# Patient Record
Sex: FEMALE | Race: BLACK OR AFRICAN AMERICAN | NOT HISPANIC OR LATINO | URBAN - METROPOLITAN AREA
[De-identification: names, ages, dates, MRNs, and addresses within clinical notes are randomized per-mention and may not be internally consistent; named-entity substitution may affect disease eponyms.]

---

## 2017-01-07 ENCOUNTER — EMERGENCY (EMERGENCY)
Facility: HOSPITAL | Age: 37
LOS: 1 days | Discharge: HOME | End: 2017-01-07
Attending: EMERGENCY MEDICINE | Admitting: EMERGENCY MEDICINE
Payer: MEDICAID

## 2017-01-07 VITALS
OXYGEN SATURATION: 99 % | TEMPERATURE: 98 F | RESPIRATION RATE: 16 BRPM | DIASTOLIC BLOOD PRESSURE: 74 MMHG | SYSTOLIC BLOOD PRESSURE: 122 MMHG | HEART RATE: 89 BPM

## 2017-01-07 VITALS
RESPIRATION RATE: 16 BRPM | DIASTOLIC BLOOD PRESSURE: 77 MMHG | OXYGEN SATURATION: 100 % | HEART RATE: 90 BPM | SYSTOLIC BLOOD PRESSURE: 133 MMHG | TEMPERATURE: 98 F

## 2017-01-07 PROCEDURE — 90792 PSYCH DIAG EVAL W/MED SRVCS: CPT | Mod: GC

## 2017-01-07 PROCEDURE — 99284 EMERGENCY DEPT VISIT MOD MDM: CPT

## 2017-01-07 RX ORDER — FAMOTIDINE 10 MG/ML
20 INJECTION INTRAVENOUS ONCE
Qty: 0 | Refills: 0 | Status: COMPLETED | OUTPATIENT
Start: 2017-01-07 | End: 2017-01-07

## 2017-01-07 RX ADMIN — FAMOTIDINE 20 MILLIGRAM(S): 10 INJECTION INTRAVENOUS at 11:47

## 2017-01-07 NOTE — ED BEHAVIORAL HEALTH ASSESSMENT NOTE - OTHER PAST PSYCHIATRIC HISTORY (INCLUDE DETAILS REGARDING ONSET, COURSE OF ILLNESS, INPATIENT/OUTPATIENT TREATMENT)
Hx of >20 inpatient psychiatric admissions, with last in [facility] (08/08 - 09/01/2016). 3 prior suicide attempts (last in 2012).  History of medication non-compliance. Multiple [facility] visits.

## 2017-01-07 NOTE — ED BEHAVIORAL HEALTH ASSESSMENT NOTE - DETAILS
Poor response to Geodon; Depakote (Increased ammonia levels) Tattoo observed on left forearm; As per chart tattoo on back of neck Depakote Patient aware

## 2017-01-07 NOTE — ED BEHAVIORAL HEALTH ASSESSMENT NOTE - RISK ASSESSMENT
Patient is currently at low risk. Patient has history of chronic suicidality with 3 prior attempts, however does not have recent SIIP. Patient does not have HIIP or any recent aggression/violence. Patient has been behaviorally safe. Patient has significant protective factors include, motivation for medication compliance, no access to firearms, no global insomnia, no recent suicidality, supportive relationships, supervised housing,  no HIIP, no aggressive/violence ideation, future orientation, ability to cope with stress, engaging in discharge and safety planning, motivation to participate in outpatient treatment, access to care, systemwide resources.

## 2017-01-07 NOTE — ED BEHAVIORAL HEALTH ASSESSMENT NOTE - CURRENT MEDICATION
Lithium 300 mg PO tid; Proventil 2 Puffs QID; Famotidine 450 mg PO QAM; Trileptal PO 600mg BID; Zyprexa 20mg PO QHS

## 2017-01-07 NOTE — ED BEHAVIORAL HEALTH ASSESSMENT NOTE - SUICIDE PROTECTIVE FACTORS
Supportive social network or family/Fear of death or dying due to pain/suffering/Identifies reasons for living/Future oriented

## 2017-01-07 NOTE — ED BEHAVIORAL HEALTH ASSESSMENT NOTE - SAFETY PLAN DETAILS
Patient instructed to return to the ED or call 911 if patient experiences SI, HI, hopelessness, worsening of symptoms or has any other concerns.

## 2017-01-07 NOTE — ED PROVIDER NOTE - PMH
Asthma    Bipolar Disorder    Borderline Personality Disorder    Cholelithiasis    Depression    Fibroids    GERD (Gastroesophageal Reflux Disease)    Obesity
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
## 2017-01-07 NOTE — ED ADULT TRIAGE NOTE - CHIEF COMPLAINT QUOTE
aggressive behavior at breakfast in [place] pt is calm in [facility] bay. As per EMS pt threw a chair against the wall spoke with  MD states to send [facility] pt to

## 2017-01-07 NOTE — ED PROVIDER NOTE - MEDICAL DECISION MAKING DETAILS
35 y/o F brought in from [place] after an episode of aggression.  Pt now calm.  Pt just ate breakfast, requesting home dose of pepcid for acid reflux.  Exam benign.  Pepcid, med clear for psych eval.
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
## 2017-01-07 NOTE — ED ADULT NURSE NOTE - CHIEF COMPLAINT QUOTE
aggressive behavior at breakfast in [place] pt is calm in [facility] bay. As per EMS pt threw a chair against the wall spoke with  MD states to send [facility] pt to

## 2017-01-07 NOTE — ED BEHAVIORAL HEALTH ASSESSMENT NOTE - DESCRIPTION
Patient was calm and cooperative in the ED and did not exhibit any aggression. Patient did not require any PRN medications or any physical restraints. GERD, Asthma, Calculus of Bladder without cholecystitis without obstruction. Patient stated before she does not have knowledge of biological family; Lives in [facility] for over 2 years; Receives SSI/SSD; Stated has "some college".
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
## 2017-02-04 ENCOUNTER — EMERGENCY (EMERGENCY)
Facility: HOSPITAL | Age: 37
LOS: 1 days | Discharge: HOME | End: 2017-02-04
Admitting: EMERGENCY MEDICINE
Payer: MEDICAID

## 2017-02-04 VITALS
HEART RATE: 79 BPM | TEMPERATURE: 99 F | DIASTOLIC BLOOD PRESSURE: 92 MMHG | OXYGEN SATURATION: 100 % | RESPIRATION RATE: 16 BRPM | SYSTOLIC BLOOD PRESSURE: 141 MMHG

## 2017-02-04 PROCEDURE — 99283 EMERGENCY DEPT VISIT LOW MDM: CPT

## 2017-02-04 PROCEDURE — 90792 PSYCH DIAG EVAL W/MED SRVCS: CPT

## 2017-02-04 NOTE — ED ADULT TRIAGE NOTE - CHIEF COMPLAINT QUOTE
Pt presents from [facility], states she got frustrated and broke a few of her own items. Pt currently calm and cooperative.

## 2017-02-04 NOTE — ED BEHAVIORAL HEALTH ASSESSMENT NOTE - DESCRIPTION
Patient was calm and cooperative in the ED and did not exhibit any aggression. Patient did not require any PRN medications or any physical restraints. GERD, Asthma, Calculus of Bladder without cholecystitis without obstruction. Patient stated before she does not have knowledge of biological family; Lives in [facility] for over 2 years; Receives SSI/SSD; Stated has "some college".

## 2017-02-04 NOTE — ED PROVIDER NOTE - MEDICAL DECISION MAKING DETAILS
37 y/o F hx Asthma, Bipolar, BPD, GERD, Obesity   No evidence of physical injuries, broken skin or deformities.   Medical evaluation performed. There is no clinical evidence of intoxication or any acute medical problem requiring immediate intervention. Patient is awaiting psychiatric consultation. Final disposition will be determined by psychiatrist.

## 2017-02-04 NOTE — ED ADULT NURSE NOTE - CHIEF COMPLAINT QUOTE
Pt presents from [facility], states she got frustrated and broke a few of her own items. Pt currently calm and cooperative.

## 2017-02-04 NOTE — ED BEHAVIORAL HEALTH ASSESSMENT NOTE - SUMMARY
This is a 36 year-old  female, non caregiver, single, domiciled on [place] grounds in a group home, unemployed (on SSD/I), with past psychiatric history of Bipolar Affective Disorder, borderline personality disorder, cannabis abuse, multiple inpatient psychiatric hospitalizations (>20), with frequent visits to the [facility] (well-known to staff). She is compliant with medication and outpatient referrals. She has a history of 3 prior suicide attempts (last in 2012 via OD), recurrent suicidal gestures and suicidal ideation (none recently), history of violence, no history of arrests or access to weapons, was referred by [place] staff and presents to [facility] for being agitated. Patient is well-known to [facility] staff per frequent multiple visits (last 09/23/2016), where she is calm and cooperative. Patient has a chronic history of such complaints and requests, of which this visit is historically congruent. Patient was able to follow simple directions and participate in the interview.  Patient has no recent safety threatening / jeopardizing behaviors I.e. violence towards people; self-harm; suicidality. Patient and staff do not have any safety concerns for patient/others. Patient is future oriented, and motivated to continue medications as prescribed. Patient presentation does not warrant in patient admission, and agreeable to discharge plan of continuing medications at the Residence, and to follow up with AOT. This is a 36 year-old  female,  single, domiciled on [place] grounds in a group home, unemployed (on SSD/I), with past psychiatric history of Bipolar Affective Disorder, borderline personality disorder, cannabis abuse, multiple inpatient psychiatric hospitalizations (>20), with frequent visits to the [facility] (well-known to staff).  Referred by [place] staff and presents to [facility] after breaking a chair and bottle in her room due to frustration over not being able to more to a independent living situation.  She is currently calm, cooperative, free of SI/HI, free of psychosis and with no evidence of an acute mood episode.  She is fully compliant with meds, followed by AOT and ACT.  Pt remorseful of her behavior, understands that housing decision needs to be made with ACT and AOT.  Pt will be d/c to residence with follow up with  and ACT.

## 2017-02-04 NOTE — ED BEHAVIORAL HEALTH ASSESSMENT NOTE - HPI (INCLUDE ILLNESS QUALITY, SEVERITY, DURATION, TIMING, CONTEXT, MODIFYING FACTORS, ASSOCIATED SIGNS AND SYMPTOMS)
37 yo female with long hx of bipolar d/o, multiple hospitalizations, followed by ACT and AOT, resident of [facility] after becoming agitated and breaking a chair and glass bottle 35 yo female with long hx of bipolar d/o, multiple hospitalizations, followed by ACT and AOT, [facility] after becoming agitated and breaking a chair and glass bottle in her room.  She reports increasing frustration b/c she no longer wants to live at [facility] and move into a more independent housing situation.  She feels that no once including her  is listening to her wishes.  In order to alleviate her frustration she broke items.  Though she realizes her behavior is not appropriate, she feels justified due to her situation and the fact that the items she broke were her own.  In the ER she is calm, cooperative, charming.  She reports being psychiatrically stable, free of mood sympomts, free of SI/HI and psychotic symptoms.  She reports full compliance with meds and rules of the house.  She reports plans to go back to school to better herself and get a part time job.    As per  Nancy pt has been asking to be d/c to be on her own, but has advised that next steps need to be in conjuction with AOT and ACT.  She has contacted both teams and is awaiting call back.  She reports pt being stable and compliant with treatment

## 2017-02-04 NOTE — ED BEHAVIORAL HEALTH ASSESSMENT NOTE - DETAILS
Poor response to Geodon; Depakote (Increased ammonia levels) Tattoo observed on left forearm; As per chart tattoo on back of neck Depakote [facility] notified of d/c

## 2017-02-04 NOTE — ED PROVIDER NOTE - OBJECTIVE STATEMENT
35 y/o F     Admits to not sleeping well. Denies punching or kicking any objects. Denies pain, SOB, fever , chills, chest/ abdominal discomfort.  Denies any thoughts  or intention to harm self or others. Denies AH/VH. Denies  use of alcohol or illicit drugs 35 y/o F hx Asthma, Bipolar, BPD, GERD, Obesity presents to  ER w c/o agitation and aggression. States that she broke a glass out of frustration because she needs a new residence. Admits to not sleeping well. Denies punching or kicking any objects. Denies pain, SOB, fever , chills, chest/ abdominal discomfort.  Denies any thoughts  or intention to harm self or others. Denies AH/VH. Denies  use of alcohol or illicit drugs  LNMP - 1/20/2017

## 2017-02-04 NOTE — ED BEHAVIORAL HEALTH ASSESSMENT NOTE - SUICIDE PROTECTIVE FACTORS
Supportive social network or family/Fear of death or dying due to pain/suffering/Future oriented/Identifies reasons for living Future oriented/Fear of death or dying due to pain/suffering/High spirituality/Identifies reasons for living/Positive therapeutic relationships/Supportive social network or family

## 2017-02-04 NOTE — ED BEHAVIORAL HEALTH ASSESSMENT NOTE - OTHER PAST PSYCHIATRIC HISTORY (INCLUDE DETAILS REGARDING ONSET, COURSE OF ILLNESS, INPATIENT/OUTPATIENT TREATMENT)
Hx of >20 inpatient psychiatric admissions, with last in [facility] (08/08 - 09/01/2016). 3 prior suicide attempts (last in 2012).  History of medication non-compliance. Multiple [facility] visits.

## 2017-02-04 NOTE — ED BEHAVIORAL HEALTH ASSESSMENT NOTE - RISK ASSESSMENT
Patient is currently at low risk. Patient has history of chronic suicidality with 3 prior attempts, however does not have recent SIIP. Patient does not have HIIP or any recent aggression/violence. Patient has been behaviorally safe. Patient has significant protective factors include, motivation for medication compliance, no access to firearms, no global insomnia, no recent suicidality, supportive relationships, supervised housing,  no HIIP, no aggressive/violence ideation, future orientation, ability to cope with stress, engaging in discharge and safety planning, motivation to participate in outpatient treatment, access to care, systemwide resources. Patient is currently at low risk. Patient has history of chronic suicidality with 3 prior attempts, however does not have recent SIIP. Patient does not have HIIP or any recent aggression/violence. Patient has been behaviorally safe. Patient has significant protective factors which  include, future orientation, motivation for medication compliance, no access to firearms, no global insomnia, no recent suicidality, supportive relationships, supervised housing,  no HIIP, no aggressive/violence ideation, future orientation, ability to cope with stress, engaging in discharge and safety planning, motivation to participate in outpatient treatment, access to care, systemwide resources.

## 2017-02-04 NOTE — ED ADULT NURSE NOTE - OBJECTIVE STATEMENT
Received pt in   pt calm & cooperative in nad denies Si/Hi/AVh at present eval on going. [facility]   pt calm & cooperative in nad denies Si/Hi/AVh at present eval on going.

## 2017-02-13 ENCOUNTER — EMERGENCY (EMERGENCY)
Facility: HOSPITAL | Age: 37
LOS: 1 days | Discharge: HOME | End: 2017-02-13
Attending: EMERGENCY MEDICINE | Admitting: EMERGENCY MEDICINE
Payer: SELF-PAY

## 2017-02-13 VITALS
TEMPERATURE: 99 F | SYSTOLIC BLOOD PRESSURE: 148 MMHG | HEART RATE: 89 BPM | OXYGEN SATURATION: 100 % | DIASTOLIC BLOOD PRESSURE: 87 MMHG | RESPIRATION RATE: 20 BRPM

## 2017-02-13 PROCEDURE — 90792 PSYCH DIAG EVAL W/MED SRVCS: CPT

## 2017-02-13 PROCEDURE — 99284 EMERGENCY DEPT VISIT MOD MDM: CPT

## 2017-02-13 NOTE — ED BEHAVIORAL HEALTH ASSESSMENT NOTE - CURRENT MEDICATION
Lithium 300 mg PO tid; Proventil 2 Puffs QID; Famotidine 450 mg PO QAM; Trileptal PO 300mg BID; Zyprexa 20mg PO QHS. Per staff has not been able to fill meds this month due to an insurance lapse.

## 2017-02-13 NOTE — ED BEHAVIORAL HEALTH NOTE - BEHAVIORAL HEALTH NOTE
Collateral obtained from Ambrosio Guallpa ([phone]), one of the  at patient's residence. He reports that earlier this morning in the dining room, patient was observed yelling for "no apparent reason." Informant reports that patient allegedly picked up a chair but was able to calm herself down, place the chair back on the floor and subsequently went to her room. Patient reportedly continued screaming but was later observed to be in behavioral control. Informant denies any observed or reported physical aggression. In recent days, patient has been at her baseline with no observed or reported verbal or physical aggression. He states that this is her baseline, denies threatening, bizarre or disorganized behaviors. Patient has been reportedly eating and sleeping well. She has been attending to ADLs. Patient has been social at the residence, goes into the community daily. No evidence of isolative or withdrawn behaviors at the residence per collateral. She has not been compliant with medications since the beginning of the month due to issues with her insurance. Patient is due for psychiatric follow up and lab work. He denies any acute safety concerns.
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
## 2017-02-13 NOTE — ED BEHAVIORAL HEALTH ASSESSMENT NOTE - RISK ASSESSMENT
Patient has history of chronic suicidality with 3 prior attempts, however does not have recent SIIP. Patient does not have HIIP or any recent aggression/violence. Patient has been behaviorally safe. Patient has significant protective factors include no access to firearms, no global insomnia, no acute anxiety, no trina/psychosis, no recent suicidality/self injury, supportive relationships, supervised housing, no SHIIP, no aggressive/violence ideation, no current substance abuse, future orientation, engaging in discharge and safety planning, motivation to participate in outpatient treatment, access to care, systemwide resources. Acute risk appears low, no evidence of elevated imminent risk. Appropriate for outpatient level of care at this time.

## 2017-02-13 NOTE — ED BEHAVIORAL HEALTH ASSESSMENT NOTE - DESCRIPTION
Patient was calm and cooperative in the ED and did not exhibit any aggression. Patient did not require any PRN medications or any physical restraints. GERD, Asthma, Calculus of Bladder without cholecystitis without obstruction. Patient stated before she does not have knowledge of biological family; Lives in [facility] for several years; Receives SSI/SSD; Stated has "some college".

## 2017-02-13 NOTE — ED BEHAVIORAL HEALTH ASSESSMENT NOTE - OTHER PAST PSYCHIATRIC HISTORY (INCLUDE DETAILS REGARDING ONSET, COURSE OF ILLNESS, INPATIENT/OUTPATIENT TREATMENT)
Hx of >20 inpatient psychiatric admissions, with last in [facility] December 2016. 3 prior suicide attempts (last in 2012).  History of medication non-compliance. Multiple [facility] visits.

## 2017-02-13 NOTE — ED BEHAVIORAL HEALTH ASSESSMENT NOTE - SUMMARY
36 year-old  female, non caregiver, single, domiciled on [place] grounds in a group home, unemployed (on SSD/I), with past psychiatric history of Bipolar Affective Disorder, borderline personality disorder, cannabis abuse, multiple inpatient psychiatric hospitalizations (>20), most recently discharged from [facility] December 2016, with frequent visits to the [facility] (well-known to staff).  She has a history of 3 prior suicide attempts (last in 2012 via OD), recurrent suicidal gestures and suicidal ideation (none recently), history of property destruction, no history of arrests or access to weapons, was referred by [place] staff and presents to [facility] after being agitated at the residence.  Patient is well known to this writer and ED staff, she has chronic behavioral outbursts secondary to poor frustration tolerance and limited coping skills. She was not physically aggressive towards anyone today. Although staff report an insurance problem has led to patient becoming non compliant over the last month, on mental status exam she is calm, appropriate and does not exhibit any signs/symptoms of decompensation. No trina/psychosis/depression reported or observed. No SI/HI. Does not meet criteria for admission.

## 2017-02-13 NOTE — ED BEHAVIORAL HEALTH ASSESSMENT NOTE - HPI (INCLUDE ILLNESS QUALITY, SEVERITY, DURATION, TIMING, CONTEXT, MODIFYING FACTORS, ASSOCIATED SIGNS AND SYMPTOMS)
36 year-old  female, non caregiver, single, domiciled on [place] grounds in a group home, unemployed (on SSD/I), with past psychiatric history of Bipolar Affective Disorder, borderline personality disorder, cannabis abuse, multiple inpatient psychiatric hospitalizations (>20), most recently discharged from [facility] December 2016, with frequent visits to the [facility] (well-known to staff).  She has a history of 3 prior suicide attempts (last in 2012 via OD), recurrent suicidal gestures and suicidal ideation (none recently), history of property destruction, no history of arrests or access to weapons, was referred by [place] staff and presents to [facility] after being agitated at the residence.  Patient calm, cooperative, pleasant, well groomed. She reports that a peer came near her and did not say "excuse me", she has had ongoing frustrations with this peer and therefore she began yelling. She denies any physical contact/harm or homicidal ideation. She denies all mood and psychotic symptoms. Denies suicidal ideation. Denies AVH. Denies recent substance abuse. Plans to visit a museum today.    Ambrosio Guallpa, [place]  provided collateral, see  note.

## 2017-02-13 NOTE — ED BEHAVIORAL HEALTH ASSESSMENT NOTE - SUICIDE PROTECTIVE FACTORS
Fear of death or dying due to pain/suffering/Identifies reasons for living/Positive therapeutic relationships/Future oriented/Supportive social network or family

## 2017-02-13 NOTE — ED PROVIDER NOTE - MEDICAL DECISION MAKING DETAILS
35 y/o F sent from [facility] for aggressive behavior.  Pt is calm and cooperative here without any complaints.  Psych to see.

## 2017-02-16 ENCOUNTER — EMERGENCY (EMERGENCY)
Facility: HOSPITAL | Age: 37
LOS: 1 days | Discharge: HOME | End: 2017-02-16
Admitting: EMERGENCY MEDICINE
Payer: MEDICAID

## 2017-02-16 VITALS
TEMPERATURE: 98 F | DIASTOLIC BLOOD PRESSURE: 85 MMHG | SYSTOLIC BLOOD PRESSURE: 136 MMHG | RESPIRATION RATE: 18 BRPM | HEART RATE: 78 BPM | OXYGEN SATURATION: 100 %

## 2017-02-16 PROCEDURE — 90792 PSYCH DIAG EVAL W/MED SRVCS: CPT

## 2017-02-16 PROCEDURE — 99284 EMERGENCY DEPT VISIT MOD MDM: CPT

## 2017-02-16 NOTE — ED ADULT NURSE REASSESSMENT NOTE - SYMPTOMS
none
Pt has lac to right third digit 1-2cm in length, no active bleeding. Pt refusing stitches at this time. Area cleansed with water, bacitracin applied and bandaged./none

## 2017-02-16 NOTE — ED BEHAVIORAL HEALTH ASSESSMENT NOTE - SUMMARY
36 year-old  female, non caregiver, single, domiciled on [place] grounds in a group home, unemployed (on SSD/I), with past psychiatric history of Bipolar Affective Disorder, borderline personality disorder, cannabis abuse, multiple inpatient psychiatric hospitalizations (>20), most recently discharged from [facility] December 2016, with frequent visits to the [facility] (well-known to staff).  She has a history of 3 prior suicide attempts (last in 2012 via OD), recurrent suicidal gestures and suicidal ideation (none recently), history of property destruction, no history of arrests or access to weapons, was referred by [place] [facility] staff and presents to [facility] after being agitated at the residence.  Patient is well known to this writer and ED staff, she has chronic behavioral outbursts secondary to poor frustration tolerance and limited coping skills. She was not physically aggressive towards anyone today. Although staff report an insurance problem has led to patient becoming non compliant over the last month, on mental status exam she is calm, appropriate and does not exhibit any signs/symptoms of decompensation. No trina/psychosis/depression reported or observed. No SI/HI. Does not meet criteria for admission.

## 2017-02-16 NOTE — ED BEHAVIORAL HEALTH ASSESSMENT NOTE - SUICIDE PROTECTIVE FACTORS
Identifies reasons for living/Supportive social network or family/Positive therapeutic relationships/Future oriented/Fear of death or dying due to pain/suffering

## 2017-02-16 NOTE — ED ADULT TRIAGE NOTE - CHIEF COMPLAINT QUOTE
Pt from [place] got aggravated and hit radiator. C/o cut to right middle finger from radiator cover with 2 cm lac. Covered with gauze in triage.

## 2017-02-16 NOTE — ED PROVIDER NOTE - MEDICAL DECISION MAKING DETAILS
This is a 36 year old Female PMHx Asthma, Bipolar, BPD, GERD, Obesity BIBA from [facility] for psych eval r/t aggression and finger laceration. Medical evaluation performed. There is no clinical evidence of intoxication or any acute medical problem requiring immediate intervention. Final disposition will be determined by psychiatrist.

## 2017-02-16 NOTE — ED PROVIDER NOTE - PHYSICAL EXAMINATION
Patient present with left  3rd phalange laceration Distal to palm Epidermis is removed  revealing bright red tissue  of the dermis  Dime size Tetanus shot 5 years ago Patient is refusing stiches or consult with hand specialist Wound was cleaned, bacitracin applied abd bandage. Patient was also seen by Dr. Facundo Manley in intake where she also refused stiches or wound care. Patient was educated on signs and symptoms of cellulitis and wound care Patient verbalizes understanding of both  instructions. Dressing is clean dry and intact. Would is no longer bleeding. VSS DENIES PAIN SOB N/V NO S/S OF CARDIAC OR RESPIRATORY DISTRESS PT AMBULATES AND PERFORMS ADL'S INDEPENDENTLY TOLERATING PO INTAKE Patient present with 1-2 cm right 3rd phalange laceration Distal to palm Epidermis removed  revealing bright red tissue  of the dermis  Dime size Tetanus shot 5 years ago Patient is refusing stiches or consult with hand specialist Wound was cleaned, bacitracin applied and bandaged. Patient was also seen by Dr. Facundo Manley at intake where she also refused stiches or wound care. Patient was educated on signs and symptoms of cellulitis and wound care Patient verbalizes understanding of both  instructions. Dressing is clean dry and intact. Would is no longer bleeding. VSS DENIES PAIN SOB N/V NO S/S OF CARDIAC OR RESPIRATORY DISTRESS PT AMBULATES AND PERFORMS ADL'S INDEPENDENTLY TOLERATING PO INTAKE

## 2017-02-16 NOTE — ED ADULT NURSE REASSESSMENT NOTE - GENERAL PATIENT STATE
comfortable appearance/cooperative/denies s/i h/i or a/v/h
pt remains awake, a&ox3, calm, denies s/i or intent to self harm/comfortable appearance/cooperative

## 2017-02-16 NOTE — ED BEHAVIORAL HEALTH ASSESSMENT NOTE - DESCRIPTION
Patient was calm and cooperative in the ED and did not exhibit any aggression. Patient did not require any PRN medications or any physical restraints. GERD, Asthma, Calculus of Bladder without cholecystitis without obstruction. Patient stated before she does not have knowledge of biological family; Lives in [facility] for several years; Receives SSI/SSD; Stated has "some college".

## 2017-02-16 NOTE — ED BEHAVIORAL HEALTH ASSESSMENT NOTE - CASE SUMMARY
Patient is a 36 year-old  woman, with past psychiatric history of Bipolar Affective Disorder, borderline personality disorder, cannabis abuse, 3 prior suicide attempts (last in 2012 via OD), recurrent suicidal gestures and suicidal ideation (none recently) multiple inpatient psychiatric hospitalizations (>20), most recently discharged from [facility] December 2016, [facility]), last here three days ago after becoming agitated, was referred by [facility] staff who presents again to the hospital after becoming agitated grabbing a radiator cover and slamming it on the floor enduring small laceration not requiring sutures.    Patient denies symptoms of depression, trina, anxiety, psychosis, suicidal/homicidal ideations, auditory/visual hallucinations.  Patient does not represent an imminent threat of danger to self or others at this time.  Patient does not meet criteria for inpatient involuntary hospitalization.  Patient will be discharged home and agrees to discharge disposition    Patient has been calm and cooperative in the ED.  Patient denies acute symptoms of depression, trina, anxiety, psychosis, suicidal/homicidal ideations, intent or plans, denies auditory/visual hallucinations.  Patient does not represent an imminent threat of danger to self or others at this time.  Patient does not meet criteria for inpatient involuntary hospitalization.  Patient can be managed by AOT team          who was contacted and is aware of her disposition.  Patient will be discharged home and agrees to discharge disposition

## 2017-02-16 NOTE — ED BEHAVIORAL HEALTH ASSESSMENT NOTE - OTHER PAST PSYCHIATRIC HISTORY (INCLUDE DETAILS REGARDING ONSET, COURSE OF ILLNESS, INPATIENT/OUTPATIENT TREATMENT)
Hx of >20 inpatient psychiatric admissions, with last in [facility] December 2016. 3 prior suicide attempts (last in 2012).  History of medication non-compliance. Multiple [facility] visits last 02/13/2017.

## 2017-02-16 NOTE — ED ADULT NURSE REASSESSMENT NOTE - REASSESS COMMUNICATION
d/c instructions and cab provided by [facility] for safe transport to home/ED physician notified
ED physician notified/D/c instructions provided, awaiting cab set up by [facility] for safe transport to home.

## 2017-02-16 NOTE — ED BEHAVIORAL HEALTH ASSESSMENT NOTE - HPI (INCLUDE ILLNESS QUALITY, SEVERITY, DURATION, TIMING, CONTEXT, MODIFYING FACTORS, ASSOCIATED SIGNS AND SYMPTOMS)
36 year-old  female, non caregiver, single, domiciled on [place] grounds in a group home(on SSD/I), with past psychiatric history of Bipolar Affective Disorder, borderline personality disorder, cannabis abuse, multiple inpatient psychiatric hospitalizations (>20), most recently discharged from [facility] December 2016, with frequent visits to the [facility] (well-known to staff), last here three days ago after becoming agitated.  She has a history of 3 prior suicide attempts (last in 2012 via OD), recurrent suicidal gestures and suicidal ideation (none recently), history of property destruction, no history of arrests or access to weapons, was referred by [place] staff and presents to [facility] after becoming agitated grabbing a radiator cover to as reported by client, "slam it on the floor because I was upset" and in the process cut her finger.  Patient states she was feeling frustrated, was unable to verbalize why she was feeling this way, states that one of the staff members had "got on her nerves" and she had gone back to her room and just grabbed the radiator. Client denies current suicidal or homicidal ideation, denies alcohol or substance abuse, denies AV hallucinations. Calm and cooperative thought processes mostly linear and goal directed with some flight of ideas.   Spoke with Grace Norris, on call  at [facility] , she reports that patient has been off of her medications for the past month because of a mix up between the AOT provider and the pharmacist. Reports that the staff there are "working on it" and hope to resolve situation soon. She denies that they have any safety concerns with patient returning to residence. 36 year-old  female, non caregiver, single, domiciled on [place] grounds in a group home(on SSD/I), with past psychiatric history of Bipolar Affective Disorder, borderline personality disorder, cannabis abuse, multiple inpatient psychiatric hospitalizations (>20), most recently discharged from [facility] December 2016, with frequent visits to the [facility] (well-known to staff), last here three days ago after becoming agitated.  She has a history of 3 prior suicide attempts (last in 2012 via OD), recurrent suicidal gestures and suicidal ideation (none recently), history of property destruction, no history of arrests or access to weapons, was referred by [place] staff and presents to [facility] after becoming agitated grabbing a radiator cover to as reported by client, "slammed it on the floor because I was upset" and in the process cut her finger.  Patient states she was feeling frustrated, was unable to verbalize why she was feeling this way, states that one of the staff members had "got on her nerves" and she had gone back to her room and just grabbed the radiator. Client denies current suicidal or homicidal ideation, denies alcohol or substance abuse, denies AV hallucinations. Calm and cooperative thought processes mostly linear and goal directed with some flight of ideas.   Spoke with Grace Norris, on call  at [facility] , she reports that patient has been off of her medications for the past month because of a mix up between the AOT provider and the pharmacist. Reports that the staff there are "working on it" and hope to resolve situation soon. She denies that they have any safety concerns with patient returning to residence.

## 2017-02-16 NOTE — ED PROVIDER NOTE - OBJECTIVE STATEMENT
This is a 36 year old Female PMHx This is a 36 year old Female PMHx Asthma, Bipolar, BPD, GERD, Obesity BIBA from [facility] for psych eval r/t aggression and finger laceration. Patient states she became mad and threw a metal radiator cover which sliced her finger.  Reports suicidal thoughts and depression Denies suicidal plan Patient is refusing stitches or hand specialist. Patient presents with right 3rd finger laceration. Patient was also seen by Dr Facundo Manley at intake which initial wound care was evaluated and bandage. Patient presents with saturated band-aid. Denies chest pain, SOB, N/V/D and fevers, Denies palpitations or diaphoresis. Denies Numbness, Tingling, Blurry Vision and HA.  Denies homicidal thoughts. Denies visual/auditory hallucinations. Denies ETOH/Illicit drug use. Denies recent falls, trauma and injuries. Denies pain or any other medical complaints.

## 2017-02-16 NOTE — ED BEHAVIORAL HEALTH NOTE - BEHAVIORAL HEALTH NOTE
Writer called [facility], [phone], and spoke with Tho, requesting [facility] service with account #[id number], for a fee of $12, to her residence, [facility] [id number]. Patient does not have her coat and cannot, therefore, be sent by bus. Tho provided an ETA of 10:30PM.

## 2017-03-07 ENCOUNTER — EMERGENCY (EMERGENCY)
Facility: HOSPITAL | Age: 37
LOS: 1 days | Discharge: HOME | End: 2017-03-07
Admitting: EMERGENCY MEDICINE
Payer: MEDICAID

## 2017-03-07 VITALS
OXYGEN SATURATION: 100 % | HEART RATE: 84 BPM | TEMPERATURE: 98 F | DIASTOLIC BLOOD PRESSURE: 73 MMHG | SYSTOLIC BLOOD PRESSURE: 118 MMHG | RESPIRATION RATE: 18 BRPM

## 2017-03-07 VITALS
DIASTOLIC BLOOD PRESSURE: 59 MMHG | SYSTOLIC BLOOD PRESSURE: 107 MMHG | RESPIRATION RATE: 16 BRPM | HEART RATE: 99 BPM | TEMPERATURE: 99 F | OXYGEN SATURATION: 99 %

## 2017-03-07 LAB
ALBUMIN SERPL ELPH-MCNC: 3.7 G/DL — SIGNIFICANT CHANGE UP (ref 3.3–5)
ALP SERPL-CCNC: 74 U/L — SIGNIFICANT CHANGE UP (ref 40–120)
ALT FLD-CCNC: 11 U/L — SIGNIFICANT CHANGE UP (ref 4–33)
APPEARANCE UR: CLEAR — SIGNIFICANT CHANGE UP
AST SERPL-CCNC: 16 U/L — SIGNIFICANT CHANGE UP (ref 4–32)
BASE EXCESS BLDV CALC-SCNC: 0 MMOL/L — SIGNIFICANT CHANGE UP
BASOPHILS # BLD AUTO: 0.01 K/UL — SIGNIFICANT CHANGE UP (ref 0–0.2)
BASOPHILS NFR BLD AUTO: 0.1 % — SIGNIFICANT CHANGE UP (ref 0–2)
BILIRUB SERPL-MCNC: 0.4 MG/DL — SIGNIFICANT CHANGE UP (ref 0.2–1.2)
BILIRUB UR-MCNC: NEGATIVE — SIGNIFICANT CHANGE UP
BLOOD GAS VENOUS - CREATININE: 0.68 MG/DL — SIGNIFICANT CHANGE UP (ref 0.5–1.3)
BLOOD UR QL VISUAL: NEGATIVE — SIGNIFICANT CHANGE UP
BUN SERPL-MCNC: 13 MG/DL — SIGNIFICANT CHANGE UP (ref 7–23)
CALCIUM SERPL-MCNC: 8.7 MG/DL — SIGNIFICANT CHANGE UP (ref 8.4–10.5)
CHLORIDE BLDV-SCNC: 109 MMOL/L — HIGH (ref 96–108)
CHLORIDE SERPL-SCNC: 107 MMOL/L — SIGNIFICANT CHANGE UP (ref 98–107)
CO2 SERPL-SCNC: 21 MMOL/L — LOW (ref 22–31)
COLOR SPEC: YELLOW — SIGNIFICANT CHANGE UP
CREAT SERPL-MCNC: 0.67 MG/DL — SIGNIFICANT CHANGE UP (ref 0.5–1.3)
EOSINOPHIL # BLD AUTO: 0.02 K/UL — SIGNIFICANT CHANGE UP (ref 0–0.5)
EOSINOPHIL NFR BLD AUTO: 0.3 % — SIGNIFICANT CHANGE UP (ref 0–6)
GAS PNL BLDV: 138 MMOL/L — SIGNIFICANT CHANGE UP (ref 136–146)
GLUCOSE BLDV-MCNC: 90 — SIGNIFICANT CHANGE UP (ref 70–99)
GLUCOSE SERPL-MCNC: 86 MG/DL — SIGNIFICANT CHANGE UP (ref 70–99)
GLUCOSE UR-MCNC: NEGATIVE — SIGNIFICANT CHANGE UP
HCO3 BLDV-SCNC: 24 MMOL/L — SIGNIFICANT CHANGE UP (ref 20–27)
HCT VFR BLD CALC: 43.4 % — SIGNIFICANT CHANGE UP (ref 34.5–45)
HCT VFR BLDV CALC: 43.1 % — SIGNIFICANT CHANGE UP (ref 34.5–45)
HGB BLD-MCNC: 14 G/DL — SIGNIFICANT CHANGE UP (ref 11.5–15.5)
HGB BLDV-MCNC: 14 G/DL — SIGNIFICANT CHANGE UP (ref 11.5–15.5)
IMM GRANULOCYTES NFR BLD AUTO: 0.4 % — SIGNIFICANT CHANGE UP (ref 0–1.5)
KETONES UR-MCNC: SIGNIFICANT CHANGE UP
LACTATE BLDV-MCNC: 1.1 MMOL/L — SIGNIFICANT CHANGE UP (ref 0.5–2)
LEUKOCYTE ESTERASE UR-ACNC: NEGATIVE — SIGNIFICANT CHANGE UP
LIDOCAIN IGE QN: 22.2 U/L — SIGNIFICANT CHANGE UP (ref 7–60)
LYMPHOCYTES # BLD AUTO: 0.96 K/UL — LOW (ref 1–3.3)
LYMPHOCYTES # BLD AUTO: 12.2 % — LOW (ref 13–44)
MCHC RBC-ENTMCNC: 30.6 PG — SIGNIFICANT CHANGE UP (ref 27–34)
MCHC RBC-ENTMCNC: 32.3 % — SIGNIFICANT CHANGE UP (ref 32–36)
MCV RBC AUTO: 94.8 FL — SIGNIFICANT CHANGE UP (ref 80–100)
MONOCYTES # BLD AUTO: 0.48 K/UL — SIGNIFICANT CHANGE UP (ref 0–0.9)
MONOCYTES NFR BLD AUTO: 6.1 % — SIGNIFICANT CHANGE UP (ref 2–14)
MUCOUS THREADS # UR AUTO: SIGNIFICANT CHANGE UP
NEUTROPHILS # BLD AUTO: 6.39 K/UL — SIGNIFICANT CHANGE UP (ref 1.8–7.4)
NEUTROPHILS NFR BLD AUTO: 80.9 % — HIGH (ref 43–77)
NITRITE UR-MCNC: NEGATIVE — SIGNIFICANT CHANGE UP
PCO2 BLDV: 43 MMHG — SIGNIFICANT CHANGE UP (ref 41–51)
PH BLDV: 7.38 PH — SIGNIFICANT CHANGE UP (ref 7.32–7.43)
PH UR: 7 — SIGNIFICANT CHANGE UP (ref 4.6–8)
PLATELET # BLD AUTO: 247 K/UL — SIGNIFICANT CHANGE UP (ref 150–400)
PMV BLD: 9.1 FL — SIGNIFICANT CHANGE UP (ref 7–13)
PO2 BLDV: 39 MMHG — SIGNIFICANT CHANGE UP (ref 35–40)
POTASSIUM BLDV-SCNC: 3.8 MMOL/L — SIGNIFICANT CHANGE UP (ref 3.4–4.5)
POTASSIUM SERPL-MCNC: 4.2 MMOL/L — SIGNIFICANT CHANGE UP (ref 3.5–5.3)
POTASSIUM SERPL-SCNC: 4.2 MMOL/L — SIGNIFICANT CHANGE UP (ref 3.5–5.3)
PROT SERPL-MCNC: 7.3 G/DL — SIGNIFICANT CHANGE UP (ref 6–8.3)
PROT UR-MCNC: 30 — HIGH
RBC # BLD: 4.58 M/UL — SIGNIFICANT CHANGE UP (ref 3.8–5.2)
RBC # FLD: 12.7 % — SIGNIFICANT CHANGE UP (ref 10.3–14.5)
RBC CASTS # UR COMP ASSIST: SIGNIFICANT CHANGE UP (ref 0–?)
SAO2 % BLDV: 66.4 % — SIGNIFICANT CHANGE UP (ref 60–85)
SODIUM SERPL-SCNC: 143 MMOL/L — SIGNIFICANT CHANGE UP (ref 135–145)
SP GR SPEC: 1.03 — HIGH (ref 1–1.03)
SQUAMOUS # UR AUTO: SIGNIFICANT CHANGE UP
UROBILINOGEN FLD QL: NORMAL E.U. — SIGNIFICANT CHANGE UP (ref 0.1–0.2)
WBC # BLD: 7.89 K/UL — SIGNIFICANT CHANGE UP (ref 3.8–10.5)
WBC # FLD AUTO: 7.89 K/UL — SIGNIFICANT CHANGE UP (ref 3.8–10.5)
WBC UR QL: SIGNIFICANT CHANGE UP (ref 0–?)

## 2017-03-07 PROCEDURE — 99284 EMERGENCY DEPT VISIT MOD MDM: CPT

## 2017-03-07 PROCEDURE — 74177 CT ABD & PELVIS W/CONTRAST: CPT | Mod: 26

## 2017-03-07 RX ORDER — ONDANSETRON 8 MG/1
4 TABLET, FILM COATED ORAL ONCE
Qty: 0 | Refills: 0 | Status: COMPLETED | OUTPATIENT
Start: 2017-03-07 | End: 2017-03-07

## 2017-03-07 RX ORDER — SODIUM CHLORIDE 9 MG/ML
1000 INJECTION INTRAMUSCULAR; INTRAVENOUS; SUBCUTANEOUS ONCE
Qty: 0 | Refills: 0 | Status: COMPLETED | OUTPATIENT
Start: 2017-03-07 | End: 2017-03-07

## 2017-03-07 RX ORDER — FAMOTIDINE 10 MG/ML
20 INJECTION INTRAVENOUS ONCE
Qty: 0 | Refills: 0 | Status: COMPLETED | OUTPATIENT
Start: 2017-03-07 | End: 2017-03-07

## 2017-03-07 RX ADMIN — FAMOTIDINE 20 MILLIGRAM(S): 10 INJECTION INTRAVENOUS at 14:10

## 2017-03-07 RX ADMIN — SODIUM CHLORIDE 1000 MILLILITER(S): 9 INJECTION INTRAMUSCULAR; INTRAVENOUS; SUBCUTANEOUS at 14:00

## 2017-03-07 RX ADMIN — ONDANSETRON 4 MILLIGRAM(S): 8 TABLET, FILM COATED ORAL at 14:00

## 2017-03-07 NOTE — ED PROVIDER NOTE - MEDICAL DECISION MAKING DETAILS
Pt is a 35 y/o F former smoker, occasional marijuana smoker PMHx Bipolar Disorder, Borderline personality disorder, GERD, uterine fibroids p/w abdominal pain today -- r/o appendicitis, r/o UTI, r/o pyelonephritis -- labs, ua, ucg, lipase, CT abd and pelvis w/ contrast

## 2017-03-07 NOTE — ED PROVIDER NOTE - CHPI ED SYMPTOMS NEG
no burning urination/no dysuria/no blood in stool/no fever/no diarrhea/no hematuria/no abdominal distension

## 2017-03-07 NOTE — ED ADULT NURSE NOTE - CHIEF COMPLAINT QUOTE
[facility] abd pain with nausea/vomiting since yesterday. No acute distress noted.

## 2017-03-07 NOTE — ED PROVIDER NOTE - PLAN OF CARE
Rest, drink plenty of fluids.  Advance activity as tolerated.  Continue all previously prescribed medications as directed.  Take Tylenol 650mg (Two 325 mg pills) every 4-6 hours as needed for pain. Follow up with your primary care physician in 48-72 hours- bring copies of your results.  Return to the ER for worsening or persistent symptoms, including, but not limited to worsening/persistent pain, nausea, vomiting, and/or ANY NEW OR CONCERNING SYMPTOMS. If you have issues obtaining follow up, please call: [phone] to obtain a doctor or specialist who takes your insurance in your area.

## 2017-03-07 NOTE — ED PROVIDER NOTE - CARE PLAN
Principal Discharge DX:	Ovarian cyst  Instructions for follow-up, activity and diet:	Rest, drink plenty of fluids.  Advance activity as tolerated.  Continue all previously prescribed medications as directed.  Take Tylenol 650mg (Two 325 mg pills) every 4-6 hours as needed for pain. Follow up with your primary care physician in 48-72 hours- bring copies of your results.  Return to the ER for worsening or persistent symptoms, including, but not limited to worsening/persistent pain, nausea, vomiting, and/or ANY NEW OR CONCERNING SYMPTOMS. If you have issues obtaining follow up, please call: [phone] to obtain a doctor or specialist who takes your insurance in your area.  Secondary Diagnosis:	Fibroid

## 2017-03-07 NOTE — ED PROVIDER NOTE - OBJECTIVE STATEMENT
Pt is a 35 y/o F former smoker, occasional marijuana smoker PMHx Bipolar Disorder, Borderline personality disorder, GERD, uterine fibroids p/w abdominal pain today.  Pt endorses RLQ and suprapubic aching pain, moderate in intensity associated with nausea and vomiting (4 nonbloody, nonbilious episodes) and chills.  Pt also notes associated generalized body aches.  Pt notes constipation, described as harder than normal stools today.  Denies any fevers, chest pain, SOB, dysuria, cloudy urine, hematuria, vaginal bleeding, vaginal discharge, cough, wheezing, melena, BRBPR, sick contacts, recent travel history, recent antibiotic use, recent hospitalizations.  Last BM today, which was normal, nonbloody.  Pt passing gas today.

## 2017-03-07 NOTE — ED ADULT TRIAGE NOTE - CHIEF COMPLAINT QUOTE
[facility] abd pain with nausea/vomiting since yesterday. No acute distress noted.

## 2017-03-07 NOTE — ED ADULT NURSE NOTE - OBJECTIVE STATEMENT
pt c.o. abd pain since yesterday. pt c.o. chills, constipation, nausea, vomiting, since yesterday. vomited x5 today. denies dysuria. c.o. lower back pain. abd soft, tender on palpation thru out abdomen. pt states she became dizzy today and fell, denies injury .sl placed labs sent. NPO pending ct scan.

## 2017-03-07 NOTE — ED PROVIDER NOTE - PROGRESS NOTE DETAILS
ARTEM Sigala:  Pt endorses resolution of pain.  Pt tolerating PO.  CT shows right ovarian cyst and fibroid.  Pt medically stable for discharge.  Pt to follow up with PMD, OB/GYN, and GI (referral list provided).

## 2017-03-08 LAB
BACTERIA UR CULT: SIGNIFICANT CHANGE UP
SPECIMEN SOURCE: SIGNIFICANT CHANGE UP

## 2017-03-23 ENCOUNTER — EMERGENCY (EMERGENCY)
Facility: HOSPITAL | Age: 37
LOS: 1 days | Discharge: HOME | End: 2017-03-23
Admitting: EMERGENCY MEDICINE
Payer: MEDICAID

## 2017-03-23 VITALS
DIASTOLIC BLOOD PRESSURE: 89 MMHG | OXYGEN SATURATION: 100 % | SYSTOLIC BLOOD PRESSURE: 129 MMHG | RESPIRATION RATE: 16 BRPM | TEMPERATURE: 98 F | HEART RATE: 65 BPM

## 2017-03-23 VITALS
TEMPERATURE: 98 F | RESPIRATION RATE: 15 BRPM | HEART RATE: 89 BPM | DIASTOLIC BLOOD PRESSURE: 86 MMHG | OXYGEN SATURATION: 100 % | SYSTOLIC BLOOD PRESSURE: 132 MMHG

## 2017-03-23 PROCEDURE — 90792 PSYCH DIAG EVAL W/MED SRVCS: CPT

## 2017-03-23 PROCEDURE — 99284 EMERGENCY DEPT VISIT MOD MDM: CPT | Mod: GC

## 2017-03-23 NOTE — ED BEHAVIORAL HEALTH NOTE - BEHAVIORAL HEALTH NOTE
Writer supplied patient with a [employer] card, # [id number]; she is travel-trained, and regularly travels by bus to her residence from the [facility] ED.

## 2017-03-23 NOTE — ED BEHAVIORAL HEALTH ASSESSMENT NOTE - CURRENT MEDICATION
Lithium 300 mg PO tid; Proventil 2 Puffs QID; Famotidine 450 mg PO QAM; Trileptal PO 300mg BID; Zyprexa 20mg PO QHS. Per staff has not been able to fill meds this month due to an insurance lapse. zyprexa 20mg daily, lithium 300mg tid, trileptal 300mg bid, proventil

## 2017-03-23 NOTE — ED BEHAVIORAL HEALTH ASSESSMENT NOTE - HPI (INCLUDE ILLNESS QUALITY, SEVERITY, DURATION, TIMING, CONTEXT, MODIFYING FACTORS, ASSOCIATED SIGNS AND SYMPTOMS)
36 year-old  female, non caregiver, single, domiciled on [place] grounds in a group home(on SSD/I), with past psychiatric history of Bipolar Affective Disorder, borderline personality disorder, cannabis abuse, multiple inpatient psychiatric hospitalizations (>20), most recently discharged from [facility] December 2016, with frequent visits to the [facility] (well-known to staff), last here three days ago after becoming agitated.  She has a history of 3 prior suicide attempts (last in 2012 via OD), recurrent suicidal gestures and suicidal ideation (none recently), history of property destruction, no history of arrests or access to weapons, was referred by [place] staff and presents to [facility] after 36 year-old  female, non caregiver, single, domiciled on [place] grounds in a group home(on SSD/I), with past psychiatric history of Bipolar Affective Disorder, borderline personality disorder, cannabis abuse, multiple inpatient psychiatric hospitalizations (>20), most recently discharged from [facility] December 2016, on [facility], with frequent visits to the [facility] (well-known to staff), She has a history of 3 prior suicide attempts (last in 2012 via OD), recurrent suicidal gestures and suicidal ideation (none recently), history of property destruction, no history of arrests or access to weapons, was referred by [place] staff and presents to [facility] after she called 911 on herself for feeling overwhelmed and wanting to talk.  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 She also has been wondering about the causes of mental illness, and what makes people "normal". She is well related and asks many philosophical questions about human behavior and emotion. Patient is pleasant and well related, no evidence of thought disorder. She denies acute manic, depressive, or psychotic symptoms, denies suicidal ideation and homicidal ideation, denies aggressive ideation. Reports compliance with medications and is following up with [facility], saw them last week and has another appointment next week.

## 2017-03-23 NOTE — ED PROVIDER NOTE - CHPI ED SYMPTOMS NEG
no confusion/no homicidal/no suicidal/no change in level of consciousness/no weakness/no hallucinations/no agitation/no paranoia/no weight loss/no disorientation

## 2017-03-23 NOTE — ED ADULT TRIAGE NOTE - CHIEF COMPLAINT QUOTE
pt with hx of bipolar d/o c/o feeling depressed and states "I need someone to talk to." pt denies SI/HI. no drugs or alcohol.

## 2017-03-23 NOTE — ED BEHAVIORAL HEALTH ASSESSMENT NOTE - SUICIDE PROTECTIVE FACTORS
Future oriented/Fear of death or dying due to pain/suffering/Positive therapeutic relationships/Identifies reasons for living/Supportive social network or family

## 2017-03-23 NOTE — ED BEHAVIORAL HEALTH ASSESSMENT NOTE - DESCRIPTION
Patient was calm and cooperative in the ED and did not exhibit any aggression. Patient did not require any PRN medications or any physical restraints. GERD, Asthma, Calculus of Bladder without cholecystitis without obstruction. Patient stated before she does not have knowledge of biological family; Lives in [facility] for several years; Receives SSI/SSD; Stated has "some college".

## 2017-03-23 NOTE — ED BEHAVIORAL HEALTH ASSESSMENT NOTE - RISK ASSESSMENT
Patient has history of chronic suicidality with 3 prior attempts, however does not have recent SIIP. Patient does not have HIIP or any recent aggression/violence. Patient has been behaviorally safe. Patient has significant protective factors include no access to firearms, no global insomnia, no acute anxiety, no trina/psychosis, no recent suicidality/self injury, supportive relationships, supervised housing, no SHIIP, no aggressive/violence ideation, no current substance abuse, future orientation, engaging in discharge and safety planning, motivation to participate in outpatient treatment, access to care, systemwide resources. Acute risk appears low, no evidence of elevated imminent risk. Appropriate for outpatient level of care at this time. Patient has history of chronic suicidality with 3 prior attempts, however does not have recent SIIP. Patient does not have HIIP or any recent aggression/violence. Patient has been behaviorally safe. Patient has significant protective factors include no access to firearms, no global insomnia, no acute anxiety, no trina/psychosis, no recent suicidality/self injury, supportive relationships, supervised housing, no SHIIP, no aggressive/violence ideation, no current substance abuse, medication compliance, ACT/AOT, future orientation, engaging in discharge and safety planning, motivation to participate in outpatient treatment, access to care, systemwide resources. Acute risk appears low, no evidence of elevated imminent risk. Appropriate for outpatient level of care at this time.

## 2017-03-23 NOTE — ED PROVIDER NOTE - OBJECTIVE STATEMENT
The patient is a 35y/o Female hx of bipolar, fibroid and ovarian cyst presents to ed for psych eval stating that she is feeling overwhelmed after her job interview she had today at [employer].  Pt denies all medical complaints and no recent injuries, trauma or falls, no headache, back or neck pain, no abd/flank pain, no urinary symptoms, nausea or vomiting, no fever or chills, no cp or sob, no palpitations or diaphoresis.  Denies etoh or illicit drugs, no SI/HI, no AVH.

## 2017-03-23 NOTE — ED BEHAVIORAL HEALTH ASSESSMENT NOTE - OTHER PAST PSYCHIATRIC HISTORY (INCLUDE DETAILS REGARDING ONSET, COURSE OF ILLNESS, INPATIENT/OUTPATIENT TREATMENT)
Hx of >20 inpatient psychiatric admissions, with last in [facility] December 2016. 3 prior suicide attempts (last in 2012).  History of medication non-compliance. Multiple [facility] visits last 02/13/2017. Hx of >20 inpatient psychiatric admissions, with last in [facility] December 2016. 3 prior suicide attempts (last in 2012).  History of medication non-compliance. [facility] currently on [facility] with [facility]

## 2017-03-23 NOTE — ED PROVIDER NOTE - MEDICAL DECISION MAKING DETAILS
The patient is a 37y/o Female hx of bipolar, fibroid and ovarian cyst presents to ed for psych eval stating that she is feeling overwhelmed after her job interview she had today at [employer].  Pt is w/o other medical complaints, no HI/SI-  Pt is otherwise well appearing w/o visible signs of self inflicted injuries-  Will dispo as per psych-

## 2017-03-23 NOTE — ED BEHAVIORAL HEALTH ASSESSMENT NOTE - REFERRAL / APPOINTMENT DETAILS
Patient to follow up with outpatient psychiatrist Patient to follow up with outpatient psychiatrist/AOT/ACT Team

## 2017-03-23 NOTE — ED BEHAVIORAL HEALTH ASSESSMENT NOTE - SUMMARY
36 year-old  female, non caregiver, single, domiciled on [place] grounds in a group home, unemployed (on SSD/I), with past psychiatric history of Bipolar Affective Disorder, borderline personality disorder, cannabis abuse, multiple inpatient psychiatric hospitalizations (>20), most recently discharged from [facility] December 2016, with frequent visits to the [facility] (well-known to staff).  She has a history of 3 prior suicide attempts (last in 2012 via OD), recurrent suicidal gestures and suicidal ideation (none recently), history of property destruction, no history of arrests or access to weapons, was referred by [place] staff and presents to [facility] after being agitated at the residence.  Patient is well known to this writer and ED staff, she has chronic behavioral outbursts secondary to poor frustration tolerance and limited coping skills. She was not physically aggressive towards anyone today. Although staff report an insurance problem has led to patient becoming non compliant over the last month, on mental status exam she is calm, appropriate and does not exhibit any signs/symptoms of decompensation. No trina/psychosis/depression reported or observed. No SI/HI. Does not meet criteria for admission. 36 year-old  female, non caregiver, single, domiciled on [place] grounds in a group home(on SSD/I), with past psychiatric history of Bipolar Affective Disorder, borderline personality disorder, cannabis abuse, multiple inpatient psychiatric hospitalizations (>20), most recently discharged from [facility] December 2016, on ACT/AOT, with [facility] (well-known to staff), She has a history of 3 prior suicide attempts (last in 2012 via OD), recurrent suicidal gestures and suicidal ideation (none recently), history of property destruction, no history of arrests or access to weapons, was referred by [place] staff and presents to [facility] after she called 911 on herself for feeling overwhelmed and wanting to talk.  Patient is well known to this writer and ED staff. She called 911 on herself asking to talk, she is anxious about her recent job interview waiting to learn if she got the job. She otherwise endorses no acute psychiatric symptoms, has been in good behavioral control, is compliant with meds and AOT, and denies using drugs. No SI/HI. Does not meet criteria for admission.

## 2017-03-23 NOTE — ED BEHAVIORAL HEALTH NOTE - BEHAVIORAL HEALTH NOTE
SW called pt's residence, [address], [phone], for collateral information. SW was transferred to pt's , Ashley, who was unavailable at this time. SW left message requesting call back for collateral information.

## 2017-04-05 ENCOUNTER — EMERGENCY (EMERGENCY)
Facility: HOSPITAL | Age: 37
LOS: 1 days | Discharge: HOME | End: 2017-04-05
Admitting: EMERGENCY MEDICINE
Payer: MEDICAID

## 2017-04-05 VITALS
OXYGEN SATURATION: 99 % | HEART RATE: 65 BPM | RESPIRATION RATE: 18 BRPM | SYSTOLIC BLOOD PRESSURE: 114 MMHG | DIASTOLIC BLOOD PRESSURE: 74 MMHG | TEMPERATURE: 98 F

## 2017-04-05 PROCEDURE — 99284 EMERGENCY DEPT VISIT MOD MDM: CPT

## 2017-04-05 PROCEDURE — 90792 PSYCH DIAG EVAL W/MED SRVCS: CPT

## 2017-04-05 NOTE — ED BEHAVIORAL HEALTH ASSESSMENT NOTE - OTHER
peers CVM blue hair looking for job as of unsuccessfully slightly increased latency somewhat concrete chronically impaired chronically low end of fair chronically limited

## 2017-04-05 NOTE — ED ADULT TRIAGE NOTE - CHIEF COMPLAINT QUOTE
Pt sent from [facility] for having an outburst after being denied a job today.  As per EMS pt was throwing furniture, calm and cooperative at this time

## 2017-04-05 NOTE — ED BEHAVIORAL HEALTH ASSESSMENT NOTE - HPI (INCLUDE ILLNESS QUALITY, SEVERITY, DURATION, TIMING, CONTEXT, MODIFYING FACTORS, ASSOCIATED SIGNS AND SYMPTOMS)
PATIENT IS WELL KNOWN TO WRITER FROM INPATIENT AND TREATED PATIENT AT [place] UNIT 2B: PATIENT IS WELL KNOWN TO WRITER FROM INPATIENT AND TREATED PATIENT AT [place] UNIT 2B: Patient is a single, 36 year-old  female, non caregiver, unemployed (curently looking for employment), domiciled on [place] grounds at [facility], with past psychiatric history of Bipolar Affective Disorder, Borderline Personality Disorder, cannabis abuse, multiple inpatient psychiatric hospitalizations (>20), most recently discharged from [facility] December 2016, on ACT/AOT, with frequent visits to the [facility] (well-known to staff), 3 prior suicide attempts (last in 2012 via OD), recurrent suicidal gestures and suicidal ideation (none recently), history of property destruction when upset, no history of arrests or access to weapons, was referred by [place] staff and presents to [facility] after she broke a chair at her residence / agitation.     Patient is much better overall then the last time Writer saw her when she was in a full blown manic episode and required CO 1:1 for sexual preoccupation. Patient today is calm, cooperative, with appropriate behavior and impulse control. Admits that she broke a chair because she was upset and frustrated; expresses appropriate remorse and said she should have handled her disappointment differently. She is disappointed by not being able to get a job despite trying and applying to places.   Patient is not using [employer] - the program was discussed with her and she expressed interest. She was given information and print out on it.     Otherwise, she endorses stable euthymic mood, regular sleep / appetite / energy level / concentration / bathroom habits. Denies any symptoms of hypomania/trina/worsened chronic psychosis/major depression/ anxiety/panic. Denies any active or passive suicidal or homicidal ideation. Names protective factors (nestor; wants a job so she can move off [place] campus; hope for future). Endorses medication compliance. Denies adverse medication side effects. Denies substance use; denies access to weapons.     COLLATERAL FROM STAFF AT RESIDENCE: please see separate  note (Writer also discussed [employer] with Ms Tse and also emailed her the website for it so she can help Patient aply to their job program).

## 2017-04-05 NOTE — ED BEHAVIORAL HEALTH NOTE - BEHAVIORAL HEALTH NOTE
Writer provided patient with [employer] card # [id number]. Patient is well-known to this facility and travel-trained.

## 2017-04-05 NOTE — ED BEHAVIORAL HEALTH ASSESSMENT NOTE - OTHER PAST PSYCHIATRIC HISTORY (INCLUDE DETAILS REGARDING ONSET, COURSE OF ILLNESS, INPATIENT/OUTPATIENT TREATMENT)
Hx of >20 inpatient psychiatric admissions, with last in [facility] December 2016. 3 prior suicide attempts (last in 2012).  History of medication non-compliance[facility] currently on [facility] with [facility]

## 2017-04-05 NOTE — ED PROVIDER NOTE - OBJECTIVE STATEMENT
Patient is a 36 year old Female PMHx Bipolar Disorder, Borderline personality disorder, GERD, uterine fibroids came in today for psych eval r/t anxiety. Patient states she is waiting to hear from a job and it was making her anxious so she decide to come in. Denies chest pain, SOB, N/V/D and fevers, Denies palpitations or diaphoresis. Denies Numbness, Tingling, Blurry Vision and HA.  Denies suicidal/homicidal thoughts. Denies visual/auditory hallucinations. Denies ETOH/Illicit drug use. Denies recent falls, trauma and injuries. Denies pain or any other medical complaints.

## 2017-04-05 NOTE — ED BEHAVIORAL HEALTH ASSESSMENT NOTE - RISK ASSESSMENT
Patient has history of chronic suicidality with 3 prior attempts, however does not have recent SIIP. Patient does not have HIIP or any recent aggression/violence. Patient has been behaviorally safe. Patient has significant protective factors include no access to firearms, no global insomnia, no acute anxiety, no trina/psychosis, no recent suicidality/self injury, supportive relationships, supervised housing, no SHIIP, no aggressive/violence ideation, no current substance abuse, medication compliance, ACT/AOT, future orientation, engaging in discharge and safety planning, motivation to participate in outpatient treatment, access to care, systemwide resources. Acute risk appears low, no evidence of elevated imminent risk. Appropriate for outpatient level of care at this time.

## 2017-04-05 NOTE — ED BEHAVIORAL HEALTH ASSESSMENT NOTE - DESCRIPTION
calm, cooperative GERD, Asthma, Calculus of Bladder without cholecystitis without obstruction. Patient stated before she does not have knowledge of biological family; Lives in [facility] for several years; Receives SSI/SSD; Stated has "some college". calm, cooperative, polite

## 2017-04-05 NOTE — ED BEHAVIORAL HEALTH NOTE - BEHAVIORAL HEALTH NOTE
COLLATERAL FROM [facility], [phone] - Ms Tse: Patient picked up a manhole cover from the street which she brought in, then took it back out when asked to do so. She did not do anything with it. She also broke a chair out of frustration then went up to her room. She did not hit or aggressively threaten anyone. Today's trigger: he applied for a job 2 weeks ago, did not hear back from them and was feeling frustrated. Lorena is the care manager who is aware of today's events. Otherwise, she has been doing well, takes her medications and has been at baseline. No issues otherwise; Patient can return home.

## 2017-04-05 NOTE — ED PROVIDER NOTE - MEDICAL DECISION MAKING DETAILS
Patient is a 36 year old Female PMHx Bipolar Disorder, Borderline personality disorder, GERD, uterine fibroids came in today for psych eval r/t anxiety. Patient states she is waiting to hear from a job and it was making her anxious so she decide to come in.  Medical evaluation performed. There is no clinical evidence of intoxication or any acute medical problem requiring immediate intervention. Final disposition will be determined by psychiatrist.

## 2017-04-05 NOTE — ED BEHAVIORAL HEALTH ASSESSMENT NOTE - DETAILS
per HPI Poor response to Geodon; Depakote (Increased ammonia levels) Tattoos self referred see HPI; broke a chair earlier today

## 2017-04-05 NOTE — ED ADULT NURSE NOTE - CHIEF COMPLAINT QUOTE
Pt sent from [facility] for having an outburst after being denied a job today.  As per EMS pt was throwing furniture, calm and cooperative at this time

## 2017-04-05 NOTE — ED BEHAVIORAL HEALTH ASSESSMENT NOTE - SUMMARY
Patient is a 36 year-old  female with Bipolar Affective Disorder, Borderline Personality Disorder, Cannabis Abuse, multiple inpatient psychiatric hospitalizations (>20), most recently discharged from [facility] December 2016, on ACT/AOT, with frequent visits to the [facility] (well-known to staff), history of 3 prior suicide attempts (last in 2012 via OD), recurrent suicidal gestures and suicidal ideation (none recently), history of property destruction, no history of arrests or access to weapons, was referred by [place] staff after she broke a chair out of frustration after not getting a job. Patient presents at her well known baseline otherwise. She denies mood sxs; denies worsening of chronic psychosis; denies drug use; denies suicidal/homicidal ideation and denies access to weapons. Discharge - can continue as an outpatient.

## 2017-11-06 ENCOUNTER — INPATIENT (INPATIENT)
Facility: HOSPITAL | Age: 37
LOS: 44 days | Discharge: HOME | End: 2017-12-21
Attending: PSYCHIATRY & NEUROLOGY | Admitting: PSYCHIATRY & NEUROLOGY
Payer: COMMERCIAL

## 2017-11-06 VITALS
SYSTOLIC BLOOD PRESSURE: 116 MMHG | OXYGEN SATURATION: 100 % | RESPIRATION RATE: 18 BRPM | HEART RATE: 101 BPM | DIASTOLIC BLOOD PRESSURE: 76 MMHG | TEMPERATURE: 98 F

## 2017-11-06 DIAGNOSIS — F31.0 BIPOLAR DISORDER, CURRENT EPISODE HYPOMANIC: ICD-10-CM

## 2017-11-06 LAB
ALBUMIN SERPL ELPH-MCNC: 3.9 G/DL — SIGNIFICANT CHANGE UP (ref 3.3–5)
ALP SERPL-CCNC: 79 U/L — SIGNIFICANT CHANGE UP (ref 40–120)
ALT FLD-CCNC: 10 U/L — SIGNIFICANT CHANGE UP (ref 4–33)
AMPHET UR-MCNC: NEGATIVE — SIGNIFICANT CHANGE UP
APAP SERPL-MCNC: < 15 UG/ML — LOW (ref 15–25)
APPEARANCE UR: CLEAR — SIGNIFICANT CHANGE UP
AST SERPL-CCNC: 13 U/L — SIGNIFICANT CHANGE UP (ref 4–32)
BARBITURATES MEASUREMENT: NEGATIVE — SIGNIFICANT CHANGE UP
BARBITURATES UR SCN-MCNC: NEGATIVE — SIGNIFICANT CHANGE UP
BASOPHILS # BLD AUTO: 0.01 K/UL — SIGNIFICANT CHANGE UP (ref 0–0.2)
BASOPHILS NFR BLD AUTO: 0.1 % — SIGNIFICANT CHANGE UP (ref 0–2)
BENZODIAZ SERPL-MCNC: NEGATIVE — SIGNIFICANT CHANGE UP
BENZODIAZ UR-MCNC: NEGATIVE — SIGNIFICANT CHANGE UP
BILIRUB SERPL-MCNC: 0.4 MG/DL — SIGNIFICANT CHANGE UP (ref 0.2–1.2)
BILIRUB UR-MCNC: NEGATIVE — SIGNIFICANT CHANGE UP
BLOOD UR QL VISUAL: HIGH
BUN SERPL-MCNC: 10 MG/DL — SIGNIFICANT CHANGE UP (ref 7–23)
CALCIUM SERPL-MCNC: 9 MG/DL — SIGNIFICANT CHANGE UP (ref 8.4–10.5)
CANNABINOIDS UR-MCNC: NEGATIVE — SIGNIFICANT CHANGE UP
CHLORIDE SERPL-SCNC: 106 MMOL/L — SIGNIFICANT CHANGE UP (ref 98–107)
CO2 SERPL-SCNC: 26 MMOL/L — SIGNIFICANT CHANGE UP (ref 22–31)
COCAINE METAB.OTHER UR-MCNC: NEGATIVE — SIGNIFICANT CHANGE UP
COLOR SPEC: YELLOW — SIGNIFICANT CHANGE UP
CREAT SERPL-MCNC: 0.65 MG/DL — SIGNIFICANT CHANGE UP (ref 0.5–1.3)
EOSINOPHIL # BLD AUTO: 0.45 K/UL — SIGNIFICANT CHANGE UP (ref 0–0.5)
EOSINOPHIL NFR BLD AUTO: 4.2 % — SIGNIFICANT CHANGE UP (ref 0–6)
ETHANOL BLD-MCNC: < 10 MG/DL — SIGNIFICANT CHANGE UP
GLUCOSE SERPL-MCNC: 106 MG/DL — HIGH (ref 70–99)
GLUCOSE UR-MCNC: NEGATIVE — SIGNIFICANT CHANGE UP
HCT VFR BLD CALC: 37 % — SIGNIFICANT CHANGE UP (ref 34.5–45)
HGB BLD-MCNC: 11.4 G/DL — LOW (ref 11.5–15.5)
IMM GRANULOCYTES # BLD AUTO: 0.04 # — SIGNIFICANT CHANGE UP
IMM GRANULOCYTES NFR BLD AUTO: 0.4 % — SIGNIFICANT CHANGE UP (ref 0–1.5)
KETONES UR-MCNC: SIGNIFICANT CHANGE UP
LEUKOCYTE ESTERASE UR-ACNC: NEGATIVE — SIGNIFICANT CHANGE UP
LYMPHOCYTES # BLD AUTO: 2.48 K/UL — SIGNIFICANT CHANGE UP (ref 1–3.3)
LYMPHOCYTES # BLD AUTO: 22.9 % — SIGNIFICANT CHANGE UP (ref 13–44)
MCHC RBC-ENTMCNC: 29.1 PG — SIGNIFICANT CHANGE UP (ref 27–34)
MCHC RBC-ENTMCNC: 30.8 % — LOW (ref 32–36)
MCV RBC AUTO: 94.4 FL — SIGNIFICANT CHANGE UP (ref 80–100)
METHADONE UR-MCNC: NEGATIVE — SIGNIFICANT CHANGE UP
MONOCYTES # BLD AUTO: 0.97 K/UL — HIGH (ref 0–0.9)
MONOCYTES NFR BLD AUTO: 9 % — SIGNIFICANT CHANGE UP (ref 2–14)
MUCOUS THREADS # UR AUTO: SIGNIFICANT CHANGE UP
NEUTROPHILS # BLD AUTO: 6.87 K/UL — SIGNIFICANT CHANGE UP (ref 1.8–7.4)
NEUTROPHILS NFR BLD AUTO: 63.4 % — SIGNIFICANT CHANGE UP (ref 43–77)
NITRITE UR-MCNC: NEGATIVE — SIGNIFICANT CHANGE UP
NRBC # FLD: 0 — SIGNIFICANT CHANGE UP
OPIATES UR-MCNC: NEGATIVE — SIGNIFICANT CHANGE UP
OXYCODONE UR-MCNC: NEGATIVE — SIGNIFICANT CHANGE UP
PCP UR-MCNC: NEGATIVE — SIGNIFICANT CHANGE UP
PH UR: 7 — SIGNIFICANT CHANGE UP (ref 4.6–8)
PLATELET # BLD AUTO: 229 K/UL — SIGNIFICANT CHANGE UP (ref 150–400)
PMV BLD: 9.5 FL — SIGNIFICANT CHANGE UP (ref 7–13)
POTASSIUM SERPL-MCNC: 4.1 MMOL/L — SIGNIFICANT CHANGE UP (ref 3.5–5.3)
POTASSIUM SERPL-SCNC: 4.1 MMOL/L — SIGNIFICANT CHANGE UP (ref 3.5–5.3)
PROT SERPL-MCNC: 7.1 G/DL — SIGNIFICANT CHANGE UP (ref 6–8.3)
PROT UR-MCNC: 30 — HIGH
RBC # BLD: 3.92 M/UL — SIGNIFICANT CHANGE UP (ref 3.8–5.2)
RBC # FLD: 13.2 % — SIGNIFICANT CHANGE UP (ref 10.3–14.5)
RBC CASTS # UR COMP ASSIST: >50 — HIGH (ref 0–?)
SALICYLATES SERPL-MCNC: < 5 MG/DL — LOW (ref 15–30)
SODIUM SERPL-SCNC: 143 MMOL/L — SIGNIFICANT CHANGE UP (ref 135–145)
SP GR SPEC: 1.01 — SIGNIFICANT CHANGE UP (ref 1–1.03)
SQUAMOUS # UR AUTO: SIGNIFICANT CHANGE UP
UROBILINOGEN FLD QL: NORMAL E.U. — SIGNIFICANT CHANGE UP (ref 0.1–0.2)
WBC # BLD: 10.82 K/UL — HIGH (ref 3.8–10.5)
WBC # FLD AUTO: 10.82 K/UL — HIGH (ref 3.8–10.5)
WBC UR QL: SIGNIFICANT CHANGE UP (ref 0–?)

## 2017-11-06 PROCEDURE — 99285 EMERGENCY DEPT VISIT HI MDM: CPT

## 2017-11-06 RX ORDER — HYDROCORTISONE 1 %
1 OINTMENT (GRAM) TOPICAL
Qty: 0 | Refills: 0 | Status: COMPLETED | OUTPATIENT
Start: 2017-11-06 | End: 2017-11-09

## 2017-11-06 RX ORDER — HALOPERIDOL DECANOATE 100 MG/ML
5 INJECTION INTRAMUSCULAR ONCE
Qty: 0 | Refills: 0 | Status: COMPLETED | OUTPATIENT
Start: 2017-11-06 | End: 2017-11-06

## 2017-11-06 RX ORDER — OLANZAPINE 15 MG/1
5 TABLET, FILM COATED ORAL AT BEDTIME
Qty: 0 | Refills: 0 | Status: DISCONTINUED | OUTPATIENT
Start: 2017-11-06 | End: 2017-11-07

## 2017-11-06 RX ORDER — HALOPERIDOL DECANOATE 100 MG/ML
5 INJECTION INTRAMUSCULAR ONCE
Qty: 0 | Refills: 0 | Status: DISCONTINUED | OUTPATIENT
Start: 2017-11-06 | End: 2017-11-16

## 2017-11-06 RX ORDER — ACETAMINOPHEN 500 MG
650 TABLET ORAL EVERY 6 HOURS
Qty: 0 | Refills: 0 | Status: DISCONTINUED | OUTPATIENT
Start: 2017-11-06 | End: 2017-12-21

## 2017-11-06 RX ORDER — DIPHENHYDRAMINE HCL 50 MG
25 CAPSULE ORAL EVERY 4 HOURS
Qty: 0 | Refills: 0 | Status: DISCONTINUED | OUTPATIENT
Start: 2017-11-06 | End: 2017-11-12

## 2017-11-06 RX ORDER — HALOPERIDOL DECANOATE 100 MG/ML
5 INJECTION INTRAMUSCULAR EVERY 6 HOURS
Qty: 0 | Refills: 0 | Status: DISCONTINUED | OUTPATIENT
Start: 2017-11-06 | End: 2017-11-13

## 2017-11-06 RX ORDER — DIPHENHYDRAMINE HCL 50 MG
50 CAPSULE ORAL ONCE
Qty: 0 | Refills: 0 | Status: COMPLETED | OUTPATIENT
Start: 2017-11-06 | End: 2017-11-06

## 2017-11-06 RX ADMIN — HALOPERIDOL DECANOATE 5 MILLIGRAM(S): 100 INJECTION INTRAMUSCULAR at 02:16

## 2017-11-06 RX ADMIN — Medication 650 MILLIGRAM(S): at 21:53

## 2017-11-06 RX ADMIN — HALOPERIDOL DECANOATE 5 MILLIGRAM(S): 100 INJECTION INTRAMUSCULAR at 10:05

## 2017-11-06 RX ADMIN — Medication 25 MILLIGRAM(S): at 21:53

## 2017-11-06 RX ADMIN — Medication 2 MILLIGRAM(S): at 10:05

## 2017-11-06 RX ADMIN — Medication 2 MILLIGRAM(S): at 21:01

## 2017-11-06 RX ADMIN — OLANZAPINE 5 MILLIGRAM(S): 15 TABLET, FILM COATED ORAL at 21:01

## 2017-11-06 RX ADMIN — Medication 50 MILLIGRAM(S): at 02:16

## 2017-11-06 RX ADMIN — Medication 2 MILLIGRAM(S): at 02:16

## 2017-11-06 RX ADMIN — Medication 650 MILLIGRAM(S): at 02:23

## 2017-11-06 RX ADMIN — HALOPERIDOL DECANOATE 5 MILLIGRAM(S): 100 INJECTION INTRAMUSCULAR at 21:01

## 2017-11-06 NOTE — ED ADULT TRIAGE NOTE - CHIEF COMPLAINT QUOTE
psych eval    pt from [address] at [facility].... found acting erratic, running back and forth outside bl without shoes. pt not answering questions... appears internally preoccupied ....talking to herself, poor eye contact...  crying at times. denies drugs or etoh.  cleared by critical attending for .

## 2017-11-06 NOTE — ED ADULT NURSE NOTE - CHIEF COMPLAINT QUOTE
psych eval    pt from [address] at [facility].... found acting erratic, running back and forth outside bl without shoes. pt not answering questions... appears internally preoccupied ....talking to herself, poor eye contact...  crying at times. denies drugs or etoh.  cleared by critical attending for .

## 2017-11-06 NOTE — ED BEHAVIORAL HEALTH ASSESSMENT NOTE - NS ED [facility] ED COURSE PSYCHIATRIC MEDICATION GIVEN
None None/Oral Medication (indication, name, dose, time) Oral Medication (indication, name, dose, time)

## 2017-11-06 NOTE — ED ADULT NURSE NOTE - OBJECTIVE STATEMENT
Pt arrives to ED via EMS from outside [address] at [facility].  Pt found acting erratically running back and forth outside without shoes on.  Pt started out to be non-verbal with staff but with verbal support answered some questions.  Pt talking to herself and looking away from staff.  Pt occasionally tearful.  Pt denies drug and etoh use.  Pt checked by security and PES.  Pt assessed by Psychiatrist Cordell and medicated as per EMAR.  Pt provided sandwich by staff.  Pt resting in room 5.

## 2017-11-06 NOTE — ED PROVIDER NOTE - OBJECTIVE STATEMENT
38 yo F with PMHx of Bipolar disorder, borderline personality disorder, multiple inpatient psychiatric hospitalizations with frequent visits to [facility] ED with 3 prior suicide attempts sent from [facility] for erratic behavior mainly running back and forth outside the building without shoes, appearing internally preoccupied, talking to himself with poor eye contact, crying at times. Unknown compliance of psych meds.

## 2017-11-06 NOTE — ED BEHAVIORAL HEALTH ASSESSMENT NOTE - SUMMARY
Patient is a single, 37-year-old  female, non caregiver, unemployed, domiciled on [facility], with past psychiatric history of Bipolar Affective Disorder, Borderline Personality Disorder, cannabis abuse, multiple inpatient psychiatric hospitalizations (>20), most recently discharged from [facility] December 2016, on ACT/AOT, with frequent visits to the [facility] (well-known to staff), 3 prior suicide attempts (last in 2012 via OD), recurrent suicidal gestures and suicidal ideation (none recently), history of property destruction when upset, no history of arrests or access to weapons, was BIBEMS activated by [facility] staff on account of acting erratic. Reportedly, Pt was running back and forth outside the building without shoes, appearing internally preoccupied, talking to himself, and with poor eye contanct, crying at times. Pt lives at [address] at [facility].     Upon approach, Pt is internally preoccupied and appears "irritable and edgy." Pt's thought process is disorganized, non linear, and she often exhibits thought blocking. Pt is unable to answer most of the questions and kept repeating "I was just trying to help." However, unable to elaborate, who or what she was trying to help. She is staring into space. Pt is unable to answer if she takes any psychiatric medications Patient is a single, 37-year-old  female, non caregiver, unemployed, domiciled on [facility], with past psychiatric history of Bipolar Affective Disorder, Borderline Personality Disorder, cannabis abuse, multiple inpatient psychiatric hospitalizations (>20), most recently discharged from [facility] December 2016, on ACT/AOT, with frequent visits to the [facility] (well-known to staff), 3 prior suicide attempts (last in 2012 via OD), recurrent suicidal gestures and suicidal ideation (none recently), history of property destruction when upset, no history of arrests or access to weapons, was BIBEMS activated by [facility] staff on account of acting erratic. Reportedly, Pt was running back and forth outside the building without shoes, appearing internally preoccupied, talking to himself, and with poor eye contanct, crying at times. Pt lives at [address] at [facility].     Upon approach, Pt is internally preoccupied and appears "irritable and edgy." Pt's thought process is disorganized, non linear, and she often exhibits thought blocking. Pt is unable to answer most of the questions and kept repeating "I was just trying to help." However, unable to elaborate, who or what she was trying to help. She is staring into space. Pt is unable to answer if she takes any psychiatric medications.    I called [facility] and spoke with a staff who confirmed that Pt was pacing and going in and out of the building. She was internally preoccupied and talking to self in a language that could not be understood. I then called Pt's on call , Ms. Nancy, who confimed what had happened and informed me of several contact information.    ACT ([facility])/ Victor Manuel Browning : [phone]    AOT /Grace Donnelly: [phone]    Psychiatrist / Dr. Flores: [phone], [phone], [phone]    According to MsNoel Nancy, Pt is supposed to be on Zyprexa 20mg qhs, oxycarbamazepine 300mg bid, and Lithium 300mg tid, but has been refusing all of them for at least 3 months.

## 2017-11-06 NOTE — ED BEHAVIORAL HEALTH ASSESSMENT NOTE - HPI (INCLUDE ILLNESS QUALITY, SEVERITY, DURATION, TIMING, CONTEXT, MODIFYING FACTORS, ASSOCIATED SIGNS AND SYMPTOMS)
Patient is a single, 37-year-old  female, non caregiver, unemployed, domiciled on [place] grounds, with past psychiatric history of Bipolar Affective Disorder, Borderline Personality Disorder, cannabis abuse, multiple inpatient psychiatric hospitalizations (>20), most recently discharged from [facility] December 2016, on ACT/AOT, with frequent visits to the [facility] (well-known to staff), 3 prior suicide attempts (last in 2012 via OD), recurrent suicidal gestures and suicidal ideation (none recently), history of property destruction when upset, no history of arrests or access to weapons, was BIBEMS referred by [place] staff and presents to [facility] after she broke a chair at her residence / agitation.     Patient is much better overall then the last time Writer saw her when she was in a full blown manic episode and required CO 1:1 for sexual preoccupation. Patient today is calm, cooperative, with appropriate behavior and impulse control. Admits that she broke a chair because she was upset and frustrated; expresses appropriate remorse and said she should have handled her disappointment differently. She is disappointed by not being able to get a job despite trying and applying to places.   Patient is not using [employer] - the program was discussed with her and she expressed interest. She was given information and print out on it.     Otherwise, she endorses stable euthymic mood, regular sleep / appetite / energy level / concentration / bathroom habits. Denies any symptoms of hypomania/trina/worsened chronic psychosis/major depression/ anxiety/panic. Denies any active or passive suicidal or homicidal ideation. Names protective factors (nestor; wants a job so she can move off [place] campus; hope for future). Endorses medication compliance. Denies adverse medication side effects. Denies substance use; denies access to weapons.     COLLATERAL FROM STAFF AT RESIDENCE: please see separate  note (Writer also discussed [employer] with Ms Tse and also emailed her the website for it so she can help Patient aply to their job program). Patient is a single, 37-year-old  female, non caregiver, unemployed, domiciled on [facility], with past psychiatric history of Bipolar Affective Disorder, Borderline Personality Disorder, cannabis abuse, multiple inpatient psychiatric hospitalizations (>20), most recently discharged from [facility] December 2016, on ACT/AOT, with frequent visits to the [facility] (well-known to staff), 3 prior suicide attempts (last in 2012 via OD), recurrent suicidal gestures and suicidal ideation (none recently), history of property destruction when upset, no history of arrests or access to weapons, was BIBEMS activated by [facility] staff on account of acting erratic. Reportedly, Pt was running back and forth outside the building without shoes, appearing internally preoccupied, talking to himself, and with poor eye contanct, crying at times. Pt lives at [address] [facility].     Upon approach, Pt is internally preoccupied and appears "irritable and edgy." Pt's thought process is disorganized, non linear, and she often exhibits thought blocking. Pt is unable to answer most of the questions and kept repeating "I was just trying to help." However, unable to elaborate, who or what she was trying to help. She is staring into space. Pt is unable to answer if she takes any psychiatric medications Patient is a single, 37-year-old  female, non caregiver, unemployed, domiciled on [facility], with past psychiatric history of Bipolar Affective Disorder, Borderline Personality Disorder, cannabis abuse, multiple inpatient psychiatric hospitalizations (>20), most recently discharged from [facility] December 2016, on ACT/AOT, with frequent visits to the [facility] (well-known to staff), 3 prior suicide attempts (last in 2012 via OD), recurrent suicidal gestures and suicidal ideation (none recently), history of property destruction when upset, no history of arrests or access to weapons, was BIBEMS activated by [facility] staff on account of acting erratic. Reportedly, Pt was running back and forth outside the building without shoes, appearing internally preoccupied, talking to herself, and with poor eye contanct, crying at times. Pt lives at [address] at [facility] [place].     Upon approach, Pt is internally preoccupied and appears "irritable and edgy." Pt's thought process is disorganized, non linear, and she often exhibits thought blocking. Pt is unable to answer most of the questions and kept repeating "I was just trying to help." However, unable to elaborate, who or what she was trying to help. She is staring into space. Pt is unable to answer if she takes any psychiatric medications. She was given Haldol 5mg, Ativan 2mg, and Benadryl 50mg    I called [facility] and spoke with a staff who confirmed that Pt was pacing and going in and out of the building. She was internally preoccupied and talking to self in a language that could not be understood. I then called Pt's on call , Ms. Nancy, who confimed what had happened and informed me of several contact information.    ACT ([facility])/ Victor Manuel Browning : [phone]    AOT /Grace Donnelly: [phone]    Psychiatrist / Dr. Flores: [phone], [phone], [phone]    According to MsNoel Nancy, Pt is supposed to be on Zyprexa 20mg qhs, oxycarbamazepine 300mg bid, and Lithium 300mg tid, but has been refusing all of them for at least 3 months.

## 2017-11-06 NOTE — ED BEHAVIORAL HEALTH ASSESSMENT NOTE - SUICIDE PROTECTIVE FACTORS
Identifies reasons for living/Future oriented/Supportive social network or family/Fear of death or dying due to pain/suffering/Positive therapeutic relationships Fear of death or dying due to pain/suffering/Identifies reasons for living

## 2017-11-06 NOTE — ED PROVIDER NOTE - MEDICAL DECISION MAKING DETAILS
36 yo F with PMHx of Bipolar disorder, borderline personality disorder, multiple inpatient psychiatric hospitalizations with frequent visits to [facility] ED with 3 prior suicide attempts sent from [employer] [facility] for erratic behavior.  -labs for medical clearance, psychiatry admission

## 2017-11-06 NOTE — ED ADULT NURSE NOTE - CHPI ED SYMPTOMS NEG
no hallucinations/no weakness/no suicidal/no paranoia/no weight loss/no change in level of consciousness/no homicidal/no confusion

## 2017-11-06 NOTE — ED BEHAVIORAL HEALTH ASSESSMENT NOTE - DESCRIPTION
calm, cooperative, polite GERD, Asthma, Calculus of Bladder without cholecystitis without obstruction. Patient stated before she does not have knowledge of biological family; Lives in [facility] for several years; Receives SSI/SSD; Stated has "some college". Pt was edgy and restless, hence she was offered PO Haldol 5mg, Ativan 2mg, and Benadryl 50mg IM STAT for behavioral control and she accepted. Pt was edgy and restless, hence she was offered PO Haldol 5mg, Ativan 2mg, and Benadryl 50mg for behavioral control and she accepted.

## 2017-11-06 NOTE — ED ADULT NURSE REASSESSMENT NOTE - NS ED NURSE REASSESS COMMENT FT1
Report given to [facility]. Pt is calm and cooperative. No acute distress.
Labs drawn and sent.  EKG performed at bedside by female PCA with PES in area.

## 2017-11-06 NOTE — ED BEHAVIORAL HEALTH ASSESSMENT NOTE - OTHER PAST PSYCHIATRIC HISTORY (INCLUDE DETAILS REGARDING ONSET, COURSE OF ILLNESS, INPATIENT/OUTPATIENT TREATMENT)
Hx of >20 inpatient psychiatric admissions, with last in [facility] December 2016. 3 prior suicide attempts (last in 2012).  History of medication non-compliance[facility] currently on [facility]AOT with [facility]

## 2017-11-06 NOTE — ED BEHAVIORAL HEALTH ASSESSMENT NOTE - RISK ASSESSMENT
Pt appears to have decompensated in the context of medication noncompliance and is acutely psychotic.

## 2017-11-06 NOTE — ED BEHAVIORAL HEALTH ASSESSMENT NOTE - DETAILS
Poor response to Geodon; Depakote (Increased ammonia levels) Tattoos per HPI see HPI; broke a chair earlier today see HPI. Handoff given to [facility], [facility]

## 2017-11-06 NOTE — ED BEHAVIORAL HEALTH ASSESSMENT NOTE - ORAL MEDICATION DETAILS
haldol 5mg, Ativan 2mg, Bemadfj;;;;;;;;;;;;;;;;;;;;;;;;;;;;;;;;;;;;;;;;;;;;;;;;;;;;;;;;;;;;;;;;;;;;;;;;;;;;;;;;;;;;;;;;;;;;;;;;;;;;;;;;;;;;;;;;;;;;;;;;;;;;;;;;;;;;;;;;;;;;;;;;;;;;;;;;;;;;;;;;;;;;;;;;;;;;;;;;;;;;;;;;;;;;;;;;;;;;;;;;;;;;;;;;;;;;;;;;;;;;;;;;;;;;;;;;;;;;;;;;;;;;;;;;;;;;;;;;;;;;;;;;;;;;;;;;;;;;;;;;;;;;;;;;;;;;;;; haldol 5mg, Ativan 2mg, Benadryl 50mg PO

## 2017-11-07 LAB
CHOLEST SERPL-MCNC: 126 MG/DL — SIGNIFICANT CHANGE UP (ref 120–199)
HBA1C BLD-MCNC: 5.5 % — SIGNIFICANT CHANGE UP (ref 4–5.6)
HDLC SERPL-MCNC: 64 MG/DL — SIGNIFICANT CHANGE UP (ref 45–65)
LIPID PNL WITH DIRECT LDL SERPL: 61 MG/DL — SIGNIFICANT CHANGE UP
TRIGL SERPL-MCNC: 40 MG/DL — SIGNIFICANT CHANGE UP (ref 10–149)

## 2017-11-07 PROCEDURE — 99223 1ST HOSP IP/OBS HIGH 75: CPT

## 2017-11-07 RX ORDER — OLANZAPINE 15 MG/1
10 TABLET, FILM COATED ORAL AT BEDTIME
Qty: 0 | Refills: 0 | Status: DISCONTINUED | OUTPATIENT
Start: 2017-11-07 | End: 2017-11-08

## 2017-11-07 RX ORDER — LITHIUM CARBONATE 300 MG/1
450 TABLET, EXTENDED RELEASE ORAL
Qty: 0 | Refills: 0 | Status: DISCONTINUED | OUTPATIENT
Start: 2017-11-07 | End: 2017-11-20

## 2017-11-07 RX ADMIN — LITHIUM CARBONATE 450 MILLIGRAM(S): 300 TABLET, EXTENDED RELEASE ORAL at 21:37

## 2017-11-07 RX ADMIN — Medication 1 APPLICATION(S): at 08:22

## 2017-11-07 RX ADMIN — Medication 2 MILLIGRAM(S): at 17:27

## 2017-11-07 RX ADMIN — OLANZAPINE 10 MILLIGRAM(S): 15 TABLET, FILM COATED ORAL at 21:37

## 2017-11-08 PROCEDURE — 99232 SBSQ HOSP IP/OBS MODERATE 35: CPT

## 2017-11-08 RX ORDER — OLANZAPINE 15 MG/1
15 TABLET, FILM COATED ORAL AT BEDTIME
Qty: 0 | Refills: 0 | Status: DISCONTINUED | OUTPATIENT
Start: 2017-11-08 | End: 2017-11-09

## 2017-11-08 RX ADMIN — LITHIUM CARBONATE 450 MILLIGRAM(S): 300 TABLET, EXTENDED RELEASE ORAL at 08:02

## 2017-11-08 RX ADMIN — OLANZAPINE 15 MILLIGRAM(S): 15 TABLET, FILM COATED ORAL at 21:24

## 2017-11-08 RX ADMIN — HALOPERIDOL DECANOATE 5 MILLIGRAM(S): 100 INJECTION INTRAMUSCULAR at 16:07

## 2017-11-08 RX ADMIN — Medication 25 MILLIGRAM(S): at 07:32

## 2017-11-08 RX ADMIN — Medication 2 MILLIGRAM(S): at 16:07

## 2017-11-08 RX ADMIN — Medication 2 MILLIGRAM(S): at 07:32

## 2017-11-08 RX ADMIN — LITHIUM CARBONATE 450 MILLIGRAM(S): 300 TABLET, EXTENDED RELEASE ORAL at 21:24

## 2017-11-08 RX ADMIN — Medication 25 MILLIGRAM(S): at 16:07

## 2017-11-08 RX ADMIN — HALOPERIDOL DECANOATE 5 MILLIGRAM(S): 100 INJECTION INTRAMUSCULAR at 07:32

## 2017-11-09 PROCEDURE — 99232 SBSQ HOSP IP/OBS MODERATE 35: CPT

## 2017-11-09 RX ORDER — OLANZAPINE 15 MG/1
20 TABLET, FILM COATED ORAL AT BEDTIME
Qty: 0 | Refills: 0 | Status: DISCONTINUED | OUTPATIENT
Start: 2017-11-09 | End: 2017-11-10

## 2017-11-09 RX ADMIN — LITHIUM CARBONATE 450 MILLIGRAM(S): 300 TABLET, EXTENDED RELEASE ORAL at 08:50

## 2017-11-09 RX ADMIN — OLANZAPINE 20 MILLIGRAM(S): 15 TABLET, FILM COATED ORAL at 20:44

## 2017-11-09 RX ADMIN — Medication 25 MILLIGRAM(S): at 20:44

## 2017-11-09 RX ADMIN — HALOPERIDOL DECANOATE 5 MILLIGRAM(S): 100 INJECTION INTRAMUSCULAR at 20:44

## 2017-11-09 RX ADMIN — LITHIUM CARBONATE 450 MILLIGRAM(S): 300 TABLET, EXTENDED RELEASE ORAL at 20:44

## 2017-11-09 RX ADMIN — Medication 2 MILLIGRAM(S): at 20:44

## 2017-11-09 RX ADMIN — HALOPERIDOL DECANOATE 5 MILLIGRAM(S): 100 INJECTION INTRAMUSCULAR at 08:20

## 2017-11-10 PROCEDURE — 99232 SBSQ HOSP IP/OBS MODERATE 35: CPT

## 2017-11-10 RX ORDER — OLANZAPINE 15 MG/1
25 TABLET, FILM COATED ORAL AT BEDTIME
Qty: 0 | Refills: 0 | Status: DISCONTINUED | OUTPATIENT
Start: 2017-11-10 | End: 2017-11-13

## 2017-11-10 RX ADMIN — Medication 2 MILLIGRAM(S): at 08:54

## 2017-11-10 RX ADMIN — OLANZAPINE 25 MILLIGRAM(S): 15 TABLET, FILM COATED ORAL at 21:08

## 2017-11-10 RX ADMIN — LITHIUM CARBONATE 450 MILLIGRAM(S): 300 TABLET, EXTENDED RELEASE ORAL at 21:08

## 2017-11-10 RX ADMIN — LITHIUM CARBONATE 450 MILLIGRAM(S): 300 TABLET, EXTENDED RELEASE ORAL at 08:37

## 2017-11-10 RX ADMIN — Medication 2 MILLIGRAM(S): at 16:32

## 2017-11-10 RX ADMIN — HALOPERIDOL DECANOATE 5 MILLIGRAM(S): 100 INJECTION INTRAMUSCULAR at 08:54

## 2017-11-10 RX ADMIN — HALOPERIDOL DECANOATE 5 MILLIGRAM(S): 100 INJECTION INTRAMUSCULAR at 16:32

## 2017-11-10 RX ADMIN — Medication 25 MILLIGRAM(S): at 16:32

## 2017-11-11 PROCEDURE — 99232 SBSQ HOSP IP/OBS MODERATE 35: CPT

## 2017-11-11 RX ADMIN — HALOPERIDOL DECANOATE 5 MILLIGRAM(S): 100 INJECTION INTRAMUSCULAR at 21:26

## 2017-11-11 RX ADMIN — OLANZAPINE 25 MILLIGRAM(S): 15 TABLET, FILM COATED ORAL at 21:26

## 2017-11-11 RX ADMIN — Medication 2 MILLIGRAM(S): at 07:03

## 2017-11-11 RX ADMIN — HALOPERIDOL DECANOATE 5 MILLIGRAM(S): 100 INJECTION INTRAMUSCULAR at 07:03

## 2017-11-11 RX ADMIN — Medication 25 MILLIGRAM(S): at 21:26

## 2017-11-11 RX ADMIN — HALOPERIDOL DECANOATE 5 MILLIGRAM(S): 100 INJECTION INTRAMUSCULAR at 14:36

## 2017-11-11 RX ADMIN — Medication 2 MILLIGRAM(S): at 21:26

## 2017-11-11 RX ADMIN — LITHIUM CARBONATE 450 MILLIGRAM(S): 300 TABLET, EXTENDED RELEASE ORAL at 08:09

## 2017-11-11 RX ADMIN — Medication 2 MILLIGRAM(S): at 14:36

## 2017-11-11 RX ADMIN — LITHIUM CARBONATE 450 MILLIGRAM(S): 300 TABLET, EXTENDED RELEASE ORAL at 21:26

## 2017-11-11 RX ADMIN — Medication 25 MILLIGRAM(S): at 07:03

## 2017-11-12 PROCEDURE — 99232 SBSQ HOSP IP/OBS MODERATE 35: CPT

## 2017-11-12 RX ORDER — DIPHENHYDRAMINE HCL 50 MG
50 CAPSULE ORAL EVERY 4 HOURS
Qty: 0 | Refills: 0 | Status: DISCONTINUED | OUTPATIENT
Start: 2017-11-12 | End: 2017-12-21

## 2017-11-12 RX ADMIN — Medication 2 MILLIGRAM(S): at 22:45

## 2017-11-12 RX ADMIN — HALOPERIDOL DECANOATE 5 MILLIGRAM(S): 100 INJECTION INTRAMUSCULAR at 05:22

## 2017-11-12 RX ADMIN — LITHIUM CARBONATE 450 MILLIGRAM(S): 300 TABLET, EXTENDED RELEASE ORAL at 09:10

## 2017-11-12 RX ADMIN — Medication 25 MILLIGRAM(S): at 05:22

## 2017-11-12 RX ADMIN — HALOPERIDOL DECANOATE 5 MILLIGRAM(S): 100 INJECTION INTRAMUSCULAR at 22:44

## 2017-11-12 RX ADMIN — OLANZAPINE 25 MILLIGRAM(S): 15 TABLET, FILM COATED ORAL at 23:44

## 2017-11-12 RX ADMIN — HALOPERIDOL DECANOATE 5 MILLIGRAM(S): 100 INJECTION INTRAMUSCULAR at 12:32

## 2017-11-12 RX ADMIN — Medication 2 MILLIGRAM(S): at 05:23

## 2017-11-12 RX ADMIN — Medication 50 MILLIGRAM(S): at 12:32

## 2017-11-12 RX ADMIN — LITHIUM CARBONATE 450 MILLIGRAM(S): 300 TABLET, EXTENDED RELEASE ORAL at 23:44

## 2017-11-12 RX ADMIN — Medication 2 MILLIGRAM(S): at 12:32

## 2017-11-12 RX ADMIN — Medication 50 MILLIGRAM(S): at 22:44

## 2017-11-13 PROCEDURE — 99232 SBSQ HOSP IP/OBS MODERATE 35: CPT

## 2017-11-13 RX ORDER — OLANZAPINE 15 MG/1
30 TABLET, FILM COATED ORAL AT BEDTIME
Qty: 0 | Refills: 0 | Status: DISCONTINUED | OUTPATIENT
Start: 2017-11-13 | End: 2017-11-20

## 2017-11-13 RX ORDER — CLONAZEPAM 1 MG
1 TABLET ORAL
Qty: 0 | Refills: 0 | Status: DISCONTINUED | OUTPATIENT
Start: 2017-11-13 | End: 2017-11-20

## 2017-11-13 RX ORDER — CHLORPROMAZINE HCL 10 MG
50 TABLET ORAL EVERY 4 HOURS
Qty: 0 | Refills: 0 | Status: DISCONTINUED | OUTPATIENT
Start: 2017-11-13 | End: 2017-12-21

## 2017-11-13 RX ADMIN — Medication 50 MILLIGRAM(S): at 05:56

## 2017-11-13 RX ADMIN — Medication 50 MILLIGRAM(S): at 13:08

## 2017-11-13 RX ADMIN — HALOPERIDOL DECANOATE 5 MILLIGRAM(S): 100 INJECTION INTRAMUSCULAR at 05:56

## 2017-11-13 RX ADMIN — OLANZAPINE 30 MILLIGRAM(S): 15 TABLET, FILM COATED ORAL at 21:05

## 2017-11-13 RX ADMIN — Medication 1 MILLIGRAM(S): at 21:05

## 2017-11-13 RX ADMIN — Medication 2 MILLIGRAM(S): at 20:00

## 2017-11-13 RX ADMIN — Medication 50 MILLIGRAM(S): at 16:11

## 2017-11-13 RX ADMIN — LITHIUM CARBONATE 450 MILLIGRAM(S): 300 TABLET, EXTENDED RELEASE ORAL at 09:06

## 2017-11-13 RX ADMIN — Medication 50 MILLIGRAM(S): at 21:05

## 2017-11-13 RX ADMIN — HALOPERIDOL DECANOATE 5 MILLIGRAM(S): 100 INJECTION INTRAMUSCULAR at 13:08

## 2017-11-13 RX ADMIN — Medication 2 MILLIGRAM(S): at 13:08

## 2017-11-13 RX ADMIN — Medication 2 MILLIGRAM(S): at 05:56

## 2017-11-13 RX ADMIN — LITHIUM CARBONATE 450 MILLIGRAM(S): 300 TABLET, EXTENDED RELEASE ORAL at 21:05

## 2017-11-14 PROCEDURE — 99232 SBSQ HOSP IP/OBS MODERATE 35: CPT

## 2017-11-14 RX ADMIN — Medication 50 MILLIGRAM(S): at 12:35

## 2017-11-14 RX ADMIN — LITHIUM CARBONATE 450 MILLIGRAM(S): 300 TABLET, EXTENDED RELEASE ORAL at 09:30

## 2017-11-14 RX ADMIN — Medication 2 MILLIGRAM(S): at 03:08

## 2017-11-14 RX ADMIN — Medication 50 MILLIGRAM(S): at 09:30

## 2017-11-14 RX ADMIN — Medication 2 MILLIGRAM(S): at 12:35

## 2017-11-14 RX ADMIN — Medication 1 MILLIGRAM(S): at 09:29

## 2017-11-14 RX ADMIN — Medication 50 MILLIGRAM(S): at 03:08

## 2017-11-15 PROCEDURE — 99232 SBSQ HOSP IP/OBS MODERATE 35: CPT

## 2017-11-15 PROCEDURE — 93010 ELECTROCARDIOGRAM REPORT: CPT

## 2017-11-15 RX ADMIN — Medication 2 MILLIGRAM(S): at 17:00

## 2017-11-15 RX ADMIN — Medication 50 MILLIGRAM(S): at 07:49

## 2017-11-15 RX ADMIN — LITHIUM CARBONATE 450 MILLIGRAM(S): 300 TABLET, EXTENDED RELEASE ORAL at 08:38

## 2017-11-15 RX ADMIN — Medication 50 MILLIGRAM(S): at 17:00

## 2017-11-15 RX ADMIN — Medication 1 MILLIGRAM(S): at 08:38

## 2017-11-15 RX ADMIN — Medication 650 MILLIGRAM(S): at 06:42

## 2017-11-16 PROCEDURE — 99232 SBSQ HOSP IP/OBS MODERATE 35: CPT

## 2017-11-16 RX ORDER — CHLORPROMAZINE HCL 10 MG
50 TABLET ORAL ONCE
Qty: 0 | Refills: 0 | Status: COMPLETED | OUTPATIENT
Start: 2017-11-16 | End: 2017-11-20

## 2017-11-16 RX ORDER — CHLORPROMAZINE HCL 10 MG
50 TABLET ORAL ONCE
Qty: 0 | Refills: 0 | Status: DISCONTINUED | OUTPATIENT
Start: 2017-11-16 | End: 2017-12-21

## 2017-11-16 RX ADMIN — Medication 1 MILLIGRAM(S): at 20:03

## 2017-11-16 RX ADMIN — LITHIUM CARBONATE 450 MILLIGRAM(S): 300 TABLET, EXTENDED RELEASE ORAL at 20:03

## 2017-11-16 RX ADMIN — OLANZAPINE 30 MILLIGRAM(S): 15 TABLET, FILM COATED ORAL at 20:03

## 2017-11-16 RX ADMIN — Medication 50 MILLIGRAM(S): at 12:50

## 2017-11-16 RX ADMIN — Medication 2 MILLIGRAM(S): at 12:50

## 2017-11-17 PROCEDURE — 99232 SBSQ HOSP IP/OBS MODERATE 35: CPT

## 2017-11-17 RX ADMIN — Medication 50 MILLIGRAM(S): at 05:52

## 2017-11-17 RX ADMIN — Medication 2 MILLIGRAM(S): at 17:42

## 2017-11-17 RX ADMIN — OLANZAPINE 30 MILLIGRAM(S): 15 TABLET, FILM COATED ORAL at 21:05

## 2017-11-17 RX ADMIN — Medication 50 MILLIGRAM(S): at 21:30

## 2017-11-17 RX ADMIN — Medication 1 MILLIGRAM(S): at 08:06

## 2017-11-17 RX ADMIN — Medication 50 MILLIGRAM(S): at 17:42

## 2017-11-17 RX ADMIN — Medication 1 MILLIGRAM(S): at 21:05

## 2017-11-17 RX ADMIN — Medication 2 MILLIGRAM(S): at 09:08

## 2017-11-17 RX ADMIN — Medication 650 MILLIGRAM(S): at 10:08

## 2017-11-17 RX ADMIN — LITHIUM CARBONATE 450 MILLIGRAM(S): 300 TABLET, EXTENDED RELEASE ORAL at 21:05

## 2017-11-17 RX ADMIN — Medication 650 MILLIGRAM(S): at 09:08

## 2017-11-17 RX ADMIN — LITHIUM CARBONATE 450 MILLIGRAM(S): 300 TABLET, EXTENDED RELEASE ORAL at 08:06

## 2017-11-18 LAB — LITHIUM SERPL-MCNC: 0.64 MMOL/L — SIGNIFICANT CHANGE UP (ref 0.6–1.2)

## 2017-11-18 RX ADMIN — Medication 50 MILLIGRAM(S): at 19:56

## 2017-11-18 RX ADMIN — Medication 50 MILLIGRAM(S): at 14:32

## 2017-11-18 RX ADMIN — LITHIUM CARBONATE 450 MILLIGRAM(S): 300 TABLET, EXTENDED RELEASE ORAL at 19:56

## 2017-11-18 RX ADMIN — Medication 1 MILLIGRAM(S): at 08:36

## 2017-11-18 RX ADMIN — Medication 50 MILLIGRAM(S): at 08:36

## 2017-11-18 RX ADMIN — OLANZAPINE 30 MILLIGRAM(S): 15 TABLET, FILM COATED ORAL at 19:56

## 2017-11-18 RX ADMIN — LITHIUM CARBONATE 450 MILLIGRAM(S): 300 TABLET, EXTENDED RELEASE ORAL at 08:36

## 2017-11-18 RX ADMIN — Medication 1 MILLIGRAM(S): at 19:56

## 2017-11-19 RX ADMIN — Medication 50 MILLIGRAM(S): at 20:24

## 2017-11-19 RX ADMIN — Medication 1 MILLIGRAM(S): at 20:30

## 2017-11-19 RX ADMIN — Medication 2 MILLIGRAM(S): at 15:37

## 2017-11-19 RX ADMIN — Medication 50 MILLIGRAM(S): at 07:07

## 2017-11-19 RX ADMIN — Medication 1 MILLIGRAM(S): at 08:05

## 2017-11-19 RX ADMIN — LITHIUM CARBONATE 450 MILLIGRAM(S): 300 TABLET, EXTENDED RELEASE ORAL at 20:30

## 2017-11-19 RX ADMIN — Medication 2 MILLIGRAM(S): at 10:26

## 2017-11-19 RX ADMIN — Medication 50 MILLIGRAM(S): at 14:11

## 2017-11-19 RX ADMIN — Medication 2 MILLIGRAM(S): at 20:24

## 2017-11-19 RX ADMIN — LITHIUM CARBONATE 450 MILLIGRAM(S): 300 TABLET, EXTENDED RELEASE ORAL at 08:05

## 2017-11-19 RX ADMIN — OLANZAPINE 30 MILLIGRAM(S): 15 TABLET, FILM COATED ORAL at 20:30

## 2017-11-20 PROCEDURE — 99232 SBSQ HOSP IP/OBS MODERATE 35: CPT

## 2017-11-20 RX ORDER — OLANZAPINE 15 MG/1
35 TABLET, FILM COATED ORAL AT BEDTIME
Qty: 0 | Refills: 0 | Status: DISCONTINUED | OUTPATIENT
Start: 2017-11-20 | End: 2017-11-21

## 2017-11-20 RX ORDER — LITHIUM CARBONATE 300 MG/1
600 TABLET, EXTENDED RELEASE ORAL
Qty: 0 | Refills: 0 | Status: DISCONTINUED | OUTPATIENT
Start: 2017-11-20 | End: 2017-12-07

## 2017-11-20 RX ORDER — CLONAZEPAM 1 MG
1 TABLET ORAL
Qty: 0 | Refills: 0 | Status: DISCONTINUED | OUTPATIENT
Start: 2017-11-21 | End: 2017-11-26

## 2017-11-20 RX ADMIN — LITHIUM CARBONATE 450 MILLIGRAM(S): 300 TABLET, EXTENDED RELEASE ORAL at 09:51

## 2017-11-20 RX ADMIN — Medication 50 MILLIGRAM(S): at 22:16

## 2017-11-20 RX ADMIN — Medication 2 MILLIGRAM(S): at 12:45

## 2017-11-20 RX ADMIN — Medication 1 MILLIGRAM(S): at 09:51

## 2017-11-20 RX ADMIN — Medication 50 MILLIGRAM(S): at 05:59

## 2017-11-20 RX ADMIN — Medication 1 MILLIGRAM(S): at 22:15

## 2017-11-20 RX ADMIN — Medication 50 MILLIGRAM(S): at 12:45

## 2017-11-20 RX ADMIN — OLANZAPINE 35 MILLIGRAM(S): 15 TABLET, FILM COATED ORAL at 22:14

## 2017-11-20 RX ADMIN — Medication 2 MILLIGRAM(S): at 22:17

## 2017-11-20 RX ADMIN — LITHIUM CARBONATE 600 MILLIGRAM(S): 300 TABLET, EXTENDED RELEASE ORAL at 22:14

## 2017-11-20 RX ADMIN — Medication 2 MILLIGRAM(S): at 06:00

## 2017-11-21 PROCEDURE — 99232 SBSQ HOSP IP/OBS MODERATE 35: CPT

## 2017-11-21 RX ORDER — OLANZAPINE 15 MG/1
40 TABLET, FILM COATED ORAL AT BEDTIME
Qty: 0 | Refills: 0 | Status: DISCONTINUED | OUTPATIENT
Start: 2017-11-21 | End: 2017-12-08

## 2017-11-21 RX ADMIN — Medication 50 MILLIGRAM(S): at 20:39

## 2017-11-21 RX ADMIN — Medication 2 MILLIGRAM(S): at 09:38

## 2017-11-21 RX ADMIN — Medication 50 MILLIGRAM(S): at 03:40

## 2017-11-21 RX ADMIN — LITHIUM CARBONATE 600 MILLIGRAM(S): 300 TABLET, EXTENDED RELEASE ORAL at 08:21

## 2017-11-21 RX ADMIN — Medication 1 MILLIGRAM(S): at 20:39

## 2017-11-21 RX ADMIN — Medication 1 MILLIGRAM(S): at 08:21

## 2017-11-21 RX ADMIN — Medication 2 MILLIGRAM(S): at 03:40

## 2017-11-21 RX ADMIN — Medication 2 MILLIGRAM(S): at 20:39

## 2017-11-21 RX ADMIN — Medication 50 MILLIGRAM(S): at 08:21

## 2017-11-21 RX ADMIN — LITHIUM CARBONATE 600 MILLIGRAM(S): 300 TABLET, EXTENDED RELEASE ORAL at 20:39

## 2017-11-21 RX ADMIN — OLANZAPINE 40 MILLIGRAM(S): 15 TABLET, FILM COATED ORAL at 20:39

## 2017-11-22 PROCEDURE — 99232 SBSQ HOSP IP/OBS MODERATE 35: CPT

## 2017-11-22 RX ADMIN — Medication 1 MILLIGRAM(S): at 20:20

## 2017-11-22 RX ADMIN — Medication 1 MILLIGRAM(S): at 08:34

## 2017-11-22 RX ADMIN — Medication 50 MILLIGRAM(S): at 20:40

## 2017-11-22 RX ADMIN — Medication 50 MILLIGRAM(S): at 04:46

## 2017-11-22 RX ADMIN — LITHIUM CARBONATE 600 MILLIGRAM(S): 300 TABLET, EXTENDED RELEASE ORAL at 10:29

## 2017-11-22 RX ADMIN — Medication 50 MILLIGRAM(S): at 10:26

## 2017-11-22 RX ADMIN — OLANZAPINE 40 MILLIGRAM(S): 15 TABLET, FILM COATED ORAL at 20:20

## 2017-11-22 RX ADMIN — Medication 2 MILLIGRAM(S): at 04:47

## 2017-11-22 RX ADMIN — Medication 2 MILLIGRAM(S): at 20:40

## 2017-11-22 RX ADMIN — LITHIUM CARBONATE 600 MILLIGRAM(S): 300 TABLET, EXTENDED RELEASE ORAL at 20:20

## 2017-11-23 RX ORDER — CHLORPROMAZINE HCL 10 MG
50 TABLET ORAL ONCE
Qty: 0 | Refills: 0 | Status: COMPLETED | OUTPATIENT
Start: 2017-11-23 | End: 2017-11-23

## 2017-11-23 RX ADMIN — Medication 50 MILLIGRAM(S): at 17:45

## 2017-11-23 RX ADMIN — Medication 2 MILLIGRAM(S): at 17:45

## 2017-11-23 RX ADMIN — Medication 2 MILLIGRAM(S): at 03:29

## 2017-11-23 RX ADMIN — Medication 50 MILLIGRAM(S): at 03:29

## 2017-11-23 RX ADMIN — Medication 50 MILLIGRAM(S): at 16:16

## 2017-11-23 RX ADMIN — OLANZAPINE 40 MILLIGRAM(S): 15 TABLET, FILM COATED ORAL at 21:20

## 2017-11-23 RX ADMIN — Medication 1 MILLIGRAM(S): at 08:00

## 2017-11-23 RX ADMIN — Medication 50 MILLIGRAM(S): at 08:00

## 2017-11-23 RX ADMIN — Medication 1 MILLIGRAM(S): at 21:20

## 2017-11-23 RX ADMIN — Medication 2 MILLIGRAM(S): at 16:16

## 2017-11-23 RX ADMIN — LITHIUM CARBONATE 600 MILLIGRAM(S): 300 TABLET, EXTENDED RELEASE ORAL at 21:20

## 2017-11-23 RX ADMIN — LITHIUM CARBONATE 600 MILLIGRAM(S): 300 TABLET, EXTENDED RELEASE ORAL at 08:00

## 2017-11-23 RX ADMIN — Medication 2 MILLIGRAM(S): at 11:06

## 2017-11-24 PROCEDURE — 90853 GROUP PSYCHOTHERAPY: CPT

## 2017-11-24 PROCEDURE — 99232 SBSQ HOSP IP/OBS MODERATE 35: CPT | Mod: 25

## 2017-11-24 RX ADMIN — Medication 2 MILLIGRAM(S): at 21:53

## 2017-11-24 RX ADMIN — Medication 1 MILLIGRAM(S): at 20:37

## 2017-11-24 RX ADMIN — OLANZAPINE 40 MILLIGRAM(S): 15 TABLET, FILM COATED ORAL at 20:37

## 2017-11-24 RX ADMIN — Medication 50 MILLIGRAM(S): at 17:10

## 2017-11-24 RX ADMIN — Medication 50 MILLIGRAM(S): at 12:25

## 2017-11-24 RX ADMIN — Medication 50 MILLIGRAM(S): at 21:53

## 2017-11-24 RX ADMIN — Medication 2 MILLIGRAM(S): at 17:10

## 2017-11-24 RX ADMIN — LITHIUM CARBONATE 600 MILLIGRAM(S): 300 TABLET, EXTENDED RELEASE ORAL at 20:37

## 2017-11-24 RX ADMIN — Medication 2 MILLIGRAM(S): at 12:25

## 2017-11-24 RX ADMIN — Medication 1 MILLIGRAM(S): at 10:29

## 2017-11-25 LAB — LITHIUM SERPL-MCNC: 0.71 MMOL/L — SIGNIFICANT CHANGE UP (ref 0.6–1.2)

## 2017-11-25 RX ADMIN — Medication 2 MILLIGRAM(S): at 08:55

## 2017-11-25 RX ADMIN — Medication 2 MILLIGRAM(S): at 22:12

## 2017-11-25 RX ADMIN — Medication 50 MILLIGRAM(S): at 07:38

## 2017-11-25 RX ADMIN — Medication 2 MILLIGRAM(S): at 18:00

## 2017-11-25 RX ADMIN — LITHIUM CARBONATE 600 MILLIGRAM(S): 300 TABLET, EXTENDED RELEASE ORAL at 20:40

## 2017-11-25 RX ADMIN — Medication 50 MILLIGRAM(S): at 13:17

## 2017-11-25 RX ADMIN — Medication 2 MILLIGRAM(S): at 13:17

## 2017-11-25 RX ADMIN — Medication 1 MILLIGRAM(S): at 09:32

## 2017-11-25 RX ADMIN — Medication 50 MILLIGRAM(S): at 22:12

## 2017-11-25 RX ADMIN — OLANZAPINE 40 MILLIGRAM(S): 15 TABLET, FILM COATED ORAL at 20:40

## 2017-11-25 RX ADMIN — Medication 50 MILLIGRAM(S): at 18:00

## 2017-11-25 RX ADMIN — Medication 1 MILLIGRAM(S): at 20:40

## 2017-11-26 RX ORDER — CHLORPROMAZINE HCL 10 MG
50 TABLET ORAL ONCE
Qty: 0 | Refills: 0 | Status: COMPLETED | OUTPATIENT
Start: 2017-11-26 | End: 2017-11-26

## 2017-11-26 RX ORDER — CLONAZEPAM 1 MG
2 TABLET ORAL
Qty: 0 | Refills: 0 | Status: DISCONTINUED | OUTPATIENT
Start: 2017-11-26 | End: 2017-12-03

## 2017-11-26 RX ORDER — DIPHENHYDRAMINE HCL 50 MG
50 CAPSULE ORAL ONCE
Qty: 0 | Refills: 0 | Status: COMPLETED | OUTPATIENT
Start: 2017-11-26 | End: 2017-11-26

## 2017-11-26 RX ADMIN — Medication 2 MILLIGRAM(S): at 23:19

## 2017-11-26 RX ADMIN — Medication 50 MILLIGRAM(S): at 10:07

## 2017-11-26 RX ADMIN — Medication 1 MILLIGRAM(S): at 09:03

## 2017-11-26 RX ADMIN — OLANZAPINE 40 MILLIGRAM(S): 15 TABLET, FILM COATED ORAL at 23:19

## 2017-11-26 RX ADMIN — Medication 50 MILLIGRAM(S): at 06:46

## 2017-11-26 RX ADMIN — Medication 2 MILLIGRAM(S): at 13:38

## 2017-11-26 RX ADMIN — Medication 50 MILLIGRAM(S): at 23:19

## 2017-11-26 RX ADMIN — Medication 50 MILLIGRAM(S): at 13:38

## 2017-11-26 RX ADMIN — Medication 2 MILLIGRAM(S): at 06:46

## 2017-11-26 RX ADMIN — Medication 2 MILLIGRAM(S): at 10:07

## 2017-11-26 RX ADMIN — LITHIUM CARBONATE 600 MILLIGRAM(S): 300 TABLET, EXTENDED RELEASE ORAL at 09:03

## 2017-11-26 RX ADMIN — LITHIUM CARBONATE 600 MILLIGRAM(S): 300 TABLET, EXTENDED RELEASE ORAL at 23:19

## 2017-11-27 PROCEDURE — 99232 SBSQ HOSP IP/OBS MODERATE 35: CPT

## 2017-11-27 RX ADMIN — LITHIUM CARBONATE 600 MILLIGRAM(S): 300 TABLET, EXTENDED RELEASE ORAL at 08:06

## 2017-11-27 RX ADMIN — Medication 2 MILLIGRAM(S): at 14:17

## 2017-11-27 RX ADMIN — Medication 2 MILLIGRAM(S): at 08:35

## 2017-11-27 RX ADMIN — Medication 2 MILLIGRAM(S): at 08:06

## 2017-11-27 RX ADMIN — LITHIUM CARBONATE 600 MILLIGRAM(S): 300 TABLET, EXTENDED RELEASE ORAL at 22:43

## 2017-11-27 RX ADMIN — Medication 50 MILLIGRAM(S): at 14:17

## 2017-11-27 RX ADMIN — Medication 2 MILLIGRAM(S): at 22:43

## 2017-11-27 RX ADMIN — OLANZAPINE 40 MILLIGRAM(S): 15 TABLET, FILM COATED ORAL at 22:43

## 2017-11-27 RX ADMIN — Medication 50 MILLIGRAM(S): at 08:06

## 2017-11-28 PROCEDURE — 99232 SBSQ HOSP IP/OBS MODERATE 35: CPT

## 2017-11-28 RX ADMIN — Medication 50 MILLIGRAM(S): at 18:30

## 2017-11-28 RX ADMIN — Medication 2 MILLIGRAM(S): at 18:30

## 2017-11-28 RX ADMIN — LITHIUM CARBONATE 600 MILLIGRAM(S): 300 TABLET, EXTENDED RELEASE ORAL at 08:16

## 2017-11-28 RX ADMIN — Medication 50 MILLIGRAM(S): at 00:45

## 2017-11-28 RX ADMIN — Medication 50 MILLIGRAM(S): at 08:15

## 2017-11-28 RX ADMIN — Medication 2 MILLIGRAM(S): at 00:45

## 2017-11-28 RX ADMIN — Medication 2 MILLIGRAM(S): at 08:16

## 2017-11-29 PROCEDURE — 99232 SBSQ HOSP IP/OBS MODERATE 35: CPT

## 2017-11-29 RX ADMIN — LITHIUM CARBONATE 600 MILLIGRAM(S): 300 TABLET, EXTENDED RELEASE ORAL at 21:07

## 2017-11-29 RX ADMIN — Medication 50 MILLIGRAM(S): at 21:07

## 2017-11-29 RX ADMIN — Medication 2 MILLIGRAM(S): at 21:45

## 2017-11-29 RX ADMIN — LITHIUM CARBONATE 600 MILLIGRAM(S): 300 TABLET, EXTENDED RELEASE ORAL at 07:55

## 2017-11-29 RX ADMIN — Medication 50 MILLIGRAM(S): at 13:41

## 2017-11-29 RX ADMIN — Medication 2 MILLIGRAM(S): at 17:16

## 2017-11-29 RX ADMIN — OLANZAPINE 40 MILLIGRAM(S): 15 TABLET, FILM COATED ORAL at 21:07

## 2017-11-29 RX ADMIN — Medication 50 MILLIGRAM(S): at 08:29

## 2017-11-29 RX ADMIN — Medication 2 MILLIGRAM(S): at 07:55

## 2017-11-29 RX ADMIN — Medication 2 MILLIGRAM(S): at 21:07

## 2017-11-30 PROCEDURE — 99232 SBSQ HOSP IP/OBS MODERATE 35: CPT

## 2017-11-30 RX ADMIN — Medication 2 MILLIGRAM(S): at 20:18

## 2017-11-30 RX ADMIN — Medication 50 MILLIGRAM(S): at 20:18

## 2017-11-30 RX ADMIN — Medication 50 MILLIGRAM(S): at 08:12

## 2017-11-30 RX ADMIN — Medication 650 MILLIGRAM(S): at 13:10

## 2017-11-30 RX ADMIN — LITHIUM CARBONATE 600 MILLIGRAM(S): 300 TABLET, EXTENDED RELEASE ORAL at 20:18

## 2017-11-30 RX ADMIN — OLANZAPINE 40 MILLIGRAM(S): 15 TABLET, FILM COATED ORAL at 20:18

## 2017-11-30 RX ADMIN — LITHIUM CARBONATE 600 MILLIGRAM(S): 300 TABLET, EXTENDED RELEASE ORAL at 08:12

## 2017-11-30 RX ADMIN — Medication 50 MILLIGRAM(S): at 20:19

## 2017-11-30 RX ADMIN — Medication 2 MILLIGRAM(S): at 08:12

## 2017-12-01 PROCEDURE — 99232 SBSQ HOSP IP/OBS MODERATE 35: CPT

## 2017-12-01 RX ORDER — CLONAZEPAM 1 MG
2 TABLET ORAL
Qty: 0 | Refills: 0 | Status: DISCONTINUED | OUTPATIENT
Start: 2017-12-04 | End: 2017-12-11

## 2017-12-01 RX ADMIN — Medication 50 MILLIGRAM(S): at 21:20

## 2017-12-01 RX ADMIN — Medication 50 MILLIGRAM(S): at 09:06

## 2017-12-01 RX ADMIN — Medication 50 MILLIGRAM(S): at 13:18

## 2017-12-01 RX ADMIN — LITHIUM CARBONATE 600 MILLIGRAM(S): 300 TABLET, EXTENDED RELEASE ORAL at 09:05

## 2017-12-01 RX ADMIN — OLANZAPINE 40 MILLIGRAM(S): 15 TABLET, FILM COATED ORAL at 21:20

## 2017-12-01 RX ADMIN — LITHIUM CARBONATE 600 MILLIGRAM(S): 300 TABLET, EXTENDED RELEASE ORAL at 21:20

## 2017-12-01 RX ADMIN — Medication 2 MILLIGRAM(S): at 21:20

## 2017-12-01 RX ADMIN — Medication 2 MILLIGRAM(S): at 09:05

## 2017-12-01 RX ADMIN — Medication 50 MILLIGRAM(S): at 09:05

## 2017-12-02 RX ADMIN — Medication 50 MILLIGRAM(S): at 09:00

## 2017-12-02 RX ADMIN — Medication 50 MILLIGRAM(S): at 22:54

## 2017-12-02 RX ADMIN — Medication 50 MILLIGRAM(S): at 14:14

## 2017-12-02 RX ADMIN — Medication 2 MILLIGRAM(S): at 22:54

## 2017-12-02 RX ADMIN — Medication 50 MILLIGRAM(S): at 22:55

## 2017-12-02 RX ADMIN — LITHIUM CARBONATE 600 MILLIGRAM(S): 300 TABLET, EXTENDED RELEASE ORAL at 22:54

## 2017-12-02 RX ADMIN — Medication 2 MILLIGRAM(S): at 09:45

## 2017-12-02 RX ADMIN — LITHIUM CARBONATE 600 MILLIGRAM(S): 300 TABLET, EXTENDED RELEASE ORAL at 09:45

## 2017-12-02 RX ADMIN — OLANZAPINE 40 MILLIGRAM(S): 15 TABLET, FILM COATED ORAL at 22:54

## 2017-12-03 RX ADMIN — Medication 2 MILLIGRAM(S): at 08:32

## 2017-12-03 RX ADMIN — LITHIUM CARBONATE 600 MILLIGRAM(S): 300 TABLET, EXTENDED RELEASE ORAL at 08:21

## 2017-12-03 RX ADMIN — Medication 2 MILLIGRAM(S): at 08:21

## 2017-12-03 RX ADMIN — Medication 50 MILLIGRAM(S): at 20:40

## 2017-12-03 RX ADMIN — Medication 50 MILLIGRAM(S): at 07:00

## 2017-12-03 RX ADMIN — Medication 50 MILLIGRAM(S): at 21:13

## 2017-12-03 RX ADMIN — Medication 2 MILLIGRAM(S): at 20:39

## 2017-12-03 RX ADMIN — Medication 50 MILLIGRAM(S): at 15:54

## 2017-12-03 RX ADMIN — Medication 50 MILLIGRAM(S): at 11:07

## 2017-12-03 RX ADMIN — LITHIUM CARBONATE 600 MILLIGRAM(S): 300 TABLET, EXTENDED RELEASE ORAL at 20:40

## 2017-12-03 RX ADMIN — Medication 2 MILLIGRAM(S): at 20:20

## 2017-12-03 RX ADMIN — OLANZAPINE 40 MILLIGRAM(S): 15 TABLET, FILM COATED ORAL at 20:20

## 2017-12-04 PROCEDURE — 99232 SBSQ HOSP IP/OBS MODERATE 35: CPT

## 2017-12-04 RX ORDER — FLUPHENAZINE HYDROCHLORIDE 1 MG/1
2.5 TABLET, FILM COATED ORAL
Qty: 0 | Refills: 0 | Status: DISCONTINUED | OUTPATIENT
Start: 2017-12-04 | End: 2017-12-05

## 2017-12-04 RX ADMIN — OLANZAPINE 40 MILLIGRAM(S): 15 TABLET, FILM COATED ORAL at 21:26

## 2017-12-04 RX ADMIN — FLUPHENAZINE HYDROCHLORIDE 2.5 MILLIGRAM(S): 1 TABLET, FILM COATED ORAL at 21:26

## 2017-12-04 RX ADMIN — LITHIUM CARBONATE 600 MILLIGRAM(S): 300 TABLET, EXTENDED RELEASE ORAL at 08:15

## 2017-12-04 RX ADMIN — LITHIUM CARBONATE 600 MILLIGRAM(S): 300 TABLET, EXTENDED RELEASE ORAL at 21:26

## 2017-12-04 RX ADMIN — Medication 2 MILLIGRAM(S): at 21:26

## 2017-12-04 RX ADMIN — Medication 50 MILLIGRAM(S): at 23:46

## 2017-12-04 RX ADMIN — Medication 2 MILLIGRAM(S): at 08:15

## 2017-12-04 RX ADMIN — Medication 50 MILLIGRAM(S): at 14:19

## 2017-12-04 RX ADMIN — Medication 2 MILLIGRAM(S): at 23:46

## 2017-12-05 PROCEDURE — 99232 SBSQ HOSP IP/OBS MODERATE 35: CPT

## 2017-12-05 PROCEDURE — 90853 GROUP PSYCHOTHERAPY: CPT

## 2017-12-05 RX ORDER — FLUPHENAZINE HYDROCHLORIDE 1 MG/1
5 TABLET, FILM COATED ORAL
Qty: 0 | Refills: 0 | Status: DISCONTINUED | OUTPATIENT
Start: 2017-12-05 | End: 2017-12-12

## 2017-12-05 RX ADMIN — Medication 50 MILLIGRAM(S): at 17:22

## 2017-12-05 RX ADMIN — Medication 2 MILLIGRAM(S): at 21:12

## 2017-12-05 RX ADMIN — Medication 2 MILLIGRAM(S): at 08:17

## 2017-12-05 RX ADMIN — Medication 2 MILLIGRAM(S): at 05:58

## 2017-12-05 RX ADMIN — Medication 50 MILLIGRAM(S): at 21:34

## 2017-12-05 RX ADMIN — Medication 2 MILLIGRAM(S): at 17:22

## 2017-12-05 RX ADMIN — OLANZAPINE 40 MILLIGRAM(S): 15 TABLET, FILM COATED ORAL at 21:13

## 2017-12-05 RX ADMIN — Medication 50 MILLIGRAM(S): at 05:58

## 2017-12-05 RX ADMIN — LITHIUM CARBONATE 600 MILLIGRAM(S): 300 TABLET, EXTENDED RELEASE ORAL at 21:12

## 2017-12-05 RX ADMIN — FLUPHENAZINE HYDROCHLORIDE 5 MILLIGRAM(S): 1 TABLET, FILM COATED ORAL at 21:12

## 2017-12-05 RX ADMIN — LITHIUM CARBONATE 600 MILLIGRAM(S): 300 TABLET, EXTENDED RELEASE ORAL at 08:17

## 2017-12-05 RX ADMIN — FLUPHENAZINE HYDROCHLORIDE 2.5 MILLIGRAM(S): 1 TABLET, FILM COATED ORAL at 08:17

## 2017-12-06 PROCEDURE — 99232 SBSQ HOSP IP/OBS MODERATE 35: CPT

## 2017-12-06 RX ORDER — CLONAZEPAM 1 MG
2 TABLET ORAL
Qty: 0 | Refills: 0 | Status: DISCONTINUED | OUTPATIENT
Start: 2017-12-12 | End: 2017-12-12

## 2017-12-06 RX ADMIN — Medication 2 MILLIGRAM(S): at 08:14

## 2017-12-06 RX ADMIN — FLUPHENAZINE HYDROCHLORIDE 5 MILLIGRAM(S): 1 TABLET, FILM COATED ORAL at 08:14

## 2017-12-06 RX ADMIN — Medication 50 MILLIGRAM(S): at 05:40

## 2017-12-06 RX ADMIN — Medication 50 MILLIGRAM(S): at 15:30

## 2017-12-06 RX ADMIN — Medication 50 MILLIGRAM(S): at 21:22

## 2017-12-06 RX ADMIN — OLANZAPINE 40 MILLIGRAM(S): 15 TABLET, FILM COATED ORAL at 20:56

## 2017-12-06 RX ADMIN — Medication 2 MILLIGRAM(S): at 05:40

## 2017-12-06 RX ADMIN — Medication 2 MILLIGRAM(S): at 20:56

## 2017-12-06 RX ADMIN — LITHIUM CARBONATE 600 MILLIGRAM(S): 300 TABLET, EXTENDED RELEASE ORAL at 20:56

## 2017-12-06 RX ADMIN — Medication 2 MILLIGRAM(S): at 21:23

## 2017-12-06 RX ADMIN — FLUPHENAZINE HYDROCHLORIDE 5 MILLIGRAM(S): 1 TABLET, FILM COATED ORAL at 20:56

## 2017-12-06 RX ADMIN — Medication 50 MILLIGRAM(S): at 15:29

## 2017-12-07 PROCEDURE — 99232 SBSQ HOSP IP/OBS MODERATE 35: CPT

## 2017-12-07 PROCEDURE — 93010 ELECTROCARDIOGRAM REPORT: CPT

## 2017-12-07 RX ORDER — LITHIUM CARBONATE 300 MG/1
600 TABLET, EXTENDED RELEASE ORAL DAILY
Qty: 0 | Refills: 0 | Status: DISCONTINUED | OUTPATIENT
Start: 2017-12-08 | End: 2017-12-21

## 2017-12-07 RX ORDER — LITHIUM CARBONATE 300 MG/1
900 TABLET, EXTENDED RELEASE ORAL AT BEDTIME
Qty: 0 | Refills: 0 | Status: DISCONTINUED | OUTPATIENT
Start: 2017-12-07 | End: 2017-12-21

## 2017-12-07 RX ADMIN — Medication 50 MILLIGRAM(S): at 20:24

## 2017-12-07 RX ADMIN — OLANZAPINE 40 MILLIGRAM(S): 15 TABLET, FILM COATED ORAL at 21:25

## 2017-12-07 RX ADMIN — Medication 2 MILLIGRAM(S): at 21:24

## 2017-12-07 RX ADMIN — FLUPHENAZINE HYDROCHLORIDE 5 MILLIGRAM(S): 1 TABLET, FILM COATED ORAL at 08:03

## 2017-12-07 RX ADMIN — Medication 50 MILLIGRAM(S): at 08:03

## 2017-12-07 RX ADMIN — Medication 2 MILLIGRAM(S): at 08:03

## 2017-12-07 RX ADMIN — LITHIUM CARBONATE 900 MILLIGRAM(S): 300 TABLET, EXTENDED RELEASE ORAL at 21:24

## 2017-12-07 RX ADMIN — LITHIUM CARBONATE 600 MILLIGRAM(S): 300 TABLET, EXTENDED RELEASE ORAL at 08:03

## 2017-12-07 RX ADMIN — Medication 2 MILLIGRAM(S): at 03:53

## 2017-12-07 RX ADMIN — Medication 50 MILLIGRAM(S): at 03:54

## 2017-12-07 RX ADMIN — Medication 2 MILLIGRAM(S): at 22:12

## 2017-12-07 RX ADMIN — FLUPHENAZINE HYDROCHLORIDE 5 MILLIGRAM(S): 1 TABLET, FILM COATED ORAL at 21:24

## 2017-12-08 PROCEDURE — 99232 SBSQ HOSP IP/OBS MODERATE 35: CPT

## 2017-12-08 RX ORDER — OLANZAPINE 15 MG/1
30 TABLET, FILM COATED ORAL AT BEDTIME
Qty: 0 | Refills: 0 | Status: DISCONTINUED | OUTPATIENT
Start: 2017-12-09 | End: 2017-12-21

## 2017-12-08 RX ORDER — OLANZAPINE 15 MG/1
40 TABLET, FILM COATED ORAL AT BEDTIME
Qty: 0 | Refills: 0 | Status: COMPLETED | OUTPATIENT
Start: 2017-12-08 | End: 2017-12-09

## 2017-12-08 RX ORDER — OLANZAPINE 15 MG/1
10 TABLET, FILM COATED ORAL DAILY
Qty: 0 | Refills: 0 | Status: DISCONTINUED | OUTPATIENT
Start: 2017-12-09 | End: 2017-12-21

## 2017-12-08 RX ADMIN — LITHIUM CARBONATE 900 MILLIGRAM(S): 300 TABLET, EXTENDED RELEASE ORAL at 22:14

## 2017-12-08 RX ADMIN — OLANZAPINE 40 MILLIGRAM(S): 15 TABLET, FILM COATED ORAL at 22:15

## 2017-12-08 RX ADMIN — Medication 50 MILLIGRAM(S): at 08:13

## 2017-12-08 RX ADMIN — Medication 2 MILLIGRAM(S): at 22:14

## 2017-12-08 RX ADMIN — FLUPHENAZINE HYDROCHLORIDE 5 MILLIGRAM(S): 1 TABLET, FILM COATED ORAL at 22:14

## 2017-12-08 RX ADMIN — Medication 50 MILLIGRAM(S): at 21:52

## 2017-12-08 RX ADMIN — Medication 2 MILLIGRAM(S): at 10:20

## 2017-12-08 RX ADMIN — FLUPHENAZINE HYDROCHLORIDE 5 MILLIGRAM(S): 1 TABLET, FILM COATED ORAL at 08:13

## 2017-12-08 RX ADMIN — Medication 50 MILLIGRAM(S): at 08:40

## 2017-12-08 RX ADMIN — LITHIUM CARBONATE 600 MILLIGRAM(S): 300 TABLET, EXTENDED RELEASE ORAL at 08:13

## 2017-12-08 RX ADMIN — Medication 2 MILLIGRAM(S): at 08:13

## 2017-12-09 RX ADMIN — OLANZAPINE 10 MILLIGRAM(S): 15 TABLET, FILM COATED ORAL at 08:00

## 2017-12-09 RX ADMIN — LITHIUM CARBONATE 900 MILLIGRAM(S): 300 TABLET, EXTENDED RELEASE ORAL at 23:16

## 2017-12-09 RX ADMIN — FLUPHENAZINE HYDROCHLORIDE 5 MILLIGRAM(S): 1 TABLET, FILM COATED ORAL at 23:16

## 2017-12-09 RX ADMIN — Medication 50 MILLIGRAM(S): at 23:18

## 2017-12-09 RX ADMIN — Medication 50 MILLIGRAM(S): at 16:36

## 2017-12-09 RX ADMIN — LITHIUM CARBONATE 600 MILLIGRAM(S): 300 TABLET, EXTENDED RELEASE ORAL at 08:00

## 2017-12-09 RX ADMIN — FLUPHENAZINE HYDROCHLORIDE 5 MILLIGRAM(S): 1 TABLET, FILM COATED ORAL at 08:00

## 2017-12-09 RX ADMIN — Medication 2 MILLIGRAM(S): at 23:18

## 2017-12-09 RX ADMIN — Medication 50 MILLIGRAM(S): at 16:35

## 2017-12-09 RX ADMIN — Medication 2 MILLIGRAM(S): at 23:16

## 2017-12-09 RX ADMIN — Medication 2 MILLIGRAM(S): at 16:36

## 2017-12-09 RX ADMIN — OLANZAPINE 30 MILLIGRAM(S): 15 TABLET, FILM COATED ORAL at 23:18

## 2017-12-09 RX ADMIN — Medication 2 MILLIGRAM(S): at 08:00

## 2017-12-09 RX ADMIN — Medication 50 MILLIGRAM(S): at 08:01

## 2017-12-10 RX ADMIN — LITHIUM CARBONATE 600 MILLIGRAM(S): 300 TABLET, EXTENDED RELEASE ORAL at 08:20

## 2017-12-10 RX ADMIN — Medication 2 MILLIGRAM(S): at 13:44

## 2017-12-10 RX ADMIN — Medication 2 MILLIGRAM(S): at 23:30

## 2017-12-10 RX ADMIN — Medication 2 MILLIGRAM(S): at 08:20

## 2017-12-10 RX ADMIN — FLUPHENAZINE HYDROCHLORIDE 5 MILLIGRAM(S): 1 TABLET, FILM COATED ORAL at 23:30

## 2017-12-10 RX ADMIN — LITHIUM CARBONATE 900 MILLIGRAM(S): 300 TABLET, EXTENDED RELEASE ORAL at 23:30

## 2017-12-10 RX ADMIN — Medication 2 MILLIGRAM(S): at 07:11

## 2017-12-10 RX ADMIN — FLUPHENAZINE HYDROCHLORIDE 5 MILLIGRAM(S): 1 TABLET, FILM COATED ORAL at 08:20

## 2017-12-10 RX ADMIN — OLANZAPINE 10 MILLIGRAM(S): 15 TABLET, FILM COATED ORAL at 08:20

## 2017-12-10 RX ADMIN — Medication 50 MILLIGRAM(S): at 07:11

## 2017-12-10 RX ADMIN — Medication 50 MILLIGRAM(S): at 13:44

## 2017-12-10 RX ADMIN — Medication 50 MILLIGRAM(S): at 23:30

## 2017-12-10 RX ADMIN — OLANZAPINE 30 MILLIGRAM(S): 15 TABLET, FILM COATED ORAL at 23:30

## 2017-12-10 RX ADMIN — Medication 2 MILLIGRAM(S): at 23:29

## 2017-12-11 PROCEDURE — 99232 SBSQ HOSP IP/OBS MODERATE 35: CPT

## 2017-12-11 RX ADMIN — Medication 2 MILLIGRAM(S): at 21:51

## 2017-12-11 RX ADMIN — FLUPHENAZINE HYDROCHLORIDE 5 MILLIGRAM(S): 1 TABLET, FILM COATED ORAL at 21:51

## 2017-12-11 RX ADMIN — Medication 50 MILLIGRAM(S): at 06:38

## 2017-12-11 RX ADMIN — Medication 2 MILLIGRAM(S): at 08:30

## 2017-12-11 RX ADMIN — Medication 2 MILLIGRAM(S): at 06:38

## 2017-12-11 RX ADMIN — Medication 50 MILLIGRAM(S): at 16:15

## 2017-12-11 RX ADMIN — LITHIUM CARBONATE 600 MILLIGRAM(S): 300 TABLET, EXTENDED RELEASE ORAL at 08:30

## 2017-12-11 RX ADMIN — OLANZAPINE 30 MILLIGRAM(S): 15 TABLET, FILM COATED ORAL at 21:51

## 2017-12-11 RX ADMIN — FLUPHENAZINE HYDROCHLORIDE 5 MILLIGRAM(S): 1 TABLET, FILM COATED ORAL at 08:30

## 2017-12-11 RX ADMIN — Medication 50 MILLIGRAM(S): at 21:52

## 2017-12-11 RX ADMIN — Medication 2 MILLIGRAM(S): at 21:52

## 2017-12-11 RX ADMIN — LITHIUM CARBONATE 900 MILLIGRAM(S): 300 TABLET, EXTENDED RELEASE ORAL at 21:51

## 2017-12-11 RX ADMIN — Medication 2 MILLIGRAM(S): at 16:16

## 2017-12-11 RX ADMIN — OLANZAPINE 10 MILLIGRAM(S): 15 TABLET, FILM COATED ORAL at 08:30

## 2017-12-11 NOTE — ED BEHAVIORAL HEALTH ASSESSMENT NOTE - REFERRAL / APPOINTMENT DETAILS
learning disabilities/comprehension/GDD
Patient to follow up with outpatient psychiatrist/AOT/ACT Team

## 2017-12-12 PROCEDURE — 99232 SBSQ HOSP IP/OBS MODERATE 35: CPT

## 2017-12-12 RX ORDER — FLUPHENAZINE HYDROCHLORIDE 1 MG/1
7.5 TABLET, FILM COATED ORAL
Qty: 0 | Refills: 0 | Status: DISCONTINUED | OUTPATIENT
Start: 2017-12-12 | End: 2017-12-18

## 2017-12-12 RX ORDER — CLONAZEPAM 1 MG
2 TABLET ORAL
Qty: 0 | Refills: 0 | Status: DISCONTINUED | OUTPATIENT
Start: 2017-12-12 | End: 2017-12-19

## 2017-12-12 RX ADMIN — FLUPHENAZINE HYDROCHLORIDE 5 MILLIGRAM(S): 1 TABLET, FILM COATED ORAL at 08:16

## 2017-12-12 RX ADMIN — OLANZAPINE 10 MILLIGRAM(S): 15 TABLET, FILM COATED ORAL at 08:16

## 2017-12-12 RX ADMIN — LITHIUM CARBONATE 600 MILLIGRAM(S): 300 TABLET, EXTENDED RELEASE ORAL at 08:16

## 2017-12-12 RX ADMIN — Medication 2 MILLIGRAM(S): at 08:16

## 2017-12-13 PROCEDURE — 99232 SBSQ HOSP IP/OBS MODERATE 35: CPT

## 2017-12-13 RX ADMIN — FLUPHENAZINE HYDROCHLORIDE 7.5 MILLIGRAM(S): 1 TABLET, FILM COATED ORAL at 08:17

## 2017-12-13 RX ADMIN — LITHIUM CARBONATE 600 MILLIGRAM(S): 300 TABLET, EXTENDED RELEASE ORAL at 08:17

## 2017-12-13 RX ADMIN — OLANZAPINE 10 MILLIGRAM(S): 15 TABLET, FILM COATED ORAL at 08:17

## 2017-12-13 RX ADMIN — Medication 2 MILLIGRAM(S): at 21:25

## 2017-12-13 RX ADMIN — OLANZAPINE 30 MILLIGRAM(S): 15 TABLET, FILM COATED ORAL at 21:25

## 2017-12-13 RX ADMIN — LITHIUM CARBONATE 900 MILLIGRAM(S): 300 TABLET, EXTENDED RELEASE ORAL at 21:25

## 2017-12-13 RX ADMIN — Medication 2 MILLIGRAM(S): at 08:17

## 2017-12-13 RX ADMIN — Medication 50 MILLIGRAM(S): at 17:17

## 2017-12-13 RX ADMIN — FLUPHENAZINE HYDROCHLORIDE 7.5 MILLIGRAM(S): 1 TABLET, FILM COATED ORAL at 21:25

## 2017-12-14 PROCEDURE — 99232 SBSQ HOSP IP/OBS MODERATE 35: CPT

## 2017-12-14 RX ADMIN — OLANZAPINE 10 MILLIGRAM(S): 15 TABLET, FILM COATED ORAL at 08:34

## 2017-12-14 RX ADMIN — OLANZAPINE 30 MILLIGRAM(S): 15 TABLET, FILM COATED ORAL at 21:44

## 2017-12-14 RX ADMIN — FLUPHENAZINE HYDROCHLORIDE 7.5 MILLIGRAM(S): 1 TABLET, FILM COATED ORAL at 21:44

## 2017-12-14 RX ADMIN — Medication 2 MILLIGRAM(S): at 08:34

## 2017-12-14 RX ADMIN — LITHIUM CARBONATE 900 MILLIGRAM(S): 300 TABLET, EXTENDED RELEASE ORAL at 21:44

## 2017-12-14 RX ADMIN — LITHIUM CARBONATE 600 MILLIGRAM(S): 300 TABLET, EXTENDED RELEASE ORAL at 08:34

## 2017-12-14 RX ADMIN — FLUPHENAZINE HYDROCHLORIDE 7.5 MILLIGRAM(S): 1 TABLET, FILM COATED ORAL at 08:34

## 2017-12-14 RX ADMIN — Medication 2 MILLIGRAM(S): at 21:44

## 2017-12-15 PROCEDURE — 99232 SBSQ HOSP IP/OBS MODERATE 35: CPT

## 2017-12-15 RX ADMIN — Medication 2 MILLIGRAM(S): at 21:26

## 2017-12-15 RX ADMIN — Medication 2 MILLIGRAM(S): at 08:11

## 2017-12-15 RX ADMIN — FLUPHENAZINE HYDROCHLORIDE 7.5 MILLIGRAM(S): 1 TABLET, FILM COATED ORAL at 08:11

## 2017-12-15 RX ADMIN — FLUPHENAZINE HYDROCHLORIDE 7.5 MILLIGRAM(S): 1 TABLET, FILM COATED ORAL at 21:26

## 2017-12-15 RX ADMIN — LITHIUM CARBONATE 600 MILLIGRAM(S): 300 TABLET, EXTENDED RELEASE ORAL at 08:11

## 2017-12-15 RX ADMIN — LITHIUM CARBONATE 900 MILLIGRAM(S): 300 TABLET, EXTENDED RELEASE ORAL at 21:26

## 2017-12-15 RX ADMIN — OLANZAPINE 30 MILLIGRAM(S): 15 TABLET, FILM COATED ORAL at 21:26

## 2017-12-15 RX ADMIN — OLANZAPINE 10 MILLIGRAM(S): 15 TABLET, FILM COATED ORAL at 08:11

## 2017-12-16 RX ADMIN — FLUPHENAZINE HYDROCHLORIDE 7.5 MILLIGRAM(S): 1 TABLET, FILM COATED ORAL at 21:50

## 2017-12-16 RX ADMIN — OLANZAPINE 10 MILLIGRAM(S): 15 TABLET, FILM COATED ORAL at 08:32

## 2017-12-16 RX ADMIN — Medication 2 MILLIGRAM(S): at 08:32

## 2017-12-16 RX ADMIN — OLANZAPINE 30 MILLIGRAM(S): 15 TABLET, FILM COATED ORAL at 21:50

## 2017-12-16 RX ADMIN — Medication 2 MILLIGRAM(S): at 21:50

## 2017-12-16 RX ADMIN — FLUPHENAZINE HYDROCHLORIDE 7.5 MILLIGRAM(S): 1 TABLET, FILM COATED ORAL at 08:32

## 2017-12-16 RX ADMIN — LITHIUM CARBONATE 900 MILLIGRAM(S): 300 TABLET, EXTENDED RELEASE ORAL at 21:50

## 2017-12-16 RX ADMIN — LITHIUM CARBONATE 600 MILLIGRAM(S): 300 TABLET, EXTENDED RELEASE ORAL at 08:32

## 2017-12-17 RX ADMIN — FLUPHENAZINE HYDROCHLORIDE 7.5 MILLIGRAM(S): 1 TABLET, FILM COATED ORAL at 08:01

## 2017-12-17 RX ADMIN — Medication 2 MILLIGRAM(S): at 08:01

## 2017-12-17 RX ADMIN — OLANZAPINE 10 MILLIGRAM(S): 15 TABLET, FILM COATED ORAL at 08:01

## 2017-12-17 RX ADMIN — Medication 50 MILLIGRAM(S): at 15:57

## 2017-12-17 RX ADMIN — FLUPHENAZINE HYDROCHLORIDE 7.5 MILLIGRAM(S): 1 TABLET, FILM COATED ORAL at 20:36

## 2017-12-17 RX ADMIN — LITHIUM CARBONATE 900 MILLIGRAM(S): 300 TABLET, EXTENDED RELEASE ORAL at 20:36

## 2017-12-17 RX ADMIN — OLANZAPINE 30 MILLIGRAM(S): 15 TABLET, FILM COATED ORAL at 20:36

## 2017-12-17 RX ADMIN — LITHIUM CARBONATE 600 MILLIGRAM(S): 300 TABLET, EXTENDED RELEASE ORAL at 08:01

## 2017-12-17 RX ADMIN — Medication 2 MILLIGRAM(S): at 20:36

## 2017-12-18 VITALS — TEMPERATURE: 98 F

## 2017-12-18 PROCEDURE — 99232 SBSQ HOSP IP/OBS MODERATE 35: CPT

## 2017-12-18 RX ORDER — CLONAZEPAM 1 MG
2 TABLET ORAL
Qty: 0 | Refills: 0 | Status: DISCONTINUED | OUTPATIENT
Start: 2017-12-20 | End: 2017-12-20

## 2017-12-18 RX ORDER — FLUPHENAZINE HYDROCHLORIDE 1 MG/1
10 TABLET, FILM COATED ORAL
Qty: 0 | Refills: 0 | Status: DISCONTINUED | OUTPATIENT
Start: 2017-12-18 | End: 2017-12-21

## 2017-12-18 RX ADMIN — Medication 2 MILLIGRAM(S): at 09:18

## 2017-12-18 RX ADMIN — OLANZAPINE 30 MILLIGRAM(S): 15 TABLET, FILM COATED ORAL at 21:56

## 2017-12-18 RX ADMIN — FLUPHENAZINE HYDROCHLORIDE 10 MILLIGRAM(S): 1 TABLET, FILM COATED ORAL at 21:55

## 2017-12-18 RX ADMIN — LITHIUM CARBONATE 600 MILLIGRAM(S): 300 TABLET, EXTENDED RELEASE ORAL at 09:18

## 2017-12-18 RX ADMIN — Medication 2 MILLIGRAM(S): at 21:55

## 2017-12-18 RX ADMIN — FLUPHENAZINE HYDROCHLORIDE 7.5 MILLIGRAM(S): 1 TABLET, FILM COATED ORAL at 09:18

## 2017-12-18 RX ADMIN — OLANZAPINE 10 MILLIGRAM(S): 15 TABLET, FILM COATED ORAL at 09:18

## 2017-12-18 RX ADMIN — LITHIUM CARBONATE 900 MILLIGRAM(S): 300 TABLET, EXTENDED RELEASE ORAL at 21:55

## 2017-12-19 PROCEDURE — 99232 SBSQ HOSP IP/OBS MODERATE 35: CPT

## 2017-12-19 RX ADMIN — FLUPHENAZINE HYDROCHLORIDE 10 MILLIGRAM(S): 1 TABLET, FILM COATED ORAL at 21:05

## 2017-12-19 RX ADMIN — LITHIUM CARBONATE 900 MILLIGRAM(S): 300 TABLET, EXTENDED RELEASE ORAL at 21:04

## 2017-12-19 RX ADMIN — LITHIUM CARBONATE 600 MILLIGRAM(S): 300 TABLET, EXTENDED RELEASE ORAL at 08:27

## 2017-12-19 RX ADMIN — OLANZAPINE 30 MILLIGRAM(S): 15 TABLET, FILM COATED ORAL at 21:04

## 2017-12-19 RX ADMIN — OLANZAPINE 10 MILLIGRAM(S): 15 TABLET, FILM COATED ORAL at 08:27

## 2017-12-19 RX ADMIN — Medication 2 MILLIGRAM(S): at 08:27

## 2017-12-19 RX ADMIN — FLUPHENAZINE HYDROCHLORIDE 10 MILLIGRAM(S): 1 TABLET, FILM COATED ORAL at 08:27

## 2017-12-19 RX ADMIN — Medication 2 MILLIGRAM(S): at 21:05

## 2017-12-20 PROCEDURE — 99232 SBSQ HOSP IP/OBS MODERATE 35: CPT

## 2017-12-20 RX ORDER — LITHIUM CARBONATE 300 MG/1
3 TABLET, EXTENDED RELEASE ORAL
Qty: 45 | Refills: 0 | OUTPATIENT
Start: 2017-12-20 | End: 2018-01-03

## 2017-12-20 RX ORDER — OLANZAPINE 15 MG/1
2 TABLET, FILM COATED ORAL
Qty: 30 | Refills: 0 | OUTPATIENT
Start: 2017-12-20 | End: 2018-01-03

## 2017-12-20 RX ORDER — OLANZAPINE 15 MG/1
1 TABLET, FILM COATED ORAL
Qty: 15 | Refills: 0 | OUTPATIENT
Start: 2017-12-20 | End: 2018-01-03

## 2017-12-20 RX ORDER — CLONAZEPAM 1 MG
1 TABLET ORAL
Qty: 30 | Refills: 0 | OUTPATIENT
Start: 2017-12-20 | End: 2018-01-03

## 2017-12-20 RX ORDER — CLONAZEPAM 1 MG
1 TABLET ORAL
Qty: 0 | Refills: 0 | Status: DISCONTINUED | OUTPATIENT
Start: 2017-12-20 | End: 2017-12-21

## 2017-12-20 RX ORDER — FLUPHENAZINE HYDROCHLORIDE 1 MG/1
1 TABLET, FILM COATED ORAL
Qty: 30 | Refills: 0 | OUTPATIENT
Start: 2017-12-20 | End: 2018-01-03

## 2017-12-20 RX ORDER — LITHIUM CARBONATE 300 MG/1
2 TABLET, EXTENDED RELEASE ORAL
Qty: 30 | Refills: 0 | OUTPATIENT
Start: 2017-12-20 | End: 2018-01-03

## 2017-12-20 RX ADMIN — LITHIUM CARBONATE 600 MILLIGRAM(S): 300 TABLET, EXTENDED RELEASE ORAL at 08:35

## 2017-12-20 RX ADMIN — FLUPHENAZINE HYDROCHLORIDE 10 MILLIGRAM(S): 1 TABLET, FILM COATED ORAL at 08:35

## 2017-12-20 RX ADMIN — OLANZAPINE 10 MILLIGRAM(S): 15 TABLET, FILM COATED ORAL at 08:35

## 2017-12-20 RX ADMIN — Medication 2 MILLIGRAM(S): at 08:35

## 2017-12-21 ENCOUNTER — EMERGENCY (EMERGENCY)
Facility: HOSPITAL | Age: 37
LOS: 1 days | Discharge: HOME | End: 2017-12-21
Admitting: EMERGENCY MEDICINE
Payer: COMMERCIAL

## 2017-12-21 VITALS
OXYGEN SATURATION: 99 % | TEMPERATURE: 98 F | HEART RATE: 97 BPM | DIASTOLIC BLOOD PRESSURE: 81 MMHG | RESPIRATION RATE: 16 BRPM | SYSTOLIC BLOOD PRESSURE: 126 MMHG

## 2017-12-21 LAB — LITHIUM SERPL-MCNC: 0.61 MMOL/L — SIGNIFICANT CHANGE UP (ref 0.6–1.2)

## 2017-12-21 PROCEDURE — 90792 PSYCH DIAG EVAL W/MED SRVCS: CPT

## 2017-12-21 PROCEDURE — 99284 EMERGENCY DEPT VISIT MOD MDM: CPT

## 2017-12-21 PROCEDURE — 99238 HOSP IP/OBS DSCHRG MGMT 30/<: CPT

## 2017-12-21 RX ORDER — CHLORPROMAZINE HCL 10 MG
50 TABLET ORAL ONCE
Qty: 0 | Refills: 0 | Status: COMPLETED | OUTPATIENT
Start: 2017-12-21 | End: 2017-12-21

## 2017-12-21 RX ADMIN — Medication 1 MILLIGRAM(S): at 08:05

## 2017-12-21 RX ADMIN — LITHIUM CARBONATE 600 MILLIGRAM(S): 300 TABLET, EXTENDED RELEASE ORAL at 08:47

## 2017-12-21 RX ADMIN — FLUPHENAZINE HYDROCHLORIDE 10 MILLIGRAM(S): 1 TABLET, FILM COATED ORAL at 08:05

## 2017-12-21 RX ADMIN — OLANZAPINE 10 MILLIGRAM(S): 15 TABLET, FILM COATED ORAL at 08:05

## 2017-12-21 RX ADMIN — Medication 50 MILLIGRAM(S): at 20:49

## 2017-12-21 NOTE — ED BEHAVIORAL HEALTH ASSESSMENT NOTE - RISK ASSESSMENT
Pt is not an imminent danger to self or others and does not require acute inpatient psychiatric admission and refuses voluntary admission. Therefore, discharge Pt home.

## 2017-12-21 NOTE — ED BEHAVIORAL HEALTH ASSESSMENT NOTE - SUMMARY
Patient is a single, 37-year-old  female, non caregiver, unemployed, domiciled on [place] grounds, with past psychiatric history of Bipolar Affective Disorder, Borderline Personality Disorder, cannabis abuse, multiple inpatient psychiatric hospitalizations (>20), most recently discharged from [facility] December 2017, just discharged from [facility] this morning after 6 weeks of hospitalization, on ACT/AOT, with frequent visits to the [facility] (well-known to staff), 3 prior suicide attempts (last in 2012 via OD), recurrent suicidal gestures and suicidal ideation (none recently), history of property destruction when upset, no history of arrests or access to weapons, was BIBEMS activated by [facility] staff on account of talking to herself.     In ED, Pt received Thorazine 50mg PO x 1 due to being irritable and talking to self. Pt is now calm and cooperative. Pt admits she was talking to herself and that was the reason why Daltoncaterina sent her back to ER. Pt states she was talking to self about "how to deal with life and philosophical stuff." Pt says she has been talking to herself ever since she was a child and does not see it as pathologic. She describes her mood to be "happy" and denies depressed mood, hopelessness, helplessness, racing thoughts, A/V hallucinations, delusions and paranoia. Pt also specifically and adamantly denies suicidal and homicidal ideations, intent or plan at this time. Pt was discharged on Zyprexa 10mg daily and 30mg qhs, Prolixin 10mg bid, Lithium 600mg daily and 900mg qhs, and Klonopin 1mg bid. Pt says Alie has her medications.    Collateral information obtained from Dr. Whitman, the inpatient psychiatrist in [facility]. Dr. Whitman said Pt was on the unit for 6 weeks and she was stable and not psychotic for the 2 weeks preceding discharge. Pt is a chronic patient and he does not have any safety concern for her.

## 2017-12-21 NOTE — ED PROVIDER NOTE - MEDICAL DECISION MAKING DETAILS
This is a 37 year old Female BIBA from [facility] for psych evaluation. Patient was sent in from her  because she noted the patient was speaking to herself. Medical evaluation performed. There is no clinical evidence of intoxication or any acute medical problem requiring immediate intervention. Final disposition will be determined by psychiatrist.

## 2017-12-21 NOTE — ED PROVIDER NOTE - OBJECTIVE STATEMENT
This is a 37 year old Female BIBA from [facility] for psych evaluation. Patient was sent in from her  because she noted the patient was speaking to herself. Patient reports being discharged from [facility] this am and then went shopping in [place] and returned to her residence. Patient states she is talking to someone. However they are not telling to do anything Denies SI/HI Denies AH/VH Denies ETOH/Illicit drugs

## 2017-12-21 NOTE — ED BEHAVIORAL HEALTH ASSESSMENT NOTE - OTHER
peers CVM slightly increased latency somewhat concrete chronically impaired chronically low end of fair chronically limited somewhat abstract

## 2017-12-21 NOTE — ED BEHAVIORAL HEALTH ASSESSMENT NOTE - HPI (INCLUDE ILLNESS QUALITY, SEVERITY, DURATION, TIMING, CONTEXT, MODIFYING FACTORS, ASSOCIATED SIGNS AND SYMPTOMS)
Patient is a single, 37-year-old  female, non caregiver, unemployed, domiciled on [place] grounds, with past psychiatric history of Bipolar Affective Disorder, Borderline Personality Disorder, cannabis abuse, multiple inpatient psychiatric hospitalizations (>20), most recently discharged from [facility] December 2017, just discharged from [facility] this morning after 6 weeks of hospitalization, on ACT/AOT, with frequent visits to the [facility] (well-known to staff), 3 prior suicide attempts (last in 2012 via OD), recurrent suicidal gestures and suicidal ideation (none recently), history of property destruction when upset, no history of arrests or access to weapons, was BIBEMS activated by [facility] staff on account of talking to herself.     In ED, Pt received Thorazine 50mg PO x 1 due to being irritable and talking to self. Pt is now calm and cooperative. Pt admits she was talking to herself and that was the reason why Daltoncaterina sent her back to ER. Pt states she was talking to self about "how to deal with life and philosophical stuff." Pt says she has been talking to herself ever since she was a child and does not see it as pathologic. She describes her mood to be "happy" and denies depressed mood, hopelessness, helplessness, racing thoughts, A/V hallucinations, delusions and paranoia. Pt also specifically and adamantly denies suicidal and homicidal ideations, intent or plan at this time. Pt was discharged on Zyprexa 10mg daily and 30mg qhs, Prolixin 10mg bid, Lithium 600mg daily and 900mg qhs, and Klonopin 1mg bid. Pt says Alie has her medications.    Collateral information obtained from Dr. Whitman, the inpatient psychiatrist in [facility]. Dr. Whitman said Pt was on the unit for 6 weeks and she was stable and not psychotic for the 2 weeks preceding discharge. Pt is a chronic patient and he does not have any safety concern for her.

## 2017-12-21 NOTE — ED ADULT NURSE NOTE - CHIEF COMPLAINT QUOTE
Pt bibems from [facility], sent over by facility, pt has been talking to herself out loud, "trying to figure out her life" denies hearing any voices, hx of bipolar and borderline personality disorder, denies any si/hi, or visual hallucination, pt calm and cooperative

## 2017-12-21 NOTE — ED BEHAVIORAL HEALTH NOTE - BEHAVIORAL HEALTH NOTE
This worker has spoken with the patient's [facility]  Carmencita [phone] for collateral. All info is as per Carmencita;    Patient was discharged from [facility] earlier today. Upon returning to the group home the patient was found to be speaking illogically and "does not appear at baseline." The patient appeared emotional and was crying. When asked what was wrong the patient was not able to partake in logical conversation. She was making statements such as "the world is coming to an end" and "people are wicked." Patient was not able to be redirected, so 911 was called. The patient did not report si/hi, but due to her tangentiality they were unable to assess if the patient is a danger to herself or others. Patient has history of uncontrolled behavior and history of property destruction when upset. Carmencita strongly advocates for re-admission as the patient does not appear at base line.

## 2017-12-21 NOTE — ED BEHAVIORAL HEALTH ASSESSMENT NOTE - OTHER PAST PSYCHIATRIC HISTORY (INCLUDE DETAILS REGARDING ONSET, COURSE OF ILLNESS, INPATIENT/OUTPATIENT TREATMENT)
Hx of >20 inpatient psychiatric admissions, with last in [facility] December 2016. 3 prior suicide attempts (last in 2012).  History of medication non-compliance[facility] Hx of >20 inpatient psychiatric admissions, with last in [facility] December 2017, just discharged this morning. 3 prior suicide attempts (last in 2012).  History of medication non-compliance. currently on ACT/AOT with [facility]

## 2017-12-21 NOTE — ED BEHAVIORAL HEALTH ASSESSMENT NOTE - CURRENT MEDICATION
zyprexa 20mg daily, lithium 300mg tid, trileptal 300mg bid, proventil Zyprexa 10mg qAM and 30mg qhs, Prolixin 10mg bid, Lithium 600mg qAM and 900mg qhs, Klonopin 1mg bid

## 2017-12-21 NOTE — ED BEHAVIORAL HEALTH ASSESSMENT NOTE - DETAILS
Pt was discharged from [place] [address] this morning. per HPI see HPI. Poor response to Geodon; Depakote (Increased ammonia levels) Tattoos after hours [facility] [place] [id number]

## 2017-12-21 NOTE — ED ADULT TRIAGE NOTE - CHIEF COMPLAINT QUOTE
Pt bibems from [facility], sent over by facility, pt has been talking to herself out loud, "trying to figure out her life" denies hearing any voices, hx of bipolar and borderline personality disorder, denies any si/hi, or visual hallucination, pt calm and cooperative

## 2017-12-21 NOTE — ED BEHAVIORAL HEALTH ASSESSMENT NOTE - DESCRIPTION
Pt was initially irritable and talking to herself and received Thorazine 50mg PO STAT x 1. GERD, Asthma, Calculus of Bladder without cholecystitis without obstruction. Patient stated before she does not have knowledge of biological family; Lives in [facility] for several years; Receives SSI/SSD; Stated has "some college".

## 2017-12-21 NOTE — ED ADULT NURSE REASSESSMENT NOTE - GENERAL PATIENT STATE
received pt awake, appears internally preoccupied, reports that she has been talking to herself but denies Hallucinations, S/I, H/I. Pt received STAT Thorazine 50mg/no change observed

## 2017-12-21 NOTE — ED ADULT NURSE REASSESSMENT NOTE - GENERAL PATIENT STATE
denies s/i h/i or a/v/h/smiling/interactive/improvement verbalized/comfortable appearance/cooperative

## 2018-01-06 ENCOUNTER — EMERGENCY (EMERGENCY)
Facility: HOSPITAL | Age: 38
LOS: 1 days | Discharge: HOME | End: 2018-01-06
Admitting: EMERGENCY MEDICINE
Payer: COMMERCIAL

## 2018-01-06 VITALS
HEART RATE: 93 BPM | SYSTOLIC BLOOD PRESSURE: 120 MMHG | RESPIRATION RATE: 16 BRPM | OXYGEN SATURATION: 100 % | TEMPERATURE: 99 F | DIASTOLIC BLOOD PRESSURE: 69 MMHG

## 2018-01-06 PROCEDURE — 99283 EMERGENCY DEPT VISIT LOW MDM: CPT

## 2018-01-06 PROCEDURE — 90792 PSYCH DIAG EVAL W/MED SRVCS: CPT | Mod: GC

## 2018-01-06 NOTE — ED BEHAVIORAL HEALTH ASSESSMENT NOTE - SUICIDE PROTECTIVE FACTORS
Fear of death or dying due to pain/suffering/Identifies reasons for living Identifies reasons for living/Future oriented/Positive therapeutic relationships/Fear of death or dying due to pain/suffering

## 2018-01-06 NOTE — ED BEHAVIORAL HEALTH ASSESSMENT NOTE - HPI (INCLUDE ILLNESS QUALITY, SEVERITY, DURATION, TIMING, CONTEXT, MODIFYING FACTORS, ASSOCIATED SIGNS AND SYMPTOMS)
Patient is a single, 37-year-old  female, non caregiver, unemployed, domiciled on [place] grounds, with past psychiatric history of Bipolar Affective Disorder, Borderline Personality Disorder, cannabis abuse, multiple inpatient psychiatric hospitalizations (>20), most recently discharged from [facility] December 2017, just discharged from [facility] this morning after 6 weeks of hospitalization, on ACT/AOT, with frequent visits to the [facility] (well-known to staff), 3 prior suicide attempts (last in 2012 via OD), recurrent suicidal gestures and suicidal ideation (none recently), history of property destruction when upset, no history of arrests or access to weapons, was BIBEMS activated by [facility] staff on account of talking to herself.     In ED, Pt received Thorazine 50mg PO x 1 due to being irritable and talking to self. Pt is now calm and cooperative. Pt admits she was talking to herself and that was the reason why Daltoncaterina sent her back to ER. Pt states she was talking to self about "how to deal with life and philosophical stuff." Pt says she has been talking to herself ever since she was a child and does not see it as pathologic. She describes her mood to be "happy" and denies depressed mood, hopelessness, helplessness, racing thoughts, A/V hallucinations, delusions and paranoia. Pt also specifically and adamantly denies suicidal and homicidal ideations, intent or plan at this time. Pt was discharged on Zyprexa 10mg daily and 30mg qhs, Prolixin 10mg bid, Lithium 600mg daily and 900mg qhs, and Klonopin 1mg bid. Pt says Alie has her medications.    Collateral information obtained from Dr. Whitman, the inpatient psychiatrist in [facility]. Dr. Whitman said Pt was on the unit for 6 weeks and she was stable and not psychotic for the 2 weeks preceding discharge. Pt is a chronic patient and he does not have any safety concern for her. Patient is a single, 37-year-old  female, non caregiver, unemployed, domiciled on [place] grounds, with past psychiatric history of Bipolar Affective Disorder, Borderline Personality Disorder, cannabis abuse, multiple inpatient psychiatric hospitalizations (>20), most recently discharged from [facility] December 2017, just discharged from [facility] this morning after 6 weeks of hospitalization, on ACT/AOT, with frequent visits to the [facility] (well-known to staff), 3 prior suicide attempts (last in 2012 via OD), recurrent suicidal gestures and suicidal ideation (none recently), history of property destruction when upset, no history of arrests or access to weapons, was BIBEMS activated by [facility] staff on account of patient taking off her shirt in the kitchen and refusing to put it back on. No acute safety concerns; no change in baseline behavior.     Initially patient was uncooperative and guarded, but upon reinterview patient explained she hates living at [place] and wants a change and "is open to admission for a vacation from [place]." Denies any suicidal ideation or homicidal ideation. Denies paranoia. Has chronic obsessive thoughts about Hitler ("He had a good idea" when asked what that idea was, patient states "I really am not sure but he was ok"). When asked why patient took off clothes, she states b/c staff refused to allow her to shower too much. Expresses remorse. Pt says she has been talking to herself ever since she was a child but is not hearing voices. "I Have heard voices but not now. All I can say is my mind is different but I am not trying to hurt anyone." Patient has no acute safety concerns; denies side effects from medications; reports she is sad her psychiatrist keeps changes; denies depressed mood, hopelessness, helplessness, racing thoughts, A/V hallucinations, or paranoia. + preoccuptions regarding Hitler & Russians. Pt also specifically and adamantly denies suicidal and homicidal ideations, intent or plan at this time. Pt was takes Zyprexa 20mg daily, trileptal 300 mg BID, Lithium 300mg TID, and states staff at [facility] administer her medications daily. Has an ACT team and is awaiting being assigned to new psychiatrist. Feels safe to be discharged back to [place] upon discharge. Explained to patient if she feels a decline in functioning or feels she is being harmed to call 911 or return to ER. Patient agreeable and has no acute safety concerns.    Collateral information obtained from Mary Jo Funk :  Patient took off her clothes in the kitchen without trigger and would not place her clothes back on. Staff did not know what to do so called EMS. Has not been acting bizarrely since [facility] D/c in December. Takes medication daily; no sign of harming self or others; has not made any violent or self injurious gestures. Patient has been chronically ill. Staff know when she is not at her baseline and feel comfortable sending patient back to [facility] ED if any changes in baseline behavior including cognition or med side effects occur. Staff do not have any safety concern for her (adamantly denies suicidal ideation homicidal ideation AV). While patient has disorganized thought process at baseline and is awaiting a psychiatrist, is being followed by ACT team, will be seen by  Monday and is living in psychiatric residential facility where staff monitor her daily & is administer her medication.  offers no impediment in treatment plan. Patient is a single, 37-year-old  female, non caregiver, unemployed, domiciled on [place] grounds, with past psychiatric history of Bipolar Affective Disorder, Borderline Personality Disorder, cannabis abuse, multiple inpatient psychiatric hospitalizations (>20), most recently discharged from [facility] December 2017 from [facility] on 12/21/17 after 6 weeks of hospitalization, on ACT/AOT, with frequent visits to the [facility] (well-known to staff), 3 prior suicide attempts (last in 2012 via OD), recurrent suicidal gestures and suicidal ideation (none recently), history of property destruction when upset, no history of arrests or access to weapons, was BIBEMS activated by [facility] staff on account of patient taking off her shirt in the kitchen and refusing to put it back on.  No acute safety concerns; no change in baseline behavior.     Initially patient was uncooperative and guarded, but upon reinterview patient explained she hates living at [place] and wants a change and "is open to admission for a vacation from [place]." Denies any suicidal ideation or homicidal ideation. Denies paranoia. Has chronic obsessive thoughts about Hitler ("He had a good idea" when asked what that idea was, patient states "I really am not sure but he was ok"). When asked why patient took off clothes, she states b/c staff refused to allow her to shower too much. Expresses remorse. Pt says she has been talking to herself ever since she was a child but is not hearing voices. "I Have heard voices but not now. All I can say is my mind is different but I am not trying to hurt anyone." Patient has no acute safety concerns; denies side effects from medications; reports she is sad her psychiatrist keeps changes; denies depressed mood, hopelessness, helplessness, racing thoughts, A/V hallucinations, or paranoia. + preoccuptions regarding Hitler & Russians. Pt also specifically and adamantly denies suicidal and homicidal ideations, intent or plan at this time. Pt was takes Zyprexa 20mg daily, trileptal 300 mg BID, Lithium 300mg TID, and states staff at [facility] administer her medications daily. Has an ACT team and is awaiting being assigned to new psychiatrist. Feels safe to be discharged back to [place] upon discharge. Explained to patient if she feels a decline in functioning or feels she is being harmed to call 911 or return to ER. Patient agreeable and has no acute safety concerns.    Collateral information obtained from Mary Jo Funk :  Patient took off her clothes in the kitchen without trigger and would not place her clothes back on. Staff did not know what to do so called EMS. Has not been acting bizarrely since [facility] D/c in December. Takes medication daily; no sign of harming self or others; has not made any violent or self injurious gestures. Patient has been chronically ill. Staff know when she is not at her baseline and feel comfortable sending patient back to [facility] ED if any changes in baseline behavior including cognition or med side effects occur. Staff do not have any safety concern for her (adamantly denies suicidal ideation homicidal ideation AV). While patient has disorganized thought process at baseline and is awaiting a psychiatrist, is being followed by ACT team, will be seen by  Monday and is living in psychiatric residential facility where staff monitor her daily & is administer her medication.  offers no impediment in treatment plan. Patient is a single, 37-year-old  female, non caregiver, unemployed, domiciled on [place] grounds, with past psychiatric history of Bipolar Affective Disorder, Borderline Personality Disorder, cannabis abuse, multiple inpatient psychiatric hospitalizations (>20), most recently discharged from [facility] December 2017 from [facility] on 12/21/17 after 6 weeks of hospitalization, on ACT/AOT, with frequent visits to the [facility] (well-known to staff), 3 prior suicide attempts (last in 2012 via OD), recurrent suicidal gestures and suicidal ideation (none recently), history of property destruction when upset, no history of arrests or access to weapons, was BIBEMS activated by [facility] staff on account of patient taking off her shirt in the kitchen and refusing to put it back on.  No acute safety concerns; no change in baseline behavior.     Initially patient was uncooperative and guarded, but upon reinterview patient explained she hates living at [place] and wants a change and "is open to admission for a vacation from [place]." Denies any suicidal ideation or homicidal ideation.  Denies paranoia.  Has chronic obsessive thoughts about Hitler ("He had a good idea" when asked what that idea was, patient states "I really am not sure but he was ok").  When asked why patient took off clothes, she states b/c staff refused to allow her to shower too much.  Expresses remorse.  Pt says she has been talking to herself ever since she was a child but is not hearing voices. "I Have heard voices but not now. All I can say is my mind is different but I am not trying to hurt anyone."  Patient has no acute safety concerns; denies side effects from medications; reports she is sad her psychiatrist keeps changes; denies depressed mood, hopelessness, helplessness, racing thoughts, A/V hallucinations, or paranoia. + preoccuptions regarding Hitler & Russians.  Pt also specifically and adamantly denies suicidal and homicidal ideations, intent or plan at this time.  Pt was takes Zyprexa 20mg daily, trileptal 300 mg BID, Lithium 300mg TID, and states staff at [facility] administer her medications daily.  Has an ACT team and is awaiting being assigned to new psychiatrist.  Feels safe to be discharged back to [place] upon discharge. Explained to patient if she feels a decline in functioning or feels she is being harmed to call 911 or return to ER. Patient agreeable and has no acute safety concerns.    Collateral information obtained from Mary Jo Funk :  Patient took off her clothes in the kitchen without trigger and would not place her clothes back on. Staff did not know what to do so called EMS. Has not been acting bizarrely since [facility] D/c in December. Takes medication daily; no sign of harming self or others; has not made any violent or self injurious gestures. Patient has been chronically ill. Staff know when she is not at her baseline and feel comfortable sending patient back to [facility] ED if any changes in baseline behavior including cognition or med side effects occur. Staff do not have any safety concern for her (adamantly denies suicidal ideation homicidal ideation AV). While patient has disorganized thought process at baseline and is awaiting a psychiatrist, is being followed by ACT team, will be seen by  Monday and is living in psychiatric residential facility where staff monitor her daily & is administer her medication.  offers no impediment in treatment plan.

## 2018-01-06 NOTE — ED BEHAVIORAL HEALTH ASSESSMENT NOTE - DETAILS
see HPI. Poor response to Geodon; Depakote (Increased ammonia levels) Tattoos patient not being readmitted Pt was discharged from [place] [id number] on Dec 21 2017 per HPI Mary Jo Funk -  at [place] patient not being readmitted and reviewed prior ED BH assessment on 12/21/17 with d/w Dr. Marek Whitman at that time

## 2018-01-06 NOTE — ED ADULT NURSE NOTE - OBJECTIVE STATEMENT
Received pt in  pt calm & cooperative bought in by EMS from [facility] for disrobing pt calm & cooperative denies Si/Hi/AVh at present eval on going.

## 2018-01-06 NOTE — ED BEHAVIORAL HEALTH ASSESSMENT NOTE - SAFETY PLAN DETAILS
No Safety planning done with patient and . Patient lives in psychiatric residential tx, denies having any firearms at home. Patient and  advised to call 911 or take the patient to the nearest ER if patient's behavior worsened or if there are any safety concerns. Patient verbalized understanding.

## 2018-01-06 NOTE — ED BEHAVIORAL HEALTH ASSESSMENT NOTE - DESCRIPTION
GERD, Asthma, Calculus of Bladder without cholecystitis without obstruction. Patient stated before she does not have knowledge of biological family; Lives in [facility] for several years; Receives SSI/SSD; Stated has "some college". Pt was initially irritable and talking to herself and received Thorazine 50mg PO STAT x 1. Patient was calm and cooperative in the ED and did not exhibit any aggression. Pt did not require any prn medications or any physical restraints.    Vital Signs Last 24 Hrs  T(C): 37.2 (06 Jan 2018 14:26), Max: 37.2 (06 Jan 2018 14:26)  T(F): 99 (06 Jan 2018 14:26), Max: 99 (06 Jan 2018 14:26)  HR: 93 (06 Jan 2018 14:26) (93 - 93)  BP: 120/69 (06 Jan 2018 14:26) (120/69 - 120/69)  BP(mean): --  RR: 16 (06 Jan 2018 14:26) (16 - 16)  SpO2: 100% (06 Jan 2018 14:26) (100% - 100%)

## 2018-01-06 NOTE — ED PROVIDER NOTE - OBJECTIVE STATEMENT
38 y/o F Bipolar, Asthma, BPD, Depression, GERD, Obesity BIBA w c/o bizarre behaviour while at [place]. EMS reported that patient was seen disrobing in the kitchen. Starts that Malachi is his role model and that she is " showing his commands" by her hand movements. Admits to medication compliance. Denies pain , SOB, fever, chills , dizziness, blurred vision, chest/ abdominal discomfort. Denies falling, punching or kicking any objects . Denies SI/HI/AH/VH. Denies  recent use  of alcohol or illicit drug.  [facility] - 12/2017

## 2018-01-06 NOTE — ED PROVIDER NOTE - MEDICAL DECISION MAKING DETAILS
36 y/o F Bipolar, Asthma, BPD, Depression, GERD, Obesity   Paranoia like due to chronic bipolar.   Medical evaluation performed. There is no clinical evidence of intoxication or any acute medical problem requiring immediate intervention. Patient is awaiting psychiatric consultation. Final disposition will be determined by psychiatrist.

## 2018-01-06 NOTE — ED BEHAVIORAL HEALTH ASSESSMENT NOTE - CURRENT MEDICATION
Zyprexa 10mg qAM and 30mg qhs, Prolixin 10mg bid, Lithium 600mg qAM and 900mg qhs, Klonopin 1mg bid Zyprexa 20mg daily, trileptal 300 mg BID, Lithium 300mg TID (per med list sent from [facility])

## 2018-01-06 NOTE — ED ADULT NURSE NOTE - CHIEF COMPLAINT QUOTE
Pt here for marques schulz. Pt from [facility], pt was taking her clothes off and having manic episodes of laughing or crying.

## 2018-01-06 NOTE — ED BEHAVIORAL HEALTH ASSESSMENT NOTE - CASE SUMMARY
Patient is a 37 year old single  female, non caregiver, unemployed, domiciled on [place] grounds, with past psychiatric history of Bipolar Affective Disorder, Borderline Personality Disorder, cannabis abuse, multiple inpatient psychiatric hospitalizations, most recently discharged from [facility] December 2017, on ACT/AOT, with frequent visits to the [facility] (well-known to staff), 3 prior suicide attempts (last in 2012 via OD), recurrent suicidal gestures and suicidal ideation (none recently), history of property destruction when upset, no history of arrests or access to weapons, was BIBEMS activated by [place] staff on account of patient taking off her shirt in the kitchen and refusing to put it back on.  Patient presents without any acute safety concerns, as per risk assessment and collateral information by .  Patient has been exhibiting good behavioral control throughout ED course with no signs or symptoms of agitation, no prns required.  Patient denies suicidal ideation, homicidal ideation, no trina evident on exam.  Chronic disorganized behaviors in the context of chronic mental illness.  While patient has disorganized thought process at baseline she is being followed by ACT team, will be seen by  Monday and is living in psychiatric residential facility where staff monitor her daily & also administer her medication.  Transportation home arranged with social work support and patient does not present as an acute risk to self or others at this time.

## 2018-01-06 NOTE — ED BEHAVIORAL HEALTH NOTE - BEHAVIORAL HEALTH NOTE
This worker has contacted Wilian at [facility] [phone] to arrange medicaid transport. Patient's medicaid policy [id number] has been determined to have inactive transport benefit. Will discuss with team.

## 2018-01-06 NOTE — ED ADULT TRIAGE NOTE - CHIEF COMPLAINT QUOTE
Pt here for marques schulz. Pt from [facility], pt was taking her clothes off and having manic episodes of laughing or crying.

## 2018-01-06 NOTE — ED BEHAVIORAL HEALTH ASSESSMENT NOTE - RISK ASSESSMENT
Pt is not an imminent danger to self or others and does not require acute inpatient psychiatric admission and refuses voluntary admission. Therefore, discharge Pt home. Risk factors: chronic mental illness, previous suicide attempt, in between psychiatrist change, impulsivity, recent discharge from an inpatient unit    Protective factors: denies suicidality, positive social support, positive therapeutic relationship, no access to gun at home, help seeking and involved with treatment plan. The patient does not present an imminent risk of suicide at this time.    On homicidal risk assessment the patient denies assaultive or homicidal ideation/urges/lethal plan or history of such, denies access to weapons, denies history of homicide attempts or impulsive acting out, does not present with verbalized vindictiveness, denies history of current or recent substance abuse; satisfactory support system.      While the patient has slight risk due to chronic mental illness, she does not meet criteria for inpatient hospitalization and will be discharged to her ACT Team/ follow up with outpatient psychiatrist within [place].

## 2018-01-06 NOTE — ED BEHAVIORAL HEALTH ASSESSMENT NOTE - SUMMARY
Patient is a single, 37-year-old  female, non caregiver, unemployed, domiciled on [place] grounds, with past psychiatric history of Bipolar Affective Disorder, Borderline Personality Disorder, cannabis abuse, multiple inpatient psychiatric hospitalizations (>20), most recently discharged from [facility] December 2017, just discharged from [facility] this morning after 6 weeks of hospitalization, on ACT/AOT, with frequent visits to the [facility] (well-known to staff), 3 prior suicide attempts (last in 2012 via OD), recurrent suicidal gestures and suicidal ideation (none recently), history of property destruction when upset, no history of arrests or access to weapons, was BIBEMS activated by [facility] staff on account of talking to herself.     In ED, Pt received Thorazine 50mg PO x 1 due to being irritable and talking to self. Pt is now calm and cooperative. Pt admits she was talking to herself and that was the reason why Daltoncaterina sent her back to ER. Pt states she was talking to self about "how to deal with life and philosophical stuff." Pt says she has been talking to herself ever since she was a child and does not see it as pathologic. She describes her mood to be "happy" and denies depressed mood, hopelessness, helplessness, racing thoughts, A/V hallucinations, delusions and paranoia. Pt also specifically and adamantly denies suicidal and homicidal ideations, intent or plan at this time. Pt was discharged on Zyprexa 10mg daily and 30mg qhs, Prolixin 10mg bid, Lithium 600mg daily and 900mg qhs, and Klonopin 1mg bid. Pt says [facility] has her medications.    Collateral information obtained from Dr. Whitman, the inpatient psychiatrist in [facility]. Dr. Whitman said Pt was on the unit for 6 weeks and she was stable and not psychotic for the 2 weeks preceding discharge. Pt is a chronic patient and he does not have any safety concern for her. Patient is a single, 37-year-old  female, non caregiver, unemployed, domiciled on [place] grounds, with past psychiatric history of Bipolar Affective Disorder, Borderline Personality Disorder, cannabis abuse, multiple inpatient psychiatric hospitalizations (>20), most recently discharged from [facility] December 2017, just discharged from [facility] this morning after 6 weeks of hospitalization, on ACT/AOT, with frequent visits to the [facility] (well-known to staff), 3 prior suicide attempts (last in 2012 via OD), recurrent suicidal gestures and suicidal ideation (none recently), history of property destruction when upset, no history of arrests or access to weapons, was BIBEMS activated by [facility] staff on account of patient taking off her shirt in the kitchen and refusing to put it back on. No acute safety concerns; no change in baseline behavior.     Patient presents without any acute safety concerns, as per risk assessment and collateral information by . Patient denies suicidal ideation, homicidal ideation, no trina evident on exam. Chronic disorganized behaviors in the context of chronic mental illness. While patient has disorganized thought process at baseline and is awaiting a psychiatrist, is being followed by ACT team, will be seen by  Monday and is living in psychiatric residential facility where staff monitor her daily & is administer her medication.  and patient offer no impediment in treatment plan. Patient will be sent to [place] [id number] residence via taxi cab and worker will meet patient at front. Patient is a single, 37-year-old  female, non caregiver, unemployed, domiciled on [place] grounds, with past psychiatric history of Bipolar Affective Disorder, Borderline Personality Disorder, cannabis abuse, multiple inpatient psychiatric hospitalizations (>20), most recently discharged from [facility] December 2017 after 6 weeks of hospitalization, on ACT/AOT, with frequent visits to the [facility] (well-known to staff), 3 prior suicide attempts (last in 2012 via OD), recurrent suicidal gestures and suicidal ideation (none recently), history of property destruction when upset, no history of arrests or access to weapons, was BIBEMS activated by [place] staff on account of patient taking off her shirt in the kitchen and refusing to put it back on. No acute safety concerns; no change in baseline behavior.     Patient presents without any acute safety concerns, as per risk assessment and collateral information by . Patient denies suicidal ideation, homicidal ideation, no trina evident on exam. Chronic disorganized behaviors in the context of chronic mental illness. While patient has disorganized thought process at baseline and is awaiting a psychiatrist, is being followed by ACT team, will be seen by  Monday and is living in psychiatric residential facility where staff monitor her daily & is administer her medication.  and patient offer no impediment in treatment plan. Patient will be sent to [place] [address] residence via taxi cab and worker will meet patient at front.

## 2018-01-06 NOTE — ED BEHAVIORAL HEALTH ASSESSMENT NOTE - OTHER PAST PSYCHIATRIC HISTORY (INCLUDE DETAILS REGARDING ONSET, COURSE OF ILLNESS, INPATIENT/OUTPATIENT TREATMENT)
Hx of >20 inpatient psychiatric admissions, with last in [facility] December 2017, just discharged this morning. 3 prior suicide attempts (last in 2012).  History of medication non-compliance. currently on ACT/AOT with [facility] Hx of >20 inpatient psychiatric admissions, with last in [facility] December 2017, 3 prior suicide attempts (last in 2012).  History of medication non-compliance. currently on ACT/AOT with [facility]. lives at [place]. Chronic symptoms of disorganized thoughts  currently denies suicidal ideation homicidal ideation AVH or trina.

## 2018-01-06 NOTE — ED BEHAVIORAL HEALTH NOTE - BEHAVIORAL HEALTH NOTE
Treating psychiatrist reports patient will not meet criteria for ambulance transport but requires assisted transport such as taxi (unable to use bus as per patient's group home staff). Taxi arranged under account 50 at a cost of $12 with [facility] thompson.

## 2018-01-06 NOTE — ED BEHAVIORAL HEALTH ASSESSMENT NOTE - DIFFERENTIAL
bipolar disorder  borderline personality disorder bipolar disorder w/ PF  hx of borderline personality disorder

## 2018-01-10 ENCOUNTER — INPATIENT (INPATIENT)
Facility: HOSPITAL | Age: 38
LOS: 15 days | Discharge: HOME | End: 2018-01-26
Attending: PSYCHIATRY & NEUROLOGY | Admitting: PSYCHIATRY & NEUROLOGY
Payer: COMMERCIAL

## 2018-01-10 VITALS
SYSTOLIC BLOOD PRESSURE: 127 MMHG | OXYGEN SATURATION: 99 % | TEMPERATURE: 98 F | DIASTOLIC BLOOD PRESSURE: 86 MMHG | HEART RATE: 89 BPM | RESPIRATION RATE: 20 BRPM

## 2018-01-10 DIAGNOSIS — F60.3 BORDERLINE PERSONALITY DISORDER: ICD-10-CM

## 2018-01-10 DIAGNOSIS — F25.0 SCHIZOAFFECTIVE DISORDER, BIPOLAR TYPE: ICD-10-CM

## 2018-01-10 DIAGNOSIS — F12.10 CANNABIS ABUSE, UNCOMPLICATED: ICD-10-CM

## 2018-01-10 LAB
ALBUMIN SERPL ELPH-MCNC: 3.9 G/DL — SIGNIFICANT CHANGE UP (ref 3.3–5)
ALP SERPL-CCNC: 92 U/L — SIGNIFICANT CHANGE UP (ref 40–120)
ALT FLD-CCNC: 25 U/L — SIGNIFICANT CHANGE UP (ref 4–33)
AMPHET UR-MCNC: NEGATIVE — SIGNIFICANT CHANGE UP
APAP SERPL-MCNC: < 15 UG/ML — LOW (ref 15–25)
APPEARANCE UR: SIGNIFICANT CHANGE UP
AST SERPL-CCNC: 24 U/L — SIGNIFICANT CHANGE UP (ref 4–32)
BARBITURATES MEASUREMENT: NEGATIVE — SIGNIFICANT CHANGE UP
BARBITURATES UR SCN-MCNC: NEGATIVE — SIGNIFICANT CHANGE UP
BASOPHILS # BLD AUTO: 0.03 K/UL — SIGNIFICANT CHANGE UP (ref 0–0.2)
BASOPHILS NFR BLD AUTO: 0.3 % — SIGNIFICANT CHANGE UP (ref 0–2)
BENZODIAZ SERPL-MCNC: NEGATIVE — SIGNIFICANT CHANGE UP
BENZODIAZ UR-MCNC: NEGATIVE — SIGNIFICANT CHANGE UP
BILIRUB SERPL-MCNC: < 0.2 MG/DL — LOW (ref 0.2–1.2)
BILIRUB UR-MCNC: NEGATIVE — SIGNIFICANT CHANGE UP
BLOOD UR QL VISUAL: NEGATIVE — SIGNIFICANT CHANGE UP
BUN SERPL-MCNC: 16 MG/DL — SIGNIFICANT CHANGE UP (ref 7–23)
CALCIUM SERPL-MCNC: 8.9 MG/DL — SIGNIFICANT CHANGE UP (ref 8.4–10.5)
CANNABINOIDS UR-MCNC: NEGATIVE — SIGNIFICANT CHANGE UP
CHLORIDE SERPL-SCNC: 105 MMOL/L — SIGNIFICANT CHANGE UP (ref 98–107)
CO2 SERPL-SCNC: 17 MMOL/L — LOW (ref 22–31)
COCAINE METAB.OTHER UR-MCNC: NEGATIVE — SIGNIFICANT CHANGE UP
COLOR SPEC: SIGNIFICANT CHANGE UP
CREAT SERPL-MCNC: 0.78 MG/DL — SIGNIFICANT CHANGE UP (ref 0.5–1.3)
EOSINOPHIL # BLD AUTO: 0.56 K/UL — HIGH (ref 0–0.5)
EOSINOPHIL NFR BLD AUTO: 5 % — SIGNIFICANT CHANGE UP (ref 0–6)
ETHANOL BLD-MCNC: < 10 MG/DL — SIGNIFICANT CHANGE UP
GLUCOSE SERPL-MCNC: 95 MG/DL — SIGNIFICANT CHANGE UP (ref 70–99)
GLUCOSE UR-MCNC: NEGATIVE — SIGNIFICANT CHANGE UP
HCG SERPL-ACNC: < 5 MIU/ML — SIGNIFICANT CHANGE UP
HCT VFR BLD CALC: 37.9 % — SIGNIFICANT CHANGE UP (ref 34.5–45)
HGB BLD-MCNC: 11.9 G/DL — SIGNIFICANT CHANGE UP (ref 11.5–15.5)
IMM GRANULOCYTES # BLD AUTO: 0.06 # — SIGNIFICANT CHANGE UP
IMM GRANULOCYTES NFR BLD AUTO: 0.5 % — SIGNIFICANT CHANGE UP (ref 0–1.5)
KETONES UR-MCNC: NEGATIVE — SIGNIFICANT CHANGE UP
LEUKOCYTE ESTERASE UR-ACNC: HIGH
LITHIUM SERPL-MCNC: 0.34 MMOL/L — LOW (ref 0.6–1.2)
LYMPHOCYTES # BLD AUTO: 28 % — SIGNIFICANT CHANGE UP (ref 13–44)
LYMPHOCYTES # BLD AUTO: 3.13 K/UL — SIGNIFICANT CHANGE UP (ref 1–3.3)
MCHC RBC-ENTMCNC: 29.4 PG — SIGNIFICANT CHANGE UP (ref 27–34)
MCHC RBC-ENTMCNC: 31.4 % — LOW (ref 32–36)
MCV RBC AUTO: 93.6 FL — SIGNIFICANT CHANGE UP (ref 80–100)
METHADONE UR-MCNC: NEGATIVE — SIGNIFICANT CHANGE UP
MONOCYTES # BLD AUTO: 0.93 K/UL — HIGH (ref 0–0.9)
MONOCYTES NFR BLD AUTO: 8.3 % — SIGNIFICANT CHANGE UP (ref 2–14)
NEUTROPHILS # BLD AUTO: 6.46 K/UL — SIGNIFICANT CHANGE UP (ref 1.8–7.4)
NEUTROPHILS NFR BLD AUTO: 57.9 % — SIGNIFICANT CHANGE UP (ref 43–77)
NITRITE UR-MCNC: NEGATIVE — SIGNIFICANT CHANGE UP
NRBC # FLD: 0 — SIGNIFICANT CHANGE UP
OPIATES UR-MCNC: NEGATIVE — SIGNIFICANT CHANGE UP
OXYCODONE UR-MCNC: NEGATIVE — SIGNIFICANT CHANGE UP
PCP UR-MCNC: NEGATIVE — SIGNIFICANT CHANGE UP
PH UR: 6 — SIGNIFICANT CHANGE UP (ref 4.6–8)
PLATELET # BLD AUTO: 254 K/UL — SIGNIFICANT CHANGE UP (ref 150–400)
PMV BLD: 9.2 FL — SIGNIFICANT CHANGE UP (ref 7–13)
POTASSIUM SERPL-MCNC: 3.9 MMOL/L — SIGNIFICANT CHANGE UP (ref 3.5–5.3)
POTASSIUM SERPL-SCNC: 3.9 MMOL/L — SIGNIFICANT CHANGE UP (ref 3.5–5.3)
PROT SERPL-MCNC: 7.6 G/DL — SIGNIFICANT CHANGE UP (ref 6–8.3)
PROT UR-MCNC: NEGATIVE MG/DL — SIGNIFICANT CHANGE UP
RBC # BLD: 4.05 M/UL — SIGNIFICANT CHANGE UP (ref 3.8–5.2)
RBC # FLD: 14.4 % — SIGNIFICANT CHANGE UP (ref 10.3–14.5)
RBC CASTS # UR COMP ASSIST: SIGNIFICANT CHANGE UP (ref 0–?)
SALICYLATES SERPL-MCNC: < 5 MG/DL — LOW (ref 15–30)
SODIUM SERPL-SCNC: 139 MMOL/L — SIGNIFICANT CHANGE UP (ref 135–145)
SP GR SPEC: 1.01 — SIGNIFICANT CHANGE UP (ref 1–1.04)
SQUAMOUS # UR AUTO: SIGNIFICANT CHANGE UP
T3 SERPL-MCNC: 115 NG/DL — SIGNIFICANT CHANGE UP (ref 80–200)
T4 AB SER-ACNC: 6.34 UG/DL — SIGNIFICANT CHANGE UP (ref 5.1–13)
TSH SERPL-MCNC: 6.2 UIU/ML — HIGH (ref 0.27–4.2)
UROBILINOGEN FLD QL: NORMAL MG/DL — SIGNIFICANT CHANGE UP
WBC # BLD: 11.17 K/UL — HIGH (ref 3.8–10.5)
WBC # FLD AUTO: 11.17 K/UL — HIGH (ref 3.8–10.5)
WBC UR QL: SIGNIFICANT CHANGE UP (ref 0–?)

## 2018-01-10 PROCEDURE — 99285 EMERGENCY DEPT VISIT HI MDM: CPT

## 2018-01-10 RX ORDER — DIPHENHYDRAMINE HCL 50 MG
25 CAPSULE ORAL EVERY 6 HOURS
Qty: 0 | Refills: 0 | Status: DISCONTINUED | OUTPATIENT
Start: 2018-01-10 | End: 2018-01-26

## 2018-01-10 RX ORDER — ALBUTEROL 90 UG/1
2 AEROSOL, METERED ORAL EVERY 6 HOURS
Qty: 0 | Refills: 0 | Status: DISCONTINUED | OUTPATIENT
Start: 2018-01-10 | End: 2018-01-26

## 2018-01-10 RX ORDER — FAMOTIDINE 10 MG/ML
20 INJECTION INTRAVENOUS DAILY
Qty: 0 | Refills: 0 | Status: DISCONTINUED | OUTPATIENT
Start: 2018-01-10 | End: 2018-01-26

## 2018-01-10 RX ORDER — OLANZAPINE 15 MG/1
10 TABLET, FILM COATED ORAL
Qty: 0 | Refills: 0 | Status: DISCONTINUED | OUTPATIENT
Start: 2018-01-10 | End: 2018-01-12

## 2018-01-10 RX ORDER — HALOPERIDOL DECANOATE 100 MG/ML
5 INJECTION INTRAMUSCULAR EVERY 6 HOURS
Qty: 0 | Refills: 0 | Status: DISCONTINUED | OUTPATIENT
Start: 2018-01-10 | End: 2018-01-26

## 2018-01-10 RX ORDER — HALOPERIDOL DECANOATE 100 MG/ML
5 INJECTION INTRAMUSCULAR ONCE
Qty: 0 | Refills: 0 | Status: COMPLETED | OUTPATIENT
Start: 2018-01-10 | End: 2018-01-10

## 2018-01-10 RX ORDER — TRAZODONE HCL 50 MG
50 TABLET ORAL AT BEDTIME
Qty: 0 | Refills: 0 | Status: DISCONTINUED | OUTPATIENT
Start: 2018-01-10 | End: 2018-01-26

## 2018-01-10 RX ORDER — LITHIUM CARBONATE 300 MG/1
300 TABLET, EXTENDED RELEASE ORAL
Qty: 0 | Refills: 0 | Status: DISCONTINUED | OUTPATIENT
Start: 2018-01-10 | End: 2018-01-12

## 2018-01-10 RX ORDER — CLONAZEPAM 1 MG
0.5 TABLET ORAL
Qty: 0 | Refills: 0 | Status: DISCONTINUED | OUTPATIENT
Start: 2018-01-10 | End: 2018-01-15

## 2018-01-10 RX ORDER — FLUPHENAZINE HYDROCHLORIDE 1 MG/1
5 TABLET, FILM COATED ORAL DAILY
Qty: 0 | Refills: 0 | Status: DISCONTINUED | OUTPATIENT
Start: 2018-01-10 | End: 2018-01-18

## 2018-01-10 RX ORDER — DIPHENHYDRAMINE HCL 50 MG
25 CAPSULE ORAL ONCE
Qty: 0 | Refills: 0 | Status: DISCONTINUED | OUTPATIENT
Start: 2018-01-10 | End: 2018-01-26

## 2018-01-10 RX ORDER — ACETAMINOPHEN 500 MG
650 TABLET ORAL EVERY 6 HOURS
Qty: 0 | Refills: 0 | Status: DISCONTINUED | OUTPATIENT
Start: 2018-01-10 | End: 2018-01-26

## 2018-01-10 RX ORDER — CEPHALEXIN 500 MG
500 CAPSULE ORAL EVERY 12 HOURS
Qty: 0 | Refills: 0 | Status: COMPLETED | OUTPATIENT
Start: 2018-01-10 | End: 2018-01-17

## 2018-01-10 RX ADMIN — OLANZAPINE 10 MILLIGRAM(S): 15 TABLET, FILM COATED ORAL at 22:41

## 2018-01-10 RX ADMIN — Medication 25 MILLIGRAM(S): at 23:29

## 2018-01-10 RX ADMIN — HALOPERIDOL DECANOATE 5 MILLIGRAM(S): 100 INJECTION INTRAMUSCULAR at 17:48

## 2018-01-10 RX ADMIN — Medication 650 MILLIGRAM(S): at 22:42

## 2018-01-10 RX ADMIN — LITHIUM CARBONATE 300 MILLIGRAM(S): 300 TABLET, EXTENDED RELEASE ORAL at 22:41

## 2018-01-10 RX ADMIN — Medication 0.5 MILLIGRAM(S): at 22:41

## 2018-01-10 RX ADMIN — Medication 50 MILLIGRAM(S): at 22:43

## 2018-01-10 RX ADMIN — Medication 2 MILLIGRAM(S): at 22:47

## 2018-01-10 RX ADMIN — Medication 2 MILLIGRAM(S): at 17:48

## 2018-01-10 RX ADMIN — Medication 500 MILLIGRAM(S): at 22:41

## 2018-01-10 NOTE — ED BEHAVIORAL HEALTH ASSESSMENT NOTE - DETAILS
residence aware of admission called Dr Marek Whitman informing him of the ED visit and readmission discharged from  11/6/17-12/21/17 [facility] called for hand off 3 prior suicide attempts (last in 2012 via OD) as per records, recurrent suicidal gestures and suicidal ideation history of property destruction ((breaking TV screens while hospitalized) when upset / acting out Poor response to Geodon; Depakote (Increased ammonia levels) [facility] called for hand off; discussed having a low threshold for CO given her hxof provocative behavior on units; AND Grace also aware

## 2018-01-10 NOTE — ED BEHAVIORAL HEALTH ASSESSMENT NOTE - CURRENT MEDICATION
as per most recent discharge from inpatient (12/21/17): lithium 600mg PO in am and 900mg PO in hs, Prolixin 10mg PO bid, Klonopin 1mg PO bid, Zyprexa 10mg PO in am and 30mg PO qhs

## 2018-01-10 NOTE — ED PROVIDER NOTE - OBJECTIVE STATEMENT
The patient is a 37y Female hx of schizophrenia, substance abuse, non compliant w/ meds brought to ed for psych eval.   As per collateral from pt's  Mr. Orville Yusef, pt has been experiencing hallucination and speaking to herself.   Pt was also drinking water from toilet bowl.  Pt denies all medical complaints at present, no recent injuries, trauma or falls, no headache, back or neck pain, no abd/flank pain, no uti/urinary symptoms, nausea or vomiting, no fever or chills, no cp or sob, no palpitations or diaphoresis.  Pt denies SI/HI, no AVH.

## 2018-01-10 NOTE — ED BEHAVIORAL HEALTH ASSESSMENT NOTE - DESCRIPTION (FIRST USE, LAST USE, QUANTITY, FREQUENCY, DURATION)
long hx of cannabis use (years); unable to state at this time when last used due to being very psychotic and disorganized charted hx of prior use (unclear if it amounted to "abuse" criteria or just recreational use) heroin - charted hx of prior use (unclear if it amounted to "abuse" criteria or just recreational use)

## 2018-01-10 NOTE — ED BEHAVIORAL HEALTH ASSESSMENT NOTE - OTHER PAST PSYCHIATRIC HISTORY (INCLUDE DETAILS REGARDING ONSET, COURSE OF ILLNESS, INPATIENT/OUTPATIENT TREATMENT)
> 11 prior inpatient psychiatric admission to [facility] alone since 2011  - > 30 prior [facility] ED visits  - long hx of sexually provocative, acting out behaviors (during last [facility] inpatient admission, patient had to be placed on CO 1:1 after trying to perform oral sex on a male patient on the dayroom, taken off CO then was going into male patient's room, overheard offering them sex and was placed back on CO > 11 prior inpatient psychiatric admission to [facility] alone since 2011, lifetime inpatient admissions > 20   - > 30 prior [facility] ED visits alone  - 3 prior suicide attempts (last in 2012 via OD) as per records, recurrent suicidal gestures and suicidal ideation (none recently), history of property destruction ((breaking TV screens while hospitalized) when upset / acting out   - long hx of sexually provocative, acting out behaviors (during last [facility] inpatient admission, patient had to be placed on CO 1:1 after trying to perform oral sex on a male patient on the dayroom, taken off CO then was going into male patient's room, overheard offering them sex and was placed back on CO

## 2018-01-10 NOTE — ED BEHAVIORAL HEALTH ASSESSMENT NOTE - OTHER
staff at residence peers CVM superficially cooperative mostly glaring with blunted affect, at times looks angry suspected as she looks to be internally preoccupied at times

## 2018-01-10 NOTE — ED PROVIDER NOTE - MEDICAL DECISION MAKING DETAILS
Elevated TSH- T3T4 sent.  Keflex 500mg tab po BID for UTI- Culture sent- 38y/o Female hx of schizophrenia, substance abuse, non compliant w/ meds brought to ed for psych eval. Pt is manic appearing but able to follow instruction, alert and oriented, no injuries on exam- Labs notable for elevated TSH- T3T4 sent, Keflex 500mg tab po BID for UTI x 7 days- Culture sent- Medically clear for psych admission.

## 2018-01-10 NOTE — ED BEHAVIORAL HEALTH ASSESSMENT NOTE - RISK ASSESSMENT
Patient currently at high risk for subsequent decompensation which includes aggression and violence when Patient is off medications for long periods of time. She is presenting with highly impulsive, disorganized behavior, engaging in bizarre behaviors that are also unsanitary (drinking out of toiler), poor self care/poor hygiene, ideas of reference she is acting on, impaired reality testing, poor insight and judgment, in the context of noncompliance. Patient is also high risk as she has a long hx of sexually provocative, acting out behaviors when decompensated and is very high risk to be taken advantage of, Hx of suicide of suicide attempts and aggression. Needs inpatient level of care at this.

## 2018-01-10 NOTE — ED BEHAVIORAL HEALTH ASSESSMENT NOTE - SUMMARY
Patient is a 37-year-old female whose seriousness of symptoms, history and presentation are consistent with ACTUAL Schizoaffective Disorder, Bipolar Type (meets criteria), Borderline Personality Disorder, Cannabis Abuse, many prior inpatient psychiatric hospitalizations, most recently discharged from [facility] ~ 3 weeks ago with 2 other ED visits since that time, 3 prior suicide attempts with hx of recurrent suicidal gestures and suicidal ideation, history of property destruction when upset, long hx of sexually provocative behavior (both out in the community and while on inpatient units), no history of arrests or access to weapons, long hx of medication and treatment noncompliance who was BIB EMS activated by [facility] staff on account of not taking her medications and engaging in highly disorganized behavior such as drinking out of the toilet in the context of medication noncompliance. Patient at this time needs inpatient level of care for stabilization as she is a threat to self/unable to meet basic needs.

## 2018-01-10 NOTE — ED BEHAVIORAL HEALTH ASSESSMENT NOTE - HPI (INCLUDE ILLNESS QUALITY, SEVERITY, DURATION, TIMING, CONTEXT, MODIFYING FACTORS, ASSOCIATED SIGNS AND SYMPTOMS)
PATIENT IS WELL KNOWN TO WRITER WHO TREATED HER AS INPATIENT AT [facility] EARLY 2017:  Patient is a single, 37-year-old  female, non caregiver, unemployed, on disability for mental illness, domiciled on [place] grounds, followed by ACT Team, with past psychiatric history of most likely actual Schizoaffective Bipolar Type, Borderline Personality Disorder, Cannabis abuse, many prior inpatient psychiatric hospitalizations (>20), most recently discharged from [facility] December 21, 2017 from [facility] on 12/21/17 after 6 weeks of hospitalization, with numerous prior visits to the [facility] ED (well-known to staff), 3 prior suicide attempts (last in 2012 via OD), recurrent suicidal gestures and suicidal ideation (none recently), history of property destruction when upset, no history of arrests or access to weapons, long hx of medication and treatment noncompliance who was BIB EMS activated by [facility] staff on account of not taking her medications and engaging in highly disorganized behavior such as drinking out of the toilet. PATIENT IS WELL KNOWN TO WRITER WHO TREATED HER AS INPATIENT AT [facility] UNIT 2B IN SEPTEMBER OF 2016:  Patient is a single, 37-year-old  female, non caregiver, unemployed, on disability for mental illness, domiciled on [facility] on [place] grounds at this time, followed by ACT Team, [therapist Katie [phone], ACT ([facility])/ Victor Manuel Browning : [phone]; AOT /Gr[facility] Donnelly: [phone]; Psychiatrist / Dr. Flores: [phone], [phone], [phone]], with past psychiatric history of most likely actual Schizoaffective Bipolar Type, Borderline Personality Disorder, Cannabis abuse, many prior inpatient psychiatric hospitalizations (>20), most recently discharged from [facility] December 21, 2017 from [facility] on 12/21/17 after 6 weeks of hospitalization, has been back to [facility] ED twice since that time, with numerous prior visits to the [facility] ED (well-known to staff), 3 prior suicide attempts (last in 2012 via OD), recurrent suicidal gestures and suicidal ideation (none recently), history of property destruction when upset, long hx of sexually provocative behavior (both out in the community and while on inpatient units), no history of arrests or access to weapons, long hx of medication and treatment noncompliance who was BIB EMS activated by [facility] staff on account of not taking her medications and engaging in highly disorganized behavior such as drinking out of the toilet.    Patient is a limited historian at this time. Highly disorganized, paranoid, unpredictable and required STAT IM treatment. She was not able to tolerate a long, in depth assessment.     COLLATERAL FROM [facility]: patient has been getting worst as the days go on. She is making bizarre statements and engaging in bizarre behavior - most recently drinking out of the toilet saying it was "Tristanian tea." Poor hygiene, poor self care, not performing her ADLs. Reporting to get special messages from things in the environment. They are suspecting she is not taking her medications.     COLLATERAL FROM DR SANTANA: case discussed and current presentation. Agrees with readmission.

## 2018-01-10 NOTE — ED PROVIDER NOTE - CHPI ED SYMPTOMS NEG
no disorientation/no change in level of consciousness/no paranoia/no homicidal/no weakness/no suicidal/no weight loss/no confusion

## 2018-01-10 NOTE — ED BEHAVIORAL HEALTH ASSESSMENT NOTE - PSYCHIATRIC ISSUES AND PLAN (INCLUDE STANDING AND PRN MEDICATION)
restart meds: lithium 300mg PO bid. Prolixin 5mg qd, Klonopin 0.5mg PO bid, Zyprexa 10mg PO bid. Given long hx of noncompliance and readmission, consider AOT and long-acting injectible

## 2018-01-10 NOTE — ED BEHAVIORAL HEALTH ASSESSMENT NOTE - DESCRIPTION
disorganized, unpredictable, stuffing food in her face and eating very fast, pacing, at times glaring at people GERD as per records,  Patient does not know much about her biological family, she attended some college in the past disorganized, unpredictable, stuffing food in her face and eating very fast, pacing, at times glaring at people. Later was found to have lathered herself from head to toe with the soap in the bathroom and staff had to intervene

## 2018-01-10 NOTE — ED ADULT NURSE NOTE - OBJECTIVE STATEMENT
Pt. brought in by EMS from [facility] for agitation and non-compliance of meds. Stating she is "high on life". Unable to stay still, dancing around. Exhibiting manic behavior. Denies any hallucinations or SI/HI. h/o bipolar. Safety measures maintained. VSS in triage. Labs sent. Will continue to monitor.

## 2018-01-11 PROCEDURE — 99223 1ST HOSP IP/OBS HIGH 75: CPT

## 2018-01-11 RX ADMIN — Medication 0.5 MILLIGRAM(S): at 08:26

## 2018-01-11 RX ADMIN — HALOPERIDOL DECANOATE 5 MILLIGRAM(S): 100 INJECTION INTRAMUSCULAR at 18:12

## 2018-01-11 RX ADMIN — Medication 50 MILLIGRAM(S): at 22:07

## 2018-01-11 RX ADMIN — Medication 0.5 MILLIGRAM(S): at 22:07

## 2018-01-11 RX ADMIN — Medication 500 MILLIGRAM(S): at 08:26

## 2018-01-11 RX ADMIN — OLANZAPINE 10 MILLIGRAM(S): 15 TABLET, FILM COATED ORAL at 08:26

## 2018-01-11 RX ADMIN — FLUPHENAZINE HYDROCHLORIDE 5 MILLIGRAM(S): 1 TABLET, FILM COATED ORAL at 08:26

## 2018-01-11 RX ADMIN — FAMOTIDINE 20 MILLIGRAM(S): 10 INJECTION INTRAVENOUS at 08:26

## 2018-01-11 RX ADMIN — OLANZAPINE 10 MILLIGRAM(S): 15 TABLET, FILM COATED ORAL at 22:07

## 2018-01-11 RX ADMIN — LITHIUM CARBONATE 300 MILLIGRAM(S): 300 TABLET, EXTENDED RELEASE ORAL at 08:26

## 2018-01-11 RX ADMIN — Medication 2 MILLIGRAM(S): at 18:12

## 2018-01-11 RX ADMIN — LITHIUM CARBONATE 300 MILLIGRAM(S): 300 TABLET, EXTENDED RELEASE ORAL at 22:07

## 2018-01-11 RX ADMIN — Medication 500 MILLIGRAM(S): at 22:07

## 2018-01-11 RX ADMIN — Medication 25 MILLIGRAM(S): at 18:12

## 2018-01-12 PROCEDURE — 99232 SBSQ HOSP IP/OBS MODERATE 35: CPT

## 2018-01-12 RX ORDER — OLANZAPINE 15 MG/1
15 TABLET, FILM COATED ORAL
Qty: 0 | Refills: 0 | Status: DISCONTINUED | OUTPATIENT
Start: 2018-01-12 | End: 2018-01-15

## 2018-01-12 RX ORDER — LITHIUM CARBONATE 300 MG/1
450 TABLET, EXTENDED RELEASE ORAL
Qty: 0 | Refills: 0 | Status: DISCONTINUED | OUTPATIENT
Start: 2018-01-12 | End: 2018-01-22

## 2018-01-12 RX ADMIN — Medication 500 MILLIGRAM(S): at 22:11

## 2018-01-12 RX ADMIN — FLUPHENAZINE HYDROCHLORIDE 5 MILLIGRAM(S): 1 TABLET, FILM COATED ORAL at 08:35

## 2018-01-12 RX ADMIN — FAMOTIDINE 20 MILLIGRAM(S): 10 INJECTION INTRAVENOUS at 08:34

## 2018-01-12 RX ADMIN — LITHIUM CARBONATE 450 MILLIGRAM(S): 300 TABLET, EXTENDED RELEASE ORAL at 22:12

## 2018-01-12 RX ADMIN — LITHIUM CARBONATE 300 MILLIGRAM(S): 300 TABLET, EXTENDED RELEASE ORAL at 08:35

## 2018-01-12 RX ADMIN — Medication 500 MILLIGRAM(S): at 08:34

## 2018-01-12 RX ADMIN — OLANZAPINE 15 MILLIGRAM(S): 15 TABLET, FILM COATED ORAL at 22:12

## 2018-01-12 RX ADMIN — OLANZAPINE 10 MILLIGRAM(S): 15 TABLET, FILM COATED ORAL at 08:35

## 2018-01-12 RX ADMIN — Medication 0.5 MILLIGRAM(S): at 08:34

## 2018-01-12 RX ADMIN — Medication 0.5 MILLIGRAM(S): at 22:12

## 2018-01-13 PROCEDURE — 99232 SBSQ HOSP IP/OBS MODERATE 35: CPT

## 2018-01-13 RX ADMIN — FAMOTIDINE 20 MILLIGRAM(S): 10 INJECTION INTRAVENOUS at 07:57

## 2018-01-13 RX ADMIN — OLANZAPINE 15 MILLIGRAM(S): 15 TABLET, FILM COATED ORAL at 20:54

## 2018-01-13 RX ADMIN — HALOPERIDOL DECANOATE 5 MILLIGRAM(S): 100 INJECTION INTRAMUSCULAR at 17:11

## 2018-01-13 RX ADMIN — LITHIUM CARBONATE 450 MILLIGRAM(S): 300 TABLET, EXTENDED RELEASE ORAL at 20:54

## 2018-01-13 RX ADMIN — OLANZAPINE 15 MILLIGRAM(S): 15 TABLET, FILM COATED ORAL at 07:57

## 2018-01-13 RX ADMIN — Medication 500 MILLIGRAM(S): at 20:54

## 2018-01-13 RX ADMIN — Medication 0.5 MILLIGRAM(S): at 20:54

## 2018-01-13 RX ADMIN — Medication 0.5 MILLIGRAM(S): at 07:57

## 2018-01-13 RX ADMIN — Medication 2 MILLIGRAM(S): at 17:11

## 2018-01-13 RX ADMIN — LITHIUM CARBONATE 450 MILLIGRAM(S): 300 TABLET, EXTENDED RELEASE ORAL at 07:57

## 2018-01-13 RX ADMIN — Medication 500 MILLIGRAM(S): at 07:56

## 2018-01-13 RX ADMIN — FLUPHENAZINE HYDROCHLORIDE 5 MILLIGRAM(S): 1 TABLET, FILM COATED ORAL at 07:57

## 2018-01-13 RX ADMIN — Medication 50 MILLIGRAM(S): at 20:54

## 2018-01-14 PROCEDURE — 93010 ELECTROCARDIOGRAM REPORT: CPT

## 2018-01-14 PROCEDURE — 99232 SBSQ HOSP IP/OBS MODERATE 35: CPT

## 2018-01-14 RX ADMIN — Medication 25 MILLIGRAM(S): at 07:46

## 2018-01-14 RX ADMIN — LITHIUM CARBONATE 450 MILLIGRAM(S): 300 TABLET, EXTENDED RELEASE ORAL at 08:02

## 2018-01-14 RX ADMIN — Medication 0.5 MILLIGRAM(S): at 21:37

## 2018-01-14 RX ADMIN — Medication 500 MILLIGRAM(S): at 08:02

## 2018-01-14 RX ADMIN — LITHIUM CARBONATE 450 MILLIGRAM(S): 300 TABLET, EXTENDED RELEASE ORAL at 21:37

## 2018-01-14 RX ADMIN — OLANZAPINE 15 MILLIGRAM(S): 15 TABLET, FILM COATED ORAL at 08:02

## 2018-01-14 RX ADMIN — FAMOTIDINE 20 MILLIGRAM(S): 10 INJECTION INTRAVENOUS at 08:02

## 2018-01-14 RX ADMIN — Medication 2 MILLIGRAM(S): at 08:39

## 2018-01-14 RX ADMIN — Medication 500 MILLIGRAM(S): at 21:37

## 2018-01-14 RX ADMIN — FLUPHENAZINE HYDROCHLORIDE 5 MILLIGRAM(S): 1 TABLET, FILM COATED ORAL at 08:02

## 2018-01-14 RX ADMIN — HALOPERIDOL DECANOATE 5 MILLIGRAM(S): 100 INJECTION INTRAMUSCULAR at 08:39

## 2018-01-14 RX ADMIN — Medication 0.5 MILLIGRAM(S): at 08:02

## 2018-01-14 RX ADMIN — OLANZAPINE 15 MILLIGRAM(S): 15 TABLET, FILM COATED ORAL at 21:37

## 2018-01-15 PROCEDURE — 99232 SBSQ HOSP IP/OBS MODERATE 35: CPT

## 2018-01-15 PROCEDURE — 93010 ELECTROCARDIOGRAM REPORT: CPT

## 2018-01-15 RX ORDER — OLANZAPINE 15 MG/1
15 TABLET, FILM COATED ORAL DAILY
Qty: 0 | Refills: 0 | Status: DISCONTINUED | OUTPATIENT
Start: 2018-01-16 | End: 2018-01-24

## 2018-01-15 RX ORDER — OLANZAPINE 15 MG/1
20 TABLET, FILM COATED ORAL AT BEDTIME
Qty: 0 | Refills: 0 | Status: DISCONTINUED | OUTPATIENT
Start: 2018-01-15 | End: 2018-01-24

## 2018-01-15 RX ORDER — CLONAZEPAM 1 MG
0.5 TABLET ORAL
Qty: 0 | Refills: 0 | Status: DISCONTINUED | OUTPATIENT
Start: 2018-01-15 | End: 2018-01-19

## 2018-01-15 RX ADMIN — HALOPERIDOL DECANOATE 5 MILLIGRAM(S): 100 INJECTION INTRAMUSCULAR at 07:23

## 2018-01-15 RX ADMIN — Medication 500 MILLIGRAM(S): at 23:08

## 2018-01-15 RX ADMIN — Medication 500 MILLIGRAM(S): at 08:28

## 2018-01-15 RX ADMIN — LITHIUM CARBONATE 450 MILLIGRAM(S): 300 TABLET, EXTENDED RELEASE ORAL at 08:28

## 2018-01-15 RX ADMIN — OLANZAPINE 15 MILLIGRAM(S): 15 TABLET, FILM COATED ORAL at 08:28

## 2018-01-15 RX ADMIN — Medication 2 MILLIGRAM(S): at 07:23

## 2018-01-15 RX ADMIN — FLUPHENAZINE HYDROCHLORIDE 5 MILLIGRAM(S): 1 TABLET, FILM COATED ORAL at 08:28

## 2018-01-15 RX ADMIN — OLANZAPINE 20 MILLIGRAM(S): 15 TABLET, FILM COATED ORAL at 23:08

## 2018-01-15 RX ADMIN — LITHIUM CARBONATE 450 MILLIGRAM(S): 300 TABLET, EXTENDED RELEASE ORAL at 23:08

## 2018-01-15 RX ADMIN — Medication 0.5 MILLIGRAM(S): at 23:08

## 2018-01-15 RX ADMIN — Medication 0.5 MILLIGRAM(S): at 08:28

## 2018-01-15 RX ADMIN — Medication 25 MILLIGRAM(S): at 07:23

## 2018-01-15 RX ADMIN — FAMOTIDINE 20 MILLIGRAM(S): 10 INJECTION INTRAVENOUS at 08:28

## 2018-01-16 PROCEDURE — 99232 SBSQ HOSP IP/OBS MODERATE 35: CPT

## 2018-01-16 RX ADMIN — FLUPHENAZINE HYDROCHLORIDE 5 MILLIGRAM(S): 1 TABLET, FILM COATED ORAL at 08:14

## 2018-01-16 RX ADMIN — Medication 500 MILLIGRAM(S): at 20:49

## 2018-01-16 RX ADMIN — Medication 25 MILLIGRAM(S): at 08:15

## 2018-01-16 RX ADMIN — Medication 2 MILLIGRAM(S): at 20:35

## 2018-01-16 RX ADMIN — Medication 2 MILLIGRAM(S): at 00:54

## 2018-01-16 RX ADMIN — HALOPERIDOL DECANOATE 5 MILLIGRAM(S): 100 INJECTION INTRAMUSCULAR at 08:15

## 2018-01-16 RX ADMIN — Medication 500 MILLIGRAM(S): at 08:14

## 2018-01-16 RX ADMIN — Medication 50 MILLIGRAM(S): at 20:35

## 2018-01-16 RX ADMIN — LITHIUM CARBONATE 450 MILLIGRAM(S): 300 TABLET, EXTENDED RELEASE ORAL at 08:14

## 2018-01-16 RX ADMIN — OLANZAPINE 20 MILLIGRAM(S): 15 TABLET, FILM COATED ORAL at 20:50

## 2018-01-16 RX ADMIN — HALOPERIDOL DECANOATE 5 MILLIGRAM(S): 100 INJECTION INTRAMUSCULAR at 00:54

## 2018-01-16 RX ADMIN — Medication 0.5 MILLIGRAM(S): at 08:14

## 2018-01-16 RX ADMIN — LITHIUM CARBONATE 450 MILLIGRAM(S): 300 TABLET, EXTENDED RELEASE ORAL at 20:49

## 2018-01-16 RX ADMIN — Medication 0.5 MILLIGRAM(S): at 20:49

## 2018-01-16 RX ADMIN — OLANZAPINE 15 MILLIGRAM(S): 15 TABLET, FILM COATED ORAL at 08:14

## 2018-01-16 RX ADMIN — Medication 25 MILLIGRAM(S): at 00:53

## 2018-01-16 RX ADMIN — FAMOTIDINE 20 MILLIGRAM(S): 10 INJECTION INTRAVENOUS at 08:14

## 2018-01-17 LAB — LITHIUM SERPL-MCNC: 0.55 MMOL/L — LOW (ref 0.6–1.2)

## 2018-01-17 PROCEDURE — 99232 SBSQ HOSP IP/OBS MODERATE 35: CPT

## 2018-01-17 RX ORDER — LITHIUM CARBONATE 300 MG/1
300 TABLET, EXTENDED RELEASE ORAL AT BEDTIME
Qty: 0 | Refills: 0 | Status: DISCONTINUED | OUTPATIENT
Start: 2018-01-17 | End: 2018-01-22

## 2018-01-17 RX ADMIN — FLUPHENAZINE HYDROCHLORIDE 5 MILLIGRAM(S): 1 TABLET, FILM COATED ORAL at 08:27

## 2018-01-17 RX ADMIN — OLANZAPINE 15 MILLIGRAM(S): 15 TABLET, FILM COATED ORAL at 08:28

## 2018-01-17 RX ADMIN — OLANZAPINE 20 MILLIGRAM(S): 15 TABLET, FILM COATED ORAL at 21:45

## 2018-01-17 RX ADMIN — Medication 50 MILLIGRAM(S): at 23:03

## 2018-01-17 RX ADMIN — FAMOTIDINE 20 MILLIGRAM(S): 10 INJECTION INTRAVENOUS at 08:27

## 2018-01-17 RX ADMIN — Medication 0.5 MILLIGRAM(S): at 08:27

## 2018-01-17 RX ADMIN — Medication 0.5 MILLIGRAM(S): at 21:45

## 2018-01-17 RX ADMIN — LITHIUM CARBONATE 450 MILLIGRAM(S): 300 TABLET, EXTENDED RELEASE ORAL at 08:28

## 2018-01-17 RX ADMIN — Medication 500 MILLIGRAM(S): at 08:27

## 2018-01-17 RX ADMIN — LITHIUM CARBONATE 300 MILLIGRAM(S): 300 TABLET, EXTENDED RELEASE ORAL at 21:45

## 2018-01-17 RX ADMIN — LITHIUM CARBONATE 450 MILLIGRAM(S): 300 TABLET, EXTENDED RELEASE ORAL at 21:45

## 2018-01-18 LAB — LITHIUM SERPL-MCNC: 0.64 MMOL/L — SIGNIFICANT CHANGE UP (ref 0.6–1.2)

## 2018-01-18 PROCEDURE — 99232 SBSQ HOSP IP/OBS MODERATE 35: CPT

## 2018-01-18 RX ORDER — FLUPHENAZINE HYDROCHLORIDE 1 MG/1
5 TABLET, FILM COATED ORAL
Qty: 0 | Refills: 0 | Status: DISCONTINUED | OUTPATIENT
Start: 2018-01-18 | End: 2018-01-26

## 2018-01-18 RX ADMIN — Medication 0.5 MILLIGRAM(S): at 20:19

## 2018-01-18 RX ADMIN — Medication 2 MILLIGRAM(S): at 05:14

## 2018-01-18 RX ADMIN — FAMOTIDINE 20 MILLIGRAM(S): 10 INJECTION INTRAVENOUS at 08:35

## 2018-01-18 RX ADMIN — Medication 0.5 MILLIGRAM(S): at 08:35

## 2018-01-18 RX ADMIN — HALOPERIDOL DECANOATE 5 MILLIGRAM(S): 100 INJECTION INTRAMUSCULAR at 05:14

## 2018-01-18 RX ADMIN — OLANZAPINE 20 MILLIGRAM(S): 15 TABLET, FILM COATED ORAL at 20:20

## 2018-01-18 RX ADMIN — OLANZAPINE 15 MILLIGRAM(S): 15 TABLET, FILM COATED ORAL at 08:35

## 2018-01-18 RX ADMIN — Medication 25 MILLIGRAM(S): at 05:14

## 2018-01-18 RX ADMIN — FLUPHENAZINE HYDROCHLORIDE 5 MILLIGRAM(S): 1 TABLET, FILM COATED ORAL at 20:19

## 2018-01-18 RX ADMIN — LITHIUM CARBONATE 300 MILLIGRAM(S): 300 TABLET, EXTENDED RELEASE ORAL at 20:20

## 2018-01-18 RX ADMIN — LITHIUM CARBONATE 450 MILLIGRAM(S): 300 TABLET, EXTENDED RELEASE ORAL at 08:35

## 2018-01-18 RX ADMIN — LITHIUM CARBONATE 450 MILLIGRAM(S): 300 TABLET, EXTENDED RELEASE ORAL at 20:20

## 2018-01-18 RX ADMIN — FLUPHENAZINE HYDROCHLORIDE 5 MILLIGRAM(S): 1 TABLET, FILM COATED ORAL at 08:35

## 2018-01-19 PROCEDURE — 99232 SBSQ HOSP IP/OBS MODERATE 35: CPT

## 2018-01-19 RX ORDER — CLONAZEPAM 1 MG
0.5 TABLET ORAL
Qty: 0 | Refills: 0 | Status: DISCONTINUED | OUTPATIENT
Start: 2018-01-19 | End: 2018-01-25

## 2018-01-19 RX ADMIN — FLUPHENAZINE HYDROCHLORIDE 5 MILLIGRAM(S): 1 TABLET, FILM COATED ORAL at 20:23

## 2018-01-19 RX ADMIN — FLUPHENAZINE HYDROCHLORIDE 5 MILLIGRAM(S): 1 TABLET, FILM COATED ORAL at 08:18

## 2018-01-19 RX ADMIN — LITHIUM CARBONATE 450 MILLIGRAM(S): 300 TABLET, EXTENDED RELEASE ORAL at 08:18

## 2018-01-19 RX ADMIN — OLANZAPINE 20 MILLIGRAM(S): 15 TABLET, FILM COATED ORAL at 20:23

## 2018-01-19 RX ADMIN — Medication 0.5 MILLIGRAM(S): at 08:17

## 2018-01-19 RX ADMIN — LITHIUM CARBONATE 450 MILLIGRAM(S): 300 TABLET, EXTENDED RELEASE ORAL at 20:23

## 2018-01-19 RX ADMIN — FAMOTIDINE 20 MILLIGRAM(S): 10 INJECTION INTRAVENOUS at 08:17

## 2018-01-19 RX ADMIN — Medication 0.5 MILLIGRAM(S): at 20:23

## 2018-01-19 RX ADMIN — LITHIUM CARBONATE 300 MILLIGRAM(S): 300 TABLET, EXTENDED RELEASE ORAL at 20:23

## 2018-01-19 RX ADMIN — OLANZAPINE 15 MILLIGRAM(S): 15 TABLET, FILM COATED ORAL at 08:18

## 2018-01-20 RX ADMIN — FLUPHENAZINE HYDROCHLORIDE 5 MILLIGRAM(S): 1 TABLET, FILM COATED ORAL at 08:20

## 2018-01-20 RX ADMIN — OLANZAPINE 15 MILLIGRAM(S): 15 TABLET, FILM COATED ORAL at 08:20

## 2018-01-20 RX ADMIN — FAMOTIDINE 20 MILLIGRAM(S): 10 INJECTION INTRAVENOUS at 08:20

## 2018-01-20 RX ADMIN — Medication 0.5 MILLIGRAM(S): at 08:20

## 2018-01-20 RX ADMIN — LITHIUM CARBONATE 450 MILLIGRAM(S): 300 TABLET, EXTENDED RELEASE ORAL at 08:20

## 2018-01-21 RX ADMIN — LITHIUM CARBONATE 450 MILLIGRAM(S): 300 TABLET, EXTENDED RELEASE ORAL at 08:00

## 2018-01-21 RX ADMIN — Medication 2 MILLIGRAM(S): at 01:17

## 2018-01-21 RX ADMIN — FLUPHENAZINE HYDROCHLORIDE 5 MILLIGRAM(S): 1 TABLET, FILM COATED ORAL at 08:00

## 2018-01-21 RX ADMIN — Medication 0.5 MILLIGRAM(S): at 08:00

## 2018-01-21 RX ADMIN — Medication 50 MILLIGRAM(S): at 21:30

## 2018-01-21 RX ADMIN — Medication 2 MILLIGRAM(S): at 21:30

## 2018-01-21 RX ADMIN — Medication 0.5 MILLIGRAM(S): at 21:45

## 2018-01-21 RX ADMIN — Medication 50 MILLIGRAM(S): at 01:17

## 2018-01-21 RX ADMIN — LITHIUM CARBONATE 450 MILLIGRAM(S): 300 TABLET, EXTENDED RELEASE ORAL at 21:45

## 2018-01-21 RX ADMIN — OLANZAPINE 15 MILLIGRAM(S): 15 TABLET, FILM COATED ORAL at 08:00

## 2018-01-21 RX ADMIN — LITHIUM CARBONATE 300 MILLIGRAM(S): 300 TABLET, EXTENDED RELEASE ORAL at 21:45

## 2018-01-21 RX ADMIN — OLANZAPINE 20 MILLIGRAM(S): 15 TABLET, FILM COATED ORAL at 21:45

## 2018-01-21 RX ADMIN — FLUPHENAZINE HYDROCHLORIDE 5 MILLIGRAM(S): 1 TABLET, FILM COATED ORAL at 21:45

## 2018-01-21 RX ADMIN — HALOPERIDOL DECANOATE 5 MILLIGRAM(S): 100 INJECTION INTRAMUSCULAR at 01:17

## 2018-01-21 RX ADMIN — FAMOTIDINE 20 MILLIGRAM(S): 10 INJECTION INTRAVENOUS at 08:00

## 2018-01-21 RX ADMIN — Medication 25 MILLIGRAM(S): at 01:17

## 2018-01-22 LAB — LITHIUM SERPL-MCNC: 0.74 MMOL/L — SIGNIFICANT CHANGE UP (ref 0.6–1.2)

## 2018-01-22 PROCEDURE — 99232 SBSQ HOSP IP/OBS MODERATE 35: CPT

## 2018-01-22 RX ORDER — LITHIUM CARBONATE 300 MG/1
600 TABLET, EXTENDED RELEASE ORAL
Qty: 0 | Refills: 0 | Status: DISCONTINUED | OUTPATIENT
Start: 2018-01-22 | End: 2018-01-26

## 2018-01-22 RX ORDER — FLUPHENAZINE HYDROCHLORIDE 1 MG/1
12.5 TABLET, FILM COATED ORAL ONCE
Qty: 0 | Refills: 0 | Status: COMPLETED | OUTPATIENT
Start: 2018-01-22 | End: 2018-01-22

## 2018-01-22 RX ADMIN — OLANZAPINE 20 MILLIGRAM(S): 15 TABLET, FILM COATED ORAL at 21:13

## 2018-01-22 RX ADMIN — Medication 0.5 MILLIGRAM(S): at 08:29

## 2018-01-22 RX ADMIN — FAMOTIDINE 20 MILLIGRAM(S): 10 INJECTION INTRAVENOUS at 08:29

## 2018-01-22 RX ADMIN — FLUPHENAZINE HYDROCHLORIDE 12.5 MILLIGRAM(S): 1 TABLET, FILM COATED ORAL at 21:12

## 2018-01-22 RX ADMIN — LITHIUM CARBONATE 600 MILLIGRAM(S): 300 TABLET, EXTENDED RELEASE ORAL at 21:13

## 2018-01-22 RX ADMIN — FLUPHENAZINE HYDROCHLORIDE 5 MILLIGRAM(S): 1 TABLET, FILM COATED ORAL at 08:29

## 2018-01-22 RX ADMIN — Medication 0.5 MILLIGRAM(S): at 21:12

## 2018-01-22 RX ADMIN — LITHIUM CARBONATE 450 MILLIGRAM(S): 300 TABLET, EXTENDED RELEASE ORAL at 08:29

## 2018-01-22 RX ADMIN — OLANZAPINE 15 MILLIGRAM(S): 15 TABLET, FILM COATED ORAL at 08:29

## 2018-01-22 RX ADMIN — FLUPHENAZINE HYDROCHLORIDE 5 MILLIGRAM(S): 1 TABLET, FILM COATED ORAL at 21:13

## 2018-01-23 PROCEDURE — 99232 SBSQ HOSP IP/OBS MODERATE 35: CPT

## 2018-01-23 RX ADMIN — Medication 50 MILLIGRAM(S): at 21:36

## 2018-01-23 RX ADMIN — FLUPHENAZINE HYDROCHLORIDE 5 MILLIGRAM(S): 1 TABLET, FILM COATED ORAL at 21:09

## 2018-01-23 RX ADMIN — LITHIUM CARBONATE 600 MILLIGRAM(S): 300 TABLET, EXTENDED RELEASE ORAL at 21:09

## 2018-01-23 RX ADMIN — Medication 0.5 MILLIGRAM(S): at 21:09

## 2018-01-23 RX ADMIN — FLUPHENAZINE HYDROCHLORIDE 5 MILLIGRAM(S): 1 TABLET, FILM COATED ORAL at 08:01

## 2018-01-23 RX ADMIN — OLANZAPINE 15 MILLIGRAM(S): 15 TABLET, FILM COATED ORAL at 08:01

## 2018-01-23 RX ADMIN — OLANZAPINE 20 MILLIGRAM(S): 15 TABLET, FILM COATED ORAL at 21:10

## 2018-01-23 RX ADMIN — FAMOTIDINE 20 MILLIGRAM(S): 10 INJECTION INTRAVENOUS at 08:01

## 2018-01-23 RX ADMIN — LITHIUM CARBONATE 600 MILLIGRAM(S): 300 TABLET, EXTENDED RELEASE ORAL at 08:01

## 2018-01-23 RX ADMIN — Medication 0.5 MILLIGRAM(S): at 08:01

## 2018-01-23 RX ADMIN — Medication 2 MILLIGRAM(S): at 21:36

## 2018-01-24 PROCEDURE — 99232 SBSQ HOSP IP/OBS MODERATE 35: CPT

## 2018-01-24 RX ORDER — OLANZAPINE 15 MG/1
20 TABLET, FILM COATED ORAL
Qty: 0 | Refills: 0 | Status: DISCONTINUED | OUTPATIENT
Start: 2018-01-24 | End: 2018-01-26

## 2018-01-24 RX ADMIN — Medication 0.5 MILLIGRAM(S): at 08:26

## 2018-01-24 RX ADMIN — OLANZAPINE 20 MILLIGRAM(S): 15 TABLET, FILM COATED ORAL at 20:53

## 2018-01-24 RX ADMIN — FAMOTIDINE 20 MILLIGRAM(S): 10 INJECTION INTRAVENOUS at 08:26

## 2018-01-24 RX ADMIN — Medication 0.5 MILLIGRAM(S): at 20:52

## 2018-01-24 RX ADMIN — LITHIUM CARBONATE 600 MILLIGRAM(S): 300 TABLET, EXTENDED RELEASE ORAL at 08:26

## 2018-01-24 RX ADMIN — LITHIUM CARBONATE 600 MILLIGRAM(S): 300 TABLET, EXTENDED RELEASE ORAL at 20:52

## 2018-01-24 RX ADMIN — OLANZAPINE 15 MILLIGRAM(S): 15 TABLET, FILM COATED ORAL at 08:26

## 2018-01-24 RX ADMIN — FLUPHENAZINE HYDROCHLORIDE 5 MILLIGRAM(S): 1 TABLET, FILM COATED ORAL at 20:52

## 2018-01-24 RX ADMIN — FLUPHENAZINE HYDROCHLORIDE 5 MILLIGRAM(S): 1 TABLET, FILM COATED ORAL at 08:26

## 2018-01-25 PROCEDURE — 99232 SBSQ HOSP IP/OBS MODERATE 35: CPT

## 2018-01-25 RX ORDER — LITHIUM CARBONATE 300 MG/1
1 TABLET, EXTENDED RELEASE ORAL
Qty: 30 | Refills: 0 | OUTPATIENT
Start: 2018-01-25 | End: 2018-02-08

## 2018-01-25 RX ORDER — FAMOTIDINE 10 MG/ML
1 INJECTION INTRAVENOUS
Qty: 15 | Refills: 0 | OUTPATIENT
Start: 2018-01-25 | End: 2018-02-08

## 2018-01-25 RX ORDER — FLUPHENAZINE HYDROCHLORIDE 1 MG/1
1 TABLET, FILM COATED ORAL
Qty: 30 | Refills: 0 | OUTPATIENT
Start: 2018-01-25 | End: 2018-02-08

## 2018-01-25 RX ORDER — OLANZAPINE 15 MG/1
1 TABLET, FILM COATED ORAL
Qty: 30 | Refills: 0 | OUTPATIENT
Start: 2018-01-25 | End: 2018-02-08

## 2018-01-25 RX ORDER — ALBUTEROL 90 UG/1
2 AEROSOL, METERED ORAL
Qty: 1 | Refills: 0 | OUTPATIENT
Start: 2018-01-25 | End: 2018-02-23

## 2018-01-25 RX ORDER — CLONAZEPAM 1 MG
1 TABLET ORAL
Qty: 30 | Refills: 0 | OUTPATIENT
Start: 2018-01-25 | End: 2018-02-08

## 2018-01-25 RX ORDER — CLONAZEPAM 1 MG
0.5 TABLET ORAL
Qty: 0 | Refills: 0 | Status: DISCONTINUED | OUTPATIENT
Start: 2018-01-25 | End: 2018-01-26

## 2018-01-25 RX ADMIN — Medication 2 MILLIGRAM(S): at 21:00

## 2018-01-25 RX ADMIN — Medication 0.5 MILLIGRAM(S): at 08:01

## 2018-01-25 RX ADMIN — FAMOTIDINE 20 MILLIGRAM(S): 10 INJECTION INTRAVENOUS at 08:01

## 2018-01-25 RX ADMIN — FLUPHENAZINE HYDROCHLORIDE 5 MILLIGRAM(S): 1 TABLET, FILM COATED ORAL at 08:01

## 2018-01-25 RX ADMIN — OLANZAPINE 20 MILLIGRAM(S): 15 TABLET, FILM COATED ORAL at 08:01

## 2018-01-25 RX ADMIN — FLUPHENAZINE HYDROCHLORIDE 5 MILLIGRAM(S): 1 TABLET, FILM COATED ORAL at 21:33

## 2018-01-25 RX ADMIN — LITHIUM CARBONATE 600 MILLIGRAM(S): 300 TABLET, EXTENDED RELEASE ORAL at 21:33

## 2018-01-25 RX ADMIN — Medication 50 MILLIGRAM(S): at 21:00

## 2018-01-25 RX ADMIN — LITHIUM CARBONATE 600 MILLIGRAM(S): 300 TABLET, EXTENDED RELEASE ORAL at 08:01

## 2018-01-25 RX ADMIN — OLANZAPINE 20 MILLIGRAM(S): 15 TABLET, FILM COATED ORAL at 21:33

## 2018-01-25 RX ADMIN — Medication 0.5 MILLIGRAM(S): at 21:33

## 2018-01-26 VITALS — TEMPERATURE: 98 F

## 2018-01-26 PROCEDURE — 99238 HOSP IP/OBS DSCHRG MGMT 30/<: CPT

## 2018-01-26 RX ADMIN — LITHIUM CARBONATE 600 MILLIGRAM(S): 300 TABLET, EXTENDED RELEASE ORAL at 08:23

## 2018-01-26 RX ADMIN — Medication 0.5 MILLIGRAM(S): at 08:23

## 2018-01-26 RX ADMIN — OLANZAPINE 20 MILLIGRAM(S): 15 TABLET, FILM COATED ORAL at 08:24

## 2018-01-26 RX ADMIN — FLUPHENAZINE HYDROCHLORIDE 5 MILLIGRAM(S): 1 TABLET, FILM COATED ORAL at 08:23

## 2018-01-26 RX ADMIN — FAMOTIDINE 20 MILLIGRAM(S): 10 INJECTION INTRAVENOUS at 08:23

## 2018-02-01 ENCOUNTER — EMERGENCY (EMERGENCY)
Facility: HOSPITAL | Age: 38
LOS: 1 days | Discharge: HOME | End: 2018-02-01
Admitting: EMERGENCY MEDICINE
Payer: SELF-PAY

## 2018-02-01 VITALS
RESPIRATION RATE: 19 BRPM | SYSTOLIC BLOOD PRESSURE: 148 MMHG | OXYGEN SATURATION: 99 % | DIASTOLIC BLOOD PRESSURE: 83 MMHG | HEART RATE: 89 BPM

## 2018-02-01 DIAGNOSIS — F31.9 BIPOLAR DISORDER, UNSPECIFIED: ICD-10-CM

## 2018-02-01 DIAGNOSIS — F60.3 BORDERLINE PERSONALITY DISORDER: ICD-10-CM

## 2018-02-01 PROCEDURE — 90792 PSYCH DIAG EVAL W/MED SRVCS: CPT | Mod: GC

## 2018-02-01 PROCEDURE — 99285 EMERGENCY DEPT VISIT HI MDM: CPT

## 2018-02-01 RX ORDER — HALOPERIDOL DECANOATE 100 MG/ML
5 INJECTION INTRAMUSCULAR ONCE
Qty: 0 | Refills: 0 | Status: COMPLETED | OUTPATIENT
Start: 2018-02-01 | End: 2018-02-01

## 2018-02-01 RX ADMIN — Medication 2 MILLIGRAM(S): at 18:46

## 2018-02-01 RX ADMIN — HALOPERIDOL DECANOATE 5 MILLIGRAM(S): 100 INJECTION INTRAMUSCULAR at 18:46

## 2018-02-01 NOTE — ED ADULT NURSE NOTE - OBJECTIVE STATEMENT
37F presents from [facility] with mild agitation and flashing people. Pt appears on edge and intrusive, denies SI/HI, auditory or visual hallucinations. Pt denies any complaints. 37F presents from [facility] with mild agitation and flashing people. Pt appears on edge, intrusive, but follows commands and cooperates denies SI/HI, auditory or visual hallucinations. Pt denies any complaints,

## 2018-02-01 NOTE — ED BEHAVIORAL HEALTH ASSESSMENT NOTE - OTHER
chronically low end of fair chronically limited chronically impaired peers CVM likely chronic delusions, not currently preoccupied

## 2018-02-01 NOTE — ED PROVIDER NOTE - MEDICAL DECISION MAKING DETAILS
This is a 37 year old Female BIBA from [facility] for psych eval r/t aggressive behavior. Per Ems patient was aggressive towards staff and flashing them. On arrival patient appears agitated , with intense glare, intrusive, yelling and screaming required Ativan 2mg/Haldol 5mg IM x 1. Medical evaluation performed. There is no clinical evidence of intoxication or any acute medical problem requiring immediate intervention. Final disposition will be determined by psychiatrist.

## 2018-02-01 NOTE — ED BEHAVIORAL HEALTH ASSESSMENT NOTE - CURRENT MEDICATION
(from discharge on 1/26)- Zyprexa 20mg BID, Lithium 600mg BID, Klonopin 0.5mg BID, Prolixin 5mg BID X 2 weeks, Prolixin Dec 12.5mg IM [id number] next injection 2/5/19

## 2018-02-01 NOTE — ED BEHAVIORAL HEALTH ASSESSMENT NOTE - RISK ASSESSMENT
Risk factors: chronic mental illness, previous suicide attempt, in between psychiatrist change, impulsivity, recent discharge from an inpatient unit    Protective factors: denies suicidality, positive social support, positive therapeutic relationship, no access to gun at home, help seeking and involved with treatment plan. The patient does not present an imminent risk of suicide at this time.    On homicidal risk assessment the patient denies assaultive or homicidal ideation/urges/lethal plan or history of such, denies access to weapons, denies history of homicide attempts or impulsive acting out, does not present with verbalized vindictiveness, denies history of current or recent substance abuse; satisfactory support system.      While the patient has slight risk due to chronic mental illness, she does not meet criteria for inpatient hospitalization and will be discharged to her ACT Team/ follow up with outpatient psychiatrist within [place]. Risk factors: chronic mental illness, previous suicide attempt, impulsivity, recent discharge from an inpatient unit    Protective factors:  positive social support (boyfriend), positive therapeutic relationship, no access to gun at home, help seeking and involved with treatment plan, compliant with meds. The patient does not present an imminent risk of suicide or harm due to a modifiable mental illness at present.     On homicidal risk assessment the patient denies assaultive or homicidal ideation/urges/lethal plan or history of such, denies access to weapons, denies history of homicide attempts or impulsive acting out, does not present with verbalized vindictiveness, denies history of current or recent substance abuse; satisfactory support system.      While the patient has slight risk due to chronic mental illness, she does not meet criteria for inpatient hospitalization and will be discharged to her ACT Team/ follow up with outpatient psychiatrist within [place].

## 2018-02-01 NOTE — ED BEHAVIORAL HEALTH ASSESSMENT NOTE - OTHER PAST PSYCHIATRIC HISTORY (INCLUDE DETAILS REGARDING ONSET, COURSE OF ILLNESS, INPATIENT/OUTPATIENT TREATMENT)
Hx of >20 inpatient psychiatric admissions, with last in [facility] January 2018, 3 prior suicide attempts (last in 2012).  History of medication non-compliance. currently on ACT/AOT with [facility]. lives at [place]. Chronic symptoms of disorganized thoughts  currently denies suicidal ideation homicidal ideation AVH or trina.

## 2018-02-01 NOTE — ED BEHAVIORAL HEALTH ASSESSMENT NOTE - SAFETY PLAN DETAILS
Safety planning done with patient and . Patient lives in psychiatric residential tx, denies having any firearms at home. Patient and  advised to call 911 or take the patient to the nearest ER if patient's behavior worsened or if there are any safety concerns. Patient verbalized understanding. Safety planning done with patient and . Patient lives in psychiatric residential tx, denies having any firearms at home. Patient and  advised to call 911 or take the patient to the nearest ER if patient's behavior worsened or if there are any safety concerns.

## 2018-02-01 NOTE — ED BEHAVIORAL HEALTH ASSESSMENT NOTE - SUICIDE PROTECTIVE FACTORS
Future oriented/Positive therapeutic relationships/Identifies reasons for living/Fear of death or dying due to pain/suffering Future oriented/Positive therapeutic relationships

## 2018-02-01 NOTE — ED BEHAVIORAL HEALTH ASSESSMENT NOTE - DIFFERENTIAL
bipolar disorder w/ PF  hx of borderline personality disorder bipolar disorder   hx of borderline personality disorder

## 2018-02-01 NOTE — ED BEHAVIORAL HEALTH NOTE - BEHAVIORAL HEALTH NOTE
Writer spoke with patient’s ACT team worker, Monica Moyer, [phone], for collateral information. She reported the following:    The patient has not been well since being discharged from  care at [facility] 1/26/18. The informant saw the patient today, at which time she was alternately happy and then tearful with crying. She changed into 5 different colorful outfits and 8 wigs during the visit. When asked why she did so, she stated it was to symbolize sexual freedom, and show that she is now liberated. She was talking to herself, telling herself not to be jealous of the fact that the informant was visiting with another client at the facility. The informant asked her why she had flashed her upper body to staff yesterday. She stated she did so to demonstrate that she has been tending to hygiene, which she had been told she was not doing well enough. She was able to state that her behavior had been inappropriate. She denied auditory hallucinations, though it was suspected she was responding to internal stimuli.     Ms. Moyer requested that the disposition be called in to the ACT team emergency line, [phone]. Writer spoke with patient’s ACT team worker, Monica Moyer, [phone], for collateral information. She reported the following:    The patient has not been well since being discharged from  care at [facility] 1/26/18. The informant saw the patient today, at which time she was alternately happy and then tearful with crying. She changed into 5 different colorful outfits and 8 wigs during the visit. When asked why she did so, she stated it was to symbolize sexual freedom, and show that she is now liberated. She was talking to herself, telling herself not to be jealous of the fact that the informant was visiting with another client at the facility. The informant asked her why she had flashed her upper body to staff yesterday. She stated she did so to demonstrate that she has been tending to hygiene, which she had been told she was not doing well enough. She was able to state that her behavior had been inappropriate. She denied auditory hallucinations, though it was suspected she was responding to internal stimuli.     Ms. Moyer requested that the disposition be called in to the ACT team emergency line, [phone]. After learning that the patient was to be discharged, writer called the ACT team emergency line and spoke with Toya, informing her of the disposition. Writer also called Nancy Bustamante, the on- from [facility] [address], and informed her of the discharge also. She stated she would call the home and inform them of the discharge. The patient will be sent home by ambulance, to be ordered by the unit nurse.

## 2018-02-01 NOTE — ED BEHAVIORAL HEALTH ASSESSMENT NOTE - HPI (INCLUDE ILLNESS QUALITY, SEVERITY, DURATION, TIMING, CONTEXT, MODIFYING FACTORS, ASSOCIATED SIGNS AND SYMPTOMS)
Patient is a single, 37-year-old  female, non caregiver, unemployed, domiciled on [place] grounds, with past psychiatric history of Bipolar Affective Disorder, Borderline Personality Disorder, cannabis abuse, multiple inpatient psychiatric hospitalizations (>20), most recently discharged from [facility] December 2017 from [facility] on 12/21/17 after 6 weeks of hospitalization, on ACT/AOT, with frequent visits to the [facility] (well-known to staff), 3 prior suicide attempts (last in 2012 via OD), recurrent suicidal gestures and suicidal ideation (none recently), history of property destruction when upset, no history of arrests or access to weapons, was BIBEMS activated by [facility] staff on account of patient taking off her shirt in the kitchen and refusing to put it back on.  No acute safety concerns; no change in baseline behavior.     Initially patient was uncooperative and guarded, but upon reinterview patient explained she hates living at [place] and wants a change and "is open to admission for a vacation from [place]." Denies any suicidal ideation or homicidal ideation.  Denies paranoia.  Has chronic obsessive thoughts about Hitler ("He had a good idea" when asked what that idea was, patient states "I really am not sure but he was ok").  When asked why patient took off clothes, she states b/c staff refused to allow her to shower too much.  Expresses remorse.  Pt says she has been talking to herself ever since she was a child but is not hearing voices. "I Have heard voices but not now. All I can say is my mind is different but I am not trying to hurt anyone."  Patient has no acute safety concerns; denies side effects from medications; reports she is sad her psychiatrist keeps changes; denies depressed mood, hopelessness, helplessness, racing thoughts, A/V hallucinations, or paranoia. + preoccuptions regarding Hitler & Russians.  Pt also specifically and adamantly denies suicidal and homicidal ideations, intent or plan at this time.  Pt was takes Zyprexa 20mg daily, trileptal 300 mg BID, Lithium 300mg TID, and states staff at [facility] administer her medications daily.  Has an ACT team and is awaiting being assigned to new psychiatrist.  Feels safe to be discharged back to [place] upon discharge. Explained to patient if she feels a decline in functioning or feels she is being harmed to call 911 or return to ER. Patient agreeable and has no acute safety concerns.    Collateral information obtained from Mary Jo Funk :  Patient took off her clothes in the kitchen without trigger and would not place her clothes back on. Staff did not know what to do so called EMS. Has not been acting bizarrely since [facility] D/c in December. Takes medication daily; no sign of harming self or others; has not made any violent or self injurious gestures. Patient has been chronically ill. Staff know when she is not at her baseline and feel comfortable sending patient back to [facility] ED if any changes in baseline behavior including cognition or med side effects occur. Staff do not have any safety concern for her (adamantly denies suicidal ideation homicidal ideation AV). While patient has disorganized thought process at baseline and is awaiting a psychiatrist, is being followed by ACT team, will be seen by  Monday and is living in psychiatric residential facility where staff monitor her daily & is administer her medication.  offers no impediment in treatment plan. Patient is a single, 37-year-old  female, non caregiver, unemployed, domiciled on [place] grounds, with past psychiatric history of Bipolar Affective Disorder, Borderline Personality Disorder, cannabis abuse, multiple inpatient psychiatric hospitalizations (>20), most recently discharged from [facility] January 2018 from [facility] on 1/26/18, on ACT/AOT, with frequent visits to the [facility] (well-known to staff), 3 prior suicide attempts (last in 2012 via OD), recurrent suicidal gestures and suicidal ideation (none recently), provocativeness, history of property destruction when upset, no history of arrests or access to weapons, was BIBEMS activated by [facility] staff after she flashed them. It appears that the patient asked the be brought to [facility] (per  collateral note)  No acute safety concerns; no change in baseline behavior.    Upon presentation, patient is in bed after receiving pre-emptive Haldol 5mg and Ativan 2mg which she states she likes.  Patient is provacative with writer, commenting on his eyes, making up and performing poetry on the spot. She states she is a free spirit and flashes her breast at the writer.  She states "we were born naked why do I have to wear clothes?!" Patient states she wants to be admitted to work on her DBT skills and see her friends on [facility] [place].  She states she does not like the staff at [place] and that they upset her.  Patient reports sleeping and eating, and asks for a meal here.  She states she is "depressed" but is smiling and laughing.  When asked what she would do if she were to be discharged she states she would jump in front of a train.  When pushed she said it would be the F train.  Writer asked how should get there from [place] she stated she would walk (it's 4 miles and it's winter),  Patient never mentioned suicide until possibility of being discharged.  Patient denies hallucinations and states the last time she hallucinated was in 2012.  She denies self harm and suicide attempts since 2012.  She denies all substance abuse.      When  attending approached her patient states her problem isn't psychiatric and admits she does not want to go back to [place].      Collateral obtained from multiple sources including ACT team and  (please see  NOTE, patient just saw ACT team today and they did not believe she needed to be hospitalized).  Writer personally obtained collateral from Marek Whitman MD who is the inpatient psychiatrist who treated her in throughout most of December and January.  He states this behavior is baseline (which is confirmed by past  notes) and that she is on two antipsychotics at high dosages, one being an PALACIOS, and on lithium, and a benzodiazpene.  He does not believe she needs to be admitted.  Additionally per  collateral ( note) patient has not been suicidal, and taking her medications since discharge.

## 2018-02-01 NOTE — ED PROVIDER NOTE - OBJECTIVE STATEMENT
This is a 37 year old Female This is a 37 year old Female BIBA from [facility] for psych eval r/t aggressive behavior. Per Ems patient was aggressive towards staff and flashing them. On arrival patient appears agitated , with intense glare, intrusive, yelling and screaming required Ativan 2mg/Haldol 5mg IM x 1> This is a 37 year old Female BIBA from [facility] for psych eval r/t aggressive behavior. Per Ems patient was aggressive towards staff and flashing them. On arrival patient appears agitated , with intense glare, intrusive, yelling and screaming required Ativan 2mg/Haldol 5mg IM x 1.

## 2018-02-01 NOTE — ED BEHAVIORAL HEALTH ASSESSMENT NOTE - DETAILS
per HPI Tattoos see HPI. Poor response to Geodon; Depakote (Increased ammonia levels) Mary Jo Funk -  at [place] patient not being readmitted Pt was discharged from [place] [address] on Jan 26 [place]- on call ACT team/ [facility] staff (see  notes) patient is provacative with threatening suicide if sent back to [place] see HPI.- none currently

## 2018-02-01 NOTE — ED BEHAVIORAL HEALTH ASSESSMENT NOTE - CASE SUMMARY
Pt with history of  bipolar d/o, borderline PD, and cannabis abuse. Pt was BIBEMS  activated by [facility] staff. Pt reportedly flashed herself at her residence. Pt reportedly has been taking her meds. Pt appears to be at her baseline and will be discharged back to [facility].

## 2018-02-01 NOTE — ED BEHAVIORAL HEALTH ASSESSMENT NOTE - DESCRIPTION
Patient was calm and cooperative in the ED and did not exhibit any aggression. Pt did not require any prn medications or any physical restraints.    Vital Signs Last 24 Hrs  T(C): --  T(F): --  HR: 89 (01 Feb 2018 18:27) (89 - 89)  BP: 148/83 (01 Feb 2018 18:27) (148/83 - 148/83)  BP(mean): --  RR: 19 (01 Feb 2018 18:27) (19 - 19)  SpO2: 99% (01 Feb 2018 18:27) (99% - 99%) Patient stated before she does not have knowledge of biological family; Lives in [facility] for several years; Receives SSI/SSD; Stated has "some college". GERD, Asthma, Calculus of Bladder without cholecystitis without obstruction. Pre-emptive IM by medical NP due to provacative nature.  Besides that she was happy and eating.  Appears disorganized and eccentric but at baseline per history.    Vital Signs Last 24 Hrs  T(C): --  T(F): --  HR: 89 (01 Feb 2018 18:27) (89 - 89)  BP: 148/83 (01 Feb 2018 18:27) (148/83 - 148/83)  BP(mean): --  RR: 19 (01 Feb 2018 18:27) (19 - 19)  SpO2: 99% (01 Feb 2018 18:27) (99% - 99%)

## 2018-02-01 NOTE — ED BEHAVIORAL HEALTH ASSESSMENT NOTE - SUMMARY
Patient is a single, 37-year-old  female, non caregiver, unemployed, domiciled on [place] grounds, with past psychiatric history of Bipolar Affective Disorder, Borderline Personality Disorder, cannabis abuse, multiple inpatient psychiatric hospitalizations (>20), most recently discharged from [facility] December 2017 after 6 weeks of hospitalization, on ACT/AOT, with frequent visits to the [facility] (well-known to staff), 3 prior suicide attempts (last in 2012 via OD), recurrent suicidal gestures and suicidal ideation (none recently), history of property destruction when upset, no history of arrests or access to weapons, was BIBEMS activated by [place] staff on account of patient taking off her shirt in the kitchen and refusing to put it back on. No acute safety concerns; no change in baseline behavior.     Patient presents without any acute safety concerns, as per risk assessment and collateral information by . Patient denies suicidal ideation, homicidal ideation, no trina evident on exam. Chronic disorganized behaviors in the context of chronic mental illness. While patient has disorganized thought process at baseline and is awaiting a psychiatrist, is being followed by ACT team, will be seen by  Monday and is living in psychiatric residential facility where staff monitor her daily & is administer her medication.  and patient offer no impediment in treatment plan. Patient will be sent to [place] [address] residence via taxi cab and worker will meet patient at front. Patient is a single, 37-year-old  female, non caregiver, unemployed, domiciled on [place] grounds, with past psychiatric history of Bipolar Affective Disorder, Borderline Personality Disorder, cannabis abuse, multiple inpatient psychiatric hospitalizations (>20), most recently discharged from [facility] January 2018 from [facility] on 1/26/18, on ACT/AOT, [facility] staff), 3 prior suicide attempts (last in 2012 via OD), recurrent suicidal gestures and suicidal ideation (none recently), provocativeness, history of property destruction when upset, no history of arrests or access to weapons, was BIBEMS activated by [facility] staff after she flashed them. It appears that the patient asked the be brought to [facility] (per  collateral note)  No acute safety concerns; no change in baseline behavior.      Patient presents without any acute safety concerns, as per risk assessment and collateral information by . Patient is provacative with suicidality but this is reactive to the possibility of being discharge, no trina evident on exam. Chronic disorganized behaviors in the context of chronic mental illness. While patient has disorganized thought process at baseline, has an ACT team, and is living in psychiatric residential facility where staff monitor her daily & is administer her medication.  and patient offer no impediment in treatment plan (confirms taking meds). Patient will be sent to [place] [address] residence.  Will require ambulance due to getting IM ativan.

## 2018-02-01 NOTE — ED BEHAVIORAL HEALTH NOTE - BEHAVIORAL HEALTH NOTE
Worker spoke to  on call Nancysamia Carrillo ([phone]) and  Mary Joclovissatnam Ponce. All information is as per MsNoel Gerardo and Ms. Ponce:    Patient is a 37 year old female, domiciled at [place] [address] on [place] ground, BIBEMS activated by residential facility [place] [address]. Ms. Carrillo states that today patient was yelling and flashing her upper body to staff and residents in the residence. Ms. Carrillo  states she kept flashing her upper body several times and even flashed the police officers upon arrival. Ms. Carrillo reports that patient was changing her clothes frequently like 7-8 times and was speaking incorrectly.  Ms. Carrillo states that patient has been compliant with medications and is not on any drugs or alcohol to her knowledge. Ms. Ponce reports that patient has presented to the emergency room several times and insisted that she be brought to [facility] today because she needs help. Ms. Ponce states that the ACT team seen her today but they did not make any changes that she is aware of. Ms. Ponce reports that last Friday patient was discharge from the inpatient unit at [facility] and the patient’s medication dosage was decreased. Ms. Ponce states she took the patient’s case in December and has never seen her not symptomatic. Ms. Ponce asked patient why she kept flashing staff and she stated that she wanted to show that she was clean.  Ms. Ponce also states that she provided medication list (in chart) about medications that patient is on. Ms. Ponce reports no medical problems and reports that patient showers, eats, and sleeps regularly. Ms. Ponce reports that the patient was pointing at the  staff stating, “I will [facility].” Yesterday, Ms. Ponce reports that the patient stated that she has the cure for aids. Ms. Ponce also states that the patient has a past diagnosis of boardline disorder.  Ms. Ponce states that patient is not SI, HI, and does not believe patient is having AH/ VH or delusions. Worker called the [facility] team therapist Katie [phone], and left a message to return call, worker provided call back number.

## 2018-03-10 ENCOUNTER — INPATIENT (INPATIENT)
Facility: HOSPITAL | Age: 38
LOS: 8 days | Discharge: HOME | End: 2018-03-19
Attending: PSYCHIATRY & NEUROLOGY | Admitting: PSYCHIATRY & NEUROLOGY
Payer: COMMERCIAL

## 2018-03-10 VITALS
SYSTOLIC BLOOD PRESSURE: 158 MMHG | OXYGEN SATURATION: 100 % | DIASTOLIC BLOOD PRESSURE: 99 MMHG | RESPIRATION RATE: 16 BRPM | HEART RATE: 98 BPM

## 2018-03-10 DIAGNOSIS — F12.11 CANNABIS ABUSE, IN REMISSION: ICD-10-CM

## 2018-03-10 DIAGNOSIS — F25.0 SCHIZOAFFECTIVE DISORDER, BIPOLAR TYPE: ICD-10-CM

## 2018-03-10 DIAGNOSIS — F31.9 BIPOLAR DISORDER, UNSPECIFIED: ICD-10-CM

## 2018-03-10 LAB
ALBUMIN SERPL ELPH-MCNC: 3.7 G/DL — SIGNIFICANT CHANGE UP (ref 3.3–5)
ALP SERPL-CCNC: 90 U/L — SIGNIFICANT CHANGE UP (ref 40–120)
ALT FLD-CCNC: 27 U/L — SIGNIFICANT CHANGE UP (ref 4–33)
AMPHET UR-MCNC: NEGATIVE — SIGNIFICANT CHANGE UP
APAP SERPL-MCNC: < 15 UG/ML — LOW (ref 15–25)
APPEARANCE UR: CLEAR — SIGNIFICANT CHANGE UP
AST SERPL-CCNC: 26 U/L — SIGNIFICANT CHANGE UP (ref 4–32)
BARBITURATES MEASUREMENT: NEGATIVE — SIGNIFICANT CHANGE UP
BARBITURATES UR SCN-MCNC: NEGATIVE — SIGNIFICANT CHANGE UP
BASOPHILS # BLD AUTO: 0.03 K/UL — SIGNIFICANT CHANGE UP (ref 0–0.2)
BASOPHILS NFR BLD AUTO: 0.3 % — SIGNIFICANT CHANGE UP (ref 0–2)
BENZODIAZ SERPL-MCNC: NEGATIVE — SIGNIFICANT CHANGE UP
BENZODIAZ UR-MCNC: NEGATIVE — SIGNIFICANT CHANGE UP
BILIRUB SERPL-MCNC: < 0.2 MG/DL — LOW (ref 0.2–1.2)
BILIRUB UR-MCNC: NEGATIVE — SIGNIFICANT CHANGE UP
BLOOD UR QL VISUAL: NEGATIVE — SIGNIFICANT CHANGE UP
BUN SERPL-MCNC: 8 MG/DL — SIGNIFICANT CHANGE UP (ref 7–23)
CALCIUM SERPL-MCNC: 8.9 MG/DL — SIGNIFICANT CHANGE UP (ref 8.4–10.5)
CANNABINOIDS UR-MCNC: NEGATIVE — SIGNIFICANT CHANGE UP
CHLORIDE SERPL-SCNC: 109 MMOL/L — HIGH (ref 98–107)
CO2 SERPL-SCNC: 19 MMOL/L — LOW (ref 22–31)
COCAINE METAB.OTHER UR-MCNC: NEGATIVE — SIGNIFICANT CHANGE UP
COLOR SPEC: SIGNIFICANT CHANGE UP
CREAT SERPL-MCNC: 0.73 MG/DL — SIGNIFICANT CHANGE UP (ref 0.5–1.3)
EOSINOPHIL # BLD AUTO: 0.66 K/UL — HIGH (ref 0–0.5)
EOSINOPHIL NFR BLD AUTO: 5.9 % — SIGNIFICANT CHANGE UP (ref 0–6)
ETHANOL BLD-MCNC: < 10 MG/DL — SIGNIFICANT CHANGE UP
GLUCOSE SERPL-MCNC: 114 MG/DL — HIGH (ref 70–99)
GLUCOSE UR-MCNC: NEGATIVE — SIGNIFICANT CHANGE UP
HCG SERPL-ACNC: < 5 MIU/ML — SIGNIFICANT CHANGE UP
HCT VFR BLD CALC: 38.6 % — SIGNIFICANT CHANGE UP (ref 34.5–45)
HGB BLD-MCNC: 12.1 G/DL — SIGNIFICANT CHANGE UP (ref 11.5–15.5)
IMM GRANULOCYTES # BLD AUTO: 0.06 # — SIGNIFICANT CHANGE UP
IMM GRANULOCYTES NFR BLD AUTO: 0.5 % — SIGNIFICANT CHANGE UP (ref 0–1.5)
KETONES UR-MCNC: NEGATIVE — SIGNIFICANT CHANGE UP
LEUKOCYTE ESTERASE UR-ACNC: NEGATIVE — SIGNIFICANT CHANGE UP
LITHIUM SERPL-MCNC: 0.57 MMOL/L — LOW (ref 0.6–1.2)
LYMPHOCYTES # BLD AUTO: 2.83 K/UL — SIGNIFICANT CHANGE UP (ref 1–3.3)
LYMPHOCYTES # BLD AUTO: 25.3 % — SIGNIFICANT CHANGE UP (ref 13–44)
MCHC RBC-ENTMCNC: 29.5 PG — SIGNIFICANT CHANGE UP (ref 27–34)
MCHC RBC-ENTMCNC: 31.3 % — LOW (ref 32–36)
MCV RBC AUTO: 94.1 FL — SIGNIFICANT CHANGE UP (ref 80–100)
METHADONE UR-MCNC: NEGATIVE — SIGNIFICANT CHANGE UP
MONOCYTES # BLD AUTO: 0.8 K/UL — SIGNIFICANT CHANGE UP (ref 0–0.9)
MONOCYTES NFR BLD AUTO: 7.1 % — SIGNIFICANT CHANGE UP (ref 2–14)
MUCOUS THREADS # UR AUTO: SIGNIFICANT CHANGE UP
NEUTROPHILS # BLD AUTO: 6.82 K/UL — SIGNIFICANT CHANGE UP (ref 1.8–7.4)
NEUTROPHILS NFR BLD AUTO: 60.9 % — SIGNIFICANT CHANGE UP (ref 43–77)
NITRITE UR-MCNC: NEGATIVE — SIGNIFICANT CHANGE UP
NRBC # FLD: 0 — SIGNIFICANT CHANGE UP
OPIATES UR-MCNC: NEGATIVE — SIGNIFICANT CHANGE UP
OXYCODONE UR-MCNC: NEGATIVE — SIGNIFICANT CHANGE UP
PCP UR-MCNC: NEGATIVE — SIGNIFICANT CHANGE UP
PH UR: 6.5 — SIGNIFICANT CHANGE UP (ref 4.6–8)
PLATELET # BLD AUTO: 284 K/UL — SIGNIFICANT CHANGE UP (ref 150–400)
PMV BLD: 8.8 FL — SIGNIFICANT CHANGE UP (ref 7–13)
POTASSIUM SERPL-MCNC: 3.5 MMOL/L — SIGNIFICANT CHANGE UP (ref 3.5–5.3)
POTASSIUM SERPL-SCNC: 3.5 MMOL/L — SIGNIFICANT CHANGE UP (ref 3.5–5.3)
PROT SERPL-MCNC: 7.5 G/DL — SIGNIFICANT CHANGE UP (ref 6–8.3)
PROT UR-MCNC: 20 MG/DL — SIGNIFICANT CHANGE UP
RBC # BLD: 4.1 M/UL — SIGNIFICANT CHANGE UP (ref 3.8–5.2)
RBC # FLD: 13.8 % — SIGNIFICANT CHANGE UP (ref 10.3–14.5)
SALICYLATES SERPL-MCNC: < 5 MG/DL — LOW (ref 15–30)
SODIUM SERPL-SCNC: 144 MMOL/L — SIGNIFICANT CHANGE UP (ref 135–145)
SP GR SPEC: 1.01 — SIGNIFICANT CHANGE UP (ref 1–1.04)
SQUAMOUS # UR AUTO: SIGNIFICANT CHANGE UP
TSH SERPL-MCNC: 6.07 UIU/ML — HIGH (ref 0.27–4.2)
UROBILINOGEN FLD QL: NORMAL MG/DL — SIGNIFICANT CHANGE UP
WBC # BLD: 11.2 K/UL — HIGH (ref 3.8–10.5)
WBC # FLD AUTO: 11.2 K/UL — HIGH (ref 3.8–10.5)
WBC UR QL: SIGNIFICANT CHANGE UP (ref 0–?)

## 2018-03-10 PROCEDURE — 99285 EMERGENCY DEPT VISIT HI MDM: CPT

## 2018-03-10 RX ORDER — DIPHENHYDRAMINE HCL 50 MG
25 CAPSULE ORAL EVERY 6 HOURS
Qty: 0 | Refills: 0 | Status: DISCONTINUED | OUTPATIENT
Start: 2018-03-10 | End: 2018-03-19

## 2018-03-10 RX ORDER — HALOPERIDOL DECANOATE 100 MG/ML
5 INJECTION INTRAMUSCULAR EVERY 6 HOURS
Qty: 0 | Refills: 0 | Status: DISCONTINUED | OUTPATIENT
Start: 2018-03-10 | End: 2018-03-19

## 2018-03-10 RX ORDER — FAMOTIDINE 10 MG/ML
20 INJECTION INTRAVENOUS DAILY
Qty: 0 | Refills: 0 | Status: DISCONTINUED | OUTPATIENT
Start: 2018-03-10 | End: 2018-03-19

## 2018-03-10 RX ORDER — ALBUTEROL 90 UG/1
2 AEROSOL, METERED ORAL EVERY 6 HOURS
Qty: 0 | Refills: 0 | Status: DISCONTINUED | OUTPATIENT
Start: 2018-03-10 | End: 2018-03-19

## 2018-03-10 RX ORDER — OLANZAPINE 15 MG/1
10 TABLET, FILM COATED ORAL AT BEDTIME
Qty: 0 | Refills: 0 | Status: DISCONTINUED | OUTPATIENT
Start: 2018-03-10 | End: 2018-03-19

## 2018-03-10 RX ORDER — OLANZAPINE 15 MG/1
20 TABLET, FILM COATED ORAL AT BEDTIME
Qty: 0 | Refills: 0 | Status: DISCONTINUED | OUTPATIENT
Start: 2018-03-10 | End: 2018-03-19

## 2018-03-10 RX ORDER — LITHIUM CARBONATE 300 MG/1
600 TABLET, EXTENDED RELEASE ORAL
Qty: 0 | Refills: 0 | Status: DISCONTINUED | OUTPATIENT
Start: 2018-03-10 | End: 2018-03-10

## 2018-03-10 RX ORDER — IBUPROFEN 200 MG
600 TABLET ORAL EVERY 6 HOURS
Qty: 0 | Refills: 0 | Status: DISCONTINUED | OUTPATIENT
Start: 2018-03-10 | End: 2018-03-19

## 2018-03-10 RX ORDER — OLANZAPINE 15 MG/1
10 TABLET, FILM COATED ORAL DAILY
Qty: 0 | Refills: 0 | Status: DISCONTINUED | OUTPATIENT
Start: 2018-03-10 | End: 2018-03-19

## 2018-03-10 RX ORDER — LITHIUM CARBONATE 300 MG/1
900 TABLET, EXTENDED RELEASE ORAL AT BEDTIME
Qty: 0 | Refills: 0 | Status: DISCONTINUED | OUTPATIENT
Start: 2018-03-10 | End: 2018-03-19

## 2018-03-10 RX ORDER — ACETAMINOPHEN 500 MG
650 TABLET ORAL EVERY 6 HOURS
Qty: 0 | Refills: 0 | Status: DISCONTINUED | OUTPATIENT
Start: 2018-03-10 | End: 2018-03-19

## 2018-03-10 RX ORDER — LITHIUM CARBONATE 300 MG/1
300 TABLET, EXTENDED RELEASE ORAL DAILY
Qty: 0 | Refills: 0 | Status: DISCONTINUED | OUTPATIENT
Start: 2018-03-10 | End: 2018-03-19

## 2018-03-10 RX ORDER — CLONAZEPAM 1 MG
0.5 TABLET ORAL
Qty: 0 | Refills: 0 | Status: DISCONTINUED | OUTPATIENT
Start: 2018-03-10 | End: 2018-03-15

## 2018-03-10 RX ORDER — DIPHENHYDRAMINE HCL 50 MG
25 CAPSULE ORAL ONCE
Qty: 0 | Refills: 0 | Status: DISCONTINUED | OUTPATIENT
Start: 2018-03-10 | End: 2018-03-19

## 2018-03-10 RX ADMIN — HALOPERIDOL DECANOATE 5 MILLIGRAM(S): 100 INJECTION INTRAMUSCULAR at 21:30

## 2018-03-10 RX ADMIN — Medication 0.5 MILLIGRAM(S): at 20:46

## 2018-03-10 RX ADMIN — Medication 25 MILLIGRAM(S): at 21:30

## 2018-03-10 RX ADMIN — OLANZAPINE 10 MILLIGRAM(S): 15 TABLET, FILM COATED ORAL at 20:46

## 2018-03-10 RX ADMIN — OLANZAPINE 20 MILLIGRAM(S): 15 TABLET, FILM COATED ORAL at 20:45

## 2018-03-10 RX ADMIN — LITHIUM CARBONATE 900 MILLIGRAM(S): 300 TABLET, EXTENDED RELEASE ORAL at 20:46

## 2018-03-10 NOTE — ED BEHAVIORAL HEALTH ASSESSMENT NOTE - OTHER PAST PSYCHIATRIC HISTORY (INCLUDE DETAILS REGARDING ONSET, COURSE OF ILLNESS, INPATIENT/OUTPATIENT TREATMENT)
> 12 prior inpatient psychiatric admission to [facility] alone since 2011, lifetime inpatient admissions > 20   - > 31 prior [facility] ED visits alone  - 3 prior suicide attempts (last in 2012 via OD) as per records, recurrent suicidal gestures and suicidal ideation (none recently), history of property destruction ((breaking TV screens while hospitalized) when upset / acting out   - long hx of sexually provocative, acting out behaviors (during last [facility] inpatient admission, patient had to be placed on CO 1:1 after trying to perform oral sex on a male patient on the dayroom, taken off CO then was going into male patient's room, overheard offering them sex and was placed back on CO  - in 2017 was also followed by ACT Team [therapist Katie [phone], ACT ([facility])/ Victor Manuel Browinng : [phone]; AOT /Grace Donnelly: [phone]; Psychiatrist / Dr. Flores: [phone], [phone], [phone]],

## 2018-03-10 NOTE — ED PROVIDER NOTE - OBJECTIVE STATEMENT
The patient is a 37y Female  hx of bipolar disorder, asthma, borderline personality disorder, cholelithiasis, depression compliant with meds sent to for psych eval 2/2 aggression toward staff at [place].  Pt denies all medical complaints and tolerating po, no recent injuries, trauma or falls, no headache, back or neck pain, no abd/flank pain, no uti/urinary symptoms, nausea or vomiting, no fever or chills, no cp or sob, no palpitations or diaphoresis.  Denies etoh or illicit drugs, no SI/HI, no AVH.

## 2018-03-10 NOTE — ED BEHAVIORAL HEALTH ASSESSMENT NOTE - CURRENT ACTIVE IDEATION
Date of Service: 12/04/2017    HISTORY OF PRESENT ILLNESS:  This is a 38-year-old  male who is very well known to my practice with a history of COPD and obstructive sleep apnea. He was last seen in February and comes in today for routine 6-month followup. The patient carries a history of moderate obstructive sleep apnea with AHI of 18. He is on auto CPAP. Previous download has shown excellent compliance. He sleeps well with the device but did not necessarily notice any increased energy since going on the CPAP. He has a history of pulmonary nodule which was found on lung cancer screening CT scan but resolved on subsequent imaging. He will be due for another scan later this winter. He has tried a variety of inhalers, none of which have given him any significant benefit. Last time I saw him, we switched him from [facility] to [facility]. He is complaining of upper respiratory infection type symptoms for about 2 weeks. He thinks that he acquired it on a plane coming back from [place]. He is coughing up green secretions and finding it a little bit more difficult to breathe. His biggest concern is pain in his lower extremities. He has seen 2 rheumatologists and a neurologist, and unfortunately nobody has really been able to give him any answers. Last rheumatology note suggested a 2-week trial of low-dose Prednisone as if it was an inflammatory would expect to see some improvement; however, he does not recall ever having gotten the prescription. He is also complaining of feeling plugged in his ears. PAST MEDICAL HISTORY, FAMILY HISTORY AND SOCIAL HISTORY:  Reviewed and is unchanged. MEDICATIONS:  Updated. REVIEW OF SYSTEMS:   A 10-point review of systems was performed and was otherwise negative. PHYSICAL EXAMINATION:  GENERAL:  He is a well-developed, well-nourished male who is in no apparent distress. LUNGS:  Clear. No wheezes, rhonchi or crackles.   CARDIAC:  Rate and rhythm are regular. ABDOMEN:  Soft, nontender to palpation. EXTREMITIES:  Warm and dry, no pretibial edema. NEUROLOGICALLY:  He is grossly normal.    IMPRESSION:    1. Moderate chronic obstructive pulmonary disease. 2.  Moderate obstructive sleep apnea. 3.  Former tobacco dependence. PLAN:  The patient will try going off Bevespi to see if he notes any difference. He just switched to Medicare and he was told that his co-pay would be $500 due to his Part D coverage. If he does not notice much benefit, he may stay off of it. Will continue with the CPAP. He will be due for lung cancer screening CT scan next year. I am going to give him 5 days of Zithromax and 5 days of Prednisone. If the leg pain has an inflammatory component, would expect that it should improve while on the Prednisone. I am going to send him to Dr. Beckie Aguirre of vascular medicine and Dr. Jenny Kaufman of ENT. I am going to check him for alpha-1 antitrypsin deficiency as he lost a brother to COPD and his second brother is also nearing the end of his road with regards to COPD. I will tentatively see him back in the office in 6 months; however, he was encouraged to call sooner if any problems arise. Dictated By: Tahmina Ferrell NP  Signing Provider: MD AMBAR Rodriguez/max ([id number])  DD: 12/04/2017 15:22:12 TD: 12/05/2017 07:14:05    Copy Sent To:     cc: Cruzito Dumont MD None known
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
## 2018-03-10 NOTE — ED BEHAVIORAL HEALTH ASSESSMENT NOTE - HPI (INCLUDE ILLNESS QUALITY, SEVERITY, DURATION, TIMING, CONTEXT, MODIFYING FACTORS, ASSOCIATED SIGNS AND SYMPTOMS)
PATIENT IS WELL KNOWN TO WRITER WHO TREATED HER AS INPATIENT AT [facility] UNIT 2B IN SEPTEMBER OF 2016: Patient is a single, 37-year-old  female, non caregiver, unemployed, on disability for mental illness, domiciled at [facility] at this time (phone [phone];  Javid), before that was living at [facility] on [place] grounds, followed by [facility] ACT Team (phone [phone]; Orville Gorman [phone]); with past psychiatric history of most likely actual Schizoaffective Bipolar Type, Borderline Personality Disorder, Cannabis abuse, many prior inpatient psychiatric hospitalizations (>22), most recently at [place] [address] from 1/10/18-1/26/18 (not taking her medications and engaging in highly disorganized behavior such as drinking out of the toilet), previously also at  from 11/6/17-12/21/17, has been back to [facility] ED twice since that time, with numerous prior visits to the [facility] ED (well-known to staff), 3 prior suicide attempts (last in 2012 via OD), recurrent suicidal gestures and suicidal ideation (none recently), history of property destruction when upset, long hx of sexually provocative behavior (both out in the community and while on inpatient units), no history of arrests or access to weapons, long hx of medication and treatment noncompliance who presents today       Patient was admitted to PATIENT IS WELL KNOWN TO WRITER WHO TREATED HER AS INPATIENT AT [facility] UNIT 2B IN SEPTEMBER OF 2016: Patient is a single, 37-year-old  female, non caregiver, unemployed, on disability for mental illness, domiciled at [facility] at this time (phone [phone];  Javid), before that was living at [facility] on [place] grounds, followed by [facility] ACT Team (phone [phone]; Orville Gorman [phone]); with past psychiatric history of most likely actual Schizoaffective Bipolar Type, Borderline Personality Disorder, Cannabis abuse, many prior inpatient psychiatric hospitalizations (>22), most recently at [place] [address] from 1/10/18-1/26/18 (not taking her medications and engaging in highly disorganized behavior such as drinking out of the toilet), previously also at  from 11/6/17-12/21/17, has been back to [facility] ED twice since that time, with numerous prior visits to the [facility] ED (well-known to staff), 3 prior suicide attempts (last in 2012 via OD), recurrent suicidal gestures and suicidal ideation (none recently), history of property destruction when upset, long hx of sexually provocative behavior (both out in the community and while on inpatient units), no history of arrests or access to weapons, long hx of medication and treatment noncompliance who presents today from her [facility] residence after Patient went up to staff and said she feels "agitated and confused." Hence 911 was called and she was sent to the ED for evaluation.     COLLATERAL FROM [facility] RESIDENCE  MACIEL: As per Maciel, she is the only staff there today. She knows Patient well and describes her baseline as "never seen her well". Today, Patient was inside her room and was heard screaming by herself. (no words, just screaming nonsensically). She came out of her room, went into the kitchen, screaming more and was asked by Maciel is she was ok which she replied "yes." Patient then returned to her room. Some time later, Patient came out of her room again and went up to Maciel telling her that "I feel agitated and confused." As per Maciel, Patient is usually like this at baseline (ie frequently yells without seeming purpose and is disorganized). Today was different because Patient came upt o staff and complained about not feeling well (ie. "confused and agitated).     Maciel transferred Writer to Patient's  Javid's office extension - recording did not leave an 'after hours emergency number" and just said to either call 911 or the residence's .  will return Monday. PATIENT IS WELL KNOWN TO WRITER WHO TREATED HER AS INPATIENT AT [facility] UNIT 2B IN SEPTEMBER OF 2016: Patient is a single, 37-year-old  female, non caregiver, unemployed, on disability for mental illness, domiciled at [facility] at this time (phone [phone];  Jaivd), before that was living at [facility] on [place] grounds, followed by [facility] ACT Team (phone [phone]; Orville Gorman [phone]); with past psychiatric history of most likely actual Schizoaffective Bipolar Type, Borderline Personality Disorder, Cannabis abuse, many prior inpatient psychiatric hospitalizations (>22), most recently at [place] [address] from 1/10/18-1/26/18 (not taking her medications and engaging in highly disorganized behavior such as drinking out of the toilet), previously also at  from 11/6/17-12/21/17, has been back to [facility] ED twice since that time, with numerous prior visits to the [facility] ED (well-known to staff), 3 prior suicide attempts (last in 2012 via OD), recurrent suicidal gestures and suicidal ideation (none recently), history of property destruction when upset, long hx of sexually provocative behavior (both out in the community and while on inpatient units), no history of arrests or access to weapons, long hx of medication and treatment noncompliance who presents today from her [facility] residence after Patient went up to staff and said she feels "agitated and confused." Hence 911 was called and she was sent to the ED for evaluation.     COLLATERAL FROM [facility] RESIDENCE  MACIEL: As per Maciel, she is the only staff there today. She knows Patient well and describes her baseline as "never seen her well". Today, Patient was inside her room and was heard screaming by herself. (no words, just screaming nonsensically). She came out of her room, went into the kitchen, screaming more and was asked by Maciel is she was ok which she replied "yes." Patient then returned to her room. Some time later, Patient came out of her room again and went up to Maciel telling her that "I feel agitated and confused." As per Maciel, Patient is usually like this at baseline (ie frequently yells without seeming purpose and is disorganized). Today was different because Patient came upt o staff and complained about not feeling well (ie. "confused and agitated).     Maciel transferred Writer to Patient's  Javid's office extension - recording did not leave an 'after hours emergency number" and just said to either call 911 or the residence's .  will return Monday.    COLLATERAL FROM COVERING  PEYMAN: PATIENT IS WELL KNOWN TO WRITER WHO TREATED HER AS INPATIENT AT [facility] UNIT 2B IN SEPTEMBER OF 2016: Patient is a single, 37-year-old  female, non caregiver, unemployed, on disability for mental illness, domiciled at [facility] at this time (phone [phone];  Javid), before that was living at [facility] on [place] grounds, followed by [facility] ACT Team (phone [phone]; Orville Gorman [phone]); with past psychiatric history of most likely actual Schizoaffective Bipolar Type, Borderline Personality Disorder, Cannabis abuse, many prior inpatient psychiatric hospitalizations (>22), most recently at [place] [address] from 1/10/18-1/26/18 (not taking her medications and engaging in highly disorganized behavior such as drinking out of the toilet), previously also at  from 11/6/17-12/21/17, has been back to [facility] ED twice since that time, with numerous prior visits to the [facility] ED (well-known to staff), 3 prior suicide attempts (last in 2012 via OD), recurrent suicidal gestures and suicidal ideation (none recently), history of property destruction when upset, long hx of sexually provocative behavior (both out in the community and while on inpatient units), no history of arrests or access to weapons, long hx of medication and treatment noncompliance who presents today from her [facility] residence after Patient went up to staff and said she feels "agitated and confused." Hence 911 was called and she was sent to the ED for evaluation.     Patient states that she has not been sleeping more than 4 hours for the last few weeks, and she usually sleeps 8. She states she cannot fall asleep and does not know why. Her mood has been down and she feels hopeless in that she will not get better. She says she takes her medication but dislikes the lithium as it makes her feel sleepy and weak. She cannot recall her last Prolixin Dec shot - next dose maybe due next week; she denies skipping it. Patient starts the conversation with some concrete linearity which after a few minutes becomes disorganized - Patient starts to say  drill commands such as "ten hut! 1, 2, 3, 4. All at attention!" When asked what keep her going in life, she responds "You." Affect labile - at times elated, at times dysphoric and tearful. She hold her face and head in her hands saying she feels unwell and she cannot recall the last time she felt well. She would like to be admitted. She denies substance/drug use.     COLLATERAL FROM [facility]  BESSY [phone]: Bessy knows Patient very well. Patient has been doing steadily worst for the last year. In the last few weeks, other residents have complained about Patient walking around naked, flashing them. Staff reports that Patient changes her clothing and outfit up to 10x/day but has nowhere to go. She has been increasingly loud, screaming by herself in her room and at different places at the residence when no one is there. Patient has been known to run around the residence, bang on the windows without seeming purpose. Patient has also ran around on the streets surrounding the residence banging on car windows and upsetting people.     COLLATERAL FROM [facility] RESIDENCE  MACIEL: As per Maciel, she is the only staff there today. She knows Patient well and describes her baseline as "never seen her well". Today, Patient was inside her room and was heard screaming by herself. (no words, just screaming nonsensically). She came out of her room, went into the kitchen, screaming more and was asked by Maciel is she was ok which she replied "yes." Patient then returned to her room. Some time later, Patient came out of her room again and went up to Maciel telling her that "I feel agitated and confused." As per Maciel, Patient is usually like this at baseline (ie frequently yells without seeming purpose and is disorganized). Today was different because Patient came upt o staff and complained about not feeling well (ie. "confused and agitated).     Maciel transferred Writer to Patient's  Javid's office extension - recording did not leave an 'after hours emergency number" and just said to either call 911 or the residence's .  will return Monday.

## 2018-03-10 NOTE — ED BEHAVIORAL HEALTH ASSESSMENT NOTE - SUMMARY
Worthy to note that patient was a few years back high functioning - she had a part-time job, socialized and had friends. She has steadily gotten worst in the last several years and never seemingly regained that prior level of functioning. There is a good chance that Patient [place] in [place] level of care given the trajectory of her illness.          Patient is a 37-year-old female whose seriousness of symptoms, history and presentation are consistent with ACTUAL Schizoaffective Disorder, Bipolar Type (meets criteria), Borderline Personality Disorder, Cannabis Abuse, many prior inpatient psychiatric hospitalizations, 3 prior suicide attempts with hx of recurrent suicidal gestures and suicidal ideation, history of property destruction when upset, long hx of sexually provocative behavior (both out in the community and while on inpatient units), hx of Cannabis use, long hx of medication and treatment noncompliance who was BIB EMS from her residence after she reported not feeling well. Patient has been engaging in highly disorganized behavior such as walking around naked in a co-ed residence, banging on windows of cars on the street, among others, resulting in frequent complaints and calls to the residence and her . Unclear if Patient is taking all of her medications - there is a chance that she is and this is the natural progression of her illness. Patient at this time needs inpatient level of care for stabilization as she is a threat to self/unable to meet basic needs

## 2018-03-10 NOTE — ED PROVIDER NOTE - MEDICAL DECISION MAKING DETAILS
37y Female  hx of bipolar disorder, asthma, borderline personality disorder, cholelithiasis, depression compliant with meds sent to for psych eval 2/2 aggression toward staff at [place].  Pt is well appearing, calm and cooperative, alert and oriented- labs unrevealing-  Clear for psych disposition-

## 2018-03-10 NOTE — ED BEHAVIORAL HEALTH ASSESSMENT NOTE - NS ED BHA PLAN ADMIT TO PSYCHIATRY ADMIT TO
[facility] phone call message- patient requests medication or medication refill:    If this is a refill request, has the caller requested the refill from the pharmacy already? No  Name of the pharmacy and phone number for the current request:  [facility]    Name of the medication requested:lisinopril (PRINIVIL/ZESTRIL) 10 MG tablet, clopidogrel (PLAVIX) 75 MG tablet     Other request: patient was in the hospital at [facility] and that's who prescribed the medication patient is wanting her provider in [place] to prescribe them now. Please call and let patient know if this can be done.    OK to leave the result message on voice mail or with a family member? YES    Call taken on 3/20/2017 at 1:12 PM by Sujata Mack     [facility]

## 2018-03-10 NOTE — ED BEHAVIORAL HEALTH ASSESSMENT NOTE - CURRENT MEDICATION
Most recent inpatient discharge Psychotropic Medications:  Lithium 600mg BID, Zyprexa 20mg BID Klonopin 0.5mg BID; Prolixin 5mg BID x 2 weeks Prolixin Decanoate 12.5mg IM Q2 weeks

## 2018-03-10 NOTE — ED ADULT TRIAGE NOTE - CHIEF COMPLAINT QUOTE
Pt was sent from [facility] for increased agitation and verbally  aggressive with staff. Pt is calm and cooperative in triage.

## 2018-03-10 NOTE — ED BEHAVIORAL HEALTH ASSESSMENT NOTE - MEDICAL ISSUES AND PLAN (INCLUDE STANDING AND PRN MEDICATION)
GERD - continue ; Prventil HFA 2 puffs q6hrs PRN for SOB/wheezing GERD - continue ; Prventil HFA 2 puffs q6hrs PRN for SOB/wheezing (this is on her residence's paperwork)

## 2018-03-10 NOTE — ED BEHAVIORAL HEALTH ASSESSMENT NOTE - PSYCHIATRIC ISSUES AND PLAN (INCLUDE STANDING AND PRN MEDICATION)
continue Lithium 600mg BID, Zyprexa 20mg BID Klonopin 0.5mg BID for now. Confirm with clinic on Monday when Patient received her last Prolixin Decanoate shot. She was discharged on 12.5mg IM Q2 weeks, maybe it should be increased if it wasn't. changed Lithium 600mg BID to 300mg qam & 900mg qhs due to c/o of sedation, changed Zyprexa 20mg BID to 10mg PO in am and 30mg PO qhs for c/o sedation; Klonopin 0.5mg BID for now. Confirm with clinic on Monday when Patient received her last Prolixin Decanoate shot. She was discharged on 12.5mg IM Q2 weeks, maybe it should be increased if it wasn't.

## 2018-03-10 NOTE — ED BEHAVIORAL HEALTH ASSESSMENT NOTE - SUICIDE PROTECTIVE FACTORS
Positive therapeutic relationships Supportive social network or family/Positive therapeutic relationships

## 2018-03-10 NOTE — ED ADULT NURSE NOTE - OBJECTIVE STATEMENT
Received pt into . Pt ambulates into  and states " they shock at it". Pt appears disorganized but calm and cooperative. No distress noted. Denies pain, Denies SI. Denies HI. Pt requesting for food. Pt walking back and forth in  ding holding plate of food. Pt eval by KIRSTY Brennan. Belongings secured in  closet.

## 2018-03-10 NOTE — ED BEHAVIORAL HEALTH ASSESSMENT NOTE - DESCRIPTION
GERD as per records,  Patient does not know much about her biological family, she attended some college in the past In an upbeat mood - came wearing a long blonde wig, smiling, eating lunch provided while doing a little "happy dance" In an upbeat mood - came wearing a long blonde wig, smiling, eating lunch provided while doing a little "happy dance;" later tearful

## 2018-03-10 NOTE — ED BEHAVIORAL HEALTH ASSESSMENT NOTE - DETAILS
3 prior suicide attempts (last in 2012 via OD) as per records, recurrent suicidal gestures and suicidal ideation history of property destruction ((breaking TV screens while hospitalized) when upset / acting out Poor response to Geodon; Depakote (Increased ammonia levels) RIKI called for hand off staff /  aware of admission

## 2018-03-11 PROCEDURE — 99222 1ST HOSP IP/OBS MODERATE 55: CPT

## 2018-03-11 RX ADMIN — LITHIUM CARBONATE 900 MILLIGRAM(S): 300 TABLET, EXTENDED RELEASE ORAL at 22:13

## 2018-03-11 RX ADMIN — Medication 0.5 MILLIGRAM(S): at 08:43

## 2018-03-11 RX ADMIN — OLANZAPINE 10 MILLIGRAM(S): 15 TABLET, FILM COATED ORAL at 22:13

## 2018-03-11 RX ADMIN — LITHIUM CARBONATE 300 MILLIGRAM(S): 300 TABLET, EXTENDED RELEASE ORAL at 08:44

## 2018-03-11 RX ADMIN — FAMOTIDINE 20 MILLIGRAM(S): 10 INJECTION INTRAVENOUS at 08:43

## 2018-03-11 RX ADMIN — Medication 0.5 MILLIGRAM(S): at 22:13

## 2018-03-11 RX ADMIN — OLANZAPINE 10 MILLIGRAM(S): 15 TABLET, FILM COATED ORAL at 08:44

## 2018-03-11 RX ADMIN — OLANZAPINE 20 MILLIGRAM(S): 15 TABLET, FILM COATED ORAL at 22:13

## 2018-03-12 PROCEDURE — 99222 1ST HOSP IP/OBS MODERATE 55: CPT

## 2018-03-12 RX ADMIN — Medication 0.5 MILLIGRAM(S): at 21:14

## 2018-03-12 RX ADMIN — Medication 0.5 MILLIGRAM(S): at 09:03

## 2018-03-12 RX ADMIN — LITHIUM CARBONATE 900 MILLIGRAM(S): 300 TABLET, EXTENDED RELEASE ORAL at 21:14

## 2018-03-12 RX ADMIN — OLANZAPINE 20 MILLIGRAM(S): 15 TABLET, FILM COATED ORAL at 21:14

## 2018-03-12 RX ADMIN — FAMOTIDINE 20 MILLIGRAM(S): 10 INJECTION INTRAVENOUS at 09:03

## 2018-03-12 RX ADMIN — LITHIUM CARBONATE 300 MILLIGRAM(S): 300 TABLET, EXTENDED RELEASE ORAL at 09:03

## 2018-03-12 RX ADMIN — OLANZAPINE 10 MILLIGRAM(S): 15 TABLET, FILM COATED ORAL at 09:03

## 2018-03-12 RX ADMIN — OLANZAPINE 10 MILLIGRAM(S): 15 TABLET, FILM COATED ORAL at 21:14

## 2018-03-13 RX ADMIN — OLANZAPINE 10 MILLIGRAM(S): 15 TABLET, FILM COATED ORAL at 09:00

## 2018-03-13 RX ADMIN — Medication 0.5 MILLIGRAM(S): at 09:00

## 2018-03-13 RX ADMIN — Medication 0.5 MILLIGRAM(S): at 21:04

## 2018-03-13 RX ADMIN — OLANZAPINE 20 MILLIGRAM(S): 15 TABLET, FILM COATED ORAL at 21:04

## 2018-03-13 RX ADMIN — OLANZAPINE 10 MILLIGRAM(S): 15 TABLET, FILM COATED ORAL at 21:04

## 2018-03-13 RX ADMIN — LITHIUM CARBONATE 900 MILLIGRAM(S): 300 TABLET, EXTENDED RELEASE ORAL at 21:04

## 2018-03-13 RX ADMIN — FAMOTIDINE 20 MILLIGRAM(S): 10 INJECTION INTRAVENOUS at 09:00

## 2018-03-13 RX ADMIN — LITHIUM CARBONATE 300 MILLIGRAM(S): 300 TABLET, EXTENDED RELEASE ORAL at 09:00

## 2018-03-14 PROCEDURE — 99232 SBSQ HOSP IP/OBS MODERATE 35: CPT

## 2018-03-14 RX ADMIN — Medication 0.5 MILLIGRAM(S): at 22:02

## 2018-03-14 RX ADMIN — FAMOTIDINE 20 MILLIGRAM(S): 10 INJECTION INTRAVENOUS at 08:59

## 2018-03-14 RX ADMIN — OLANZAPINE 20 MILLIGRAM(S): 15 TABLET, FILM COATED ORAL at 22:02

## 2018-03-14 RX ADMIN — LITHIUM CARBONATE 900 MILLIGRAM(S): 300 TABLET, EXTENDED RELEASE ORAL at 22:02

## 2018-03-14 RX ADMIN — OLANZAPINE 10 MILLIGRAM(S): 15 TABLET, FILM COATED ORAL at 08:59

## 2018-03-14 RX ADMIN — LITHIUM CARBONATE 300 MILLIGRAM(S): 300 TABLET, EXTENDED RELEASE ORAL at 08:59

## 2018-03-14 RX ADMIN — Medication 0.5 MILLIGRAM(S): at 08:59

## 2018-03-14 RX ADMIN — OLANZAPINE 10 MILLIGRAM(S): 15 TABLET, FILM COATED ORAL at 22:02

## 2018-03-15 PROCEDURE — 99232 SBSQ HOSP IP/OBS MODERATE 35: CPT

## 2018-03-15 RX ORDER — FLUPHENAZINE HYDROCHLORIDE 1 MG/1
25 TABLET, FILM COATED ORAL ONCE
Qty: 0 | Refills: 0 | Status: COMPLETED | OUTPATIENT
Start: 2018-03-15 | End: 2018-03-15

## 2018-03-15 RX ORDER — CLONAZEPAM 1 MG
0.5 TABLET ORAL
Qty: 0 | Refills: 0 | Status: DISCONTINUED | OUTPATIENT
Start: 2018-03-15 | End: 2018-03-19

## 2018-03-15 RX ADMIN — OLANZAPINE 10 MILLIGRAM(S): 15 TABLET, FILM COATED ORAL at 21:23

## 2018-03-15 RX ADMIN — Medication 0.5 MILLIGRAM(S): at 09:45

## 2018-03-15 RX ADMIN — OLANZAPINE 20 MILLIGRAM(S): 15 TABLET, FILM COATED ORAL at 21:23

## 2018-03-15 RX ADMIN — OLANZAPINE 10 MILLIGRAM(S): 15 TABLET, FILM COATED ORAL at 09:45

## 2018-03-15 RX ADMIN — LITHIUM CARBONATE 300 MILLIGRAM(S): 300 TABLET, EXTENDED RELEASE ORAL at 09:45

## 2018-03-15 RX ADMIN — FLUPHENAZINE HYDROCHLORIDE 25 MILLIGRAM(S): 1 TABLET, FILM COATED ORAL at 16:40

## 2018-03-15 RX ADMIN — LITHIUM CARBONATE 900 MILLIGRAM(S): 300 TABLET, EXTENDED RELEASE ORAL at 21:23

## 2018-03-15 RX ADMIN — Medication 0.5 MILLIGRAM(S): at 21:23

## 2018-03-15 RX ADMIN — FAMOTIDINE 20 MILLIGRAM(S): 10 INJECTION INTRAVENOUS at 09:45

## 2018-03-16 LAB — LITHIUM SERPL-MCNC: 0.83 MMOL/L — SIGNIFICANT CHANGE UP (ref 0.6–1.2)

## 2018-03-16 RX ADMIN — Medication 0.5 MILLIGRAM(S): at 21:30

## 2018-03-16 RX ADMIN — FAMOTIDINE 20 MILLIGRAM(S): 10 INJECTION INTRAVENOUS at 09:24

## 2018-03-16 RX ADMIN — OLANZAPINE 10 MILLIGRAM(S): 15 TABLET, FILM COATED ORAL at 09:24

## 2018-03-16 RX ADMIN — OLANZAPINE 20 MILLIGRAM(S): 15 TABLET, FILM COATED ORAL at 21:30

## 2018-03-16 RX ADMIN — Medication 0.5 MILLIGRAM(S): at 09:24

## 2018-03-16 RX ADMIN — LITHIUM CARBONATE 300 MILLIGRAM(S): 300 TABLET, EXTENDED RELEASE ORAL at 09:24

## 2018-03-16 RX ADMIN — LITHIUM CARBONATE 900 MILLIGRAM(S): 300 TABLET, EXTENDED RELEASE ORAL at 21:30

## 2018-03-16 RX ADMIN — OLANZAPINE 10 MILLIGRAM(S): 15 TABLET, FILM COATED ORAL at 21:30

## 2018-03-17 RX ADMIN — OLANZAPINE 10 MILLIGRAM(S): 15 TABLET, FILM COATED ORAL at 20:54

## 2018-03-17 RX ADMIN — OLANZAPINE 20 MILLIGRAM(S): 15 TABLET, FILM COATED ORAL at 20:54

## 2018-03-17 RX ADMIN — OLANZAPINE 10 MILLIGRAM(S): 15 TABLET, FILM COATED ORAL at 10:43

## 2018-03-17 RX ADMIN — FAMOTIDINE 20 MILLIGRAM(S): 10 INJECTION INTRAVENOUS at 10:43

## 2018-03-17 RX ADMIN — Medication 0.5 MILLIGRAM(S): at 10:43

## 2018-03-17 RX ADMIN — Medication 0.5 MILLIGRAM(S): at 20:54

## 2018-03-17 RX ADMIN — LITHIUM CARBONATE 900 MILLIGRAM(S): 300 TABLET, EXTENDED RELEASE ORAL at 20:54

## 2018-03-17 RX ADMIN — LITHIUM CARBONATE 300 MILLIGRAM(S): 300 TABLET, EXTENDED RELEASE ORAL at 10:43

## 2018-03-18 RX ADMIN — OLANZAPINE 20 MILLIGRAM(S): 15 TABLET, FILM COATED ORAL at 21:42

## 2018-03-18 RX ADMIN — Medication 0.5 MILLIGRAM(S): at 21:42

## 2018-03-18 RX ADMIN — Medication 0.5 MILLIGRAM(S): at 08:59

## 2018-03-18 RX ADMIN — LITHIUM CARBONATE 300 MILLIGRAM(S): 300 TABLET, EXTENDED RELEASE ORAL at 08:59

## 2018-03-18 RX ADMIN — OLANZAPINE 10 MILLIGRAM(S): 15 TABLET, FILM COATED ORAL at 08:59

## 2018-03-18 RX ADMIN — OLANZAPINE 10 MILLIGRAM(S): 15 TABLET, FILM COATED ORAL at 21:42

## 2018-03-18 RX ADMIN — FAMOTIDINE 20 MILLIGRAM(S): 10 INJECTION INTRAVENOUS at 08:59

## 2018-03-18 RX ADMIN — LITHIUM CARBONATE 900 MILLIGRAM(S): 300 TABLET, EXTENDED RELEASE ORAL at 21:42

## 2018-03-19 VITALS — DIASTOLIC BLOOD PRESSURE: 77 MMHG | SYSTOLIC BLOOD PRESSURE: 114 MMHG | HEART RATE: 91 BPM | TEMPERATURE: 98 F

## 2018-03-19 RX ORDER — LITHIUM CARBONATE 300 MG/1
1 TABLET, EXTENDED RELEASE ORAL
Qty: 30 | Refills: 0 | OUTPATIENT
Start: 2018-03-19

## 2018-03-19 RX ORDER — ALBUTEROL 90 UG/1
2 AEROSOL, METERED ORAL
Qty: 0 | Refills: 0 | COMMUNITY
Start: 2018-03-19

## 2018-03-19 RX ORDER — LITHIUM CARBONATE 300 MG/1
1 TABLET, EXTENDED RELEASE ORAL
Qty: 15 | Refills: 0 | OUTPATIENT
Start: 2018-03-19

## 2018-03-19 RX ORDER — OLANZAPINE 15 MG/1
1 TABLET, FILM COATED ORAL
Qty: 15 | Refills: 0 | OUTPATIENT
Start: 2018-03-19

## 2018-03-19 RX ORDER — CLONAZEPAM 1 MG
1 TABLET ORAL
Qty: 39 | Refills: 0 | OUTPATIENT
Start: 2018-03-19

## 2018-03-19 RX ORDER — OLANZAPINE 15 MG/1
1 TABLET, FILM COATED ORAL
Qty: 30 | Refills: 0 | OUTPATIENT
Start: 2018-03-19

## 2018-03-19 RX ADMIN — LITHIUM CARBONATE 300 MILLIGRAM(S): 300 TABLET, EXTENDED RELEASE ORAL at 09:22

## 2018-03-19 RX ADMIN — FAMOTIDINE 20 MILLIGRAM(S): 10 INJECTION INTRAVENOUS at 09:22

## 2018-03-19 RX ADMIN — Medication 0.5 MILLIGRAM(S): at 09:21

## 2018-03-19 RX ADMIN — OLANZAPINE 10 MILLIGRAM(S): 15 TABLET, FILM COATED ORAL at 09:21

## 2018-03-27 ENCOUNTER — EMERGENCY (EMERGENCY)
Facility: HOSPITAL | Age: 38
LOS: 1 days | Discharge: HOME | End: 2018-03-27
Attending: EMERGENCY MEDICINE | Admitting: EMERGENCY MEDICINE
Payer: SELF-PAY

## 2018-03-27 VITALS
DIASTOLIC BLOOD PRESSURE: 79 MMHG | HEART RATE: 104 BPM | SYSTOLIC BLOOD PRESSURE: 123 MMHG | RESPIRATION RATE: 18 BRPM | OXYGEN SATURATION: 100 % | TEMPERATURE: 99 F

## 2018-03-27 LAB
ALBUMIN SERPL ELPH-MCNC: 3.5 G/DL — SIGNIFICANT CHANGE UP (ref 3.3–5)
ALP SERPL-CCNC: 82 U/L — SIGNIFICANT CHANGE UP (ref 40–120)
ALT FLD-CCNC: 26 U/L — SIGNIFICANT CHANGE UP (ref 4–33)
AMPHET UR-MCNC: NEGATIVE — SIGNIFICANT CHANGE UP
APAP SERPL-MCNC: < 15 UG/ML — LOW (ref 15–25)
APPEARANCE UR: CLEAR — SIGNIFICANT CHANGE UP
AST SERPL-CCNC: 17 U/L — SIGNIFICANT CHANGE UP (ref 4–32)
BACTERIA # UR AUTO: SIGNIFICANT CHANGE UP
BARBITURATES UR SCN-MCNC: NEGATIVE — SIGNIFICANT CHANGE UP
BASOPHILS # BLD AUTO: 0.03 K/UL — SIGNIFICANT CHANGE UP (ref 0–0.2)
BASOPHILS NFR BLD AUTO: 0.3 % — SIGNIFICANT CHANGE UP (ref 0–2)
BENZODIAZ UR-MCNC: NEGATIVE — SIGNIFICANT CHANGE UP
BILIRUB SERPL-MCNC: < 0.2 MG/DL — LOW (ref 0.2–1.2)
BILIRUB UR-MCNC: NEGATIVE — SIGNIFICANT CHANGE UP
BLOOD UR QL VISUAL: NEGATIVE — SIGNIFICANT CHANGE UP
BUN SERPL-MCNC: 12 MG/DL — SIGNIFICANT CHANGE UP (ref 7–23)
CALCIUM SERPL-MCNC: 9.3 MG/DL — SIGNIFICANT CHANGE UP (ref 8.4–10.5)
CANNABINOIDS UR-MCNC: NEGATIVE — SIGNIFICANT CHANGE UP
CHLORIDE SERPL-SCNC: 104 MMOL/L — SIGNIFICANT CHANGE UP (ref 98–107)
CO2 SERPL-SCNC: 26 MMOL/L — SIGNIFICANT CHANGE UP (ref 22–31)
COCAINE METAB.OTHER UR-MCNC: NEGATIVE — SIGNIFICANT CHANGE UP
COLOR SPEC: SIGNIFICANT CHANGE UP
CREAT SERPL-MCNC: 0.9 MG/DL — SIGNIFICANT CHANGE UP (ref 0.5–1.3)
EOSINOPHIL # BLD AUTO: 0.53 K/UL — HIGH (ref 0–0.5)
EOSINOPHIL NFR BLD AUTO: 5 % — SIGNIFICANT CHANGE UP (ref 0–6)
ETHANOL BLD-MCNC: < 10 MG/DL — SIGNIFICANT CHANGE UP
GLUCOSE SERPL-MCNC: 89 MG/DL — SIGNIFICANT CHANGE UP (ref 70–99)
GLUCOSE UR-MCNC: NEGATIVE — SIGNIFICANT CHANGE UP
HCG SERPL-ACNC: < 5 MIU/ML — SIGNIFICANT CHANGE UP
HCT VFR BLD CALC: 40.6 % — SIGNIFICANT CHANGE UP (ref 34.5–45)
HGB BLD-MCNC: 12.6 G/DL — SIGNIFICANT CHANGE UP (ref 11.5–15.5)
IMM GRANULOCYTES # BLD AUTO: 0.06 # — SIGNIFICANT CHANGE UP
IMM GRANULOCYTES NFR BLD AUTO: 0.6 % — SIGNIFICANT CHANGE UP (ref 0–1.5)
KETONES UR-MCNC: NEGATIVE — SIGNIFICANT CHANGE UP
LEUKOCYTE ESTERASE UR-ACNC: HIGH
LITHIUM SERPL-MCNC: 0.8 MMOL/L — SIGNIFICANT CHANGE UP (ref 0.6–1.2)
LYMPHOCYTES # BLD AUTO: 1.83 K/UL — SIGNIFICANT CHANGE UP (ref 1–3.3)
LYMPHOCYTES # BLD AUTO: 17.2 % — SIGNIFICANT CHANGE UP (ref 13–44)
MCHC RBC-ENTMCNC: 29.6 PG — SIGNIFICANT CHANGE UP (ref 27–34)
MCHC RBC-ENTMCNC: 31 % — LOW (ref 32–36)
MCV RBC AUTO: 95.3 FL — SIGNIFICANT CHANGE UP (ref 80–100)
METHADONE UR-MCNC: NEGATIVE — SIGNIFICANT CHANGE UP
MONOCYTES # BLD AUTO: 0.91 K/UL — HIGH (ref 0–0.9)
MONOCYTES NFR BLD AUTO: 8.6 % — SIGNIFICANT CHANGE UP (ref 2–14)
NEUTROPHILS # BLD AUTO: 7.27 K/UL — SIGNIFICANT CHANGE UP (ref 1.8–7.4)
NEUTROPHILS NFR BLD AUTO: 68.3 % — SIGNIFICANT CHANGE UP (ref 43–77)
NITRITE UR-MCNC: NEGATIVE — SIGNIFICANT CHANGE UP
NON-SQ EPI CELLS # UR AUTO: <1 — SIGNIFICANT CHANGE UP
NRBC # FLD: 0 — SIGNIFICANT CHANGE UP
OPIATES UR-MCNC: NEGATIVE — SIGNIFICANT CHANGE UP
OXYCODONE UR-MCNC: NEGATIVE — SIGNIFICANT CHANGE UP
PCP UR-MCNC: NEGATIVE — SIGNIFICANT CHANGE UP
PH UR: 7 — SIGNIFICANT CHANGE UP (ref 4.6–8)
PLATELET # BLD AUTO: 267 K/UL — SIGNIFICANT CHANGE UP (ref 150–400)
PMV BLD: 9.1 FL — SIGNIFICANT CHANGE UP (ref 7–13)
POTASSIUM SERPL-MCNC: 4 MMOL/L — SIGNIFICANT CHANGE UP (ref 3.5–5.3)
POTASSIUM SERPL-SCNC: 4 MMOL/L — SIGNIFICANT CHANGE UP (ref 3.5–5.3)
PROT SERPL-MCNC: 7.6 G/DL — SIGNIFICANT CHANGE UP (ref 6–8.3)
PROT UR-MCNC: 20 MG/DL — SIGNIFICANT CHANGE UP
RBC # BLD: 4.26 M/UL — SIGNIFICANT CHANGE UP (ref 3.8–5.2)
RBC # FLD: 13.9 % — SIGNIFICANT CHANGE UP (ref 10.3–14.5)
RBC CASTS # UR COMP ASSIST: SIGNIFICANT CHANGE UP (ref 0–?)
SALICYLATES SERPL-MCNC: < 5 MG/DL — LOW (ref 15–30)
SODIUM SERPL-SCNC: 138 MMOL/L — SIGNIFICANT CHANGE UP (ref 135–145)
SP GR SPEC: 1.01 — SIGNIFICANT CHANGE UP (ref 1–1.04)
SQUAMOUS # UR AUTO: SIGNIFICANT CHANGE UP
TSH SERPL-MCNC: 2.53 UIU/ML — SIGNIFICANT CHANGE UP (ref 0.27–4.2)
UROBILINOGEN FLD QL: NORMAL MG/DL — SIGNIFICANT CHANGE UP
WBC # BLD: 10.63 K/UL — HIGH (ref 3.8–10.5)
WBC # FLD AUTO: 10.63 K/UL — HIGH (ref 3.8–10.5)
WBC UR QL: SIGNIFICANT CHANGE UP (ref 0–?)

## 2018-03-27 PROCEDURE — 99284 EMERGENCY DEPT VISIT MOD MDM: CPT

## 2018-03-27 PROCEDURE — 73630 X-RAY EXAM OF FOOT: CPT | Mod: 26,RT

## 2018-03-27 PROCEDURE — 90792 PSYCH DIAG EVAL W/MED SRVCS: CPT

## 2018-03-27 PROCEDURE — 73600 X-RAY EXAM OF ANKLE: CPT | Mod: 26,RT

## 2018-03-27 NOTE — ED BEHAVIORAL HEALTH ASSESSMENT NOTE - DETAILS
d/c from [facility] on 3/19/18. 3 prior suicide attempts (last in 2012 via OD) as per records, recurrent suicidal gestures and suicidal ideation history of property destruction ((breaking TV screens while hospitalized) when upset / acting out Poor response to Geodon; Depakote (Increased ammonia levels) ankle pain self Pt for discharge.

## 2018-03-27 NOTE — ED PROVIDER NOTE - MEDICAL DECISION MAKING DETAILS
36 y/o F w/ right foot pain. Plan: UCG and XR. If XR negative, will wrap in ace bandage, give Tylenol and dc home.

## 2018-03-27 NOTE — ED BEHAVIORAL HEALTH ASSESSMENT NOTE - SUMMARY
Pt is chronically suicidal and her baseline risk of self harm is moderate. However, her current risk is not elevated from the baseline and she can be managed as an outpatient basis,

## 2018-03-27 NOTE — ED BEHAVIORAL HEALTH NOTE - BEHAVIORAL HEALTH NOTE
Patient is a 37 year old female who presented to the emergency room today for ankle pain.  Patient resides at [facility].  Writer called [facility]  Javid  who provided the following information.  Patient has been doing well since her recent discharge.  She has been compliant with her medications.  She’s complained of feeling tired during the day.  Patient typically is outside and has a history of some behavioral issues, but lately has been staying in her room.  Patient spends most of her time in her room.  She did notify staff today that she was going to go to the emergency room for ankle pain.  She does not have any safety concerns.  She states patient can return via taxi. Patient is a 37 year old female who presented to the emergency room today for ankle pain.  Patient resides at [facility].  Writer called [facility]  Javid  who provided the following information.  Patient has been doing well since her recent discharge.  She has been compliant with her medications.  She’s complained of feeling tired during the day.  Patient typically is outside and has a history of some behavioral issues, but lately has been staying in her room.  Patient spends most of her time in her room.  She did notify staff today that she was going to go to the emergency room for ankle pain.  She does not have any safety concerns.  She states patient can return via taxi.    Writer called residence requesting Medicaid number to arrange a taxi to transport as medicaid number is not in chart.  Writer left a voicemail requesting a callback to social work [facility].

## 2018-03-27 NOTE — ED BEHAVIORAL HEALTH ASSESSMENT NOTE - DESCRIPTION
Pt is calm and cooperative. No medication given.   Vital Signs Last 24 Hrs  T(C): 37.1 (27 Mar 2018 10:25), Max: 37.1 (27 Mar 2018 10:25)  T(F): 98.7 (27 Mar 2018 10:25), Max: 98.7 (27 Mar 2018 10:25)  HR: 104 (27 Mar 2018 10:25) (104 - 104)  BP: 123/79 (27 Mar 2018 10:25) (123/79 - 123/79)  BP(mean): --  RR: 18 (27 Mar 2018 10:25) (18 - 18)  SpO2: 100% (27 Mar 2018 10:25) (100% - 100%) GERD as per records,  Patient does not know much about her biological family, she attended some college in the past
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
## 2018-03-27 NOTE — ED ADULT TRIAGE NOTE - CHIEF COMPLAINT QUOTE
pt ambulated to triage from ambulance. as per patient her "right ankle gave out" while walking and pt sat on her rear end. no swelling or deformity noted pt able to bare weight.

## 2018-03-27 NOTE — ED BEHAVIORAL HEALTH ASSESSMENT NOTE - RISK ASSESSMENT
As above. Pt's risk of suicide is not elevated from the baseline at this time (though baseline risk of self harm is medium), and she can be managed at home on outpatient basis.

## 2018-03-27 NOTE — ED PROVIDER NOTE - LOWER EXTREMITY EXAM, RIGHT
diffuse dorsal mid foot pain w/ palpation, no visible swelling or bruising, good pulses and feeling at toes

## 2018-03-27 NOTE — ED PROVIDER NOTE - OBJECTIVE STATEMENT
36 y/o F w/ PMHx of asthma, bipolar disorder, borderline personality disorder, cholelithiasis, depression, fibroids, GERD, obesity and right inguinal hernia, presents to the ED c/o right foot pain. Pain is constant and worse w/ walking. Pt states she was walking and felt her right foot give out, but did not fall. Pain is located in right mid foot. No pain to knee or ankle. Denies head trauma, any other trauma, numbness/tingling or any other complaints. Pt does not know if she could be pregnant.

## 2018-03-27 NOTE — ED BEHAVIORAL HEALTH ASSESSMENT NOTE - HPI (INCLUDE ILLNESS QUALITY, SEVERITY, DURATION, TIMING, CONTEXT, MODIFYING FACTORS, ASSOCIATED SIGNS AND SYMPTOMS)
Pt is a 37-year-old black female with history of schizoaffective disorder, bipolar ty pe, borderline personality disorder, cannabis abuse, and multiple inpatient psychiatric admission, most recently in [facility], discharged on 3/19/18, originally presented to ER today for ankle pain but later voiced she was suicidal, hence she was transferred to . Upon assessment, Pt says "my life is a waste. I have no saving. I want to be voluntarily admitted to the hospital." She goes on to say the recent hospitalization was helpful because "I ate, rested, and talked to people." She states she has learnt some DBT skills but they don't help. I went over some of the skills with her and she was able to cite "behavioral chain analysis" and "Distraction" but unable to recall any other skills. Pt was encouraged to engage in further outpatient therapy and skills development. Collateral information was obtained from staff at [facility]. See SW  note. They have no safety concerns.

## 2018-03-27 NOTE — ED BEHAVIORAL HEALTH ASSESSMENT NOTE - SUICIDE RISK FACTORS
Mood episode/Hopelessness/Highly impulsive behavior/Agitation/severe anxiety/Perceived burden on family and others
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
## 2018-03-28 LAB
BACTERIA UR CULT: SIGNIFICANT CHANGE UP
SPECIMEN SOURCE: SIGNIFICANT CHANGE UP

## 2018-06-21 ENCOUNTER — EMERGENCY (EMERGENCY)
Facility: HOSPITAL | Age: 38
LOS: 1 days | Discharge: HOME | End: 2018-06-21
Attending: EMERGENCY MEDICINE | Admitting: EMERGENCY MEDICINE
Payer: SELF-PAY

## 2018-06-21 VITALS
OXYGEN SATURATION: 100 % | RESPIRATION RATE: 16 BRPM | TEMPERATURE: 99 F | DIASTOLIC BLOOD PRESSURE: 61 MMHG | HEART RATE: 90 BPM | SYSTOLIC BLOOD PRESSURE: 137 MMHG

## 2018-06-21 VITALS
TEMPERATURE: 98 F | RESPIRATION RATE: 16 BRPM | SYSTOLIC BLOOD PRESSURE: 137 MMHG | HEART RATE: 95 BPM | DIASTOLIC BLOOD PRESSURE: 93 MMHG | OXYGEN SATURATION: 100 %

## 2018-06-21 LAB
ALBUMIN SERPL ELPH-MCNC: 3.9 G/DL — SIGNIFICANT CHANGE UP (ref 3.3–5)
ALP SERPL-CCNC: 114 U/L — SIGNIFICANT CHANGE UP (ref 40–120)
ALT FLD-CCNC: 45 U/L — HIGH (ref 4–33)
APPEARANCE UR: CLEAR — SIGNIFICANT CHANGE UP
AST SERPL-CCNC: 23 U/L — SIGNIFICANT CHANGE UP (ref 4–32)
BACTERIA # UR AUTO: SIGNIFICANT CHANGE UP
BASOPHILS # BLD AUTO: 0.03 K/UL — SIGNIFICANT CHANGE UP (ref 0–0.2)
BASOPHILS NFR BLD AUTO: 0.3 % — SIGNIFICANT CHANGE UP (ref 0–2)
BILIRUB SERPL-MCNC: < 0.2 MG/DL — LOW (ref 0.2–1.2)
BILIRUB UR-MCNC: NEGATIVE — SIGNIFICANT CHANGE UP
BLOOD UR QL VISUAL: NEGATIVE — SIGNIFICANT CHANGE UP
BUN SERPL-MCNC: 11 MG/DL — SIGNIFICANT CHANGE UP (ref 7–23)
CALCIUM SERPL-MCNC: 9.2 MG/DL — SIGNIFICANT CHANGE UP (ref 8.4–10.5)
CHLORIDE SERPL-SCNC: 106 MMOL/L — SIGNIFICANT CHANGE UP (ref 98–107)
CO2 SERPL-SCNC: 23 MMOL/L — SIGNIFICANT CHANGE UP (ref 22–31)
COLOR SPEC: SIGNIFICANT CHANGE UP
CREAT SERPL-MCNC: 0.85 MG/DL — SIGNIFICANT CHANGE UP (ref 0.5–1.3)
EOSINOPHIL # BLD AUTO: 0.39 K/UL — SIGNIFICANT CHANGE UP (ref 0–0.5)
EOSINOPHIL NFR BLD AUTO: 4 % — SIGNIFICANT CHANGE UP (ref 0–6)
GLUCOSE SERPL-MCNC: 88 MG/DL — SIGNIFICANT CHANGE UP (ref 70–99)
GLUCOSE UR-MCNC: NEGATIVE — SIGNIFICANT CHANGE UP
HCT VFR BLD CALC: 36.2 % — SIGNIFICANT CHANGE UP (ref 34.5–45)
HGB BLD-MCNC: 11.4 G/DL — LOW (ref 11.5–15.5)
IMM GRANULOCYTES # BLD AUTO: 0.09 # — SIGNIFICANT CHANGE UP
IMM GRANULOCYTES NFR BLD AUTO: 0.9 % — SIGNIFICANT CHANGE UP (ref 0–1.5)
KETONES UR-MCNC: NEGATIVE — SIGNIFICANT CHANGE UP
LEUKOCYTE ESTERASE UR-ACNC: HIGH
LYMPHOCYTES # BLD AUTO: 1.85 K/UL — SIGNIFICANT CHANGE UP (ref 1–3.3)
LYMPHOCYTES # BLD AUTO: 19 % — SIGNIFICANT CHANGE UP (ref 13–44)
MCHC RBC-ENTMCNC: 28.7 PG — SIGNIFICANT CHANGE UP (ref 27–34)
MCHC RBC-ENTMCNC: 31.5 % — LOW (ref 32–36)
MCV RBC AUTO: 91.2 FL — SIGNIFICANT CHANGE UP (ref 80–100)
MONOCYTES # BLD AUTO: 0.85 K/UL — SIGNIFICANT CHANGE UP (ref 0–0.9)
MONOCYTES NFR BLD AUTO: 8.7 % — SIGNIFICANT CHANGE UP (ref 2–14)
MUCOUS THREADS # UR AUTO: SIGNIFICANT CHANGE UP
NEUTROPHILS # BLD AUTO: 6.51 K/UL — SIGNIFICANT CHANGE UP (ref 1.8–7.4)
NEUTROPHILS NFR BLD AUTO: 67.1 % — SIGNIFICANT CHANGE UP (ref 43–77)
NITRITE UR-MCNC: NEGATIVE — SIGNIFICANT CHANGE UP
NRBC # FLD: 0 — SIGNIFICANT CHANGE UP
PH UR: 6.5 — SIGNIFICANT CHANGE UP (ref 4.6–8)
PLATELET # BLD AUTO: 240 K/UL — SIGNIFICANT CHANGE UP (ref 150–400)
PMV BLD: 9.2 FL — SIGNIFICANT CHANGE UP (ref 7–13)
POTASSIUM SERPL-MCNC: 3.8 MMOL/L — SIGNIFICANT CHANGE UP (ref 3.5–5.3)
POTASSIUM SERPL-SCNC: 3.8 MMOL/L — SIGNIFICANT CHANGE UP (ref 3.5–5.3)
PROT SERPL-MCNC: 7.8 G/DL — SIGNIFICANT CHANGE UP (ref 6–8.3)
PROT UR-MCNC: 10 MG/DL — SIGNIFICANT CHANGE UP
RBC # BLD: 3.97 M/UL — SIGNIFICANT CHANGE UP (ref 3.8–5.2)
RBC # FLD: 14.7 % — HIGH (ref 10.3–14.5)
RBC CASTS # UR COMP ASSIST: SIGNIFICANT CHANGE UP (ref 0–?)
SODIUM SERPL-SCNC: 140 MMOL/L — SIGNIFICANT CHANGE UP (ref 135–145)
SP GR SPEC: 1.01 — SIGNIFICANT CHANGE UP (ref 1–1.04)
SQUAMOUS # UR AUTO: SIGNIFICANT CHANGE UP
UROBILINOGEN FLD QL: NORMAL MG/DL — SIGNIFICANT CHANGE UP
WBC # BLD: 9.72 K/UL — SIGNIFICANT CHANGE UP (ref 3.8–10.5)
WBC # FLD AUTO: 9.72 K/UL — SIGNIFICANT CHANGE UP (ref 3.8–10.5)
WBC UR QL: SIGNIFICANT CHANGE UP (ref 0–?)

## 2018-06-21 PROCEDURE — 99284 EMERGENCY DEPT VISIT MOD MDM: CPT

## 2018-06-21 RX ORDER — ACETAMINOPHEN 500 MG
650 TABLET ORAL ONCE
Qty: 0 | Refills: 0 | Status: COMPLETED | OUTPATIENT
Start: 2018-06-21 | End: 2018-06-21

## 2018-06-21 RX ORDER — CEPHALEXIN 500 MG
500 CAPSULE ORAL ONCE
Qty: 0 | Refills: 0 | Status: COMPLETED | OUTPATIENT
Start: 2018-06-21 | End: 2018-06-21

## 2018-06-21 RX ORDER — CEPHALEXIN 500 MG
1 CAPSULE ORAL
Qty: 14 | Refills: 0 | OUTPATIENT
Start: 2018-06-21 | End: 2018-06-27

## 2018-06-21 RX ORDER — SODIUM CHLORIDE 9 MG/ML
1000 INJECTION INTRAMUSCULAR; INTRAVENOUS; SUBCUTANEOUS ONCE
Qty: 0 | Refills: 0 | Status: COMPLETED | OUTPATIENT
Start: 2018-06-21 | End: 2018-06-21

## 2018-06-21 RX ORDER — FAMOTIDINE 10 MG/ML
20 INJECTION INTRAVENOUS ONCE
Qty: 0 | Refills: 0 | Status: COMPLETED | OUTPATIENT
Start: 2018-06-21 | End: 2018-06-21

## 2018-06-21 RX ADMIN — Medication 500 MILLIGRAM(S): at 11:56

## 2018-06-21 RX ADMIN — SODIUM CHLORIDE 1000 MILLILITER(S): 9 INJECTION INTRAMUSCULAR; INTRAVENOUS; SUBCUTANEOUS at 10:44

## 2018-06-21 RX ADMIN — Medication 650 MILLIGRAM(S): at 12:52

## 2018-06-21 RX ADMIN — Medication 650 MILLIGRAM(S): at 13:42

## 2018-06-21 RX ADMIN — FAMOTIDINE 20 MILLIGRAM(S): 10 INJECTION INTRAVENOUS at 10:44

## 2018-06-21 NOTE — ED PROVIDER NOTE - MEDICAL DECISION MAKING DETAILS
36 y/o female with an episode of vomiting and abd pain after eating. Concern for appy/gastritis. Labs, CT, UA, Analgesia, IVF.

## 2018-06-21 NOTE — ED ADULT NURSE REASSESSMENT NOTE - NS ED NURSE REASSESS COMMENT FT1
PO challenge done for pt. After eating she feels mild nausea but no vomiting. MD Toledo made aware. Pending disposition.

## 2018-06-21 NOTE — ED PROVIDER NOTE - PROGRESS NOTE DETAILS
Pt refusing CT. Discussed at length with pt. Pt understands that it is to look for possible infections to the bowel like appendicitis and that without such testing pain can get worse leading to perforation and death. Pt is competent and understands risks and does not want to have CT done. Discussed concerns with pt's , Javid, at [phone] Pt states feeling better. Tolerating PO. No tenderness on re-exam. Discussed with  about finding of UTI and will sent Keflex to pharmacy. Discussed with pt to return for any worsening symptoms. Pt states feeling better. Tolerating PO. No tenderness on re-exam. Discussed with  about finding of UTI and will sent Keflex to pharmacy.  said to send medication to [facility]. Discussed with pt to return for any worsening symptoms. Pt states feeling better. Tolerating PO. No tenderness on re-exam. Discussed with  about finding of UTI and will sent Keflex to pharmacy.  said to send medication to [facility]. Discussed with pt to return for any worsening symptoms. Pt states she normally takes the bus back home and has money to do so.

## 2018-06-21 NOTE — ED PROVIDER NOTE - OBJECTIVE STATEMENT
36 y/o female with PMHx of Asthma, GERD, Bipolar, and Depression presents to ED for lower abd pain with an episode of vomiting. Pt states after eating breakfast today having lower abd pain with an episode of vomiting. Pt also notes having an achy headache. Pt denies any fever, chills, SOB, chest pain, vaginal bleeding, dysuria, or discharge. 38 y/o female with PMHx of Asthma, GERD, Bipolar, and Depression presents to ED for lower abd pain with an episode of vomiting. Pt states after eating breakfast today having lower abd pain with an episode of vomiting. Pt also notes having an achy headache. Pt denies any fever, chills, SOB, chest pain, vaginal bleeding, dysuria, or discharge. Denies any SI, HI, or hallucinations.

## 2018-06-21 NOTE — ED ADULT TRIAGE NOTE - CHIEF COMPLAINT QUOTE
Pt presents from [facility] c/o abdominal pain, headache and vomiting after breakfast today. Pt denies chest pain, SOB, diarrhea, fever, dysuria. Pt appears in NAD.

## 2018-06-21 NOTE — ED ADULT NURSE REASSESSMENT NOTE - NS ED NURSE REASSESS COMMENT FT1
pt AO x3, ambulatory, was discharged by MD Toledo in stable condition. IV removed. No bleeding noted. Exit ED. As per pt, she will use public transportation/bus.

## 2018-06-25 ENCOUNTER — EMERGENCY (EMERGENCY)
Facility: HOSPITAL | Age: 38
LOS: 1 days | Discharge: HOME | End: 2018-06-25
Attending: EMERGENCY MEDICINE | Admitting: EMERGENCY MEDICINE
Payer: COMMERCIAL

## 2018-06-25 VITALS
DIASTOLIC BLOOD PRESSURE: 80 MMHG | OXYGEN SATURATION: 99 % | TEMPERATURE: 99 F | HEART RATE: 100 BPM | RESPIRATION RATE: 18 BRPM | SYSTOLIC BLOOD PRESSURE: 145 MMHG

## 2018-06-25 PROCEDURE — 99284 EMERGENCY DEPT VISIT MOD MDM: CPT

## 2018-06-25 PROCEDURE — 70450 CT HEAD/BRAIN W/O DYE: CPT | Mod: 26

## 2018-06-25 RX ORDER — MECLIZINE HCL 12.5 MG
25 TABLET ORAL ONCE
Qty: 0 | Refills: 0 | Status: COMPLETED | OUTPATIENT
Start: 2018-06-25 | End: 2018-06-25

## 2018-06-25 RX ORDER — ACETAMINOPHEN 500 MG
650 TABLET ORAL ONCE
Qty: 0 | Refills: 0 | Status: COMPLETED | OUTPATIENT
Start: 2018-06-25 | End: 2018-06-25

## 2018-06-25 RX ADMIN — Medication 25 MILLIGRAM(S): at 13:46

## 2018-06-25 RX ADMIN — Medication 650 MILLIGRAM(S): at 13:45

## 2018-06-25 NOTE — PROVIDER CONTACT NOTE (OTHER) - ASSESSMENT
pt stable to return home to [place].  pt states that she is able to take a bus to [place].  pt given metro card to assist in paying for transport,

## 2018-06-25 NOTE — ED PROVIDER NOTE - OBJECTIVE STATEMENT
37F with PMH of bipolar d/o, GERD, asthma who comes in with 3 hours of vertigo, ear ringing, and frontal HA.  No N/V, numbness, weakness, LOC. No F/C/neck pain.  No CP, palps, SOB.

## 2018-06-25 NOTE — ED PROVIDER NOTE - PHYSICAL EXAMINATION
HDS, NAD, MMM, TMs with cerumen bilat, lungs CTAB, heart sounds normal, abd benign, LEs without edema, AAOx3, PERRLA, EOMIB, no nystagmus, CN 2-12 intact, 5/5 strength, SILT, normal gait, no drift, ZURI intact HDS, NAD  MMM  TMs with cerumen bilat  lungs CTAB  heart sounds normal  abd benign  LEs without edema  AAOx3, PERRLA, EOMIB, no nystagmus, CN 2-12 intact, 5/5 strength, SILT, normal gait, no drift, ZURI intact  Skin without rash

## 2018-06-25 NOTE — ED PROVIDER NOTE - CARE PLAN
Principal Discharge DX:	Vertigo Principal Discharge DX:	Vertigo  Assessment and plan of treatment:	1) Please follow-up with your primary care doctor within the next 3 days.  If you cannot follow-up with your doctor(s), please return to the ED for any urgent issues.  2) If you have any worsening of symptoms or any other concerns please return to the ED immediately.  3) Please continue taking your home medications as directed.  4) You may have been given a copy of your labs and/or imaging.  Please go over these with your primary care doctor.

## 2018-06-25 NOTE — ED PROVIDER NOTE - CONSTITUTIONAL, MLM
normal... Well appearing, well nourished, awake, alert, oriented to person, place, time/situation and in no apparent distress. · CONSTITUTIONAL: - - -  · Constitutional [+]: FEVER  · GASTROINTESTINAL: no abdominal pain, no bloating, no constipation, no diarrhea, no nausea and no vomiting.  · GENITOURINARY: - - -  · Genitourinary [+]: urinary urgency, urinary frequency, dysuria  · Genitourinary [-]: no hematuria  · ROS STATEMENT: all other ROS negative except as per HPI

## 2018-06-25 NOTE — ED PROVIDER NOTE - MEDICAL DECISION MAKING DETAILS
[place]: 37F from [place], here with vertigo and ear ringing x 3 hours.  Exam completely benign.  Unlikely central cause.  Will give meclizine and check head CT. [place]: 37F from [place], here with vertigo and ear ringing x 3 hours.  Exam completely benign.  Unlikely central cause.  Will give meclizine and check head CT.  Patient refused EKG.

## 2018-07-30 ENCOUNTER — EMERGENCY (EMERGENCY)
Facility: HOSPITAL | Age: 38
LOS: 1 days | Discharge: HOME | End: 2018-07-30
Attending: EMERGENCY MEDICINE | Admitting: EMERGENCY MEDICINE
Payer: COMMERCIAL

## 2018-07-30 VITALS
DIASTOLIC BLOOD PRESSURE: 95 MMHG | SYSTOLIC BLOOD PRESSURE: 136 MMHG | RESPIRATION RATE: 18 BRPM | OXYGEN SATURATION: 100 % | HEART RATE: 110 BPM

## 2018-07-30 VITALS
SYSTOLIC BLOOD PRESSURE: 117 MMHG | DIASTOLIC BLOOD PRESSURE: 74 MMHG | OXYGEN SATURATION: 100 % | RESPIRATION RATE: 18 BRPM | HEART RATE: 83 BPM

## 2018-07-30 PROCEDURE — 99283 EMERGENCY DEPT VISIT LOW MDM: CPT

## 2018-07-30 PROCEDURE — 90792 PSYCH DIAG EVAL W/MED SRVCS: CPT

## 2018-07-30 NOTE — ED PROVIDER NOTE - OBJECTIVE STATEMENT
38 year old female c/o auditory hallucinations, suicidal ideation. crying. Claims to be compliant with meds. no homicidal ideation. DEnies medical complaints. last in ED 1 month ago. No medical complaints. DEnies alcohol or drug use

## 2018-07-30 NOTE — ED BEHAVIORAL HEALTH NOTE - BEHAVIORAL HEALTH NOTE
Worker completed max series drivers of utilization interview with patient and emailed to [email or web address]

## 2018-07-30 NOTE — ED PROVIDER NOTE - PHYSICAL EXAMINATION
obese female, crying,  hypokinetic,   Full EOM PERRL, neck supple   heart sopunds nml  lungs clear, abd soft nontender   ext no edema or rashes  neuro gait nml   2-12 intact, motor 5/5 sensation intact

## 2018-07-30 NOTE — ED BEHAVIORAL HEALTH ASSESSMENT NOTE - DESCRIPTION (FIRST USE, LAST USE, QUANTITY, FREQUENCY, DURATION)
long hx of cannabis use charted hx of prior use (unclear if it amounted to "abuse" criteria or just recreational use) heroin - charted hx of prior use (unclear if it amounted to "abuse" criteria or just recreational use)

## 2018-07-30 NOTE — ED BEHAVIORAL HEALTH ASSESSMENT NOTE - RISK ASSESSMENT
patient has chronic risks of multiple admissions, prior suicide attempts, prior property destruction, high suspicion for prostitution, prior non compliance, prior substance abuse. No acute risks are identified. Protective factors include treatment compliance, no recent violence, denying recent substance abuse, no known access to weapons, no known legal issues, lack of acute mood/psychotic epsidoe, no suicidal ideation intent or plan, no homicidal ideation, supervised living environment and engaged in treatment. Patient has elevated chronic/non-modifiable risks but does not appear to have any current elevated risk from baseline. Suspect malingering/housing seeking is primary motivation for today's visit. Patient is appropriate for outpatient level of care at this time.

## 2018-07-30 NOTE — ED ADULT NURSE NOTE - CHIEF COMPLAINT QUOTE
Pt brought in by EMS from [facility] for visual and auditory hallucinations, "They are telling me to jump off a bridge" Pt denies drugs or alcohol. Pt evaluated by JOSE LUIS LUJAN.

## 2018-07-30 NOTE — ED BEHAVIORAL HEALTH ASSESSMENT NOTE - DETAILS
[facility] staff notified history of property destruction ((breaking TV screens while hospitalized) when upset / acting out 3 prior suicide attempts (last in 2012 via OD) as per records, recurrent suicidal gestures and suicidal ideation Poor response to Geodon; Depakote (Increased ammonia levels) suspect prostitution
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
## 2018-07-30 NOTE — ED BEHAVIORAL HEALTH ASSESSMENT NOTE - DESCRIPTION
Pt is calm and cooperative. No medication given.   Vital Signs Last 24 Hrs  T(C): --  T(F): --  HR: 110 (30 Jul 2018 09:36) (110 - 110)  BP: 136/95 (30 Jul 2018 09:36) (136/95 - 136/95)  BP(mean): --  RR: 18 (30 Jul 2018 09:36) (18 - 18)  SpO2: 100% (30 Jul 2018 09:36) (100% - 100%) GERD as per records,  Patient does not know much about her biological family, she attended some college in the past

## 2018-07-30 NOTE — ED ADULT NURSE NOTE - OBJECTIVE STATEMENT
pt states hat she is having auditory hallucinations, the voices are telling her to kill herself. pt states she wants to listen to them when they are talking, but now is not currently suicidal. pt states she does not want to hurt anyone else because 'they have the right to live'. pt states she just needs time to 'recoop'

## 2018-07-30 NOTE — ED BEHAVIORAL HEALTH ASSESSMENT NOTE - SUMMARY
Patient is a single, 38-year-old  female, non caregiver, unemployed, domiciled on [place] grounds, with past psychiatric history of Bipolar Affective Disorder, Borderline Personality Disorder, cannabis abuse, multiple inpatient psychiatric hospitalizations (>20), with frequent visits to the [facility] (well-known to staff), 3 prior suicide attempts (last in 2012 via OD), recurrent suicidal gestures and suicidal ideation, history of property destruction when upset, no history of arrests or access to weapons, was BIBEMS activated by [place] staff after patient reportedly kneeled down and began crying asking to go to the hospital.  Patient reports chronic depression vs dysthymia and chronic dislike of living situation. She presents actively seeking admission and then asks to leave if beds are not available. Patient does not currently meet criteria for a major depressive episode, denies suicidal ideation and homicidal ideation, does not appear grossly psychotic, manic or thought disordered. Compliant with medications and in good behavioral control. No indication for psychiatric admission.

## 2018-07-30 NOTE — ED BEHAVIORAL HEALTH ASSESSMENT NOTE - HPI (INCLUDE ILLNESS QUALITY, SEVERITY, DURATION, TIMING, CONTEXT, MODIFYING FACTORS, ASSOCIATED SIGNS AND SYMPTOMS)
Patient is a single, 38-year-old  female, non caregiver, unemployed, domiciled on [place] grounds, with past psychiatric history of Bipolar Affective Disorder, Borderline Personality Disorder, cannabis abuse, multiple inpatient psychiatric hospitalizations (>20), with frequent visits to the [facility] (well-known to staff), 3 prior suicide attempts (last in 2012 via OD), recurrent suicidal gestures and suicidal ideation, history of property destruction when upset, no history of arrests or access to weapons, was BIBEMS activated by [place] staff after patient reportedly kneeled down and began crying asking to go to the hospital.  EMS reported that patient stated she "drank eucalyptus tea from a plant" prior to arrival, unclear if this is a reference to illicit substances.  Patient well known to this provider. She reports that "my situation and my psyche" make her feel depressed. She last felt happy a year ago. She cannot identify any trigger today that upset her. she reports chronic low mood, noting that she had a "low key" 38th birthday a few weeks ago. She denies hearing voices, denies suicidal intent or plan, denies homicidal ideation. Patient denies drug use and reports medication compliance. Otherwise has no complaints. repeatedly asks for admission and then later states she would like to go home if there is no bed at [facility]. Patient states she suffered a miscarriage recently.    Spoke with Tiffanie, staff at [facility]. She reports the prior shift called 911 and per report, "patient went down on her knees and was screaming in the ding" and wanted to come to the hospital. They report patient has been at baseline, sometimes appearing sad, sometimes appearing happy and dancing. Patient is medication compliant and non violent. No known inciting trigger for today's presentation. No safety concerns.

## 2018-07-30 NOTE — ED ADULT TRIAGE NOTE - CHIEF COMPLAINT QUOTE
Pt brought in by EMS from [facility] for visual and auditory hallucinations, "They are telling me to jump off a bridge" Pt denies drugs or alcohol. Pt evaluated by JOSE LUIS LUJAN.

## 2018-08-17 ENCOUNTER — EMERGENCY (EMERGENCY)
Facility: HOSPITAL | Age: 38
LOS: 1 days | Discharge: HOME | End: 2018-08-17
Admitting: EMERGENCY MEDICINE
Payer: SELF-PAY

## 2018-08-17 VITALS
SYSTOLIC BLOOD PRESSURE: 140 MMHG | OXYGEN SATURATION: 98 % | RESPIRATION RATE: 18 BRPM | HEART RATE: 84 BPM | TEMPERATURE: 98 F | DIASTOLIC BLOOD PRESSURE: 90 MMHG

## 2018-08-17 PROCEDURE — 90792 PSYCH DIAG EVAL W/MED SRVCS: CPT | Mod: GC

## 2018-08-17 PROCEDURE — 99285 EMERGENCY DEPT VISIT HI MDM: CPT

## 2018-08-17 NOTE — ED PROVIDER NOTE - PROGRESS NOTE DETAILS
Vik NP- Medical evaluation performed. There is no clinical evidence of intoxication or any acute medical problem requiring immediate intervention. Patient is awaiting psychiatric consultation. Final disposition will be determined by psychiatrist.

## 2018-08-17 NOTE — ED PROVIDER NOTE - PHYSICAL EXAMINATION
HDS, NAD,   MMM,   eyes clear,   oropharynx normal, no bruising around neck, cspine NTTP in midline, FROM of neck,   lungs CTAB, no stridor,  heart sounds normal,   abd soft, NT, ND, no CVAT,   LEs without edema, wwp,   skin normal temperature and color,   neuro: alert and oriented, no focal deficits

## 2018-08-17 NOTE — ED BEHAVIORAL HEALTH ASSESSMENT NOTE - HPI (INCLUDE ILLNESS QUALITY, SEVERITY, DURATION, TIMING, CONTEXT, MODIFYING FACTORS, ASSOCIATED SIGNS AND SYMPTOMS)
Ms. Castillo is a single, 38-year-old  female, non caregiver, unemployed, domiciled on [place] grounds, with past psychiatric history of Bipolar Affective Disorder, Borderline Personality Disorder, cannabis abuse, multiple inpatient psychiatric hospitalizations (>20), with frequent visits to the [facility] (well-known to staff), 3 prior suicide attempts (last in 2012 via OD), recurrent suicidal gestures and suicidal ideation, history of property destruction when upset, no history of arrests or access to weapons, was BIBEMS activated by [place] staff after patient reportedly attempted to strangle herself with a lanyard.    On evaluation, the pt was observed resting comfortably in bed, drinking water from an unsued urine testing cup. The pt indicated that she is grandiose, having hallucinations and is suffering from "sadness and despair" that caused her to try and kill herself today. She reports being depressed and anhedonic for her entire life because she has no friends, no family and cannot get a job. Today, she was searching for jobs on [employer], felt that life was not worth living and attempted to strangle herself with a lanyard. She noted that it is difficult to commit suicide by strangling, but also thought about using a knife that she has hidden in her room to hurt herself. She has no noted bruises or ligature marks on her neck. She reports overdosing on digoxin in the past which required hospitalization, but is unable to recall how many other suicide attempts she has had in her life or how she attempted them. The pt insisted she needs voluntary hospitalization and does not want to go back home. When pressed about any problems she is having at her residence, Ms. Castillo denied specific issues, repeating that she does not want to go back and would like respite housing if she is not sent to a psychiatric hospital. She states that she would hurt herself with the knife hidden in her room if she goes back and made a gesture of cutting her throat. When asked about her grandiosity, she replies she is the Duchess of [place]. When asked about her hallucinations, she responds that she hears her own thoughts saying "kill, kill, kill." She also hears the voice of god who calls her his child. Throughout the interview, she remained logical, linear and goal-directed without apparent pre-occupation with internal stimuli. She endorsed feelings of hopeless with poor appetite, sleep and energy in addition to her depression and anhedonia. She endorses being easily distracted, being impulsive, having grandiose thoughts, racing thoughts, poor sleep and pressured speech despite no evidence of impaired attention or pressured speech on exam with an ability to coherently and appropriately answer all questions. The pt denies HI. She denies that she is on medication, later stating she cannot remember if she takes pills.    Per collateral obtained from pt's  Javid ([phone]) - The pt had a meeting at 10 am this morning regarding her upcoming AOT hearing on Tuesday. Shortly after being informed that she would have to attend the hearing, the  states the pt reported dizziness with CAH from god telling her to kill people. She also expressed SI to use a knife hidden in her room to cut herself. While the  activated 911, the pt returned to her residence to use the bathroom and came out with a lanyard that she pulled tight across her neck in front of staff. Staff was able to verbally re-direct her to let go of the lanyard. The  reports that for the past 2 weeks, the pt has been at her baseline, with some episodes of verbal altercations, but otherwise sleeping well, no evidence of AH/VH or pressured speech and racing thoughts. She reports the pt has full compliance with her current regimen of prolixin dec, klonopin, famotidine, olanzapine and lithium and is actively followed by a psychiatrist on her ACT team. Aside from today, the  is not aware of any other SI or HI expressed within the past two weeks. Ms. Castillo is a single, 38-year-old  female, non caregiver, unemployed, domiciled on [place] grounds, with past psychiatric history of Bipolar Affective Disorder, Borderline Personality Disorder, cannabis abuse, multiple inpatient psychiatric hospitalizations (>20), with frequent visits to the [facility] (well-known to staff), 3 prior suicide attempts (last in 2012 via OD), recurrent suicidal gestures and suicidal ideation, history of property destruction when upset, no history of arrests or access to weapons, was BIBEMS activated by [place] staff after patient reportedly attempted to strangle herself with a lanyard.    On evaluation, the pt was observed resting comfortably in bed, drinking water from an unsued urine testing cup. The pt indicated that she is grandiose, having hallucinations and is suffering from "sadness and despair" that caused her to try and kill herself today. She reports being depressed and anhedonic for her entire life because she has no friends, no family and cannot get a job. Today, she was searching for jobs on [employer], felt that life was not worth living and attempted to strangle herself with a lanyard. She noted that it is difficult to commit suicide by strangling, but also thought about using a knife that she has hidden in her room to hurt herself. She has no noted bruises or ligature marks on her neck. She reports overdosing on digoxin in the past which required hospitalization, but is unable to recall how many other suicide attempts she has had in her life or how she attempted them. The pt insisted she needs voluntary hospitalization and does not want to go back home. When pressed about any problems she is having at her residence, Ms. Castillo denied specific issues, repeating that she does not want to go back and would like respite housing if she is not sent to a psychiatric hospital. She states that she would hurt herself with the knife hidden in her room if she goes back and made a gesture of cutting her throat. When asked about her grandiosity, she replies she is the Duchess of [place]. When asked about her hallucinations, she responds that she hears her own thoughts saying "kill, kill, kill." She also hears the voice of god who calls her his child. Throughout the interview, she remained logical, linear and goal-directed without apparent pre-occupation with internal stimuli. She endorsed feelings of hopeless with poor appetite, sleep and energy in addition to her depression and anhedonia. She endorses being easily distracted, being impulsive, having grandiose thoughts, racing thoughts, poor sleep and pressured speech despite no evidence of impaired attention or pressured speech on exam with an ability to coherently and appropriately answer all questions. The pt denies HI. She denies that she is on medication, later stating she cannot remember if she takes pills.    On re-evaluation, the pt admitted to being upset about her AOT hearing and that she does not want to go to the hearing. When informed that it is important to attend the hearing, she verbalized understanding of the need to attend. She reported that she does not know where the knife in her room is, but would not tell the examiner if she did know. She indicated she still had thoughts of hurting herself, but requested to take a taxi home.    Per collateral obtained from pt's  Javid ([phone]) - The pt had a meeting at 10 am this morning regarding her upcoming AOT hearing on Tuesday. Shortly after being informed that she would have to attend the hearing, the  states the pt reported dizziness with CAH from god telling her to kill people. She also expressed SI to use a knife hidden in her room to cut herself. While the  activated 911, the pt returned to her residence to use the bathroom and came out with a lanyard that she pulled tight across her neck in front of staff. Staff was able to verbally re-direct her to let go of the lanyard. The  reports that for the past 2 weeks, the pt has been at her baseline, with some episodes of verbal altercations, but otherwise sleeping well, no evidence of AH/VH or pressured speech and racing thoughts. She reports the pt has full compliance with her current regimen of prolixin dec, klonopin, famotidine, olanzapine and lithium and is actively followed by a psychiatrist on her ACT team. Aside from today, the  is not aware of any other SI or HI expressed within the past two weeks. Ms. Castillo is a single, 38-year-old  female, non caregiver, unemployed, domiciled on [place] grounds, with past psychiatric history of Bipolar Affective Disorder, Borderline Personality Disorder, cannabis abuse, multiple inpatient psychiatric hospitalizations (>20), with frequent visits to the [facility] (well-known to staff), 3 prior suicide attempts (last in 2012 via OD), recurrent suicidal gestures and suicidal ideation, history of property destruction when upset, no history of arrests or access to weapons, was BIBEMS activated by [place] staff after patient reportedly attempted to strangle herself with a lanyard.    On evaluation, the pt was observed resting comfortably in bed, drinking water from an unsued urine testing cup. The pt indicated that she is grandiose, having hallucinations and is suffering from "sadness and despair" that caused her to try and kill herself today. She reports being depressed and anhedonic for her entire life because she has no friends, no family and cannot get a job. Today, she was searching for jobs on [employer], felt that life was not worth living and attempted to strangle herself with a lanyard. She noted that it is difficult to commit suicide by strangling, but also thought about using a knife that she has hidden in her room to hurt herself. She has no noted bruises or ligature marks on her neck. She reports overdosing on digoxin in the past which required hospitalization, but is unable to recall how many other suicide attempts she has had in her life or how she attempted them. The pt insisted she needs voluntary hospitalization and does not want to go back home. When pressed about any problems she is having at her residence, Ms. Castillo denied specific issues, repeating that she does not want to go back and would like respite housing if she is not sent to a psychiatric hospital. She states that she would hurt herself with the knife hidden in her room if she goes back and made a gesture of cutting her throat. When asked about her grandiosity, she replies she is the Duchess of [place]. When asked about her hallucinations, she responds that she hears her own thoughts saying "kill, kill, kill." She also hears the voice of god who calls her his child. Throughout the interview, she remained logical, linear and goal-directed without apparent pre-occupation with internal stimuli. She endorsed feelings of hopeless with poor appetite, sleep and energy in addition to her depression and anhedonia. She endorses being easily distracted, being impulsive, having grandiose thoughts, racing thoughts, poor sleep and pressured speech despite no evidence of impaired attention or pressured speech on exam with an ability to coherently and appropriately answer all questions. The pt denies HI. She denies that she is on medication, later stating she cannot remember if she takes pills.    On re-evaluation, when confronted by the collateral information obtained from her , the pt admitted to being upset about her AOT hearing and that she does not want to go to the hearing. When informed that it is important to attend the hearing, she verbalized understanding of the need to attend. She reported that she does not know where the knife in her room is, but would not tell the examiner if she did know. She indicated she still had thoughts of hurting herself, but requested to take a taxi home.    Per collateral obtained from pt's  Javid ([phone]) - The pt had a meeting at 10 am this morning regarding her upcoming AOT hearing on Tuesday. Shortly after being informed that she would have to attend the hearing, the  states the pt reported dizziness with CAH from god telling her to kill people. She also expressed SI to use a knife hidden in her room to cut herself. While the  activated 911, the pt returned to her residence to use the bathroom and came out with a lanyard that she pulled tight across her neck in front of staff. Staff was able to verbally re-direct her to let go of the lanyard. The  reports that for the past 2 weeks, the pt has been at her baseline, with some episodes of verbal altercations, but otherwise sleeping well, no evidence of AH/VH or pressured speech and racing thoughts. She reports the pt has full compliance with her current regimen of prolixin dec, klonopin, famotidine, olanzapine and lithium and is actively followed by a psychiatrist on her ACT team. Aside from today, the  is not aware of any other SI or HI expressed within the past two weeks.

## 2018-08-17 NOTE — ED PROVIDER NOTE - OBJECTIVE STATEMENT
38F with PMH of BPD and bipolar d/o who comes in with suicidal ideation.  She states that she pulled the string with her keys tight against her neck but did not experience strangulation.  She hears voices telling her to kill herself.  No HI.  No ingestions.  Denies drugs/ETOH.

## 2018-08-17 NOTE — ED BEHAVIORAL HEALTH ASSESSMENT NOTE - CURRENT MEDICATION
List obtained from [facility] :  Lithium 300 mg qd, 900 mg qHS, Zyprexa 10 mg qam, 20 mg qHS, Klonopin 0.5mg BID; Prolixin Decanoate 25 mg IM Q2 weeks, famotidine 20 mg qd, proair q6h prn

## 2018-08-17 NOTE — ED ADULT NURSE NOTE - NSIMPLEMENTINTERV_GEN_ALL_ED
Implemented All Universal Safety Interventions:  [place] to call system. Call bell, personal items and telephone within reach. Instruct patient to call for assistance. Room bathroom lighting operational. Non-slip footwear when patient is off stretcher. Physically safe environment: no spills, clutter or unnecessary equipment. Stretcher in lowest position, wheels locked, appropriate side rails in place.

## 2018-08-17 NOTE — ED BEHAVIORAL HEALTH ASSESSMENT NOTE - OTHER PAST PSYCHIATRIC HISTORY (INCLUDE DETAILS REGARDING ONSET, COURSE OF ILLNESS, INPATIENT/OUTPATIENT TREATMENT)
lifetime inpatient admissions > 20. Numerous [facility] ED visits. Most recently inpatient at [facility] in March 2018.  - 3 prior suicide attempts (last in 2012 via OD) as per records, recurrent suicidal gestures and suicidal ideation, history of property destruction ((breaking TV screens while hospitalized) when upset / acting out   - long hx of sexually provocative, acting out behaviors (during last [facility] inpatient admission >2 years ago, patient had to be placed on CO 1:1 after trying to perform oral sex on a male patient on the dayroom, taken off CO then was going into male patient's room, overheard offering them sex and was placed back on CO  - in 2017 was also followed by ACT Team [therapist Katie [phone], ACT ([facility])/ Victor Manuel Browning : [phone]; AOT /Grace Donnelly: [phone]; Psychiatrist / Dr. Flores: [phone], [phone], [phone]], lifetime inpatient admissions > 20. Numerous [facility] ED visits. Currently followed by ACT team. Most recently inpatient at [facility] in March 2018.  - 3 prior suicide attempts (last in 2012 via OD) as per records, recurrent suicidal gestures and suicidal ideation, history of property destruction ((breaking TV screens while hospitalized) when upset / acting out   - long hx of sexually provocative, acting out behaviors (during last [facility] inpatient admission >2 years ago, patient had to be placed on CO 1:1 after trying to perform oral sex on a male patient on the dayroom, taken off CO then was going into male patient's room, overheard offering them sex and was placed back on CO

## 2018-08-17 NOTE — ED BEHAVIORAL HEALTH ASSESSMENT NOTE - RISK ASSESSMENT
patient has chronic risks of multiple admissions, prior suicide attempts, prior property destruction, high suspicion for prostitution, prior non compliance, prior substance abuse. No acute risks are identified. Protective factors include treatment compliance, no recent violence, denying recent substance abuse, no known access to weapons, no known legal issues, lack of acute mood/psychotic epsidoe, no suicidal ideation intent or plan, no homicidal ideation, supervised living environment and engaged in treatment. Patient has elevated chronic/non-modifiable risks but does not appear to have any current elevated risk from baseline. Suspect malingering/housing seeking is primary motivation for today's visit. Patient is appropriate for outpatient level of care at this time. patient has chronic risks of multiple admissions, prior suicide attempts, prior property destruction, high suspicion for prostitution, prior non compliance, prior substance abuse with current SI in the context of an active psychosocial stressor. Protective factors include treatment compliance, no recent violence, no known recent substance use, no known access to firearms, no known legal issues, lack of active manic/psychotic symptoms, no homicidal ideation, supervised living environment and engaged in treatment. Patient has elevated chronic/non-modifiable risks but does not appear to have any current elevated risk from baseline. There is likely a an element of malingering present given her desire to obtain new housing in the context of a stressful meeting with her  about AOT. Patient is appropriate for outpatient level of care at this time.

## 2018-08-17 NOTE — ED ADULT TRIAGE NOTE - CHIEF COMPLAINT QUOTE
Patient brought to ER from home by EMS for c/o suicidal ideations. Pt attempted to cut her neck with her keys. Patient brought to ER from home by EMS for c/o suicidal ideations with hallucinations. Pt attempted to cut her neck with her keys.

## 2018-08-17 NOTE — ED BEHAVIORAL HEALTH ASSESSMENT NOTE - DESCRIPTION
Pt is calm and cooperative. No medication given.     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

## 2018-08-17 NOTE — ED BEHAVIORAL HEALTH ASSESSMENT NOTE - DETAILS
[facility] staff notified 3 prior suicide attempts (last in 2012 via OD) as per records, recurrent suicidal gestures and suicidal ideation, see HPI for current thoughts to hurt herself with a knife history of property destruction ((breaking TV screens while hospitalized) when upset / acting out Poor response to Geodon; Depakote (Increased ammonia levels) ? history of prostitution Left voicemail for [facility]  Javid informing her of pt's disposition

## 2018-08-17 NOTE — ED BEHAVIORAL HEALTH ASSESSMENT NOTE - SUMMARY
Patient is a single, 38-year-old  female, non caregiver, unemployed, domiciled on [place] grounds, with past psychiatric history of Bipolar Affective Disorder, Borderline Personality Disorder, cannabis abuse, multiple inpatient psychiatric hospitalizations (>20), with frequent visits to the [facility] (well-known to staff), 3 prior suicide attempts (last in 2012 via OD), recurrent suicidal gestures and suicidal ideation, history of property destruction when upset, no history of arrests or access to weapons, was BIBEMS activated by [facility] staff after patient reportedly kneeled down and began crying asking to go to the hospital.  Patient reports chronic depression vs dysthymia and chronic dislike of living situation. She presents actively seeking admission and then asks to leave if beds are not available. Patient does not currently meet criteria for a major depressive episode, denies suicidal ideation and homicidal ideation, does not appear grossly psychotic, manic or thought disordered. Compliant with medications and in good behavioral control. No indication for psychiatric admission. Ms. Castillo is a single, 38-year-old  female, non caregiver, unemployed, domiciled on [place] grounds, with past psychiatric history of Bipolar Affective Disorder, Borderline Personality Disorder, cannabis abuse, multiple inpatient psychiatric hospitalizations (>20), with frequent visits to the [facility] (well-known to staff), 3 prior suicide attempts (last in 2012 via OD), recurrent suicidal gestures and suicidal ideation, history of property destruction when upset, no history of arrests or access to weapons, was BIBEMS activated by [place] staff after patient reportedly attempted to strangle herself with a lanyard.    Pt reports suffering from chronic depressive symptoms with exacerbations triggered by various psychosocial stressors, today of which involved a meeting regarding her AOT hearing. She requested voluntary admission to a psychiatric facility and respite housing if she were not to be admitted because she does not want to return home. She endorses nearly every psychiatric ROS, but these reported symptoms are inconsistent with her full, euthymic affect and information obtained by collateral. She is logical, linear and goal-directed in thought without apparent internal preoccupation and normal speech despite her endorsement of flight of ideas, hallucinations, grandiosity and being easily distracted. She does not appear actively manic, psychotic or intoxicated. She is endorsing SI with a plan to use a knife to hurt herself, but she has an extensive history of parasuicidal gestures and suicidal ideation. Pt has not attempted to injure/commit suicide via cutting despite her indication that she has access to a knife. She is compliant with medications and able to maintain good behavioral control. Collateral information suggests that the pt has been at her baseline without an acute exacerbation in symptoms until this morning after experiencing the previously described psychosocial stressor. The pt does not meet criteria for psychiatric hospitalization at this time. Ms. Castillo is a single, 38-year-old  female, non caregiver, unemployed, domiciled on [place] grounds, with past psychiatric history of Bipolar Affective Disorder, Borderline Personality Disorder, cannabis abuse, multiple inpatient psychiatric hospitalizations (>20), with frequent visits to the [facility] (well-known to staff), 3 prior suicide attempts (last in 2012 via OD), recurrent suicidal gestures and suicidal ideation, history of property destruction when upset, no history of arrests or access to weapons, was BIBEMS activated by [place] staff after patient reportedly attempted to strangle herself with a lanyard.    Pt reports suffering from chronic depressive symptoms with exacerbations triggered by various psychosocial stressors, today of which involved a meeting regarding her AOT hearing. She requested voluntary admission to a psychiatric facility and respite housing if she were not to be admitted because she does not want to return home. She endorses nearly every psychiatric ROS, but these reported symptoms are inconsistent with her full, euthymic affect and information obtained by collateral. On re-evaluation, the pt admitted to numerous inconsistencies in her story, admitting to being upset about her pending AOT case and report compliance with medication. She is logical, linear and goal-directed in thought without apparent internal preoccupation and normal speech despite her endorsement of flight of ideas, hallucinations, grandiosity and being easily distracted. She does not appear actively manic, psychotic or intoxicated. She is endorsing SI with a plan to use a knife to hurt herself, but she has an extensive history of parasuicidal gestures and suicidal ideation. Pt has not attempted to injure/commit suicide via cutting despite her indication that she has access to a knife. She is compliant with medications and able to maintain good behavioral control. Collateral information suggests that the pt has been at her baseline without an acute exacerbation in symptoms until this morning after experiencing the previously described psychosocial stressor. The pt does not meet criteria for psychiatric hospitalization at this time.

## 2018-08-17 NOTE — ED BEHAVIORAL HEALTH ASSESSMENT NOTE - DESCRIPTION (FIRST USE, LAST USE, QUANTITY, FREQUENCY, DURATION)
long hx of cannabis use, pt denies current use charted hx of prior use (unclear if it amounted to "abuse" criteria or just recreational use), pt denies current use heroin - charted hx of prior use (unclear if it amounted to "abuse" criteria or just recreational use), pt denies current use
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
## 2018-08-17 NOTE — ED PROVIDER NOTE - MEDICAL DECISION MAKING DETAILS
[place]: 38F with PMH of BPD and bipolar d/o who comes in with suicidal ideation.  She states that she pulled the string with her keys tight against her neck but did not experience strangulation.  She hears voices telling her to kill herself.  No HI.  No ingestions.  Denies drugs/ETOH.  Exam normal, no sign of strangulation injury.  Will send screening labs and consult psych.

## 2018-08-17 NOTE — ED ADULT NURSE REASSESSMENT NOTE - NS ED NURSE REASSESS COMMENT FT1
pt calm & cooperative responding to directions verbalizing understanding of d/c instructions pt denies Si/Hi currently.

## 2018-08-29 NOTE — ED BEHAVIORAL HEALTH ASSESSMENT NOTE - FAMILY HISTORY OF MEDICAL ILLNESS
Discharge Note:  Patient discharged home with spouse via wheelchair. AVS reviewed with patient indepth and patient verbalized understanding of medications and follow up appointments.  Strongly encouraged patient to take lactulose 3 times a day, need to have 3 bowel movements a day.   None known

## 2018-09-01 ENCOUNTER — INPATIENT (INPATIENT)
Facility: HOSPITAL | Age: 38
LOS: 9 days | Discharge: HOME | End: 2018-09-11
Attending: PSYCHIATRY & NEUROLOGY | Admitting: PSYCHIATRY & NEUROLOGY
Payer: MEDICARE

## 2018-09-01 VITALS
OXYGEN SATURATION: 98 % | DIASTOLIC BLOOD PRESSURE: 96 MMHG | RESPIRATION RATE: 16 BRPM | SYSTOLIC BLOOD PRESSURE: 137 MMHG | TEMPERATURE: 99 F | HEART RATE: 113 BPM

## 2018-09-01 DIAGNOSIS — F31.9 BIPOLAR DISORDER, UNSPECIFIED: ICD-10-CM

## 2018-09-01 LAB
ALBUMIN SERPL ELPH-MCNC: 3.9 G/DL — SIGNIFICANT CHANGE UP (ref 3.3–5)
ALP SERPL-CCNC: 109 U/L — SIGNIFICANT CHANGE UP (ref 40–120)
ALT FLD-CCNC: 15 U/L — SIGNIFICANT CHANGE UP (ref 4–33)
APAP SERPL-MCNC: < 15 UG/ML — LOW (ref 15–25)
AST SERPL-CCNC: 16 U/L — SIGNIFICANT CHANGE UP (ref 4–32)
BASOPHILS # BLD AUTO: 0.03 K/UL — SIGNIFICANT CHANGE UP (ref 0–0.2)
BASOPHILS NFR BLD AUTO: 0.3 % — SIGNIFICANT CHANGE UP (ref 0–2)
BILIRUB SERPL-MCNC: 0.3 MG/DL — SIGNIFICANT CHANGE UP (ref 0.2–1.2)
BUN SERPL-MCNC: 6 MG/DL — LOW (ref 7–23)
CALCIUM SERPL-MCNC: 9.6 MG/DL — SIGNIFICANT CHANGE UP (ref 8.4–10.5)
CHLORIDE SERPL-SCNC: 103 MMOL/L — SIGNIFICANT CHANGE UP (ref 98–107)
CO2 SERPL-SCNC: 23 MMOL/L — SIGNIFICANT CHANGE UP (ref 22–31)
CREAT SERPL-MCNC: 0.93 MG/DL — SIGNIFICANT CHANGE UP (ref 0.5–1.3)
EOSINOPHIL # BLD AUTO: 0.25 K/UL — SIGNIFICANT CHANGE UP (ref 0–0.5)
EOSINOPHIL NFR BLD AUTO: 2.1 % — SIGNIFICANT CHANGE UP (ref 0–6)
ETHANOL BLD-MCNC: < 10 MG/DL — SIGNIFICANT CHANGE UP
GLUCOSE SERPL-MCNC: 92 MG/DL — SIGNIFICANT CHANGE UP (ref 70–99)
HCG SERPL-ACNC: < 5 MIU/ML — SIGNIFICANT CHANGE UP
HCT VFR BLD CALC: 38.5 % — SIGNIFICANT CHANGE UP (ref 34.5–45)
HGB BLD-MCNC: 12 G/DL — SIGNIFICANT CHANGE UP (ref 11.5–15.5)
IMM GRANULOCYTES # BLD AUTO: 0.03 # — SIGNIFICANT CHANGE UP
IMM GRANULOCYTES NFR BLD AUTO: 0.3 % — SIGNIFICANT CHANGE UP (ref 0–1.5)
LITHIUM SERPL-MCNC: 0.69 MMOL/L — SIGNIFICANT CHANGE UP (ref 0.6–1.2)
LYMPHOCYTES # BLD AUTO: 1.6 K/UL — SIGNIFICANT CHANGE UP (ref 1–3.3)
LYMPHOCYTES # BLD AUTO: 13.7 % — SIGNIFICANT CHANGE UP (ref 13–44)
MCHC RBC-ENTMCNC: 28.7 PG — SIGNIFICANT CHANGE UP (ref 27–34)
MCHC RBC-ENTMCNC: 31.2 % — LOW (ref 32–36)
MCV RBC AUTO: 92.1 FL — SIGNIFICANT CHANGE UP (ref 80–100)
MONOCYTES # BLD AUTO: 0.77 K/UL — SIGNIFICANT CHANGE UP (ref 0–0.9)
MONOCYTES NFR BLD AUTO: 6.6 % — SIGNIFICANT CHANGE UP (ref 2–14)
NEUTROPHILS # BLD AUTO: 8.96 K/UL — HIGH (ref 1.8–7.4)
NEUTROPHILS NFR BLD AUTO: 77 % — SIGNIFICANT CHANGE UP (ref 43–77)
NRBC # FLD: 0 — SIGNIFICANT CHANGE UP
PLATELET # BLD AUTO: 274 K/UL — SIGNIFICANT CHANGE UP (ref 150–400)
PMV BLD: 8.9 FL — SIGNIFICANT CHANGE UP (ref 7–13)
POTASSIUM SERPL-MCNC: 3.8 MMOL/L — SIGNIFICANT CHANGE UP (ref 3.5–5.3)
POTASSIUM SERPL-SCNC: 3.8 MMOL/L — SIGNIFICANT CHANGE UP (ref 3.5–5.3)
PROT SERPL-MCNC: 8.1 G/DL — SIGNIFICANT CHANGE UP (ref 6–8.3)
RBC # BLD: 4.18 M/UL — SIGNIFICANT CHANGE UP (ref 3.8–5.2)
RBC # FLD: 13.8 % — SIGNIFICANT CHANGE UP (ref 10.3–14.5)
SALICYLATES SERPL-MCNC: < 5 MG/DL — LOW (ref 15–30)
SODIUM SERPL-SCNC: 140 MMOL/L — SIGNIFICANT CHANGE UP (ref 135–145)
TSH SERPL-MCNC: 1.64 UIU/ML — SIGNIFICANT CHANGE UP (ref 0.27–4.2)
WBC # BLD: 11.64 K/UL — HIGH (ref 3.8–10.5)
WBC # FLD AUTO: 11.64 K/UL — HIGH (ref 3.8–10.5)

## 2018-09-01 PROCEDURE — 99285 EMERGENCY DEPT VISIT HI MDM: CPT

## 2018-09-01 RX ORDER — OLANZAPINE 15 MG/1
10 TABLET, FILM COATED ORAL DAILY
Qty: 0 | Refills: 0 | Status: DISCONTINUED | OUTPATIENT
Start: 2018-09-01 | End: 2018-09-01

## 2018-09-01 RX ORDER — OLANZAPINE 15 MG/1
20 TABLET, FILM COATED ORAL AT BEDTIME
Qty: 0 | Refills: 0 | Status: DISCONTINUED | OUTPATIENT
Start: 2018-09-01 | End: 2018-09-01

## 2018-09-01 RX ORDER — CLONAZEPAM 1 MG
0.5 TABLET ORAL
Qty: 0 | Refills: 0 | Status: DISCONTINUED | OUTPATIENT
Start: 2018-09-01 | End: 2018-09-01

## 2018-09-01 RX ORDER — CLONAZEPAM 1 MG
1 TABLET ORAL
Qty: 0 | Refills: 0 | Status: DISCONTINUED | OUTPATIENT
Start: 2018-09-01 | End: 2018-09-06

## 2018-09-01 RX ORDER — OLANZAPINE 15 MG/1
20 TABLET, FILM COATED ORAL AT BEDTIME
Qty: 0 | Refills: 0 | Status: DISCONTINUED | OUTPATIENT
Start: 2018-09-01 | End: 2018-09-05

## 2018-09-01 RX ORDER — FAMOTIDINE 10 MG/ML
20 INJECTION INTRAVENOUS DAILY
Qty: 0 | Refills: 0 | Status: DISCONTINUED | OUTPATIENT
Start: 2018-09-01 | End: 2018-09-01

## 2018-09-01 RX ORDER — LITHIUM CARBONATE 300 MG/1
300 TABLET, EXTENDED RELEASE ORAL DAILY
Qty: 0 | Refills: 0 | Status: DISCONTINUED | OUTPATIENT
Start: 2018-09-01 | End: 2018-09-01

## 2018-09-01 RX ORDER — LITHIUM CARBONATE 300 MG/1
900 TABLET, EXTENDED RELEASE ORAL AT BEDTIME
Qty: 0 | Refills: 0 | Status: DISCONTINUED | OUTPATIENT
Start: 2018-09-01 | End: 2018-09-01

## 2018-09-01 RX ORDER — OLANZAPINE 15 MG/1
10 TABLET, FILM COATED ORAL DAILY
Qty: 0 | Refills: 0 | Status: DISCONTINUED | OUTPATIENT
Start: 2018-09-01 | End: 2018-09-11

## 2018-09-01 RX ORDER — FAMOTIDINE 10 MG/ML
20 INJECTION INTRAVENOUS DAILY
Qty: 0 | Refills: 0 | Status: DISCONTINUED | OUTPATIENT
Start: 2018-09-01 | End: 2018-09-11

## 2018-09-01 RX ORDER — LITHIUM CARBONATE 300 MG/1
300 TABLET, EXTENDED RELEASE ORAL DAILY
Qty: 0 | Refills: 0 | Status: DISCONTINUED | OUTPATIENT
Start: 2018-09-01 | End: 2018-09-11

## 2018-09-01 RX ORDER — LITHIUM CARBONATE 300 MG/1
900 TABLET, EXTENDED RELEASE ORAL AT BEDTIME
Qty: 0 | Refills: 0 | Status: DISCONTINUED | OUTPATIENT
Start: 2018-09-01 | End: 2018-09-11

## 2018-09-01 RX ADMIN — OLANZAPINE 20 MILLIGRAM(S): 15 TABLET, FILM COATED ORAL at 20:36

## 2018-09-01 RX ADMIN — LITHIUM CARBONATE 900 MILLIGRAM(S): 300 TABLET, EXTENDED RELEASE ORAL at 20:36

## 2018-09-01 RX ADMIN — Medication 1 MILLIGRAM(S): at 20:36

## 2018-09-01 NOTE — ED ADULT TRIAGE NOTE - CHIEF COMPLAINT QUOTE
ar[place] with EMS and [employer] from [facility] for evaluation of aggressive behavior and non complaint with meds x 2 days. Denies SI, no HI, no drugs, no alcohol.  PMH: Bipolar disorder, GERD, borderline personality disorder, depression, asthma, fibroids, obesity,

## 2018-09-01 NOTE — ED PROVIDER NOTE - OBJECTIVE STATEMENT
39 yo female with a h/o bordeline personality and bipolar disorder on unknown meds as per patient BIBEMS for aggressive behavior and agitation at [facility]. Pt has not taken her meds in several days. Pt is unclear why she is in the ED- admits to not taking meds and admits to feeling suicidal. Pt endorses previous suicide attempts with medications. NO audio or visual hallucinations. Pt generally a poor historian. Pt touching her vagina during the interview.
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
## 2018-09-01 NOTE — ED BEHAVIORAL HEALTH ASSESSMENT NOTE - DESCRIPTION
Pt is cooperative but disinhibited. No medication given.     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

## 2018-09-01 NOTE — ED ADULT NURSE NOTE - CHIEF COMPLAINT QUOTE
ar[place] with EMS and [employer] from [facility] for evaluation of aggressive behavior and non complaint with meds x 2 days. Denies SI, no HI, no drugs, no alcohol.  PMH: Bipolar disorder, GERD, borderline personality disorder, depression, asthma, fibroids, obesity,

## 2018-09-01 NOTE — ED PROVIDER NOTE - MEDICAL DECISION MAKING DETAILS
37 yo female with recent medication noncompliance now here for agitation. pt calm on my evaluation but touching her genitals. will consult psych and put pt in  for evaluation.
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
## 2018-09-01 NOTE — ED ADULT NURSE REASSESSMENT NOTE - NS ED NURSE REASSESS COMMENT FT1
pt cooperative in nad denies Si/Hi/AVh presently pt aware of admission to [facility], awaiting transport via security & PES.

## 2018-09-01 NOTE — ED BEHAVIORAL HEALTH ASSESSMENT NOTE - DETAILS
3 prior suicide attempts (last in 2012 via OD) as per records, recurrent suicidal gestures and suicidal ideation, see HPI for current thoughts to hurt kill herself with CO history of property destruction ((breaking TV screens while hospitalized) when upset / acting out, see HPI for current thoughts to hurt others with gun or sword verbal handoff provided to ADN contacted NP Poor response to [place]; Depakote (Increased ammonia levels) ? history of prostitution verbal handoff provided to RIKI

## 2018-09-01 NOTE — ED BEHAVIORAL HEALTH NOTE - BEHAVIORAL HEALTH NOTE
This worker has contacted patient's insurer [employer] [phone]. Automated message states that they are closed for weekend & holiday and recommends call back during normal business hours. No representative is available.
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
## 2018-09-01 NOTE — ED ADULT NURSE NOTE - OBJECTIVE STATEMENT
Received  pt in  [facility] for non compliance of meds & aggression, pt verbalizing Si with no plan pt disorganized  denies /Hi/AVh presently safety & comfort measures maintained, eval on going.

## 2018-09-01 NOTE — ED BEHAVIORAL HEALTH ASSESSMENT NOTE - RISK ASSESSMENT
patient has chronic risks of multiple admissions, prior suicide attempts, prior property destruction, high suspicion for prostitution, prior non compliance, prior substance abuse with current SI in the context of an active psychosocial stressor. Protective factors include treatment compliance, no recent violence, no known recent substance use, no known access to firearms, no known legal issues, lack of active manic/psychotic symptoms, no homicidal ideation, supervised living environment and engaged in treatment. Patient has elevated chronic/non-modifiable risks but does not appear to have any current elevated risk from baseline. There is likely a an element of malingering present given her desire to obtain new housing in the context of a stressful meeting with her  about AOT. Patient is appropriate for outpatient level of care at this time. patient has chronic risks of multiple admissions, prior suicide attempts, prior property destruction, high suspicion for prostitution, prior non compliance, prior substance abuse with current SI in the context of an active psychosocial stressor. Protective factors include no known recent substance use, no known access to firearms, no known legal issues, supervised living environment and engaged in treatment. Patient has elevated chronic/non-modifiable risks and has elevated acute risk given current SI/HI and recent agitation at residence.  Pt denies suicidal/homicidal intent while in the hospital and has been in fair behavioral control while in the hospital, mitigating risks while she is hospitalized; however, she will require regular reassessment for safety.

## 2018-09-01 NOTE — ED BEHAVIORAL HEALTH ASSESSMENT NOTE - PSYCHIATRIC ISSUES AND PLAN (INCLUDE STANDING AND PRN MEDICATION)
Lithium 300 mg daily/900 mg, Zyprexa 10 mg/30 mg, Klonopin 0.5mg BID; Prolixin Decanoate 25 mg IM q2week; ativan 1mg prn for agitation Lithium 300 mg daily/900 mg, Zyprexa 10 mg/20 mg, Klonopin 0.5mg BID; Prolixin Decanoate 25 mg IM q2week; ativan 1mg prn for agitation

## 2018-09-01 NOTE — ED ADULT NURSE NOTE - NSIMPLEMENTINTERV_GEN_ALL_ED
Implemented All Universal Safety Interventions:  [place] to call system. Call bell, personal items and telephone within reach. Instruct patient to call for assistance. Room bathroom lighting operational. Non-slip footwear when patient is off stretcher. Physically safe environment: no spills, clutter or unnecessary equipment. Stretcher in lowest position, wheels locked, appropriate side rails in place.

## 2018-09-01 NOTE — ED BEHAVIORAL HEALTH ASSESSMENT NOTE - CURRENT MEDICATION
List obtained from [facility] :  Lithium 300 mg daily/900 mg, Zyprexa 10 mg/30 mg, Klonopin 0.5mg BID; Prolixin Decanoate 25 mg IM q2week, famotidine 20 mg daily List obtained from [facility] :  Lithium 300 mg daily/900 mg, Zyprexa 10 mg/20 mg, Klonopin 0.5mg BID; Prolixin Decanoate 25 mg IM q2week, famotidine 20 mg daily

## 2018-09-02 PROCEDURE — 99222 1ST HOSP IP/OBS MODERATE 55: CPT

## 2018-09-02 RX ADMIN — OLANZAPINE 10 MILLIGRAM(S): 15 TABLET, FILM COATED ORAL at 07:41

## 2018-09-02 RX ADMIN — FAMOTIDINE 20 MILLIGRAM(S): 10 INJECTION INTRAVENOUS at 07:41

## 2018-09-02 RX ADMIN — OLANZAPINE 20 MILLIGRAM(S): 15 TABLET, FILM COATED ORAL at 21:19

## 2018-09-02 RX ADMIN — Medication 1 MILLIGRAM(S): at 07:41

## 2018-09-02 RX ADMIN — Medication 1 MILLIGRAM(S): at 17:53

## 2018-09-02 RX ADMIN — LITHIUM CARBONATE 900 MILLIGRAM(S): 300 TABLET, EXTENDED RELEASE ORAL at 21:19

## 2018-09-02 RX ADMIN — Medication 1 MILLIGRAM(S): at 21:19

## 2018-09-02 RX ADMIN — LITHIUM CARBONATE 300 MILLIGRAM(S): 300 TABLET, EXTENDED RELEASE ORAL at 07:41

## 2018-09-03 PROCEDURE — 99232 SBSQ HOSP IP/OBS MODERATE 35: CPT

## 2018-09-03 RX ADMIN — LITHIUM CARBONATE 300 MILLIGRAM(S): 300 TABLET, EXTENDED RELEASE ORAL at 09:00

## 2018-09-03 RX ADMIN — OLANZAPINE 10 MILLIGRAM(S): 15 TABLET, FILM COATED ORAL at 09:00

## 2018-09-03 RX ADMIN — OLANZAPINE 20 MILLIGRAM(S): 15 TABLET, FILM COATED ORAL at 20:27

## 2018-09-03 RX ADMIN — FAMOTIDINE 20 MILLIGRAM(S): 10 INJECTION INTRAVENOUS at 09:00

## 2018-09-03 RX ADMIN — LITHIUM CARBONATE 900 MILLIGRAM(S): 300 TABLET, EXTENDED RELEASE ORAL at 20:27

## 2018-09-03 RX ADMIN — Medication 1 MILLIGRAM(S): at 20:27

## 2018-09-03 RX ADMIN — Medication 1 MILLIGRAM(S): at 09:00

## 2018-09-04 PROCEDURE — 99232 SBSQ HOSP IP/OBS MODERATE 35: CPT

## 2018-09-04 RX ADMIN — LITHIUM CARBONATE 300 MILLIGRAM(S): 300 TABLET, EXTENDED RELEASE ORAL at 08:33

## 2018-09-04 RX ADMIN — OLANZAPINE 20 MILLIGRAM(S): 15 TABLET, FILM COATED ORAL at 20:21

## 2018-09-04 RX ADMIN — OLANZAPINE 10 MILLIGRAM(S): 15 TABLET, FILM COATED ORAL at 08:33

## 2018-09-04 RX ADMIN — LITHIUM CARBONATE 900 MILLIGRAM(S): 300 TABLET, EXTENDED RELEASE ORAL at 20:21

## 2018-09-04 RX ADMIN — FAMOTIDINE 20 MILLIGRAM(S): 10 INJECTION INTRAVENOUS at 08:33

## 2018-09-04 RX ADMIN — Medication 1 MILLIGRAM(S): at 08:33

## 2018-09-04 RX ADMIN — Medication 1 MILLIGRAM(S): at 20:21

## 2018-09-05 LAB
APPEARANCE UR: CLEAR — SIGNIFICANT CHANGE UP
BACTERIA # UR AUTO: NEGATIVE — SIGNIFICANT CHANGE UP
BILIRUB UR-MCNC: NEGATIVE — SIGNIFICANT CHANGE UP
BLOOD UR QL VISUAL: NEGATIVE — SIGNIFICANT CHANGE UP
COLOR SPEC: COLORLESS — SIGNIFICANT CHANGE UP
GLUCOSE UR-MCNC: NEGATIVE — SIGNIFICANT CHANGE UP
HYALINE CASTS # UR AUTO: NEGATIVE — SIGNIFICANT CHANGE UP
KETONES UR-MCNC: NEGATIVE — SIGNIFICANT CHANGE UP
LEUKOCYTE ESTERASE UR-ACNC: SIGNIFICANT CHANGE UP
NITRITE UR-MCNC: NEGATIVE — SIGNIFICANT CHANGE UP
PH UR: 7.5 — SIGNIFICANT CHANGE UP (ref 5–8)
PROT UR-MCNC: NEGATIVE — SIGNIFICANT CHANGE UP
RBC CASTS # UR COMP ASSIST: SIGNIFICANT CHANGE UP (ref 0–?)
SP GR SPEC: 1.01 — SIGNIFICANT CHANGE UP (ref 1–1.04)
SQUAMOUS # UR AUTO: SIGNIFICANT CHANGE UP
UROBILINOGEN FLD QL: NORMAL — SIGNIFICANT CHANGE UP
WBC UR QL: SIGNIFICANT CHANGE UP (ref 0–?)

## 2018-09-05 PROCEDURE — 99232 SBSQ HOSP IP/OBS MODERATE 35: CPT | Mod: 25

## 2018-09-05 RX ORDER — FLUPHENAZINE HYDROCHLORIDE 1 MG/1
25 TABLET, FILM COATED ORAL ONCE
Qty: 0 | Refills: 0 | Status: COMPLETED | OUTPATIENT
Start: 2018-09-06 | End: 2018-09-06

## 2018-09-05 RX ORDER — OLANZAPINE 15 MG/1
30 TABLET, FILM COATED ORAL AT BEDTIME
Qty: 0 | Refills: 0 | Status: DISCONTINUED | OUTPATIENT
Start: 2018-09-05 | End: 2018-09-11

## 2018-09-05 RX ORDER — FLUPHENAZINE HYDROCHLORIDE 1 MG/1
5 TABLET, FILM COATED ORAL
Qty: 0 | Refills: 0 | Status: DISCONTINUED | OUTPATIENT
Start: 2018-09-05 | End: 2018-09-11

## 2018-09-05 RX ADMIN — FLUPHENAZINE HYDROCHLORIDE 5 MILLIGRAM(S): 1 TABLET, FILM COATED ORAL at 21:45

## 2018-09-05 RX ADMIN — OLANZAPINE 10 MILLIGRAM(S): 15 TABLET, FILM COATED ORAL at 08:35

## 2018-09-05 RX ADMIN — Medication 1 MILLIGRAM(S): at 21:45

## 2018-09-05 RX ADMIN — Medication 1 MILLIGRAM(S): at 08:35

## 2018-09-05 RX ADMIN — OLANZAPINE 30 MILLIGRAM(S): 15 TABLET, FILM COATED ORAL at 21:45

## 2018-09-05 RX ADMIN — FAMOTIDINE 20 MILLIGRAM(S): 10 INJECTION INTRAVENOUS at 08:35

## 2018-09-05 RX ADMIN — LITHIUM CARBONATE 300 MILLIGRAM(S): 300 TABLET, EXTENDED RELEASE ORAL at 08:35

## 2018-09-05 RX ADMIN — LITHIUM CARBONATE 900 MILLIGRAM(S): 300 TABLET, EXTENDED RELEASE ORAL at 21:45

## 2018-09-06 PROCEDURE — 99232 SBSQ HOSP IP/OBS MODERATE 35: CPT

## 2018-09-06 RX ORDER — CLONAZEPAM 1 MG
1 TABLET ORAL
Qty: 0 | Refills: 0 | Status: DISCONTINUED | OUTPATIENT
Start: 2018-09-06 | End: 2018-09-11

## 2018-09-06 RX ADMIN — Medication 1 MILLIGRAM(S): at 21:17

## 2018-09-06 RX ADMIN — FAMOTIDINE 20 MILLIGRAM(S): 10 INJECTION INTRAVENOUS at 10:08

## 2018-09-06 RX ADMIN — OLANZAPINE 30 MILLIGRAM(S): 15 TABLET, FILM COATED ORAL at 21:17

## 2018-09-06 RX ADMIN — Medication 1 MILLIGRAM(S): at 10:10

## 2018-09-06 RX ADMIN — FLUPHENAZINE HYDROCHLORIDE 5 MILLIGRAM(S): 1 TABLET, FILM COATED ORAL at 21:17

## 2018-09-06 RX ADMIN — FLUPHENAZINE HYDROCHLORIDE 5 MILLIGRAM(S): 1 TABLET, FILM COATED ORAL at 10:09

## 2018-09-06 RX ADMIN — LITHIUM CARBONATE 900 MILLIGRAM(S): 300 TABLET, EXTENDED RELEASE ORAL at 21:17

## 2018-09-06 RX ADMIN — FLUPHENAZINE HYDROCHLORIDE 25 MILLIGRAM(S): 1 TABLET, FILM COATED ORAL at 11:44

## 2018-09-06 RX ADMIN — OLANZAPINE 10 MILLIGRAM(S): 15 TABLET, FILM COATED ORAL at 10:10

## 2018-09-06 RX ADMIN — LITHIUM CARBONATE 300 MILLIGRAM(S): 300 TABLET, EXTENDED RELEASE ORAL at 10:09

## 2018-09-07 PROCEDURE — 99232 SBSQ HOSP IP/OBS MODERATE 35: CPT

## 2018-09-07 RX ADMIN — OLANZAPINE 10 MILLIGRAM(S): 15 TABLET, FILM COATED ORAL at 09:02

## 2018-09-07 RX ADMIN — FAMOTIDINE 20 MILLIGRAM(S): 10 INJECTION INTRAVENOUS at 09:01

## 2018-09-07 RX ADMIN — FLUPHENAZINE HYDROCHLORIDE 5 MILLIGRAM(S): 1 TABLET, FILM COATED ORAL at 21:12

## 2018-09-07 RX ADMIN — Medication 1 MILLIGRAM(S): at 09:01

## 2018-09-07 RX ADMIN — OLANZAPINE 30 MILLIGRAM(S): 15 TABLET, FILM COATED ORAL at 21:12

## 2018-09-07 RX ADMIN — FLUPHENAZINE HYDROCHLORIDE 5 MILLIGRAM(S): 1 TABLET, FILM COATED ORAL at 09:01

## 2018-09-07 RX ADMIN — LITHIUM CARBONATE 900 MILLIGRAM(S): 300 TABLET, EXTENDED RELEASE ORAL at 21:12

## 2018-09-07 RX ADMIN — LITHIUM CARBONATE 300 MILLIGRAM(S): 300 TABLET, EXTENDED RELEASE ORAL at 09:02

## 2018-09-07 RX ADMIN — Medication 1 MILLIGRAM(S): at 21:12

## 2018-09-08 RX ADMIN — OLANZAPINE 10 MILLIGRAM(S): 15 TABLET, FILM COATED ORAL at 08:04

## 2018-09-08 RX ADMIN — FAMOTIDINE 20 MILLIGRAM(S): 10 INJECTION INTRAVENOUS at 08:03

## 2018-09-08 RX ADMIN — FLUPHENAZINE HYDROCHLORIDE 5 MILLIGRAM(S): 1 TABLET, FILM COATED ORAL at 21:51

## 2018-09-08 RX ADMIN — FLUPHENAZINE HYDROCHLORIDE 5 MILLIGRAM(S): 1 TABLET, FILM COATED ORAL at 08:03

## 2018-09-08 RX ADMIN — Medication 1 MILLIGRAM(S): at 21:51

## 2018-09-08 RX ADMIN — LITHIUM CARBONATE 300 MILLIGRAM(S): 300 TABLET, EXTENDED RELEASE ORAL at 08:03

## 2018-09-08 RX ADMIN — Medication 1 MILLIGRAM(S): at 08:03

## 2018-09-08 RX ADMIN — OLANZAPINE 30 MILLIGRAM(S): 15 TABLET, FILM COATED ORAL at 21:51

## 2018-09-08 RX ADMIN — LITHIUM CARBONATE 900 MILLIGRAM(S): 300 TABLET, EXTENDED RELEASE ORAL at 21:51

## 2018-09-09 RX ADMIN — OLANZAPINE 30 MILLIGRAM(S): 15 TABLET, FILM COATED ORAL at 23:23

## 2018-09-09 RX ADMIN — LITHIUM CARBONATE 300 MILLIGRAM(S): 300 TABLET, EXTENDED RELEASE ORAL at 08:07

## 2018-09-09 RX ADMIN — LITHIUM CARBONATE 900 MILLIGRAM(S): 300 TABLET, EXTENDED RELEASE ORAL at 23:23

## 2018-09-09 RX ADMIN — FAMOTIDINE 20 MILLIGRAM(S): 10 INJECTION INTRAVENOUS at 08:07

## 2018-09-09 RX ADMIN — Medication 1 MILLIGRAM(S): at 23:23

## 2018-09-09 RX ADMIN — Medication 1 MILLIGRAM(S): at 08:07

## 2018-09-09 RX ADMIN — OLANZAPINE 10 MILLIGRAM(S): 15 TABLET, FILM COATED ORAL at 08:07

## 2018-09-09 RX ADMIN — FLUPHENAZINE HYDROCHLORIDE 5 MILLIGRAM(S): 1 TABLET, FILM COATED ORAL at 23:23

## 2018-09-09 RX ADMIN — FLUPHENAZINE HYDROCHLORIDE 5 MILLIGRAM(S): 1 TABLET, FILM COATED ORAL at 08:07

## 2018-09-09 RX ADMIN — Medication 30 MILLILITER(S): at 10:23

## 2018-09-10 PROCEDURE — 99232 SBSQ HOSP IP/OBS MODERATE 35: CPT

## 2018-09-10 RX ORDER — LITHIUM CARBONATE 300 MG/1
1 TABLET, EXTENDED RELEASE ORAL
Qty: 60 | Refills: 0 | OUTPATIENT
Start: 2018-09-10

## 2018-09-10 RX ORDER — OLANZAPINE 15 MG/1
2 TABLET, FILM COATED ORAL
Qty: 60 | Refills: 0 | OUTPATIENT
Start: 2018-09-10

## 2018-09-10 RX ORDER — FLUPHENAZINE HYDROCHLORIDE 1 MG/1
1 TABLET, FILM COATED ORAL
Qty: 60 | Refills: 0 | OUTPATIENT
Start: 2018-09-10

## 2018-09-10 RX ORDER — LITHIUM CARBONATE 300 MG/1
1 TABLET, EXTENDED RELEASE ORAL
Qty: 30 | Refills: 0 | OUTPATIENT
Start: 2018-09-10

## 2018-09-10 RX ORDER — FAMOTIDINE 10 MG/ML
1 INJECTION INTRAVENOUS
Qty: 30 | Refills: 0 | OUTPATIENT
Start: 2018-09-10

## 2018-09-10 RX ORDER — OLANZAPINE 15 MG/1
1 TABLET, FILM COATED ORAL
Qty: 30 | Refills: 0 | OUTPATIENT
Start: 2018-09-10

## 2018-09-10 RX ORDER — CLONAZEPAM 1 MG
1 TABLET ORAL
Qty: 30 | Refills: 0 | OUTPATIENT
Start: 2018-09-10

## 2018-09-10 RX ORDER — FLUPHENAZINE HYDROCHLORIDE 1 MG/1
0.5 TABLET, FILM COATED ORAL
Qty: 0 | Refills: 0 | COMMUNITY

## 2018-09-10 RX ADMIN — FLUPHENAZINE HYDROCHLORIDE 5 MILLIGRAM(S): 1 TABLET, FILM COATED ORAL at 21:19

## 2018-09-10 RX ADMIN — OLANZAPINE 30 MILLIGRAM(S): 15 TABLET, FILM COATED ORAL at 21:19

## 2018-09-10 RX ADMIN — FAMOTIDINE 20 MILLIGRAM(S): 10 INJECTION INTRAVENOUS at 09:09

## 2018-09-10 RX ADMIN — Medication 1 MILLIGRAM(S): at 09:09

## 2018-09-10 RX ADMIN — LITHIUM CARBONATE 900 MILLIGRAM(S): 300 TABLET, EXTENDED RELEASE ORAL at 21:19

## 2018-09-10 RX ADMIN — LITHIUM CARBONATE 300 MILLIGRAM(S): 300 TABLET, EXTENDED RELEASE ORAL at 09:09

## 2018-09-10 RX ADMIN — FLUPHENAZINE HYDROCHLORIDE 5 MILLIGRAM(S): 1 TABLET, FILM COATED ORAL at 09:09

## 2018-09-10 RX ADMIN — OLANZAPINE 10 MILLIGRAM(S): 15 TABLET, FILM COATED ORAL at 09:09

## 2018-09-10 RX ADMIN — Medication 1 MILLIGRAM(S): at 21:19

## 2018-09-11 VITALS — TEMPERATURE: 97 F

## 2018-09-11 LAB — LITHIUM SERPL-MCNC: 0.84 MMOL/L — SIGNIFICANT CHANGE UP (ref 0.6–1.2)

## 2018-09-11 PROCEDURE — 99238 HOSP IP/OBS DSCHRG MGMT 30/<: CPT

## 2018-09-11 RX ADMIN — FLUPHENAZINE HYDROCHLORIDE 5 MILLIGRAM(S): 1 TABLET, FILM COATED ORAL at 09:07

## 2018-09-11 RX ADMIN — LITHIUM CARBONATE 300 MILLIGRAM(S): 300 TABLET, EXTENDED RELEASE ORAL at 09:07

## 2018-09-11 RX ADMIN — Medication 1 MILLIGRAM(S): at 09:08

## 2018-09-11 RX ADMIN — FAMOTIDINE 20 MILLIGRAM(S): 10 INJECTION INTRAVENOUS at 09:07

## 2018-09-11 RX ADMIN — OLANZAPINE 10 MILLIGRAM(S): 15 TABLET, FILM COATED ORAL at 09:07

## 2018-10-17 ENCOUNTER — INPATIENT (INPATIENT)
Facility: HOSPITAL | Age: 38
LOS: 12 days | Discharge: HOME | End: 2018-10-30
Attending: PSYCHIATRY & NEUROLOGY | Admitting: PSYCHIATRY & NEUROLOGY
Payer: COMMERCIAL

## 2018-10-17 VITALS
HEART RATE: 114 BPM | RESPIRATION RATE: 18 BRPM | DIASTOLIC BLOOD PRESSURE: 96 MMHG | SYSTOLIC BLOOD PRESSURE: 133 MMHG | TEMPERATURE: 99 F

## 2018-10-17 DIAGNOSIS — R69 ILLNESS, UNSPECIFIED: ICD-10-CM

## 2018-10-17 DIAGNOSIS — F31.9 BIPOLAR DISORDER, UNSPECIFIED: ICD-10-CM

## 2018-10-17 DIAGNOSIS — F25.0 SCHIZOAFFECTIVE DISORDER, BIPOLAR TYPE: ICD-10-CM

## 2018-10-17 LAB
ALBUMIN SERPL ELPH-MCNC: 3.8 G/DL — SIGNIFICANT CHANGE UP (ref 3.3–5)
ALP SERPL-CCNC: 119 U/L — SIGNIFICANT CHANGE UP (ref 40–120)
ALT FLD-CCNC: 24 U/L — SIGNIFICANT CHANGE UP (ref 4–33)
APAP SERPL-MCNC: < 15 UG/ML — LOW (ref 15–25)
AST SERPL-CCNC: 22 U/L — SIGNIFICANT CHANGE UP (ref 4–32)
BASOPHILS # BLD AUTO: 0.03 K/UL — SIGNIFICANT CHANGE UP (ref 0–0.2)
BASOPHILS NFR BLD AUTO: 0.3 % — SIGNIFICANT CHANGE UP (ref 0–2)
BILIRUB SERPL-MCNC: 0.3 MG/DL — SIGNIFICANT CHANGE UP (ref 0.2–1.2)
BUN SERPL-MCNC: 9 MG/DL — SIGNIFICANT CHANGE UP (ref 7–23)
CALCIUM SERPL-MCNC: 9.1 MG/DL — SIGNIFICANT CHANGE UP (ref 8.4–10.5)
CHLORIDE SERPL-SCNC: 106 MMOL/L — SIGNIFICANT CHANGE UP (ref 98–107)
CO2 SERPL-SCNC: 22 MMOL/L — SIGNIFICANT CHANGE UP (ref 22–31)
CREAT SERPL-MCNC: 0.81 MG/DL — SIGNIFICANT CHANGE UP (ref 0.5–1.3)
EOSINOPHIL # BLD AUTO: 0.43 K/UL — SIGNIFICANT CHANGE UP (ref 0–0.5)
EOSINOPHIL NFR BLD AUTO: 3.9 % — SIGNIFICANT CHANGE UP (ref 0–6)
ETHANOL BLD-MCNC: < 10 MG/DL — SIGNIFICANT CHANGE UP
GLUCOSE SERPL-MCNC: 109 MG/DL — HIGH (ref 70–99)
HCG SERPL-ACNC: < 5 MIU/ML — SIGNIFICANT CHANGE UP
HCT VFR BLD CALC: 35.2 % — SIGNIFICANT CHANGE UP (ref 34.5–45)
HGB BLD-MCNC: 10.9 G/DL — LOW (ref 11.5–15.5)
IMM GRANULOCYTES # BLD AUTO: 0.05 # — SIGNIFICANT CHANGE UP
IMM GRANULOCYTES NFR BLD AUTO: 0.4 % — SIGNIFICANT CHANGE UP (ref 0–1.5)
LYMPHOCYTES # BLD AUTO: 1.95 K/UL — SIGNIFICANT CHANGE UP (ref 1–3.3)
LYMPHOCYTES # BLD AUTO: 17.5 % — SIGNIFICANT CHANGE UP (ref 13–44)
MCHC RBC-ENTMCNC: 28.5 PG — SIGNIFICANT CHANGE UP (ref 27–34)
MCHC RBC-ENTMCNC: 31 % — LOW (ref 32–36)
MCV RBC AUTO: 92.1 FL — SIGNIFICANT CHANGE UP (ref 80–100)
MONOCYTES # BLD AUTO: 0.75 K/UL — SIGNIFICANT CHANGE UP (ref 0–0.9)
MONOCYTES NFR BLD AUTO: 6.7 % — SIGNIFICANT CHANGE UP (ref 2–14)
NEUTROPHILS # BLD AUTO: 7.91 K/UL — HIGH (ref 1.8–7.4)
NEUTROPHILS NFR BLD AUTO: 71.2 % — SIGNIFICANT CHANGE UP (ref 43–77)
NRBC # FLD: 0 — SIGNIFICANT CHANGE UP
PLATELET # BLD AUTO: 271 K/UL — SIGNIFICANT CHANGE UP (ref 150–400)
PMV BLD: 9 FL — SIGNIFICANT CHANGE UP (ref 7–13)
POTASSIUM SERPL-MCNC: 3.5 MMOL/L — SIGNIFICANT CHANGE UP (ref 3.5–5.3)
POTASSIUM SERPL-SCNC: 3.5 MMOL/L — SIGNIFICANT CHANGE UP (ref 3.5–5.3)
PROT SERPL-MCNC: 7.8 G/DL — SIGNIFICANT CHANGE UP (ref 6–8.3)
RBC # BLD: 3.82 M/UL — SIGNIFICANT CHANGE UP (ref 3.8–5.2)
RBC # FLD: 14.3 % — SIGNIFICANT CHANGE UP (ref 10.3–14.5)
SALICYLATES SERPL-MCNC: < 5 MG/DL — LOW (ref 15–30)
SODIUM SERPL-SCNC: 140 MMOL/L — SIGNIFICANT CHANGE UP (ref 135–145)
TSH SERPL-MCNC: 2.26 UIU/ML — SIGNIFICANT CHANGE UP (ref 0.27–4.2)
WBC # BLD: 11.12 K/UL — HIGH (ref 3.8–10.5)
WBC # FLD AUTO: 11.12 K/UL — HIGH (ref 3.8–10.5)

## 2018-10-17 PROCEDURE — 99285 EMERGENCY DEPT VISIT HI MDM: CPT

## 2018-10-17 RX ORDER — OLANZAPINE 15 MG/1
10 TABLET, FILM COATED ORAL DAILY
Qty: 0 | Refills: 0 | Status: DISCONTINUED | OUTPATIENT
Start: 2018-10-17 | End: 2018-10-30

## 2018-10-17 RX ORDER — FLUPHENAZINE HYDROCHLORIDE 1 MG/1
5 TABLET, FILM COATED ORAL
Qty: 0 | Refills: 0 | Status: DISCONTINUED | OUTPATIENT
Start: 2018-10-17 | End: 2018-10-30

## 2018-10-17 RX ORDER — DIPHENHYDRAMINE HCL 50 MG
50 CAPSULE ORAL EVERY 6 HOURS
Qty: 0 | Refills: 0 | Status: DISCONTINUED | OUTPATIENT
Start: 2018-10-17 | End: 2018-10-30

## 2018-10-17 RX ORDER — OLANZAPINE 15 MG/1
20 TABLET, FILM COATED ORAL AT BEDTIME
Qty: 0 | Refills: 0 | Status: DISCONTINUED | OUTPATIENT
Start: 2018-10-17 | End: 2018-10-20

## 2018-10-17 RX ORDER — DIPHENHYDRAMINE HCL 50 MG
50 CAPSULE ORAL ONCE
Qty: 0 | Refills: 0 | Status: DISCONTINUED | OUTPATIENT
Start: 2018-10-17 | End: 2018-10-30

## 2018-10-17 RX ORDER — HALOPERIDOL DECANOATE 100 MG/ML
5 INJECTION INTRAMUSCULAR EVERY 6 HOURS
Qty: 0 | Refills: 0 | Status: DISCONTINUED | OUTPATIENT
Start: 2018-10-17 | End: 2018-10-30

## 2018-10-17 RX ORDER — LITHIUM CARBONATE 300 MG/1
300 TABLET, EXTENDED RELEASE ORAL
Qty: 0 | Refills: 0 | Status: DISCONTINUED | OUTPATIENT
Start: 2018-10-17 | End: 2018-10-30

## 2018-10-17 RX ORDER — HALOPERIDOL DECANOATE 100 MG/ML
5 INJECTION INTRAMUSCULAR ONCE
Qty: 0 | Refills: 0 | Status: DISCONTINUED | OUTPATIENT
Start: 2018-10-17 | End: 2018-10-30

## 2018-10-17 RX ORDER — HALOPERIDOL DECANOATE 100 MG/ML
5 INJECTION INTRAMUSCULAR ONCE
Qty: 0 | Refills: 0 | Status: COMPLETED | OUTPATIENT
Start: 2018-10-17 | End: 2018-10-17

## 2018-10-17 RX ORDER — FAMOTIDINE 10 MG/ML
20 INJECTION INTRAVENOUS DAILY
Qty: 0 | Refills: 0 | Status: DISCONTINUED | OUTPATIENT
Start: 2018-10-17 | End: 2018-10-30

## 2018-10-17 RX ORDER — LITHIUM CARBONATE 300 MG/1
600 TABLET, EXTENDED RELEASE ORAL AT BEDTIME
Qty: 0 | Refills: 0 | Status: DISCONTINUED | OUTPATIENT
Start: 2018-10-17 | End: 2018-10-30

## 2018-10-17 RX ORDER — CLONAZEPAM 1 MG
1 TABLET ORAL
Qty: 0 | Refills: 0 | Status: DISCONTINUED | OUTPATIENT
Start: 2018-10-17 | End: 2018-10-24

## 2018-10-17 RX ADMIN — HALOPERIDOL DECANOATE 5 MILLIGRAM(S): 100 INJECTION INTRAMUSCULAR at 17:48

## 2018-10-17 RX ADMIN — Medication 2 MILLIGRAM(S): at 17:48

## 2018-10-17 NOTE — ED BEHAVIORAL HEALTH ASSESSMENT NOTE - DESCRIPTION
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

## 2018-10-17 NOTE — ED BEHAVIORAL HEALTH ASSESSMENT NOTE - OTHER PAST PSYCHIATRIC HISTORY (INCLUDE DETAILS REGARDING ONSET, COURSE OF ILLNESS, INPATIENT/OUTPATIENT TREATMENT)
lifetime inpatient admissions > 20. Numerous [facility] ED visits. Currently followed by ACT team. Most recently inpatient at [facility] in March 2018.  - 3 prior suicide attempts (last in 2012 via OD) as per records, recurrent suicidal gestures and suicidal ideation, history of property destruction ((breaking TV screens while hospitalized) when upset / acting out   - long hx of sexually provocative, acting out behaviors (during last [facility] inpatient admission >2 years ago, patient had to be placed on CO 1:1 after trying to perform oral sex on a male patient on the dayroom, taken off CO then was going into male patient's room, overheard offering them sex and was placed back on CO

## 2018-10-17 NOTE — ED PROVIDER NOTE - NS ED ROS FT
patient cannot rliably answer questions given mental state Psych condition, suggestible and answers yes or inappropriately to all questions

## 2018-10-17 NOTE — ED BEHAVIORAL HEALTH ASSESSMENT NOTE - CURRENT MEDICATION
List obtained from [facility] :  Lithium 300 mg daily/900 mg, Zyprexa 10 mg/20 mg, Klonopin 0.5mg BID; Prolixin Decanoate 25 mg IM q2week, famotidine 20 mg daily

## 2018-10-17 NOTE — ED BEHAVIORAL HEALTH ASSESSMENT NOTE - SUMMARY
Ms. Castillo is a single, 38-year-old  female, non caregiver, unemployed, domiciled on [place] grounds [address], with past psychiatric history of  Schizoaffective Disorder, Borderline Personality Disorder, cannabis abuse, multiple inpatient psychiatric hospitalizations (>20), recently Low 6 9/1/18-9/11/18, with frequent visits to the [facility] (well-known to staff), 3 prior suicide attempts (last in 2012 via OD), recurrent suicidal gestures and suicidal ideation, history of property destruction when upset, no history of arrests or access to weapons, was BIB EMS activated by [place] staff for agitation. On arrival, patient was agitated stating "Kill, kill, kill". On interview, she is internally preoccupied, laughing, talking to herself, kneeling prostrate before writer, speech is normal in rate, vol and tone, mood is labile, affect incongruous (laughing as she endorses SI states that she wants to "drink Cyanide"), thought processes are tangential with formal thought disorder, she endorses delusions that she has been selected to "exterminate" non Edd races, endorses AH/CAH Ms. Castillo is a single, 38-year-old  female, non caregiver, unemployed, domiciled on [place] [address], with past psychiatric history of  Schizoaffective Disorder, Borderline Personality Disorder, cannabis abuse, multiple inpatient psychiatric hospitalizations (>20), recently Low 6 9/1/18-9/11/18, with frequent visits to the [facility] (well-known to staff), 3 prior suicide attempts (last in 2012 via OD), recurrent suicidal gestures and suicidal ideation, history of property destruction when upset, no history of arrests or access to weapons, was BIB EMS activated by [place] staff for agitation. On arrival, patient was agitated stating "Kill, kill, kill". On interview, she is internally preoccupied, laughing, talking to herself, kneeling prostrate before writer, speech is normal in rate, vol and tone, mood is labile, affect incongruous (laughing as she endorses SI states that she wants to "drink Cyanide"), thought processes are tangential with formal thought disorder, she endorses delusions that she has been selected to "exterminate" non Edd races, endorses AH/CAH, and homicidal ideation without current intent or plan. Insight is poor. Dx-schizoaffective disorder. Pt presents as acutely psychotic with affective instability in context of medication non compliance. Chronic risks include past hospitalizations, past suicide attempts, unemployment, hx of chronic aggression. Acute risks include active psychosis, CAH to hurt people, affective instability, Ms. Castillo is a single, 38-year-old  female, non caregiver, unemployed, domiciled on [place] grounds [address], with past psychiatric history of  Schizoaffective Disorder, Borderline Personality Disorder, cannabis abuse, multiple inpatient psychiatric hospitalizations (>20), recently Low 6 9/1/18-9/11/18, with frequent visits to the [facility] (well-known to staff), 3 prior suicide attempts (last in 2012 via OD), recurrent suicidal gestures and suicidal ideation, history of property destruction when upset, no history of arrests or access to weapons, was BIB EMS activated by [facility] staff for agitation. On arrival, patient was agitated stating "Kill, kill, kill". On interview, she is internally preoccupied, laughing, talking to herself, kneeling prostrate before writer, speech is normal in rate, vol and tone, mood is labile, affect incongruous (laughing as she endorses SI states that she wants to "drink Cyanide"), thought processes are tangential with formal thought disorder, she endorses delusions that she has been selected to "exterminate" non Edd races, endorses AH/CAH, and homicidal ideation without current intent or plan. Insight is poor. Dx-schizoaffective disorder. Pt presents as acutely psychotic with affective instability in context of medication non compliance. Chronic risks include past hospitalizations, past suicide attempts, unemployment, hx of chronic aggression. Acute risks include active psychosis, CAH to hurt people, affective instability, recent aggression, HI/SI, medical non compliance and poor insight. Pt poses an imminent danger to self and others and requires inpatient psychiatric Ms. Castillo is a single, 38-year-old  female, non caregiver, unemployed, domiciled on [place] grounds [address], with past psychiatric history of  Schizoaffective Disorder, Borderline Personality Disorder, cannabis abuse, multiple inpatient psychiatric hospitalizations (>20), recently Low 6 9/1/18-9/11/18, with frequent visits to the [facility] (well-known to staff), 3 prior suicide attempts (last in 2012 via OD), recurrent suicidal gestures and suicidal ideation, history of property destruction when upset, no history of arrests or access to weapons, was BIB EMS activated by [place] staff for agitation. On arrival, patient was agitated stating "Kill, kill, kill". On interview, she is internally preoccupied, laughing, talking to herself, kneeling prostrate before writer, speech is normal in rate, vol and tone, mood is labile, affect incongruous (laughing as she endorses SI states that she wants to "drink Cyanide"), thought processes are tangential with formal thought disorder, she endorses delusions that she has been selected to "exterminate" non Edd races, endorses AH/CAH, and homicidal ideation without current intent or plan. Insight is poor. Dx-schizoaffective disorder. Pt presents as acutely psychotic with affective instability in context of medication non compliance. Chronic risks include past hospitalizations, past suicide attempts, unemployment, hx of chronic aggression. Acute risks include active psychosis, CAH to hurt people, affective instability, recent aggression, HI/SI, medical non compliance and poor insight. Pt poses an imminent danger to self and others and requires inpatient psychiatric admission for treatment and stabilization. No 1:1 CO as pt denies active suicidal and homicidal intent.

## 2018-10-17 NOTE — ED PROVIDER NOTE - OBJECTIVE STATEMENT
39 yo female hx of chronic schizophrenia, [facility] resident, presents with agitation, states she is throwing things and saying "kill, kill, kill."

## 2018-10-17 NOTE — ED BEHAVIORAL HEALTH ASSESSMENT NOTE - AXIS IV
Problem related to social environment/Educational problems/Occupational problems/Problems with access to healthcare services/Housing problems/Economic problems

## 2018-10-17 NOTE — ED BEHAVIORAL HEALTH NOTE - BEHAVIORAL HEALTH NOTE
Worker spoke to patient’s  Mary Jo Ponce ([phone] ext. [phone]) at [facility] for collateral information. All information is as per Ms. PONCE:    Patient is a 38 year old female, domiciled at [facility], recently non- compliant with medications, BIBEMS activated by [facility] lead team for disorganization. Ms. Ponce states that the patient has been yelling and acting out at [facility] and the lead team met with her and the patient agreed to go to [facility] for a couple of days. Ms. Ponce states that the patient was supposed to return back from the [facility] tomorrow but the lead team called 911 because the patient was not doing well. Ms. Ponce states that while at [facility] the patient was visiting her residence [facility] on Monday, Tuesday, and Wednesday. Ms. Ponce states that while she was visiting the patient she has been smashing objects on the floor and screaming. Ms. Ponce states that the patient said she was bored at [facility] and wanted to visit her boyfriend. Ms. Ponce states that the patient’s pill box was inconsistent with the medications and she believes the patient has not been taking her medications. Ms. Ponce states the patient normally screams in her room but she has been breaking items in the residence. She states the patient is not on any drugs or alcohol and denies that the patient is having SI/HI/VH. She states that the patient is prescribed Clonazepam 1mg twice a day, Lithium 300mg am/ 900mg pm, Olazapine 10mg am/30mg pm, Fluphenazine 5mg twice a day, and Famotidine 20mg am. Worker informed Ms. Ponce that patient will be admitted to .    Worker also spoke to Diane Ambriz at [facility] ([phone]) for additional collateral information.    Ms. Ambriz states that the patient has been shouting and screaming since Thursday. She states that the patient has been walking outside with no shoes with gold makeup smothered on her face. Ms. Ambriz states that the patient was not physically/ verbally aggressive towards staff at the [facility]. Ms. Ambriz states that the patient was outside shouting screaming that she is a terrorist with a sheet wrapped around her head. Ms. Ambriz states that [facility] does not monitor the patient’s medications and she believes the patient is not taking medications. Worker informed Ms. Ambriz that the patient will be admitted to [facility].     Worker also spoke to Hans Ryan ([phone]) for collateral from [facility] lead team.    Ms. Ryan states that the I lead time activated call for the patient to come to the emergency room. Ms. Ryan states that the patient went to [facility] on Thursday and she has not been taking her medications consistently. Ms. Ryan states that the patient presented very delusional and was in and out of psychosis. She also states that the patient is not in touch with reality. Today the patient went back to the residence to visit and afterwards the lead team brought the patient back to the respite. Ms. Ryan states that when she was brought back the patient was banging her head on the table and was not making sense when she was talking. She states that the patient was stating that she was talking to God and trying to rule the world. Ms. Ryan states that the patient has been non-compliant with medications (not sure how long). She denies that the patient is having SI/HI and is not on any drugs or alcohol. Ms. Ryan is not sure if the patient has an ACT team and is not sure when the patient was last admitted. Worker informed Ms. Ryan that the patient is being admitted to . Worker spoke to patient’s  Mary Jo Ponce ([phone] ext. [phone]) at [facility] for collateral information. All information is as per Ms. PONCE:    Patient is a 38 year old female, domiciled at [facility], recently non- compliant with medications, BIBEMS activated by [facility] lead team for disorganization. Ms. Ponce states that the patient has been yelling and acting out at [facility] and the lead team met with her and the patient agreed to go to [facility] for a couple of days. Ms. Ponce states that the patient was supposed to return back from the [facility] tomorrow but the lead team called 911 because the patient was not doing well. Ms. Ponce states that while at [facility] the patient was visiting her residence [facility] on Monday, Tuesday, and Wednesday. Ms. Ponce states that while she was visiting the patient she has been smashing objects on the floor and screaming. Ms. Ponce states that the patient said she was bored at [facility] and wanted to visit her boyfriend. Ms. Ponce states that she believes the patient has not been taking her medications. Ms. Ponce states the patient normally screams in her room but she has been breaking items in the residence. She states the patient is not on any drugs or alcohol and denies that the patient is having SI/HI/VH. She states that the patient is prescribed Clonazepam 1mg twice a day, Lithium 300mg am/ 900mg pm, Olazapine 10mg am/30mg pm, Fluphenazine 5mg twice a day, and Famotidine 20mg am. Worker informed Ms. Ponce that patient will be admitted to .    Worker also spoke to Diane Ambriz at [facility] ([phone]) for additional collateral information.    Ms. Ambriz states that the patient has been shouting and screaming since Thursday. She states that the patient has been walking outside with no shoes with gold makeup smothered on her face. Ms. Ambriz states that the patient was not physically/ verbally aggressive towards staff at the [facility]. Ms. Ambriz states that the patient was outside shouting screaming that she is a terrorist with a sheet wrapped around her head. Ms. Ambriz states that  staff at [facility] does not monitor the patient’s medications and she believes the patient is not taking medications. Worker informed Ms. Ambriz that the patient will be admitted to [facility].     Worker also spoke to Hans Ryan ([phone]) for collateral from \[facility]\"" lead team.    Ms. Ryan states that the I lead time activated call for the patient to come to the emergency room. Ms. Ryan states that the patient went to [facility] resp[facility] on Thursday and she has not been taking her medications consistently. Ms. Ryan states that the patient presented very delusional and was in and out of psychosis. She also states that the patient is not in touch with reality. Today the patient went back to the residence to visit and afterwards the lead team brought the patient back to the respite. Ms. Ryan states that when she was brought back the patient was banging her head on the table and was not making sense when she was talking. She states that the patient was stating that she was talking to God and trying to rule the world. Ms. Ryan states that the patient has been non-compliant with medications (not sure how long). She denies that the patient is having SI/HI and is not on any drugs or alcohol. Ms. Ryan is not sure if the patient has an ACT team and is not sure when the patient was last admitted. Worker informed Ms. Ryan that the patient is being admitted to .

## 2018-10-17 NOTE — ED BEHAVIORAL HEALTH ASSESSMENT NOTE - PSYCHIATRIC ISSUES AND PLAN (INCLUDE STANDING AND PRN MEDICATION)
restart Lithium 300 mg daily/900 mg, Zyprexa 10 mg/20 mg, Klonopin 0.5mg BID;  famotidine 20 mg daily, need to clarify when pt last received PALACIOS, Haldol 5mg PO q 6 hrs prn agitation; Ativan 2mg PO q 6hrs prn anxiety/agitation, Diphenhydramine 50mg PO q 6hr prn EPS, insomnia or agitation.      Haldol 5mg IM; Ativan 2mg IM, Diphenhydramine 50mg IM once prn severe agitation (combativeness, assaultive behavior) after notification of a prescribing clinician.

## 2018-10-17 NOTE — ED ADULT NURSE NOTE - NSIMPLEMENTINTERV_GEN_ALL_ED
Implemented All Universal Safety Interventions:  [place] to call system. Call bell, personal items and telephone within reach. Instruct patient to call for assistance. Room bathroom lighting operational. Non-slip footwear when patient is off stretcher. Physically safe environment: no spills, clutter or unnecessary equipment. Stretcher in lowest position, wheels locked, appropriate side rails in place.

## 2018-10-17 NOTE — ED BEHAVIORAL HEALTH ASSESSMENT NOTE - HPI (INCLUDE ILLNESS QUALITY, SEVERITY, DURATION, TIMING, CONTEXT, MODIFYING FACTORS, ASSOCIATED SIGNS AND SYMPTOMS)
Ms. Castillo is a single, 38-year-old  female, non caregiver, unemployed, domiciled on [place] [address], with past psychiatric history of  Schizoaffective Disorder, Borderline Personality Disorder, cannabis abuse, multiple inpatient psychiatric hospitalizations (>20), recently Low 6 9/1/18-9/11/18, with frequent visits to the [facility] (well-known to staff), 3 prior suicide attempts (last in 2012 via OD), recurrent suicidal gestures and suicidal ideation, history of property destruction when upset, no history of arrests or access to weapons, was BIBEMS activated by [place] staff for agitation. On arrival, patient was agitated stating "Kill, kill, kill". On interview, she is internally preoccupied, kneeling prostrate before writer, speech is normal in rate, vol and tone, mood is labile, affect incongruous (laughing as she states that she wants to "drink Cyanide"), thought processes are tangential with formal thought disorder.  She states that today she was "making gang signs" and broke a broom stick in half. She states that she is not sleeping , was staying up all night watching TV... and "talking to President Bennett in my head". She states that she is "hearing the voice of God telling me to kill, kill , kill. ..I need to save the Edd race... kill all the cripples and the niggers". She states that she wants to kill people and has had thoughts of killing herself by "drinking cyanide".       Per collateral obtained from pt's  Tiffanie Keller at [facility] ([phone]), Ms. Castillo is a single, 38-year-old  female, non caregiver, unemployed, domiciled on [place] [address], with past psychiatric history of  Schizoaffective Disorder, Borderline Personality Disorder, cannabis abuse, multiple inpatient psychiatric hospitalizations (>20), recently Low 6 9/1/18-9/11/18, with frequent visits to the [facility] (well-known to staff), 3 prior suicide attempts (last in 2012 via OD), recurrent suicidal gestures and suicidal ideation, history of property destruction when upset, no history of arrests or access to weapons, was BIB EMS activated by [place] staff for agitation.     On arrival, patient was agitated stating "Kill, kill, kill". On interview, she is internally preoccupied, laughing, talking to herself, kneeling prostrate before writer, speech is normal in rate, vol and tone, mood is labile, affect incongruous (laughing as she states that she wants to "drink Cyanide"), thought processes are tangential with formal thought disorder.  She states that today she was "making gang signs" and broke a broom stick in half. She states that she is not sleeping , was staying up all night watching TV... and "talking to President Bennett in my head". She states that she is "hearing the voice of God telling me to kill, kill , kill. ..I need to save the Edd race... kill all the cripples and the niggers". She states that she wants to kill people and has had thoughts of killing herself by "drinking cyanide". She states that she has not been taking her medications for over a week.     As per collateral: Patient was recently moved to [facility] as she was increasingly agitated in [address] over past week. She is screaming, not sleeping and breaking property.  See  note for further details.       Per collateral obtained from pt's  Tiffanie Kellre at [facility] ([phone]), Ms. Castillo is a single, 38-year-old  female, non caregiver, unemployed, domiciled on [place] grounds [address], with past psychiatric history of  Schizoaffective Disorder, Borderline Personality Disorder, cannabis abuse, multiple inpatient psychiatric hospitalizations (>20), recently Low 6 9/1/18-9/11/18, with frequent visits to the [facility] (well-known to staff), 3 prior suicide attempts (last in 2012 via OD), recurrent suicidal gestures and suicidal ideation, history of property destruction when upset, no history of arrests or access to weapons, was BIB EMS activated by [place] staff for agitation.     On arrival, patient was agitated stating "Kill, kill, kill". On interview, she is internally preoccupied, laughing, talking to herself, kneeling prostrate before writer, speech is normal in rate, vol and tone, mood is labile, affect incongruous (laughing as she states that she wants to "drink Cyanide"), thought processes are tangential with formal thought disorder.  She states that today she was "making gang signs" and broke a broom stick in half. She states that she is not sleeping , was staying up all night watching TV... and "talking to President Bennett in my head". She states that she is "hearing the voice of God telling me to kill, kill , kill. ..I need to save the Edd race... kill all the cripples and the niggers". She states that she wants to kill people and has had thoughts of killing herself by "drinking cyanide". She states that she has not been taking her medications for over a week.     As per collateral: Patient was recently moved to [facility] as she was increasingly agitated in [address] over past week. She is screaming, not sleeping and breaking property. See  note for further details. Ms. Castillo is a single, 38-year-old  female, non caregiver, unemployed, domiciled on [place] grounds [address], with past psychiatric history of  Schizoaffective Disorder, Borderline Personality Disorder, cannabis abuse, multiple inpatient psychiatric hospitalizations (>20), recently Low 6 9/1/18-9/11/18, with frequent visits to the [facility] (well-known to staff), 3 prior suicide attempts (last in 2012 via OD), recurrent suicidal gestures and suicidal ideation, history of property destruction when upset, no history of arrests or access to weapons, was BIB EMS activated by [place] staff for agitation.     On arrival, patient was agitated stating "Kill, kill, kill". On interview, she is internally preoccupied, laughing, talking to herself, kneeling prostrate before writer, speech is normal in rate, vol and tone, mood is labile, affect incongruous (laughing as she states that she wants to "drink Cyanide"), thought processes are tangential with formal thought disorder.  She states that today she was "making gang signs" and broke a broom stick in half. She states that she is not sleeping , was staying up all night watching TV... and "talking to President Bennett in my head". She states that she is "hearing the voice of God telling me to kill, kill , kill. ..I need to save the Edd race... kill all the cripples and the niggers". She denies any imminent  She states that she has thoughts of killing herself by "drinking cyanide". She states that she has not been taking her medications for over a week.     As per collateral: Patient was recently moved to [facility] as she was increasingly agitated in [address] over past week. She is screaming, not sleeping and breaking property .She has not been aggressive toward other people recently despite reporting hearing v. See  note for further details.

## 2018-10-17 NOTE — ED ADULT NURSE REASSESSMENT NOTE - NS ED NURSE REASSESS COMMENT FT1
Break Coverage -  Pt calm aware of admission to  Low 6 pt currently denies Si/Hi/AVh presently pt awaiting transport via EMS.

## 2018-10-17 NOTE — ED BEHAVIORAL HEALTH ASSESSMENT NOTE - DETAILS
Poor response to Geodon; Depakote (Increased ammonia levels) ? history of prostitution 3 prior suicide attempts (last in 2012 via OD) as per records, recurrent suicidal gestures and suicidal ideation, see HPI for current thoughts to hurt kill herself with CO history of property destruction ((breaking TV screens while hospitalized) when upset / acting out, see HPI for current thoughts to hurt others with gun or sword hearing voice telling her to kill and save Edd race dr Johnson

## 2018-10-17 NOTE — ED ADULT NURSE REASSESSMENT NOTE - NS ED NURSE REASSESS COMMENT FT1
Patient agitated, screaming and banging on walls, unable to be redirected. MD Serrano notified. Patient medicated for safety and stabilization(see EMR) good effects. safety and comfort measures maintained.

## 2018-10-17 NOTE — ED BEHAVIORAL HEALTH ASSESSMENT NOTE - RISK ASSESSMENT
Pt presents as acutely psychotic with affective instability in context of medication non compliance. Chronic risks include past hospitalizations, past suicide attempts, unemployment, hx of chronic aggression. Acute risks include active psychosis, CAH to hurt people, affective instability, recent aggression, HI/SI, medical non compliance and poor insight. Pt poses an imminent danger to self and others and requires inpatient psychiatric admission for treatment and stabilization. No 1:1 CO as pt denies active suicidal and homicidal intent.

## 2018-10-17 NOTE — ED ADULT TRIAGE NOTE - CHIEF COMPLAINT QUOTE
voices are telling her to [email or web address] making stabbing action  when asked pt states" I made a bomb"  pt from [facility] 20

## 2018-10-17 NOTE — ED ADULT NURSE NOTE - OBJECTIVE STATEMENT
Pt received in  c/o psych eval, patient agitated and disorganized, talking and laughing to self and making bizarre gestures. pt denies SI&AH. Endorsed AH saying "kill kill kill". pt denies ETOH and substance use. psych eval ongoing

## 2018-10-17 NOTE — ED ADULT NURSE NOTE - IS THE PATIENT ABLE TO BE SCREENED?
Patient has lab appointment tomorrow at 8:25 am. There were no orders seen in epic, appointment notes states \"fasting lab per Dr. Machado\". Patient did have labs done in March. Please put in orders, thanks.     No

## 2018-10-17 NOTE — ED ADULT NURSE NOTE - CHIEF COMPLAINT QUOTE
voices are telling her to [email or web address] making stabbing action  when asked pt states" I made a bomb"  pt from [facility] 20

## 2018-10-18 PROCEDURE — 99222 1ST HOSP IP/OBS MODERATE 55: CPT

## 2018-10-18 RX ADMIN — LITHIUM CARBONATE 300 MILLIGRAM(S): 300 TABLET, EXTENDED RELEASE ORAL at 21:21

## 2018-10-18 RX ADMIN — OLANZAPINE 10 MILLIGRAM(S): 15 TABLET, FILM COATED ORAL at 08:46

## 2018-10-18 RX ADMIN — Medication 1 MILLIGRAM(S): at 08:46

## 2018-10-18 RX ADMIN — OLANZAPINE 20 MILLIGRAM(S): 15 TABLET, FILM COATED ORAL at 21:21

## 2018-10-18 RX ADMIN — Medication 2 MILLIGRAM(S): at 16:37

## 2018-10-18 RX ADMIN — LITHIUM CARBONATE 600 MILLIGRAM(S): 300 TABLET, EXTENDED RELEASE ORAL at 21:21

## 2018-10-18 RX ADMIN — LITHIUM CARBONATE 300 MILLIGRAM(S): 300 TABLET, EXTENDED RELEASE ORAL at 08:46

## 2018-10-18 RX ADMIN — FLUPHENAZINE HYDROCHLORIDE 5 MILLIGRAM(S): 1 TABLET, FILM COATED ORAL at 21:21

## 2018-10-18 RX ADMIN — Medication 50 MILLIGRAM(S): at 21:21

## 2018-10-18 RX ADMIN — Medication 1 MILLIGRAM(S): at 21:21

## 2018-10-18 RX ADMIN — FLUPHENAZINE HYDROCHLORIDE 5 MILLIGRAM(S): 1 TABLET, FILM COATED ORAL at 08:46

## 2018-10-18 RX ADMIN — HALOPERIDOL DECANOATE 5 MILLIGRAM(S): 100 INJECTION INTRAMUSCULAR at 10:42

## 2018-10-18 RX ADMIN — HALOPERIDOL DECANOATE 5 MILLIGRAM(S): 100 INJECTION INTRAMUSCULAR at 21:21

## 2018-10-18 RX ADMIN — Medication 50 MILLIGRAM(S): at 10:42

## 2018-10-18 RX ADMIN — FAMOTIDINE 20 MILLIGRAM(S): 10 INJECTION INTRAVENOUS at 08:46

## 2018-10-19 PROCEDURE — 99232 SBSQ HOSP IP/OBS MODERATE 35: CPT

## 2018-10-19 RX ADMIN — LITHIUM CARBONATE 300 MILLIGRAM(S): 300 TABLET, EXTENDED RELEASE ORAL at 21:16

## 2018-10-19 RX ADMIN — LITHIUM CARBONATE 300 MILLIGRAM(S): 300 TABLET, EXTENDED RELEASE ORAL at 08:58

## 2018-10-19 RX ADMIN — LITHIUM CARBONATE 600 MILLIGRAM(S): 300 TABLET, EXTENDED RELEASE ORAL at 21:16

## 2018-10-19 RX ADMIN — FLUPHENAZINE HYDROCHLORIDE 5 MILLIGRAM(S): 1 TABLET, FILM COATED ORAL at 08:58

## 2018-10-19 RX ADMIN — FLUPHENAZINE HYDROCHLORIDE 5 MILLIGRAM(S): 1 TABLET, FILM COATED ORAL at 21:16

## 2018-10-19 RX ADMIN — OLANZAPINE 10 MILLIGRAM(S): 15 TABLET, FILM COATED ORAL at 08:58

## 2018-10-19 RX ADMIN — Medication 1 MILLIGRAM(S): at 21:16

## 2018-10-19 RX ADMIN — FAMOTIDINE 20 MILLIGRAM(S): 10 INJECTION INTRAVENOUS at 08:58

## 2018-10-19 RX ADMIN — OLANZAPINE 20 MILLIGRAM(S): 15 TABLET, FILM COATED ORAL at 21:16

## 2018-10-19 RX ADMIN — Medication 1 MILLIGRAM(S): at 08:58

## 2018-10-20 PROCEDURE — 99232 SBSQ HOSP IP/OBS MODERATE 35: CPT

## 2018-10-20 RX ORDER — OLANZAPINE 15 MG/1
25 TABLET, FILM COATED ORAL AT BEDTIME
Qty: 0 | Refills: 0 | Status: DISCONTINUED | OUTPATIENT
Start: 2018-10-20 | End: 2018-10-23

## 2018-10-20 RX ADMIN — OLANZAPINE 25 MILLIGRAM(S): 15 TABLET, FILM COATED ORAL at 21:50

## 2018-10-20 RX ADMIN — HALOPERIDOL DECANOATE 5 MILLIGRAM(S): 100 INJECTION INTRAMUSCULAR at 16:49

## 2018-10-20 RX ADMIN — FLUPHENAZINE HYDROCHLORIDE 5 MILLIGRAM(S): 1 TABLET, FILM COATED ORAL at 21:50

## 2018-10-20 RX ADMIN — Medication 1 MILLIGRAM(S): at 08:28

## 2018-10-20 RX ADMIN — Medication 2 MILLIGRAM(S): at 16:50

## 2018-10-20 RX ADMIN — LITHIUM CARBONATE 300 MILLIGRAM(S): 300 TABLET, EXTENDED RELEASE ORAL at 08:28

## 2018-10-20 RX ADMIN — OLANZAPINE 10 MILLIGRAM(S): 15 TABLET, FILM COATED ORAL at 08:28

## 2018-10-20 RX ADMIN — Medication 1 MILLIGRAM(S): at 21:50

## 2018-10-20 RX ADMIN — FAMOTIDINE 20 MILLIGRAM(S): 10 INJECTION INTRAVENOUS at 08:28

## 2018-10-20 RX ADMIN — LITHIUM CARBONATE 600 MILLIGRAM(S): 300 TABLET, EXTENDED RELEASE ORAL at 21:50

## 2018-10-20 RX ADMIN — FLUPHENAZINE HYDROCHLORIDE 5 MILLIGRAM(S): 1 TABLET, FILM COATED ORAL at 08:28

## 2018-10-20 RX ADMIN — LITHIUM CARBONATE 300 MILLIGRAM(S): 300 TABLET, EXTENDED RELEASE ORAL at 21:50

## 2018-10-21 PROCEDURE — 99232 SBSQ HOSP IP/OBS MODERATE 35: CPT

## 2018-10-21 RX ADMIN — LITHIUM CARBONATE 300 MILLIGRAM(S): 300 TABLET, EXTENDED RELEASE ORAL at 09:27

## 2018-10-21 RX ADMIN — FLUPHENAZINE HYDROCHLORIDE 5 MILLIGRAM(S): 1 TABLET, FILM COATED ORAL at 20:19

## 2018-10-21 RX ADMIN — LITHIUM CARBONATE 600 MILLIGRAM(S): 300 TABLET, EXTENDED RELEASE ORAL at 20:19

## 2018-10-21 RX ADMIN — LITHIUM CARBONATE 300 MILLIGRAM(S): 300 TABLET, EXTENDED RELEASE ORAL at 20:19

## 2018-10-21 RX ADMIN — OLANZAPINE 25 MILLIGRAM(S): 15 TABLET, FILM COATED ORAL at 20:19

## 2018-10-21 RX ADMIN — FLUPHENAZINE HYDROCHLORIDE 5 MILLIGRAM(S): 1 TABLET, FILM COATED ORAL at 09:27

## 2018-10-21 RX ADMIN — OLANZAPINE 10 MILLIGRAM(S): 15 TABLET, FILM COATED ORAL at 09:27

## 2018-10-21 RX ADMIN — FAMOTIDINE 20 MILLIGRAM(S): 10 INJECTION INTRAVENOUS at 09:27

## 2018-10-21 RX ADMIN — Medication 1 MILLIGRAM(S): at 09:27

## 2018-10-21 RX ADMIN — Medication 1 MILLIGRAM(S): at 20:19

## 2018-10-22 LAB — LITHIUM SERPL-MCNC: 0.9 MMOL/L — SIGNIFICANT CHANGE UP (ref 0.6–1.2)

## 2018-10-22 PROCEDURE — 99232 SBSQ HOSP IP/OBS MODERATE 35: CPT

## 2018-10-22 RX ADMIN — FLUPHENAZINE HYDROCHLORIDE 5 MILLIGRAM(S): 1 TABLET, FILM COATED ORAL at 09:05

## 2018-10-22 RX ADMIN — Medication 1 MILLIGRAM(S): at 09:05

## 2018-10-22 RX ADMIN — Medication 1 MILLIGRAM(S): at 21:21

## 2018-10-22 RX ADMIN — FLUPHENAZINE HYDROCHLORIDE 5 MILLIGRAM(S): 1 TABLET, FILM COATED ORAL at 21:21

## 2018-10-22 RX ADMIN — LITHIUM CARBONATE 300 MILLIGRAM(S): 300 TABLET, EXTENDED RELEASE ORAL at 21:21

## 2018-10-22 RX ADMIN — OLANZAPINE 25 MILLIGRAM(S): 15 TABLET, FILM COATED ORAL at 21:21

## 2018-10-22 RX ADMIN — LITHIUM CARBONATE 600 MILLIGRAM(S): 300 TABLET, EXTENDED RELEASE ORAL at 21:21

## 2018-10-22 RX ADMIN — FAMOTIDINE 20 MILLIGRAM(S): 10 INJECTION INTRAVENOUS at 09:05

## 2018-10-22 RX ADMIN — LITHIUM CARBONATE 300 MILLIGRAM(S): 300 TABLET, EXTENDED RELEASE ORAL at 09:05

## 2018-10-22 RX ADMIN — OLANZAPINE 10 MILLIGRAM(S): 15 TABLET, FILM COATED ORAL at 09:05

## 2018-10-23 PROCEDURE — 99232 SBSQ HOSP IP/OBS MODERATE 35: CPT

## 2018-10-23 RX ORDER — OLANZAPINE 15 MG/1
20 TABLET, FILM COATED ORAL AT BEDTIME
Qty: 0 | Refills: 0 | Status: DISCONTINUED | OUTPATIENT
Start: 2018-10-23 | End: 2018-10-30

## 2018-10-23 RX ADMIN — Medication 50 MILLIGRAM(S): at 21:43

## 2018-10-23 RX ADMIN — LITHIUM CARBONATE 600 MILLIGRAM(S): 300 TABLET, EXTENDED RELEASE ORAL at 21:30

## 2018-10-23 RX ADMIN — FLUPHENAZINE HYDROCHLORIDE 5 MILLIGRAM(S): 1 TABLET, FILM COATED ORAL at 09:13

## 2018-10-23 RX ADMIN — FLUPHENAZINE HYDROCHLORIDE 5 MILLIGRAM(S): 1 TABLET, FILM COATED ORAL at 21:30

## 2018-10-23 RX ADMIN — OLANZAPINE 20 MILLIGRAM(S): 15 TABLET, FILM COATED ORAL at 21:30

## 2018-10-23 RX ADMIN — FAMOTIDINE 20 MILLIGRAM(S): 10 INJECTION INTRAVENOUS at 09:13

## 2018-10-23 RX ADMIN — Medication 1 MILLIGRAM(S): at 21:30

## 2018-10-23 RX ADMIN — Medication 1 MILLIGRAM(S): at 09:13

## 2018-10-23 RX ADMIN — HALOPERIDOL DECANOATE 5 MILLIGRAM(S): 100 INJECTION INTRAMUSCULAR at 21:43

## 2018-10-23 RX ADMIN — LITHIUM CARBONATE 300 MILLIGRAM(S): 300 TABLET, EXTENDED RELEASE ORAL at 21:30

## 2018-10-23 RX ADMIN — OLANZAPINE 10 MILLIGRAM(S): 15 TABLET, FILM COATED ORAL at 09:13

## 2018-10-23 RX ADMIN — LITHIUM CARBONATE 300 MILLIGRAM(S): 300 TABLET, EXTENDED RELEASE ORAL at 09:13

## 2018-10-24 PROCEDURE — 99232 SBSQ HOSP IP/OBS MODERATE 35: CPT

## 2018-10-24 RX ORDER — FLUPHENAZINE HYDROCHLORIDE 1 MG/1
25 TABLET, FILM COATED ORAL ONCE
Qty: 0 | Refills: 0 | Status: COMPLETED | OUTPATIENT
Start: 2018-10-24 | End: 2018-10-24

## 2018-10-24 RX ORDER — CLONAZEPAM 1 MG
1 TABLET ORAL
Qty: 0 | Refills: 0 | Status: DISCONTINUED | OUTPATIENT
Start: 2018-10-24 | End: 2018-10-30

## 2018-10-24 RX ORDER — ACETAMINOPHEN 500 MG
650 TABLET ORAL EVERY 6 HOURS
Qty: 0 | Refills: 0 | Status: DISCONTINUED | OUTPATIENT
Start: 2018-10-24 | End: 2018-10-30

## 2018-10-24 RX ADMIN — Medication 1 MILLIGRAM(S): at 21:18

## 2018-10-24 RX ADMIN — FLUPHENAZINE HYDROCHLORIDE 5 MILLIGRAM(S): 1 TABLET, FILM COATED ORAL at 21:18

## 2018-10-24 RX ADMIN — LITHIUM CARBONATE 600 MILLIGRAM(S): 300 TABLET, EXTENDED RELEASE ORAL at 21:18

## 2018-10-24 RX ADMIN — LITHIUM CARBONATE 300 MILLIGRAM(S): 300 TABLET, EXTENDED RELEASE ORAL at 21:18

## 2018-10-24 RX ADMIN — OLANZAPINE 20 MILLIGRAM(S): 15 TABLET, FILM COATED ORAL at 21:18

## 2018-10-24 RX ADMIN — LITHIUM CARBONATE 300 MILLIGRAM(S): 300 TABLET, EXTENDED RELEASE ORAL at 09:33

## 2018-10-24 RX ADMIN — FAMOTIDINE 20 MILLIGRAM(S): 10 INJECTION INTRAVENOUS at 09:33

## 2018-10-24 RX ADMIN — OLANZAPINE 10 MILLIGRAM(S): 15 TABLET, FILM COATED ORAL at 09:33

## 2018-10-24 RX ADMIN — FLUPHENAZINE HYDROCHLORIDE 5 MILLIGRAM(S): 1 TABLET, FILM COATED ORAL at 09:33

## 2018-10-24 RX ADMIN — Medication 1 MILLIGRAM(S): at 09:33

## 2018-10-24 RX ADMIN — FLUPHENAZINE HYDROCHLORIDE 25 MILLIGRAM(S): 1 TABLET, FILM COATED ORAL at 13:42

## 2018-10-25 PROCEDURE — 99232 SBSQ HOSP IP/OBS MODERATE 35: CPT

## 2018-10-25 RX ADMIN — LITHIUM CARBONATE 600 MILLIGRAM(S): 300 TABLET, EXTENDED RELEASE ORAL at 20:33

## 2018-10-25 RX ADMIN — LITHIUM CARBONATE 300 MILLIGRAM(S): 300 TABLET, EXTENDED RELEASE ORAL at 09:11

## 2018-10-25 RX ADMIN — Medication 1 MILLIGRAM(S): at 20:33

## 2018-10-25 RX ADMIN — OLANZAPINE 20 MILLIGRAM(S): 15 TABLET, FILM COATED ORAL at 20:34

## 2018-10-25 RX ADMIN — OLANZAPINE 10 MILLIGRAM(S): 15 TABLET, FILM COATED ORAL at 09:11

## 2018-10-25 RX ADMIN — LITHIUM CARBONATE 300 MILLIGRAM(S): 300 TABLET, EXTENDED RELEASE ORAL at 20:33

## 2018-10-25 RX ADMIN — Medication 1 MILLIGRAM(S): at 09:11

## 2018-10-25 RX ADMIN — FLUPHENAZINE HYDROCHLORIDE 5 MILLIGRAM(S): 1 TABLET, FILM COATED ORAL at 20:33

## 2018-10-25 RX ADMIN — FAMOTIDINE 20 MILLIGRAM(S): 10 INJECTION INTRAVENOUS at 09:11

## 2018-10-25 RX ADMIN — FLUPHENAZINE HYDROCHLORIDE 5 MILLIGRAM(S): 1 TABLET, FILM COATED ORAL at 09:11

## 2018-10-26 PROCEDURE — 99232 SBSQ HOSP IP/OBS MODERATE 35: CPT

## 2018-10-26 RX ADMIN — OLANZAPINE 20 MILLIGRAM(S): 15 TABLET, FILM COATED ORAL at 21:34

## 2018-10-26 RX ADMIN — FLUPHENAZINE HYDROCHLORIDE 5 MILLIGRAM(S): 1 TABLET, FILM COATED ORAL at 21:34

## 2018-10-26 RX ADMIN — LITHIUM CARBONATE 600 MILLIGRAM(S): 300 TABLET, EXTENDED RELEASE ORAL at 21:34

## 2018-10-26 RX ADMIN — Medication 1 MILLIGRAM(S): at 09:11

## 2018-10-26 RX ADMIN — FLUPHENAZINE HYDROCHLORIDE 5 MILLIGRAM(S): 1 TABLET, FILM COATED ORAL at 09:12

## 2018-10-26 RX ADMIN — LITHIUM CARBONATE 300 MILLIGRAM(S): 300 TABLET, EXTENDED RELEASE ORAL at 09:12

## 2018-10-26 RX ADMIN — OLANZAPINE 10 MILLIGRAM(S): 15 TABLET, FILM COATED ORAL at 09:12

## 2018-10-26 RX ADMIN — FAMOTIDINE 20 MILLIGRAM(S): 10 INJECTION INTRAVENOUS at 09:12

## 2018-10-26 RX ADMIN — Medication 1 MILLIGRAM(S): at 21:34

## 2018-10-26 RX ADMIN — LITHIUM CARBONATE 300 MILLIGRAM(S): 300 TABLET, EXTENDED RELEASE ORAL at 21:34

## 2018-10-27 RX ADMIN — LITHIUM CARBONATE 300 MILLIGRAM(S): 300 TABLET, EXTENDED RELEASE ORAL at 09:00

## 2018-10-27 RX ADMIN — OLANZAPINE 20 MILLIGRAM(S): 15 TABLET, FILM COATED ORAL at 21:29

## 2018-10-27 RX ADMIN — Medication 1 MILLIGRAM(S): at 09:00

## 2018-10-27 RX ADMIN — Medication 1 MILLIGRAM(S): at 21:29

## 2018-10-27 RX ADMIN — OLANZAPINE 10 MILLIGRAM(S): 15 TABLET, FILM COATED ORAL at 09:00

## 2018-10-27 RX ADMIN — LITHIUM CARBONATE 600 MILLIGRAM(S): 300 TABLET, EXTENDED RELEASE ORAL at 21:29

## 2018-10-27 RX ADMIN — FLUPHENAZINE HYDROCHLORIDE 5 MILLIGRAM(S): 1 TABLET, FILM COATED ORAL at 09:00

## 2018-10-27 RX ADMIN — LITHIUM CARBONATE 300 MILLIGRAM(S): 300 TABLET, EXTENDED RELEASE ORAL at 21:29

## 2018-10-27 RX ADMIN — FLUPHENAZINE HYDROCHLORIDE 5 MILLIGRAM(S): 1 TABLET, FILM COATED ORAL at 21:29

## 2018-10-27 RX ADMIN — FAMOTIDINE 20 MILLIGRAM(S): 10 INJECTION INTRAVENOUS at 09:00

## 2018-10-28 RX ADMIN — FLUPHENAZINE HYDROCHLORIDE 5 MILLIGRAM(S): 1 TABLET, FILM COATED ORAL at 09:55

## 2018-10-28 RX ADMIN — FAMOTIDINE 20 MILLIGRAM(S): 10 INJECTION INTRAVENOUS at 09:55

## 2018-10-28 RX ADMIN — Medication 1 MILLIGRAM(S): at 09:55

## 2018-10-28 RX ADMIN — LITHIUM CARBONATE 300 MILLIGRAM(S): 300 TABLET, EXTENDED RELEASE ORAL at 21:29

## 2018-10-28 RX ADMIN — FLUPHENAZINE HYDROCHLORIDE 5 MILLIGRAM(S): 1 TABLET, FILM COATED ORAL at 21:29

## 2018-10-28 RX ADMIN — OLANZAPINE 20 MILLIGRAM(S): 15 TABLET, FILM COATED ORAL at 21:29

## 2018-10-28 RX ADMIN — LITHIUM CARBONATE 300 MILLIGRAM(S): 300 TABLET, EXTENDED RELEASE ORAL at 09:55

## 2018-10-28 RX ADMIN — Medication 1 MILLIGRAM(S): at 21:29

## 2018-10-28 RX ADMIN — OLANZAPINE 10 MILLIGRAM(S): 15 TABLET, FILM COATED ORAL at 09:55

## 2018-10-28 RX ADMIN — LITHIUM CARBONATE 600 MILLIGRAM(S): 300 TABLET, EXTENDED RELEASE ORAL at 21:29

## 2018-10-29 PROCEDURE — 99232 SBSQ HOSP IP/OBS MODERATE 35: CPT

## 2018-10-29 RX ORDER — OLANZAPINE 15 MG/1
1 TABLET, FILM COATED ORAL
Qty: 0 | Refills: 0 | COMMUNITY
Start: 2018-10-29

## 2018-10-29 RX ORDER — CLONAZEPAM 1 MG
1 TABLET ORAL
Qty: 0 | Refills: 0 | COMMUNITY
Start: 2018-10-29

## 2018-10-29 RX ORDER — FLUPHENAZINE HYDROCHLORIDE 1 MG/1
1 TABLET, FILM COATED ORAL
Qty: 0 | Refills: 0 | COMMUNITY
Start: 2018-10-29

## 2018-10-29 RX ORDER — OLANZAPINE 15 MG/1
1 TABLET, FILM COATED ORAL
Qty: 30 | Refills: 0 | OUTPATIENT
Start: 2018-10-29

## 2018-10-29 RX ORDER — FAMOTIDINE 10 MG/ML
1 INJECTION INTRAVENOUS
Qty: 30 | Refills: 0 | OUTPATIENT
Start: 2018-10-29

## 2018-10-29 RX ORDER — LITHIUM CARBONATE 300 MG/1
1 TABLET, EXTENDED RELEASE ORAL
Qty: 0 | Refills: 0 | COMMUNITY
Start: 2018-10-29

## 2018-10-29 RX ORDER — LITHIUM CARBONATE 300 MG/1
1 TABLET, EXTENDED RELEASE ORAL
Qty: 30 | Refills: 0 | OUTPATIENT
Start: 2018-10-29

## 2018-10-29 RX ORDER — LITHIUM CARBONATE 300 MG/1
1 TABLET, EXTENDED RELEASE ORAL
Qty: 60 | Refills: 0 | OUTPATIENT
Start: 2018-10-29

## 2018-10-29 RX ORDER — FLUPHENAZINE HYDROCHLORIDE 1 MG/1
1 TABLET, FILM COATED ORAL
Qty: 60 | Refills: 0 | OUTPATIENT
Start: 2018-10-29

## 2018-10-29 RX ORDER — FAMOTIDINE 10 MG/ML
1 INJECTION INTRAVENOUS
Qty: 0 | Refills: 0 | COMMUNITY
Start: 2018-10-29

## 2018-10-29 RX ORDER — CLONAZEPAM 1 MG
1 TABLET ORAL
Qty: 30 | Refills: 0 | OUTPATIENT
Start: 2018-10-29

## 2018-10-29 RX ADMIN — LITHIUM CARBONATE 300 MILLIGRAM(S): 300 TABLET, EXTENDED RELEASE ORAL at 21:14

## 2018-10-29 RX ADMIN — Medication 1 MILLIGRAM(S): at 21:14

## 2018-10-29 RX ADMIN — OLANZAPINE 10 MILLIGRAM(S): 15 TABLET, FILM COATED ORAL at 08:49

## 2018-10-29 RX ADMIN — Medication 1 MILLIGRAM(S): at 08:49

## 2018-10-29 RX ADMIN — FLUPHENAZINE HYDROCHLORIDE 5 MILLIGRAM(S): 1 TABLET, FILM COATED ORAL at 21:14

## 2018-10-29 RX ADMIN — FLUPHENAZINE HYDROCHLORIDE 5 MILLIGRAM(S): 1 TABLET, FILM COATED ORAL at 08:49

## 2018-10-29 RX ADMIN — FAMOTIDINE 20 MILLIGRAM(S): 10 INJECTION INTRAVENOUS at 08:49

## 2018-10-29 RX ADMIN — OLANZAPINE 20 MILLIGRAM(S): 15 TABLET, FILM COATED ORAL at 21:14

## 2018-10-29 RX ADMIN — LITHIUM CARBONATE 300 MILLIGRAM(S): 300 TABLET, EXTENDED RELEASE ORAL at 08:49

## 2018-10-29 RX ADMIN — LITHIUM CARBONATE 600 MILLIGRAM(S): 300 TABLET, EXTENDED RELEASE ORAL at 21:14

## 2018-10-30 VITALS — DIASTOLIC BLOOD PRESSURE: 72 MMHG | SYSTOLIC BLOOD PRESSURE: 125 MMHG | HEART RATE: 91 BPM | TEMPERATURE: 98 F

## 2018-10-30 PROCEDURE — 99238 HOSP IP/OBS DSCHRG MGMT 30/<: CPT

## 2018-10-30 RX ADMIN — FAMOTIDINE 20 MILLIGRAM(S): 10 INJECTION INTRAVENOUS at 08:25

## 2018-10-30 RX ADMIN — OLANZAPINE 10 MILLIGRAM(S): 15 TABLET, FILM COATED ORAL at 08:25

## 2018-10-30 RX ADMIN — FLUPHENAZINE HYDROCHLORIDE 5 MILLIGRAM(S): 1 TABLET, FILM COATED ORAL at 08:25

## 2018-10-30 RX ADMIN — LITHIUM CARBONATE 300 MILLIGRAM(S): 300 TABLET, EXTENDED RELEASE ORAL at 09:11

## 2018-10-30 RX ADMIN — Medication 1 MILLIGRAM(S): at 08:25

## 2018-11-22 ENCOUNTER — EMERGENCY (EMERGENCY)
Facility: HOSPITAL | Age: 38
LOS: 1 days | Discharge: HOME | End: 2018-11-22
Attending: EMERGENCY MEDICINE | Admitting: EMERGENCY MEDICINE
Payer: MEDICARE

## 2018-11-22 VITALS
SYSTOLIC BLOOD PRESSURE: 150 MMHG | HEART RATE: 95 BPM | RESPIRATION RATE: 16 BRPM | DIASTOLIC BLOOD PRESSURE: 93 MMHG | OXYGEN SATURATION: 100 % | TEMPERATURE: 98 F

## 2018-11-22 PROCEDURE — 90792 PSYCH DIAG EVAL W/MED SRVCS: CPT

## 2018-11-22 PROCEDURE — 99283 EMERGENCY DEPT VISIT LOW MDM: CPT
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
## 2018-11-22 NOTE — ED ADULT TRIAGE NOTE - CHIEF COMPLAINT QUOTE
pt brought from [place] for active HI towards "everyone" called EMS herself, calm/compliant on presentation, compliant w/ meds

## 2018-11-22 NOTE — ED BEHAVIORAL HEALTH ASSESSMENT NOTE - DESCRIPTION
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    Vital Signs Last 24 Hrs  T(C): 36.6 (22 Nov 2018 16:48), Max: 36.6 (22 Nov 2018 16:48)  T(F): 97.9 (22 Nov 2018 16:48), Max: 97.9 (22 Nov 2018 16:48)  HR: 95 (22 Nov 2018 16:48) (95 - 95)  BP: 150/93 (22 Nov 2018 16:48) (150/93 - 150/93)  BP(mean): --  RR: 16 (22 Nov 2018 16:48) (16 - 16)  SpO2: 100% (22 Nov 2018 16:48) (100% - 100%) GERD, Asthma

## 2018-11-22 NOTE — ED BEHAVIORAL HEALTH ASSESSMENT NOTE - SUMMARY
Ms. Castillo is a single, 38-year-old  female, non caregiver, unemployed, domiciled on [place] grounds [address], with past psychiatric history of  Schizoaffective Disorder, Borderline Personality Disorder, cannabis abuse, multiple inpatient psychiatric hospitalizations (>20), recently Low 6 9/1/18-9/11/18, with frequent visits to the [facility] (well-known to staff), 3 prior suicide attempts (last in 2012 via OD), recurrent suicidal gestures and suicidal ideation, history of property destruction when upset, no history of arrests or access to weapons, was BIB EMS activated by [place] staff for agitation. On arrival, patient was agitated stating "Kill, kill, kill". On interview, she is internally preoccupied, laughing, talking to herself, kneeling prostrate before writer, speech is normal in rate, vol and tone, mood is labile, affect incongruous (laughing as she endorses SI states that she wants to "drink Cyanide"), thought processes are tangential with formal thought disorder, she endorses delusions that she has been selected to "exterminate" non Edd races, endorses AH/CAH, and homicidal ideation without current intent or plan. Insight is poor. Dx-schizoaffective disorder. Pt presents as acutely psychotic with affective instability in context of medication non compliance. Chronic risks include past hospitalizations, past suicide attempts, unemployment, hx of chronic aggression. Acute risks include active psychosis, CAH to hurt people, affective instability, recent aggression, HI/SI, medical non compliance and poor insight. Pt poses an imminent danger to self and others and requires inpatient psychiatric admission for treatment and stabilization. No 1:1 CO as pt denies active suicidal and homicidal intent. Ms. Castillo is a single, 38-year-old  female, non caregiver, unemployed, domiciled on [place] grounds [address], with past psychiatric history of  Schizoaffective Disorder, Borderline Personality Disorder, cannabis abuse, multiple inpatient psychiatric hospitalizations (>20), recently Low 6 9/1/18-9/11/18, with frequent visits to the [facility] (well-known to staff), 3 prior suicide attempts (last in 2012 via OD), recurrent suicidal gestures and suicidal ideation, history of property destruction when upset, no history of arrests or access to weapons, was BIB EMS for self-activated HI but no plan or intention, and a clear understanding of moral wrongness of such actions, and her mental status looks improved from her last ED visit over a month ago, which is secondary to her increase in medications and she presents calm with no active HI, and does not need a psychiatric admission for stabilization.

## 2018-11-22 NOTE — ED ADULT NURSE NOTE - NSIMPLEMENTINTERV_GEN_ALL_ED
Implemented All Universal Safety Interventions:  [place] to call system. Call bell, personal items and telephone within reach. Instruct patient to call for assistance. Room bathroom lighting operational. Non-slip footwear when patient is off stretcher. Physically safe environment: no spills, clutter or unnecessary equipment. Stretcher in lowest position, wheels locked, appropriate side rails in place.

## 2018-11-22 NOTE — ED PROVIDER NOTE - OBJECTIVE STATEMENT
37 yo F presents with homicidal ideation. she states that she sees a lake entering her room several times a day and she doesn't want him to do so anymore and so has been thinking of killing him. she states she doesn't want to report him bc she is afraid that no one is going to believe her, and so her solution is to kill him. no suicidal ideation. otherwise feels well.   + urinary urgency.   no other physical complaints. states that her mood is good 39 yo F presents with homicidal ideation. she states that she sees a lake entering her room several times a day and she doesn't want him to do so anymore and so has been thinking of killing him. she states she doesn't want to report him bc she is afraid that no one is going to believe her, and so her solution is to kill him. no suicidal ideation. otherwise feels well.   no other physical complaints. states that her mood is good
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
## 2018-11-22 NOTE — ED ADULT NURSE NOTE - CHIEF COMPLAINT QUOTE
pt brought from [place] for active HI towards "everyone" called EMS herself, calm/compliant on presentation, compliant w/ meds

## 2018-11-22 NOTE — ED BEHAVIORAL HEALTH ASSESSMENT NOTE - CURRENT MEDICATION
List obtained from [facility] :  Lithium 300 mg daily/900 mg, Zyprexa 10 mg/20 mg, Klonopin 0.5mg BID; Prolixin Decanoate 25 mg IM q2week, famotidine 20 mg daily List obtained from [facility] :  Lithium 300 mg daily/600 mg, Zyprexa 20 mg/40 mg, Klonopin 2mg BID; Prolixin Decanoate 25 mg IM q2week, famotidine 20 mg daily

## 2018-11-22 NOTE — ED PROVIDER NOTE - MEDICAL DECISION MAKING DETAILS
delusions, HI, psych consult, dispo per psych p/w homicidal ideations. may be having visual hallucinations regarding man entering room. no SI. no physical complaints. pykaren consulted, they confirmed that these are visual hallucinations. they did not believe labs were warranted and deemed patient safe for discharge with instructions to continue taking her meds and f/u with psychiatrist in 1-2 days for rpt eval/further managmenet    The patient was discharged from the ED in stable condition. All results of today's workup were discussed with the patient and all questions/concerns were addressed. All discharge instructions were thoroughly discussed with the patient, as well as important warning signs and new/ worsening symptoms which should necessitate patient's immediate return to the ED. The patient is agreeable with discharge and expresses full understanding of all instructions given.

## 2018-11-22 NOTE — ED PROVIDER NOTE - NSFOLLOWUPCLINICS_GEN_ALL_ED_FT
A Family Medicine Doctor  Family Medicine  .  [place]   Phone:   Fax:   Follow Up Time:     A Psychiatrist  Psychiatry  .  [place]   Phone:   Fax:   Follow Up Time:

## 2018-11-22 NOTE — ED BEHAVIORAL HEALTH ASSESSMENT NOTE - AXIS IV
Economic problems/Problems with access to healthcare services/Problem related to social environment/Educational problems/Occupational problems/Housing problems

## 2018-11-22 NOTE — ED ADULT NURSE NOTE - OBJECTIVE STATEMENT
Patient received in  c/o HI towards a fellow resident at [facility]. In  pt continues to endorsed HI with (a smiling face), requesting inpatient hospitalization "to stabilize myself". Patient denies SI or any hallucinations. denies ETOH or substance use. psych eval ongoing

## 2018-11-22 NOTE — ED PROVIDER NOTE - NSFOLLOWUPINSTRUCTIONS_ED_ALL_ED_FT
take all your meds as prescribed. follow up with your PMD in 1-2 days for repeat eval/further management  return for worsening symptoms

## 2018-11-22 NOTE — ED BEHAVIORAL HEALTH ASSESSMENT NOTE - DETAILS
3 prior suicide attempts (last in 2012 via OD) as per records, recurrent suicidal gestures and suicidal ideation, see HPI for current thoughts to hurt kill herself with CO history of property destruction ((breaking TV screens while hospitalized) when upset / acting out, see HPI for current thoughts to hurt others with gun or sword Poor response to Geodon; Depakote (Increased ammonia levels) ? 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

## 2018-11-22 NOTE — ED BEHAVIORAL HEALTH ASSESSMENT NOTE - REFERRAL / APPOINTMENT DETAILS
Patient's chart reviewed, please contact to schedule OA colonoscopy with Dago Friedman MD.    Screening Criteria  Age: 63 year old Patient meets age criteria.  PCP:  Ceferino Cohen MD  Personal H/O Colon CA or Polyps: Patient does NOT have a personal history of colon polyps or colon cancer  Family H/O Colon CA: No family history of colon cancer.  GI Symptoms: No  Most recent colon procedure No information available in chart  Concerns for taking prep at home(mobility, etc): No    ALLERGIES:   Allergen Reactions   • Unknown Other (See Comments)     Patient states that she has a hard time waking from anesthetic and has stomach issues   • Influenza Vac Typ [Flu Virus Vaccine] HIVES       Medications:  Anticoagulation (examples - coumadin, heparin, lovenox, xarelto, pradaxa): No  Platelet Modifying (examples - Plavix, aspirin, nsaids, Aggrenox) : No  Concerns for sedation. On Chronic long acting narcotics, Methadone, Suboxone, antianxiety like xanax, klonazepam: No  Review of other medications indicate - diuretic   BMI: Estimated body mass index is 36.14 kg/(m^2) as calculated from the following:    Height as of an earlier encounter on 3/9/17: 5' 3\" (1.6 m).    Weight as of an earlier encounter on 3/9/17: 92.5 kg.:more than 45 No  Is the patient over 300 lbs Weight:   Wt Readings from Last 1 Encounters:   03/09/17 92.5 kg   :  No  On Oxygen:  No    Past Medical History:  Past Medical History:   Diagnosis Date   • Acute bronchitis    • Anemia 2/4/2013   • Asthma 4/5/2013    pt denies   • GERD (esophageal reflux)    • Hyperlipidemia 2/4/2013   • Malignant neoplasm of upper-inner quadrant of female breast    • GREYSON (obstructive sleep apnea) 4/5/2013    pt denies   • Osteoporosis 5/24/2013   • PONV (postoperative nausea and vomiting)    • Tibial plateau fracture 6/15/2012    bilateral   • Unexplained endometrial cells on cervical Pap smear 10/6/2014    Endometrial biopsy was done 10/06/2014   • Unspecified sinusitis (chronic)     • Urinary tract infection      History of clotting disorder (i.e., VWF or ITP)? No    Past Surgical History:  Past Surgical History:   Procedure Laterality Date   • BIOPSY OF BREAST, NEEDLE CORE  11/26/2007    Right - benign   • BREAST SURGERY  1/4/07    Left, Breast lumpectomy with axillary lymphadenectomy   • CAD DIAGNOSTIC MAMMOGRAPHY  08/31/2009    Birads 2 benign   • ENDOMETRIAL BIOPSY  10/6/2014    Normal Pap with endometrial cells   • FRACTURE SURGERY  6/17/12    Right ORIF tibial plateau fracture. Dr Salguero. [facility]   • LIGATN/STRIP LONG OR SHORT SAPHEN  11-13    Vein Stripping   • SINUS SURGERY  8-2013    right   • SINUS SURGERY PROC UNLISTED  1998       Other Concerns: none noted    Prior Labs: If abnormal creatinine/electrolytes, then will need Nulytely prep  Creatinine   Date Value Ref Range Status   03/01/2017 0.62 0.51 - 0.95 mg/dL Final     BUN   Date Value Ref Range Status   03/01/2017 13 6 - 20 mg/dL Final     Sodium   Date Value Ref Range Status   03/01/2017 140 135 - 145 mmol/L Final     Potassium   Date Value Ref Range Status   03/01/2017 4.4 3.4 - 5.1 mmol/L Final     Chloride   Date Value Ref Range Status   03/01/2017 102 98 - 107 mmol/L Final     Carbon Dioxide   Date Value Ref Range Status   03/01/2017 28 21 - 32 mmol/L Final     Glucose   Date Value Ref Range Status   03/01/2017 96 65 - 99 mg/dL Final     CALCIUM   Date Value Ref Range Status   03/01/2017 9.6 8.4 - 10.2 mg/dL Final        SCHEDULING:  OK to schedule open access colonoscopy, if no then will need office visit: Yes  Physician Scheduled with: Dago Friedman MD  Diagnosis code from O/A order:  Colon Cancer Screening Z12.11  Location: [facility]  Sedation: MAC  Prep: Suprep       [facility]

## 2018-11-22 NOTE — ED ADULT NURSE REASSESSMENT NOTE - NS ED NURSE REASSESS COMMENT FT1
pt calm & cooperative denies Si/Hi/AVh presently pt d/c by MD awaiting transport pt verbalizing understanding of d/c instructions eval on going.

## 2018-11-22 NOTE — ED BEHAVIORAL HEALTH ASSESSMENT NOTE - RISK ASSESSMENT
Pt presents as acutely psychotic with affective instability in context of medication non compliance. Chronic risks include past hospitalizations, past suicide attempts, unemployment, hx of chronic aggression. Acute risks include active psychosis, CAH to hurt people, affective instability, recent aggression, HI/SI, medical non compliance and poor insight. Pt poses an imminent danger to self and others and requires inpatient psychiatric admission for treatment and stabilization. No 1:1 CO as pt denies active suicidal and homicidal intent. chronic risk secondary to impulsive behaviors, but no clear acute increased risk.

## 2018-12-02 ENCOUNTER — EMERGENCY (EMERGENCY)
Facility: HOSPITAL | Age: 38
LOS: 1 days | Discharge: HOME | End: 2018-12-02
Admitting: EMERGENCY MEDICINE
Payer: SELF-PAY

## 2018-12-02 VITALS
HEART RATE: 78 BPM | OXYGEN SATURATION: 99 % | RESPIRATION RATE: 16 BRPM | SYSTOLIC BLOOD PRESSURE: 148 MMHG | DIASTOLIC BLOOD PRESSURE: 86 MMHG | TEMPERATURE: 98 F

## 2018-12-02 LAB
ALBUMIN SERPL ELPH-MCNC: 3.9 G/DL — SIGNIFICANT CHANGE UP (ref 3.3–5)
ALP SERPL-CCNC: 118 U/L — SIGNIFICANT CHANGE UP (ref 40–120)
ALT FLD-CCNC: 20 U/L — SIGNIFICANT CHANGE UP (ref 4–33)
AMPHET UR-MCNC: NEGATIVE — SIGNIFICANT CHANGE UP
APAP SERPL-MCNC: < 15 UG/ML — LOW (ref 15–25)
APPEARANCE UR: SIGNIFICANT CHANGE UP
AST SERPL-CCNC: 15 U/L — SIGNIFICANT CHANGE UP (ref 4–32)
BACTERIA # UR AUTO: HIGH
BARBITURATES UR SCN-MCNC: NEGATIVE — SIGNIFICANT CHANGE UP
BASOPHILS # BLD AUTO: 0.04 K/UL — SIGNIFICANT CHANGE UP (ref 0–0.2)
BASOPHILS NFR BLD AUTO: 0.4 % — SIGNIFICANT CHANGE UP (ref 0–2)
BENZODIAZ UR-MCNC: NEGATIVE — SIGNIFICANT CHANGE UP
BILIRUB SERPL-MCNC: < 0.2 MG/DL — LOW (ref 0.2–1.2)
BILIRUB UR-MCNC: NEGATIVE — SIGNIFICANT CHANGE UP
BLOOD UR QL VISUAL: SIGNIFICANT CHANGE UP
BUN SERPL-MCNC: 9 MG/DL — SIGNIFICANT CHANGE UP (ref 7–23)
CALCIUM SERPL-MCNC: 9.9 MG/DL — SIGNIFICANT CHANGE UP (ref 8.4–10.5)
CANNABINOIDS UR-MCNC: NEGATIVE — SIGNIFICANT CHANGE UP
CHLORIDE SERPL-SCNC: 107 MMOL/L — SIGNIFICANT CHANGE UP (ref 98–107)
CO2 SERPL-SCNC: 21 MMOL/L — LOW (ref 22–31)
COCAINE METAB.OTHER UR-MCNC: NEGATIVE — SIGNIFICANT CHANGE UP
COLOR SPEC: YELLOW — SIGNIFICANT CHANGE UP
CREAT SERPL-MCNC: 0.94 MG/DL — SIGNIFICANT CHANGE UP (ref 0.5–1.3)
EOSINOPHIL # BLD AUTO: 0.4 K/UL — SIGNIFICANT CHANGE UP (ref 0–0.5)
EOSINOPHIL NFR BLD AUTO: 3.5 % — SIGNIFICANT CHANGE UP (ref 0–6)
ETHANOL BLD-MCNC: < 10 MG/DL — SIGNIFICANT CHANGE UP
GLUCOSE SERPL-MCNC: 90 MG/DL — SIGNIFICANT CHANGE UP (ref 70–99)
GLUCOSE UR-MCNC: NEGATIVE — SIGNIFICANT CHANGE UP
HCG SERPL-ACNC: < 5 MIU/ML — SIGNIFICANT CHANGE UP
HCT VFR BLD CALC: 40.5 % — SIGNIFICANT CHANGE UP (ref 34.5–45)
HGB BLD-MCNC: 12.3 G/DL — SIGNIFICANT CHANGE UP (ref 11.5–15.5)
IMM GRANULOCYTES # BLD AUTO: 0.05 # — SIGNIFICANT CHANGE UP
IMM GRANULOCYTES NFR BLD AUTO: 0.4 % — SIGNIFICANT CHANGE UP (ref 0–1.5)
KETONES UR-MCNC: NEGATIVE — SIGNIFICANT CHANGE UP
LEUKOCYTE ESTERASE UR-ACNC: HIGH
LYMPHOCYTES # BLD AUTO: 2.53 K/UL — SIGNIFICANT CHANGE UP (ref 1–3.3)
LYMPHOCYTES # BLD AUTO: 22.3 % — SIGNIFICANT CHANGE UP (ref 13–44)
MCHC RBC-ENTMCNC: 28.1 PG — SIGNIFICANT CHANGE UP (ref 27–34)
MCHC RBC-ENTMCNC: 30.4 % — LOW (ref 32–36)
MCV RBC AUTO: 92.7 FL — SIGNIFICANT CHANGE UP (ref 80–100)
METHADONE UR-MCNC: NEGATIVE — SIGNIFICANT CHANGE UP
MONOCYTES # BLD AUTO: 0.77 K/UL — SIGNIFICANT CHANGE UP (ref 0–0.9)
MONOCYTES NFR BLD AUTO: 6.8 % — SIGNIFICANT CHANGE UP (ref 2–14)
NEUTROPHILS # BLD AUTO: 7.56 K/UL — HIGH (ref 1.8–7.4)
NEUTROPHILS NFR BLD AUTO: 66.6 % — SIGNIFICANT CHANGE UP (ref 43–77)
NITRITE UR-MCNC: NEGATIVE — SIGNIFICANT CHANGE UP
NRBC # FLD: 0 — SIGNIFICANT CHANGE UP
OPIATES UR-MCNC: NEGATIVE — SIGNIFICANT CHANGE UP
OXYCODONE UR-MCNC: NEGATIVE — SIGNIFICANT CHANGE UP
PCP UR-MCNC: NEGATIVE — SIGNIFICANT CHANGE UP
PH UR: 6.5 — SIGNIFICANT CHANGE UP (ref 5–8)
PLATELET # BLD AUTO: 307 K/UL — SIGNIFICANT CHANGE UP (ref 150–400)
PMV BLD: 9.1 FL — SIGNIFICANT CHANGE UP (ref 7–13)
POTASSIUM SERPL-MCNC: 3.4 MMOL/L — LOW (ref 3.5–5.3)
POTASSIUM SERPL-SCNC: 3.4 MMOL/L — LOW (ref 3.5–5.3)
PROT SERPL-MCNC: 8.1 G/DL — SIGNIFICANT CHANGE UP (ref 6–8.3)
PROT UR-MCNC: SIGNIFICANT CHANGE UP
RBC # BLD: 4.37 M/UL — SIGNIFICANT CHANGE UP (ref 3.8–5.2)
RBC # FLD: 13.7 % — SIGNIFICANT CHANGE UP (ref 10.3–14.5)
RBC CASTS # UR COMP ASSIST: SIGNIFICANT CHANGE UP (ref 0–?)
SALICYLATES SERPL-MCNC: < 5 MG/DL — LOW (ref 15–30)
SODIUM SERPL-SCNC: 142 MMOL/L — SIGNIFICANT CHANGE UP (ref 135–145)
SP GR SPEC: 1.02 — SIGNIFICANT CHANGE UP (ref 1–1.04)
SQUAMOUS # UR AUTO: SIGNIFICANT CHANGE UP
TSH SERPL-MCNC: 2.68 UIU/ML — SIGNIFICANT CHANGE UP (ref 0.27–4.2)
UROBILINOGEN FLD QL: NORMAL — SIGNIFICANT CHANGE UP
WBC # BLD: 11.35 K/UL — HIGH (ref 3.8–10.5)
WBC # FLD AUTO: 11.35 K/UL — HIGH (ref 3.8–10.5)
WBC UR QL: >50 — HIGH (ref 0–?)

## 2018-12-02 PROCEDURE — 93010 ELECTROCARDIOGRAM REPORT: CPT

## 2018-12-02 PROCEDURE — 99285 EMERGENCY DEPT VISIT HI MDM: CPT | Mod: 25

## 2018-12-02 PROCEDURE — 90792 PSYCH DIAG EVAL W/MED SRVCS: CPT | Mod: GC

## 2018-12-02 NOTE — ED BEHAVIORAL HEALTH ASSESSMENT NOTE - DESCRIPTION
calm, cooperative GERD, Asthma currently lives in [place] housing, reports no contact with family, currently unemployed calm, cooperative  Vital Signs Last 24 Hrs  T(C): 36.9 (02 Dec 2018 14:15), Max: 36.9 (02 Dec 2018 14:15)  T(F): 98.5 (02 Dec 2018 14:15), Max: 98.5 (02 Dec 2018 14:15)  HR: 78 (02 Dec 2018 14:15) (78 - 78)  BP: 148/86 (02 Dec 2018 14:15) (148/86 - 148/86)  BP(mean): --  RR: 16 (02 Dec 2018 14:15) (16 - 16)  SpO2: 99% (02 Dec 2018 14:15) (99% - 99%)

## 2018-12-02 NOTE — ED BEHAVIORAL HEALTH ASSESSMENT NOTE - SUMMARY
Ms. Castillo is a single, 38-year-old  female, non caregiver, unemployed, domiciled on [place] [address], with past psychiatric history of  Schizoaffective Disorder, Borderline Personality Disorder, cannabis abuse, multiple inpatient psychiatric hospitalizations (>20), recently Low 6 9/1/18-9/11/18, with frequent visits to the [facility] (well-known to staff, last Oct 2018), 3 prior suicide attempts (last in 2012 via OD), recurrent suicidal gestures and suicidal ideation, history of property destruction when upset, no history of arrests or access to weapons, was BIB EMS for  "please call [facility], I am having suicidal thoughts."  states that pt has a history of making these statements and often it is "attention seeking" and may be for 2ndary gain. Pt denies having any plan or intent to end her life. Denies AVH, suspiciousness, or paranoia. Reports compliance with her medications. Pt contracts for safety, does not meet criteria for hospitalization. Pt to return to [place].

## 2018-12-02 NOTE — ED PROVIDER NOTE - MEDICAL DECISION MAKING DETAILS
39 y/o M hx Bipolar, BPD, GERD, Obesity   Labs, Urine Tox/UA, EKG, HCG.   Medical evaluation performed. There is no clinical evidence of intoxication or any acute medical problem requiring immediate intervention. Patient is awaiting psychiatric consultation. Final disposition will be determined by psychiatrist.

## 2018-12-02 NOTE — ED BEHAVIORAL HEALTH ASSESSMENT NOTE - HPI (INCLUDE ILLNESS QUALITY, SEVERITY, DURATION, TIMING, CONTEXT, MODIFYING FACTORS, ASSOCIATED SIGNS AND SYMPTOMS)
Ms. Castillo is a single, 38-year-old  female, non caregiver, unemployed, domiciled on [facility] [address], with past psychiatric history of  Schizoaffective Disorder, Borderline Personality Disorder, cannabis abuse, multiple inpatient psychiatric hospitalizations (>20), recently Low 6 9/1/18-9/11/18, with [facility] to staff, last Oct 2018), 3 prior suicide attempts (last in 2012 via OD), recurrent suicidal gestures and suicidal ideation, history of property destruction when upset, no history of arrests or access to weapons, was BIB EMS for "please call [facility] I am having suicidal thoughts".     Collateral from  reveals that pt was not combative or agitated, did not appear distressed or upset, and calmly walked to the front staff and stated "please call [facility], I am having suicidal thoughts."  states that pt has a history of making these statements and often it is "attention seeking" and may be for 2ndary gain. However, per protocol, staff is obligated to send patient to emergency room. No acute changes in behavior.     On interview, pt has fixed gaze, AAOx3, able to engage in linear, reality based conversation, states that she has been upset for most of her life that she is "not working, not , don't have any children," and that she came to the emergency room for "DBT skills to help with my cognitive thinking." Pt states that she would like someone to tell her her purpose in life. She denies having any plan or intent to end her life. Denies AVH, suspiciousness, or paranoia. States that the staff has been treating her well. Denies recent altercation, denies HIIP. She states she continues to enjoy watching TV and dancing.  Denies anxious or depressed mood. Reports that she has been sleeping and eating well. States that she sees a psychiatric NP regularly. Reports compliance with her medications.     Per ED  assessment from last week, "Tiffanie Keller, the client’s  was called, and she reports that the patient informed the  and the staff that she was going to the call 911 because she was having HI.  No behavior disturbance, or problems at the facility prior to calling 911.  She has a long history of calling 911 because she enjoys the visits and attention, and has a long history to visiting multiple EDs."

## 2018-12-02 NOTE — ED BEHAVIORAL HEALTH ASSESSMENT NOTE - CURRENT MEDICATION
List obtained from [facility] :  Lithium 300 mg BID and 600 mg qhs, Zyprexa 10 mg/20 mg, Klonopin 1mg BID; Prolixin Decanoate 25 mg IM q2week

## 2018-12-02 NOTE — ED PROVIDER NOTE - OBJECTIVE STATEMENT
39 y/o M hx Bipolar, BPD, GERD, Obesity BIBA w c/o depression  and suicidal ideations with plan to overdose on pills. Patient who appears paranoid states that she feels overwhelmed , hopeless and has no purpose to live.  Denies falling, punching  or kicking any objects. Denies SI/HI/AH/VH.  Denies SOB, fever, chills, chest/ abdominal   discomfort.  Denies  recent use of alcohol or illicit drugs.

## 2018-12-02 NOTE — ED BEHAVIORAL HEALTH ASSESSMENT NOTE - AXIS IV
Problem related to social environment/Educational problems/Occupational problems/Housing problems/Economic problems

## 2018-12-02 NOTE — ED BEHAVIORAL HEALTH ASSESSMENT NOTE - RISK ASSESSMENT
chronic risk secondary to impulsive behaviors, but no clear acute increased risk. Risk factors include dx of schizoaffective d/o, h/o SAs, impulsivity. Protective factors include no current SIIP/HIIP, calm and cooperative, no access to weapons, engaged in treatment.

## 2018-12-02 NOTE — ED ADULT NURSE NOTE - CHIEF COMPLAINT QUOTE
from [place], staff stated pt  reported suicidal thoughts, [address].  states  " wants to kill self,but I feel blue"

## 2018-12-02 NOTE — ED BEHAVIORAL HEALTH ASSESSMENT NOTE - DETAILS
Alie aware of discharge plan 3 prior suicide attempts (last in 2012 via OD) as per records, recurrent suicidal gestures and suicidal ideation, see HPI for current thoughts to hurt kill herself with CO history of property destruction ((breaking TV screens while hospitalized) when upset / acting out Poor response to Geodon; Depakote (Increased ammonia levels)

## 2018-12-02 NOTE — ED BEHAVIORAL HEALTH ASSESSMENT NOTE - OTHER PAST PSYCHIATRIC HISTORY (INCLUDE DETAILS REGARDING ONSET, COURSE OF ILLNESS, INPATIENT/OUTPATIENT TREATMENT)
lifetime inpatient admissions > 20. Numerous [facility] ED visits. Currently followed by ACT team. Most recently inpatient at [facility] in March 2018.  Multiple  ED evals with d/c, last in Oct, 2018.  - 3 prior suicide attempts (last in 2012 via OD) as per records, recurrent suicidal gestures and suicidal ideation, history of property destruction ((breaking TV screens while hospitalized) when upset / acting out   - long hx of sexually provocative, acting out behaviors (during last [facility] inpatient admission >2 years ago, patient had to be placed on CO 1:1 after trying to perform oral sex on a male patient on the dayroom, taken off CO then was going into male patient's room, overheard offering them sex and was placed back on CO

## 2018-12-02 NOTE — ED BEHAVIORAL HEALTH ASSESSMENT NOTE - CASE SUMMARY
38-year-old  female, non caregiver, unemployed, domiciled on [place] grounds [address], with past psychiatric history of  Schizoaffective Disorder, Borderline Personality Disorder, cannabis abuse, multiple inpatient psychiatric hospitalizations (>20), recently Low 6 9/1/18-9/11/18, with frequent visits to the [facility] (well-known to staff, last Oct 2018), 3 prior suicide attempts (last in 2012 via OD), recurrent suicidal gestures and suicidal ideation, history of property destruction when upset, no history of arrests or access to weapons, was BIB EMS for  "please call [facility], I am having suicidal thoughts."  states that pt has a history of making these statements and often it is "attention seeking" and may be for 2ndary gain. Pt denies having any plan or intent to end her life. Denies AVH, suspiciousness, or paranoia. Reports compliance with her medications. Pt contracts for safety, does not meet criteria for hospitalization. Pt to return to [place].  Agree with the assessment and plan as outline in resident note. Pt denies any active suicidal ideation, homicidal ideation, auditory/visual hallucinations, or trina.   At this time doesn't meet criteria for inpatient psych admission.

## 2018-12-02 NOTE — ED ADULT TRIAGE NOTE - CHIEF COMPLAINT QUOTE
from [employer], staff stated pt  reported suicidal thoughts, [address].  states  " wants to kill self,but I feel blue"

## 2018-12-03 ENCOUNTER — EMERGENCY (EMERGENCY)
Facility: HOSPITAL | Age: 38
LOS: 1 days | Discharge: HOME | End: 2018-12-03
Admitting: EMERGENCY MEDICINE
Payer: COMMERCIAL

## 2018-12-03 VITALS
SYSTOLIC BLOOD PRESSURE: 125 MMHG | DIASTOLIC BLOOD PRESSURE: 80 MMHG | RESPIRATION RATE: 16 BRPM | HEART RATE: 81 BPM | TEMPERATURE: 99 F | OXYGEN SATURATION: 100 %

## 2018-12-03 DIAGNOSIS — F60.3 BORDERLINE PERSONALITY DISORDER: ICD-10-CM

## 2018-12-03 PROCEDURE — 99284 EMERGENCY DEPT VISIT MOD MDM: CPT

## 2018-12-03 PROCEDURE — 90792 PSYCH DIAG EVAL W/MED SRVCS: CPT | Mod: GC

## 2018-12-03 NOTE — ED ADULT TRIAGE NOTE - CHIEF COMPLAINT QUOTE
pt bibems from [email or web address], here for suicidal ideations, plan to jab herself in the neck with an object and bleed to death, no hi, no drugs/alcohol, pt having visual hallucinations, seeing two of everything, no ah. pmhx: bipolar, explosive personality disorder

## 2018-12-03 NOTE — ED BEHAVIORAL HEALTH ASSESSMENT NOTE - HPI (INCLUDE ILLNESS QUALITY, SEVERITY, DURATION, TIMING, CONTEXT, MODIFYING FACTORS, ASSOCIATED SIGNS AND SYMPTOMS)
Ms. Castillo is a single, 38-year-old  female, non caregiver, unemployed, domiciled on [place] [address], with past psychiatric history of  Schizoaffective Disorder, Borderline Personality Disorder, cannabis abuse, multiple inpatient psychiatric hospitalizations (>20), recently Low 6 10/17/18-10/30/18, with frequent visits to the [facility] (well-known to staff, last yesterday), 3 prior suicide attempts (last in 2012 via OD), recurrent suicidal gestures and suicidal ideation, history of property destruction when upset, no history of arrests or access to weapons, was BIB EMS called by  for SI.    Collateral from [place],  who called EMS reports that pt was in usual state of health today, no changes from yesterday. Says pt got mail that she thought meant she might have to leave her residence (did not say that) and felt more down. Says that pt had a pen and said she wanted to stab her neck but made no gesture. Said that she walked pt back to her room, and pt became happy and friendly when the police came because "she likes them". Does not believe that pt poses an acute risk at this time.    Pt reports that nothing has changed from yesterday. Says she "sits around all day twiddling my thumbs". Reports sleep and appetite are ok, no fatigue. Mood is generally stable, can feel down at times when she thinks about not having a family. Reports wanted to stab herself in the neck with a pen but decided to come to the ED instead. Reports passive SI now but denies any intent or plan. Reports "hearing the voice of god" that tells her things will work out for her. Denies VH/paranoia/no other delusional content elicited. Reports med compliance. TP was mainly linear, but pt would make bizarre statement at times such as "phd, potential hydrogen", and "my body mind and soul are all connected". Pt says she sees her NP and therapist regularly. Wants to go to groups to "get more DBT and CBT" but was kicked out. Denies HI/I/P or other aggressive thoughts. No access to firearms. No substance use. Reports she enjoys watching TV and eating. Says she wants to help homeless people. Ms. Castillo is a single, 38-year-old  female, non caregiver, unemployed, domiciled on [place] grounds [address], with past psychiatric history of  Schizoaffective Disorder, Borderline Personality Disorder, cannabis abuse, multiple inpatient psychiatric hospitalizations (>20), recently Low 6 10/17/18-10/30/18, with frequent visits to the [facility] (well-known to staff, last yesterday), 3 prior suicide attempts (last in 2012 via OD), recurrent suicidal gestures and suicidal ideation, history of property destruction when upset, no history of arrests or access to weapons, was BIB EMS called by  for SI.    Collateral from [place],  who called EMS reports that pt was in usual state of health today, no changes from yesterday. Says pt got mail that she thought meant she might have to leave her residence (did not say that) and felt more down. Says that pt had a pen and said she wanted to stab her neck but made no gesture. Said that she walked pt back to her room, and pt became happy and friendly when the police came because "she likes them". Does not believe that pt poses an acute risk at this time.    Pt reports that nothing has changed from yesterday. Says she "sits around all day twiddling my thumbs". Reports sleep and appetite are ok, no fatigue. Mood is generally stable, can feel down at times when she thinks about not having a family. Reports wanted to stab herself in the neck with a pen but decided to come to the ED instead. Reports passive SI now but denies any intent or plan. Reports "hearing the voice of god" that tells her things will work out for her. Denies other AH/VH/paranoia/no other delusional content elicited. Reports med compliance. TP was mainly linear, but pt would make bizarre statement at times such as "phd, potential hydrogen", and "my body mind and soul are all connected". Pt says she sees her NP and therapist regularly. Wants to go to groups to "get more DBT and CBT" but was kicked out. Denies HI/I/P or other aggressive thoughts. Denies anxiety or panic sx. No access to firearms. No substance use. Reports she enjoys watching TV and eating. Says she wants to help homeless people.

## 2018-12-03 NOTE — ED BEHAVIORAL HEALTH ASSESSMENT NOTE - DESCRIPTION
GERD, Asthma currently lives in [place] housing, reports no contact with family, currently unemployed calm and cooperative    Vital Signs Last 24 Hrs  T(C): 37.2 (03 Dec 2018 19:34), Max: 37.2 (03 Dec 2018 19:34)  T(F): 98.9 (03 Dec 2018 19:34), Max: 98.9 (03 Dec 2018 19:34)  HR: 81 (03 Dec 2018 19:34) (81 - 81)  BP: 125/80 (03 Dec 2018 19:34) (125/80 - 125/80)  BP(mean): --  RR: 16 (03 Dec 2018 19:34) (16 - 16)  SpO2: 100% (03 Dec 2018 19:34) (100% - 100%)

## 2018-12-03 NOTE — ED BEHAVIORAL HEALTH ASSESSMENT NOTE - OTHER PAST PSYCHIATRIC HISTORY (INCLUDE DETAILS REGARDING ONSET, COURSE OF ILLNESS, INPATIENT/OUTPATIENT TREATMENT)
lifetime inpatient admissions > 20. Numerous [facility] ED visits. Currently followed by ACT team. Most recently inpatient at [facility] in March 2018.  Multiple  ED evals with d/c, last yesterday 12/2.  - 3 prior suicide attempts (last in 2012 via OD) as per records, recurrent suicidal gestures and suicidal ideation, history of property destruction ((breaking TV screens while hospitalized) when upset / acting out   - long hx of sexually provocative, acting out behaviors (during last [facility] inpatient admission >2 years ago, patient had to be placed on CO 1:1 after trying to perform oral sex on a male patient on the dayroom, taken off CO then was going into male patient's room, overheard offering them sex and was placed back on CO lifetime inpatient admissions > 20. Numerous [facility] ED visits. Currently followed by ACT team. Most recently inpatient at [facility] in October 18.  Multiple  ED evals with d/c, last yesterday 12/2.  - 3 prior suicide attempts (last in 2012 via OD) as per records, recurrent suicidal gestures and suicidal ideation, history of property destruction ((breaking TV screens while hospitalized) when upset / acting out   - long hx of sexually provocative, acting out behaviors (during last [facility] inpatient admission >2 years ago, patient had to be placed on CO 1:1 after trying to perform oral sex on a male patient on the dayroom, taken off CO then was going into male patient's room, overheard offering them sex and was placed back on CO

## 2018-12-03 NOTE — ED ADULT NURSE NOTE - NSIMPLEMENTINTERV_GEN_ALL_ED
Implemented All Universal Safety Interventions:  [place] to call system. Call bell, personal items and telephone within reach. Instruct patient to call for assistance. Room bathroom lighting operational. Non-slip footwear when patient is off stretcher. Physically safe environment: no spills, clutter or unnecessary equipment. Stretcher in lowest position, wheels locked, appropriate side rails in place.

## 2018-12-03 NOTE — ED BEHAVIORAL HEALTH ASSESSMENT NOTE - DESCRIPTION (FIRST USE, LAST USE, QUANTITY, FREQUENCY, DURATION)
long hx of cannabis use, pt denies current use charted hx of prior use (unclear if it amounted to "abuse" criteria or just recreational use), pt denies current use heroin - charted hx of prior use (unclear if it amounted to "abuse" criteria or just recreational use), pt denies current use reports hx of use but not for years

## 2018-12-03 NOTE — ED ADULT NURSE NOTE - CHIEF COMPLAINT QUOTE
pt bibems from [employer], here for suicidal ideations, plan to jab herself in the neck with an object and bleed to death, no hi, no drugs/alcohol, pt having visual hallucinations, seeing two of everything, no ah. pmhx: bipolar, explosive personality disorder

## 2018-12-03 NOTE — ED PROVIDER NOTE - OBJECTIVE STATEMENT
37 y/o M hx Bipolar, BPD, GERD, Obesity BIBA w c/o depression  and suicidal ideations with plan to stab herself in the neck.  Patient is from [address]. Reports seeing double.  Reports Si with a plan + AH Denies HI  Denies ETOH/Illicit drugs

## 2018-12-03 NOTE — ED BEHAVIORAL HEALTH ASSESSMENT NOTE - RISK ASSESSMENT
Risk factors include dx of schizoaffective d/o, h/o SAs, impulsivity. Protective factors include no current active SI/HI/I/P, calm and cooperative, no access to weapons, engaged in treatment, future-oriented, spiritual. Pt is at baseline risk level and does not meet criteria for psychiatrist hospitalization.

## 2018-12-03 NOTE — ED BEHAVIORAL HEALTH ASSESSMENT NOTE - AXIS IV
Economic problems/Problem related to social environment/Educational problems/Occupational problems/Housing problems

## 2018-12-03 NOTE — ED PROVIDER NOTE - MEDICAL DECISION MAKING DETAILS
37 y/o M hx Bipolar, BPD, GERD, Obesity BIBA w c/o depression  and suicidal ideations with plan to stab herself in the neck.  Patient is from [address]. Medical evaluation performed. There is no clinical evidence of intoxication or any acute medical problem requiring immediate intervention. Final disposition will be determined by psychiatrist.

## 2018-12-03 NOTE — ED BEHAVIORAL HEALTH ASSESSMENT NOTE - DETAILS
3 prior suicide attempts (last in 2012 via OD) as per records, recurrent suicidal gestures and suicidal ideation, see HPI for current thoughts to hurt kill herself with CO history of property destruction ((breaking TV screens while hospitalized) when upset / acting out Poor response to Geodon; Depakote (Increased ammonia levels) Alie aware of discharge plan [place] aware of discharge plan

## 2018-12-03 NOTE — ED BEHAVIORAL HEALTH ASSESSMENT NOTE - CASE SUMMARY
Ms. Castillo is a single, 38-year-old  female, non caregiver, unemployed, domiciled on [place] grounds [address], with past psychiatric history of  Schizoaffective Disorder, Borderline Personality Disorder, cannabis abuse, multiple inpatient psychiatric hospitalizations (>20), recently Low 6 10/17/18-10/30/18, with frequent visits to the [facility] (well-known to staff, last yesterday), 3 prior suicide attempts (last in 2012 via OD), recurrent suicidal gestures and suicidal ideation, history of property destruction when upset, no history of arrests or access to weapons, was BIB EMS called by  for SI. Pt presentation has not changed since yesterday's ED visit. Her SI was fleeting and denies current active SI/I/P. Staff member who called EMS has no acute safety concerns and is ok with pt returning to [place].

## 2018-12-03 NOTE — ED ADULT NURSE NOTE - OBJECTIVE STATEMENT
Received pt in BH from [facility] pt calm & cooperative c/o Si/Vh,  pt denies Hi/Ah presently safety & comfort measures ,maintained eval on going.

## 2018-12-03 NOTE — ED BEHAVIORAL HEALTH ASSESSMENT NOTE - CURRENT MEDICATION
Lithium 300 mg BID and 600 mg qhs, Zyprexa 10 mg/20 mg, Klonopin 1mg BID; Prolixin Decanoate 25 mg IM q2week

## 2018-12-03 NOTE — ED BEHAVIORAL HEALTH ASSESSMENT NOTE - SUMMARY
Ms. Castillo is a single, 38-year-old  female, non caregiver, unemployed, domiciled on [place] grounds [address], with past psychiatric history of  Schizoaffective Disorder, Borderline Personality Disorder, cannabis abuse, multiple inpatient psychiatric hospitalizations (>20), recently Low 6 10/17/18-10/30/18, with frequent visits to the [facility] (well-known to staff, last yesterday), 3 prior suicide attempts (last in 2012 via OD), recurrent suicidal gestures and suicidal ideation, history of property destruction when upset, no history of arrests or access to weapons, was BIB EMS called by  for SI. Pt presentation has not changed since yesterday's ED visit. Her SI was fleeting and denies current active SI/I/P. Staff member who called EMS has no acute safety concerns and is ok with pt returning to [place].

## 2018-12-04 LAB
BACTERIA UR CULT: SIGNIFICANT CHANGE UP
SPECIMEN SOURCE: SIGNIFICANT CHANGE UP

## 2018-12-07 ENCOUNTER — EMERGENCY (EMERGENCY)
Facility: HOSPITAL | Age: 38
LOS: 1 days | Discharge: HOME | End: 2018-12-07
Admitting: EMERGENCY MEDICINE
Payer: COMMERCIAL

## 2018-12-07 VITALS
HEART RATE: 90 BPM | SYSTOLIC BLOOD PRESSURE: 152 MMHG | DIASTOLIC BLOOD PRESSURE: 98 MMHG | RESPIRATION RATE: 14 BRPM | TEMPERATURE: 99 F | OXYGEN SATURATION: 99 %

## 2018-12-07 DIAGNOSIS — F60.3 BORDERLINE PERSONALITY DISORDER: ICD-10-CM

## 2018-12-07 DIAGNOSIS — F25.9 SCHIZOAFFECTIVE DISORDER, UNSPECIFIED: ICD-10-CM

## 2018-12-07 PROCEDURE — 99283 EMERGENCY DEPT VISIT LOW MDM: CPT

## 2018-12-07 PROCEDURE — 90792 PSYCH DIAG EVAL W/MED SRVCS: CPT

## 2018-12-07 NOTE — ED BEHAVIORAL HEALTH ASSESSMENT NOTE - DESCRIPTION
calm and cooperative GERD, Asthma currently lives in [place] housing, reports no contact with family, currently unemployed calm and cooperative  ICU Vital Signs Last 24 Hrs  T(C): 37.1 (07 Dec 2018 20:30), Max: 37.1 (07 Dec 2018 20:30)  T(F): 98.8 (07 Dec 2018 20:30), Max: 98.8 (07 Dec 2018 20:30)  HR: 90 (07 Dec 2018 20:30) (90 - 90)  BP: 140/98 (07 Dec 2018 20:30) (140/98 - 140/98)  BP(mean): --  ABP: --  ABP(mean): --  RR: 14 (07 Dec 2018 20:30) (14 - 14)  SpO2: 99% (07 Dec 2018 20:30) (99% - 99%)

## 2018-12-07 NOTE — ED BEHAVIORAL HEALTH NOTE - BEHAVIORAL HEALTH NOTE
Writer called [facility], to request information, and spoke with David, who stated the patient was screaming there, banging walls and other objects, and frightened other guests, who stated they felt frightened and uncomfortable. The patient was not yelling anything intelligilble, but others there felt threatened. Staff there called 911. The patient cannot be accepted back there. Writer called [address], [phone], and informed staff that the patient would be returning.  gave the patient [facility] card # [id number], as requested by the evaluating provider.

## 2018-12-07 NOTE — ED BEHAVIORAL HEALTH ASSESSMENT NOTE - DESCRIPTION (FIRST USE, LAST USE, QUANTITY, FREQUENCY, DURATION)
reports hx of use but not for years long hx of cannabis use, pt denies current use charted hx of prior use (unclear if it amounted to "abuse" criteria or just recreational use), pt denies current use heroin - charted hx of prior use (unclear if it amounted to "abuse" criteria or just recreational use), pt denies current use

## 2018-12-07 NOTE — ED ADULT NURSE NOTE - NSIMPLEMENTINTERV_GEN_ALL_ED
Implemented All Universal Safety Interventions:  [place] to call system. Call bell, personal items and telephone within reach. Instruct patient to call for assistance. Room bathroom lighting operational. Non-slip footwear when patient is off stretcher. Physically safe environment: no spills, clutter or unnecessary equipment. Stretcher in lowest position, wheels locked, appropriate side rails in place.

## 2018-12-07 NOTE — ED BEHAVIORAL HEALTH ASSESSMENT NOTE - OTHER PAST PSYCHIATRIC HISTORY (INCLUDE DETAILS REGARDING ONSET, COURSE OF ILLNESS, INPATIENT/OUTPATIENT TREATMENT)
lifetime inpatient admissions > 20. Numerous [facility] ED visits. Currently followed by ACT team. Most recently inpatient at [facility] in October 18.  Multiple  ED evals with d/c, last yesterday 12/2.  - 3 prior suicide attempts (last in 2012 via OD) as per records, recurrent suicidal gestures and suicidal ideation, history of property destruction ((breaking TV screens while hospitalized) when upset / acting out   - long hx of sexually provocative, acting out behaviors (during last [facility] inpatient admission >2 years ago, patient had to be placed on CO 1:1 after trying to perform oral sex on a male patient on the dayroom, taken off CO then was going into male patient's room, overheard offering them sex and was placed back on CO

## 2018-12-07 NOTE — ED PROVIDER NOTE - MEDICAL DECISION MAKING DETAILS
39 y/o M hx Bipolar, BPD, GERD, Obesity  Medical evaluation performed. There is no clinical evidence of intoxication or any acute medical problem requiring immediate intervention. Patient is awaiting psychiatric consultation. Final disposition will be determined by psychiatrist. D/C via EMS to [facility]

## 2018-12-07 NOTE — ED BEHAVIORAL HEALTH ASSESSMENT NOTE - DETAILS
3 prior suicide attempts (last in 2012 via OD) as per records, recurrent suicidal gestures and suicidal ideation, see HPI for current thoughts to hurt kill herself with CO history of property destruction ((breaking TV screens while hospitalized) when upset / acting out Poor response to Geodon; Depakote (Increased ammonia levels) [facility] and [address]

## 2018-12-07 NOTE — ED ADULT TRIAGE NOTE - CHIEF COMPLAINT QUOTE
pt arrives to ED from [facility]. EMS called because per other resident stated pt had knife to throat, was not witnessed by staff.  Patient has been calm and cooperative with EMS and in triage. Denies suicidal or homicidal ideations. VSS. Endorsed to [facility]. Pt taken to . Hx of Bipolar and schizophrenia.

## 2018-12-07 NOTE — ED BEHAVIORAL HEALTH ASSESSMENT NOTE - SUMMARY
Ms. Castillo is a single, 38-year-old  female, non caregiver, unemployed, domiciled on [place] grounds [address] with past psychiatric history of  Schizoaffective Disorder, Borderline Personality Disorder, cannabis abuse, multiple inpatient psychiatric hospitalizations (>20), recently Low 6 10/17/18-10/30/18, with frequent visits to the [facility] (well-known to staff, last on 12/5)) 3 prior suicide attempts (last in 2012 via OD), recurrent suicidal gestures and suicidal ideation, history of property destruction when upset, no history of arrests or access to weapons, was BIB EMS called by staff at [facility] as patient was behaving in a provacative fashion, screaming and upsetting the other residents. As per [facility], patient was acting out this evening, screaming, and had made a gesture my putting a butter knife to her neck (pt told staff that she was "just joking", and that she was not truly suicidal.) Patient is not allowed to go back to [facility] and was informed that she does not want to go back to [place] [address]. Collateral from [facility]  staff [place] [address]-patient was sent tonight from [facility] because she was cursing out the other clients. she has been in respite since Wednesday December 4th, doing 7 days in respite w plan to return to [place] [address]. On interview, pt is A+Ox3, she is cooperative, no PMA; with long silver manicure nails, and gold glitter on her cheek;, provocative and dramatic, eye contact is good, mood is euthymic, affect is reactive, thought processes are linear. She denies current active suicidal intent or plan, or HI, she denies delusions/AVH. Patient appears to be at chronic baseline, and as she has been evicted from [facility] and does not want to go back to [place] [address], she is requesting psychiatric hospitalization. She has a chronic hx of attention seeking behavior, and has long hx of making parasuicidal gestures and threats, and told staff that she held a butter knife near her neck as a "joke".  Patient is at a chronic elevated background risk give chronic impulsivity, past SA, past aggression. Risk does not currently appear to be acutely elevated from baseline. Pt is not currently acutely psychotic or depressed, is on an PALACIOS. Inpatient admission Is unlikely to modify these chronic risks, pt is not requiring inpatient psychiatric admission at this time as she is not an imminent danger to self or others. Current presentation in keeping w axis 2 pathology and chronic poor impulse control.

## 2018-12-07 NOTE — ED ADULT NURSE NOTE - CHIEF COMPLAINT QUOTE
pt arrives to ED from [facility]. EMS called because per other resident stated pt had knife to throat, was not witnessed by staff.  Patient has been calm and cooperative with EMS and in triage. Denies suicidal or homicidal ideations. VSS. Endorsed to [facility]. Pt taken to . Hx of Bipolar and schizophrenia.

## 2018-12-07 NOTE — ED BEHAVIORAL HEALTH ASSESSMENT NOTE - HPI (INCLUDE ILLNESS QUALITY, SEVERITY, DURATION, TIMING, CONTEXT, MODIFYING FACTORS, ASSOCIATED SIGNS AND SYMPTOMS)
Ms. Castillo is a single, 38-year-old  female, non caregiver, unemployed, domiciled on [facility] [address], with past psychiatric history of  Schizoaffective Disorder, Borderline Personality Disorder, cannabis abuse, multiple inpatient psychiatric hospitalizations (>20), recently Low 6 10/17/18-10/30/18, with frequent visits to the [facility] (well-known to staff, last yesterday), 3 prior suicide attempts (last in 2012 via OD), recurrent suicidal gestures and suicidal ideation, history of property destruction when upset, no history of arrests or access to weapons, was BIB EMS called by  for SI.    Collateral from [facility]  staff [address]-patient was sent tonight from [facility] respite because she was cursing out the other clients. she has been in respite since Wednesday December 4th, doing 7 days in respite w plan to return to [address]. Ms. Castillo is a single, 38-year-old  female, non caregiver, unemployed, domiciled on [place] grounds [address] with past psychiatric history of  Schizoaffective Disorder, Borderline Personality Disorder, cannabis abuse, multiple inpatient psychiatric hospitalizations (>20), recently Low 6 10/17/18-10/30/18, with frequent visits to the [facility] (well-known to staff, last on 12/5)) 3 prior suicide attempts (last in 2012 via OD), recurrent suicidal gestures and suicidal ideation, history of property destruction when upset, no history of arrests or access to weapons, was BIB EMS called by staff at [facility] as patient was behaving in a provacative fashion, screaming and upsetting the other residents. As per [facility], patient was acting out this evening, screaming, and had made a gesture my putting a butter knife to her neck (pt told staff that she was "just joking", and that she was not truly suicidal.) Patient is not allowed to go back to [facility] and was informed that she does not want to go back to [place] [address]. Collateral from [facility]  staff [place] [address]-patient was sent tonight from [facility] because she was cursing out the other clients. she has been in respite since Wednesday December 4th, doing 7 days in respite w plan to return to [place] [address].     On interview, pt is A+Ox3, she is cooperative, no PMA; with long silver manicure nails, and gold glitter on her cheek;, provocative and dramatic, eye contact is good, mood is euthymic, affect is reactive, thought processes are linear. Patient states that she was sent to the ED because "I was going to cut my neck.. .to sacrifice myself to Allah,". She denies actually cutting her neck. She reports chronic passive suicidal ideation but denies current suicidal intent or plan. She states that she does not want to go back to [place] [address], and is unhappy that [facility] is not allowing her back, so she wants admission to [facility] "so I can learn DBT skills". She states "I don't do anything with my life. .I need help". She states that sometimes she feels hopeless but other times she "wants something to live for". She states that she is "living in hell" and she decided to color her hair and put gold spray paint on her face and "put on war paint". She denies delusions/AVH. She states that she has not taken her medication for one week because it "makes me too tired".    [address] has no acute safety concerns for patient's return Ms. Castillo is a single, 38-year-old  female, non caregiver, unemployed, domiciled on [place] grounds [address] with past psychiatric history of  Schizoaffective Disorder, Borderline Personality Disorder, cannabis abuse, multiple inpatient psychiatric hospitalizations (>20), recently Low 6 10/17/18-10/30/18, with frequent visits to the [facility] (well-known to staff, last on 12/5)) 3 prior suicide attempts (last in 2012 via OD), recurrent suicidal gestures and suicidal ideation, history of property destruction when upset, no history of arrests or access to weapons, was BIB EMS called by staff at [facility] as patient was behaving in a provacative fashion, screaming and upsetting the other residents. As per [facility], patient was acting out this evening, screaming, and had made a gesture my putting a butter knife to her neck (pt told staff that she was "just joking", and that she was not truly suicidal.) Patient is not allowed to go back to [facility] and was informed. She states that she does not want to go back to [place] [address]. Collateral from [facility]  staff Building -patient was sent tonight from [facility] because she was cursing out the other clients. she has been in respite since Wednesday December 4th, doing 7 days in respite w plan to return to [place] [address].     On interview, pt is A+Ox3, she is cooperative, no PMA; with long silver manicure nails, and gold glitter on her cheek;, provocative and dramatic, eye contact is good, mood is euthymic, affect is reactive, thought processes are linear. Patient states that she was sent to the ED because "I was going to cut my neck.. .to sacrifice myself to Allah,". She denies actually cutting her neck. She reports chronic passive suicidal ideation but denies current suicidal intent or plan. She states that she does not want to go back to [place] [address], and is unhappy that [facility] is not allowing her back, so she wants admission to [facility] "so I can learn DBT skills". She states "I don't do anything with my life. .I need help". She states that sometimes she feels hopeless but other times she "wants something to live for". She states that she is "living in hell" and she decided to color her hair and put gold spray paint on her face and "put on war paint". She denies delusions/AVH. She states that she has not taken her medication for one week because it "makes me too tired".    [address] has no acute safety concerns for patient's return

## 2018-12-07 NOTE — ED PROVIDER NOTE - OBJECTIVE STATEMENT
39 y/o M hx Bipolar, BPD, GERD, Obesity BIBA from [facility] w c/o provocative and bizarre behaviors . Patient presents with her face painted stating " Its raining yue and gold".  Patient appears paranoid.    Denies falling, punching  or kicking any objects. Denies SI/HI/AH/VH.  Denies pain , SOB, fever, chills, chest/ abdominal   discomfort.  Denies  recent use of alcohol or illicit drugs.
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
## 2018-12-11 ENCOUNTER — INPATIENT (INPATIENT)
Facility: HOSPITAL | Age: 38
LOS: 12 days | Discharge: HOME | End: 2018-12-24
Attending: PSYCHIATRY & NEUROLOGY | Admitting: PSYCHIATRY & NEUROLOGY
Payer: COMMERCIAL

## 2018-12-11 VITALS
TEMPERATURE: 100 F | HEART RATE: 105 BPM | DIASTOLIC BLOOD PRESSURE: 92 MMHG | RESPIRATION RATE: 18 BRPM | SYSTOLIC BLOOD PRESSURE: 125 MMHG | OXYGEN SATURATION: 97 %
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
## 2018-12-12 DIAGNOSIS — F25.9 SCHIZOAFFECTIVE DISORDER, UNSPECIFIED: ICD-10-CM

## 2018-12-12 DIAGNOSIS — F60.3 BORDERLINE PERSONALITY DISORDER: ICD-10-CM

## 2018-12-12 LAB
ALBUMIN SERPL ELPH-MCNC: 3.6 G/DL — SIGNIFICANT CHANGE UP (ref 3.3–5)
ALP SERPL-CCNC: 108 U/L — SIGNIFICANT CHANGE UP (ref 40–120)
ALT FLD-CCNC: 36 U/L — HIGH (ref 4–33)
APAP SERPL-MCNC: < 15 UG/ML — LOW (ref 15–25)
AST SERPL-CCNC: 35 U/L — HIGH (ref 4–32)
BASOPHILS # BLD AUTO: 0 K/UL — SIGNIFICANT CHANGE UP (ref 0–0.2)
BASOPHILS NFR BLD AUTO: 0 % — SIGNIFICANT CHANGE UP (ref 0–2)
BILIRUB SERPL-MCNC: 0.3 MG/DL — SIGNIFICANT CHANGE UP (ref 0.2–1.2)
BUN SERPL-MCNC: 12 MG/DL — SIGNIFICANT CHANGE UP (ref 7–23)
CALCIUM SERPL-MCNC: 8.9 MG/DL — SIGNIFICANT CHANGE UP (ref 8.4–10.5)
CHLORIDE SERPL-SCNC: 104 MMOL/L — SIGNIFICANT CHANGE UP (ref 98–107)
CO2 SERPL-SCNC: 20 MMOL/L — LOW (ref 22–31)
CREAT SERPL-MCNC: 0.99 MG/DL — SIGNIFICANT CHANGE UP (ref 0.5–1.3)
EOSINOPHIL # BLD AUTO: 0.22 K/UL — SIGNIFICANT CHANGE UP (ref 0–0.5)
EOSINOPHIL NFR BLD AUTO: 2.3 % — SIGNIFICANT CHANGE UP (ref 0–6)
ETHANOL BLD-MCNC: < 10 MG/DL — SIGNIFICANT CHANGE UP
GLUCOSE SERPL-MCNC: 112 MG/DL — HIGH (ref 70–99)
HCG SERPL-ACNC: < 5 MIU/ML — SIGNIFICANT CHANGE UP
HCT VFR BLD CALC: 36.7 % — SIGNIFICANT CHANGE UP (ref 34.5–45)
HGB BLD-MCNC: 11.3 G/DL — LOW (ref 11.5–15.5)
IMM GRANULOCYTES # BLD AUTO: 0.04 # — SIGNIFICANT CHANGE UP
IMM GRANULOCYTES NFR BLD AUTO: 0.4 % — SIGNIFICANT CHANGE UP (ref 0–1.5)
LITHIUM SERPL-MCNC: 0.26 MMOL/L — LOW (ref 0.6–1.2)
LYMPHOCYTES # BLD AUTO: 0.8 K/UL — LOW (ref 1–3.3)
LYMPHOCYTES # BLD AUTO: 8.2 % — LOW (ref 13–44)
MCHC RBC-ENTMCNC: 28.3 PG — SIGNIFICANT CHANGE UP (ref 27–34)
MCHC RBC-ENTMCNC: 30.8 % — LOW (ref 32–36)
MCV RBC AUTO: 91.8 FL — SIGNIFICANT CHANGE UP (ref 80–100)
MONOCYTES # BLD AUTO: 0.55 K/UL — SIGNIFICANT CHANGE UP (ref 0–0.9)
MONOCYTES NFR BLD AUTO: 5.6 % — SIGNIFICANT CHANGE UP (ref 2–14)
NEUTROPHILS # BLD AUTO: 8.15 K/UL — HIGH (ref 1.8–7.4)
NEUTROPHILS NFR BLD AUTO: 83.5 % — HIGH (ref 43–77)
NRBC # FLD: 0 — SIGNIFICANT CHANGE UP
PLATELET # BLD AUTO: 254 K/UL — SIGNIFICANT CHANGE UP (ref 150–400)
PMV BLD: 9.2 FL — SIGNIFICANT CHANGE UP (ref 7–13)
POTASSIUM SERPL-MCNC: 4.2 MMOL/L — SIGNIFICANT CHANGE UP (ref 3.5–5.3)
POTASSIUM SERPL-SCNC: 4.2 MMOL/L — SIGNIFICANT CHANGE UP (ref 3.5–5.3)
PROT SERPL-MCNC: 6.9 G/DL — SIGNIFICANT CHANGE UP (ref 6–8.3)
RBC # BLD: 4 M/UL — SIGNIFICANT CHANGE UP (ref 3.8–5.2)
RBC # FLD: 14.2 % — SIGNIFICANT CHANGE UP (ref 10.3–14.5)
SALICYLATES SERPL-MCNC: < 5 MG/DL — LOW (ref 15–30)
SODIUM SERPL-SCNC: 139 MMOL/L — SIGNIFICANT CHANGE UP (ref 135–145)
TSH SERPL-MCNC: 1.48 UIU/ML — SIGNIFICANT CHANGE UP (ref 0.27–4.2)
WBC # BLD: 9.76 K/UL — SIGNIFICANT CHANGE UP (ref 3.8–10.5)
WBC # FLD AUTO: 9.76 K/UL — SIGNIFICANT CHANGE UP (ref 3.8–10.5)

## 2018-12-12 PROCEDURE — 99285 EMERGENCY DEPT VISIT HI MDM: CPT

## 2018-12-12 RX ORDER — LITHIUM CARBONATE 300 MG/1
300 TABLET, EXTENDED RELEASE ORAL
Qty: 0 | Refills: 0 | Status: DISCONTINUED | OUTPATIENT
Start: 2018-12-12 | End: 2018-12-14

## 2018-12-12 RX ORDER — FLUPHENAZINE HYDROCHLORIDE 1 MG/1
5 TABLET, FILM COATED ORAL
Qty: 0 | Refills: 0 | Status: DISCONTINUED | OUTPATIENT
Start: 2018-12-12 | End: 2018-12-24

## 2018-12-12 RX ORDER — ACETAMINOPHEN 500 MG
650 TABLET ORAL EVERY 6 HOURS
Qty: 0 | Refills: 0 | Status: DISCONTINUED | OUTPATIENT
Start: 2018-12-12 | End: 2018-12-24

## 2018-12-12 RX ORDER — IBUPROFEN 200 MG
600 TABLET ORAL ONCE
Qty: 0 | Refills: 0 | Status: COMPLETED | OUTPATIENT
Start: 2018-12-12 | End: 2018-12-12

## 2018-12-12 RX ORDER — OLANZAPINE 15 MG/1
20 TABLET, FILM COATED ORAL ONCE
Qty: 0 | Refills: 0 | Status: COMPLETED | OUTPATIENT
Start: 2018-12-12 | End: 2018-12-12

## 2018-12-12 RX ORDER — FLUPHENAZINE HYDROCHLORIDE 1 MG/1
5 TABLET, FILM COATED ORAL EVERY 6 HOURS
Qty: 0 | Refills: 0 | Status: DISCONTINUED | OUTPATIENT
Start: 2018-12-12 | End: 2018-12-15

## 2018-12-12 RX ORDER — CLONAZEPAM 1 MG
1 TABLET ORAL ONCE
Qty: 0 | Refills: 0 | Status: DISCONTINUED | OUTPATIENT
Start: 2018-12-12 | End: 2018-12-12

## 2018-12-12 RX ORDER — FLUPHENAZINE HYDROCHLORIDE 1 MG/1
5 TABLET, FILM COATED ORAL ONCE
Qty: 0 | Refills: 0 | Status: DISCONTINUED | OUTPATIENT
Start: 2018-12-12 | End: 2018-12-24

## 2018-12-12 RX ORDER — OLANZAPINE 15 MG/1
10 TABLET, FILM COATED ORAL
Qty: 0 | Refills: 0 | Status: DISCONTINUED | OUTPATIENT
Start: 2018-12-12 | End: 2018-12-14

## 2018-12-12 RX ORDER — CLONAZEPAM 1 MG
0.5 TABLET ORAL
Qty: 0 | Refills: 0 | Status: DISCONTINUED | OUTPATIENT
Start: 2018-12-12 | End: 2018-12-14

## 2018-12-12 RX ADMIN — OLANZAPINE 20 MILLIGRAM(S): 15 TABLET, FILM COATED ORAL at 00:50

## 2018-12-12 RX ADMIN — Medication 1 MILLIGRAM(S): at 00:49

## 2018-12-12 NOTE — ED PROVIDER NOTE - PROGRESS NOTE DETAILS
Key: pt signed out to me by Dr. Alcala pending psychiatric admission for schizoaffective disorder, pulling fire alarm, agitation. pt is medically cleared. Key: Pt accepted to [facility], accepting physician: Marek Whitman.

## 2018-12-12 NOTE — ED ADULT NURSE REASSESSMENT NOTE - NS ED NURSE REASSESS COMMENT FT1
Pt ambulated to bathroom unassisted with steady gait. Calm and able to follow verbal command. NAD, VSS. Awaiting bed for [facility] admission. Plan of care explained to pt. Will continue to monitor for safety.
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
## 2018-12-12 NOTE — ED BEHAVIORAL HEALTH ASSESSMENT NOTE - DETAILS
3 prior suicide attempts (last in 2012 via OD) as per records, recurrent suicidal gestures and suicidal ideation, see HPI for current thoughts to hurt kill herself with CO history of property destruction ((breaking TV screens while hospitalized) when upset / acting out Poor response to Geodon; Depakote (Increased ammonia levels) to hurt others left voicemail message for Dr. Stanton

## 2018-12-12 NOTE — ED BEHAVIORAL HEALTH ASSESSMENT NOTE - PSYCHIATRIC ISSUES AND PLAN (INCLUDE STANDING AND PRN MEDICATION)
Restart Lithium 300 mg daily/900 mg, Zyprexa 10 mg/20 mg, Klonopin 0.5mg BID; Haldol 5mg PO q 6 hrs prn agitation; Ativan 2mg PO q 6hrs prn anxiety/agitation, Diphenhydramine 50mg PO q 6hr prn EPS, insomnia or agitation. Restart Lithium at 300mg bid, Zyprexa 10mg bid, , Klonopin 0.5mg BID; Haldol 5mg PO q 6 hrs prn agitation; Ativan 2mg PO q 6hrs prn anxiety/agitation, Diphenhydramine 50mg PO q 6hr prn EPS, insomnia or agitation. Restart Lithium at 300mg bid, Zyprexa 10mg bid, , Klonopin 0.5mg BID, Prolixin 5mg BID; Haldol 5mg PO q 6 hrs prn agitation; Ativan 2mg PO q 6hrs prn anxiety/agitation, Diphenhydramine 50mg PO q 6hr prn EPS, insomnia or agitation.

## 2018-12-12 NOTE — ED BEHAVIORAL HEALTH ASSESSMENT NOTE - RISK ASSESSMENT
Patient is at a chronic elevated background risk give chronic impulsivity, past SA, past aggression. Risk does not currently appear to be acutely elevated from baseline. Pt is not currently acutely psychotic or depressed, is on an PALACIOS. Inpatient admission Is unlikely to modify these chronic risks, pt is not requiring inpatient psychiatric admission at this time as she is not an imminent danger to self or others. Current presentation in keeping w axis 2 pathology and chronic poor impulse control. Pt presents as acutely psychotic with affective instability in context of medication non adherence. Chronic risks include past hospitalizations, past suicide attempts, unemployment, hx of chronic aggression. Acute risks include active psychosis, CAH to hurt people, affective instability, recent aggression, HI, medical non adherence and poor insight. Pt poses an imminent danger to self and others and requires inpatient psychiatric admission for treatment and stabilization.

## 2018-12-12 NOTE — ED ADULT NURSE REASSESSMENT NOTE - NS ED NURSE REASSESS COMMENT FT1
0830am Received report from night RN pt calm & cooperative currently denies Si/Hi/AVh pt tolerated breakfast no c/o verbalized safety & comfort measures maintained eval on going.  1040 Pt made aware of admission to [facility] 4 pt currently denies Si/Hi/AVh pt transported via security accompanied by a PES.

## 2018-12-12 NOTE — ED BEHAVIORAL HEALTH ASSESSMENT NOTE - DESCRIPTION
GERD, Asthma currently lives in [place] housing, reports no contact with family, currently unemployed Patient was labile, yelling and sexually inappropriate in the emergency room.
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
## 2018-12-12 NOTE — ED PROVIDER NOTE - MEDICAL DECISION MAKING DETAILS
Pt c complex PMhx who presents after argument with staff at [facility], also abd pain.  Will send labs to assess for hepatic abn given h/o gallstones, but has no tenderness on exam and neg nicholas's.  Agitated.  Will provide medication, at pt's request, to facilitate medical evaluation (not for sedation) and also obtain  eval. No sign of acute or decompensated medical illness.  Dispo per  attending if labs WNL.

## 2018-12-12 NOTE — ED ADULT NURSE REASSESSMENT NOTE - NS ED NURSE REASSESS COMMENT FT1
Received pt from [facility], awake, hyper and increasingly labile. Pt yelling, disrobing and sexually inappropriate towards others. Dr. Alcala with patient at this time for initial medical eval. Will continue to monitor for safety.
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
## 2018-12-13 RX ORDER — OLANZAPINE 15 MG/1
10 TABLET, FILM COATED ORAL AT BEDTIME
Qty: 0 | Refills: 0 | Status: DISCONTINUED | OUTPATIENT
Start: 2018-12-13 | End: 2018-12-14

## 2018-12-13 RX ORDER — SIMETHICONE 80 MG/1
80 TABLET, CHEWABLE ORAL
Qty: 0 | Refills: 0 | Status: DISCONTINUED | OUTPATIENT
Start: 2018-12-13 | End: 2018-12-24

## 2018-12-13 RX ADMIN — OLANZAPINE 10 MILLIGRAM(S): 15 TABLET, FILM COATED ORAL at 20:55

## 2018-12-13 RX ADMIN — FLUPHENAZINE HYDROCHLORIDE 5 MILLIGRAM(S): 1 TABLET, FILM COATED ORAL at 20:55

## 2018-12-13 RX ADMIN — FLUPHENAZINE HYDROCHLORIDE 5 MILLIGRAM(S): 1 TABLET, FILM COATED ORAL at 08:04

## 2018-12-13 RX ADMIN — OLANZAPINE 10 MILLIGRAM(S): 15 TABLET, FILM COATED ORAL at 08:04

## 2018-12-13 RX ADMIN — LITHIUM CARBONATE 300 MILLIGRAM(S): 300 TABLET, EXTENDED RELEASE ORAL at 00:30

## 2018-12-13 RX ADMIN — Medication 0.5 MILLIGRAM(S): at 08:04

## 2018-12-13 RX ADMIN — LITHIUM CARBONATE 300 MILLIGRAM(S): 300 TABLET, EXTENDED RELEASE ORAL at 08:04

## 2018-12-13 RX ADMIN — Medication 2 MILLIGRAM(S): at 10:22

## 2018-12-13 RX ADMIN — Medication 0.5 MILLIGRAM(S): at 00:30

## 2018-12-13 RX ADMIN — Medication 2 MILLIGRAM(S): at 20:55

## 2018-12-13 RX ADMIN — OLANZAPINE 10 MILLIGRAM(S): 15 TABLET, FILM COATED ORAL at 00:30

## 2018-12-13 RX ADMIN — Medication 0.5 MILLIGRAM(S): at 20:55

## 2018-12-13 RX ADMIN — FLUPHENAZINE HYDROCHLORIDE 5 MILLIGRAM(S): 1 TABLET, FILM COATED ORAL at 00:30

## 2018-12-13 RX ADMIN — LITHIUM CARBONATE 300 MILLIGRAM(S): 300 TABLET, EXTENDED RELEASE ORAL at 20:55

## 2018-12-14 RX ORDER — MAGNESIUM HYDROXIDE 400 MG/1
30 TABLET, CHEWABLE ORAL DAILY
Qty: 0 | Refills: 0 | Status: DISCONTINUED | OUTPATIENT
Start: 2018-12-14 | End: 2018-12-24

## 2018-12-14 RX ORDER — LITHIUM CARBONATE 300 MG/1
300 TABLET, EXTENDED RELEASE ORAL DAILY
Qty: 0 | Refills: 0 | Status: DISCONTINUED | OUTPATIENT
Start: 2018-12-14 | End: 2018-12-23

## 2018-12-14 RX ORDER — LITHIUM CARBONATE 300 MG/1
600 TABLET, EXTENDED RELEASE ORAL AT BEDTIME
Qty: 0 | Refills: 0 | Status: DISCONTINUED | OUTPATIENT
Start: 2018-12-14 | End: 2018-12-23

## 2018-12-14 RX ORDER — OLANZAPINE 15 MG/1
10 TABLET, FILM COATED ORAL DAILY
Qty: 0 | Refills: 0 | Status: DISCONTINUED | OUTPATIENT
Start: 2018-12-14 | End: 2018-12-14

## 2018-12-14 RX ORDER — OLANZAPINE 15 MG/1
10 TABLET, FILM COATED ORAL
Qty: 0 | Refills: 0 | Status: DISCONTINUED | OUTPATIENT
Start: 2018-12-14 | End: 2018-12-14

## 2018-12-14 RX ORDER — OLANZAPINE 15 MG/1
20 TABLET, FILM COATED ORAL AT BEDTIME
Qty: 0 | Refills: 0 | Status: DISCONTINUED | OUTPATIENT
Start: 2018-12-14 | End: 2018-12-14

## 2018-12-14 RX ORDER — OLANZAPINE 15 MG/1
20 TABLET, FILM COATED ORAL AT BEDTIME
Qty: 0 | Refills: 0 | Status: DISCONTINUED | OUTPATIENT
Start: 2018-12-14 | End: 2018-12-24

## 2018-12-14 RX ORDER — CLONAZEPAM 1 MG
1 TABLET ORAL
Qty: 0 | Refills: 0 | Status: DISCONTINUED | OUTPATIENT
Start: 2018-12-14 | End: 2018-12-19

## 2018-12-14 RX ORDER — OLANZAPINE 15 MG/1
10 TABLET, FILM COATED ORAL DAILY
Qty: 0 | Refills: 0 | Status: DISCONTINUED | OUTPATIENT
Start: 2018-12-14 | End: 2018-12-24

## 2018-12-14 RX ADMIN — SIMETHICONE 80 MILLIGRAM(S): 80 TABLET, CHEWABLE ORAL at 18:23

## 2018-12-14 RX ADMIN — OLANZAPINE 10 MILLIGRAM(S): 15 TABLET, FILM COATED ORAL at 08:47

## 2018-12-14 RX ADMIN — FLUPHENAZINE HYDROCHLORIDE 5 MILLIGRAM(S): 1 TABLET, FILM COATED ORAL at 08:47

## 2018-12-14 RX ADMIN — MAGNESIUM HYDROXIDE 30 MILLILITER(S): 400 TABLET, CHEWABLE ORAL at 12:30

## 2018-12-14 RX ADMIN — FLUPHENAZINE HYDROCHLORIDE 5 MILLIGRAM(S): 1 TABLET, FILM COATED ORAL at 23:17

## 2018-12-14 RX ADMIN — Medication 2 MILLIGRAM(S): at 09:36

## 2018-12-14 RX ADMIN — SIMETHICONE 80 MILLIGRAM(S): 80 TABLET, CHEWABLE ORAL at 12:00

## 2018-12-14 RX ADMIN — LITHIUM CARBONATE 300 MILLIGRAM(S): 300 TABLET, EXTENDED RELEASE ORAL at 08:47

## 2018-12-14 RX ADMIN — Medication 0.5 MILLIGRAM(S): at 08:47

## 2018-12-14 RX ADMIN — Medication 1 MILLIGRAM(S): at 23:17

## 2018-12-14 RX ADMIN — LITHIUM CARBONATE 600 MILLIGRAM(S): 300 TABLET, EXTENDED RELEASE ORAL at 23:18

## 2018-12-14 RX ADMIN — OLANZAPINE 20 MILLIGRAM(S): 15 TABLET, FILM COATED ORAL at 23:18

## 2018-12-15 PROCEDURE — 99232 SBSQ HOSP IP/OBS MODERATE 35: CPT

## 2018-12-15 RX ORDER — CHLORPROMAZINE HCL 10 MG
50 TABLET ORAL EVERY 6 HOURS
Qty: 0 | Refills: 0 | Status: DISCONTINUED | OUTPATIENT
Start: 2018-12-15 | End: 2018-12-24

## 2018-12-15 RX ADMIN — FLUPHENAZINE HYDROCHLORIDE 5 MILLIGRAM(S): 1 TABLET, FILM COATED ORAL at 06:36

## 2018-12-15 RX ADMIN — OLANZAPINE 20 MILLIGRAM(S): 15 TABLET, FILM COATED ORAL at 22:19

## 2018-12-15 RX ADMIN — OLANZAPINE 10 MILLIGRAM(S): 15 TABLET, FILM COATED ORAL at 09:07

## 2018-12-15 RX ADMIN — LITHIUM CARBONATE 300 MILLIGRAM(S): 300 TABLET, EXTENDED RELEASE ORAL at 09:07

## 2018-12-15 RX ADMIN — Medication 2 MILLIGRAM(S): at 06:36

## 2018-12-15 RX ADMIN — LITHIUM CARBONATE 600 MILLIGRAM(S): 300 TABLET, EXTENDED RELEASE ORAL at 22:19

## 2018-12-15 RX ADMIN — Medication 2 MILLIGRAM(S): at 00:35

## 2018-12-15 RX ADMIN — Medication 1 MILLIGRAM(S): at 09:07

## 2018-12-15 RX ADMIN — FLUPHENAZINE HYDROCHLORIDE 5 MILLIGRAM(S): 1 TABLET, FILM COATED ORAL at 09:07

## 2018-12-15 RX ADMIN — Medication 1 MILLIGRAM(S): at 22:18

## 2018-12-15 RX ADMIN — FLUPHENAZINE HYDROCHLORIDE 5 MILLIGRAM(S): 1 TABLET, FILM COATED ORAL at 22:19

## 2018-12-16 PROCEDURE — 99231 SBSQ HOSP IP/OBS SF/LOW 25: CPT

## 2018-12-16 RX ADMIN — FLUPHENAZINE HYDROCHLORIDE 5 MILLIGRAM(S): 1 TABLET, FILM COATED ORAL at 09:06

## 2018-12-16 RX ADMIN — Medication 1 MILLIGRAM(S): at 22:05

## 2018-12-16 RX ADMIN — LITHIUM CARBONATE 600 MILLIGRAM(S): 300 TABLET, EXTENDED RELEASE ORAL at 22:05

## 2018-12-16 RX ADMIN — OLANZAPINE 20 MILLIGRAM(S): 15 TABLET, FILM COATED ORAL at 22:05

## 2018-12-16 RX ADMIN — LITHIUM CARBONATE 300 MILLIGRAM(S): 300 TABLET, EXTENDED RELEASE ORAL at 09:06

## 2018-12-16 RX ADMIN — Medication 1 MILLIGRAM(S): at 09:06

## 2018-12-16 RX ADMIN — FLUPHENAZINE HYDROCHLORIDE 5 MILLIGRAM(S): 1 TABLET, FILM COATED ORAL at 22:05

## 2018-12-16 RX ADMIN — OLANZAPINE 10 MILLIGRAM(S): 15 TABLET, FILM COATED ORAL at 09:06

## 2018-12-17 RX ADMIN — Medication 2 MILLIGRAM(S): at 12:58

## 2018-12-17 RX ADMIN — OLANZAPINE 20 MILLIGRAM(S): 15 TABLET, FILM COATED ORAL at 23:10

## 2018-12-17 RX ADMIN — LITHIUM CARBONATE 600 MILLIGRAM(S): 300 TABLET, EXTENDED RELEASE ORAL at 23:10

## 2018-12-17 RX ADMIN — Medication 1 MILLIGRAM(S): at 08:26

## 2018-12-17 RX ADMIN — OLANZAPINE 10 MILLIGRAM(S): 15 TABLET, FILM COATED ORAL at 08:26

## 2018-12-17 RX ADMIN — FLUPHENAZINE HYDROCHLORIDE 5 MILLIGRAM(S): 1 TABLET, FILM COATED ORAL at 23:10

## 2018-12-17 RX ADMIN — LITHIUM CARBONATE 300 MILLIGRAM(S): 300 TABLET, EXTENDED RELEASE ORAL at 08:26

## 2018-12-17 RX ADMIN — FLUPHENAZINE HYDROCHLORIDE 5 MILLIGRAM(S): 1 TABLET, FILM COATED ORAL at 08:26

## 2018-12-17 RX ADMIN — Medication 1 MILLIGRAM(S): at 23:10

## 2018-12-18 RX ORDER — POLYETHYLENE GLYCOL 3350 17 G/17G
17 POWDER, FOR SOLUTION ORAL DAILY
Qty: 0 | Refills: 0 | Status: DISCONTINUED | OUTPATIENT
Start: 2018-12-18 | End: 2018-12-24

## 2018-12-18 RX ADMIN — OLANZAPINE 10 MILLIGRAM(S): 15 TABLET, FILM COATED ORAL at 08:16

## 2018-12-18 RX ADMIN — Medication 1 MILLIGRAM(S): at 23:14

## 2018-12-18 RX ADMIN — FLUPHENAZINE HYDROCHLORIDE 5 MILLIGRAM(S): 1 TABLET, FILM COATED ORAL at 23:14

## 2018-12-18 RX ADMIN — OLANZAPINE 20 MILLIGRAM(S): 15 TABLET, FILM COATED ORAL at 23:14

## 2018-12-18 RX ADMIN — SIMETHICONE 80 MILLIGRAM(S): 80 TABLET, CHEWABLE ORAL at 08:50

## 2018-12-18 RX ADMIN — LITHIUM CARBONATE 300 MILLIGRAM(S): 300 TABLET, EXTENDED RELEASE ORAL at 08:16

## 2018-12-18 RX ADMIN — LITHIUM CARBONATE 600 MILLIGRAM(S): 300 TABLET, EXTENDED RELEASE ORAL at 23:14

## 2018-12-18 RX ADMIN — FLUPHENAZINE HYDROCHLORIDE 5 MILLIGRAM(S): 1 TABLET, FILM COATED ORAL at 08:16

## 2018-12-18 RX ADMIN — Medication 1 MILLIGRAM(S): at 08:16

## 2018-12-19 RX ORDER — CLONAZEPAM 1 MG
1 TABLET ORAL
Qty: 0 | Refills: 0 | Status: DISCONTINUED | OUTPATIENT
Start: 2018-12-19 | End: 2018-12-24

## 2018-12-19 RX ADMIN — POLYETHYLENE GLYCOL 3350 17 GRAM(S): 17 POWDER, FOR SOLUTION ORAL at 12:32

## 2018-12-19 RX ADMIN — OLANZAPINE 20 MILLIGRAM(S): 15 TABLET, FILM COATED ORAL at 20:58

## 2018-12-19 RX ADMIN — FLUPHENAZINE HYDROCHLORIDE 5 MILLIGRAM(S): 1 TABLET, FILM COATED ORAL at 20:58

## 2018-12-19 RX ADMIN — Medication 1 MILLIGRAM(S): at 08:25

## 2018-12-19 RX ADMIN — OLANZAPINE 10 MILLIGRAM(S): 15 TABLET, FILM COATED ORAL at 08:25

## 2018-12-19 RX ADMIN — LITHIUM CARBONATE 600 MILLIGRAM(S): 300 TABLET, EXTENDED RELEASE ORAL at 20:58

## 2018-12-19 RX ADMIN — Medication 1 MILLIGRAM(S): at 20:58

## 2018-12-19 RX ADMIN — LITHIUM CARBONATE 300 MILLIGRAM(S): 300 TABLET, EXTENDED RELEASE ORAL at 08:25

## 2018-12-19 RX ADMIN — FLUPHENAZINE HYDROCHLORIDE 5 MILLIGRAM(S): 1 TABLET, FILM COATED ORAL at 08:25

## 2018-12-19 RX ADMIN — Medication 2 MILLIGRAM(S): at 20:58

## 2018-12-20 RX ADMIN — FLUPHENAZINE HYDROCHLORIDE 5 MILLIGRAM(S): 1 TABLET, FILM COATED ORAL at 09:04

## 2018-12-20 RX ADMIN — LITHIUM CARBONATE 300 MILLIGRAM(S): 300 TABLET, EXTENDED RELEASE ORAL at 09:04

## 2018-12-20 RX ADMIN — Medication 50 MILLIGRAM(S): at 15:40

## 2018-12-20 RX ADMIN — OLANZAPINE 20 MILLIGRAM(S): 15 TABLET, FILM COATED ORAL at 21:48

## 2018-12-20 RX ADMIN — Medication 1 MILLIGRAM(S): at 09:04

## 2018-12-20 RX ADMIN — POLYETHYLENE GLYCOL 3350 17 GRAM(S): 17 POWDER, FOR SOLUTION ORAL at 14:27

## 2018-12-20 RX ADMIN — LITHIUM CARBONATE 600 MILLIGRAM(S): 300 TABLET, EXTENDED RELEASE ORAL at 21:48

## 2018-12-20 RX ADMIN — Medication 1 MILLIGRAM(S): at 21:48

## 2018-12-20 RX ADMIN — OLANZAPINE 10 MILLIGRAM(S): 15 TABLET, FILM COATED ORAL at 09:04

## 2018-12-20 RX ADMIN — FLUPHENAZINE HYDROCHLORIDE 5 MILLIGRAM(S): 1 TABLET, FILM COATED ORAL at 21:48

## 2018-12-21 LAB
ALBUMIN SERPL ELPH-MCNC: 3.4 G/DL — SIGNIFICANT CHANGE UP (ref 3.3–5)
ALP SERPL-CCNC: 104 U/L — SIGNIFICANT CHANGE UP (ref 40–120)
ALT FLD-CCNC: 56 U/L — HIGH (ref 4–33)
AST SERPL-CCNC: 41 U/L — HIGH (ref 4–32)
BILIRUB SERPL-MCNC: 0.2 MG/DL — SIGNIFICANT CHANGE UP (ref 0.2–1.2)
BUN SERPL-MCNC: 12 MG/DL — SIGNIFICANT CHANGE UP (ref 7–23)
CALCIUM SERPL-MCNC: 9.5 MG/DL — SIGNIFICANT CHANGE UP (ref 8.4–10.5)
CHLORIDE SERPL-SCNC: 105 MMOL/L — SIGNIFICANT CHANGE UP (ref 98–107)
CHOLEST SERPL-MCNC: 122 MG/DL — SIGNIFICANT CHANGE UP (ref 120–199)
CO2 SERPL-SCNC: 22 MMOL/L — SIGNIFICANT CHANGE UP (ref 22–31)
CREAT SERPL-MCNC: 0.72 MG/DL — SIGNIFICANT CHANGE UP (ref 0.5–1.3)
GLUCOSE SERPL-MCNC: 90 MG/DL — SIGNIFICANT CHANGE UP (ref 70–99)
HDLC SERPL-MCNC: 39 MG/DL — LOW (ref 45–65)
LIPID PNL WITH DIRECT LDL SERPL: 76 MG/DL — SIGNIFICANT CHANGE UP
LITHIUM SERPL-MCNC: 0.54 MMOL/L — LOW (ref 0.6–1.2)
POTASSIUM SERPL-MCNC: 4.1 MMOL/L — SIGNIFICANT CHANGE UP (ref 3.5–5.3)
POTASSIUM SERPL-SCNC: 4.1 MMOL/L — SIGNIFICANT CHANGE UP (ref 3.5–5.3)
PROT SERPL-MCNC: 7 G/DL — SIGNIFICANT CHANGE UP (ref 6–8.3)
SODIUM SERPL-SCNC: 138 MMOL/L — SIGNIFICANT CHANGE UP (ref 135–145)
TRIGL SERPL-MCNC: 75 MG/DL — SIGNIFICANT CHANGE UP (ref 10–149)

## 2018-12-21 RX ORDER — FLUPHENAZINE HYDROCHLORIDE 1 MG/1
25 TABLET, FILM COATED ORAL ONCE
Qty: 0 | Refills: 0 | Status: COMPLETED | OUTPATIENT
Start: 2018-12-21 | End: 2018-12-22

## 2018-12-21 RX ADMIN — FLUPHENAZINE HYDROCHLORIDE 5 MILLIGRAM(S): 1 TABLET, FILM COATED ORAL at 08:16

## 2018-12-21 RX ADMIN — Medication 50 MILLIGRAM(S): at 16:52

## 2018-12-21 RX ADMIN — LITHIUM CARBONATE 300 MILLIGRAM(S): 300 TABLET, EXTENDED RELEASE ORAL at 08:16

## 2018-12-21 RX ADMIN — OLANZAPINE 20 MILLIGRAM(S): 15 TABLET, FILM COATED ORAL at 21:19

## 2018-12-21 RX ADMIN — Medication 1 MILLIGRAM(S): at 08:16

## 2018-12-21 RX ADMIN — FLUPHENAZINE HYDROCHLORIDE 5 MILLIGRAM(S): 1 TABLET, FILM COATED ORAL at 21:18

## 2018-12-21 RX ADMIN — LITHIUM CARBONATE 600 MILLIGRAM(S): 300 TABLET, EXTENDED RELEASE ORAL at 21:18

## 2018-12-21 RX ADMIN — OLANZAPINE 10 MILLIGRAM(S): 15 TABLET, FILM COATED ORAL at 08:17

## 2018-12-21 RX ADMIN — Medication 1 MILLIGRAM(S): at 21:18

## 2018-12-22 RX ADMIN — FLUPHENAZINE HYDROCHLORIDE 25 MILLIGRAM(S): 1 TABLET, FILM COATED ORAL at 15:56

## 2018-12-22 RX ADMIN — LITHIUM CARBONATE 300 MILLIGRAM(S): 300 TABLET, EXTENDED RELEASE ORAL at 09:11

## 2018-12-22 RX ADMIN — OLANZAPINE 20 MILLIGRAM(S): 15 TABLET, FILM COATED ORAL at 21:15

## 2018-12-22 RX ADMIN — Medication 1 MILLIGRAM(S): at 09:11

## 2018-12-22 RX ADMIN — FLUPHENAZINE HYDROCHLORIDE 5 MILLIGRAM(S): 1 TABLET, FILM COATED ORAL at 21:15

## 2018-12-22 RX ADMIN — FLUPHENAZINE HYDROCHLORIDE 5 MILLIGRAM(S): 1 TABLET, FILM COATED ORAL at 09:11

## 2018-12-22 RX ADMIN — OLANZAPINE 10 MILLIGRAM(S): 15 TABLET, FILM COATED ORAL at 09:11

## 2018-12-22 RX ADMIN — LITHIUM CARBONATE 600 MILLIGRAM(S): 300 TABLET, EXTENDED RELEASE ORAL at 21:15

## 2018-12-22 RX ADMIN — Medication 1 MILLIGRAM(S): at 21:15

## 2018-12-23 RX ORDER — OLANZAPINE 15 MG/1
1 TABLET, FILM COATED ORAL
Qty: 30 | Refills: 0
Start: 2018-12-23 | End: 2019-01-21

## 2018-12-23 RX ORDER — LITHIUM CARBONATE 300 MG/1
1 TABLET, EXTENDED RELEASE ORAL
Qty: 0 | Refills: 0 | COMMUNITY
Start: 2018-12-23

## 2018-12-23 RX ORDER — LITHIUM CARBONATE 300 MG/1
600 TABLET, EXTENDED RELEASE ORAL
Qty: 0 | Refills: 0 | Status: DISCONTINUED | OUTPATIENT
Start: 2018-12-23 | End: 2018-12-24

## 2018-12-23 RX ORDER — LITHIUM CARBONATE 300 MG/1
2 TABLET, EXTENDED RELEASE ORAL
Qty: 0 | Refills: 0 | COMMUNITY
Start: 2018-12-23

## 2018-12-23 RX ORDER — FLUPHENAZINE HYDROCHLORIDE 1 MG/1
1 TABLET, FILM COATED ORAL
Qty: 60 | Refills: 0
Start: 2018-12-23 | End: 2019-01-21

## 2018-12-23 RX ORDER — LITHIUM CARBONATE 300 MG/1
2 TABLET, EXTENDED RELEASE ORAL
Qty: 120 | Refills: 0
Start: 2018-12-23 | End: 2019-01-21

## 2018-12-23 RX ORDER — FLUPHENAZINE HYDROCHLORIDE 1 MG/1
25 TABLET, FILM COATED ORAL
Qty: 25 | Refills: 0 | OUTPATIENT
Start: 2018-12-23 | End: 2019-01-12

## 2018-12-23 RX ORDER — CLONAZEPAM 1 MG
1 TABLET ORAL
Qty: 60 | Refills: 0
Start: 2018-12-23 | End: 2019-01-21

## 2018-12-23 RX ORDER — POLYETHYLENE GLYCOL 3350 17 G/17G
17 POWDER, FOR SOLUTION ORAL
Qty: 30 | Refills: 0
Start: 2018-12-23 | End: 2019-01-21

## 2018-12-23 RX ORDER — SIMETHICONE 80 MG/1
1 TABLET, CHEWABLE ORAL
Qty: 60 | Refills: 0
Start: 2018-12-23 | End: 2019-01-21

## 2018-12-23 RX ADMIN — OLANZAPINE 20 MILLIGRAM(S): 15 TABLET, FILM COATED ORAL at 21:33

## 2018-12-23 RX ADMIN — Medication 1 MILLIGRAM(S): at 09:09

## 2018-12-23 RX ADMIN — Medication 1 MILLIGRAM(S): at 21:33

## 2018-12-23 RX ADMIN — OLANZAPINE 10 MILLIGRAM(S): 15 TABLET, FILM COATED ORAL at 09:09

## 2018-12-23 RX ADMIN — FLUPHENAZINE HYDROCHLORIDE 5 MILLIGRAM(S): 1 TABLET, FILM COATED ORAL at 09:09

## 2018-12-23 RX ADMIN — LITHIUM CARBONATE 600 MILLIGRAM(S): 300 TABLET, EXTENDED RELEASE ORAL at 09:09

## 2018-12-23 RX ADMIN — FLUPHENAZINE HYDROCHLORIDE 5 MILLIGRAM(S): 1 TABLET, FILM COATED ORAL at 21:33

## 2018-12-23 RX ADMIN — LITHIUM CARBONATE 600 MILLIGRAM(S): 300 TABLET, EXTENDED RELEASE ORAL at 21:33

## 2018-12-24 VITALS — TEMPERATURE: 99 F

## 2018-12-24 PROCEDURE — 99238 HOSP IP/OBS DSCHRG MGMT 30/<: CPT

## 2018-12-24 RX ADMIN — OLANZAPINE 10 MILLIGRAM(S): 15 TABLET, FILM COATED ORAL at 08:03

## 2018-12-24 RX ADMIN — LITHIUM CARBONATE 600 MILLIGRAM(S): 300 TABLET, EXTENDED RELEASE ORAL at 08:03

## 2018-12-24 RX ADMIN — Medication 1 MILLIGRAM(S): at 08:03

## 2018-12-24 RX ADMIN — FLUPHENAZINE HYDROCHLORIDE 5 MILLIGRAM(S): 1 TABLET, FILM COATED ORAL at 08:03

## 2018-12-27 ENCOUNTER — EMERGENCY (EMERGENCY)
Facility: HOSPITAL | Age: 38
LOS: 1 days | Discharge: HOME | End: 2018-12-27
Admitting: EMERGENCY MEDICINE
Payer: SELF-PAY

## 2018-12-27 VITALS
DIASTOLIC BLOOD PRESSURE: 75 MMHG | HEART RATE: 88 BPM | SYSTOLIC BLOOD PRESSURE: 136 MMHG | OXYGEN SATURATION: 100 % | RESPIRATION RATE: 18 BRPM | TEMPERATURE: 98 F

## 2018-12-27 PROCEDURE — 99284 EMERGENCY DEPT VISIT MOD MDM: CPT

## 2018-12-27 RX ORDER — HALOPERIDOL DECANOATE 100 MG/ML
5 INJECTION INTRAMUSCULAR ONCE
Qty: 0 | Refills: 0 | Status: COMPLETED | OUTPATIENT
Start: 2018-12-27 | End: 2018-12-27

## 2018-12-27 RX ADMIN — HALOPERIDOL DECANOATE 5 MILLIGRAM(S): 100 INJECTION INTRAMUSCULAR at 18:42

## 2018-12-27 RX ADMIN — Medication 2 MILLIGRAM(S): at 18:43

## 2018-12-27 NOTE — ED BEHAVIORAL HEALTH NOTE - BEHAVIORAL HEALTH NOTE
Writer informed that pt was cleared for discharge. Writer contacted [facility] #[phone] and was notified that pt's medicaid is currently inactive. Writer then spoke with pt. Pt reports that she has no money and feels safest returning to residence in a taxi as opposed to public transportation. Writer then contacted [facility]. Writer provided with taxi price of $12 dollars. Taxi transportation arranged for pt.

## 2018-12-27 NOTE — ED PROVIDER NOTE - MEDICAL DECISION MAKING DETAILS
39 y/o M hx Bipolar, BPD, GERD, Obesity   Medical evaluation performed. There is no clinical evidence of intoxication or any acute medical problem requiring immediate intervention. D/C to group home via EMS 39 y/o M hx Bipolar, BPD, GERD, Obesity   Medical evaluation performed. There is no clinical evidence of intoxication or any acute medical problem requiring immediate intervention. D/C to  [facility] via cab. Discuss plan with - [phone]
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
## 2019-01-02 ENCOUNTER — EMERGENCY (EMERGENCY)
Facility: HOSPITAL | Age: 39
LOS: 1 days | Discharge: HOME | End: 2019-01-02
Admitting: EMERGENCY MEDICINE
Payer: MEDICAID

## 2019-01-02 VITALS
DIASTOLIC BLOOD PRESSURE: 87 MMHG | RESPIRATION RATE: 18 BRPM | TEMPERATURE: 99 F | OXYGEN SATURATION: 98 % | SYSTOLIC BLOOD PRESSURE: 132 MMHG | HEART RATE: 98 BPM

## 2019-01-02 PROCEDURE — 99283 EMERGENCY DEPT VISIT LOW MDM: CPT

## 2019-01-02 NOTE — ED PROVIDER NOTE - OBJECTIVE STATEMENT
39y/o female presents to  for AH telling her she's gorgeous".  Pt states the voices do not command her to do anything, "they just tell me I'm gorgeous and I'm pretty". Pt does not fear the voices.  PT alert and oriented x 3 with no c/o pain or discomfort.  PT denies SI/HI/VH.  PT is happy, calm and cooperative, making jokes with staff in the  area.

## 2019-01-02 NOTE — ED ADULT NURSE NOTE - NSIMPLEMENTINTERV_GEN_ALL_ED
Implemented All Universal Safety Interventions:  [place] to call system. Call bell, personal items and telephone within reach. Instruct patient to call for assistance. Room bathroom lighting operational. Non-slip footwear when patient is off stretcher. Physically safe environment: no spills, clutter or unnecessary equipment. Stretcher in lowest position, wheels locked, appropriate side rails in place.

## 2019-01-02 NOTE — ED PROVIDER NOTE - MEDICAL DECISION MAKING DETAILS
39 y/o female presents to  for  telling her she's gorgeous".  Physical examination performed and unremarkable for any acute medical issues or problems requiring immediate interventions.  Pt cleared for stable discharge.  Pt will f/u with her out pt provider regarding the  for medication adjustment.  Pt also provided the [facility] information with discharge paper work as an additional resource.

## 2019-01-02 NOTE — ED ADULT TRIAGE NOTE - CHIEF COMPLAINT QUOTE
I'm having grand delusions "voices are telling me that I am the queen and to run the world"  Denies SI/HI

## 2019-01-02 NOTE — ED BEHAVIORAL HEALTH NOTE - BEHAVIORAL HEALTH NOTE
Writer called [facility], [phone], and spoke with Joesph, requesting [facility] service, faxing the request to [phone]. Patient has only Medicare, and no coverage for a cab arranged by [facility]. Joesph provided an ETA of 6:15PM.

## 2019-01-06 ENCOUNTER — EMERGENCY (EMERGENCY)
Facility: HOSPITAL | Age: 39
LOS: 1 days | Discharge: HOME | End: 2019-01-06
Attending: EMERGENCY MEDICINE | Admitting: EMERGENCY MEDICINE
Payer: MEDICAID

## 2019-01-06 VITALS
RESPIRATION RATE: 18 BRPM | SYSTOLIC BLOOD PRESSURE: 127 MMHG | OXYGEN SATURATION: 100 % | TEMPERATURE: 97 F | DIASTOLIC BLOOD PRESSURE: 94 MMHG | HEART RATE: 88 BPM

## 2019-01-06 PROCEDURE — 99283 EMERGENCY DEPT VISIT LOW MDM: CPT

## 2019-01-06 PROCEDURE — 90792 PSYCH DIAG EVAL W/MED SRVCS: CPT

## 2019-01-06 NOTE — ED PROVIDER NOTE - OBJECTIVE STATEMENT
38F with pmh of bipolar disorder, borderline personality disorder, schizoaffective disorder, recent admission to [facility] with psychosis/trina  BIBA from [facility] with SI/HI. Pt reports that hears voices that are telling her to kill herself and other people. She mentioned to EMS that wanted to stab herself in the next with a pen. Pt denies alcohol/drug use, f/c, recent illness. Pt reports that is compliant with her psych meds.

## 2019-01-06 NOTE — ED BEHAVIORAL HEALTH ASSESSMENT NOTE - SUMMARY
Ms. Castillo is a single, 38-year-old  female, non caregiver, unemployed, domiciled on [place] grounds, with past psychiatric history of  Schizoaffective Disorder, Borderline Personality Disorder, cannabis abuse, multiple inpatient psychiatric hospitalizations (>20), recently Low 6 10/17/18-10/30/18, with frequent visits to the [facility] (well-known to staff, last on 12/7) 3 prior suicide attempts (last in 2012 via OD), recurrent suicidal gestures and suicidal ideation, history of property destruction when upset, no history of arrests or access to weapons, was BIB EMS called by staff at [place] due to agitation after pulling the fire alarm. Patient presents with active psychotic symptoms, formal though disorder, homicidal ideation towards staff at her residence and medication non-adherence (Lithium subtherapeutic). Pt poses an imminent danger to self and others and requires inpatient psychiatric admission for treatment and stabilization. Ms. Castillo is a single, 38-year-old  female, non caregiver, unemployed, domiciled on [place] grounds, with past psychiatric history of  Schizoaffective Disorder, Borderline Personality Disorder, cannabis abuse, multiple inpatient psychiatric hospitalizations (>20), recently Low 4 12/12-12/24 2018, with frequent visits to the [facility] (well-known to staff) 3 prior suicide attempts (last in 2012 via OD), recurrent suicidal gestures and suicidal ideation, history of property destruction when upset, no history of arrests or access to weapons. PMH GERD & asthma.  BIB EMS called by  because patient requested to come to the ER.     Patient presents to the ER in the context of requesting to come to the ER for suicidal ideation and inpatient admission. Patient reportedly also threw a chair while waiting for EMS because she could not find her ID but then calmed herself once she found it. She has a chronic history of impulsive behavior and property destruction. She denied SI/HI/CAH. She reported saying these things to triage in order to gain admission because she did not like her residence and wanted admission to have a break or go to respite. Patient consistently denies SI/HI/SIB/intent/plan, CAH, psychotic symptoms, depression, trina or any other mental health issues. She is future oriented and able to safety plan. She is medication compliant and treatment seeking. Patient appears at chronic baseline. Patient has secondary gains in requesting admission, collateral has no safety concerns. Patient recanted request for admission, now refusing inpatient admission and now is requesting discharge. She does not meet criteria for involuntary inpatient admission. Recommend discharge back to residence. On-Call  advised to make patient follow up appt with psychiatrist. Extensive safety planning performed. Patient and staff agreeing verbally to return patient to ER or call 911 if symptoms worsen or patient has urges to harm self or others. Ms. Castillo is a single, 38-year-old  female, non caregiver, unemployed, domiciled on [place] [address], with past psychiatric history of  Schizoaffective Disorder, Borderline Personality Disorder, cannabis abuse, multiple inpatient psychiatric hospitalizations (>20), recently Low 4 12/12-12/24 2018, with frequent visits to the [facility] (well-known to staff) 3 prior suicide attempts (last in 2012 via OD), recurrent suicidal gestures and suicidal ideation, history of property destruction when upset, past legal issues, no access to weapons. PMH GERD & asthma.  BIB EMS called by  because patient requested to come to the ER.     Patient presents to the ER in the context of requesting to come to the ER for suicidal ideation and inpatient admission. Patient reportedly also threw a chair while waiting for EMS because she could not find her ID but then calmed herself once she found it. She has a chronic history of similar impulsive behavior and property destruction. She denied SI/HI/CAH. She reported saying these things to triage/EMS in order to gain inpatient admission because she does not like her residence and wanted admission to have a break or go to respite. Patient consistently denies SI/HI/SIB/intent/plan, CAH, psychotic symptoms, depression, trina or any other mental health issues. She is future oriented and able to safety plan. She is medication compliant and treatment seeking. Patient appears at chronic baseline. Patient has secondary gains in requesting admission, collateral has no safety concerns. Patient recanted request for admission, now refusing inpatient admission and now is requesting discharge. She does not meet criteria for involuntary inpatient admission. Recommend discharge back to residence. On-Call  advised to make patient follow up appt with psychiatrist. Extensive safety planning performed. Patient and staff agreeing verbally to return patient to ER or call 911 if symptoms worsen or patient has urges to harm self or others.
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
## 2019-01-06 NOTE — ED BEHAVIORAL HEALTH ASSESSMENT NOTE - RISK ASSESSMENT
Pt presents as acutely psychotic with affective instability in context of medication non adherence. Chronic risks include past hospitalizations, past suicide attempts, unemployment, hx of chronic aggression. Acute risks include active psychosis, CAH to hurt people, affective instability, recent aggression, HI, medical non adherence and poor insight. Pt poses an imminent danger to self and others and requires inpatient psychiatric admission for treatment and stabilization. Patient has chornic risk give chronic impulsivity, past SA, past aggression. Risk does not currently appear to be acutely elevated from baseline. Pt is not an imminent risk- not currently acutely psychotic or depressed, no si/hi/sib/intent/plan, medication compliant, eating/sleeping well, no recent violence, no substance use, future oriented, able to safety plan, is on an PALACIOS and is calm and cooperative throughout evaluation. Inpatient admission Is unlikely to modify these chronic risks, pt is not requiring inpatient psychiatric admission at this time as she is not an imminent danger to self or others. Current presentation in keeping w axis 2 pathology and chronic poor impulse control. Patient has chronic risk give chronic impulsivity, past SA, past aggression. Risk does not currently appear to be acutely elevated from baseline. Pt is not an imminent risk- not currently acutely psychotic or depressed, no si/hi/sib/intent/plan, medication compliant, eating/sleeping well, no recent violence, no substance use, future oriented, able to safety plan, is on an PALACIOS and is calm and cooperative throughout evaluation. Inpatient admission Is unlikely to modify these chronic risks, pt is not requiring inpatient psychiatric admission at this time as she is not an imminent danger to self or others. Current presentation in keeping w axis 2 pathology and chronic poor impulse control.

## 2019-01-06 NOTE — ED BEHAVIORAL HEALTH ASSESSMENT NOTE - SUICIDE PROTECTIVE FACTORS
Supportive social network or family/Fear of death or dying due to pain/suffering Supportive social network or family/Identifies reasons for living/Future oriented/Positive therapeutic relationships/Other/Fear of death or dying due to pain/suffering

## 2019-01-06 NOTE — ED ADULT TRIAGE NOTE - CHIEF COMPLAINT QUOTE
pt comes to ED by EMS for psych eval. pt comes from [facility]. pt states she wants to kill herself and others. pt states the voices in her head are telling her to hurt other people. pt is calm and cooperative in triage. pt VSS pt denies any drugs or ETOH. pt states she has been taking her  psych meds. FIT MD called to triage for eval  pt is cleared to go to

## 2019-01-06 NOTE — ED PROVIDER NOTE - MEDICAL DECISION MAKING DETAILS
likely decompensation of psych disease, possible psychosis. low suspicion for underlying infection, metabolic derangement, ICH. Plan for psych eval.

## 2019-01-06 NOTE — ED PROVIDER NOTE - NSFOLLOWUPINSTRUCTIONS_ED_ALL_ED_FT
Please see your psychiatrist in next few days. Take your medications as prescribed. Return to ED if you have worsening symptoms.

## 2019-01-06 NOTE — ED ADULT NURSE NOTE - NSIMPLEMENTINTERV_GEN_ALL_ED
Implemented All Universal Safety Interventions:  [place] to call system. Call bell, personal items and telephone within reach. Instruct patient to call for assistance. Room bathroom lighting operational. Non-slip footwear when patient is off stretcher. Physically safe environment: no spills, clutter or unnecessary equipment. Stretcher in lowest position, wheels locked, appropriate side rails in place.

## 2019-01-06 NOTE — ED PROVIDER NOTE - PHYSICAL EXAMINATION
GEN - mildly agitated, obese, A+O x3   HEAD - NC/AT     EYES - EOMI, no conjunctival pallor, no scleral icterus  ENT -   mucous membranes  moist , no discharge      NECK - Neck supple  PULM - CTA b/l,  symmetric breath sounds  COR -  RRR, S1 S2, no murmurs  ABD - , ND, NT, soft, no guarding, no rebound, no masses    BACK - no CVA tenderness, nontender spine     EXTREMS - no edema, no deformity, warm and well perfused    SKIN - no rash or bruising      NEUROLOGIC - alert, sensation nl, motor 5/5 RUE/LUE/RLE/LLE  PSYCH -  +AH, SI/HI, odd affect

## 2019-01-06 NOTE — ED BEHAVIORAL HEALTH NOTE - BEHAVIORAL HEALTH NOTE
Worker alerted patient is clear for discharge and reports ability to use public transport. Team consulted and metro provided. No further questions or concerns reported.

## 2019-01-06 NOTE — ED BEHAVIORAL HEALTH ASSESSMENT NOTE - CURRENT MEDICATION
Lithium 300 mg BID and 600 mg qhs, Zyprexa 10 mg/20 mg, Klonopin 1mg BID; Prolixin Decanoate 25 mg IM q2week Lithium 600mg bid,  Zyprexa 10 mg daily;20 mg qhs, Klonopin 1mg BID; Prolixin Decanoate 25 mg IM q2week (last received 12/21/18) Lithium 600mg bid,  Zyprexa 10 mg daily;20 mg qhs, Klonopin 1mg BID; Prolixin Decanoate 25 mg IM q2week (last received [facility] 12/21/18 (unknown if recent received since admission since clinic closed but on call  endorses compliance with medication)

## 2019-01-06 NOTE — ED PROVIDER NOTE - PROGRESS NOTE DETAILS
Key: Pt was evaluated by psychiatry, stated to them that only expressed SI/HI because wanted to come here as does not like living in [facility]. Pt is psychiatrically and medically cleared for discharge pending SW to arrange transport.

## 2019-01-06 NOTE — ED BEHAVIORAL HEALTH ASSESSMENT NOTE - CASE SUMMARY
38-year-old  female, non caregiver, unemployed, domiciled on [place] [address], with past psychiatric history of  Schizoaffective Disorder, Borderline Personality Disorder, cannabis abuse, multiple inpatient psychiatric hospitalizations (>20), recently Low 4 12/12-12/24 2018, with frequent visits to the [facility] (well-known to staff) 3 prior suicide attempts (last in 2012 via OD), recurrent suicidal gestures and suicidal ideation, history of property destruction when upset, past legal issues, no access to weapons. PMH GERD & asthma.  BIB EMS called by  because patient requested to come to the ER.     Patient in the interview changed her story and stated she wanted to get a break from the place she is living in and came to the hospital. She does not pose any imminent threat to self ot others and does not require inpatient treatment at this time. Patient contracts for safety and can be discharged from the ED.
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
## 2019-01-06 NOTE — ED BEHAVIORAL HEALTH ASSESSMENT NOTE - SUICIDE RISK FACTORS
Hopelessness/History of abuse/trauma/Highly impulsive behavior Agitation/severe anxiety/History of abuse/trauma

## 2019-01-06 NOTE — ED BEHAVIORAL HEALTH NOTE - BEHAVIORAL HEALTH NOTE
Spoke with On-call  Rosalva Ramirez ([phone])- She was told from  that patient stated she wanted to go to the hospital and while she was waiting she threw a chair. Patient did not say why she threw a chair or why she wanted to go to the hospital. Patient has been medication compliant. Patient has been at baseline with imminent safety concerns. Spoke with On-call  Rosalva Ramirez ([phone])- She was told from  that patient stated she wanted to go to the hospital and while she was waiting she threw a chair. Patient did not say why she threw a chair or why she wanted to go to the hospital. Patient has been medication compliant. Patient has been at baseline with imminent safety concerns. Agreed she will organize outpatient psychiatric appt.

## 2019-01-06 NOTE — ED BEHAVIORAL HEALTH ASSESSMENT NOTE - PSYCHIATRIC ISSUES AND PLAN (INCLUDE STANDING AND PRN MEDICATION)
Restart Lithium at 300mg bid, Zyprexa 10mg bid, , Klonopin 0.5mg BID, Prolixin 5mg BID; Haldol 5mg PO q 6 hrs prn agitation; Ativan 2mg PO q 6hrs prn anxiety/agitation, Diphenhydramine 50mg PO q 6hr prn EPS, insomnia or agitation.

## 2019-01-06 NOTE — ED ADULT NURSE NOTE - CHIEF COMPLAINT QUOTE
pt comes to ED by EMS for psych eval. pt comes from [facility]. pt states she wants to kill herself and others. pt states the voices in her head are telling her to hurt other people. pt is calm and cooperative in triage. pt VSS pt denies any drugs or ETOH. pt states she has been taking her  psych meds. FIT MD called to triage for eval  pt is cleared to go to

## 2019-01-06 NOTE — ED BEHAVIORAL HEALTH ASSESSMENT NOTE - DESCRIPTION
Patient was labile, yelling and sexually inappropriate in the emergency room. GERD, Asthma currently lives in [place] housing, reports no contact with family, currently unemployed During course of ED visit patient was calm and cooperative. Patient was not aggressive or violent and did not require PRN medications.    Vital Signs Last 24 Hrs  T(C): 36.3 (06 Jan 2019 09:21), Max: 36.3 (06 Jan 2019 09:21)  T(F): 97.3 (06 Jan 2019 09:21), Max: 97.3 (06 Jan 2019 09:21)  HR: 88 (06 Jan 2019 09:21) (88 - 88)  BP: 127/94 (06 Jan 2019 09:21) (127/94 - 127/94)  BP(mean): --  RR: 18 (06 Jan 2019 09:21) (18 - 18)  SpO2: 100% (06 Jan 2019 09:21) (100% - 100%)

## 2019-01-11 ENCOUNTER — EMERGENCY (EMERGENCY)
Facility: HOSPITAL | Age: 39
LOS: 1 days | Discharge: HOME | End: 2019-01-11
Attending: EMERGENCY MEDICINE | Admitting: EMERGENCY MEDICINE
Payer: MEDICAID

## 2019-01-11 VITALS
HEART RATE: 78 BPM | TEMPERATURE: 98 F | RESPIRATION RATE: 16 BRPM | DIASTOLIC BLOOD PRESSURE: 70 MMHG | SYSTOLIC BLOOD PRESSURE: 130 MMHG | OXYGEN SATURATION: 100 %

## 2019-01-11 LAB
HCG UR-SCNC: NEGATIVE — SIGNIFICANT CHANGE UP
SP GR UR: 1.03 — SIGNIFICANT CHANGE UP (ref 1–1.03)

## 2019-01-11 PROCEDURE — 99283 EMERGENCY DEPT VISIT LOW MDM: CPT

## 2019-01-11 PROCEDURE — 90792 PSYCH DIAG EVAL W/MED SRVCS: CPT

## 2019-01-11 RX ORDER — OLANZAPINE 15 MG/1
10 TABLET, FILM COATED ORAL ONCE
Qty: 0 | Refills: 0 | Status: COMPLETED | OUTPATIENT
Start: 2019-01-11 | End: 2019-01-11

## 2019-01-11 RX ORDER — FLUPHENAZINE HYDROCHLORIDE 1 MG/1
25 TABLET, FILM COATED ORAL ONCE
Qty: 0 | Refills: 0 | Status: COMPLETED | OUTPATIENT
Start: 2019-01-11 | End: 2019-01-11

## 2019-01-11 RX ADMIN — FLUPHENAZINE HYDROCHLORIDE 25 MILLIGRAM(S): 1 TABLET, FILM COATED ORAL at 13:12

## 2019-01-11 RX ADMIN — OLANZAPINE 10 MILLIGRAM(S): 15 TABLET, FILM COATED ORAL at 12:17

## 2019-01-11 NOTE — ED BEHAVIORAL HEALTH ASSESSMENT NOTE - DESCRIPTION
calm, cooperative, offered and accepted prescribed medication  Vital Signs Last 24 Hrs  T(C): 36.7 (11 Jan 2019 10:10), Max: 36.7 (11 Jan 2019 10:10)  T(F): 98 (11 Jan 2019 10:10), Max: 98 (11 Jan 2019 10:10)  HR: 78 (11 Jan 2019 10:10) (78 - 78)  BP: 130/70 (11 Jan 2019 10:10) (130/70 - 130/70)  BP(mean): --  RR: 16 (11 Jan 2019 10:10) (16 - 16)  SpO2: 100% (11 Jan 2019 10:10) (100% - 100%) GERD, Asthma currently lives in [facility], [address] [facility] [address] MICHELLE reports no contact with family, currently unemployed
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
## 2019-01-11 NOTE — ED BEHAVIORAL HEALTH ASSESSMENT NOTE - DESCRIPTION (FIRST USE, LAST USE, QUANTITY, FREQUENCY, DURATION)
reports hx of use but not for years social use, denies recent long hx of cannabis use, pt denies current use charted hx of prior use (unclear if it amounted to "abuse" criteria or just recreational use), pt denies current use heroin - charted hx of prior use (unclear if it amounted to "abuse" criteria or just recreational use), pt denies current use

## 2019-01-11 NOTE — ED ADULT NURSE NOTE - NSIMPLEMENTINTERV_GEN_ALL_ED
Implemented All Universal Safety Interventions:  [place] to call system. Call bell, personal items and telephone within reach. Instruct patient to call for assistance. Room bathroom lighting operational. Non-slip footwear when patient is off stretcher. Physically safe environment: no spills, clutter or unnecessary equipment. Stretcher in lowest position, wheels locked, appropriate side rails in place.
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
## 2019-01-11 NOTE — ED BEHAVIORAL HEALTH ASSESSMENT NOTE - HPI (INCLUDE ILLNESS QUALITY, SEVERITY, DURATION, TIMING, CONTEXT, MODIFYING FACTORS, ASSOCIATED SIGNS AND SYMPTOMS)
Ms. Castillo is a single, 38-year-old  female, non caregiver, unemployed, domiciled on [place] [address], with past psychiatric history of  Schizoaffective Disorder, Borderline Personality Disorder, cannabis abuse, multiple inpatient psychiatric hospitalizations (>20), recently Low 4 12/12-12/24 2018, with frequent visits to the [facility] (well-known to staff) 3 prior suicide attempts (last in 2012 via OD), recurrent suicidal gestures and suicidal ideation, history of property destruction when upset, past legal issues, no access to weapons. PMH GERD & asthma.  BIB EMS called by staff for bizarre behavior. Ms. Castillo is a single, 38-year-old  female, non caregiver, unemployed, domiciled on [place] [address], with past psychiatric history of  Schizoaffective Disorder, Borderline Personality Disorder, cannabis abuse, multiple inpatient psychiatric hospitalizations (>20), recently Low 4 12/12-12/24 2018, with frequent visits to the [facility] (well-known to staff) 3 prior suicide attempts (last in 2012 via OD), recurrent suicidal gestures and suicidal ideation, history of property destruction when upset, past legal issues, no access to weapons. PMH GERD & asthma.  BIB EMS called by staff for bizarre behavior.    Patient first interviewed by medical student, where she reported she was upset because her ex-boyfriend had been cheating on her, which caused her to have an “epiphany” today to take her clothes off and throw glitter on herself because she was “having a bar mitzvah.” She went on to state that she wanted to protest about life at [place].    Patient was observed to be behaviorally dysregulated when staff were being attentive, banging on windows and dancing. However when observed on camera she had extended periods of calm, which supports the belief that presentation is largely behaviorally driven. Ms. Castillo is a single, 38-year-old  female, non caregiver, unemployed, domiciled on [place] [address], with past psychiatric history of  Schizoaffective Disorder, Borderline Personality Disorder, cannabis abuse, multiple inpatient psychiatric hospitalizations (>20), recently Low 4 12/12-12/24 2018, [facility]) 3 prior suicide attempts (last in 2012 via OD), recurrent suicidal gestures and suicidal ideation, history of property destruction when upset, past legal issues, no access to weapons. PMH GERD & asthma.  [facility] called by staff for bizarre behavior.    Patient first interviewed by medical student, where she reported she was upset because her ex-boyfriend had been cheating on her, which caused her to have an “epiphany” today to take her clothes off and throw glitter on herself because she was “having a bar mitzvah.” She went on to state that she wanted to protest about life at [place].    Patient was observed to be behaviorally dysregulated when staff were being attentive, banging on windows and dancing. However when observed on camera she had extended periods of calm, which supports the belief that presentation is largely behaviorally driven.    Upon attending interview, she asked writer about her prior pregnancy and reported supernatural alba. She reports ongoing conflicts with various peers in her residence and does not like that they use drugs. She reports AH, then laughs and denies them. Denies suicidal ideation and homicidal ideation.     See  note for information from residence.    Spoke with Toya YOUNGBLOOD and Orville Holbrook  at [facility] ACT team. They saw patient Monday. They had planned to see patient Wednesday but she was not present at the residence. They report she was at baseline on Monday, with chronic dislike of her residence and attention seeking behaviors. When asked what led to her presentation today, they reported her actions were consistent with her baseline. They did report that she had not received her Prolixin injection since her admission at [facility]. They also note her medicaid is inactive and she is not being cooperative with providing the documentation, though the team is attempting to help her with this.   Spoke with Dr. Stanton who reported that patient was needy and attention seeking on the unit, and after a few days of admission she would repeatedly ask about her discharge. He also suspects a significant behavioral component to frequent hospital presentations.

## 2019-01-11 NOTE — ED BEHAVIORAL HEALTH ASSESSMENT NOTE - REFERRAL / APPOINTMENT DETAILS
Follow up with outpatient psychiatrist- on call  aware to make appointment patient to follow up with ACT team next week
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
## 2019-01-11 NOTE — ED PROVIDER NOTE - OBJECTIVE STATEMENT
Meg: 38 F, bipolar, from [facility] w/ elaborate/exaggerated behavior. Dressing like she's going to a club, wearing a wig and glitter, putting hand in her vagina, stating she wants to kill Aryans. No e/o acute medical or surgical condition or trauma that might explain such behavior or complicate treatment.

## 2019-01-11 NOTE — ED BEHAVIORAL HEALTH ASSESSMENT NOTE - MEDICATIONS (PRESCRIPTIONS, DIRECTIONS)
none; continue current medication Given one dose of Zyprexa 10mg PO and Prolixin Dec 25mg IM in the ED. ACT Team aware. Patient due for next injection in two weeks.

## 2019-01-11 NOTE — ED ADULT NURSE NOTE - CHIEF COMPLAINT QUOTE
pt comes to ED for psych eval pt comes to ED from [facility] [address].  pt was stripping her clothes off and threw gliter on herself at the facility. pt denies SI. pt denies ETOH or Drugs. pt states she wants to hurt other people. pt has not been taking her meds. pt trying to take clothes off in triage. fit MD called to triage for eval

## 2019-01-11 NOTE — ED BEHAVIORAL HEALTH ASSESSMENT NOTE - RISK ASSESSMENT
Patient has chronic risk give chronic impulsivity, past SA, past aggression. Risk does not currently appear to be acutely elevated from baseline. Pt is not an imminent risk- not currently acutely psychotic or depressed, no si/hi/sib/intent/plan, medication compliant, eating/sleeping well, no recent violence, no substance use, future oriented, able to safety plan, is on an PALACIOS and is calm and cooperative throughout evaluation. Inpatient admission Is unlikely to modify these chronic risks, pt is not requiring inpatient psychiatric admission at this time as she is not an imminent danger to self or others. Current presentation in keeping w axis 2 pathology and chronic poor impulse control. Patient has chronic risk give chronic impulsivity, past SA, past aggression. Risk does not currently appear to be acutely elevated from baseline. Pt is not an imminent risk- not currently acutely psychotic or depressed, no si/hi/sib/intent/plan, eating/sleeping well, no recent violence, no substance use, future oriented, able to safety plan, is on an PALACIOS and is calm and cooperative throughout evaluation. Inpatient admission Is unlikely to modify these chronic risks, pt is not requiring inpatient psychiatric admission at this time as she is not an imminent danger to self or others. Current presentation in keeping with personality pathology and chronic poor impulse control.

## 2019-01-11 NOTE — ED ADULT NURSE NOTE - OBJECTIVE STATEMENT
Received  pt in  pt  calm & cooperative sent from [facility] for psych eval as per EMS pt was disrobing & tossing glitter on herself pt denies Si/Hi/AVh presently safety & comfort measures maintained eval on going.

## 2019-01-11 NOTE — ED ADULT NURSE REASSESSMENT NOTE - NS ED NURSE REASSESS COMMENT FT1
pt calm & cooperative d/c by MD pt denies Si/Hi/AVh presently pt verbalizing understanding of d/c instructions resources provided to pt. pt calm & cooperative d/c by MD pt denies Si/Hi/AVh presently pt verbalizing understanding of d/c instructions resources provided to pt.  [id number] Pt calm & cooperative back to baseline pt denies Si/Hi/AVh pt placed in a cab by [facility] for transport back to [facility].

## 2019-01-11 NOTE — ED BEHAVIORAL HEALTH ASSESSMENT NOTE - SUICIDE PROTECTIVE FACTORS
Positive therapeutic relationships/Identifies reasons for living/Future oriented/Other/Supportive social network or family/Fear of death or dying due to pain/suffering

## 2019-01-11 NOTE — ED ADULT TRIAGE NOTE - CHIEF COMPLAINT QUOTE
pt comes to ED for psych eval pt comes to ED from [facility] [address].  pt was stripping her clothes off and threw gliter on herself at the facility. pt denies SI. pt denies ETOH or Drugs. pt states she wants to hurt other people. pt has not been taking her meds. pt trying to take clothes off in triage. fit MD called to triage for eval

## 2019-01-11 NOTE — ED BEHAVIORAL HEALTH ASSESSMENT NOTE - SUMMARY
Ms. Castillo is a single, 38-year-old  female, non caregiver, unemployed, domiciled on [place] [address], with past psychiatric history of  Schizoaffective Disorder, Borderline Personality Disorder, cannabis abuse, multiple inpatient psychiatric hospitalizations (>20), recently Low 4 12/12-12/24 2018, with frequent visits to the [facility] (well-known to staff) 3 prior suicide attempts (last in 2012 via OD), recurrent suicidal gestures and suicidal ideation, history of property destruction when upset, past legal issues, no access to weapons. PMH GERD & asthma. BIB EMS called by staff for bizarre behavior.  Patient is well known to this writer. When interviewed by [facility], she initially was citing medical terms to report her symptoms ("I have flight of ideas") in addition to making suicidal comments and reporting hallucinations. Upon attending reassessment these were denied. Patient was observed to be dancing and banging on windows, but these behaviors were not observed when staff attention was diverted elsewhere, suggesting a strong behavioral component. Patient does not appear internally preoccupied nor responding to internal stimuli. She does not present as acutely depressed or suicidal, not homicidal. She appears to be trying to feign manic symptoms, supported by her report of DSM criteria for trina. She willingly agreed to take oral Zyprexa and also agreed to take her overdue Prolixin Decanoate. She resides in a supervised residence and has community support via ACT team. She is known to act out due to chronic dislike of her residence and peer conflict. She does not present an imminent risk of harm to herself/others that would warrant psychiatric hospitalization at this time.

## 2019-01-11 NOTE — ED BEHAVIORAL HEALTH ASSESSMENT NOTE - CURRENT MEDICATION
Reportedly non compliant sine 1/8/19: Lithium 600mg bid, Zyprexa 10 mg daily & 20 mg qhs, Klonopin 1mg BID.     Prolixin Decanoate 25 mg IM q2week. last received [facility] 12/21/18. Has not received since leaving the hospital per ACT Team. will administer in ED today.

## 2019-01-11 NOTE — ED BEHAVIORAL HEALTH NOTE - BEHAVIORAL HEALTH NOTE
Worker met with patient to discuss options of transport back to [place] [facility]. Patient states she does not have a coat or any cash money with her belongings. Patient has inactive Medicaid and is currently pending Medicaid renewal. Worker arranged for cab through [employer] account 50 to transport patient back to residence. Worker met with patient to discuss options of transport back to [facility]. Patient states she does not have a coat or any cash money with her belongings. Patient has inactive Medicaid and is currently pending Medicaid renewal. Worker arranged for cab through [employer] account 50 to transport patient back to residence. Worker faxed discharge sheet [phone] to [facility] and also faxed discharge sheet to social work staff house.

## 2019-02-13 ENCOUNTER — EMERGENCY (EMERGENCY)
Facility: HOSPITAL | Age: 39
LOS: 1 days | Discharge: HOME | End: 2019-02-13
Attending: EMERGENCY MEDICINE | Admitting: EMERGENCY MEDICINE
Payer: MEDICAID

## 2019-02-13 VITALS
SYSTOLIC BLOOD PRESSURE: 121 MMHG | TEMPERATURE: 97 F | DIASTOLIC BLOOD PRESSURE: 71 MMHG | OXYGEN SATURATION: 99 % | RESPIRATION RATE: 18 BRPM | HEART RATE: 89 BPM

## 2019-02-13 DIAGNOSIS — F25.0 SCHIZOAFFECTIVE DISORDER, BIPOLAR TYPE: ICD-10-CM

## 2019-02-13 DIAGNOSIS — F43.24 ADJUSTMENT DISORDER WITH DISTURBANCE OF CONDUCT: ICD-10-CM

## 2019-02-13 PROCEDURE — 99283 EMERGENCY DEPT VISIT LOW MDM: CPT

## 2019-02-13 PROCEDURE — 90792 PSYCH DIAG EVAL W/MED SRVCS: CPT

## 2019-02-13 NOTE — ED BEHAVIORAL HEALTH ASSESSMENT NOTE - PRIMARY DX
Schizoaffective disorder Borderline personality disorder Adjustment disorder with disturbance of conduct

## 2019-02-13 NOTE — ED BEHAVIORAL HEALTH ASSESSMENT NOTE - DIFFERENTIAL
schizoaffective disorder, borderline pd, malingering. schizoaffective disorder by history, borderline pd
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
## 2019-02-13 NOTE — ED ADULT NURSE NOTE - NSFALLRSKASSESASSIST_ED_ALL_ED
no 15 y/o F pt with epigastric burning radiating to the chest, likely gastritis vs GERD. No relief with Maalox. Labs and US with no significant pathology yesterday. Will recheck labs and give Pepcid due to pt smoking and since pt has IUD will check D-dimer as well.

## 2019-02-13 NOTE — ED ADULT NURSE NOTE - OBJECTIVE STATEMENT
Received pt in  pt calm & cooperative bought in by EMS from [facility] for agitations pt sitting in hallway in [facility] denies Si/Hi/AVh. pt  requesting breakfast presently safety & comfort measures maintained eval on going.

## 2019-02-13 NOTE — ED PROVIDER NOTE - CLINICAL SUMMARY MEDICAL DECISION MAKING FREE TEXT BOX
hx of bipolar d/o, borderline personality d/o here from [facility] for agitated behavior. Patient to be evaluated by psych for psychosis. hx of bipolar d/o, borderline personality d/o here from [facility] for agitated behavior. Patient fed and evaluated by psychiatry, thought to be as a result of patient's poor impulse control. Patient deemed clear for discharge via psych.

## 2019-02-13 NOTE — ED BEHAVIORAL HEALTH ASSESSMENT NOTE - DESCRIPTION
calm, cooperative, offered and accepted prescribed medication  Vital Signs Last 24 Hrs  T(C): 36.7 (11 Jan 2019 10:10), Max: 36.7 (11 Jan 2019 10:10)  T(F): 98 (11 Jan 2019 10:10), Max: 98 (11 Jan 2019 10:10)  HR: 78 (11 Jan 2019 10:10) (78 - 78)  BP: 130/70 (11 Jan 2019 10:10) (130/70 - 130/70)  BP(mean): --  RR: 16 (11 Jan 2019 10:10) (16 - 16)  SpO2: 100% (11 Jan 2019 10:10) (100% - 100%) currently lives in [facility], [address] [facility] [address] MICHELLE reports no contact with family, currently unemployed GERD, Asthma calm, cooperative, in good behavioral control    Vital Signs Last 24 Hrs  T(C): 36.1 (13 Feb 2019 08:53), Max: 36.1 (13 Feb 2019 08:53)  T(F): 97 (13 Feb 2019 08:53), Max: 97 (13 Feb 2019 08:53)  HR: 89 (13 Feb 2019 08:53) (89 - 89)  BP: 121/71 (13 Feb 2019 08:53) (121/71 - 121/71)  BP(mean): --  RR: 18 (13 Feb 2019 08:53) (18 - 18)  SpO2: 99% (13 Feb 2019 08:53) (99% - 99%)

## 2019-02-13 NOTE — ED BEHAVIORAL HEALTH ASSESSMENT NOTE - NS ED B[facility] ED COURSE PSYCHIATRIC MEDICATION GIVEN
unknown Voluntary Intramuscular (indication, name, dose, time)/Oral Medication (indication, name, dose, time) None

## 2019-02-13 NOTE — ED BEHAVIORAL HEALTH ASSESSMENT NOTE - AXIS IV
Problem related to social environment/Problems with primary support/Occupational problems/Housing problems

## 2019-02-13 NOTE — ED BEHAVIORAL HEALTH ASSESSMENT NOTE - HPI (INCLUDE ILLNESS QUALITY, SEVERITY, DURATION, TIMING, CONTEXT, MODIFYING FACTORS, ASSOCIATED SIGNS AND SYMPTOMS)
Ms. Castillo is a single, 38-year-old  female, non caregiver, unemployed, domiciled on [place] [address], with past psychiatric history of  Schizoaffective Disorder, Borderline Personality Disorder, cannabis abuse, multiple inpatient psychiatric hospitalizations (>20), recently Low 4 12/12-12/24 2018, with [facility]) 3 prior suicide attempts (last in 2012 via OD), recurrent suicidal gestures and suicidal ideation, history of property destruction when upset, past legal issues, no access to weapons. PMH GERD & asthma.  [facility] called by staff for bizarre behavior.    Patient first interviewed by medical student, where she reported she was upset because her ex-boyfriend had been cheating on her, which caused her to have an “epiphany” today to take her clothes off and throw glitter on herself because she was “having a bar mitzvah.” She went on to state that she wanted to protest about life at [place].    Patient was observed to be behaviorally dysregulated when staff were being attentive, banging on windows and dancing. However when observed on camera she had extended periods of calm, which supports the belief that presentation is largely behaviorally driven.    Upon attending interview, she asked writer about her prior pregnancy and reported supernatural alba. She reports ongoing conflicts with various peers in her residence and does not like that they use drugs. She reports AH, then laughs and denies them. Denies suicidal ideation and homicidal ideation.     See  note for information from residence.    Spoke with Toya YOUNGBLOOD and Orville Holbrook  at [facility] ACT team. They saw patient Monday. They had planned to see patient Wednesday but she was not present at the residence. They report she was at baseline on Monday, with chronic dislike of her residence and attention seeking behaviors. When asked what led to her presentation today, they reported her actions were consistent with her baseline. They did report that she had not received her Prolixin injection since her admission at [facility]. They also note her medicaid is inactive and she is not being cooperative with providing the documentation, though the team is attempting to help her with this.   Spoke with Dr. Stanton who reported that patient was needy and attention seeking on the unit, and after a few days of admission she would repeatedly ask about her discharge. He also suspects a significant behavioral component to frequent hospital presentations. Ms. Castillo is a single, 38-year-old  female, non caregiver, unemployed, domiciled on [place] [address], with past psychiatric history of  Schizoaffective Disorder, Borderline Personality Disorder, cannabis abuse, multiple inpatient psychiatric hospitalizations (>20), most recently Low 4 12/12-12/24 2018, with frequent visits to the [facility] (well-known to staff) 3 prior suicide attempts (last in 2012 via OD), recurrent suicidal gestures and suicidal ideation, history of property destruction when upset, past legal issues, no access to weapons. PMH GERD & asthma.  BIB EMS called by [place] staff for throwing things while upset.     Per ED provider note: "Patient was throwing plastic knives and forks at staff because she was hungry and not being given food. Patient arrives tearful. She states she wants to hurt herself."    On assessment in  ED, pt is calm, cooperative, and in good behavioral control. States she threw plastic knives and forks because staff didn't give her breakfast. Pt says "I was upset." Pt denies wanting to physically harm anyone. She denies homicidal or violent ideation, intent, or plan. Pt denies suicidal ideation, intent, or plan. Pt denies paranoid ideation/delusions, denies AH/VH. She denies manic symptoms or depression. She reports medication compliance, denies substance use. She asks for food. Pt ate plate of food in  ED.   Writer spoke with pt's , Ms. Camargo, who corroborates that pt was throwing objects because she was upset this morning. Pt was not physically aggressive or violent. She has not voiced SI recently. Pt missed one dose of medication on Monday night but is otherwise medication compliant. Pt has been at her baseline.

## 2019-02-13 NOTE — ED BEHAVIORAL HEALTH ASSESSMENT NOTE - OTHER PAST PSYCHIATRIC HISTORY (INCLUDE DETAILS REGARDING ONSET, COURSE OF ILLNESS, INPATIENT/OUTPATIENT TREATMENT)
lifetime inpatient admissions > 20. Numerous [facility] ED visits. Currently followed by ACT team. Most recently inpatient at [facility] 12/12-12/24 2018.  Multiple [facility] ED evals.  - 3 prior suicide attempts (last in 2012 via OD) as per records, recurrent suicidal gestures and suicidal ideation, history of property destruction ((breaking TV screens while hospitalized) when upset / acting out   - long hx of sexually provocative, acting out behaviors (during last [facility] inpatient admission >2 years ago, patient had to be placed on CO 1:1 after trying to perform oral sex on a male patient on the dayroom, taken off CO then was going into male patient's room, overheard offering them sex and was placed back on CO

## 2019-02-13 NOTE — ED BEHAVIORAL HEALTH ASSESSMENT NOTE - SUICIDE PROTECTIVE FACTORS
Fear of death or dying due to pain/suffering/Identifies reasons for living/Supportive social network or family/Positive therapeutic relationships/Future oriented/Other

## 2019-02-13 NOTE — ED BEHAVIORAL HEALTH ASSESSMENT NOTE - SUMMARY
Ms. Castillo is a single, 38-year-old  female, non caregiver, unemployed, domiciled on [place] [address], with past psychiatric history of  Schizoaffective Disorder, Borderline Personality Disorder, cannabis abuse, multiple inpatient psychiatric hospitalizations (>20), recently Low 4 12/12-12/24 2018, with frequent visits to the [facility] (well-known to staff) 3 prior suicide attempts (last in 2012 via OD), recurrent suicidal gestures and suicidal ideation, history of property destruction when upset, past legal issues, no access to weapons. PMH GERD & asthma. BIB EMS called by staff for bizarre behavior.  Patient is well known to this writer. When interviewed by [facility], she initially was citing medical terms to report her symptoms ("I have flight of ideas") in addition to making suicidal comments and reporting hallucinations. Upon attending reassessment these were denied. Patient was observed to be dancing and banging on windows, but these behaviors were not observed when staff attention was diverted elsewhere, suggesting a strong behavioral component. Patient does not appear internally preoccupied nor responding to internal stimuli. She does not present as acutely depressed or suicidal, not homicidal. She appears to be trying to feign manic symptoms, supported by her report of DSM criteria for trina. She willingly agreed to take oral Zyprexa and also agreed to take her overdue Prolixin Decanoate. She resides in a supervised residence and has community support via ACT team. She is known to act out due to chronic dislike of her residence and peer conflict. She does not present an imminent risk of harm to herself/others that would warrant psychiatric hospitalization at this time. Ms. Castillo is a single, 38-year-old  female, non caregiver, unemployed, domiciled on [place] grounds [address], with past psychiatric history of  Schizoaffective Disorder, Borderline Personality Disorder, cannabis abuse, multiple inpatient psychiatric hospitalizations (>20), most recently Low 4 12/12-12/24 2018, with frequent visits to the [facility] (well-known to staff) 3 prior suicide attempts (last in 2012 via OD), recurrent suicidal gestures and suicidal ideation, history of property destruction when upset, past legal issues, no access to weapons. PMH GERD & asthma.  BIB EMS called by [place] staff for throwing things while upset.     Pt is known to act out due to chronic dislike of her residence and peer conflict. Pt is calm, cooperative, and in good behavioral control throughout ED course. She denies suicidal ideation, intent, or plan. She denies homicidal or violent ideation, intent, or plan. She does not appear psychotic, manic, or depressed. She does not present an imminent risk of harm to herself/others at this time and does not require inpatient psychiatric hospitalization at this time.
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
## 2019-02-13 NOTE — ED BEHAVIORAL HEALTH ASSESSMENT NOTE - OTHER
on call  dislikes residence 24/7 supervised residence wearing glitter chronically limited CVM concrete

## 2019-02-13 NOTE — ED PROVIDER NOTE - PROGRESS NOTE DETAILS
Modesta LUJAN: Patient well known to psych here. Discussed consult with psych attending who knows patient well. Patient to be evaluated. Modesta LUJAN: I was called by psych to be told that patient is cleared for discharge. Patient to go back to [facility] via [facility].

## 2019-02-13 NOTE — ED BEHAVIORAL HEALTH ASSESSMENT NOTE - RISK ASSESSMENT
Patient has chronic risk give chronic impulsivity, past SA, past aggression. Risk does not currently appear to be acutely elevated from baseline. Pt is not an imminent risk- not currently acutely psychotic or depressed, no si/hi/sib/intent/plan, eating/sleeping well, no recent violence, no substance use, future oriented, able to safety plan, is on an PALACIOS and is calm and cooperative throughout evaluation. Inpatient admission Is unlikely to modify these chronic risks, pt is not requiring inpatient psychiatric admission at this time as she is not an imminent danger to self or others. Current presentation in keeping with personality pathology and chronic poor impulse control. Patient has chronic elevated risk given chronic impulsivity, past SA, past aggression. Risk does not currently appear to be acutely elevated from baseline. Pt is not an imminent risk- not currently acutely psychotic or depressed, no si/hi/sib/intent/plan, eating/sleeping well, no recent violence, no substance use, future oriented, able to safety plan, is on an PALACIOS and is calm and cooperative throughout evaluation. Inpatient admission Is unlikely to modify these chronic risks, pt is not requiring inpatient psychiatric admission at this time as she is not an imminent danger to self or others. Current presentation in keeping with personality pathology and chronic poor impulse control.

## 2019-02-13 NOTE — ED ADULT NURSE REASSESSMENT NOTE - NS ED NURSE REASSESS COMMENT FT1
1030am pt d/c by MD verbalizing understanding of d/c instructions pt denies Si/Hi/AVh presently resources provided to pt upon discharge pt verbalizing understanding.

## 2019-02-13 NOTE — ED BEHAVIORAL HEALTH ASSESSMENT NOTE - CURRENT MEDICATION
Reportedly non compliant sine 1/8/19: Lithium 600mg bid, Zyprexa 10 mg daily & 20 mg qhs, Klonopin 1mg BID.     Prolixin Decanoate 25 mg IM q2week. last received [facility] 12/21/18. Has not received since leaving the hospital per ACT Team. will administer in ED today. Lithium 600mg bid, Zyprexa 10 mg daily & 20 mg qhs, Klonopin 1mg BID.     Prolixin Decanoate 25 mg IM q2week
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
## 2019-02-13 NOTE — ED BEHAVIORAL HEALTH ASSESSMENT NOTE - MEDICATIONS (PRESCRIPTIONS, DIRECTIONS)
Given one dose of Zyprexa 10mg PO and Prolixin Dec 25mg IM in the ED. ACT Team aware. Patient due for next injection in two weeks. take medications as prescribed

## 2019-02-21 ENCOUNTER — EMERGENCY (EMERGENCY)
Facility: HOSPITAL | Age: 39
LOS: 1 days | Discharge: HOME | End: 2019-02-21
Attending: EMERGENCY MEDICINE | Admitting: EMERGENCY MEDICINE
Payer: MEDICAID

## 2019-02-21 VITALS
OXYGEN SATURATION: 96 % | RESPIRATION RATE: 18 BRPM | HEART RATE: 111 BPM | DIASTOLIC BLOOD PRESSURE: 66 MMHG | TEMPERATURE: 98 F | SYSTOLIC BLOOD PRESSURE: 133 MMHG

## 2019-02-21 VITALS
SYSTOLIC BLOOD PRESSURE: 128 MMHG | OXYGEN SATURATION: 100 % | DIASTOLIC BLOOD PRESSURE: 76 MMHG | HEART RATE: 89 BPM | RESPIRATION RATE: 16 BRPM | TEMPERATURE: 98 F

## 2019-02-21 PROCEDURE — 99283 EMERGENCY DEPT VISIT LOW MDM: CPT

## 2019-02-21 PROCEDURE — 90792 PSYCH DIAG EVAL W/MED SRVCS: CPT

## 2019-02-21 NOTE — ED BEHAVIORAL HEALTH ASSESSMENT NOTE - SUMMARY
Ms. Castillo is a single, 38-year-old  female, non caregiver, unemployed, domiciled on [place] [address], with past psychiatric history of  Schizoaffective Disorder, Borderline Personality Disorder, cannabis abuse, multiple inpatient psychiatric hospitalizations (>20), most recently Low 4 12/12-12/24 2018, with frequent visits to the [facility] (well-known to staff) 3 prior suicide attempts (last in 2012 via OD), recurrent suicidal gestures and suicidal ideation, history of property destruction when upset, past legal issues, no access to weapons. PMH GERD & asthma, referred by staff via EMS for throwing things in the residence.     Pt is known to act out due to chronic dislike of her residence and peer conflict. Pt is calm, cooperative, and in good behavioral control throughout ED course. She denies suicidal ideation, intent, or plan. She denies homicidal or violent ideation, intent, or plan. She does not appear psychotic, manic, or depressed. She does not present an imminent risk of harm to herself/others at this time and does not require inpatient psychiatric hospitalization at this time.
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
## 2019-02-21 NOTE — ED BEHAVIORAL HEALTH ASSESSMENT NOTE - HPI (INCLUDE ILLNESS QUALITY, SEVERITY, DURATION, TIMING, CONTEXT, MODIFYING FACTORS, ASSOCIATED SIGNS AND SYMPTOMS)
Ms. Castillo is a single, 38-year-old  female, non caregiver, unemployed, domiciled on [place] [address], with past psychiatric history of  Schizoaffective Disorder, Borderline Personality Disorder, cannabis abuse, multiple inpatient psychiatric hospitalizations (>20), most recently Low 4 12/12-12/24 2018, with frequent visits to the [facility] (well-known to staff) 3 prior suicide attempts (last in 2012 via OD), recurrent suicidal gestures and suicidal ideation, history of property destruction when upset, past legal issues, no access to weapons. PMH GERD & asthma.  BIB EMS called by [place] staff for aggression.    On assessment in  ED, pt is calm, cooperative, and in good behavioral control. She reports that she told staff she saw "god" and the "Armageddon" after she lost a game of call of duty. She reports telling staff she wanted to come to the ED to get admitted and they declined to send her so she started throwing things so that EMS would come get her. She also reports not taking some of her medications, "so they could admit me for not taking my meds". She reports that lithium sedates her in the morning and she does not like taking it but has been taking her other medications. Reports she "wants a break" and to come into the hospital. She denies homicidal or violent ideation, intent, or plan. Pt denies suicidal ideation, intent, or plan. Pt denies paranoid ideation/delusions, denies AH/VH. She denies manic symptoms or depression. She denies any recent substance abuse.     Collateral obtained from staff, see  note. In summary, they corroborate patient's report that she asked to be sent to the ED and escalated when they declined and began to throw things.

## 2019-02-21 NOTE — ED PROVIDER NOTE - MDM ORDERS SUBMITTED SELECTION
Pt reports she has stopped drinking for the past 2 months. Continued sobriety encouraged. No signs of withdrawal and she is out of window for withdrawal. Supplement with thiamine and folic acid. Stable. Noted Elevated LFT's.    Medications/Not Applicable
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
## 2019-02-21 NOTE — ED BEHAVIORAL HEALTH ASSESSMENT NOTE - DESCRIPTION
GERD, Asthma currently lives in [facility], [address] [facility] le MICHELLE reports no contact with family, currently unemployed calm, cooperative, in good behavioral control    ICU Vital Signs Last 24 Hrs  T(C): 36.7 (21 Feb 2019 09:47), Max: 36.7 (21 Feb 2019 09:47)  T(F): 98.1 (21 Feb 2019 09:47), Max: 98.1 (21 Feb 2019 09:47)  HR: 111 (21 Feb 2019 09:47) (111 - 111)  BP: 133/66 (21 Feb 2019 09:47) (133/66 - 133/66)  BP(mean): --  ABP: --  ABP(mean): --  RR: 18 (21 Feb 2019 09:47) (18 - 18)  SpO2: 96% (21 Feb 2019 09:47) (96% - 96%)

## 2019-02-21 NOTE — ED BEHAVIORAL HEALTH ASSESSMENT NOTE - RISK ASSESSMENT
Patient has chronic elevated risk given chronic impulsivity, past SA, past aggression. Risk does not currently appear to be acutely elevated from baseline. Pt is not an imminent risk- not currently acutely psychotic or depressed, no si/hi/sib/intent/plan, eating/sleeping well, no recent violence, no substance use, future oriented, able to safety plan, is on an PALACIOS and is calm and cooperative throughout evaluation. Inpatient admission Is unlikely to modify these chronic risks, pt is not requiring inpatient psychiatric admission at this time as she is not an imminent danger to self or others. Current presentation in keeping with personality pathology and chronic poor impulse control.

## 2019-02-21 NOTE — ED ADULT NURSE REASSESSMENT NOTE - NS ED NURSE REASSESS COMMENT FT1
pt calm & cooperative denies Si/Hi/AVh presently pt d/c by MD resources provided to pt upon discharge pt transported back to [facility] via taxi arranged by MI.

## 2019-02-21 NOTE — ED PROVIDER NOTE - PROGRESS NOTE DETAILS
richelle: pt evaluated by psychiatry and additional information on reason for visit obtained. Pt was cleared for discharge by psychiatry.

## 2019-02-21 NOTE — ED BEHAVIORAL HEALTH ASSESSMENT NOTE - SUICIDE PROTECTIVE FACTORS
Positive therapeutic relationships/Fear of death or dying due to pain/suffering/Supportive social network or family/Identifies reasons for living/Future oriented/Other

## 2019-02-21 NOTE — ED BEHAVIORAL HEALTH ASSESSMENT NOTE - CURRENT MEDICATION
Lithium 600mg bid, Zyprexa 10 mg daily & 20 mg qhs, Klonopin 1mg BID.     Prolixin Decanoate 25 mg IM q2week

## 2019-02-21 NOTE — ED PROVIDER NOTE - CLINICAL SUMMARY MEDICAL DECISION MAKING FREE TEXT BOX
pt with hx of recurrent suicidal gestures and attempts was sent to ED by riccardo for evaluation. denies wanting to hurt self or others at present. pt calm amd cooperative on exam. psychiatry consulted for further evaluation

## 2019-02-21 NOTE — ED BEHAVIORAL HEALTH NOTE - BEHAVIORAL HEALTH NOTE
Jesseniar was informed patient was medically and psychiatrically cleared for discharge.  Bettina called pt's residence and informed  Ms. Crandall of the discharge.  Garret was informed patient required a taxi home. Pt does not have medicaid.   arranged taxi with [place] using account [employer] and was informed it would be 15 minute ETA.

## 2019-02-21 NOTE — ED PROVIDER NOTE - OBJECTIVE STATEMENT
richelle: past psychiatry hx as follows:   Pt is domiciled on [place] grounds psychiatric history of  Schizoaffective Disorder, Borderline Personality Disorder, cannabis abuse, multiple inpatient psychiatric hospitalizations (>20), frequent visits to the [facility] , 3 prior suicide attempts , recurrent suicidal gestures and suicidal ideation.  today presents again with evaluation. denies wanting to hurt self at this time.

## 2019-02-21 NOTE — ED BEHAVIORAL HEALTH NOTE - BEHAVIORAL HEALTH NOTE
Jesseniar called On call  Ms. Camargo [phone] who states she was informed patient was aggressive today with a history of frequent visits to the emergency rooms.  Writer was informed to call pt's residence at  and speak to  MSNoel Ponce.  Writer called Ms. Ponce but was informed they are in a meeting an to call back after 11am.  Writer [facility] [address] [facility]  She states patient wanted to go to the hospital this morning.  Patient refused her medication last night and this morning stating she wanted to be admitted to the hospital.  When patient was informed she didn't need to go to the hospital she began throwing things, breaking property, making comments to peers trying to provoke and fight others.  She states patient goes on and off at times.  She states patient has been her usual self otherwise.  She states the  might be able to provide additional information but is currently in a morning meeting.

## 2019-02-21 NOTE — ED ADULT NURSE NOTE - OBJECTIVE STATEMENT
Received pt in  pt calm & cooperative verbalizing she didn't take her meds today, pt denies Si/Hi/AVh presently safety & comfort measures maintained eval on going.
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
## 2019-02-27 ENCOUNTER — EMERGENCY (EMERGENCY)
Facility: HOSPITAL | Age: 39
LOS: 1 days | Discharge: HOME | End: 2019-02-27
Admitting: EMERGENCY MEDICINE
Payer: MEDICAID

## 2019-02-27 VITALS
HEART RATE: 80 BPM | RESPIRATION RATE: 18 BRPM | DIASTOLIC BLOOD PRESSURE: 76 MMHG | OXYGEN SATURATION: 100 % | TEMPERATURE: 98 F | SYSTOLIC BLOOD PRESSURE: 116 MMHG

## 2019-02-27 VITALS
DIASTOLIC BLOOD PRESSURE: 78 MMHG | HEART RATE: 78 BPM | RESPIRATION RATE: 16 BRPM | TEMPERATURE: 98 F | SYSTOLIC BLOOD PRESSURE: 120 MMHG | OXYGEN SATURATION: 100 %

## 2019-02-27 PROCEDURE — 99284 EMERGENCY DEPT VISIT MOD MDM: CPT

## 2019-02-27 RX ORDER — HALOPERIDOL DECANOATE 100 MG/ML
5 INJECTION INTRAMUSCULAR ONCE
Qty: 0 | Refills: 0 | Status: COMPLETED | OUTPATIENT
Start: 2019-02-27 | End: 2019-02-27

## 2019-02-27 RX ADMIN — Medication 2 MILLIGRAM(S): at 19:00

## 2019-02-27 RX ADMIN — HALOPERIDOL DECANOATE 5 MILLIGRAM(S): 100 INJECTION INTRAMUSCULAR at 19:00
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
## 2019-02-27 NOTE — ED ADULT NURSE NOTE - OBJECTIVE STATEMENT
Received pt in   pt verbalizing she voided in a garbage pt denies Si/Hi/AVh presently safety & comfort measures maintained eval on going.

## 2019-02-27 NOTE — ED PROVIDER NOTE - CPE EDP NEURO NORM
New Patient Consult Note  Referred by: Jen Ortiz PA-C    Reason for consult: Diabetes Management Type 2    HPI:  Hu Santacruz is a 53 y.o. old patient who is seeing us today for diabetes care.  This is a pleasant patient with diabetes and I appreciate the opportunity to participate in the care of this patient.  This is a new patient with me today.      DIABETES:  He is currently on Trulicity 1.5 qweekly and Amaryl 2mg QHS, and Metformin 1000 BID.  His HbA1c from 12/2015 was 6.8 today in the office it is 5.7.  I do not want him to be having hypoglycemic events and will stop the Amaryl today.  If needed I will start him on an SGLT2.  Stay away from Amaryl at this point.  If his numbers are doing really well in 6 weeks he can reduce to one 1000mg ER Metformin in the AM only instead of BID    Recent open heart surgery 3 vessel.  No transfusion.  He is curently in [facility] rehab which is great.      BG Diary:  In the AM: 110, 164, 198, 146, 143.  I asked him to check in the AM and just before bed for the next visit    1. Dyslipidemia associated with type 2 diabetes mellitus (CMS-HCC)  He is on Crestor 20mg qday    2. Coronary artery disease involving native coronary artery of native heart without angina pectoris  He is on Crestor 20mg and states doing well.     Fatigue:   He has been tired since the bypass surgery but feels he is getting his strength back      ROS:   Constitutional: No change in weight , No fatigue, No night sweats.  HEENT: No Headache.  Eyes:  No blurred vision, No visual changes.  Cardiac: No chest pain, No palpitations.  Resp: No shortness of breath, No cough,   Gastro: No nausea or vomiting, No diarrhea.  Neuro: Denies numbness or tinging in bilateral feet or hands, and no loss of sensation.  Endo: No heat or cold intolerance.  : No polyuria, No polydipsia, No chronic UTI's.  Lower extremities: No lower leg edema bilateral.  All other systems were reviewed and were negative.    Past Medical  History:  Patient Active Problem List    Diagnosis Date Noted   • S/P CABG (coronary artery bypass graft) 12/20/2016     Priority: Medium   • CAD (coronary artery disease), native coronary artery 04/16/2012     Priority: Medium   • Dyslipidemia associated with type 2 diabetes mellitus (CMS-HCC) 04/16/2012     Priority: Medium   • Situational anxiety 10/11/2016     Priority: Low   • Erectile dysfunction 02/24/2014     Priority: Low   • Calcifying tendinitis of shoulder 04/02/2013     Priority: Low   • Bicipital tenosynovitis 04/02/2013     Priority: Low   • DM (diabetes mellitus) (CMS-HCC) 02/06/2017   • Type 2 diabetes mellitus with hyperlipidemia (CMS-HCC) 02/06/2017   • Fatigue 02/06/2017       Past Surgical History:  Past Surgical History   Procedure Laterality Date   • Patella orif  1980     bilateral   • Knee arthroscopy  1983     left   • Dental extraction(s)  1997     wisdom teeth   • Shoulder arthroscopy w/ rotator cuff repair  4/2/2013     Performed by Angelique Romero M.D. at [facility]   • Shoulder decompression arthroscopic  4/2/2013     Performed by Angelique Romero M.D. at [facility]   • Joint injection diagnostic  4/2/2013     Performed by Angelique Romero M.D. at [facility]   • Clavicle distal excision  4/2/2013     Performed by Angelique Romero M.D. at [facility]   • Shoulder arthroscopy w/ bicipital tenodesis repair  4/2/2013     Performed by Angelique Romero M.D. at [facility]   • Stent placement  2008     x3   • Multiple coronary artery bypass endo vein harvest  12/6/2016     Procedure: MULTIPLE CORONARY ARTERY BYPASS ENDO VEIN HARVEST X3 WITH LAITH;  Surgeon: Juan Pablo Schmitz M.D.;  Location: SURGERY [facility];  Service:    • Cardiac cath         Allergies:  Review of patient's allergies indicates no known allergies.    Social History:  Social History     Social History   • Marital Status:      Spouse  Name: N/A   • Number of Children: 1   • Years of Education: N/A     Occupational History   • Contractor Other     Social History Main Topics   • Smoking status: Never Smoker    • Smokeless tobacco: Never Used   • Alcohol Use: 3.0 oz/week     6 Cans of beer per week      Comment: 2 per week   • Drug Use: No      Comment: Former Marijuana usage   • Sexual Activity:     Partners: Female     Other Topics Concern   • Not on file     Social History Narrative       Family History:  Family History   Problem Relation Age of Onset   • Adopted: Yes   • Family history unknown: Yes       Medications:    Current outpatient prescriptions:   •  Dulaglutide (TRULICITY) 1.5 MG/0.5ML Solution Pen-injector, Inject 1 Each as instructed every 7 days. On Mon, Disp: 4 PEN, Rfl: 8  •  alprazolam (XANAX) 0.25 MG Tab, Take 1 Tab by mouth at bedtime as needed for Anxiety., Disp: 20 Tab, Rfl: 0  •  rosuvastatin (CRESTOR) 20 MG Tab, Take 1 Tab by mouth every bedtime., Disp: 90 Tab, Rfl: 3  •  metoprolol (LOPRESSOR) 25 MG Tab, Take 0.5 Tabs by mouth 2 times a day., Disp: 90 Tab, Rfl: 3  •  cyclobenzaprine (FLEXERIL) 10 MG Tab, Take 10 mg by mouth 1 time daily as needed for Moderate Pain or Muscle Spasms. Pt is taking Roxicodone for post surgery pain and is not taking this yet since DC from CABG.  But he has it., Disp: , Rfl:   •  clopidogrel (PLAVIX) 75 MG Tab, Take 1 Tab by mouth every day., Disp: 30 Tab, Rfl: 2  •  aspirin EC (ECOTRIN) 81 MG Tablet Delayed Response, Take 81 mg by mouth every day., Disp: , Rfl:   •  metformin (GLUCOPHAGE) 1000 MG tablet, Take 1,000 mg by mouth 2 times a day, with meals., Disp: , Rfl:   •  vitamin D (CHOLECALCIFEROL) 1000 UNIT TABS, Take 1,000 Units by mouth 2 Times a Day., Disp: , Rfl:   •  Diphenhydramine-APAP, sleep, (TYLENOL PM EXTRA STRENGTH)  MG Tab, Take 2 Tabs by mouth 1 time daily as needed (sleep)., Disp: , Rfl:     Labs:    Physical Examination:   Vital signs: /70 mmHg  Pulse 92  Ht 1.803  "m (5' 10.98\")  Wt 86.637 kg (191 lb)  BMI 26.65 kg/m2  SpO2 95%  General: No distress, cooperative, well dressed and well nourished.   Eyes: No scleral icterus or discharge, No hyposphagma  ENMT: Normal on external inspection of nose, lips, No nasal drainage   Neck: No abnormal masses on inspection  Resp: Normal effort, Bilateral clear to auscultation, No wheezing, No rales  CVS: Regular rate and rhythm, S1 S2 normal, No murmur. No gallop  Extremities: No edema bilateral extremities  Neuro: Alert and oriented  Skin: No rash, No Ulcers  Psych: Normal mood and affect  Foot exam: normal sensation to monofilament testing, normal pulses, no ulcers.  Normal Vibration quantitative sensation test.    Assessment and Plan:    DIABETES:  He is currently on Trulicity 1.5 qweekly and Amaryl 2mg QHS, and Metformin 1000 BID.  His HbA1c from 12/2015 was 6.8 today in the office it is 5.7.  I do not want him to be having hypoglycemic events and will stop the Amaryl today.  If needed I will start him on an SGLT2.  Stay away from Amaryl at this point.  If his numbers are doing really well in 6 weeks he can reduce to one 1000mg ER Metformin in the AM only instead of BID      1. Dyslipidemia associated with type 2 diabetes mellitus (CMS-HCC)  He is on Crestor 20mg and no changes made. He is seeing Cardiology and recent 3 vessel bypass and he is attending Cardiac Rehab.      2. Coronary artery disease involving native coronary artery of native heart without angina pectoris  Recent 3 vessel    Fatigue:  He is still pretty tired most days but feels he is getting better with exercise.  This is not due to his diabetes.        Return in about 6 months (around 8/6/2017).         This patient during there office visit today may have been started on new medication.  Side effects of new medications were discussed with the patient today in the office. The patient may have been supplied paperwork on this new medication.    Thank you kindly for " allowing me to participate in the diabetes care plan for this patient.    Kelvin Turcios PA-C  02/06/2017    CC:   Jen Ortiz PA-C       normal...

## 2019-02-27 NOTE — ED ADULT NURSE REASSESSMENT NOTE - NS ED NURSE REASSESS COMMENT FT1
pt calm & cooperative back to baseline pt denies Si/Hi/AVh  presently pt d/c by NP resources provided to pt upon discharge pt verbalizing understanding.

## 2019-02-27 NOTE — ED PROVIDER NOTE - OBJECTIVE STATEMENT
37 y/o female pt presents to  from [place] 37 y/o female pt presents to  from [facility] for she pee'd in the trash can.  Pt is alert and oriented x 3, denies SI/HI/AH/VH.  Pt is calm and cooperative in  area and denies any other c/o pain or discomfort.  PT states: "I didn't think it would offend anyone".  "I needed to pee, I was waiting in line to get my pillbox filled and I really needed to pee".  "So, I pee'd in the garbage can".   "Honestly, I didn't mean to offend anyone".  "I know it's offensive to Asians, but I didn't think anyone there would care".
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
## 2019-02-27 NOTE — ED PROVIDER NOTE - CLINICAL SUMMARY MEDICAL DECISION MAKING FREE TEXT BOX
39 y/o female pt presents to  from [facility] EMS for she pee'd in the trash can. Physical examination completed and unremarkable for any acute issues/problems requiring immediate interventions.  Collateral obtained by  from staff at [place].  PT cleared for discharge back to [place].

## 2019-03-03 ENCOUNTER — EMERGENCY (EMERGENCY)
Facility: HOSPITAL | Age: 39
LOS: 1 days | Discharge: HOME | End: 2019-03-03
Attending: EMERGENCY MEDICINE | Admitting: EMERGENCY MEDICINE
Payer: MEDICAID

## 2019-03-03 VITALS
RESPIRATION RATE: 22 BRPM | TEMPERATURE: 99 F | HEART RATE: 102 BPM | DIASTOLIC BLOOD PRESSURE: 89 MMHG | SYSTOLIC BLOOD PRESSURE: 120 MMHG | OXYGEN SATURATION: 99 %

## 2019-03-03 VITALS
RESPIRATION RATE: 16 BRPM | SYSTOLIC BLOOD PRESSURE: 126 MMHG | OXYGEN SATURATION: 100 % | HEART RATE: 90 BPM | TEMPERATURE: 98 F | DIASTOLIC BLOOD PRESSURE: 78 MMHG

## 2019-03-03 DIAGNOSIS — R41.83 BORDERLINE INTELLECTUAL FUNCTIONING: ICD-10-CM

## 2019-03-03 PROCEDURE — 99283 EMERGENCY DEPT VISIT LOW MDM: CPT

## 2019-03-03 PROCEDURE — 90792 PSYCH DIAG EVAL W/MED SRVCS: CPT

## 2019-03-03 RX ADMIN — Medication 2 MILLIGRAM(S): at 11:00

## 2019-03-03 NOTE — ED ADULT TRIAGE NOTE - CHIEF COMPLAINT QUOTE
brought in by ambulance and [employer] from [facility] more for agitation, argument with staff from [facility], is seen by Dr. Petersen, transfer to ED Psy

## 2019-03-03 NOTE — ED PROVIDER NOTE - CONSTITUTIONAL, MLM
normal... Well appearing, well nourished, awake, alert, oriented to person, place, time/situation and in no apparent distress. Well appearing, well nourished, awake, alert, oriented to person, place, time/situation and in no apparent distress. NCAT. Neck supple nt.

## 2019-03-03 NOTE — ED PROVIDER NOTE - CARE PLAN
Principal Discharge DX:	Aggression Principal Discharge DX:	Bipolar disorder Principal Discharge DX:	Bipolar disorder  Secondary Diagnosis:	Borderline personality disorder

## 2019-03-03 NOTE — ED ADULT NURSE NOTE - CHIEF COMPLAINT QUOTE
brought in by ambulance and [employer] from [facility] more for agitation, argument with staff from [facility], is seen by Dr. Petersen, transfer to ED Psy
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
## 2019-03-03 NOTE — ED BEHAVIORAL HEALTH ASSESSMENT NOTE - PRIMARY DX
Adjustment disorder with disturbance of conduct Borderline personality disorder Borderline intellectual functioning

## 2019-03-03 NOTE — ED ADULT NURSE NOTE - OBJECTIVE STATEMENT
Received pt in  pt calm & cooperative bought in by EMS from [facility] for agitation & aggression. pt calm & cooperative denies si/hi/avh presently safety & comfort measures maintained eval on going.
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
## 2019-03-03 NOTE — ED BEHAVIORAL HEALTH ASSESSMENT NOTE - RISK ASSESSMENT
Patient has chronic elevated risk given chronic impulsivity, past SA, past aggression. Risk does not currently appear to be acutely elevated from baseline. Pt is not an imminent risk- not currently acutely psychotic or depressed, no si/hi/sib/intent/plan, eating/sleeping well, no recent violence, no substance use, future oriented, able to safety plan, is on an PALACIOS and is calm and cooperative throughout evaluation. Inpatient admission Is unlikely to modify these chronic risks, pt is not requiring inpatient psychiatric admission at this time as she is not an imminent danger to self or others. Current presentation in keeping with personality pathology and chronic poor impulse control. Patient has chronic elevated risk given chronic impulsivity, past SA, past aggression. Risk does not currently appear to be acutely elevated from baseline. Pt is not an imminent risk- not currently acutely psychotic or depressed, no si/hi/sib/intent/plan, eating/sleeping well, no recent violence, no substance use, future oriented,  is on an PALACIOS and was calm and cooperative throughout evaluation. Inpatient admission Is unlikely to modify these chronic risks, pt is not requiring inpatient psychiatric admission at this time as she is not an imminent danger to self or others. Current presentation in keeping with personality pathology and chronic poor impulse control.
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
## 2019-03-03 NOTE — ED PROVIDER NOTE - PROGRESS NOTE DETAILS
Trinity att: 09:30 Patient eval by me in ambulance bay. Patient's physical exam deferred until she was moved from ambulance bay to  as per her request. 10:54 I went to  with Psych Attg Dr. Canales to evaluate patient. Observed patient seated upright and peeing directly into her hand. D/w Dr. Canales. Will medicate 2 mg Ativan po for disorganization. NAD, ctabl, rrr, s1s2, abd soft ntnd.

## 2019-03-03 NOTE — ED BEHAVIORAL HEALTH ASSESSMENT NOTE - SUICIDE PROTECTIVE FACTORS
Supportive social network or family/Identifies reasons for living/Other/Future oriented/Fear of death or dying due to pain/suffering/Positive therapeutic relationships

## 2019-03-03 NOTE — ED BEHAVIORAL HEALTH NOTE - BEHAVIORAL HEALTH NOTE
Alerted patient is clear for discharge and will benefit from a taxi for transport. Have contacted [facility] to reach [place]. patient's medicaid is inactive. Patient is ineligible for service. Will discuss with team.

## 2019-03-03 NOTE — ED ADULT NURSE REASSESSMENT NOTE - NS ED NURSE REASSESS COMMENT FT1
pt calm & cooperative lying on bed in nad pt denies  si/hi/avh presently safety & comfort measures maintained eval on going.

## 2019-03-03 NOTE — ED BEHAVIORAL HEALTH ASSESSMENT NOTE - AXIS IV
Occupational problems/Problems with primary support/Problem related to social environment/Housing problems

## 2019-03-03 NOTE — ED BEHAVIORAL HEALTH ASSESSMENT NOTE - SAFETY PLAN DETAILS
Extensive safety planning performed. Patient and staff agreeing verbally to return patient to ER or call 911 if symptoms worsen or patient has urges to harm self or others. Patient and staff agreeing verbally to return patient to ER or call 911 if symptoms worsen or patient has urges to harm self or others.

## 2019-03-03 NOTE — ED BEHAVIORAL HEALTH NOTE - BEHAVIORAL HEALTH NOTE
Account 50 taxi arranged with Ray at [facility] as requested by treating psychiatrist. Cost of transport is $14

## 2019-03-03 NOTE — ED BEHAVIORAL HEALTH ASSESSMENT NOTE - DESCRIPTION
Vital Signs Last 24 Hrs  T(C): 37.1 (03 Mar 2019 09:27), Max: 37.1 (03 Mar 2019 09:27)  T(F): 98.8 (03 Mar 2019 09:27), Max: 98.8 (03 Mar 2019 09:27)  HR: 102 (03 Mar 2019 09:27) (102 - 102)  BP: 120/89 (03 Mar 2019 09:27) (120/89 - 120/89)  BP(mean): --  RR: 22 (03 Mar 2019 09:27) (22 - 22)  SpO2: 99% (03 Mar 2019 09:27) (99% - 99%) GERD, Asthma currently lives in [facility] [facility] MICHELLE reports no contact with family, currently unemployed Vital Signs Last 24 Hrs  T(C): 37.1 (03 Mar 2019 09:27), Max: 37.1 (03 Mar 2019 09:27)  T(F): 98.8 (03 Mar 2019 09:27), Max: 98.8 (03 Mar 2019 09:27)  HR: 102 (03 Mar 2019 09:27) (102 - 102)  BP: 120/89 (03 Mar 2019 09:27) (120/89 - 120/89)  BP(mean): --  RR: 22 (03 Mar 2019 09:27) (22 - 22)  SpO2: 99% (03 Mar 2019 09:27) (99% - 99%)  Patient initially agitated, treated with lorazepam 2 mg po, was calm for duration

## 2019-03-22 ENCOUNTER — EMERGENCY (EMERGENCY)
Facility: HOSPITAL | Age: 39
LOS: 1 days | Discharge: HOME | End: 2019-03-22
Attending: EMERGENCY MEDICINE | Admitting: EMERGENCY MEDICINE
Payer: MEDICAID

## 2019-03-22 VITALS
RESPIRATION RATE: 18 BRPM | TEMPERATURE: 98 F | OXYGEN SATURATION: 100 % | HEART RATE: 89 BPM | SYSTOLIC BLOOD PRESSURE: 156 MMHG | DIASTOLIC BLOOD PRESSURE: 87 MMHG

## 2019-03-22 PROCEDURE — 90792 PSYCH DIAG EVAL W/MED SRVCS: CPT

## 2019-03-22 PROCEDURE — 99283 EMERGENCY DEPT VISIT LOW MDM: CPT

## 2019-03-22 RX ORDER — FLUPHENAZINE HYDROCHLORIDE 1 MG/1
25 TABLET, FILM COATED ORAL ONCE
Qty: 0 | Refills: 0 | Status: COMPLETED | OUTPATIENT
Start: 2019-03-22 | End: 2019-03-22

## 2019-03-22 RX ADMIN — FLUPHENAZINE HYDROCHLORIDE 25 MILLIGRAM(S): 1 TABLET, FILM COATED ORAL at 11:00

## 2019-03-22 NOTE — ED PROVIDER NOTE - DISCHARGE DATE
Forms received from [employer] for Pt to complete home monitoring of INR  Advised Pt his signature is required on forms in order for completion and to be faxed.  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      Objective:     Observation:  Gait: R antalgic, use of SC, improved circumduction during swing   Palpation: minimal TTP noted globally   Sensation: diminished along lateral aspect of R k discharge to aide with symptom management and return to previous level of function. (Progressing 12/21/17)  2. Patient to demonstrate 100 degrees of R knee flexion in order to transfer in/our of car/chair/bed without difficulty. (Met 12/21/17)  3.  Patient 22-Mar-2019

## 2019-03-22 NOTE — ED BEHAVIORAL HEALTH ASSESSMENT NOTE - SUICIDE PROTECTIVE FACTORS
Future oriented/Fear of death or dying due to pain/suffering/Positive therapeutic relationships/Identifies reasons for living/Supportive social network or family/Other
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
## 2019-03-22 NOTE — ED BEHAVIORAL HEALTH ASSESSMENT NOTE - RISK ASSESSMENT
Patient has chronic risk give chronic impulsivity, past SA, past aggression. Risk does not currently appear to be acutely elevated from baseline. Pt is not an imminent risk- not currently acutely psychotic or depressed, no si/hi/sib/intent/plan, eating/sleeping well, no recent violence, no substance use, future oriented, able to safety plan, is on an PALACIOS and is calm and cooperative throughout evaluation. Inpatient admission Is unlikely to modify these chronic risks, pt is not requiring inpatient psychiatric admission at this time as she is not an imminent danger to self or others. Current presentation in keeping with personality pathology and chronic poor impulse control.

## 2019-03-22 NOTE — ED BEHAVIORAL HEALTH ASSESSMENT NOTE - DESCRIPTION
calm, cooperative, offered and accepted prescribed medication  Vital Signs Last 24 Hrs  T(C): 36.8 (22 Mar 2019 08:40), Max: 36.8 (22 Mar 2019 08:40)  T(F): 98.3 (22 Mar 2019 08:40), Max: 98.3 (22 Mar 2019 08:40)  HR: 89 (22 Mar 2019 08:40) (89 - 89)  BP: 156/87 (22 Mar 2019 08:40) (156/87 - 156/87)  BP(mean): --  RR: 18 (22 Mar 2019 08:40) (18 - 18)  SpO2: 100% (22 Mar 2019 08:40) (100% - 100%) GERD, Asthma currently lives in [facility] [facility] [address] MICHELLE reports no contact with family, currently unemployed

## 2019-03-22 NOTE — ED BEHAVIORAL HEALTH NOTE - BEHAVIORAL HEALTH NOTE
Peterson thompson arranged for pt at time of discharge. Cost of taxi was $14 dollars with bill to  department.

## 2019-03-22 NOTE — ED PROVIDER NOTE - CLINICAL SUMMARY MEDICAL DECISION MAKING FREE TEXT BOX
38F was upset due to breakfast problem at [employer], threw a speaker; is purposely doing things to get herself kicked out of [employer].  Endorses HI – reports voices “allah”  telling her to kill,  reports SI, with plan for SI with intent to overdose.  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  Reports some abd pain.  Multiple ED visits for similar.  Seems manic here, endorsing all positive BH screening questions, possible 2ary gain.  Would check labs and urine, psych eval.

## 2019-03-22 NOTE — ED PROVIDER NOTE - PHYSICAL EXAMINATION
VS:  unremarkable except mild htn    GEN - NAD; psychomotor agitation but in good behavioral control; A+O x3   HEAD - NC/AT     ENT - PEERL, EOMI, mucous membranes  moist , no discharge      NECK: Neck supple, non-tender without lymphadenopathy, no masses, no JVD  PULM - CTA b/l,  symmetric breath sounds  COR -  normal heart sounds    ABD - , ND, NT, soft,  BACK - no CVA tenderness, nontender spine     EXTREMS - no edema, no deformity, warm and well perfused    SKIN - no rash or bruising      NEUROLOGIC - alert, CN 2-12 intact, sensation nl, motor no focal deficit.

## 2019-03-22 NOTE — ED BEHAVIORAL HEALTH ASSESSMENT NOTE - CURRENT MEDICATION
Per documentation from residence: Prolixin 5mg BID, Klonopin 1mg BID, Zyprexa 10mg QAM+20mg QHS, Lithium Carbonate 600mg BID    Prolixin Decanoate 25 mg IM q2week. last received 3/8/19 from ACT team RN.

## 2019-03-22 NOTE — ED BEHAVIORAL HEALTH ASSESSMENT NOTE - AXIS IV
Housing problems/Problems with primary support/Problem related to social environment/Occupational problems

## 2019-03-22 NOTE — ED ADULT TRIAGE NOTE - CHIEF COMPLAINT QUOTE
arrives calm appears manic  wearing  a nga @ this time follows all direction   pt states" Allah tells me to kill"

## 2019-03-22 NOTE — ED PROVIDER NOTE - OBJECTIVE STATEMENT
38F was upset due to breakfast problem at [employer], threw a speaker; is purposely doing things to get herself kicked out of [employer].  Endorses HI – reports voices “allah”  telling her to kill,  reports SI, with plan for SI with intent to overdose.  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  Reports some abd pain.  Multiple ED visits for similar.  Seems manic here, endorsing all positive BH screening questions, possible 2ary gain.  Would check labs and urine, psych eval.   VS:  unremarkable except mild htn    GEN - NAD; psychomotor agitation but in good behavioral control; A+O x3   HEAD - NC/AT     ENT - PEERL, EOMI, mucous membranes  moist , no discharge      NECK: Neck supple, non-tender without lymphadenopathy, no masses, no JVD  PULM - CTA b/l,  symmetric breath sounds  COR -  normal heart sounds    ABD - , ND, NT, soft,  BACK - no CVA tenderness, nontender spine     EXTREMS - no edema, no deformity, warm and well perfused    SKIN - no rash or bruising      NEUROLOGIC - alert, CN 2-12 intact, sensation nl, motor no focal deficit.
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
## 2019-03-22 NOTE — ED BEHAVIORAL HEALTH ASSESSMENT NOTE - OTHER
residence staff / act team dislikes residence 24/7 supervised residence wearing a wig chronically limited
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
## 2019-03-22 NOTE — ED BEHAVIORAL HEALTH ASSESSMENT NOTE - DIFFERENTIAL
schizoaffective disorder, borderline pd, malingering. malingering, schizoaffective disorder, borderline personality disorder

## 2019-03-22 NOTE — ED BEHAVIORAL HEALTH ASSESSMENT NOTE - SUMMARY
Ms. Castillo is a single, 38-year-old  female, non caregiver, unemployed, domiciled on [place] [address], with past psychiatric history of  Schizoaffective Disorder, Borderline Personality Disorder, cannabis abuse, multiple inpatient psychiatric hospitalizations (>20), recently Low 4 12/12-12/24 2018, with frequent visits to the [facility] (well-known to staff) 3 prior suicide attempts (last in 2012 via OD), recurrent suicidal gestures and suicidal ideation, history of property destruction when upset, past legal issues, no access to weapons. PMH GERD & asthma. BIB EMS called by staff for agitation, throwing objects.  Patient is well known to this writer. When interviewed by [facility], she was largely pan-positive on review of symptoms. Upon attending reassessment these were denied. Patient was observed to be volitionally shaking which she stopped when told to do so, these behaviors were not observed when staff attention was diverted elsewhere, suggesting a strong behavioral component. Residence states patient became agitated and threw objects when not given coffee at breakfast, which is consistent with prior evaluations. Patient is not thought disordered and does not appear internally preoccupied nor responding to internal stimuli. She does not present as acutely depressed or manic, is not suicidal, not homicidal. She appears to be trying to feign symptoms, supported by her report of DSM criteria. She willingly agreed to take her Prolixin Decanoate. She resides in a supervised residence and has community support via ACT team. She is known to act out due to chronic dislike of her residence and peer conflict. She does not present an imminent risk of harm to herself/others that would warrant psychiatric hospitalization at this time. Ms. Castillo is a single, 38-year-old  female, non caregiver, unemployed, domiciled on [place] [address], with past psychiatric history of  Schizoaffective Disorder, Borderline Personality Disorder, cannabis abuse, multiple inpatient psychiatric hospitalizations (>20), recently Low 4 12/12-12/24 2018, with frequent visits to the [facility] (well-known to staff) 3 prior suicide attempts (last in 2012 via OD), recurrent suicidal gestures and suicidal ideation, history of property destruction when upset, past legal issues, no access to weapons. PMH GERD & asthma. BIB EMS called by staff for agitation, throwing objects.  Patient is well known to this writer. When interviewed by [facility], she was largely pan-positive on review of symptoms. Upon attending reassessment these were denied. Patient was observed to be volitionally shaking which she stopped when told to do so, these behaviors were not observed when staff attention was diverted elsewhere, suggesting a strong behavioral component. Residence states patient became agitated and threw objects when not given coffee at breakfast, which is consistent with prior evaluations. Patient is not thought disordered and does not appear internally preoccupied nor responding to internal stimuli. She does not present as acutely depressed or manic, is not suicidal, not homicidal. She appears to be trying to feign symptoms, supported by her report of DSM criteria and her dramatic volitional behaviors which diminish when she is not being paid attention. She willingly agreed to take her Prolixin Decanoate and asks for a taxi. She resides in a supervised residence and has community support via ACT team. She is known to act out due to chronic dislike of her residence and peer conflict. She does not present an imminent risk of harm to herself/others that would warrant psychiatric hospitalization at this time.

## 2019-03-23 ENCOUNTER — EMERGENCY (EMERGENCY)
Facility: HOSPITAL | Age: 39
LOS: 1 days | Discharge: HOME | End: 2019-03-23
Admitting: EMERGENCY MEDICINE
Payer: MEDICAID

## 2019-03-23 VITALS
RESPIRATION RATE: 18 BRPM | TEMPERATURE: 98 F | DIASTOLIC BLOOD PRESSURE: 96 MMHG | HEART RATE: 77 BPM | SYSTOLIC BLOOD PRESSURE: 137 MMHG | OXYGEN SATURATION: 100 %

## 2019-03-23 PROCEDURE — 99283 EMERGENCY DEPT VISIT LOW MDM: CPT

## 2019-03-23 RX ORDER — HALOPERIDOL DECANOATE 100 MG/ML
5 INJECTION INTRAMUSCULAR ONCE
Qty: 0 | Refills: 0 | Status: COMPLETED | OUTPATIENT
Start: 2019-03-23 | End: 2019-03-23

## 2019-03-23 RX ADMIN — Medication 1 MILLIGRAM(S): at 17:59

## 2019-03-23 RX ADMIN — HALOPERIDOL DECANOATE 5 MILLIGRAM(S): 100 INJECTION INTRAMUSCULAR at 17:59

## 2019-03-23 NOTE — ED ADULT NURSE NOTE - CHIEF COMPLAINT QUOTE
Pt brought in by EMS from [facility] for aggressive behavior. As per EMS, Pt became upset because dinner wasn't ready. Pt became agitated and verbally aggressive towards staff members. Denies SI/HI. Denies A/V hallucinations. Hx of bipolar, schizophrenia

## 2019-03-23 NOTE — ED ADULT NURSE REASSESSMENT NOTE - NS ED NURSE REASSESS COMMENT FT1
Pt received at 8:30pm shift change. Pt discharged and presently leaving ER by cab which was arranged by MI.

## 2019-03-23 NOTE — ED ADULT TRIAGE NOTE - CHIEF COMPLAINT QUOTE
Pt brought in by EMS from [facility] for aggressive behavior. As per EMS, Pt became upset because dinner wasn't ready. Pt became agitated and verbally aggressive towards staff members. Denies SI/HI. Denies A/V hallucinations. Hx of bipolar, schizophrenia

## 2019-03-23 NOTE — ED PROVIDER NOTE - NSFOLLOWUPCLINICS_GEN_ALL_ED_FT
[facility]  Behavioral Health  [address] [place] [address]  Phone: [phone]  Fax:   Follow Up Time:

## 2019-03-23 NOTE — ED PROVIDER NOTE - OBJECTIVE STATEMENT
37 y/o M hx BPD, Bipolar, Depression, GERD, Obesity  BIBA w c/o agitation  secondary to not receiving on time.  States " I was hungry" .  Denies falling, punching  or kicking any objects. Denies SI/HI/AH/VH.  Denies  pain, SOB, fever, chills, chest/ abdominal   discomfort.  Denies  recent use of alcohol or illicit drugs.

## 2019-03-23 NOTE — ED ADULT NURSE NOTE - NSIMPLEMENTINTERV_GEN_ALL_ED
Implemented All Universal Safety Interventions:  [place] to call system. Call bell, personal items and telephone within reach. Instruct patient to call for assistance. Room bathroom lighting operational. Non-slip footwear when patient is off stretcher. Physically safe environment: no spills, clutter or unnecessary equipment. Stretcher in lowest position, wheels locked, appropriate side rails in place.

## 2019-03-23 NOTE — ED PROVIDER NOTE - CLINICAL SUMMARY MEDICAL DECISION MAKING FREE TEXT BOX
39 y/o M hx BPD, Bipolar, Depression, GERD, Obesity   Medical evaluation performed. There is no clinical evidence of intoxication or any acute medical problem requiring immediate intervention.  Discuss plan with - - [facility] - [phone]  Ms Shelby Toledo.  admits that patient is compliant with her medications and has no concerns for patient  safety.

## 2019-03-23 NOTE — ED BEHAVIORAL HEALTH NOTE - BEHAVIORAL HEALTH NOTE
[facility] back [facility] .  Taxi requested with Peterson Kenney.

## 2019-03-25 ENCOUNTER — EMERGENCY (EMERGENCY)
Facility: HOSPITAL | Age: 39
LOS: 1 days | Discharge: HOME | End: 2019-03-25
Admitting: EMERGENCY MEDICINE
Payer: MEDICAID

## 2019-03-25 VITALS
OXYGEN SATURATION: 100 % | TEMPERATURE: 98 F | RESPIRATION RATE: 17 BRPM | SYSTOLIC BLOOD PRESSURE: 123 MMHG | DIASTOLIC BLOOD PRESSURE: 83 MMHG | HEART RATE: 91 BPM

## 2019-03-25 DIAGNOSIS — F25.9 SCHIZOAFFECTIVE DISORDER, UNSPECIFIED: ICD-10-CM

## 2019-03-25 DIAGNOSIS — F63.9 IMPULSE DISORDER, UNSPECIFIED: ICD-10-CM

## 2019-03-25 DIAGNOSIS — Z76.5 MALINGERER [CONSCIOUS SIMULATION]: ICD-10-CM

## 2019-03-25 PROCEDURE — 99283 EMERGENCY DEPT VISIT LOW MDM: CPT

## 2019-03-25 PROCEDURE — 90792 PSYCH DIAG EVAL W/MED SRVCS: CPT

## 2019-03-25 NOTE — ED BEHAVIORAL HEALTH ASSESSMENT NOTE - SUMMARY
Ms. Castillo is a single, 38-year-old  female, non caregiver, unemployed, domiciled on [place] [address], with past psychiatric history of  Schizoaffective Disorder, Borderline Personality Disorder, cannabis abuse, multiple inpatient psychiatric hospitalizations (>20), recently Low 4 12/12-12/24 2018, with frequent visits to the [facility] (well-known to staff) 3 prior suicide attempts (last in 2012 via OD), recurrent suicidal gestures and suicidal ideation, history of property destruction when upset, past legal issues, no access to weapons. PMH GERD & asthma. BIB EMS called by staff for verbal threats.  Patient is well known to this writer. Patient was making verbal threats in the context of not liking breakfast and not wanting to sit with her  to do insurance paperwork. There was no physical violence or property destruction today. She does not present as acutely depressed or manic, is not suicidal, not homicidal. She resides in a supervised residence and has community support via ACT team. She is known to act out due to chronic dislike of her residence and peer conflict. She does not present an imminent risk of harm to herself/others that would warrant psychiatric hospitalization at this time.
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
## 2019-03-25 NOTE — ED PROVIDER NOTE - CARE PLAN
Principal Discharge DX:	Intermittent explosive disorder  Secondary Diagnosis:	Borderline personality disorder  Secondary Diagnosis:	Bipolar disorder

## 2019-03-25 NOTE — ED ADULT NURSE NOTE - CHIEF COMPLAINT QUOTE
pt from [facility], states she upset about her health insurance and food plan. pt stated to staff if she had to live there more day she'd kill herself. pt currently calm and laughing in triage.
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
## 2019-03-25 NOTE — ED PROVIDER NOTE - OBJECTIVE STATEMENT
This is a 38 year old Female PMHx  Asthma  Bipolar Disorder Borderline Personality Disorder  Cholelithiasis  Depression  Fibroids  GERD Obesity    BIBA for psych eval r/t suicidal ideations. Patient known to  staff appears in good behavorial control clean and well dressed. Patient states " I can not longer live at that facility they will not help so I want to kill myself"

## 2019-03-25 NOTE — ED ADULT TRIAGE NOTE - CHIEF COMPLAINT QUOTE
pt from [facility], states she upset about her health insurance and food plan. pt stated to staff if she had to live there more day she'd kill herself. pt currently calm and laughing in triage.

## 2019-03-25 NOTE — ED BEHAVIORAL HEALTH ASSESSMENT NOTE - DESCRIPTION
calm, cooperative, offered and accepted prescribed medication  Vital Signs Last 24 Hrs  T(C): 36.4 (25 Mar 2019 12:28), Max: 36.4 (25 Mar 2019 12:28)  T(F): 97.5 (25 Mar 2019 12:28), Max: 97.5 (25 Mar 2019 12:28)  HR: 91 (25 Mar 2019 12:28) (91 - 91)  BP: 123/83 (25 Mar 2019 12:28) (123/83 - 123/83)  BP(mean): --  RR: 17 (25 Mar 2019 12:28) (17 - 17)  SpO2: 100% (25 Mar 2019 12:28) (100% - 100%) GERD, Asthma currently lives in [facility], [address] [facility] carterfreddy MIGUEL ANGEL reports no contact with family, currently unemployed.
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
## 2019-03-25 NOTE — ED BEHAVIORAL HEALTH ASSESSMENT NOTE - CURRENT MEDICATION
Per documentation from residence: Prolixin 5mg BID, Klonopin 1mg BID, Zyprexa 10mg QAM+20mg QHS, Lithium Carbonate 600mg BID    Prolixin Decanoate 25 mg IM q2week. last received 3/22/19 in [facility] ED

## 2019-03-25 NOTE — ED BEHAVIORAL HEALTH ASSESSMENT NOTE - SUICIDE PROTECTIVE FACTORS
Supportive social network or family/Identifies reasons for living/Future oriented/Fear of death or dying due to pain/suffering/Other/Positive therapeutic relationships
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
## 2019-03-27 NOTE — ED ADULT NURSE NOTE - NS_BHTRGCALCULATEDSCORE_ED_A_ED_FT
1 Bilobed Flap Text: The defect edges were debeveled with a #15 scalpel blade.  Given the location of the defect and the proximity to free margins a bilobe flap was deemed most appropriate.  Using a sterile surgical marker, an appropriate bilobe flap drawn around the defect.    The area thus outlined was incised deep to adipose tissue with a #15 scalpel blade.  The skin margins were undermined to an appropriate distance in all directions utilizing iris scissors.

## 2019-03-28 ENCOUNTER — EMERGENCY (EMERGENCY)
Facility: HOSPITAL | Age: 39
LOS: 1 days | Discharge: NURSING, REHAB OR LONG-TERM CARE | End: 2019-03-28
Attending: EMERGENCY MEDICINE | Admitting: EMERGENCY MEDICINE
Payer: MEDICAID

## 2019-03-28 VITALS
HEART RATE: 92 BPM | OXYGEN SATURATION: 100 % | DIASTOLIC BLOOD PRESSURE: 73 MMHG | SYSTOLIC BLOOD PRESSURE: 131 MMHG | TEMPERATURE: 99 F | RESPIRATION RATE: 16 BRPM

## 2019-03-28 PROCEDURE — 90792 PSYCH DIAG EVAL W/MED SRVCS: CPT

## 2019-03-28 PROCEDURE — 99283 EMERGENCY DEPT VISIT LOW MDM: CPT

## 2019-03-28 NOTE — ED ADULT TRIAGE NOTE - CHIEF COMPLAINT QUOTE
Pt brought in from [facility] for aggressive behavior, pt states "I was angry because dinner wasn't on time", pt endorses SI with plan to lie down in middle of street, also endorses HI towards staff at [facility], pt denies drinking/drug use/delusions/hallucinations.

## 2019-03-28 NOTE — ED BEHAVIORAL HEALTH ASSESSMENT NOTE - SUMMARY
Ms. Castillo is a single, 38-year-old  female, non caregiver, unemployed, domiciled on [place] [address], with past psychiatric history of  Schizoaffective Disorder, Borderline Personality Disorder, cannabis abuse, multiple inpatient psychiatric hospitalizations (>20), recently Low 4 12/12-12/24 2018, with frequent visits to the [facility] (well-known to staff) 3 prior suicide attempts (last in 2012 via OD), recurrent suicidal gestures and suicidal ideation, history of property destruction when upset, past legal issues, no access to weapons. PMH GERD & asthma. BIB EMS called by staff for agitation.    Patient is well known to this writer. Patient was making verbal threats in the context of being upset dinner was not served on time. There was no physical violence today, patient was throwing things around the kitchen and slamming her hands on the counter, per staff reports. She does not present as acutely depressed or manic, is not suicidal, not homicidal. She resides in a supervised residence and has community support via ACT team. She is known to act out due to chronic dislike of her residence and peer conflict. She does not present an imminent risk of harm to herself/others that would warrant psychiatric hospitalization at this time.

## 2019-03-28 NOTE — ED PROVIDER NOTE - OBJECTIVE STATEMENT
38F with pmh of bipolar and borderline personality disorders, asthma, obesity, GERD ,frequently in ED, here with SI/HI after her meal did not get served on time at the group home. Pt is disorganized and not able to provide much hx. Denies fever, chest pain, head injury, drug use.

## 2019-03-28 NOTE — ED BEHAVIORAL HEALTH ASSESSMENT NOTE - SUICIDE PROTECTIVE FACTORS
Identifies reasons for living/Future oriented/Supportive social network or family/Fear of death or dying due to pain/suffering/Positive therapeutic relationships/Other

## 2019-03-28 NOTE — ED BEHAVIORAL HEALTH ASSESSMENT NOTE - AXIS IV
Occupational problems/Housing problems/Problems with primary support/Problem related to social environment

## 2019-03-28 NOTE — ED PROVIDER NOTE - CLINICAL SUMMARY MEDICAL DECISION MAKING FREE TEXT BOX
pt well known to psych team. at baseline here. no signs of head injury. do not suspect electrolyte derrangement or infection or ICH.  medically cleared. will get psych c/s.
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
## 2019-03-28 NOTE — ED ADULT NURSE NOTE - NSIMPLEMENTINTERV_GEN_ALL_ED
Implemented All Universal Safety Interventions:  [place] to call system. Call bell, personal items and telephone within reach. Instruct patient to call for assistance. Room bathroom lighting operational. Non-slip footwear when patient is off stretcher. Physically safe environment: no spills, clutter or unnecessary equipment. Stretcher in lowest position, wheels locked, appropriate side rails in place.

## 2019-03-28 NOTE — ED BEHAVIORAL HEALTH ASSESSMENT NOTE - CURRENT MEDICATION
Per documentation from residence: Prolixin 5mg BID, Klonopin 1mg BID, Zyprexa 10mg QAM+20mg QHS, Lithium Carbonate 600mg BID    Prolixin Decanoate 25 mg IM q2week. last received 3/22/19 in [facility] ED

## 2019-03-28 NOTE — ED BEHAVIORAL HEALTH NOTE - BEHAVIORAL HEALTH NOTE
Worker alerted patient will require an account 50 cab for return to [place]. Have arranged with Yao at [facility]. He will  patient ASAP. Form emailed to [email or web address] and sent to social work office [phone]

## 2019-03-28 NOTE — ED BEHAVIORAL HEALTH ASSESSMENT NOTE - DESCRIPTION
calm, cooperative, offered and accepted prescribed medication    ICU Vital Signs Last 24 Hrs  T(C): 37.3 (28 Mar 2019 18:35), Max: 37.3 (28 Mar 2019 18:35)  T(F): 99.1 (28 Mar 2019 18:35), Max: 99.1 (28 Mar 2019 18:35)  HR: 92 (28 Mar 2019 18:35) (92 - 92)  BP: 131/73 (28 Mar 2019 18:35) (131/73 - 131/73)  BP(mean): --  ABP: --  ABP(mean): --  RR: 16 (28 Mar 2019 18:35) (16 - 16)  SpO2: 100% (28 Mar 2019 18:35) (100% - 100%) GERD, Asthma currently lives in [facility], [address] [facility] MIGUEL ANGEL reports no contact with family, currently unemployed.

## 2019-03-28 NOTE — ED ADULT NURSE NOTE - CHIEF COMPLAINT QUOTE
Pt brought in from [facility] for aggressive behavior, pt states "I was angry because dinner wasn't on time", pt endorses SI with plan to lie down in middle of street, also endorses HI towards staff at [facility], pt denies drinking/drug use/delusions/hallucinations.

## 2019-03-28 NOTE — ED PROVIDER NOTE - PHYSICAL EXAMINATION

## 2019-03-28 NOTE — ED BEHAVIORAL HEALTH ASSESSMENT NOTE - PROFESSIONAL COLLATERAL PHONE
[facility] [phone] #[phone], then option [phone] for  / [employer] ACT Team

## 2019-03-28 NOTE — ED BEHAVIORAL HEALTH ASSESSMENT NOTE - HPI (INCLUDE ILLNESS QUALITY, SEVERITY, DURATION, TIMING, CONTEXT, MODIFYING FACTORS, ASSOCIATED SIGNS AND SYMPTOMS)
Ms. Castillo is a single, 38-year-old  female, non caregiver, unemployed, domiciled on [place] [address], with past psychiatric history of  Schizoaffective Disorder, Borderline Personality Disorder, cannabis abuse, multiple inpatient psychiatric hospitalizations (>20), recently Low 4 12/12-12/24 2018, with frequent visits to the [facility] (well-known to staff) 3 prior suicide attempts (last in 2012 via OD), recurrent suicidal gestures and suicidal ideation, history of property destruction when upset, past legal issues, no access to weapons. PMH GERD & asthma. BIB EMS called by staff for agitation.    Patient is well known to ED team and writer. Patient was observed socializing with a peers in the ED, smiling and joking with staff.     Patient asks to be admitted voluntarily to multiple staff members. She reports that she wants to be admitted so that she can get her dinner on time. "it was late today and I got mad and started throwing things. Why can't I just stay here?" She chronically makes dramatic comments about killing herself in the context of chronic dislike of her residence. She has no suicidal plan or intent. She denies any thoughts/intent/plan to harm anyone. Denies hallucinations. Denies drug use.    Per staff, they report it was dinner and she was being verbally aggressive. She was throwing things in the kitchen, slamming her hand on the desk and tried to jump over the counter to get food and coffee. She was screaming at staff as well. They report that she will do this to go to the ED. They are amenable to discharge.     Per note from 3 days ago: "[facility]. No imminent concerns expressed. There is a planned case conference April 9 with the ACT Team and the residence. She notes it will be difficult to pursue more independent housing for patient due to her numerous ED visits. Will follow up with patient within 2 days."

## 2019-04-12 ENCOUNTER — EMERGENCY (EMERGENCY)
Facility: HOSPITAL | Age: 39
LOS: 1 days | Discharge: HOME | End: 2019-04-12
Attending: EMERGENCY MEDICINE | Admitting: EMERGENCY MEDICINE
Payer: MEDICAID

## 2019-04-12 VITALS
RESPIRATION RATE: 18 BRPM | HEART RATE: 82 BPM | OXYGEN SATURATION: 100 % | TEMPERATURE: 98 F | SYSTOLIC BLOOD PRESSURE: 149 MMHG | DIASTOLIC BLOOD PRESSURE: 88 MMHG

## 2019-04-12 VITALS
OXYGEN SATURATION: 100 % | RESPIRATION RATE: 18 BRPM | HEART RATE: 88 BPM | SYSTOLIC BLOOD PRESSURE: 131 MMHG | TEMPERATURE: 99 F | DIASTOLIC BLOOD PRESSURE: 68 MMHG

## 2019-04-12 LAB
ALBUMIN SERPL ELPH-MCNC: 3.6 G/DL — SIGNIFICANT CHANGE UP (ref 3.3–5)
ALP SERPL-CCNC: 95 U/L — SIGNIFICANT CHANGE UP (ref 40–120)
ALT FLD-CCNC: 11 U/L — SIGNIFICANT CHANGE UP (ref 4–33)
AMPHET UR-MCNC: NEGATIVE — SIGNIFICANT CHANGE UP
ANION GAP SERPL CALC-SCNC: 12 MMO/L — SIGNIFICANT CHANGE UP (ref 7–14)
APAP SERPL-MCNC: < 15 UG/ML — LOW (ref 15–25)
APPEARANCE UR: CLEAR — SIGNIFICANT CHANGE UP
APTT BLD: 34.5 SEC — SIGNIFICANT CHANGE UP (ref 27.5–36.3)
AST SERPL-CCNC: 13 U/L — SIGNIFICANT CHANGE UP (ref 4–32)
BARBITURATES UR SCN-MCNC: NEGATIVE — SIGNIFICANT CHANGE UP
BASOPHILS # BLD AUTO: 0.03 K/UL — SIGNIFICANT CHANGE UP (ref 0–0.2)
BASOPHILS NFR BLD AUTO: 0.4 % — SIGNIFICANT CHANGE UP (ref 0–2)
BENZODIAZ UR-MCNC: NEGATIVE — SIGNIFICANT CHANGE UP
BILIRUB SERPL-MCNC: < 0.2 MG/DL — LOW (ref 0.2–1.2)
BILIRUB UR-MCNC: NEGATIVE — SIGNIFICANT CHANGE UP
BLOOD UR QL VISUAL: NEGATIVE — SIGNIFICANT CHANGE UP
BUN SERPL-MCNC: 9 MG/DL — SIGNIFICANT CHANGE UP (ref 7–23)
CALCIUM SERPL-MCNC: 9.3 MG/DL — SIGNIFICANT CHANGE UP (ref 8.4–10.5)
CANNABINOIDS UR-MCNC: NEGATIVE — SIGNIFICANT CHANGE UP
CHLORIDE SERPL-SCNC: 106 MMOL/L — SIGNIFICANT CHANGE UP (ref 98–107)
CO2 SERPL-SCNC: 21 MMOL/L — LOW (ref 22–31)
COCAINE METAB.OTHER UR-MCNC: NEGATIVE — SIGNIFICANT CHANGE UP
COLOR SPEC: SIGNIFICANT CHANGE UP
CREAT SERPL-MCNC: 0.84 MG/DL — SIGNIFICANT CHANGE UP (ref 0.5–1.3)
EOSINOPHIL # BLD AUTO: 0.26 K/UL — SIGNIFICANT CHANGE UP (ref 0–0.5)
EOSINOPHIL NFR BLD AUTO: 3.4 % — SIGNIFICANT CHANGE UP (ref 0–6)
ETHANOL BLD-MCNC: < 10 MG/DL — SIGNIFICANT CHANGE UP
GLUCOSE SERPL-MCNC: 104 MG/DL — HIGH (ref 70–99)
GLUCOSE UR-MCNC: NEGATIVE — SIGNIFICANT CHANGE UP
HCT VFR BLD CALC: 42.5 % — SIGNIFICANT CHANGE UP (ref 34.5–45)
HGB BLD-MCNC: 12.7 G/DL — SIGNIFICANT CHANGE UP (ref 11.5–15.5)
IMM GRANULOCYTES NFR BLD AUTO: 0.3 % — SIGNIFICANT CHANGE UP (ref 0–1.5)
INR BLD: 1.11 — SIGNIFICANT CHANGE UP (ref 0.88–1.17)
KETONES UR-MCNC: NEGATIVE — SIGNIFICANT CHANGE UP
LEUKOCYTE ESTERASE UR-ACNC: NEGATIVE — SIGNIFICANT CHANGE UP
LITHIUM SERPL-MCNC: 0.47 MMOL/L — LOW (ref 0.6–1.2)
LYMPHOCYTES # BLD AUTO: 1.8 K/UL — SIGNIFICANT CHANGE UP (ref 1–3.3)
LYMPHOCYTES # BLD AUTO: 23.7 % — SIGNIFICANT CHANGE UP (ref 13–44)
MCHC RBC-ENTMCNC: 27.4 PG — SIGNIFICANT CHANGE UP (ref 27–34)
MCHC RBC-ENTMCNC: 29.9 % — LOW (ref 32–36)
MCV RBC AUTO: 91.6 FL — SIGNIFICANT CHANGE UP (ref 80–100)
METHADONE UR-MCNC: NEGATIVE — SIGNIFICANT CHANGE UP
MONOCYTES # BLD AUTO: 0.63 K/UL — SIGNIFICANT CHANGE UP (ref 0–0.9)
MONOCYTES NFR BLD AUTO: 8.3 % — SIGNIFICANT CHANGE UP (ref 2–14)
NEUTROPHILS # BLD AUTO: 4.85 K/UL — SIGNIFICANT CHANGE UP (ref 1.8–7.4)
NEUTROPHILS NFR BLD AUTO: 63.9 % — SIGNIFICANT CHANGE UP (ref 43–77)
NITRITE UR-MCNC: NEGATIVE — SIGNIFICANT CHANGE UP
NRBC # FLD: 0 K/UL — SIGNIFICANT CHANGE UP (ref 0–0)
OB PNL STL: NEGATIVE — SIGNIFICANT CHANGE UP
OPIATES UR-MCNC: NEGATIVE — SIGNIFICANT CHANGE UP
OXYCODONE UR-MCNC: NEGATIVE — SIGNIFICANT CHANGE UP
PCP UR-MCNC: NEGATIVE — SIGNIFICANT CHANGE UP
PH UR: 8 — SIGNIFICANT CHANGE UP (ref 5–8)
PLATELET # BLD AUTO: 313 K/UL — SIGNIFICANT CHANGE UP (ref 150–400)
PMV BLD: 9.4 FL — SIGNIFICANT CHANGE UP (ref 7–13)
POTASSIUM SERPL-MCNC: 3.9 MMOL/L — SIGNIFICANT CHANGE UP (ref 3.5–5.3)
POTASSIUM SERPL-SCNC: 3.9 MMOL/L — SIGNIFICANT CHANGE UP (ref 3.5–5.3)
PROT SERPL-MCNC: 7.3 G/DL — SIGNIFICANT CHANGE UP (ref 6–8.3)
PROT UR-MCNC: NEGATIVE — SIGNIFICANT CHANGE UP
PROTHROM AB SERPL-ACNC: 12.7 SEC — SIGNIFICANT CHANGE UP (ref 9.8–13.1)
RBC # BLD: 4.64 M/UL — SIGNIFICANT CHANGE UP (ref 3.8–5.2)
RBC # FLD: 14 % — SIGNIFICANT CHANGE UP (ref 10.3–14.5)
SALICYLATES SERPL-MCNC: < 5 MG/DL — LOW (ref 15–30)
SODIUM SERPL-SCNC: 139 MMOL/L — SIGNIFICANT CHANGE UP (ref 135–145)
SP GR SPEC: 1.01 — SIGNIFICANT CHANGE UP (ref 1–1.04)
TSH SERPL-MCNC: 2.13 UIU/ML — SIGNIFICANT CHANGE UP (ref 0.27–4.2)
UROBILINOGEN FLD QL: NORMAL — SIGNIFICANT CHANGE UP
WBC # BLD: 7.59 K/UL — SIGNIFICANT CHANGE UP (ref 3.8–10.5)
WBC # FLD AUTO: 7.59 K/UL — SIGNIFICANT CHANGE UP (ref 3.8–10.5)

## 2019-04-12 PROCEDURE — 74177 CT ABD & PELVIS W/CONTRAST: CPT | Mod: 26

## 2019-04-12 PROCEDURE — 99285 EMERGENCY DEPT VISIT HI MDM: CPT

## 2019-04-12 PROCEDURE — 90792 PSYCH DIAG EVAL W/MED SRVCS: CPT

## 2019-04-12 NOTE — ED PROVIDER NOTE - NS ED ROS FT
General: denies fever, chills  Eyes: denies visual changes, blurred vision,   CV: denies chest pain, palpitations  Resp: denies difficulty breathing, cough  Abdominal: + abdominal pain, +blood in stool, + bloody vomit  MSK: denies  leg pain, leg swelling  Neuro: denies headaches, numbness  Pysch: + SI, denies HI

## 2019-04-12 NOTE — ED PROVIDER NOTE - NSFOLLOWUPINSTRUCTIONS_ED_ALL_ED_FT
- Follow up with your doctor within 2-3 days.   - Return to the ED for any new or worsening symptoms.     Abdominal Pain    Many things can cause abdominal pain. Many times, abdominal pain is not caused by a disease and will improve without treatment. Your health care provider will do a physical exam to determine if there is a dangerous cause of your pain; blood tests and imaging may help determine the cause of your pain. However, in many cases, no cause may be found and you may need further testing as an outpatient. Monitor your abdominal pain for any changes.     SEEK IMMEDIATE MEDICAL CARE IF YOU HAVE ANY OF THE FOLLOWING SYMPTOMS: worsening abdominal pain, uncontrollable vomiting, profuse diarrhea, inability to have bowel movements or pass gas, black or bloody stools, fever accompanying chest pain or back pain, or fainting. These symptoms may represent a serious problem that is an emergency. Do not wait to see if the symptoms will go away. Get medical help right away. Call 911 and do not drive yourself to the hospital.

## 2019-04-12 NOTE — ED BEHAVIORAL HEALTH ASSESSMENT NOTE - SUMMARY
Ms. Castillo is a single, 38-year-old  female, non caregiver, unemployed, domiciled on [place] grounds [address] [place] [address], with past psychiatric history of Schizoaffective Disorder, Borderline Personality Disorder, cannabis abuse, multiple inpatient psychiatric hospitalizations (>20), recently Low 4 12/12-12/24 2018, with frequent visits to the [facility] (well-known to staff) 3 prior suicide attempts (last in 2012 via OD), recurrent suicidal gestures and suicidal ideation, history of property destruction when upset, past legal issues, no access to weapons. PMH GERD & asthma. BIB EMS called by staff after patient reported vomiting. Patient expressed to medical team that she was suicidal.    Patient is well known to this writer. Patient reports being depressed for two weeks despite having seen her ACT Team twice this week. She reports not tending to ADLs despite being adequately groomed in the ED. She is compliant with PALACIOS though lithium level is somewhat low at 0.47. Per staff patient came her for medical reasons with no depression reported earlier today. She is observed socializing with a female peer in the ED. Patient is otherwise at baseline. Is not manic or psychotic. She is known to dislike her residence and seek admission through the ED. There is no indication that patient is decompensating from her baseline and she is appropriate for discharge with outpatient ACT team follow up as scheduled Monday.

## 2019-04-12 NOTE — ED PROVIDER NOTE - OBJECTIVE STATEMENT
38F w/ h/o GERD, bipolar and borderline, asthma, on lithium p/w bloody emesis x1 this morning at 5 am. States she had similar episode 2 days ago. Reports bright red blood, no coffee grounds. States she feels lightheaded and is having epigastric pain. Denies chest pain, SOB, diarrhea. Reports long h/o BRBPR. Patient also reporting she is going to kill herself by jumping off a bridge.

## 2019-04-12 NOTE — ED PROVIDER NOTE - ATTENDING CONTRIBUTION TO CARE
Dr. Mackey: I have personally performed a face to face bedside history and physical examination of this patient. I have discussed the history, examination, review of systems, assessment and plan of management with the resident. I have reviewed the electronic medical record and amended it to reflect my history, review of systems, physical exam, assessment and plan.    38F with pmh of GERD, bipolar and borderline personality disorder, here with hematemesis x 2 episodes in last 3 days. +epigastric pain. denies eating anything red prior or melena. not on blood thinners. Also reports that has SI and would like to jump out in front of traffic    on exam  GEN - NAD; well appearing; A+O x3   HEAD - NC/AT     EYES - EOMI, no conjunctival pallor, no scleral icterus  ENT -   mucous membranes  moist , no discharge      NECK - Neck supple  PULM - CTA b/l,  symmetric breath sounds  COR -  RRR, S1 S2, no murmurs  ABD - , ND, +RLQ tenderness, soft, obese, no guarding, no rebound, no masses    BACK - no CVA tenderness, nontender spine     EXTREMS - no edema, no deformity, warm and well perfused    SKIN - no rash or bruising      NEUROLOGIC - alert, sensation nl, motor 5/5 RUE/LUE/RLE/LLE  PSYCH - very talkative, smiling inappropriately, +SI    will r/o upper GI bleed, significant anemia, coagulopathy, possibly psychogenic. Also with RLQ tenderness, will r/o collitis/appendicits. Plan for labs, guaiac, CT a/p, close monitoring for recurrent hematemesis, psych eval, GI f/u.

## 2019-04-12 NOTE — ED PROVIDER NOTE - PHYSICAL EXAMINATION
CONSTITUTIONAL: awake, alert, no acute respiratory distress  HEAD: Normocephalic; atraumatic  ENMT: External appears normal; no blood visualized in the oropharynx   CARD: Normal Sl, S2; no audible murmurs  RESP: Breathing comfortably on RA, normal wob, lungs ctab  ABD: Soft, non-distended; + mild epigastric tenderness  EXT: No edema, normal ROM in all four extremities  NEURO: aaox3, moving all extremities spontaneously  PSYCH: + SI

## 2019-04-12 NOTE — ED BEHAVIORAL HEALTH ASSESSMENT NOTE - SUICIDE PROTECTIVE FACTORS
Positive therapeutic relationships/Identifies reasons for living/Future oriented/Fear of death or dying due to pain/suffering/Other/Supportive social network or family

## 2019-04-12 NOTE — ED PROVIDER NOTE - PMH
Asthma    Bipolar Disorder    Borderline Personality Disorder    Cholelithiasis    Depression    Fibroids    GERD (Gastroesophageal Reflux Disease)    Obesity
constant

## 2019-04-12 NOTE — ED BEHAVIORAL HEALTH ASSESSMENT NOTE - AXIS IV
Problem related to social environment/Housing problems/Problems with primary support/Occupational problems

## 2019-04-12 NOTE — ED BEHAVIORAL HEALTH ASSESSMENT NOTE - HPI (INCLUDE ILLNESS QUALITY, SEVERITY, DURATION, TIMING, CONTEXT, MODIFYING FACTORS, ASSOCIATED SIGNS AND SYMPTOMS)
Ms. Castillo is a single, 38-year-old  female, non caregiver, unemployed, domiciled on [place] grounds building [address] [place] [address], with past psychiatric history of Schizoaffective Disorder, Borderline Personality Disorder, cannabis abuse, multiple inpatient psychiatric hospitalizations (>20), recently Low 4 12/12-12/24 2018, with frequent visits to the [facility] (well-known to staff) 3 prior suicide attempts (last in 2012 via OD), recurrent suicidal gestures and suicidal ideation, history of property destruction when upset, past legal issues, no access to weapons. PMH GERD & asthma. BIB EMS called by staff after patient reported vomiting. Patient expressed to medical team that she was suicidal.    Patient is well known to ED team and writer. Patient was observed socializing with a peers in the ED, smiling and joking with staff.     Patient asks multiple staff members if there are beds in the hospital. She states she has been depressed for 5 days and has not gotten out of bed or showered (despite being appropriately groomed). She states "I just use air, my life has no purpose". When asked what she would do about it she states "maybe something I shouldn't", but denies wanting to actively end her life and denies having a suicide plan. Denies homicidal ideation. She states she just saw her ACT Team but withheld that she has been feeling depressed from them. She claims she has been sleeping "all day". Reports medication compliance, denies drug use. Denies manic and psychotic symptoms. Denies homicidal ideation. Denies appetite change, ate well in the ED.    Spoke with residence staff, Alex, who reported that patient told the staff she was vomiting so they sent her to the hospital. No mental health issues were reported at that time. She has otherwise been at baseline, no concerns expressed.     Spoke with Dao of ACT Team. Patient last received her injection on 4/3, and was seen twice this week on 4/8 and 4/10, it is documented that she was inappropriately dressed but otherwise calm, no acute symptoms reported or observed. Patient sees the ACT Team on Mondays and Wednesdays.
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
## 2019-04-12 NOTE — ED ADULT NURSE NOTE - OBJECTIVE STATEMENT
Pt rec;d in 14, c/o N/V, blood in vomit, and abd pain x today. Pt reports SI, reports history of SI, denies HI, states she always feels SI.

## 2019-04-12 NOTE — ED PROVIDER NOTE - CLINICAL SUMMARY MEDICAL DECISION MAKING FREE TEXT BOX
38F w/ bipolar, + SI, on constant obs, will consult  after labs, will check for anemia, will check lithium levels, low suspicion for acute abdomen as patient appears comfortable w/o distension

## 2019-04-12 NOTE — ED BEHAVIORAL HEALTH ASSESSMENT NOTE - DESCRIPTION
GERD, Asthma currently lives in [facility], building [address] [facility] [address] MIGUEL ANGEL reports no contact with family, currently unemployed. calm and cooperative  Vital Signs Last 24 Hrs  T(C): 36.9 (12 Apr 2019 12:18), Max: 37 (12 Apr 2019 11:06)  T(F): 98.5 (12 Apr 2019 12:18), Max: 98.6 (12 Apr 2019 11:06)  HR: 82 (12 Apr 2019 12:18) (82 - 88)  BP: 149/88 (12 Apr 2019 12:18) (131/68 - 149/88)  BP(mean): --  RR: 18 (12 Apr 2019 12:18) (18 - 18)  SpO2: 100% (12 Apr 2019 12:18) (100% - 100%)

## 2019-04-12 NOTE — ED BEHAVIORAL HEALTH NOTE - BEHAVIORAL HEALTH NOTE
Worker discussed transportation for patient with interdisciplinary team and attending Dr. el. Patient does not have Medicaid therefore she is not eligible for transportation services. Worker met with patient and also observed on property sheet that the patient has $28 dollars. Patient states she is able to pay for her own cab to take her back to residence. Worker will help patient to facilitate taxi upon discharge. Worker discussed transportation for patient with interdisciplinary team and attending Dr. el. Patient does not have Medicaid therefore she is not eligible for transportation services. Worker met with patient and also observed on property sheet that the patient has $28 dollars. Patient states she is able to pay for her own cab to take her back to residence. Worker will help patient to facilitate taxi upon discharge and called for cab through [employer].  Patient will be transported to [address] [facility]. Taxi states that ride will be $12 and the patient states she will pay for cab. PES assisted Ms. Castillo to the cab upon discharge.

## 2019-04-12 NOTE — ED PROVIDER NOTE - PROGRESS NOTE DETAILS
Juan Ramon PGY-4: no bleeding (occult or recurrent vomiting). VSS. Cleared for . Now cleared by  as well, will emily.

## 2019-04-12 NOTE — ED BEHAVIORAL HEALTH ASSESSMENT NOTE - CURRENT MEDICATION
Per documentation from residence: Prolixin 5mg BID, Klonopin 1mg BID, Zyprexa 10mg QAM+20mg QHS, Lithium Carbonate 600mg BID    Prolixin Decanoate 25 mg IM q2week. last received 4/3/19 from ACT Team

## 2019-05-06 ENCOUNTER — EMERGENCY (EMERGENCY)
Facility: HOSPITAL | Age: 39
LOS: 1 days | Discharge: HOME | End: 2019-05-06
Attending: EMERGENCY MEDICINE | Admitting: EMERGENCY MEDICINE
Payer: MEDICAID

## 2019-05-06 VITALS
RESPIRATION RATE: 16 BRPM | HEART RATE: 90 BPM | OXYGEN SATURATION: 100 % | TEMPERATURE: 99 F | SYSTOLIC BLOOD PRESSURE: 142 MMHG | DIASTOLIC BLOOD PRESSURE: 79 MMHG

## 2019-05-06 PROCEDURE — 99284 EMERGENCY DEPT VISIT MOD MDM: CPT

## 2019-05-06 PROCEDURE — 90792 PSYCH DIAG EVAL W/MED SRVCS: CPT

## 2019-05-06 NOTE — ED BEHAVIORAL HEALTH ASSESSMENT NOTE - CURRENT MEDICATION
Per documentation from residence: Prolixin 5mg BID, Klonopin 1mg BID, Zyprexa 10mg QAM+20mg QHS, Lithium Carbonate 600mg BID    Prolixin Decanoate 25 mg IM q2week. Per documentation from residence: Prolixin 5mg BID, Klonopin 1mg BID, Zyprexa 10mg QAM+20mg QHS, Lithium Carbonate 600mg BID    Prolixin Decanoate 25 mg IM q2week. ACT team did not know when next due.

## 2019-05-06 NOTE — ED PROVIDER NOTE - MDM PATIENT STATEMENT FOR ADDL TREATMENT
Taxotere infusion completed  Cytoxan infusing at present  Eating lunch, good appetite  Will continue to monitor  Patient with one or more new problems requiring additional work-up/treatment.

## 2019-05-06 NOTE — ED BEHAVIORAL HEALTH ASSESSMENT NOTE - SUMMARY
Ms. Castillo is a single, 38-year-old  female, non caregiver, unemployed, domiciled on [place] grounds building [address] [place] [address], with past psychiatric history of Schizoaffective Disorder, Borderline Personality Disorder, cannabis abuse, multiple inpatient psychiatric hospitalizations (>20), recently Low 4 12/12-12/24 2018, with frequent visits to the [facility] (well-known to staff) 3 prior suicide attempts (last in 2012 via OD), recurrent suicidal gestures and suicidal ideation, history of property destruction when upset, past legal issues, no access to weapons. PMH GERD & asthma. BIB EMS called by staff after making a suicidal threat to harm herself with a pen.    Patient is well known to this writer. Patient reports an extremely low-lethality threat to harm self without any self injury. Patient reports being depressed for two weeks despite having seen her ACT Team twice this week. She is adequately groomed in the ED. Patient is otherwise at baseline. Is not manic or psychotic. She is known to dislike her residence and seek admission through the ED. She is often provocative and dramatic which is most consistent with behavioral dysregulation and personality disorder pathology. There is no indication that patient is decompensating from her baseline and she is appropriate for discharge with outpatient ACT team follow up as scheduled. Ms. Castillo is a single, 38-year-old  female, non caregiver, unemployed, domiciled on [place] grounds building [address] [place] [address], with past psychiatric history of Schizoaffective Disorder, Borderline Personality Disorder, cannabis abuse, multiple inpatient psychiatric hospitalizations (>20), recently Low 4 12/12-12/24 2018, with frequent visits to the [facility] (well-known to staff) 3 prior suicide attempts (last in 2012 via OD), recurrent suicidal gestures and suicidal ideation, history of property destruction when upset, past legal issues, no access to weapons. PMH GERD & asthma. BIB EMS called by staff after making a suicidal threat to harm herself with a pen.    Patient is well known to this writer. Patient reports an extremely low-lethality threat to harm self without any self injury. She is adequately groomed in the ED. Patient is otherwise at baseline. Is not manic or psychotic. She is known to dislike her residence and seek admission through the ED. She is often provocative and dramatic which is most consistent with behavioral dysregulation and personality disorder pathology. There is no indication that patient is decompensating from her baseline, no evidence of an acute mood or psychotic disorder on exam, and she is appropriate for discharge with outpatient ACT team follow up as scheduled.
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
## 2019-05-06 NOTE — ED BEHAVIORAL HEALTH ASSESSMENT NOTE - SUICIDE PROTECTIVE FACTORS
Fear of death or dying due to pain/suffering/Positive therapeutic relationships/Future oriented/Identifies reasons for living/Supportive social network or family/Other
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
## 2019-05-06 NOTE — ED ADULT NURSE REASSESSMENT NOTE - NS ED NURSE REASSESS COMMENT FT1
Patient in improved and stable condition, discharged as per MD Ramirez order, discharge instructions given, pt verbalized understanding and left ER a&ox3 with metro card.

## 2019-05-06 NOTE — ED BEHAVIORAL HEALTH ASSESSMENT NOTE - PROFESSIONAL COLLATERAL PHONE
residence [phone] #6, then option 421 for  // [employer] ACT Team [phone] or [phone] // [phone]

## 2019-05-06 NOTE — ED BEHAVIORAL HEALTH ASSESSMENT NOTE - AXIS IV
Problems with primary support/Problem related to social environment/Housing problems/Occupational problems

## 2019-05-06 NOTE — ED ADULT NURSE NOTE - PAIN: PRESENCE, MLM
denies pain/discomfort
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

## 2019-05-06 NOTE — ED BEHAVIORAL HEALTH ASSESSMENT NOTE - DESCRIPTION
calm and cooperative  Vital Signs Last 24 Hrs  T(C): 37.2 (06 May 2019 14:27), Max: 37.2 (06 May 2019 14:27)  T(F): 99 (06 May 2019 14:27), Max: 99 (06 May 2019 14:27)  HR: 90 (06 May 2019 14:27) (90 - 90)  BP: 142/79 (06 May 2019 14:27) (142/79 - 142/79)  BP(mean): --  RR: 16 (06 May 2019 14:27) (16 - 16)  SpO2: 100% (06 May 2019 14:27) (100% - 100%) GERD, Asthma currently lives in [facility], building [address] [facility] [address] MIGUEL ANGEL reports no contact with family, currently unemployed.

## 2019-05-06 NOTE — ED BEHAVIORAL HEALTH ASSESSMENT NOTE - MEDICATIONS (PRESCRIPTIONS, DIRECTIONS)
prolixin dec due from ACT team. Continue all oral meds as prescribed. prolixin dec as ordered from ACT team (they could not confirm date). Continue all oral meds as prescribed.
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
## 2019-05-06 NOTE — ED PROVIDER NOTE - OBJECTIVE STATEMENT
38 year old female came to the ED because she is depressed and thinking about killing herself. No overdose, no chest pain, no back pain, no nausea, no vomiting.

## 2019-05-18 ENCOUNTER — EMERGENCY (EMERGENCY)
Facility: HOSPITAL | Age: 39
LOS: 1 days | Discharge: HOME | End: 2019-05-18
Attending: EMERGENCY MEDICINE | Admitting: EMERGENCY MEDICINE
Payer: MEDICAID

## 2019-05-18 VITALS
TEMPERATURE: 98 F | OXYGEN SATURATION: 99 % | DIASTOLIC BLOOD PRESSURE: 94 MMHG | SYSTOLIC BLOOD PRESSURE: 142 MMHG | RESPIRATION RATE: 17 BRPM | HEART RATE: 96 BPM

## 2019-05-18 PROCEDURE — 99284 EMERGENCY DEPT VISIT MOD MDM: CPT | Mod: GC,25

## 2019-05-18 NOTE — ED ADULT TRIAGE NOTE - CHIEF COMPLAINT QUOTE
Pt BIBEMS NSLIJ from [facility] [address]. c/o Bilateral Lower Abdominal Pain with Nausea, Vomiting after eating food from chinese Restaurant. Denies Diarrhea Dysuria

## 2019-05-19 VITALS
DIASTOLIC BLOOD PRESSURE: 86 MMHG | TEMPERATURE: 98 F | OXYGEN SATURATION: 98 % | RESPIRATION RATE: 16 BRPM | HEART RATE: 86 BPM | SYSTOLIC BLOOD PRESSURE: 148 MMHG

## 2019-05-19 LAB
ALBUMIN SERPL ELPH-MCNC: 3.6 G/DL — SIGNIFICANT CHANGE UP (ref 3.3–5)
ALP SERPL-CCNC: 114 U/L — SIGNIFICANT CHANGE UP (ref 40–120)
ALT FLD-CCNC: 29 U/L — SIGNIFICANT CHANGE UP (ref 4–33)
ANION GAP SERPL CALC-SCNC: 10 MMO/L — SIGNIFICANT CHANGE UP (ref 7–14)
APPEARANCE UR: CLEAR — SIGNIFICANT CHANGE UP
AST SERPL-CCNC: 21 U/L — SIGNIFICANT CHANGE UP (ref 4–32)
BACTERIA # UR AUTO: NEGATIVE — SIGNIFICANT CHANGE UP
BASE EXCESS BLDV CALC-SCNC: -1.4 MMOL/L — SIGNIFICANT CHANGE UP
BASOPHILS # BLD AUTO: 0.03 K/UL — SIGNIFICANT CHANGE UP (ref 0–0.2)
BASOPHILS NFR BLD AUTO: 0.2 % — SIGNIFICANT CHANGE UP (ref 0–2)
BILIRUB SERPL-MCNC: < 0.2 MG/DL — LOW (ref 0.2–1.2)
BILIRUB UR-MCNC: NEGATIVE — SIGNIFICANT CHANGE UP
BLOOD GAS VENOUS - CREATININE: 0.66 MG/DL — SIGNIFICANT CHANGE UP (ref 0.5–1.3)
BLOOD GAS VENOUS - FIO2: 21 — SIGNIFICANT CHANGE UP
BLOOD UR QL VISUAL: NEGATIVE — SIGNIFICANT CHANGE UP
BUN SERPL-MCNC: 10 MG/DL — SIGNIFICANT CHANGE UP (ref 7–23)
CALCIUM SERPL-MCNC: 9.2 MG/DL — SIGNIFICANT CHANGE UP (ref 8.4–10.5)
CHLORIDE BLDV-SCNC: 112 MMOL/L — HIGH (ref 96–108)
CHLORIDE SERPL-SCNC: 110 MMOL/L — HIGH (ref 98–107)
CO2 SERPL-SCNC: 22 MMOL/L — SIGNIFICANT CHANGE UP (ref 22–31)
COLOR SPEC: SIGNIFICANT CHANGE UP
CREAT SERPL-MCNC: 0.71 MG/DL — SIGNIFICANT CHANGE UP (ref 0.5–1.3)
EOSINOPHIL # BLD AUTO: 0.66 K/UL — HIGH (ref 0–0.5)
EOSINOPHIL NFR BLD AUTO: 5.5 % — SIGNIFICANT CHANGE UP (ref 0–6)
GAS PNL BLDV: 141 MMOL/L — SIGNIFICANT CHANGE UP (ref 136–146)
GLUCOSE BLDV-MCNC: 119 MG/DL — HIGH (ref 70–99)
GLUCOSE SERPL-MCNC: 121 MG/DL — HIGH (ref 70–99)
GLUCOSE UR-MCNC: NEGATIVE — SIGNIFICANT CHANGE UP
HCO3 BLDV-SCNC: 23 MMOL/L — SIGNIFICANT CHANGE UP (ref 20–27)
HCT VFR BLD CALC: 37.1 % — SIGNIFICANT CHANGE UP (ref 34.5–45)
HCT VFR BLDV CALC: 34.8 % — SIGNIFICANT CHANGE UP (ref 34.5–45)
HGB BLD-MCNC: 11.3 G/DL — LOW (ref 11.5–15.5)
HGB BLDV-MCNC: 11.3 G/DL — LOW (ref 11.5–15.5)
HYALINE CASTS # UR AUTO: NEGATIVE — SIGNIFICANT CHANGE UP
IMM GRANULOCYTES NFR BLD AUTO: 0.7 % — SIGNIFICANT CHANGE UP (ref 0–1.5)
KETONES UR-MCNC: NEGATIVE — SIGNIFICANT CHANGE UP
LACTATE BLDV-MCNC: 1.2 MMOL/L — SIGNIFICANT CHANGE UP (ref 0.5–2)
LEUKOCYTE ESTERASE UR-ACNC: SIGNIFICANT CHANGE UP
LIDOCAIN IGE QN: 20.3 U/L — SIGNIFICANT CHANGE UP (ref 7–60)
LITHIUM SERPL-MCNC: 0.65 MMOL/L — SIGNIFICANT CHANGE UP (ref 0.6–1.2)
LYMPHOCYTES # BLD AUTO: 16.8 % — SIGNIFICANT CHANGE UP (ref 13–44)
LYMPHOCYTES # BLD AUTO: 2.02 K/UL — SIGNIFICANT CHANGE UP (ref 1–3.3)
MCHC RBC-ENTMCNC: 28 PG — SIGNIFICANT CHANGE UP (ref 27–34)
MCHC RBC-ENTMCNC: 30.5 % — LOW (ref 32–36)
MCV RBC AUTO: 92.1 FL — SIGNIFICANT CHANGE UP (ref 80–100)
MONOCYTES # BLD AUTO: 0.76 K/UL — SIGNIFICANT CHANGE UP (ref 0–0.9)
MONOCYTES NFR BLD AUTO: 6.3 % — SIGNIFICANT CHANGE UP (ref 2–14)
NEUTROPHILS # BLD AUTO: 8.47 K/UL — HIGH (ref 1.8–7.4)
NEUTROPHILS NFR BLD AUTO: 70.5 % — SIGNIFICANT CHANGE UP (ref 43–77)
NITRITE UR-MCNC: NEGATIVE — SIGNIFICANT CHANGE UP
NRBC # FLD: 0 K/UL — SIGNIFICANT CHANGE UP (ref 0–0)
PCO2 BLDV: 39 MMHG — LOW (ref 41–51)
PH BLDV: 7.38 PH — SIGNIFICANT CHANGE UP (ref 7.32–7.43)
PH UR: 6.5 — SIGNIFICANT CHANGE UP (ref 5–8)
PLATELET # BLD AUTO: 267 K/UL — SIGNIFICANT CHANGE UP (ref 150–400)
PMV BLD: 9.3 FL — SIGNIFICANT CHANGE UP (ref 7–13)
PO2 BLDV: 60 MMHG — HIGH (ref 35–40)
POTASSIUM BLDV-SCNC: 3.7 MMOL/L — SIGNIFICANT CHANGE UP (ref 3.4–4.5)
POTASSIUM SERPL-MCNC: 3.9 MMOL/L — SIGNIFICANT CHANGE UP (ref 3.5–5.3)
POTASSIUM SERPL-SCNC: 3.9 MMOL/L — SIGNIFICANT CHANGE UP (ref 3.5–5.3)
PROT SERPL-MCNC: 7.4 G/DL — SIGNIFICANT CHANGE UP (ref 6–8.3)
PROT UR-MCNC: 20 — SIGNIFICANT CHANGE UP
RBC # BLD: 4.03 M/UL — SIGNIFICANT CHANGE UP (ref 3.8–5.2)
RBC # FLD: 14.6 % — HIGH (ref 10.3–14.5)
RBC CASTS # UR COMP ASSIST: SIGNIFICANT CHANGE UP (ref 0–?)
SAO2 % BLDV: 90.1 % — HIGH (ref 60–85)
SODIUM SERPL-SCNC: 142 MMOL/L — SIGNIFICANT CHANGE UP (ref 135–145)
SP GR SPEC: 1.01 — SIGNIFICANT CHANGE UP (ref 1–1.04)
SQUAMOUS # UR AUTO: SIGNIFICANT CHANGE UP
UROBILINOGEN FLD QL: NORMAL — SIGNIFICANT CHANGE UP
WBC # BLD: 12.02 K/UL — HIGH (ref 3.8–10.5)
WBC # FLD AUTO: 12.02 K/UL — HIGH (ref 3.8–10.5)
WBC UR QL: SIGNIFICANT CHANGE UP (ref 0–?)

## 2019-05-19 RX ORDER — ONDANSETRON 8 MG/1
4 TABLET, FILM COATED ORAL ONCE
Refills: 0 | Status: COMPLETED | OUTPATIENT
Start: 2019-05-19 | End: 2019-05-19

## 2019-05-19 RX ORDER — METOCLOPRAMIDE HCL 10 MG
10 TABLET ORAL ONCE
Refills: 0 | Status: COMPLETED | OUTPATIENT
Start: 2019-05-19 | End: 2019-05-19

## 2019-05-19 RX ORDER — SODIUM CHLORIDE 9 MG/ML
1000 INJECTION INTRAMUSCULAR; INTRAVENOUS; SUBCUTANEOUS ONCE
Refills: 0 | Status: COMPLETED | OUTPATIENT
Start: 2019-05-19 | End: 2019-05-19

## 2019-05-19 RX ORDER — FAMOTIDINE 10 MG/ML
20 INJECTION INTRAVENOUS ONCE
Refills: 0 | Status: COMPLETED | OUTPATIENT
Start: 2019-05-19 | End: 2019-05-19

## 2019-05-19 RX ADMIN — SODIUM CHLORIDE 1000 MILLILITER(S): 9 INJECTION INTRAMUSCULAR; INTRAVENOUS; SUBCUTANEOUS at 02:07

## 2019-05-19 RX ADMIN — Medication 10 MILLIGRAM(S): at 02:55

## 2019-05-19 RX ADMIN — Medication 30 MILLILITER(S): at 02:12

## 2019-05-19 RX ADMIN — FAMOTIDINE 20 MILLIGRAM(S): 10 INJECTION INTRAVENOUS at 02:10

## 2019-05-19 RX ADMIN — ONDANSETRON 4 MILLIGRAM(S): 8 TABLET, FILM COATED ORAL at 02:08

## 2019-05-19 NOTE — ED ADULT NURSE REASSESSMENT NOTE - NS ED NURSE REASSESS COMMENT FT1
Pt tolerating PO intake. Pt appears comfortable and reports decrease in abdominal pain. Pending disposition.

## 2019-05-19 NOTE — ED PROVIDER NOTE - NSFOLLOWUPINSTRUCTIONS_ED_ALL_ED_FT
You were seen at [facility] ER for abdominal pain and vomiting after eating a Dana cheese steak. You had a normal exam and normal lab work except a slightly elevated white blood cell count. You likely had an irritation of your stomach and you tolerated oral intake.     You are to continue taking pepcid 20mg twice a day.     You are to continue to follow-up with your primary doctor and psychiatrist int he next 1 week.    You are to return to the hospital if you have persistent vomiting and inability to tolerate oral intake, persistent fevers, worsening abdominal pain, or any concerning symptoms.

## 2019-05-19 NOTE — ED ADULT NURSE NOTE - CHIEF COMPLAINT QUOTE
Pt BIBEMS NSLIJ from [place] [address]. c/o Bilateral Lower Abdominal Pain with Nausea, Vomiting after eating food from chinese Restaurant. Denies Diarrhea Dysuria

## 2019-05-19 NOTE — ED ADULT NURSE NOTE - OBJECTIVE STATEMENT
Pt received to room 24 a/o x 3 c/o lower abd pain, nausea 3 episodes  of vomiting with blood streaks since this afternoon. Pt states she at a greasy rose cheese steak and symptoms started after. Pt received to room 24 a/o x 3 c/o lower abd pain, nausea 3 episodes  of vomiting with blood streaks since this afternoon. Pt states she at a greasy rose cheese steak and symptoms started after. pt LBM was 5 hrs ago and normal for her, no blood or dark color stool. Respirations even and unlabored. Lung sounds clear with equal chest rise bilaterally. ABD is soft, tender lower abd, non distended with normal active bowel sounds. No complaints of chest pain, headache, dizziness, vomiting  SOB, fever, chills verbalized.

## 2019-05-19 NOTE — ED PROVIDER NOTE - PROGRESS NOTE DETAILS
patient with leukocytosis likely stress related due to gastritis. tolerated oral intake. Will discharge back to [facility].   -Wiliam Baker PGY4 EMIM Spectra#[id number]

## 2019-05-19 NOTE — ED PROVIDER NOTE - ATTENDING CONTRIBUTION TO CARE
39 y/o F with h/o GERD, borderline personality, bipolar here with abdominal pain and n/v.  Pt reports having a rose cheesesteak tonight for dinner, noting it was very greasy and her stomach became upset after eating it.  She endorses nausea and one episode of nbnb vomiting.  No fever, chills, urinary sxs, diarrhea, constipation.  No past surgical hx.  Pt did not take any other medications or food prior to arrival.  Well appearing, obese sitting comfortably in stretcher, awake and alert, nontoxic.  AF/VSS.  Lungs cta bl.  Cards nl S1/S2, RRR, no MRG.  Abd soft obese ntnd no ruq tenderness, neg nicholas's sign.  No CVA tenderness.  Sxs likely related with known gerd vs gastritis and recent greast food intake.  Abd is benign on exam, no concern for acute surgical pathology at this time.  Will obtain labs, gi cocktail and antiemetics, reassess.

## 2019-05-19 NOTE — ED PROVIDER NOTE - CLINICAL SUMMARY MEDICAL DECISION MAKING FREE TEXT BOX
Pt is a 37 y/o F w/ a PMHx of GERD, Gallstone, Asthma, Borderline personality d/o, bipolar do who p/w vomiting and abdominal pain Pt is a 39 y/o F w/ a PMHx of GERD, Gallstone, Asthma, Borderline personality d/o, bipolar do who p/w vomiting and abdominal pain likely due to gastritis with nontender abdominal exam. Likely gastritis, low suspicion for obstruction of diverticulitis, and nephrolithiasis given history and exam and will get CBC, CMP, Lipase, VBG w/ lact, give IVFs, zofran, Lithium level, Pepcid, Maalox, no need for imaging at this time. Likely DC back to [facility].

## 2019-05-19 NOTE — ED PROVIDER NOTE - OBJECTIVE STATEMENT
Pt is a 39 y/o F w/ a PMHx of GERD, Gallstone, Asthma, Borderline personality d/o, bipolar do who p/w vomiting x 3 starting at 6pm with no blood after eating a Dana cheese steak sandwich. No other sick contacts. Pt also complains of lower abdominal pain that started at the same time, last BM early today, no abdominal surgeries, no fevers, normal BM today, no blood in BM, no dysuria or hematuria.

## 2019-05-23 ENCOUNTER — EMERGENCY (EMERGENCY)
Facility: HOSPITAL | Age: 39
LOS: 1 days | Discharge: HOME | End: 2019-05-23
Attending: EMERGENCY MEDICINE | Admitting: EMERGENCY MEDICINE
Payer: MEDICAID

## 2019-05-23 VITALS
TEMPERATURE: 99 F | HEART RATE: 92 BPM | DIASTOLIC BLOOD PRESSURE: 86 MMHG | SYSTOLIC BLOOD PRESSURE: 137 MMHG | OXYGEN SATURATION: 100 % | RESPIRATION RATE: 18 BRPM

## 2019-05-23 PROCEDURE — 99283 EMERGENCY DEPT VISIT LOW MDM: CPT

## 2019-05-23 RX ORDER — DIPHENHYDRAMINE HCL 50 MG
50 CAPSULE ORAL ONCE
Refills: 0 | Status: COMPLETED | OUTPATIENT
Start: 2019-05-23 | End: 2019-05-23

## 2019-05-23 RX ORDER — HALOPERIDOL DECANOATE 100 MG/ML
5 INJECTION INTRAMUSCULAR ONCE
Refills: 0 | Status: COMPLETED | OUTPATIENT
Start: 2019-05-23 | End: 2019-05-23

## 2019-05-23 RX ADMIN — HALOPERIDOL DECANOATE 5 MILLIGRAM(S): 100 INJECTION INTRAMUSCULAR at 21:49

## 2019-05-23 RX ADMIN — Medication 50 MILLIGRAM(S): at 22:00

## 2019-05-23 RX ADMIN — Medication 2 MILLIGRAM(S): at 21:49

## 2019-05-23 NOTE — ED PROVIDER NOTE - OBJECTIVE STATEMENT
21:40 [facility] att: 38F h/o bipolar disorder, borderline personality BIBEMS manic. Per ems patient from [facility], out wandering, drinking water from puddles on ground. Upon seeing examiner patient exclaimed, "Doctor stop chewing! Why are you chewing on jarocho? Are you a child?" Denies si/hi/ah. Escorted to . To be seen by Psych attending.
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
## 2019-05-23 NOTE — ED PROVIDER NOTE - PROGRESS NOTE DETAILS
Per FORD Watkins, pt has been evaluated and cleared for discharge by psychiatrist Dr. Gamez (though note not yet entered). Rec'd signout on this pt from Dr. Petersen, pt is medically cleared as well. Altaf gonzalez   -Dr. Shultz

## 2019-05-23 NOTE — ED PROVIDER NOTE - NSFOLLOWUPINSTRUCTIONS_ED_ALL_ED_FT
Please follow up with your psychiatrist as soon as possible and follow all other instructions provided to you by our emergency department psychiatrist.     Return to the emergency department if you develop any symptoms that are concerning to you.
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
## 2019-05-24 PROCEDURE — 90792 PSYCH DIAG EVAL W/MED SRVCS: CPT
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
## 2019-05-24 NOTE — ED BEHAVIORAL HEALTH ASSESSMENT NOTE - PROFESSIONAL COLLATERAL PHONE
residence [phone] #6, then option 421 for  // [employer] ACT Team [phone] or [phone] // [phone]

## 2019-05-24 NOTE — ED BEHAVIORAL HEALTH ASSESSMENT NOTE - SUICIDE PROTECTIVE FACTORS
Supportive social network or family/Fear of death or dying due to pain/suffering/Identifies reasons for living/Future oriented/Positive therapeutic relationships/Other
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
## 2019-05-24 NOTE — ED BEHAVIORAL HEALTH ASSESSMENT NOTE - DESCRIPTION
calm and cooperative  Vital Signs Last 24 Hrs  T(C): 37.2 (06 May 2019 14:27), Max: 37.2 (06 May 2019 14:27)  T(F): 99 (06 May 2019 14:27), Max: 99 (06 May 2019 14:27)  HR: 90 (06 May 2019 14:27) (90 - 90)  BP: 142/79 (06 May 2019 14:27) (142/79 - 142/79)  BP(mean): --  RR: 16 (06 May 2019 14:27) (16 - 16)  SpO2: 100% (06 May 2019 14:27) (100% - 100%) GERD, Asthma currently lives in [facility], [address] [facility] [address] MIGUEL ANGEL reports no contact with family, currently unemployed.
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
## 2019-05-24 NOTE — ED BEHAVIORAL HEALTH ASSESSMENT NOTE - CURRENT MEDICATION
Per documentation from residence: Prolixin 5mg BID, Klonopin 1mg BID, Zyprexa 10mg QAM+20mg QHS, Lithium Carbonate 600mg BID    Prolixin Decanoate 25 mg IM q2week. ACT team did not know when next due.

## 2019-05-24 NOTE — ED BEHAVIORAL HEALTH ASSESSMENT NOTE - AXIS IV
Problems with primary support/Occupational problems/Housing problems/Problem related to social environment
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
## 2019-05-24 NOTE — ED BEHAVIORAL HEALTH ASSESSMENT NOTE - MEDICATIONS (PRESCRIPTIONS, DIRECTIONS)
prolixin dec as ordered from ACT team (they could not confirm date). Continue all oral meds as prescribed.

## 2019-06-24 NOTE — ED PROVIDER NOTE - ATTESTATION, MLM
Detail Level: Zone Detail Level: Simple I have reviewed and confirmed nurses' notes for patient's medications, allergies, medical history, and surgical history.

## 2019-07-14 ENCOUNTER — EMERGENCY (EMERGENCY)
Facility: HOSPITAL | Age: 39
LOS: 1 days | Discharge: HOME | End: 2019-07-14
Attending: EMERGENCY MEDICINE | Admitting: EMERGENCY MEDICINE
Payer: COMMERCIAL

## 2019-07-14 VITALS
TEMPERATURE: 98 F | HEART RATE: 82 BPM | DIASTOLIC BLOOD PRESSURE: 69 MMHG | SYSTOLIC BLOOD PRESSURE: 115 MMHG | OXYGEN SATURATION: 99 % | RESPIRATION RATE: 16 BRPM

## 2019-07-14 PROCEDURE — 73130 X-RAY EXAM OF HAND: CPT | Mod: 26,RT

## 2019-07-14 PROCEDURE — 29125 APPL SHORT ARM SPLINT STATIC: CPT

## 2019-07-14 PROCEDURE — 99053 MED SERV 10PM-8AM 24 HR FAC: CPT

## 2019-07-14 PROCEDURE — 99283 EMERGENCY DEPT VISIT LOW MDM: CPT | Mod: 25,GC

## 2019-07-14 NOTE — ED PROVIDER NOTE - PHYSICAL EXAMINATION
Gen: AAOx3, non-toxic  Head: NCAT  HEENT: EOMI, oral mucosa moist, normal conjunctiva  Lung: CTAB, no respiratory distress, no wheezes/rhonchi/rales B/L, speaking in full sentences  CV: RRR, no murmurs, rubs or gallops  Abd: soft, NTND, no guarding  MSK: no visible deformities, mild swelling over 2nd and 3rd metacarpal of L hand  Neuro: No focal sensory or motor deficits, +5/5  strength bilaterally  Skin: Warm, well perfused, no rash  Psych: normal affect.   ~Miguelangel Patrick M.D. Resident Gen: AAOx3, non-toxic  Head: NCAT  HEENT: EOMI, oral mucosa moist, normal conjunctiva  Lung: CTAB, no respiratory distress, no wheezes/rhonchi/rales B/L, speaking in full sentences  CV: RRR, no murmurs, rubs or gallops  Abd: soft, NTND, no guarding  MSK: no visible deformities, mild swelling over 2nd and 3rd metacarpal of R hand  Neuro: No focal sensory or motor deficits, +5/5  strength bilaterally  Skin: Warm, well perfused, no rash  Psych: normal affect.   ~Miguelangel Patrick M.D. Resident

## 2019-07-14 NOTE — ED PROVIDER NOTE - NS ED ROS FT
GENERAL: No fever or chills, EYES: no change in vision, HEENT: no trouble swallowing or speaking, CARDIAC: no chest pain, PULMONARY: no cough or SOB, GI: no abdominal pain, no nausea, no vomiting, no diarrhea or constipation, : No changes in urination, SKIN: no rashes, NEURO: no headache,  MSK: L hand pain ~Miguelangel Patrick M.D. Resident GENERAL: No fever or chills, EYES: no change in vision, HEENT: no trouble swallowing or speaking, CARDIAC: no chest pain, PULMONARY: no cough or SOB, GI: no abdominal pain, no nausea, no vomiting, no diarrhea or constipation, : No changes in urination, SKIN: no rashes, NEURO: no headache,  MSK: R hand pain ~Miguelangel Patrick M.D. Resident

## 2019-07-14 NOTE — ED PROVIDER NOTE - OBJECTIVE STATEMENT
39 yo F PMHx bipolar, asthma, GERD p/w L hand pain. Pt punched a wall around 9 pm because she was angry. She has some swelling and pain to the hand. She states she was angry that people were being allowed. Denies SI/HI. 39 yo F PMHx bipolar, asthma, GERD p/w R hand pain. Pt punched a wall around 9 pm because she was angry. She has some swelling and pain to the hand. She states she was angry that people were being allowed. Denies SI/HI.

## 2019-07-14 NOTE — ED PROVIDER NOTE - NSFOLLOWUPCLINICS_GEN_ALL_ED_FT
[facility] Orthopedic Surgery  Orthopedic Surgery  [address], 3rd & 4th [place] [address]  Phone: [phone]  Fax:   Follow Up Time:
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
## 2019-07-14 NOTE — ED PROVIDER NOTE - CLINICAL SUMMARY MEDICAL DECISION MAKING FREE TEXT BOX
37 yo F PMHx bipolar, asthma, GERD p/w L hand pain s/p punching wall, will obtain Xray 39 yo F PMHx bipolar, asthma, GERD p/w R hand pain s/p punching wall, will obtain Xray
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
## 2019-07-14 NOTE — ED PROVIDER NOTE - ATTENDING CONTRIBUTION TO CARE
38F hx bipolar now p/w R hand pain after punching wall b/c there was loud noises on the other side.  No SI/HI/  pt reports pain worse at index finger MCP & 2/3 MCs.  No thumb pain.  No change in sensation.  + swellung.    VS: afebrile, others reassuring  Gen: Well appearing in NAD  Head: NC/AT  HEENT: moist mucous membranes   Neck: trachea midline  Resp:  No distress  Ext: R upper extremity exam: There is no gross visible bony deformity. + tender soft tissue swelling on 2/3 MC & MCPs.  Full active & passive ROM & strength testing limited by pain w/ROM 2/3 MCP. 5/5 sensation. 2+ distal pulses.  Brisk cap refill.  Skin intact. No snuff box tenderness or pain w/axial load on thumb.   Neuro:  A&Ox3  Skin:  Warm and dry as visualized  Psych:  Normal affect and mood    MDM: obvious traumatic injusr.  skin intaxct.  Will XR to eval fx, splint. 38F hx bipolar now p/w R hand pain after punching wall b/c there was loud noises on the other side.  No SI/HI/  pt reports pain worse at index finger MCP & 2/3 MCs.  No thumb pain.  No change in sensation.  + swellung.    VS: afebrile, others reassuring  Gen: Well appearing in NAD  Head: NC/AT  HEENT: moist mucous membranes   Neck: trachea midline  Resp:  No distress  Ext: R upper extremity exam: There is no gross visible bony deformity. + tender soft tissue swelling on dorsal 2>3 MC & MCPs.  Full active & passive ROM & strength testing limited by pain w/ROM 2>3 MCP. 5/5 sensation. 2+ distal pulses.  Brisk cap refill.  Skin intact. No snuff box tenderness or pain w/axial load on thumb.   Neuro:  A&Ox3  Skin:  Warm and dry as visualized  Psych:  Normal affect and mood    MDM: obvious traumatic injusr.  skin intaxct.  Will XR to eval fx, splint.

## 2019-07-20 ENCOUNTER — EMERGENCY (EMERGENCY)
Facility: HOSPITAL | Age: 39
LOS: 1 days | Discharge: HOME | End: 2019-07-20
Attending: EMERGENCY MEDICINE | Admitting: EMERGENCY MEDICINE
Payer: COMMERCIAL

## 2019-07-20 VITALS
RESPIRATION RATE: 18 BRPM | SYSTOLIC BLOOD PRESSURE: 129 MMHG | DIASTOLIC BLOOD PRESSURE: 68 MMHG | OXYGEN SATURATION: 100 % | TEMPERATURE: 98 F | HEART RATE: 94 BPM

## 2019-07-20 PROCEDURE — 99282 EMERGENCY DEPT VISIT SF MDM: CPT

## 2019-07-20 NOTE — ED PROVIDER NOTE - CARE PLAN
Principal Discharge DX:	Asthma  Assessment and plan of treatment:	You were seen today for your asthma exacerbation.  Continue to use albuterol as needed for wheezing.  Follow up with your primary care physician in 2-3 days for re-evaluation.  RETURN TO THE EMERGENCY DEPARTMENT IMMEDIATELY IF YOU FEEL SEVERE SHORTNESS OF BREATH OR FOR ANY OTHER CONCERN.

## 2019-07-20 NOTE — ED PROVIDER NOTE - OBJECTIVE STATEMENT
40 y/o female with history of bipolar disorder and asthma. Brought in by EMS from [facility] for asthma exacerbation. Per EMS patient had clear lungs on root, was easily breathing, in no respiratory distress and did not receive any interventions. Asthma attack resolved by itself. No SOB. Patient is doing well now. 38 y/o female with history of bipolar disorder and asthma. Brought in by EMS from [facility] for asthma exacerbation. Per EMS patient had clear lungs en route, in no respiratory distress and did not receive any interventions. Pt states she felt mildly sob.  Feels back to baseline at this time.  States she thinks her asthma spontaneously resolved.

## 2019-07-20 NOTE — ED ADULT NURSE NOTE - OBJECTIVE STATEMENT
Received pt in  Received pt in  pt calm & cooperative denies si/hi/avh presently no c/o sob pt verbalizing her asthma symptoms has resolved, safety & comfort measures maintained eval on going.

## 2019-07-20 NOTE — ED PROVIDER NOTE - PLAN OF CARE
You were seen today for your asthma exacerbation.  Continue to use albuterol as needed for wheezing.  Follow up with your primary care physician in 2-3 days for re-evaluation.  RETURN TO THE EMERGENCY DEPARTMENT IMMEDIATELY IF YOU FEEL SEVERE SHORTNESS OF BREATH OR FOR ANY OTHER CONCERN.

## 2019-07-20 NOTE — ED ADULT NURSE REASSESSMENT NOTE - NS ED NURSE REASSESS COMMENT FT1
pt calm & cooperative denies si/hi/avh presently pt d/c by MD resources provided to pt upon discharge pt verbalizing understanding.

## 2019-07-20 NOTE — ED PROVIDER NOTE - RESPIRATORY, MLM
Easy work of breathing, speaks in full sentences, no SOB Easy work of breathing, speaks in full sentences, no SOB, no wheeze, ctabil

## 2019-07-20 NOTE — ED PROVIDER NOTE - CLINICAL SUMMARY MEDICAL DECISION MAKING FREE TEXT BOX
38 y/o male with history of bipolar disorder and asthma. Presenting after an asthma attack. Asthma exacerbation self resolved with no interventions at this time. Will d/c back to [place] with PCP follow up. 40 y/o male with history of bipolar disorder and asthma. Presenting after a mild asthma attack. Asthma exacerbation self resolved with no interventions at this time. Will d/c back to [place] with PCP follow up.

## 2019-07-22 ENCOUNTER — EMERGENCY (EMERGENCY)
Facility: HOSPITAL | Age: 39
LOS: 1 days | Discharge: HOME | End: 2019-07-22
Attending: EMERGENCY MEDICINE | Admitting: EMERGENCY MEDICINE
Payer: COMMERCIAL

## 2019-07-22 VITALS
DIASTOLIC BLOOD PRESSURE: 89 MMHG | HEART RATE: 80 BPM | OXYGEN SATURATION: 100 % | SYSTOLIC BLOOD PRESSURE: 141 MMHG | RESPIRATION RATE: 18 BRPM | TEMPERATURE: 98 F

## 2019-07-22 VITALS
RESPIRATION RATE: 18 BRPM | SYSTOLIC BLOOD PRESSURE: 121 MMHG | TEMPERATURE: 99 F | DIASTOLIC BLOOD PRESSURE: 77 MMHG | OXYGEN SATURATION: 97 % | HEART RATE: 117 BPM

## 2019-07-22 PROCEDURE — 90792 PSYCH DIAG EVAL W/MED SRVCS: CPT

## 2019-07-22 PROCEDURE — 99284 EMERGENCY DEPT VISIT MOD MDM: CPT

## 2019-07-22 RX ORDER — HALOPERIDOL DECANOATE 100 MG/ML
5 INJECTION INTRAMUSCULAR ONCE
Refills: 0 | Status: COMPLETED | OUTPATIENT
Start: 2019-07-22 | End: 2019-07-22

## 2019-07-22 RX ADMIN — HALOPERIDOL DECANOATE 5 MILLIGRAM(S): 100 INJECTION INTRAMUSCULAR at 12:59

## 2019-07-22 RX ADMIN — Medication 2 MILLIGRAM(S): at 12:59

## 2019-07-22 NOTE — ED BEHAVIORAL HEALTH NOTE - BEHAVIORAL HEALTH NOTE
C-SSRS Screener     1. Have you ever wished to be dead or wished you could go to sleep and not wake up?  [ X ]Yes, [  ]No, [  ]Unable to Assess  Details _____________________________     2. Have you actually had any thoughts of killing yourself?   [ X ]Yes, [  ]No, [  ]Unable to Assess  Details _____________________________     If answer is “No” for 1 and 2, stop here. If answer is “Yes” to 1 or 2, proceed to 3.     3. Have you been thinking about how you might kill yourself?  [  ]Yes, [ X ]No, [  ]Unable to Assess  Details _____________________________     4. Have you had these thoughts and had some intention of acting on them?  [  ]Yes, [ X ]No, [  ]Unable to Assess  Details _____________________________     5. Have you started to work out or worked out the details of how to kill yourself? Do you intend to carry out this plan?  [  ]Yes, [ X ]No, [  ]Unable to Assess  Details _____________________________     6. Have you ever done anything, started to do anything, or prepared to do anything to end your life? If so, was it in the past 3 months?  [  ]Yes, [ X ]No, [  ]Unable to Assess  Details _____________________________        Additional Suicide Risk Factors (select all that apply)  [  ]Access to lethal means including firearms  [  ]Family history of suicide  [ X ]Impulsivity  [ X ] Current or past mood disorder  [  ] Current or past psychotic disorder  [  ] Current or past PTSD  [  ] Current or past ADHD  [  ] Current or past TBI  [ X ] Current or past cluster B personality disorder or traits  [ X ] Current or past conduct problems  [  ] Recent onset of current or past psychiatric disorder  [  ] Family history of psychiatric diagnoses requiring hospitalization     Additional Activating Events (select all that apply)  [  ]Perceived burden on family or others  [  ]Current sexual or physical abuse  [  ]Substance intoxication or withdrawal  [ X ]Inadequate social supports  [  ]Hopeless about or dissatisfied with current provider or treatment     Additional Protective Factors (select all that apply)  [  ] Future plans  [  ] [employer] beliefs  [  ] Beloved pets

## 2019-07-22 NOTE — ED BEHAVIORAL HEALTH ASSESSMENT NOTE - OTHER PAST PSYCHIATRIC HISTORY (INCLUDE DETAILS REGARDING ONSET, COURSE OF ILLNESS, INPATIENT/OUTPATIENT TREATMENT)
lifetime inpatient admissions > 20. Numerous [facility] ED visits. Currently followed by ACT team. Most recently inpatient at [facility] 12/12-12/24 2018.  Multiple [facility] ED evals.  - 3 prior suicide attempts (last in 2012 via OD) as per records, recurrent suicidal gestures and suicidal ideation, history of property destruction (breaking TV screens while hospitalized) when upset / acting out   - long hx of sexually provocative, acting out behaviors (during last [facility] inpatient admission >2 years ago, patient had to be placed on CO 1:1 after trying to perform oral sex on a male patient on the dayroom, taken off CO then was going into male patient's room, overheard offering them sex and was placed back on CO)

## 2019-07-22 NOTE — ED PROVIDER NOTE - PHYSICAL EXAMINATION
GEN - agitated, speaking fast, crying; A+O x3   HEAD - NC/AT     EYES - EOMI, no conjunctival pallor, no scleral icterus  ENT -   mucous membranes  moist , no discharge      NECK - Neck supple  PULM - CTA b/l,  symmetric breath sounds  COR -  RRR, S1 S2, tachy  ABD - , ND, NT, soft, no guarding, no rebound, no masses    BACK - no CVA tenderness, nontender spine     EXTREMS - no edema, no deformity, warm and well perfused    SKIN - no rash or bruising      NEUROLOGIC - alert, sensation nl, motor 5/5 RUE/LUE/RLE/LLE  PSYCH - agitated, crying, ?SI, tangential

## 2019-07-22 NOTE — ED BEHAVIORAL HEALTH ASSESSMENT NOTE - HPI (INCLUDE ILLNESS QUALITY, SEVERITY, DURATION, TIMING, CONTEXT, MODIFYING FACTORS, ASSOCIATED SIGNS AND SYMPTOMS)
Ms. Castillo is a single, 39-year-old  female, non caregiver, unemployed, domiciled on [place] grounds building [address] [place] [address], with past psychiatric history of Schizoaffective Disorder, Borderline Personality Disorder, cannabis abuse, multiple inpatient psychiatric hospitalizations (>20), recently [facility] 12/12-12/24 2018, with frequent visits to the [facility] (well-known to staff) 3 prior suicide attempts (last in 2012 via OD), recurrent suicidal gestures and suicidal ideation, history of property destruction when upset, past legal issues, no access to weapons. PMH GERD & asthma. BIB EMS called by staff at residence for agitation.    Patient was yelling on arrival, poorly cooperative, provocative, given medication by EM attending.    Patient is well known to this writer. She is observed yelling "shock treatment" in front of peers, but stops when instructed to. She states she does not like her residence and asks if she will be admitted. She reports chronically low mood, but denies other symptoms of a depressive episode. Denies suicidal ideation and homicidal ideation, denies AVH, denies hopelessness, denies insomnia or appetite changes, denies access to weapons. Denies manic sx. Denies current substance abuse.    Spoke with Tiffanie from residence. She states patient has been 'agitated all weekend' due to not wanting to live there, but she cannot move because she is on AOT. Yesterday she was throwing pens. Today she reportedly defecated in a garbage pail, and when staff called 911 and told EMS she needed a psych eval, she reportedly cursed and spit at this staff. No physical aggression. Patient is partially adherent to oral medications. No imminent safety concerns expressed.    Spoke with ACT team. Patient was last seen 7/17 noted to be stable and happy about celebrating her birthday. Compliant with injection, last received 7/12/19. Patient is seen twice weekly on Mondays and Wednesdays. They plan to work with pt on DBT skills. No concerns expressed.

## 2019-07-22 NOTE — ED BEHAVIORAL HEALTH ASSESSMENT NOTE - AXIS IV
Housing problems/Problem related to social environment/Problems with primary support/Occupational problems

## 2019-07-22 NOTE — ED ADULT NURSE REASSESSMENT NOTE - NS ED NURSE REASSESS COMMENT FT1
Patient medically cleared and in improved condition, discharged as per MD order, discharge instructions given, pt verbalized understanding and left ER a&ox3 with metro card.

## 2019-07-22 NOTE — ED PROVIDER NOTE - OBJECTIVE STATEMENT
39F with pmh of bipolar disorder, borderline personality disorder, asthma p/w agitation from [place]. Per EMS and police pt with distruptive behavior. To me pt denies drug use or recent illness. Says that is compliant with her meds but not sure which meds she actually takes. Limited hx 2/2 agitation.

## 2019-07-22 NOTE — ED ADULT NURSE REASSESSMENT NOTE - NS ED NURSE REASSESS COMMENT FT1
Patient presents anxious and hyper verbal, medication given as ordered, needs met, pt currently in [facility], awaiting further MD evaluation.

## 2019-07-22 NOTE — ED BEHAVIORAL HEALTH ASSESSMENT NOTE - RISK ASSESSMENT
Patient has high chronic risk given chronic impulsivity, past SA, past aggression. Risk does not currently appear to be acutely elevated from baseline. Pt is low imminent risk- not currently acutely psychotic or depressed, no si/hi/sib/intent/plan, eating/sleeping well, no recent violence, no substance use, future oriented, able to safety plan, is on an PALACIOS and is calm and cooperative throughout evaluation. Inpatient admission Is unlikely to modify these chronic risks, pt is not requiring inpatient psychiatric admission at this time as she is not an imminent danger to self or others. Current presentation in keeping with personality pathology and chronic poor impulse control.  See  note for CSSR.

## 2019-07-22 NOTE — ED BEHAVIORAL HEALTH ASSESSMENT NOTE - SUMMARY
Ms. Castillo is a single, 39-year-old  female, non caregiver, unemployed, domiciled on [place] grounds building [address] [place] [address], with past psychiatric history of Schizoaffective Disorder, Borderline Personality Disorder, cannabis abuse, multiple inpatient psychiatric hospitalizations (>20), recently [facility] 12/12-12/24 2018, with frequent visits to the [facility] (well-known to staff) 3 prior suicide attempts (last in 2012 via OD), recurrent suicidal gestures and suicidal ideation, history of property destruction when upset, past legal issues, no access to weapons. PMH GERD & asthma. BIB EMS called by staff at residence for agitation.    On arrival, patient was agitated and provocative and  was given haldol 5mg/ativan 2mg/Benadryl 50mg IM. She de-escalated and on interview was calm and cooperative, asking for a new residence.  Patient is well known to this writer and has been known to make provacative and attention seeking statements but denies SI/HI. Patient is at chronic baseline. Is not manic or psychotic. She is known to dislike her residence and seek admission through the ED. She is often provocative and dramatic which is most consistent with behavioral dysregulation and personality disorder pathology. There is no indication that patient is decompensating from her baseline, no evidence of an acute mood or psychotic disorder on exam, is compliant with PALACIOS and at baseline per ACT Team, and she is appropriate for discharge with outpatient ACT team follow up as scheduled.

## 2019-07-22 NOTE — ED PROVIDER NOTE - CLINICAL SUMMARY MEDICAL DECISION MAKING FREE TEXT BOX
39F with pmh of multiple psych problems p/w agitation. Pt is very histrionic, theatrical.  Likely decompensation of underlying psych disease. Do not suspect ICH, CNS infection, UTI, electrolyte abnormality, hypo/hyperglycemia. Pt required medical anxiolysis. Plan for  eval.

## 2019-07-22 NOTE — ED BEHAVIORAL HEALTH ASSESSMENT NOTE - PROFESSIONAL COLLATERAL PHONE
residence [phone] then option [phone] for  or [phone] for Tiffanie // [employer] ACT Team [phone] or [phone]

## 2019-07-22 NOTE — ED BEHAVIORAL HEALTH ASSESSMENT NOTE - DESCRIPTION
initially agitated per report from EM, calm and cooperative during  assessment  Vital Signs Last 24 Hrs  T(C): 37.2 (22 Jul 2019 12:45), Max: 37.2 (22 Jul 2019 12:45)  T(F): 99 (22 Jul 2019 12:45), Max: 99 (22 Jul 2019 12:45)  HR: 117 (22 Jul 2019 12:45) (117 - 117)  BP: 121/77 (22 Jul 2019 12:45) (121/77 - 121/77)  BP(mean): --  RR: 18 (22 Jul 2019 12:45) (18 - 18)  SpO2: 97% (22 Jul 2019 12:45) (97% - 97%) GERD, Asthma currently lives in [facility], [address] MIGUEL ANGEL reports no contact with family, currently unemployed.

## 2019-07-22 NOTE — ED ADULT TRIAGE NOTE - CHIEF COMPLAINT QUOTE
pt BIBA and NYMATI in handcuffs from [facility], pt was aggressive and aggitated today and demanding money from people.

## 2019-07-22 NOTE — ED ADULT NURSE NOTE - CHIEF COMPLAINT QUOTE
pt BIBA and NYMATI in handcuffs from [facility], pt was aggressive and aggitated today and demanding money from people.

## 2019-07-25 ENCOUNTER — EMERGENCY (EMERGENCY)
Facility: HOSPITAL | Age: 39
LOS: 1 days | Discharge: HOME | End: 2019-07-25
Attending: EMERGENCY MEDICINE | Admitting: EMERGENCY MEDICINE
Payer: COMMERCIAL

## 2019-07-25 VITALS
SYSTOLIC BLOOD PRESSURE: 142 MMHG | OXYGEN SATURATION: 100 % | HEART RATE: 85 BPM | RESPIRATION RATE: 16 BRPM | DIASTOLIC BLOOD PRESSURE: 77 MMHG | TEMPERATURE: 98 F

## 2019-07-25 PROCEDURE — 99283 EMERGENCY DEPT VISIT LOW MDM: CPT

## 2019-07-25 RX ORDER — ALBUTEROL 90 UG/1
2 AEROSOL, METERED ORAL
Qty: 1 | Refills: 0
Start: 2019-07-25 | End: 2019-08-23

## 2019-07-25 NOTE — ED PROVIDER NOTE - NS ED ROS FT
Constitutional: No weakness or fatigue, no fevers or chills  Skin: No rash or pruritis  HEENT: No sore throat  Cardiovascular: No chest pain, no cough, no shortness of breath  Respiratory: No shortness of breath, no cough  Gastrointestinal: No abdominal pain, no nausea, no vomiting, no diarrhea, no constipation  Musculoskeletal: No joint pain  Genitourinary: No dysuria   Neurological: No weakness

## 2019-07-25 NOTE — ED PROVIDER NOTE - NSFOLLOWUPINSTRUCTIONS_ED_ALL_ED_FT
You are discharged to go home/back to your facility  Please return to the Emergency Room if your symptoms change or worsen    Please follow up with you outpatient psychiatry resources

## 2019-07-25 NOTE — ED PROVIDER NOTE - PROGRESS NOTE DETAILS
BRANDI DO- facility contacted by psych SW who confirmed story per my HPI. sent Rx for albuterol inhaler as requested for maintenance asthma tx. plan to dc back to facility

## 2019-07-25 NOTE — ED ADULT NURSE NOTE - NSIMPLEMENTINTERV_GEN_ALL_ED
Implemented All Universal Safety Interventions:  [place] to call system. Call bell, personal items and telephone within reach. Instruct patient to call for assistance. Room bathroom lighting operational. Non-slip footwear when patient is off stretcher. Physically safe environment: no spills, clutter or unnecessary equipment. Stretcher in lowest position, wheels locked, appropriate side rails in place.

## 2019-07-25 NOTE — ED ADULT NURSE NOTE - OBJECTIVE STATEMENT
Received pt in  pt calm & cooperative bought in for agitation & aggression from [place] pt denies Si/Hi/AVh presently safety & comfort measures maintained eval on going.

## 2019-07-25 NOTE — ED PROVIDER NOTE - OBJECTIVE STATEMENT
38yo F with h/o bipolar, asthma, frequent ED visits, here today after not getting food today at [place] she says and then got angry and agitated, no SI or HI or physical complaints. asking for food here

## 2019-07-25 NOTE — ED PROVIDER NOTE - CLINICAL SUMMARY MEDICAL DECISION MAKING FREE TEXT BOX
38yo F with h/o bipolar, asthma, frequent ED visits, here today after not getting food today at [place] she says and then got angry and agitated, no SI or HI or physical complaints. asking for food here. will give food. psych SW called to contact facility for collateral. if no acute issues at facility, plan to likely dc back to facility after eating here

## 2019-07-25 NOTE — ED BEHAVIORAL HEALTH NOTE - BEHAVIORAL HEALTH NOTE
Pt is a 39 year old female from [facility] [address]. Writer spoke with FARIDA Moreno at [phone]. The following information was provided:     Pt has hx of Bipolar Disorder and Borderline Personality Disorder as per CM. Face sheet and additional documents were said to be sent with pt. CM states pt acting up more as she reportedly wants to move out of residence. CM states pt became agitated, screaming, and throwing things because food was not on time today. Pt then screaming that she would rather go to hospital. Pt said to be verbally aggressive and raising fist to  who she was blaming for food not being out on time. Pt has hx of medication noncompliance. No SI/HI intent or plan reported. Pt has hx of acting out. No AH/VH reported. Pt able to return to residence with no acute safety concerns identified. CM stated only additional thing to be pt being without her Albuterol inhaler for quite sometime and requesting it. Writer recommended pt follow up with PCP to which CM stated pt does not always make appointments. CM requesting possibility of script being sent by provider. The below pharmacy information was provided. MD informed.     Pharmacy: [facility]  Address: [address] , [place] [address]  Phone: [phone]

## 2019-07-25 NOTE — ED PROVIDER NOTE - PHYSICAL EXAMINATION
General: Patient in no apparent distress, AAO x 3. laying comfortably on floor, heavy facial makeup  Skin: Dry and intact  HEENT: Oral mucosa moist. No pharyngeal exudates or tonsillar enlargement  Eyes: Conjunctiva normal  Cardiac: Regular rate and rhythm  Respiratory: Lungs clear b/l and symmetric. No respiratory distress  Gastrointestinal: Abdomen soft, nondistended, nontender  Musculoskeletal: Moves all extremities spontaneously  Neurological: alert and oriented to personal, place and time  Psychiatric: Cooperative

## 2019-07-25 NOTE — ED ADULT TRIAGE NOTE - CHIEF COMPLAINT QUOTE
Pt became angry at [facility] when she was not given food.  Pt calm and cooperative in triage.  PMH Bipolar

## 2019-07-29 ENCOUNTER — EMERGENCY (EMERGENCY)
Facility: HOSPITAL | Age: 39
LOS: 1 days | Discharge: HOME | End: 2019-07-29
Attending: EMERGENCY MEDICINE | Admitting: EMERGENCY MEDICINE
Payer: COMMERCIAL

## 2019-07-29 VITALS
SYSTOLIC BLOOD PRESSURE: 145 MMHG | HEART RATE: 79 BPM | RESPIRATION RATE: 18 BRPM | DIASTOLIC BLOOD PRESSURE: 91 MMHG | OXYGEN SATURATION: 100 %

## 2019-07-29 PROCEDURE — 99283 EMERGENCY DEPT VISIT LOW MDM: CPT

## 2019-07-29 NOTE — ED ADULT TRIAGE NOTE - NS ED NOTE AC HIGH RISK COUNTRIES
Pt called in with c/o possible yeast under R breast x3days, getting worse. Would like to be seen as soon as possible today. No available appts please advise.
No

## 2019-07-29 NOTE — ED PROVIDER NOTE - NSFOLLOWUPINSTRUCTIONS_ED_ALL_ED_FT
RECOMMEND CLOTRIMAZOLE CREAM APPLIED TO AFFECTED AREA TWICE A DAY FOR 4 WEEKS.    FOLLOW UP WITH PRIMARY CARE PHYSICIAN IN 1 WEEK. RECOMMEND CLOTRIMAZOLE CREAM APPLIED TO AFFECTED AREA TWICE A DAY FOR 4 WEEKS.'    ALSO RECOMMEND HYDROCORTISONE 1% CREAM TO AFFECTED AREA TWICE A DAY FOR 1 WEEK ONLY.    FOLLOW UP WITH PRIMARY CARE PHYSICIAN IN 1 WEEK.

## 2019-07-29 NOTE — ED PROVIDER NOTE - OBJECTIVE STATEMENT
39F presents from [place] with complaint of pruritic rash to left side of face x few days.  Denies new substances ingested/in contact with.  Denies pain.

## 2019-07-29 NOTE — ED ADULT TRIAGE NOTE - CHIEF COMPLAINT QUOTE
pt BIBA from [facility].  pt c/o rash on her face.  + rash noted to left side of her face

## 2019-08-06 ENCOUNTER — EMERGENCY (EMERGENCY)
Facility: HOSPITAL | Age: 39
LOS: 1 days | Discharge: PSYCHIATRIC OR SUBSTANCE USE FACILITY | End: 2019-08-06
Attending: EMERGENCY MEDICINE | Admitting: STUDENT IN AN ORGANIZED HEALTH CARE EDUCATION/TRAINING PROGRAM
Payer: COMMERCIAL

## 2019-08-06 VITALS
DIASTOLIC BLOOD PRESSURE: 78 MMHG | TEMPERATURE: 98 F | HEART RATE: 91 BPM | SYSTOLIC BLOOD PRESSURE: 128 MMHG | OXYGEN SATURATION: 99 % | RESPIRATION RATE: 18 BRPM

## 2019-08-06 LAB
ALBUMIN SERPL ELPH-MCNC: 3.9 G/DL — SIGNIFICANT CHANGE UP (ref 3.3–5)
ALP SERPL-CCNC: 106 U/L — SIGNIFICANT CHANGE UP (ref 40–120)
ALT FLD-CCNC: 17 U/L — SIGNIFICANT CHANGE UP (ref 4–33)
AMPHET UR-MCNC: NEGATIVE — SIGNIFICANT CHANGE UP
ANION GAP SERPL CALC-SCNC: 11 MMO/L — SIGNIFICANT CHANGE UP (ref 7–14)
APAP SERPL-MCNC: < 15 UG/ML — LOW (ref 15–25)
APPEARANCE UR: CLEAR — SIGNIFICANT CHANGE UP
AST SERPL-CCNC: 17 U/L — SIGNIFICANT CHANGE UP (ref 4–32)
BACTERIA # UR AUTO: NEGATIVE — SIGNIFICANT CHANGE UP
BARBITURATES UR SCN-MCNC: NEGATIVE — SIGNIFICANT CHANGE UP
BENZODIAZ UR-MCNC: NEGATIVE — SIGNIFICANT CHANGE UP
BILIRUB SERPL-MCNC: 0.2 MG/DL — SIGNIFICANT CHANGE UP (ref 0.2–1.2)
BILIRUB UR-MCNC: NEGATIVE — SIGNIFICANT CHANGE UP
BLOOD UR QL VISUAL: NEGATIVE — SIGNIFICANT CHANGE UP
BUN SERPL-MCNC: 9 MG/DL — SIGNIFICANT CHANGE UP (ref 7–23)
CALCIUM SERPL-MCNC: 9.7 MG/DL — SIGNIFICANT CHANGE UP (ref 8.4–10.5)
CANNABINOIDS UR-MCNC: NEGATIVE — SIGNIFICANT CHANGE UP
CHLORIDE SERPL-SCNC: 106 MMOL/L — SIGNIFICANT CHANGE UP (ref 98–107)
CO2 SERPL-SCNC: 24 MMOL/L — SIGNIFICANT CHANGE UP (ref 22–31)
COCAINE METAB.OTHER UR-MCNC: NEGATIVE — SIGNIFICANT CHANGE UP
COLOR SPEC: YELLOW — SIGNIFICANT CHANGE UP
CREAT SERPL-MCNC: 0.79 MG/DL — SIGNIFICANT CHANGE UP (ref 0.5–1.3)
ETHANOL BLD-MCNC: < 10 MG/DL — SIGNIFICANT CHANGE UP
GLUCOSE SERPL-MCNC: 96 MG/DL — SIGNIFICANT CHANGE UP (ref 70–99)
GLUCOSE UR-MCNC: NEGATIVE — SIGNIFICANT CHANGE UP
HCT VFR BLD CALC: 37 % — SIGNIFICANT CHANGE UP (ref 34.5–45)
HGB BLD-MCNC: 11.2 G/DL — LOW (ref 11.5–15.5)
HYALINE CASTS # UR AUTO: NEGATIVE — SIGNIFICANT CHANGE UP
KETONES UR-MCNC: NEGATIVE — SIGNIFICANT CHANGE UP
LEUKOCYTE ESTERASE UR-ACNC: NEGATIVE — SIGNIFICANT CHANGE UP
MCHC RBC-ENTMCNC: 27.6 PG — SIGNIFICANT CHANGE UP (ref 27–34)
MCHC RBC-ENTMCNC: 30.3 % — LOW (ref 32–36)
MCV RBC AUTO: 91.1 FL — SIGNIFICANT CHANGE UP (ref 80–100)
METHADONE UR-MCNC: NEGATIVE — SIGNIFICANT CHANGE UP
NITRITE UR-MCNC: NEGATIVE — SIGNIFICANT CHANGE UP
NRBC # FLD: 0 K/UL — SIGNIFICANT CHANGE UP (ref 0–0)
OPIATES UR-MCNC: NEGATIVE — SIGNIFICANT CHANGE UP
OXYCODONE UR-MCNC: NEGATIVE — SIGNIFICANT CHANGE UP
PCP UR-MCNC: NEGATIVE — SIGNIFICANT CHANGE UP
PH UR: 6 — SIGNIFICANT CHANGE UP (ref 5–8)
PLATELET # BLD AUTO: 273 K/UL — SIGNIFICANT CHANGE UP (ref 150–400)
PMV BLD: 9 FL — SIGNIFICANT CHANGE UP (ref 7–13)
POTASSIUM SERPL-MCNC: 3.9 MMOL/L — SIGNIFICANT CHANGE UP (ref 3.5–5.3)
POTASSIUM SERPL-SCNC: 3.9 MMOL/L — SIGNIFICANT CHANGE UP (ref 3.5–5.3)
PROT SERPL-MCNC: 7.7 G/DL — SIGNIFICANT CHANGE UP (ref 6–8.3)
PROT UR-MCNC: 50 — SIGNIFICANT CHANGE UP
RBC # BLD: 4.06 M/UL — SIGNIFICANT CHANGE UP (ref 3.8–5.2)
RBC # FLD: 14.4 % — SIGNIFICANT CHANGE UP (ref 10.3–14.5)
RBC CASTS # UR COMP ASSIST: HIGH (ref 0–?)
SALICYLATES SERPL-MCNC: < 5 MG/DL — LOW (ref 15–30)
SODIUM SERPL-SCNC: 141 MMOL/L — SIGNIFICANT CHANGE UP (ref 135–145)
SP GR SPEC: 1.03 — SIGNIFICANT CHANGE UP (ref 1–1.04)
SQUAMOUS # UR AUTO: SIGNIFICANT CHANGE UP
TSH SERPL-MCNC: 0.96 UIU/ML — SIGNIFICANT CHANGE UP (ref 0.27–4.2)
UROBILINOGEN FLD QL: NORMAL — SIGNIFICANT CHANGE UP
WBC # BLD: 8.1 K/UL — SIGNIFICANT CHANGE UP (ref 3.8–10.5)
WBC # FLD AUTO: 8.1 K/UL — SIGNIFICANT CHANGE UP (ref 3.8–10.5)
WBC UR QL: SIGNIFICANT CHANGE UP (ref 0–?)

## 2019-08-06 PROCEDURE — 90792 PSYCH DIAG EVAL W/MED SRVCS: CPT

## 2019-08-06 PROCEDURE — 99285 EMERGENCY DEPT VISIT HI MDM: CPT

## 2019-08-06 RX ORDER — HALOPERIDOL DECANOATE 100 MG/ML
5 INJECTION INTRAMUSCULAR ONCE
Refills: 0 | Status: COMPLETED | OUTPATIENT
Start: 2019-08-06 | End: 2019-08-06

## 2019-08-06 RX ADMIN — HALOPERIDOL DECANOATE 5 MILLIGRAM(S): 100 INJECTION INTRAMUSCULAR at 14:09

## 2019-08-06 RX ADMIN — Medication 2 MILLIGRAM(S): at 14:09

## 2019-08-06 NOTE — ED BEHAVIORAL HEALTH ASSESSMENT NOTE - CURRENT MEDICATION
Per documentation from residence: Prolixin 5mg BID, Klonopin 1mg BID, Zyprexa 10mg QAM+20mg QHS, Lithium Carbonate 600mg BID    Prolixin Decanoate 25 mg IM q2week. ACT team did not know when next due. Per documentation from residence: Prolixin 5mg BID, Klonopin 1mg Qdaily, Zyprexa 10mg QAM+20mg QHS, Lithium Carbonate 600mg BID    Prolixin Decanoate 25 mg IM q2week (adherent)

## 2019-08-06 NOTE — ED BEHAVIORAL HEALTH NOTE - BEHAVIORAL HEALTH NOTE
As per team patient medically and psychiatrically cleared and able to return to previous residence [facility]. MI spoke with FARIDA Ceballos at [address] [phone] regarding discharge. SW confirmed method of transportation is metrocard or taxi and patient travel independent without supervision.  Team prefers with taxi due to PRN given when in hospital. Clinical provider in agreement with taxi instead of metrocard. Lin also asked for discharge paperwork to be faxed [phone] before 5pm for them to review before patient arrival due to the medication that was given. SW notified team and next covering SW.   Patient does not have active medicaid and no benefits for transportation. MI spoke with supervisor Crystal Blanco and approved to use [email or web address] also to call Darren Kenney for transport. Covering SW will complete transportation when ready for discharge. SW will also call [facility] and confirm they receive fax and time of transportation.  MI will continue to monitor, erick made aware.

## 2019-08-06 NOTE — ED BEHAVIORAL HEALTH ASSESSMENT NOTE - THOUGHT CONTENT
Blood pressure is under good control.  She continues losartan with hydrochlorothiazide.  She tries to follow a low-salt diet with limited success.   Unremarkable

## 2019-08-06 NOTE — ED BEHAVIORAL HEALTH ASSESSMENT NOTE - PROFESSIONAL COLLATERAL PHONE
residence [phone] then option [phone] for  or [phone] for Tiffanie // [facility] [phone] or [phone]

## 2019-08-06 NOTE — ED BEHAVIORAL HEALTH NOTE - BEHAVIORAL HEALTH NOTE
follow up completed based on  sign out from  Katrin LIRA Writer faxed pt's discharge paperwork to [phone]. Writer contacted FARIDA Ceballos at [phone] who confirmed fax was received. FARIDA informed that pt would be returning home via taxi, as previously reported to be best mode of transportation for today's discharge due to PRNs given. Writer made online reservation via [email or web address] with  account 50 as approved by   when discussed with prior  staff. Refer to prior ED BH note for confirmation. Current trip set with booking ID #[id number] with cost of $10.61. Reservation info to be sent to  office staff.

## 2019-08-06 NOTE — ED BEHAVIORAL HEALTH ASSESSMENT NOTE - MEDICATIONS (PRESCRIPTIONS, DIRECTIONS)
prolixin dec next due 7/26. Continue all oral meds as prescribed. c/w prolixin dec and all PO meds (pt agrees to resume PO meds this evening).

## 2019-08-06 NOTE — ED PROVIDER NOTE - OBJECTIVE STATEMENT
This is a 38 y/o F PMHx bipolar, asthma, frequent ED visits p/w agitation and aggression from [facility]. She reports she was angry and agitated so she took a stove and threw it at a wall and barricaded herself. She denies taking any pills and denies SI/HI/AH/VH. She denies any pain except on her wrists from the handcuffs. She denies any fevers, chills, chest pain, SOB, abdominal pain, nausea, vomiting, urinary complaints or lower extremity edema.

## 2019-08-06 NOTE — ED BEHAVIORAL HEALTH ASSESSMENT NOTE - SUICIDE PROTECTIVE FACTORS
Identifies reasons for living/Future oriented/Fear of death or dying due to pain/suffering/Positive therapeutic relationships/Other/Supportive social network or family

## 2019-08-06 NOTE — ED BEHAVIORAL HEALTH ASSESSMENT NOTE - OTHER
dislikes residence residence staff/ACT team peers 24/7 supervised residence chronically limited initially disinhibited, dancing, but later calmed and for several hours in ER was seating/lying down, quiet, appropriate

## 2019-08-06 NOTE — ED BEHAVIORAL HEALTH ASSESSMENT NOTE - OTHER PAST PSYCHIATRIC HISTORY (INCLUDE DETAILS REGARDING ONSET, COURSE OF ILLNESS, INPATIENT/OUTPATIENT TREATMENT)
lifetime inpatient admissions > 20. Numerous [facility] ED visits. Currently followed by ACT team. Most recently inpatient at [facility] 12/12-12/24 2018.  Multiple [facility] ED evals.  - 3 prior suicide attempts (last in 2012 via OD) as per records, recurrent suicidal gestures and suicidal ideation, history of property destruction (breaking TV screens while hospitalized) when upset / acting out   - long hx of sexually provocative, acting out behaviors (during last [facility] inpatient admission >2 years ago, patient had to be placed on CO 1:1 after trying to perform oral sex on a male patient on the dayroom, taken off CO then was going into male patient's room, overheard offering them sex and was placed back on CO)

## 2019-08-06 NOTE — ED ADULT NURSE NOTE - NSIMPLEMENTINTERV_GEN_ALL_ED
Implemented All Universal Safety Interventions:  [place] to call system. Call bell, personal items and telephone within reach. Instruct patient to call for assistance. Room bathroom lighting operational. Non-slip footwear when patient is off stretcher. Physically safe environment: no spills, clutter or unnecessary equipment. Stretcher in lowest position, wheels locked, appropriate side rails in place.

## 2019-08-06 NOTE — ED BEHAVIORAL HEALTH ASSESSMENT NOTE - HPI (INCLUDE ILLNESS QUALITY, SEVERITY, DURATION, TIMING, CONTEXT, MODIFYING FACTORS, ASSOCIATED SIGNS AND SYMPTOMS)
Ms. Castillo is a single, 39-year-old  female, non caregiver, unemployed, domiciled on [place] grounds building [address] [place] [address], with past psychiatric history of Schizoaffective Disorder, Borderline Personality Disorder, cannabis abuse, multiple inpatient psychiatric hospitalizations (>20), last [facility] 4 12/12-12/24 2018, with frequent visits to the [facility] (well-known to staff) 3 prior suicide attempts (last in 2012 via OD), recurrent suicidal gestures and suicidal ideation, history of property destruction when upset, past legal issues, no access to weapons. PMH GERD & asthma. BIB EMS called by staff at residence for agitation.    Patient was yelling on arrival, poorly cooperative, provocative, given medication by EM attending.    Chart reviewed.  Pt observed over several hours in the ED.  Initially she was loud, provocative.  Later she calmed, was easily redirectable.  She stated that she broke the stove in her residence because she was annoyed at the staff, states that she does not think they treat her fairly.  She states she has been taking her injectable medication (Prolixin Dec) but intermittently does not take her PO medications because she does not want them to make her sleep.  She states she has been more awake at night recently because she hasn't been taking the medication regularly.  Initially in the ER she was more disinhibted, dancing, laughing, but later calmed, was seated, ate her food appropriate, spoke calmly with writer.  Initially when she was told that she would likely be discharged home she complained that she should stay in the hospital because "she broke the stove" (demonstrating her insight into fact that this was not acceptable behavior and demonstrating her desire to engage in behavior that will get her into the hospital because she complains that she does not like her peers/her residence at times (clearly indicated by previous notes as well).   She denies AVH or SI/HI/I/P.  Was initially worried that she would not be getting her money as scheduled tomorrow, but was reassured that residence confirmed that she is scheduled to get her money tomorrow.      Spoke with Aubrie Sarabia from ACT team who confirmed that patient has been getting her injectable medication, is intermittently noncompliant with PO meds.  Last saw ACT team yesterday.  Intermittent is upset with ACT team and does not always want to talk to all the members.      Spoke with Tiffanie from [facility] who reported patient intermittently not taking PO medications and today moved/damaged stove because she was upset and that this was consistent with her provocative behavior that forces [facility] to call EMS.  Confirmed with Tiffanie that this is baseline, there was no physical aggression towards others or self today or recently. Ms. Castillo is a single, 39-year-old  female, non caregiver, unemployed, domiciled on [place] grounds building [address] [place] [address], with past psychiatric history of Schizoaffective Disorder, Borderline Personality Disorder, cannabis abuse, multiple inpatient psychiatric hospitalizations (>20), last [facility] 4 12/12-12/24 2018, with frequent visits to the [facility] (well-known to staff) 3 prior suicide attempts (last in 2012 via OD), recurrent suicidal gestures and suicidal ideation, history of property destruction when upset, past legal issues, no access to weapons. PMH GERD & asthma. BIB EMS called by staff at residence for agitation.    Patient was yelling on arrival, poorly cooperative, provocative, given medication by EM attending.    Chart reviewed.  Pt observed over several hours in the ED.  Initially she was loud, provocative.  Later she calmed, was easily redirectable.  She stated that she broke the stove in her residence because she was annoyed at the staff, states that she does not think they treat her fairly.  She states she has been taking her injectable medication (Prolixin Dec) but intermittently does not take her PO medications because she does not want them to make her sleep.  She states she has been more awake at night recently because she hasn't been taking the medication regularly.  Initially in the ER she was more disinhibited, dancing, laughing, but later calmed, was seated, ate her food appropriate, spoke calmly with writer.  Initially when she was told that she would likely be discharged home she complained that she should stay in the hospital because "she broke the stove" (demonstrating her insight into fact that this was not acceptable behavior and demonstrating her desire to engage in behavior that will get her into the hospital because she complains that she does not like her peers/her residence at times (clearly indicated by previous notes as well).   She denies AVH or SI/HI/I/P.  Was initially worried that she would not be getting her money as scheduled tomorrow, but was reassured that residence confirmed that she is scheduled to get her money tomorrow.    Spoke with Aubrie Sarabia from ACT team who confirmed that patient has been getting her injectable medication, is intermittently noncompliant with PO meds.  Last saw ACT team yesterday.  Intermittent is upset with ACT team and does not always want to talk to all the members.      Spoke with Tiffanie from [facility] who reported patient intermittently not taking PO medications and today moved/damaged stove because she was upset and that this was consistent with her provocative behavior that forces [facility] to call EMS.  Confirmed with Tiffanie that this is baseline, there was no physical aggression towards others or self today or recently.

## 2019-08-06 NOTE — ED ADULT NURSE NOTE - OBJECTIVE STATEMENT
Pt to behavioral health are of ER. Pt brought in for agitation while at residence of [place]. Pt pleasantly manic in ED. All belongings accounted for and placed. in locker. Will continue to monitor.

## 2019-08-06 NOTE — ED PROVIDER NOTE - CLINICAL SUMMARY MEDICAL DECISION MAKING FREE TEXT BOX
38 y/o F PMHx bipolar, asthma, frequent ED visits p/w agitation and aggression from [facility]. No medical complaints at this time and denies SI/HI/AH/VH. Sent to behavioral health and asked [facility] to loosen cuffs 40 y/o F PMHx bipolar, asthma, frequent ED visits p/w agitation and aggression from [facility]. No medical complaints at this time and denies SI/HI/AH/VH. Patient placed in behavioral health. Psych consult for dispo 40 y/o F PMHx bipolar, asthma, frequent ED visits p/w agitation and aggression from [facility]. No medical complaints at this time and denies SI/HI/AH/VH. Patient placed in behavioral health. Given frequent outbursts and unable to verbally redirect, will require sedation with Haldol 5mg and Ativan 2mg for safety and assessment. Psych consult for dispo

## 2019-08-06 NOTE — ED PROVIDER NOTE - NSFOLLOWUPINSTRUCTIONS_ED_ALL_ED_FT
1) Please follow-up with doctor at [facility] in the next 1-2 days  2) If you have any worsening of symptoms or any other concerns please return to the ED immediately.  3) You may have been given a copy of your labs and/or imaging.  Please go over these with your primary care doctor.

## 2019-08-06 NOTE — ED PROVIDER NOTE - MUSCULOSKELETAL, MLM
Handcuffs on wrist, b/l radial pulses intact, Sitting in stretcher upright with hands cuffed behind her, FROM of LEs

## 2019-08-06 NOTE — ED BEHAVIORAL HEALTH ASSESSMENT NOTE - DESCRIPTION
initially agitated per report from EM, calm and cooperative during  assessment  Vital Signs Last 24 Hrs  T(C): 37.2 (22 Jul 2019 12:45), Max: 37.2 (22 Jul 2019 12:45)  T(F): 99 (22 Jul 2019 12:45), Max: 99 (22 Jul 2019 12:45)  HR: 117 (22 Jul 2019 12:45) (117 - 117)  BP: 121/77 (22 Jul 2019 12:45) (121/77 - 121/77)  BP(mean): --  RR: 18 (22 Jul 2019 12:45) (18 - 18)  SpO2: 97% (22 Jul 2019 12:45) (97% - 97%) GERD, Asthma currently lives in [facility], [address] [facility] carterfreddy MIGUEL ANGEL reports no contact with family, currently unemployed.
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
## 2019-08-06 NOTE — ED BEHAVIORAL HEALTH NOTE - BEHAVIORAL HEALTH NOTE
C-SSRS Screener     1. Have you ever wished to be dead or wished you could go to sleep and not wake up?  [  ]Yes, [x  ]No, [  ]Unable to Assess  Details _____________________________     2. Have you actually had any thoughts of killing yourself?   [  ]Yes, [x  ]No, [  ]Unable to Assess  Details _____________________________     If answer is “No” for 1 and 2, stop here. If answer is “Yes” to 1 or 2, proceed to 3.     3. Have you been thinking about how you might kill yourself?  [  ]Yes, [  ]No, [  ]Unable to Assess  Details _____________________________     4. Have you had these thoughts and had some intention of acting on them?  [  ]Yes, [  ]No, [  ]Unable to Assess  Details _____________________________     5. Have you started to work out or worked out the details of how to kill yourself? Do you intend to carry out this plan?  [  ]Yes, [  ]No, [  ]Unable to Assess  Details _____________________________     6. Have you ever done anything, started to do anything, or prepared to do anything to end your life? If so, was it in the past 3 months?  [  ]Yes, [  ]No, [  ]Unable to Assess  Details _____________________________        Additional Suicide Risk Factors (select all that apply)  [  ]Access to lethal means including firearms  [  ]Family history of suicide  [  ]Impulsivity  [  ] Current or past mood disorder  [  ] Current or past psychotic disorder  [  ] Current or past PTSD  [  ] Current or past ADHD  [  ] Current or past TBI  [  ] Current or past cluster B personality disorder or traits  [  ] Current or past conduct problems  [  ] Recent onset of current or past psychiatric disorder  [  ] Family history of psychiatric diagnoses requiring hospitalization     Additional Activating Events (select all that apply)  [  ]Perceived burden on family or others  [  ]Current sexual or physical abuse  [  ]Substance intoxication or withdrawal  [  ]Inadequate social supports  [  ]Hopeless about or dissatisfied with current provider or treatment     Additional Protective Factors (select all that apply)  [  ] Future plans  [  ] Quaker beliefs  [  ] Beloved pets
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
## 2019-08-08 ENCOUNTER — EMERGENCY (EMERGENCY)
Facility: HOSPITAL | Age: 39
LOS: 1 days | Discharge: HOME | End: 2019-08-08
Attending: EMERGENCY MEDICINE | Admitting: EMERGENCY MEDICINE
Payer: COMMERCIAL

## 2019-08-08 VITALS
OXYGEN SATURATION: 100 % | TEMPERATURE: 98 F | RESPIRATION RATE: 18 BRPM | HEART RATE: 88 BPM | DIASTOLIC BLOOD PRESSURE: 74 MMHG | SYSTOLIC BLOOD PRESSURE: 126 MMHG

## 2019-08-08 PROCEDURE — 73110 X-RAY EXAM OF WRIST: CPT | Mod: 26,RT

## 2019-08-08 PROCEDURE — 29125 APPL SHORT ARM SPLINT STATIC: CPT | Mod: RT

## 2019-08-08 PROCEDURE — 99283 EMERGENCY DEPT VISIT LOW MDM: CPT | Mod: 25
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
## 2019-08-08 NOTE — ED PROCEDURE NOTE - PROCEDURE ADDITIONAL DETAILS
4 inch ortho glass and stocking net used for thumb spica splint. Splinted up to just proximal to right thumb IP joint. Patient able to move distal thumb with cap refill <2 sec and neurologically intact after splinting. 4 inch ortho glass and stocking net used for thumb spica splint. Splinted up to just proximal to right thumb IP joint. Patient able to move distal thumb with cap refill <2 sec and neurologically intact after splinting.    Dawood Redding MD: I was present for the key portions of the procedure.

## 2019-08-08 NOTE — ED ADULT TRIAGE NOTE - CHIEF COMPLAINT QUOTE
Pt is from [facility] grounds arrived with no escort. Pt is here because states was brought to ER two days ago with handcuffs now reports pain to right wrist and thumb.

## 2019-08-08 NOTE — ED PROVIDER NOTE - NSFOLLOWUPINSTRUCTIONS_ED_ALL_ED_FT
Your diagnosis:    Discharge instructions:    - Please follow up with orthopedist in 2 weeks for repeat xrays of right wrist.    - Tylenol up to 650 mg every 8 hours as needed for pain and/or Motrin up to 600 mg every 6 hours as needed for pain.    - Be sure to return to the ED if you develop new or worsening symptoms. Specific signs and symptoms to be vigilant of:  weakness in the hand, numbness or tingling in the hand or fingers, or any other distressing symptoms

## 2019-08-08 NOTE — ED ADULT TRIAGE NOTE - MODE OF ARRIVAL
H&P History of Present Illness





- General


Date of Service: 19


Admit Problem/Dx: 


 Admission Diagnosis/Problem





Admission Diagnosis/Problem      Urosepsis








Source of Information: Patient, Old Records, Provider, RN Notes Reviewed


History Limitations: Reports: Language Barrier, Physical Impairment





- History of Present Illness


Initial Comments - Free Text/Narative: 


This is a 54 yo white female with past medical hx/o Dysphagia, Urinary Retention

, Back Pain, OA, Spasms, Osteoporosis, CVA/TIA, Advanced MS, Anemia, CKD, 

Seizure Disorder, Hx/o DVT/VTE, Alzheimer's Disease, DM2, Anxiety, and 

Depression who was brought in from [facility] due to febrile illness associated 

with decreased responsiveness in the past a couple of days. Per ED notes, ED 

staff were told that it is expected she will  but her mother did not want 

patient to die in the nursing home and would rather be brought here in the 

hospital. Dr. Valles also informed me that this patient is comfort measures.





Her initial work up in ED shows CBC remarkable for WBC of 14.22, RBC of 3.55, 

Hgb of 8.5, Hct of 29.1, MCH of 23.9, MCHC of 29.2, RDW of 51.9, Platelet of 833

, Neutrophils of 68% and Monocytes of 1%. Her ABG shows a pH of 7.61, pCO2 of 

20.5, pO2 of 64, HCO3 of 20.5 and O2 Sat of 97.1%. Her chemistry is significant 

for Sodium of 133, CO2 of 20, AG of 20, BUN of 29, Cr of 1.3, LA of 6.2 and Ca 

of 10.4, AST of 66, ALT of 101, Alk Phos of 147, Total Protein of 8.3, and 

Albumin of 1.2. Her UA is strongly suggestive of UTI. Her CXR shows atelectasis 

on the bases. Her BPs have been significantly low, with RR in the 20-30s, HR as 

high as 160 and a Temp of 39 C on presentation to ED.





Patient is being admitted for Sepsis secondary to UTI. She is DNR/DNI/Comfort 

Measures.





  











- Related Data


Allergies/Adverse Reactions: 


 Allergies











Allergy/AdvReac Type Severity Reaction Status Date / Time


 


No Known Allergies Allergy   Verified 18 14:24











Home Medications: 


 Home Meds





tiZANidine HCl [Zanaflex] 2 mg GTUBE TID 01/12/15 [History]


levETIRAcetam [Levetiracetam] 10 ml GTUBE BID 17 [History]


Acetaminophen 650 mg GTUBE Q4H PRN 18 [History]


LORazepam [Ativan] 0.25 mg GTUBE DAILY 18 [History]


Acetaminophen/oxyCODONE [Percocet 325-5 MG] 1 tab PO Q4H PRN #45 tablet 12/15/

18 [Rx]


Atropine 1% [Atropine 1% Ophth Soln] 2 drop PO Q4HR PRN 19 [History]


Cholecalciferol (Vitamin D3) [Vitamin D3] 2,000 unit GTUBE DAILY 19 [

History]


FA/Lycopene/Lut/MV,Ca,Iron,Min [Centrum] 1 tab GTUBE BEDTIME 19 [History]


FLUoxetine [PROzac] 10 mg GTUBE DAILY 19 [History]


Lactose-Reduced Food/Fiber [Jevity 1.2 Antwan Liquid] 260 ml GTUBE 5XDAY 19 [

History]


Lactulose 15 ml GTUBE DAILY PRN 19 [History]


Magnesium Oxide [Magnesium] 400 mg GTUBE BID 19 [History]


Morphine [Morphine 10 MG/0.5 ML Oral Syringe] 2 mg GTUBE Q2HR PRN 19 [

History]


Nystatin 15 gm TP BID 19 [History]


Ondansetron [Zofran] 8 mg GTUBE Q6H PRN 19 [History]


Potassium Chloride [Potassium Chloride Solution] 15 ml PO BID 19 [History]


Sennosides/Docusate Sodium [Senna Plus Tablet] 2 each GTUBE BID 19 [

History]


Sulfamethoxazole/Trimethoprim [Sulfatrim 800-160 mg/20 ml Jennifer] 20 ml PO BID  [History]


Water 100 ml GTUBE TID 19 [History]











Past Medical History


HEENT History: Reports: Other (See Below)


Other HEENT History: dysphagia,aphasia


Cardiovascular History: Reports: Blood Clots/VTE/DVT


Other Cardiovascular History: hypomagnesemia, hypokalemia


Respiratory History: Reports: Pneumonia, Recurrent


Gastrointestinal History: Reports: Other (See Below)


Other Gastrointestinal History: martinez-key tube in place


Genitourinary History: Reports: Acute Renal Failure, Retention, Urinary, UTI, 

Recurrent


Other Genitourinary History: chronic kidney disease


Musculoskeletal History: Reports: Back Pain, Chronic, Osteoarthritis, 

Osteoporosis


Other Musculoskeletal History: MS


Neurological History: Reports: Alzheimers Disease, CVA, MS, Seizure, TIA


Other Neuro History: encephalopathy, insomnia


Psychiatric History: Reports: Anxiety, Depression


Other Psychiatric History: insomnia,


Endocrine/Metabolic History: Reports: Diabetes, Type II


Other Endocrine/Metabolic History: disorders of magnesium metabolism, anemia, 

hypercalcemia, hypokalemia


Hematologic History: Reports: Anemia


Other Hematologic History: hyponmagnesemia, septic shock


Other Dermatologic History: candidasis, follicular disorder





- Infectious Disease History


Infectious Disease History: Reports: MRSA


Other Infectious Disease History: not on NH paperwork, patient unable to answer





- Past Surgical History


Head Surgeries/Procedures: Reports: None


Other Musculoskeletal Surgeries/Procedures:: contractures of muscles





Social & Family History





- Family History


Family Medical History: Unobtainable





- Tobacco Use


Smoking Status *Q: Unknown Ever Smoked





- Caffeine Use


Caffeine Use: Reports: None





- Recreational Drug Use


Recreational Drug Use: No





- Living Situation & Occupation


Living situation: Reports: Extended Care Facility ([facility] nursing home)


Occupation: Disabled





H&P Review of Systems





- Review of Systems:


Review Of Systems: Unable To Obtain


Review of Systems Comment:: 


Has advanced MS and unknown baseline cognition. She is able to talk but non-

sensible.    








Exam





- Exam


Exam: See Below





- Vital Signs


Vital Signs: 


 Last Vital Signs











Temp  39.0 C H  19 06:31


 


Pulse  161 H  19 06:31


 


Resp  33 H  19 06:31


 


BP  105/70   19 06:31


 


Pulse Ox  94 L  19 06:31











Weight: 66.27 kg





- Exam


General: Alert, Other (awake and does not look she is actively dying).  No: 

Oriented, Cooperative


HEENT: Conjunctiva Clear, Hearing Intact, Nares Patent, Normal Nasal Septum, 

Other (her lips a chapped and dry).  No: Mucosa Moist & Pink


Neck: Supple


Lungs: Normal Respiratory Effort.  No: Decreased Breath Sounds


Cardiovascular: Tachycardia


GI/Abdominal Exam: Normal Bowel Sounds, Soft, Non-Tender, No Organomegaly, No 

Abnormal Bruit, No Mass, Distended, Other (Gastric tube)


 (Female) Exam: Deferred


Rectal (Female) Exam: Deferred


Back Exam: Other (deferred)


Extremities: Normal Range of Motion, Non-Tender, No Pedal Edema, Normal 

Capillary Refill, Other (right hand contracture)


Peripheral Pulses: 2+: Posterior Tibial (L), Posterior Tibial (R), Dorsalis 

Pedis (L), Dorsalis Pedis (R)


Skin: Warm, Dry, Intact


Neuro Extensive - Mental Status: Other (awake but not able to follow commands)


Neuro Extensive - Motor, Sensory, Reflexes: Other (not appropriate, she is not 

able to follow commands)


Psychiatric: Normal Affect, Other





- Patient Data


Lab Results Last 24 hrs: 


 Laboratory Results - last 24 hr











  19 Range/Units





  06:57 07:14 07:28 


 


WBC    14.22 H  (3.98-10.04)  K/mm3


 


RBC    3.55 L  (3.98-5.22)  M/mm3


 


Hgb    8.5 L  (11.2-15.7)  gm/L


 


Hct    29.1 L  (34.1-44.9)  %


 


MCV    82.0  (79.4-94.8)  fl


 


MCH    23.9 L  (25.6-32.2)  pg


 


MCHC    29.2 L  (32.2-35.5)  g/dl


 


RDW Std Deviation    51.9 H  (36.4-46.3)  fL


 


Plt Count    833 H  (182-369)  K/mm3


 


MPV    9.5  (9.4-12.3)  fl


 


Neutrophils % (Manual)    68 H  (40-60)  %


 


Band Neutrophils %    8  (0-10)  %


 


Lymphocytes % (Manual)    23  (20-40)  %


 


Atypical Lymphs %    0  %


 


Monocytes % (Manual)    1 L  (2-10)  %


 


Eosinophils % (Manual)    0 L  (0.7-5.8)  %


 


Basophils % (Manual)    0 L  (0.1-1.2)  


 


Blast Cells %    0  


 


Nucleated RBCs    1.0  %


 


Platelet Estimate    Marked inc  


 


Polychromasia    1+ slight  


 


Anisocytosis    Sl  


 


Tear Drop Cells    Few  


 


RBC Morph Comment    Abn  


 


Puncture Site   Rt radial   


 


ABG pH   7.61 H*   (7.35-7.45)  


 


ABG pCO2   20.5 L   (35.0-45.0)  mmHg


 


ABG pO2   64.0 L   (80.0-100.0)  mmHg


 


ABG HCO3   20.5 L   (22.0-26.0)  meq/L


 


ABG O2 Saturation   97.1 H   (96.0-97.0)  %


 


ABG Base Excess   -0.1   (-2-2.0)  


 


Angel Test   Positive   


 


A-a Gradient   44   mmHg


 


PEEP   Not Reportable   


 


Sodium     (136-145)  mEq/L


 


Potassium     (3.5-5.1)  mEq/L


 


Chloride     ()  mEq/L


 


Carbon Dioxide     (21-32)  mEq/L


 


Anion Gap     (5-15)  


 


BUN     (7-18)  mg/dL


 


Creatinine     (0.55-1.02)  mg/dL


 


Est Cr Clr Drug Dosing     


 


Estimated GFR (MDRD)     (>60)  mL/min


 


BUN/Creatinine Ratio     (14-18)  


 


Glucose     ()  mg/dL


 


Lactic Acid     (0.4-2.0)  mmol/L


 


Calcium     (8.5-10.1)  mg/dL


 


Magnesium     (1.8-2.4)  mg/dl


 


Total Bilirubin     (0.2-1.0)  mg/dL


 


AST     (15-37)  U/L


 


ALT     (14-59)  U/L


 


Alkaline Phosphatase     ()  U/L


 


Total Protein     (6.4-8.2)  g/dl


 


Albumin     (3.4-5.0)  g/dl


 


Globulin     gm/dL


 


Albumin/Globulin Ratio     (1-2)  


 


Urine Color  Yellow    (Yellow)  


 


Urine Appearance  Cloudy H    (Clear)  


 


Urine pH  8.5 H    (5.0-8.0)  


 


Ur Specific Gravity  1.015    (1.005-1.030)  


 


Urine Protein  Trace H    (Negative)  


 


Urine Glucose (UA)  Negative    (Negative)  


 


Urine Ketones  Negative    (Negative)  


 


Urine Occult Blood  Trace-intact H    (Negative)  


 


Urine Nitrite  Negative    (Negative)  


 


Urine Bilirubin  Negative    (Negative)  


 


Urine Urobilinogen  0.2    (0.2-1.0)  


 


Ur Leukocyte Esterase  3+ H    (Negative)  


 


Urine RBC  0-5    (0-5)  /hpf


 


Urine WBC   H    (0-5)  /hpf


 


Ur Epithelial Cells  10-20 H    (0-5)  /hpf


 


Ur Squamous Epith Cells  5-10 H    (0-5)  /hpf


 


Ur Renal Epithelial Cell  0-5    (0-5)  /hpf


 


Amorphous Sediment  Many H    (NOT SEEN)  /hpf


 


Urine Bacteria  Many H    (FEW)  /hpf


 


Urine Mucus  Not seen    (FEW)  /hpf


 


MRSA (PCR)     














  19 Range/Units





  07:28 07:28 12:00 


 


WBC     (3.98-10.04)  K/mm3


 


RBC     (3.98-5.22)  M/mm3


 


Hgb     (11.2-15.7)  gm/L


 


Hct     (34.1-44.9)  %


 


MCV     (79.4-94.8)  fl


 


MCH     (25.6-32.2)  pg


 


MCHC     (32.2-35.5)  g/dl


 


RDW Std Deviation     (36.4-46.3)  fL


 


Plt Count     (182-369)  K/mm3


 


MPV     (9.4-12.3)  fl


 


Neutrophils % (Manual)     (40-60)  %


 


Band Neutrophils %     (0-10)  %


 


Lymphocytes % (Manual)     (20-40)  %


 


Atypical Lymphs %     %


 


Monocytes % (Manual)     (2-10)  %


 


Eosinophils % (Manual)     (0.7-5.8)  %


 


Basophils % (Manual)     (0.1-1.2)  


 


Blast Cells %     


 


Nucleated RBCs     %


 


Platelet Estimate     


 


Polychromasia     


 


Anisocytosis     


 


Tear Drop Cells     


 


RBC Morph Comment     


 


Puncture Site     


 


ABG pH     (7.35-7.45)  


 


ABG pCO2     (35.0-45.0)  mmHg


 


ABG pO2     (80.0-100.0)  mmHg


 


ABG HCO3     (22.0-26.0)  meq/L


 


ABG O2 Saturation     (96.0-97.0)  %


 


ABG Base Excess     (-2-2.0)  


 


Angel Test     


 


A-a Gradient     mmHg


 


PEEP     


 


Sodium  133 L    (136-145)  mEq/L


 


Potassium  5.0    (3.5-5.1)  mEq/L


 


Chloride  98    ()  mEq/L


 


Carbon Dioxide  20 L    (21-32)  mEq/L


 


Anion Gap  20.0 H    (5-15)  


 


BUN  29 H    (7-18)  mg/dL


 


Creatinine  1.3 H    (0.55-1.02)  mg/dL


 


Est Cr Clr Drug Dosing  TNP    


 


Estimated GFR (MDRD)  43    (>60)  mL/min


 


BUN/Creatinine Ratio  22.3 H    (14-18)  


 


Glucose  98    ()  mg/dL


 


Lactic Acid   6.2 H   (0.4-2.0)  mmol/L


 


Calcium  10.4 H    (8.5-10.1)  mg/dL


 


Magnesium  1.9    (1.8-2.4)  mg/dl


 


Total Bilirubin  0.6    (0.2-1.0)  mg/dL


 


AST  66 H    (15-37)  U/L


 


ALT  101 H    (14-59)  U/L


 


Alkaline Phosphatase  147 H    ()  U/L


 


Total Protein  8.3 H    (6.4-8.2)  g/dl


 


Albumin  1.2 L    (3.4-5.0)  g/dl


 


Globulin  7.1    gm/dL


 


Albumin/Globulin Ratio  0.2 L    (1-2)  


 


Urine Color     (Yellow)  


 


Urine Appearance     (Clear)  


 


Urine pH     (5.0-8.0)  


 


Ur Specific Gravity     (1.005-1.030)  


 


Urine Protein     (Negative)  


 


Urine Glucose (UA)     (Negative)  


 


Urine Ketones     (Negative)  


 


Urine Occult Blood     (Negative)  


 


Urine Nitrite     (Negative)  


 


Urine Bilirubin     (Negative)  


 


Urine Urobilinogen     (0.2-1.0)  


 


Ur Leukocyte Esterase     (Negative)  


 


Urine RBC     (0-5)  /hpf


 


Urine WBC     (0-5)  /hpf


 


Ur Epithelial Cells     (0-5)  /hpf


 


Ur Squamous Epith Cells     (0-5)  /hpf


 


Ur Renal Epithelial Cell     (0-5)  /hpf


 


Amorphous Sediment     (NOT SEEN)  /hpf


 


Urine Bacteria     (FEW)  /hpf


 


Urine Mucus     (FEW)  /hpf


 


MRSA (PCR)    Negative  














  19 Range/Units





  13:30 


 


WBC   (3.98-10.04)  K/mm3


 


RBC   (3.98-5.22)  M/mm3


 


Hgb   (11.2-15.7)  gm/L


 


Hct   (34.1-44.9)  %


 


MCV   (79.4-94.8)  fl


 


MCH   (25.6-32.2)  pg


 


MCHC   (32.2-35.5)  g/dl


 


RDW Std Deviation   (36.4-46.3)  fL


 


Plt Count   (182-369)  K/mm3


 


MPV   (9.4-12.3)  fl


 


Neutrophils % (Manual)   (40-60)  %


 


Band Neutrophils %   (0-10)  %


 


Lymphocytes % (Manual)   (20-40)  %


 


Atypical Lymphs %   %


 


Monocytes % (Manual)   (2-10)  %


 


Eosinophils % (Manual)   (0.7-5.8)  %


 


Basophils % (Manual)   (0.1-1.2)  


 


Blast Cells %   


 


Nucleated RBCs   %


 


Platelet Estimate   


 


Polychromasia   


 


Anisocytosis   


 


Tear Drop Cells   


 


RBC Morph Comment   


 


Puncture Site   


 


ABG pH   (7.35-7.45)  


 


ABG pCO2   (35.0-45.0)  mmHg


 


ABG pO2   (80.0-100.0)  mmHg


 


ABG HCO3   (22.0-26.0)  meq/L


 


ABG O2 Saturation   (96.0-97.0)  %


 


ABG Base Excess   (-2-2.0)  


 


Angel Test   


 


A-a Gradient   mmHg


 


PEEP   


 


Sodium   (136-145)  mEq/L


 


Potassium   (3.5-5.1)  mEq/L


 


Chloride   ()  mEq/L


 


Carbon Dioxide   (21-32)  mEq/L


 


Anion Gap   (5-15)  


 


BUN   (7-18)  mg/dL


 


Creatinine   (0.55-1.02)  mg/dL


 


Est Cr Clr Drug Dosing   


 


Estimated GFR (MDRD)   (>60)  mL/min


 


BUN/Creatinine Ratio   (14-18)  


 


Glucose   ()  mg/dL


 


Lactic Acid  5.3 H  (0.4-2.0)  mmol/L


 


Calcium   (8.5-10.1)  mg/dL


 


Magnesium   (1.8-2.4)  mg/dl


 


Total Bilirubin   (0.2-1.0)  mg/dL


 


AST   (15-37)  U/L


 


ALT   (14-59)  U/L


 


Alkaline Phosphatase   ()  U/L


 


Total Protein   (6.4-8.2)  g/dl


 


Albumin   (3.4-5.0)  g/dl


 


Globulin   gm/dL


 


Albumin/Globulin Ratio   (1-2)  


 


Urine Color   (Yellow)  


 


Urine Appearance   (Clear)  


 


Urine pH   (5.0-8.0)  


 


Ur Specific Gravity   (1.005-1.030)  


 


Urine Protein   (Negative)  


 


Urine Glucose (UA)   (Negative)  


 


Urine Ketones   (Negative)  


 


Urine Occult Blood   (Negative)  


 


Urine Nitrite   (Negative)  


 


Urine Bilirubin   (Negative)  


 


Urine Urobilinogen   (0.2-1.0)  


 


Ur Leukocyte Esterase   (Negative)  


 


Urine RBC   (0-5)  /hpf


 


Urine WBC   (0-5)  /hpf


 


Ur Epithelial Cells   (0-5)  /hpf


 


Ur Squamous Epith Cells   (0-5)  /hpf


 


Ur Renal Epithelial Cell   (0-5)  /hpf


 


Amorphous Sediment   (NOT SEEN)  /hpf


 


Urine Bacteria   (FEW)  /hpf


 


Urine Mucus   (FEW)  /hpf


 


MRSA (PCR)   











Result Diagrams: 


 19 07:28





 19 07:28


Martinez Results Last 24 hrs: 


 Microbiology











 19 07:02 Influenza Type A Antigen Screen - Final





 Nasopharyngeal Swab    NEGATIVE INFLUENZA A VIRUS AG





 Influenza Type B Antigen Screen - Final





    NEGATIVE INFLUENZA B VIRUS AG














EKG INTERPRETATION


EKG Date: 19


Time: 06:45


Rhythm: Other (Sinus Tachycardia)


Rate (Beats/Min): 166


Axis: Normal


P-Wave: Present


QRS: Normal


ST-T: Normal


QT: Normal


Comparison: No Change


Problem List Initiated/Reviewed/Updated: Yes


Orders Last 24hrs: 


 Active Orders 24 hr











 Category Date Time Status


 


 Admission Status [Patient Status] [ADT] Routine ADT  19 09:40 Active


 


 Insert Aguirre Catheter [Insert Urinary Catheter] [OM.PC] Care  19 07:05 

Ordered





 Stat   


 


 CULTURE BLOOD [BC] Stat Lab  19 07:15 Received


 


 CULTURE BLOOD [BC] Stat Lab  19 07:28 Received


 


 CULTURE URINE [RM] Stat Lab  19 06:57 Received


 


 REFLEX LACTIC ACID YES OR NO [CHEM] Routine Lab  19 14:14 Received


 


 Acetaminophen [Tylenol] Med  19 16:19 Ordered





 650 mg GTUBE Q4H PRN   


 


 Acetaminophen/oxyCODONE [Percocet 325-5 MG] Med  19 16:19 Ordered





 1 tab PO Q4H PRN   


 


 Atropine 1% [Atropine 1% Ophth Soln] Med  19 16:19 Ordered





 2 drop EYEBOTH Q4HR PRN   


 


 Cholecalciferol (Vitamin D3) [Vitamin D3] Med  19 09:00 Ordered





 2,000 unit GTUBE DAILY   


 


 Dextrose 5%-0.9% NaCl [Dextrose 5%-Normal Saline] 1,000 Med  19 09:30 

Active





 ml   





 IV ASDIRECTED   


 


 Docusate Sodium/Sennosides [Senna Plus] Med  19 21:00 Ordered





 2 each GTUBE BID   


 


 FA/Lycopene/Lut/MV,Ca,Iron,Min Med  19 21:00 Ordered





 1 tab GTUBE BEDTIME   


 


 FLUoxetine [PROzac] Med  19 09:00 Ordered





 10 mg GTUBE DAILY   


 


 LORazepam [Ativan] Med  19 09:00 Ordered





 0.25 mg GTUBE DAILY   


 


 Lactose-Reduced Food/Fiber [Jevity 1.2 Antwan Liquid] Med  19 18:00 Ordered





 260 ml GTUBE 5XDAY   


 


 Lactulose Med  19 16:19 Ordered





 15 ml GTUBE DAILY PRN   


 


 Magnesium Oxide [Magnesium] Med  19 21:00 Ordered





 400 mg GTUBE BID   


 


 Morphine [Morphine 10 MG/0.5 ML Oral Syringe] Med  19 16:19 Ordered





 2 mg .XX Q2HR PRN   


 


 Nystatin Med  19 21:00 Ordered





 15 gm TP BID   


 


 Ondansetron Med  19 16:19 Ordered





 8 mg GTUBE Q6H PRN   


 


 Potassium Chloride [Potassium Chloride Solution] Med  19 21:00 Ordered





 20 meq PO BID   


 


 Water [Water] Med  19 21:00 Ordered





 100 ml GTUBE TID   


 


 levETIRAcetam Med  19 21:00 Ordered





 10 ml GTUBE BID   


 


 tiZANidine HCl [Zanaflex] Med  19 21:00 Ordered





 2 mg GTUBE TID   


 


 Blood Culture x2 Reflex Set [OM.PC] Stat Oth  19 06:30 Ordered


 


 Resuscitation Status Routine Resus Stat  19 15:16 Ordered








 Medication Orders





Dextrose/Sodium Chloride (Dextrose 5%-Normal Saline)  1,000 mls @ 75 mls/hr IV 

ASDIRECTED [facility]


   Last Admin: 19 09:44  Dose: 75 mls/hr








Assessment/Plan Comment:: 


Assessment/Plan:


Acute:


Sepsis w/ Hypotension


   - 2/2 UTI due to neurogenic bladder from MS


   - Risk Factor: Advanced MS, AMS, Recurrent UTI and Aspiration Syndrome 2/2 

Dysphagia


   - Fever of 39 C; WBC of 14.22, HR of 119-128, BP of 92/34, 100/48, 101/58 

mmHg, RR 24-33


   - LA 6.2 --> 5.3 --> 5.6; received 1L of NS in ED; will add 2L NS and then 

start 1L NS at 125 cc/hr


   - Procalcitonin and Cortisol level


   - Solucortef 100 mg IV Q6H once cortisol level is drawn


   - High pseudomonal risk due to recent multiple antibiotic use: Azithromycin -, Cipro 2/15-, and Bactrim -


   - Triple antibiotic regimen: IV Levaquin, Zosyn and Vancomycin





UTI


   - Carries Multiple Hx/o UTIs


   - 2/2 Urinary retention from neurogenic bladder 


   - UA is pos for infection w/ 3+ LE


   - IV Antibiotic for GN coverage





Anemia


   - Acute on Chronic


   - Hgb of 8.5 and Hct 29; baseline is - (2018)


   - Stool occult study


   - Iron Panel in AM 





End of Life Care


   - Spoke to her mother; not ready for it





Chronic:


Dysphagia


Urinary Retention


Back Pain


OA


Spasms


Osteoporosis


CVA/TIA


MS


Anemia


Seizure Disorder


Hx/o DVT/VTE


Alzheimer's Disease


DM2


Anxiety


Depression





Plan:


Admit to [facility] w/ Tele


Routine AM Labs


Sepsis Protocol (my own-triple regimen)


Resume Home Meds except Bactrim


Aspiration/Seizure/Fall Precaution


DVT/GI PPx: Lovenox sub Q


SW/CM for D/c planning


DNR/DNI/Comfort Measures
bus/Public Transport

## 2019-08-08 NOTE — ED PROVIDER NOTE - CLINICAL SUMMARY MEDICAL DECISION MAKING FREE TEXT BOX
39F with PMH of bipolar disorder (taking clozapine/ Lithium), Asthma, GERD, presents with right wrist pain after being handcuffed two days ago. Patient endorses pain located near the snuff box of the right wrist. Denies falling on her right hand/wrist. Vitals reassuring on arrival. Exam pertinent for snuffbox and scaphoid tubercle tenderness, with normal  strength, normal radial pulses, and no swelling or ecchymosis to wrist. Xrays, thumb spica splint, pain meds.

## 2019-08-08 NOTE — ED PROVIDER NOTE - NS ED ROS FT
GENERAL: no fever, chills  HEENT: no cough, congestion, odynophagia, dysphagia  CARDIAC: +palpitations, no chest pain, lightheadedness  PULM: no dyspnea, wheezing   GI: no abdominal pain, nausea, vomiting, diarrhea, constipation  : no urinary dysuria, frequency  NEURO: +endorses tingling in dorsal aspect of right thumb, no headache, motor weakness  MSK: +pain on dorsal/radial aspect of right wrist near snuffbox  SKIN: +abrasion on wrist where handcuffs were, hives  HEME: no active bleeding, bruising

## 2019-08-08 NOTE — ED PROVIDER NOTE - OBJECTIVE STATEMENT
Patient is a 39 year old Female with PMH of Asthma, GERD, and Bipolar disorder (on clozapine and lithium) presenting to the ED complaining of right wrist and thumb pain since yesterday. She states she was handcuffed 2 days ago and they were too tight. States that she has tingling on the dorsal surface of the thumb. Her pain is located on the radial aspect of the wrist near the snuff box. Pain is 5/10, didn't take anything for the pain. Patient says she never fell on her right wrist. Also mentioned having palpitations associated with the pain. Denies any fevers, chills, chest pain, lightheadedness, SOB, abdominal pain, nausea, vomiting, diarrhea, constipation, dysuria. Patient is a 39 year old Female with PMH of Asthma, GERD, and Bipolar disorder (on clozapine and lithium) presenting to the ED complaining of right wrist and thumb pain since yesterday. She states she was handcuffed 2 days ago and they were too tight. States that she has tingling on the dorsal surface of the thumb. Her pain is located on the radial aspect of the wrist near the snuff box. Pain is 5/10, didn't take anything for the pain. Patient says she never fell on her right wrist. Also mentioned having palpitations associated with the pain. Patient also notes that she came into the ED today to see a nurse in behavioral health to talk about babysitting her kids. Denies any fevers, chills, chest pain, lightheadedness, SOB, abdominal pain, nausea, vomiting, diarrhea, constipation, dysuria.

## 2019-08-08 NOTE — ED PROVIDER NOTE - PHYSICAL EXAMINATION
GENERAL: no acute distress, non-toxic appearing  HEAD: normocephalic, atraumatic  HEENT: normal conjunctiva, oral mucosa moist, neck supple  CARDIAC: regular rate and rhythm, normal S1 and S2,  no appreciable murmurs, 2+ radial pulses in bilateral upper extremities  PULM: clear to ascultation bilaterally, no crackles, rales, rhonchi, or wheezing  NEURO: alert and oriented x 3, 5/5 strength in bilateral upper extremities including  strength, no focal motor or sensory deficits  MSK: + tenderness at right snuff box and right scaphoid tubercle, no visible deformities or ecchymosis to wrist, no peripheral edema, calf tenderness/redness/swelling  SKIN: +small 2cm abrasion on volar aspect of right wrist with skin intact, no visible rashes, dry, well-perfused  PSYCH: +slightly inappropriate/blunted affect

## 2019-08-16 ENCOUNTER — EMERGENCY (EMERGENCY)
Facility: HOSPITAL | Age: 39
LOS: 1 days | Discharge: HOME | End: 2019-08-16
Attending: EMERGENCY MEDICINE | Admitting: EMERGENCY MEDICINE
Payer: COMMERCIAL

## 2019-08-16 VITALS
RESPIRATION RATE: 18 BRPM | OXYGEN SATURATION: 100 % | HEART RATE: 78 BPM | TEMPERATURE: 98 F | SYSTOLIC BLOOD PRESSURE: 113 MMHG | DIASTOLIC BLOOD PRESSURE: 87 MMHG

## 2019-08-16 LAB
APPEARANCE UR: CLEAR — SIGNIFICANT CHANGE UP
BACTERIA # UR AUTO: NEGATIVE — SIGNIFICANT CHANGE UP
BILIRUB UR-MCNC: NEGATIVE — SIGNIFICANT CHANGE UP
BLOOD UR QL VISUAL: NEGATIVE — SIGNIFICANT CHANGE UP
COLOR SPEC: SIGNIFICANT CHANGE UP
GLUCOSE UR-MCNC: NEGATIVE — SIGNIFICANT CHANGE UP
HYALINE CASTS # UR AUTO: NEGATIVE — SIGNIFICANT CHANGE UP
KETONES UR-MCNC: NEGATIVE — SIGNIFICANT CHANGE UP
LEUKOCYTE ESTERASE UR-ACNC: SIGNIFICANT CHANGE UP
NITRITE UR-MCNC: NEGATIVE — SIGNIFICANT CHANGE UP
PH UR: 7.5 — SIGNIFICANT CHANGE UP (ref 5–8)
PROT UR-MCNC: 10 — SIGNIFICANT CHANGE UP
RBC CASTS # UR COMP ASSIST: SIGNIFICANT CHANGE UP (ref 0–?)
SP GR SPEC: 1.01 — SIGNIFICANT CHANGE UP (ref 1–1.04)
SQUAMOUS # UR AUTO: SIGNIFICANT CHANGE UP
UROBILINOGEN FLD QL: NORMAL — SIGNIFICANT CHANGE UP
WBC UR QL: SIGNIFICANT CHANGE UP (ref 0–?)

## 2019-08-16 PROCEDURE — 99283 EMERGENCY DEPT VISIT LOW MDM: CPT | Mod: GC

## 2019-08-16 RX ORDER — FLUCONAZOLE 150 MG/1
150 TABLET ORAL ONCE
Refills: 0 | Status: COMPLETED | OUTPATIENT
Start: 2019-08-16 | End: 2019-08-16

## 2019-08-16 RX ORDER — IBUPROFEN 200 MG
600 TABLET ORAL ONCE
Refills: 0 | Status: COMPLETED | OUTPATIENT
Start: 2019-08-16 | End: 2019-08-16

## 2019-08-16 RX ADMIN — FLUCONAZOLE 150 MILLIGRAM(S): 150 TABLET ORAL at 18:10

## 2019-08-16 RX ADMIN — Medication 600 MILLIGRAM(S): at 16:45

## 2019-08-16 NOTE — ED PROVIDER NOTE - NSFOLLOWUPINSTRUCTIONS_ED_ALL_ED_FT
Please follow up with your primary medical doctor within two days.  Return to the emergency department immediately if your symptoms worsen or if you develop fever, chills, abdominal pain, nausea or vomiting.

## 2019-08-16 NOTE — ED PROVIDER NOTE - NS ED ROS FT
GENERAL: denies fever, chills  HEENT: denies congestion, dysphagia, lightheadedness   CARDIAC: denies chest pain, palpitations  PULM: denies dyspnea, wheezing, cough  GI: denies abdominal pain, nausea, vomiting, diarrhea, constipation, melena, hematochezia  : +pelvic pain, denies dysuria, frequency, incontinence, hematuria  NEURO: denies headache, motor weakness, sensory changes  MSK: denies joint or muscle pain  SKIN: denies new rashes, hives  HEME: denies active bleeding, bruising

## 2019-08-16 NOTE — ED PROVIDER NOTE - OBJECTIVE STATEMENT
40y/o F w/ h/o GERD, asthma, hernia, BPD presents to the ED with pelvic pain. Patient states that her pain has been going on for a few days and is in the lower abdomen. The pain does not radiate anywhere. Patient reports her last menstrual period was 2 weeks ago. She denies active bleeding, lightheadedness, fevers, chills, headache, chest pain, SOB.

## 2019-08-16 NOTE — PROVIDER CONTACT NOTE (OTHER) - ASSESSMENT
Pt is discharged. She is from Building . She asked for a Taxi, I called [facility] and was told her Medicaid in inactive. I called [place] [id number] @ [phone], spoke with Shelby, she said pt ia capable to take Bus and asked to give her a metro card. I explained pt about her Medicaid , she accepted a metro card. Pt left.

## 2019-08-16 NOTE — ED ADULT NURSE REASSESSMENT NOTE - NS ED NURSE REASSESS COMMENT FT1
Pt dc'd spoke to SW, pt provided way back to facility, no medical complaints at this time, provided with dc instructions and paperwork.

## 2019-08-16 NOTE — ED ADULT TRIAGE NOTE - CHIEF COMPLAINT QUOTE
pt reports she is compliant with her psych meds and is pleasant in triage. reports pelvic pain and cramping,. denies bleeing

## 2019-08-16 NOTE — ED PROVIDER NOTE - CLINICAL SUMMARY MEDICAL DECISION MAKING FREE TEXT BOX
40y/o F w/ h/o GERD, asthma, hernia, BPD presents to the ED with non radiating pelvic pain for a few days and is in the lower abdomen denies active bleeding, fevers. Will do pelvic exam to r/o PID and d/c with OB/GYN f/u.

## 2019-08-16 NOTE — ED PROVIDER NOTE - ATTENDING CONTRIBUTION TO CARE
Dr. Mackey: I have personally performed a face to face bedside history and physical examination of this patient. I have discussed the history, examination, review of systems, assessment and plan of management with the resident. I have reviewed the electronic medical record and amended it to reflect my history, review of systems, physical exam, assessment and plan.    39F with pmh of GERD, asthma, psych disease p/w pelvic/lower abdominal pain and watery diarrhea x few days. +feels bloated. +vaginal discharge. No f/c, n/v, urinary symptoms. No hx of abdominal surgeries. +sexually active, one male partner.    On exam  GEN - NAD; well appearing; A+O x3   HEAD - NC/AT     EYES - EOMI, no conjunctival pallor, no scleral icterus  ENT -   mucous membranes  moist , no discharge      NECK - Neck supple  PULM - CTA b/l,  symmetric breath sounds  COR -  RRR, S1 S2, no murmurs  ABD - , ND, +ttp at lower abdomen, soft, no guarding, no rebound, no masses    BACK - no CVA tenderness, nontender spine     EXTREMS - no edema, no deformity, warm and well perfused    SKIN - no rash or bruising      NEUROLOGIC - alert, sensation nl, motor 5/5 RUE/LUE/RLE/LLE    39F p/w lower abdominal pain, vaginal discharge. Afebrile, no vomiting. Will r/o pregnancy/ectopic, Dr. Mackey: I have personally performed a face to face bedside history and physical examination of this patient. I have discussed the history, examination, review of systems, assessment and plan of management with the resident. I have reviewed the electronic medical record and amended it to reflect my history, review of systems, physical exam, assessment and plan.    39F with pmh of GERD, asthma, psych disease p/w pelvic/lower abdominal pain and watery diarrhea x few days. +feels bloated. +vaginal discharge. No f/c, n/v, urinary symptoms. No hx of abdominal surgeries. +sexually active, one male partner.    On exam  GEN - NAD; well appearing; A+O x3   HEAD - NC/AT     EYES - EOMI, no conjunctival pallor, no scleral icterus  ENT -   mucous membranes  moist , no discharge      NECK - Neck supple  PULM - CTA b/l,  symmetric breath sounds  COR -  RRR, S1 S2, no murmurs  ABD - , ND, +mild ttp at lower abdomen, soft, no guarding, no rebound, no masses    BACK - no CVA tenderness, nontender spine     EXTREMS - no edema, no deformity, warm and well perfused    SKIN - no rash or bruising      NEUROLOGIC - alert, sensation nl, motor 5/5 RUE/LUE/RLE/LLE  PSYCH - odd affect, distruptive but redirectable. No active SI/HI or hallucinations    39F p/w lower abdominal pain, vaginal discharge. Afebrile, no vomiting. Will r/o pregnancy/ectopic, UTI. Likely yeast infection. plan for UA, UCG, gc/chlamydia, pelvic exam.  Do not suspect appendicitis, torsion, TOA or colitis. Dr. Mackey: I have personally performed a face to face bedside history and physical examination of this patient. I have discussed the history, examination, review of systems, assessment and plan of management with the resident. I have reviewed the electronic medical record and amended it to reflect my history, review of systems, physical exam, assessment and plan.    39F with pmh of GERD, asthma, psych disease p/w pelvic/lower abdominal pain and watery diarrhea x few days. +feels bloated. +vaginal discharge. No f/c, n/v, urinary symptoms. No hx of abdominal surgeries. +sexually active, one male partner.    On exam  GEN - NAD; well appearing; A+O x3   HEAD - NC/AT     EYES - EOMI, no conjunctival pallor, no scleral icterus  ENT -   mucous membranes  moist , no discharge      NECK - Neck supple  PULM - CTA b/l,  symmetric breath sounds  COR -  RRR, S1 S2, no murmurs  ABD - , ND, +mild ttp at lower abdomen, soft, no guarding, no rebound, no masses    BACK - no CVA tenderness, nontender spine     EXTREMS - no edema, no deformity, warm and well perfused    SKIN - no rash or bruising      NEUROLOGIC - alert, sensation nl, motor 5/5 RUE/LUE/RLE/LLE  PSYCH - odd affect, distruptive but redirectable. No active SI/HI or hallucinations    39F p/w lower abdominal pain, vaginal discharge. Afebrile, no vomiting. Will r/o pregnancy/ectopic, UTI. Likely yeast infection. plan for UA, UCG, gc/chlamydia, pelvic exam.  Do not suspect appendicitis, torsion, TOA or colitis..

## 2019-08-16 NOTE — ED PROVIDER NOTE - PROGRESS NOTE DETAILS
Pt completely distractible and is nontender in the abdomen on re-examination. Pelvic exam performed with Dr. Khan and shows small amount of white discharge, no CMT or adnexal tenderness. GC/chlamydia sent. will treat for yeast infection. Pending UA and SW. Dr. Beltran: Received signout 39 F P/W dysuria with exam c/w yeast infection.  Singed out pending UA and administration of fluconazole.  Urine negative for infection and pt feels improved.

## 2019-08-16 NOTE — ED PROVIDER NOTE - PHYSICAL EXAMINATION
GENERAL: no acute distress, non-toxic appearing  HEAD: normocephalic, atraumatic  HEENT: normal conjunctiva, oral mucosa moist, neck supple  CARDIAC: regular rate and rhythm, normal S1 and S2,  no appreciable murmurs  PULM: clear to ascultation bilaterally, no crackles, rales, rhonchi, or wheezing  GI: abdomen nondistended, soft, nontender, no guarding or rebound tenderness  : chapeone Dr. Mackey, no adnexal tenderness, no cervical tendernes, no gross blood on exam, no CVA tenderness, no suprapubic tenderness  NEURO: alert and oriented x 3, normal speech, PERRLA, EOMI, no focal motor or sensory deficits, nonantalgic gait  MSK: no visible deformities, no peripheral edema, calf tenderness/redness/swelling  SKIN: no visible rashes, dry, well-perfused  PSYCH: appropriate mood and affect

## 2019-08-17 LAB
C TRACH RRNA SPEC QL NAA+PROBE: SIGNIFICANT CHANGE UP
N GONORRHOEA RRNA SPEC QL NAA+PROBE: SIGNIFICANT CHANGE UP
SPECIMEN SOURCE: SIGNIFICANT CHANGE UP

## 2019-08-22 ENCOUNTER — EMERGENCY (EMERGENCY)
Facility: HOSPITAL | Age: 39
LOS: 1 days | Discharge: HOME | End: 2019-08-22
Attending: EMERGENCY MEDICINE | Admitting: EMERGENCY MEDICINE
Payer: SELF-PAY

## 2019-08-22 VITALS
SYSTOLIC BLOOD PRESSURE: 137 MMHG | DIASTOLIC BLOOD PRESSURE: 88 MMHG | OXYGEN SATURATION: 100 % | RESPIRATION RATE: 18 BRPM | TEMPERATURE: 98 F | HEART RATE: 101 BPM

## 2019-08-22 PROCEDURE — 99283 EMERGENCY DEPT VISIT LOW MDM: CPT

## 2019-08-22 NOTE — ED ADULT NURSE REASSESSMENT NOTE - NS ED NURSE REASSESS COMMENT FT1
pt calm & cooperative d/c by MD bae/ángel kelly done prior to d/c pt cleared to go back to [facility], metro card given pt  denies si/hi/avh presently verbalizing understanding of d/c instructions.

## 2019-08-22 NOTE — ED ADULT NURSE NOTE - OBJECTIVE STATEMENT
Received pt in  pt bought in by EMS from [facility] s/p verbal altercation with staff over breakfast being late, pt denies si/hi/avh presently safety & comfort measures maintained eval on going.

## 2019-08-22 NOTE — ED ADULT TRIAGE NOTE - CHIEF COMPLAINT QUOTE
patient from [facility] had an argument with staff. at baseline mental status in ED calm and cooperative

## 2019-08-22 NOTE — ED ADULT NURSE NOTE - CHIEF COMPLAINT QUOTE
patient from [facility] had an argument with staff. at baseline mental status in ED calm and cooperative

## 2019-08-22 NOTE — ED PROVIDER NOTE - OBJECTIVE STATEMENT
39F h/o bipolar disorder, borderline personality disorder sent from her psych residence due to acting out  behavior this morning, became angry and shouted at staff as breakfast was late. No physical violence Did not harm anyone and denies SI/HI. Collateral info obtained by . currently patient feels fine. Does not feel angry and does not wish to harm anyone denies SI/HI. No drugs alcohol No acute medical symptoms. No injuries. Review of Symptoms in all systems otherwise negative, except as indicated

## 2019-08-22 NOTE — ED PROVIDER NOTE - CLINICAL SUMMARY MEDICAL DECISION MAKING FREE TEXT BOX
Transient anger and agitation, now resolved in 39F with bipolar ds and borderline Personality disorder, sent from residence. Calm and cooperative in ED. d/w psych residence. No need for acute psych intervention. DC back to psych residence
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
## 2019-08-29 ENCOUNTER — EMERGENCY (EMERGENCY)
Facility: HOSPITAL | Age: 39
LOS: 1 days | Discharge: HOME | End: 2019-08-29
Admitting: EMERGENCY MEDICINE
Payer: COMMERCIAL

## 2019-08-29 VITALS
OXYGEN SATURATION: 99 % | TEMPERATURE: 98 F | DIASTOLIC BLOOD PRESSURE: 87 MMHG | SYSTOLIC BLOOD PRESSURE: 131 MMHG | HEART RATE: 80 BPM | RESPIRATION RATE: 18 BRPM

## 2019-08-29 PROCEDURE — 73610 X-RAY EXAM OF ANKLE: CPT | Mod: 26,LT

## 2019-08-29 PROCEDURE — 99283 EMERGENCY DEPT VISIT LOW MDM: CPT

## 2019-08-29 PROCEDURE — 73630 X-RAY EXAM OF FOOT: CPT | Mod: 26,LT

## 2019-08-29 RX ORDER — ACETAMINOPHEN 500 MG
650 TABLET ORAL ONCE
Refills: 0 | Status: COMPLETED | OUTPATIENT
Start: 2019-08-29 | End: 2019-08-29

## 2019-08-29 RX ADMIN — Medication 650 MILLIGRAM(S): at 11:16

## 2019-08-29 NOTE — ED STATDOCS - NSFOLLOWUPINSTRUCTIONS_ED_ALL_ED_FT
Limit further injury, over exertion, and rest affected area. Take motrin 600mg every 6h as needed for pain. Please continue all home medications as directed. See your regular doctor/podiatry within 72 hours for follow-up care. Return to ER for new or worsening symptoms.

## 2019-08-29 NOTE — ED STATDOCS - PATIENT PORTAL LINK FT
You can access the FollowMyHealth Patient Portal offered by [facility] by registering at the following website: [email or web address]. By joining [employer]’s FollowMyHealth portal, you will also be able to view your health information using other applications (apps) compatible with our system.

## 2019-08-29 NOTE — ED STATDOCS - OBJECTIVE STATEMENT
38 y/o F presents to the ED, brought by ambulance with left ankle pain. Pt was walking and slipped and in the process of trying catch her she twisted her left ankle inward and outward. Associated symptoms dizziness. Pt toes are fine, denies hitting her heard.    PMH: Asthma    Bipolar Disorder    Borderline Personality Disorder    Cholelithiasis    Depression    Fibroids    GERD (Gastroesophageal Reflux Disease)    Obesity    PSH: negative  FH/SH: non-contributory, except as noted in the HPI  Allergies: No known drug allergies  Medications: No chronic home medications 40 y/o F presents to the ED, brought by ambulance with left ankle pain. Pt was walking and slipped and in the process of trying catch her she twisted her left ankle inward and outward. Denies LOC, head trauma, n/v/f/c, CP, SOB, abdominal pain, dysuria, hematuria, melena, hematochezia, diarrhea, constipation.    PMH: Asthma    Bipolar Disorder    Borderline Personality Disorder    Cholelithiasis    Depression    Fibroids    GERD (Gastroesophageal Reflux Disease)    Obesity    PSH: negative  FH/SH: non-contributory, except as noted in the HPI  Allergies: No known drug allergies  Medications: No chronic home medications

## 2019-08-29 NOTE — PROVIDER CONTACT NOTE (OTHER) - SITUATION
Worker contacted patient’s residence [facility] and spoke to [facility]  ([phone]) staff  to verify patient arrived to residence via Metro Card. Team informed.

## 2019-08-29 NOTE — ED ADULT NURSE REASSESSMENT NOTE - NS ED NURSE REASSESS COMMENT FT1
Pt left with her own metrocard prior to SW speaking to patient. [facility] notified SW that patient reached facility safely.

## 2019-08-29 NOTE — ED STATDOCS - CLINICAL SUMMARY MEDICAL DECISION MAKING FREE TEXT BOX
40 y/o F presents to the ED, brought by ambulance with left ankle pain. Plan to get x-ray of left ankle to rule out fracture  and pain management.

## 2019-09-27 ENCOUNTER — EMERGENCY (EMERGENCY)
Facility: HOSPITAL | Age: 39
LOS: 1 days | Discharge: HOME | End: 2019-09-27
Attending: PERSONAL EMERGENCY RESPONSE ATTENDANT | Admitting: PERSONAL EMERGENCY RESPONSE ATTENDANT
Payer: COMMERCIAL

## 2019-09-27 VITALS
HEART RATE: 96 BPM | TEMPERATURE: 98 F | OXYGEN SATURATION: 98 % | SYSTOLIC BLOOD PRESSURE: 148 MMHG | DIASTOLIC BLOOD PRESSURE: 84 MMHG | RESPIRATION RATE: 18 BRPM

## 2019-09-27 DIAGNOSIS — F25.0 SCHIZOAFFECTIVE DISORDER, BIPOLAR TYPE: ICD-10-CM

## 2019-09-27 LAB
ALBUMIN SERPL ELPH-MCNC: 4 G/DL — SIGNIFICANT CHANGE UP (ref 3.3–5)
ALP SERPL-CCNC: 116 U/L — SIGNIFICANT CHANGE UP (ref 40–120)
ALT FLD-CCNC: 20 U/L — SIGNIFICANT CHANGE UP (ref 4–33)
AMPHET UR-MCNC: NEGATIVE — SIGNIFICANT CHANGE UP
ANION GAP SERPL CALC-SCNC: 10 MMO/L — SIGNIFICANT CHANGE UP (ref 7–14)
APAP SERPL-MCNC: < 15 UG/ML — LOW (ref 15–25)
AST SERPL-CCNC: 13 U/L — SIGNIFICANT CHANGE UP (ref 4–32)
BARBITURATES UR SCN-MCNC: NEGATIVE — SIGNIFICANT CHANGE UP
BASOPHILS # BLD AUTO: 0.04 K/UL — SIGNIFICANT CHANGE UP (ref 0–0.2)
BASOPHILS NFR BLD AUTO: 0.4 % — SIGNIFICANT CHANGE UP (ref 0–2)
BENZODIAZ UR-MCNC: NEGATIVE — SIGNIFICANT CHANGE UP
BILIRUB SERPL-MCNC: < 0.2 MG/DL — LOW (ref 0.2–1.2)
BUN SERPL-MCNC: 10 MG/DL — SIGNIFICANT CHANGE UP (ref 7–23)
CALCIUM SERPL-MCNC: 10.2 MG/DL — SIGNIFICANT CHANGE UP (ref 8.4–10.5)
CANNABINOIDS UR-MCNC: NEGATIVE — SIGNIFICANT CHANGE UP
CHLORIDE SERPL-SCNC: 108 MMOL/L — HIGH (ref 98–107)
CO2 SERPL-SCNC: 21 MMOL/L — LOW (ref 22–31)
COCAINE METAB.OTHER UR-MCNC: NEGATIVE — SIGNIFICANT CHANGE UP
CREAT SERPL-MCNC: 1.1 MG/DL — SIGNIFICANT CHANGE UP (ref 0.5–1.3)
DIGOXIN SERPL-MCNC: < 0.3 NG/ML — LOW (ref 0.8–2)
EOSINOPHIL # BLD AUTO: 0.53 K/UL — HIGH (ref 0–0.5)
EOSINOPHIL NFR BLD AUTO: 4.8 % — SIGNIFICANT CHANGE UP (ref 0–6)
ETHANOL BLD-MCNC: < 10 MG/DL — SIGNIFICANT CHANGE UP
GLUCOSE SERPL-MCNC: 95 MG/DL — SIGNIFICANT CHANGE UP (ref 70–99)
HCT VFR BLD CALC: 40.9 % — SIGNIFICANT CHANGE UP (ref 34.5–45)
HGB BLD-MCNC: 12.5 G/DL — SIGNIFICANT CHANGE UP (ref 11.5–15.5)
IMM GRANULOCYTES NFR BLD AUTO: 0.4 % — SIGNIFICANT CHANGE UP (ref 0–1.5)
LYMPHOCYTES # BLD AUTO: 2.32 K/UL — SIGNIFICANT CHANGE UP (ref 1–3.3)
LYMPHOCYTES # BLD AUTO: 20.9 % — SIGNIFICANT CHANGE UP (ref 13–44)
MCHC RBC-ENTMCNC: 27.6 PG — SIGNIFICANT CHANGE UP (ref 27–34)
MCHC RBC-ENTMCNC: 30.6 % — LOW (ref 32–36)
MCV RBC AUTO: 90.3 FL — SIGNIFICANT CHANGE UP (ref 80–100)
METHADONE UR-MCNC: NEGATIVE — SIGNIFICANT CHANGE UP
MONOCYTES # BLD AUTO: 0.82 K/UL — SIGNIFICANT CHANGE UP (ref 0–0.9)
MONOCYTES NFR BLD AUTO: 7.4 % — SIGNIFICANT CHANGE UP (ref 2–14)
NEUTROPHILS # BLD AUTO: 7.37 K/UL — SIGNIFICANT CHANGE UP (ref 1.8–7.4)
NEUTROPHILS NFR BLD AUTO: 66.1 % — SIGNIFICANT CHANGE UP (ref 43–77)
NRBC # FLD: 0 K/UL — SIGNIFICANT CHANGE UP (ref 0–0)
OPIATES UR-MCNC: NEGATIVE — SIGNIFICANT CHANGE UP
OXYCODONE UR-MCNC: NEGATIVE — SIGNIFICANT CHANGE UP
PCP UR-MCNC: NEGATIVE — SIGNIFICANT CHANGE UP
PLATELET # BLD AUTO: 288 K/UL — SIGNIFICANT CHANGE UP (ref 150–400)
PMV BLD: 9.4 FL — SIGNIFICANT CHANGE UP (ref 7–13)
POTASSIUM SERPL-MCNC: 4.1 MMOL/L — SIGNIFICANT CHANGE UP (ref 3.5–5.3)
POTASSIUM SERPL-SCNC: 4.1 MMOL/L — SIGNIFICANT CHANGE UP (ref 3.5–5.3)
PROT SERPL-MCNC: 8.1 G/DL — SIGNIFICANT CHANGE UP (ref 6–8.3)
RBC # BLD: 4.53 M/UL — SIGNIFICANT CHANGE UP (ref 3.8–5.2)
RBC # FLD: 14 % — SIGNIFICANT CHANGE UP (ref 10.3–14.5)
SALICYLATES SERPL-MCNC: < 5 MG/DL — LOW (ref 15–30)
SODIUM SERPL-SCNC: 139 MMOL/L — SIGNIFICANT CHANGE UP (ref 135–145)
WBC # BLD: 11.12 K/UL — HIGH (ref 3.8–10.5)
WBC # FLD AUTO: 11.12 K/UL — HIGH (ref 3.8–10.5)

## 2019-09-27 PROCEDURE — 99283 EMERGENCY DEPT VISIT LOW MDM: CPT

## 2019-09-27 PROCEDURE — 90792 PSYCH DIAG EVAL W/MED SRVCS: CPT | Mod: GC

## 2019-09-27 NOTE — ED ADULT NURSE NOTE - NSIMPLEMENTINTERV_GEN_ALL_ED
Implemented All Universal Safety Interventions:  [place] to call system. Call bell, personal items and telephone within reach. Instruct patient to call for assistance. Room bathroom lighting operational. Non-slip footwear when patient is off stretcher. Physically safe environment: no spills, clutter or unnecessary equipment. Stretcher in lowest position, wheels locked, appropriate side rails in place.

## 2019-09-27 NOTE — ED BEHAVIORAL HEALTH ASSESSMENT NOTE - SUICIDE RISK FACTORS
History of abuse/trauma/Agitation/Severe Anxiety/Panic History of abuse/trauma/Psychotic disorder current/past/Mood Disorder current/past/Cluster B Personality disorders or traits current/past
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
## 2019-09-27 NOTE — ED BEHAVIORAL HEALTH ASSESSMENT NOTE - DIFFERENTIAL
malingering, schizoaffective disorder, borderline personality disorder schizoaffective disorder, borderline personality disorder
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
## 2019-09-27 NOTE — ED BEHAVIORAL HEALTH ASSESSMENT NOTE - MEDICATIONS (PRESCRIPTIONS, DIRECTIONS)
c/w prolixin dec and all PO meds (pt agrees to resume PO meds this evening). c/w all medications as dispensed by residence

## 2019-09-27 NOTE — ED PROVIDER NOTE - NSFOLLOWUPINSTRUCTIONS_ED_ALL_ED_FT
1. Follow up with your team at [facility].  2.  Return to the ER for any suicidal or homicidal thoughts.

## 2019-09-27 NOTE — ED PROVIDER NOTE - OBJECTIVE STATEMENT
Attending MD Villafana.  Pt is a 38 yo female who endorses hx of GERD, schizophrenia, depression and borderline personality disorder who presents to ED with complaint of wanting to die. States that she's looked up ways to kill herself online but has not acted on anything and has no one way in mind.  Endorses hx of attempting overdose with digoxin.  Pt denies any attempt to harm self prior to arrival today.  She is tearful but forthcoming without psychomotor agitation.  States that she has seen the ghost of her dead father and that she needs a needle in her chest.  She denies any physical pain complaints or injuries.

## 2019-09-27 NOTE — ED PROVIDER NOTE - PATIENT PORTAL LINK FT
You can access the FollowMyHealth Patient Portal offered by [facility] by registering at the following website: [email or web address]. By joining [employer]’s FollowMyHealth portal, you will also be able to view your health information using other applications (apps) compatible with our system.

## 2019-09-27 NOTE — ED PROVIDER NOTE - PROGRESS NOTE DETAILS
Attending MD Villafana.  PT being seen by psych currently.  Likely TBDC but will pend formal recs. Received call from Dr. Lackey.  Psychiatry stating that pt is cleared psychiatrically.  SW to help arrange transport back to [facility].  Pt has an ACT team and an NP at [facility].

## 2019-09-27 NOTE — ED BEHAVIORAL HEALTH ASSESSMENT NOTE - CURRENT PASSIVE IDEATION:
Bedside shift report received from LIZ Bravo. Patient up speaking with family at bedside. No complaints at this time. Will continue to monitor. Yes

## 2019-09-27 NOTE — ED BEHAVIORAL HEALTH ASSESSMENT NOTE - CASE SUMMARY
Patient is a 39-year-old, single,  female, non caregiver, unemployed, domiciled on [place] grounds building [address] [place] [address], with past psychiatric history of Schizoaffective Disorder, Borderline Personality Disorder, hx of cannabis abuse, multiple inpatient psychiatric hospitalizations (>20), last [facility] 4 12/12-12/24 2018, with frequent visits to the [facility] (well-known to staff), with ACT Team, 3 prior suicide attempts (last in 2012 via OD), recurrent and chronic suicidal gestures and suicidal ideation, history of property destruction when upset, past legal issues, no access to weapons. PMH GERD & asthma. BIB EMS called by staff at residence for depression and patient expressing SI.  Patient was seen and evaluated and she reports she was upset about someone who passed away in the residence. However at the time this provider evaluated the pt she denies any si/i/p, and wants to return to the residence. She was open about having the safety plan done  and was cooperative. She declines admission and does not meet criteria for involuntary admission.

## 2019-09-27 NOTE — ED BEHAVIORAL HEALTH ASSESSMENT NOTE - OTHER
dislikes residence 24/7 supervised residence chronically limited peers residence staff/ACT team initially disinhibited, dancing, but later calmed and for several hours in ER was seating/lying down, quiet, appropriate 24/7 supervised residence, no access to means

## 2019-09-27 NOTE — ED BEHAVIORAL HEALTH ASSESSMENT NOTE - CURRENT MEDICATION
Per documentation from residence: Prolixin 5mg BID, Klonopin 1mg Qdaily, Zyprexa 10mg QAM+20mg QHS, Lithium Carbonate 600mg BID    Prolixin Decanoate 25 mg IM q2week (adherent) Per patient taking Lithium and Clozapine

## 2019-09-27 NOTE — ED BEHAVIORAL HEALTH NOTE - BEHAVIORAL HEALTH NOTE
DISCHARGE:   Per provider, patient is cleared and is able to return to their previous residence, [facility] [address].  has spoken to Yue at from the residence [phone],  confirmed that patient’s mode of transportation is TAXI and that patient travels INDEPENDENTLY. Clinical provider is in agreement with TAXI back to group home.   Transportation coordinated via account 50.

## 2019-09-27 NOTE — ED BEHAVIORAL HEALTH ASSESSMENT NOTE - RISK ASSESSMENT
Patient has high chronic risk given chronic impulsivity, past SA, past aggression. Risk does not currently appear to be acutely elevated from baseline. Pt is low imminent risk- not currently acutely psychotic or depressed, no si/hi/sib/intent/plan, eating/sleeping well, no recent violence, no substance use, future oriented, able to safety plan, is on an PALACIOS and is calm and cooperative throughout evaluation. Inpatient admission Is unlikely to modify these chronic risks, pt is not requiring inpatient psychiatric admission at this time as she is not an imminent danger to self or others. Current presentation in keeping with personality pathology and chronic poor impulse control.  See  note for CSSR. Patient has high chronic risk given chronic impulsivity, past SA, past aggression. Risk does not currently appear to be acutely elevated from baseline. Pt is low imminent risk- not currently acutely psychotic or depressed, no si/hi/sib/intent/plan, eating/sleeping well, no recent violence, no substance use, future oriented, able to safety plan, compliant with medications, calm and cooperative throughout evaluation. Inpatient admission Is unlikely to modify these chronic risks, pt is not requiring inpatient psychiatric admission at this time as she is not an imminent danger to self or others. Current presentation in keeping with personality pathology and known psychiatric diagnoses. Low Acute Suicide Risk

## 2019-09-27 NOTE — ED BEHAVIORAL HEALTH ASSESSMENT NOTE - AXIS IV
Housing problems/Occupational problems/Problem related to social environment/Problems with primary support

## 2019-09-27 NOTE — ED BEHAVIORAL HEALTH ASSESSMENT NOTE - DESCRIPTION
GERD, Asthma currently lives in [facility], [address] [facility] carterfreddy MIGUEL ANGEL reports no contact with family, currently unemployed. calm and cooperative in ED    Vital Signs Last 24 Hrs  T(C): 36.7 (27 Sep 2019 18:19), Max: 36.7 (27 Sep 2019 18:19)  T(F): 98.1 (27 Sep 2019 18:19), Max: 98.1 (27 Sep 2019 18:19)  HR: 96 (27 Sep 2019 18:19) (96 - 96)  BP: 148/84 (27 Sep 2019 18:19) (148/84 - 148/84)  BP(mean): --  RR: 18 (27 Sep 2019 18:19) (18 - 18)  SpO2: 98% (27 Sep 2019 18:19) (98% - 98%)

## 2019-09-27 NOTE — ED BEHAVIORAL HEALTH ASSESSMENT NOTE - SUICIDE PROTECTIVE FACTORS
Other/Fear of death or the actual act of killing self/Positive therapeutic relationships/Identifies reasons for living/Has future plans/Supportive social network of family or friends
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
## 2019-09-27 NOTE — ED BEHAVIORAL HEALTH ASSESSMENT NOTE - SUMMARY
Ms. Castillo is a single, 39-year-old  female, non caregiver, unemployed, domiciled on [place] grounds building [address] [place] [address], with past psychiatric history of Schizoaffective Disorder, Borderline Personality Disorder, cannabis abuse, multiple inpatient psychiatric hospitalizations (>20), recently [facility] 12/12-12/24 2018, with frequent visits to the [facility] (well-known to staff) 3 prior suicide attempts (last in 2012 via OD), recurrent suicidal gestures and suicidal ideation, history of property destruction when upset, past legal issues, no access to weapons. PMH GERD & asthma. BIB EMS called by staff at residence for agitation/destructive to property.    Presentation and ED course are consistent with patient's baseline.  Pt initially agitated and provocative in ED and given prn by EM team.  Pt initially disinhibted, dancing/provocative, asking for new residence,  With support and redirection she de-escalated, was calm and cooperative and agreed to resume her PO medications this evening and follow with her ACT team.  We discussed that residence can not be changed through the emergency room and she accepted that.   She is currently at chronic baseline.  It would be counterproductive to respond to her provocative statements and acting out behaviors by admitting her to inpatient psychiatric unit as, again she readily admits to wanting a difference residence.   At this time no evidence of trina/psychosis/suicidality/homicidality.  She has chronic issues with impulse control, acting out behaviors, intermittent destruction to property (again all consistent with her character pathology and would not benefit from inpatient psychiatric hospitalization but would be best served by returning to supportive/structured residence and working with  from there if she really wants to change residences). Patient is a 39-year-old, single,  female, non caregiver, unemployed, domiciled on [place] grounds building [address] [place] [address], with past psychiatric history of Schizoaffective Disorder, Borderline Personality Disorder, hx of cannabis abuse, multiple inpatient psychiatric hospitalizations (>20), last [facility] 4 12/12-12/24 2018, with frequent visits to the [facility] (well-known to staff), with ACT Team, 3 prior suicide attempts (last in 2012 via OD), recurrent and chronic suicidal gestures and suicidal ideation, history of property destruction when upset, past legal issues, no access to weapons. PMH GERD & asthma. BIB EMS called by staff at residence for depression and patient expressing SI.    Patient presents with mild worsening of chronic SI in the setting of recent loss/stressor at patient's residence. At present she denies any plan or intent to act on these thoughts and is willing to engage in safety planning to further mitigate her risk. Discussed option of admission to the hospital however this is not in line with patient's stated goals of receiving intensive therapy. At this time there is no evidence of acute trina/psychosis/homicidality. Patient is a 39-year-old, single,  female, non caregiver, unemployed, domiciled on [place] grounds building [address] [place] [address], with past psychiatric history of Schizoaffective Disorder, Borderline Personality Disorder, hx of cannabis abuse, multiple inpatient psychiatric hospitalizations (>20), last [facility] 4 12/12-12/24 2018, with frequent visits to the [facility] (well-known to staff), with ACT Team, 3 prior suicide attempts (last in 2012 via OD), recurrent and chronic suicidal gestures and suicidal ideation, history of property destruction when upset, past legal issues, no access to weapons. PMH GERD & asthma. BIB EMS called by staff at residence for depression and patient expressing SI.    Patient presents with mild worsening of chronic SI in the setting of recent loss/stressor at patient's residence. At present she denies any plan or intent to act on these thoughts and is willing to engage in safety planning to further mitigate her risk. Discussed option of admission to the hospital however this is not in line with patient's stated goals of receiving intensive therapy. At this time there is no evidence of acute trina/psychosis/homicidality. After speaking with providers and engaging in safety planning, patient denied all current suicidal ideation and was future oriented.

## 2019-09-27 NOTE — ED BEHAVIORAL HEALTH ASSESSMENT NOTE - SAFETY PLAN ADDT'L DETAILS
Education provided regarding environmental safety / lethal means restriction/Provision of National Suicide Prevention Lifeline 8-282-967-FHHF (7646)/Safety plan discussed with...

## 2019-09-27 NOTE — ED BEHAVIORAL HEALTH ASSESSMENT NOTE - ACTIVATING EVENTS/STRESSORS
Other Triggering events leading to humiliation, shame, and/or despair (e.g. Loss of relationship, financial or health status) (real or anticipated)

## 2019-09-27 NOTE — ED BEHAVIORAL HEALTH ASSESSMENT NOTE - PAST PSYCHOTROPIC MEDICATION
Haldol, risperdal, Abilify, Geodon, thorazine, depakote Haldol, risperdal, Abilify, Geodon, thorazine, depakote, Zyprexa, Prolixin

## 2019-09-27 NOTE — ED ADULT NURSE NOTE - OBJECTIVE STATEMENT
Pt. A&Ox3, brought to Pt. A&Ox3, brought via EMS to  c/o feeling depressed since her friend  recently. Admits to SI and has been looking on the computer for ways to kill herself. Pt. tearful on arrival but calm and compliant. No discomforts at this time. Denies HI or hallucinations. Labs drawn and sent. MD to zeus. WIll continue to monitor.

## 2019-10-07 ENCOUNTER — EMERGENCY (EMERGENCY)
Facility: HOSPITAL | Age: 39
LOS: 1 days | Discharge: HOME | End: 2019-10-07
Attending: EMERGENCY MEDICINE | Admitting: EMERGENCY MEDICINE
Payer: COMMERCIAL

## 2019-10-07 VITALS
OXYGEN SATURATION: 100 % | SYSTOLIC BLOOD PRESSURE: 133 MMHG | HEART RATE: 99 BPM | TEMPERATURE: 98 F | RESPIRATION RATE: 18 BRPM | DIASTOLIC BLOOD PRESSURE: 90 MMHG

## 2019-10-07 VITALS
HEART RATE: 84 BPM | DIASTOLIC BLOOD PRESSURE: 84 MMHG | OXYGEN SATURATION: 100 % | TEMPERATURE: 98 F | RESPIRATION RATE: 17 BRPM | SYSTOLIC BLOOD PRESSURE: 151 MMHG

## 2019-10-07 PROCEDURE — 99283 EMERGENCY DEPT VISIT LOW MDM: CPT

## 2019-10-07 RX ORDER — ACETAMINOPHEN 500 MG
650 TABLET ORAL ONCE
Refills: 0 | Status: COMPLETED | OUTPATIENT
Start: 2019-10-07 | End: 2019-10-07

## 2019-10-07 RX ADMIN — Medication 650 MILLIGRAM(S): at 19:11

## 2019-10-07 NOTE — ED ADULT NURSE REASSESSMENT NOTE - NS ED NURSE REASSESS COMMENT FT1
PT baseline mental status, calm and cooperative. Transport set up via taxi to [facility] by social work. PT escorted to cab at pickup in front of ED.

## 2019-10-07 NOTE — ED PROVIDER NOTE - CLINICAL SUMMARY MEDICAL DECISION MAKING FREE TEXT BOX
This is a 39 yr old F, Bipolar Disorder, Borderline Personality Disorder , with acting out behavior.  Collateral info obtained by SW from group home via phone.  There is no clinical evidence of intoxication, or any acute medical problem requiring immediate intervention.  Pt will be discharge to group home via cab.
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
## 2019-10-07 NOTE — ED BEHAVIORAL HEALTH NOTE - BEHAVIORAL HEALTH NOTE
DISCHARGE:   Per provider, patient is cleared and is able to return to their previous residence, [place] [address]  has spoken to Rosalva [phone] on call house manger from [place] [phone],  confirmed that patient’s mode of transportation is TAXI and that patient travels INDEPENDENTLY. Clinical provider is in agreement with taxi back to group home.   Transportation coordinated via account 50.

## 2019-10-07 NOTE — ED PROVIDER NOTE - PATIENT PORTAL LINK FT
You can access the FollowMyHealth Patient Portal offered by [facility] by registering at the following website: [email or web address]. By joining [facility]’s FollowMyHealth portal, you will also be able to view your health information using other applications (apps) compatible with our system.
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
## 2019-10-07 NOTE — ED PROVIDER NOTE - NSFOLLOWUPCLINICS_GEN_ALL_ED_FT
[facility]  Behavioral Health  [address] [place] [address]  Phone: [phone]  Fax:   Follow Up Time:
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
## 2019-10-07 NOTE — ED ADULT NURSE NOTE - NSIMPLEMENTINTERV_GEN_ALL_ED
Implemented All Universal Safety Interventions:  [place] to call system. Call bell, personal items and telephone within reach. Instruct patient to call for assistance. Room bathroom lighting operational. Non-slip footwear when patient is off stretcher. Physically safe environment: no spills, clutter or unnecessary equipment. Stretcher in lowest position, wheels locked, appropriate side rails in place.

## 2019-10-07 NOTE — ED PROVIDER NOTE - NSFOLLOWUPINSTRUCTIONS_ED_ALL_ED_FT
FOLLOW UP WITH [facility]- [phone])    FOLLOW-UP WITH YOUR PRIMARY CARE DOCTOR IN 1-2 DAYS TO DISCUSS YOUR ER APPOINTMENT.     YOU MAY FOLLOW-UP WITH THE [facility] AS NEEDED FOR .    IF NEEDED, CALL PATIENT ACCESS SERVICES AT [phone] TO FIND A PRIMARY CARE PHYSICIAN.  OR CALL [phone] TO MAKE AN APPOINTMENT WITH THE CLINIC.    RETURN TO THE ER FOR ANY WORSENING SYMPTOMS OR CONCERNS.

## 2019-10-10 ENCOUNTER — EMERGENCY (EMERGENCY)
Facility: HOSPITAL | Age: 39
LOS: 1 days | Discharge: HOME | End: 2019-10-10
Attending: EMERGENCY MEDICINE | Admitting: EMERGENCY MEDICINE
Payer: COMMERCIAL

## 2019-10-10 VITALS
HEART RATE: 99 BPM | RESPIRATION RATE: 16 BRPM | SYSTOLIC BLOOD PRESSURE: 140 MMHG | DIASTOLIC BLOOD PRESSURE: 85 MMHG | TEMPERATURE: 99 F

## 2019-10-10 PROCEDURE — 99283 EMERGENCY DEPT VISIT LOW MDM: CPT
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
## 2019-10-10 NOTE — ED PROVIDER NOTE - PATIENT PORTAL LINK FT
You can access the FollowMyHealth Patient Portal offered by [facility] by registering at the following website: [email or web address]. By joining [employer]’s FollowMyHealth portal, you will also be able to view your health information using other applications (apps) compatible with our system.

## 2019-10-10 NOTE — ED PROVIDER NOTE - PHYSICAL EXAMINATION
Attending/Nima: Well-appearing, NAD; PERRL/EOMI, non-icterus, supple, no VALENTE, no JVD, RRR, CTAB; Abd-soft, NT/ND, no HSM; no LE edema, A&Ox3, nonfocal; Skin-warm/dry
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
## 2019-10-10 NOTE — ED BEHAVIORAL HEALTH NOTE - BEHAVIORAL HEALTH NOTE
Writer spoke with patient's ACT team provider, Brenda Conklin (on personal cell phone). She reports that patient has been at her baseline, refusing AM meds but taking HS meds and compliant with her monthly injection. She has no acute safety concerns and is amenable to discharge as she does not feel patient would benefit from and inpatient admission as much of her presentation is behavioral. She will follow up with patient upon discharge.

## 2019-10-10 NOTE — ED BEHAVIORAL HEALTH NOTE - BEHAVIORAL HEALTH NOTE
SW contacted patient’s residence [facility] and spoke to [facility] ([phone]) staff  to verify patient arrived to residence via [employer] Card. Team informed.

## 2019-10-10 NOTE — ED ADULT NURSE REASSESSMENT NOTE - NS ED NURSE REASSESS COMMENT FT1
Received report from FORD Kelley pt sitting in  low acuity denies si/hi/avh presently, pt d/c by MD resources provided to pt including metro card pt verbalizing understanding, report giving to [facility] staff [facility].
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
## 2019-10-10 NOTE — ED PROVIDER NOTE - PROGRESS NOTE DETAILS
Nima: Manju had contacted with staff at [facility]. Pt  to be d/c back to facility.

## 2019-10-19 ENCOUNTER — EMERGENCY (EMERGENCY)
Facility: HOSPITAL | Age: 39
LOS: 1 days | Discharge: HOME | End: 2019-10-19
Attending: EMERGENCY MEDICINE | Admitting: EMERGENCY MEDICINE
Payer: COMMERCIAL

## 2019-10-19 VITALS
HEART RATE: 68 BPM | RESPIRATION RATE: 14 BRPM | SYSTOLIC BLOOD PRESSURE: 130 MMHG | TEMPERATURE: 98 F | DIASTOLIC BLOOD PRESSURE: 79 MMHG | OXYGEN SATURATION: 100 %

## 2019-10-19 VITALS
SYSTOLIC BLOOD PRESSURE: 129 MMHG | OXYGEN SATURATION: 100 % | HEART RATE: 80 BPM | RESPIRATION RATE: 18 BRPM | TEMPERATURE: 98 F | DIASTOLIC BLOOD PRESSURE: 82 MMHG

## 2019-10-19 LAB
ALBUMIN SERPL ELPH-MCNC: 3.5 G/DL — SIGNIFICANT CHANGE UP (ref 3.3–5)
ALP SERPL-CCNC: 92 U/L — SIGNIFICANT CHANGE UP (ref 40–120)
ALT FLD-CCNC: 15 U/L — SIGNIFICANT CHANGE UP (ref 4–33)
ANION GAP SERPL CALC-SCNC: 8 MMO/L — SIGNIFICANT CHANGE UP (ref 7–14)
APPEARANCE UR: CLEAR — SIGNIFICANT CHANGE UP
AST SERPL-CCNC: 26 U/L — SIGNIFICANT CHANGE UP (ref 4–32)
BASE EXCESS BLDV CALC-SCNC: 0.8 MMOL/L — SIGNIFICANT CHANGE UP
BASOPHILS # BLD AUTO: 0.04 K/UL — SIGNIFICANT CHANGE UP (ref 0–0.2)
BASOPHILS NFR BLD AUTO: 0.6 % — SIGNIFICANT CHANGE UP (ref 0–2)
BILIRUB SERPL-MCNC: < 0.2 MG/DL — LOW (ref 0.2–1.2)
BILIRUB UR-MCNC: NEGATIVE — SIGNIFICANT CHANGE UP
BLOOD GAS VENOUS - CREATININE: SIGNIFICANT CHANGE UP MG/DL (ref 0.5–1.3)
BLOOD UR QL VISUAL: NEGATIVE — SIGNIFICANT CHANGE UP
BUN SERPL-MCNC: 15 MG/DL — SIGNIFICANT CHANGE UP (ref 7–23)
CALCIUM SERPL-MCNC: 9.2 MG/DL — SIGNIFICANT CHANGE UP (ref 8.4–10.5)
CHLORIDE BLDV-SCNC: 111 MMOL/L — HIGH (ref 96–108)
CHLORIDE SERPL-SCNC: 110 MMOL/L — HIGH (ref 98–107)
CO2 SERPL-SCNC: 21 MMOL/L — LOW (ref 22–31)
COLOR SPEC: SIGNIFICANT CHANGE UP
CREAT SERPL-MCNC: 0.73 MG/DL — SIGNIFICANT CHANGE UP (ref 0.5–1.3)
EOSINOPHIL # BLD AUTO: 0.24 K/UL — SIGNIFICANT CHANGE UP (ref 0–0.5)
EOSINOPHIL NFR BLD AUTO: 3.4 % — SIGNIFICANT CHANGE UP (ref 0–6)
GAS PNL BLDV: 140 MMOL/L — SIGNIFICANT CHANGE UP (ref 136–146)
GLUCOSE BLDV-MCNC: 81 MG/DL — SIGNIFICANT CHANGE UP (ref 70–99)
GLUCOSE SERPL-MCNC: 77 MG/DL — SIGNIFICANT CHANGE UP (ref 70–99)
GLUCOSE UR-MCNC: NEGATIVE — SIGNIFICANT CHANGE UP
HCO3 BLDV-SCNC: 23 MMOL/L — SIGNIFICANT CHANGE UP (ref 20–27)
HCT VFR BLD CALC: 37.7 % — SIGNIFICANT CHANGE UP (ref 34.5–45)
HCT VFR BLDV CALC: 36.6 % — SIGNIFICANT CHANGE UP (ref 34.5–45)
HGB BLD-MCNC: 11.6 G/DL — SIGNIFICANT CHANGE UP (ref 11.5–15.5)
HGB BLDV-MCNC: 11.9 G/DL — SIGNIFICANT CHANGE UP (ref 11.5–15.5)
IMM GRANULOCYTES NFR BLD AUTO: 0.3 % — SIGNIFICANT CHANGE UP (ref 0–1.5)
KETONES UR-MCNC: NEGATIVE — SIGNIFICANT CHANGE UP
LACTATE BLDV-MCNC: 1 MMOL/L — SIGNIFICANT CHANGE UP (ref 0.5–2)
LEUKOCYTE ESTERASE UR-ACNC: NEGATIVE — SIGNIFICANT CHANGE UP
LIDOCAIN IGE QN: 22.9 U/L — SIGNIFICANT CHANGE UP (ref 7–60)
LYMPHOCYTES # BLD AUTO: 2.07 K/UL — SIGNIFICANT CHANGE UP (ref 1–3.3)
LYMPHOCYTES # BLD AUTO: 29.4 % — SIGNIFICANT CHANGE UP (ref 13–44)
MCHC RBC-ENTMCNC: 28 PG — SIGNIFICANT CHANGE UP (ref 27–34)
MCHC RBC-ENTMCNC: 30.8 % — LOW (ref 32–36)
MCV RBC AUTO: 91.1 FL — SIGNIFICANT CHANGE UP (ref 80–100)
MONOCYTES # BLD AUTO: 0.71 K/UL — SIGNIFICANT CHANGE UP (ref 0–0.9)
MONOCYTES NFR BLD AUTO: 10.1 % — SIGNIFICANT CHANGE UP (ref 2–14)
NEUTROPHILS # BLD AUTO: 3.97 K/UL — SIGNIFICANT CHANGE UP (ref 1.8–7.4)
NEUTROPHILS NFR BLD AUTO: 56.2 % — SIGNIFICANT CHANGE UP (ref 43–77)
NITRITE UR-MCNC: NEGATIVE — SIGNIFICANT CHANGE UP
NRBC # FLD: 0 K/UL — SIGNIFICANT CHANGE UP (ref 0–0)
PCO2 BLDV: 55 MMHG — HIGH (ref 41–51)
PH BLDV: 7.31 PH — LOW (ref 7.32–7.43)
PH UR: 6.5 — SIGNIFICANT CHANGE UP (ref 5–8)
PLATELET # BLD AUTO: 259 K/UL — SIGNIFICANT CHANGE UP (ref 150–400)
PMV BLD: 9.1 FL — SIGNIFICANT CHANGE UP (ref 7–13)
PO2 BLDV: < 24 MMHG — LOW (ref 35–40)
POTASSIUM BLDV-SCNC: 5.7 MMOL/L — HIGH (ref 3.4–4.5)
POTASSIUM SERPL-MCNC: 5 MMOL/L — SIGNIFICANT CHANGE UP (ref 3.5–5.3)
POTASSIUM SERPL-SCNC: 5 MMOL/L — SIGNIFICANT CHANGE UP (ref 3.5–5.3)
PROT SERPL-MCNC: 7.5 G/DL — SIGNIFICANT CHANGE UP (ref 6–8.3)
PROT UR-MCNC: NEGATIVE — SIGNIFICANT CHANGE UP
RBC # BLD: 4.14 M/UL — SIGNIFICANT CHANGE UP (ref 3.8–5.2)
RBC # FLD: 14.9 % — HIGH (ref 10.3–14.5)
SAO2 % BLDV: 32.7 % — LOW (ref 60–85)
SODIUM SERPL-SCNC: 139 MMOL/L — SIGNIFICANT CHANGE UP (ref 135–145)
SP GR SPEC: 1.01 — SIGNIFICANT CHANGE UP (ref 1–1.04)
UROBILINOGEN FLD QL: NORMAL — SIGNIFICANT CHANGE UP
WBC # BLD: 7.05 K/UL — SIGNIFICANT CHANGE UP (ref 3.8–10.5)
WBC # FLD AUTO: 7.05 K/UL — SIGNIFICANT CHANGE UP (ref 3.8–10.5)

## 2019-10-19 PROCEDURE — 99284 EMERGENCY DEPT VISIT MOD MDM: CPT

## 2019-10-19 RX ORDER — SODIUM CHLORIDE 9 MG/ML
1000 INJECTION INTRAMUSCULAR; INTRAVENOUS; SUBCUTANEOUS ONCE
Refills: 0 | Status: COMPLETED | OUTPATIENT
Start: 2019-10-19 | End: 2019-10-19

## 2019-10-19 RX ORDER — ONDANSETRON 8 MG/1
4 TABLET, FILM COATED ORAL ONCE
Refills: 0 | Status: COMPLETED | OUTPATIENT
Start: 2019-10-19 | End: 2019-10-19

## 2019-10-19 RX ORDER — FAMOTIDINE 10 MG/ML
20 INJECTION INTRAVENOUS ONCE
Refills: 0 | Status: COMPLETED | OUTPATIENT
Start: 2019-10-19 | End: 2019-10-19

## 2019-10-19 RX ADMIN — SODIUM CHLORIDE 1000 MILLILITER(S): 9 INJECTION INTRAMUSCULAR; INTRAVENOUS; SUBCUTANEOUS at 12:57

## 2019-10-19 RX ADMIN — FAMOTIDINE 20 MILLIGRAM(S): 10 INJECTION INTRAVENOUS at 12:57

## 2019-10-19 RX ADMIN — ONDANSETRON 4 MILLIGRAM(S): 8 TABLET, FILM COATED ORAL at 12:57

## 2019-10-19 NOTE — ED ADULT NURSE NOTE - OBJECTIVE STATEMENT
Pt to bed 21. Alert and oriented x 3. Pt c/o abd pain and vaginal discharge. IVl placed. Bloods drawn. Will continue to monitor.

## 2019-10-19 NOTE — ED ADULT NURSE NOTE - NSIMPLEMENTINTERV_GEN_ALL_ED
Implemented All Universal Safety Interventions:  [place] to call system. Call bell, personal items and telephone within reach. Instruct patient to call for assistance. Room bathroom lighting operational. Non-slip footwear when patient is off stretcher. Physically safe environment: no spills, clutter or unnecessary equipment. Stretcher in lowest position, wheels locked, appropriate side rails in place.

## 2019-10-19 NOTE — ED PROVIDER NOTE - CLINICAL SUMMARY MEDICAL DECISION MAKING FREE TEXT BOX
40 y/o female from outpatient [facility], with pmhx of obesity, schizophrenia, bipolar, borderline personality disorder, fibroids, hernia, presents to ED c/o RLQ pain since this morning associated with 1 episode of nbnb vomiting and 1 episode of watery diarrhea. will r/o appendicitis, labs, CT abdomen/pelvis with IV contrast. pt admits to vaginal discharge x 2 weeks and unprotected sex. unremarkable pelvic exam, no cmt, no adnexal tenderness, no discharge. discussed testing for G/C in urine, safe sex and outpt follow up with GYN for further std testing

## 2019-10-19 NOTE — ED PROVIDER NOTE - ATTENDING CONTRIBUTION TO CARE
This is a 40 y/o F PMHx Schizophrenia, Bipolar, Borderline personality disorder, Fibroids, Hernia p/w RLQ pain, a/w N/V/D x 1 day. No fevers, chills, chest pain, SOB, urinary complaints or vaginal bleeding. No adnexal tenderness as per PA. Plan- Urine pregnancy, Labs, UA, CT A/P, Symptomatic treatment with IVF/Pain control/Anti-emetics, Reassess

## 2019-10-19 NOTE — ED PROVIDER NOTE - PATIENT PORTAL LINK FT
You can access the FollowMyHealth Patient Portal offered by [facility] by registering at the following website: [email or web address]. By joining [employer]’s FollowMyHealth portal, you will also be able to view your health information using other applications (apps) compatible with our system.

## 2019-10-19 NOTE — ED PROVIDER NOTE - NSFOLLOWUPINSTRUCTIONS_ED_ALL_ED_FT
Follow up with your primary care provider within 48 hours  Take copy of results with you      Follow up with OBGYN    You may return to ER at anytime    Return to ER for any new or worsening symptoms, fever, vomiting, abdominal pain or any other concerns.

## 2019-10-19 NOTE — ED PROVIDER NOTE - PROGRESS NOTE DETAILS
labs and ua unremarkable. pt states she would like to go home and does not want to have CT performed. pt axox3, has capacity to make own decisions. pt calm, removed her IV herself. discussed with John at [facility] residence at [phone], and can have pt return via bus, called Patricia STARK to give pt metro card. pt aware of risks of leaving, not having ct performed, appendicitis, infection possible permanent disability and/or death. aware can return at any time. pt states her abd pain resolved and is hungry and wants food. will AMA. ARTEM Lobo- pt eloped prior to receiving dc and ama papers. as per MI Gomez, spoke with  at residence and pt can travel herself independently. ARTEM Lobo- pt eloped prior to receiving dc and ama papers. as per MI Gomez, spoke with  at residence and pt can travel herself independently. called pt's 3 contacts phone numbers listed with no answer.

## 2019-10-19 NOTE — ED PROVIDER NOTE - OBJECTIVE STATEMENT
40 y/o female from outpatient [facility], with pmhx of schizophrenia, bipolar, borderline personality disorder, fibroids, hernia, presents to ED c/o RLQ pain since this morning associated with 1 episode of nbnb vomiting and 1 episode of watery diarrhea. No sick contacts or travel. LMP 1 month ago, sexually active with one male partner, not in monogamous relationship. Hx of chlamydia treated years ago. Admits to white vaginal discharge x 2 weeks, no itchiness. No fever, chills, cp, sob, melena, dysuria, vaginal bleeding. 38 y/o female from outpatient [facility], with pmhx of obesity, schizophrenia, bipolar, borderline personality disorder, fibroids, hernia, presents to ED c/o RLQ pain since this morning associated with 1 episode of nbnb vomiting and 1 episode of watery diarrhea. No sick contacts or travel. LMP 1 month ago, sexually active with one male partner, not monogamous. Hx of chlamydia treated years ago. Admits to white vaginal discharge x 2 weeks, no itchiness. No fever, chills, cp, sob, melena, dysuria, vaginal bleeding.

## 2019-10-19 NOTE — PROVIDER CONTACT NOTE (OTHER) - ASSESSMENT
SW contacted by Pt’s Judy who states Group Home Pt from [facility] leaving AMA, MI contacted Pt's Group Home [address] Outpatient, [phone] MI spoke with FARIDA on Site Janel Walters who informs Pt often goes to hospital on her own, Pt travels independently and mertrocard or taxi is appropriate for travel. SW went to meet with Pt, Pt not in room. MI spoke with ARTEM Kelley who informs Pt ripped out IV and eloped.  SW contacted Pt's Residence again spoke with FARIDA Walters and informed her of Pt's elopement, she states they will look out for her.  No further SW intervention required at this time.

## 2019-10-20 ENCOUNTER — EMERGENCY (EMERGENCY)
Facility: HOSPITAL | Age: 39
LOS: 1 days | Discharge: HOME | End: 2019-10-20
Admitting: EMERGENCY MEDICINE
Payer: COMMERCIAL

## 2019-10-20 VITALS
SYSTOLIC BLOOD PRESSURE: 116 MMHG | OXYGEN SATURATION: 100 % | TEMPERATURE: 98 F | RESPIRATION RATE: 18 BRPM | HEART RATE: 80 BPM | DIASTOLIC BLOOD PRESSURE: 74 MMHG

## 2019-10-20 LAB — SPECIMEN SOURCE: SIGNIFICANT CHANGE UP

## 2019-10-20 PROCEDURE — 99284 EMERGENCY DEPT VISIT MOD MDM: CPT

## 2019-10-20 RX ORDER — HALOPERIDOL DECANOATE 100 MG/ML
5 INJECTION INTRAMUSCULAR ONCE
Refills: 0 | Status: COMPLETED | OUTPATIENT
Start: 2019-10-20 | End: 2019-10-20

## 2019-10-20 RX ADMIN — HALOPERIDOL DECANOATE 5 MILLIGRAM(S): 100 INJECTION INTRAMUSCULAR at 17:37

## 2019-10-20 RX ADMIN — Medication 2 MILLIGRAM(S): at 17:37

## 2019-10-20 NOTE — ED PROVIDER NOTE - CLINICAL SUMMARY MEDICAL DECISION MAKING FREE TEXT BOX
40 y/o F hx BPD, Bipolar, Obesity   Medical evaluation performed. There is no clinical evidence of intoxication or any acute medical problem requiring immediate intervention. Medication offered. D/C to [facility] [address] via EMS.

## 2019-10-20 NOTE — ED ADULT NURSE REASSESSMENT NOTE - NS ED NURSE REASSESS COMMENT FT1
Patient is in improved and stable condition, discharged as per NP Pellew order, discharge instructions given, pt verbalized understanding and left ER with EMS on route back to adult residence.
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
## 2019-10-20 NOTE — ED PROVIDER NOTE - CARE PLAN
Principal Discharge DX:	Impulse control disorder  Secondary Diagnosis:	Borderline personality disorder

## 2019-10-20 NOTE — ED ADULT NURSE NOTE - NSIMPLEMENTINTERV_GEN_ALL_ED
Implemented All Universal Safety Interventions:  [place] to call system. Call bell, personal items and telephone within reach. Instruct patient to call for assistance. Room bathroom lighting operational. Non-slip footwear when patient is off stretcher. Physically safe environment: no spills, clutter or unnecessary equipment. Stretcher in lowest position, wheels locked, appropriate side rails in place.

## 2019-10-20 NOTE — ED ADULT NURSE NOTE - NSFALLRSKASSESSTYPE_ED_ALL_ED
carried down through  skin and subcutaneous tissue as well as palmar fascia.  At this point  retractors were used to identify transverse carpal ligament, which was  identified and incised using a 15 blade sharply at  the distal portion of the  carpal tunnel . A [place] elevator was inserted to protect the contents of the  carpal tunnel at all times.  The distal release was completed using a  combination of tenotomy scissors, gently releasing the fibers distally as well  as a 15 blade until the yellow fat distally was identified and complete  release of the nerve was directly visualized.  We then focused attention  proximally. Under direct visualization, the transverse carpal ligament was  released using a 15 blade using a [place] elevator for protection of the median  nerve and carpal tunnel contents.  Tenotomy scissors were used to incise the  more superficial palmar fascia proximally as well with again direct  visualization noted.  Fingertip could be inserted to ensure adequate proximal  and distal decompression.  The contents of the carpal tunnel were examined and  found to be consistent with changes of median nerve compression.   The  tourniquet was then released, and hemostasis was achieved with bipolar  electrocautery.  The wound was irrigated with sterile saline.  The skin was  then closed using interrupted 5-0 nylon suture.  The sterile dressing using  Adaptic, bacitracin with sterile overwrap was then applied.  The patient was  awoken and taken to PACU in stable condition.  No apparent complications were  noted.           Findings:  No aberrant motor branch     Intervention:  1% Xylocaine, 0.25% Marcaine, without epinephrine as local injection        Other Notes:   Follow-up in 10-14 days for wound inspection and likely suture removal.  Patient will be taught a home exercise range of motion program for the wrist and fingers along with scar and thenar massage.  If there are any concerns or if the patient desires, Routine Reassessment

## 2019-10-20 NOTE — ED PROVIDER NOTE - NSFOLLOWUPCLINICS_GEN_ALL_ED_FT
[facility]  Behavioral Health  [address] [place] [address]  Phone: [phone]  Fax:   Follow Up Time:
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
## 2019-10-20 NOTE — ED PROVIDER NOTE - PATIENT PORTAL LINK FT
You can access the FollowMyHealth Patient Portal offered by [facility] by registering at the following website: [email or web address]. By joining [employer]’s FollowMyHealth portal, you will also be able to view your health information using other applications (apps) compatible with our system.

## 2019-10-21 LAB
BACTERIA UR CULT: SIGNIFICANT CHANGE UP
C TRACH RRNA SPEC QL NAA+PROBE: SIGNIFICANT CHANGE UP
N GONORRHOEA RRNA SPEC QL NAA+PROBE: SIGNIFICANT CHANGE UP
SPECIMEN SOURCE: SIGNIFICANT CHANGE UP

## 2019-10-23 NOTE — ED PROVIDER NOTE - ENMT, MLM
Airway patent. No e/o head trauma.
PATIENT CURRENTLY DENIES CHEST PAIN  SHORTNESS OF BREATH  PALPITATIONS,  DYSURIA, OR UPPER RESPIRATORY INFECTION IN PAST 2 WEEKS  EXERCISE  TOLERANCE  1-2 FLIGHT OF STAIRS  WITHOUT SHORTNESS OF BREATH
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
## 2019-11-14 NOTE — ED BEHAVIORAL HEALTH ASSESSMENT NOTE - NS ED BHA AXIS II PRIMARY CODE FT
Informed pt her angelique contour test strips are ready to be picked up at her pharmacy. Pt verbalized understanding, no further action is needed.   R68

## 2019-12-04 ENCOUNTER — EMERGENCY (EMERGENCY)
Facility: HOSPITAL | Age: 39
LOS: 1 days | Discharge: HOME | End: 2019-12-04
Admitting: EMERGENCY MEDICINE
Payer: COMMERCIAL

## 2019-12-04 VITALS
RESPIRATION RATE: 16 BRPM | HEART RATE: 64 BPM | TEMPERATURE: 98 F | SYSTOLIC BLOOD PRESSURE: 122 MMHG | OXYGEN SATURATION: 100 % | DIASTOLIC BLOOD PRESSURE: 74 MMHG

## 2019-12-04 VITALS
HEART RATE: 80 BPM | SYSTOLIC BLOOD PRESSURE: 135 MMHG | DIASTOLIC BLOOD PRESSURE: 90 MMHG | OXYGEN SATURATION: 100 % | RESPIRATION RATE: 16 BRPM | TEMPERATURE: 98 F

## 2019-12-04 PROCEDURE — 99053 MED SERV 10PM-8AM 24 HR FAC: CPT

## 2019-12-04 PROCEDURE — 99283 EMERGENCY DEPT VISIT LOW MDM: CPT

## 2019-12-04 PROCEDURE — 73610 X-RAY EXAM OF ANKLE: CPT | Mod: 26,LT

## 2019-12-04 PROCEDURE — 73562 X-RAY EXAM OF KNEE 3: CPT | Mod: 26,LT

## 2019-12-04 RX ORDER — ACETAMINOPHEN 500 MG
975 TABLET ORAL ONCE
Refills: 0 | Status: COMPLETED | OUTPATIENT
Start: 2019-12-04 | End: 2019-12-04

## 2019-12-04 RX ADMIN — Medication 975 MILLIGRAM(S): at 08:57

## 2019-12-04 NOTE — ED ADULT NURSE REASSESSMENT NOTE - NS ED NURSE REASSESS COMMENT FT1
PT is A&Ox4, ambulates independently. Pt lives at [facility], [address]. Pt is calm & cooperative at this time, respirations are even & unlabored. Pt looks comfortable, walking with a steady gait. Endorses "some" left knee and ankle pain but states "it's not that bad right now." Awaiting X-ray results.

## 2019-12-04 NOTE — ED ADULT NURSE NOTE - OBJECTIVE STATEMENT
Patient is a 43 year-old female received to intake room 6, ambulatory, c/o left knee and ankle pain. Pt is a patient coming from [facility] A. Patient states she was dancing yesterday and states the ankle "flared up" this am. Patient appears comfortable in the bed. Respirations even and unlabored. Patient able to ambulate. Seen by MD. Pt awaiting X-Ray at this time. Will continue to monitor.

## 2019-12-04 NOTE — ED PROVIDER NOTE - PROGRESS NOTE DETAILS
xrays negative.  Group huddle form filled out. Spoke to [facility]  at [facility] and discussed discharge instructions and follow up recommendations.

## 2019-12-04 NOTE — ED ADULT TRIAGE NOTE - CHIEF COMPLAINT QUOTE
Pt from [facility].  Pt p/w L knee and ankle swelling after dancing yesterday.  Ambulatory with EMS.  Pt calm and cooperative in triage.

## 2019-12-04 NOTE — ED PROVIDER NOTE - PATIENT PORTAL LINK FT
You can access the FollowMyHealth Patient Portal offered by [facility] by registering at the following website: [email or web address]. By joining [employer]’s FollowMyHealth portal, you will also be able to view your health information using other applications (apps) compatible with our system.

## 2019-12-04 NOTE — ED PROVIDER NOTE - MUSCULOSKELETAL MINIMAL EXAM
left knee: FROM, no swelling or point tenderness noted.  Left ankle: +mild swelling, FROM, no point tenderness

## 2019-12-04 NOTE — ED PROVIDER NOTE - CLINICAL SUMMARY MEDICAL DECISION MAKING FREE TEXT BOX
38 yo female c pmhx bipolar disorder, schizophrenia, Obesity, asthma from [facility], BIBA for left ankle and left knee pain/swelling this AM.

## 2019-12-04 NOTE — ED PROVIDER NOTE - OBJECTIVE STATEMENT
38 yo female c pmhx bipolar disorder, schizophrenia, Obesity, asthma from [employer], BIBA for left ankle and left knee pain/swelling this AM.  Pt states was practicing her ballet and is not sure if she injured herself.  Pt has been ambulating on it.  No SI/HI.  Denies any other joint pain or injuries.

## 2019-12-04 NOTE — ED ADULT NURSE NOTE - CHIEF COMPLAINT QUOTE
Pt from [facility].  Pt p/w L knee and ankle swelling after dancing yesterday.  Ambulatory with EMS.  Pt calm and cooperative in triage.

## 2019-12-04 NOTE — ED PROVIDER NOTE - NSFOLLOWUPINSTRUCTIONS_ED_ALL_ED_FT
Take tylenol or motrin every 6-8 hours as needed for pain.  Avoid any strenuous activity, rest, elevate.  Follow up with an orthopedic within 1-2 weeks.  Return to ED for any worsening pain, swelling, redness or fever.

## 2019-12-05 ENCOUNTER — EMERGENCY (EMERGENCY)
Facility: HOSPITAL | Age: 39
LOS: 1 days | Discharge: HOME | End: 2019-12-05
Attending: EMERGENCY MEDICINE | Admitting: EMERGENCY MEDICINE
Payer: SELF-PAY

## 2019-12-05 VITALS
RESPIRATION RATE: 16 BRPM | HEART RATE: 67 BPM | SYSTOLIC BLOOD PRESSURE: 111 MMHG | DIASTOLIC BLOOD PRESSURE: 76 MMHG | OXYGEN SATURATION: 97 % | TEMPERATURE: 98 F

## 2019-12-05 VITALS
HEART RATE: 73 BPM | RESPIRATION RATE: 16 BRPM | OXYGEN SATURATION: 100 % | DIASTOLIC BLOOD PRESSURE: 78 MMHG | SYSTOLIC BLOOD PRESSURE: 134 MMHG | TEMPERATURE: 98 F

## 2019-12-05 LAB
ALBUMIN SERPL ELPH-MCNC: 3.9 G/DL — SIGNIFICANT CHANGE UP (ref 3.3–5)
ALP SERPL-CCNC: 93 U/L — SIGNIFICANT CHANGE UP (ref 40–120)
ALT FLD-CCNC: 10 U/L — SIGNIFICANT CHANGE UP (ref 4–33)
ANION GAP SERPL CALC-SCNC: 12 MMO/L — SIGNIFICANT CHANGE UP (ref 7–14)
AST SERPL-CCNC: 15 U/L — SIGNIFICANT CHANGE UP (ref 4–32)
BILIRUB SERPL-MCNC: 0.2 MG/DL — SIGNIFICANT CHANGE UP (ref 0.2–1.2)
BUN SERPL-MCNC: 8 MG/DL — SIGNIFICANT CHANGE UP (ref 7–23)
CALCIUM SERPL-MCNC: 9.7 MG/DL — SIGNIFICANT CHANGE UP (ref 8.4–10.5)
CHLORIDE SERPL-SCNC: 106 MMOL/L — SIGNIFICANT CHANGE UP (ref 98–107)
CO2 SERPL-SCNC: 23 MMOL/L — SIGNIFICANT CHANGE UP (ref 22–31)
CREAT SERPL-MCNC: 0.72 MG/DL — SIGNIFICANT CHANGE UP (ref 0.5–1.3)
GLUCOSE SERPL-MCNC: 90 MG/DL — SIGNIFICANT CHANGE UP (ref 70–99)
HCT VFR BLD CALC: 38.3 % — SIGNIFICANT CHANGE UP (ref 34.5–45)
HGB BLD-MCNC: 11.7 G/DL — SIGNIFICANT CHANGE UP (ref 11.5–15.5)
MCHC RBC-ENTMCNC: 28 PG — SIGNIFICANT CHANGE UP (ref 27–34)
MCHC RBC-ENTMCNC: 30.5 % — LOW (ref 32–36)
MCV RBC AUTO: 91.6 FL — SIGNIFICANT CHANGE UP (ref 80–100)
NRBC # FLD: 0 K/UL — SIGNIFICANT CHANGE UP (ref 0–0)
PLATELET # BLD AUTO: 285 K/UL — SIGNIFICANT CHANGE UP (ref 150–400)
PMV BLD: 9.2 FL — SIGNIFICANT CHANGE UP (ref 7–13)
POTASSIUM SERPL-MCNC: 4.1 MMOL/L — SIGNIFICANT CHANGE UP (ref 3.5–5.3)
POTASSIUM SERPL-SCNC: 4.1 MMOL/L — SIGNIFICANT CHANGE UP (ref 3.5–5.3)
PROT SERPL-MCNC: 7.2 G/DL — SIGNIFICANT CHANGE UP (ref 6–8.3)
RBC # BLD: 4.18 M/UL — SIGNIFICANT CHANGE UP (ref 3.8–5.2)
RBC # FLD: 14.4 % — SIGNIFICANT CHANGE UP (ref 10.3–14.5)
SODIUM SERPL-SCNC: 141 MMOL/L — SIGNIFICANT CHANGE UP (ref 135–145)
WBC # BLD: 7.33 K/UL — SIGNIFICANT CHANGE UP (ref 3.8–10.5)
WBC # FLD AUTO: 7.33 K/UL — SIGNIFICANT CHANGE UP (ref 3.8–10.5)

## 2019-12-05 PROCEDURE — 99283 EMERGENCY DEPT VISIT LOW MDM: CPT

## 2019-12-05 RX ORDER — ACETAMINOPHEN 500 MG
650 TABLET ORAL ONCE
Refills: 0 | Status: COMPLETED | OUTPATIENT
Start: 2019-12-05 | End: 2019-12-05

## 2019-12-05 RX ADMIN — Medication 650 MILLIGRAM(S): at 15:00

## 2019-12-05 RX ADMIN — Medication 650 MILLIGRAM(S): at 14:08

## 2019-12-05 RX ADMIN — Medication 30 MILLILITER(S): at 14:08

## 2019-12-05 NOTE — ED PROVIDER NOTE - CLINICAL SUMMARY MEDICAL DECISION MAKING FREE TEXT BOX
pt here states vomiting blood, here looks stable, vs are normal, soft abd.  long term psych hx, has hx gerd, states not taking meds (nexium).  unclear if history accurate.  pt was also here yesterday for ankle pain.    will involve social work as pt is in ACT team, lives at [facility] pt here states vomiting blood, here looks stable, vs are normal, soft abd.  long term psych hx, has hx gerd, states not taking meds (nexium).  unclear if history accurate.  pt was also here yesterday for ankle pain.    will involve social work as pt is in ACT team, [facility]    pt observed, no vomiting during her stay, vs remained normal, lab work (hct/ chem) all normal, pt felt improved, sw involved for dc.  pt should have outpt f/u

## 2019-12-05 NOTE — ED PROVIDER NOTE - MUSCULOSKELETAL, MLM
Spine appears normal, range of motion is not limited, no muscle or joint tenderness. No extremity edema

## 2019-12-05 NOTE — ED PROVIDER NOTE - OBJECTIVE STATEMENT
38 y/o F with PMHx of asthma, bipolar disorder, borderline personality disorder, and GERD presents to ED with blood in vomit since a few days. Associated with these symptoms pt c/o abdominal pain and headache. As per patient, had similar symptoms in the past and at this time was seen in ED and given IV medications. No endoscopy done at time. Pt states that had chinese food yesterday, which no other person ate. Yesterday pt was seen in ED for ankle injury s/p injury while doing ballet. Pt endorses no longer taking medication for GERD which was Nexium (+5 years) or medication for Asthma which is Albuterol.  LMP last month. Denies having any fever, diarrhea, recent travel, or other medical complaints.   Medications: Lithium 300mg and Olanzapine 20mg

## 2019-12-05 NOTE — ED PROVIDER NOTE - NS_ ATTENDINGSCRIBEDETAILS _ED_A_ED_FT
I performed a history and physical exam of the patient and discussed their management with the resident and /or advanced care provider. My medical decision making and observations are found above.    agree with bj ibarra as above documented by brennon

## 2019-12-05 NOTE — ED PROVIDER NOTE - CONSTITUTIONAL, MLM
normal... Well appearing, awake, alert, oriented to person, place, time/situation and in no apparent distress. No tremors.

## 2019-12-05 NOTE — ED PROVIDER NOTE - PATIENT PORTAL LINK FT
You can access the FollowMyHealth Patient Portal offered by [facility] by registering at the following website: [email or web address]. By joining [employer]’s FollowMyHealth portal, you will also be able to view your health information using other applications (apps) compatible with our system.

## 2019-12-05 NOTE — ED PROVIDER NOTE - PHYSICAL EXAMINATION
pt with general appearance NAD, able to phonate well, not pressured speech, looks reasonably put together, has on lipstick and wig in good position, she does have some body odor.

## 2019-12-05 NOTE — PROVIDER CONTACT NOTE (OTHER) - ASSESSMENT
Pt is cleared t0 return to [facility], called [phone], spoke with Julee confirmed pt is a resident. She said pt is an independent traveller, takes bus and asked to give metro card. Provided. Pt left. Filled out Hayden form.

## 2019-12-05 NOTE — ED ADULT NURSE NOTE - CHIEF COMPLAINT QUOTE
Arrives via EMS from [facility] [place] with complaints of "throwing up blood and abdominal since 6 AM and headache"

## 2019-12-05 NOTE — ED ADULT TRIAGE NOTE - CHIEF COMPLAINT QUOTE
Arrives via EMS from [place] with complaints of "throwing up blood and abdominal since 6 AM and headache"

## 2019-12-05 NOTE — ED PROVIDER NOTE - PROGRESS NOTE DETAILS
ARTEM Martinez: Called [facility], spoke to clerk Ladd, informed patient being discharged.  Message left for  on call.

## 2019-12-09 NOTE — ED PROVIDER NOTE - INTERPRETATION
I have reviewed and confirmed nurses' notes for patient's medications, allergies, medical history, and surgical history.
normal sinus rhythm

## 2019-12-13 NOTE — ED BEHAVIORAL HEALTH ASSESSMENT NOTE - PROFESSIONAL COLLATERAL NAME
Patient arrives in triage reporting weakness for the last week. Patient Reporting headache, cough, and dizziness. Spouse reports patient has a fever. Denies pain, NV or other symptoms.   
Dr Marek Whitman

## 2019-12-31 ENCOUNTER — EMERGENCY (EMERGENCY)
Facility: HOSPITAL | Age: 39
LOS: 1 days | Discharge: HOME | End: 2019-12-31
Attending: STUDENT IN AN ORGANIZED HEALTH CARE EDUCATION/TRAINING PROGRAM | Admitting: STUDENT IN AN ORGANIZED HEALTH CARE EDUCATION/TRAINING PROGRAM
Payer: COMMERCIAL

## 2019-12-31 VITALS
SYSTOLIC BLOOD PRESSURE: 119 MMHG | OXYGEN SATURATION: 98 % | HEART RATE: 78 BPM | DIASTOLIC BLOOD PRESSURE: 68 MMHG | RESPIRATION RATE: 18 BRPM | TEMPERATURE: 98 F

## 2019-12-31 LAB
ALBUMIN SERPL ELPH-MCNC: 3.9 G/DL — SIGNIFICANT CHANGE UP (ref 3.3–5)
ALP SERPL-CCNC: 112 U/L — SIGNIFICANT CHANGE UP (ref 40–120)
ALT FLD-CCNC: 15 U/L — SIGNIFICANT CHANGE UP (ref 4–33)
AMPHET UR-MCNC: NEGATIVE — SIGNIFICANT CHANGE UP
ANION GAP SERPL CALC-SCNC: 13 MMO/L — SIGNIFICANT CHANGE UP (ref 7–14)
APAP SERPL-MCNC: < 15 UG/ML — LOW (ref 15–25)
APPEARANCE UR: CLEAR — SIGNIFICANT CHANGE UP
AST SERPL-CCNC: 16 U/L — SIGNIFICANT CHANGE UP (ref 4–32)
BACTERIA # UR AUTO: HIGH
BARBITURATES UR SCN-MCNC: NEGATIVE — SIGNIFICANT CHANGE UP
BASOPHILS # BLD AUTO: 0.04 K/UL — SIGNIFICANT CHANGE UP (ref 0–0.2)
BASOPHILS NFR BLD AUTO: 0.4 % — SIGNIFICANT CHANGE UP (ref 0–2)
BENZODIAZ UR-MCNC: NEGATIVE — SIGNIFICANT CHANGE UP
BILIRUB SERPL-MCNC: 0.3 MG/DL — SIGNIFICANT CHANGE UP (ref 0.2–1.2)
BILIRUB UR-MCNC: NEGATIVE — SIGNIFICANT CHANGE UP
BLOOD UR QL VISUAL: SIGNIFICANT CHANGE UP
BUN SERPL-MCNC: 9 MG/DL — SIGNIFICANT CHANGE UP (ref 7–23)
CALCIUM SERPL-MCNC: 10 MG/DL — SIGNIFICANT CHANGE UP (ref 8.4–10.5)
CANNABINOIDS UR-MCNC: NEGATIVE — SIGNIFICANT CHANGE UP
CHLORIDE SERPL-SCNC: 105 MMOL/L — SIGNIFICANT CHANGE UP (ref 98–107)
CO2 SERPL-SCNC: 21 MMOL/L — LOW (ref 22–31)
COCAINE METAB.OTHER UR-MCNC: NEGATIVE — SIGNIFICANT CHANGE UP
COLOR SPEC: YELLOW — SIGNIFICANT CHANGE UP
CREAT SERPL-MCNC: 0.93 MG/DL — SIGNIFICANT CHANGE UP (ref 0.5–1.3)
EOSINOPHIL # BLD AUTO: 0.44 K/UL — SIGNIFICANT CHANGE UP (ref 0–0.5)
EOSINOPHIL NFR BLD AUTO: 4.5 % — SIGNIFICANT CHANGE UP (ref 0–6)
ETHANOL BLD-MCNC: < 10 MG/DL — SIGNIFICANT CHANGE UP
GLUCOSE SERPL-MCNC: 95 MG/DL — SIGNIFICANT CHANGE UP (ref 70–99)
GLUCOSE UR-MCNC: NEGATIVE — SIGNIFICANT CHANGE UP
HCG SERPL-ACNC: < 5 MIU/ML — SIGNIFICANT CHANGE UP
HCT VFR BLD CALC: 42.7 % — SIGNIFICANT CHANGE UP (ref 34.5–45)
HGB BLD-MCNC: 13 G/DL — SIGNIFICANT CHANGE UP (ref 11.5–15.5)
HYALINE CASTS # UR AUTO: NEGATIVE — SIGNIFICANT CHANGE UP
IMM GRANULOCYTES NFR BLD AUTO: 0.3 % — SIGNIFICANT CHANGE UP (ref 0–1.5)
KETONES UR-MCNC: NEGATIVE — SIGNIFICANT CHANGE UP
LEUKOCYTE ESTERASE UR-ACNC: SIGNIFICANT CHANGE UP
LITHIUM SERPL-MCNC: 0.49 MMOL/L — LOW (ref 0.6–1.2)
LYMPHOCYTES # BLD AUTO: 2.63 K/UL — SIGNIFICANT CHANGE UP (ref 1–3.3)
LYMPHOCYTES # BLD AUTO: 26.9 % — SIGNIFICANT CHANGE UP (ref 13–44)
MCHC RBC-ENTMCNC: 27.7 PG — SIGNIFICANT CHANGE UP (ref 27–34)
MCHC RBC-ENTMCNC: 30.4 % — LOW (ref 32–36)
MCV RBC AUTO: 91 FL — SIGNIFICANT CHANGE UP (ref 80–100)
METHADONE UR-MCNC: NEGATIVE — SIGNIFICANT CHANGE UP
MONOCYTES # BLD AUTO: 0.62 K/UL — SIGNIFICANT CHANGE UP (ref 0–0.9)
MONOCYTES NFR BLD AUTO: 6.3 % — SIGNIFICANT CHANGE UP (ref 2–14)
NEUTROPHILS # BLD AUTO: 6.02 K/UL — SIGNIFICANT CHANGE UP (ref 1.8–7.4)
NEUTROPHILS NFR BLD AUTO: 61.6 % — SIGNIFICANT CHANGE UP (ref 43–77)
NITRITE UR-MCNC: NEGATIVE — SIGNIFICANT CHANGE UP
NRBC # FLD: 0 K/UL — SIGNIFICANT CHANGE UP (ref 0–0)
OPIATES UR-MCNC: NEGATIVE — SIGNIFICANT CHANGE UP
OXYCODONE UR-MCNC: NEGATIVE — SIGNIFICANT CHANGE UP
PCP UR-MCNC: NEGATIVE — SIGNIFICANT CHANGE UP
PH UR: 6.5 — SIGNIFICANT CHANGE UP (ref 5–8)
PLATELET # BLD AUTO: 333 K/UL — SIGNIFICANT CHANGE UP (ref 150–400)
PMV BLD: 8.9 FL — SIGNIFICANT CHANGE UP (ref 7–13)
POTASSIUM SERPL-MCNC: 4.6 MMOL/L — SIGNIFICANT CHANGE UP (ref 3.5–5.3)
POTASSIUM SERPL-SCNC: 4.6 MMOL/L — SIGNIFICANT CHANGE UP (ref 3.5–5.3)
PROT SERPL-MCNC: 8 G/DL — SIGNIFICANT CHANGE UP (ref 6–8.3)
PROT UR-MCNC: 50 — SIGNIFICANT CHANGE UP
RBC # BLD: 4.69 M/UL — SIGNIFICANT CHANGE UP (ref 3.8–5.2)
RBC # FLD: 14.1 % — SIGNIFICANT CHANGE UP (ref 10.3–14.5)
RBC CASTS # UR COMP ASSIST: SIGNIFICANT CHANGE UP (ref 0–?)
SALICYLATES SERPL-MCNC: < 5 MG/DL — LOW (ref 15–30)
SODIUM SERPL-SCNC: 139 MMOL/L — SIGNIFICANT CHANGE UP (ref 135–145)
SP GR SPEC: 1.02 — SIGNIFICANT CHANGE UP (ref 1–1.04)
SQUAMOUS # UR AUTO: SIGNIFICANT CHANGE UP
TSH SERPL-MCNC: 2.19 UIU/ML — SIGNIFICANT CHANGE UP (ref 0.27–4.2)
UROBILINOGEN FLD QL: NORMAL — SIGNIFICANT CHANGE UP
WBC # BLD: 9.78 K/UL — SIGNIFICANT CHANGE UP (ref 3.8–10.5)
WBC # FLD AUTO: 9.78 K/UL — SIGNIFICANT CHANGE UP (ref 3.8–10.5)
WBC UR QL: HIGH (ref 0–?)

## 2019-12-31 PROCEDURE — 90792 PSYCH DIAG EVAL W/MED SRVCS: CPT

## 2019-12-31 PROCEDURE — 99284 EMERGENCY DEPT VISIT MOD MDM: CPT

## 2019-12-31 NOTE — ED ADULT NURSE REASSESSMENT NOTE - NS ED NURSE REASSESS COMMENT FT1
Pt calm & cooperative seen by psych cleared by MD NP for discharge report giving to [facility], transport arranged by  pt verbalizing understanding of d/c instructions.

## 2019-12-31 NOTE — ED PROVIDER NOTE - PATIENT PORTAL LINK FT
You can access the FollowMyHealth Patient Portal offered by [facility] by registering at the following website: [email or web address]. By joining [employer]’s FollowMyHealth portal, you will also be able to view your health information using other applications (apps) compatible with our system.
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
## 2019-12-31 NOTE — ED BEHAVIORAL HEALTH ASSESSMENT NOTE - RISK ASSESSMENT
Patient has high chronic risk given chronic impulsivity, past SA, past aggression. Risk does not currently appear to be acutely elevated from baseline. Pt is low imminent risk- not currently acutely psychotic or depressed, no si/hi/sib/intent/plan, eating/sleeping well, no recent violence, no substance use, future oriented, able to safety plan, compliant with medications, calm and cooperative throughout evaluation. Inpatient admission Is unlikely to modify these chronic risks, pt is not requiring inpatient psychiatric admission at this time as she is not an imminent danger to self or others. Current presentation in keeping with personality pathology and known psychiatric diagnoses. Low Acute Suicide Risk
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
## 2019-12-31 NOTE — ED BEHAVIORAL HEALTH NOTE - BEHAVIORAL HEALTH NOTE
Writer called [address], [phone] spoke to Wander informing him patient will be coming back.  Writer informed him patient will receive metrocard.  Pt was provided metrocard serial # [id number]

## 2019-12-31 NOTE — ED BEHAVIORAL HEALTH ASSESSMENT NOTE - DESCRIPTION
dramatic but calm and redirectable  Vital Signs Last 24 Hrs  T(C): 36.7 (31 Dec 2019 11:36), Max: 36.7 (31 Dec 2019 11:36)  T(F): 98 (31 Dec 2019 11:36), Max: 98 (31 Dec 2019 11:36)  HR: 78 (31 Dec 2019 11:36) (78 - 78)  BP: 119/68 (31 Dec 2019 11:36) (119/68 - 119/68)  BP(mean): --  RR: 18 (31 Dec 2019 11:36) (18 - 18)  SpO2: 98% (31 Dec 2019 11:36) (98% - 98%) GERD, Asthma currently lives in [facility], [address] [facility] carterfreddy MIGUEL ANGEL reports no contact with family, currently unemployed.

## 2019-12-31 NOTE — ED PROVIDER NOTE - NSFOLLOWUPCLINICS_GEN_ALL_ED_FT
[facility]  Behavioral Health  [address] [place] [address]  Phone: [phone]  Fax:   Follow Up Time:

## 2019-12-31 NOTE — ED PROVIDER NOTE - CLINICAL SUMMARY MEDICAL DECISION MAKING FREE TEXT BOX
39F h/o bipolar, borderline personality from [place] p/w suicidal ideation with a plan -will have psych eval in ED 39F h/o bipolar, borderline personality from [facility] p/w suicidal ideation with a plan -will have psych eval in ED  Jassi NP- psychiatric eval - team recommendation outpatient follow up.

## 2019-12-31 NOTE — ED PROVIDER NOTE - OBJECTIVE STATEMENT
39F h/o bipolar, borderline personality from [facility] p/w suicidal ideation with a plan. PT states she has been more depressed and wants to give away all of her money and stick her head in the oven to "end it all." Denies any new stressors or changes to medication. Reports previous suicide attempt in the past with digoxin OD. No suicide attempt made today.

## 2019-12-31 NOTE — ED BEHAVIORAL HEALTH ASSESSMENT NOTE - HPI (INCLUDE ILLNESS QUALITY, SEVERITY, DURATION, TIMING, CONTEXT, MODIFYING FACTORS, ASSOCIATED SIGNS AND SYMPTOMS)
Patient is a 39-year-old, single,  female, non caregiver, unemployed, domiciled on [place] grounds building [address] [place] [address], with past psychiatric history of Schizoaffective Disorder, Borderline Personality Disorder, hx of cannabis abuse, multiple inpatient psychiatric hospitalizations (>20), last [facility] 12/12-12/24 2018, with frequent visits to the [facility] (well-known to staff), with ACT Team, 3 prior suicide attempts (last in 2012 via OD), recurrent and chronic suicidal gestures and suicidal ideation, history of property destruction when upset, past legal issues, no access to weapons. PMH GERD & asthma. BIB EMS called by staff due to pt request to come to the hospital.    Patient was dramatic, sitting on the floor at times, but verbally redirectable and otherwise calm and cooperative. Patient states she wants to leave her money to [facility] and today had a thought to stick her head in the oven, denies trying to do so. She denies wanting to be dead. She claims she is not taking her night lithium because it makes her lethargic during the day. She states she needs medication for her mind because it feels "flabbergasted". She denies any suicidal intent, denies homicidal or violent ideation, reports no issues with sleep or appetite, asking for food, and requesting admission to be in the hospital for new years. Denies hallucinations, paranoia, or IOR.    See  note for collateral from residence.    Spoke with Brenda Conklin, psych NP of [facility] ACT Team. She states pr has been refusing prolixin dec for two months and was transitioned to oral two weeks ago. Current regimen is Prolixin 5mg BID, Lithium 450mg BID, Klonopin 1mg QAM. She states pt was seen last week, is at baseline, chronically dislikes her residence and seeks out time in the ED. No concerns expressed, ACT team can see patient in 2 days.

## 2019-12-31 NOTE — ED BEHAVIORAL HEALTH ASSESSMENT NOTE - SUICIDE PROTECTIVE FACTORS
Supportive social network of family or friends/Fear of death or the actual act of killing self/Identifies reasons for living/Positive therapeutic relationships/Has future plans

## 2019-12-31 NOTE — ED BEHAVIORAL HEALTH NOTE - BEHAVIORAL HEALTH NOTE
Writer was asked to arrange taxi paid for by [employer].  Writer arranged taxi via [employer] using account 20.    Pritesh Castro  Thank you for booking with [employer], please find your booking details below. If you have any questions in relation to your booking, please call us on [phone]  Booking Reference	#[id number]   Date	Tue 31st Dec 2019 14:51  Account	50- Social Work Dept  Payment	Invoice  Passenger	Janessa Castillo  Passenger Phone	[phone]  Client Reference	[id number]  From	[address]   Destination	[address] [facility]  Trip Distance	2.27MI    [employer], [address] [place] [address], [id number]

## 2019-12-31 NOTE — ED BEHAVIORAL HEALTH ASSESSMENT NOTE - SAFETY PLAN ADDT'L DETAILS
Safety plan discussed with.../Education provided regarding environmental safety / lethal means restriction/Provision of National Suicide Prevention Lifeline 8-403-927-IXOJ (3293)

## 2019-12-31 NOTE — ED ADULT TRIAGE NOTE - CHIEF COMPLAINT QUOTE
p/t with hx Bipolar, schizophrenia c/o of increased depression and SI with plan, p/t not compliant with her meds

## 2019-12-31 NOTE — ED BEHAVIORAL HEALTH ASSESSMENT NOTE - SUMMARY
Patient is a 39-year-old, single,  female, non caregiver, unemployed, domiciled on [place] [address] [place] [address], with past psychiatric history of Schizoaffective Disorder, Borderline Personality Disorder, hx of cannabis abuse, multiple inpatient psychiatric hospitalizations (>20), last [facility] 4 12/12-12/24 2018, with frequent visits to the [facility] (well-known to staff), with ACT Team, 3 prior suicide attempts (last in 2012 via OD), recurrent and chronic suicidal gestures and suicidal ideation, history of property destruction when upset, past legal issues, no access to weapons. PMH GERD & asthma. BIB EMS called by staff due to pt request to come to the hospital.    Patient is seeking admission due to chronic dislike of residence and wanting to celebrate New Years within the hospital. She has chronic suicidal ideation, which she expressed to staff today, however does not meet criteria for an acute depressive episode, has no suicidal intent, had no preparatory acts for suicide, no recent self injury, and no wish to be dead. She requests a lithium level for monitoring stating she has not had it in months. Per residence and ACT team, patient has been at baseline and compliant. There is noted secondary gain. At present she denies any plan or intent to act on these thoughts and is willing to engage in safety planning to further mitigate her risk. At this time there is no evidence of acute trina/psychosis/homicidality. Patient is future oriented, hopeful to eventually transition to an independent apartment. She has ACT Team follow up later this week. No indication for inpatient level of care at this time. These symptoms are chronic and can continue to be managed as an oupatient.

## 2019-12-31 NOTE — ED ADULT NURSE NOTE - OBJECTIVE STATEMENT
Received pt in  pt sent from [facility] for psych eval,  pt calm & cooperative verbalizing she told staff she wanted to stick her head in the oven, so she can come to the hospital. pt denies si/hi/avh presently safety & comfort measures maintained eval on going.

## 2019-12-31 NOTE — ED BEHAVIORAL HEALTH NOTE - BEHAVIORAL HEALTH NOTE
MI spoke with patient’s , Angi at [facility] Building [address] [place] [address] A, [phone]. Angi reported patient went to  this morning stating she was feeling depressed and asked for the LEAD team to be called. Angi reported when LEAD team arrived, patient requested to go to the hospital and stated she was going to give all of her money away and stick her head in the oven, prompting EMS call. Angi reported patient has a history of expressing suicidal ideation. Angi reported patient has been stable this month and has been compliant with all medication. Angi reported patient has not expressed AH/VH/HI, is not engaging in SIB and has not been violent. Angi reported no additional psychiatric or medical concerns. Angi reported patient can return independently via taxi if cleared for discharge.

## 2020-01-01 LAB
BACTERIA UR CULT: SIGNIFICANT CHANGE UP
SPECIMEN SOURCE: SIGNIFICANT CHANGE UP

## 2020-02-12 NOTE — ED ADULT NURSE NOTE - NSSISCREENINGQ5_ED_A_ED
Mom called think child has strep throat, mom noticed white spots. Child has had cold symptoms for about 3 weeks.  Mom says she snores and thinks she may need tonsils out No

## 2020-05-25 NOTE — ED ADULT NURSE NOTE - NS ED PATIENT SAFETY CONCERN
No CBC Full  -  ( 25 May 2020 06:10 )  WBC Count : 6.27 K/uL  RBC Count : 2.83 M/uL  Hemoglobin : 8.6 g/dL  Hematocrit : 28.3 %  Platelet Count - Automated : 173 K/uL  Mean Cell Volume : 100.0 fl  Mean Cell Hemoglobin : 30.4 pg  Mean Cell Hemoglobin Concentration : 30.4 gm/dL  Auto Neutrophil # : x  Auto Lymphocyte # : x  Auto Monocyte # : x  Auto Eosinophil # : x  Auto Basophil # : x  Auto Neutrophil % : x  Auto Lymphocyte % : x  Auto Monocyte % : x  Auto Eosinophil % : x  Auto Basophil % : x    05-25    128<L>  |  93<L>  |  23  ----------------------------<  94  4.5   |  24  |  3.02<H>    Ca    8.8      25 May 2020 06:10  Phos  1.2     05-25  Mg     1.9     05-25

## 2020-06-02 ENCOUNTER — INPATIENT (INPATIENT)
Facility: HOSPITAL | Age: 40
LOS: 6 days | Discharge: HOME | End: 2020-06-09
Attending: PSYCHIATRY & NEUROLOGY | Admitting: PSYCHIATRY & NEUROLOGY
Payer: MEDICAID

## 2020-06-02 VITALS
HEART RATE: 88 BPM | OXYGEN SATURATION: 100 % | TEMPERATURE: 98 F | DIASTOLIC BLOOD PRESSURE: 80 MMHG | RESPIRATION RATE: 18 BRPM | SYSTOLIC BLOOD PRESSURE: 125 MMHG

## 2020-06-02 DIAGNOSIS — F31.64 BIPOLAR DISORDER, CURRENT EPISODE MIXED, SEVERE, WITH PSYCHOTIC FEATURES: ICD-10-CM

## 2020-06-02 DIAGNOSIS — F25.9 SCHIZOAFFECTIVE DISORDER, UNSPECIFIED: ICD-10-CM

## 2020-06-02 LAB
ALBUMIN SERPL ELPH-MCNC: 4 G/DL — SIGNIFICANT CHANGE UP (ref 3.3–5)
ALP SERPL-CCNC: 123 U/L — HIGH (ref 40–120)
ALT FLD-CCNC: 36 U/L — HIGH (ref 4–33)
ANION GAP SERPL CALC-SCNC: 12 MMO/L — SIGNIFICANT CHANGE UP (ref 7–14)
APAP SERPL-MCNC: < 15 UG/ML — LOW (ref 15–25)
AST SERPL-CCNC: 25 U/L — SIGNIFICANT CHANGE UP (ref 4–32)
BASOPHILS # BLD AUTO: 0.02 K/UL — SIGNIFICANT CHANGE UP (ref 0–0.2)
BASOPHILS NFR BLD AUTO: 0.2 % — SIGNIFICANT CHANGE UP (ref 0–2)
BILIRUB SERPL-MCNC: < 0.2 MG/DL — LOW (ref 0.2–1.2)
BUN SERPL-MCNC: 8 MG/DL — SIGNIFICANT CHANGE UP (ref 7–23)
CALCIUM SERPL-MCNC: 9.4 MG/DL — SIGNIFICANT CHANGE UP (ref 8.4–10.5)
CHLORIDE SERPL-SCNC: 105 MMOL/L — SIGNIFICANT CHANGE UP (ref 98–107)
CO2 SERPL-SCNC: 20 MMOL/L — LOW (ref 22–31)
CREAT SERPL-MCNC: 0.79 MG/DL — SIGNIFICANT CHANGE UP (ref 0.5–1.3)
EOSINOPHIL # BLD AUTO: 0.6 K/UL — HIGH (ref 0–0.5)
EOSINOPHIL NFR BLD AUTO: 7 % — HIGH (ref 0–6)
ETHANOL BLD-MCNC: < 10 MG/DL — SIGNIFICANT CHANGE UP
GLUCOSE SERPL-MCNC: 112 MG/DL — HIGH (ref 70–99)
HCG SERPL-ACNC: < 5 MIU/ML — SIGNIFICANT CHANGE UP
HCT VFR BLD CALC: 40.8 % — SIGNIFICANT CHANGE UP (ref 34.5–45)
HGB BLD-MCNC: 12.4 G/DL — SIGNIFICANT CHANGE UP (ref 11.5–15.5)
IMM GRANULOCYTES NFR BLD AUTO: 0.6 % — SIGNIFICANT CHANGE UP (ref 0–1.5)
LITHIUM SERPL-MCNC: 0.91 MMOL/L — SIGNIFICANT CHANGE UP (ref 0.6–1.2)
LYMPHOCYTES # BLD AUTO: 2.28 K/UL — SIGNIFICANT CHANGE UP (ref 1–3.3)
LYMPHOCYTES # BLD AUTO: 26.6 % — SIGNIFICANT CHANGE UP (ref 13–44)
MCHC RBC-ENTMCNC: 27.8 PG — SIGNIFICANT CHANGE UP (ref 27–34)
MCHC RBC-ENTMCNC: 30.4 % — LOW (ref 32–36)
MCV RBC AUTO: 91.5 FL — SIGNIFICANT CHANGE UP (ref 80–100)
MONOCYTES # BLD AUTO: 0.66 K/UL — SIGNIFICANT CHANGE UP (ref 0–0.9)
MONOCYTES NFR BLD AUTO: 7.7 % — SIGNIFICANT CHANGE UP (ref 2–14)
NEUTROPHILS # BLD AUTO: 4.95 K/UL — SIGNIFICANT CHANGE UP (ref 1.8–7.4)
NEUTROPHILS NFR BLD AUTO: 57.9 % — SIGNIFICANT CHANGE UP (ref 43–77)
NRBC # FLD: 0 K/UL — SIGNIFICANT CHANGE UP (ref 0–0)
PLATELET # BLD AUTO: 270 K/UL — SIGNIFICANT CHANGE UP (ref 150–400)
PMV BLD: 9.2 FL — SIGNIFICANT CHANGE UP (ref 7–13)
POTASSIUM SERPL-MCNC: 4.7 MMOL/L — SIGNIFICANT CHANGE UP (ref 3.5–5.3)
POTASSIUM SERPL-SCNC: 4.7 MMOL/L — SIGNIFICANT CHANGE UP (ref 3.5–5.3)
PROT SERPL-MCNC: 7.7 G/DL — SIGNIFICANT CHANGE UP (ref 6–8.3)
RBC # BLD: 4.46 M/UL — SIGNIFICANT CHANGE UP (ref 3.8–5.2)
RBC # FLD: 13.8 % — SIGNIFICANT CHANGE UP (ref 10.3–14.5)
SALICYLATES SERPL-MCNC: < 5 MG/DL — LOW (ref 15–30)
SARS-COV-2 RNA SPEC QL NAA+PROBE: SIGNIFICANT CHANGE UP
SODIUM SERPL-SCNC: 137 MMOL/L — SIGNIFICANT CHANGE UP (ref 135–145)
TSH SERPL-MCNC: 2.96 UIU/ML — SIGNIFICANT CHANGE UP (ref 0.27–4.2)
WBC # BLD: 8.56 K/UL — SIGNIFICANT CHANGE UP (ref 3.8–10.5)
WBC # FLD AUTO: 8.56 K/UL — SIGNIFICANT CHANGE UP (ref 3.8–10.5)

## 2020-06-02 PROCEDURE — 99285 EMERGENCY DEPT VISIT HI MDM: CPT

## 2020-06-02 RX ORDER — ALBUTEROL 90 UG/1
2 AEROSOL, METERED ORAL EVERY 6 HOURS
Refills: 0 | Status: DISCONTINUED | OUTPATIENT
Start: 2020-06-02 | End: 2020-06-09

## 2020-06-02 RX ORDER — LITHIUM CARBONATE 300 MG/1
900 TABLET, EXTENDED RELEASE ORAL AT BEDTIME
Refills: 0 | Status: DISCONTINUED | OUTPATIENT
Start: 2020-06-02 | End: 2020-06-09

## 2020-06-02 RX ORDER — LITHIUM CARBONATE 300 MG/1
150 TABLET, EXTENDED RELEASE ORAL
Refills: 0 | Status: DISCONTINUED | OUTPATIENT
Start: 2020-06-02 | End: 2020-06-02

## 2020-06-02 RX ORDER — CLONAZEPAM 1 MG
1 TABLET ORAL DAILY
Refills: 0 | Status: DISCONTINUED | OUTPATIENT
Start: 2020-06-02 | End: 2020-06-09

## 2020-06-02 RX ORDER — OLANZAPINE 15 MG/1
10 TABLET, FILM COATED ORAL AT BEDTIME
Refills: 0 | Status: DISCONTINUED | OUTPATIENT
Start: 2020-06-02 | End: 2020-06-02

## 2020-06-02 RX ORDER — FAMOTIDINE 10 MG/ML
20 INJECTION INTRAVENOUS DAILY
Refills: 0 | Status: DISCONTINUED | OUTPATIENT
Start: 2020-06-02 | End: 2020-06-09

## 2020-06-02 RX ORDER — LITHIUM CARBONATE 300 MG/1
300 TABLET, EXTENDED RELEASE ORAL DAILY
Refills: 0 | Status: DISCONTINUED | OUTPATIENT
Start: 2020-06-02 | End: 2020-06-09

## 2020-06-02 RX ORDER — LITHIUM CARBONATE 300 MG/1
300 TABLET, EXTENDED RELEASE ORAL
Refills: 0 | Status: DISCONTINUED | OUTPATIENT
Start: 2020-06-02 | End: 2020-06-02

## 2020-06-02 RX ORDER — OLANZAPINE 15 MG/1
10 TABLET, FILM COATED ORAL
Refills: 0 | Status: DISCONTINUED | OUTPATIENT
Start: 2020-06-02 | End: 2020-06-09

## 2020-06-02 RX ORDER — FLUPHENAZINE HYDROCHLORIDE 1 MG/1
5 TABLET, FILM COATED ORAL
Refills: 0 | Status: DISCONTINUED | OUTPATIENT
Start: 2020-06-02 | End: 2020-06-09

## 2020-06-02 RX ADMIN — LITHIUM CARBONATE 900 MILLIGRAM(S): 300 TABLET, EXTENDED RELEASE ORAL at 20:28

## 2020-06-02 RX ADMIN — OLANZAPINE 10 MILLIGRAM(S): 15 TABLET, FILM COATED ORAL at 20:28

## 2020-06-02 RX ADMIN — FLUPHENAZINE HYDROCHLORIDE 5 MILLIGRAM(S): 1 TABLET, FILM COATED ORAL at 20:28

## 2020-06-02 NOTE — ED PROVIDER NOTE - CLINICAL SUMMARY MEDICAL DECISION MAKING FREE TEXT BOX
This is a q 39 yr old F, pmh bipolar disorder with c/o SI. Pt endorses SI to counselor in the morning and she still feels she wants to die. Collateral info obtained by MI from [place] [address] staff member, please refer to her note. Labs, toxicology, psych consult requested. This is a q 39 yr old F, pmh bipolar disorder with c/o SI. Pt endorses SI to counselor in the morning and she still feels she wants to die. Collateral info obtained by MI from [place] [address] staff member, please refer to her note. Labs, toxicology, psych consult requested.- recommendation inpatient treatment

## 2020-06-02 NOTE — ED BEHAVIORAL HEALTH ASSESSMENT NOTE - SUICIDE RISK FACTORS
Current mood episode/Psychotic disorder current/past/Cluster B Personality disorders or traits current/past/Mood Disorder current/past/Hopelessness or despair/Command hallucinations/History of abuse/trauma/Anhedonia

## 2020-06-02 NOTE — ED BEHAVIORAL HEALTH ASSESSMENT NOTE - TELEPSYCHIATRY?
No Transitions of Care: 15% risk of re-admission      -[facility] has accepted for home health skilled nursing, PT/OT   -Discharge with elise with Urology and Cardiac Surgery follow-up   -Family to transport    The CM spoke with Cardiac Surgery NP- anticipate discharge tomorrow, 3/31 with [facility]- CM will ask RN to initiate [facility] education for home. The patient is authorized for home health PT/OT/RN with [facility], information on AVS. The CM met with patient at bedside- he is agreeable for services with [facility] and his wife will transport home. Denied any additional concerns for anticipated discharge tomorrow, 3/31. Medicare pt has received, reviewed, and signed 2nd IM letter informing them of their right to appeal the discharge. Signed copy has been placed on pt bedside chart. CM will follow for transitions of care.  SAMANTHA Castro

## 2020-06-02 NOTE — ED BEHAVIORAL HEALTH ASSESSMENT NOTE - SUICIDE PROTECTIVE FACTORS
Supportive social network of family or friends/Fear of death or the actual act of killing self/Positive therapeutic relationships
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
## 2020-06-02 NOTE — ED PROVIDER NOTE - OBJECTIVE STATEMENT
This is a q 39 yr old F, pmh bipolar disorder with c/o SI. Pt endorses SI to counselor in the morning and she still feels she wants to die.

## 2020-06-02 NOTE — ED BEHAVIORAL HEALTH NOTE - BEHAVIORAL HEALTH NOTE
MI spoke with  at [facility] Central Intake and obtained bed for patient on Low6.    MI spoke with ACT , Anh [phone], LEAD Team  Angie [phone], and  Tiffanie, [phone] to inform of [facility] admission pending medical clearance and provided contact information for unit Low6.

## 2020-06-02 NOTE — ED ADULT NURSE NOTE - OBJECTIVE STATEMENT
pt. brought from [facility]. 74, pt. expressed suicidal thoughts to a counselor this morning, endorses SI at the present time w/ no particular plan in mind, denies HI. PMHx bipolar disorder, borderline personality disorder.  Patient evaluated by JOHNNIE and MD. Pt's labs drawn and sent. Pt admitted and report given to floor RN Low 6. Pt left with security.  FORD Sandoval
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
## 2020-06-02 NOTE — ED BEHAVIORAL HEALTH ASSESSMENT NOTE - HPI (INCLUDE ILLNESS QUALITY, SEVERITY, DURATION, TIMING, CONTEXT, MODIFYING FACTORS, ASSOCIATED SIGNS AND SYMPTOMS)
Patient is a 39-year-old, single,  female, non caregiver, unemployed, domiciled on [place] grounds building [address] [place] [address], with past psychiatric history of bipolar I disorder, Borderline Personality Disorder, hx of cannabis abuse, multiple inpatient psychiatric hospitalizations (>20), [facility] with ACT Team, 3 prior suicide attempts (last in 2012 via OD), recurrent and chronic suicidal gestures and suicidal ideation, history of property destruction when upset, past legal issues, no access to weapons. PMH GERD & asthma. BIB EMS called by staff due to suicidal ideation.    See  note for collateral from residence and outpatient SW.     Patient reports she has been feeling depressed and angry for a long time and can't hide it anymore. Not feeling well despite compliance with medication. Feels depressed about society. Reports she has been feeling like there is no point in being alive. Reports that she researched how to kill herself yesterday and was having thoughts to buy things so that she could put herself on fire. States does not want to continue living like this. Hears voices telling her to kill herself and that she is bad. No delusions elicited. Reports sleep and appetite are so-so. Reports feeling hopelessness and anhedonia. Reports slamming doors but denies breaking things. No homicidal ideation reported. No drug use.

## 2020-06-02 NOTE — ED PROVIDER NOTE - INTERPRETATION
Reason for Admission:   Complaints of substance abuse/additional problem RRAT Score:          0/0/1 Plan for utilizing home health:      No needs Likelihood of Readmission:  Low Transition of Care Plan:            
 
Mary Reid is a 35year old male to [facility] after relapse. He self reported loss of job and girlfriend in last few weeks. [facility] met with patient and evaluated. He lives with room mate for 1.5 years with verbal contract that he stay sober. He has sponsor at bedside, he has reached out to mother, sister,  and father. He has some resources for hotel if needed. [facility] is recommending outpatient services/therapies. Meli Hilton RN, BSN, [facility] 
ED Care Management [phone] normal sinus rhythm

## 2020-06-02 NOTE — ED BEHAVIORAL HEALTH ASSESSMENT NOTE - SUMMARY
Patient is a 39-year-old, single,  female, non caregiver, unemployed, domiciled on [place] grounds building [address] [place] [address], with past psychiatric history of bipolar I disorder, Borderline Personality Disorder, hx of cannabis abuse, multiple inpatient psychiatric hospitalizations (>20), with frequent visits to the [facility] (well-known to staff), with ACT Team, 3 prior suicide attempts (last in 2012 via OD), recurrent and chronic suicidal gestures and suicidal ideation, history of property destruction when upset, past legal issues, no access to weapons. PMH GERD & asthma. BIB EMS called by staff due to suicidal ideation.    Patient reporting worsening depression and irritability in setting of being quarantined at residence. She report suicidal ideation with plan to put herself on fire, reports has been researching ways to kill herself and making plans to give her things away. Unable to contract for safety at this time. She is also reporting CAH to kill herself and has somewhat of a disorganized thought process. While she does have a history of chronic suicidal ideation, making provocative statements/acting out, it is confirmed that she has been more depressed in the past week at residence, not taking care of herself as well, and her outpatient SW is advocating for an admission. Will admit for observation and management.

## 2020-06-02 NOTE — ED BEHAVIORAL HEALTH ASSESSMENT NOTE - PRIMARY DX
Bipolar disorder, current episode mixed, severe, with psychotic features Borderline personality disorder
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
## 2020-06-02 NOTE — ED BEHAVIORAL HEALTH ASSESSMENT NOTE - DESCRIPTION
No incidents    Vital Signs Last 24 Hrs  T(C): 36.6 (02 Jun 2020 12:13), Max: 36.6 (02 Jun 2020 12:13)  T(F): 97.8 (02 Jun 2020 12:13), Max: 97.8 (02 Jun 2020 12:13)  HR: 88 (02 Jun 2020 12:13) (88 - 88)  BP: 125/80 (02 Jun 2020 12:13) (125/80 - 125/80)  BP(mean): --  RR: 18 (02 Jun 2020 12:13) (18 - 18)  SpO2: 100% (02 Jun 2020 12:13) (100% - 100%) GERD, Asthma, gallbladder calculus without cholecystitis currently lives in [facility], [address] [facility] [address] MIGUEL ANGEL reports no contact with family, currently unemployed.

## 2020-06-02 NOTE — ED ADULT NURSE NOTE - CHIEF COMPLAINT QUOTE
pt. brought from [facility]. 74, pt. expressed suicidal thoughts to a counselor this morning, endorses SI at the present time w/ no particular plan in mind, denies HI. PMHx bipolar disorder, borderline personality disorder.

## 2020-06-02 NOTE — ED BEHAVIORAL HEALTH ASSESSMENT NOTE - CURRENT MEDICATION
proair HFA 2 puffs q6h prn SOB, prolixin decanoate 25mg/bi-week, zyprexa 20mg po qhs, Prolixin 5mg BID, Lithium 450mg BID, Klonopin 1mg QAM Albuterol 90mg 2 puffs q6h prn, pepcid 20mg po daily, zyprexa 10mg po bid, Prolixin 5mg BID, Lithium 300mg qam and 900mg at bedtime, Klonopin 1mg QAM

## 2020-06-02 NOTE — ED BEHAVIORAL HEALTH ASSESSMENT NOTE - PAST PSYCHOTROPIC MEDICATION
Haldol, risperdal, Abilify, Geodon, thorazine, depakote, Zyprexa, Prolixin dec, ?clozapine Haldol, risperdal, Abilify, Geodon, thorazine, depakote, Zyprexa, Prolixin dec, ?clozapine  off prolixin decanoate one year

## 2020-06-03 RX ADMIN — FLUPHENAZINE HYDROCHLORIDE 5 MILLIGRAM(S): 1 TABLET, FILM COATED ORAL at 20:38

## 2020-06-03 RX ADMIN — LITHIUM CARBONATE 900 MILLIGRAM(S): 300 TABLET, EXTENDED RELEASE ORAL at 20:39

## 2020-06-03 RX ADMIN — OLANZAPINE 10 MILLIGRAM(S): 15 TABLET, FILM COATED ORAL at 20:39

## 2020-06-04 RX ADMIN — Medication 1 MILLIGRAM(S): at 08:45

## 2020-06-04 RX ADMIN — LITHIUM CARBONATE 900 MILLIGRAM(S): 300 TABLET, EXTENDED RELEASE ORAL at 21:23

## 2020-06-04 RX ADMIN — LITHIUM CARBONATE 300 MILLIGRAM(S): 300 TABLET, EXTENDED RELEASE ORAL at 08:45

## 2020-06-04 RX ADMIN — FLUPHENAZINE HYDROCHLORIDE 5 MILLIGRAM(S): 1 TABLET, FILM COATED ORAL at 21:23

## 2020-06-04 RX ADMIN — OLANZAPINE 10 MILLIGRAM(S): 15 TABLET, FILM COATED ORAL at 21:23

## 2020-06-04 RX ADMIN — FAMOTIDINE 20 MILLIGRAM(S): 10 INJECTION INTRAVENOUS at 08:45

## 2020-06-04 RX ADMIN — FLUPHENAZINE HYDROCHLORIDE 5 MILLIGRAM(S): 1 TABLET, FILM COATED ORAL at 08:45

## 2020-06-04 RX ADMIN — OLANZAPINE 10 MILLIGRAM(S): 15 TABLET, FILM COATED ORAL at 08:45

## 2020-06-05 LAB
ALBUMIN SERPL ELPH-MCNC: 3.6 G/DL — SIGNIFICANT CHANGE UP (ref 3.3–5)
ALP SERPL-CCNC: 107 U/L — SIGNIFICANT CHANGE UP (ref 40–120)
ALT FLD-CCNC: 26 U/L — SIGNIFICANT CHANGE UP (ref 4–33)
ANION GAP SERPL CALC-SCNC: 10 MMO/L — SIGNIFICANT CHANGE UP (ref 7–14)
AST SERPL-CCNC: 20 U/L — SIGNIFICANT CHANGE UP (ref 4–32)
BILIRUB SERPL-MCNC: < 0.2 MG/DL — LOW (ref 0.2–1.2)
BUN SERPL-MCNC: 11 MG/DL — SIGNIFICANT CHANGE UP (ref 7–23)
CALCIUM SERPL-MCNC: 9.4 MG/DL — SIGNIFICANT CHANGE UP (ref 8.4–10.5)
CHLORIDE SERPL-SCNC: 109 MMOL/L — HIGH (ref 98–107)
CHOLEST SERPL-MCNC: 132 MG/DL — SIGNIFICANT CHANGE UP (ref 120–199)
CO2 SERPL-SCNC: 24 MMOL/L — SIGNIFICANT CHANGE UP (ref 22–31)
CREAT SERPL-MCNC: 0.86 MG/DL — SIGNIFICANT CHANGE UP (ref 0.5–1.3)
GLUCOSE SERPL-MCNC: 80 MG/DL — SIGNIFICANT CHANGE UP (ref 70–99)
HBA1C BLD-MCNC: 5.4 % — SIGNIFICANT CHANGE UP (ref 4–5.6)
HCT VFR BLD CALC: 38 % — SIGNIFICANT CHANGE UP (ref 34.5–45)
HDLC SERPL-MCNC: 43 MG/DL — LOW (ref 45–65)
HGB BLD-MCNC: 11.5 G/DL — SIGNIFICANT CHANGE UP (ref 11.5–15.5)
LIPID PNL WITH DIRECT LDL SERPL: 76 MG/DL — SIGNIFICANT CHANGE UP
MCHC RBC-ENTMCNC: 27.8 PG — SIGNIFICANT CHANGE UP (ref 27–34)
MCHC RBC-ENTMCNC: 30.3 % — LOW (ref 32–36)
MCV RBC AUTO: 92 FL — SIGNIFICANT CHANGE UP (ref 80–100)
NRBC # FLD: 0 K/UL — SIGNIFICANT CHANGE UP (ref 0–0)
PLATELET # BLD AUTO: 259 K/UL — SIGNIFICANT CHANGE UP (ref 150–400)
PMV BLD: 9.3 FL — SIGNIFICANT CHANGE UP (ref 7–13)
POTASSIUM SERPL-MCNC: 4.4 MMOL/L — SIGNIFICANT CHANGE UP (ref 3.5–5.3)
POTASSIUM SERPL-SCNC: 4.4 MMOL/L — SIGNIFICANT CHANGE UP (ref 3.5–5.3)
PROT SERPL-MCNC: 6.6 G/DL — SIGNIFICANT CHANGE UP (ref 6–8.3)
RBC # BLD: 4.13 M/UL — SIGNIFICANT CHANGE UP (ref 3.8–5.2)
RBC # FLD: 14 % — SIGNIFICANT CHANGE UP (ref 10.3–14.5)
SODIUM SERPL-SCNC: 143 MMOL/L — SIGNIFICANT CHANGE UP (ref 135–145)
TRIGL SERPL-MCNC: 105 MG/DL — SIGNIFICANT CHANGE UP (ref 10–149)
WBC # BLD: 7.77 K/UL — SIGNIFICANT CHANGE UP (ref 3.8–10.5)
WBC # FLD AUTO: 7.77 K/UL — SIGNIFICANT CHANGE UP (ref 3.8–10.5)

## 2020-06-05 RX ORDER — PANTOPRAZOLE SODIUM 20 MG/1
40 TABLET, DELAYED RELEASE ORAL DAILY
Refills: 0 | Status: DISCONTINUED | OUTPATIENT
Start: 2020-06-06 | End: 2020-06-09

## 2020-06-05 RX ORDER — ONDANSETRON 8 MG/1
4 TABLET, FILM COATED ORAL EVERY 8 HOURS
Refills: 0 | Status: DISCONTINUED | OUTPATIENT
Start: 2020-06-05 | End: 2020-06-09

## 2020-06-05 RX ORDER — PANTOPRAZOLE SODIUM 20 MG/1
40 TABLET, DELAYED RELEASE ORAL ONCE
Refills: 0 | Status: COMPLETED | OUTPATIENT
Start: 2020-06-05 | End: 2020-06-05

## 2020-06-05 RX ADMIN — OLANZAPINE 10 MILLIGRAM(S): 15 TABLET, FILM COATED ORAL at 08:23

## 2020-06-05 RX ADMIN — Medication 1 MILLIGRAM(S): at 08:23

## 2020-06-05 RX ADMIN — LITHIUM CARBONATE 900 MILLIGRAM(S): 300 TABLET, EXTENDED RELEASE ORAL at 21:00

## 2020-06-05 RX ADMIN — LITHIUM CARBONATE 300 MILLIGRAM(S): 300 TABLET, EXTENDED RELEASE ORAL at 08:23

## 2020-06-05 RX ADMIN — FLUPHENAZINE HYDROCHLORIDE 5 MILLIGRAM(S): 1 TABLET, FILM COATED ORAL at 21:00

## 2020-06-05 RX ADMIN — FLUPHENAZINE HYDROCHLORIDE 5 MILLIGRAM(S): 1 TABLET, FILM COATED ORAL at 08:23

## 2020-06-05 RX ADMIN — OLANZAPINE 10 MILLIGRAM(S): 15 TABLET, FILM COATED ORAL at 21:00

## 2020-06-05 RX ADMIN — FAMOTIDINE 20 MILLIGRAM(S): 10 INJECTION INTRAVENOUS at 08:23

## 2020-06-05 RX ADMIN — ONDANSETRON 4 MILLIGRAM(S): 8 TABLET, FILM COATED ORAL at 21:00

## 2020-06-05 RX ADMIN — ONDANSETRON 4 MILLIGRAM(S): 8 TABLET, FILM COATED ORAL at 10:20

## 2020-06-05 RX ADMIN — PANTOPRAZOLE SODIUM 40 MILLIGRAM(S): 20 TABLET, DELAYED RELEASE ORAL at 10:20

## 2020-06-06 PROCEDURE — 99232 SBSQ HOSP IP/OBS MODERATE 35: CPT

## 2020-06-06 RX ADMIN — Medication 1 MILLIGRAM(S): at 09:26

## 2020-06-06 RX ADMIN — PANTOPRAZOLE SODIUM 40 MILLIGRAM(S): 20 TABLET, DELAYED RELEASE ORAL at 09:26

## 2020-06-06 RX ADMIN — FLUPHENAZINE HYDROCHLORIDE 5 MILLIGRAM(S): 1 TABLET, FILM COATED ORAL at 09:26

## 2020-06-06 RX ADMIN — FAMOTIDINE 20 MILLIGRAM(S): 10 INJECTION INTRAVENOUS at 09:26

## 2020-06-06 RX ADMIN — FLUPHENAZINE HYDROCHLORIDE 5 MILLIGRAM(S): 1 TABLET, FILM COATED ORAL at 21:31

## 2020-06-06 RX ADMIN — OLANZAPINE 10 MILLIGRAM(S): 15 TABLET, FILM COATED ORAL at 09:26

## 2020-06-06 RX ADMIN — OLANZAPINE 10 MILLIGRAM(S): 15 TABLET, FILM COATED ORAL at 20:46

## 2020-06-06 RX ADMIN — LITHIUM CARBONATE 900 MILLIGRAM(S): 300 TABLET, EXTENDED RELEASE ORAL at 20:45

## 2020-06-06 RX ADMIN — LITHIUM CARBONATE 300 MILLIGRAM(S): 300 TABLET, EXTENDED RELEASE ORAL at 09:26

## 2020-06-07 LAB
HCT VFR BLD CALC: 40.5 % — SIGNIFICANT CHANGE UP (ref 34.5–45)
HGB BLD-MCNC: 12.2 G/DL — SIGNIFICANT CHANGE UP (ref 11.5–15.5)
MCHC RBC-ENTMCNC: 27.6 PG — SIGNIFICANT CHANGE UP (ref 27–34)
MCHC RBC-ENTMCNC: 30.1 % — LOW (ref 32–36)
MCV RBC AUTO: 91.6 FL — SIGNIFICANT CHANGE UP (ref 80–100)
NRBC # FLD: 0 K/UL — SIGNIFICANT CHANGE UP (ref 0–0)
PLATELET # BLD AUTO: 269 K/UL — SIGNIFICANT CHANGE UP (ref 150–400)
PMV BLD: 9.1 FL — SIGNIFICANT CHANGE UP (ref 7–13)
RBC # BLD: 4.42 M/UL — SIGNIFICANT CHANGE UP (ref 3.8–5.2)
RBC # FLD: 14 % — SIGNIFICANT CHANGE UP (ref 10.3–14.5)
WBC # BLD: 7.92 K/UL — SIGNIFICANT CHANGE UP (ref 3.8–10.5)
WBC # FLD AUTO: 7.92 K/UL — SIGNIFICANT CHANGE UP (ref 3.8–10.5)

## 2020-06-07 PROCEDURE — 99232 SBSQ HOSP IP/OBS MODERATE 35: CPT

## 2020-06-07 RX ADMIN — FLUPHENAZINE HYDROCHLORIDE 5 MILLIGRAM(S): 1 TABLET, FILM COATED ORAL at 09:11

## 2020-06-07 RX ADMIN — Medication 1 MILLIGRAM(S): at 09:11

## 2020-06-07 RX ADMIN — FLUPHENAZINE HYDROCHLORIDE 5 MILLIGRAM(S): 1 TABLET, FILM COATED ORAL at 20:42

## 2020-06-07 RX ADMIN — LITHIUM CARBONATE 300 MILLIGRAM(S): 300 TABLET, EXTENDED RELEASE ORAL at 09:11

## 2020-06-07 RX ADMIN — LITHIUM CARBONATE 900 MILLIGRAM(S): 300 TABLET, EXTENDED RELEASE ORAL at 20:42

## 2020-06-07 RX ADMIN — PANTOPRAZOLE SODIUM 40 MILLIGRAM(S): 20 TABLET, DELAYED RELEASE ORAL at 09:11

## 2020-06-07 RX ADMIN — FAMOTIDINE 20 MILLIGRAM(S): 10 INJECTION INTRAVENOUS at 09:11

## 2020-06-07 RX ADMIN — OLANZAPINE 10 MILLIGRAM(S): 15 TABLET, FILM COATED ORAL at 20:42

## 2020-06-07 RX ADMIN — OLANZAPINE 10 MILLIGRAM(S): 15 TABLET, FILM COATED ORAL at 09:11

## 2020-06-07 NOTE — CHART NOTE - NSCHARTNOTEFT_GEN_A_CORE
Pt seen with a female RN Grace.  Pt has no complaints, no more nausea or vomiting since Friday, tolerated diet. Vitals stable. No signs of GI bleeding. Will check CBC today.

## 2020-06-08 PROCEDURE — 99232 SBSQ HOSP IP/OBS MODERATE 35: CPT

## 2020-06-08 RX ORDER — OLANZAPINE 15 MG/1
1 TABLET, FILM COATED ORAL
Qty: 60 | Refills: 0
Start: 2020-06-08

## 2020-06-08 RX ORDER — CLONAZEPAM 1 MG
1 TABLET ORAL
Qty: 15 | Refills: 0
Start: 2020-06-08

## 2020-06-08 RX ORDER — PANTOPRAZOLE SODIUM 20 MG/1
1 TABLET, DELAYED RELEASE ORAL
Qty: 30 | Refills: 0
Start: 2020-06-08

## 2020-06-08 RX ORDER — FAMOTIDINE 10 MG/ML
1 INJECTION INTRAVENOUS
Qty: 30 | Refills: 0
Start: 2020-06-08

## 2020-06-08 RX ORDER — FLUPHENAZINE HYDROCHLORIDE 1 MG/1
1 TABLET, FILM COATED ORAL
Qty: 60 | Refills: 0
Start: 2020-06-08

## 2020-06-08 RX ORDER — OLANZAPINE 15 MG/1
1 TABLET, FILM COATED ORAL
Qty: 0 | Refills: 0 | DISCHARGE
Start: 2020-06-08

## 2020-06-08 RX ORDER — ALBUTEROL 90 UG/1
2 AEROSOL, METERED ORAL
Qty: 1 | Refills: 0
Start: 2020-06-08

## 2020-06-08 RX ORDER — FLUPHENAZINE HYDROCHLORIDE 1 MG/1
1 TABLET, FILM COATED ORAL
Qty: 0 | Refills: 0 | DISCHARGE
Start: 2020-06-08

## 2020-06-08 RX ORDER — LITHIUM CARBONATE 300 MG/1
3 TABLET, EXTENDED RELEASE ORAL
Qty: 0 | Refills: 0 | DISCHARGE
Start: 2020-06-08

## 2020-06-08 RX ORDER — CLONAZEPAM 1 MG
1 TABLET ORAL
Qty: 0 | Refills: 0 | DISCHARGE
Start: 2020-06-08

## 2020-06-08 RX ORDER — FAMOTIDINE 10 MG/ML
1 INJECTION INTRAVENOUS
Qty: 0 | Refills: 0 | DISCHARGE
Start: 2020-06-08

## 2020-06-08 RX ORDER — LITHIUM CARBONATE 300 MG/1
1 TABLET, EXTENDED RELEASE ORAL
Qty: 0 | Refills: 0 | DISCHARGE
Start: 2020-06-08

## 2020-06-08 RX ORDER — LITHIUM CARBONATE 300 MG/1
1 TABLET, EXTENDED RELEASE ORAL
Qty: 30 | Refills: 0
Start: 2020-06-08

## 2020-06-08 RX ORDER — LITHIUM CARBONATE 300 MG/1
1 TABLET, EXTENDED RELEASE ORAL
Qty: 60 | Refills: 0
Start: 2020-06-08

## 2020-06-08 RX ADMIN — PANTOPRAZOLE SODIUM 40 MILLIGRAM(S): 20 TABLET, DELAYED RELEASE ORAL at 08:51

## 2020-06-08 RX ADMIN — OLANZAPINE 10 MILLIGRAM(S): 15 TABLET, FILM COATED ORAL at 20:39

## 2020-06-08 RX ADMIN — LITHIUM CARBONATE 900 MILLIGRAM(S): 300 TABLET, EXTENDED RELEASE ORAL at 20:39

## 2020-06-08 RX ADMIN — Medication 1 MILLIGRAM(S): at 08:50

## 2020-06-08 RX ADMIN — FLUPHENAZINE HYDROCHLORIDE 5 MILLIGRAM(S): 1 TABLET, FILM COATED ORAL at 20:39

## 2020-06-08 RX ADMIN — FLUPHENAZINE HYDROCHLORIDE 5 MILLIGRAM(S): 1 TABLET, FILM COATED ORAL at 08:50

## 2020-06-08 RX ADMIN — LITHIUM CARBONATE 300 MILLIGRAM(S): 300 TABLET, EXTENDED RELEASE ORAL at 08:50

## 2020-06-08 RX ADMIN — OLANZAPINE 10 MILLIGRAM(S): 15 TABLET, FILM COATED ORAL at 08:51

## 2020-06-08 RX ADMIN — FAMOTIDINE 20 MILLIGRAM(S): 10 INJECTION INTRAVENOUS at 08:50

## 2020-06-09 VITALS — TEMPERATURE: 96 F

## 2020-06-09 PROCEDURE — 99238 HOSP IP/OBS DSCHRG MGMT 30/<: CPT

## 2020-06-09 RX ADMIN — FLUPHENAZINE HYDROCHLORIDE 5 MILLIGRAM(S): 1 TABLET, FILM COATED ORAL at 08:01

## 2020-06-09 RX ADMIN — PANTOPRAZOLE SODIUM 40 MILLIGRAM(S): 20 TABLET, DELAYED RELEASE ORAL at 08:01

## 2020-06-09 RX ADMIN — OLANZAPINE 10 MILLIGRAM(S): 15 TABLET, FILM COATED ORAL at 08:01

## 2020-06-09 RX ADMIN — FAMOTIDINE 20 MILLIGRAM(S): 10 INJECTION INTRAVENOUS at 08:01

## 2020-06-09 RX ADMIN — Medication 1 MILLIGRAM(S): at 08:01

## 2020-06-09 RX ADMIN — LITHIUM CARBONATE 300 MILLIGRAM(S): 300 TABLET, EXTENDED RELEASE ORAL at 08:01

## 2020-06-14 ENCOUNTER — EMERGENCY (EMERGENCY)
Facility: HOSPITAL | Age: 40
LOS: 1 days | Discharge: HOME | End: 2020-06-14
Admitting: EMERGENCY MEDICINE
Payer: MEDICAID

## 2020-06-14 VITALS
DIASTOLIC BLOOD PRESSURE: 91 MMHG | SYSTOLIC BLOOD PRESSURE: 149 MMHG | HEART RATE: 86 BPM | TEMPERATURE: 97 F | OXYGEN SATURATION: 97 % | RESPIRATION RATE: 16 BRPM

## 2020-06-14 DIAGNOSIS — F31.9 BIPOLAR DISORDER, UNSPECIFIED: ICD-10-CM

## 2020-06-14 PROCEDURE — 90792 PSYCH DIAG EVAL W/MED SRVCS: CPT | Mod: GC

## 2020-06-14 PROCEDURE — 99284 EMERGENCY DEPT VISIT MOD MDM: CPT

## 2020-06-14 NOTE — ED PROVIDER NOTE - CLINICAL SUMMARY MEDICAL DECISION MAKING FREE TEXT BOX
40 y/o F hx BPD, Bipolar, Obesity   Medical evaluation performed. There is no clinical evidence of intoxication or any acute medical problem requiring immediate intervention. Patient is awaiting psychiatric consultation. Final disposition will be determined by psychiatrist.  D/C to [place] via cab

## 2020-06-14 NOTE — ED PROVIDER NOTE - PATIENT PORTAL LINK FT
You can access the FollowMyHealth Patient Portal offered by [facility] by registering at the following website: [email or web address]. By joining [facility]’s FollowMyHealth portal, you will also be able to view your health information using other applications (apps) compatible with our system.

## 2020-06-14 NOTE — ED BEHAVIORAL HEALTH ASSESSMENT NOTE - MODIFICATIONS
Prescription sent   I have seen and examined the patient with Dr BENITO Galindo and performed key elements of the History and Mental Status Examination.  I concur with her assessment and recommendations.  I agree with the note as stated above, having amended the EMR as needed to reflect my findings. I have discussed the Pt's assessment and plan of care with Dr BENITO Galindo.  This includes during the time I functioned as the attending physician for this Pt at the  ED of [facility].

## 2020-06-14 NOTE — ED BEHAVIORAL HEALTH ASSESSMENT NOTE - SUICIDE PROTECTIVE FACTORS
Identifies reasons for living/Supportive social network of family or friends/Fear of death or the actual act of killing self/Positive therapeutic relationships

## 2020-06-14 NOTE — ED BEHAVIORAL HEALTH ASSESSMENT NOTE - SUMMARY
The patient is a 39-year-old, single,  woman, non caregiver, unemployed, domiciled on [facility] building [address] [place] [address]; with past psychiatric history of bipolar I disorder, Borderline Personality Disorder, hx of cannabis abuse, and multiple inpatient psychiatric hospitalizations (>20, most recently discharged from [facility] on 6/9/20), with frequent visits to the [facility] (well-known to staff), with ACT Team, with a history of 3 prior suicide attempts (last in 2012 via OD), with hx of recurrent and chronic suicidal gestures and suicidal ideation; with a history of property destruction when upset, past legal issues, no access to weapons; with PMH of GERD & asthma; BIB EMS from [facility] called by staff due to concern for suicidal ideation.     In the ED, patient reported that recent SI was fleeting triggered by her attendance in [employer] today and denies any current suicidal ideation. In the ED, she was observed to be smiling and making jokes with several staff members. She denied depressed mood/anxiety. No evidence of trina or psychotic symptoms. No HI.

## 2020-06-14 NOTE — ED BEHAVIORAL HEALTH ASSESSMENT NOTE - DESCRIPTION
see above GERD, Asthma, gallbladder calculus without cholecystitis currently lives in [facility], [address] [facility] [address] MIGUEL ANGEL reports no contact with family, currently unemployed. Since her  ED arrival, the Pt has been calm and cooperative.  There has been no agitation/aggressive behavior.  No verbalization of active SI/HI.   There are no signs/symptoms of severe MDD, acute trina or florid psychosis.  The Pt is not experiencing any AH/VH.  Pt is not showing any signs/symptoms of intoxication or withdrawal.  Pt is not delirious.  She has not tested limits.. Has maintained appropriate boundaries. Pt has been easily redirected.  Overall, there has been no management issues.      Vital Signs Last 24 Hrs  T(C): 36.1 (14 Jun 2020 13:07), Max: 36.1 (14 Jun 2020 13:07)  T(F): 97 (14 Jun 2020 13:07), Max: 97 (14 Jun 2020 13:07)  HR: 86 (14 Jun 2020 13:07) (86 - 86)  BP: 149/91 (14 Jun 2020 13:07) (149/91 - 149/91)  BP(mean): --  RR: 16 (14 Jun 2020 13:07) (16 - 16)  SpO2: 97% (14 Jun 2020 13:07) (97% - 97%)

## 2020-06-14 NOTE — ED ADULT NURSE NOTE - OBJECTIVE STATEMENT
Received pt in  pt c/o depression & si pt denies hi/avh presently emotional reassurance provided eval on going.

## 2020-06-14 NOTE — ED BEHAVIORAL HEALTH ASSESSMENT NOTE - RISK ASSESSMENT
Static risk factors include: hx of inpatient psychiatric admissions, remote hx of suicide attempts (last in 2012), hx of psychiatric illness, hx of aggression, hx of trauma, and hx of substance use  Protective factors include: no current ideation/intent/plan for suicide/homicide/NSSIB/aggression toward others, future orientation, help-seeking behavior and desire for betterment, treatment engagement, domiciled, no active psychosis, no access to a gun, no recent substance use, ACT team in the community.     Given the above, and that the patient is not actively suicidal/homicidal, and that protective factors mitigate risk, the patient is not an imminent risk of harm to self or others at this time and is appropriate for treatment as an outpatient. Future risk will be minimized by providing relevant patient education, devising a safety plan, discussing emergency procedures, and recommending close follow-up with her outpatient psychiatrist. Low Acute Suicide Risk

## 2020-06-14 NOTE — ED BEHAVIORAL HEALTH ASSESSMENT NOTE - OTHER
residence staff peers cvm Pandemic/Quarantine fair denies SI/HI/I/P chronically limited staff at residence
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
## 2020-06-14 NOTE — ED BEHAVIORAL HEALTH ASSESSMENT NOTE - CASE SUMMARY
39/F with hx of Schizoaffective disorder and Borderline Personality Disorder, has hx of multiple psych hosps (most recently discharged from [facility] SI; Pt has hx of THC abuse and 3 prior SAs (last in 2012 via OD); with hx of recurrent and chronic suicidal gestures and SI.  The Pt has a hx of property destruction when upset, had past legal issues, and no hx of access to weapons.  Today, BIB EMS after staff called 911 as Pt had been verbalizing SI with plan of self immolation.  At this time, the Pt is not presenting with signs/ symptoms of MDD, acute trina or psychosis.  Since her  ED arrival, the Pt has been calm and cooperative.  There has been no agitation/aggressive behavior.  No verbalization of active SI/HI.   Pt is not showing any signs/symptoms of withdrawal.  She is not delirious.  Pt has not tested limits.. Has maintained appropriate boundaries. Pt has been easily redirected.  Pt was observed to have been interacting appropriately with other Pts; was jovial, laughing and talking with other Pts in the .  Overall, there has been no management issues.  Collateral information was obtained from her  who reported that Pt's verbalization of SI with plan is behavioral rather (as a means to seek attention) rather than consequential of a worsening primary affective/psychotic disorder.  The  did not raise any safety concerns nor any opposition towards discharging the Pt back to the community.  In addition, the Pt was able to partake towards safety planning.  As such, there is no indication to warrant in-Pt psych admission at this time.  Pt may continue to be seen on an out-Pt basis     RECOMMENDATIONS:   1. Psychoeducation provided.  Encouraged continued compliance with all prescribed meds.     2. Emergency protocol reviewed.  Pt and  were both adviced to call 911 or come to the nearest ED should symptoms worsen; have increasing bouts of agitation/aggressive behavior; having SI/HI.    3. Pt's ACT team is scheduled to follow up on her this week

## 2020-06-14 NOTE — ED BEHAVIORAL HEALTH ASSESSMENT NOTE - SAFETY PLAN DETAILS
Patient advised to return to the ED for thoughts of harming self/others. Completed formal safety plan with patient in EMR, patient voiced understanding and agreed with this plan.

## 2020-06-14 NOTE — ED ADULT TRIAGE NOTE - CHIEF COMPLAINT QUOTE
pt brought in by ems from [facility]  c/o feeling suicidal with a plan pt has hx bi polar recently d/[place] from [place]

## 2020-06-14 NOTE — ED BEHAVIORAL HEALTH NOTE - BEHAVIORAL HEALTH NOTE
COLLATERAL INFORMATION OBTAINED FROM Pt's , Ms Tiffanie Keller [phone]:  reported that since Pt was recently discharged from [facility], has been doing well overall.  Pt was reportedly compliant with her meds.  Today, Ms Keller was aware that Pt has made suicidal statements with plan to "burn herself (the Pt)".  As per Ms Keller, the Pt has not been exhibiting any signs/ symptoms of MDD, acute trina or psychosis.  Lead team at [facility] are also aware of Pt's suicidal statement and they have spoken to the Pt.  Ms Keller and the lead team are in concordance that Pt's statements are volitional; behavioral.. Per Ms Keller, there is no real intention of consummating on the suicidal statements made by the Pt.  Rather, such statements were said within the context of "boredom" as well as Pt feeling that "she has not been receiving much attention" from the staff there.  Ms Keller denied that pt has verbalized any HI.  There is no substance abuse involved here not Pt manifesting any violence; aggression towards others/ properties.  Ms Keller denied Pt having any access to guns.  Ms Keller did not raise any safety concerns and is ok for Pt to be discharged back to the community; there was no opposition towards discharging Pt.  Pt's ACT team is scheduled to follow up on her this week ([phone]).

## 2020-06-14 NOTE — ED BEHAVIORAL HEALTH ASSESSMENT NOTE - HPI (INCLUDE ILLNESS QUALITY, SEVERITY, DURATION, TIMING, CONTEXT, MODIFYING FACTORS, ASSOCIATED SIGNS AND SYMPTOMS)
The patient is a 39-year-old, single,  woman, non caregiver, unemployed, domiciled on [place] grounds building [address] [place] [address]; with past psychiatric history of bipolar I disorder, Borderline Personality Disorder, hx of cannabis abuse, and multiple inpatient psychiatric hospitalizations (>20, most recently discharged from [facility] on 6/9/20), with frequent visits to the [facility] (well-known to staff), with ACT Team, with a history of 3 prior suicide attempts (last in 2012 via OD), with hx of recurrent and chronic suicidal gestures and suicidal ideation; with a history of property destruction when upset, past legal issues, no access to weapons; with PMH of GERD & asthma; BIB EMS from [facility] called by staff due to concern for suicidal ideation.     Per  note in sunrise - COLLATERAL INFORMATION OBTAINED FROM Pt's , Ms Tiffanie Keller [phone]:reported that since Pt was recently discharged from [facility], has been doing well overall.  Pt was reportedly compliant with her meds.  Today, Ms Keller was aware that Pt has made suicidal statements with plan to "burn herself (the Pt)".  As per Ms Keller, the Pt has not been exhibiting any signs/ symptoms of MDD, acute trina or psychosis.  Lead team at [facility] are also aware of Pt's suicidal statement and they have spoken to the Pt.  Ms Keller and the lead team are in concordance that Pt's statements are volitional; behavioral.. Per Ms Keller, there is no real intention of consummating on the suicidal statements made by the Pt.  Rather, such statements were said within the context of "boredom" as well as Pt feeling that "she has not been receiving much attention" from the staff there.  Ms Keller denied that pt has verbalized any HI.  There is no substance abuse involved here not Pt manifesting any violence; aggression towards others/ properties.  Ms Keller denied Pt having any access to guns.  Ms Keller did not raise any safety concerns and is ok for Pt to be discharged back to the community; there was no opposition towards discharging Pt.  Pt's ACT team is scheduled to follow up on her this week ([phone]).    Patient was seen and examined. Patient reports that she was recently discharged from [facility] last week and has been adherent with and tolerating her discharge psychotropic medication regimen of: Klonopin 1 mg PO daily, Fluphenazine 5 mg PO BID, lithium 300 mg PO BID and 600 mg PO QHS, and Zyprexa 10 mg PO BID. She reports that since discharge she has missed seeing a therapist and psychiatrist in person, and that it has been harder for her to see her current outpatient psychiatrist (Brenda) and therapist (Katie) via the telehealth format in the setting of the COVID-19 pandemic. She reported that she was in her usual state of health until this morning when she was engaged in [employer] services and donations were requested. At that time, patient reports that she suddenly became upset that she is unemployed and does not have the financial means to donate to others in need. In this setting, she reports that she developed fleeting suicidal thoughts to end her life by “lighting an alcoholic beverage on fire.” Patient subsequently informed staff regarding these thoughts. Patient denied any preparatory action to carry out this plan.    Since coming to the ED, patient reports that she is feeling better and no longer having suicidal ideation. She reports that she wishes to live for “kind people” and help others in any way she can.     On review of systems: Denies depressed mood/anxiety. No evidence of psychotic or manic symptoms. She appears future-oriented and was observed to be smiling and making jokes with several staff members and other patients in the emergency.  Pt denied recent substance/alcohol use.  Denies access to a weapon. Reports sleep and appetite are adequate. No homicidal ideation/intent/plan.

## 2020-06-14 NOTE — ED PROVIDER NOTE - OBJECTIVE STATEMENT
38 y/o F hx BPD, Bipolar, Obesity  BIBA from [facility] [address] c/o agitation and suicidal ideations to light herself afire.  Admits  to feeling overwhelmed and that she wants to create a bomb.  Denies HI/AH/VH.  Denies falling,  punching or kicking any objects. Denies pain, headache, blurred vision,  SOB, dizziness,   fever, chills  chest/ abdominal discomfort. Denies recent use of alcohol or illicit drugs.  No evidence of physical injuries, or deformities.

## 2020-06-14 NOTE — ED ADULT NURSE NOTE - CHIEF COMPLAINT QUOTE
pt brought in by ems from [facility]  c/o feeling suicidal with a plan pt has hx bi polar recently d/[facility] from [facility]

## 2020-06-14 NOTE — ED PROVIDER NOTE - NSFOLLOWUPCLINICS_GEN_ALL_ED_FT
[facility]  Behavioral Health  [address] [place] [address]  Phone: [phone]  Fax:   Follow Up Time:
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
## 2020-06-14 NOTE — ED BEHAVIORAL HEALTH NOTE - BEHAVIORAL HEALTH NOTE
Per provider, patient is cleared and is able to return to their previous residence, [facility].  has spoken to Tiffanie  at [facility] [phone],  confirmed that patients mode of transportation is [employer] and that patient travels INDEPENDENTLY. Clinical provider is in agreement with [employer] back to group home. Verbal huddle regarding coordination of care completed with interdisciplinary team. Ms. Moreno states patient should travel via taxi at this time. Patient has straight Medicare. Unable to process cab. Patient states she did not come with money. Booked cab via account 50 $11.26 booking # [id number] [facility] unable to arrange for transport.

## 2020-06-14 NOTE — ED ADULT NURSE REASSESSMENT NOTE - NS ED NURSE REASSESS COMMENT FT1
Break coverage- D/C paperwork provided. Pt's personal belongings returned. Pt calm and cooperative. Pt escorted out to cab where transportation was set up by social work.
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
## 2020-06-15 ENCOUNTER — EMERGENCY (EMERGENCY)
Facility: HOSPITAL | Age: 40
LOS: 1 days | Discharge: HOME | End: 2020-06-15
Admitting: EMERGENCY MEDICINE
Payer: MEDICAID

## 2020-06-15 VITALS
HEART RATE: 84 BPM | DIASTOLIC BLOOD PRESSURE: 74 MMHG | RESPIRATION RATE: 18 BRPM | SYSTOLIC BLOOD PRESSURE: 142 MMHG | OXYGEN SATURATION: 98 % | TEMPERATURE: 98 F

## 2020-06-15 VITALS
OXYGEN SATURATION: 100 % | HEART RATE: 81 BPM | DIASTOLIC BLOOD PRESSURE: 71 MMHG | SYSTOLIC BLOOD PRESSURE: 144 MMHG | RESPIRATION RATE: 18 BRPM | TEMPERATURE: 98 F

## 2020-06-15 PROCEDURE — 90792 PSYCH DIAG EVAL W/MED SRVCS: CPT | Mod: GC

## 2020-06-15 PROCEDURE — 99284 EMERGENCY DEPT VISIT MOD MDM: CPT

## 2020-06-15 NOTE — ED BEHAVIORAL HEALTH ASSESSMENT NOTE - SUMMARY
The patient is a 39-year-old, single,  woman, non caregiver, unemployed, domiciled on [place] grounds building [address] [place] [address]; with past psychiatric history of bipolar I disorder, Borderline Personality Disorder, hx of cannabis abuse, and multiple inpatient psychiatric hospitalizations (>20, most recently discharged from [facility] on 6/9/20), with frequent visits to the [facility] (well-known to staff), with ACT Team, with a history of 3 prior suicide attempts (last in 2012 via OD), with hx of recurrent and chronic suicidal gestures and suicidal ideation; with a history of property destruction when upset, past legal issues, no access to weapons; with PMH of GERD & asthma; BIB EMS from [facility] called by staff due to concern for suicidal ideation.     Pt denied SI on examination, noting brief passive SI thought while in ambulance, but not before and not after. Pt reports feeling happy and is "excited" to return home and follow up with her ACT team later this week. No evidence of trina or psychotic symptoms. No HI.

## 2020-06-15 NOTE — ED BEHAVIORAL HEALTH ASSESSMENT NOTE - CASE SUMMARY
39F with borderline personality disorder and bipolar presents after residence claims pt made suicidal statements. Patient seen here yesterday for same. In the ED she is well groomed with nice make up, denies all psychiatric symptoms. Residence said pt was at baseline and tap dancing earlier. Patient is not suicidal or homicidal and wants to return to her residence. Patient is well known to make dramatic suicidal statements for attention and for the purpose of going to the ED, she also tends to frequent the ED at mealtimes. Patient is chronically personality disordered and manipulative, no evidence of acute psychosis or trina on exam, compliant with treatment and ACT Team. JACKSON.

## 2020-06-15 NOTE — ED PROVIDER NOTE - NSFOLLOWUPCLINICS_GEN_ALL_ED_FT
[facility]  Behavioral Health  [address] [place] [address]  Phone: [phone]  Fax:   Follow Up Time:

## 2020-06-15 NOTE — ED BEHAVIORAL HEALTH NOTE - BEHAVIORAL HEALTH NOTE
Worker called Tiffanie on call for [facility] ([phone]) she states that she is not sure why the patient is presenting to the ER and she is unsure if the ACT team Angie is confusing what the patient stated yesterday as today. She states that the patient reported to the ACT team that she wanted to make a cocktail in a bottle and put a rag in it and set on fire. Ms. Moreno states that the patient reported to the act team she wanted to set it on fire or throw the bottle. Ms. Moreno states it was never clarified if this was a thought she had yesterday or today. Worker then called Act team to speak with [phone] and left message. Worker called another number for act team [phone] and left message for call back. Case discussed with Dr. Gonzalez. Patient is psychiatrically cleared for discharge. Per provider, Patient is cleared and is able to return to their previous residence, [facility] [address]  has spoken to [facility] informing of patient ‘s clearance.  confirmed that patient’s mode of transportation is public transport and that patient travels INDEPENDENTLY. Clinical provider is in agreement with  [facility] back to group home. Verbal huddle regarding coordination of care completed with interdisciplinary team.  Worker called Tiffanie  at [facility] [phone] on call  and could not leave messages . Worker called multiple times and no answer. Worker then called Act team to speak with [phone] and left message. Worker called another number for act team [phone] and left message for call back.

## 2020-06-15 NOTE — ED BEHAVIORAL HEALTH ASSESSMENT NOTE - SUICIDE RISK FACTORS
Mood Disorder current/past/Impulsivity/Psychotic disorder current/past/Cluster B Personality disorders or traits current/past

## 2020-06-15 NOTE — ED BEHAVIORAL HEALTH ASSESSMENT NOTE - OTHER PAST PSYCHIATRIC HISTORY (INCLUDE DETAILS REGARDING ONSET, COURSE OF ILLNESS, INPATIENT/OUTPATIENT TREATMENT)
-discharged from [facility] on 6/9/2020  lifetime inpatient admissions > 20. Numerous [facility] ED visits. Currently followed by [facility] ACT team.  Multiple [facility] ED evals.  - 3 prior suicide attempts (last in 2012 via OD) as per records, recurrent suicidal gestures and suicidal ideation, history of property destruction (breaking TV screens while hospitalized) when upset / acting out   - long hx of sexually provocative, acting out behaviors (during last [facility] inpatient admission >2 years ago, patient had to be placed on CO 1:1 after trying to perform oral sex on a male patient on the dayroom, taken off CO then was going into male patient's room, overheard offering them sex and was placed back on CO)

## 2020-06-15 NOTE — ED BEHAVIORAL HEALTH ASSESSMENT NOTE - CURRENT MEDICATION
Klonopin 1 mg PO daily, Fluphenazine 5 mg PO BID, lithium 300 mg PO BID and 600 mg PO QHS, and Zyprexa 10 mg PO BID

## 2020-06-15 NOTE — ED BEHAVIORAL HEALTH ASSESSMENT NOTE - SAFETY PLAN ADDT'L DETAILS
Safety plan discussed with.../Provision of National Suicide Prevention Lifeline 9-263-001-TALK (3876)

## 2020-06-15 NOTE — ED BEHAVIORAL HEALTH NOTE - BEHAVIORAL HEALTH NOTE
High Risk Log:  Worker called patient's  [phone] and she reports the patient is doing well and has been going outside and tap dancing. She denies that the patient is having any si.  She states that the patient has follow up with her ACT team this week.

## 2020-06-15 NOTE — ED ADULT NURSE REASSESSMENT NOTE - GENERAL PATIENT STATE
Left message with  to have patient call back. Her machine mail box full.    cooperative/pt denies s/i h/i or a/v/h. Reports feeling sad r/t inability to obtain a job for the past few weeks./comfortable appearance

## 2020-06-15 NOTE — ED BEHAVIORAL HEALTH ASSESSMENT NOTE - DESCRIPTION
Pt calm and cooperative, noted to be walking around happily.    Vital Signs Last 24 Hrs  T(C): 36.7 (15 Kulwant 2020 17:02), Max: 36.7 (15 Kulwant 2020 17:02)  T(F): 98 (15 Kulwant 2020 17:02), Max: 98 (15 Kulwant 2020 17:02)  HR: 84 (15 Kulwant 2020 17:02) (84 - 84)  BP: 142/74 (15 Kulwant 2020 17:02) (142/74 - 142/74)  BP(mean): --  RR: 18 (15 Kulwant 2020 17:02) (18 - 18)  SpO2: 98% (15 Kulwant 2020 17:02) (98% - 98%) GERD, Asthma, gallbladder calculus without cholecystitis currently lives in [facility], [address] [facility] [address] MIGUEL ANGEL reports no contact with family, currently unemployed.
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
## 2020-06-15 NOTE — ED ADULT NURSE NOTE - NSFALLRSKASSESSDT_ED_ALL_ED
EMERGENCY DEPARTMENT HISTORY AND PHYSICAL EXAM      Date: 9/16/2019  Patient Name: Kylie Alberto    History of Presenting Illness     Chief Complaint   Patient presents with    Neck Pain       History Provided By: Patient and Patient's Son    Chief Complaint: neck pain    Additional History (Context): Kylie Alberto is a 78 y.o. male with Multiple medical problems as below who presents with 3 days of bilateral neck muscle pain. States that he was moving around shelving basement prior to the onset of the pain. The next day when he woke up noted that both sides of his neck were sore radiating down towards his shoulders. Worse with movement, especially twisting. No fall or direct trauma. No weakness or paresthesia. Also notes that about 6 weeks ago he had a right lower extremity cellulitis after a cat bite. This was treated with oral antibiotics and the redness resolved completely, however the swelling never went away. Now he complains of redness to his anterior shin that is like when he had the cellulitis before. No fevers, chills, sweats, nausea, vomiting. PCP: Alfonzo Jonas MD    Current Facility-Administered Medications   Medication Dose Route Frequency Provider Last Rate Last Dose    oxyCODONE-acetaminophen (PERCOCET) 5-325 mg per tablet 1 Tab  1 Tab Oral ONCE Moo Peguero MD         Current Outpatient Medications   Medication Sig Dispense Refill    trimethoprim-sulfamethoxazole (BACTRIM DS) 160-800 mg per tablet Take 1 Tab by mouth two (2) times a day for 10 days. 20 Tab 0    traMADol (ULTRAM) 50 mg tablet Take 1 Tab by mouth every six (6) hours as needed for Pain for up to 5 days. Max Daily Amount: 200 mg. 20 Tab 0    zolpidem (AMBIEN) 5 mg tablet Take 5 mg by mouth nightly.  tamsulosin (FLOMAX) 0.4 mg capsule Take 1 Cap by mouth daily (after dinner). 90 Cap 3    apixaban (ELIQUIS) 5 mg tablet Take 1 Tab by mouth two (2) times a day.  60 Tab 0    albuterol (PROAIR HFA) 90 mcg/actuation inhaler Take  inhaled by mouth.  ALPHAGAN P 0.15 % ophthalmic solution Administer 1 Drop to both eyes three (3) times daily.  ipratropium-albuterol (COMBIVENT RESPIMAT)  mcg/actuation inhaler daily as needed      cyanocobalamin 1,000 mcg tablet Take 1,000 mcg by mouth daily.  dilTIAZem CD (CARDIZEM CD) 180 mg ER capsule Take 180 mg by mouth daily.  furosemide (LASIX) 40 mg tablet Take 40 mg by mouth daily. 2 tab daily      loratadine (CLARITIN) 10 mg tablet Take 10 mg by mouth daily.  gabapentin (NEURONTIN) 600 mg tablet Take 600 mg by mouth three (3) times daily.  levothyroxine (SYNTHROID) 175 mcg tablet Take 175 mcg by mouth Daily (before breakfast).  bimatoprost (LUMIGAN) 0.03 % ophthalmic drops Administer 1 drop to both eyes every evening.  multivitamins-minerals-lutein (CENTRUM SILVER) Tab Take 1 Tab by mouth daily.  traZODone (DESYREL) 50 mg tablet Take 50 mg by mouth nightly as needed.  PYRIDOXINE HCL (VITAMIN B-6 PO) Take 1 Cap by mouth daily. Past History     Past Medical History:  Past Medical History:   Diagnosis Date    Arthritis     Atrial fibrillation ([facility] 07/2017    Dr Pasquale Medina cardio follows.  chronic     Carcinoma of prostate ([facility]     Cellulitis     Coarse tremor      hands, states \"my doctor is aware\"    Difficulty swallowing     Dysphagia     Edema     Encounter for colonoscopy due to history of adenomatous colonic polyps     Essential hypertension     Fall 10/07/2017    \"[facility] to [facility] Emergency Room, CT Scan showed concussion, no bleeding\" per patient    Fatigue     GERD (gastroesophageal reflux disease)     wo. esophagitis     Glaucoma     H/O joint replacement      left knee 2003    HLD (hyperlipidemia)     Hypertension     Hypertensive disorder     Hypothyroidism     Impotence     Laceration of right lower leg with complication     Malignant neoplasm of prostate (HCC)      [id number] Adenocarcinoma of the Prostate, dx 11/3/2008, karlee 3+4, presenting PSA 5.5ng/mL. 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 of        Past Surgical History:  Past Surgical History:   Procedure Laterality Date    COLONOSCOPY N/A 1/3/2017    HX CHOLECYSTECTOMY      HX KNEE REPLACEMENT Left 01/2003    HX SHOULDER REPLACEMENT Right 7/17, 10/17       Family History:  No family history on file. Social History:  Social History     Tobacco Use    Smoking status: Former Smoker    Smokeless tobacco: Never Used   Substance Use Topics    Alcohol use: Yes     Comment: weekly     Drug use: No       Allergies: Allergies   Allergen Reactions    Adhesive Other (comments)     Skin irritation    Darvon [Propoxyphene] Hives, Myalgia and Other (comments)     Joint pain    Penicillins Rash and Itching         Review of Systems   Review of Systems   Constitutional: Negative for chills, fatigue and fever. HENT: Negative for congestion, rhinorrhea, sore throat, trouble swallowing and voice change. Eyes: Negative for discharge, redness and itching. Respiratory: Negative for cough, shortness of breath, wheezing and stridor. Cardiovascular: Positive for leg swelling. Negative for chest pain and palpitations. Gastrointestinal: Negative for abdominal pain, blood in stool, diarrhea, nausea and vomiting. Genitourinary: Negative for difficulty urinating and dysuria. Musculoskeletal: Positive for neck pain and neck stiffness. Negative for back pain. Skin: Positive for color change and rash. Negative for wound.         His facial actinic keratoses which were recently treated with freezing by his dermatologist; he has several superficial scabbed-over lesions consistent with stated history. Also has redness to his right leg as per HPI. Neurological: Negative for syncope and light-headedness. Psychiatric/Behavioral: Negative for behavioral problems and confusion. All other systems reviewed and are negative. Physical Exam     Vitals:    09/16/19 1008   BP: 158/72   Pulse: 86   Resp: 16   Temp: 98.2 °F (36.8 °C)   SpO2: 98%   Weight: 86.2 kg (190 lb)   Height: 5' 8\" (1.727 m)     Physical Exam   Constitutional: He is oriented to person, place, and time. He appears well-developed and well-nourished. No distress. HENT:   Head: Normocephalic and atraumatic. Mouth/Throat: Oropharynx is clear and moist.   Eyes: Pupils are equal, round, and reactive to light. Conjunctivae are normal.   Neck: Normal range of motion. Neck supple. No JVD present. No tracheal deviation present. Cardiovascular: Normal rate, regular rhythm and normal heart sounds. No murmur heard. Pulmonary/Chest: Effort normal and breath sounds normal. He has no wheezes. Abdominal: Soft. Bowel sounds are normal. There is no tenderness. Musculoskeletal: Normal range of motion. He exhibits edema. He exhibits no tenderness. Right lower extremity with 2+ pitting edema with associated erythema and mild warmth. Patient also has numerous scabbed over superficial lesions in his head and neck area that he states are where his dermatologist recently treated actinic keratoses with freezing. No evidence of infection in that area. C-spine with no midline or bony TTP; has bilateral cervical paraspinous muscle spasm and TTP that reproduces his pain. Slightly decreased ROM secondary to pain. Lymphadenopathy:     He has no cervical adenopathy. Neurological: He is alert and oriented to person, place, and time. No cranial nerve deficit. Coordination normal.   Motor 5 out of 5 bilateral upper extremities proximally and distally. Sensation intact light touch throughout. Skin: Skin is warm and dry. Rash noted. Psychiatric: He has a normal mood and affect. His behavior is normal.   Nursing note and vitals reviewed. Diagnostic Study Results     Labs -   No results found for this or any previous visit (from the past 12 hour(s)). Radiologic Studies -   No orders to display     CT Results  (Last 48 hours)    None        CXR Results  (Last 48 hours)    None            Medical Decision Making   I am the first provider for this patient. I reviewed the vital signs, available nursing notes, past medical history, past surgical history, family history and social history. Vital Signs-Reviewed the patient's vital signs. Records Reviewed: Old Medical Records    ED Course:   Felt better after treatment in the emergency department. Disposition:  Discharge home    DISCHARGE NOTE:     Pt has been reexamined. Patient has no new complaints, changes, or physical findings. Care plan outlined and precautions discussed. All medications were reviewed with the patient; will d/c home with prescription for Ultram and Bactrim. All of pt's questions and concerns were addressed. Patient was instructed and agrees to follow up with his primary care provider, as well as to return to the ED upon further deterioration. Patient is ready to go home. Follow-up Information     Follow up With Specialties Details Why Contact Info    Valarie Loaiza MD Family Practice Schedule an appointment as soon as possible for a visit in 3 days  [facility] [address] [facility] DEPT Emergency Medicine  As needed, If symptoms worsen [address] [facility].  [phone]          Current Discharge Medication List      START taking these medications    Details   trimethoprim-sulfamethoxazole (BACTRIM DS) 160-800 mg per tablet Take 1 Tab by mouth two (2) times a day for 10 days.   Qty: 20 Tab, Refills: 0 CONTINUE these medications which have CHANGED    Details   traMADol (ULTRAM) 50 mg tablet Take 1 Tab by mouth every six (6) hours as needed for Pain for up to 5 days. Max Daily Amount: 200 mg. Qty: 20 Tab, Refills: 0    Associated Diagnoses: Cervical sprain, initial encounter         CONTINUE these medications which have NOT CHANGED    Details   zolpidem (AMBIEN) 5 mg tablet Take 5 mg by mouth nightly. tamsulosin (FLOMAX) 0.4 mg capsule Take 1 Cap by mouth daily (after dinner). Qty: 90 Cap, Refills: 3    Associated Diagnoses: Malignant neoplasm [facility] ([facility] History of urinary retention      apixaban (ELIQUIS) 5 mg tablet Take 1 Tab by mouth two (2) times a day. Qty: 60 Tab, Refills: 0      albuterol (PROAIR HFA) 90 mcg/actuation inhaler Take  inhaled by mouth. Associated Diagnoses: Malignant neoplasm of prostate ([facility] Urinary retention; Nocturia      ALPHAGAN P 0.15 % ophthalmic solution Administer 1 Drop to both eyes three (3) times daily. Associated Diagnoses: Malignant neoplasm of prostate ([facility] Urinary retention; Nocturia      ipratropium-albuterol (COMBIVENT RESPIMAT)  mcg/actuation inhaler daily as needed    Comments: for SOB  Associated Diagnoses: Malignant neoplasm of prostate ([facility] Urinary retention; Nocturia      cyanocobalamin 1,000 mcg tablet Take 1,000 mcg by mouth daily. Associated Diagnoses: Malignant neoplasm of prostate ([facility] Urinary retention; Nocturia      dilTIAZem CD (CARDIZEM CD) 180 mg ER capsule Take 180 mg by mouth daily. Associated Diagnoses: Malignant neoplasm of prostate ([facility] Urinary retention; Nocturia      furosemide (LASIX) 40 mg tablet Take 40 mg by mouth daily. 2 tab daily    Associated Diagnoses: Malignant neoplasm of prostate ([facility] Urinary retention; Nocturia      loratadine (CLARITIN) 10 mg tablet Take 10 mg by mouth daily. Associated Diagnoses: Malignant neoplasm of prostate ([facility]  Urinary retention; Nocturia      gabapentin (NEURONTIN) 600 mg tablet Take 600 mg by mouth three (3) times daily. levothyroxine (SYNTHROID) 175 mcg tablet Take 175 mcg by mouth Daily (before breakfast). bimatoprost (LUMIGAN) 0.03 % ophthalmic drops Administer 1 drop to both eyes every evening.      multivitamins-minerals-lutein (CENTRUM SILVER) Tab Take 1 Tab by mouth daily. traZODone (DESYREL) 50 mg tablet Take 50 mg by mouth nightly as needed. PYRIDOXINE HCL (VITAMIN B-6 PO) Take 1 Cap by mouth daily. STOP taking these medications       cephALEXin (KEFLEX) 500 mg capsule Comments:   Reason for Stopping:         predniSONE (DELTASONE) 20 mg tablet Comments:   Reason for Stopping:         predniSONE (DELTASONE) 20 mg tablet Comments:   Reason for Stopping:               Provider Notes (Medical Decision Making):   Neck pain. History and exam are consistent with musculoskeletal cervical strain. Low suspicion for fracture or other acute bony pathology or significant herniated disc that would require any acute intervention. Discussed utility of x-rays with patient and his son, and offered to perform x-rays today, versus symptom Medicare and following up with primary care doctor. Patient desires to manage expectantly with analgesics and to follow-up with his primary doctor should he not improve, and then consider imaging at that time. Patient also with recent history of right lower extremity cellulitis that now seems to have recurred. Given that he states the swelling was there before and associated with infection, I have low suspicion that he has diarrhea. Also discussed with patient that he may need evaluation by his primary care doctor for his long-term swelling in his leg. Stable and appropriate for outpatient PCP follow-up. Diagnosis     Clinical Impression:   1. Cervical sprain, initial encounter    2.  Cellulitis of right leg 15-Kulwant-2020 19:01

## 2020-06-15 NOTE — ED ADULT NURSE NOTE - OBJECTIVE STATEMENT
Pt from [facility] had initial complaint of wanting to make her own "cocktail" in order to hurt herself and others, upon assessment, pt denying complaints, breathing even and unlabored, denies any pain, calm and cooperative, awaiting further eval by psychiatrist, will continue to monitor.

## 2020-06-15 NOTE — ED PROVIDER NOTE - OBJECTIVE STATEMENT
This is a 39 yr old F, pmh borderline personality and bipolar disorder with c/o depression and SI with a plan, making her own Molotov cocktail. Pt is from [facility].  Pt states yesterday he wanted to do that. Today still feel like "I don't want to live anymore". She states I have money so I can do my own Molotov cocktail. Pt upon arrival, calm cooperative with care, pleasant, has her make up and dressed neatly with a clip in her hair.

## 2020-06-15 NOTE — ED PROVIDER NOTE - CLINICAL SUMMARY MEDICAL DECISION MAKING FREE TEXT BOX
This is a 39 yr old F, pmh borderline personality and bipolar disorder with c/o depression and SI with a plan, making her own Molotov cocktail. Pt was seen here yesterday with very similar presentation and complaints yesterday, after eval she was discharged.  Psychiatric consult requested- recommendation out patent follow up. There is no clinical evidence of intoxication, or any acute medical problem requiring immediate intervention.

## 2020-06-15 NOTE — ED BEHAVIORAL HEALTH ASSESSMENT NOTE - SAFETY PLAN DETAILS
Patient advised to return to the ED for thoughts of harming self/others. Safety plan dated 6/14/2020 in EMR reviewed with patient

## 2020-06-15 NOTE — ED BEHAVIORAL HEALTH ASSESSMENT NOTE - HPI (INCLUDE ILLNESS QUALITY, SEVERITY, DURATION, TIMING, CONTEXT, MODIFYING FACTORS, ASSOCIATED SIGNS AND SYMPTOMS)
The patient is a 39-year-old, single,  woman, non caregiver, unemployed, domiciled on [place] grounds building [address] [place] [address]; with past psychiatric history of bipolar I disorder, Borderline Personality Disorder, hx of cannabis abuse, and multiple inpatient psychiatric hospitalizations (>20, most recently discharged from [facility] on 6/9/20), with frequent visits to the [facility] (well-known to staff), with ACT Team, with a history of 3 prior suicide attempts (last in 2012 via OD), with hx of recurrent and chronic suicidal gestures and suicidal ideation; with a history of property destruction when upset, past legal issues, no access to weapons; with PMH of GERD & asthma; BIB EMS from [facility] called by staff due to concern for suicidal ideation.    On interview, pt is calm, cooperative and pleasant with the examiner. She reports that she does not know why she was brought to the hospital, only that staff activated 911 and an ambulance came and picked her up. She admits that she felt The patient is a 39-year-old, single,  woman, non caregiver, unemployed, domiciled on [place] grounds building [address] [place] [address]; with past psychiatric history of bipolar I disorder, Borderline Personality Disorder, hx of cannabis abuse, and multiple inpatient psychiatric hospitalizations (>20, most recently discharged from [facility] on 6/9/20), with frequent visits to the [facility] (well-known to staff), with ACT Team, with a history of 3 prior suicide attempts (last in 2012 via OD), with hx of recurrent and chronic suicidal gestures and suicidal ideation; with a history of property destruction when upset, past legal issues, no access to weapons; with PMH of GERD & asthma; BIB EMS from [facility] called by staff due to concern for suicidal ideation.    On interview, pt is calm, cooperative and pleasant with the examiner. She reports that she does not know why she was brought to the hospital, only that staff activated 911 and an ambulance came and picked her up. She admits that she felt like she wanted to die on the ambulance ride, but denies feeling suicidal now. She denied making suicidal or homicidal threats at her residence and told the examiner that she would like to go home. She endorsed her last SA was in 2012 and she has not thought of any plan to hurt herself for "a while." When confronted about comments she made about making a molotov cocktail, pt laughed and again denied SI/HI. She denied AH, VH, paranoid delusions and IOR. She reported having poor sleep last night, but otherwise has been sleeping well for at least the past week. Reports eating well at home. Pt denies feelings of depression/anxiety, noting last time she felt sad was a week ago. Pt reports enjoying her residence and would like to go back tonight.     See  note for collateral from housing staff. Housing staff unaware of SI/HI prior to EMS transport.

## 2020-06-15 NOTE — ED PROVIDER NOTE - PATIENT PORTAL LINK FT
You can access the FollowMyHealth Patient Portal offered by [facility] by registering at the following website: [email or web address]. By joining [employer]’s FollowMyHealth portal, you will also be able to view your health information using other applications (apps) compatible with our system.

## 2020-06-19 NOTE — ED BEHAVIORAL HEALTH NOTE - BEHAVIORAL HEALTH NOTE
Writer called [phone] and spoke with Tiffanie, on call  for [facility]. Tiffanie reports patient is doing well.

## 2020-07-06 ENCOUNTER — EMERGENCY (EMERGENCY)
Facility: HOSPITAL | Age: 40
LOS: 1 days | Discharge: HOME | End: 2020-07-06
Admitting: EMERGENCY MEDICINE
Payer: MEDICAID

## 2020-07-06 VITALS
DIASTOLIC BLOOD PRESSURE: 65 MMHG | OXYGEN SATURATION: 100 % | RESPIRATION RATE: 16 BRPM | HEART RATE: 78 BPM | SYSTOLIC BLOOD PRESSURE: 119 MMHG | TEMPERATURE: 99 F

## 2020-07-06 PROCEDURE — 99283 EMERGENCY DEPT VISIT LOW MDM: CPT

## 2020-07-06 RX ORDER — OLANZAPINE 15 MG/1
5 TABLET, FILM COATED ORAL ONCE
Refills: 0 | Status: COMPLETED | OUTPATIENT
Start: 2020-07-06 | End: 2020-07-06

## 2020-07-06 RX ADMIN — OLANZAPINE 5 MILLIGRAM(S): 15 TABLET, FILM COATED ORAL at 17:56

## 2020-07-06 NOTE — ED ADULT NURSE NOTE - CHIEF COMPLAINT QUOTE
Patient brought to ER by EMS from [facility] after she stated she wanted to burn herself up with a Molatave Cocktail.

## 2020-07-06 NOTE — ED BEHAVIORAL HEALTH NOTE - BEHAVIORAL HEALTH NOTE
Verbal huddle occurred regarding collateral and d/c information. Pt cleared for discharge at this time by KIRSTY Keene. Writer informed [facility] residence of pt's return. On call ACT team member also informed of pt's discharge. Transportation set up via [employer] online portal with reservation #[phone]. Transportation set up with [employer] #[phone]. Verbal huddle occurred regarding collateral and d/c information. Pt cleared for discharge at this time by KIRSTY Keene. Writer informed [facility] residence of pt's return. On call ACT team member also informed of pt's discharge. Transportation set up via [employer] online portal with reservation #[phone]. Transportation set up with [employer] #[phone].

## 2020-07-06 NOTE — ED PROVIDER NOTE - CLINICAL SUMMARY MEDICAL DECISION MAKING FREE TEXT BOX
This is a 39 yr borderline and bipolar disorder with c.o anxiety and self injuries ideation. Pt states she just feels bad, because she is not working, and using "tax payers money". PT came from [facility]. Pt well known to  provider and staff members. Pt is pleasant and happy to see everyone, smiling, cheerful, cooperative with care. She endorses she wanted to hurt herself. She states she feels very anxious, because stressful events are going around. collateral info obtained by , please refer to her note. There is no clinical evidence of intoxication, or any acute medical problem requiring immediate intervention. Transportation arranged by  via cab to return to [facility].

## 2020-07-06 NOTE — ED BEHAVIORAL HEALTH NOTE - BEHAVIORAL HEALTH NOTE
Pt from [facility] #[phone] ext. [phone]. Writer called on call [phone] and spoke with Rosalva. CM has heard her assigned CM that she has said this before that she was going to harm herself making a cocktail. CM states it was around 2 weeks ago call to EMS also made as it is protocol. Pt reported to be unpredictable. Pt said to be at times calm and then at other times behaviors escalates to throwing things, destroying property, acting out and making verbal outs. No known changes to sleep, appetite, etc. No other informed able to be provided. Pt travels INDEPENDENTLY via [employer].     Writer contacted [facility] residence and spoke with Erwin at the . Erwin reports ACT team activated EMS for SI. ACT team contacted at    LEAD TEAM contacted at [phone]. Writer spoke with Angie who spoke with pt at 3pm. Pt at this time reported to be happy, talking about her hair/nails, and upcoming birthday.  No concerns reported with representative surprised to hear pt went to ED. Call not activated by LEAD team.     [facility] ACT TEAM contacted at . After hour number found to be [phone]. Writer called and spoke with Roxana who will pass message along to Swapna. Phone number for returned call provided. [facility] #[phone] ext. [phone]. Writer called on call [facility] [phone] and spoke with Rosalva. CM has heard her assigned CM that she has said this before that she was going to harm herself making a cocktail. CM states it was around 2 weeks ago call to EMS also made as it is protocol. Pt reported to be unpredictable. Pt said to be at times calm and then at other times behaviors escalates to throwing things, destroying property, acting out and making verbal outs. No known changes to sleep, appetite, etc. No other informed able to be provided. Pt travels INDEPENDENTLY via [place].     Writer contacted [facility] residence and spoke with Erwin at the . Erwin reports ACT team activated EMS for SI. ACT team contacted at    LEAD TEAM contacted at [phone]. Writer spoke with Angie who spoke with pt at 3pm. Pt at this time reported to be happy, talking about her hair/nails, and upcoming birthday.  No concerns reported with representative surprised to hear pt went to ED. Call not activated by LEAD team.     [facility] ACT TEAM contacted at . After hour number found to be [phone]. Writer called and spoke with Roxana who will pass message along to Swapna. Phone number for returned call provided.    Writer received a return call from ACT team member who reported pt had phone session with Ms. Castillo today and mentioned ideations with plan of purchasing flammable products from store with money to purchase items. No time frame reported. Pt said to have went back and forth between excitement and then feeling sad because she is not as productive as people in same age group. Pt also mentioned brother also reporting that pt is a burden. Worker spoke with pt sometime after 3pm via cellphone. No other concerns reported. Session was said to have started out well. Pt said to be medication compliant.

## 2020-07-06 NOTE — ED PROVIDER NOTE - PATIENT PORTAL LINK FT
You can access the FollowMyHealth Patient Portal offered by [facility] by registering at the following website: [email or web address]. By joining [employer]’s FollowMyHealth portal, you will also be able to view your health information using other applications (apps) compatible with our system.

## 2020-07-06 NOTE — ED ADULT NURSE NOTE - TEMPLATE LIST FOR HEAD TO TOE ASSESSMENT
pls inform patient her monitoring labs are fine. Inflammatory markers are fine/no inflammation. Continue same tx with HCQ. XR of ankles unremarkable except for heel spurs. Ankle pain possibly from tendinitis. Consider seeing podiatry/ortho.   Psych/Behavioral
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
## 2020-07-06 NOTE — ED ADULT NURSE NOTE - NSIMPLEMENTINTERV_GEN_ALL_ED
Implemented All Universal Safety Interventions:  [place] to call system. Call bell, personal items and telephone within reach. Instruct patient to call for assistance. Room bathroom lighting operational. Non-slip footwear when patient is off stretcher. Physically safe environment: no spills, clutter or unnecessary equipment. Stretcher in lowest position, wheels locked, appropriate side rails in place.

## 2020-07-06 NOTE — ED ADULT NURSE NOTE - OBJECTIVE STATEMENT
Pt a&ox3, restless, cooperative. pt sent from residence for self injurious ideation. pt appears cheerful, states feeling bad for "wasting tax payers dollars". pt denies si/hi and auditory hallucinations. respirations even/unlabored, nad noted. belongings collected and secured. will continue to monitor

## 2020-07-06 NOTE — ED PROVIDER NOTE - OBJECTIVE STATEMENT
This is a 39 yr borderline and bipolar disorder with c.o anxiety and self injuries ideation. Pt states she just feels bad, because she is not working, and using "tax payers money". PT came from [place] [id number]. Pt well known to  provider and staff members. Pt is happy to see everyone, smiling, cheerful, cooperative with care. She endorses she wanted to hurt herself. This is a 39 yr borderline and bipolar disorder with c.o anxiety and self injuries ideation. Pt states she just feels bad, because she is not working, and using "tax payers money". PT came from [place] [id number]. Pt well known to  provider and staff members. Pt is pleasant and happy to see everyone, smiling, cheerful, cooperative with care. She endorses she wanted to hurt herself. She states she feels very anxious, because stressful events are going around.

## 2020-07-31 NOTE — ED BEHAVIORAL HEALTH ASSESSMENT NOTE - BEHAVIOR
Tiara calls today asking for Lupis WINTERS to write her a medical excuse/letter for her work stating that she doesn't need to wear a mask at work.  She says that she is working 11 hour days and wearing a mask and \"cannot breathe, and her glasses fog up so she cannot see.\"    She is a . She says she just \"cannot do this.\" She no longer sees her PCP due to insurance. Reviewed her health history, no breathing issues or significant health history  She says that she was told she has exercise induced asthma from past PCP but has no inhalers or medication for this.   Discussed with her that she may need to try out different types of masks and to make sure that she uses one that has a wire of part at the nose that she can pinch to create a better seal around her nose and prevent her glasses from steaming up. Reviewed that the masks are safe to wear and that she may need to practice over the weekend to get more comfortable.   She still is requesting the letter. Advised that will have to send a message to Lupis WINTERS and let her decide.  NP is out until next week.  Patient states understanding.  Will call her next week.      Cooperative

## 2020-08-04 ENCOUNTER — EMERGENCY (EMERGENCY)
Facility: HOSPITAL | Age: 40
LOS: 1 days | Discharge: HOME | End: 2020-08-04
Admitting: EMERGENCY MEDICINE
Payer: MEDICAID

## 2020-08-04 VITALS
DIASTOLIC BLOOD PRESSURE: 82 MMHG | HEART RATE: 65 BPM | RESPIRATION RATE: 18 BRPM | SYSTOLIC BLOOD PRESSURE: 142 MMHG | TEMPERATURE: 98 F | OXYGEN SATURATION: 99 %

## 2020-08-04 PROCEDURE — 99283 EMERGENCY DEPT VISIT LOW MDM: CPT

## 2020-08-04 NOTE — ED BEHAVIORAL HEALTH NOTE - BEHAVIORAL HEALTH NOTE
writer called [facility], [phone] phone not working. Writer called [facility] and trip was changed to [facility].  Writer called [facility]  but they do not have the order yet, and asked to call back in 15 minutes.
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
## 2020-08-04 NOTE — ED ADULT NURSE NOTE - CHIEF COMPLAINT QUOTE
Pt brought in by EMS from [facility]. Pt states she does not want to live at [facility] anymore. Pt denies SI/HI.

## 2020-08-04 NOTE — ED PROVIDER NOTE - PATIENT PORTAL LINK FT
You can access the FollowMyHealth Patient Portal offered by [facility] by registering at the following website: [email or web address]. By joining [employer]’s FollowMyHealth portal, you will also be able to view your health information using other applications (apps) compatible with our system.
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
## 2020-08-04 NOTE — ED BEHAVIORAL HEALTH NOTE - BEHAVIORAL HEALTH NOTE
Worker called [facility] ([phone]) And spoke with Rosalva who changed vendor because prior vendor was a no show several times for . Worker changed vendor to [facility] ([phone]) new invoice #[id number]  20 Minutes.

## 2020-08-04 NOTE — ED ADULT TRIAGE NOTE - CHIEF COMPLAINT QUOTE
Pt brought in by EMS from [facility]. Pt states she does not want to live at [facility] anymore. Pt denies SI/HI.

## 2020-08-04 NOTE — ED BEHAVIORAL HEALTH NOTE - BEHAVIORAL HEALTH NOTE
Worker called back [facility]; computer lines down. Worker then called back [facility] and staff states 20 minutes wait.
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
## 2020-08-04 NOTE — ED ADULT NURSE NOTE - OBJECTIVE STATEMENT
Pt arrived to  reporting agitation at residence. Pt reports that she no longer wants to live there. EMS reports that patient had been throwing items. Pt calm and cooperative. Denies S/I H/I A/H V/H. Pt changed into  clothing. Personal property collected and logged.

## 2020-08-12 NOTE — ED BEHAVIORAL HEALTH ASSESSMENT NOTE - CURRENT PLAN:
Hpi Title: Evaluation of Skin Lesions
How Severe Are Your Spot(S)?: mild
Have Your Spot(S) Been Treated In The Past?: has not been treated
None known

## 2020-08-12 NOTE — ED ADULT TRIAGE NOTE - SOURCE OF INFORMATION
Subjective:       Patient ID: Maria Elena Tavarse is a 62 y.o. female  The patient location is: home  The chief complaint leading to consultation is: breast cancer    Visit type: audiovisual      30 minutes of total time spent on the encounter, which includes face to face time and non-face to face time preparing to see the patient (eg, review of tests), Obtaining and/or reviewing separately obtained history, Documenting clinical information in the electronic or other health record, Independently interpreting results (not separately reported) and communicating results to the patient/family/caregiver, or Care coordination (not separately reported).         Each patient to whom he or she provides medical services by telemedicine is:  (1) informed of the relationship between the physician and patient and the respective role of any other health care provider with respect to management of the patient; and (2) notified that he or she may decline to receive medical services by telemedicine and may withdraw from such care at any time.    Notes: see below    HPI     History of Present Illness: Ms. Tavares is a 62 y.o. who returns in follow up for Breast Cancer.  In the interval, admitted with enteritis as was having abdominal pain. See below.      Today, reports that she feels better today than yesterday.   Taking antibiotic- last dose due today.   +fatigue  +cold all the time.   Not drinking as much.   Not taking Nsaids.   Appt with Dr. Solorzano today to remove drains.         Hospital Course:   Patient admitted to observation for enteritis. She was started on Zosyn with improvement. Prior to discharge on oral medications, patient's left breast spacer ruptured with purulent fluid. Plastic surgery consulted and plan for explant of TE in OR today (7/30).  She was started on vanc/zosyn and ID consulted. ID de-escalated abx to vanc/CTX. Intra-op cultures showed MSSA.  ID recommended cephalexin and patient discharged to completed 14  total days (end 8/13).  Patient to follow up with ID and plastic surgery.  Patient acknowledged understanding of plan and discharged home in stable condition.          Oncologic History:  - self detected, noted a shooting pain in breast first and then a week later felt a lump  Some delay in getting appointment as she was unaware she had to call  - 8/30/19 Diagnostic mammogram and ultrasound:  Left breast 20 mm x 18 mm x 17 mm mass at the 3 o'clock position. Assessment: 4 - Suspicious finding. Biopsy is recommended.   Lymph Node: Left axilla 16 mm x 14 mm x 13 mm lymph node. Assessment: 4 - Suspicious finding. Biopsy is recommended.   Right- There is no mammographic or sonographic evidence of malignancy.  BI-RADS Category:   Overall: 4 - Suspicious  - 9/5/19 Ultrasound guided biopsy  Pathology:  FINAL PATHOLOGIC DIAGNOSIS  1. LEFT BREAST MASS, BIOPSY:  - Invasive ductal carcinoma, grade 3, longest linear length is 10 mm measured on the slide.  - Histologic Grade (Yale Histologic Score)  Glandular (Acinar/Tubular Differentiation): 3.  Nuclear Pleomorphism: 2.  Mitotic Rate: 3.  Overall Grade: Grade 3 (score 8).  - Microcalcifications: Not identified.  - Lymphovascular invasion: Not identified.  2. LYMPH NODE, LEFT AXILLA, BIOPSY:  - Positive for metastatic carcinoma.  - The metastatic deposit measures 6 mm in longest continuous linear length.  Estrogen receptor: Positive, 90% of the tumor nuclei staining moderate to strongly.  Progesterone receptor: Positive, 30% of the tumor nuclei staining weak to moderately.  HER2: Negative.  Ki-67: 60%.  - ECHO 9/20/19: EF 64%  - PET 9/21/19:  Impression         Hypermetabolic left breast mass and regional left axillary lymphadenopathy consistent with reported breast cancer and corresponding with recent MRI findings.    Upper limit of normal sized right axillary lymph nodes with normal fatty duane and mildly increased radiotracer uptake.  Findings could represent reactive  nodes with metastatic nodes thought less likely.  Lymphscintigraphy with injection in the left breast may considered to assess for drainage to the contralateral axilla.      BX 9/24/19: Right axilla node biopsy is benign  - she underwent neoadjuvant chemotherapy and completed 4 cycles of AC followed by 11 cycles of weekly Taxol.   Stopped with side effects.  - 3/24/20 - she underwent bilateral mastectomies with Lt. sentinel node biopsy and subsequent Lt. axillary dissection and Rt. sentinel node biopsy with Dr. Lopez.  Tissue expanders were placed.   - Pathology from the Lt. breast revealed 1.2 cm of residual invasive ductal carcinoma histologic grade 3, nuclear grade 3 and mitotic index 5 ). There was high grade DCIS. There was lymphovascular invasion.  The margins of resection were negative at > 1 cm.  One axillary node had a 5 mm focus of metastatic carcinoma.  Another Lt. sentinel node had isolated tumor cells.   A third Lt. sentinel node and 7 Lt. axillary nodes were negative.  The Rt. breast and Rt. sentinel nodes were negative.    - XRT pending      Review of Systems   Constitutional: Positive for fatigue. Negative for activity change, appetite change, chills, fever and unexpected weight change ( weight loss planned).   HENT: Negative for nasal congestion, rhinorrhea and trouble swallowing.    Eyes: Negative for visual disturbance.   Respiratory: Negative for cough, shortness of breath and wheezing.    Cardiovascular: Negative for chest pain, palpitations and leg swelling.   Gastrointestinal: Negative for abdominal distention, abdominal pain, blood in stool, constipation, diarrhea, nausea and vomiting.   Genitourinary: Negative for decreased urine volume, difficulty urinating, dysuria, frequency and hematuria.   Musculoskeletal: Negative for arthralgias, back pain, gait problem, joint swelling and neck pain.   Integumentary:  Negative for pallor and rash.   Neurological: Positive for numbness (neuropathy  stable). Negative for dizziness, weakness, light-headedness and headaches.   Hematological: Negative for adenopathy. Does not bruise/bleed easily.   Psychiatric/Behavioral: Negative for confusion, dysphoric mood and sleep disturbance.         Objective:      Physical Exam  Constitutional:       Appearance: Normal appearance.      Comments: Limited as virtual visit.    Neurological:      Mental Status: She is alert.      Labs- reviewed   Assessment:       1. Breast cancer metastasized to axillary lymph node, left    2. Anemia, unspecified type    3. Neuropathy due to chemotherapeutic drug    4. Elevated serum creatinine        Plan:       Anemia parameters stable but needs further workup.   TSH, T4, Iron/tibc, ferritin, B12, Folate, retic count, LDH,   Repeat creatinine as well.   Encouraged hydration. Of note, Last antibiotic dose today.    Needs PCP and she will set up.    Continue femara.       RTC in 1 week for labs and 1 month for follow up        Patient is in agreement with the proposed treatment plan. All questions were answered to the patient's satisfaction. Pt knows to call clinic for any new or worsening symptoms and if anything is needed before the next clinic visit.      SHARON MorejonP-C  Hematology & Oncology  [facility] [place] [address]  ph. [phone]  Fax. [phone]            Patient

## 2020-09-15 ENCOUNTER — EMERGENCY (EMERGENCY)
Facility: HOSPITAL | Age: 40
LOS: 1 days | Discharge: HOME | End: 2020-09-15
Attending: EMERGENCY MEDICINE | Admitting: EMERGENCY MEDICINE
Payer: MEDICAID

## 2020-09-15 VITALS
RESPIRATION RATE: 16 BRPM | OXYGEN SATURATION: 100 % | TEMPERATURE: 98 F | HEART RATE: 64 BPM | DIASTOLIC BLOOD PRESSURE: 97 MMHG | SYSTOLIC BLOOD PRESSURE: 148 MMHG

## 2020-09-15 VITALS — HEIGHT: 66 IN

## 2020-09-15 PROCEDURE — 90792 PSYCH DIAG EVAL W/MED SRVCS: CPT

## 2020-09-15 PROCEDURE — 99284 EMERGENCY DEPT VISIT MOD MDM: CPT

## 2020-09-15 RX ORDER — IBUPROFEN 200 MG
400 TABLET ORAL ONCE
Refills: 0 | Status: COMPLETED | OUTPATIENT
Start: 2020-09-15 | End: 2020-09-15

## 2020-09-15 RX ADMIN — Medication 400 MILLIGRAM(S): at 11:39

## 2020-09-15 NOTE — ED BEHAVIORAL HEALTH ASSESSMENT NOTE - SUICIDE PROTECTIVE FACTORS
Positive therapeutic relationships/Engaged in work or school/Identifies reasons for living/Has future plans

## 2020-09-15 NOTE — ED BEHAVIORAL HEALTH NOTE - BEHAVIORAL HEALTH NOTE
Called [facility] [phone] ext [phone] - Ms. Dowd Director- Patient has been verbally aggressive threatening to "blow the whole building up." She became verbally aggressive because breakfast was not served on time. She will make these threats frequently when she does not get what she wants. Mid-August she broke her windows in her room after she was mad because she wants to handle her own money but her NP doesn't feel she should. She said "you better pay me." Patient can travel via bus or taxi. She said to ask the patient.     Called ACT team and spoke with Brenda Conklin- Patient has been acting out over the last 2 months. She has been refusing medication and breaking things at the residence because she wants to be her own payee but she is not showing she is responsible. Patient has been acting out as a result of this- refusing medication x 2 months and acting out ie: breaking stuff at the residence, agitation. She reports this is all behavioral. Called [facility] [phone] ext [phone] - Ms. Dowd Director- Patient was verbally aggressive today threatening to "blow the whole building up." She became verbally aggressive because breakfast was not served on time. She will make these threats to blow up the residence or harm others frequently when she does not get what she wants. This is her baseline behavior.  Mid-August she broke her windows in her room after she was mad because she wants to handle her own money but her NP doesn't feel she should. She said "you better pay me." Patient is at baseline. Patient can travel via bus or taxi.     Called ACT team and spoke with Brenda Conklin- Patient has been acting out over the last 2 months. She has been refusing medication and breaking things at the residence because she wants to be her own payee but she is not showing she is responsible. Patient has been acting out as a result of this- refusing medication x 2 months and acting out ie: breaking stuff at the residence, agitation. She reports this is all behavioral.
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
## 2020-09-15 NOTE — ED ADULT NURSE REASSESSMENT NOTE - NS ED NURSE REASSESS COMMENT FT1
pt c/o of dental pain, requesting medication, KARRI [place] aware, medication order. will continue to monitor
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
## 2020-09-15 NOTE — ED BEHAVIORAL HEALTH ASSESSMENT NOTE - SAFETY PLAN ADDT'L DETAILS
Education provided regarding environmental safety / lethal means restriction/Provision of National Suicide Prevention Lifeline 6-668-511-LNCP (4720)/Safety plan discussed with...
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
## 2020-09-15 NOTE — ED PROVIDER NOTE - NSFOLLOWUPINSTRUCTIONS_ED_ALL_ED_FT
Continue your current medications as prescribed.    May take Motrin 400mg every 6 hours as needed for pain. Take with food.     Follow up with Dental Clinic, [phone], as discussed for further evaluation of tooth pain. Continue your current medications as prescribed.    May take Motrin 400mg every 6 hours as needed for pain. Take with food.     Follow up with Dental Clinic, [phone], as discussed for further evaluation of tooth pain.    Return for any worsening symptoms.
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
## 2020-09-15 NOTE — ED BEHAVIORAL HEALTH ASSESSMENT NOTE - HPI (INCLUDE ILLNESS QUALITY, SEVERITY, DURATION, TIMING, CONTEXT, MODIFYING FACTORS, ASSOCIATED SIGNS AND SYMPTOMS)
The patient is a 40-year-old, single,  woman, non caregiver, unemployed, domiciled on [place] [address] [place] [address]; with past psychiatric history of bipolar I disorder, Borderline Personality Disorder, hx of cannabis abuse, and multiple inpatient psychiatric hospitalizations (>20, most recently discharged from [facility] on 6/9/20), with frequent visits to the [facility] (well-known to staff), with ACT Team, with a history of 3 prior suicide attempts (last in 2012 via OD), with hx of recurrent and chronic suicidal gestures and suicidal ideation; with a history of property destruction when upset, past legal issues, no access to weapons; with PMH of GERD & asthma; BIB EMS from [facility] called by staff due to agitation.     On interview, pt is calm, cooperative and pleasant with the examiner. She stated she came to the ER because breakfast was not served on time. She stated "if it was [employer] and your coffee was late, wouldn't you complain about bad customer service." She stated she stopped taking her medication because they won't let her control her money and so if she doesn't take her medication she can save 37$. She reports "I'm showing them I can budget." She denies making a statement to blow up the residence or threatening staff. She denied HI/plan/intent. She reports she has been volunteering at a [employer] to show the residence she can hold a job.     Patient denies any hallucinations, does not report any delusional thought content, denies thought insertion/withdrawal, denies referential thought processes & is not paranoid on interview. Patient does not report nor exhibit any signs of trina, including irritable or elevated mood, grandiosity, pressured speech, risk-taking behaviors, increase in productivity or agitation. Patient denies any depressive symptoms including depressed mood, anhedonia, changes in energy/concentration/appetite, sleep disturbances, preoccupation with death or feelings of guilt. Patient adamantly denies SI, intent or plan; denies any HI, violent thoughts.     See  note for collateral information. The patient is a 40-year-old, single,  woman, non caregiver, unemployed, domiciled on [place] grounds [address] [place] [address]; with past psychiatric history of bipolar I disorder, Borderline Personality Disorder, hx of cannabis abuse, and multiple inpatient psychiatric hospitalizations (>20, most recently discharged from [facility] on 6/9/20), with frequent visits to the [facility] (well-known to staff), with ACT Team, with a history of 3 prior suicide attempts (last in 2012 via OD), with hx of recurrent and chronic suicidal gestures and suicidal ideation; with a history of property destruction when upset, past legal issues, no access to weapons; with PMH of GERD & asthma; BIB EMS from [facility] called by staff due to agitation.     On interview, pt is calm, cooperative and pleasant with the examiner. She stated she came to the ER because breakfast was not served on time. She stated "if it was [employer] and your coffee was late, wouldn't you complain about bad customer service." She stated she stopped taking her medication because they won't let her control her money and so if she doesn't take her medication she can save 37$. She reports "I'm showing them I can budget." She denies making a statement to blow up the residence or threatening staff. She denied HI/plan/intent. She reports she has been volunteering at a near  [employer] to show the residence she can hold a job.     Patient denies any hallucinations, does not report any delusional thought content, denies thought insertion/withdrawal, denies referential thought processes & is not paranoid on interview. Patient does not report nor exhibit any signs of trina, including irritable or elevated mood, grandiosity, pressured speech, risk-taking behaviors, increase in productivity or agitation. Patient denies any depressive symptoms including depressed mood, anhedonia, changes in energy/concentration/appetite, sleep disturbances, preoccupation with death or feelings of guilt. Patient adamantly denies SI, intent or plan; denies any HI, violent thoughts.     See  note for collateral information.

## 2020-09-15 NOTE — ED PROVIDER NOTE - OBJECTIVE STATEMENT
41 y/o female with PMHx of Asthma, Bipolar, Borderline Personality, and GERD who presented to the ED for increased agitation today. Pt is a resident at [place] [address] who presented today after reported becoming more agitated with other residents because her breakfast was taking too long. Pt admits to being non-compliant with medication over the past week. Pt denies any headache, fever, chills, cough, SOB, chest pain, or abd pain. Pt denies any SI, HI, or hallucinations. Pt arrived with [facility] and escorted to  where cuffs were removed.

## 2020-09-15 NOTE — ED PROVIDER NOTE - CLINICAL SUMMARY MEDICAL DECISION MAKING FREE TEXT BOX
41 y/o female with PMHx of Asthma, Bipolar, Borderline Personality, and GERD who presented to the ED for increased agitation today.  Concern for Bipolar with agitation and off medication  Psych eval

## 2020-09-15 NOTE — ED PROVIDER NOTE - PROGRESS NOTE DETAILS
Dr. Toledo: Pt was seen and evaluated by Psych. Pt cleared by Psych. Pt currently calm. Pt does note having some tooth pain on re-eval. Noted to have some discoloration to tooth #1. No tenderness or fluctuance to gumline. Discussed with pt need to f/u with Dentist and given Dental clinic number. Dr. Toledo: Betty performed and SW arranging for transportation for the pt to get back to [facility].

## 2020-09-15 NOTE — ED BEHAVIORAL HEALTH ASSESSMENT NOTE - REFERRAL / APPOINTMENT DETAILS
Patient advised to follow-up with her outpatient psychiatrist and ACT team later this week, Dental Clinic Patient advised to follow-up with her outpatient psychiatrist and ACT team later this week, Dental Clinic referral given

## 2020-09-15 NOTE — ED ADULT NURSE NOTE - OBJECTIVE STATEMENT
Pt brought in by EMS, from facility. As per EMS, pt was agitated because breakfast "took too long" and pt threatening to blow building up. Pt admits to threat. pt denies si/hi and auditory hallucination, or any medical discomfort. Respirations even/unlabored, nad noted. belongings collected and secured. will continue to monitor

## 2020-09-15 NOTE — ED BEHAVIORAL HEALTH ASSESSMENT NOTE - SUMMARY
The patient is a 40-year-old, single,  woman, non caregiver, unemployed, domiciled on [address]; with past psychiatric history of bipolar I disorder, Borderline Personality Disorder, hx of cannabis abuse, and multiple inpatient psychiatric hospitalizations (>20, most recently discharged from [facility] on 6/9/20), with frequent visits to the [facility] (well-known to staff), with ACT Team, with a history of 3 prior suicide attempts (last in 2012 via OD), with hx of recurrent and chronic suicidal gestures and suicidal ideation; with a history of property destruction when upset, past legal issues, no access to weapons; with PMH of GERD & asthma; BIB EMS from [facility] called by staff due to agitation.    Ms. Castillo is a single, 39-year-old  female, non caregiver, unemployed, domiciled on [address], with past psychiatric history of Schizoaffective Disorder, Borderline Personality Disorder, cannabis abuse, multiple inpatient psychiatric hospitalizations (>20), recently [facility] 4 12/12-12/24 2018, with frequent visits to the [facility] (well-known to staff) 3 prior suicide attempts (last in 2012 via OD), recurrent suicidal gestures and suicidal ideation, history of property destruction when upset, past legal issues, no access to weapons. PMH GERD & asthma. BIB EMS called by staff at residence for agitation.    Patient presents to the ER in the context of verbal agitation when she did not get her breakfast on time. Presentation and ED course are consistent with patient's baseline.  Patient chronically makes provocative statements and has acting out behaviors due to her poor frustration tolerance and character pathology. She is often provocative and dramatic which is most consistent with behavioral dysregulation and personality disorder pathology. Her refusal of her psychiatric medication is behavioral in nature and at this current time she does not display any acute exacerbation of an axis I diagnosis. Patient does not endorse nor display symptoms of psychosis, trina/hypomania, depression or any other mental health symptoms. She adamantly denies SI/HI/SIB/intent/plan. She has chronic issues with impulse control, acting out behaviors & intermittent destruction to property. She is not seeking and refused inpatient psychiatric admission. She does not meet criteria for involuntary inpatient admission at this time. Recommend f/u with ACT team.    Extensive safety planning performed with patient and staff.  Patient and staff agreeing verbally to return patient to ER or call 911 if symptoms worsen or patient has urges to harm self or others. The patient is a 40-year-old, single,  woman, non caregiver, unemployed, domiciled on [place] [address] building [address] [place] [address]; with past psychiatric history of bipolar I disorder, Borderline Personality Disorder, hx of cannabis abuse, and multiple inpatient psychiatric hospitalizations (>20, most recently discharged from [facility] on 6/9/20), with frequent visits to the [facility] (well-known to staff), with ACT Team, with a history of 3 prior suicide attempts (last in 2012 via OD), with hx of recurrent and chronic suicidal gestures and suicidal ideation; with a history of property destruction when upset, past legal issues, no access to weapons; with PMH of GERD & asthma; BIB EMS from [facility] called by staff due to agitation.    Patient presents to the ER in the context of verbal threats when she did not get her breakfast on time. Presentation and ED course are consistent with patient's baseline.  Patient chronically makes provocative threatening statements and has acting out behaviors due to her poor frustration tolerance and character pathology. She is often provocative and dramatic which is most consistent with behavioral dysregulation and personality disorder pathology. Her refusal of her psychiatric medication is behavioral in nature and at this current time she does not display any acute exacerbation of an axis 1 diagnosis or symptoms. Patient does not endorse nor display symptoms of psychosis, trina/hypomania, depression or any other mental health symptoms. She adamantly denies SI/HI/SIB/intent/plan. She is future oriented and able to safety plan. She has chronic issues with impulse control, acting out behaviors & intermittent destruction to property. She is not seeking and refused inpatient psychiatric admission. She does not meet criteria for involuntary inpatient admission at this time. Recommend f/u with ACT team.    Extensive safety planning performed with patient and staff.  Patient and staff agreeing verbally to return patient to ER or call 911 if symptoms worsen or patient has urges to harm self or others.

## 2020-09-15 NOTE — ED BEHAVIORAL HEALTH ASSESSMENT NOTE - SAFETY PLAN DETAILS
Extensive safety planning performed with patient and staff.  Patient and staff agreeing verbally to return patient to ER or call 911 if symptoms worsen or patient has urges to harm self or others.

## 2020-09-15 NOTE — ED BEHAVIORAL HEALTH ASSESSMENT NOTE - CASE SUMMARY
Pt seen, chart reviewed, case discussed with team. As above, pt was BIBA from her residence b/c she was upset about breakfast being late. She denies threatening anyone, or having thoughts to harm anyone. She denies any current A/V/T H, SI/I/I/P, HI/I/P, drug use. She admits to noncompliance with her meds, does not wish to restart. She has been calm in the ED, linear MSE. She does c/o tooth pain, relayed to ED Attending, pt has tooth which needs outpt dental f/u, residence aware. Pt does not require inpt admission at this time, can f/u with ACT team.

## 2020-09-15 NOTE — ED PROVIDER NOTE - PATIENT PORTAL LINK FT
You can access the FollowMyHealth Patient Portal offered by [facility] by registering at the following website: [email or web address]. By joining [facility]’s FollowMyHealth portal, you will also be able to view your health information using other applications (apps) compatible with our system.

## 2020-09-15 NOTE — ED BEHAVIORAL HEALTH ASSESSMENT NOTE - RISK ASSESSMENT
Static risk factors include: hx of inpatient psychiatric admissions, remote hx of suicide attempts (last in 2012), hx of psychiatric illness, hx of aggression, hx of trauma, and hx of substance use.    Acute risk factors- medication non compliance x 2 months    Protective factors include: no current ideation/intent/plan for suicide/homicide/NSSIB/aggression toward others, future orientation, help-seeking behavior and desire for betterment, treatment engagement, domiciled, no active psychosis, no access to a gun, no recent substance use, ACT team in the community. Low Acute Suicide Risk Static risk factors include: hx of inpatient psychiatric admissions, remote hx of suicide attempts (last in 2012), hx of psychiatric illness, hx of aggression, hx of trauma, axis 2 pathology and hx of substance use.    Acute risk factors- medication non compliance x 2 months    Protective factors include: no current ideation/intent/plan for suicide/homicide/NSSIB, no aggression toward others, future orientation, help-seeking behavior and desire for betterment, treatment engagement, domiciled, no active psychosis, no trina/hypomania, no depression, no access to a gun, no recent substance use, ACT team in the community, supervised residence, calm and cooperative during evaluation.

## 2020-09-15 NOTE — ED BEHAVIORAL HEALTH ASSESSMENT NOTE - DESCRIPTION
GERD, Asthma, gallbladder calculus without cholecystitis currently lives in [facility], [address] [facility] carterfreddy MIGUEL ANGEL reports no contact with family, currently unemployed. Pt calm and cooperative, noted to be walking around happily.    Vital Signs Last 24 Hrs  T(C): 36.7 (15 Kulwant 2020 17:02), Max: 36.7 (15 Kulwant 2020 17:02)  T(F): 98 (15 Kulwant 2020 17:02), Max: 98 (15 Kulwant 2020 17:02)  HR: 84 (15 Kulwant 2020 17:02) (84 - 84)  BP: 142/74 (15 Kulwant 2020 17:02) (142/74 - 142/74)  BP(mean): --  RR: 18 (15 Kulwant 2020 17:02) (18 - 18)  SpO2: 98% (15 Kulwant 2020 17:02) (98% - 98%) Pt calm and cooperative, noted to be walking around happily. No aggressive/violent behavior. No PRNs given.     Vital Signs Last 24 Hrs  T(C): 36.5 (15 Sep 2020 10:16), Max: 36.5 (15 Sep 2020 10:16)  T(F): 97.7 (15 Sep 2020 10:16), Max: 97.7 (15 Sep 2020 10:16)  HR: 64 (15 Sep 2020 10:16) (64 - 64)  BP: 148/97 (15 Sep 2020 10:16) (148/97 - 148/97)  BP(mean): --  RR: 16 (15 Sep 2020 10:16) (16 - 16)  SpO2: 100% (15 Sep 2020 10:16) (100% - 100%)

## 2020-09-15 NOTE — ED BEHAVIORAL HEALTH ASSESSMENT NOTE - OTHER
staff at residence housing staff dissatisfaction with being unable to be her own payee denies SI/HI/I/P chronically limited staff, ACT team peers

## 2020-09-15 NOTE — ED BEHAVIORAL HEALTH NOTE - BEHAVIORAL HEALTH NOTE
Writer was informed pt was medically and psychiatrically cleared.  Writer contacted [facility] [phone] spoke to Deirdre who arranged for [employer] for 12:30  using confirmation #[id number] Writer was informed pt was medically and psychiatrically cleared.  Writer contacted [facility]  spoke to Deirdre who arranged for [place] [employer] [facility] for 12:30  using confirmation #[id number]  Writer spoke to Zach [phone] estimated 25 minute ETA. Writer was informed pt was medically and psychiatrically cleared.  Writer contacted [facility]  spoke to Deirdre who arranged for [place] [employer] [facility] for 12:30  using confirmation #[id number].  Writer called pt's residence Called [facility] [phone] x [phone] - Ms. Dowd Director who confirmed pt can return to residence via public transportation or taxi whichever pt prefers.  Betty completed.  Writer spoke to Zach [phone] estimated 25 minute ETA.

## 2020-09-15 NOTE — ED ADULT NURSE NOTE - CHIEF COMPLAINT QUOTE
pt arrives from [facility].  pt got agitated and aggressive toward other residents because "her breakfast was taking too long".  pt has been non-compliant with meds.  unable to obtain meds due to agitation.  pt accompanied by HENRY in handcuffs

## 2020-09-29 NOTE — ED ADULT NURSE NOTE - CAS DISCH ACCOMP BY
Mr. Bonilla,    I hope you are doing well in [place], I am jealous.      The repeat protein test looks the same.  You have a tiny extra protein which at this point is really insignificant.  Many people have this as they get older and while there is an extremely small chance it can lead to a more serious blood disorder there is nothing urgent but monitoring.  For most people it does absolutely nothing.    I would asked that you come back in the spring and see me and we can repeat the test.  There is nothing else you need to do.  There is nothing you need to worry about.    If you have any questions just let me know.    Zac Martínez M.D.  
Self

## 2020-10-02 ENCOUNTER — EMERGENCY (EMERGENCY)
Facility: HOSPITAL | Age: 40
LOS: 1 days | Discharge: HOME | End: 2020-10-02
Attending: EMERGENCY MEDICINE | Admitting: EMERGENCY MEDICINE
Payer: MEDICAID

## 2020-10-02 VITALS
RESPIRATION RATE: 16 BRPM | TEMPERATURE: 98 F | SYSTOLIC BLOOD PRESSURE: 134 MMHG | HEART RATE: 78 BPM | DIASTOLIC BLOOD PRESSURE: 80 MMHG | OXYGEN SATURATION: 100 %

## 2020-10-02 VITALS
SYSTOLIC BLOOD PRESSURE: 134 MMHG | DIASTOLIC BLOOD PRESSURE: 87 MMHG | OXYGEN SATURATION: 99 % | TEMPERATURE: 98 F | RESPIRATION RATE: 18 BRPM | HEIGHT: 66 IN | HEART RATE: 74 BPM

## 2020-10-02 PROCEDURE — 99283 EMERGENCY DEPT VISIT LOW MDM: CPT

## 2020-10-02 NOTE — ED BEHAVIORAL HEALTH ASSESSMENT NOTE - SUICIDE PROTECTIVE FACTORS
Identifies reasons for living/Positive therapeutic relationships/Engaged in work or school/Has future plans

## 2020-10-02 NOTE — ED BEHAVIORAL HEALTH ASSESSMENT NOTE - OTHER
peers staff, ACT team dissatisfaction with being unable to be her own payee denies SI/HI/I/P chronically limited staff at residence

## 2020-10-02 NOTE — ED BEHAVIORAL HEALTH ASSESSMENT NOTE - REFERRAL / APPOINTMENT DETAILS
Patient advised to follow-up with her outpatient psychiatrist and ACT team later this week (10/05/2020)

## 2020-10-02 NOTE — ED BEHAVIORAL HEALTH ASSESSMENT NOTE - CURRENT MEDICATION
Klonopin 1 mg PO daily, Fluphenazine 5 mg PO BID, lithium 300 mg PO BID and 600 mg PO QHS, and Zyprexa 10 mg PO BID - NON ADHERENT

## 2020-10-02 NOTE — ED BEHAVIORAL HEALTH ASSESSMENT NOTE - OTHER PAST PSYCHIATRIC HISTORY (INCLUDE DETAILS REGARDING ONSET, COURSE OF ILLNESS, INPATIENT/OUTPATIENT TREATMENT)
-discharged from [facility] on 6/9/2020  lifetime inpatient admissions > 20. Numerous J ED visits. Currently followed by [facility] ACT team.  Multiple [facility] ED evals.  - 3 prior suicide attempts (last in 2012 via OD) as per records, recurrent suicidal gestures and suicidal ideation, history of property destruction (breaking TV screens while hospitalized) when upset / acting out   - long hx of sexually provocative, acting out behaviors (during last [facility] inpatient admission >2 years ago, patient had to be placed on CO 1:1 after trying to perform oral sex on a male patient on the dayroom, taken off CO then was going into male patient's room, overheard offering them sex and was placed back on CO)

## 2020-10-02 NOTE — ED BEHAVIORAL HEALTH NOTE - BEHAVIORAL HEALTH NOTE
[phone] [phone] spoke to Ms. Ortiz who provided the following collateral.  [facility]  Ms. Ortiz states she hasn't been in the office in several days and hasn't seen patient.  She states today pt was taking paintings off the wall and laying them on the floor then laying down next to them.  She states the last thing patient did is come downstairs with a bucket of feces.  She states pt told them her toilet was "broken or something like that".  She states pt has been refusing medication for 2 months since August.  She doesn't know how patient has been sleeping but states there is no report of patient not sleeping.  She states pt is treated by the ACT team.   Writer spoke to  Tiffanie who states pt was walking in the ding with a bucket of feces.  Pt has been off medication for 2 months, but hasn't been symptomatic.  She states patient not taking medication is a protest of patient not being her own payee.  [facility]  She states patient acts out from time to time.  She denies patient says she's suicidal denies any aggression.  Writer informed her patient will be discharged and transported home via taxi.  Writer spoke to MD and nurse regarding transportation home.    Writer called [facility] spoke to Jazmyne 4  who arranged for a taxi with a 12:45pm  using confirmation# [id number]  Transportation company [employer]

## 2020-10-02 NOTE — ED ADULT NURSE NOTE - OBJECTIVE STATEMENT
Received pt in  pt calm & cooperative sent for bizarre behaviour as per EMS, pt verbalizing her toilet was broken & she was attempting to remove the feces. pt  denies s/i h/i/avh  presently, safety & comfort measures maintained eval on going.

## 2020-10-02 NOTE — ED BEHAVIORAL HEALTH ASSESSMENT NOTE - RISK ASSESSMENT
Static risk factors include: hx of inpatient psychiatric admissions, remote hx of suicide attempts (last in 2012), hx of psychiatric illness, hx of aggression, hx of trauma, axis 2 pathology and hx of substance use.    Acute risk factors- medication non compliance x 2 months    Protective factors include: no current ideation/intent/plan for suicide/homicide/NSSIB, no aggression toward others, future orientation, help-seeking behavior and desire for betterment, treatment engagement, domiciled, no active psychosis, no trina/hypomania, no depression, no access to a gun, no recent substance use, ACT team in the community, supervised residence, calm and cooperative during evaluation. Low Acute Suicide Risk

## 2020-10-02 NOTE — ED PROVIDER NOTE - PHYSICAL EXAMINATION
Well appearing, well nourished, awake, alert, oriented to person, place, time/situation and in no apparent distress.    Airway patent    Eyes without scleral injection. No jaundice.    Strong pulse.    Respirations unlabored.    Abdomen soft, non-tender, no guarding.    Spine appears normal, range of motion is not limited, no muscle or joint tenderness.    Alert and oriented, no gross motor or sensory deficits.    Skin normal color for race, warm, dry and intact. No evidence of rash.

## 2020-10-02 NOTE — ED BEHAVIORAL HEALTH ASSESSMENT NOTE - SUMMARY
The patient is a 40-year-old, single,  woman, non caregiver, unemployed, domiciled on [place] grounds building [address] [place] [address]; with past psychiatric history of bipolar I disorder, Borderline Personality Disorder, hx of cannabis abuse, and multiple inpatient psychiatric hospitalizations (>20, most recently discharged from [facility] on 6/9/20), with frequent visits to the [facility] (well-known to staff), with ACT Team, with a history of 3 prior suicide attempts (last in 2012 via OD), with hx of recurrent and chronic suicidal gestures and suicidal ideation; with a history of property destruction when upset, past legal issues, no access to weapons; with PMH of GERD & asthma; BIB EMS from [facility] called by staff for unusual behavior.     Patient presents to the ER in the context of strange behaviors in her residence. Presentation and ED course are consistent with patient's baseline.  She is often provocative and dramatic which is most consistent with behavioral dysregulation and personality disorder pathology. Her refusal of her psychiatric medication is behavioral in nature and at this current time she does not display any acute exacerbation of an axis 1 diagnosis or symptoms. Patient does not endorse nor display symptoms of psychosis, trina/hypomania, depression or any other mental health symptoms. She adamantly denies SI/HI/SIB/intent/plan. She is future oriented. She has chronic issues with impulse control, acting out behaviors & intermittent destruction to property (not in the last 2 weeks). She is not seeking and refused inpatient psychiatric admission. She does not meet criteria for involuntary inpatient admission at this time. Recommend f/u with ACT team.    Patient and staff agreeing verbally to return patient to ER or call 911 if symptoms worsen or patient has urges to harm self or others.

## 2020-10-02 NOTE — ED BEHAVIORAL HEALTH ASSESSMENT NOTE - HPI (INCLUDE ILLNESS QUALITY, SEVERITY, DURATION, TIMING, CONTEXT, MODIFYING FACTORS, ASSOCIATED SIGNS AND SYMPTOMS)
The patient is a 40-year-old, single,  woman, non caregiver, unemployed, domiciled on [place] grounds building [address] [place] [address]; with past psychiatric history of bipolar I disorder, Borderline Personality Disorder, hx of cannabis abuse, and multiple inpatient psychiatric hospitalizations (>20, most recently discharged from [facility] on 6/9/20), with frequent visits to the [facility] (well-known to staff), with ACT Team, with a history of 3 prior suicide attempts (last in 2012 via OD), with hx of recurrent and chronic suicidal gestures and suicidal ideation; with a history of property destruction when upset, past legal issues, no access to weapons; with PMH of GERD & asthma; BIB EMS from [facility] called by staff for unusual behavior.     On interview, pt is calm, cooperative and pleasant with the examiner. She stated she doesn't know why she's here and when asked about what happened with the "toilet" she started laughing and explained that today her toilet clogged with her feces and when she told the staff, they told her to "deal with it". She asked a coresident for a bucket and proceeded to take the feces out of the toilet and put it on the front lawn as "composte". When asked it that was the most appropriate thing to do, pt said no and stated she could have put it in her plants. She attempted to compare it to "truffles/manure" they use in [place] and stated she is a mammal as well. Patient denies any recent aggression/violence at home and admits to not taking her psychiatric medications because she doesn't like the side effects and "eugenics". When asked if she thought anyone was trying to control her or hurt her with the medication she denied any paranoia towards staff or her ACT team NP Brenda Conklin. She denies any AH/VH at this time. She is denying any depressed symptoms as well and admits to having a normal mood and has been eating and sleeping well. Pt has also been caring for ADLs appropriately and showering daily.      Patient denies any hallucinations, does not report any delusional thought content, denies thought insertion/withdrawal, denies referential thought processes & is not paranoid on interview. Patient does not report nor exhibit any signs of trina, including irritable or elevated mood, grandiosity, pressured speech, risk-taking behaviors, increase in productivity or agitation. Patient denies any depressive symptoms including depressed mood, anhedonia, changes in energy/concentration/appetite, sleep disturbances, preoccupation with death or feelings of guilt. Patient adamantly denies SI, intent or plan; denies any HI, violent thoughts.     See  note for collateral information.    Discussed with Brenda Conklin NP from ACT team - Pt has been acting out recently because she wants to become her own Payee and ultimately get discharged from the residence. She has not been taking medications for the last two months. There is a case conference on 10/05/2020 to discuss her care. Pt has a hx of being violent and aggressive and destructive of property. No recent acute psychosis/trina and most behaviors are secondary to personality traits and trauma from her past. No acute safety concerns and will f/u on 10/05/2020.

## 2020-10-02 NOTE — ED PROVIDER NOTE - PATIENT PORTAL LINK FT
You can access the FollowMyHealth Patient Portal offered by [facility] by registering at the following website: [email or web address]. By joining [email or web address]’s FollowMyHealth portal, you will also be able to view your health information using other applications (apps) compatible with our system.

## 2020-10-02 NOTE — ED ADULT NURSE NOTE - CHIEF COMPLAINT QUOTE
pt from [facility] for hallucinating pt was cleaning her feces from the floor with her pant which she place it in her head.  as per EMS pt not compliant with meds.  pt cooperative at present time.

## 2020-10-02 NOTE — ED BEHAVIORAL HEALTH ASSESSMENT NOTE - VIOLENCE RISK FACTORS:
Impulsivity/History of being victimized/traumatized/Noncompliance with treatment/Lack of insight into violence risk/need for treatment

## 2020-10-02 NOTE — ED BEHAVIORAL HEALTH ASSESSMENT NOTE - DESCRIPTION
GERD, Asthma, gallbladder calculus without cholecystitis Pt calm and cooperative, noted to be walking around happily. No aggressive/violent behavior. No PRNs given.     Vital Signs Last 24 Hrs  T(C): 36.9 (02 Oct 2020 10:49), Max: 36.9 (02 Oct 2020 10:49)  T(F): 98.4 (02 Oct 2020 10:49), Max: 98.4 (02 Oct 2020 10:49)  HR: 74 (02 Oct 2020 10:49) (74 - 74)  BP: 134/87 (02 Oct 2020 10:49) (134/87 - 134/87)  BP(mean): --  RR: 18 (02 Oct 2020 10:49) (18 - 18)  SpO2: 99% (02 Oct 2020 10:49) (99% - 99%) currently lives in [facility], [address] carterfreddy MIGUEL ANGEL reports no contact with family, currently unemployed.

## 2020-10-02 NOTE — ED ADULT NURSE REASSESSMENT NOTE - NS ED NURSE REASSESS COMMENT FT1
pt calm & cooperative denies s/i h/i/avh  presently, pt cleared by psych d/c by md laci kelly completed transport arranged by SW pt verbalizing understanding of d/c instructions.

## 2020-10-02 NOTE — ED ADULT TRIAGE NOTE - CHIEF COMPLAINT QUOTE
pt from [facility] for hallucinating pt was cleaning her feces from the floor with her pant which she place it in her head.  as per EMS pt not compliant with meds.  pt cooperative at present time.

## 2020-10-02 NOTE — ED PROVIDER NOTE - OBJECTIVE STATEMENT
Meg: From [employer]. Apparently, her toilet wasn't working, so she took a bucket and removed the feces. The bucket had a hole. She took the feces outside for compost. Staff say she was spreading it around. She's been talking about electrocution by Nazis, formaldehyde, and eugenics (she says she's not taking meds 2/2 fear of eugenics). She is calm, and, when speaking with me, appears delusional and disorganized and tangential, However, when speaking with someone she knows here, is organized in her thinking.
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
## 2020-10-02 NOTE — ED BEHAVIORAL HEALTH ASSESSMENT NOTE - SAFETY PLAN DETAILS
safety planning performed with patient and staff.  Patient and staff agreeing verbally to return patient to ER or call 911 if symptoms worsen or patient has urges to harm self or others.

## 2020-10-15 NOTE — ED PROVIDER NOTE - ATTESTATION, MLM
"Requested Prescriptions   Pending Prescriptions Disp Refills     lisinopril (ZESTRIL) 5 MG tablet 90 tablet 0     Sig: TAKE 1 TABLET(5 MG) BY MOUTH DAILY.  Please follow up for yearly preventative Physical in September 2020       ACE Inhibitors (Including Combos) Protocol Failed - 10/12/2020 11:41 AM        Failed - Normal serum creatinine on file in past 12 months     Recent Labs   Lab Test 08/27/20  1515   CR 1.33*       Ok to refill medication if creatinine is low          Passed - Blood pressure under 140/90 in past 12 months     BP Readings from Last 3 Encounters:   07/22/20 103/66   03/09/20 136/80   02/17/20 118/66                 Passed - Recent (12 mo) or future (30 days) visit within the authorizing provider's specialty     Patient has had an office visit with the authorizing provider or a provider within the authorizing providers department within the previous 12 mos or has a future within next 30 days. See \"Patient Info\" tab in inbasket, or \"Choose Columns\" in Meds & Orders section of the refill encounter.              Passed - Medication is active on med list        Passed - Patient is age 18 or older        Passed - Normal serum potassium on file in past 12 months     Recent Labs   Lab Test 08/27/20  1515   POTASSIUM 4.3                  " I have reviewed and confirmed nurses' notes for patient's medications, allergies, medical history, and surgical history.

## 2020-10-24 NOTE — ED PROVIDER NOTE - QUALITY
Creatinine trending up   BMP reviewed- noted Estimated Creatinine Clearance: 29.7 mL/min (A) (based on SCr of 2.5 mg/dL (H)). according to latest data.   Monitor UOP and serial BMP and adjust therapy as needed. Renally dose meds.     altered level of consciousness

## 2020-10-26 NOTE — ED BEHAVIORAL HEALTH ASSESSMENT NOTE - SUICIDALITY
[facility] ((National Emergency Concerning the CORONAVIRUS (COVID 19), preferred for return patients. )    Image quality and interpretability: acceptable    Physician has received verbal consent for a Video/Photos Visit from the patient? Yes    In-person dermatology visit recommendation: no    Consent has been obtained for this service by 1 care team member: yes.       Referring Physician: Montse Hernandez   CC:   Chief Complaint   Patient presents with     teledermatology     Phone visits with photos, hives       HPI:   We had the pleasure of calling Elisha's mother from our Pediatric Dermatology clinic today, for evaluation of hives that patient has been getting for just over a month now every time she goes outside into cooler weather. Mom says that this started around September when Elisha would go outside when temps were in the 50s F. Mom says that the hives show up all over patient's body, even areas that are no exposed to the elements. They are extremely itchy and will last for 15-20 minutes after patient comes back inside.   She has not tried any treatments for this yet. Patient has never had difficulty breathing with any of these episodes. This doesn't happen when patient drinks cold beverages. No family history of similar skin concerns. Patient has a history of atopic dermatitis that is relatively well-controlled, although mom admits they could be better with her treatment routine.   There are no further skin concerns at this time.      Past Medical/Surgical History:   No past medical history on file.  - atopic dermatitis    Family History:   No family history on file.  - no history of hives    Social History:   - reviewed    Medications:   Current Outpatient Medications   Medication Sig Dispense Refill     FLUoxetine (PROZAC) 20 MG capsule TAKE 1 CAPSULE BY MOUTH EVERY DAY       guanFACINE HCl (INTUNIV) 3 MG TB24 24 hr tablet Take 3 mg by mouth daily       VYVANSE 20  MG capsule        cyproheptadine (PERIACTIN) 4 MG tablet Take 4 mg by mouth 2 times daily       fluocinolone (SYNALAR) 0.01 % solution Apply topically 2 times daily Apply to affected areas of the scalp at bedtime as needed (Patient not taking: Reported on 10/26/2020) 60 mL 3     mometasone (ELOCON) 0.1 % ointment Use daily on round thicker spots (Patient not taking: Reported on 4/2/2019) 45 g 3     tacrolimus (PROTOPIC) 0.03 % ointment Use twice a day as needed for affected areas on the face (Patient not taking: Reported on 4/2/2019) 60 g 3     triamcinolone (KENALOG) 0.1 % external cream Apply topically 2 times daily (Patient not taking: Reported on 10/26/2020) 453.6 g 3     triamcinolone (KENALOG) 0.1 % ointment Use as needed for flares (Patient not taking: Reported on 4/2/2019) 80 g 3      Allergies: No Known Allergies   ROS: a 10 point review of systems including constitutional, HEENT, CV, GI, musculoskeletal, Neurologic, Endocrine, Respiratory, Hematologic and Allergic/Immunologic was performed and was negative except for the following: unremarkable  Physical examination: There were no vitals taken for this visit.     Photos submitted on 10/22 of left cheek, lateral trunk, abdomen, right hand reveal:  - on the left cheek there are numerous skin-colored dermal papules  - on the left trunk, abdomen, left arm/hand there are numerous skin-colored dermal papules and wheals                  Asked mom to place ice-cube in plastic bag and apply to skin x 5 minutes. After 2 minutes, reaction below was elicited.   - large skin-colored wheal on forearm with smaller satellite wheals surrounding it    Assessment:  1. 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 Discussed with patient potential for possible life-threatening reactions such as anaphylaxis and also dangerous activities that would include swimming, cold foods/beverages, surgery, IV  fluids and infections. Would recommend patient have an epinephrine pen available should she develop symptoms of anaphylaxis. For prevention of cold urticaria, would recommend starting higher dose of antihistamine. Given patient's age and size would recommend starting cetirizine 10 mg PO every day .  Plan:  1. Auvi-Q prescription sent to pharmacy (one injector for school and one for home), letter sent for school  2. Start cetirizine 10 mg by mouth daily.    Follow-up in 4 weeks.    Thank you for allowing us to participate in Elisha's care.    Patient seen and discussed with Dr. August.    Ancelmo Bejarano MD, PhD  Med-Derm PGY-5    I have personally reviewed photos of this patient and was present for the resident's conversation with this patient.  I agree with the resident's documentation and plan of care.  I have reviewed and amended the note above.  The documentation accurately reflects my clinical observations, diagnoses, treatment and follow-up plans.     Deepthi August MD  , Pediatric Dermatology        Teledermatology information:  - Location of patient: Home  - Location of teledermatologist:  [facility] (Dr. Oliver, [place], [place])  - Reason teledermatology is appropriate:  of National Emergency Regarding Coronavirus disease (COVID 19) Outbreak  - Method of transmission:  Store and Forward ((National Emergency Concerning the CORONAVIRUS (COVID 19), preferred for return patients.   - Date of images: 10/26/20  - Service start time:10:45 am  - Service end time:10:57 am  - Date of report: 10/26/20     Yes

## 2020-11-07 ENCOUNTER — EMERGENCY (EMERGENCY)
Facility: HOSPITAL | Age: 40
LOS: 1 days | Discharge: HOME | End: 2020-11-07
Attending: EMERGENCY MEDICINE | Admitting: EMERGENCY MEDICINE
Payer: MEDICAID

## 2020-11-07 VITALS
TEMPERATURE: 99 F | RESPIRATION RATE: 18 BRPM | OXYGEN SATURATION: 100 % | DIASTOLIC BLOOD PRESSURE: 72 MMHG | HEART RATE: 69 BPM | SYSTOLIC BLOOD PRESSURE: 137 MMHG

## 2020-11-07 VITALS
TEMPERATURE: 98 F | RESPIRATION RATE: 18 BRPM | OXYGEN SATURATION: 98 % | DIASTOLIC BLOOD PRESSURE: 85 MMHG | HEIGHT: 66 IN | SYSTOLIC BLOOD PRESSURE: 126 MMHG | HEART RATE: 74 BPM

## 2020-11-07 LAB
APPEARANCE UR: CLEAR — SIGNIFICANT CHANGE UP
BACTERIA # UR AUTO: NEGATIVE — SIGNIFICANT CHANGE UP
BILIRUB UR-MCNC: SIGNIFICANT CHANGE UP
BLOOD UR QL VISUAL: NEGATIVE — SIGNIFICANT CHANGE UP
COLOR SPEC: YELLOW — SIGNIFICANT CHANGE UP
GLUCOSE UR-MCNC: NEGATIVE — SIGNIFICANT CHANGE UP
HYALINE CASTS # UR AUTO: HIGH
KETONES UR-MCNC: HIGH
LEUKOCYTE ESTERASE UR-ACNC: NEGATIVE — SIGNIFICANT CHANGE UP
MUCOUS THREADS # UR AUTO: SIGNIFICANT CHANGE UP
NITRITE UR-MCNC: NEGATIVE — SIGNIFICANT CHANGE UP
PH UR: 6.5 — SIGNIFICANT CHANGE UP (ref 5–8)
PROT UR-MCNC: 100 — HIGH
RBC CASTS # UR COMP ASSIST: SIGNIFICANT CHANGE UP (ref 0–?)
SP GR SPEC: 1.03 — SIGNIFICANT CHANGE UP (ref 1–1.04)
SQUAMOUS # UR AUTO: SIGNIFICANT CHANGE UP
UROBILINOGEN FLD QL: HIGH
WBC UR QL: SIGNIFICANT CHANGE UP (ref 0–?)

## 2020-11-07 PROCEDURE — 99283 EMERGENCY DEPT VISIT LOW MDM: CPT

## 2020-11-07 RX ORDER — ONDANSETRON 8 MG/1
4 TABLET, FILM COATED ORAL ONCE
Refills: 0 | Status: DISCONTINUED | OUTPATIENT
Start: 2020-11-07 | End: 2020-11-07

## 2020-11-07 RX ORDER — ONDANSETRON 8 MG/1
4 TABLET, FILM COATED ORAL ONCE
Refills: 0 | Status: COMPLETED | OUTPATIENT
Start: 2020-11-07 | End: 2020-11-07

## 2020-11-07 RX ORDER — SODIUM CHLORIDE 9 MG/ML
1000 INJECTION INTRAMUSCULAR; INTRAVENOUS; SUBCUTANEOUS ONCE
Refills: 0 | Status: DISCONTINUED | OUTPATIENT
Start: 2020-11-07 | End: 2020-11-07

## 2020-11-07 RX ORDER — ACETAMINOPHEN 500 MG
650 TABLET ORAL ONCE
Refills: 0 | Status: COMPLETED | OUTPATIENT
Start: 2020-11-07 | End: 2020-11-07

## 2020-11-07 RX ADMIN — Medication 650 MILLIGRAM(S): at 06:39

## 2020-11-07 RX ADMIN — ONDANSETRON 4 MILLIGRAM(S): 8 TABLET, FILM COATED ORAL at 06:47

## 2020-11-07 RX ADMIN — Medication 30 MILLILITER(S): at 06:39

## 2020-11-07 NOTE — ED ADULT NURSE REASSESSMENT NOTE - NS ED NURSE REASSESS COMMENT FT1
Pt. is alert and oriented x 3, no c/o abdominal pain, no nausea or vomiting. Patient ate breakfast tolerated well. Waiting for taxi service to return to [facility].  Margie arranged transportation.

## 2020-11-07 NOTE — ED PROVIDER NOTE - ATTENDING CONTRIBUTION TO CARE
I performed a history and physical exam of the patient and discussed their management with the resident. I reviewed the resident's note and agree with the documented findings and plan of care. I have edited as appropriate. My medical decision making and observations are found above.   pt p/w multiple complaints, well appearing. ambulatory around ed with no active abd pain. states had over 40 episodes of vomiting but not clinically appropriate with normal skin tugor, wet mucous membranes, tolerating PO with no complaints. requesting Pt is well appearing walking with normal gait, stable for discharge and follow up with medical doctor. Pt educated on care and need for follow up. Questions answered.  pt states does not want CT, does not want more IV attempts, pt tolerating PO in ED, with hd stable.

## 2020-11-07 NOTE — ED PROVIDER NOTE - PATIENT PORTAL LINK FT
You can access the FollowMyHealth Patient Portal offered by [facility] by registering at the following website: [email or web address]. By joining [employer]’s FollowMyHealth portal, you will also be able to view your health information using other applications (apps) compatible with our system.

## 2020-11-07 NOTE — ED ADULT NURSE NOTE - OBJECTIVE STATEMENT
Pt received in room 10. Pt A&Ox3, states she os coming from [facility] for abdominal pain that started a few days ago. Pt states the pain is across her entire lower abdomen and it is a 10/10. Pt states she has had multiple episodes of vomiting for the last Pt received in room 10. Pt A&Ox3, states she os coming from [facility] for abdominal pain that started a few days ago. Pt states the pain is across her entire lower abdomen and it is a 10/10. Pt states she has had multiple episodes of vomiting today. Pt abd is soft, non-distended, tenderness upon palpation to lower abd, bowel sounds in all quadrants. Pt denies any chest pain, SOB, N/V/D, HA, Dizziness. Pt's respirations even and unlabored, lungs sounds clear bilaterally. Will continue to monitor

## 2020-11-07 NOTE — ED PROVIDER NOTE - NSFOLLOWUPINSTRUCTIONS_ED_ALL_ED_FT
Please take over the counter pain medications such as motrin (600mg every 6 hours) and tylenol (650mg every 4 hours) for pain and follow up with your primary care doctor within 2-3 days    You were seen today in the emergency room for abdominal pain. Although the testing done today indicates that your pain is not from an acute emergency, your pain could still represent a more serious problem. Most commonly, the pain you are having is likely not something serious and will resolve in a few days, however testing was done today based on the symptoms that you currently have; so if you develop new or worsening symptoms this could be a sign of a problem that was not tested for and means you should come back to the emergency room or see your doctor urgently. You need to follow up with your doctor in the next 48-72 hours. If you develop any new or worsening symptoms you need to return immediately to the emergency department. If you experience any of the following please come right back to the emergency room: severe nausea and vomiting with inability to tolerate eating, severe worsening of your pain, a new fever, new bleeding in stool or vomit, confusion, new numbness or weakness, passing out.

## 2020-11-07 NOTE — ED PROVIDER NOTE - OBJECTIVE STATEMENT
40F PMHx of Asthma, Bipolar, Borderline Personality, and GERD presenting from [facility] for abdominal pain. Diffuse but worse across lower abdomen, 10/10, beginning 2 days ago, intermittent, no patterns she noticed to pain, associated with multiple episodes of yellow vomiting. No constipation, diarrhea, urinary symptoms. No vaginal bleeding or discharge. No fevers/chills, chest pain, SOB

## 2020-11-07 NOTE — ED PROVIDER NOTE - CLINICAL SUMMARY MEDICAL DECISION MAKING FREE TEXT BOX
The patient had repeat abdominal examinations.  They did not show peritoneal signs.  There were no signs of ischemic bowel, AAA, or perforations. No evidence of Appendicitis, Diverticulitis, Bowel Obstruction, MI, Torsion, acute biliary tract disease or any other acute abdominal condition.

## 2020-11-07 NOTE — ED PROVIDER NOTE - PROGRESS NOTE DETAILS
Richard LUJAN (PGY-1)   pt tolerating PO. ambulating w/o difficulty. resting comfortably. no new complaints. will d/c to home w/ return precautions Richard LUJAN (PGY-1)   spoke with Alie hale who said to arrange cab for pt back to her residence. spoke with registration who will attempt to assist with this

## 2020-11-07 NOTE — ED PROVIDER NOTE - PHYSICAL EXAMINATION
Physical Exam:  Gen: NAD, non-toxic appearing, awake alert   HEENT: EOMI, PEERL, normal conjunctiva, oral mucosa dry  Lung: CTAB, no respiratory distress, no wheezes/rhonchi/rales B/L, speaking in full sentences  CV: RRR, no murmurs, rubs or gallops  Abd: soft, tenderness across lower abdomen, ND, no guarding, no rigidity, no rebound tenderness, no CVA tenderness   MSK: no visible deformities, ROM normal in UE/LE, no spinal tenderness   Neuro: No focal sensory or motor deficits  Skin: Warm, well perfused, no rash, no leg swelling  Psych: normal affect, calm  ~Marco Ramos MD (PGY-1) Physical Exam:  Gen: NAD, non-toxic appearing, awake alert   HEENT: EOMI, PEERL, normal conjunctiva, oral mucosa moist  Lung: CTAB, no respiratory distress, no wheezes/rhonchi/rales B/L, speaking in full sentences  CV: RRR, no murmurs, rubs or gallops  Abd: soft, tenderness across lower abdomen, ND, no guarding, no rigidity, no rebound tenderness, no CVA tenderness   MSK: no visible deformities, ROM normal in UE/LE, no spinal tenderness   Neuro: No focal sensory or motor deficits  Skin: Warm, well perfused, no rash, no leg swelling  Psych: normal affect, calm  ~Marco Ramos MD (PGY-1)

## 2020-11-07 NOTE — PROVIDER CONTACT NOTE (OTHER) - ASSESSMENT
MI contacted by Provider Demario, Pt is from a [facility], MI reviewed chart, started verbal Huddle.  SW contacted [facility]. [phone] spoke with staff, referred to On-Call FARIDA Noel [phone] who confirmed Pt is from [facility]. [id number] [place]. [address], Pt travels independently by Bus or TAXI. MI contacted [facility] [phone] spoke with staff Nichelle who provided invoice # [id number] for taxi transport with [employer] set up earlier.  MI contacted [employer] confirmed  20 minutes.  MI met with Pt and MD, Pt is refusing Taxi states she will take the bus does not want to wait. SW encouraged Pt to wait for Taxi Pt insists on taking the bus.  MI contacted [facility] FARIDA Noel again made her aware of Pt’s request, FARIDA informs Pt is allowed and able to take the bus independently. MI informed medical team and completed Huddle.  MI contacted [employer] cancelled taxi.  No further MI intervention required at this time.

## 2020-11-08 ENCOUNTER — EMERGENCY (EMERGENCY)
Facility: HOSPITAL | Age: 40
LOS: 1 days | Discharge: HOME | End: 2020-11-08
Attending: EMERGENCY MEDICINE | Admitting: EMERGENCY MEDICINE
Payer: MEDICAID

## 2020-11-08 VITALS
OXYGEN SATURATION: 96 % | DIASTOLIC BLOOD PRESSURE: 78 MMHG | HEIGHT: 66 IN | HEART RATE: 62 BPM | RESPIRATION RATE: 18 BRPM | SYSTOLIC BLOOD PRESSURE: 119 MMHG | TEMPERATURE: 98 F

## 2020-11-08 LAB
APPEARANCE UR: CLEAR — SIGNIFICANT CHANGE UP
BACTERIA # UR AUTO: SIGNIFICANT CHANGE UP
BILIRUB UR-MCNC: SIGNIFICANT CHANGE UP
BLOOD UR QL VISUAL: SIGNIFICANT CHANGE UP
COLOR SPEC: YELLOW — SIGNIFICANT CHANGE UP
CULTURE RESULTS: SIGNIFICANT CHANGE UP
GLUCOSE UR-MCNC: NEGATIVE — SIGNIFICANT CHANGE UP
HYALINE CASTS # UR AUTO: HIGH
KETONES UR-MCNC: HIGH
LEUKOCYTE ESTERASE UR-ACNC: NEGATIVE — SIGNIFICANT CHANGE UP
NITRITE UR-MCNC: NEGATIVE — SIGNIFICANT CHANGE UP
PH UR: 6.5 — SIGNIFICANT CHANGE UP (ref 5–8)
PROT UR-MCNC: 100 — HIGH
RBC CASTS # UR COMP ASSIST: HIGH (ref 0–?)
SP GR SPEC: 1.03 — SIGNIFICANT CHANGE UP (ref 1–1.04)
SPECIMEN SOURCE: SIGNIFICANT CHANGE UP
SQUAMOUS # UR AUTO: SIGNIFICANT CHANGE UP
UROBILINOGEN FLD QL: SIGNIFICANT CHANGE UP
WBC UR QL: HIGH (ref 0–?)

## 2020-11-08 PROCEDURE — 99284 EMERGENCY DEPT VISIT MOD MDM: CPT

## 2020-11-08 RX ORDER — FAMOTIDINE 10 MG/ML
20 INJECTION INTRAVENOUS ONCE
Refills: 0 | Status: COMPLETED | OUTPATIENT
Start: 2020-11-08 | End: 2020-11-08

## 2020-11-08 RX ORDER — SODIUM CHLORIDE 9 MG/ML
1000 INJECTION INTRAMUSCULAR; INTRAVENOUS; SUBCUTANEOUS ONCE
Refills: 0 | Status: DISCONTINUED | OUTPATIENT
Start: 2020-11-08 | End: 2020-11-08

## 2020-11-08 RX ORDER — FAMOTIDINE 10 MG/ML
20 INJECTION INTRAVENOUS ONCE
Refills: 0 | Status: DISCONTINUED | OUTPATIENT
Start: 2020-11-08 | End: 2020-11-08

## 2020-11-08 RX ORDER — KETOROLAC TROMETHAMINE 30 MG/ML
15 SYRINGE (ML) INJECTION ONCE
Refills: 0 | Status: DISCONTINUED | OUTPATIENT
Start: 2020-11-08 | End: 2020-11-08

## 2020-11-08 RX ORDER — ONDANSETRON 8 MG/1
4 TABLET, FILM COATED ORAL ONCE
Refills: 0 | Status: DISCONTINUED | OUTPATIENT
Start: 2020-11-08 | End: 2020-11-08

## 2020-11-08 RX ORDER — ACETAMINOPHEN 500 MG
975 TABLET ORAL ONCE
Refills: 0 | Status: COMPLETED | OUTPATIENT
Start: 2020-11-08 | End: 2020-11-08

## 2020-11-08 RX ADMIN — FAMOTIDINE 20 MILLIGRAM(S): 10 INJECTION INTRAVENOUS at 06:19

## 2020-11-08 RX ADMIN — Medication 30 MILLILITER(S): at 06:19

## 2020-11-08 RX ADMIN — Medication 975 MILLIGRAM(S): at 06:19

## 2020-11-08 NOTE — ED PROVIDER NOTE - PHYSICAL EXAMINATION
CONSTITUTIONAL: non-toxic, well appearing  SKIN: no rash, no petechiae.  EYES: pink conjunctiva, anicteric  ENT: tongue and uvular midline, no exudates, moist mucous membranes  NECK: Supple; no meningismus, no JVD  CARD: RRR, no murmurs, equal radial pulses bilaterally 2+  RESP: CTAB, no respiratory distress  ABD: Soft, abdomen diffusely tender but distractable, non-distended, no peritoneal signs  EXT: Normal ROM x4. No edema.   NEURO: Alert, oriented.   PSYCH: Cooperative, appropriate.

## 2020-11-08 NOTE — ED PROVIDER NOTE - ATTENDING CONTRIBUTION TO CARE
Dr. Wisdom:  I have personally performed a face to face bedside history and physical examination of this patient. I have discussed the history, examination, review of systems, assessment and plan of management with the resident. I have reviewed the electronic medical record and amended it to reflect my history, review of systems, physical exam, assessment and plan.    40F h/o bipolar disorder, cholelithiasis, presents from [facility] c/o abdominal pain.  Started a few days ago, was seen in ED for same complaint yesterday and discharged after normal UA.  States she was vomiting yesterday but not today.  ROS otherwise negative.    Exam:  - nad  - rrr  - ctab   -abd soft, diffusely tender but distractable    A/P  - abd pain, eval pathology  - ordered cbc, cmp, lipase, hcg, ua, CT abd/pelvis  - patient initially amenable to work up but subsequently refusing blood draws/CT, states that she understands she may have pathology that may worsen and lead to further morbidity & mortality

## 2020-11-08 NOTE — ED PROVIDER NOTE - NSFOLLOWUPINSTRUCTIONS_ED_ALL_ED_FT
You were seen today for abdominal pain. You declined further evaluation with labs or imaging and are leaving against medical advice.    Follow up with your primary care physician as soon as possible.     Return immediately if you wish to continue your evaluation.     SEEK IMMEDIATE MEDICAL CARE IF YOU HAVE ANY OF THE FOLLOWING SYMPTOMS: worsening abdominal pain, uncontrollable vomiting, profuse diarrhea, inability to have bowel movements or pass gas, black or bloody stools, fever accompanying chest pain or back pain, or fainting. These symptoms may represent a serious problem that is an emergency. Do not wait to see if the symptoms will go away. Get medical help right away. Call 911 and do not drive yourself to the hospital.

## 2020-11-08 NOTE — ED ADULT NURSE NOTE - PMH
Romeo Cruz is requesting a refill of lisdexamfetamine (VYVANSE) 60 MG capsule for a 30 day supply and would like to be called when ready for .      Asthma    Bipolar Disorder    Borderline Personality Disorder    Cholelithiasis    Depression    Fibroids    GERD (Gastroesophageal Reflux Disease)    Obesity

## 2020-11-08 NOTE — ED ADULT TRIAGE NOTE - CHIEF COMPLAINT QUOTE
Pt arrives from [facility]. Pt st" I was here yesterday for abd pain....I still have abd pain did not get any better, I hope they admitt me....I feel dizzy and lethargic the pain is all over abd." Denies vomiting or diarreah today. Pt st" Yesterday I vomited. "

## 2020-11-08 NOTE — ED PROVIDER NOTE - OBJECTIVE STATEMENT
40 year old female with PMH bipolar disorder, depression, GERD presents with abdominal pain x 3 days. Pt reports "abdominal pain all over" over the past 3 days associated with nausea and "40 episodes of vomiting" yesterday. Pt was seen yesterday in [facility] ED, declined bloodwork/IV/CT at the time, tolerated PO intake and discharged. Pt returning today with persistent pain. Denies fevers, diarrhea, bloody stools, black tarry stools, dysuria, hematuria, vaginal bleeding, or vaginal discharge. Tolerating PO intake. Denies any vomiting today, but reports nausea. Denies any additional complaints.

## 2020-11-08 NOTE — ED PROVIDER NOTE - PATIENT PORTAL LINK FT
You can access the FollowMyHealth Patient Portal offered by [facility] by registering at the following website: [email or web address]. By joining [employer]’s FollowMyHealth portal, you will also be able to view your health information using other applications (apps) compatible with our system.

## 2020-11-08 NOTE — ED PROVIDER NOTE - CLINICAL SUMMARY MEDICAL DECISION MAKING FREE TEXT BOX
Beth-PGY2: 40 year old female with PMH bipolar disorder, depression, GERD presents with abdominal pain x 3 days. Unclear etiology, pt reports vomiting yesterday, no vomiting today. Mucous membranes moist, pt afebrile, nontoxic appearing, in NAD. Attempted to obtain labs and CT A/P r/o intraabdominal pathology, pt refusing blood draws or CT at this time. Discussed possibility of acute intraabdominal pathology causing vomiting and abdominal pain including possibility of delayed diagnosis, critical illness, and death. Offered anxiolysis for blood draw, pt declined. The pt is clinically sober, AA&Ox3, free from distracting injury.  Pt has demonstrated concrete thinking/reasoning, has maintained an orderly/reasonable conversation, appears to have intact insight/judgment/reason and therefore in our opinion has capacity to make decisions. Given the patient's presentation, we communicated our concern for acute abdominal pathology in laymans terms. The pt verbalized an understanding of our worries. We’ve told the patient that the ED evaluation is incomplete & many troublesome conditions haven’t been r/o. We have discussed the need for further ED w/u so we can get more information about her abdominal pain and vomiting.  We have discussed the range of possible dx, potential testing & tx options.  We’ve made numerous efforts to prevent the pt from leaving AMA. Our discussions included the potential outcomes of leaving AMA, including worsening of their condition, becoming permanently disabled/in pain/critically ill, or death.  Despite these efforts, we were unable to convince the pt to stay. The pt is refusing any  further care and is leaving against medical advice. We have attempted to offer tx/rx/guidance for any dangerous conditions which are most likely and/or dangerous.  We have answered all questions and have implored the pt to return ASAP to complete the work up.

## 2020-11-08 NOTE — ED ADULT NURSE NOTE - OBJECTIVE STATEMENT
Pt received to room 18 a&ox4, ambulatory c/o abdominal pain. Pt comes from [facility]. Pt was here yesterday for same complaint. Pt states that she has abdominal pain "all over." Pt states "I threw up 40 times yesterday." Pt refusing lab work and vitals at this time. Pt in no acute distress at this time. Respirations even and unlabored. Skin warm and dry. Comfort measures provided. Awaiting further orders. Will continue to monitor.

## 2020-11-08 NOTE — ED ADULT NURSE REASSESSMENT NOTE - NS ED NURSE REASSESS COMMENT FT1
Pt to leave AMA. Pt still refusing vitals and blood work at this time. Pt to leave AMA. Pt still refusing vitals and blood work at this time. SW contacted [facility], and pt to return back via taxi.

## 2020-11-08 NOTE — PROVIDER CONTACT NOTE (OTHER) - ASSESSMENT
Per provider, Dr. Campos, pt is cleared and able to return to her previous residence [place] [facility] [address] [place], [place]. SW has spoken to Bari  at [facility] ([phone]) who confirmed that pt's mode of transportation is [employer] and pt travels independently. Clinical provider is in agreement with [employer] back to group home. Verbal huddle completed.    Transportation coordinated via [employer] ([phone]), estimated  by 7AM. MAS Inv# [id number].

## 2020-11-08 NOTE — ED ADULT NURSE NOTE - CHIEF COMPLAINT QUOTE
Pt arrives from [facility]. Pt st" I was here yesterday for abd pain....I still have abd pain did not get any better, I hope they admitt me....I feel dizzy and lethargic the pain is all over abd." Denies vomiting or diarreah today. Pt st" Yesterday I vomited. "

## 2020-11-12 ENCOUNTER — EMERGENCY (EMERGENCY)
Facility: HOSPITAL | Age: 40
LOS: 1 days | Discharge: HOME | End: 2020-11-12
Attending: EMERGENCY MEDICINE | Admitting: EMERGENCY MEDICINE
Payer: MEDICAID

## 2020-11-12 VITALS
RESPIRATION RATE: 15 BRPM | HEART RATE: 89 BPM | OXYGEN SATURATION: 98 % | HEIGHT: 66 IN | DIASTOLIC BLOOD PRESSURE: 83 MMHG | SYSTOLIC BLOOD PRESSURE: 140 MMHG | TEMPERATURE: 98 F

## 2020-11-12 PROCEDURE — 99283 EMERGENCY DEPT VISIT LOW MDM: CPT
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
## 2020-11-12 NOTE — ED PROVIDER NOTE - PROGRESS NOTE DETAILS
King Garcia, PGY 3: patient wanted to left and confirmed with SW and patient is able to travel independently.

## 2020-11-12 NOTE — PROVIDER CONTACT NOTE (OTHER) - ASSESSMENT
SW contacted patient's residence, [facility] ([phone]) and was instructed to call on call CM, [phone], as CM's are not yet in for the day. MI contacted on call CM number and spoke with Sadie to inform that patient is cleared for discharge. Per Sadie, patient can travel INDEPENDENTLY via PUBLIC TRANSPORTATION. MI informed attending Dr. James that patient is able to travel independently via bus, verbal huddle complete. MI attemtped to meet with patient at bedside to provide metrocard, SW informed by Dr. James and RN that patient left.
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
## 2020-11-12 NOTE — PROVIDER CONTACT NOTE (OTHER) - BACKGROUND
SW informed by RN that patient is cleared for discharge and requesting metrocard. Patient is from [facility] 74, verbal huddle initiated.
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
## 2020-11-12 NOTE — ED PROVIDER NOTE - NS ED ROS FT
CONSTITUTIONAL: No fever  CARDIOVASCULAR: No chest pain  RESPIRATORY: No SOB, no cough  GASTROINTESTINAL: No abdominal pain, no vomiting, no diarrhea  NEUROLOGIC: No headache

## 2020-11-24 NOTE — ED BEHAVIORAL HEALTH ASSESSMENT NOTE - PAST PSYCHOTROPIC MEDICATION
[Palliative] : Goals of care discussed with patient: Palliative [Palliative Care Plan] : not applicable at this time Haldol, risperdal, Abilify, Geodon, thorazine, depakote

## 2020-12-03 NOTE — ED BEHAVIORAL HEALTH ASSESSMENT NOTE - AFFECT QUALITY
Euthymic Affect appropriate/Interactive/Normal unassisted gait/Deep tendon reflexes intact and symmetric/Motor strength normal in all extremities

## 2020-12-05 ENCOUNTER — INPATIENT (INPATIENT)
Facility: HOSPITAL | Age: 40
LOS: 30 days | Discharge: HOME | End: 2021-01-05
Attending: PSYCHIATRY & NEUROLOGY | Admitting: PSYCHIATRY & NEUROLOGY
Payer: MEDICAID

## 2020-12-05 VITALS
RESPIRATION RATE: 16 BRPM | HEIGHT: 66 IN | HEART RATE: 110 BPM | OXYGEN SATURATION: 100 % | DIASTOLIC BLOOD PRESSURE: 72 MMHG | TEMPERATURE: 98 F | SYSTOLIC BLOOD PRESSURE: 115 MMHG

## 2020-12-05 DIAGNOSIS — F31.9 BIPOLAR DISORDER, UNSPECIFIED: ICD-10-CM

## 2020-12-05 LAB
ALBUMIN SERPL ELPH-MCNC: 3.5 G/DL — SIGNIFICANT CHANGE UP (ref 3.3–5)
ALP SERPL-CCNC: 83 U/L — SIGNIFICANT CHANGE UP (ref 40–120)
ALT FLD-CCNC: 13 U/L — SIGNIFICANT CHANGE UP (ref 4–33)
ANION GAP SERPL CALC-SCNC: 14 MMOL/L — SIGNIFICANT CHANGE UP (ref 7–14)
AST SERPL-CCNC: 16 U/L — SIGNIFICANT CHANGE UP (ref 4–32)
BASOPHILS # BLD AUTO: 0.01 K/UL — SIGNIFICANT CHANGE UP (ref 0–0.2)
BASOPHILS NFR BLD AUTO: 0.2 % — SIGNIFICANT CHANGE UP (ref 0–2)
BILIRUB SERPL-MCNC: <0.2 MG/DL — SIGNIFICANT CHANGE UP (ref 0.2–1.2)
BUN SERPL-MCNC: 12 MG/DL — SIGNIFICANT CHANGE UP (ref 7–23)
CALCIUM SERPL-MCNC: 9 MG/DL — SIGNIFICANT CHANGE UP (ref 8.4–10.5)
CHLORIDE SERPL-SCNC: 110 MMOL/L — HIGH (ref 98–107)
CO2 SERPL-SCNC: 17 MMOL/L — LOW (ref 22–31)
CREAT SERPL-MCNC: 0.84 MG/DL — SIGNIFICANT CHANGE UP (ref 0.5–1.3)
EOSINOPHIL # BLD AUTO: 0.42 K/UL — SIGNIFICANT CHANGE UP (ref 0–0.5)
EOSINOPHIL NFR BLD AUTO: 7.5 % — HIGH (ref 0–6)
GLUCOSE SERPL-MCNC: 92 MG/DL — SIGNIFICANT CHANGE UP (ref 70–99)
HCT VFR BLD CALC: 36.5 % — SIGNIFICANT CHANGE UP (ref 34.5–45)
HGB BLD-MCNC: 11 G/DL — LOW (ref 11.5–15.5)
IANC: 2.57 K/UL — SIGNIFICANT CHANGE UP (ref 1.5–8.5)
IMM GRANULOCYTES NFR BLD AUTO: 0.2 % — SIGNIFICANT CHANGE UP (ref 0–1.5)
LITHIUM SERPL-MCNC: <0.1 MMOL/L — LOW (ref 0.6–1.2)
LYMPHOCYTES # BLD AUTO: 1.97 K/UL — SIGNIFICANT CHANGE UP (ref 1–3.3)
LYMPHOCYTES # BLD AUTO: 35 % — SIGNIFICANT CHANGE UP (ref 13–44)
MCHC RBC-ENTMCNC: 27.4 PG — SIGNIFICANT CHANGE UP (ref 27–34)
MCHC RBC-ENTMCNC: 30.1 GM/DL — LOW (ref 32–36)
MCV RBC AUTO: 90.8 FL — SIGNIFICANT CHANGE UP (ref 80–100)
MONOCYTES # BLD AUTO: 0.65 K/UL — SIGNIFICANT CHANGE UP (ref 0–0.9)
MONOCYTES NFR BLD AUTO: 11.5 % — SIGNIFICANT CHANGE UP (ref 2–14)
NEUTROPHILS # BLD AUTO: 2.57 K/UL — SIGNIFICANT CHANGE UP (ref 1.8–7.4)
NEUTROPHILS NFR BLD AUTO: 45.6 % — SIGNIFICANT CHANGE UP (ref 43–77)
NRBC # BLD: 0 /100 WBCS — SIGNIFICANT CHANGE UP
NRBC # FLD: 0 K/UL — SIGNIFICANT CHANGE UP
PLATELET # BLD AUTO: 267 K/UL — SIGNIFICANT CHANGE UP (ref 150–400)
POTASSIUM SERPL-MCNC: 3.7 MMOL/L — SIGNIFICANT CHANGE UP (ref 3.5–5.3)
POTASSIUM SERPL-SCNC: 3.7 MMOL/L — SIGNIFICANT CHANGE UP (ref 3.5–5.3)
PROT SERPL-MCNC: 6.6 G/DL — SIGNIFICANT CHANGE UP (ref 6–8.3)
RBC # BLD: 4.02 M/UL — SIGNIFICANT CHANGE UP (ref 3.8–5.2)
RBC # FLD: 16 % — HIGH (ref 10.3–14.5)
SODIUM SERPL-SCNC: 141 MMOL/L — SIGNIFICANT CHANGE UP (ref 135–145)
TOXICOLOGY SCREEN, DRUGS OF ABUSE, SERUM RESULT: SIGNIFICANT CHANGE UP
TSH SERPL-MCNC: 2.54 UIU/ML — SIGNIFICANT CHANGE UP (ref 0.27–4.2)
WBC # BLD: 5.63 K/UL — SIGNIFICANT CHANGE UP (ref 3.8–10.5)
WBC # FLD AUTO: 5.63 K/UL — SIGNIFICANT CHANGE UP (ref 3.8–10.5)

## 2020-12-05 PROCEDURE — 99285 EMERGENCY DEPT VISIT HI MDM: CPT | Mod: GC

## 2020-12-05 NOTE — ED BEHAVIORAL HEALTH NOTE - BEHAVIORAL HEALTH NOTE
COVID Exposure Screen- Patient     1.        *Have you had a COVID-19 test in the last 21 days?  (  ) Yes   (X) No   (  ) Unknown- Reason: ______  IF YES PROCEED TO QUESTION #2. IF NO OR UNKNOWN THEN PLEASE SKIP TO QUESTION #3.  2.        Date of test: ________  3.        3. Do you know the result? (  ) Negative   (  ) Positive   (  ) No result available  4.        *In the past 14 days, have you been around anyone with a positive COVID-19 test?*  (  ) Yes   (X) No   (  ) Unknown- Reason (e.g. patient uncertain, sedated, refusing to answer, etc.):  ______  IF YES PROCEED TO QUESTION #5. IF NO or UNKNOWN, PLEASE SKIP TO QUESTION #10  5.        Were you within 6 feet of them for at least 15 minutes? (  ) Yes   (  ) No   (  ) Unknown- Reason: _____  6.        Have you provided care for them? (  ) Yes   (  ) No   (  ) Unknown- Reason: ______  7.        Have you had direct physical contact with them (touched, hugged, or kissed them)? (  ) Yes   (  ) No    (  ) Unknown- Reason: ___  8.        Have you shared eating or drinking utensils with them? (  ) Yes   (  ) No    (  ) Unknown- Reason: ____  9.        Have they sneezed, coughed, or somehow got respiratory droplets on you? (  ) Yes   (  ) No    (  ) Unknown- Reason: ______  10.     *Have you been out of [place] within the past 14 days?*  (  ) Yes   (X) No   (  ) Unknown- Reason (e.g. patient uncertain, sedated, refusing to answer, etc.): _______  IF YES PLEASE ANSWER THE FOLLOWING QUESTIONS:  11.     Which state/country have you been to? ______  12.     Were you there over 24 hours? (  ) Yes   (  ) No    (  ) Unknown- Reason: ______  13.     Date of return to [facility]: ______

## 2020-12-05 NOTE — ED BEHAVIORAL HEALTH ASSESSMENT NOTE - SUICIDE RISK FACTORS
Mood Disorder current/past/History of abuse/trauma/Cluster B Personality disorders or traits current/past/Psychotic disorder current/past/Impulsivity

## 2020-12-05 NOTE — ED ADULT NURSE REASSESSMENT NOTE - NS ED NURSE REASSESS COMMENT FT1
Pt is currently sleeping, NAD. Lab and covid results pending for psychiatric admission. Will continue to monitor for safety.
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
## 2020-12-05 NOTE — ED BEHAVIORAL HEALTH ASSESSMENT NOTE - CASE SUMMARY
Pt is a 41 y/o single female, unemployed, domiciled on [facility] [address] [place] [address], w/ PMHx GERD, asthma, w/ PPHx bipolar 1 disorder, borderline personality disorder, hx cannabis use d/o, w/ hx multiple inpt psych hospitalizations (>20, most recently discharged from [facility] on 6/9/20), w/ frequent visits to [facility] (well-known to staff), w/ ACT team, w/ hx 3 prior SA's (last in 2012 via OD), w/ hx recurrent and chronic suicidal gestures and suicidal ideation; with a history of property destruction when upset, past legal issues, no access to weapons; BIBEMS from [facility] for violent behavior.   Patient is well known to writer and appears to have deteriorated significantly from her chronic baseline in the setting of medication non compliance. She endorses persecutory delusions asks writer "Do you understand the theory of eugenics?" " I want to help the Edd race...but maybe that is bad....I need my medicine...I am not doing well". Pt presents as manic, psychotic, and has been aggressive, is an imminent danger to self and others and requires admission at this point. She is, however, agreeable to re-start all of her medication in the ED, which we will do pending bed availability.
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
## 2020-12-05 NOTE — ED PROVIDER NOTE - CLINICAL SUMMARY MEDICAL DECISION MAKING FREE TEXT BOX
Labs, Urine Tox/UA, EKG , HCG   Medical evaluation performed. There is no clinical evidence of intoxication or any acute medical problem requiring immediate intervention. Patient is awaiting psychiatric consultation. Final disposition will be determined by psychiatrist. 39 y/o M hx  Bipolar  D/O, BPD, GERD, Asthma    Labs, Urine Tox/UA, EKG , HCG   Medical evaluation performed. There is no clinical evidence of intoxication or any acute medical problem requiring immediate intervention. Patient is awaiting psychiatric consultation. Final disposition will be determined by psychiatrist.

## 2020-12-05 NOTE — ED ADULT NURSE NOTE - NSIMPLEMENTINTERV_GEN_ALL_ED
Implemented All Universal Safety Interventions:  [place] to call system. Call bell, personal items and telephone within reach. Instruct patient to call for assistance. Room bathroom lighting operational. Non-slip footwear when patient is off stretcher. Physically safe environment: no spills, clutter or unnecessary equipment. Stretcher in lowest position, wheels locked, appropriate side rails in place.
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
## 2020-12-05 NOTE — ED BEHAVIORAL HEALTH ASSESSMENT NOTE - VIOLENCE RISK FACTORS:
Impulsivity/History of being victimized/traumatized/Lack of insight into violence risk/need for treatment/Noncompliance with treatment

## 2020-12-05 NOTE — ED BEHAVIORAL HEALTH ASSESSMENT NOTE - CURRENT MEDICATION
Patient currently off meds x3 months. Prior meds include: Klonopin 1 mg PO daily, Fluphenazine 5 mg PO BID, lithium 300 mg PO BID and 600 mg PO QHS, and Zyprexa 10 mg PO BID

## 2020-12-05 NOTE — ED BEHAVIORAL HEALTH NOTE - BEHAVIORAL HEALTH NOTE
No female beds in [facility]. Contacted SO-per Shakira , no female beds except for COVID positive. We are awaiting COVID. Plan-patient will board in ED until bed available, will restart medication in meantime , see  assessment note for details.
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
## 2020-12-05 NOTE — ED BEHAVIORAL HEALTH ASSESSMENT NOTE - DESCRIPTION
Pt irritable, though largely cooperative in ED.    Vital Signs Last 24 Hrs  T(C): 36.9 (05 Dec 2020 18:34), Max: 36.9 (05 Dec 2020 18:34)  T(F): 98.4 (05 Dec 2020 18:34), Max: 98.4 (05 Dec 2020 18:34)  HR: 110 (05 Dec 2020 18:34) (110 - 110)  BP: 115/72 (05 Dec 2020 18:34) (115/72 - 115/72)  BP(mean): --  RR: 16 (05 Dec 2020 18:34) (16 - 16)  SpO2: 100% (05 Dec 2020 18:34) (100% - 100%) GERD, Asthma, gallbladder calculus without cholecystitis currently lives in [facility], [address] [facility] [address] MIGUEL ANGEL reports no contact with family, currently unemployed.
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
## 2020-12-05 NOTE — ED ADULT NURSE REASSESSMENT NOTE - NS ED NURSE REASSESS COMMENT FT1
Received pt and report at change of shift. Pt awake, a&ox3, hyper and dancing in HW. No aggression observed. Covid and lab results pending for  and psychiatric admission. Will continue to monitor for safety. Received pt and report at change of shift. Pt awake, a&ox3, hyper, dancing, laughing in HW. No aggression observed. Covid and lab results pending for MC and psychiatric admission. Will continue to monitor for safety.

## 2020-12-05 NOTE — ED BEHAVIORAL HEALTH ASSESSMENT NOTE - PSYCHIATRIC ISSUES AND PLAN (INCLUDE STANDING AND PRN MEDICATION)
Restart prior meds w/ plan to uptitrate as needed: Klonopin 0.5mg daily, fluphenazine 5mg BID, Zyprexa 5mg qhs, lithium 600mg qhs
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
## 2020-12-05 NOTE — ED BEHAVIORAL HEALTH ASSESSMENT NOTE - RISK ASSESSMENT
Static risk factors include: hx of inpatient psychiatric admissions, remote hx of suicide attempts (last in 2012), hx of psychiatric illness, hx of aggression, hx of trauma, axis 2 pathology and hx of substance use. Low Acute Suicide Risk
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
## 2020-12-05 NOTE — ED BEHAVIORAL HEALTH ASSESSMENT NOTE - SUICIDE PROTECTIVE FACTORS
Engaged in work or school/Has future plans/Identifies reasons for living/Positive therapeutic relationships

## 2020-12-06 DIAGNOSIS — F31.9 BIPOLAR DISORDER, UNSPECIFIED: ICD-10-CM

## 2020-12-06 LAB
SARS-COV-2 IGG SERPL QL IA: NEGATIVE — SIGNIFICANT CHANGE UP
SARS-COV-2 IGM SERPL IA-ACNC: <0.1 INDEX — SIGNIFICANT CHANGE UP
SARS-COV-2 RNA SPEC QL NAA+PROBE: SIGNIFICANT CHANGE UP

## 2020-12-06 PROCEDURE — 90853 GROUP PSYCHOTHERAPY: CPT

## 2020-12-06 RX ORDER — LITHIUM CARBONATE 300 MG/1
600 TABLET, EXTENDED RELEASE ORAL AT BEDTIME
Refills: 0 | Status: DISCONTINUED | OUTPATIENT
Start: 2020-12-06 | End: 2020-12-14

## 2020-12-06 RX ORDER — HALOPERIDOL DECANOATE 100 MG/ML
5 INJECTION INTRAMUSCULAR EVERY 6 HOURS
Refills: 0 | Status: DISCONTINUED | OUTPATIENT
Start: 2020-12-06 | End: 2021-01-05

## 2020-12-06 RX ORDER — HALOPERIDOL DECANOATE 100 MG/ML
5 INJECTION INTRAMUSCULAR EVERY 6 HOURS
Refills: 0 | Status: DISCONTINUED | OUTPATIENT
Start: 2020-12-06 | End: 2020-12-07

## 2020-12-06 RX ORDER — DIPHENHYDRAMINE HCL 50 MG
25 CAPSULE ORAL ONCE
Refills: 0 | Status: DISCONTINUED | OUTPATIENT
Start: 2020-12-06 | End: 2020-12-07

## 2020-12-06 RX ORDER — DIPHENHYDRAMINE HCL 50 MG
50 CAPSULE ORAL EVERY 6 HOURS
Refills: 0 | Status: DISCONTINUED | OUTPATIENT
Start: 2020-12-06 | End: 2021-01-05

## 2020-12-06 RX ORDER — DIPHENHYDRAMINE HCL 50 MG
25 CAPSULE ORAL ONCE
Refills: 0 | Status: DISCONTINUED | OUTPATIENT
Start: 2020-12-06 | End: 2020-12-13

## 2020-12-06 RX ORDER — FLUPHENAZINE HYDROCHLORIDE 1 MG/1
5 TABLET, FILM COATED ORAL
Refills: 0 | Status: DISCONTINUED | OUTPATIENT
Start: 2020-12-06 | End: 2020-12-14

## 2020-12-06 RX ORDER — ALBUTEROL 90 UG/1
2 AEROSOL, METERED ORAL EVERY 6 HOURS
Refills: 0 | Status: DISCONTINUED | OUTPATIENT
Start: 2020-12-06 | End: 2021-01-05

## 2020-12-06 RX ORDER — CLONAZEPAM 1 MG
0.5 TABLET ORAL DAILY
Refills: 0 | Status: DISCONTINUED | OUTPATIENT
Start: 2020-12-06 | End: 2020-12-09

## 2020-12-06 RX ORDER — OLANZAPINE 15 MG/1
5 TABLET, FILM COATED ORAL AT BEDTIME
Refills: 0 | Status: DISCONTINUED | OUTPATIENT
Start: 2020-12-06 | End: 2020-12-14

## 2020-12-06 RX ADMIN — Medication 50 MILLIGRAM(S): at 22:50

## 2020-12-06 RX ADMIN — Medication 2 MILLIGRAM(S): at 16:45

## 2020-12-06 RX ADMIN — Medication 50 MILLIGRAM(S): at 16:45

## 2020-12-06 RX ADMIN — FLUPHENAZINE HYDROCHLORIDE 5 MILLIGRAM(S): 1 TABLET, FILM COATED ORAL at 20:46

## 2020-12-06 RX ADMIN — HALOPERIDOL DECANOATE 5 MILLIGRAM(S): 100 INJECTION INTRAMUSCULAR at 20:47

## 2020-12-06 RX ADMIN — LITHIUM CARBONATE 600 MILLIGRAM(S): 300 TABLET, EXTENDED RELEASE ORAL at 20:47

## 2020-12-06 RX ADMIN — Medication 2 MILLIGRAM(S): at 22:50

## 2020-12-06 RX ADMIN — HALOPERIDOL DECANOATE 5 MILLIGRAM(S): 100 INJECTION INTRAMUSCULAR at 16:45

## 2020-12-06 RX ADMIN — OLANZAPINE 5 MILLIGRAM(S): 15 TABLET, FILM COATED ORAL at 20:47
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
## 2020-12-06 NOTE — BH PATIENT PROFILE - NSBHBEHAVIOR_PSY_A_CORE
aggressive/agitated/angry/inappropriate for situation/uncooperative/combative/non-compliant with medications

## 2020-12-06 NOTE — ED BEHAVIORAL HEALTH NOTE - BEHAVIORAL HEALTH NOTE
This is a 39 y/o single female, unemployed, domiciled on [place] grounds , w/ PMHx GERD, asthma, w/ PPHx bipolar 1 disorder, borderline personality disorder, hx cannabis use d/o, w/ hx multiple inpt psych hospitalizations (>20, most recently discharged from [facility] on 6/9/20), w/ frequent visits to [facility] (well-known to staff), w/ ACT team, w/ hx 3 prior SA's (last in 2012 via OD), w/ hx recurrent and chronic suicidal gestures and suicidal ideation; with a history of property destruction when upset, past legal issues, no access to weapons; BIBEMS from [place] residence for violent behavior.     Pt. slept throughout the night without any events. Now awake, pt. was seen dancing on the hallway on and off. Pt. remains very disorganized - talking and  laughing to self. Pt. has been non compliant with meds for last 3 months.    ICU Vital Signs Last 24 Hrs  T(C): 36.7 (06 Dec 2020 06:14), Max: 36.9 (05 Dec 2020 18:34)  T(F): 98 (06 Dec 2020 06:14), Max: 98.4 (05 Dec 2020 18:34)  HR: 82 (06 Dec 2020 06:14) (63 - 110)  BP: 127/84 (06 Dec 2020 06:14) (115/72 - 127/84)  BP(mean): --  ABP: --  ABP(mean): --  RR: 16 (06 Dec 2020 06:14) (16 - 16)  SpO2: 100% (06 Dec 2020 06:14) (100% - 100%)    Pt. is waiting for inpt. admission- no female beds available at [facility] and [facility]  COVID negative This is a 41 y/o single female, unemployed, domiciled on [place] grounds , w/ PMHx GERD, asthma, w/ PPHx bipolar 1 disorder, borderline personality disorder, hx cannabis use d/o, w/ hx multiple inpt psych hospitalizations (>20, most recently discharged from [facility] on 6/9/20), w/ frequent visits to [facility] (well-known to staff), w/ ACT team, w/ hx 3 prior SA's (last in 2012 via OD), w/ hx recurrent and chronic suicidal gestures and suicidal ideation; with a history of property destruction when upset, past legal issues, no access to weapons; BIBEMS from [place] residence for violent behavior.     Pt. slept throughout the night without any events. Now awake, pt. was seen dancing on the hallway on and off. Pt. remains very disorganized - talking and  laughing to self. Pt. has been non compliant with meds for last 3 months. Discussed the plan with primary team, also d/sed  about ordering  am dose of prolixin 5mg po.    ICU Vital Signs Last 24 Hrs  T(C): 36.7 (06 Dec 2020 06:14), Max: 36.9 (05 Dec 2020 18:34)  T(F): 98 (06 Dec 2020 06:14), Max: 98.4 (05 Dec 2020 18:34)  HR: 82 (06 Dec 2020 06:14) (63 - 110)  BP: 127/84 (06 Dec 2020 06:14) (115/72 - 127/84)  BP(mean): --  ABP: --  ABP(mean): --  RR: 16 (06 Dec 2020 06:14) (16 - 16)  SpO2: 100% (06 Dec 2020 06:14) (100% - 100%)    Pt. is waiting for inpt. admission- no female beds available at [facility] and [facility]  COVID negative This is a 41 y/o single female, unemployed, domiciled on [place] grounds , w/ PMHx GERD, asthma, w/ PPHx bipolar 1 disorder, borderline personality disorder, hx cannabis use d/o, w/ hx multiple inpt psych hospitalizations (>20, most recently discharged from [facility] on 6/9/20), w/ frequent visits to [facility] (well-known to staff), w/ ACT team, w/ hx 3 prior SA's (last in 2012 via OD), w/ hx recurrent and chronic suicidal gestures and suicidal ideation; with a history of property destruction when upset, past legal issues, no access to weapons; BIBEMS from [place] residence for violent behavior.     Pt. slept throughout the night without any events. Now awake, pt. was seen dancing on the hallway on and off. Pt. remains very disorganized -restless, pacing back and forth,  talking to self and  laughing . Pt. has been non compliant with meds for last 3 months. Discussed the plan with primary team, also d/sed  about ordering  am dose of prolixin 5mg po.    ICU Vital Signs Last 24 Hrs  T(C): 36.7 (06 Dec 2020 06:14), Max: 36.9 (05 Dec 2020 18:34)  T(F): 98 (06 Dec 2020 06:14), Max: 98.4 (05 Dec 2020 18:34)  HR: 82 (06 Dec 2020 06:14) (63 - 110)  BP: 127/84 (06 Dec 2020 06:14) (115/72 - 127/84)  BP(mean): --  ABP: --  ABP(mean): --  RR: 16 (06 Dec 2020 06:14) (16 - 16)  SpO2: 100% (06 Dec 2020 06:14) (100% - 100%)    Pt. is waiting for inpt. admission- no female beds available at [facility] and [facility]  COVID negative

## 2020-12-06 NOTE — ED ADULT NURSE REASSESSMENT NOTE - NS ED NURSE REASSESS COMMENT FT1
Received pt from RN break coverage. Pt is sleeping in bed, NAD, even unlabored respirations observed. Awaiting psychiatric admission when bed becomes available. Will continue to monitor for safety.

## 2020-12-06 NOTE — ED BEHAVIORAL HEALTH NOTE - BEHAVIORAL HEALTH NOTE
AM SHIFT PSYCH ED ATTENDING:   discussed with  staff as Pt is well known here; high utilizer of mental health facility; frequent visitor.  chart review.  Pt was brought yesterday from her psych residence due to agitation.  Since her  ED arrival, the Pt has been calm and cooperative.  There has been no recurrence of agitation/aggressive behavior.  No verbalization of active/ passive SI/HI.   There are no signs/symptoms of acute trina or florid psychosis.   Pt is not showing any signs/symptoms of intoxication or withdrawal.  She is not delirious.  Pt is easily redirectable and has not tested limits.  Overnight and up to this time, the pt was able to maintain appropriate boundaries. Overall, there has been no management issues.      Vital Signs Last 24 Hrs  T(C): 36.8 (06 Dec 2020 10:46), Max: 36.9 (05 Dec 2020 18:34)  T(F): 98.2 (06 Dec 2020 10:46), Max: 98.4 (05 Dec 2020 18:34)  HR: 78 (06 Dec 2020 10:46) (63 - 110)  BP: 129/74 (06 Dec 2020 10:46) (115/72 - 129/74)  BP(mean): --  RR: 16 (06 Dec 2020 10:46) (16 - 16)  SpO2: 100% (06 Dec 2020 10:46) (100% - 100%)    LABS:                        11.0   5.63  )-----------( 267      ( 05 Dec 2020 19:47 )             36.5     05 Dec 2020 19:47    141    |  110    |  12     ----------------------------<  92     3.7     |  17     |  0.84     Ca    9.0        05 Dec 2020 19:47    TPro  6.6    /  Alb  3.5    /  TBili  <0.2   /  DBili  x      /  AST  16     /  ALT  13     /  AlkPhos  83     05 Dec 2020 19:47      COVID PCR: not detected       - discussed with Dr HODA Cary,  chief: final dispo; regarding transfer to appropriate psych unit, will be upon discussion with [facility] AND    - called and discussed this case for admission to [facility]; upon discussion with [facility] ADN, Pt was approved to be transferred to  Encounter Date: 3/19/2018       History     Chief Complaint   Patient presents with    Fatigue     + increased generalzied weakness x 1 week. Pt reports + intermittent unsteady gait w/ several falls within this week. Denies hitting head or LOC at time of fall. No numbnes/tinlging to extremities.      Time seen by provider: 6:31 PM    This is a 70 y.o. Female, with history of HTN and CKD, who presents with complaint of fatigue that has been constant for approximately six days. Patient reports that six days ago she began taking Inspra to treat her HTN and stopped taking Torcemide. She reports that since this medication change she has been feeling fatigue accompanied by weakness, decreased appetite, nausea, and vomiting. These symptoms are similar to an episode three months ago for which she was admitted to [facility] for eleven days. During this hospital admission, patient was treated and symptoms had resolved. She was discharged on 1/26/2018. She reports during this admission she was diagnosed with hyperaldosteronism. Patient denies diarrhea, constipation, shortness of breath, or chest pain. Patient also denies use of alcohol, tobacco, or illicit drugs. She reports that her primary care provider is Dr. Snyder and her nephrologist is Dr. Diallo.       The history is provided by the patient.     Review of patient's allergies indicates:   Allergen Reactions    Ace inhibitors Swelling     Lips Swelling    Vioxx [rofecoxib] Swelling      lips swelling    Bactrim [sulfamethoxazole-trimethoprim]      Pt would like this medication to be added due to elevation of creatinine with single kidney.    Procardia [nifedipine] Nausea Only and Edema     Feet swelling and nausea, now reports that this has happened in the past and required lasix    Arthrotec 50 [diclofenac-misoprostol]      Unknown    Bentyl [dicyclomine]      Not effective    Doxycycline Nausea Only    Imdur [isosorbide mononitrate] Nausea Only    Lomotil  [diphenoxylate-atropine] Nausea And Vomiting     Stomach cramps with vomitting    Lozol [indapamide] Rash    Norvasc [amlodipine]      Not tolerated    Tramadol Nausea Only     Past Medical History:   Diagnosis Date    Acute on chronic diastolic congestive heart failure 11/17/2017    Allergy     Anatomical narrow angle glaucoma     Anemia     States diagnosed about a month ago    Aortic atherosclerosis     Arthritis     Cataract     CHF (congestive heart failure)     Chronic kidney disease     Chronic sciatica of left side     Right lower back pain due to sciatica    Coronary artery disease     Depression     Dry eyes     GERD (gastroesophageal reflux disease)     History of colon cancer     Hyperaldosteronism     Hyperlipidemia     Hypertension     Hypothyroidism     Left ventricular diastolic dysfunction with preserved systolic function     Lupus (systemic lupus erythematosus) 8/17/2012    Malnutrition 5/16/2017    Osteoarthritis of cervical spine 8/17/2012    Osteopenia     PAD (peripheral artery disease)     FRAN (renal artery stenosis)      Past Surgical History:   Procedure Laterality Date    CARDIAC CATHETERIZATION  07/27/2011    x1    CHOLECYSTECTOMY  1999    COLON SURGERY  2000 & 2003    partial removal    COLONOSCOPY N/A 4/14/2016    Procedure: COLONOSCOPY;  Surgeon: Jose Steen MD;  Location: [facility] ([address] [place] [address]);  Service: Endoscopy;  Laterality: N/A;  prep 2 days prior light meals only/clear liquid day before  and 4 dulcolax tabs (5 mgs) at noon    COLONOSCOPY N/A 9/14/2017    Procedure: COLONOSCOPY;  Surgeon: Jose Steen MD;  Location: [facility] ([address] [place] [address]);  Service: Endoscopy;  Laterality: N/A;    EYE SURGERY      HAND SURGERY Bilateral 1996 & 1997    due to carpal tunnel     HERNIA REPAIR      HYSTERECTOMY  1983    NEPHRECTOMY  1985    donated to brother    OOPHORECTOMY      removed one    Peripheral Iridotomy both eyes  1994    laser angle correction     THYROIDECTOMY, PARTIAL  2006    to remove two nodules and one-half thyroid     Family History   Problem Relation Age of Onset    Heart attack Mother     Hypertension Mother     Heart disease Father     Hypertension Father     Diabetes Maternal Grandmother     Glaucoma Maternal Grandmother     Hypertension Sister     Kidney disease Brother     Kidney failure Brother      received donated kidney from sister    No Known Problems Daughter     Diabetes Son     Hypertension Brother     Diabetes Maternal Aunt     No Known Problems Maternal Grandfather     No Known Problems Paternal Grandmother     No Known Problems Paternal Grandfather     Acne Neg Hx     Eczema Neg Hx     Lupus Neg Hx     Psoriasis Neg Hx     Melanoma Neg Hx     Amblyopia Neg Hx     Blindness Neg Hx     Cataracts Neg Hx     Macular degeneration Neg Hx     Retinal detachment Neg Hx     Strabismus Neg Hx      Social History   Substance Use Topics    Smoking status: Former Smoker     Packs/day: 1.50     Years: 30.00     Types: Cigarettes     Start date: 1955     Quit date: 3/13/1985    Smokeless tobacco: Never Used    Alcohol use No     Review of Systems   Constitutional: Positive for appetite change (decreased) and fatigue. Negative for fever.   HENT: Negative for sore throat.    Respiratory: Negative for shortness of breath.    Cardiovascular: Negative for chest pain.   Gastrointestinal: Positive for nausea and vomiting. Negative for constipation and diarrhea.   Genitourinary: Negative for dysuria.   Musculoskeletal: Negative for back pain.   Skin: Negative for rash.   Neurological: Negative for weakness.   Hematological: Does not bruise/bleed easily.       Physical Exam     Initial Vitals [03/19/18 1757]   BP Pulse Resp Temp SpO2   (!) 179/77 63 16 98.4 °F (36.9 °C) 96 %      MAP       111         Physical Exam    Nursing note and vitals reviewed.  Constitutional: No distress.   Thin elderly female.    HENT:   Head:  Normocephalic and atraumatic.   Right Ear: External ear normal.   Left Ear: External ear normal.   Nose: Nose normal.   Dry mucus membranes.   Eyes: Conjunctivae and EOM are normal. Pupils are equal, round, and reactive to light. No scleral icterus.   Neck: Normal range of motion. Neck supple.   Cardiovascular: Normal rate, regular rhythm, normal heart sounds and intact distal pulses. Exam reveals no gallop and no friction rub.    No murmur heard.  Pulmonary/Chest: Breath sounds normal. No respiratory distress. She has no wheezes. She exhibits no tenderness.   Musculoskeletal: Normal range of motion. She exhibits no edema or tenderness.       Neurological: She is alert and oriented to person, place, and time. She has normal strength. No cranial nerve deficit.   Skin: Skin is warm and dry. Capillary refill takes less than 2 seconds. No rash noted. No pallor.   Psychiatric: She has a normal mood and affect. Thought content normal.         ED Course   Procedures  Labs Reviewed   CBC W/ AUTO DIFFERENTIAL - Abnormal; Notable for the following:        Result Value    WBC 3.78 (*)     Hemoglobin 10.3 (*)     Hematocrit 32.0 (*)     MCV 80 (*)     MCH 25.7 (*)     Lymph # 0.8 (*)     Mono% 18.8 (*)     All other components within normal limits   COMPREHENSIVE METABOLIC PANEL - Abnormal; Notable for the following:     Sodium 132 (*)     BUN, Bld 70 (*)     Creatinine 2.8 (*)     Albumin 3.1 (*)     Anion Gap 7 (*)     eGFR if  19 (*)     eGFR if non  16 (*)     All other components within normal limits     EKG Readings: (Independently Interpreted)   Initial Reading: No STEMI.   Sinus rhythm at a rate of 65 bpm. Non specific ST changes, no acute ischemia. Similar to prior EKGS           Medical Decision Making:   Clinical Tests:   Lab Tests: Ordered and Reviewed  Medical Tests: Ordered and Reviewed            Scribe Attestation:   Scribe #1: I performed the above scribed service and the  documentation accurately describes the services I performed. I attest to the accuracy of the note.    Attending Attestation:           Physician Attestation for Scribe:  Physician Attestation Statement for Scribe #1: I, Dr. Birch, reviewed documentation, as scribed by Lesly Cook in my presence, and it is both accurate and complete.          patient presents complaining of nausea and occasional vomiting, making it difficult to tolerate her medication feels generally weak over the past 5-7 days.  Unsteady in her feet.  Primarily lives alone.  She attributes her weakness to starting a new medication for blood pressure, inspra,  she had recent admission for difficult to control blood pressure and in the emergency department her systolic is again 200 to 230.  Laboratory studies were sent and show stable mild hyponatremia, essentially unchanged chronic renal function.  We will give her a dose of IV hydralazine.  Give her the oral nighttime medications but in light of her nausea difficulty ambulating and recurrence of the malignant hypertension.  We will admit to hospital for observation, titration of medications, especially as patient is adamant she does not want to continue the recently prescribed inspra.  Case discussed with the hospitalist service           Clinical Impression:     1. Malignant hypertension    2. Weakness    3. Lightheaded    4. Chronic renal impairment, unspecified CKD stage    5. Hyponatremia    6. Nausea and vomiting, intractability of vomiting not specified, unspecified vomiting type                                 Jeremy Birch II, MD  03/20/18 3055

## 2020-12-06 NOTE — ED BEHAVIORAL HEALTH NOTE - BEHAVIORAL HEALTH NOTE
MI spoke with [facility] on-call , Kedar Buckner [phone] to inform of [facility] admission and provided contact number for [address].

## 2020-12-06 NOTE — BH PATIENT PROFILE - HOME MEDICATIONS
pantoprazole 40 mg oral delayed release tablet , 1 tab(s) orally once a day   famotidine 20 mg oral tablet , 1 tab(s) orally once a day  clonazePAM 1 mg oral tablet , 1 tab(s) orally once a day MDD:1mg  OLANZapine 10 mg oral tablet , 1 tab(s) orally 2 times a day  lithium 300 mg oral capsule , 1 cap(s) orally 2 times a day   lithium 600 mg oral capsule , 1 cap(s) orally once a day (at bedtime)   fluPHENAZine 5 mg oral tablet , 1 tab(s) orally 2 times a day  Proventil HFA 90 mcg/inh inhalation aerosol , 2 puff(s) inhaled every 6 hours, As needed, Shortness of Breath MDD:Inhaler as per patient's insurance

## 2020-12-06 NOTE — ED ADULT NURSE REASSESSMENT NOTE - NS ED NURSE REASSESS COMMENT FT1
Received report from night RN, pt calm & cooperative denies si/hi/avh presently, pt awaiting bed assignment tolerated breakfast no c/o verbalized eval on going.

## 2020-12-06 NOTE — BH PATIENT PROFILE - NSICDXPASTMEDICALHX_GEN_ALL_CORE_FT
PAST MEDICAL HISTORY:  Asthma     Bipolar Disorder     Borderline Personality Disorder     Cholelithiasis     Depression     Fibroids     GERD (Gastroesophageal Reflux Disease)     Obesity

## 2020-12-06 NOTE — ED ADULT NURSE REASSESSMENT NOTE - NS ED NURSE REASSESS COMMENT FT1
Break RN: Pt is sleeping at this time, breathing even and non-labored, appears to be comfortable. NAD noted. Awaiting bed assignment. Will continue to monitor

## 2020-12-06 NOTE — ED BEHAVIORAL HEALTH NOTE - BEHAVIORAL HEALTH NOTE
Per request of Dr. Valencia,  faxed clinicals to [place] f: [phone] for review for possible admission. Per request of Dr. Valencia, MI faxed clinicals to [place] f: [phone] for review for possible admission.      MI received call from Kadie at [place], [phone] who reports patient does not have any psychiatric days left under Medicare. [place] unable to accept [facility] Medicaid. Per request of Dr. Valencia, MI faxed clinicals to [place] f: [phone] for review for possible admission.    MI received call from Kadie at [place], [phone] who reports patient does not have any psychiatric days left under Medicare. [place] unable to accept [facility] Medicaid. Per request of Dr. Valencia, MI faxed clinicals to [place] f: [phone] for review for possible admission.    MI received call from Kadie at [place], [phone] who reports patient does not have any psychiatric days left under Medicare. [place] unable to accept straight Medicaid.  MI spoke with Nitin Akbar RN at [facility], [phone] who reports 1 female bed available. Awaiting return call from Attending. Per request of Dr. Valencia, MI faxed clinicals to [place] f: [phone] for review for possible admission.  MI received call from Kadie at [place], [phone] who reports patient does not have any psychiatric days left under Medicare. [place] unable to accept straight Medicaid.    MI spoke with Nitin Akbar RN at [facility], [phone] who reports 1 female bed available. Awaiting return call from Attending. Per request of Dr. Valencia, MI faxed clinicals to [place] f: [phone] for review for possible admission.  MI received call from Kadie at [place], [phone] who reports patient does not have any psychiatric days left under Medicare. [place] unable to accept straight Medicaid.    MI spoke with Dia at [facility], [phone] who reports no adult beds available.     MI spoke with Dr. Almonte at [facility] [phone] who reports no beds are available to accept transfers today.

## 2020-12-06 NOTE — ED ADULT NURSE REASSESSMENT NOTE - NS ED NURSE REASSESS COMMENT FT1
Pt is awake, a&ox3, ambulating to bathroom. Upon approach, pt has disorganized thinking, laughing and talking to self. VSS. Awaiting bed for psychiatric admission

## 2020-12-06 NOTE — ED ADULT NURSE REASSESSMENT NOTE - NS ED NURSE REASSESS COMMENT FT1
pt remains sleeping, NAD, even unlabored respirations observed. MC, awaiting available bed for involuntary psychiatric admission. Will continue to monitor.

## 2020-12-07 PROCEDURE — 99222 1ST HOSP IP/OBS MODERATE 55: CPT

## 2020-12-07 RX ORDER — HALOPERIDOL DECANOATE 100 MG/ML
5 INJECTION INTRAMUSCULAR ONCE
Refills: 0 | Status: DISCONTINUED | OUTPATIENT
Start: 2020-12-07 | End: 2020-12-07

## 2020-12-07 RX ADMIN — Medication 2 MILLIGRAM(S): at 12:55

## 2020-12-07 RX ADMIN — Medication 50 MILLIGRAM(S): at 12:55

## 2020-12-07 RX ADMIN — HALOPERIDOL DECANOATE 5 MILLIGRAM(S): 100 INJECTION INTRAMUSCULAR at 12:55

## 2020-12-08 PROCEDURE — 99233 SBSQ HOSP IP/OBS HIGH 50: CPT

## 2020-12-08 RX ORDER — DIPHENHYDRAMINE HCL 50 MG
25 CAPSULE ORAL ONCE
Refills: 0 | Status: DISCONTINUED | OUTPATIENT
Start: 2020-12-08 | End: 2020-12-08

## 2020-12-08 RX ORDER — HALOPERIDOL DECANOATE 100 MG/ML
5 INJECTION INTRAMUSCULAR EVERY 6 HOURS
Refills: 0 | Status: DISCONTINUED | OUTPATIENT
Start: 2020-12-08 | End: 2020-12-08

## 2020-12-08 RX ADMIN — Medication 2 MILLIGRAM(S): at 15:10

## 2020-12-08 RX ADMIN — Medication 50 MILLIGRAM(S): at 15:10

## 2020-12-08 RX ADMIN — Medication 50 MILLIGRAM(S): at 08:08

## 2020-12-08 RX ADMIN — HALOPERIDOL DECANOATE 5 MILLIGRAM(S): 100 INJECTION INTRAMUSCULAR at 15:10

## 2020-12-08 RX ADMIN — HALOPERIDOL DECANOATE 5 MILLIGRAM(S): 100 INJECTION INTRAMUSCULAR at 08:07

## 2020-12-08 NOTE — BH CHART NOTE - NSEVENTNOTEFT_PSY_ALL_CORE
RIKI notified pt is agitated, disrobing, verbally aggressive, not redirectable. Chart reviewed, most recent VSS. EKG 12/5/2020 qtc 415ms. Activated Haldol 5mg, Benadryl 50mg, Ativan 2mg IM. RIKI to continue to monitor.

## 2020-12-08 NOTE — PSYCHIATRIC REHAB INITIAL EVALUATION - NSBHPRRECOMMEND_PSY_ALL_CORE
Writer attempted to meet with pt for initial assessment. Pt was unable to be aroused to participate in session, pt refused. Psych Rehab staff will continue to meet with pt later to orient psychiatric rehabilitation department function in order to provide further assessment and to build rapport with pt. Writer provided pt a unit schedule and psychiatric rehabilitation welcome letter. Writer and pt were unable to establish a collaborative psych rehab goal so writer will select goal for pt at present and psych rehab staff will continue to asses pt and collaborate around treatment and psych rehab goal.

## 2020-12-08 NOTE — BH INPATIENT PSYCHIATRY PROGRESS NOTE - NSBHFUPINTERVALHXFT_PSY_A_CORE
Patient seen for follow up for worsening Psychosis, chart reviewed, and case discussed with treatment team.  Per Nursing report, patient was disorgonized, disrobing herself in the hallway.  The patient report that she does need any medications and prefers everything to be natural.  Patient walked away from the interview. She continues to refused all prescribed medications

## 2020-12-08 NOTE — BH INPATIENT PSYCHIATRY PROGRESS NOTE - NSBHATTENDATTEST_PSY_ALL_CORE
I have personally seen, examined and participated in the care of this patient. I have reviewed all pertinent clinical information, including history, physical exam, plan and the Medical/PA/NP Student’s note and agree except as noted.

## 2020-12-08 NOTE — BH INPATIENT PSYCHIATRY PROGRESS NOTE - NSBHCHARTREVIEWVS_PSY_A_CORE FT
Vital Signs Last 24 Hrs  T(C): 37.1 (07 Dec 2020 19:35), Max: 37.1 (07 Dec 2020 19:35)  T(F): 98.7 (07 Dec 2020 19:35), Max: 98.7 (07 Dec 2020 19:35)  HR: --  BP: --  BP(mean): --  RR: --  SpO2: --

## 2020-12-09 RX ORDER — HALOPERIDOL DECANOATE 100 MG/ML
5 INJECTION INTRAMUSCULAR EVERY 6 HOURS
Refills: 0 | Status: DISCONTINUED | OUTPATIENT
Start: 2020-12-09 | End: 2021-01-05

## 2020-12-09 RX ORDER — CLONAZEPAM 1 MG
0.5 TABLET ORAL DAILY
Refills: 0 | Status: DISCONTINUED | OUTPATIENT
Start: 2020-12-09 | End: 2020-12-16

## 2020-12-09 RX ORDER — DIPHENHYDRAMINE HCL 50 MG
25 CAPSULE ORAL ONCE
Refills: 0 | Status: COMPLETED | OUTPATIENT
Start: 2020-12-09 | End: 2020-12-09

## 2020-12-09 RX ADMIN — FLUPHENAZINE HYDROCHLORIDE 5 MILLIGRAM(S): 1 TABLET, FILM COATED ORAL at 07:58

## 2020-12-09 RX ADMIN — Medication 2 MILLIGRAM(S): at 09:13

## 2020-12-09 RX ADMIN — Medication 25 MILLIGRAM(S): at 09:12

## 2020-12-09 RX ADMIN — Medication 0.5 MILLIGRAM(S): at 07:58

## 2020-12-09 RX ADMIN — HALOPERIDOL DECANOATE 5 MILLIGRAM(S): 100 INJECTION INTRAMUSCULAR at 09:12

## 2020-12-09 NOTE — BH INPATIENT PSYCHIATRY PROGRESS NOTE - NSTXIMPULSINTERMD_PSY_ALL_CORE
Encourage groups and milieu therapy Encourage groups and milieu therapy  Continue medication regimen

## 2020-12-09 NOTE — BH INPATIENT PSYCHIATRY PROGRESS NOTE - NSBHFUPINTERVALHXFT_PSY_A_CORE
Patient is followed up for Schizophrenia, admitted for psychosis.  Chart, medications and labs reviewed.  Patient is discussed in treatment team. No significant overnight issues, however patient is tenuous, labile on unit.  Patient remains aggressive on unit, attempted to throw chair at staff and peer during breakfast meal. Patient not redirectable, given IM medication, Haldol 5mg Ativan 2mg and Benadryl 25mg at 0900. Patient refused her medications, stating "I don't need medications."  Patient is profoundly disorganized with ongoing perceptual disturbances, reports +HI towards peer.  She is aggressive and erratic on unit.  She is observed dancing provocatively  in hallway.  Unable to fully participate in interview due to severity of psychotic symptoms. Self- care remains an issue.

## 2020-12-09 NOTE — BH INPATIENT PSYCHIATRY PROGRESS NOTE - NSBHCHARTREVIEWVS_PSY_A_CORE FT
Vital Signs Last 24 Hrs  T(C): 36.7 (08 Dec 2020 15:41), Max: 36.7 (08 Dec 2020 15:30)  T(F): 98 (08 Dec 2020 15:41), Max: 98 (08 Dec 2020 15:30)  HR: 78 (08 Dec 2020 15:41) (78 - 78)  BP: 134/80 (08 Dec 2020 15:41) (134/80 - 134/80)  BP(mean): --  RR: 18 (08 Dec 2020 15:30) (18 - 18)  SpO2: --

## 2020-12-09 NOTE — BH INPATIENT PSYCHIATRY PROGRESS NOTE - MSE UNSTRUCTURED FT
The patient appears stated age, poor hygiene. She maintained poor eye contact.  No psychomotor agitation or retardation. Patient ambulating with  Steady gait.   The patient’ presented with ; pressured speech, impaired attention, and tangential thinking. Thought content and thinking with bizarre ideas of reference including beliefs that her " she has to eradicate the world". She denies any auditory and visual hallucinations.  She denies any current suicidal or homicidal ideation, intent, or plan.  Insight is poor.  Judgment is poor.  Impulse control has been fair on the unit
The patient appears stated age, poor hygiene. She maintained poor eye contact.  No psychomotor agitation or retardation. Patient ambulating with  Steady gait.   The patient’ presented with ; pressured speech, impaired attention, and tangential thinking. Thought content and thinking with bizarre ideas of reference including beliefs that her " she has to eradicate the world". She denies any auditory and visual hallucinations.  She denies any current suicidal or homicidal ideation, intent, or plan.  Insight is poor.  Judgment is poor.  Impulse control has been fair on the unit

## 2020-12-10 PROCEDURE — 99232 SBSQ HOSP IP/OBS MODERATE 35: CPT

## 2020-12-10 RX ADMIN — Medication 0.5 MILLIGRAM(S): at 08:55

## 2020-12-10 RX ADMIN — FLUPHENAZINE HYDROCHLORIDE 5 MILLIGRAM(S): 1 TABLET, FILM COATED ORAL at 08:55

## 2020-12-10 NOTE — BH INPATIENT PSYCHIATRY PROGRESS NOTE - OTHER
Sexually provocative. Patient is seen at nursing station disrobing. A bit manic, hyperactive, Expansive Illogical, disorganized Difficult to tolerate interview Interview

## 2020-12-10 NOTE — BH INPATIENT PSYCHIATRY PROGRESS NOTE - NSBHFUPINTERVALHXFT_PSY_A_CORE
Patient is seen for aggression, trina  and disorganization.  Chart, labs and medications reviewed.  Patient is discussed in treatment team, no significant overnight issues.  Patient is observed in front of nurses station and begins to disrobe, exposing breasts to staff.  Patient is redirected and covers self with her coat.  Patient is offered and given hospital garb but refuses. Patient refused her standing medications last night, reports "it's my right to refuse I don't need any medications." Patient reports she is only here because "I needed to rest and get away from where I live." During interview patient made multiple complaints regarding her ACT Team and her residence, requesting that writer "get me another ACT Team." Patient denies SI/HI/AVH.  Patient is less aggressive today but remains labile, unpredictable, and sexually provocative on unit.

## 2020-12-10 NOTE — BH INPATIENT PSYCHIATRY PROGRESS NOTE - NSBHCONSBHPROVDETAILS_PSY_A_CORE  FT
12/09:Attempt made to call  Kedar Buckner at [phone]  12/10: Second attempt to call  Kedar Buckner at [facility] LEFT VM

## 2020-12-10 NOTE — BH INPATIENT PSYCHIATRY PROGRESS NOTE - NSBHCHARTREVIEWVS_PSY_A_CORE FT
Vital Signs Last 24 Hrs  T(C): 36.8 (10 Dec 2020 06:13), Max: 36.8 (10 Dec 2020 06:13)  T(F): 98.3 (10 Dec 2020 06:13), Max: 98.3 (10 Dec 2020 06:13)  HR: --  BP: --  BP(mean): --  RR: 18 (09 Dec 2020 21:13) (18 - 18)  SpO2: --

## 2020-12-11 PROCEDURE — 99232 SBSQ HOSP IP/OBS MODERATE 35: CPT

## 2020-12-11 RX ADMIN — Medication 0.5 MILLIGRAM(S): at 10:06

## 2020-12-11 RX ADMIN — FLUPHENAZINE HYDROCHLORIDE 5 MILLIGRAM(S): 1 TABLET, FILM COATED ORAL at 10:06

## 2020-12-11 NOTE — BH SCALES AND SCREENS - NSBPRSGRANDIOS_PSY_ALL_CORE
2 = Very Mild – e.g., is more confident than most people, but of only possible clinical significance

## 2020-12-11 NOTE — BH SCALES AND SCREENS - NSBPRSMANNPOST_PSY_ALL_CORE
4 = Moderate – e.g., assumes yoga position for a brief period of time, infrequent tongue protrusions, rocking

## 2020-12-11 NOTE — BH SCALES AND SCREENS - NSBPRSHALLBEH_PSY_ALL_CORE
4 = Moderate – as above, but more frequent or extensive (e.g. frequently sees the devil’s face, two voices carry on lengthy conversations)

## 2020-12-11 NOTE — BH INPATIENT PSYCHIATRY PROGRESS NOTE - NSBHCONSBHPROVDETAILS_PSY_A_CORE  FT
12/09:Attempt made to call  Kedar Buckner at [phone]  12/10: Second attempt to call  Kedar Buckner at [facility] LEFT VM  12/11/20: Attempts made to contact outpatient ACT Team.  Left  for ACT Team KIRSTY Bernal at [phone].

## 2020-12-11 NOTE — BH PSYCHOLOGY - GROUP THERAPY NOTE - NSPSYCHOLGRPCOGINT_PSY_A_CORE
mindfulness skills taught/other../group members provided support/relaxation skills practiced/group members suggested positive behaviors

## 2020-12-11 NOTE — BH INPATIENT PSYCHIATRY PROGRESS NOTE - NSBHCHARTREVIEWVS_PSY_A_CORE FT
Vital Signs Last 24 Hrs  T(C): 37.4 (11 Dec 2020 08:18), Max: 37.4 (11 Dec 2020 08:18)  T(F): 99.3 (11 Dec 2020 08:18), Max: 99.3 (11 Dec 2020 08:18)  HR: --  BP: --  BP(mean): --  RR: 18 (10 Dec 2020 21:05) (18 - 18)  SpO2: 100% (11 Dec 2020 08:42) (100% - 100%)

## 2020-12-11 NOTE — BH PSYCHOLOGY - GROUP THERAPY NOTE - NSPSYCHOLGRPCOGPT_PSY_A_CORE
participated in exercise/shared positive coping experience/shared negative coping experience/other...

## 2020-12-11 NOTE — BH PSYCHOLOGY - GROUP THERAPY NOTE - NSBHPSYCHOLPARTICIPCOMMENT_PSY_A_CORE FT
Pt actively participated. At times her comments were inappropriate but she was able to be redirected. PT remained in behavioral control.

## 2020-12-11 NOTE — BH TREATMENT PLAN - NSTXCOPEINTERMD_PSY_ALL_CORE
Encourage patient to attend groups and milieu therapy  Encouraged medication compliance  Consider PALACIOS Prolixin

## 2020-12-11 NOTE — BH TREATMENT PLAN - NSTXIMPULSGOAL_PSY_ALL_CORE
Will be able to describe/demonstrate alternative ways of managing his/her emotional state on the unit

## 2020-12-11 NOTE — BH INPATIENT PSYCHIATRY PROGRESS NOTE - OTHER
Dressed in layers, wearing fur coat indoors Less sexual provocative. In slight better control, decreased aggression A bit manic, hyperactive, but improving Expansive, smiles brightly disorganized Difficult to tolerate interview, improving

## 2020-12-11 NOTE — BH TREATMENT PLAN - NSTXPSYCHOINTERMD_PSY_ALL_CORE
Continue with current medication regimen  Encourage medication compliance  Consider PALACIOS Prolixin

## 2020-12-11 NOTE — BH SCALES AND SCREENS - NSBPRSSUSPIC_PSY_ALL_CORE
3 = Mild – occasional instances of suspiciousness that are definitely not warranted by the situation

## 2020-12-11 NOTE — BH INPATIENT PSYCHIATRY PROGRESS NOTE - NSBHFUPINTERVALHXFT_PSY_A_CORE
Patient is seen for aggression, trina and disorganization.  Chart, labs and medications reviewed.  Patient is discussed in treatment team, no significant overnight issues.  Patient is observed pacing hallway, patient is a lot calmer today, less manic, less aggressive.  She is more appropriate on unit and less sexually provocative.  Patient approaches writer and unsolicited reported “I’m taking my medications now.”  Reports she is more willing to engage in treatment now.  Patient is encouraged to continue medication regimen and is provided with positive feedback.  She describes mood as “I’m doing alright.”  Patient denies SI/HI, no plan or intent.  She also denies AVH, however there is evidence of psychosis.  Remains a bit disorganized, and internally preoccupied.  No mention of sleep disturbances or appetite concerns.  Patient is more medication compliant.  No reported or observed adverse effects.  No akithisia, EPS, or tremors. Self- care remains an issue, but improving.  Has had no behavioral issues on unit today, requiring emergent IM medications. Pt offers no complaints.    Addendum: Attempts made to contact outpatient ACT Team.  Left  for ACT Team KIRSTY Bernal at [phone].

## 2020-12-11 NOTE — BH TREATMENT PLAN - NSTXCOPEINTERPR_PSY_ALL_CORE
Writer attempted to meet with pt, pt refused. Psych rehab staff will continue to attempt to meet with pt to enagae her in tx and encorge joe to attend daily psych reahb groups so she may idenitfy 2-3 coping skills.

## 2020-12-12 RX ORDER — HALOPERIDOL DECANOATE 100 MG/ML
5 INJECTION INTRAMUSCULAR EVERY 6 HOURS
Refills: 0 | Status: DISCONTINUED | OUTPATIENT
Start: 2020-12-12 | End: 2021-01-05

## 2020-12-12 RX ORDER — DIPHENHYDRAMINE HCL 50 MG
25 CAPSULE ORAL ONCE
Refills: 0 | Status: COMPLETED | OUTPATIENT
Start: 2020-12-12 | End: 2020-12-12

## 2020-12-12 RX ADMIN — Medication 50 MILLIGRAM(S): at 06:53

## 2020-12-12 RX ADMIN — HALOPERIDOL DECANOATE 5 MILLIGRAM(S): 100 INJECTION INTRAMUSCULAR at 12:05

## 2020-12-12 RX ADMIN — Medication 2 MILLIGRAM(S): at 12:05

## 2020-12-12 RX ADMIN — Medication 25 MILLIGRAM(S): at 12:05

## 2020-12-12 RX ADMIN — Medication 2 MILLIGRAM(S): at 06:54

## 2020-12-12 RX ADMIN — HALOPERIDOL DECANOATE 5 MILLIGRAM(S): 100 INJECTION INTRAMUSCULAR at 06:53

## 2020-12-13 RX ORDER — HALOPERIDOL DECANOATE 100 MG/ML
5 INJECTION INTRAMUSCULAR EVERY 6 HOURS
Refills: 0 | Status: DISCONTINUED | OUTPATIENT
Start: 2020-12-13 | End: 2021-01-05

## 2020-12-13 RX ORDER — DIPHENHYDRAMINE HCL 50 MG
50 CAPSULE ORAL ONCE
Refills: 0 | Status: COMPLETED | OUTPATIENT
Start: 2020-12-13 | End: 2020-12-13

## 2020-12-13 RX ORDER — DIPHENHYDRAMINE HCL 50 MG
50 CAPSULE ORAL ONCE
Refills: 0 | Status: DISCONTINUED | OUTPATIENT
Start: 2020-12-13 | End: 2021-01-05

## 2020-12-13 RX ADMIN — Medication 2 MILLIGRAM(S): at 21:59

## 2020-12-13 RX ADMIN — FLUPHENAZINE HYDROCHLORIDE 5 MILLIGRAM(S): 1 TABLET, FILM COATED ORAL at 19:52

## 2020-12-13 RX ADMIN — LITHIUM CARBONATE 600 MILLIGRAM(S): 300 TABLET, EXTENDED RELEASE ORAL at 19:52

## 2020-12-13 RX ADMIN — HALOPERIDOL DECANOATE 5 MILLIGRAM(S): 100 INJECTION INTRAMUSCULAR at 19:53

## 2020-12-13 RX ADMIN — OLANZAPINE 5 MILLIGRAM(S): 15 TABLET, FILM COATED ORAL at 19:51

## 2020-12-13 RX ADMIN — FLUPHENAZINE HYDROCHLORIDE 5 MILLIGRAM(S): 1 TABLET, FILM COATED ORAL at 09:34

## 2020-12-13 RX ADMIN — Medication 50 MILLIGRAM(S): at 22:00

## 2020-12-13 RX ADMIN — Medication 0.5 MILLIGRAM(S): at 09:34

## 2020-12-13 RX ADMIN — Medication 50 MILLIGRAM(S): at 19:52

## 2020-12-13 RX ADMIN — HALOPERIDOL DECANOATE 5 MILLIGRAM(S): 100 INJECTION INTRAMUSCULAR at 21:58

## 2020-12-14 PROCEDURE — 99232 SBSQ HOSP IP/OBS MODERATE 35: CPT

## 2020-12-14 RX ORDER — OLANZAPINE 15 MG/1
7.5 TABLET, FILM COATED ORAL AT BEDTIME
Refills: 0 | Status: DISCONTINUED | OUTPATIENT
Start: 2020-12-14 | End: 2020-12-23

## 2020-12-14 RX ORDER — FLUPHENAZINE HYDROCHLORIDE 1 MG/1
10 TABLET, FILM COATED ORAL
Refills: 0 | Status: DISCONTINUED | OUTPATIENT
Start: 2020-12-14 | End: 2020-12-29

## 2020-12-14 RX ORDER — LITHIUM CARBONATE 300 MG/1
450 TABLET, EXTENDED RELEASE ORAL
Refills: 0 | Status: DISCONTINUED | OUTPATIENT
Start: 2020-12-14 | End: 2020-12-22

## 2020-12-14 NOTE — BH INPATIENT PSYCHIATRY PROGRESS NOTE - NSBHCHARTREVIEWVS_PSY_A_CORE FT
Vital Signs Last 24 Hrs  T(C): 37.3 (13 Dec 2020 10:23), Max: 37.3 (13 Dec 2020 10:23)  T(F): 99.2 (13 Dec 2020 10:23), Max: 99.2 (13 Dec 2020 10:23)  HR: --  BP: --  BP(mean): --  RR: 17 (13 Dec 2020 16:07) (17 - 17)  SpO2: 99% (13 Dec 2020 16:07) (99% - 99%) Vital Signs Last 24 Hrs  T(C): 37.3 (13 Dec 2020 10:23), Max: 37.3 (13 Dec 2020 10:23)  T(F): 99.2 (13 Dec 2020 10:23), Max: 99.2 (13 Dec 2020 10:23)  HR: --refused vitals  BP: --  BP(mean): --  RR: 17 (13 Dec 2020 16:07) (17 - 17)  SpO2: 99% (13 Dec 2020 16:07) (99% - 99%)

## 2020-12-14 NOTE — BH INPATIENT PSYCHIATRY PROGRESS NOTE - OTHER
Dressed in layers, wearing fur coat indoors Less sexual provocative. More aggression over the weekend required IM emergent medications A bit manic, hyperactive, Expansive, smiles brightly disorganized Difficult to tolerate interview, improving Less sexual provocative. More aggression over the weekend required IM emergent medications. Patient remains labile,  threatening Angry affect Animated, angry, reddened face as a result of shouting

## 2020-12-14 NOTE — BH INPATIENT PSYCHIATRY PROGRESS NOTE - NSTXPSYCHOINTERMD_PSY_ALL_CORE
Continue with current medication regimen. Increase Prolixin 10mg BId, increase Lithium 450mg BID  Encourage medication compliance  Consider PALACIOS Prolixin Continue with current medication regimen. Increase Prolixin 10mg BId, increase Lithium 450mg BID  Encourage medication compliance  Consider PALACIOS Prolixin  >Continue treatment for risk modifications  >Obs: Routine, no current SI. no need for CO, patient not expected to pose risk to self  in controlled inpatient setting.   >Continue to monitor for risk of aggression  >Treatment: Group therapy and individual therapy  >Continue to provide therapeutic interventions in support of treatment goals

## 2020-12-14 NOTE — BH INPATIENT PSYCHIATRY PROGRESS NOTE - NSBHCONSBHPROVDETAILS_PSY_A_CORE  FT
12/09:Attempt made to call  Kedar Buckner at [phone]  12/10: Second attempt to call  Kedar Buckner at [facility] LEFT VM  12/11/20: Attempts made to contact outpatient ACT Team.  Left  for ACT Team KIRSTY Bernal at [phone].

## 2020-12-14 NOTE — ED ADULT NURSE NOTE - PAIN RATING/NUMBER SCALE (0-10): ACTIVITY
Interventional Cardiology Clinic Note  Reason for Visit: Pre-lung transplant RHC/LHC  Referring Physician: Dr. Brian Lopez    HPI:   Igor Sprague is a 50 y.o. male who presents for Lung Transplant Pre-evaluation    Patient has a PMHx of HTN, HLD, idiopathic pulmonary fibrosis, ADELAIDA on CPAP. He is currently being evaluated for a lung transplant and now presents to interventional cardiology clinic for a pre-operative RHC and LHC.    Patient states he has had PAULINO for many months. He was diagnosed with IPF in October of this year. 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 He denies any history of angina, PND, orthopnea, or LE edema.    He is a former smoker, quit about 6 years ago.  He has a family history of CAD, father had MI in his 60's and mother has had multiple PCI's starting in her 60's.    ROS:    Constitution: Negative for fever, chills, weight loss or gain.   HENT: Negative for sore throat, rhinorrhea, or headache.  Eyes: Negative for blurred or double vision.   Cardiovascular: See above  Pulmonary: Positive for SOB   Gastrointestinal: Negative for abdominal pain, nausea, vomiting, or diarrhea.   : Negative for dysuria.   Neurological: Negative for focal weakness or sensory changes.  PMH:     Past Medical History:   Diagnosis Date    Chronic rhinosinusitis     PAULINO (dyspnea on exertion)     Elevated IgE level     GERD (gastroesophageal reflux disease)     Hyperlipidemia     Intermittent claudication     Interstitial lung disease     IPF (idiopathic pulmonary fibrosis)     Mild obstructive sleep apnea     on CPAP    Organizing pneumonia     Palpitations     Paraseptal emphysema     Prediabetes     UIP (usual interstitial pneumonitis)     UIP (usual interstitial pneumonitis)      Past Surgical History:   Procedure Laterality Date    APPENDECTOMY      BRONCHOSCOPY WITH BIOPSY  09/04/2019    COLONOSCOPY      THORACOSCOPY  10/10/2019  "    Allergies:   Review of patient's allergies indicates:  No Known Allergies  Medications:     Current Outpatient Medications on File Prior to Visit   Medication Sig Dispense Refill    aspirin (ECOTRIN) 81 MG EC tablet Take 81 mg by mouth once daily.      atorvastatin (LIPITOR) 20 MG tablet Take 20 mg by mouth once daily.      clonazePAM (KLONOPIN) 1 MG tablet Take 1 mg by mouth 2 (two) times daily. as needed for anxiety.      metFORMIN (GLUCOPHAGE) 500 MG tablet Take 500 mg by mouth once daily.      metoprolol succinate (TOPROL-XL) 25 MG 24 hr tablet Take 12.5 mg by mouth once daily.      pantoprazole (PROTONIX) 40 MG tablet Take 40 mg by mouth once daily.      sertraline (ZOLOFT) 50 MG tablet Take 50 mg by mouth nightly.      zolpidem (AMBIEN) 10 mg Tab Take 10 mg by mouth nightly as needed.       No current facility-administered medications on file prior to visit.      Social History:     Social History     Tobacco Use    Smoking status: Former Smoker     Types: Cigarettes, Vaping with nicotine     Start date: 1984     Quit date: 2018     Years since quittin.6    Smokeless tobacco: Never Used    Tobacco comment: 'stopped cigarettes at 44, e-cigs at 48"   Substance Use Topics    Alcohol use: Not Currently     Comment: 'quit 5 years ago'     Family History:     Family History   Problem Relation Age of Onset    Heart disease Father     Hypertension Father     Heart attack Father     Heart disease Maternal Grandfather     Hypertension Maternal Grandfather     Heart attack Maternal Grandfather      Physical Exam:   /69 (BP Location: Right arm, Patient Position: Sitting, BP Method: Large (Automatic))   Pulse 84   Ht 5' 9" (1.753 m)   Wt 94.8 kg (208 lb 15.9 oz)   SpO2 96%   BMI 30.86 kg/m²      Constitutional: NAD, conversant  HEENT: Sclera anicteric, PERRLA, EOMI  Neck: No JVD, no carotid bruits  CV: RRR, no murmur, normal S1/S2  Pulm: Diffuse crackles B/L  GI: Abdomen soft, " NTND, +BS  Extremities: No LE edema, warm and well perfused; 2+ radial pulses B/L, 2+ DP and PT pulses B/L  Skin: No ecchymosis, erythema, or ulcers  Psych: AOx3, appropriate affect  Neuro: No gross deficits    Labs:     Lab Results   Component Value Date     12/14/2020    K 4.2 12/14/2020     12/14/2020    CO2 29 12/14/2020    BUN 18 12/14/2020    CREATININE 1.1 12/14/2020    ANIONGAP 7 (L) 12/14/2020     Lab Results   Component Value Date    HGBA1C 5.7 (H) 12/14/2020      Lab Results   Component Value Date    WBC 7.23 12/14/2020    HGB 16.1 12/14/2020    HCT 49.2 12/14/2020     12/14/2020    GRAN 3.8 12/14/2020    GRAN 52.5 12/14/2020     Lab Results   Component Value Date    CHOL 147 12/14/2020    HDL 32 (L) 12/14/2020    LDLCALC 88.8 12/14/2020    TRIG 131 12/14/2020            Assessment:     1. Lung transplant candidate    2. Essential hypertension    3. Mixed hyperlipidemia    4. IPF (idiopathic pulmonary fibrosis)      Plan:     Lung transplant candidate  - Patient has a Hx of IPF and is currently under consideration for lung transplant  - Plan for a RHC and coronary angiogram +/- PCI as part of his pre-op evaluation  - Anti-platelet Therapy: ASA 81 mg Daily, Clopidogrel 300 mg load x 1 and Clopidogrel 75 mg Daily  - Access: R CFA and R CFV  - Creatinine/CrCl: 1.1 on 12/14/2020  - Allergies: No shellfish/Iodine allergy  - Pre-Hydration: 3 cc/kg/hr IV, continuous, for 1 hour, Pre-Procedure  - Pre-Op Med: Diphenhydramine (Benadryl) 50 mg, Oral, Once, Pre-Procedure   - All patient's questions were answered  - The risks, benefits & alternatives of the procedure were explained to the patient  - The risks of coronary angiography include but are not limited to: Bleeding, infection, heart rhythm abnormalities, allergic reactions, kidney injury requiring dilaysis, limb loss, stroke and death  - Should stenting be indicated, the patient has agreed to dual anti-platelet therapy for 1-consecutive year  with a drug-eluting stent and a minimum of 1-month with the use of a bare metal stent  - Additionally, pt is aware that non-compliance is likely to result in stent clotting with heart attack, heart failure, and/or death  - The risks of moderate sedation include hypotension, respiratory depression, arrhythmias, bronchospasm, & death  - Informed consent was obtained & the patient is agreeable to proceed with the procedure    Essential hypertension  - Well-controlled in clinic today  - On Toprol XL 12.5 mg Daily    Hyperlipidemia  - Lipid panel as above  - On atorvastatin 20 mg Daily    IPF (idiopathic pulmonary fibrosis)  - Currently being evaluated for lung transplant as above    Signed:  Leobardo Duque MD  Advanced Interventional Cardiology Fellow, PGY-8  Pager: [phone]  12/14/2020 10:22 AM    Staff:  I have personally taken the history and examined this patient and agree with the fellow's note as stated above and amended it accordingly :-)  Coronary angiogram and possible AVIS PCI pre lung transplantation.   0

## 2020-12-14 NOTE — BH INPATIENT PSYCHIATRY PROGRESS NOTE - NSBHFUPINTERVALHXFT_PSY_A_CORE
Patient is seen for aggression, trina and disorganization.  Chart, labs and medications reviewed.  Patient is discussed in treatment team, per nursing team patient required po and emergent prn medications over the weekend.  Patient was given po haldol 5mg and benadryl 50mg last night at 0752pm due to trina, and aggressive behaviors. However, patient required IM prn medications with haldol 5mg, benadryl 50mg and ativan 2mg at 0940pm due to escalating aggressive behaviors.  Patient was involved in a verbal altercation with another peer, escalated to patient making verbal threats to harm peer. Patient was loud, a bit erratic last night.  After receiving IM medications (with good effect) patient was able to deescalate and slept through out the night. Per nursing patient took her standing evening medications but required much teaching and encouraging.  Today patient is observed in hallway, approachable but still manic. She remains hyperactive: restless, pacing, dancing in hallway.  She describes mood as “I’m doing alright.”  Patient denies SI/HI, no plan or intent.  She also denies AVH, however there is evidence of psychosis.  Remains a bit disorganized, and internally preoccupied.  No mention of sleep disturbances or appetite concerns.      Addendum: Second attempt made to contact outpatient ACT Team.  Left  for ACT Team NP Allie Bernal at [phone].       Patient is seen for aggression, trina and disorganization.  Chart, labs and medications reviewed.  Patient is discussed in treatment team, per nursing team patient required po and emergent prn medications over the weekend.  Patient was given po haldol 5mg and benadryl 50mg last night at 0752pm due to trina, and aggressive behaviors. However, patient required IM prn medications with haldol 5mg, benadryl 50mg and ativan 2mg at 0940pm due to escalating aggressive behaviors.  Patient was involved in a verbal altercation with another peer, escalated to patient making verbal threats to harm peer. Patient was loud, a bit erratic last night.  After receiving IM medications (with good effect) patient was able to deescalate and slept through out the night. Per nursing patient took her standing evening medications but required much teaching and encouraging.  Today patient is observed in her room, patient is not approachable, she remains hostile, and aggressive.  She describes mood as “get out of my face and get out of my room.”  Patient refused interview, refused standing medications.  When encouraged to take her medications patient began shouting/cursing/threatening statements  at writer.  Patient shouts at writer "Im not taking medications,take em to court." Patient states that she does not need medications.  Patient disheveled with poor hygiene. Remains unpredictable, labile, hostile.   Patient denies SI/HI, no plan or intent.  She also denies AVH, however there is evidence of psychosis.  Remains a bit disorganized, and internally preoccupied.  No mention of sleep disturbances or appetite concerns.      Addendum: Second attempt made to contact outpatient ACT Team.  Left  for ACT Team NP Allie Bernal at [phone].

## 2020-12-15 PROCEDURE — 99232 SBSQ HOSP IP/OBS MODERATE 35: CPT

## 2020-12-15 RX ADMIN — FLUPHENAZINE HYDROCHLORIDE 10 MILLIGRAM(S): 1 TABLET, FILM COATED ORAL at 21:20

## 2020-12-15 RX ADMIN — LITHIUM CARBONATE 450 MILLIGRAM(S): 300 TABLET, EXTENDED RELEASE ORAL at 21:20

## 2020-12-15 RX ADMIN — Medication 2 MILLIGRAM(S): at 21:20

## 2020-12-15 RX ADMIN — OLANZAPINE 7.5 MILLIGRAM(S): 15 TABLET, FILM COATED ORAL at 21:20

## 2020-12-15 NOTE — BH INPATIENT PSYCHIATRY DISCHARGE NOTE - NSBHDCHANDOFFFT_PSY_ALL_CORE
Verbal handoff  and written handoff via fax was given to Allie Bernal NP, including discussion of hospital course, treatment, and medications, with phone number left for call back. Verbal handoff  and written handoff via fax was given to Allie Bernal NP, including discussion of hospital course, treatment, and medications, with phone number left for call back. Discharge paperwork also faxed.  Verbal handoff  and written handoff via fax was given to Allie Bernal NP, including discussion of hospital course, treatment, and medications, with phone number left for call back. Discharge summary paperwork also faxed to [phone]

## 2020-12-15 NOTE — BH INPATIENT PSYCHIATRY PROGRESS NOTE - NSBHCONSBHPROVDETAILS_PSY_A_CORE  FT
12/09:Attempt made to call  Kedar Buckner at [phone]  12/10: Second attempt to call  Kedar Buckner at [facility] LEFT VM  12/11/20: Attempts made to contact outpatient ACT Team.  Left  for ACT Team KIRSTY Bernal at [phone]. 12/09:Attempt made to call  Kedar Buckner at [phone]  12/10: Second attempt to call  Kedar Buckner at [facility] LEFT VM  12/11/20: Attempts made to contact outpatient ACT Team.  Left  for ACT Team NP Allie Bernal at [phone].    12/15/20: Spoke with ACT Team member Jennifer Nettles at [phone].

## 2020-12-15 NOTE — BH INPATIENT PSYCHIATRY DISCHARGE NOTE - OTHER PAST PSYCHIATRIC HISTORY (INCLUDE DETAILS REGARDING ONSET, COURSE OF ILLNESS, INPATIENT/OUTPATIENT TREATMENT)
most recent admission was at [facility], 6/9/2020. Lifetime inpatient admissions > 20. Numerous [facility] ED visits. Currently followed by [facility] ACT team.  Multiple [facility] ED evals.    - 3 prior suicide attempts (last in 2012 via OD) as per records, recurrent suicidal gestures and suicidal ideation, history of property destruction (breaking TV screens while hospitalized) when upset / acting out     - long hx of sexually provocative, acting out behaviors (during last [facility] inpatient admission >2 years ago, patient had to be placed on CO 1:1 after trying to perform oral sex on a male patient on the dayroom, taken off CO then was going into male patient's room, overheard offering them sex and was placed back on CO)

## 2020-12-15 NOTE — BH INPATIENT PSYCHIATRY DISCHARGE NOTE - VIOLENCE RISK FACTORS:
Violent ideation/threat/speech/Affective dysregulation/Impulsivity/Lack of insight into violence risk/need for treatment/Noncompliance with treatment/Irritability/Other

## 2020-12-15 NOTE — BH INPATIENT PSYCHIATRY DISCHARGE NOTE - NSDCMRMEDTOKEN_GEN_ALL_CORE_FT
clonazePAM 1 mg oral tablet: 1 tab(s) orally once a day MDD:1mg  famotidine 20 mg oral tablet: 1 tab(s) orally once a day  fluPHENAZine 5 mg oral tablet: 1 tab(s) orally 2 times a day  lithium 300 mg oral capsule: 1 cap(s) orally 2 times a day   lithium 600 mg oral capsule: 1 cap(s) orally once a day (at bedtime)   OLANZapine 10 mg oral tablet: 1 tab(s) orally 2 times a day  pantoprazole 40 mg oral delayed release tablet: 1 tab(s) orally once a day   Proventil HFA 90 mcg/inh inhalation aerosol: 2 puff(s) inhaled every 6 hours, As needed, Shortness of Breath MDD:Inhaler as per patient&#x27;s insurance   albuterol 90 mcg/inh inhalation aerosol: 2 puff(s) inhaled every 6 hours, As needed, asthma attack  clonazePAM 1 mg oral tablet: 1 tab(s) orally once a day MDD:1mg  fluPHENAZine decanoate 25 mg/mL injectable solution: 50 milligram(s) intramuscularly every 4 weeks. Received on 12/22/20, Next scheduled dosing on 1/22/20.  lithium 450 mg oral tablet, extended release: 2 tab(s) orally once a day (at bedtime)  OLANZapine 15 mg oral tablet: 1 tab(s) orally once a day (at bedtime)

## 2020-12-15 NOTE — BH INPATIENT PSYCHIATRY DISCHARGE NOTE - NSBHDCSIGEVENTSFT_PSY_A_CORE
Overall she has been in behavioral control, no prn’s required during the latter part of her hospitalization. Once stabilized on her medications, she has remained in good behavioral control and has not required emergent intramuscular medications or seclusion / restraints.  Initial part of her treatment course patient refused interview, and was extremely verbally aggressive with staff.  She refused medications and verbally attacked nursing team when offered medications, often requiring PES and po prns.  Once stabilized on her medications, she has remained in good behavioral control and has not required emergent intramuscular medications or seclusion / restraints. Overall she has been in behavioral control, no prn’s required during the latter part of her hospitalization.

## 2020-12-15 NOTE — BH INPATIENT PSYCHIATRY PROGRESS NOTE - OTHER
Dressed in layers, wearing fur coat indoors. At times patient is disrobing in hallway Sexual provocative. More verbally aggression, remains labile,  threatening, increasing trina Appears to be in a psychotic manic state.  Mood fluctuating. Angry , Animated, angry, expansive affect Patient is becoming more disorganized disorganized Difficult to tolerate interview, walks away from Provider

## 2020-12-15 NOTE — BH INPATIENT PSYCHIATRY DISCHARGE NOTE - NSBHMETABOLIC_PSY_ALL_CORE_FT
BMI:   HbA1c: A1C with Estimated Average Glucose: 5.4 % (06-05-20 @ 11:45)    Glucose:   BP: --  Lipid Panel: Date/Time: 06-05-20 @ 11:45  Cholesterol, Serum: 132  Direct LDL: 76  HDL Cholesterol, Serum: 43  Total Cholesterol/HDL Ration Measurement: --  Triglycerides, Serum: 105

## 2020-12-15 NOTE — BH INPATIENT PSYCHIATRY PROGRESS NOTE - NSTXPSYCHOINTERMD_PSY_ALL_CORE
Continue with current medication regimen. Increase Prolixin 10mg BId, increase Lithium 450mg BID  Encourage medication compliance  Consider PALACIOS Prolixin  >Continue treatment for risk modifications  >Obs: Routine, no current SI. no need for CO, patient not expected to pose risk to self  in controlled inpatient setting.   >Continue to monitor for risk of aggression  >Treatment: Group therapy and individual therapy  >Continue to provide therapeutic interventions in support of treatment goals

## 2020-12-15 NOTE — BH INPATIENT PSYCHIATRY PROGRESS NOTE - NSBHCHARTREVIEWVS_PSY_A_CORE FT
Vital Signs Last 24 Hrs  T(C): 36.3 (15 Dec 2020 06:51), Max: 36.4 (14 Dec 2020 22:16)  T(F): 97.3 (15 Dec 2020 06:51), Max: 97.5 (14 Dec 2020 22:16)  HR: --  BP: --  BP(mean): --  RR: 17 (15 Dec 2020 08:58) (17 - 18)  SpO2: 97% (15 Dec 2020 08:58) (97% - 97%)

## 2020-12-15 NOTE — BH INPATIENT PSYCHIATRY PROGRESS NOTE - NSBHFUPINTERVALHXFT_PSY_A_CORE
Patient is followed up for Bipolar Disorder with psychosis.  Chart, medications and labs reviewed.  Patient is discussed in treatment team.  Per nursing patient continues to refuse all standing medications, remains labile, unpredictable, at times hostile. Patient is observed dancing provocatively, sliding on floor, touching self provocatively in hallway. Upon approach patient is minimally cooperative, easily-agitated, shouting at NP to “stay away.” Appears paranoid, responding to internal stimuli, walks off from NP avoiding any direct expression.  She denies any psychotic symptoms, despite psychotic symptoms successfully elicited.  Per nursing no mention of sleep disturbances or appetite concerns.  Attempted to discuss her medication regimen, patient shouts at NP “take me to court.”     Addendum: Spoke with ACT Team member Jennifer Nettles.  Discussed precipitating events leading to current admission, treatment goals/plan, and medication regimen discussed.  In brief, per Ms. Tho patient has been noncompliant with her medications for several months. When patient decompensates she gets aggressive, and sexually provocative.  Per Ms. Nettles patient has been upset with ACT Team because patient wants to be her own payee and has been “acting out erratically” for several months. Ms. Nettles is unable to confirm if patient has ever had an PALACIOS.  She is informed that writer has made several attempts to call patient’s ACT Team NP, with no return call back.  Ms. Nettles reports she will follow up on this matter.

## 2020-12-15 NOTE — BH INPATIENT PSYCHIATRY DISCHARGE NOTE - NSICDXPASTMEDICALHX_GEN_ALL_CORE_FT
fractured extremity PAST MEDICAL HISTORY:  Asthma     Bipolar Disorder     Borderline Personality Disorder     Cholelithiasis     Depression     Fibroids     GERD (Gastroesophageal Reflux Disease)     Obesity

## 2020-12-15 NOTE — BH INPATIENT PSYCHIATRY DISCHARGE NOTE - NSDCCPCAREPLAN_GEN_ALL_CORE_FT
PRINCIPAL DISCHARGE DIAGNOSIS  Diagnosis: Bipolar affective disorder  Assessment and Plan of Treatment:

## 2020-12-15 NOTE — BH INPATIENT PSYCHIATRY DISCHARGE NOTE - NSDCTESTSFT_PSY_ALL_CORE
----- Message from Yee Chandni schedule next available appointment with Dmitry NA Lithium serum at dc is therapeutic 0.6

## 2020-12-15 NOTE — BH INPATIENT PSYCHIATRY DISCHARGE NOTE - HOSPITAL COURSE
Date Admitted: 12/6 /20  Date Discharged: 1/5/21  Patient was admitted to [facility] inpatient service on 9.39 legal status. However during course of treatment patient was converted to [facility]. Patient's labs were grossly WNL.   Patient admitted with a diagnosis of  Bipolar I Disorder.  Patient was BIBEMS from [facility] for violent behavior.  When asked what brought pt to the ED, she responds "eugenics." She says that the psychiatric association has been doing eugenics for years based off of Malachi's theories, and she is interested in helping. She says "I need to eradicate people." When asked how, she says "it's already eradicated." Reports "agitated" mood every day. Reports poor sleep, <5 hr nightly for a while. Denies other depressive or manic sx. Denies SI/HI at this time. Denies AVH. Reports she stopped meds 3 months ago and says "I need to be reprogrammed so I don't get agitated."  Staff reports that they called EMS because earlier the pt had a knife in her hand during dinner at the residence and was cutting walls at dinner time, saying she was cutting vegetables. The pt then later was threatening to kill another resident unprovoked. When EMS came, pt was found to have another knife and a long metal pole weapon in her bag. She hasn't taken any medications in 3 months. Additional collateral obtained from pt's  Kedar Buckner ([phone]). She reports pt has been breaking property at the residence and today threatened to kill another resident. Police found 2 knives in her bag. She has been talking to self, decompensating for months.  On admission interview, patient was profoundly disorganized, aggressive and preoccupied with ongoing perceptual disturbances; minimal information could be elicited, unwilling to participate in interview. Initial part of her treatment course patient refused interview, and was extremely vernally aggressive with staff.  She refused medications and verbally attacked nursing team when offered medications, often requiring PES and po prns.  Following a prolonged discussion regarding risks (including but not limited to EPS, weight gain, NMS, TD, metabolic syndrome) and benefits (attenuated psychosis, more organized thought process and behavior), patient was restarted on her outpatient medications Prolixin, Lithium and Olanzapine.  Prolixin was titrated to 5mg BID. Medication teaching provided, risk/benefits discussed regarding PALACIOS, patient consented to receiving her monthly outpatient Prolixin Deconate 50mg with good tolerability and effect.   She showed some improvement in her symptoms, with a gradual reduction of her internal preoccupation, aggression and thought disorganization.   Patient had no reported or observed adverse effects, such as akathisia, tremor, EPS.  Although there is still some evidence of internal preoccupation at baseline, patient has responded well to medication regimen, with good restoration of function.  Psychotic process has notably subsided. Once stabilized on her medications, she has remained in good behavioral control and has not required emergent intramuscular medications or seclusion / restraints.   She denied any suicidal or homicidal ideations throughout hospitalization.  Patient attended groups and milieu therapy.  In regards to her substance abuse, the patient was provided with motivational counseling to abstain from illicit substances, as these can directly worsen her mood and symptoms. The patient was provided with motivational counseling to tackle stressors and enhance coping skills.  Patient was provided with extensive psycho-education regarding importance of compliance with medications. At discharge patient has developed coping skills and able to identify protective factors.     PROCEDURES AND TREATMENT:  1.  Individual and group psychotherapy.  2.  Psychopharmacologic management.  3. Motivational counseling.   Communication with patient ACT Team was continuous throughout her course of treatment.  For the purpose of professional collaboration, psychoeducation, outpatient treatment plan and medication management.    ADDITIONAL RISK FACTORS CONSIDERED AT TIME OF DISCHARGE:  Patient is visible on the unit and is calm, cooperative, pleasant, AAOX3 upon approach. She is scheduled for discharge, returning to [facility] residence. No risk factors present upon discharge, patient left under no distress. On suicide risk assessment at the time of discharge, patient is at elevated chronic risk due the following factors: history of psychiatric illness, history on noncompliance, recent inpatient psychiatric admission.  Protective factors include patient denying any panic attacks, global insomnia, severe anhedonia or psychotic/manic symptoms. Patient has support social network and residential stability.  Patient denies any suicidal ideations, intent, or plan at the time of discharge.  Among other protective factors, patient is future-oriented, and received  PALACIOS prior to discharge.  Given the mitigation of aforementioned acute risk factors and considerable protective factors, patient's acute risk for self-harm has been minimized and is currently low.  There are no other acute risk factors that could be mitigated by further inpatient hospitalization.  Patient is not deemed to be an imminent danger to self or others at the time of discharge.  The patient has been instructed that should she develop thoughts of self-harm, suicidal thoughts, homicidal thoughts, aggression, agitation or any other distressing psychiatric symptoms, she should call 911 or go the emergency room. The patient has expressed understanding and is in agreement with the above plan.  Problem Pertinent Review of Symptoms/Associated Signs and Symptoms: There are no other acute risk factors that could be mitigated by further inpatient hospitalization.  Patient is not deemed to be an imminent danger to self or others at the time of discharge. Patient's clinical improvement of symptoms led the treatment team to conclude that the patient can be safely discharged to lower level of care to her residence and to her outpatient provider.  Psycho education was provided to the patient prior to discharge.  Patient received medication education at discharge. Psychoeducation was provided to patient with regards routing blood levels, and necessary bloodwork for Lithium. The patient was educated about the proper and safe use of medications as well as the risks and benefits of over and under dosing. The patient was told to go to the nearest emergency department or call her PMD or psychiatrist if she experienced any adverse side effects from any of the medications.  Encouraged patient to maintain medication compliance. Discussed pitfalls of treatment failure, and reinforced taking medication as directed. Discussed not stopping medications when she feels better, and processed discontinuation symptoms. Patient was informed of risk/benefit of medication, alternative treatment and no treatment.  Patient was educated about side effects of her medications, including EPS, TD.   Patient was provided with emergency phone numbers and were instructed to call 911 or go to the nearest ER for worsening of symptoms, dangerousness to self/others. Patient verbalized understanding.      Based on the above assessment, patient no longer warrants an inpatient psychiatric hospitalization.  Prognosis is guarded, given a history of inpatient psychiatric hospitalizations, history of chronic mental illness and history of noncompliance with medications. Future risk was minimized before discharge by treatment of acute mood symptoms, maximizing outpatient support, providing relevant patient education, discussing emergency procedures, and ensuring close follow-up. Although the patient remains at her chronic baseline risk of self-harm, such risk cannot be further ameliorated by continued inpatient treatment and the patient is therefore appropriate for discharge. The treatment regimen in combination with other treatment modalities led to: Improved reality testing. Improved mood and behavior stabilization. Symptoms reduction and stabilization. Functional improvement. Improved socialization. Positive thinking and gaining insight.       Date Admitted: 12/6 /20  Date Discharged: 1/5/21  Patient was admitted to [facility] inpatient service on 9.39 legal status. However during course of treatment patient was converted to [facility]. Patient's labs were grossly WNL.   Patient admitted with a diagnosis of  Bipolar I Disorder.  Patient was BIBEMS from [facility] for violent behavior.  When asked what brought pt to the ED, she responds "eugenics." She says that the psychiatric association has been doing eugenics for years based off of Malachi's theories, and she is interested in helping. She says "I need to eradicate people." When asked how, she says "it's already eradicated." Reports "agitated" mood every day. Reports poor sleep, <5 hr nightly for a while. Denies other depressive or manic sx. Denies SI/HI at this time. Denies AVH. Reports she stopped meds 3 months ago and says "I need to be reprogrammed so I don't get agitated."  Staff reports that they called EMS because earlier the pt had a knife in her hand during dinner at the residence and was cutting walls at dinner time, saying she was cutting vegetables. The pt then later was threatening to kill another resident unprovoked. When EMS came, pt was found to have another knife and a long metal pole weapon in her bag. She hasn't taken any medications in 3 months. Additional collateral obtained from pt's  Kedar Buckner ([phone]). She reports pt has been breaking property at the residence and today threatened to kill another resident. Police found 2 knives in her bag. She has been talking to self, decompensating for months.  On admission interview, patient was profoundly disorganized, aggressive and preoccupied with ongoing perceptual disturbances; minimal information could be elicited, unwilling to participate in interview. Initial part of her treatment course patient refused interview, and was extremely vernally aggressive with staff.  She refused medications and verbally attacked nursing team when offered medications, often requiring PES and po prns.  Following a prolonged discussion regarding risks (including but not limited to EPS, weight gain, NMS, TD, metabolic syndrome) and benefits (attenuated psychosis, more organized thought process and behavior), patient was restarted on her outpatient medications Prolixin, Lithium and Olanzapine.  Prolixin was titrated to 5mg BID. Medication teaching provided, risk/benefits discussed regarding PALACIOS, patient consented to receiving her monthly outpatient Prolixin Deconate 50mg with good tolerability and effect.   She showed some improvement in her symptoms, with a gradual reduction of her internal preoccupation, aggression and thought disorganization.   Patient had no reported or observed adverse effects, such as akathisia, tremor, EPS.  Although there is still some evidence of internal preoccupation at baseline, patient has responded well to medication regimen, with good restoration of function.  Psychotic process has notably subsided. Once stabilized on her medications, she has remained in good behavioral control and has not required emergent intramuscular medications or seclusion / restraints.   She denied any suicidal or homicidal ideations throughout hospitalization.  Patient attended groups and milieu therapy.  In regards to her substance abuse, the patient was provided with motivational counseling to abstain from illicit substances, as these can directly worsen her mood and symptoms. The patient was provided with motivational counseling to tackle stressors and enhance coping skills.  Patient was provided with extensive psycho-education regarding importance of compliance with medications. At discharge patient has developed coping skills and able to identify protective factors.     PROCEDURES AND TREATMENT:  1.  Individual and group psychotherapy.  2.  Psychopharmacologic management.  3. Motivational counseling.   Communication with patient ACT Team was continuous throughout her course of treatment.  For the purpose of professional collaboration, psychoeducation, outpatient treatment plan and medication management.    ADDITIONAL RISK FACTORS CONSIDERED AT TIME OF DISCHARGE:  Patient is visible on the unit and is calm, cooperative, pleasant, AAOX3 upon approach. She is scheduled for discharge, returning to [facility] residence. No risk factors present upon discharge, patient left under no distress. On suicide risk assessment at the time of discharge, patient is at elevated chronic risk due the following factors: history of psychiatric illness, history on noncompliance, recent inpatient psychiatric admission.  Protective factors include patient denying any panic attacks, global insomnia, severe anhedonia or psychotic/manic symptoms. Patient has support social network and residential stability.  Patient denies any suicidal ideations, intent, or plan at the time of discharge.  Among other protective factors, patient is future-oriented, and received  PALACIOS prior to discharge.  Given the mitigation of aforementioned acute risk factors and considerable protective factors, patient's acute risk for self-harm has been minimized and is currently low.  There are no other acute risk factors that could be mitigated by further inpatient hospitalization.  Patient is not deemed to be an imminent danger to self or others at the time of discharge.  The patient has been instructed that should she develop thoughts of self-harm, suicidal thoughts, homicidal thoughts, aggression, agitation or any other distressing psychiatric symptoms, she should call 911 or go the emergency room. The patient has expressed understanding and is in agreement with the above plan.  Problem Pertinent Review of Symptoms/Associated Signs and Symptoms: There are no other acute risk factors that could be mitigated by further inpatient hospitalization.  Patient is not deemed to be an imminent danger to self or others at the time of discharge. Patient's clinical improvement of symptoms led the treatment team to conclude that the patient can be safely discharged to lower level of care to her residence and to her outpatient provider.  Psycho education was provided to the patient prior to discharge.  Patient received medication education at discharge. Psychoeducation was provided to patient with regards routing blood levels, and necessary bloodwork for Lithium. The patient was educated about the proper and safe use of medications as well as the risks and benefits of over and under dosing. The patient was told to go to the nearest emergency department or call her PMD or psychiatrist if she experienced any adverse side effects from any of the medications.  Encouraged patient to maintain medication compliance. Discussed pitfalls of treatment failure, and reinforced taking medication as directed. Discussed not stopping medications when she feels better, and processed discontinuation symptoms. Patient was informed of risk/benefit of medication, alternative treatment and no treatment.  Patient was educated about side effects of her medications, including EPS, TD.   Patient was provided with emergency phone numbers and were instructed to call 911 or go to the nearest ER for worsening of symptoms, dangerousness to self/others. Patient verbalized understanding.  Based on the above assessment, patient no longer warrants an inpatient psychiatric hospitalization.  Prognosis is guarded, given a history of inpatient psychiatric hospitalizations, history of chronic mental illness and history of noncompliance with medications. Future risk was minimized before discharge by treatment of acute mood symptoms, maximizing outpatient support, providing relevant patient education, discussing emergency procedures, and ensuring close follow-up. Although the patient remains at her chronic baseline risk of self-harm, such risk cannot be further ameliorated by continued inpatient treatment and the patient is therefore appropriate for discharge. The treatment regimen in combination with other treatment modalities led to: Improved reality testing. Improved mood and behavior stabilization. Symptoms reduction and stabilization. Functional improvement. Improved socialization. Positive thinking and gaining insight.    Discharge Pan:  -Patient given 4 weeks supply of medications. Risk, benefits and alternatives discussed with patient. Patient verbalized understanding and in accord with aftercare recommendations.   -Patient instructed to call 911 or go to nearest ER in case of emergency.   -Patient given Suicide Prevention Lifeline number 1-231.574.2023 and provided instructions on its use  -Patient will follow up with outpatient appointments.

## 2020-12-15 NOTE — BH INPATIENT PSYCHIATRY DISCHARGE NOTE - NSBHDCMEDICALFT_PSY_A_CORE
Patient is diagnosed with Asthma, GERD, Obesity and Fibroids. During the course of treatment, collaborated with medical team to manage medical issues.   At the time of this treatment summary, the patient has not had any acute medical changes during her hospitalization. There have been no other medical consultations. Patient was discharge with COVID 19 negative results. No cough, SOB, CP, or fever at time of discharge. Vitals WNL.

## 2020-12-15 NOTE — BH INPATIENT PSYCHIATRY DISCHARGE NOTE - NSBHDCRISKMITIGATE_PSY_ALL_CORE
Safety planning/Referral to case management/Family/Other social support involvement/Medications targeting suicidality/non-suicidal self injurious behavior

## 2020-12-15 NOTE — BH INPATIENT PSYCHIATRY DISCHARGE NOTE - NSDCRECOMMEND_PSY_ALL_CORE
Unrestricted diet/activity Unrestricted diet/activity/Recommended laboratory tests/other investigations following discharge...

## 2020-12-15 NOTE — BH INPATIENT PSYCHIATRY DISCHARGE NOTE - DESCRIPTION
currently lives in [facility], [address] [facility] [address] MIGUEL ANGEL reports no contact with family, currently unemployed.

## 2020-12-15 NOTE — BH INPATIENT PSYCHIATRY DISCHARGE NOTE - REASON FOR ADMISSION
Patient was brought into ED from [facility] for violent behavior in context of noncompliance with medications

## 2020-12-15 NOTE — BH INPATIENT PSYCHIATRY DISCHARGE NOTE - HPI (INCLUDE ILLNESS QUALITY, SEVERITY, DURATION, TIMING, CONTEXT, MODIFYING FACTORS, ASSOCIATED SIGNS AND SYMPTOMS)
Pt is a 39 y/o single female, unemployed, domiciled on [facility] [address] [facility] [facility] [address], w/ PMHx GERD, asthma, w/ PPHx bipolar 1 disorder, borderline personality disorder, hx cannabis use d/o, w/ hx multiple inpt psych hospitalizations (>20, most recently discharged from [facility] on 6/9/20), w/ frequent visits to [facility] (well-known to staff), w/ ACT team, w/ hx 3 prior SA's (last in 2012 via OD), w/ hx recurrent and chronic suicidal gestures and suicidal ideation; with a history of property destruction when upset, past legal issues, no access to weapons; BIBEMS from [facility] for violent behavior.     When asked what brought pt to the ED, she responds "eugenics." She says that the psychiatric association has been doing eugenics for years based off of Malachi's theories, and she is interested in helping. She says "I need to eradicate people." When asked how, she says "it's already eradicated." Reports "agitated" mood every day. Reports poor sleep, <5 hr nightly for a while. Denies other depressive or manic sx. Denies SI/HI at this time. Denies AVH. Reports she stopped meds 3 months ago and says "I need to be reprogrammed so I don't get agitated."     Called [facility] [address], ([phone]). Staff reports that they called EMS because earlier the pt had a knife in her hand during dinner at the residence and was cutting walls at dinner time, saying she was cutting vegetables. The pt then later was threatening to kill another resident unprovoked. When EMS came, pt was found to have another knife and a long metal pole weapon in her bag. She hasn't taken any medications in 3 months. Additional collateral obtained from pt's  Kedar Buckner ([phone]). She reports pt has been breaking property at the residence and today threatened to kill another resident. Police found 2 knives in her bag. She has been talking to self, decompensating for months.

## 2020-12-16 PROCEDURE — 99232 SBSQ HOSP IP/OBS MODERATE 35: CPT

## 2020-12-16 RX ORDER — IBUPROFEN 200 MG
600 TABLET ORAL EVERY 6 HOURS
Refills: 0 | Status: DISCONTINUED | OUTPATIENT
Start: 2020-12-16 | End: 2021-01-05

## 2020-12-16 RX ORDER — CLONAZEPAM 1 MG
0.5 TABLET ORAL
Refills: 0 | Status: DISCONTINUED | OUTPATIENT
Start: 2020-12-16 | End: 2020-12-23

## 2020-12-16 RX ADMIN — HALOPERIDOL DECANOATE 5 MILLIGRAM(S): 100 INJECTION INTRAMUSCULAR at 13:45

## 2020-12-16 RX ADMIN — Medication 600 MILLIGRAM(S): at 16:11

## 2020-12-16 RX ADMIN — Medication 2 MILLIGRAM(S): at 13:45

## 2020-12-16 RX ADMIN — FLUPHENAZINE HYDROCHLORIDE 10 MILLIGRAM(S): 1 TABLET, FILM COATED ORAL at 13:46

## 2020-12-16 RX ADMIN — Medication 50 MILLIGRAM(S): at 21:43

## 2020-12-16 RX ADMIN — Medication 600 MILLIGRAM(S): at 15:11

## 2020-12-16 RX ADMIN — Medication 50 MILLIGRAM(S): at 13:46

## 2020-12-16 RX ADMIN — HALOPERIDOL DECANOATE 5 MILLIGRAM(S): 100 INJECTION INTRAMUSCULAR at 21:44

## 2020-12-16 RX ADMIN — LITHIUM CARBONATE 450 MILLIGRAM(S): 300 TABLET, EXTENDED RELEASE ORAL at 13:46

## 2020-12-16 NOTE — BH INPATIENT PSYCHIATRY PROGRESS NOTE - NSBHCONSBHPROVDETAILS_PSY_A_CORE  FT
12/09:Attempt made to call  Kedar Buckner at [phone]  12/10: Second attempt to call  Kedar Buckner at [facility] LEFT VM  12/11/20: Attempts made to contact outpatient ACT Team.  Left VM for ACT Team NP Allie Bernal at [phone].    12/16/20: Professional collateral obtained from outpatient ACT Team NP Allie Bernal at [phone].    12/15/20: Spoke with ACT Team member Jennifer Nettles at [phone].

## 2020-12-16 NOTE — BH INPATIENT PSYCHIATRY PROGRESS NOTE - NSTXPSYCHOINTERMD_PSY_ALL_CORE
Continue with current medication regimen. Increase Prolixin 10mg BId, increase Lithium 450mg BID  Encourage medication compliance  Consider PALACIOS Prolixin  Possible MOO  >Continue treatment for risk modifications  >Obs: Routine, no current SI. no need for CO, patient not expected to pose risk to self  in controlled inpatient setting.   >Continue to monitor for risk of aggression  >Treatment: Group therapy and individual therapy  >Continue to provide therapeutic interventions in support of treatment goals

## 2020-12-16 NOTE — BH INPATIENT PSYCHIATRY PROGRESS NOTE - OTHER
Dressed in layers, wearing fur coat indoors. At times patient is disrobing in hallway. Stained hospital gown from menses. Patient is refusing to wear sanitary napkin Sexual provocative. More verbally aggression, remains labile,  threatening, increasing trina Appears to be in a psychotic manic state.  Mood fluctuating. Angry ,  Animated, angry, expansive affect Patient is becoming more disorganized disorganized, appears internally preoccupied, talking to self Difficult to tolerate interview, walks away from Provider

## 2020-12-16 NOTE — BH INPATIENT PSYCHIATRY PROGRESS NOTE - NSBHFUPINTERVALHXFT_PSY_A_CORE
Patient is followed up for Bipolar Disorder, presents with trina, disorganization, aggression and psychosis.   Chart, medications and labs reviewed.  Patient is discussed in treatment team.  No significant overnight issues.  No appreciated change in status today. Patient remains disorganized, with ongoing perceptual disturbances. Psychotic symptoms successfully elicited; patient is observed talking to self, RIS, appears internally preoccupied, and disorganized. She is observed in hallway, dancing provocatively, remains hyperactive, intrusive on the unit, manic. Some evidence of attention seeking behaviors on the unit.  Psychotic symptoms remains in same intensity and frequency.  Patient initially refuses interview, however, walks up to Provider stating “I took my medications” Per nursing patient refused all standing medications last night and this morning.   Unable to fully participate in interview due to severity of psychotic symptoms, walks away when writer attempts to talk to her further, pt reports “take me to court.” Per nursing no mention of sleep disturbances or appetite concerns.  Self- care remains an issue, dressed in layers of hospital gowns, a bit disheveled, not malodorous.  Has not had behavioral issues on unit today, requiring emergent IM medications, however patient remains labile, unpredictable, hostile.  Outbursts with peers seems slightly in better control.  Patient reports being angry at staff because staff gave her IM over the weekend due to altercation with peer. Patient blames peer for altercation.   Patient also started her menses today, and is refusing to wear sanitary napkin. Patient is given supplies and when asked by staff to wear sanitary napkin patient shouts “I don’t want to be bother.”   Addendum: Professional collateral obtained from outpatient NP Allie Bernal at [place] [phone].  Discussed precipitating events leading to admission. Reviewed treatment plan/goals, and medication management discussed.  In brief, per NP patient has hx of being on AOT, did well on AOT.  However, AOT was discontinued approximately 6 months ago, since then patient has been intermittently compliant with her medications, more recently 2 months ago patient totally self discontinued all her medications (Klonopin 0.5mg BID, Prolixin 5mg BID, Lithium. Has hx of Prolixing Dec 50mg. When patient decompensate she becomes aggressive.  Past several months patient has been destructive to property at her residence, hostile and aggressive with staff, with multiple ED visit. Per NP patient does not like her residence and wants to be her own payee.

## 2020-12-16 NOTE — BH INPATIENT PSYCHIATRY PROGRESS NOTE - NSBHCHARTREVIEWVS_PSY_A_CORE FT
Vital Signs Last 24 Hrs  T(C): 36.2 (15 Dec 2020 19:55), Max: 36.5 (15 Dec 2020 15:09)  T(F): 97.1 (15 Dec 2020 19:55), Max: 97.7 (15 Dec 2020 15:09)  HR: --  BP: --  BP(mean): --  RR: --  SpO2: --

## 2020-12-17 RX ADMIN — FLUPHENAZINE HYDROCHLORIDE 10 MILLIGRAM(S): 1 TABLET, FILM COATED ORAL at 09:21

## 2020-12-17 RX ADMIN — FLUPHENAZINE HYDROCHLORIDE 10 MILLIGRAM(S): 1 TABLET, FILM COATED ORAL at 23:00

## 2020-12-17 RX ADMIN — Medication 2 MILLIGRAM(S): at 23:00

## 2020-12-17 RX ADMIN — LITHIUM CARBONATE 450 MILLIGRAM(S): 300 TABLET, EXTENDED RELEASE ORAL at 09:21

## 2020-12-17 RX ADMIN — LITHIUM CARBONATE 450 MILLIGRAM(S): 300 TABLET, EXTENDED RELEASE ORAL at 23:00

## 2020-12-17 RX ADMIN — OLANZAPINE 7.5 MILLIGRAM(S): 15 TABLET, FILM COATED ORAL at 23:02

## 2020-12-17 RX ADMIN — Medication 0.5 MILLIGRAM(S): at 22:59

## 2020-12-17 RX ADMIN — Medication 0.5 MILLIGRAM(S): at 09:21

## 2020-12-18 PROCEDURE — 99232 SBSQ HOSP IP/OBS MODERATE 35: CPT

## 2020-12-18 RX ORDER — DIPHENHYDRAMINE HCL 50 MG
50 CAPSULE ORAL ONCE
Refills: 0 | Status: DISCONTINUED | OUTPATIENT
Start: 2020-12-18 | End: 2021-01-05

## 2020-12-18 RX ADMIN — HALOPERIDOL DECANOATE 5 MILLIGRAM(S): 100 INJECTION INTRAMUSCULAR at 17:45

## 2020-12-18 RX ADMIN — Medication 50 MILLIGRAM(S): at 17:45

## 2020-12-18 RX ADMIN — Medication 50 MILLIGRAM(S): at 10:20

## 2020-12-18 RX ADMIN — Medication 2 MILLIGRAM(S): at 10:21

## 2020-12-18 RX ADMIN — Medication 2 MILLIGRAM(S): at 17:45

## 2020-12-18 RX ADMIN — HALOPERIDOL DECANOATE 5 MILLIGRAM(S): 100 INJECTION INTRAMUSCULAR at 10:20

## 2020-12-18 RX ADMIN — FLUPHENAZINE HYDROCHLORIDE 10 MILLIGRAM(S): 1 TABLET, FILM COATED ORAL at 10:20

## 2020-12-18 NOTE — BH INPATIENT PSYCHIATRY PROGRESS NOTE - NSBHCONSBHPROVDETAILS_PSY_A_CORE  FT
12/09:Attempt made to call  Kedar Buckner at [phone]  12/10: Second attempt to call  Kedar Buckner at [facility] LEFT VM  12/11/20: Attempts made to contact outpatient ACT Team.  Left VM for ACT Team NP Allie Bernal at [phone].    12/16/20: Professional collateral obtained from outpatient ACT Team NP Allie Bernal at [phone].    12/15/20: Spoke with ACT Team member Jennifer Nettles at [phone].

## 2020-12-18 NOTE — BH INPATIENT PSYCHIATRY PROGRESS NOTE - NSBHFUPINTERVALHXFT_PSY_A_CORE
Patient is followed up for Bipolar Disorder, presents with trina, disorganization, aggression and psychosis. Charts, medications, labs reviewed. Patient discussed with treatment team. No significant overnight issues. No appreciated change in status today. Patient remains internally preoccupied and very disorganized. Patient observed talking to herself dancing provocatively in hallways and continues to be aggressive towards staff. Appears in a manic, hyperactive state. Symptoms show no improvement. Continues to be attention seeking on unit, asking if she can do her makeup. Self-care remains an issue. Dressed in a few layers of her own clothing. Blood seen soaking through pants. Behavioral issue this AM around 9:15am requiring emergent IM medications to be delivered. Patient refused vitals as per nursing but did take medications. Patient refused interview says "I don't care and I don't want to talk." Patient is followed up for Bipolar Disorder, presents with trina, disorganization, aggression and psychosis. Charts, medications, labs reviewed. Patient discussed with treatment team. No significant overnight issues. No appreciated change in status today. Patient remains internally preoccupied and very disorganized. Patient observed talking to herself dancing provocatively in hallways and continues to be aggressive towards staff. Appears in a manic, hyperactive state. Symptoms show no improvement. Continues to be attention seeking on unit, asking if she can do her makeup. Self-care remains an issue. Dressed in a few layers of her own clothing. Blood seen soaking through pants, refusing to use sanitary napkins while menstrating. Behavioral issue this AM around 9:15am requiring emergent IM medications to be delivered. Patient refused vitals as per nursing but did take medications. Patient refused interview says "I don't care and I don't want to talk."

## 2020-12-18 NOTE — BH INPATIENT PSYCHIATRY PROGRESS NOTE - NSBHCHARTREVIEWVS_PSY_A_CORE FT
Vital Signs Last 24 Hrs  T(C): --  T(F): --  HR: --  BP: --  BP(mean): --  RR: 17 (18 Dec 2020 11:11) (17 - 18)  SpO2: 100% (18 Dec 2020 11:11) (99% - 100%)

## 2020-12-18 NOTE — BH INPATIENT PSYCHIATRY PROGRESS NOTE - OTHER
Sexual provocative. More verbally aggression, remains labile,  threatening, increasing trina Dressed in layers, wearing fur coat indoors. At times patient is disrobing in hallway. Stained hospital gown from menses. Patient is refusing to wear sanitary napkin Appears to be in a psychotic manic state.  Mood fluctuating. Animated, angry, expansive affect Patient is becoming more disorganized disorganized, appears internally preoccupied, talking to self Difficult to tolerate interview, walks away from Provider Angry ,

## 2020-12-19 RX ADMIN — FLUPHENAZINE HYDROCHLORIDE 10 MILLIGRAM(S): 1 TABLET, FILM COATED ORAL at 06:43

## 2020-12-19 RX ADMIN — HALOPERIDOL DECANOATE 5 MILLIGRAM(S): 100 INJECTION INTRAMUSCULAR at 06:26

## 2020-12-19 RX ADMIN — LITHIUM CARBONATE 450 MILLIGRAM(S): 300 TABLET, EXTENDED RELEASE ORAL at 06:42

## 2020-12-19 RX ADMIN — Medication 50 MILLIGRAM(S): at 13:00

## 2020-12-19 RX ADMIN — FLUPHENAZINE HYDROCHLORIDE 10 MILLIGRAM(S): 1 TABLET, FILM COATED ORAL at 20:58

## 2020-12-19 RX ADMIN — HALOPERIDOL DECANOATE 5 MILLIGRAM(S): 100 INJECTION INTRAMUSCULAR at 20:59

## 2020-12-19 RX ADMIN — LITHIUM CARBONATE 450 MILLIGRAM(S): 300 TABLET, EXTENDED RELEASE ORAL at 20:59

## 2020-12-19 RX ADMIN — Medication 0.5 MILLIGRAM(S): at 06:41

## 2020-12-19 RX ADMIN — Medication 50 MILLIGRAM(S): at 06:26

## 2020-12-19 RX ADMIN — Medication 2 MILLIGRAM(S): at 06:32

## 2020-12-19 RX ADMIN — HALOPERIDOL DECANOATE 5 MILLIGRAM(S): 100 INJECTION INTRAMUSCULAR at 13:00

## 2020-12-19 RX ADMIN — Medication 0.5 MILLIGRAM(S): at 20:58

## 2020-12-19 RX ADMIN — Medication 50 MILLIGRAM(S): at 20:58

## 2020-12-19 RX ADMIN — OLANZAPINE 7.5 MILLIGRAM(S): 15 TABLET, FILM COATED ORAL at 20:58

## 2020-12-19 NOTE — BH INPATIENT PSYCHIATRY PROGRESS NOTE - NSBHFUPINTERVALHXFT_PSY_A_CORE
Patient is followed up for Bipolar Disorder, presents with trina, disorganization, aggression and psychosis.   Chart, medications and labs reviewed.  Patient is discussed with nursing.  No significant overnight issues.  No appreciated change in status today. Patient remains high risk for aggression, disorganized, with ongoing perceptual disturbances. Patient refuses interview. Per nursing no mention of sleep disturbances or appetite concerns.  Patient continues to refuse standing medications.

## 2020-12-19 NOTE — BH INPATIENT PSYCHIATRY PROGRESS NOTE - NSBHCONSBHPROVDETAILS_PSY_A_CORE  FT
12/09:Attempt made to call  Kedar Buckner at [phone]  12/10: Second attempt to call  Kedar Buckner at [facility] LEFT VM  12/11/20: Attempts made to contact outpatient ACT Team.  Left VM for ACT Team NP Allie Brenal at [phone].    12/16/20: Professional collateral obtained from outpatient ACT Team NP Allie Bernal at [phone].    12/15/20: Spoke with ACT Team member Jennifer Nettles at [phone].

## 2020-12-19 NOTE — BH INPATIENT PSYCHIATRY PROGRESS NOTE - NSBHCHARTREVIEWVS_PSY_A_CORE FT
Vital Signs Last 24 Hrs  T(C): 36.6 (18 Dec 2020 22:01), Max: 36.6 (18 Dec 2020 22:01)  T(F): 97.8 (18 Dec 2020 22:01), Max: 97.8 (18 Dec 2020 22:01)  HR: --  BP: --  BP(mean): --  RR: 17 (18 Dec 2020 22:01) (17 - 17)  SpO2: 100% (18 Dec 2020 11:11) (100% - 100%)

## 2020-12-19 NOTE — BH INPATIENT PSYCHIATRY PROGRESS NOTE - OTHER
Animated, angry, expansive affect Appears to be in a psychotic manic state.  Mood fluctuating. Difficult to tolerate interview, walks away from Provider disorganized, appears internally preoccupied, talking to self Dressed in layers, wearing fur coat indoors. At times patient is disrobing in hallway. Stained hospital gown from menses. Patient is refusing to wear sanitary napkin Angry ,  Sexual provocative. More verbally aggression, remains labile,  threatening, increasing trina Patient is becoming more disorganized

## 2020-12-19 NOTE — BH INPATIENT PSYCHIATRY PROGRESS NOTE - NSBHCONSDANGEROTHERS_PSY_A_CORE
assaultive threats with plan and means/high risk for assault/assaultive behavior
high risk for assault/assaultive behavior
assaultive behavior/high risk for assault
high risk for assault
assaultive behavior/high risk for assault
assaultive behavior

## 2020-12-20 RX ADMIN — HALOPERIDOL DECANOATE 5 MILLIGRAM(S): 100 INJECTION INTRAMUSCULAR at 08:12

## 2020-12-20 RX ADMIN — Medication 600 MILLIGRAM(S): at 20:48

## 2020-12-20 RX ADMIN — FLUPHENAZINE HYDROCHLORIDE 10 MILLIGRAM(S): 1 TABLET, FILM COATED ORAL at 08:11

## 2020-12-20 RX ADMIN — Medication 50 MILLIGRAM(S): at 08:12

## 2020-12-20 RX ADMIN — Medication 0.5 MILLIGRAM(S): at 08:11

## 2020-12-20 RX ADMIN — Medication 600 MILLIGRAM(S): at 21:50

## 2020-12-20 RX ADMIN — Medication 2 MILLIGRAM(S): at 08:12

## 2020-12-20 RX ADMIN — OLANZAPINE 7.5 MILLIGRAM(S): 15 TABLET, FILM COATED ORAL at 20:48

## 2020-12-20 RX ADMIN — FLUPHENAZINE HYDROCHLORIDE 10 MILLIGRAM(S): 1 TABLET, FILM COATED ORAL at 20:48

## 2020-12-20 RX ADMIN — LITHIUM CARBONATE 450 MILLIGRAM(S): 300 TABLET, EXTENDED RELEASE ORAL at 08:12

## 2020-12-20 RX ADMIN — LITHIUM CARBONATE 450 MILLIGRAM(S): 300 TABLET, EXTENDED RELEASE ORAL at 20:49

## 2020-12-20 RX ADMIN — Medication 0.5 MILLIGRAM(S): at 20:48

## 2020-12-21 PROCEDURE — 99232 SBSQ HOSP IP/OBS MODERATE 35: CPT

## 2020-12-21 RX ADMIN — Medication 50 MILLIGRAM(S): at 12:50

## 2020-12-21 RX ADMIN — Medication 0.5 MILLIGRAM(S): at 12:50

## 2020-12-21 RX ADMIN — OLANZAPINE 7.5 MILLIGRAM(S): 15 TABLET, FILM COATED ORAL at 21:22

## 2020-12-21 RX ADMIN — FLUPHENAZINE HYDROCHLORIDE 10 MILLIGRAM(S): 1 TABLET, FILM COATED ORAL at 12:50

## 2020-12-21 RX ADMIN — FLUPHENAZINE HYDROCHLORIDE 10 MILLIGRAM(S): 1 TABLET, FILM COATED ORAL at 21:22

## 2020-12-21 RX ADMIN — HALOPERIDOL DECANOATE 5 MILLIGRAM(S): 100 INJECTION INTRAMUSCULAR at 12:50

## 2020-12-21 RX ADMIN — LITHIUM CARBONATE 450 MILLIGRAM(S): 300 TABLET, EXTENDED RELEASE ORAL at 21:22

## 2020-12-21 RX ADMIN — Medication 0.5 MILLIGRAM(S): at 21:22

## 2020-12-21 RX ADMIN — LITHIUM CARBONATE 450 MILLIGRAM(S): 300 TABLET, EXTENDED RELEASE ORAL at 12:49

## 2020-12-21 NOTE — BH INPATIENT PSYCHIATRY PROGRESS NOTE - OTHER
Appears to be in a psychotic manic state.  Mood fluctuating. disorganized, appears internally preoccupied, talking to self, but improving Dressed in layers, wearing fur coat indoors. At times patient is disrobing in hallway. Stained hospital gown from menses. Patient is refusing to wear sanitary napkin Less Animated, angry, expansive affect

## 2020-12-21 NOTE — BH INPATIENT PSYCHIATRY PROGRESS NOTE - NSBHCHARTREVIEWVS_PSY_A_CORE FT
Vital Signs Last 24 Hrs  T(C): 36.3 (20 Dec 2020 22:18), Max: 36.7 (20 Dec 2020 18:51)  T(F): 97.4 (20 Dec 2020 22:18), Max: 98 (20 Dec 2020 18:51)  HR: --refused  BP: --refused  BP(mean): --  RR: 18 (20 Dec 2020 20:42) (18 - 18)  SpO2: 100% (20 Dec 2020 20:42) (100% - 100%)

## 2020-12-21 NOTE — ED PROVIDER NOTE - CARDIAC, MLM
12/20/20 4583   C-SSRS (Frequent Screen)   2. Have you actually had any thoughts of killing yourself? No   6. Have you done anything, started to do anything, or prepared to do anything to end your life? No   Suicide Evaluation Negative screen= no ideation, behaviors or history     Nursing Suicide Assessment Note - Inpatient    Current assessment:    Current C-SSRS score: (P) Negative screen= no ideation, behaviors or history      Protective Factors / Reason for Living: Responsibility to children, Nondenominational beliefs    Interventions:   · Implemented the Safety / Recovery Plan    Other Interventions Implemented:  · Pt sleeping, maintain current plan or care   Normal rate, regular rhythm.  Heart sounds S1, S2.  No murmurs, rubs or gallops.

## 2020-12-21 NOTE — BH INPATIENT PSYCHIATRY PROGRESS NOTE - NSBHFUPINTERVALHXFT_PSY_A_CORE
Patient is followed up for Schizophrenia, admitted for psychosis, and aggression.   Chart, medications and labs reviewed.  Patient is discussed in treatment team. Over the weekend patient became a bit irritated at her nurse because patient felt nurse was taking too long to give her  her morning medications, after nurse gave patient her medications patient threw a chair.  Per nursing chair was not directed at the staff.  Improved rapport with writer today, patient is less dismissive, some decrease in aggression.  She is slightly calmer during interview.  Patient describes mood as “ok.”  Patient reports “I’m doing better right?”  Patient is given positive praise for better compliance with her medications.  Although improved, discussed her continued aggression on the unit.  Patient is working on obtaining rewards (headphones, her makeup, a wig, shaving privileges) if her behavior improves and remains on her medications and allows vitals.   Denies AVH/SI/HI.  Good sleep and appetite.  Remains more compliant with medications. No reported or observed adverse effects.  Encouraged patient to continue medication regimen. Briefly discussed Prolixin PALACIOS, patient not consenting at this time.   Self- care remains fair.  Overall in good behavioral control, remains tenuous at times, but level of aggression has improved significantly.  Pt offers no complaints.

## 2020-12-21 NOTE — BH INPATIENT PSYCHIATRY PROGRESS NOTE - NSBHCONSBHPROVDETAILS_PSY_A_CORE  FT
12/09:Attempt made to call  Kedar Buckner at [phone]  12/10: Second attempt to call  Kedar Buckner at [facility] LEFT VM  12/11/20: Attempts made to contact outpatient ACT Team.  Left VM for ACT Team NP Allie Bernal at [phone].    12/16/20: Professional collateral obtained from outpatient ACT Team NP Allie Bernal at [phone].    12/15/20: Spoke with ACT Team member Jennifer Nettles at [phone].

## 2020-12-22 PROCEDURE — 99232 SBSQ HOSP IP/OBS MODERATE 35: CPT

## 2020-12-22 RX ORDER — FLUPHENAZINE HYDROCHLORIDE 1 MG/1
50 TABLET, FILM COATED ORAL ONCE
Refills: 0 | Status: COMPLETED | OUTPATIENT
Start: 2020-12-22 | End: 2020-12-22

## 2020-12-22 RX ORDER — LITHIUM CARBONATE 300 MG/1
900 TABLET, EXTENDED RELEASE ORAL AT BEDTIME
Refills: 0 | Status: DISCONTINUED | OUTPATIENT
Start: 2020-12-22 | End: 2021-01-05

## 2020-12-22 RX ADMIN — FLUPHENAZINE HYDROCHLORIDE 10 MILLIGRAM(S): 1 TABLET, FILM COATED ORAL at 08:00

## 2020-12-22 RX ADMIN — Medication 0.5 MILLIGRAM(S): at 21:59

## 2020-12-22 RX ADMIN — Medication 0.5 MILLIGRAM(S): at 08:00

## 2020-12-22 RX ADMIN — LITHIUM CARBONATE 900 MILLIGRAM(S): 300 TABLET, EXTENDED RELEASE ORAL at 21:59

## 2020-12-22 RX ADMIN — FLUPHENAZINE HYDROCHLORIDE 10 MILLIGRAM(S): 1 TABLET, FILM COATED ORAL at 22:02

## 2020-12-22 RX ADMIN — OLANZAPINE 7.5 MILLIGRAM(S): 15 TABLET, FILM COATED ORAL at 22:01

## 2020-12-22 RX ADMIN — LITHIUM CARBONATE 450 MILLIGRAM(S): 300 TABLET, EXTENDED RELEASE ORAL at 08:00

## 2020-12-22 RX ADMIN — FLUPHENAZINE HYDROCHLORIDE 50 MILLIGRAM(S): 1 TABLET, FILM COATED ORAL at 13:31

## 2020-12-22 NOTE — BH INPATIENT PSYCHIATRY PROGRESS NOTE - NSBHCHARTREVIEWVS_PSY_A_CORE FT
Vital Signs Last 24 Hrs  T(C): 36.8 (21 Dec 2020 20:44), Max: 36.8 (21 Dec 2020 20:44)  T(F): 98.3 (21 Dec 2020 20:44), Max: 98.3 (21 Dec 2020 20:44)  HR: --  BP: --  BP(mean): --  RR: --  SpO2: 100% (21 Dec 2020 23:37) (100% - 100%)

## 2020-12-22 NOTE — BH INPATIENT PSYCHIATRY PROGRESS NOTE - NSTXPSYCHOINTERMD_PSY_ALL_CORE
Continue with current medication regimen. Increase Prolixin 10mg BId, increase Lithium 450mg BID  Encourage medication compliance  Order Prolixin Dec 50mg PALACIOS today  Possible MOO  >Continue treatment for risk modifications  >Obs: Routine, no current SI. no need for CO, patient not expected to pose risk to self  in controlled inpatient setting.   >Continue to monitor for risk of aggression  >Treatment: Group therapy and individual therapy  >Continue to provide therapeutic interventions in support of treatment goals

## 2020-12-22 NOTE — BH INPATIENT PSYCHIATRY PROGRESS NOTE - NSBHFUPINTERVALHXFT_PSY_A_CORE
Chart, medications and labs reviewed.  Patient is discussed in treatment team. Continued improvement rapport with writer significant decrease in aggression and agitation.  She is slightly calmer during interview and is able to tolerate interview a bit better.  Patient describes mood as “Im doing better.”  Patient is given positive praise for better compliance with her medications.  Although improved, discussed her continued aggression on the unit.  Patient is working on obtaining rewards (headphones, her makeup, a wig, shaving privileges) if her behavior improves and remains on her medications and allows vitals. Patient was approved use of her make up today.  Denies AVH/SI/HI.  Good sleep and appetite.  Remains more compliant with medications. No reported or observed adverse effects.  Encouraged patient to continue medication regimen. Again discussed benefits of Prolixin PALACIOS, patient has consented to PALACIOS.  She will receive Prolixin Deconate 50mg today.  Self- care remains poor-fair, displays substandard grooming and hygiene. Overall in good behavioral control, remains tenuous at times, but level of aggression has improved significantly.  Pt offers no complaints, seems more invested in treatment interventions.

## 2020-12-22 NOTE — BH INPATIENT PSYCHIATRY PROGRESS NOTE - OTHER
Less Animated, angry, expansive affect Dressed in layers, wearing fur coat indoors. displays substandard grooming and hygiene displays substandard grooming and hygiene Appears to be in a psychotic manic state.  Mood fluctuating. disorganized, appears internally preoccupied, talking to self, but improving

## 2020-12-22 NOTE — BH INPATIENT PSYCHIATRY PROGRESS NOTE - NSBHCONSBHPROVDETAILS_PSY_A_CORE  FT
12/09:Attempt made to call  Kedar Buckner at [phone]  12/10: Second attempt to call  Kedar Buckner at [facility] LEFT VM  12/11/20: Attempts made to contact outpatient ACT Team.  Left VM for ACT Team NP Allie Bernal at [phone].    12/16/20: Professional collateral obtained from outpatient ACT Team NP Allie Bernal at [phone].    12/15/20: Spoke with ACT Team member Jennifer Nettles at [phone].

## 2020-12-23 LAB
A1C WITH ESTIMATED AVERAGE GLUCOSE RESULT: 5.6 % — SIGNIFICANT CHANGE UP (ref 4–5.6)
CHOLEST SERPL-MCNC: 148 MG/DL — SIGNIFICANT CHANGE UP
ESTIMATED AVERAGE GLUCOSE: 114 MG/DL — SIGNIFICANT CHANGE UP (ref 68–114)
HDLC SERPL-MCNC: 52 MG/DL — SIGNIFICANT CHANGE UP
LIPID PNL WITH DIRECT LDL SERPL: 76 MG/DL — SIGNIFICANT CHANGE UP
LITHIUM SERPL-MCNC: 0.6 MMOL/L — SIGNIFICANT CHANGE UP (ref 0.6–1.2)
NON HDL CHOLESTEROL: 96 MG/DL — SIGNIFICANT CHANGE UP
TRIGL SERPL-MCNC: 101 MG/DL — SIGNIFICANT CHANGE UP

## 2020-12-23 PROCEDURE — 99232 SBSQ HOSP IP/OBS MODERATE 35: CPT

## 2020-12-23 RX ORDER — OLANZAPINE 15 MG/1
10 TABLET, FILM COATED ORAL AT BEDTIME
Refills: 0 | Status: DISCONTINUED | OUTPATIENT
Start: 2020-12-23 | End: 2020-12-29

## 2020-12-23 RX ORDER — CLONAZEPAM 1 MG
0.5 TABLET ORAL
Refills: 0 | Status: DISCONTINUED | OUTPATIENT
Start: 2020-12-23 | End: 2020-12-30

## 2020-12-23 RX ADMIN — FLUPHENAZINE HYDROCHLORIDE 10 MILLIGRAM(S): 1 TABLET, FILM COATED ORAL at 08:10

## 2020-12-23 RX ADMIN — Medication 0.5 MILLIGRAM(S): at 08:10

## 2020-12-23 NOTE — BH INPATIENT PSYCHIATRY PROGRESS NOTE - NSBHCONSBHPROVDETAILS_PSY_A_CORE  FT
12/09:Attempt made to call  Kedar Buckner at [phone]  12/10: Second attempt to call  Kedar Buckner at [facility] LEFT VM  12/11/20: Attempts made to contact outpatient ACT Team.  Left VM for ACT Team NP Allie Bernal at [phone].    12/16/20: Professional collateral obtained from outpatient ACT Team NP Allie Bernal at [phone].    12/15/20: Spoke with ACT Team member Jennifer Nettles at [phone].

## 2020-12-23 NOTE — BH INPATIENT PSYCHIATRY PROGRESS NOTE - NSBHFUPINTERVALHXFT_PSY_A_CORE
Chart, medications and labs reviewed.  Patient is discussed in treatment team. Continued improvement rapport with writer significant decrease in aggression and agitation.  Level, intensity and frequency of aggression has significantly decreased and she is better, significantly calmer on unit.  Patient continues to work on obtaining rewards (headphones, her makeup, a wig, shaving privileges) if her behavior maintains in control, remains on her medications and allows vitals. Denies AVH/SI/HI.  Good sleep and appetite.  Remains compliant with medications. No reported or observed adverse effects.  Received her Prolixin PALACIOS, 50mg yesterday, will continue with one week oral overlap. Olanzapine will be titrated to 10mg qhs for tonight, remain on Lithium 900mg.  Blood work scheduled today for Lithium level.  Self- care remains poor-fair, displays substandard grooming and hygiene, however patient seems to be more interested in improving her ADL’s. Pt offers no complaints, seems more invested in treatment interventions. Symptoms present a few days ago has dissipated.

## 2020-12-23 NOTE — BH INPATIENT PSYCHIATRY PROGRESS NOTE - NSBHCHARTREVIEWVS_PSY_A_CORE FT
Vital Signs Last 24 Hrs  T(C): 36.7 (23 Dec 2020 08:05), Max: 36.7 (23 Dec 2020 08:05)  T(F): 98.1 (23 Dec 2020 08:05), Max: 98.1 (23 Dec 2020 08:05)  HR: 75 (23 Dec 2020 08:05) (75 - 75)  BP: 108/81 (23 Dec 2020 08:05) (108/81 - 108/81)  BP(mean): --  RR: 18 (22 Dec 2020 14:58) (18 - 18)  SpO2: 100% (22 Dec 2020 14:58) (100% - 100%)

## 2020-12-23 NOTE — BH INPATIENT PSYCHIATRY PROGRESS NOTE - OTHER
Mood is improving and stabilizing.  Dressed in layers, wearing fur coat indoors. displays substandard grooming and hygiene displays substandard grooming and hygiene disorganized, appears internally preoccupied, talking to self, but improving Patient is becoming less disorganized, TP is stabilizing

## 2020-12-24 PROCEDURE — 99232 SBSQ HOSP IP/OBS MODERATE 35: CPT

## 2020-12-24 RX ADMIN — Medication 2 MILLIGRAM(S): at 20:14

## 2020-12-24 RX ADMIN — Medication 2 MILLIGRAM(S): at 00:34

## 2020-12-24 RX ADMIN — Medication 50 MILLIGRAM(S): at 20:14

## 2020-12-24 RX ADMIN — FLUPHENAZINE HYDROCHLORIDE 10 MILLIGRAM(S): 1 TABLET, FILM COATED ORAL at 20:14

## 2020-12-24 RX ADMIN — HALOPERIDOL DECANOATE 5 MILLIGRAM(S): 100 INJECTION INTRAMUSCULAR at 20:14

## 2020-12-24 RX ADMIN — OLANZAPINE 10 MILLIGRAM(S): 15 TABLET, FILM COATED ORAL at 20:14

## 2020-12-24 RX ADMIN — Medication 0.5 MILLIGRAM(S): at 00:35

## 2020-12-24 RX ADMIN — HALOPERIDOL DECANOATE 5 MILLIGRAM(S): 100 INJECTION INTRAMUSCULAR at 00:36

## 2020-12-24 RX ADMIN — Medication 50 MILLIGRAM(S): at 00:36

## 2020-12-24 RX ADMIN — Medication 0.5 MILLIGRAM(S): at 20:14

## 2020-12-24 RX ADMIN — LITHIUM CARBONATE 900 MILLIGRAM(S): 300 TABLET, EXTENDED RELEASE ORAL at 20:14

## 2020-12-24 RX ADMIN — LITHIUM CARBONATE 900 MILLIGRAM(S): 300 TABLET, EXTENDED RELEASE ORAL at 00:35

## 2020-12-24 RX ADMIN — OLANZAPINE 10 MILLIGRAM(S): 15 TABLET, FILM COATED ORAL at 00:57

## 2020-12-24 RX ADMIN — FLUPHENAZINE HYDROCHLORIDE 10 MILLIGRAM(S): 1 TABLET, FILM COATED ORAL at 00:34

## 2020-12-24 NOTE — BH INPATIENT PSYCHIATRY PROGRESS NOTE - NSBHCHARTREVIEWVS_PSY_A_CORE FT
Vital Signs Last 24 Hrs  T(C): 36.7 (23 Dec 2020 23:30), Max: 36.7 (23 Dec 2020 23:30)  T(F): 98 (23 Dec 2020 23:30), Max: 98 (23 Dec 2020 23:30)  HR: --  BP: --  BP(mean): --  RR: 18 (23 Dec 2020 23:30) (18 - 18)  SpO2: 98% (23 Dec 2020 23:30) (98% - 98%)

## 2020-12-24 NOTE — BH INPATIENT PSYCHIATRY PROGRESS NOTE - OTHER
Mood is improving and stabilizing.  Patient is becoming less disorganized, TP is stabilizing displays substandard grooming and hygiene Dressed in layers, wearing fur coat indoors. displays improved grooming and hygiene disorganized, appears internally preoccupied, talking to self, but improving

## 2020-12-24 NOTE — BH INPATIENT PSYCHIATRY PROGRESS NOTE - NSBHFUPINTERVALHXFT_PSY_A_CORE
Chart, medications and labs reviewed.  Patient is discussed in treatment team, no interval events.  Continued improvements are evident, decrease in aggression and agitation.  Patient continues to work on obtaining rewards (headphones, her makeup, a wig, shaving privileges) if her behavior maintains in control, remains on her medications and allows vitals. Denies AVH/SI/HI. Less sexually provocative.  Good sleep and appetite.  Remains compliant with medications. No reported or observed adverse effects.  Received her Prolixin PALACIOS, 50mg,  will continue with one week oral overlap.  Self- care remains slightly improved, patient seems to be more interested in improving her ADL’s. Pt offers no complaints, seems more invested in treatment interventions. Symptoms present a few days ago has dissipated.

## 2020-12-24 NOTE — BH INPATIENT PSYCHIATRY PROGRESS NOTE - NSBHCONSBHPROVDETAILS_PSY_A_CORE  FT
12/09:Attempt made to call  Kedar Buckner at [phone]  12/10: Second attempt to call  Kedar Buckner at [facility] LEFT VM  12/11/20: Attempts made to contact outpatient ACT Team.  Left VM for ACT Team NP Allie Bernal at [phone].    12/16/20: Professional collateral obtained from outpatient ACT Team NP Allie Bernal at [phone].    12/15/20: Spoke with ACT Team member Jennifer Nettles at [phone].

## 2020-12-25 RX ADMIN — Medication 0.5 MILLIGRAM(S): at 08:25

## 2020-12-25 RX ADMIN — Medication 0.5 MILLIGRAM(S): at 21:16

## 2020-12-25 RX ADMIN — OLANZAPINE 10 MILLIGRAM(S): 15 TABLET, FILM COATED ORAL at 21:15

## 2020-12-25 RX ADMIN — FLUPHENAZINE HYDROCHLORIDE 10 MILLIGRAM(S): 1 TABLET, FILM COATED ORAL at 21:16

## 2020-12-25 RX ADMIN — Medication 2 MILLIGRAM(S): at 08:56

## 2020-12-25 RX ADMIN — Medication 2 MILLIGRAM(S): at 21:25

## 2020-12-25 RX ADMIN — FLUPHENAZINE HYDROCHLORIDE 10 MILLIGRAM(S): 1 TABLET, FILM COATED ORAL at 08:25

## 2020-12-25 RX ADMIN — HALOPERIDOL DECANOATE 5 MILLIGRAM(S): 100 INJECTION INTRAMUSCULAR at 08:56

## 2020-12-25 RX ADMIN — LITHIUM CARBONATE 900 MILLIGRAM(S): 300 TABLET, EXTENDED RELEASE ORAL at 21:15

## 2020-12-26 RX ADMIN — Medication 0.5 MILLIGRAM(S): at 08:04

## 2020-12-26 RX ADMIN — OLANZAPINE 10 MILLIGRAM(S): 15 TABLET, FILM COATED ORAL at 20:05

## 2020-12-26 RX ADMIN — FLUPHENAZINE HYDROCHLORIDE 10 MILLIGRAM(S): 1 TABLET, FILM COATED ORAL at 08:04

## 2020-12-26 RX ADMIN — Medication 50 MILLIGRAM(S): at 20:06

## 2020-12-26 RX ADMIN — LITHIUM CARBONATE 900 MILLIGRAM(S): 300 TABLET, EXTENDED RELEASE ORAL at 20:05

## 2020-12-26 RX ADMIN — Medication 0.5 MILLIGRAM(S): at 20:05

## 2020-12-26 RX ADMIN — FLUPHENAZINE HYDROCHLORIDE 10 MILLIGRAM(S): 1 TABLET, FILM COATED ORAL at 20:05

## 2020-12-26 RX ADMIN — Medication 2 MILLIGRAM(S): at 20:06

## 2020-12-26 RX ADMIN — HALOPERIDOL DECANOATE 5 MILLIGRAM(S): 100 INJECTION INTRAMUSCULAR at 20:05

## 2020-12-27 RX ADMIN — Medication 0.5 MILLIGRAM(S): at 08:04

## 2020-12-27 RX ADMIN — Medication 2 MILLIGRAM(S): at 20:17

## 2020-12-27 RX ADMIN — Medication 50 MILLIGRAM(S): at 20:17

## 2020-12-27 RX ADMIN — Medication 0.5 MILLIGRAM(S): at 20:16

## 2020-12-27 RX ADMIN — HALOPERIDOL DECANOATE 5 MILLIGRAM(S): 100 INJECTION INTRAMUSCULAR at 08:41

## 2020-12-27 RX ADMIN — FLUPHENAZINE HYDROCHLORIDE 10 MILLIGRAM(S): 1 TABLET, FILM COATED ORAL at 20:16

## 2020-12-27 RX ADMIN — LITHIUM CARBONATE 900 MILLIGRAM(S): 300 TABLET, EXTENDED RELEASE ORAL at 20:17

## 2020-12-27 RX ADMIN — Medication 50 MILLIGRAM(S): at 08:41

## 2020-12-27 RX ADMIN — HALOPERIDOL DECANOATE 5 MILLIGRAM(S): 100 INJECTION INTRAMUSCULAR at 20:17

## 2020-12-27 RX ADMIN — OLANZAPINE 10 MILLIGRAM(S): 15 TABLET, FILM COATED ORAL at 20:16

## 2020-12-27 RX ADMIN — FLUPHENAZINE HYDROCHLORIDE 10 MILLIGRAM(S): 1 TABLET, FILM COATED ORAL at 08:04

## 2020-12-28 PROCEDURE — 99232 SBSQ HOSP IP/OBS MODERATE 35: CPT

## 2020-12-28 RX ADMIN — Medication 2 MILLIGRAM(S): at 20:36

## 2020-12-28 RX ADMIN — LITHIUM CARBONATE 900 MILLIGRAM(S): 300 TABLET, EXTENDED RELEASE ORAL at 20:35

## 2020-12-28 RX ADMIN — Medication 0.5 MILLIGRAM(S): at 08:23

## 2020-12-28 RX ADMIN — OLANZAPINE 10 MILLIGRAM(S): 15 TABLET, FILM COATED ORAL at 20:36

## 2020-12-28 RX ADMIN — Medication 0.5 MILLIGRAM(S): at 20:35

## 2020-12-28 RX ADMIN — HALOPERIDOL DECANOATE 5 MILLIGRAM(S): 100 INJECTION INTRAMUSCULAR at 20:36

## 2020-12-28 RX ADMIN — Medication 50 MILLIGRAM(S): at 08:24

## 2020-12-28 RX ADMIN — FLUPHENAZINE HYDROCHLORIDE 10 MILLIGRAM(S): 1 TABLET, FILM COATED ORAL at 20:35

## 2020-12-28 RX ADMIN — HALOPERIDOL DECANOATE 5 MILLIGRAM(S): 100 INJECTION INTRAMUSCULAR at 08:24

## 2020-12-28 RX ADMIN — FLUPHENAZINE HYDROCHLORIDE 10 MILLIGRAM(S): 1 TABLET, FILM COATED ORAL at 08:23

## 2020-12-28 RX ADMIN — Medication 50 MILLIGRAM(S): at 20:36

## 2020-12-28 NOTE — BH INPATIENT PSYCHIATRY PROGRESS NOTE - NSBHFUPINTERVALHXFT_PSY_A_CORE
Chart, medications and labs reviewed.  Patient is discussed in treatment team. She was medicated with PRN medication for agitation last night and in the morning. On evaluation, the patient presented calm and cooperative. Noted she responds well to redirection. Denies AVH/SI/HI. Less sexually provocative and wears excessive amounts of makeup. As per sleep log and patient, her sleep has improved. Her appetite is good.  She is compliant with medications and no side effects reported. The patient asked, "When I leaving here?". The writer explained her treatment plan with the patient and she verbalized understanding.

## 2020-12-28 NOTE — BH INPATIENT PSYCHIATRY PROGRESS NOTE - NSBHCONSBHPROVDETAILS_PSY_A_CORE  FT
12/09:Attempt made to call  Kedar Buckner at [phone]  12/10: Second attempt to call  Kedar Buckner at [facility] LEFT VM  12/11/20: Attempts made to contact outpatient ACT Team.  Left VM for ACT Team NP Allie Bernal at [phone].    12/16/20: Professional collateral obtained from outpatient ACT Team NP Allie Bernal at [phone].    12/15/20: Spoke with ACT Team member Jennifer Nettles at [phone].

## 2020-12-28 NOTE — BH INPATIENT PSYCHIATRY PROGRESS NOTE - NSBHASSESSSUMMFT_PSY_ALL_CORE
The patient is currently cooperative on the unit, though, remains tenuous. Responds well to redirection. She is visible with minimal social contact. Noted excessive amounts of makeup.   Plan:  Continue current medication regimen.

## 2020-12-28 NOTE — BH INPATIENT PSYCHIATRY PROGRESS NOTE - OTHER
Dressed in layers, wearing fur coat indoors. displays improved grooming and hygiene. Excessive amounts of makeup on her face today. displays substandard grooming and hygiene Mood is improving and stabilizing.  Patient is becoming less disorganized, TP is stabilizing disorganized, appears internally preoccupied, talking to self, but improving

## 2020-12-28 NOTE — BH INPATIENT PSYCHIATRY PROGRESS NOTE - NSBHCHARTREVIEWVS_PSY_A_CORE FT
Vital Signs Last 24 Hrs  T(C): 36.3 (28 Dec 2020 14:35), Max: 36.6 (28 Dec 2020 06:57)  T(F): 97.4 (28 Dec 2020 14:35), Max: 97.8 (28 Dec 2020 06:57)  HR: --92  BP: --124/73  BP(mean): --  RR: 16 (27 Dec 2020 22:56) (16 - 16)  SpO2: 100% (28 Dec 2020 09:23) (100% - 100%)

## 2020-12-29 PROCEDURE — 99232 SBSQ HOSP IP/OBS MODERATE 35: CPT

## 2020-12-29 RX ORDER — OLANZAPINE 15 MG/1
15 TABLET, FILM COATED ORAL AT BEDTIME
Refills: 0 | Status: DISCONTINUED | OUTPATIENT
Start: 2020-12-29 | End: 2021-01-05

## 2020-12-29 RX ORDER — FLUPHENAZINE HYDROCHLORIDE 1 MG/1
5 TABLET, FILM COATED ORAL
Refills: 0 | Status: DISCONTINUED | OUTPATIENT
Start: 2020-12-29 | End: 2020-12-31

## 2020-12-29 RX ADMIN — FLUPHENAZINE HYDROCHLORIDE 5 MILLIGRAM(S): 1 TABLET, FILM COATED ORAL at 20:13

## 2020-12-29 RX ADMIN — HALOPERIDOL DECANOATE 5 MILLIGRAM(S): 100 INJECTION INTRAMUSCULAR at 20:14

## 2020-12-29 RX ADMIN — OLANZAPINE 15 MILLIGRAM(S): 15 TABLET, FILM COATED ORAL at 20:13

## 2020-12-29 RX ADMIN — LITHIUM CARBONATE 900 MILLIGRAM(S): 300 TABLET, EXTENDED RELEASE ORAL at 20:13

## 2020-12-29 RX ADMIN — FLUPHENAZINE HYDROCHLORIDE 5 MILLIGRAM(S): 1 TABLET, FILM COATED ORAL at 08:20

## 2020-12-29 RX ADMIN — Medication 0.5 MILLIGRAM(S): at 08:19

## 2020-12-29 RX ADMIN — Medication 2 MILLIGRAM(S): at 20:14

## 2020-12-29 RX ADMIN — Medication 0.5 MILLIGRAM(S): at 20:14

## 2020-12-29 RX ADMIN — Medication 50 MILLIGRAM(S): at 20:14

## 2020-12-29 NOTE — BH INPATIENT PSYCHIATRY PROGRESS NOTE - NSBHCHARTREVIEWVS_PSY_A_CORE FT
Vital Signs Last 24 Hrs  T(C): 36.9 (29 Dec 2020 08:52), Max: 36.9 (29 Dec 2020 08:52)  T(F): 98.4 (29 Dec 2020 08:52), Max: 98.4 (29 Dec 2020 08:52)  HR: 93  BP: 106/67  BP(mean): --  RR: 18 (28 Dec 2020 20:50) (18 - 18)  SpO2: 100% (28 Dec 2020 20:50) (100% - 100%)

## 2020-12-29 NOTE — BH INPATIENT PSYCHIATRY PROGRESS NOTE - OTHER
Patient is becoming less disorganized, TP is stabilizing displays substandard grooming and hygiene Dressed in layers, wearing fur coat indoors. displays improved grooming and hygiene. Excessive amounts of makeup on her face today. disorganized, appears internally preoccupied, talking to self, but improving Mood is improving and stabilizing.

## 2020-12-29 NOTE — BH INPATIENT PSYCHIATRY PROGRESS NOTE - NSBHFUPINTERVALHXFT_PSY_A_CORE
Patient followed up for psychosis. Charts, medications, labs reviewed. Patient is discussed with treatment team. No significant overnight events. Patient received "cocktail" at 830pm last night. Patient reports feeling very good and happy today because received news of discharge date for 1/5. States she is a little worried because the environment here keeps her more calm but she is excited to get out. Patient continues to show improvements in aggression and agitation. Patient continues to smile and shows no signs of aggression with writer. Patient continues to behave herself with being rewarded for good behavior. Denies any SI/HI, AVH. Minimal, if no signs on sexual preoccupation and less internally preoccupied. Patient reports good sleep and appetite. Remains compliant with medications and reports no side effects. Self care continuing to improve with improvements in ADLs. Patient has been attending groups and remains interactive and visible on unit. Patient has no complaints. Encouraged to continue following behavioral plan and attending groups. Patient followed up for psychosis. Charts, medications, labs reviewed. Patient is discussed with treatment team. No significant overnight events. Patient received po prn medication Haldol 5mg Ativan 2mg and Benadryl 50mg  at 830pm last night. Patient reports feeling very good and happy today because received news of discharge date for 1/5. States she is a little worried because the environment here keeps her more calm but she is excited to get out. Patient continues to show improvements in aggression and agitation. Patient continues to smile and shows no signs of aggression with writer. Patient continues to behave herself with being rewarded for good behavior. Denies any SI/HI, AVH. Minimal, if no signs on sexual preoccupation and less internally preoccupied. Patient reports good sleep and appetite. Remains compliant with medications and reports no side effects. Self care continuing to improve with improvements in ADLs. Patient has been attending groups and remains interactive and visible on unit. Patient has no complaints. Encouraged to continue following behavioral plan and attending groups.

## 2020-12-29 NOTE — BH INPATIENT PSYCHIATRY PROGRESS NOTE - NSBHCONSBHPROVDETAILS_PSY_A_CORE  FT
12/09:Attempt made to call  Kedar Buckner at [phone]  12/10: Second attempt to call  Kedar Buckner at [facility] LEFT VM  12/11/20: Attempts made to contact outpatient ACT Team.  Left VM for ACT Team NP Allie Bernal at [phone].    12/16/20: Professional collateral obtained from outpatient ACT Team NP Allie Bernal at [phone].    12/15/20: Spoke with ACT Team member Jennifer Nettles at [phone].

## 2020-12-30 PROCEDURE — 99232 SBSQ HOSP IP/OBS MODERATE 35: CPT

## 2020-12-30 RX ADMIN — Medication 0.5 MILLIGRAM(S): at 08:20

## 2020-12-30 RX ADMIN — LITHIUM CARBONATE 900 MILLIGRAM(S): 300 TABLET, EXTENDED RELEASE ORAL at 20:03

## 2020-12-30 RX ADMIN — FLUPHENAZINE HYDROCHLORIDE 5 MILLIGRAM(S): 1 TABLET, FILM COATED ORAL at 08:21

## 2020-12-30 RX ADMIN — FLUPHENAZINE HYDROCHLORIDE 5 MILLIGRAM(S): 1 TABLET, FILM COATED ORAL at 20:03

## 2020-12-30 RX ADMIN — HALOPERIDOL DECANOATE 5 MILLIGRAM(S): 100 INJECTION INTRAMUSCULAR at 20:03

## 2020-12-30 RX ADMIN — Medication 0.5 MILLIGRAM(S): at 20:03

## 2020-12-30 RX ADMIN — Medication 50 MILLIGRAM(S): at 20:03

## 2020-12-30 RX ADMIN — Medication 2 MILLIGRAM(S): at 20:03

## 2020-12-30 RX ADMIN — OLANZAPINE 15 MILLIGRAM(S): 15 TABLET, FILM COATED ORAL at 20:03

## 2020-12-30 NOTE — BH INPATIENT PSYCHIATRY PROGRESS NOTE - OTHER
Dressed in layers, wearing fur coat indoors. displays improved grooming and hygiene. Excessive amounts of makeup on her face today. displays substandard grooming and hygiene Patient is becoming less disorganized, TP is stabilizing Mood is improving and stabilizing.  disorganized, appears internally preoccupied, talking to self, but improving

## 2020-12-30 NOTE — BH INPATIENT PSYCHIATRY PROGRESS NOTE - NSBHCHARTREVIEWVS_PSY_A_CORE FT
Vital Signs Last 24 Hrs  T(C): 36.4 (30 Dec 2020 10:28), Max: 36.4 (29 Dec 2020 11:12)  T(F): 97.6 (30 Dec 2020 10:28), Max: 97.6 (29 Dec 2020 14:49)  HR: 78  BP: 123/72  BP(mean): --  RR: 16 (30 Dec 2020 08:37) (16 - 18)  SpO2: 100% (30 Dec 2020 08:37) (100% - 100%)
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
## 2020-12-31 PROCEDURE — 99232 SBSQ HOSP IP/OBS MODERATE 35: CPT

## 2020-12-31 RX ADMIN — LITHIUM CARBONATE 900 MILLIGRAM(S): 300 TABLET, EXTENDED RELEASE ORAL at 21:18

## 2020-12-31 RX ADMIN — OLANZAPINE 15 MILLIGRAM(S): 15 TABLET, FILM COATED ORAL at 21:19

## 2020-12-31 RX ADMIN — Medication 50 MILLIGRAM(S): at 15:23

## 2020-12-31 RX ADMIN — HALOPERIDOL DECANOATE 5 MILLIGRAM(S): 100 INJECTION INTRAMUSCULAR at 15:23

## 2020-12-31 RX ADMIN — Medication 2 MILLIGRAM(S): at 15:23

## 2020-12-31 NOTE — BH INPATIENT PSYCHIATRY PROGRESS NOTE - NSBHCHARTREVIEWVS_PSY_A_CORE FT
Vital Signs Last 24 Hrs  T(C): 36.2 (30 Dec 2020 21:30), Max: 36.4 (30 Dec 2020 10:28)  T(F): 97.2 (30 Dec 2020 21:30), Max: 97.6 (30 Dec 2020 10:28)  HR: --  BP: --  BP(mean): --  RR: 16 (30 Dec 2020 22:10) (16 - 16)  SpO2: 100% (30 Dec 2020 22:10) (100% - 100%) Vital Signs Last 24 Hrs  T(C): 36.2 (30 Dec 2020 21:30), Max: 36.4 (30 Dec 2020 10:28)  T(F): 97.2 (30 Dec 2020 21:30), Max: 97.6 (30 Dec 2020 10:28)  HR: --97  BP: --109/65  BP(mean): --  RR: 16 (30 Dec 2020 22:10) (16 - 16)  SpO2: 100% (30 Dec 2020 22:10) (100% - 100%)

## 2020-12-31 NOTE — BH INPATIENT PSYCHIATRY PROGRESS NOTE - NSBHCONSBHPROVDETAILS_PSY_A_CORE  FT
12/09:Attempt made to call  Kedar Buckner at [phone]  12/10: Second attempt to call  Kedar Buckner at [facility] LEFT VM  12/11/20: Attempts made to contact outpatient ACT Team.  Left VM for ACT Team NP Allie Bernal at [phone].    12/16/20: Professional collateral obtained from outpatient ACT Team NP Allie Bernal at [phone].    12/15/20: Spoke with ACT Team member Jennifer Nettles at [phone].

## 2020-12-31 NOTE — BH INPATIENT PSYCHIATRY PROGRESS NOTE - NSBHFUPINTERVALHXFT_PSY_A_CORE
Chart, medications and labs reviewed.  Patient is discussed with nursing staff. No significant overnight issues.  Patient continues to respond to treatment interventions. Denies SI/HI, no plan or intent. Denies AVH, however patient is observed talking to self, RIS, remains internally preoccupied.  However, patient has made significant clinical progress. She is calm, cooperative and engaging during interview. No mention of sleep disturbances or appetite concerns.  Remains compliant with medications, no reported or observed adverse effects.  No ne SE.   Self- care is good.  Good sleep and appetite.   Remains in behavioral control.  Pt offers no complaints. Patient is actively working on discharge planning, with discharge next Tuesday 1/5/21. Patient is in accord with discharge planning.

## 2020-12-31 NOTE — BH INPATIENT PSYCHIATRY PROGRESS NOTE - OTHER
disorganized, appears internally preoccupied, talking to self, but improving Displays improved grooming and hygiene. Excessive amounts of makeup on her face today. Patient is becoming less disorganized, TP is improving Mood is improving and stabilizing.

## 2021-01-01 RX ADMIN — LITHIUM CARBONATE 900 MILLIGRAM(S): 300 TABLET, EXTENDED RELEASE ORAL at 20:32

## 2021-01-01 RX ADMIN — Medication 50 MILLIGRAM(S): at 08:16

## 2021-01-01 RX ADMIN — Medication 50 MILLIGRAM(S): at 20:32

## 2021-01-01 RX ADMIN — OLANZAPINE 15 MILLIGRAM(S): 15 TABLET, FILM COATED ORAL at 20:32

## 2021-01-01 RX ADMIN — HALOPERIDOL DECANOATE 5 MILLIGRAM(S): 100 INJECTION INTRAMUSCULAR at 08:16

## 2021-01-01 RX ADMIN — Medication 2 MILLIGRAM(S): at 08:16

## 2021-01-02 RX ADMIN — Medication 50 MILLIGRAM(S): at 15:38

## 2021-01-02 RX ADMIN — HALOPERIDOL DECANOATE 5 MILLIGRAM(S): 100 INJECTION INTRAMUSCULAR at 15:40

## 2021-01-02 RX ADMIN — LITHIUM CARBONATE 900 MILLIGRAM(S): 300 TABLET, EXTENDED RELEASE ORAL at 20:45

## 2021-01-02 RX ADMIN — OLANZAPINE 15 MILLIGRAM(S): 15 TABLET, FILM COATED ORAL at 20:45

## 2021-01-02 RX ADMIN — Medication 2 MILLIGRAM(S): at 15:38

## 2021-01-03 RX ADMIN — Medication 50 MILLIGRAM(S): at 07:58

## 2021-01-03 RX ADMIN — OLANZAPINE 15 MILLIGRAM(S): 15 TABLET, FILM COATED ORAL at 20:08

## 2021-01-03 RX ADMIN — HALOPERIDOL DECANOATE 5 MILLIGRAM(S): 100 INJECTION INTRAMUSCULAR at 07:59

## 2021-01-03 RX ADMIN — Medication 2 MILLIGRAM(S): at 07:58

## 2021-01-03 RX ADMIN — LITHIUM CARBONATE 900 MILLIGRAM(S): 300 TABLET, EXTENDED RELEASE ORAL at 20:08

## 2021-01-04 LAB
ALBUMIN SERPL ELPH-MCNC: 3.6 G/DL — SIGNIFICANT CHANGE UP (ref 3.3–5)
ALP SERPL-CCNC: 206 U/L — HIGH (ref 40–120)
ALT FLD-CCNC: 111 U/L — HIGH (ref 4–33)
ANION GAP SERPL CALC-SCNC: 10 MMOL/L — SIGNIFICANT CHANGE UP (ref 7–14)
AST SERPL-CCNC: 50 U/L — HIGH (ref 4–32)
BASOPHILS # BLD AUTO: 0.04 K/UL — SIGNIFICANT CHANGE UP (ref 0–0.2)
BASOPHILS NFR BLD AUTO: 0.3 % — SIGNIFICANT CHANGE UP (ref 0–2)
BILIRUB SERPL-MCNC: <0.2 MG/DL — SIGNIFICANT CHANGE UP (ref 0.2–1.2)
BUN SERPL-MCNC: 18 MG/DL — SIGNIFICANT CHANGE UP (ref 7–23)
CALCIUM SERPL-MCNC: 9.7 MG/DL — SIGNIFICANT CHANGE UP (ref 8.4–10.5)
CHLORIDE SERPL-SCNC: 105 MMOL/L — SIGNIFICANT CHANGE UP (ref 98–107)
CO2 SERPL-SCNC: 23 MMOL/L — SIGNIFICANT CHANGE UP (ref 22–31)
CREAT SERPL-MCNC: 0.75 MG/DL — SIGNIFICANT CHANGE UP (ref 0.5–1.3)
EOSINOPHIL # BLD AUTO: 0.86 K/UL — HIGH (ref 0–0.5)
EOSINOPHIL NFR BLD AUTO: 7.5 % — HIGH (ref 0–6)
GLUCOSE SERPL-MCNC: 123 MG/DL — HIGH (ref 70–99)
HCT VFR BLD CALC: 38.3 % — SIGNIFICANT CHANGE UP (ref 34.5–45)
HGB BLD-MCNC: 11.6 G/DL — SIGNIFICANT CHANGE UP (ref 11.5–15.5)
IANC: 6.89 K/UL — SIGNIFICANT CHANGE UP (ref 1.5–8.5)
IMM GRANULOCYTES NFR BLD AUTO: 1 % — SIGNIFICANT CHANGE UP (ref 0–1.5)
LITHIUM SERPL-MCNC: 0.6 MMOL/L — SIGNIFICANT CHANGE UP (ref 0.6–1.2)
LYMPHOCYTES # BLD AUTO: 2.72 K/UL — SIGNIFICANT CHANGE UP (ref 1–3.3)
LYMPHOCYTES # BLD AUTO: 23.8 % — SIGNIFICANT CHANGE UP (ref 13–44)
MCHC RBC-ENTMCNC: 27.1 PG — SIGNIFICANT CHANGE UP (ref 27–34)
MCHC RBC-ENTMCNC: 30.3 GM/DL — LOW (ref 32–36)
MCV RBC AUTO: 89.5 FL — SIGNIFICANT CHANGE UP (ref 80–100)
MONOCYTES # BLD AUTO: 0.81 K/UL — SIGNIFICANT CHANGE UP (ref 0–0.9)
MONOCYTES NFR BLD AUTO: 7.1 % — SIGNIFICANT CHANGE UP (ref 2–14)
NEUTROPHILS # BLD AUTO: 6.89 K/UL — SIGNIFICANT CHANGE UP (ref 1.8–7.4)
NEUTROPHILS NFR BLD AUTO: 60.3 % — SIGNIFICANT CHANGE UP (ref 43–77)
NRBC # BLD: 0 /100 WBCS — SIGNIFICANT CHANGE UP
NRBC # FLD: 0 K/UL — SIGNIFICANT CHANGE UP
PLATELET # BLD AUTO: 347 K/UL — SIGNIFICANT CHANGE UP (ref 150–400)
POTASSIUM SERPL-MCNC: 4.2 MMOL/L — SIGNIFICANT CHANGE UP (ref 3.5–5.3)
POTASSIUM SERPL-SCNC: 4.2 MMOL/L — SIGNIFICANT CHANGE UP (ref 3.5–5.3)
PROT SERPL-MCNC: 7.4 G/DL — SIGNIFICANT CHANGE UP (ref 6–8.3)
RBC # BLD: 4.28 M/UL — SIGNIFICANT CHANGE UP (ref 3.8–5.2)
RBC # FLD: 16.3 % — HIGH (ref 10.3–14.5)
SODIUM SERPL-SCNC: 138 MMOL/L — SIGNIFICANT CHANGE UP (ref 135–145)
WBC # BLD: 11.44 K/UL — HIGH (ref 3.8–10.5)
WBC # FLD AUTO: 11.44 K/UL — HIGH (ref 3.8–10.5)

## 2021-01-04 PROCEDURE — 99232 SBSQ HOSP IP/OBS MODERATE 35: CPT

## 2021-01-04 RX ORDER — FLUPHENAZINE HYDROCHLORIDE 1 MG/1
50 TABLET, FILM COATED ORAL
Qty: 1 | Refills: 0
Start: 2021-01-04

## 2021-01-04 RX ORDER — OLANZAPINE 15 MG/1
1 TABLET, FILM COATED ORAL
Qty: 30 | Refills: 0
Start: 2021-01-04 | End: 2021-02-02

## 2021-01-04 RX ORDER — LITHIUM CARBONATE 300 MG/1
2 TABLET, EXTENDED RELEASE ORAL
Qty: 60 | Refills: 0
Start: 2021-01-04 | End: 2021-02-02

## 2021-01-04 RX ORDER — ALBUTEROL 90 UG/1
2 AEROSOL, METERED ORAL
Qty: 8 | Refills: 0
Start: 2021-01-04 | End: 2021-01-04

## 2021-01-04 RX ORDER — CLONAZEPAM 1 MG
1 TABLET ORAL
Qty: 7 | Refills: 0
Start: 2021-01-04 | End: 2021-01-10

## 2021-01-04 RX ADMIN — HALOPERIDOL DECANOATE 5 MILLIGRAM(S): 100 INJECTION INTRAMUSCULAR at 20:08

## 2021-01-04 RX ADMIN — HALOPERIDOL DECANOATE 5 MILLIGRAM(S): 100 INJECTION INTRAMUSCULAR at 12:03

## 2021-01-04 RX ADMIN — Medication 2 MILLIGRAM(S): at 20:08

## 2021-01-04 RX ADMIN — OLANZAPINE 15 MILLIGRAM(S): 15 TABLET, FILM COATED ORAL at 20:08

## 2021-01-04 RX ADMIN — Medication 50 MILLIGRAM(S): at 20:08

## 2021-01-04 RX ADMIN — Medication 2 MILLIGRAM(S): at 12:03

## 2021-01-04 RX ADMIN — LITHIUM CARBONATE 900 MILLIGRAM(S): 300 TABLET, EXTENDED RELEASE ORAL at 20:08

## 2021-01-04 RX ADMIN — Medication 50 MILLIGRAM(S): at 12:04

## 2021-01-04 NOTE — BH INPATIENT PSYCHIATRY PROGRESS NOTE - OTHER
Mood is improving and stabilizing.  Displays improved grooming and hygiene. Excessive amounts of makeup on her face today. Patient is becoming less disorganized, TP is improving disorganized, appears internally preoccupied, talking to self, but improving Displays improved grooming and hygiene.  More organized and sensical. Has been engaged in dc planning Patient is becoming less disorganized, more linear, organized thought process

## 2021-01-04 NOTE — BH INPATIENT PSYCHIATRY PROGRESS NOTE - NSBHFUPINTERVALHXFT_PSY_A_CORE
Patient is seen for psychosis.  Patient is discussed in multidisciplinary team: reviewed discharge plans for tomorrow, labs, medications, chart reviewed. No overnight concerns. Patient is stable and appears to be at baseline. Patient reports improved mood, denies any psychotic symptoms at this time.  Patient describes mood as "I’m doing okay."  She showed some improvement in her symptoms, with a gradual reduction of her internal preoccupation, thought disorganization, and aggression.  She has been compliant with her medications.  Patient consented to receiving Prolixin Deconate 50mg PALACIOS, no adverse effects reported or observed.  No mention of appetite concerns or sleep disturbances.  Denies any SI/HI/VH, no signs of trina, delusions or paranoia at this time.  Symptoms reduction and stabilization are observed: functional improvement, improved socialization, positive thinking and gaining insight, and improved interaction with others. Patient attended groups and milieu therapy. Patient has been actively engaged in discharge planning. Discussed discharge plans for tomorrow, and aftercare recommendations.  Discussed pitfalls of treatment failure, and reinforced taking medication as directed. Discussed not stopping medications when she feels better, and processed discontinuation symptoms. Patient verbalized understanding.  Patient is in accord with discharge for tomorrow, returning to [facility]. Discussed her f/u appointments.  Patient is strongly encouraged to keep all aftercare recommendations. Patient is seen for psychosis.  Patient is discussed in multidisciplinary team: reviewed discharge plans for tomorrow, labs, medications, chart reviewed. No overnight concerns. Patient is stable and appears to be at baseline. Patient reports improved mood, denies any psychotic symptoms at this time.  Patient describes mood as "I’m doing okay."  Patient reports some anxiety about being dc tomorrow, processed feelings, discussed coping mechanisms. She showed some improvement in her symptoms, with a gradual reduction of her internal preoccupation, thought disorganization, and aggression.  She has been compliant with her medications.  Patient consented to receiving Prolixin Deconate 50mg PALACIOS, no adverse effects reported or observed.  No mention of appetite concerns or sleep disturbances.  Denies any SI/HI/VH, no signs of trina, delusions or paranoia at this time.  Symptoms reduction and stabilization are observed: functional improvement, improved socialization, positive thinking and gaining insight, and improved interaction with others. Patient attended groups and milieu therapy. Patient has been actively engaged in discharge planning. Discussed discharge plans for tomorrow, and aftercare recommendations.  Discussed pitfalls of treatment failure, and reinforced taking medication as directed. Discussed not stopping medications when she feels better, and processed discontinuation symptoms. Patient verbalized understanding.  Patient is in accord with discharge for tomorrow, returning to [facility]. Discussed her f/u appointments.  Patient is strongly encouraged to keep all aftercare recommendations.

## 2021-01-04 NOTE — BH INPATIENT PSYCHIATRY PROGRESS NOTE - NSDCCRITERIA_PSY_ALL_CORE
Discharge pending psychiatric stabilization.  Discharge pending psychiatric stabilization.   Has received PALACIOS (Prolixin Deconate)  Participating in dc planning

## 2021-01-04 NOTE — BH INPATIENT PSYCHIATRY PROGRESS NOTE - ADDITIONAL DETAILS / COMMENTS
Improved insight into her mental illness and need for medications. Has consented to PALACIOS Improved insight into her mental illness and need for medications. Has consented to PALACIOS. Has been fully engaged in dc planning.

## 2021-01-05 VITALS — TEMPERATURE: 98 F

## 2021-01-05 PROCEDURE — 99238 HOSP IP/OBS DSCHRG MGMT 30/<: CPT

## 2021-01-05 NOTE — BH INPATIENT PSYCHIATRY PROGRESS NOTE - NSBHCONTPROVIDER_PSY_ALL_CORE
Yes...

## 2021-01-05 NOTE — BH INPATIENT PSYCHIATRY PROGRESS NOTE - NSBHMSEBODY_PSY_A_CORE
Well nourished/Overweight

## 2021-01-05 NOTE — BH INPATIENT PSYCHIATRY PROGRESS NOTE - NSTXDCOPNOGOAL_PSY_ALL_CORE
Will agree to participate in appropriate outpatient care

## 2021-01-05 NOTE — BH INPATIENT PSYCHIATRY PROGRESS NOTE - NSBHANTIPSYCHOTIC_PSY_ALL_CORE
Yes...

## 2021-01-05 NOTE — BH INPATIENT PSYCHIATRY PROGRESS NOTE - NSTXIMPULSDATEEST_PSY_ALL_CORE
06-Dec-2020
06-Dec-2020
24-Dec-2020
08-Dec-2020
08-Dec-2020
06-Dec-2020
24-Dec-2020
24-Dec-2020
06-Dec-2020
24-Dec-2020
06-Dec-2020
08-Dec-2020
06-Dec-2020
06-Dec-2020
24-Dec-2020
06-Dec-2020
24-Dec-2020

## 2021-01-05 NOTE — BH INPATIENT PSYCHIATRY PROGRESS NOTE - NSTXPSYCHOGOAL_PSY_ALL_CORE
Will ask for PRN medication to manage hallucinations
Will ask for PRN medication to manage hallucinations
Will identify 2 coping skills that help mitigate hallucinations
Will ask for PRN medication to manage hallucinations
Will identify 2 coping skills that help mitigate hallucinations
Will ask for PRN medication to manage hallucinations
Will identify 2 coping skills that help mitigate hallucinations
Will identify 2 coping skills that help mitigate hallucinations
Will ask for PRN medication to manage hallucinations
Will identify 2 coping skills that help mitigate hallucinations
Will ask for PRN medication to manage hallucinations
Will identify 2 coping skills that help mitigate hallucinations

## 2021-01-05 NOTE — BH INPATIENT PSYCHIATRY PROGRESS NOTE - NSTXPROBCOPE_PSY_ALL_CORE
COPING, INEFFECTIVE

## 2021-01-05 NOTE — BH INPATIENT PSYCHIATRY PROGRESS NOTE - NSCGISEVERILLNESS_PSY_ALL_CORE
4 = Moderately ill – overt symptoms causing noticeable, but modest, functional impairment or distress; symptom level may warrant medication
5 = Markedly ill - intrusive symptoms that distinctly impair social/occupational function or cause intrusive levels of distress
4 = Moderately ill – overt symptoms causing noticeable, but modest, functional impairment or distress; symptom level may warrant medication
4 = Moderately ill – overt symptoms causing noticeable, but modest, functional impairment or distress; symptom level may warrant medication
5 = Markedly ill - intrusive symptoms that distinctly impair social/occupational function or cause intrusive levels of distress
4 = Moderately ill – overt symptoms causing noticeable, but modest, functional impairment or distress; symptom level may warrant medication
5 = Markedly ill - intrusive symptoms that distinctly impair social/occupational function or cause intrusive levels of distress
4 = Moderately ill – overt symptoms causing noticeable, but modest, functional impairment or distress; symptom level may warrant medication
5 = Markedly ill - intrusive symptoms that distinctly impair social/occupational function or cause intrusive levels of distress
4 = Moderately ill – overt symptoms causing noticeable, but modest, functional impairment or distress; symptom level may warrant medication
4 = Moderately ill – overt symptoms causing noticeable, but modest, functional impairment or distress; symptom level may warrant medication
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
## 2021-01-05 NOTE — BH INPATIENT PSYCHIATRY PROGRESS NOTE - NSBHCHARTREVIEWVS_PSY_A_CORE FT
Vital Signs Last 24 Hrs  T(C): 36.6 (04 Jan 2021 14:26), Max: 36.6 (04 Jan 2021 14:26)  T(F): 97.9 (04 Jan 2021 14:26), Max: 97.9 (04 Jan 2021 14:26)  HR: 108 (04 Jan 2021 09:00) (108 - 108)  BP: 121/87 (04 Jan 2021 09:00) (121/87 - 121/87)  BP(mean): --  RR: 16 (04 Jan 2021 21:48) (16 - 16)  SpO2: 100% (04 Jan 2021 21:48) (100% - 100%)

## 2021-01-05 NOTE — BH DISCHARGE NOTE NURSING/SOCIAL WORK/PSYCH REHAB - NSCDUDCCRISIS_PSY_A_CORE
[facility] Well  [phone] [facility]-WELL ([phone])  Text "WELL" to [phone]  Website: www.[place]/.[facility] 1 (252) 701-UKEH (5300) Website: www.safehorizon.org/.National Suicide Prevention Lifeline 2 (454) 128-9652/.  [employer]  1 (060) L[facility] (182-3119)/.  [facility]  [phone] [place] [address] [phone]   Hours:  Monday through Friday from 9 AM to 3 PM/.  [employer] - [facility] Crisis Line  0 [phone], Option 1

## 2021-01-05 NOTE — BH INPATIENT PSYCHIATRY PROGRESS NOTE - NSBHMSEKNOWHOW_PSY_ALL_CORE
Current Events
Vocabulary
Vocabulary
Current Events
Vocabulary
Current Events
Other.../Vocabulary
Vocabulary
Current Events
Current Events
Vocabulary
Vocabulary
Current Events/Vocabulary

## 2021-01-05 NOTE — BH INPATIENT PSYCHIATRY PROGRESS NOTE - NSTXCOPEDATETRGT_PSY_ALL_CORE
22-Dec-2020
05-Jan-2021
05-Jan-2021
15-Dec-2020
15-Dec-2020
31-Dec-2020
22-Dec-2020
31-Dec-2020
22-Dec-2020
15-Dec-2020
05-Jan-2021
15-Dec-2020
22-Dec-2020
15-Dec-2020
22-Dec-2020
22-Dec-2020
05-Jan-2021

## 2021-01-05 NOTE — BH INPATIENT PSYCHIATRY PROGRESS NOTE - NSBHADMITMEDEDUDETAILS_A_CORE FT
Risks/benefits discussed with patient
Risks/benefits discussed
Risks/benefits discussed with patient
Patient was informed of risk/benefit of medication, alternative treatment and no treatment.  Patient was educated about side effects of his medications, including EPS, TD. 
Patient was informed of risk/benefit of medication, alternative treatment and no treatment.  Patient was educated about side effects of her medications, including EPS, TD.
Risks/benefits discussed with patient
Patient was informed of risk/benefit of medication, alternative treatment and no treatment.  Patient was educated about side effects of his medications, including EPS, TD. 
Patient was informed of risk/benefit of medication, alternative treatment and no treatment.  Patient was educated about side effects of his medications, including EPS, TD. 
Risks/benefits discussed with patient
Risk and benefits discussed
Risks/benefits discussed with patient
Risk/benefits discussed
Risk/benefits discussed.
Patient was informed of risk/benefit of medication, alternative treatment and no treatment.  Patient was educated about side effects of his medications, including EPS, TD. 
Risk/benefits discussed
Risk and benefits discussed

## 2021-01-05 NOTE — BH INPATIENT PSYCHIATRY PROGRESS NOTE - NSBHMSEATTEN_PSY_A_CORE
Normal
Other
Normal
Normal
Other
Normal

## 2021-01-05 NOTE — BH INPATIENT PSYCHIATRY PROGRESS NOTE - NSBHATTESTSEENBY_PSY_A_CORE
NP without Attending Psychiatrist

## 2021-01-05 NOTE — BH INPATIENT PSYCHIATRY PROGRESS NOTE - NSTXIMPULSDATETRGT_PSY_ALL_CORE
22-Dec-2020
31-Dec-2020
15-Dec-2020
15-Dec-2020
31-Dec-2020
31-Dec-2020
22-Dec-2020
31-Dec-2020
29-Dec-2020
15-Dec-2020
31-Dec-2020
15-Dec-2020
15-Dec-2020
22-Dec-2020
31-Dec-2020

## 2021-01-05 NOTE — BH INPATIENT PSYCHIATRY PROGRESS NOTE - GENERAL APPEARANCE
Well developed/Other
Other/Well developed
Well developed/Other
Other/Well developed
Well developed/Other
Well developed
Well developed/Other

## 2021-01-05 NOTE — BH INPATIENT PSYCHIATRY PROGRESS NOTE - NSTXCOPEPROGRES_PSY_ALL_CORE
Improving
Improving
No Change
Improving
Improving
No Change
No Change
Improving
No Change
Improving
Improving
Met - goal discontinued

## 2021-01-05 NOTE — BH INPATIENT PSYCHIATRY PROGRESS NOTE - NSTXPROBPSYCHO_PSY_ALL_CORE
PSYCHOTIC SYMPTOMS

## 2021-01-05 NOTE — BH INPATIENT PSYCHIATRY PROGRESS NOTE - NSTXDCFAMDATETRGT_PSY_ALL_CORE
31-Dec-2020
21-Dec-2020
31-Dec-2020
28-Dec-2020
31-Dec-2020
31-Dec-2020
21-Dec-2020

## 2021-01-05 NOTE — BH INPATIENT PSYCHIATRY PROGRESS NOTE - NSBHCONSBHPROVDETAILS_PSY_A_CORE  FT
12/09:Attempt made to call  Kedar Buckner at [phone]  12/10: Second attempt to call  Kedar Buckner at [facility] LEFT VM  12/11/20: Attempts made to contact outpatient ACT Team.  Left VM for ACT Team NP Allie Bernal at [phone].    12/16/20: Professional collateral obtained from outpatient ACT Team NP Allie Bernal at [phone].    12/15/20: Spoke with ACT Team member Jennifer Nettles at [phone].

## 2021-01-05 NOTE — BH INPATIENT PSYCHIATRY PROGRESS NOTE - NSTREATMENTCERTY_PSY_ALL_CORE

## 2021-01-05 NOTE — BH INPATIENT PSYCHIATRY PROGRESS NOTE - NSDCCRITERIA_PSY_ALL_CORE
Discharge pending psychiatric stabilization.   Has received PALACIOS (Prolixin Deconate)  Participating in dc planning

## 2021-01-05 NOTE — BH INPATIENT PSYCHIATRY PROGRESS NOTE - NSTXPSYCHOPROGRES_PSY_ALL_CORE
Improving
No Change
Improving
No Change
Improving
Improving
No Change
Met - goal discontinued
Improving
No Change
No Change
Improving

## 2021-01-05 NOTE — BH INPATIENT PSYCHIATRY PROGRESS NOTE - NSTXDCFAMDATEEST_PSY_ALL_CORE
21-Dec-2020
14-Dec-2020
21-Dec-2020
14-Dec-2020
21-Dec-2020

## 2021-01-05 NOTE — BH DISCHARGE NOTE NURSING/SOCIAL WORK/PSYCH REHAB - PATIENT PORTAL LINK FT
You can access the FollowMyHealth Patient Portal offered by [facility] by registering at the following website: [email or web address]. By joining [employer]’s FollowMyHealth portal, you will also be able to view your health information using other applications (apps) compatible with our system.

## 2021-01-05 NOTE — BH INPATIENT PSYCHIATRY PROGRESS NOTE - NSBHMETABOLIC_PSY_ALL_CORE_FT
BMI:   HbA1c:   Glucose:   BP: --  Lipid Panel: Date/Time: 06-05-20 @ 11:45  Cholesterol, Serum: 132  Direct LDL: 76  HDL Cholesterol, Serum: 43  Total Cholesterol/HDL Ration Measurement: --  Triglycerides, Serum: 105  
BMI:   HbA1c: A1C with Estimated Average Glucose: 5.4 % (06-05-20 @ 11:45)    Glucose:   BP: --  Lipid Panel: Date/Time: 06-05-20 @ 11:45  Cholesterol, Serum: 132  Direct LDL: 76  HDL Cholesterol, Serum: 43  Total Cholesterol/HDL Ration Measurement: --  Triglycerides, Serum: 105  
BMI:   HbA1c:   Glucose:   BP: 129/74 (12-06-20 @ 10:46) (115/72 - 129/74)  Lipid Panel: Date/Time: 06-05-20 @ 11:45  Cholesterol, Serum: 132  Direct LDL: 76  HDL Cholesterol, Serum: 43  Total Cholesterol/HDL Ration Measurement: --  Triglycerides, Serum: 105  
BMI:   HbA1c:   Glucose:   BP: 134/80 (12-08-20 @ 15:41) (129/74 - 134/80)  Lipid Panel: Date/Time: 06-05-20 @ 11:45  Cholesterol, Serum: 132  Direct LDL: 76  HDL Cholesterol, Serum: 43  Total Cholesterol/HDL Ration Measurement: --  Triglycerides, Serum: 105  
BMI:   HbA1c:   Glucose:   BP: 134/80 (12-08-20 @ 15:41) (134/80 - 134/80)  Lipid Panel: Date/Time: 06-05-20 @ 11:45  Cholesterol, Serum: 132  Direct LDL: 76  HDL Cholesterol, Serum: 43  Total Cholesterol/HDL Ration Measurement: --  Triglycerides, Serum: 105  
BMI:   HbA1c:   Glucose:   BP: 134/80 (12-08-20 @ 15:41) (134/80 - 134/80)  Lipid Panel: Date/Time: 06-05-20 @ 11:45  Cholesterol, Serum: 132  Direct LDL: 76  HDL Cholesterol, Serum: 43  Total Cholesterol/HDL Ration Measurement: --  Triglycerides, Serum: 105  
BMI:   HbA1c: A1C with Estimated Average Glucose: 5.4 % (06-05-20 @ 11:45)    Glucose:   BP: --  Lipid Panel: Date/Time: 06-05-20 @ 11:45  Cholesterol, Serum: 132  Direct LDL: 76  HDL Cholesterol, Serum: 43  Total Cholesterol/HDL Ration Measurement: --  Triglycerides, Serum: 105  
BMI:   HbA1c: A1C with Estimated Average Glucose: 5.4 % (06-05-20 @ 11:45)    Glucose:   BP: --  Lipid Panel: Date/Time: 06-05-20 @ 11:45  Cholesterol, Serum: 132  Direct LDL: 76  HDL Cholesterol, Serum: 43  Total Cholesterol/HDL Ration Measurement: --  Triglycerides, Serum: 105  
BMI:   HbA1c: A1C with Estimated Average Glucose Result: 5.6 % (12-23-20 @ 10:49)    Glucose:   BP: 121/87 (01-04-21 @ 09:00) (121/87 - 121/87)  Lipid Panel: Date/Time: 12-23-20 @ 10:49  Cholesterol, Serum: 148  Direct LDL: --  HDL Cholesterol, Serum: 52  Total Cholesterol/HDL Ration Measurement: --  Triglycerides, Serum: 101  
BMI:   HbA1c: A1C with Estimated Average Glucose Result: 5.6 % (12-23-20 @ 10:49)    Glucose:   BP: 104/60 (12-27-20 @ 08:06) (104/60 - 104/60)  Lipid Panel: Date/Time: 12-23-20 @ 10:49  Cholesterol, Serum: 148  Direct LDL: --  HDL Cholesterol, Serum: 52  Total Cholesterol/HDL Ration Measurement: --  Triglycerides, Serum: 101  
BMI:   HbA1c: A1C with Estimated Average Glucose Result: 5.6 % (12-23-20 @ 10:49)    Glucose:   BP: --  Lipid Panel: Date/Time: 12-23-20 @ 10:49  Cholesterol, Serum: 148  Direct LDL: --  HDL Cholesterol, Serum: 52  Total Cholesterol/HDL Ration Measurement: --  Triglycerides, Serum: 101  
BMI:   HbA1c: A1C with Estimated Average Glucose Result: 5.6 % (12-23-20 @ 10:49)    Glucose:   BP: --  Lipid Panel: Date/Time: 12-23-20 @ 10:49  Cholesterol, Serum: 148  Direct LDL: --  HDL Cholesterol, Serum: 52  Total Cholesterol/HDL Ration Measurement: --  Triglycerides, Serum: 101  
BMI:   HbA1c: A1C with Estimated Average Glucose Result: 5.6 % (12-23-20 @ 10:49)    Glucose:   BP: 108/81 (12-23-20 @ 08:05) (108/81 - 108/81)  Lipid Panel: Date/Time: 12-23-20 @ 10:49  Cholesterol, Serum: 148  Direct LDL: --  HDL Cholesterol, Serum: 52  Total Cholesterol/HDL Ration Measurement: --  Triglycerides, Serum: 101  
BMI:   HbA1c: A1C with Estimated Average Glucose: 5.4 % (06-05-20 @ 11:45)    Glucose:   BP: --  Lipid Panel: Date/Time: 06-05-20 @ 11:45  Cholesterol, Serum: 132  Direct LDL: 76  HDL Cholesterol, Serum: 43  Total Cholesterol/HDL Ration Measurement: --  Triglycerides, Serum: 105  
BMI:   HbA1c: A1C with Estimated Average Glucose: 5.4 % (06-05-20 @ 11:45)    Glucose:   BP: 108/81 (12-23-20 @ 08:05) (108/81 - 108/81)  Lipid Panel: Date/Time: 06-05-20 @ 11:45  Cholesterol, Serum: 132  Direct LDL: 76  HDL Cholesterol, Serum: 43  Total Cholesterol/HDL Ration Measurement: --  Triglycerides, Serum: 105  
BMI:   HbA1c: A1C with Estimated Average Glucose: 5.4 % (06-05-20 @ 11:45)    Glucose:   BP: --  Lipid Panel: Date/Time: 06-05-20 @ 11:45  Cholesterol, Serum: 132  Direct LDL: 76  HDL Cholesterol, Serum: 43  Total Cholesterol/HDL Ration Measurement: --  Triglycerides, Serum: 105  
BMI:   HbA1c: A1C with Estimated Average Glucose: 5.4 % (06-05-20 @ 11:45)    Glucose:   BP: --  Lipid Panel: Date/Time: 06-05-20 @ 11:45  Cholesterol, Serum: 132  Direct LDL: 76  HDL Cholesterol, Serum: 43  Total Cholesterol/HDL Ration Measurement: --  Triglycerides, Serum: 105  
BMI:   HbA1c: A1C with Estimated Average Glucose Result: 5.6 % (12-23-20 @ 10:49)    Glucose:   BP: 104/60 (12-27-20 @ 08:06) (104/60 - 104/60)  Lipid Panel: Date/Time: 12-23-20 @ 10:49  Cholesterol, Serum: 148  Direct LDL: --  HDL Cholesterol, Serum: 52  Total Cholesterol/HDL Ration Measurement: --  Triglycerides, Serum: 101  
BMI:   HbA1c: A1C with Estimated Average Glucose Result: 5.6 % (12-23-20 @ 10:49)    Glucose:   BP: --  Lipid Panel: Date/Time: 12-23-20 @ 10:49  Cholesterol, Serum: 148  Direct LDL: --  HDL Cholesterol, Serum: 52  Total Cholesterol/HDL Ration Measurement: --  Triglycerides, Serum: 101

## 2021-01-05 NOTE — BH INPATIENT PSYCHIATRY PROGRESS NOTE - NSTXIMPULSPROGRES_PSY_ALL_CORE
Improving
Met - goal discontinued
No Change
No Change
Improving
No Change

## 2021-01-05 NOTE — BH INPATIENT PSYCHIATRY PROGRESS NOTE - NSBHFUPINTERVALCCFT_PSY_A_CORE
" im not talking now"
"I don't want to be bother"
"Im ok"
"Nothing is wrong with me, I just needed to rest from where I was living"
"Take me to court"
"Im doing much better"
I don't want to be bothered
"I don't want to talk to you"
"When I leaving here?" 
"Can I have my make-up today"
"Im ok, I took my shot" 
I am tired today
"I'm doing ok, just nervous about being leaving"
I feel good
"I don't need medications"
"Im ok, I got my make up" 
"I'm doing good"
" Get out of my face get out of my room"
"I'm doing ok, just nervous about being leaving"

## 2021-01-05 NOTE — BH INPATIENT PSYCHIATRY PROGRESS NOTE - NSCGIIMPROVESX_PSY_ALL_CORE
3 = Minimally improved - slightly better with little or no clinically meaningful reduction of symptoms.  Represents very little change in basic clinical status, level of care, or functional capacity.
4 = No change - symptoms remain essentially unchanged
4 = No change - symptoms remain essentially unchanged
3 = Minimally improved - slightly better with little or no clinically meaningful reduction of symptoms.  Represents very little change in basic clinical status, level of care, or functional capacity.
4 = No change - symptoms remain essentially unchanged
3 = Minimally improved - slightly better with little or no clinically meaningful reduction of symptoms.  Represents very little change in basic clinical status, level of care, or functional capacity.
4 = No change - symptoms remain essentially unchanged
3 = Minimally improved - slightly better with little or no clinically meaningful reduction of symptoms.  Represents very little change in basic clinical status, level of care, or functional capacity.
2 = Much improved - notably better with signficant reduction of symptoms; increase in the level of functioning but some symptoms remain

## 2021-01-05 NOTE — BH INPATIENT PSYCHIATRY PROGRESS NOTE - NSTXCOPEINTERMD_PSY_ALL_CORE
Encourage patient to attend groups and milieu therapy  Encouraged medication compliance, Increase Prolixin 10mg BID, and increase Lithium 450mg BID  Consider PALACIOS Prolixin
Encourage patient to attend groups and milieu therapy
Encourage patient to attend groups and milieu therapy  Encouraged medication compliance, Increase Prolixin 10mg BID, and increase Lithium 450mg BID  Consider PALACIOS Prolixin
Encourage patient to attend groups and milieu therapy
Encourage patient to attend groups and milieu therapy
Encourage patient to attend groups and milieu therapy  Encouraged medication compliance, Increase Prolixin 10mg BID, and increase Lithium 450mg BID  Consider PALACIOS Prolixin

## 2021-01-05 NOTE — BH INPATIENT PSYCHIATRY PROGRESS NOTE - NSBHMSESPEECH_PSY_A_CORE
Normal volume, rate, productivity, spontaneity and articulation

## 2021-01-05 NOTE — BH INPATIENT PSYCHIATRY PROGRESS NOTE - NSBHCHARTREVIEWLAB_PSY_A_CORE FT
all WNL
Labs: Lithium level ordered for today
all WNL
All labs WNL
Labs: Lithium level 0.6 as of 12/23/20

## 2021-01-05 NOTE — BH INPATIENT PSYCHIATRY PROGRESS NOTE - NSBHPSYCHOLCOGORIENT_PSY_A_CORE
Oriented to time, place, person, situation

## 2021-01-05 NOTE — BH INPATIENT PSYCHIATRY PROGRESS NOTE - CURRENT MEDICATION
MEDICATIONS  (STANDING):  clonazePAM  Tablet 0.5 milliGRAM(s) Oral two times a day  fluPHENAZine 10 milliGRAM(s) Oral two times a day  lithium CR (ESKALITH-CR) 900 milliGRAM(s) Oral at bedtime  OLANZapine 10 milliGRAM(s) Oral at bedtime    MEDICATIONS  (PRN):  ALBUTerol    90 MICROgram(s) HFA Inhaler 2 Puff(s) Inhalation every 6 hours PRN asthma attack  diphenhydrAMINE 50 milliGRAM(s) Oral every 6 hours PRN agitation eps ppx  diphenhydrAMINE   Injectable 50 milliGRAM(s) IntraMuscular once PRN psychotic agitation  diphenhydrAMINE   Injectable 50 milliGRAM(s) IntraMuscular once PRN psychotic agitation  haloperidol     Tablet 5 milliGRAM(s) Oral every 6 hours PRN psychotic agitation  haloperidol    Injectable 5 milliGRAM(s) IntraMuscular every 6 hours PRN SEVERE PSYCHOTIC AGITATION  haloperidol    Injectable 5 milliGRAM(s) IntraMuscular every 6 hours PRN SEVERE PSYCHOTIC AGITATION  haloperidol    Injectable 5 milliGRAM(s) IntraMuscular every 6 hours PRN SEVERE PSYCHOTIC AGITATION  haloperidol    Injectable 5 milliGRAM(s) IntraMuscular every 6 hours PRN SEVERE PSYCHOTIC AGITATION  ibuprofen  Tablet. 600 milliGRAM(s) Oral every 6 hours PRN Moderate Pain (4 - 6)  LORazepam     Tablet 2 milliGRAM(s) Oral every 6 hours PRN agitation  LORazepam   Injectable 2 milliGRAM(s) IntraMuscular once PRN agitation  
MEDICATIONS  (STANDING):  clonazePAM  Tablet 0.5 milliGRAM(s) Oral two times a day  fluPHENAZine 10 milliGRAM(s) Oral two times a day  lithium CR (ESKALITH-CR) 900 milliGRAM(s) Oral at bedtime  OLANZapine 10 milliGRAM(s) Oral at bedtime    MEDICATIONS  (PRN):  ALBUTerol    90 MICROgram(s) HFA Inhaler 2 Puff(s) Inhalation every 6 hours PRN asthma attack  diphenhydrAMINE 50 milliGRAM(s) Oral every 6 hours PRN agitation eps ppx  diphenhydrAMINE   Injectable 50 milliGRAM(s) IntraMuscular once PRN psychotic agitation  diphenhydrAMINE   Injectable 50 milliGRAM(s) IntraMuscular once PRN psychotic agitation  haloperidol     Tablet 5 milliGRAM(s) Oral every 6 hours PRN psychotic agitation  haloperidol    Injectable 5 milliGRAM(s) IntraMuscular every 6 hours PRN SEVERE PSYCHOTIC AGITATION  haloperidol    Injectable 5 milliGRAM(s) IntraMuscular every 6 hours PRN SEVERE PSYCHOTIC AGITATION  haloperidol    Injectable 5 milliGRAM(s) IntraMuscular every 6 hours PRN SEVERE PSYCHOTIC AGITATION  haloperidol    Injectable 5 milliGRAM(s) IntraMuscular every 6 hours PRN SEVERE PSYCHOTIC AGITATION  ibuprofen  Tablet. 600 milliGRAM(s) Oral every 6 hours PRN Moderate Pain (4 - 6)  LORazepam     Tablet 2 milliGRAM(s) Oral every 6 hours PRN agitation  LORazepam   Injectable 2 milliGRAM(s) IntraMuscular once PRN agitation  
MEDICATIONS  (STANDING):  clonazePAM  Tablet 0.5 milliGRAM(s) Oral two times a day  fluPHENAZine 5 milliGRAM(s) Oral two times a day  lithium CR (ESKALITH-CR) 900 milliGRAM(s) Oral at bedtime  OLANZapine 15 milliGRAM(s) Oral at bedtime    MEDICATIONS  (PRN):  ALBUTerol    90 MICROgram(s) HFA Inhaler 2 Puff(s) Inhalation every 6 hours PRN asthma attack  diphenhydrAMINE 50 milliGRAM(s) Oral every 6 hours PRN agitation eps ppx  diphenhydrAMINE   Injectable 50 milliGRAM(s) IntraMuscular once PRN psychotic agitation  diphenhydrAMINE   Injectable 50 milliGRAM(s) IntraMuscular once PRN psychotic agitation  haloperidol     Tablet 5 milliGRAM(s) Oral every 6 hours PRN psychotic agitation  haloperidol    Injectable 5 milliGRAM(s) IntraMuscular every 6 hours PRN SEVERE PSYCHOTIC AGITATION  haloperidol    Injectable 5 milliGRAM(s) IntraMuscular every 6 hours PRN SEVERE PSYCHOTIC AGITATION  haloperidol    Injectable 5 milliGRAM(s) IntraMuscular every 6 hours PRN SEVERE PSYCHOTIC AGITATION  haloperidol    Injectable 5 milliGRAM(s) IntraMuscular every 6 hours PRN SEVERE PSYCHOTIC AGITATION  ibuprofen  Tablet. 600 milliGRAM(s) Oral every 6 hours PRN Moderate Pain (4 - 6)  LORazepam     Tablet 2 milliGRAM(s) Oral every 6 hours PRN agitation  LORazepam   Injectable 2 milliGRAM(s) IntraMuscular once PRN agitation  
MEDICATIONS  (STANDING):  clonazePAM  Tablet 0.5 milliGRAM(s) Oral daily  fluPHENAZine 10 milliGRAM(s) Oral two times a day  lithium CR (ESKALITH-CR) 450 milliGRAM(s) Oral two times a day  OLANZapine 5 milliGRAM(s) Oral at bedtime    MEDICATIONS  (PRN):  ALBUTerol    90 MICROgram(s) HFA Inhaler 2 Puff(s) Inhalation every 6 hours PRN asthma attack  diphenhydrAMINE 50 milliGRAM(s) Oral every 6 hours PRN agitation eps ppx  diphenhydrAMINE   Injectable 50 milliGRAM(s) IntraMuscular once PRN psychotic agitation  haloperidol     Tablet 5 milliGRAM(s) Oral every 6 hours PRN psychotic agitation  haloperidol    Injectable 5 milliGRAM(s) IntraMuscular every 6 hours PRN SEVERE PSYCHOTIC AGITATION  haloperidol    Injectable 5 milliGRAM(s) IntraMuscular every 6 hours PRN SEVERE PSYCHOTIC AGITATION  haloperidol    Injectable 5 milliGRAM(s) IntraMuscular every 6 hours PRN SEVERE PSYCHOTIC AGITATION  haloperidol    Injectable 5 milliGRAM(s) IntraMuscular every 6 hours PRN SEVERE PSYCHOTIC AGITATION  LORazepam     Tablet 2 milliGRAM(s) Oral every 6 hours PRN agitation  LORazepam   Injectable 2 milliGRAM(s) IntraMuscular once PRN agitation  
MEDICATIONS  (STANDING):  clonazePAM  Tablet 0.5 milliGRAM(s) Oral daily  fluPHENAZine 5 milliGRAM(s) Oral two times a day  lithium 600 milliGRAM(s) Oral at bedtime  OLANZapine 5 milliGRAM(s) Oral at bedtime    MEDICATIONS  (PRN):  ALBUTerol    90 MICROgram(s) HFA Inhaler 2 Puff(s) Inhalation every 6 hours PRN asthma attack  diphenhydrAMINE 50 milliGRAM(s) Oral every 6 hours PRN agitation eps ppx  diphenhydrAMINE   Injectable 25 milliGRAM(s) IntraMuscular once PRN severe agitation  haloperidol     Tablet 5 milliGRAM(s) Oral every 6 hours PRN psychotic agitation  haloperidol    Injectable 5 milliGRAM(s) IntraMuscular every 6 hours PRN SEVERE PSYCHOTIC AGITATION  LORazepam     Tablet 2 milliGRAM(s) Oral every 6 hours PRN agitation  LORazepam   Injectable 2 milliGRAM(s) IntraMuscular once PRN agitation  
MEDICATIONS  (STANDING):  clonazePAM  Tablet 0.5 milliGRAM(s) Oral daily  fluPHENAZine 5 milliGRAM(s) Oral two times a day  lithium 600 milliGRAM(s) Oral at bedtime  OLANZapine 5 milliGRAM(s) Oral at bedtime    MEDICATIONS  (PRN):  ALBUTerol    90 MICROgram(s) HFA Inhaler 2 Puff(s) Inhalation every 6 hours PRN asthma attack  diphenhydrAMINE 50 milliGRAM(s) Oral every 6 hours PRN agitation eps ppx  diphenhydrAMINE   Injectable 25 milliGRAM(s) IntraMuscular once PRN severe agitation  haloperidol     Tablet 5 milliGRAM(s) Oral every 6 hours PRN psychotic agitation  haloperidol    Injectable 5 milliGRAM(s) IntraMuscular every 6 hours PRN SEVERE PSYCHOTIC AGITATION  LORazepam     Tablet 2 milliGRAM(s) Oral every 6 hours PRN psychotic agitation  LORazepam   Injectable 2 milliGRAM(s) IntraMuscular every 6 hours PRN SEVERE PSYCHOTIC AGITATION  
MEDICATIONS  (STANDING):  clonazePAM  Tablet 0.5 milliGRAM(s) Oral two times a day  fluPHENAZine 10 milliGRAM(s) Oral two times a day  lithium CR (ESKALITH-CR) 900 milliGRAM(s) Oral at bedtime  OLANZapine 10 milliGRAM(s) Oral at bedtime    MEDICATIONS  (PRN):  ALBUTerol    90 MICROgram(s) HFA Inhaler 2 Puff(s) Inhalation every 6 hours PRN asthma attack  diphenhydrAMINE 50 milliGRAM(s) Oral every 6 hours PRN agitation eps ppx  diphenhydrAMINE   Injectable 50 milliGRAM(s) IntraMuscular once PRN psychotic agitation  diphenhydrAMINE   Injectable 50 milliGRAM(s) IntraMuscular once PRN psychotic agitation  haloperidol     Tablet 5 milliGRAM(s) Oral every 6 hours PRN psychotic agitation  haloperidol    Injectable 5 milliGRAM(s) IntraMuscular every 6 hours PRN SEVERE PSYCHOTIC AGITATION  haloperidol    Injectable 5 milliGRAM(s) IntraMuscular every 6 hours PRN SEVERE PSYCHOTIC AGITATION  haloperidol    Injectable 5 milliGRAM(s) IntraMuscular every 6 hours PRN SEVERE PSYCHOTIC AGITATION  haloperidol    Injectable 5 milliGRAM(s) IntraMuscular every 6 hours PRN SEVERE PSYCHOTIC AGITATION  ibuprofen  Tablet. 600 milliGRAM(s) Oral every 6 hours PRN Moderate Pain (4 - 6)  LORazepam     Tablet 2 milliGRAM(s) Oral every 6 hours PRN agitation  LORazepam   Injectable 2 milliGRAM(s) IntraMuscular once PRN agitation  
MEDICATIONS  (STANDING):  clonazePAM  Tablet 0.5 milliGRAM(s) Oral daily  fluPHENAZine 10 milliGRAM(s) Oral two times a day  lithium CR (ESKALITH-CR) 450 milliGRAM(s) Oral two times a day  OLANZapine 7.5 milliGRAM(s) Oral at bedtime    MEDICATIONS  (PRN):  ALBUTerol    90 MICROgram(s) HFA Inhaler 2 Puff(s) Inhalation every 6 hours PRN asthma attack  diphenhydrAMINE 50 milliGRAM(s) Oral every 6 hours PRN agitation eps ppx  diphenhydrAMINE   Injectable 50 milliGRAM(s) IntraMuscular once PRN psychotic agitation  haloperidol     Tablet 5 milliGRAM(s) Oral every 6 hours PRN psychotic agitation  haloperidol    Injectable 5 milliGRAM(s) IntraMuscular every 6 hours PRN SEVERE PSYCHOTIC AGITATION  haloperidol    Injectable 5 milliGRAM(s) IntraMuscular every 6 hours PRN SEVERE PSYCHOTIC AGITATION  haloperidol    Injectable 5 milliGRAM(s) IntraMuscular every 6 hours PRN SEVERE PSYCHOTIC AGITATION  haloperidol    Injectable 5 milliGRAM(s) IntraMuscular every 6 hours PRN SEVERE PSYCHOTIC AGITATION  LORazepam     Tablet 2 milliGRAM(s) Oral every 6 hours PRN agitation  LORazepam   Injectable 2 milliGRAM(s) IntraMuscular once PRN agitation  
MEDICATIONS  (STANDING):  clonazePAM  Tablet 0.5 milliGRAM(s) Oral daily  fluPHENAZine 5 milliGRAM(s) Oral two times a day  lithium 600 milliGRAM(s) Oral at bedtime  OLANZapine 5 milliGRAM(s) Oral at bedtime    MEDICATIONS  (PRN):  ALBUTerol    90 MICROgram(s) HFA Inhaler 2 Puff(s) Inhalation every 6 hours PRN asthma attack  diphenhydrAMINE 50 milliGRAM(s) Oral every 6 hours PRN agitation eps ppx  diphenhydrAMINE   Injectable 25 milliGRAM(s) IntraMuscular once PRN severe agitation  haloperidol     Tablet 5 milliGRAM(s) Oral every 6 hours PRN psychotic agitation  haloperidol    Injectable 5 milliGRAM(s) IntraMuscular every 6 hours PRN SEVERE PSYCHOTIC AGITATION  haloperidol    Injectable 5 milliGRAM(s) IntraMuscular every 6 hours PRN SEVERE PSYCHOTIC AGITATION  LORazepam     Tablet 2 milliGRAM(s) Oral every 6 hours PRN agitation  LORazepam   Injectable 2 milliGRAM(s) IntraMuscular once PRN agitation  
MEDICATIONS  (STANDING):  clonazePAM  Tablet 0.5 milliGRAM(s) Oral daily  fluPHENAZine 5 milliGRAM(s) Oral two times a day  lithium 600 milliGRAM(s) Oral at bedtime  OLANZapine 5 milliGRAM(s) Oral at bedtime    MEDICATIONS  (PRN):  ALBUTerol    90 MICROgram(s) HFA Inhaler 2 Puff(s) Inhalation every 6 hours PRN asthma attack  diphenhydrAMINE 50 milliGRAM(s) Oral every 6 hours PRN agitation eps ppx  diphenhydrAMINE   Injectable 25 milliGRAM(s) IntraMuscular once PRN severe agitation  haloperidol     Tablet 5 milliGRAM(s) Oral every 6 hours PRN psychotic agitation  haloperidol    Injectable 5 milliGRAM(s) IntraMuscular every 6 hours PRN SEVERE PSYCHOTIC AGITATION  haloperidol    Injectable 5 milliGRAM(s) IntraMuscular every 6 hours PRN SEVERE PSYCHOTIC AGITATION  LORazepam     Tablet 2 milliGRAM(s) Oral every 6 hours PRN agitation  LORazepam   Injectable 2 milliGRAM(s) IntraMuscular once PRN agitation  
MEDICATIONS  (STANDING):  fluPHENAZine 10 milliGRAM(s) Oral two times a day  lithium CR (ESKALITH-CR) 450 milliGRAM(s) Oral two times a day  OLANZapine 7.5 milliGRAM(s) Oral at bedtime    MEDICATIONS  (PRN):  ALBUTerol    90 MICROgram(s) HFA Inhaler 2 Puff(s) Inhalation every 6 hours PRN asthma attack  diphenhydrAMINE 50 milliGRAM(s) Oral every 6 hours PRN agitation eps ppx  diphenhydrAMINE   Injectable 50 milliGRAM(s) IntraMuscular once PRN psychotic agitation  haloperidol     Tablet 5 milliGRAM(s) Oral every 6 hours PRN psychotic agitation  haloperidol    Injectable 5 milliGRAM(s) IntraMuscular every 6 hours PRN SEVERE PSYCHOTIC AGITATION  haloperidol    Injectable 5 milliGRAM(s) IntraMuscular every 6 hours PRN SEVERE PSYCHOTIC AGITATION  haloperidol    Injectable 5 milliGRAM(s) IntraMuscular every 6 hours PRN SEVERE PSYCHOTIC AGITATION  haloperidol    Injectable 5 milliGRAM(s) IntraMuscular every 6 hours PRN SEVERE PSYCHOTIC AGITATION  LORazepam     Tablet 2 milliGRAM(s) Oral every 6 hours PRN agitation  LORazepam   Injectable 2 milliGRAM(s) IntraMuscular once PRN agitation  
MEDICATIONS  (STANDING):  clonazePAM  Tablet 0.5 milliGRAM(s) Oral two times a day  fluPHENAZine 10 milliGRAM(s) Oral two times a day  lithium CR (ESKALITH-CR) 450 milliGRAM(s) Oral two times a day  OLANZapine 7.5 milliGRAM(s) Oral at bedtime    MEDICATIONS  (PRN):  ALBUTerol    90 MICROgram(s) HFA Inhaler 2 Puff(s) Inhalation every 6 hours PRN asthma attack  diphenhydrAMINE 50 milliGRAM(s) Oral every 6 hours PRN agitation eps ppx  diphenhydrAMINE   Injectable 50 milliGRAM(s) IntraMuscular once PRN psychotic agitation  diphenhydrAMINE   Injectable 50 milliGRAM(s) IntraMuscular once PRN psychotic agitation  haloperidol     Tablet 5 milliGRAM(s) Oral every 6 hours PRN psychotic agitation  haloperidol    Injectable 5 milliGRAM(s) IntraMuscular every 6 hours PRN SEVERE PSYCHOTIC AGITATION  haloperidol    Injectable 5 milliGRAM(s) IntraMuscular every 6 hours PRN SEVERE PSYCHOTIC AGITATION  haloperidol    Injectable 5 milliGRAM(s) IntraMuscular every 6 hours PRN SEVERE PSYCHOTIC AGITATION  haloperidol    Injectable 5 milliGRAM(s) IntraMuscular every 6 hours PRN SEVERE PSYCHOTIC AGITATION  ibuprofen  Tablet. 600 milliGRAM(s) Oral every 6 hours PRN Moderate Pain (4 - 6)  LORazepam     Tablet 2 milliGRAM(s) Oral every 6 hours PRN agitation  LORazepam   Injectable 2 milliGRAM(s) IntraMuscular once PRN agitation  LORazepam   Injectable 2 milliGRAM(s) IntraMuscular once PRN agitation  
MEDICATIONS  (STANDING):  clonazePAM  Tablet 0.5 milliGRAM(s) Oral two times a day  fluPHENAZine 10 milliGRAM(s) Oral two times a day  fluPHENAZine decanoate Injectable, Long Acting 50 milliGRAM(s) IntraMuscular once  lithium CR (ESKALITH-CR) 900 milliGRAM(s) Oral at bedtime  OLANZapine 7.5 milliGRAM(s) Oral at bedtime    MEDICATIONS  (PRN):  ALBUTerol    90 MICROgram(s) HFA Inhaler 2 Puff(s) Inhalation every 6 hours PRN asthma attack  diphenhydrAMINE 50 milliGRAM(s) Oral every 6 hours PRN agitation eps ppx  diphenhydrAMINE   Injectable 50 milliGRAM(s) IntraMuscular once PRN psychotic agitation  diphenhydrAMINE   Injectable 50 milliGRAM(s) IntraMuscular once PRN psychotic agitation  haloperidol     Tablet 5 milliGRAM(s) Oral every 6 hours PRN psychotic agitation  haloperidol    Injectable 5 milliGRAM(s) IntraMuscular every 6 hours PRN SEVERE PSYCHOTIC AGITATION  haloperidol    Injectable 5 milliGRAM(s) IntraMuscular every 6 hours PRN SEVERE PSYCHOTIC AGITATION  haloperidol    Injectable 5 milliGRAM(s) IntraMuscular every 6 hours PRN SEVERE PSYCHOTIC AGITATION  haloperidol    Injectable 5 milliGRAM(s) IntraMuscular every 6 hours PRN SEVERE PSYCHOTIC AGITATION  ibuprofen  Tablet. 600 milliGRAM(s) Oral every 6 hours PRN Moderate Pain (4 - 6)  LORazepam     Tablet 2 milliGRAM(s) Oral every 6 hours PRN agitation  LORazepam   Injectable 2 milliGRAM(s) IntraMuscular once PRN agitation  LORazepam   Injectable 2 milliGRAM(s) IntraMuscular once PRN agitation  
MEDICATIONS  (STANDING):  clonazePAM  Tablet 0.5 milliGRAM(s) Oral two times a day  fluPHENAZine 5 milliGRAM(s) Oral two times a day  lithium CR (ESKALITH-CR) 900 milliGRAM(s) Oral at bedtime  OLANZapine 15 milliGRAM(s) Oral at bedtime    MEDICATIONS  (PRN):  ALBUTerol    90 MICROgram(s) HFA Inhaler 2 Puff(s) Inhalation every 6 hours PRN asthma attack  diphenhydrAMINE 50 milliGRAM(s) Oral every 6 hours PRN agitation eps ppx  diphenhydrAMINE   Injectable 50 milliGRAM(s) IntraMuscular once PRN psychotic agitation  diphenhydrAMINE   Injectable 50 milliGRAM(s) IntraMuscular once PRN psychotic agitation  haloperidol     Tablet 5 milliGRAM(s) Oral every 6 hours PRN psychotic agitation  haloperidol    Injectable 5 milliGRAM(s) IntraMuscular every 6 hours PRN SEVERE PSYCHOTIC AGITATION  haloperidol    Injectable 5 milliGRAM(s) IntraMuscular every 6 hours PRN SEVERE PSYCHOTIC AGITATION  haloperidol    Injectable 5 milliGRAM(s) IntraMuscular every 6 hours PRN SEVERE PSYCHOTIC AGITATION  haloperidol    Injectable 5 milliGRAM(s) IntraMuscular every 6 hours PRN SEVERE PSYCHOTIC AGITATION  ibuprofen  Tablet. 600 milliGRAM(s) Oral every 6 hours PRN Moderate Pain (4 - 6)  LORazepam     Tablet 2 milliGRAM(s) Oral every 6 hours PRN agitation  LORazepam   Injectable 2 milliGRAM(s) IntraMuscular once PRN agitation  
MEDICATIONS  (STANDING):  lithium CR (ESKALITH-CR) 900 milliGRAM(s) Oral at bedtime  OLANZapine 15 milliGRAM(s) Oral at bedtime    MEDICATIONS  (PRN):  ALBUTerol    90 MICROgram(s) HFA Inhaler 2 Puff(s) Inhalation every 6 hours PRN asthma attack  diphenhydrAMINE 50 milliGRAM(s) Oral every 6 hours PRN agitation eps ppx  diphenhydrAMINE   Injectable 50 milliGRAM(s) IntraMuscular once PRN psychotic agitation  diphenhydrAMINE   Injectable 50 milliGRAM(s) IntraMuscular once PRN psychotic agitation  haloperidol     Tablet 5 milliGRAM(s) Oral every 6 hours PRN psychotic agitation  haloperidol    Injectable 5 milliGRAM(s) IntraMuscular every 6 hours PRN SEVERE PSYCHOTIC AGITATION  haloperidol    Injectable 5 milliGRAM(s) IntraMuscular every 6 hours PRN SEVERE PSYCHOTIC AGITATION  haloperidol    Injectable 5 milliGRAM(s) IntraMuscular every 6 hours PRN SEVERE PSYCHOTIC AGITATION  haloperidol    Injectable 5 milliGRAM(s) IntraMuscular every 6 hours PRN SEVERE PSYCHOTIC AGITATION  ibuprofen  Tablet. 600 milliGRAM(s) Oral every 6 hours PRN Moderate Pain (4 - 6)  LORazepam     Tablet 2 milliGRAM(s) Oral every 6 hours PRN agitation/anxiety  LORazepam   Injectable 2 milliGRAM(s) IntraMuscular once PRN agitation  

## 2021-01-05 NOTE — BH INPATIENT PSYCHIATRY PROGRESS NOTE - NSTXIMPULSGOAL_PSY_ALL_CORE
Will identify 1 behavior to cope with impulsive urges
Will identify 1 behavior to cope with impulsive urges
Will be able to describe/demonstrate alternative ways of managing his/her emotional state on the unit
Will identify 1 behavior to cope with impulsive urges
Will be able to describe/demonstrate alternative ways of managing his/her emotional state on the unit
Will identify 1 behavior to cope with impulsive urges
Will be able to describe/demonstrate alternative ways of managing his/her emotional state on the unit
Will identify 1 behavior to cope with impulsive urges
Will identify 1 behavior to cope with impulsive urges
Will be able to describe/demonstrate alternative ways of managing his/her emotional state on the unit
Will be able to describe/demonstrate alternative ways of managing his/her emotional state on the unit
Will identify 1 behavior to cope with impulsive urges

## 2021-01-05 NOTE — BH INPATIENT PSYCHIATRY PROGRESS NOTE - NSBHCONSULTIPREASON_PSY_A_CORE
danger to others; mental illness expected to respond to inpatient care
danger to others; mental illness expected to respond to inpatient care
Patient placed on Lithium 600mg and Zyprexa 5mg for psychosis, Patient refusing to take all prescribed medications.   All Risks and benefits of medications reviewed with patient. Will continue to collaborate with treatment team and medical team to manage medical issues. Patient Transfer to [facility].
danger to others; mental illness expected to respond to inpatient care
other reason
danger to others; mental illness expected to respond to inpatient care

## 2021-01-05 NOTE — BH DISCHARGE NOTE NURSING/SOCIAL WORK/PSYCH REHAB - NSDCPRGOAL_PSY_ALL_CORE
Writer met with patient in order to discuss progress towards psych rehab goal upon discharge. Patient has made fair progress. Patient identified  listening to music, and using computer as positive coping skills. Writer provided support and encouraged patient to utilize coping skills post discharge.  Writer met with patient in order to discuss progress towards psych rehab goal upon discharge. Patient has made fair progress. Patient identified  listening to music, and using computer as positive coping skills. Writer provided support and encouraged patient to utilize coping skills post discharge.    Upon approach, patient is irritable and preoccupied with discharge. Patient refuses to discuss treatment with writer. Writer provided support and encourage dpatient to continue engaging in treatment post discharge. Patient was receptive. Patient denies SI/HI     Patient attended approximately 70% of psych rehab groups during the last seven days. Patient was verbally engaged, however unable to tolerate session structure at times.     Patient refused press ganey survey.

## 2021-01-05 NOTE — BH INPATIENT PSYCHIATRY PROGRESS NOTE - NSTXDCFAMGOAL_PSY_ALL_CORE
Will agree to involve supports/family in treatment and discharge planning

## 2021-01-05 NOTE — BH INPATIENT PSYCHIATRY PROGRESS NOTE - NSTXPSYCHODATETRGT_PSY_ALL_CORE
22-Dec-2020
29-Dec-2020
15-Dec-2020
15-Dec-2020
31-Dec-2020
22-Dec-2020
31-Dec-2020
15-Dec-2020
31-Dec-2020
22-Dec-2020
31-Dec-2020

## 2021-01-05 NOTE — BH INPATIENT PSYCHIATRY PROGRESS NOTE - NSTXCOPEDATEEST_PSY_ALL_CORE
08-Dec-2020

## 2021-01-05 NOTE — BH INPATIENT PSYCHIATRY PROGRESS NOTE - NSTXDCFAMPROGRES_PSY_ALL_CORE
No Change
Met - goal discontinued
No Change

## 2021-01-05 NOTE — BH INPATIENT PSYCHIATRY PROGRESS NOTE - NSBHANTIPSYCHOTIC_PSY_ALL_CORE_FT
Discharge labs ordered: CBC, CMP, Lithium serum level.
Discharge labs ordered: CBC, CMP, Lithium serum level.  1/5/20: Lithium serum level at dc is therapeutic 0.6

## 2021-01-05 NOTE — BH INPATIENT PSYCHIATRY PROGRESS NOTE - NSTXCOPEGOAL_PSY_ALL_CORE
Identify and utilize 2 coping skills that meet their needs

## 2021-01-05 NOTE — BH INPATIENT PSYCHIATRY PROGRESS NOTE - OTHER
Displays improved grooming and hygiene.  More organized and sensical.  Patient is becoming less disorganized, more linear, organized thought process Mood is improving and stabilizing. Patient reports feeling anxious about discharge

## 2021-01-05 NOTE — BH INPATIENT PSYCHIATRY PROGRESS NOTE - NSTXPSYCHODATEEST_PSY_ALL_CORE
06-Dec-2020
06-Dec-2020
24-Dec-2020
08-Dec-2020
08-Dec-2020
24-Dec-2020
24-Dec-2020
06-Dec-2020
06-Dec-2020
24-Dec-2020
06-Dec-2020
08-Dec-2020
24-Dec-2020
06-Dec-2020
06-Dec-2020
24-Dec-2020
06-Dec-2020

## 2021-01-05 NOTE — BH INPATIENT PSYCHIATRY PROGRESS NOTE - NSBHMSEIMPULSE_PSY_A_CORE
Impaired
Impaired
Normal
Impaired

## 2021-01-05 NOTE — BH INPATIENT PSYCHIATRY PROGRESS NOTE - NSICDXBHPRIMARYDX_PSY_ALL_CORE
Bipolar disorder with psychotic features   F31.9  

## 2021-01-05 NOTE — ED ADULT NURSE NOTE - PRO INTERPRETER NEED 2
English Referred To Mid-Level For Closure Text (Leave Blank If You Do Not Want): After obtaining clear surgical margins the patient was sent to a mid-level provider for surgical repair.  The patient understands they will receive post-surgical care and follow-up from the mid-level provider.

## 2021-01-05 NOTE — BH INPATIENT PSYCHIATRY PROGRESS NOTE - NSBHADMITMEDEDUDETAILS_PSY_A_CORE FT
Symptom reduction and improved level of functioning. 
Symptom reduction and improved level of functioning. 
Psycho education was provided to the patient
Symptom improvement
Symptom reduction and improved level of functioning. 
Psychoeducation discussed
Psycho education was provided to the patient
Psychoeducation discussed.
Symptom reduction and improved level of functioning. 
Psychoeducation discussed
Symptom reduction and improved level of functioning. 
Symptom stabilization

## 2021-01-05 NOTE — BH INPATIENT PSYCHIATRY PROGRESS NOTE - NSBHMETABOLICLABS_PSY_ALL_CORE
Labs within last 12 months

## 2021-01-05 NOTE — BH INPATIENT PSYCHIATRY PROGRESS NOTE - ADDITIONAL DETAILS / COMMENTS
Improved insight into her mental illness and need for medications. Has consented to [facility]. Has been fully engaged in dc planning.

## 2021-01-05 NOTE — BH INPATIENT PSYCHIATRY PROGRESS NOTE - NSTXPROBDCOPNO_PSY_ALL_CORE
DISCHARGE ISSUE - NON-ADHERENT WITH OUTPATIENT SERVICES

## 2021-01-05 NOTE — BH INPATIENT PSYCHIATRY PROGRESS NOTE - NSBHFUPINTERVALHXFT_PSY_A_CORE
Patient seen individually for discharge day management. The patient's case has been reviewed with the treatment team.  Spent performing discharge risk assessment, safety planning, performing clinical eval, planning for dc and providing psychoeducation about meds, sx and aftercare.On exam today the patient was cooperative and makes good eye contact. Patient describes mood as “I’m just a little nervous about discharge.”  Symptom improvement is noted. Patient’s symptoms of psychotic process have lessened and she is better. Her sleep patterns have also improved and are better.  Overall she has been in behavioral control, no prn’s required during the latter part of her hospitalization. Denies suicidal thoughts no plan or intent. Denies HI/AVH, delusions, or other symptoms of psychotic process are reported at this time.  She showed some improvement in her symptoms, with a gradual reduction of her internal preoccupation, paranoia, aggression, and thought disorganization. Patient has actively engaged in treatment to maximize treatment benefits. She has attended groups, and milieu therapy.   The patient was provided with motivational counseling to tackle stressors and enhance coping skills.  She is encouraged to abstain from illicit substances, as these can directly worsen her mood and symptoms. She has remained compliant with medications, no adverse effects noted.  Medication teaching, risk/benefits of PALACIOS discussed. She agreed to PALACIOS, received Prolixin Deconate 50mg during her course of treatment. She has remained in good behavioral control and has not required emergent intramuscular medications or seclusion / restraints.  She denied any suicidal or homicidal ideations throughout hospitalization. Improved level of functioning is evident, with appropriate interaction with others.   Patient able to identify protective factors: she is future-oriented, and has strong social support.  Patient no longer requires inpatient treatment and care. Patient is not judged to be an acute danger to self or others at this time. Appears ready and stable to be discharged to lower level of care.  There are no other acute risk factors that could be mitigated by further inpatient hospitalization.   She will be discharged today to home at [facility] building [address] and outpatient follow up with her ACT Team.   Patient left under no distress. Patient seen individually for discharge day management. The patient's case has been reviewed with the treatment team.  Spent performing discharge risk assessment, safety planning, performing clinical eval, planning for dc and providing psychoeducation about meds, sx and aftercare. On exam today the patient was cooperative and makes good eye contact. Patient describes mood as “I’m just a little nervous about discharge.”  Symptom improvement is noted. Patient’s symptoms of psychotic process have lessened and she is better. Her sleep patterns have also improved and are better.  Overall she has been in behavioral control, no prn’s required during the latter part of her hospitalization. Denies suicidal thoughts no plan or intent. Denies HI/AVH, delusions, or other symptoms of psychotic process are reported at this time.  She showed some improvement in her symptoms, with a gradual reduction of her internal preoccupation, paranoia, aggression, and thought disorganization. Patient has actively engaged in treatment to maximize treatment benefits. She has attended groups, and milieu therapy.   The patient was provided with motivational counseling to tackle stressors and enhance coping skills.  She is encouraged to abstain from illicit substances, as these can directly worsen her mood and symptoms. She has remained compliant with medications, no adverse effects noted.  Medication teaching, risk/benefits of PALACIOS discussed. She agreed to PALACIOS, received Prolixin Deconate 50mg during her course of treatment. She has remained in good behavioral control and has not required emergent intramuscular medications or seclusion / restraints.  She denied any suicidal or homicidal ideations throughout hospitalization. Improved level of functioning is evident, with appropriate interaction with others.   Patient able to identify protective factors: she is future-oriented, and has strong social support.  Patient no longer requires inpatient treatment and care. Patient is not judged to be an acute danger to self or others at this time. Appears ready and stable to be discharged to lower level of care.  There are no other acute risk factors that could be mitigated by further inpatient hospitalization.   She will be discharged today to home at [facility] building [address] and outpatient follow up with her ACT Team.   Patient left under no distress.

## 2021-01-05 NOTE — BH INPATIENT PSYCHIATRY PROGRESS NOTE - PRN MEDS
MEDICATIONS  (PRN):  ALBUTerol    90 MICROgram(s) HFA Inhaler 2 Puff(s) Inhalation every 6 hours PRN asthma attack  diphenhydrAMINE 50 milliGRAM(s) Oral every 6 hours PRN agitation eps ppx  diphenhydrAMINE   Injectable 50 milliGRAM(s) IntraMuscular once PRN psychotic agitation  diphenhydrAMINE   Injectable 50 milliGRAM(s) IntraMuscular once PRN psychotic agitation  haloperidol     Tablet 5 milliGRAM(s) Oral every 6 hours PRN psychotic agitation  haloperidol    Injectable 5 milliGRAM(s) IntraMuscular every 6 hours PRN SEVERE PSYCHOTIC AGITATION  haloperidol    Injectable 5 milliGRAM(s) IntraMuscular every 6 hours PRN SEVERE PSYCHOTIC AGITATION  haloperidol    Injectable 5 milliGRAM(s) IntraMuscular every 6 hours PRN SEVERE PSYCHOTIC AGITATION  haloperidol    Injectable 5 milliGRAM(s) IntraMuscular every 6 hours PRN SEVERE PSYCHOTIC AGITATION  ibuprofen  Tablet. 600 milliGRAM(s) Oral every 6 hours PRN Moderate Pain (4 - 6)  LORazepam     Tablet 2 milliGRAM(s) Oral every 6 hours PRN agitation  LORazepam   Injectable 2 milliGRAM(s) IntraMuscular once PRN agitation  LORazepam   Injectable 2 milliGRAM(s) IntraMuscular once PRN agitation  
MEDICATIONS  (PRN):  ALBUTerol    90 MICROgram(s) HFA Inhaler 2 Puff(s) Inhalation every 6 hours PRN asthma attack  diphenhydrAMINE 50 milliGRAM(s) Oral every 6 hours PRN agitation eps ppx  diphenhydrAMINE   Injectable 50 milliGRAM(s) IntraMuscular once PRN psychotic agitation  diphenhydrAMINE   Injectable 50 milliGRAM(s) IntraMuscular once PRN psychotic agitation  haloperidol     Tablet 5 milliGRAM(s) Oral every 6 hours PRN psychotic agitation  haloperidol    Injectable 5 milliGRAM(s) IntraMuscular every 6 hours PRN SEVERE PSYCHOTIC AGITATION  haloperidol    Injectable 5 milliGRAM(s) IntraMuscular every 6 hours PRN SEVERE PSYCHOTIC AGITATION  haloperidol    Injectable 5 milliGRAM(s) IntraMuscular every 6 hours PRN SEVERE PSYCHOTIC AGITATION  haloperidol    Injectable 5 milliGRAM(s) IntraMuscular every 6 hours PRN SEVERE PSYCHOTIC AGITATION  ibuprofen  Tablet. 600 milliGRAM(s) Oral every 6 hours PRN Moderate Pain (4 - 6)  LORazepam     Tablet 2 milliGRAM(s) Oral every 6 hours PRN agitation  LORazepam   Injectable 2 milliGRAM(s) IntraMuscular once PRN agitation  
MEDICATIONS  (PRN):  ALBUTerol    90 MICROgram(s) HFA Inhaler 2 Puff(s) Inhalation every 6 hours PRN asthma attack  diphenhydrAMINE 50 milliGRAM(s) Oral every 6 hours PRN agitation eps ppx  diphenhydrAMINE   Injectable 50 milliGRAM(s) IntraMuscular once PRN psychotic agitation  haloperidol     Tablet 5 milliGRAM(s) Oral every 6 hours PRN psychotic agitation  haloperidol    Injectable 5 milliGRAM(s) IntraMuscular every 6 hours PRN SEVERE PSYCHOTIC AGITATION  haloperidol    Injectable 5 milliGRAM(s) IntraMuscular every 6 hours PRN SEVERE PSYCHOTIC AGITATION  haloperidol    Injectable 5 milliGRAM(s) IntraMuscular every 6 hours PRN SEVERE PSYCHOTIC AGITATION  haloperidol    Injectable 5 milliGRAM(s) IntraMuscular every 6 hours PRN SEVERE PSYCHOTIC AGITATION  LORazepam     Tablet 2 milliGRAM(s) Oral every 6 hours PRN agitation  LORazepam   Injectable 2 milliGRAM(s) IntraMuscular once PRN agitation  
MEDICATIONS  (PRN):  ALBUTerol    90 MICROgram(s) HFA Inhaler 2 Puff(s) Inhalation every 6 hours PRN asthma attack  diphenhydrAMINE 50 milliGRAM(s) Oral every 6 hours PRN agitation eps ppx  diphenhydrAMINE   Injectable 25 milliGRAM(s) IntraMuscular once PRN severe agitation  haloperidol     Tablet 5 milliGRAM(s) Oral every 6 hours PRN psychotic agitation  haloperidol    Injectable 5 milliGRAM(s) IntraMuscular every 6 hours PRN SEVERE PSYCHOTIC AGITATION  LORazepam     Tablet 2 milliGRAM(s) Oral every 6 hours PRN agitation  LORazepam   Injectable 2 milliGRAM(s) IntraMuscular once PRN agitation  
MEDICATIONS  (PRN):  ALBUTerol    90 MICROgram(s) HFA Inhaler 2 Puff(s) Inhalation every 6 hours PRN asthma attack  diphenhydrAMINE 50 milliGRAM(s) Oral every 6 hours PRN agitation eps ppx  diphenhydrAMINE   Injectable 25 milliGRAM(s) IntraMuscular once PRN severe agitation  haloperidol     Tablet 5 milliGRAM(s) Oral every 6 hours PRN psychotic agitation  haloperidol    Injectable 5 milliGRAM(s) IntraMuscular every 6 hours PRN SEVERE PSYCHOTIC AGITATION  LORazepam     Tablet 2 milliGRAM(s) Oral every 6 hours PRN psychotic agitation  LORazepam   Injectable 2 milliGRAM(s) IntraMuscular every 6 hours PRN SEVERE PSYCHOTIC AGITATION  
MEDICATIONS  (PRN):  ALBUTerol    90 MICROgram(s) HFA Inhaler 2 Puff(s) Inhalation every 6 hours PRN asthma attack  diphenhydrAMINE 50 milliGRAM(s) Oral every 6 hours PRN agitation eps ppx  diphenhydrAMINE   Injectable 50 milliGRAM(s) IntraMuscular once PRN psychotic agitation  haloperidol     Tablet 5 milliGRAM(s) Oral every 6 hours PRN psychotic agitation  haloperidol    Injectable 5 milliGRAM(s) IntraMuscular every 6 hours PRN SEVERE PSYCHOTIC AGITATION  haloperidol    Injectable 5 milliGRAM(s) IntraMuscular every 6 hours PRN SEVERE PSYCHOTIC AGITATION  haloperidol    Injectable 5 milliGRAM(s) IntraMuscular every 6 hours PRN SEVERE PSYCHOTIC AGITATION  haloperidol    Injectable 5 milliGRAM(s) IntraMuscular every 6 hours PRN SEVERE PSYCHOTIC AGITATION  LORazepam     Tablet 2 milliGRAM(s) Oral every 6 hours PRN agitation  LORazepam   Injectable 2 milliGRAM(s) IntraMuscular once PRN agitation  
MEDICATIONS  (PRN):  ALBUTerol    90 MICROgram(s) HFA Inhaler 2 Puff(s) Inhalation every 6 hours PRN asthma attack  diphenhydrAMINE 50 milliGRAM(s) Oral every 6 hours PRN agitation eps ppx  diphenhydrAMINE   Injectable 25 milliGRAM(s) IntraMuscular once PRN severe agitation  haloperidol     Tablet 5 milliGRAM(s) Oral every 6 hours PRN psychotic agitation  haloperidol    Injectable 5 milliGRAM(s) IntraMuscular every 6 hours PRN SEVERE PSYCHOTIC AGITATION  haloperidol    Injectable 5 milliGRAM(s) IntraMuscular every 6 hours PRN SEVERE PSYCHOTIC AGITATION  LORazepam     Tablet 2 milliGRAM(s) Oral every 6 hours PRN agitation  LORazepam   Injectable 2 milliGRAM(s) IntraMuscular once PRN agitation  
MEDICATIONS  (PRN):  ALBUTerol    90 MICROgram(s) HFA Inhaler 2 Puff(s) Inhalation every 6 hours PRN asthma attack  diphenhydrAMINE 50 milliGRAM(s) Oral every 6 hours PRN agitation eps ppx  diphenhydrAMINE   Injectable 25 milliGRAM(s) IntraMuscular once PRN severe agitation  haloperidol     Tablet 5 milliGRAM(s) Oral every 6 hours PRN psychotic agitation  haloperidol    Injectable 5 milliGRAM(s) IntraMuscular every 6 hours PRN SEVERE PSYCHOTIC AGITATION  haloperidol    Injectable 5 milliGRAM(s) IntraMuscular every 6 hours PRN SEVERE PSYCHOTIC AGITATION  LORazepam     Tablet 2 milliGRAM(s) Oral every 6 hours PRN agitation  LORazepam   Injectable 2 milliGRAM(s) IntraMuscular once PRN agitation  
MEDICATIONS  (PRN):  ALBUTerol    90 MICROgram(s) HFA Inhaler 2 Puff(s) Inhalation every 6 hours PRN asthma attack  diphenhydrAMINE 50 milliGRAM(s) Oral every 6 hours PRN agitation eps ppx  diphenhydrAMINE   Injectable 50 milliGRAM(s) IntraMuscular once PRN psychotic agitation  haloperidol     Tablet 5 milliGRAM(s) Oral every 6 hours PRN psychotic agitation  haloperidol    Injectable 5 milliGRAM(s) IntraMuscular every 6 hours PRN SEVERE PSYCHOTIC AGITATION  haloperidol    Injectable 5 milliGRAM(s) IntraMuscular every 6 hours PRN SEVERE PSYCHOTIC AGITATION  haloperidol    Injectable 5 milliGRAM(s) IntraMuscular every 6 hours PRN SEVERE PSYCHOTIC AGITATION  haloperidol    Injectable 5 milliGRAM(s) IntraMuscular every 6 hours PRN SEVERE PSYCHOTIC AGITATION  LORazepam     Tablet 2 milliGRAM(s) Oral every 6 hours PRN agitation  LORazepam   Injectable 2 milliGRAM(s) IntraMuscular once PRN agitation  
MEDICATIONS  (PRN):  ALBUTerol    90 MICROgram(s) HFA Inhaler 2 Puff(s) Inhalation every 6 hours PRN asthma attack  diphenhydrAMINE 50 milliGRAM(s) Oral every 6 hours PRN agitation eps ppx  diphenhydrAMINE   Injectable 50 milliGRAM(s) IntraMuscular once PRN psychotic agitation  diphenhydrAMINE   Injectable 50 milliGRAM(s) IntraMuscular once PRN psychotic agitation  haloperidol     Tablet 5 milliGRAM(s) Oral every 6 hours PRN psychotic agitation  haloperidol    Injectable 5 milliGRAM(s) IntraMuscular every 6 hours PRN SEVERE PSYCHOTIC AGITATION  haloperidol    Injectable 5 milliGRAM(s) IntraMuscular every 6 hours PRN SEVERE PSYCHOTIC AGITATION  haloperidol    Injectable 5 milliGRAM(s) IntraMuscular every 6 hours PRN SEVERE PSYCHOTIC AGITATION  haloperidol    Injectable 5 milliGRAM(s) IntraMuscular every 6 hours PRN SEVERE PSYCHOTIC AGITATION  ibuprofen  Tablet. 600 milliGRAM(s) Oral every 6 hours PRN Moderate Pain (4 - 6)  LORazepam     Tablet 2 milliGRAM(s) Oral every 6 hours PRN agitation  LORazepam   Injectable 2 milliGRAM(s) IntraMuscular once PRN agitation  LORazepam   Injectable 2 milliGRAM(s) IntraMuscular once PRN agitation  
MEDICATIONS  (PRN):  ALBUTerol    90 MICROgram(s) HFA Inhaler 2 Puff(s) Inhalation every 6 hours PRN asthma attack  diphenhydrAMINE 50 milliGRAM(s) Oral every 6 hours PRN agitation eps ppx  diphenhydrAMINE   Injectable 50 milliGRAM(s) IntraMuscular once PRN psychotic agitation  diphenhydrAMINE   Injectable 50 milliGRAM(s) IntraMuscular once PRN psychotic agitation  haloperidol     Tablet 5 milliGRAM(s) Oral every 6 hours PRN psychotic agitation  haloperidol    Injectable 5 milliGRAM(s) IntraMuscular every 6 hours PRN SEVERE PSYCHOTIC AGITATION  haloperidol    Injectable 5 milliGRAM(s) IntraMuscular every 6 hours PRN SEVERE PSYCHOTIC AGITATION  haloperidol    Injectable 5 milliGRAM(s) IntraMuscular every 6 hours PRN SEVERE PSYCHOTIC AGITATION  haloperidol    Injectable 5 milliGRAM(s) IntraMuscular every 6 hours PRN SEVERE PSYCHOTIC AGITATION  ibuprofen  Tablet. 600 milliGRAM(s) Oral every 6 hours PRN Moderate Pain (4 - 6)  LORazepam     Tablet 2 milliGRAM(s) Oral every 6 hours PRN agitation  LORazepam   Injectable 2 milliGRAM(s) IntraMuscular once PRN agitation  
MEDICATIONS  (PRN):  ALBUTerol    90 MICROgram(s) HFA Inhaler 2 Puff(s) Inhalation every 6 hours PRN asthma attack  diphenhydrAMINE 50 milliGRAM(s) Oral every 6 hours PRN agitation eps ppx  diphenhydrAMINE   Injectable 50 milliGRAM(s) IntraMuscular once PRN psychotic agitation  diphenhydrAMINE   Injectable 50 milliGRAM(s) IntraMuscular once PRN psychotic agitation  haloperidol     Tablet 5 milliGRAM(s) Oral every 6 hours PRN psychotic agitation  haloperidol    Injectable 5 milliGRAM(s) IntraMuscular every 6 hours PRN SEVERE PSYCHOTIC AGITATION  haloperidol    Injectable 5 milliGRAM(s) IntraMuscular every 6 hours PRN SEVERE PSYCHOTIC AGITATION  haloperidol    Injectable 5 milliGRAM(s) IntraMuscular every 6 hours PRN SEVERE PSYCHOTIC AGITATION  haloperidol    Injectable 5 milliGRAM(s) IntraMuscular every 6 hours PRN SEVERE PSYCHOTIC AGITATION  ibuprofen  Tablet. 600 milliGRAM(s) Oral every 6 hours PRN Moderate Pain (4 - 6)  LORazepam     Tablet 2 milliGRAM(s) Oral every 6 hours PRN agitation  LORazepam   Injectable 2 milliGRAM(s) IntraMuscular once PRN agitation  LORazepam   Injectable 2 milliGRAM(s) IntraMuscular once PRN agitation  
MEDICATIONS  (PRN):  ALBUTerol    90 MICROgram(s) HFA Inhaler 2 Puff(s) Inhalation every 6 hours PRN asthma attack  diphenhydrAMINE 50 milliGRAM(s) Oral every 6 hours PRN agitation eps ppx  diphenhydrAMINE   Injectable 50 milliGRAM(s) IntraMuscular once PRN psychotic agitation  diphenhydrAMINE   Injectable 50 milliGRAM(s) IntraMuscular once PRN psychotic agitation  haloperidol     Tablet 5 milliGRAM(s) Oral every 6 hours PRN psychotic agitation  haloperidol    Injectable 5 milliGRAM(s) IntraMuscular every 6 hours PRN SEVERE PSYCHOTIC AGITATION  haloperidol    Injectable 5 milliGRAM(s) IntraMuscular every 6 hours PRN SEVERE PSYCHOTIC AGITATION  haloperidol    Injectable 5 milliGRAM(s) IntraMuscular every 6 hours PRN SEVERE PSYCHOTIC AGITATION  haloperidol    Injectable 5 milliGRAM(s) IntraMuscular every 6 hours PRN SEVERE PSYCHOTIC AGITATION  ibuprofen  Tablet. 600 milliGRAM(s) Oral every 6 hours PRN Moderate Pain (4 - 6)  LORazepam     Tablet 2 milliGRAM(s) Oral every 6 hours PRN agitation/anxiety  LORazepam   Injectable 2 milliGRAM(s) IntraMuscular once PRN agitation  
MEDICATIONS  (PRN):  ALBUTerol    90 MICROgram(s) HFA Inhaler 2 Puff(s) Inhalation every 6 hours PRN asthma attack  diphenhydrAMINE 50 milliGRAM(s) Oral every 6 hours PRN agitation eps ppx  diphenhydrAMINE   Injectable 50 milliGRAM(s) IntraMuscular once PRN psychotic agitation  diphenhydrAMINE   Injectable 50 milliGRAM(s) IntraMuscular once PRN psychotic agitation  haloperidol     Tablet 5 milliGRAM(s) Oral every 6 hours PRN psychotic agitation  haloperidol    Injectable 5 milliGRAM(s) IntraMuscular every 6 hours PRN SEVERE PSYCHOTIC AGITATION  haloperidol    Injectable 5 milliGRAM(s) IntraMuscular every 6 hours PRN SEVERE PSYCHOTIC AGITATION  haloperidol    Injectable 5 milliGRAM(s) IntraMuscular every 6 hours PRN SEVERE PSYCHOTIC AGITATION  haloperidol    Injectable 5 milliGRAM(s) IntraMuscular every 6 hours PRN SEVERE PSYCHOTIC AGITATION  ibuprofen  Tablet. 600 milliGRAM(s) Oral every 6 hours PRN Moderate Pain (4 - 6)  LORazepam     Tablet 2 milliGRAM(s) Oral every 6 hours PRN agitation  LORazepam   Injectable 2 milliGRAM(s) IntraMuscular once PRN agitation  
MEDICATIONS  (PRN):  ALBUTerol    90 MICROgram(s) HFA Inhaler 2 Puff(s) Inhalation every 6 hours PRN asthma attack  diphenhydrAMINE 50 milliGRAM(s) Oral every 6 hours PRN agitation eps ppx  diphenhydrAMINE   Injectable 50 milliGRAM(s) IntraMuscular once PRN psychotic agitation  diphenhydrAMINE   Injectable 50 milliGRAM(s) IntraMuscular once PRN psychotic agitation  haloperidol     Tablet 5 milliGRAM(s) Oral every 6 hours PRN psychotic agitation  haloperidol    Injectable 5 milliGRAM(s) IntraMuscular every 6 hours PRN SEVERE PSYCHOTIC AGITATION  haloperidol    Injectable 5 milliGRAM(s) IntraMuscular every 6 hours PRN SEVERE PSYCHOTIC AGITATION  haloperidol    Injectable 5 milliGRAM(s) IntraMuscular every 6 hours PRN SEVERE PSYCHOTIC AGITATION  haloperidol    Injectable 5 milliGRAM(s) IntraMuscular every 6 hours PRN SEVERE PSYCHOTIC AGITATION  ibuprofen  Tablet. 600 milliGRAM(s) Oral every 6 hours PRN Moderate Pain (4 - 6)  LORazepam     Tablet 2 milliGRAM(s) Oral every 6 hours PRN agitation  LORazepam   Injectable 2 milliGRAM(s) IntraMuscular once PRN agitation  
MEDICATIONS  (PRN):  ALBUTerol    90 MICROgram(s) HFA Inhaler 2 Puff(s) Inhalation every 6 hours PRN asthma attack  diphenhydrAMINE 50 milliGRAM(s) Oral every 6 hours PRN agitation eps ppx  diphenhydrAMINE   Injectable 50 milliGRAM(s) IntraMuscular once PRN psychotic agitation  diphenhydrAMINE   Injectable 50 milliGRAM(s) IntraMuscular once PRN psychotic agitation  haloperidol     Tablet 5 milliGRAM(s) Oral every 6 hours PRN psychotic agitation  haloperidol    Injectable 5 milliGRAM(s) IntraMuscular every 6 hours PRN SEVERE PSYCHOTIC AGITATION  haloperidol    Injectable 5 milliGRAM(s) IntraMuscular every 6 hours PRN SEVERE PSYCHOTIC AGITATION  haloperidol    Injectable 5 milliGRAM(s) IntraMuscular every 6 hours PRN SEVERE PSYCHOTIC AGITATION  haloperidol    Injectable 5 milliGRAM(s) IntraMuscular every 6 hours PRN SEVERE PSYCHOTIC AGITATION  ibuprofen  Tablet. 600 milliGRAM(s) Oral every 6 hours PRN Moderate Pain (4 - 6)  LORazepam     Tablet 2 milliGRAM(s) Oral every 6 hours PRN agitation/anxiety  LORazepam   Injectable 2 milliGRAM(s) IntraMuscular once PRN agitation  
MEDICATIONS  (PRN):  ALBUTerol    90 MICROgram(s) HFA Inhaler 2 Puff(s) Inhalation every 6 hours PRN asthma attack  diphenhydrAMINE 50 milliGRAM(s) Oral every 6 hours PRN agitation eps ppx  diphenhydrAMINE   Injectable 50 milliGRAM(s) IntraMuscular once PRN psychotic agitation  diphenhydrAMINE   Injectable 50 milliGRAM(s) IntraMuscular once PRN psychotic agitation  haloperidol     Tablet 5 milliGRAM(s) Oral every 6 hours PRN psychotic agitation  haloperidol    Injectable 5 milliGRAM(s) IntraMuscular every 6 hours PRN SEVERE PSYCHOTIC AGITATION  haloperidol    Injectable 5 milliGRAM(s) IntraMuscular every 6 hours PRN SEVERE PSYCHOTIC AGITATION  haloperidol    Injectable 5 milliGRAM(s) IntraMuscular every 6 hours PRN SEVERE PSYCHOTIC AGITATION  haloperidol    Injectable 5 milliGRAM(s) IntraMuscular every 6 hours PRN SEVERE PSYCHOTIC AGITATION  ibuprofen  Tablet. 600 milliGRAM(s) Oral every 6 hours PRN Moderate Pain (4 - 6)  LORazepam     Tablet 2 milliGRAM(s) Oral every 6 hours PRN agitation  LORazepam   Injectable 2 milliGRAM(s) IntraMuscular once PRN agitation  LORazepam   Injectable 2 milliGRAM(s) IntraMuscular once PRN agitation  
MEDICATIONS  (PRN):  ALBUTerol    90 MICROgram(s) HFA Inhaler 2 Puff(s) Inhalation every 6 hours PRN asthma attack  diphenhydrAMINE 50 milliGRAM(s) Oral every 6 hours PRN agitation eps ppx  diphenhydrAMINE   Injectable 50 milliGRAM(s) IntraMuscular once PRN psychotic agitation  diphenhydrAMINE   Injectable 50 milliGRAM(s) IntraMuscular once PRN psychotic agitation  haloperidol     Tablet 5 milliGRAM(s) Oral every 6 hours PRN psychotic agitation  haloperidol    Injectable 5 milliGRAM(s) IntraMuscular every 6 hours PRN SEVERE PSYCHOTIC AGITATION  haloperidol    Injectable 5 milliGRAM(s) IntraMuscular every 6 hours PRN SEVERE PSYCHOTIC AGITATION  haloperidol    Injectable 5 milliGRAM(s) IntraMuscular every 6 hours PRN SEVERE PSYCHOTIC AGITATION  haloperidol    Injectable 5 milliGRAM(s) IntraMuscular every 6 hours PRN SEVERE PSYCHOTIC AGITATION  ibuprofen  Tablet. 600 milliGRAM(s) Oral every 6 hours PRN Moderate Pain (4 - 6)  LORazepam     Tablet 2 milliGRAM(s) Oral every 6 hours PRN agitation  LORazepam   Injectable 2 milliGRAM(s) IntraMuscular once PRN agitation  
MEDICATIONS  (PRN):  ALBUTerol    90 MICROgram(s) HFA Inhaler 2 Puff(s) Inhalation every 6 hours PRN asthma attack  diphenhydrAMINE 50 milliGRAM(s) Oral every 6 hours PRN agitation eps ppx  diphenhydrAMINE   Injectable 50 milliGRAM(s) IntraMuscular once PRN psychotic agitation  diphenhydrAMINE   Injectable 50 milliGRAM(s) IntraMuscular once PRN psychotic agitation  haloperidol     Tablet 5 milliGRAM(s) Oral every 6 hours PRN psychotic agitation  haloperidol    Injectable 5 milliGRAM(s) IntraMuscular every 6 hours PRN SEVERE PSYCHOTIC AGITATION  haloperidol    Injectable 5 milliGRAM(s) IntraMuscular every 6 hours PRN SEVERE PSYCHOTIC AGITATION  haloperidol    Injectable 5 milliGRAM(s) IntraMuscular every 6 hours PRN SEVERE PSYCHOTIC AGITATION  haloperidol    Injectable 5 milliGRAM(s) IntraMuscular every 6 hours PRN SEVERE PSYCHOTIC AGITATION  ibuprofen  Tablet. 600 milliGRAM(s) Oral every 6 hours PRN Moderate Pain (4 - 6)  LORazepam     Tablet 2 milliGRAM(s) Oral every 6 hours PRN agitation/anxiety  LORazepam   Injectable 2 milliGRAM(s) IntraMuscular once PRN agitation

## 2021-01-05 NOTE — BH INPATIENT PSYCHIATRY PROGRESS NOTE - MSE OPTIONS
Structured MSE
Structured MSE
Unstructured MSE
Unstructured MSE
Structured MSE

## 2021-01-05 NOTE — BH INPATIENT PSYCHIATRY PROGRESS NOTE - NSTXPROBIMPULS_PSY_ALL_CORE
IMPULSIVITY/AGITATION

## 2021-01-05 NOTE — BH INPATIENT PSYCHIATRY PROGRESS NOTE - NSTXPROBDCFAM_PSY_ALL_CORE
DISCHARGE ISSUE - POOR ENGAGEMENT WITH SUPPORTS/FAMILY

## 2021-01-07 ENCOUNTER — EMERGENCY (EMERGENCY)
Facility: HOSPITAL | Age: 41
LOS: 1 days | Discharge: HOME | End: 2021-01-07
Admitting: EMERGENCY MEDICINE
Payer: MEDICAID

## 2021-01-07 VITALS
OXYGEN SATURATION: 100 % | TEMPERATURE: 98 F | SYSTOLIC BLOOD PRESSURE: 130 MMHG | DIASTOLIC BLOOD PRESSURE: 79 MMHG | HEIGHT: 66 IN | HEART RATE: 99 BPM | RESPIRATION RATE: 17 BRPM

## 2021-01-07 VITALS
OXYGEN SATURATION: 99 % | HEART RATE: 91 BPM | DIASTOLIC BLOOD PRESSURE: 68 MMHG | SYSTOLIC BLOOD PRESSURE: 108 MMHG | RESPIRATION RATE: 18 BRPM | TEMPERATURE: 99 F

## 2021-01-07 DIAGNOSIS — F43.24 ADJUSTMENT DISORDER WITH DISTURBANCE OF CONDUCT: ICD-10-CM

## 2021-01-07 PROCEDURE — 99283 EMERGENCY DEPT VISIT LOW MDM: CPT

## 2021-01-07 PROCEDURE — 90792 PSYCH DIAG EVAL W/MED SRVCS: CPT

## 2021-01-07 RX ORDER — ACETAMINOPHEN 500 MG
650 TABLET ORAL ONCE
Refills: 0 | Status: COMPLETED | OUTPATIENT
Start: 2021-01-07 | End: 2021-01-07

## 2021-01-07 RX ADMIN — Medication 650 MILLIGRAM(S): at 18:35

## 2021-01-07 NOTE — ED BEHAVIORAL HEALTH ASSESSMENT NOTE - REASON FOR REFERRAL
Left message for patient prescription has been sent to pharmacy  Needs appointment if this does not resolve 
Patient called back,  states she has extreme vaginal itching, thick white discharge with an odor  Would like a diflucan sent into pharmacy  Routing to provider for order 
Patient left message on nurse line states she thinks she has a yeast infection  C/o vaginal itching and working in [place] area  Would like to know if provider can prescribe something for her  Left message for patient to call the office 
Sent  Needs appt if does not resolve 
property destruction

## 2021-01-07 NOTE — ED PROVIDER NOTE - OBJECTIVE STATEMENT
41 y/o F hx  Bipolar  D/O, BPD, GERD, Asthma  BIB EMS after being agitated at [place] residence, throwing object around room.  EMS reports upon arrival patient calm and cooperative, however started endorsing voices that are "the voice of g-d".  Patient states that these voices are chronic and has heard them for a long period of time.  No physical altercation between her and anyone else in residence reported.  Patient denies SI/HI/VH.  Patient denies illicit drugs or ETOH, no physical complaints or concerns.  States that she wants to go back to [facility] (was discharged 2 days ago).

## 2021-01-07 NOTE — ED BEHAVIORAL HEALTH ASSESSMENT NOTE - NSSUICPROTFACT_PSY_ALL_CORE
Identifies reasons for living/Fear of death or the actual act of killing self/Positive therapeutic relationships
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
## 2021-01-07 NOTE — ED BEHAVIORAL HEALTH ASSESSMENT NOTE - NSACTIVEVENT_PSY_ALL_CORE
dislikes her residence/Triggering events leading to humiliation, shame, and/or despair (e.g., Loss of relationship, financial or health status) (real or anticipated)

## 2021-01-07 NOTE — ED BEHAVIORAL HEALTH NOTE - BEHAVIORAL HEALTH NOTE
Verbal huddle occurred with interdisciplinary team. Pt psychiatrically cleared for discharge at this time. Writer contacted [facility] on call provider at [phone]. Writer spoke with FARIDA Ceballos who was informed of pt’s discharge at this time. Pt okay to travel INDEPENDENTLY via Medicaid taxi or ambulette to [id number]. Transportation discussed with team with Medicaid taxi found to be appropriate. Trip scheduled via MAS online portal with [employer] with invoice #[id number]. Staff informed of ETA.
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
## 2021-01-07 NOTE — ED PROVIDER NOTE - PHYSICAL EXAMINATION
GEN:   comfortable, in no apparent distress, AOx3  EYES:   PERRL, extra-occular movements intact  RESP:   clear to auscultation bilaterally, non-labored, speaking in full sentences  ABD:   soft, non tender, no guarding  :   no cva tenderness  MSK:   no musculoskeletal tenderness, 5/5 strength, moving all extremities  SKIN:   dry, intact, no rash  NEURO:   AOx3, no focal weakness or loss of sensation, gait normal, GCS 15  PSYCH: calm, cooperative, endorsing AH.  Denies SI/HI/Ah/VH.  Normal mood and affect, making appropriate eye contact.

## 2021-01-07 NOTE — ED BEHAVIORAL HEALTH ASSESSMENT NOTE - SUMMARY
The patient is a 40-year-old, single,  woman, non caregiver, unemployed, domiciled on [place] grounds building [address] [place] [address]; with past psychiatric history of bipolar I disorder, Borderline Personality Disorder, hx of cannabis abuse, and multiple inpatient psychiatric hospitalizations (>20, most recently [facility] from 12/5/20-1/5/21 ), with frequent visits to the [facility] (well-known to staff), with ACT Team, with a history of 3 prior suicide attempts (last in 2012 via OD), with hx of recurrent and chronic suicidal gestures and suicidal ideation; with a history of property destruction when upset, past legal issues, no access to weapons; with PMH of GERD & asthma; BIB EMS from [place] residence called by staff due to trashing her room.  Presentation consistent with patient's baseline. Patient chronically makes provocative threatening statements and has acting out behaviors due to her poor frustration tolerance and character pathology. She is often provocative and dramatic which is most consistent with behavioral dysregulation and personality disorder pathology. At this current time she does not display any acute exacerbation of an axis 1 diagnosis or symptoms. Patient does not endorse nor display symptoms of psychosis, trina/hypomania, depression or any other mental health symptoms. She adamantly denies SI/HI/SIB/intent/plan. She is future oriented and able to safety plan. She has chronic issues with impulse control, acting out behaviors & intermittent destruction to property. She is not seeking and refused inpatient psychiatric admission. She does not meet criteria for involuntary inpatient admission at this time. Recommend f/u with ACT team.

## 2021-01-07 NOTE — ED PROVIDER NOTE - NSFOLLOWUPINSTRUCTIONS_ED_ALL_ED_FT
Follow up with your primary care physician and psychiatrist in 48-72 hours.  You may also see the psychiatrist at [facility]:    [address] [place] [address]  Phone: [phone]      SEEK IMMEDIATE MEDICAL CARE IF YOU HAVE ANY OF THE FOLLOWING SYMPTOMS: thoughts about hurting or killing yourself, thoughts about hurting or killing somebody else, hallucinations or any other worsening or persistent symptoms OR ANY NEW OR CONCERNING SYMPTOMS.

## 2021-01-07 NOTE — ED BEHAVIORAL HEALTH ASSESSMENT NOTE - DESCRIPTION
Patient is calm, cooperative, and in good behavioral control throughout extended ED course. She did not require prn medications.   Vital Signs Last 24 Hrs  T(C): 37.1 (07 Jan 2021 19:44), Max: 37.1 (07 Jan 2021 19:44)  T(F): 98.7 (07 Jan 2021 19:44), Max: 98.7 (07 Jan 2021 19:44)  HR: 91 (07 Jan 2021 19:44) (91 - 99)  BP: 108/68 (07 Jan 2021 19:44) (108/68 - 130/79)  BP(mean): --  RR: 18 (07 Jan 2021 19:44) (17 - 18)  SpO2: 99% (07 Jan 2021 19:44) (99% - 100%) currently lives in [facility], [address] [facility] [address] MIGUEL ANGEL reports no contact with family, currently unemployed. GERD, Asthma, gallbladder calculus without cholecystitis
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
## 2021-01-07 NOTE — ED BEHAVIORAL HEALTH ASSESSMENT NOTE - RISK ASSESSMENT
Low Acute Suicide Risk Static risk factors include: hx of inpatient psychiatric admissions, remote hx of suicide attempts (last in 2012), hx of psychiatric illness, hx of aggression, hx of trauma, axis 2 pathology and hx of substance use.    Acute risk factors- medication non compliance x 2 months    Protective factors include: no current ideation/intent/plan for suicide/homicide/NSSIB, no aggression toward others, future orientation, help-seeking behavior and desire for betterment, treatment engagement, domiciled, no active psychosis, no trina/hypomania, no depression, no access to a gun, no recent substance use, ACT team in the community, supervised residence, calm and cooperative during evaluation.

## 2021-01-07 NOTE — ED BEHAVIORAL HEALTH NOTE - BEHAVIORAL HEALTH NOTE
Per request of KIRSTY Palencia, MI spoke with [facility] [phone] to obtain collateral. Kedar reports patient was discharged from [facility] two days ago. Kedar reports since return, patient has been destroying property, punching holes in the wall, and screaming at other residents. Kedar does not believe patient returned to her baseline.    MI spoke with [facility] NP Allie Conklin, [phone] to obtain collateral. Allie reports ACT Team has not been able to see patient yet since discharge. Allie notes she received report that patient was doing well at [facility] during her extensive stay. Allie reports presenting behavior may be behavioral in nature.    MI left message for Danielle Gregory NP on [facility] [phone] to obtain information.

## 2021-01-07 NOTE — ED PROVIDER NOTE - PROGRESS NOTE DETAILS
Patient cleared by psych, nontoxic and medically stable for discharge. Return precautions provided and patient understands to return to the ED for concerning or worsening signs and symptoms. Instructed to follow up with primary care physician and [facility] crisis center and agreeable. Patient's questions answered.

## 2021-01-07 NOTE — ED PROVIDER NOTE - CLINICAL SUMMARY MEDICAL DECISION MAKING FREE TEXT BOX
41 y/o F hx  Bipolar  D/O, BPD, GERD, Asthma  BIB EMS after being agitated at [place] residence as well as chronic AH.  Medical evaluation performed. There is no clinical evidence of intoxication or any acute medical problem requiring immediate intervention. Will engage SW to obtain collateral.

## 2021-01-07 NOTE — ED BEHAVIORAL HEALTH ASSESSMENT NOTE - VIOLENCE RISK FACTORS:
Impulsivity/History of being victimized/traumatized/Lack of insight into violence risk/need for treatment
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
## 2021-01-07 NOTE — ED BEHAVIORAL HEALTH ASSESSMENT NOTE - HPI (INCLUDE ILLNESS QUALITY, SEVERITY, DURATION, TIMING, CONTEXT, MODIFYING FACTORS, ASSOCIATED SIGNS AND SYMPTOMS)
The patient is a 40-year-old, single,  woman, non caregiver, unemployed, domiciled on [place] grounds building [address] [place] [address]; with past psychiatric history of bipolar I disorder, Borderline Personality Disorder, hx of cannabis abuse, and multiple inpatient psychiatric hospitalizations (>20, most recently [place] [address] from 12/5/20-1/5/21 ), with frequent visits to the [facility] (well-known to staff), with ACT Team, with a history of 3 prior suicide attempts (last in 2012 via OD), with hx of recurrent and chronic suicidal gestures and suicidal ideation; with a history of property destruction when upset, past legal issues, no access to weapons; with PMH of GERD & asthma; BIB EMS from [facility] called by staff due to trashing her room.     Patient is calm, cooperative, and in good behavioral control throughout extended ED course. She did not require prn medications. On interview, pt states she threw the dresser in her room because she was angry [place]. Pt then says "I think I need to be readmitted to the hospital so I can get on good terms with [facility]." She adds "I really like the ambiance here." She denies SI/HI. She denies paranoia, no delusional content elicited. She denies AH/VH. She denies manic symptoms or depression. She reports medication compliance.
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
## 2021-01-07 NOTE — ED BEHAVIORAL HEALTH ASSESSMENT NOTE - SAFETY PLAN ADDT'L DETAILS
Safety plan discussed with.../Education provided regarding environmental safety / lethal means restriction/Provision of National Suicide Prevention Lifeline 9-105-344-EJER (1864)

## 2021-01-07 NOTE — ED PROVIDER NOTE - PATIENT PORTAL LINK FT
You can access the FollowMyHealth Patient Portal offered by [facility] by registering at the following website: [email or web address]. By joining [employer]’s FollowMyHealth portal, you will also be able to view your health information using other applications (apps) compatible with our system.

## 2021-01-08 NOTE — ED ADULT TRIAGE NOTE - CHIEF COMPLAINT QUOTE
Patient left via car with daughter, all belongings were collected, went over discharge paper work and answered questions the patient had, scheduled follow up appointments that needed to be made.  Came in with no IVs. pt. brought from [facility]. 74, pt. expressed suicidal thoughts to a counselor this morning, endorses SI at the present time w/ no particular plan in mind, denies HI. PMHx bipolar disorder, borderline personality disorder.

## 2021-01-10 ENCOUNTER — EMERGENCY (EMERGENCY)
Facility: HOSPITAL | Age: 41
LOS: 1 days | Discharge: HOME | End: 2021-01-10
Attending: EMERGENCY MEDICINE | Admitting: EMERGENCY MEDICINE
Payer: MEDICAID

## 2021-01-10 VITALS
RESPIRATION RATE: 16 BRPM | OXYGEN SATURATION: 100 % | TEMPERATURE: 98 F | HEART RATE: 89 BPM | DIASTOLIC BLOOD PRESSURE: 73 MMHG | HEIGHT: 66 IN | SYSTOLIC BLOOD PRESSURE: 103 MMHG

## 2021-01-10 PROCEDURE — 99283 EMERGENCY DEPT VISIT LOW MDM: CPT

## 2021-01-10 RX ORDER — IBUPROFEN 200 MG
400 TABLET ORAL ONCE
Refills: 0 | Status: COMPLETED | OUTPATIENT
Start: 2021-01-10 | End: 2021-01-10

## 2021-01-10 RX ADMIN — Medication 400 MILLIGRAM(S): at 06:10

## 2021-01-10 NOTE — ED PROVIDER NOTE - ATTENDING CONTRIBUTION TO CARE
HPI: 39yo female Bipolar D/O, BPD, GERD, Asthma p/w L groin pain worsened by walking after walking a lot yesterday. Denies h/o DVT/PE, leg swelling, fever, trauma.  EXAM: Obese, NAD, medial thigh without any signs trauma, contusion. Gait steady and stable without need for assistance.   MDM: Pt with multiple medical problems that presents with left medial thigh pain without signs trauma reportedly pain from walking all day. No abnormalities seen that would require any labs or imaging. Will provide pain meds as pt request and discharge.

## 2021-01-10 NOTE — ED ADULT NURSE NOTE - CHIEF COMPLAINT QUOTE
Pt arrives from [facility] via EMS c/o left thigh pain.  Pt reports she was walking a lot today and walked back to [facility] and thinks she pulled a muscle.  Pt states, "I think it might be the ligament or dorsal fin."  Pt denies fall or trauma.

## 2021-01-10 NOTE — ED ADULT NURSE NOTE - NSIMPLEMENTINTERV_GEN_ALL_ED
Implemented All Universal Safety Interventions:  [place] to call system. Call bell, personal items and telephone within reach. Instruct patient to call for assistance. Room bathroom lighting operational. Non-slip footwear when patient is off stretcher. Physically safe environment: no spills, clutter or unnecessary equipment. Stretcher in lowest position, wheels locked, appropriate side rails in place.

## 2021-01-10 NOTE — ED PROVIDER NOTE - OBJECTIVE STATEMENT
39yo female Bipolar D/O, BPD, GERD, Asthma p/w L groin pain worsened by walking after walking a lot yesterday. Denies h/o DVT/PE, leg swelling, fever, trauma.

## 2021-01-10 NOTE — PROVIDER CONTACT NOTE (OTHER) - ASSESSMENT
Per provider, Dr. Parada, pt is cleared and [facility] [address] [place], [place]. SW has spoken to Lin,  ([phone]) who confirmed pt's mode of transportation is [employer] and that pt travels independently. Clinical provider is in agreement with [facility] back to group home. Verbal huddle completed.    Transportation coordinated via [employer] ([phone]), estimated  by 7AM. MAS Inv# [id number].

## 2021-01-10 NOTE — ED PROVIDER NOTE - PHYSICAL EXAMINATION
Physical Exam:  Gen: NAD, AOx3, non-toxic appearing, able to ambulate without assistance  Head: NCAT  Abd: soft, NT, ND, no guarding, no rigidity, no rebound tenderness, no CVA tenderness   MSK: no tenderness to area of pain in L groin(chaperoned by Santy YOUNGBLOOD), no visible deformities, ROM normal in UE/LE, no back TTP  Skin: Warm, well perfused, no rash, no leg swelling  Psych: normal affect, calm

## 2021-01-10 NOTE — ED PROVIDER NOTE - PATIENT PORTAL LINK FT
You can access the FollowMyHealth Patient Portal offered by [facility] by registering at the following website: [email or web address]. By joining [employer]’s FollowMyHealth portal, you will also be able to view your health information using other applications (apps) compatible with our system.

## 2021-01-10 NOTE — ED PROVIDER NOTE - NSFOLLOWUPINSTRUCTIONS_ED_ALL_ED_FT
- Continue all regular medications  - For pain, take tylenol or ibuprofen as directed on the packaging  - Follow up with your primary doctor within 3 days  - You were given copies of labs and/or imaging results if applicable, please take them to your follow up appointments  - Return to the ER for any worsening symptoms or concerns

## 2021-01-10 NOTE — ED ADULT TRIAGE NOTE - CHIEF COMPLAINT QUOTE
Pt arrives from [facility] via EMS c/o left thigh pain.  Pt reports she was walking a lot today and walked back to [facility] and thinks she pulled a muscle.  Pt states, "I think it might be the ligament or dorsal fin." Pt arrives from [facility] via EMS c/o left thigh pain.  Pt reports she was walking a lot today and walked back to [facility] and thinks she pulled a muscle.  Pt states, "I think it might be the ligament or dorsal fin."  Pt denies fall or trauma.

## 2021-01-10 NOTE — ED ADULT NURSE NOTE - OBJECTIVE STATEMENT
Jermain RN: Pt received in rm #20. 40Y F Aox4, ambulatory. PMHX Bipolar, borderline personality, depression, fibroids, asthma. Pt c/o left thigh pain s/p walking a lot today. Denies injury, falls, swelling, redness, fever, chills. Pt appears comfortable, in NAD. Report to primary RN.

## 2021-01-10 NOTE — ED PROVIDER NOTE - CLINICAL SUMMARY MEDICAL DECISION MAKING FREE TEXT BOX
39yo female p/w 1 day L groin pain after walking more than usual. No tenderness or swelling to area. Likely muscular pain 2/2 overuse, unlikely to have DVT or infection. Motrin, ambulate and dc.

## 2021-01-11 ENCOUNTER — EMERGENCY (EMERGENCY)
Facility: HOSPITAL | Age: 41
LOS: 1 days | Discharge: HOME | End: 2021-01-11
Attending: EMERGENCY MEDICINE | Admitting: EMERGENCY MEDICINE
Payer: MEDICAID

## 2021-01-11 ENCOUNTER — EMERGENCY (EMERGENCY)
Facility: HOSPITAL | Age: 41
LOS: 1 days | Discharge: HOME | End: 2021-01-11
Admitting: EMERGENCY MEDICINE
Payer: MEDICAID

## 2021-01-11 VITALS
SYSTOLIC BLOOD PRESSURE: 110 MMHG | TEMPERATURE: 98 F | RESPIRATION RATE: 18 BRPM | DIASTOLIC BLOOD PRESSURE: 93 MMHG | OXYGEN SATURATION: 99 % | HEIGHT: 66 IN | HEART RATE: 69 BPM

## 2021-01-11 VITALS
OXYGEN SATURATION: 98 % | HEART RATE: 85 BPM | RESPIRATION RATE: 18 BRPM | SYSTOLIC BLOOD PRESSURE: 114 MMHG | DIASTOLIC BLOOD PRESSURE: 70 MMHG | TEMPERATURE: 98 F | HEIGHT: 66 IN

## 2021-01-11 DIAGNOSIS — F60.9 PERSONALITY DISORDER, UNSPECIFIED: ICD-10-CM

## 2021-01-11 DIAGNOSIS — F43.24 ADJUSTMENT DISORDER WITH DISTURBANCE OF CONDUCT: ICD-10-CM

## 2021-01-11 DIAGNOSIS — F31.9 BIPOLAR DISORDER, UNSPECIFIED: ICD-10-CM

## 2021-01-11 PROCEDURE — 99283 EMERGENCY DEPT VISIT LOW MDM: CPT

## 2021-01-11 PROCEDURE — 90792 PSYCH DIAG EVAL W/MED SRVCS: CPT

## 2021-01-11 RX ORDER — CLONAZEPAM 1 MG
1 TABLET ORAL ONCE
Refills: 0 | Status: DISCONTINUED | OUTPATIENT
Start: 2021-01-11 | End: 2021-01-11

## 2021-01-11 RX ORDER — OLANZAPINE 15 MG/1
5 TABLET, FILM COATED ORAL ONCE
Refills: 0 | Status: COMPLETED | OUTPATIENT
Start: 2021-01-11 | End: 2021-01-11

## 2021-01-11 RX ADMIN — OLANZAPINE 5 MILLIGRAM(S): 15 TABLET, FILM COATED ORAL at 19:22

## 2021-01-11 RX ADMIN — Medication 1 MILLIGRAM(S): at 19:22

## 2021-01-11 RX ADMIN — Medication 0.5 MILLIGRAM(S): at 10:21

## 2021-01-11 NOTE — ED ADULT NURSE NOTE - OBJECTIVE STATEMENT
PT BIB-EMS from [place] for third visit to [facility] ED today. Pt was discharged back to [place] a short while ago. Pt started acting out upon return to [facility], threatening her peer, and was sent back to [facility] again for another evaluation.

## 2021-01-11 NOTE — ED BEHAVIORAL HEALTH ASSESSMENT NOTE - MEDICATIONS (PRESCRIPTIONS, DIRECTIONS)
continue home medications continue all medications as prescribed continue all medications as prescribed. Safety plan from 6/14/20 reviewed with patient, no changes made.

## 2021-01-11 NOTE — ED BEHAVIORAL HEALTH ASSESSMENT NOTE - VIOLENCE RISK FACTORS:
Impulsivity/History of being victimized/traumatized/Lack of insight into violence risk/need for treatment Affective dysregulation/Impulsivity/History of being victimized/traumatized
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
## 2021-01-11 NOTE — ED BEHAVIORAL HEALTH ASSESSMENT NOTE - REFERRAL / APPOINTMENT DETAILS
Patient advised to follow-up with her outpatient psychiatrist and ACT team pt to follow up with ACT team today

## 2021-01-11 NOTE — ED BEHAVIORAL HEALTH ASSESSMENT NOTE - CURRENT MEDICATION
lithium CR (ESKALITH-CR) 900 milliGRAM(s) Oral at bedtime  OLANZapine 15 milliGRAM(s) Oral at bedtime  prolixin Dec 50mg IM, pt states is due 1/26/21 lithium CR (ESKALITH-CR) 900 milliGRAM(s) Oral at bedtime  OLANZapine 15 milliGRAM(s) Oral at bedtime  prolixin Dec 50mg IM, received 12/22/20 at [facility], ?pt states is due 1/26/21

## 2021-01-11 NOTE — ED BEHAVIORAL HEALTH NOTE - BEHAVIORAL HEALTH NOTE
Verbal huddle occurred with interdisciplinary team. Pt psychiatrically cleared for discharge at this time. Writer contacted [facility] on call provider at [phone]. Writer spoke with FARIDA Ceballos who was informed of pt’s discharge at this time. Pt okay to travel INDEPENDENTLY via Medicaid taxi o to [facility] [address]. Transportation discussed with team with Medicaid taxi found to be appropriate. Worker called [facility] ([phone]) and spoke to Arely who arranged for [facility] ([phone]) to transport back the patient to residence.  Invoice #[phone].  time 30 minutes. Verbal huddle occurred with interdisciplinary team. Pt psychiatrically cleared for discharge at this time. Writer contacted [facility] on call provider at [phone]. Writer spoke with FARIDA Ceballos  and director Ms. Serrano who was informed of pt’s discharge at this time. Pt okay to travel INDEPENDENTLY via Medicaid taxi o to [facility] [address]. Transportation discussed with team with Medicaid taxi found to be appropriate. Worker called [facility] ([phone]) and spoke to Arely who arranged for [facility] ([phone]) to transport back the patient to residence.  Invoice #[phone].  time 30 minutes. Verbal huddle occurred with interdisciplinary team. Pt psychiatrically cleared for discharge at this time. Writer contacted [facility] on call provider at [phone]. Writer spoke with FARIDA Ceballos  and director Ms. Serrano who was informed of pt’s discharge at this time. Pt okay to travel INDEPENDENTLY via Medicaid taxi o to [facility] [address]. Transportation discussed with team with Medicaid taxi found to be appropriate. Worker called [facility] ([phone]) and spoke to Arely who arranged for [facility] ([phone]) to transport back the patient to residence.  Invoice #[phone].  time 30 minutes. worker provided nursing station for bebe to call.

## 2021-01-11 NOTE — ED PROVIDER NOTE - PATIENT PORTAL LINK FT
You can access the FollowMyHealth Patient Portal offered by [facility] by registering at the following website: [email or web address]. By joining [facility]’s FollowMyHealth portal, you will also be able to view your health information using other applications (apps) compatible with our system.

## 2021-01-11 NOTE — ED BEHAVIORAL HEALTH ASSESSMENT NOTE - NSSUICRSKFACTOR_PSY_ALL_CORE
Current and Past Psychiatric Diagnoses/Activating Events/Stressors Current and Past Psychiatric Diagnoses/Treatment Related Factors/Activating Events/Stressors
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
## 2021-01-11 NOTE — ED BEHAVIORAL HEALTH ASSESSMENT NOTE - DESCRIPTION
GERD, Asthma, gallbladder calculus without cholecystitis currently lives in [facility], [address] [facility] [address] MIGUEL ANGEL reports no contact with family, currently unemployed. cooperative, dancing at times but redirectable  Vital Signs Last 24 Hrs  T(C): 36.8 (11 Jan 2021 08:00), Max: 36.8 (11 Jan 2021 08:00)  T(F): 98.3 (11 Jan 2021 08:00), Max: 98.3 (11 Jan 2021 08:00)  HR: 69 (11 Jan 2021 08:00) (69 - 69)  BP: 110/93 (11 Jan 2021 08:00) (110/93 - 110/93)  BP(mean): --  RR: 18 (11 Jan 2021 08:00) (18 - 18)  SpO2: 99% (11 Jan 2021 08:00) (99% - 99%)
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
## 2021-01-11 NOTE — ED BEHAVIORAL HEALTH NOTE - BEHAVIORAL HEALTH NOTE
Verbal huddle occurred with interdisciplinary team. Pt psychiatrically cleared for discharge at this time. Writer contacted [facility] on call provider at [phone]. Writer spoke with FARIDA Ceballos who was informed of pt’s discharge at this time. Pt okay to travel INDEPENDENTLY via Medicaid taxi o to [facility] [address]. Transportation discussed with team with Medicaid taxi found to be appropriate. Trip scheduled via [facility] ([phone]) with Reta with [employer] with invoice #[id number]. Staff informed of ETA.
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
## 2021-01-11 NOTE — ED BEHAVIORAL HEALTH ASSESSMENT NOTE - SAFETY PLAN ADDT'L DETAILS
Safety plan discussed with.../Education provided regarding environmental safety / lethal means restriction/Provision of National Suicide Prevention Lifeline 8-530-463-[facility] (4485)
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
## 2021-01-11 NOTE — ED PROVIDER NOTE - PHYSICAL EXAMINATION
Attending/Nima: Smiling, not agitated; PERRL/EOMI, non-icterus, supple, no VALENTE, no JVD, RRR, CTAB; Abd-soft, NT/ND, no HSM; no LE edema/swelling, Left upper thigh/groin; no STS, ecchymosis, PTA&Ox3, nonfocal; Skin-warm/dry
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
## 2021-01-11 NOTE — ED PROVIDER NOTE - PATIENT PORTAL LINK FT
You can access the FollowMyHealth Patient Portal offered by [facility] by registering at the following website: [email or web address]. By joining [facility]’s FollowMyHealth portal, you will also be able to view your health information using other applications (apps) compatible with our system.

## 2021-01-11 NOTE — ED ADULT NURSE NOTE - SUICIDE SCREENING QUESTION 3
After Visit Summary   9/26/2017    Michelle Rodriguez    MRN: [id number]           Patient Information     Date Of Birth          1945        Visit Information        Provider Department      9/26/2017 1:15 PM Aby Redmond NP [facility]        Today's Diagnoses     NANDA (generalized anxiety disorder)        Recurrent major depression in complete remission (H)          Care Instructions    Treatment Plan:    Continue Buspar (buspirone) 15 mg by mouth 3 times per day.     Increase Effexor (venlafaxine) XR to 150 mg by mouth daily for anxiety and mood.     Continue Xanax (alprazolam) 0.25 - 0.5 mg by mouth up to 2 times per day as needed for anxiety.    Continue all other medications as reviewed per electronic medical record today.     All questions addressed. Education and counseling completed regarding risks and benefits of medications and psychotherapy options.    Safety plan was reviewed. To the Emergency Department as needed or call after hours crisis line at [phone] or [phone].     To schedule individual or family therapy, call [facility] at [phone]. Geena Dorman, [place] Jerod Dotson, or [place] Brittani Biggs.    Schedule an appointment with me in 6-8 weeks or sooner as needed. Call [facility] at [phone] to schedule.    Follow up with primary care provider as planned or for acute medical concerns.    Call the psychiatric nurse line with medication questions or concerns at [phone].    My Practice Policy was reviewed and signed: YES     MyChart may be used to communicate with your provider, but this is not intended to be used for emergencies.          Follow-ups after your visit        Follow-up notes from your care team     Return in about 7 weeks (around 11/14/2017).      Your next 10 appointments already scheduled     Oct 27, 2017  8:45 AM CDT   Return Visit with CATIE Mendoza  "[facility]    [address]  [address]  [facility] [place] [id number] [phone]            Dec 04, 2017  8:00 AM CST   PHYS[facility]    [address] [facility] [address] [phone]              Who to contact     If you have questions or need follow up information about today's clinic visit or your schedule please contact [facility] directly at [phone].  Normal or non-critical lab and imaging results will be communicated to you by [employer], letter or phone within 4 business days after the clinic has received the results. If you do not hear from us within 7 days, please contact the clinic through [employer] or phone. If you have a critical or abnormal lab result, we will notify you by phone as soon as possible.  Submit refill requests through [employer] or call your pharmacy and they will forward the refill request to us. Please allow 3 business days for your refill to be completed.          Additional Information About Your Visit        [employer] Information     [employer] lets you send messages to your doctor, view your test results, renew your prescriptions, schedule appointments and more. To sign up, go to [email or web address] . Click on \"Log in\" on the left side of the screen, which will take you to the Welcome page. Then click on \"Sign up Now\" on the right side of the page.     You will be asked to enter the access code listed below, as well as some personal information. Please follow the directions to create your username and password.     Your access code is: [email or web address]  Expires: 2017  3:18 PM     Your access code will  in 90 days. If you need help or a new code, please call your [place] clinic or [phone].        Care EveryWhere ID     This is your Care EveryWhere ID. This could be used by other organizations to access your [place] medical " records  [id number]        Your Vitals Were     Pulse Temperature Pulse Oximetry BMI (Body Mass Index)          99 98.4  F (36.9  C) (Oral) 95% 43.05 kg/m2         Blood Pressure from Last 3 Encounters:   09/26/17 148/82   09/22/17 115/71   06/06/17 138/86    Weight from Last 3 Encounters:   09/26/17 243 lb (110.2 kg)   09/22/17 243 lb 11.2 oz (110.5 kg)   06/06/17 248 lb 3.2 oz (112.6 kg)              Today, you had the following     No orders found for display         Today's Medication Changes          These changes are accurate as of: 9/26/17  1:57 PM.  If you have any questions, ask your nurse or doctor.               These medicines have changed or have updated prescriptions.        Dose/Directions    venlafaxine 150 MG 24 hr capsule   Commonly known as:  EFFEXOR XR   This may have changed:    - medication strength  - how much to take  - additional instructions   Used for:  NANDA (generalized anxiety disorder), Recurrent major depression in complete remission (H)   Changed by:  Aby Redmond NP        Dose:  150 mg   Take 1 capsule (150 mg) by mouth daily Increased dose.   Quantity:  90 capsule   Refills:  1            Where to get your medicines      These medications were sent to [facility] [address] [place] [facility] [address] [address]     Phone:  [phone]     BusPIRone HCl 30 MG Tabs    venlafaxine 150 MG 24 hr capsule                Primary Care Provider Office Phone # Fax #    Crystal Stuart [phone] [address] [facility] [address]  [facility] [address]        Equal Access to Services     [facility] : Haddevendra Grayson, angel rico, laura leong. So [facility] [phone].    ATENCIÓN: Si habla español, tiene a cedeno disposición servicios gratuitos de asistencia lingüística. Llame al [phone].    We comply with applicable federal civil rights laws and [place]  laws. We do not discriminate on the basis of race, color, national origin, age, disability sex, sexual orientation or gender identity.            Thank you!     Thank you for choosing [facility]  for your care. Our goal is always to provide you with excellent care. Hearing back from our patients is one way we can continue to improve our services. Please take a few minutes to complete the written survey that you may receive in the mail after your visit with us. Thank you!             Your Updated Medication List - Protect others around you: Learn how to safely use, store and throw away your medicines at www.disposemymeds.org.          This list is accurate as of: 9/26/17  1:57 PM.  Always use your most recent med list.                   Brand Name Dispense Instructions for use Diagnosis    ALPRAZolam 0.5 MG tablet    XANAX     1/2 tab qd        aspirin 81 MG tablet      1 tab po QD (Once per day)        atorvastatin 10 MG tablet    LIPITOR    90 tablet    Take 1 tablet (10 mg) by mouth daily    Hyperlipidemia LDL goal <130       BusPIRone HCl 30 MG Tabs     90 tablet    Take 15 mg by mouth 3 times daily    NANDA (generalized anxiety disorder)       calcium 600/vitamin D 600-400 MG-UNIT per tablet   Generic drug:  calcium-vitamin D     60 tablet    Take 1 tablet by mouth daily    Routine general medical examination at a health care facility       fosinopril 20 MG tablet    MONOPRIL    90 tablet    Take 1 tablet (20 mg) by mouth daily    Essential hypertension with goal blood pressure less than 140/90       hydrochlorothiazide 12.5 MG capsule    MICROZIDE    90 capsule    Take 1 capsule (12.5 mg) by mouth every morning    Hyperlipidemia LDL goal <130       Multi-vitamin Tabs tablet   Generic drug:  multivitamin, therapeutic with minerals      1 tab qd        venlafaxine 150 MG 24 hr capsule    EFFEXOR XR    90 capsule    Take 1 capsule (150 mg) by mouth daily Increased dose.    NANDA (generalized anxiety  disorder), Recurrent major depression in complete remission (H)          Patient refused
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
## 2021-01-11 NOTE — ED BEHAVIORAL HEALTH ASSESSMENT NOTE - RISK ASSESSMENT
Low Acute Suicide Risk Static risk factors include: hx of inpatient psychiatric admissions, remote hx of suicide attempts (last in 2012), hx of psychiatric illness, hx of aggression, hx of trauma, axis 2 pathology and hx of substance use.    Acute risk factors- medication non compliance x 2 months    Protective factors include: no current ideation/intent/plan for suicide/homicide/NSSIB, no aggression toward others, future orientation, help-seeking behavior and desire for betterment, treatment engagement, domiciled, no active psychosis, no trina/hypomania, no depression, no access to a gun, no recent substance use, ACT team in the community, supervised residence, calm and cooperative during evaluation. Chronic risk factors include: hx of inpatient psychiatric admissions, remote hx of suicide attempts (last in 2012), hx of psychiatric illness, hx of aggression, hx of trauma, axis 2 pathology and hx of substance use.    Acute risk factors- recent inpatient discharge    Protective factors include: no current ideation/intent/plan for suicide/homicide/NSSIB, no aggression toward others, future orientation, help-seeking behavior and desire for betterment, treatment engagement, on PALACIOS, followed by ACT Team, domiciled, no active psychosis, no trina/hypomania, no depression, no access to a gun, no recent substance use, supervised residence.    chronic risk remains moderately elevated but imminent risk LOW. not requiring inpatient admission.

## 2021-01-12 ENCOUNTER — EMERGENCY (EMERGENCY)
Facility: HOSPITAL | Age: 41
LOS: 1 days | Discharge: HOME | End: 2021-01-12
Admitting: EMERGENCY MEDICINE
Payer: MEDICAID

## 2021-01-12 VITALS
DIASTOLIC BLOOD PRESSURE: 79 MMHG | RESPIRATION RATE: 18 BRPM | TEMPERATURE: 98 F | OXYGEN SATURATION: 98 % | HEART RATE: 96 BPM | SYSTOLIC BLOOD PRESSURE: 119 MMHG

## 2021-01-12 VITALS — HEIGHT: 66 IN

## 2021-01-12 PROCEDURE — 99283 EMERGENCY DEPT VISIT LOW MDM: CPT

## 2021-01-12 RX ORDER — OLANZAPINE 15 MG/1
15 TABLET, FILM COATED ORAL ONCE
Refills: 0 | Status: COMPLETED | OUTPATIENT
Start: 2021-01-12 | End: 2021-01-12

## 2021-01-12 RX ORDER — CLONAZEPAM 1 MG
1 TABLET ORAL ONCE
Refills: 0 | Status: DISCONTINUED | OUTPATIENT
Start: 2021-01-12 | End: 2021-01-12

## 2021-01-12 RX ADMIN — OLANZAPINE 15 MILLIGRAM(S): 15 TABLET, FILM COATED ORAL at 21:54

## 2021-01-12 RX ADMIN — Medication 1 MILLIGRAM(S): at 21:54

## 2021-01-12 NOTE — ED ADULT NURSE NOTE - CHIEF COMPLAINT QUOTE
Pt brought in by EMS from [facility] after being found eating dog feces and smearing it all over her face. Pt states she saw a "lady walking her dog" and asked if she could had the bag of feces. Pt arrives with feces al over pants and face. Pt brought directly to [facility] room
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
## 2021-01-12 NOTE — ED ADULT NURSE REASSESSMENT NOTE - NS ED NURSE REASSESS COMMENT FT1
Pt maintained behavioral control and was redirectable as needed.  Pt remained safe.  Pt cleared for discharge and was presented with discharge instructions and received her medications for the evening, Klonopin 1mg and Zyprexa 15mg PO.  No complaints voiced. Pt left via [facility] [employer] service.

## 2021-01-12 NOTE — ED BEHAVIORAL HEALTH NOTE - BEHAVIORAL HEALTH NOTE
Writer informed by KIRSTY Keene that pt was cleared for discharge at this time. Writer contacted [facility] [address] FARIDA Ceballos at [phone]. CM informed of pt’s discharge and anticipated return to residence. Address of residence confirm. Pt okay to travel INDEPENDENTLY via [place]AX[place] for discharge. Taxi arranged via [facility] online portal. Trip scheduled with [employer] #[phone] with invoice #[id number]. CM aware; verbal huddle occurred with interdisciplinary team. ETA is 30 minutes.

## 2021-01-12 NOTE — ED ADULT TRIAGE NOTE - CHIEF COMPLAINT QUOTE
Pt brought in by EMS from [facility] after being found eating dog feces and smearing it all over her face. Pt states she saw a "lady walking her dog" and asked if she could had the bag of feces. Pt arrives with feces al over pants and face. Pt brought directly to [facility] room

## 2021-01-12 NOTE — ED PROVIDER NOTE - PATIENT PORTAL LINK FT
You can access the FollowMyHealth Patient Portal offered by [facility] by registering at the following website: [email or web address]. By joining [employer]’s FollowMyHealth portal, you will also be able to view your health information using other applications (apps) compatible with our system.
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
## 2021-01-12 NOTE — ED ADULT NURSE NOTE - OBJECTIVE STATEMENT
Pt BIB EMS from [facility]. Pt reportedly had ate and smeared dog feces on her face/body while on [facility]. Pt was decon on arrival to [facility]. Not focusing during asessement. Pt laughing and saying "I got dog shit on my face".
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
## 2021-01-13 ENCOUNTER — INPATIENT (INPATIENT)
Facility: HOSPITAL | Age: 41
LOS: 83 days | Discharge: HOME | End: 2021-04-07
Attending: PSYCHIATRY & NEUROLOGY | Admitting: PSYCHIATRY & NEUROLOGY
Payer: MEDICAID

## 2021-01-13 VITALS
HEART RATE: 98 BPM | DIASTOLIC BLOOD PRESSURE: 75 MMHG | OXYGEN SATURATION: 99 % | SYSTOLIC BLOOD PRESSURE: 128 MMHG | TEMPERATURE: 98 F | RESPIRATION RATE: 16 BRPM | HEIGHT: 66 IN

## 2021-01-13 DIAGNOSIS — F31.30 BIPOLAR DISORDER, CURRENT EPISODE DEPRESSED, MILD OR MODERATE SEVERITY, UNSPECIFIED: ICD-10-CM

## 2021-01-13 DIAGNOSIS — F60.3 BORDERLINE PERSONALITY DISORDER: ICD-10-CM

## 2021-01-13 LAB
ALBUMIN SERPL ELPH-MCNC: 3.5 G/DL — SIGNIFICANT CHANGE UP (ref 3.3–5)
ALP SERPL-CCNC: 142 U/L — HIGH (ref 40–120)
ALT FLD-CCNC: 20 U/L — SIGNIFICANT CHANGE UP (ref 4–33)
AMPHET UR-MCNC: NEGATIVE — SIGNIFICANT CHANGE UP
ANION GAP SERPL CALC-SCNC: 10 MMOL/L — SIGNIFICANT CHANGE UP (ref 7–14)
APAP SERPL-MCNC: <15 UG/ML — SIGNIFICANT CHANGE UP (ref 15–25)
APPEARANCE UR: CLEAR — SIGNIFICANT CHANGE UP
AST SERPL-CCNC: 14 U/L — SIGNIFICANT CHANGE UP (ref 4–32)
BACTERIA # UR AUTO: ABNORMAL
BARBITURATES UR SCN-MCNC: NEGATIVE — SIGNIFICANT CHANGE UP
BASOPHILS # BLD AUTO: 0.02 K/UL — SIGNIFICANT CHANGE UP (ref 0–0.2)
BASOPHILS NFR BLD AUTO: 0.3 % — SIGNIFICANT CHANGE UP (ref 0–2)
BENZODIAZ UR-MCNC: NEGATIVE — SIGNIFICANT CHANGE UP
BILIRUB SERPL-MCNC: <0.2 MG/DL — SIGNIFICANT CHANGE UP (ref 0.2–1.2)
BILIRUB UR-MCNC: NEGATIVE — SIGNIFICANT CHANGE UP
BUN SERPL-MCNC: 17 MG/DL — SIGNIFICANT CHANGE UP (ref 7–23)
CALCIUM SERPL-MCNC: 8.9 MG/DL — SIGNIFICANT CHANGE UP (ref 8.4–10.5)
CHLORIDE SERPL-SCNC: 108 MMOL/L — HIGH (ref 98–107)
CO2 SERPL-SCNC: 23 MMOL/L — SIGNIFICANT CHANGE UP (ref 22–31)
COCAINE METAB.OTHER UR-MCNC: NEGATIVE — SIGNIFICANT CHANGE UP
COLOR SPEC: YELLOW — SIGNIFICANT CHANGE UP
CREAT SERPL-MCNC: 0.92 MG/DL — SIGNIFICANT CHANGE UP (ref 0.5–1.3)
CREATININE URINE RESULT, DAU: 194 MG/DL — SIGNIFICANT CHANGE UP
DIFF PNL FLD: NEGATIVE — SIGNIFICANT CHANGE UP
EOSINOPHIL # BLD AUTO: 0.52 K/UL — HIGH (ref 0–0.5)
EOSINOPHIL NFR BLD AUTO: 6.5 % — HIGH (ref 0–6)
EPI CELLS # UR: 7 /HPF — HIGH (ref 0–5)
ETHANOL SERPL-MCNC: <10 MG/DL — SIGNIFICANT CHANGE UP
GLUCOSE SERPL-MCNC: 56 MG/DL — LOW (ref 70–99)
GLUCOSE UR QL: NEGATIVE — SIGNIFICANT CHANGE UP
HCG SERPL-ACNC: <5 MIU/ML — SIGNIFICANT CHANGE UP
HCT VFR BLD CALC: 34.4 % — LOW (ref 34.5–45)
HGB BLD-MCNC: 10.4 G/DL — LOW (ref 11.5–15.5)
HYALINE CASTS # UR AUTO: 0 /LPF — SIGNIFICANT CHANGE UP (ref 0–7)
IANC: 4.68 K/UL — SIGNIFICANT CHANGE UP (ref 1.5–8.5)
IMM GRANULOCYTES NFR BLD AUTO: 0.4 % — SIGNIFICANT CHANGE UP (ref 0–1.5)
KETONES UR-MCNC: NEGATIVE — SIGNIFICANT CHANGE UP
LEUKOCYTE ESTERASE UR-ACNC: NEGATIVE — SIGNIFICANT CHANGE UP
LYMPHOCYTES # BLD AUTO: 1.89 K/UL — SIGNIFICANT CHANGE UP (ref 1–3.3)
LYMPHOCYTES # BLD AUTO: 23.8 % — SIGNIFICANT CHANGE UP (ref 13–44)
MCHC RBC-ENTMCNC: 28 PG — SIGNIFICANT CHANGE UP (ref 27–34)
MCHC RBC-ENTMCNC: 30.2 GM/DL — LOW (ref 32–36)
MCV RBC AUTO: 92.5 FL — SIGNIFICANT CHANGE UP (ref 80–100)
METHADONE UR-MCNC: NEGATIVE — SIGNIFICANT CHANGE UP
MONOCYTES # BLD AUTO: 0.81 K/UL — SIGNIFICANT CHANGE UP (ref 0–0.9)
MONOCYTES NFR BLD AUTO: 10.2 % — SIGNIFICANT CHANGE UP (ref 2–14)
NEUTROPHILS # BLD AUTO: 4.68 K/UL — SIGNIFICANT CHANGE UP (ref 1.8–7.4)
NEUTROPHILS NFR BLD AUTO: 58.8 % — SIGNIFICANT CHANGE UP (ref 43–77)
NITRITE UR-MCNC: NEGATIVE — SIGNIFICANT CHANGE UP
NRBC # BLD: 0 /100 WBCS — SIGNIFICANT CHANGE UP
NRBC # FLD: 0 K/UL — SIGNIFICANT CHANGE UP
OPIATES UR-MCNC: NEGATIVE — SIGNIFICANT CHANGE UP
OXYCODONE UR-MCNC: NEGATIVE — SIGNIFICANT CHANGE UP
PCP SPEC-MCNC: SIGNIFICANT CHANGE UP
PCP UR-MCNC: NEGATIVE — SIGNIFICANT CHANGE UP
PH UR: 6 — SIGNIFICANT CHANGE UP (ref 5–8)
PLATELET # BLD AUTO: 278 K/UL — SIGNIFICANT CHANGE UP (ref 150–400)
POTASSIUM SERPL-MCNC: 4.2 MMOL/L — SIGNIFICANT CHANGE UP (ref 3.5–5.3)
POTASSIUM SERPL-SCNC: 4.2 MMOL/L — SIGNIFICANT CHANGE UP (ref 3.5–5.3)
PROT SERPL-MCNC: 6.9 G/DL — SIGNIFICANT CHANGE UP (ref 6–8.3)
PROT UR-MCNC: ABNORMAL
RBC # BLD: 3.72 M/UL — LOW (ref 3.8–5.2)
RBC # FLD: 16.1 % — HIGH (ref 10.3–14.5)
RBC CASTS # UR COMP ASSIST: 5 /HPF — HIGH (ref 0–4)
SALICYLATES SERPL-MCNC: <5 MG/DL — LOW (ref 15–30)
SARS-COV-2 RNA SPEC QL NAA+PROBE: SIGNIFICANT CHANGE UP
SODIUM SERPL-SCNC: 141 MMOL/L — SIGNIFICANT CHANGE UP (ref 135–145)
SP GR SPEC: 1.03 — HIGH (ref 1.01–1.02)
THC UR QL: NEGATIVE — SIGNIFICANT CHANGE UP
TOXICOLOGY SCREEN, DRUGS OF ABUSE, SERUM RESULT: SIGNIFICANT CHANGE UP
TSH SERPL-MCNC: 0.81 UIU/ML — SIGNIFICANT CHANGE UP (ref 0.27–4.2)
UROBILINOGEN FLD QL: SIGNIFICANT CHANGE UP
WBC # BLD: 7.95 K/UL — SIGNIFICANT CHANGE UP (ref 3.8–10.5)
WBC # FLD AUTO: 7.95 K/UL — SIGNIFICANT CHANGE UP (ref 3.8–10.5)
WBC UR QL: 3 /HPF — SIGNIFICANT CHANGE UP (ref 0–5)

## 2021-01-13 PROCEDURE — 99285 EMERGENCY DEPT VISIT HI MDM: CPT

## 2021-01-13 RX ORDER — HALOPERIDOL DECANOATE 100 MG/ML
5 INJECTION INTRAMUSCULAR EVERY 6 HOURS
Refills: 0 | Status: DISCONTINUED | OUTPATIENT
Start: 2021-01-13 | End: 2021-01-14

## 2021-01-13 RX ORDER — HALOPERIDOL DECANOATE 100 MG/ML
5 INJECTION INTRAMUSCULAR EVERY 6 HOURS
Refills: 0 | Status: DISCONTINUED | OUTPATIENT
Start: 2021-01-13 | End: 2021-04-07

## 2021-01-13 RX ORDER — OLANZAPINE 15 MG/1
10 TABLET, FILM COATED ORAL AT BEDTIME
Refills: 0 | Status: DISCONTINUED | OUTPATIENT
Start: 2021-01-13 | End: 2021-01-14

## 2021-01-13 RX ORDER — DIPHENHYDRAMINE HCL 50 MG
50 CAPSULE ORAL EVERY 6 HOURS
Refills: 0 | Status: DISCONTINUED | OUTPATIENT
Start: 2021-01-13 | End: 2021-04-07

## 2021-01-13 RX ORDER — DIPHENHYDRAMINE HCL 50 MG
50 CAPSULE ORAL EVERY 6 HOURS
Refills: 0 | Status: DISCONTINUED | OUTPATIENT
Start: 2021-01-13 | End: 2021-01-14

## 2021-01-13 RX ORDER — LITHIUM CARBONATE 300 MG/1
900 TABLET, EXTENDED RELEASE ORAL AT BEDTIME
Refills: 0 | Status: DISCONTINUED | OUTPATIENT
Start: 2021-01-13 | End: 2021-01-14

## 2021-01-13 RX ORDER — ALBUTEROL 90 UG/1
2 AEROSOL, METERED ORAL EVERY 6 HOURS
Refills: 0 | Status: DISCONTINUED | OUTPATIENT
Start: 2021-01-13 | End: 2021-04-07

## 2021-01-13 RX ADMIN — HALOPERIDOL DECANOATE 5 MILLIGRAM(S): 100 INJECTION INTRAMUSCULAR at 23:34

## 2021-01-13 RX ADMIN — OLANZAPINE 10 MILLIGRAM(S): 15 TABLET, FILM COATED ORAL at 23:32

## 2021-01-13 RX ADMIN — Medication 2 MILLIGRAM(S): at 13:25

## 2021-01-13 RX ADMIN — Medication 50 MILLIGRAM(S): at 23:34

## 2021-01-13 RX ADMIN — Medication 2 MILLIGRAM(S): at 23:35

## 2021-01-13 RX ADMIN — Medication 50 MILLIGRAM(S): at 23:32
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
## 2021-01-13 NOTE — ED BEHAVIORAL HEALTH ASSESSMENT NOTE - RISK ASSESSMENT
Chronic risk factors include: hx of inpatient psychiatric admissions, remote hx of suicide attempts (last in 2012), hx of psychiatric illness, hx of aggression, hx of trauma, axis 2 pathology and hx of substance use.    Acute risk factors- recent inpatient discharge    Protective factors include: no current ideation/intent/plan for suicide/homicide/NSSIB, no aggression toward others, future orientation, help-seeking behavior and desire for betterment, treatment engagement, on PALACIOS, followed by ACT Team, domiciled, no active psychosis, no trina/hypomania, no depression, no access to a gun, no recent substance use, supervised residence.    chronic risk remains moderately elevated but imminent risk LOW. not requiring inpatient admission. Low Acute Suicide Risk Although patient is a chronic risk at this time she is experiencing worsening symptoms and requires inpatient hospitalization. High Acute Suicide Risk

## 2021-01-13 NOTE — ED BEHAVIORAL HEALTH ASSESSMENT NOTE - HPI (INCLUDE ILLNESS QUALITY, SEVERITY, DURATION, TIMING, CONTEXT, MODIFYING FACTORS, ASSOCIATED SIGNS AND SYMPTOMS)
The patient is a 40-year-old, single,  woman, non caregiver, unemployed, domiciled on [place] [address] [place] [address]; with past psychiatric history of bipolar disorder, Borderline Personality Disorder, hx of cannabis abuse, and multiple inpatient psychiatric hospitalizations (>20, most recently [facility] from 12/5/20-1/5/21 ), with frequent visits to the [facility] (five this past month), with [facility] ACT Team, with a history of 3 prior suicide attempts (last in 2012 via OD), with hx of recurrent and chronic suicidal gestures and suicidal ideation; with a history of property destruction when upset, past legal issues, no access to weapons; with PMH of GERD & asthma; BIB EMS from [facility] called by staff for bizarre behavior in the context of refusing medication.   Patient reports she has been smearing her feces on the wall and door of her room and has refused to clean it. Patient also reports eating her feces yesterday. When questioned why she has been engaging in this behavior patient stated, " because it is the way white people view me."  Patient reports her mood has been depressed and does not get along with the staff at her residence. She reports she is hungry and has not been receiving her meals. Patient denies current or recent suicidal/homicidal ideation, intent or plan., auditory/visual hallucinations or ideas of reference. Patient denies any recently alcohol or illicit substance abuse. During interview patient became disorganized and started turning in a [place].   Received collateral from Act team PMHNP Allie Conklin who reports today the patient was observed eating her feces and smearing on the lounge door. Patient refused her PALACIOS 3 weeks ago and has been refusing po medications. Ms Katerin feels patient has decompensated since her discharge from [facility] and requires hospitalization.          Spoke with KIRSTY Conklin of ACT Team, patient has visits with them on Mondays and Fridays.    See  note for collateral from residence.
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
## 2021-01-13 NOTE — ED BEHAVIORAL HEALTH NOTE - BEHAVIORAL HEALTH NOTE
Worker received a call from patient's ACT team Allie Conklin. She states that today the patient was smearing her feces in the lounge and on the door. She states that the patient ate feces as well. This is not her baseline and she usually is well kept. Patient has not taken her injection in the last three weeks and missed this. Refused PO meds yesterday and today. Team is advocating for admission at this time.

## 2021-01-13 NOTE — ED BEHAVIORAL HEALTH NOTE - BEHAVIORAL HEALTH NOTE
Worker spoke with clerk Caal from [facility] who states that once COVID-19 results are in they can accept patient. accepting Dr. Espinoza. Nurse to nurse report needs to be provided to [phone]. [facility] states that patient should present to the ED and the accepting unit is 2S.
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
## 2021-01-13 NOTE — ED PROVIDER NOTE - CARE PLAN
Principal Discharge DX:	Bipolar affective disorder, current episode depressed, current episode severity unspecified  Secondary Diagnosis:	Borderline personality disorder
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
## 2021-01-13 NOTE — ED PROVIDER NOTE - PHYSICAL EXAMINATION
GEN:  Non-toxic appearing, pressured speech, tangential    HEENT:  NCAT, neck supple, EOMI, PERRLA, sclera anicteric, no conjunctival pallor or injection, no stridor, normal voice, no tonsillar exudate, uvula midline, tympanic membranes and external auditory canals normal appearing bilaterally   CV:  regular rhythm and rate, s1/s2 audible, no murmurs, rubs or gallops, peripheral pulses 2+ and symmetric  PULM:  non-labored respirations, lungs clear to auscultation bilaterally, no wheezes, crackles or rales  ABD:  non distended, non-tender, no rebound, no guarding, negative Parks's sign, bowel sounds normal, no cvat  MSK:  no gross deformity, non-tender extremities and joints, range of motion grossly normal appearing, no extremity edema, extremities warm and well perfused   NEURO:  CN II-XII intact, motor 5/5 in upper and lower extremities bilaterally, sensation grossly intact in extremities and trunk, finger to nose testing wnl, no nystagmus, negative Romberg, no pronator drift, no gait deficit  SKIN:  warm, dry, no rash or vesicles

## 2021-01-13 NOTE — ED PROVIDER NOTE - CLINICAL SUMMARY MEDICAL DECISION MAKING FREE TEXT BOX
39 yo pmh bipolar, borderline personality disorder presents from [facility] for agitation, behavioral disturbance.  VSS.  Tangential during interview.  No focal neuro deficits on exam.  Plan for labs,  consult.  Dispo pending. 39 yo pmh bipolar, borderline personality disorder presents from [place] [address] for agitation, behavioral disturbance.  VSS.  Tangential during interview.  No focal neuro deficits on exam.  Plan for labs,  consult.  Dispo pending.  NP Bereczky- labs unremarkable at pt's baseline, psych recommendation inpatient tx, she will go to [facility].

## 2021-01-13 NOTE — ED ADULT TRIAGE NOTE - RESPIRATORY RATE (BREATHS/MIN)
16
Eyes with no visual disturbances.  Ears clean and dry and no hearing difficulties. Nose with pink mucosa and no drainage.  Mouth mucous membranes moist and pink.  No tenderness or swelling to throat or neck.
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
## 2021-01-13 NOTE — ED BEHAVIORAL HEALTH ASSESSMENT NOTE - CURRENT MEDICATION
lithium CR (ESKALITH-CR) 900 milliGRAM(s) Oral at bedtime  OLANZapine 15 milliGRAM(s) Oral at bedtime  prolixin Dec 50mg IM, received 12/22/20 at [facility], ?pt states is due 1/26/21  Albuterol prn lithium 900 milliGRAM(s) Oral at bedtime  OLANZapine 15 milliGRAM(s) Oral at bedtime  prolixin Dec 50mg IM, received 12/22/20 at [facility], ?pt states is due 1/26/21  Albuterol prn
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
## 2021-01-13 NOTE — ED PROVIDER NOTE - PROGRESS NOTE DETAILS
KIRSTY Keene- upon reevaluation, pt still with erratic behaviour, trina, psych consult requested, labs- , psych recommendation inpatient tx- pt most likely will transfer to [place] for inpatient psych treatment.
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
## 2021-01-13 NOTE — ED PROVIDER NOTE - OBJECTIVE STATEMENT
39 yo pmh bipolar, borderline personality disorder presents from [place] [address] for agitation, behavioral disturbance.  Per EMS, staff called for patient acting agitated.   Patient states that staff became upset with her because she wiped feces around her room and patient refused to clean it up.  Patient denies si/hi/ah/vh.  Denies etoh or illicit drug use. 39 yo pmh bipolar, borderline personality disorder presents from [place] [address] for agitation, behavioral disturbance.  Per EMS, staff called for patient acting agitated.   Patient states that staff became upset with her because she wiped feces around her room and patient refused to clean it up.  Patient denies si/hi/ah/vh.  Denies etoh or illicit drug use.  Patient tangential during interview, states that there is "formaldehyde in [her] lithium".  Patient also gestures at plastic butter knife and states that "this is used to cut my vertebrae".  Endorses no complaints.

## 2021-01-13 NOTE — ED BEHAVIORAL HEALTH NOTE - BEHAVIORAL HEALTH NOTE
Worker spoke to Saima from [facility] who states that the patient cannot be accepted due to no Medicare days. Patient has only straight Medicaid- which is not accepted at [facility].

## 2021-01-13 NOTE — ED BEHAVIORAL HEALTH NOTE - BEHAVIORAL HEALTH NOTE
Worker faxed referral for transfer to [place] [phone] ( assessment, ekg, facesheet, labs, provider note) Pending review.

## 2021-01-13 NOTE — ED BEHAVIORAL HEALTH ASSESSMENT NOTE - DESCRIPTION
currently lives in [facility], [address] [facility] [address]ter[address]dy MIGUEL ANGEL reports no contact with family, currently unemployed. Bizarre and intermittently tearful during interview.   ICU Vital Signs Last 24 Hrs  T(C): 36.7 (13 Jan 2021 15:33), Max: 36.9 (13 Jan 2021 10:49)  T(F): 98 (13 Jan 2021 15:33), Max: 98.4 (13 Jan 2021 10:49)  HR: 86 (13 Jan 2021 15:33) (86 - 98)  BP: 123/77 (13 Jan 2021 15:33) (119/79 - 128/75)  BP(mean): --  ABP: --  ABP(mean): --  RR: 18 (13 Jan 2021 15:33) (16 - 18)  SpO2: 100% (13 Jan 2021 15:33) (98% - 100%) GERD, Asthma, gallbladder calculus without cholecystitis

## 2021-01-13 NOTE — ED BEHAVIORAL HEALTH NOTE - BEHAVIORAL HEALTH NOTE
COVID Exposure Screen- Patient     1.        *Have you had a COVID-19 test in the last 21 days?  (  ) Yes   ( x) No   (  ) Unknown- Reason: ______  IF YES PROCEED TO QUESTION #2. IF NO OR UNKNOWN THEN PLEASE SKIP TO QUESTION #3.  2.        Date of test: ________  3.        3. Do you know the result? (  ) Negative   (  ) Positive   (  ) No result available    4.        *In the past 14 days, have you been around anyone with a positive COVID-19 test?*  (  ) Yes   ( x ) No   (  ) Unknown- Reason (e.g. patient uncertain, sedated, refusing to answer, etc.):  ______  IF YES PROCEED TO QUESTION #5. IF NO or UNKNOWN, PLEASE SKIP TO QUESTION #10  5.        Were you within 6 feet of them for at least 15 minutes? (  ) Yes   (  ) No   (  ) Unknown- Reason: _____  6.        Have you provided care for them? (  ) Yes   (  ) No   (  ) Unknown- Reason: ______  7.        Have you had direct physical contact with them (touched, hugged, or kissed them)? (  ) Yes   (  ) No    (  ) Unknown- Reason: ___  8.        Have you shared eating or drinking utensils with them? (  ) Yes   (  ) No    (  ) Unknown- Reason: ____  9.        Have they sneezed, coughed, or somehow got respiratory droplets on you? (  ) Yes   (  ) No    (  ) Unknown- Reason: ______    10.     *Have you been out of [place] within the past 14 days?*  (  ) Yes   ( x ) No   (  ) Unknown- Reason (e.g. patient uncertain, sedated, refusing to answer, etc.): _______  IF YES PLEASE ANSWER THE FOLLOWING QUESTIONS:  11.     Which state/country have you been to? ______  12.     Were you there over 24 hours? (  ) Yes   (  ) No    (  ) Unknown- Reason: ______  13.     Date of return to [facility]: ______ Clindamycin Pregnancy And Lactation Text: This medication can be used in pregnancy if certain situations. Clindamycin is also present in breast milk.
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
## 2021-01-13 NOTE — ED BEHAVIORAL HEALTH NOTE - BEHAVIORAL HEALTH NOTE
Worker received a call back from [facility] and Charge nurse Armando who states they cannot take the patient due to pending results of covid-19 and time limit  for acceptance.
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
## 2021-01-13 NOTE — ED BEHAVIORAL HEALTH NOTE - BEHAVIORAL HEALTH NOTE
Pt rejected by [facility] due to insurance reasons. Writer contacted [facility] at [phone] for bed status. Writer spoke with Unit ClerAfua parsons, who reported bed available with Dr. Espinoza as on call provider. Writer called provider at [phone] providing information with case said to be review for acceptance. Writer faxed information including EKG and legals to [facility] fax #[phone].

## 2021-01-13 NOTE — ED ADULT NURSE REASSESSMENT NOTE - NS ED NURSE REASSESS COMMENT FT1
Received pt at change of shift in  rm 5, pt alert and oriented x3. Pt is irritable at this time, nonaggressive, resting in bed comfortably at this time. Pt is medically cleared for [facility] admission to L4. Report given to FORD Preston. Awaiting EMS for transport.

## 2021-01-14 LAB
SARS-COV-2 IGG SERPL QL IA: NEGATIVE — SIGNIFICANT CHANGE UP
SARS-COV-2 IGM SERPL IA-ACNC: <0.1 INDEX — SIGNIFICANT CHANGE UP

## 2021-01-14 PROCEDURE — 99222 1ST HOSP IP/OBS MODERATE 55: CPT

## 2021-01-14 RX ORDER — HALOPERIDOL DECANOATE 100 MG/ML
5 INJECTION INTRAMUSCULAR ONCE
Refills: 0 | Status: DISCONTINUED | OUTPATIENT
Start: 2021-01-14 | End: 2021-04-07

## 2021-01-14 RX ORDER — LITHIUM CARBONATE 300 MG/1
900 TABLET, EXTENDED RELEASE ORAL AT BEDTIME
Refills: 0 | Status: DISCONTINUED | OUTPATIENT
Start: 2021-01-14 | End: 2021-01-14

## 2021-01-14 RX ORDER — LITHIUM CARBONATE 300 MG/1
900 TABLET, EXTENDED RELEASE ORAL AT BEDTIME
Refills: 0 | Status: DISCONTINUED | OUTPATIENT
Start: 2021-01-14 | End: 2021-01-20

## 2021-01-14 RX ORDER — OLANZAPINE 15 MG/1
10 TABLET, FILM COATED ORAL
Refills: 0 | Status: DISCONTINUED | OUTPATIENT
Start: 2021-01-14 | End: 2021-02-09

## 2021-01-14 RX ORDER — HALOPERIDOL DECANOATE 100 MG/ML
5 INJECTION INTRAMUSCULAR ONCE
Refills: 0 | Status: COMPLETED | OUTPATIENT
Start: 2021-01-14 | End: 2021-01-14

## 2021-01-14 RX ORDER — CHLORPROMAZINE HCL 10 MG
50 TABLET ORAL EVERY 6 HOURS
Refills: 0 | Status: DISCONTINUED | OUTPATIENT
Start: 2021-01-14 | End: 2021-01-14

## 2021-01-14 RX ORDER — DIPHENHYDRAMINE HCL 50 MG
50 CAPSULE ORAL ONCE
Refills: 0 | Status: COMPLETED | OUTPATIENT
Start: 2021-01-14 | End: 2021-01-14

## 2021-01-14 RX ORDER — DIPHENHYDRAMINE HCL 50 MG
50 CAPSULE ORAL ONCE
Refills: 0 | Status: DISCONTINUED | OUTPATIENT
Start: 2021-01-14 | End: 2021-04-07

## 2021-01-14 RX ADMIN — Medication 2 MILLIGRAM(S): at 19:29

## 2021-01-14 RX ADMIN — Medication 50 MILLIGRAM(S): at 12:12

## 2021-01-14 RX ADMIN — LITHIUM CARBONATE 900 MILLIGRAM(S): 300 TABLET, EXTENDED RELEASE ORAL at 19:29

## 2021-01-14 RX ADMIN — Medication 50 MILLIGRAM(S): at 19:29

## 2021-01-14 RX ADMIN — HALOPERIDOL DECANOATE 5 MILLIGRAM(S): 100 INJECTION INTRAMUSCULAR at 12:12

## 2021-01-14 RX ADMIN — Medication 2 MILLIGRAM(S): at 11:48

## 2021-01-14 RX ADMIN — OLANZAPINE 10 MILLIGRAM(S): 15 TABLET, FILM COATED ORAL at 19:29

## 2021-01-14 RX ADMIN — HALOPERIDOL DECANOATE 5 MILLIGRAM(S): 100 INJECTION INTRAMUSCULAR at 11:48

## 2021-01-14 RX ADMIN — Medication 50 MILLIGRAM(S): at 11:48

## 2021-01-14 RX ADMIN — HALOPERIDOL DECANOATE 5 MILLIGRAM(S): 100 INJECTION INTRAMUSCULAR at 19:29

## 2021-01-14 NOTE — BH SOCIAL WORK INITIAL PSYCHOSOCIAL EVALUATION - NSPTSTATEDGOAL_PSY_ALL_CORE
Pt did not specify goals due to being disoriented in interview. Pt commented on being in the hospital "for the rest of my life" to which SW assured her she would not be here that long.

## 2021-01-14 NOTE — BH INPATIENT PSYCHIATRY ASSESSMENT NOTE - NSBHASSESSSUMMFT_PSY_ALL_CORE
Plan:  1. Legal: 9.39  2. Obs: Routine, no current SI  3. Psychiatric Meds: Restart her outpatient medications, Olanzapine 10mg qhs, Lithium 900mg qhs, aim to optimize dose.  PRNs: Benadryl 50mg PO Q6H prn, Benadryl 50mg IM once prn, Haldol 5mg PO Q6H PRN agitation, Haldol 5mg IM Q6H PRN, Ativan 2mg PO Q6H PRN anxiety, Ativan 2mg IM Q6H PRN  4. Medical:   No acute concerns, admission labs WNL   5. Social: milieu therapy  6. Treatment: Group/milieu and individual therapy  7. Dispo: Collateral and dispo planning pending further symptom and medication optimization
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
## 2021-01-14 NOTE — BH INPATIENT PSYCHIATRY ASSESSMENT NOTE - DESCRIPTION
currently lives in [facility], [address] [facility] carterfreddy MIGUEL ANGEL reports no contact with family, currently unemployed.

## 2021-01-14 NOTE — BH PATIENT PROFILE - HOME MEDICATIONS
albuterol 90 mcg/inh inhalation aerosol , 2 puff(s) inhaled every 6 hours, As needed, asthma attack  lithium 450 mg oral tablet, extended release , 2 tab(s) orally once a day (at bedtime)  OLANZapine 15 mg oral tablet , 1 tab(s) orally once a day (at bedtime)

## 2021-01-14 NOTE — PSYCHIATRIC REHAB INITIAL EVALUATION - NSBHPRRECOMMEND_PSY_ALL_CORE
Writer was unable to engage in meaningful dialogue with patient at this time, due to psychosis. Patient and writer were unable to establish a collaborative rehabilitation goal, therefore, an appropriate goal will be selected for her. Psychiatric rehabilitation staff will continue to provide ongoing support and encouragement.  In response to COVID19, unit programming will be re-evaluated on a consistent basis in effort to maintain safety guidelines.
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
## 2021-01-14 NOTE — BH INPATIENT PSYCHIATRY ASSESSMENT NOTE - VIOLENCE RISK FACTORS:
Feeling of being under threat and being unable to control threat/Affective dysregulation/Lack of insight into violence risk/need for treatment/Noncompliance with treatment/Irritability

## 2021-01-14 NOTE — BH INPATIENT PSYCHIATRY ASSESSMENT NOTE - NSBHMETABOLIC_PSY_ALL_CORE_FT
BMI:   HbA1c: A1C with Estimated Average Glucose Result: 5.6 % (12-23-20 @ 10:49)    Glucose: POCT Blood Glucose.: 89 mg/dL (01-13-21 @ 17:45)    BP: 120/61 (01-13-21 @ 22:41) (120/61 - 128/75)  Lipid Panel: Date/Time: 12-23-20 @ 10:49  Cholesterol, Serum: 148  Direct LDL: --  HDL Cholesterol, Serum: 52  Total Cholesterol/HDL Ration Measurement: --  Triglycerides, Serum: 101

## 2021-01-14 NOTE — BH SOCIAL WORK INITIAL PSYCHOSOCIAL EVALUATION - NSCMSPTSTRENGTHS_PSY_ALL_CORE
PT has an involved ACT team as well as loves to get dressed up and wear make up which motivates positive behavior.

## 2021-01-14 NOTE — BH INPATIENT PSYCHIATRY ASSESSMENT NOTE - CURRENT MEDICATION
MEDICATIONS  (STANDING):  lithium 900 milliGRAM(s) Oral at bedtime  lithium CR (ESKALITH-CR) 900 milliGRAM(s) Oral at bedtime  OLANZapine 10 milliGRAM(s) Oral two times a day    MEDICATIONS  (PRN):  ALBUTerol    90 MICROgram(s) HFA Inhaler 2 Puff(s) Inhalation every 6 hours PRN asthma attack  diphenhydrAMINE 50 milliGRAM(s) Oral every 6 hours PRN agitation  diphenhydrAMINE   Injectable 50 milliGRAM(s) IntraMuscular once PRN EPS  diphenhydrAMINE   Injectable 50 milliGRAM(s) IntraMuscular once PRN EPS  haloperidol     Tablet 5 milliGRAM(s) Oral every 6 hours PRN agitation  haloperidol    Injectable 5 milliGRAM(s) IntraMuscular once PRN agitaion  haloperidol    Injectable 5 milliGRAM(s) IntraMuscular once PRN agitaion  LORazepam     Tablet 2 milliGRAM(s) Oral every 6 hours PRN Agitation  LORazepam   Injectable 2 milliGRAM(s) IntraMuscular once PRN agitation  LORazepam   Injectable 2 milliGRAM(s) IntraMuscular once PRN agitation  
Stable.

## 2021-01-14 NOTE — BH PATIENT PROFILE - STATED REASON FOR ADMISSION
Pt was acting bizarre at her residence, agitated, smearing feces on the walls and on her face, verbalized that's how the white people view her. Pt is grossly disorganized, talking to self, and was noncompliant, ACT team recommended pt be admitted.

## 2021-01-14 NOTE — BH SOCIAL WORK INITIAL PSYCHOSOCIAL EVALUATION - NSHIGHRISKBEHFT_PSY_ALL_CORE
Sexual behavior-exposing herself on the unit and disrobing  Aggressive behavior- destruction of property when upset

## 2021-01-14 NOTE — BH INPATIENT PSYCHIATRY ASSESSMENT NOTE - NSTXDCOTHRGOAL_PSY_ALL_CORE
Pt will comply with recommended tx plan and medication for 5 dayss, identify 2 benefits for adhering to tx.

## 2021-01-14 NOTE — BH INPATIENT PSYCHIATRY ASSESSMENT NOTE - NSBHADMITMEDEDUDETAILS_PSY_A_CORE FT
Improved reality testing. Improved mood and behavior stabilization. Symptoms reduction and stabilization. Functional improvement. Improved socialization. Positive thinking and gaining insight.

## 2021-01-14 NOTE — BH SCALES AND SCREENS - NSBPRSUNUTHOCON_PSY_ALL_CORE
6 = Severe - delusion(s) has significant effect, e.g., neglects responsibilities because of preoccupations with belief that he/she is God

## 2021-01-14 NOTE — BH INPATIENT PSYCHIATRY ASSESSMENT NOTE - NSBHADMITMEDEDUDETAILS_A_CORE FT
Patient was informed of risk/benefit of medication, alternative treatment and no treatment.  Patient was educated about side effects of his medications, including EPS, TD.

## 2021-01-14 NOTE — BH INPATIENT PSYCHIATRY ASSESSMENT NOTE - HPI (INCLUDE ILLNESS QUALITY, SEVERITY, DURATION, TIMING, CONTEXT, MODIFYING FACTORS, ASSOCIATED SIGNS AND SYMPTOMS)
Patient was seen and evaluated, chart reviewed. Case discussed with nursing team.  On service for this 40 year old, single female with PPH of Schizophrenia Disorder. Patient is hospitalized with a primary problem of psychotic decompensation, worsening psychosis, and bizarre behaviors.  Patient admitted on 9.39 fro [facility]/ED to [facility]. I have reviewed the initial psychiatric assessment in the electronic medical record, including the history of present illness, past psychiatric history, family/social history (no pertinent changes), and exam, and have confirmed the salient findings dated 1/13/21.  Legal status: 9.39   As per chart review, transferring records indicated the following:  The patient is a 40-year-old, single,  woman, non-caregiver, unemployed, domiciled on [place] [address] [place] [address]; with past psychiatric history of bipolar disorder, Borderline Personality Disorder, hx of cannabis abuse, and multiple inpatient psychiatric hospitalizations (>20, most recently [place] [id number] from 12/5/20-1/5/21 ), with frequent visits to the [facility] (five this past month), with [facility], with a history of 3 prior suicide attempts (last in 2012 via OD), with hx of recurrent and chronic suicidal gestures and suicidal ideation; with a history of property destruction when upset, past legal issues, no access to weapons; with PMH of GERD & asthma; BIB EMS from [facility] called by staff for bizarre behavior in the context of refusing medication.  Patient reports she has been smearing her feces on the wall and door of her room and has refused to clean it. Patient also reports eating her feces yesterday. When questioned why she has been engaging in this behavior patient stated, "because it is the way white people view me."  Patient reports her mood has been depressed and does not get along with the staff at her residence. She reports she is hungry and has not been receiving her meals. Patient denies current or recent suicidal/homicidal ideation, intent or plan., auditory/visual hallucinations or ideas of reference. Patient denies any recently alcohol or illicit substance abuse. During interview patient became disorganized and started turning in a [place].     On unit:  Patient is followed up for Schizophrenia, admitted for psychosis.  Chart, medications and labs reviewed.  Patient is discussed with nursing staff.  Interview was challenging due to patient’s profound disorganization and paranoia.  Patient is profoundly disorganized with ongoing perceptual disturbances. Patient is observed in hallway talking to self, laughing to self.  Later on, patient was observed walking in hallway with feces on her face, and clothing.  Unable to fully participate in meaningful interview due to severity of psychotic/disorganized and bizarre symptoms. Patient became increasingly bizarre and aggressive, patient received IM medications.

## 2021-01-15 PROCEDURE — 99232 SBSQ HOSP IP/OBS MODERATE 35: CPT

## 2021-01-15 RX ORDER — HALOPERIDOL DECANOATE 100 MG/ML
5 INJECTION INTRAMUSCULAR ONCE
Refills: 0 | Status: COMPLETED | OUTPATIENT
Start: 2021-01-15 | End: 2021-01-15

## 2021-01-15 RX ORDER — DIPHENHYDRAMINE HCL 50 MG
50 CAPSULE ORAL ONCE
Refills: 0 | Status: COMPLETED | OUTPATIENT
Start: 2021-01-15 | End: 2021-01-15

## 2021-01-15 RX ADMIN — OLANZAPINE 10 MILLIGRAM(S): 15 TABLET, FILM COATED ORAL at 08:18

## 2021-01-15 RX ADMIN — LITHIUM CARBONATE 900 MILLIGRAM(S): 300 TABLET, EXTENDED RELEASE ORAL at 21:37

## 2021-01-15 RX ADMIN — HALOPERIDOL DECANOATE 5 MILLIGRAM(S): 100 INJECTION INTRAMUSCULAR at 22:59

## 2021-01-15 RX ADMIN — HALOPERIDOL DECANOATE 5 MILLIGRAM(S): 100 INJECTION INTRAMUSCULAR at 16:57

## 2021-01-15 RX ADMIN — Medication 2 MILLIGRAM(S): at 22:58

## 2021-01-15 RX ADMIN — Medication 50 MILLIGRAM(S): at 09:35

## 2021-01-15 RX ADMIN — Medication 50 MILLIGRAM(S): at 16:57

## 2021-01-15 RX ADMIN — Medication 50 MILLIGRAM(S): at 22:59

## 2021-01-15 RX ADMIN — Medication 2 MILLIGRAM(S): at 09:35

## 2021-01-15 RX ADMIN — HALOPERIDOL DECANOATE 5 MILLIGRAM(S): 100 INJECTION INTRAMUSCULAR at 09:35

## 2021-01-15 RX ADMIN — OLANZAPINE 10 MILLIGRAM(S): 15 TABLET, FILM COATED ORAL at 21:37

## 2021-01-15 RX ADMIN — Medication 2 MILLIGRAM(S): at 16:57

## 2021-01-15 NOTE — BH INPATIENT PSYCHIATRY DISCHARGE NOTE - REASON FOR ADMISSION
Patient was seen and evaluated, chart reviewed. On service for this 40 year old, single female with PPH of Schizophrenia Disorder. Patient is hospitalized with a primary problem of psychotic decompensation, worsening psychosis, and bizarre behaviors.  Patient admitted on 9.39 fro [facility]/ED to [facility].
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
## 2021-01-15 NOTE — BH INPATIENT PSYCHIATRY DISCHARGE NOTE - DESCRIPTION
currently lives in [facility], [address] [place] MIGUEL ANGEL reports no contact with family, currently unemployed.

## 2021-01-15 NOTE — BH INPATIENT PSYCHIATRY DISCHARGE NOTE - HOSPITAL COURSE
Date Admitted: 1/ 13/21  Date Discharged: 4/7/21  Patient was admitted to [facility] inpatient service on 9.27 legal status.  Patient's labs were grossly WNL, and toxicology was negative.  TOO granted, patient was held for retention for State transfer.   Patient admitted with a diagnosis of Schizophrenia Disorder.  Patient was admitted primary problem of psychotic decompensation, worsening psychosis, and bizarre behaviors in context of medication noncompliance.  EMS was called by [facility] after patient had been smearing her feces on the walls and doors of her room and has refused to clean it. Patient was also eating her feces prior to admission.  On admission interview, patient was profoundly disorganized, bizarre, and preoccupied with ongoing perceptual disturbances; minimal information could be elicited.  Patient was re-started on her outpatient medications: Olanzapine titrated to 15mg daily and 10mg qhs, Lithium was titrated to 1800mg qhs. Lithium serum level resulted 1.3 on 2/11/21, no signs of toxicity, reduce Lithium dose 1350mg qhs.  Lithium level resulted 0.9 on 2/17/21. At discharge patient’s level was 1.1.    She received PALACIOS Prolixin Deconate 50mg monthly during course of treatment.  Last received on 3/23/21 with good tolerability and effect.  Patient had no reported or observed adverse effects, such as akathisia, tremor, EPS.  Patient has responded well to medication regimen, with good restoration of function.    She has a long history of recidivist hospitalizations for psychosis. During these previous hospitalizations, she typically refuses medications, is uncooperative and has frequently required IM prn’s for aggressive. During patient’s initial course of treatment she did require IM medications.  The latter course of her hospitalization she had a dramatic improvement in her level of cooperation with treatment team. Patient was much more willing to take her medications, her oral standing medications as well as agreed to take monthly PALACIOS with Prolixin Deconate.  Past hospitalizations are typically prolonged as patient is floridly psychotic, refuses medications, medication over objections is often obtained. She showed some improvement in her symptoms, with a gradual reduction of her internal preoccupation, aggression and thought disorganization. She denied any suicidal or homicidal ideations throughout hospitalization. Despite her progress in treatment, patient continues to have bouts of psychotic behaviors.   Patient is symptomatically stable and is at baseline.     PROCEDURES AND TREATMENT:  1.  Individual and group psychotherapy.  2.  Psychopharmacologic management.  3. Supportive/emotional therapy  Discharge Pan:  -Patient given 4 weeks supply of medications. Risk, benefits and alternatives discussed with patient. Patient verbalized understanding and in accord with aftercare recommendations.   -Patient instructed to call 911 or go to nearest ER in case of emergency.   -Patient given Suicide Prevention Lifeline number 5-022-362-9783 and provided instructions on its use  -Patient will be transferred to [facility]         Date Admitted: 1/ 13/21  Date Discharged: 4/7/21  Patient was admitted to [facility] inpatient service on 9.39 legal status. However during course of treatment patient was converted to [facility]. Patient's labs were grossly WNL, and toxicology was negative.  TOO granted, patient was held for retention for State transfer.   Patient admitted with a diagnosis of Schizophrenia Disorder.  Patient was admitted primary problem of psychotic decompensation, worsening psychosis, and bizarre behaviors in context of medication noncompliance.  EMS was called by [facility] after patient had been smearing her feces on the walls and doors of her room and has refused to clean it. Patient was also eating her feces prior to admission.  On admission interview, patient was profoundly disorganized, bizarre, and preoccupied with ongoing perceptual disturbances; minimal information could be elicited.  Patient was re-started on her outpatient medications: Olanzapine titrated to 15mg daily and 10mg qhs, Lithium was titrated to 1800mg qhs. Lithium serum level resulted 1.3 on 2/11/21, no signs of toxicity, reduce Lithium dose 1350mg qhs.  Lithium level resulted 0.9 on 2/17/21. At discharge patient’s level was 1.1.    She received PALACIOS Prolixin Deconate 50mg monthly during course of treatment.  Last received on 3/23/21 with good tolerability and effect.  Patient had no reported or observed adverse effects, such as akathisia, tremor, EPS.  Patient has responded well to medication regimen, with good restoration of function.    She has a long history of recidivist hospitalizations for psychosis. During these previous hospitalizations, she typically refuses medications, is uncooperative and has frequently required IM prn’s for aggressive. During patient’s initial course of treatment she did require IM medications.  The latter course of her hospitalization she had a dramatic improvement in her level of cooperation with treatment team. Patient was much more willing to take her medications, her oral standing medications as well as agreed to take monthly PALACIOS with Prolixin Deconate.  Past hospitalizations are typically prolonged as patient is floridly psychotic, refuses medications, medication over objections is often obtained. She showed some improvement in her symptoms, with a gradual reduction of her internal preoccupation, aggression and thought disorganization. She denied any suicidal or homicidal ideations throughout hospitalization. Despite her progress in treatment, patient continues to have bouts of psychotic behaviors.   Patient is symptomatically stable and is at baseline.     PROCEDURES AND TREATMENT:  1.  Individual and group psychotherapy.  2.  Psychopharmacologic management.  3. Supportive/emotional therapy  Discharge Pan:  -Patient given 4 weeks supply of medications. Risk, benefits and alternatives discussed with patient. Patient verbalized understanding and in accord with aftercare recommendations.   -Patient instructed to call 911 or go to nearest ER in case of emergency.   -Patient given Suicide Prevention Lifeline number 1-232.439.2818 and provided instructions on its use  -Patient will be transferred to [facility]

## 2021-01-15 NOTE — BH INPATIENT PSYCHIATRY DISCHARGE NOTE - NSDCMRMEDTOKEN_GEN_ALL_CORE_FT
albuterol 90 mcg/inh inhalation aerosol: 2 puff(s) inhaled every 6 hours, As needed, asthma attack  lithium 450 mg oral tablet, extended release: 2 tab(s) orally once a day (at bedtime)  OLANZapine 15 mg oral tablet: 1 tab(s) orally once a day (at bedtime)   albuterol 90 mcg/inh inhalation aerosol: 2 puff(s) inhaled every 6 hours, As needed, asthma attack  fluPHENAZine decanoate 25 mg/mL injectable solution: 50 milligram(s) intramuscularly every 4 weeks. Next dose due on 4/24/21   lithium 450 mg oral tablet, extended release: 3 tab(s) orally once a day (at bedtime)  OLANZapine 10 mg oral tablet: 1 tab(s) orally once a day (at bedtime)  OLANZapine 15 mg oral tablet: 1 tab(s) orally once a day  pantoprazole 40 mg oral delayed release tablet: 1 tab(s) orally once a day (before a meal)

## 2021-01-15 NOTE — BH INPATIENT PSYCHIATRY PROGRESS NOTE - NSBHASSESSSUMMFT_PSY_ALL_CORE
>Obs: Routine, no current SI. no need for CO, patient not expected to pose risk to self or others in controlled inpatient setting  >Psychiatric Meds: Continue with current medication regimen. Low thresh hold for prn’s  >Social: milieu therapy  >Treatment: Group therapy and individual therapy/CBT modality  >Continue to provide therapeutic interventions in support of treatment goals
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
## 2021-01-15 NOTE — BH INPATIENT PSYCHIATRY DISCHARGE NOTE - HPI (INCLUDE ILLNESS QUALITY, SEVERITY, DURATION, TIMING, CONTEXT, MODIFYING FACTORS, ASSOCIATED SIGNS AND SYMPTOMS)
Patient was seen and evaluated, chart reviewed. Case discussed with nursing team.  On service for this 40 year old, single female with PPH of Schizophrenia Disorder. Patient is hospitalized with a primary problem of psychotic decompensation, worsening psychosis, and bizarre behaviors.  Patient admitted on 9.39 fro [facility]/ED to [facility]. I have reviewed the initial psychiatric assessment in the electronic medical record, including the history of present illness, past psychiatric history, family/social history (no pertinent changes), and exam, and have confirmed the salient findings dated 1/13/21.  Legal status: 9.39   As per chart review, transferring records indicated the following:  The patient is a 40-year-old, single,  woman, non-caregiver, unemployed, domiciled on [place] building [address] [place] [address]; with past psychiatric history of bipolar disorder, Borderline Personality Disorder, hx of cannabis abuse, and multiple inpatient psychiatric hospitalizations (>20, most recently [place] [id number] from 12/5/20-1/5/21 ), with frequent visits to the [facility] (five this past month), with [facility], with a history of 3 prior suicide attempts (last in 2012 via OD), with hx of recurrent and chronic suicidal gestures and suicidal ideation; with a history of property destruction when upset, past legal issues, no access to weapons; with PMH of GERD & asthma; BIB EMS from [facility] called by staff for bizarre behavior in the context of refusing medication.  Patient reports she has been smearing her feces on the wall and door of her room and has refused to clean it. Patient also reports eating her feces yesterday. When questioned why she has been engaging in this behavior patient stated, "because it is the way white people view me."  Patient reports her mood has been depressed and does not get along with the staff at her residence. She reports she is hungry and has not been receiving her meals. Patient denies current or recent suicidal/homicidal ideation, intent or plan., auditory/visual hallucinations or ideas of reference. Patient denies any recently alcohol or illicit substance abuse. During interview patient became disorganized and started turning in a [place].     On unit:  Patient is followed up for Schizophrenia, admitted for psychosis.  Chart, medications and labs reviewed.  Patient is discussed with nursing staff.  Interview was challenging due to patient’s profound disorganization and paranoia.  Patient is profoundly disorganized with ongoing perceptual disturbances. Patient is observed in hallway talking to self, laughing to self.  Later on, patient was observed walking in hallway with feces on her face, and clothing.  Unable to fully participate in meaningful interview due to severity of psychotic/disorganized and bizarre symptoms. Patient became increasingly bizarre and aggressive, patient received IM medications.

## 2021-01-15 NOTE — BH INPATIENT PSYCHIATRY DISCHARGE NOTE - NSDCRECOMMENDMEDICALFT_PSY_ALL_CORE
Upon discharge, recommend physical exam including EKG and metabolic screen with PCP on ongoing basis for preventative care and to maintain health and wellness.

## 2021-01-15 NOTE — BH INPATIENT PSYCHIATRY DISCHARGE NOTE - NSBHDCSIGEVENTSFT_PSY_A_CORE
Patient was admitted due to psychotic decompensation, bizarre behaviors and aggression. During initial course of her hospital stay patient was profoundly disorganized/bizarre, aggressive, destructive to property, threatening, requiring continuous po and IM emergent medications.

## 2021-01-15 NOTE — BH INPATIENT PSYCHIATRY DISCHARGE NOTE - NSDCTESTSFT_PSY_ALL_CORE
Lithium level at dc is 1.1  Covid negative at dc Lithium level at dc is 1.1  Covid negative at dc  All labs WNL at dc

## 2021-01-15 NOTE — BH INPATIENT PSYCHIATRY DISCHARGE NOTE - NSBHDCHANDOFFFT_PSY_ALL_CORE
Medication list and discharge summary provided to [facility] via fax and this writer's contact information was provided for any further questions or concerns [phone].

## 2021-01-15 NOTE — BH INPATIENT PSYCHIATRY DISCHARGE NOTE - NSDCRECOMMEND_PSY_ALL_CORE
Unrestricted diet/activity Unrestricted diet/activity/Recommended laboratory tests/other investigations following discharge.../Recommended medical follow-up following discharge...

## 2021-01-15 NOTE — BH INPATIENT PSYCHIATRY DISCHARGE NOTE - NSBHDCRISKMITIGATE_PSY_ALL_CORE
Safety planning/Long acting injectable medication/Medications targeting suicidality/non-suicidal self injurious behavior

## 2021-01-15 NOTE — BH INPATIENT PSYCHIATRY DISCHARGE NOTE - NSBHDCMEDICALFT_PSY_A_CORE
Patient is diagnosed with GERD and Asthma. During the course of treatment, collaborated with medical team to manage medical issues.   At the time of this treatment summary, the patient has not had any acute medical changes during his hospitalization. There have been no other medical consultations. Patient was discharge with COVID 19 negative results. No cough, SOB, CP, or fever at time of discharge. Vitals WNL. Patient is diagnosed with GERD and Asthma. During the course of treatment, collaborated with medical team to manage medical issues.  At the time of this treatment summary, the patient has not had any acute medical changes during his hospitalization. There have been no other medical consultations. Patient was discharge with COVID 19 negative results. No cough, SOB, CP, or fever at time of discharge. Vitals WNL. Patient did not consent to Covid vaccination

## 2021-01-15 NOTE — BH INPATIENT PSYCHIATRY PROGRESS NOTE - OTHER
Patient is internally preoccupied, she is observed talking to self Bizarre animated affect,  Bizarre, disorganized, sexually provocative Patient is observed smearing feces on her face and clothing.
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
## 2021-01-15 NOTE — BH INPATIENT PSYCHIATRY PROGRESS NOTE - NSBHFUPINTERVALHXFT_PSY_A_CORE
Patient is followed up for psychosis. Chart, medications and labs reviewed.  Patient is discussed in treatment team.  No appreciated change in status today. Patient remains profoundly disorganized, bizarre with ongoing perceptual disturbances. Psychotic symptoms successfully elicited; talking to self, RIS, appears internally preoccupied, with delusions.  Endorses +AH, hallucinations remains in same intensity and frequency. Per nursing patient had no further episodes last evening of smearing feces on her face/body/clothes. However she did require IM medications due to aggression, and level/intensity of psychosis.  During day shift yesterday, patient was observed drinking her urine.  Patient has not done this today.  Interview is challenging due, difficult to have meaningful interview, due to severity of psychotic symptoms. Poor sleep last night, no appetite concerns.  Remains compliant with medications, no reported or observed adverse effects.  Self- care remains an issue.  Continues to have behavioral issues on unit, requiring emergent IM medications, patient remains labile, disorganized, unpredictable, with bouts of irritability. Patient received IM Haldol 5mg Ativan 2mg and Benadryl 50mg this morning due to throwing chairs and garbage cans in dayroom. With fair response to IM.  However, patient seen dancing provocatively in hallway and disrobed in front of nurses station, flashing peers and staff.

## 2021-01-15 NOTE — BH INPATIENT PSYCHIATRY DISCHARGE NOTE - NSDCCPCAREPLAN_GEN_ALL_CORE_FT
PRINCIPAL DISCHARGE DIAGNOSIS  Diagnosis: Schizoaffective disorder  Assessment and Plan of Treatment:       SECONDARY DISCHARGE DIAGNOSES  Diagnosis: Borderline personality disorder  Assessment and Plan of Treatment:

## 2021-01-15 NOTE — BH INPATIENT PSYCHIATRY PROGRESS NOTE - NSBHCHARTREVIEWVS_PSY_A_CORE FT
Vital Signs Last 24 Hrs  T(C): 36.9 (15 Jl 2021 06:40), Max: 36.9 (15 Jl 2021 06:40)  T(F): 98.5 (15 Jl 2021 06:40), Max: 98.5 (15 Jl 2021 06:40)  HR: --105  BP: --132/92  BP(mean): --  RR: 18 (14 Jan 2021 20:02) (18 - 18)  SpO2: 100% (14 Jan 2021 20:02) (100% - 100%)

## 2021-01-16 PROCEDURE — 99233 SBSQ HOSP IP/OBS HIGH 50: CPT

## 2021-01-16 RX ADMIN — OLANZAPINE 10 MILLIGRAM(S): 15 TABLET, FILM COATED ORAL at 09:14

## 2021-01-16 RX ADMIN — OLANZAPINE 10 MILLIGRAM(S): 15 TABLET, FILM COATED ORAL at 20:57

## 2021-01-16 RX ADMIN — Medication 50 MILLIGRAM(S): at 15:41

## 2021-01-16 RX ADMIN — HALOPERIDOL DECANOATE 5 MILLIGRAM(S): 100 INJECTION INTRAMUSCULAR at 15:43

## 2021-01-16 RX ADMIN — Medication 50 MILLIGRAM(S): at 10:37

## 2021-01-16 RX ADMIN — Medication 2 MILLIGRAM(S): at 10:38

## 2021-01-16 RX ADMIN — LITHIUM CARBONATE 900 MILLIGRAM(S): 300 TABLET, EXTENDED RELEASE ORAL at 20:57

## 2021-01-16 RX ADMIN — HALOPERIDOL DECANOATE 5 MILLIGRAM(S): 100 INJECTION INTRAMUSCULAR at 10:37

## 2021-01-16 RX ADMIN — Medication 2 MILLIGRAM(S): at 15:43

## 2021-01-16 NOTE — BH INPATIENT PSYCHIATRY PROGRESS NOTE - NSBHCHARTREVIEWVS_PSY_A_CORE FT
Vital Signs Last 24 Hrs  T(C): 36.4 (16 Jan 2021 06:26), Max: 36.4 (16 Jan 2021 06:26)  T(F): 97.6 (16 Jan 2021 06:26), Max: 97.6 (16 Jan 2021 06:26)  HR: --  BP: --  BP(mean): --  RR: 20 (16 Jan 2021 11:43) (18 - 20)  SpO2: 98% (15 Jl 2021 20:15) (98% - 98%)

## 2021-01-16 NOTE — BH INPATIENT PSYCHIATRY PROGRESS NOTE - OTHER
Patient reportedly smeared feces in her room.   Patient is internally preoccupied, she is observed talking to self Bizarre animated affect Bizarre, disorganized, sexually provocative impoverished Intense at times

## 2021-01-16 NOTE — BH INPATIENT PSYCHIATRY PROGRESS NOTE - NSBHFUPINTERVALHXFT_PSY_A_CORE
Chart reviewed and case discussed with treatment team.  No events reported overnight. Sleep and appetite is fair.  Patient unable to participate in interview in a meaningful way. Patient was dancing in hallway and then sat down on the ground when approached by writer. Patient then pointed to her veins in her legs and speech was incoherent. Per RN, patient defecated in her room and smeared it on the walls. She was given Ativan, Haldol and Benadryl PPO PRN last night for agitation. Patient is compliant with medications, no adverse effects reported.

## 2021-01-17 PROCEDURE — 99233 SBSQ HOSP IP/OBS HIGH 50: CPT

## 2021-01-17 RX ADMIN — OLANZAPINE 10 MILLIGRAM(S): 15 TABLET, FILM COATED ORAL at 08:28

## 2021-01-17 RX ADMIN — Medication 2 MILLIGRAM(S): at 19:25

## 2021-01-17 RX ADMIN — HALOPERIDOL DECANOATE 5 MILLIGRAM(S): 100 INJECTION INTRAMUSCULAR at 01:56

## 2021-01-17 RX ADMIN — Medication 2 MILLIGRAM(S): at 08:28

## 2021-01-17 RX ADMIN — Medication 50 MILLIGRAM(S): at 08:28

## 2021-01-17 RX ADMIN — HALOPERIDOL DECANOATE 5 MILLIGRAM(S): 100 INJECTION INTRAMUSCULAR at 19:24

## 2021-01-17 RX ADMIN — OLANZAPINE 10 MILLIGRAM(S): 15 TABLET, FILM COATED ORAL at 20:04

## 2021-01-17 RX ADMIN — Medication 50 MILLIGRAM(S): at 19:25

## 2021-01-17 RX ADMIN — LITHIUM CARBONATE 900 MILLIGRAM(S): 300 TABLET, EXTENDED RELEASE ORAL at 20:04

## 2021-01-17 RX ADMIN — Medication 2 MILLIGRAM(S): at 01:55

## 2021-01-17 NOTE — BH INPATIENT PSYCHIATRY PROGRESS NOTE - NSBHFUPINTERVALHXFT_PSY_A_CORE
Chart reviewed and case discussed with treatment team.  No events reported overnight. Sleep and appetite is fair.  Patient unable to participate in interview in a meaningful way. Patient remains grossly disorganized but was calmer this morning after PO PRN Ativan and Benadryl. She reported that "the walls did a 360. I don't know what I am. In [place] they think you're a part of the underworld" referring to her tattoos. Patient is compliant with medications, no adverse effects reported.

## 2021-01-17 NOTE — BH INPATIENT PSYCHIATRY PROGRESS NOTE - OTHER
Bizarre, disorganized, sexually provocative impoverished Bizarre animated affect Intense at times Patient is internally preoccupied, she is observed talking to self

## 2021-01-17 NOTE — BH INPATIENT PSYCHIATRY PROGRESS NOTE - NSBHCHARTREVIEWVS_PSY_A_CORE FT
Vital Signs Last 24 Hrs  T(C): 36.9 (16 Jan 2021 18:23), Max: 36.9 (16 Jan 2021 18:23)  T(F): 98.4 (16 Jan 2021 18:23), Max: 98.4 (16 Jan 2021 18:23)  HR: --  BP: --  BP(mean): --  RR: 20 (17 Jan 2021 10:25) (20 - 20)  SpO2: --

## 2021-01-18 PROCEDURE — 99233 SBSQ HOSP IP/OBS HIGH 50: CPT

## 2021-01-18 RX ADMIN — OLANZAPINE 10 MILLIGRAM(S): 15 TABLET, FILM COATED ORAL at 19:36

## 2021-01-18 RX ADMIN — LITHIUM CARBONATE 900 MILLIGRAM(S): 300 TABLET, EXTENDED RELEASE ORAL at 19:35

## 2021-01-18 RX ADMIN — HALOPERIDOL DECANOATE 5 MILLIGRAM(S): 100 INJECTION INTRAMUSCULAR at 19:35

## 2021-01-18 RX ADMIN — Medication 2 MILLIGRAM(S): at 19:35

## 2021-01-18 RX ADMIN — Medication 50 MILLIGRAM(S): at 19:36

## 2021-01-18 NOTE — BH INPATIENT PSYCHIATRY PROGRESS NOTE - OTHER
Bizarre animated affect Patient is internally preoccupied, she is observed talking to self. Patient denies any AH

## 2021-01-18 NOTE — BH INPATIENT PSYCHIATRY PROGRESS NOTE - NSBHCHARTREVIEWVS_PSY_A_CORE FT
Vital Signs Last 24 Hrs  T(C): 36.6 (17 Jan 2021 18:18), Max: 36.6 (17 Jan 2021 18:18)  T(F): 97.8 (17 Jan 2021 18:18), Max: 97.8 (17 Jan 2021 18:18)  HR: --  BP: --  BP(mean): --  RR: 18 (18 Jan 2021 06:01) (18 - 18)  SpO2: 99% (18 Jan 2021 06:01) (99% - 99%)

## 2021-01-18 NOTE — BH INPATIENT PSYCHIATRY PROGRESS NOTE - NSBHFUPINTERVALHXFT_PSY_A_CORE
Chart reviewed and case discussed with treatment team.  No events reported overnight. Sleep and appetite is fair.  Patient was clearer this morning and able to participate in interview. She refused all her medications this morning, stating "I have the right to refuse. I do not like the side effects". Patient encouraged to comply with medications. Writer attempted to explain to patient how she has significantly improved over the weekend due to the medications but patient stated she doesn't believe that. Patient stated she will remain in her room until discharge. She denies any SI/HI or AH/VH.

## 2021-01-19 PROCEDURE — 99232 SBSQ HOSP IP/OBS MODERATE 35: CPT

## 2021-01-19 RX ADMIN — OLANZAPINE 10 MILLIGRAM(S): 15 TABLET, FILM COATED ORAL at 08:02

## 2021-01-19 RX ADMIN — Medication 50 MILLIGRAM(S): at 07:43

## 2021-01-19 RX ADMIN — Medication 2 MILLIGRAM(S): at 17:53

## 2021-01-19 RX ADMIN — HALOPERIDOL DECANOATE 5 MILLIGRAM(S): 100 INJECTION INTRAMUSCULAR at 07:43

## 2021-01-19 RX ADMIN — HALOPERIDOL DECANOATE 5 MILLIGRAM(S): 100 INJECTION INTRAMUSCULAR at 17:54

## 2021-01-19 RX ADMIN — Medication 2 MILLIGRAM(S): at 07:43

## 2021-01-19 RX ADMIN — Medication 50 MILLIGRAM(S): at 17:54

## 2021-01-19 NOTE — BH INPATIENT PSYCHIATRY PROGRESS NOTE - NSBHCHARTREVIEWVS_PSY_A_CORE FT
Vital Signs Last 24 Hrs  T(C): 36.6 (19 Jan 2021 08:38), Max: 36.6 (18 Jan 2021 22:29)  T(F): 97.8 (19 Jan 2021 08:38), Max: 97.8 (18 Jan 2021 22:29)  HR: 90 (18 Jan 2021 22:29) (90 - 90)  BP: 103/- (18 Jan 2021 22:29) (103/- - 103/-)  BP(mean): --  RR: 18 (18 Jan 2021 19:35) (18 - 18)  SpO2: 100% (18 Jan 2021 19:35) (100% - 100%)

## 2021-01-19 NOTE — BH INPATIENT PSYCHIATRY PROGRESS NOTE - NSBHFUPINTERVALHXFT_PSY_A_CORE
Patient is followed up for psychosis. Chart, medications and labs reviewed.  Patient is discussed with nursing staff. No appreciated change in status today. Per nursing patient continues to smear feces on the walls in her room, and on her face. Requires continuous po prn medications (Haldol 5mg, Ativan 2mg and Benadryl 50mg).  Patient remains disorganized, with ongoing perceptual disturbances. Psychotic symptoms is prominent and successfully elicited; talking to self, RIS, appears internally preoccupied, and disorganized. No mention of sleep disturbances or appetite concerns. Patient refused her oral standing medications last night and over the weekend, shouting at writer “I have a right to refuse.”  Patient did receive PALACIOS Prolixin 50mg during her last hospitalization in December 2020.  Encouraged patient to continue medication regimen.  Self- care remains an issue, disheveled, malodorous.  Patient remains bizarre, sexually provocative/manic/labile, aggressive on the unit.  Remains difficult to redirect, behavior is unpredictable. Patient is discussed in treatment team: proceeding with State Application.

## 2021-01-19 NOTE — BH INPATIENT PSYCHIATRY PROGRESS NOTE - NSBHASSESSSUMMFT_PSY_ALL_CORE
>Obs: Routine, no current SI. no need for CO, patient not expected to pose risk to self or others in controlled inpatient setting  >Psychiatric Meds: Continue with current medication regimen. Continue to observe for tolerability and efficacy.  >Social: milieu therapy  >Treatment: Group therapy and individual therapy, coping skills development for emotion management and mood regulation. Motivational counseling for substance abuse related issues.   >Continue to provide therapeutic interventions in support of treatment goals.  >Dispo: Continues to require inpatient level of care for treatment of psychotic symptoms. Proceed with State Application

## 2021-01-19 NOTE — BH INPATIENT PSYCHIATRY PROGRESS NOTE - OTHER
Patient continues to smear feces on the walls and on her face Patient is internally preoccupied, she is observed talking to self. Patient denies any AH Bizarre animated affect

## 2021-01-20 PROCEDURE — 99232 SBSQ HOSP IP/OBS MODERATE 35: CPT

## 2021-01-20 RX ORDER — LITHIUM CARBONATE 300 MG/1
1800 TABLET, EXTENDED RELEASE ORAL AT BEDTIME
Refills: 0 | Status: DISCONTINUED | OUTPATIENT
Start: 2021-01-20 | End: 2021-02-12

## 2021-01-20 RX ORDER — FLUPHENAZINE HYDROCHLORIDE 1 MG/1
50 TABLET, FILM COATED ORAL ONCE
Refills: 0 | Status: COMPLETED | OUTPATIENT
Start: 2021-01-22 | End: 2021-01-22

## 2021-01-20 RX ADMIN — HALOPERIDOL DECANOATE 5 MILLIGRAM(S): 100 INJECTION INTRAMUSCULAR at 21:34

## 2021-01-20 RX ADMIN — Medication 2 MILLIGRAM(S): at 06:40

## 2021-01-20 RX ADMIN — Medication 50 MILLIGRAM(S): at 21:34

## 2021-01-20 RX ADMIN — Medication 2 MILLIGRAM(S): at 21:34

## 2021-01-20 RX ADMIN — OLANZAPINE 10 MILLIGRAM(S): 15 TABLET, FILM COATED ORAL at 08:01

## 2021-01-20 RX ADMIN — LITHIUM CARBONATE 1800 MILLIGRAM(S): 300 TABLET, EXTENDED RELEASE ORAL at 21:34

## 2021-01-20 RX ADMIN — HALOPERIDOL DECANOATE 5 MILLIGRAM(S): 100 INJECTION INTRAMUSCULAR at 06:41

## 2021-01-20 RX ADMIN — OLANZAPINE 10 MILLIGRAM(S): 15 TABLET, FILM COATED ORAL at 21:35

## 2021-01-20 RX ADMIN — Medication 50 MILLIGRAM(S): at 06:41

## 2021-01-20 NOTE — BH INPATIENT PSYCHIATRY PROGRESS NOTE - OTHER
Patient is internally preoccupied, she is observed talking to self. Patient denies any AH Bizarre animated affect Patient continues to smear feces on the walls and on her face

## 2021-01-20 NOTE — BH INPATIENT PSYCHIATRY PROGRESS NOTE - NSBHFUPINTERVALHXFT_PSY_A_CORE
Patient is followed up for psychosis. Chart, medications and labs reviewed.  Patient is discussed in treatment team.  No appreciated change in status today. Per nursing patient continues to smear feces on the walls in her room, and on her face.  Patient is observed throwing cups of urine on the walls, and tossing garbage can in her room. Patient given  po prn medications (Haldol 5mg, Ativan 2mg and Benadryl 50mg).  Patient remains profoundly disorganized, delusional, ongoing perceptual disturbances. Psychotic symptoms is prominent and successfully elicited; talking to self, RIS, appears internally preoccupied, verbally aggressive. No mention of sleep disturbances or appetite concerns. Patient refused her oral standing medications last night shouting at writer “I have a right to refuse.”  Patient did receive PALACIOS Prolixin 50mg during her last hospitalization in December 2020.  Encouraged patient to continue medication regimen.  Self- care remains an issue, disheveled, malodorous.  Patient remains bizarre, sexually provocative/manic/labile, aggressive on the unit.  Remains difficult to redirect, behavior is unpredictable. Patient is discussed in treatment team: proceeding with State Application.

## 2021-01-20 NOTE — BH INPATIENT PSYCHIATRY PROGRESS NOTE - NSBHCHARTREVIEWVS_PSY_A_CORE FT
Vital Signs Last 24 Hrs  T(C): 36.9 (19 Jan 2021 20:54), Max: 36.9 (19 Jan 2021 20:54)  T(F): 98.5 (19 Jan 2021 20:54), Max: 98.5 (19 Jan 2021 20:54)  HR: --  BP: --  BP(mean): --  RR: 18 (19 Jan 2021 20:54) (18 - 18)  SpO2: 99% (19 Jan 2021 20:54) (99% - 99%)

## 2021-01-21 PROCEDURE — 99232 SBSQ HOSP IP/OBS MODERATE 35: CPT

## 2021-01-21 RX ADMIN — HALOPERIDOL DECANOATE 5 MILLIGRAM(S): 100 INJECTION INTRAMUSCULAR at 08:05

## 2021-01-21 RX ADMIN — Medication 2 MILLIGRAM(S): at 08:05

## 2021-01-21 RX ADMIN — OLANZAPINE 10 MILLIGRAM(S): 15 TABLET, FILM COATED ORAL at 08:05

## 2021-01-21 RX ADMIN — Medication 50 MILLIGRAM(S): at 08:05

## 2021-01-21 NOTE — BH INPATIENT PSYCHIATRY PROGRESS NOTE - OTHER
Bizarre animated affect Patient continues to smear feces on the walls and on her face Patient is internally preoccupied, she is observed talking to self. Patient denies any AH

## 2021-01-21 NOTE — BH INPATIENT PSYCHIATRY PROGRESS NOTE - NSBHCHARTREVIEWVS_PSY_A_CORE FT
Vital Signs Last 24 Hrs  T(C): 36.6 (21 Jan 2021 14:55), Max: 36.7 (20 Jan 2021 21:40)  T(F): 97.8 (21 Jan 2021 14:55), Max: 98 (20 Jan 2021 21:40)  HR: --  BP: --  BP(mean): --  RR: 18 (20 Jan 2021 21:40) (18 - 18)  SpO2: --

## 2021-01-21 NOTE — BH INPATIENT PSYCHIATRY PROGRESS NOTE - NSBHFUPINTERVALHXFT_PSY_A_CORE
Patient is followed up for psychosis. Chart, medications and labs reviewed.  Patient is discussed in treatment team. Per nursing patient continues bizarre dysregulated behavior including with excrement, disorganized thought, hypersexuality. Patient given doses of po prn medications (Haldol 5mg, Ativan 2mg and Benadryl 50mg) last night and again this morning for dysregulated behavior similar to previous. Slept most of the day following medication. Patient remains profoundly disorganized, delusional, ongoing perceptual disturbances. Psychotic symptoms is prominent and successfully elicited; talking to self, RIS, appears internally preoccupied. Exposed her chest to writer as writer was leaving after assessment. Self- care remains an issue, disheveled, malodorous.  Patient remains bizarre, sexually provocative/manic/labile, aggressive on the unit.  Remains difficult to redirect, behavior is unpredictable. Patient is discussed in treatment team: proceeding with State Application.

## 2021-01-22 PROCEDURE — 99232 SBSQ HOSP IP/OBS MODERATE 35: CPT

## 2021-01-22 RX ORDER — FLUPHENAZINE HYDROCHLORIDE 1 MG/1
25 TABLET, FILM COATED ORAL ONCE
Refills: 0 | Status: COMPLETED | OUTPATIENT
Start: 2021-01-26 | End: 2021-01-26

## 2021-01-22 RX ADMIN — Medication 50 MILLIGRAM(S): at 20:42

## 2021-01-22 RX ADMIN — Medication 2 MILLIGRAM(S): at 20:43

## 2021-01-22 RX ADMIN — LITHIUM CARBONATE 1800 MILLIGRAM(S): 300 TABLET, EXTENDED RELEASE ORAL at 20:42

## 2021-01-22 RX ADMIN — OLANZAPINE 10 MILLIGRAM(S): 15 TABLET, FILM COATED ORAL at 20:41

## 2021-01-22 RX ADMIN — HALOPERIDOL DECANOATE 5 MILLIGRAM(S): 100 INJECTION INTRAMUSCULAR at 20:43

## 2021-01-22 NOTE — BH INPATIENT PSYCHIATRY PROGRESS NOTE - NSBHASSESSSUMMFT_PSY_ALL_CORE
>Obs: Routine, no current SI. no need for CO, patient not expected to pose risk to self or others in controlled inpatient setting  >Psychiatric Meds: Prolixin Dec 50mg scheduled for today. Continue with current medication regimen. Continue to observe for tolerability and efficacy.  >Social: milieu therapy  >Treatment: Group therapy and individual therapy, coping skills development for emotion management and mood regulation. Motivational counseling for substance abuse related issues.   >Continue to provide therapeutic interventions in support of treatment goals.  >Dispo: Continues to require inpatient level of care for treatment of psychotic symptoms. Proceed with State Application. MOO will mostly likely be necessary

## 2021-01-22 NOTE — BH INPATIENT PSYCHIATRY PROGRESS NOTE - OTHER
Patient continues to smear feces on the walls and on her face Bizarre animated affect Patient is internally preoccupied, she is observed talking to self. Patient denies any AH

## 2021-01-22 NOTE — BH INPATIENT PSYCHIATRY PROGRESS NOTE - NSBHFUPINTERVALHXFT_PSY_A_CORE
Patient seen for follow-up for psychosis.  Chart reviewed, and case discussed with treatment team.  No events reported overnight.  Patient reports fair sleep and appetite.  ADL's remains an issue, malodorous.  Patient denies physical complaints.  Patient denies SI/HI.  Psychotic symptoms of profound disorganization are elicited, but partially cooperative with interview, oddly related, sexually provocative, dancing provocatively in front of nurses station, +bizarre, nonsensical remarks are made during interview. During session patient got on the floor, on her knees and began bowing to writer.  Continues to refuse her standing medications.  Refused her Prolixin Dec today.  Patient reports she is not due for PALACIOS until 1/26/21 and will take it on that date. Treatment team will proceed with State application. MOO will most likely be necessary.

## 2021-01-22 NOTE — BH INPATIENT PSYCHIATRY PROGRESS NOTE - NSBHCHARTREVIEWVS_PSY_A_CORE FT
Vital Signs Last 24 Hrs  T(C): 36.3 (22 Jan 2021 06:54), Max: 37.1 (22 Jan 2021 06:53)  T(F): 97.3 (22 Jan 2021 06:54), Max: 98.7 (22 Jan 2021 06:53)  HR: --82  BP: --130/82  BP(mean): --  RR: 17 (21 Jan 2021 21:21) (17 - 17)  SpO2: --

## 2021-01-23 RX ADMIN — HALOPERIDOL DECANOATE 5 MILLIGRAM(S): 100 INJECTION INTRAMUSCULAR at 08:28

## 2021-01-23 RX ADMIN — Medication 2 MILLIGRAM(S): at 08:28

## 2021-01-23 RX ADMIN — LITHIUM CARBONATE 1800 MILLIGRAM(S): 300 TABLET, EXTENDED RELEASE ORAL at 22:28

## 2021-01-23 RX ADMIN — Medication 50 MILLIGRAM(S): at 22:29

## 2021-01-23 RX ADMIN — Medication 50 MILLIGRAM(S): at 08:28

## 2021-01-23 RX ADMIN — Medication 2 MILLIGRAM(S): at 22:28

## 2021-01-23 RX ADMIN — OLANZAPINE 10 MILLIGRAM(S): 15 TABLET, FILM COATED ORAL at 08:28

## 2021-01-23 RX ADMIN — HALOPERIDOL DECANOATE 5 MILLIGRAM(S): 100 INJECTION INTRAMUSCULAR at 22:28

## 2021-01-23 RX ADMIN — OLANZAPINE 10 MILLIGRAM(S): 15 TABLET, FILM COATED ORAL at 22:28

## 2021-01-24 RX ADMIN — Medication 50 MILLIGRAM(S): at 21:12

## 2021-01-24 RX ADMIN — OLANZAPINE 10 MILLIGRAM(S): 15 TABLET, FILM COATED ORAL at 08:27

## 2021-01-24 RX ADMIN — Medication 2 MILLIGRAM(S): at 21:11

## 2021-01-24 RX ADMIN — HALOPERIDOL DECANOATE 5 MILLIGRAM(S): 100 INJECTION INTRAMUSCULAR at 21:11

## 2021-01-24 NOTE — ED BEHAVIORAL HEALTH ASSESSMENT NOTE - PATIENT SEEN BY
Patient contacted to confirm his appointment with Dr Vargas scheduled for 1/25/2021 at 2:30 patient confirms he is coming was also  instructed to bring ID, INS cards, current medications list including supplements and test results images pertaining to this visit. Patient denies any Covid symptoms at the present time .  Verbalizes understanding    Attending Psychiatrist supervising NP/Trainee and meeting pt

## 2021-01-25 LAB — SARS-COV-2 RNA SPEC QL NAA+PROBE: SIGNIFICANT CHANGE UP

## 2021-01-25 PROCEDURE — 99232 SBSQ HOSP IP/OBS MODERATE 35: CPT

## 2021-01-25 RX ORDER — TUBERCULIN PURIFIED PROTEIN DERIVATIVE 5 [IU]/.1ML
5 INJECTION, SOLUTION INTRADERMAL ONCE
Refills: 0 | Status: COMPLETED | OUTPATIENT
Start: 2021-01-25 | End: 2021-01-30

## 2021-01-25 RX ADMIN — Medication 2 MILLIGRAM(S): at 19:43

## 2021-01-25 RX ADMIN — OLANZAPINE 10 MILLIGRAM(S): 15 TABLET, FILM COATED ORAL at 20:11

## 2021-01-25 RX ADMIN — LITHIUM CARBONATE 1800 MILLIGRAM(S): 300 TABLET, EXTENDED RELEASE ORAL at 20:11

## 2021-01-25 RX ADMIN — HALOPERIDOL DECANOATE 5 MILLIGRAM(S): 100 INJECTION INTRAMUSCULAR at 19:44

## 2021-01-25 RX ADMIN — Medication 50 MILLIGRAM(S): at 19:43

## 2021-01-25 NOTE — BH INPATIENT PSYCHIATRY PROGRESS NOTE - NSBHASSESSSUMMFT_PSY_ALL_CORE
>Obs: Routine, no current SI. no need for CO, patient not expected to pose risk to self or others in controlled inpatient setting  >Psychiatric Meds: Refused her Prolixin Dec 50mg last week. Continue with current medication regimen. Continue to observe for tolerability and efficacy.  >Social: milieu therapy  >Treatment: Group therapy and individual therapy, coping skills development for emotion management and mood regulation. Motivational counseling for substance abuse related issues.   >Continue to provide therapeutic interventions in support of treatment goals.  >Dispo: Continues to require inpatient level of care for treatment of psychotic symptoms. Proceed with State Application. MOO will mostly likely be necessary

## 2021-01-26 PROCEDURE — 99232 SBSQ HOSP IP/OBS MODERATE 35: CPT

## 2021-01-26 RX ORDER — LOPERAMIDE HCL 2 MG
2 TABLET ORAL EVERY 4 HOURS
Refills: 0 | Status: DISCONTINUED | OUTPATIENT
Start: 2021-01-26 | End: 2021-04-07

## 2021-01-26 RX ORDER — FLUPHENAZINE HYDROCHLORIDE 1 MG/1
25 TABLET, FILM COATED ORAL ONCE
Refills: 0 | Status: COMPLETED | OUTPATIENT
Start: 2021-01-26 | End: 2021-01-26

## 2021-01-26 RX ADMIN — FLUPHENAZINE HYDROCHLORIDE 25 MILLIGRAM(S): 1 TABLET, FILM COATED ORAL at 12:25

## 2021-01-26 RX ADMIN — FLUPHENAZINE HYDROCHLORIDE 25 MILLIGRAM(S): 1 TABLET, FILM COATED ORAL at 09:12

## 2021-01-26 RX ADMIN — OLANZAPINE 10 MILLIGRAM(S): 15 TABLET, FILM COATED ORAL at 21:01

## 2021-01-26 RX ADMIN — Medication 50 MILLIGRAM(S): at 21:02

## 2021-01-26 RX ADMIN — OLANZAPINE 10 MILLIGRAM(S): 15 TABLET, FILM COATED ORAL at 08:32

## 2021-01-26 RX ADMIN — LITHIUM CARBONATE 1800 MILLIGRAM(S): 300 TABLET, EXTENDED RELEASE ORAL at 21:01

## 2021-01-26 RX ADMIN — HALOPERIDOL DECANOATE 5 MILLIGRAM(S): 100 INJECTION INTRAMUSCULAR at 21:01

## 2021-01-26 RX ADMIN — Medication 2 MILLIGRAM(S): at 21:01

## 2021-01-26 RX ADMIN — Medication 2 MILLIGRAM(S): at 08:47

## 2021-01-26 NOTE — BH INPATIENT PSYCHIATRY PROGRESS NOTE - NSBHCHARTREVIEWVS_PSY_A_CORE FT
Vital Signs Last 24 Hrs  T(C): 36.6 (25 Jan 2021 14:34), Max: 36.6 (25 Jan 2021 14:34)  T(F): 97.8 (25 Jan 2021 14:34), Max: 97.8 (25 Jan 2021 14:34)  HR: --  BP: --  BP(mean): --  RR: --  SpO2: --

## 2021-01-26 NOTE — BH INPATIENT PSYCHIATRY PROGRESS NOTE - NSBHFUPINTERVALHXFT_PSY_A_CORE
Patient is followed up for psychosis. Chart, medications and labs reviewed.  Patient is discussed with nursing staff. No significant overnight issues.  No appreciated change in status today. Patient remains disorganized, psychotic, with ongoing perceptual disturbances. Psychotic symptoms successfully elicited; talking to self, RIS, appears internally preoccupied, sexually provocative and disorganized. Patient’s symptoms are worsening due to her noncompliance with medications.   No mention of sleep disturbances or appetite concerns.  Refusing standing medications, refused her Prolixin Dec last week, rescheduled for administration today.   Self- care remains an issue.  Treatment team proceeding with MOO and State application.      Patient is followed up for psychosis. Chart, medications and labs reviewed.  Patient is discussed with nursing staff. No significant overnight issues.  No appreciated change in status today. Patient remains disorganized, psychotic, with ongoing perceptual disturbances. Psychotic symptoms successfully elicited; talking to self, RIS, appears internally preoccupied, sexually provocative and disorganized. Patient took her standing medications last and this morning.  Refused her Prolixin Dec last week, but consented and received her PALACIOS Prolixin Dec 50mg today.  Patient reports "I'm going to do better now."   No mention of sleep disturbances or appetite concerns.  Self- care remains an issue.  Treatment team proceeding with State application.

## 2021-01-26 NOTE — BH INPATIENT PSYCHIATRY PROGRESS NOTE - NSBHASSESSSUMMFT_PSY_ALL_CORE
>Obs: Routine, no current SI. no need for CO, patient not expected to pose risk to self or others in controlled inpatient setting  >Psychiatric Meds: Refused her Prolixin Dec 50mg last week, rescheduled for today. Continue with current medication regimen. Continue to observe for tolerability and efficacy.  >Labs: Refusing Covid testing/PPD and all other bloodwork  >Social: milieu therapy  >Treatment: Group therapy and individual therapy, coping skills development for emotion management and mood regulation. Motivational counseling for substance abuse related issues.   >Continue to provide therapeutic interventions in support of treatment goals.  >Dispo: Continues to require inpatient level of care for treatment of psychotic symptoms. Proceed with State Application. MOO will mostly likely be necessary

## 2021-01-27 PROCEDURE — 99232 SBSQ HOSP IP/OBS MODERATE 35: CPT

## 2021-01-27 RX ORDER — PANTOPRAZOLE SODIUM 20 MG/1
40 TABLET, DELAYED RELEASE ORAL
Refills: 0 | Status: DISCONTINUED | OUTPATIENT
Start: 2021-01-27 | End: 2021-04-07

## 2021-01-27 RX ADMIN — Medication 50 MILLIGRAM(S): at 22:16

## 2021-01-27 RX ADMIN — OLANZAPINE 10 MILLIGRAM(S): 15 TABLET, FILM COATED ORAL at 09:46

## 2021-01-27 RX ADMIN — LITHIUM CARBONATE 1800 MILLIGRAM(S): 300 TABLET, EXTENDED RELEASE ORAL at 22:15

## 2021-01-27 RX ADMIN — OLANZAPINE 10 MILLIGRAM(S): 15 TABLET, FILM COATED ORAL at 22:15

## 2021-01-27 RX ADMIN — PANTOPRAZOLE SODIUM 40 MILLIGRAM(S): 20 TABLET, DELAYED RELEASE ORAL at 15:01

## 2021-01-27 RX ADMIN — Medication 2 MILLIGRAM(S): at 22:16

## 2021-01-27 RX ADMIN — HALOPERIDOL DECANOATE 5 MILLIGRAM(S): 100 INJECTION INTRAMUSCULAR at 22:16

## 2021-01-27 NOTE — BH CHART NOTE - NSEVENTNOTEFT_PSY_ALL_CORE
DIRECTOR OF INPATIENT PSYCHIATRY NOTE:   An appeal process was conducted today to assess this patient's potential transfer to a state hospital in the presence of the \[facility] [place] [facility]\""  Mr. Juwan Encinas, Ms. Bailee Irvin, the patient, and the attending physician,  .    	  Briefly, the patient is a 40-year-old, single,  woman, non-caregiver, unemployed, domiciled on [place] grounds building [address] [place] [address]; with past psychiatric history of bipolar disorder, Borderline Personality Disorder, hx of cannabis abuse, and multiple inpatient psychiatric hospitalizations (>20, most recently [facility] from 12/5/20-1/5/21 ), with frequent visits to the [facility] (five this past month), with [facility], with a history of 3 prior suicide attempts (last in 2012 via OD), with hx of recurrent and chronic suicidal gestures and suicidal ideation; with a history of property destruction when upset, past legal issues, no access to weapons; with PMH of GERD & asthma; BIB EMS from [place] residence called by staff for bizarre behavior in the context of refusing medication.    Pt is on lithium and Zyprexa, Prolixin Ded on 1/26/21.  Pt remains disorganized, internally preoccupied and sexually preoccupied. On exam, pt states that she is doing better here because of the structure and does not remember how she was prior to admission. Pt states she does not have any place to live and does not have treatment outside since she did not have money.  States that she would not mind going to [place].    Based on my review of the medical record and my interview with the patient today, I concur with the treatment team's recommendation that this patient requires additional hospitalization for stabilization and that a transfer to a state facility is indicated.   DIRECTOR OF INPATIENT PSYCHIATRY NOTE:   An appeal process was conducted today to assess this patient's potential transfer to a state hospital in the presence of the [facility]  Mr. Juwan Encinas over the phone, the patient, and the attending physician, Dr. Desai .    	  Briefly, the patient is a 40-year-old, single,  woman, non-caregiver, unemployed, domiciled on [place] grounds building [address] [place] [address]; with past psychiatric history of bipolar disorder, Borderline Personality Disorder, hx of cannabis abuse, and multiple inpatient psychiatric hospitalizations (>20, most recently [facility] from 12/5/20-1/5/21 ), with frequent visits to the [facility] (five this past month), with [facility], with a history of 3 prior suicide attempts (last in 2012 via OD), with hx of recurrent and chronic suicidal gestures and suicidal ideation; with a history of property destruction when upset, past legal issues, no access to weapons; with PMH of GERD & asthma; BIB EMS from [place] residence called by staff for bizarre behavior in the context of refusing medication.    Pt is on lithium and Zyprexa, Prolixin Ded on 1/26/21.  Pt remains disorganized, internally preoccupied and sexually preoccupied. On exam, pt states that she is doing better here because of the structure and does not remember how she was prior to admission. Pt states she does not have any place to live and does not have treatment outside since she did not have money.  States that she would not mind going to [place].    Based on my review of the medical record and my interview with the patient today, I concur with the treatment team's recommendation that this patient requires additional hospitalization for stabilization and that a transfer to a state facility is indicated.

## 2021-01-27 NOTE — BH INPATIENT PSYCHIATRY PROGRESS NOTE - NSBHASSESSSUMMFT_PSY_ALL_CORE
>Obs: Routine, no current SI. no need for CO, patient not expected to pose risk to self or others in controlled inpatient setting  >Psychiatric Meds: Received her Prolixin Dec 50mg on 1/26/21. Continue with current medication regimen. Continue to observe for tolerability and efficacy.  >Social: milieu therapy  >Treatment: Group therapy and individual therapy, coping skills development for emotion management and mood regulation. Motivational counseling for substance abuse related issues.   >Continue to provide therapeutic interventions in support of treatment goals.  >Dispo: Continues to require inpatient level of care for treatment of psychotic symptoms. Proceed with State Application. MOO will mostly likely be necessary

## 2021-01-27 NOTE — BH INPATIENT PSYCHIATRY PROGRESS NOTE - NSBHFUPINTERVALHXFT_PSY_A_CORE
Patient is followed up for psychosis. Chart, medications and labs reviewed.  Patient is discussed with nursing staff. No significant overnight issues.  Per nursing patient initially refused her evening standing medications, began slamming doors, shouting at staff, seen trying to clog the toilet.  However took her medications after much encouragement.  Patient also required po prn’s last night due to severity of psychotic symptoms and aggression. Received Haldol 5mg Ativan 2mg and Benadryl 50mg, with good effect patient able to sleep throughout the night. Patient had State Hearing today, treatment team with proceed with application. Patient remains disorganized, psychotic, with ongoing perceptual disturbances, talking to self, RIS, appears internally preoccupied, sexually provocative, aggressive and disorganized. No mention of sleep disturbances or appetite concerns.  Self- care remains an issue.  Treatment team proceeding with State application. Patient refused bloodwork/PPD for State application processing.

## 2021-01-27 NOTE — BH INPATIENT PSYCHIATRY PROGRESS NOTE - NSBHCHARTREVIEWVS_PSY_A_CORE FT
Vital Signs Last 24 Hrs  T(C): 36.7 (26 Jan 2021 20:31), Max: 36.7 (26 Jan 2021 20:31)  T(F): 98 (26 Jan 2021 20:31), Max: 98 (26 Jan 2021 20:31)  HR: --  BP: --  BP(mean): --  RR: 18 (26 Jan 2021 20:31) (18 - 18)  SpO2: --

## 2021-01-28 PROCEDURE — 99232 SBSQ HOSP IP/OBS MODERATE 35: CPT

## 2021-01-28 RX ORDER — ONDANSETRON 8 MG/1
4 TABLET, FILM COATED ORAL EVERY 6 HOURS
Refills: 0 | Status: DISCONTINUED | OUTPATIENT
Start: 2021-01-28 | End: 2021-04-07

## 2021-01-28 RX ADMIN — OLANZAPINE 10 MILLIGRAM(S): 15 TABLET, FILM COATED ORAL at 08:19

## 2021-01-28 RX ADMIN — OLANZAPINE 10 MILLIGRAM(S): 15 TABLET, FILM COATED ORAL at 19:34

## 2021-01-28 RX ADMIN — ONDANSETRON 4 MILLIGRAM(S): 8 TABLET, FILM COATED ORAL at 19:34

## 2021-01-28 RX ADMIN — PANTOPRAZOLE SODIUM 40 MILLIGRAM(S): 20 TABLET, DELAYED RELEASE ORAL at 08:20

## 2021-01-28 RX ADMIN — LITHIUM CARBONATE 1800 MILLIGRAM(S): 300 TABLET, EXTENDED RELEASE ORAL at 19:33

## 2021-01-28 RX ADMIN — Medication 50 MILLIGRAM(S): at 19:34

## 2021-01-28 RX ADMIN — HALOPERIDOL DECANOATE 5 MILLIGRAM(S): 100 INJECTION INTRAMUSCULAR at 19:34

## 2021-01-28 RX ADMIN — Medication 2 MILLIGRAM(S): at 19:36

## 2021-01-28 NOTE — BH INPATIENT PSYCHIATRY PROGRESS NOTE - OTHER
Bizarre animated affect Patient is internally preoccupied, she is observed talking to self. Patient denies any AH Patient continues to smear feces on the walls and on her face

## 2021-01-28 NOTE — BH INPATIENT PSYCHIATRY PROGRESS NOTE - NSBHASSESSSUMMFT_PSY_ALL_CORE
>Obs: Routine, no current SI. no need for CO, patient not expected to pose risk to self or others in controlled inpatient setting  >Psychiatric Meds: Received her Prolixin Dec 50mg on 1/26/21. Continue with current medication regimen. Continue to observe for tolerability and efficacy. Lithium 1800mg daily, Olanzapine 10mg BID.   >Labs: PPD and Lithium serum level on 1/29/21  >Social: milieu therapy  >Treatment: Group therapy and individual therapy, coping skills development for emotion management and mood regulation. Motivational counseling for substance abuse related issues.   >Continue to provide therapeutic interventions in support of treatment goals.  >Dispo: Continues to require inpatient level of care for treatment of psychotic symptoms. Proceed with State Application. MOO will mostly likely be necessary

## 2021-01-28 NOTE — BH INPATIENT PSYCHIATRY PROGRESS NOTE - NSBHCHARTREVIEWVS_PSY_A_CORE FT
Vital Signs Last 24 Hrs  T(C): 36.4 (28 Jan 2021 08:07), Max: 36.4 (28 Jan 2021 08:07)  T(F): 97.5 (28 Jan 2021 08:07), Max: 97.5 (28 Jan 2021 08:07)  HR: --86  BP: --113/68  BP(mean): --  RR: 16 (27 Jan 2021 22:44) (16 - 16)  SpO2: 100% (27 Jan 2021 22:44) (100% - 100%)

## 2021-01-28 NOTE — BH INPATIENT PSYCHIATRY PROGRESS NOTE - NSBHFUPINTERVALHXFT_PSY_A_CORE
Patient is followed up for psychosis. Chart, medications and labs reviewed.  Patient is discussed with nursing staff. No significant interval events, in better control last night.   Patient remains disorganized, psychotic, with ongoing perceptual disturbances, talking to self, RIS, appears internally preoccupied, sexually provocative, and disorganized. Although still having bouts of aggression, it is happening less frequently.  Good sleep and appetite. Self- care slightly improved, but still remains an issue. Patient is no longer smearing feces on her face/body.  She is more medication compliant.  Lithium level scheduled for tomorrow.

## 2021-01-28 NOTE — ED PROVIDER NOTE - PMH
Asthma    Bipolar Disorder    Borderline Personality Disorder    Cholelithiasis    Depression    Fibroids    GERD (Gastroesophageal Reflux Disease)    Obesity     What Type Of Note Output Would You Prefer (Optional)?: Standard Output How Severe Is Your Rash?: moderate Is This A New Presentation, Or A Follow-Up?: Follow Up Rash

## 2021-01-29 LAB — LITHIUM SERPL-MCNC: 1.2 MMOL/L — SIGNIFICANT CHANGE UP (ref 0.6–1.2)

## 2021-01-29 PROCEDURE — 99232 SBSQ HOSP IP/OBS MODERATE 35: CPT

## 2021-01-29 RX ADMIN — OLANZAPINE 10 MILLIGRAM(S): 15 TABLET, FILM COATED ORAL at 08:01

## 2021-01-29 RX ADMIN — LITHIUM CARBONATE 1800 MILLIGRAM(S): 300 TABLET, EXTENDED RELEASE ORAL at 20:43

## 2021-01-29 RX ADMIN — Medication 2 MILLIGRAM(S): at 07:45

## 2021-01-29 RX ADMIN — OLANZAPINE 10 MILLIGRAM(S): 15 TABLET, FILM COATED ORAL at 20:43

## 2021-01-29 RX ADMIN — Medication 50 MILLIGRAM(S): at 07:45

## 2021-01-29 RX ADMIN — PANTOPRAZOLE SODIUM 40 MILLIGRAM(S): 20 TABLET, DELAYED RELEASE ORAL at 07:46

## 2021-01-29 RX ADMIN — HALOPERIDOL DECANOATE 5 MILLIGRAM(S): 100 INJECTION INTRAMUSCULAR at 07:45

## 2021-01-29 NOTE — BH INPATIENT PSYCHIATRY PROGRESS NOTE - NSBHFUPINTERVALHXFT_PSY_A_CORE
Patient is followed up for psychosis. Chart, medications and labs reviewed.  Patient is discussed in treatment team, per nursing no significant interval events, in better behavioral control but required po prn’s for intensity/severity of psychotic symptoms. Patient given Haldol 5mg Ativan 2mg ant Benadryl 50mg Po at 0730pm, with good effect. Patient took evening medications without incident. Today patient is interviewed in her room, she continues to make bizarre gestures she smiles oddly at writer, then oddly frowns, batting her lashes rapidly.  Patient is asked why she is doing that, to which she replies “I’m an rai.”  Patient had her State hearing earlier this week, discussed with patient how she feels about State Hospitalization. Patient reports “I want to go to [facility], I think I need more help.” Patient remains disorganized, psychotic, with ongoing perceptual disturbances, talking to self, RIS, internally preoccupied, oddly related, and disorganized. Good sleep and appetite. Self- care slightly improved, but still remains an issue. Patient is no longer smearing feces on her face/body.  She is more medication compliant.  Lithium level and PPD scheduled for today.    Addendum: Shortly after interview patient approaches writer with angry affect stating that she no longer wants to go to [facility] hospital “I have the right to refuse.”  Patient is requesting to submit petition for court discharge.     Patient is followed up for psychosis. Chart, medications and labs reviewed.  Patient is discussed in treatment team, per nursing no significant interval events, in better behavioral control but required po prn’s for intensity/severity of psychotic symptoms. Patient given Haldol 5mg Ativan 2mg ant Benadryl 50mg Po at 0730pm, with good effect. Patient took evening medications without incident. Today patient is interviewed in her room, she continues to make bizarre gestures she smiles oddly at writer, then oddly frowns, batting her lashes rapidly.  Patient is asked why she is doing that, to which she replies “I’m an rai.”  Patient had her State hearing earlier this week, discussed with patient how she feels about State Hospitalization. Patient reports “I want to go to [facility], I think I need more help.” Patient remains disorganized, psychotic, with ongoing perceptual disturbances, talking to self, RIS, internally preoccupied, oddly related, and disorganized. Good sleep and appetite. Self- care slightly improved, but still remains an issue. Patient is no longer smearing feces on her face/body.  She is more medication compliant.  Lithium level and quantiferon TB scheduled for today.  Patient is refusing PPD.  Addendum: Shortly after interview patient approaches writer with angry affect stating that she no longer wants to go to [facility] hospital “I have the right to refuse.”  Patient is requesting to submit petition for court discharge.

## 2021-01-29 NOTE — BH INPATIENT PSYCHIATRY PROGRESS NOTE - NSBHCHARTREVIEWVS_PSY_A_CORE FT
Vital Signs Last 24 Hrs  T(C): 36.2 (29 Jan 2021 06:22), Max: 36.6 (28 Jan 2021 14:35)  T(F): 97.2 (29 Jan 2021 06:22), Max: 97.9 (28 Jan 2021 14:35)  HR: --92  BP: --131/89  BP(mean): --  RR: --  SpO2: --

## 2021-01-29 NOTE — BH INPATIENT PSYCHIATRY PROGRESS NOTE - NSBHASSESSSUMMFT_PSY_ALL_CORE
Plan:  >Obs: Routine checks appropriate--visible on the unit, no recent aggression on unit.  No current SI. No need for CO, patient not expected to pose risk to self or others in controlled inpatient setting.  >Psychiatric Meds:  Observe for tolerability and efficacy. Received Prolixin Deconate 50mg on 1/26/21 , Olanzapine 10mg BID for psychosis, Lithium 1800mg daily for mood stabilization.  >Medical: No acute concerns. Improved GI disturbances, no N/V today.  >Social: milieu therapy  >Treatment Interventions: Groups and Individual Therapy/CBT, Motivational counseling for substance abuse related issues.   >Dispo: Proceeding with State Hospitalization Plan:  >Obs: Routine checks appropriate--visible on the unit, no recent aggression on unit.  No current SI. No need for CO, patient not expected to pose risk to self or others in controlled inpatient setting.  >Psychiatric Meds:  Observe for tolerability and efficacy. Received Prolixin Deconate 50mg on 1/26/21 , Olanzapine 10mg BID for psychosis, Lithium 1800mg daily for mood stabilization.  >Labs: Lithium serum level resulted 1.2 on 1/29/21. Quantiferon TB ordered for today.  >Medical: No acute concerns. Improved GI disturbances, no N/V today.  >Social: milieu therapy  >Treatment Interventions: Groups and Individual Therapy/CBT, Motivational counseling for substance abuse related issues.   >Dispo: Proceeding with State Hospitalization

## 2021-01-30 RX ADMIN — Medication 2 MILLIGRAM(S): at 15:07

## 2021-01-30 RX ADMIN — Medication 2 MILLIGRAM(S): at 10:19

## 2021-01-30 RX ADMIN — Medication 2 MILLIGRAM(S): at 20:27

## 2021-01-30 RX ADMIN — HALOPERIDOL DECANOATE 5 MILLIGRAM(S): 100 INJECTION INTRAMUSCULAR at 20:27

## 2021-01-30 RX ADMIN — TUBERCULIN PURIFIED PROTEIN DERIVATIVE 5 UNIT(S): 5 INJECTION, SOLUTION INTRADERMAL at 12:10

## 2021-01-30 RX ADMIN — OLANZAPINE 10 MILLIGRAM(S): 15 TABLET, FILM COATED ORAL at 20:26

## 2021-01-30 RX ADMIN — LITHIUM CARBONATE 1800 MILLIGRAM(S): 300 TABLET, EXTENDED RELEASE ORAL at 20:27

## 2021-01-30 RX ADMIN — Medication 50 MILLIGRAM(S): at 20:27

## 2021-01-30 RX ADMIN — HALOPERIDOL DECANOATE 5 MILLIGRAM(S): 100 INJECTION INTRAMUSCULAR at 10:19

## 2021-01-30 RX ADMIN — OLANZAPINE 10 MILLIGRAM(S): 15 TABLET, FILM COATED ORAL at 08:52

## 2021-01-30 RX ADMIN — Medication 50 MILLIGRAM(S): at 10:18

## 2021-01-30 RX ADMIN — PANTOPRAZOLE SODIUM 40 MILLIGRAM(S): 20 TABLET, DELAYED RELEASE ORAL at 08:53

## 2021-01-31 RX ADMIN — Medication 2 MILLIGRAM(S): at 09:27

## 2021-01-31 RX ADMIN — OLANZAPINE 10 MILLIGRAM(S): 15 TABLET, FILM COATED ORAL at 09:26

## 2021-01-31 RX ADMIN — PANTOPRAZOLE SODIUM 40 MILLIGRAM(S): 20 TABLET, DELAYED RELEASE ORAL at 09:26

## 2021-02-01 LAB — SARS-COV-2 RNA SPEC QL NAA+PROBE: SIGNIFICANT CHANGE UP

## 2021-02-01 PROCEDURE — 99232 SBSQ HOSP IP/OBS MODERATE 35: CPT

## 2021-02-01 RX ORDER — ACETAMINOPHEN 500 MG
650 TABLET ORAL EVERY 6 HOURS
Refills: 0 | Status: DISCONTINUED | OUTPATIENT
Start: 2021-02-01 | End: 2021-02-01

## 2021-02-01 RX ORDER — IBUPROFEN 200 MG
600 TABLET ORAL EVERY 8 HOURS
Refills: 0 | Status: DISCONTINUED | OUTPATIENT
Start: 2021-02-01 | End: 2021-04-07

## 2021-02-01 RX ORDER — IBUPROFEN 200 MG
600 TABLET ORAL EVERY 8 HOURS
Refills: 0 | Status: DISCONTINUED | OUTPATIENT
Start: 2021-02-01 | End: 2021-02-01

## 2021-02-01 RX ORDER — ACETAMINOPHEN 500 MG
650 TABLET ORAL EVERY 6 HOURS
Refills: 0 | Status: DISCONTINUED | OUTPATIENT
Start: 2021-02-01 | End: 2021-04-07

## 2021-02-01 RX ADMIN — PANTOPRAZOLE SODIUM 40 MILLIGRAM(S): 20 TABLET, DELAYED RELEASE ORAL at 08:01

## 2021-02-01 RX ADMIN — Medication 50 MILLIGRAM(S): at 20:07

## 2021-02-01 RX ADMIN — OLANZAPINE 10 MILLIGRAM(S): 15 TABLET, FILM COATED ORAL at 20:07

## 2021-02-01 RX ADMIN — OLANZAPINE 10 MILLIGRAM(S): 15 TABLET, FILM COATED ORAL at 08:00

## 2021-02-01 RX ADMIN — HALOPERIDOL DECANOATE 5 MILLIGRAM(S): 100 INJECTION INTRAMUSCULAR at 20:07

## 2021-02-01 RX ADMIN — LITHIUM CARBONATE 1800 MILLIGRAM(S): 300 TABLET, EXTENDED RELEASE ORAL at 20:07

## 2021-02-01 RX ADMIN — TUBERCULIN PURIFIED PROTEIN DERIVATIVE 5 UNIT(S): 5 INJECTION, SOLUTION INTRADERMAL at 15:48

## 2021-02-01 RX ADMIN — Medication 2 MILLIGRAM(S): at 20:07

## 2021-02-01 NOTE — BH CHART NOTE - NSEVENTNOTEFT_PSY_ALL_CORE
Pt. evaluated after unsupervised fall. Per nursing, a loud noise was heard and then the patient reported she fell. She reports hitting her right knee. Denies traumat to her head and any other extremities. No edema or ecchymosis noted to the affected knee or other parts of the body. The patient reports some stiffness and pain, ROM slightly impaired. She accepted pain medication. Will sign out to PA overnight to re-evaluate patient for worsening symptoms. Pt. evaluated after fall. Fall witnessed by peer but not by staff. Per nursing, a loud noise was heard and then the patient reported she fell. She reports hitting her right knee. Denies traumat to her head and any other extremities. No edema or ecchymosis noted to the affected knee or other parts of the body. The patient reports some stiffness and pain, ROM slightly impaired. She accepted pain medication. Will sign out to PA overnight to re-evaluate patient for worsening symptoms. Pt. evaluated after fall. Fall witnessed by peer but not by staff. Per nursing, a loud noise was heard and then the patient reported she fell. She reports hitting her right knee. Denies traumat to her head and any other extremities. No edema or ecchymosis noted to the affected knee or other parts of the body. The patient reports some stiffness and pain, ROM slightly impaired. She accepted pain medication. Pt. refused V/S. Will sign out to PA overnight to re-evaluate patient for worsening symptoms.

## 2021-02-01 NOTE — BH INPATIENT PSYCHIATRY PROGRESS NOTE - NSBHASSESSSUMMFT_PSY_ALL_CORE
Plan:  >Obs: Routine checks appropriate--visible on the unit, no recent aggression on unit.  No current SI. No need for CO, patient not expected to pose risk to self or others in controlled inpatient setting.  >Psychiatric Meds:  Observe for tolerability and efficacy. Received Prolixin Deconate 50mg on 1/26/21 , Olanzapine 10mg BID for psychosis, Lithium 1800mg daily for mood stabilization.  >Labs: Lithium serum level resulted 1.2 on 1/29/21. Quantiferon TB ordered for today.  >Medical: No acute concerns. Improved GI disturbances, no N/V today.  >Social: milieu therapy  >Treatment Interventions: Groups and Individual Therapy/CBT, Motivational counseling for substance abuse related issues.   >Dispo: Proceeding with State Hospitalization

## 2021-02-01 NOTE — BH INPATIENT PSYCHIATRY PROGRESS NOTE - NSBHCHARTREVIEWVS_PSY_A_CORE FT
Vital Signs Last 24 Hrs  T(C): 35.8 (31 Jan 2021 09:28), Max: 35.8 (31 Jan 2021 09:28)  T(F): 96.5 (31 Jan 2021 09:28), Max: 96.5 (31 Jan 2021 09:28)  HR: --  BP: --  BP(mean): --  RR: --  SpO2: --

## 2021-02-01 NOTE — BH INPATIENT PSYCHIATRY PROGRESS NOTE - NSBHFUPINTERVALHXFT_PSY_A_CORE
Mother Per nursing patient continues to refuse her standing evening medications.  Patient reports to writer “I want to go to court.”  She is very irritable today, not engaging with writer today, patient yelling stating “I want another doctor, not you.” Patient walks away from writer.

## 2021-02-01 NOTE — CHART NOTE - NSCHARTNOTEFT_GEN_A_CORE
Provider attempted to reevaluate patient s/p fall earlier in the day. Patient refused to talk to the provider or let the provider examine her. Patient noted to be yelling "i'm fine let me sleep." Nurse was advised to monitor patient for any increasing pain to the patient's right knee. Will continue to monitor.

## 2021-02-02 PROCEDURE — 99232 SBSQ HOSP IP/OBS MODERATE 35: CPT

## 2021-02-02 RX ADMIN — Medication 50 MILLIGRAM(S): at 19:29

## 2021-02-02 RX ADMIN — OLANZAPINE 10 MILLIGRAM(S): 15 TABLET, FILM COATED ORAL at 09:10

## 2021-02-02 RX ADMIN — LITHIUM CARBONATE 1800 MILLIGRAM(S): 300 TABLET, EXTENDED RELEASE ORAL at 20:13

## 2021-02-02 RX ADMIN — PANTOPRAZOLE SODIUM 40 MILLIGRAM(S): 20 TABLET, DELAYED RELEASE ORAL at 09:10

## 2021-02-02 RX ADMIN — OLANZAPINE 10 MILLIGRAM(S): 15 TABLET, FILM COATED ORAL at 20:12

## 2021-02-02 RX ADMIN — Medication 2 MILLIGRAM(S): at 19:29

## 2021-02-02 RX ADMIN — HALOPERIDOL DECANOATE 5 MILLIGRAM(S): 100 INJECTION INTRAMUSCULAR at 19:29

## 2021-02-02 NOTE — BH INPATIENT PSYCHIATRY PROGRESS NOTE - NSBHCHARTREVIEWVS_PSY_A_CORE FT
Vital Signs Last 24 Hrs  T(C): --not recorded  T(F): --  HR: --104  BP: --128/80  BP(mean): --  RR: 18 (02 Feb 2021 03:45) (18 - 18)  SpO2: 99% (02 Feb 2021 03:45) (99% - 99%)

## 2021-02-02 NOTE — BH INPATIENT PSYCHIATRY PROGRESS NOTE - NSBHFUPINTERVALHXFT_PSY_A_CORE
Follow-up for psychosis and disorganization. No events reported overnight. The pt. reports she is eating and sleeping normally. The pt. is post fall yesterday; she denies any pain and reports her knee feels better. The pt. states she feels "discombobulated" at times. She does not know how to make sense of her current situation. The pt. began to go off on tangent describing her housing situation, stating she hits herself when she is feeling disoriented. She reports "I feel the same wavelength" when asked if she is feeling less disorganized since her admission. Pt. denied AVH and delusions, SI/HI intent or plan. Medication compliant. No SE noted.

## 2021-02-03 PROCEDURE — 99232 SBSQ HOSP IP/OBS MODERATE 35: CPT

## 2021-02-03 RX ADMIN — OLANZAPINE 10 MILLIGRAM(S): 15 TABLET, FILM COATED ORAL at 08:24

## 2021-02-03 RX ADMIN — LITHIUM CARBONATE 1800 MILLIGRAM(S): 300 TABLET, EXTENDED RELEASE ORAL at 20:09

## 2021-02-03 RX ADMIN — OLANZAPINE 10 MILLIGRAM(S): 15 TABLET, FILM COATED ORAL at 20:09

## 2021-02-03 RX ADMIN — Medication 2 MILLIGRAM(S): at 20:09

## 2021-02-03 RX ADMIN — Medication 50 MILLIGRAM(S): at 20:09

## 2021-02-03 RX ADMIN — HALOPERIDOL DECANOATE 5 MILLIGRAM(S): 100 INJECTION INTRAMUSCULAR at 20:09

## 2021-02-03 RX ADMIN — PANTOPRAZOLE SODIUM 40 MILLIGRAM(S): 20 TABLET, DELAYED RELEASE ORAL at 08:24

## 2021-02-03 NOTE — BH INPATIENT PSYCHIATRY PROGRESS NOTE - NSBHCHARTREVIEWVS_PSY_A_CORE FT
Vital Signs Last 24 Hrs  T(C): 36 (03 Feb 2021 00:34), Max: 36.2 (02 Feb 2021 09:00)  T(F): 96.8 (03 Feb 2021 00:34), Max: 97.1 (02 Feb 2021 09:00)  HR: 104 (02 Feb 2021 09:00) (104 - 104)  BP: 123/80 (02 Feb 2021 09:00) (123/80 - 123/80)  BP(mean): --  RR: 18 (03 Feb 2021 00:34) (18 - 18)  SpO2: 100% (03 Feb 2021 00:34) (100% - 100%) Vital Signs Last 24 Hrs  T(C): 36 (03 Feb 2021 00:34), Max: 36.2 (02 Feb 2021 09:00)  T(F): 96.8 (03 Feb 2021 00:34), Max: 97.1 (02 Feb 2021 09:00)  HR: 84  BP: 123/78   BP(mean): --  RR: 18 (03 Feb 2021 00:34) (18 - 18)  SpO2: 100% (03 Feb 2021 00:34) (100% - 100%)

## 2021-02-03 NOTE — BH SCALES AND SCREENS - NSBPRSHALLBEH_PSY_ALL_CORE
4 = Moderate – as above, but more frequent or extensive (e.g. frequently sees the devil’s face, two voices carry on lengthy conversations)
6 = Severe – as above, and has had a moderate impact on the patient’s behavior (e.g. concentration difficulties leading to impaired work functioning)

## 2021-02-03 NOTE — BH INPATIENT PSYCHIATRY PROGRESS NOTE - NSBHFUPINTERVALHXFT_PSY_A_CORE
Follow-up for psychosis and disorganization. No events reported overnight. The pt. reports she is eating and sleeping normally. The pt. is post fall 2 days ago; she denies any pain and reports her knee feels better. Patient is observed in dayroom, upon approach patient sits down on floor, and lays on floor in fetal position.  Patient is asked several times to get off floor.  The pt. states she feels "I’m barely holding on." Despite psychotic symptoms successfully elicited, patient denied AVH and delusions, SI/HI intent or plan. Patient has been on/off compliance with medications. Per nursing patient has restarted her medication compliance. No SE noted. Patient is strongly encouraged to remain compliant. Discussed with patient that treatment plan is to proceed with State Hospitalization.  To which she replies “I’m ok with that I need more help” “I’m going to follow your expertise.”  She remains disorganized/delusional.  Updated BPRS score 24 on 2/3/21. Follow-up for psychosis and disorganization. No events reported overnight. The pt. reports she is eating and sleeping normally. The pt. is post fall 2 days ago; she denies any pain and reports her knee feels better. Patient is observed in dayroom, upon approach patient sits down on floor, and lays on floor in fetal position.  Patient is asked several times to get off floor.  The pt. states she feels "I’m barely holding on, my mind is slipping." Despite psychotic symptoms successfully elicited, patient denied AVH and delusions, SI/HI intent or plan. Patient has been on/off compliance with medications. Per nursing patient has restarted her medication compliance. No SE noted. Patient is strongly encouraged to remain compliant. Discussed with patient that treatment plan is to proceed with State Hospitalization.  To which she replies “I’m ok with that I need more help” “I’m going to follow your expertise.”  She remains disorganized/delusional.  Updated BPRS score 24 on 2/3/21.

## 2021-02-03 NOTE — BH SCALES AND SCREENS - NSBPRSMANNPOST_PSY_ALL_CORE
6 = Severe – as above, but more frequent, intense, or pervasive
5 = Moderately Severe – e.g., assumes and maintains yoga position throughout interview, unusual movements in several body areas

## 2021-02-03 NOTE — BH SCALES AND SCREENS - NSBPRSSUSPIC_PSY_ALL_CORE
2 = Very Mild – rare instances of distrustfulness which may or may not be warranted by the situation
4 = Moderate – more frequent suspiciousness, or transient ideas of reference

## 2021-02-03 NOTE — BH SCALES AND SCREENS - NSBPRSCONCDISORG_PSY_ALL_CORE
6 = Severe - formal thought disorder is present for most of the interview, and the interview is severely strained
6 = Severe - formal thought disorder is present for most of the interview, and the interview is severely strained

## 2021-02-04 PROCEDURE — 99232 SBSQ HOSP IP/OBS MODERATE 35: CPT

## 2021-02-04 RX ADMIN — Medication 2 MILLIGRAM(S): at 12:04

## 2021-02-04 RX ADMIN — LITHIUM CARBONATE 1800 MILLIGRAM(S): 300 TABLET, EXTENDED RELEASE ORAL at 23:11

## 2021-02-04 RX ADMIN — HALOPERIDOL DECANOATE 5 MILLIGRAM(S): 100 INJECTION INTRAMUSCULAR at 23:12

## 2021-02-04 RX ADMIN — Medication 50 MILLIGRAM(S): at 23:12

## 2021-02-04 RX ADMIN — OLANZAPINE 10 MILLIGRAM(S): 15 TABLET, FILM COATED ORAL at 23:11

## 2021-02-04 RX ADMIN — Medication 50 MILLIGRAM(S): at 12:03

## 2021-02-04 RX ADMIN — HALOPERIDOL DECANOATE 5 MILLIGRAM(S): 100 INJECTION INTRAMUSCULAR at 12:03

## 2021-02-04 RX ADMIN — Medication 2 MILLIGRAM(S): at 23:12

## 2021-02-04 RX ADMIN — OLANZAPINE 10 MILLIGRAM(S): 15 TABLET, FILM COATED ORAL at 08:44

## 2021-02-04 RX ADMIN — PANTOPRAZOLE SODIUM 40 MILLIGRAM(S): 20 TABLET, DELAYED RELEASE ORAL at 08:44

## 2021-02-04 NOTE — BH INPATIENT PSYCHIATRY PROGRESS NOTE - NSBHFUPINTERVALHXFT_PSY_A_CORE
Follow-up for psychosis and disorganization. No events reported overnight.  Per nursing patient was observed eating trash out of garbage, patient redirected by staff.  When asked why she is doing that, patient replies “I’m trash whiete people see me as trash and I’m a dog, dogs eat out of trash.” Patient is visible on unit, she is observed walking bare feet, and her shirt put on backwards.  Patient waves to writer, smiles oddly, bizarre/constricted affect.  Patient describes mood as “I feel like I’m surviving, and I’m talking to myself.”  Upon further assessment patient reports “its outlandish how I feel.”  Interview with patient was difficult due to severity/intensity of symptoms. Patient is profoundly disorganized, illogical, nonsensical thought process, conceptual disturbances are evident, continues to elicit frequent IOR,/RUTH, full delusional conviction, difficult to have meaningful interview.  Patient has been on/off compliance with medications, but has been consistently compliant for several days.  No SE noted. Patient is provided positive feedback, and is strongly encouraged to remain compliant. Updated BPRS score 24 on 2/3/21.

## 2021-02-04 NOTE — BH INPATIENT PSYCHIATRY PROGRESS NOTE - NSBHCHARTREVIEWVS_PSY_A_CORE FT
Detail Level: Generalized Vital Signs Last 24 Hrs  T(C): 36.3 (04 Feb 2021 08:25), Max: 36.7 (03 Feb 2021 23:48)  T(F): 97.4 (04 Feb 2021 08:25), Max: 98 (03 Feb 2021 23:48)  HR: 88 (04 Feb 2021 08:25) (88 - 88)  BP: 121/73 (04 Feb 2021 08:25) (121/73 - 121/73)  BP(mean): --  RR: 18 (03 Feb 2021 23:48) (18 - 18)  SpO2: 100% (03 Feb 2021 23:48) (100% - 100%)

## 2021-02-05 PROCEDURE — 99232 SBSQ HOSP IP/OBS MODERATE 35: CPT

## 2021-02-05 RX ADMIN — Medication 50 MILLIGRAM(S): at 20:17

## 2021-02-05 RX ADMIN — OLANZAPINE 10 MILLIGRAM(S): 15 TABLET, FILM COATED ORAL at 20:16

## 2021-02-05 RX ADMIN — PANTOPRAZOLE SODIUM 40 MILLIGRAM(S): 20 TABLET, DELAYED RELEASE ORAL at 08:25

## 2021-02-05 RX ADMIN — Medication 2 MILLIGRAM(S): at 20:17

## 2021-02-05 RX ADMIN — HALOPERIDOL DECANOATE 5 MILLIGRAM(S): 100 INJECTION INTRAMUSCULAR at 20:17

## 2021-02-05 RX ADMIN — LITHIUM CARBONATE 1800 MILLIGRAM(S): 300 TABLET, EXTENDED RELEASE ORAL at 20:16

## 2021-02-05 RX ADMIN — OLANZAPINE 10 MILLIGRAM(S): 15 TABLET, FILM COATED ORAL at 08:25

## 2021-02-05 NOTE — BH INPATIENT PSYCHIATRY PROGRESS NOTE - NSBHFUPINTERVALHXFT_PSY_A_CORE
Follow-up for psychosis and disorganization. No events reported overnight.  Per nursing patient has not been seen eating out of the trash as of late.  Patient is less visible on unit today, states she is a bit tired today.  Patient voices no other concerns.  ADL’s slightly better today but continues to be an issue.  Her impaired reality testing and bizarre behaviors cause problems with her performance of ADL’s.  Patient is disorganized, illogical, nonsensical thought process, conceptual disturbances are evident, continues to elicit frequent IOR,/RUTH, full delusional conviction.  Patient remains compliant with medications for several days.  No SE noted. Patient is provided positive feedback, and is strongly encouraged to remain compliant. Updated BPRS score 24 on 2/3/21. Denies SI/HI.

## 2021-02-05 NOTE — BH INPATIENT PSYCHIATRY PROGRESS NOTE - NSBHCHARTREVIEWVS_PSY_A_CORE FT
Vital Signs Last 24 Hrs  T(C): 36.6 (05 Feb 2021 07:06), Max: 36.7 (04 Feb 2021 20:02)  T(F): 97.9 (05 Feb 2021 07:06), Max: 98.1 (04 Feb 2021 20:02)  HR: --79  BP: --109/60  BP(mean): --  RR: 16 (04 Feb 2021 20:02) (16 - 16)  SpO2: 99% (04 Feb 2021 20:02) (99% - 99%)

## 2021-02-05 NOTE — BH INPATIENT PSYCHIATRY PROGRESS NOTE - NSBHASSESSSUMMFT_PSY_ALL_CORE
Plan:  >Obs: Routine checks appropriate--visible on the unit, no recent aggression on unit.  No current SI. No need for CO, patient not expected to pose risk to self or others in controlled inpatient setting.  >Psychiatric Meds:  Observe for tolerability and efficacy. Received Prolixin Deconate 50mg on 1/26/21 , Olanzapine 10mg BID for psychosis, Lithium 1800mg daily for mood stabilization.  >Labs: Lithium serum level resulted 1.2 on 1/29/21.   >Medical: No acute concerns.   >Social: milieu /structured therapy  >Treatment Interventions: Groups and Individual Therapy/CBT, Motivational counseling for substance abuse related issues.   >Dispo: Proceeding with State Hospitalization

## 2021-02-06 RX ADMIN — OLANZAPINE 10 MILLIGRAM(S): 15 TABLET, FILM COATED ORAL at 09:43

## 2021-02-06 RX ADMIN — Medication 2 MILLIGRAM(S): at 16:43

## 2021-02-06 RX ADMIN — HALOPERIDOL DECANOATE 5 MILLIGRAM(S): 100 INJECTION INTRAMUSCULAR at 16:43

## 2021-02-06 RX ADMIN — LITHIUM CARBONATE 1800 MILLIGRAM(S): 300 TABLET, EXTENDED RELEASE ORAL at 21:18

## 2021-02-06 RX ADMIN — OLANZAPINE 10 MILLIGRAM(S): 15 TABLET, FILM COATED ORAL at 21:18

## 2021-02-06 RX ADMIN — Medication 50 MILLIGRAM(S): at 16:43

## 2021-02-06 RX ADMIN — PANTOPRAZOLE SODIUM 40 MILLIGRAM(S): 20 TABLET, DELAYED RELEASE ORAL at 07:59

## 2021-02-07 RX ADMIN — Medication 50 MILLIGRAM(S): at 10:34

## 2021-02-07 RX ADMIN — OLANZAPINE 10 MILLIGRAM(S): 15 TABLET, FILM COATED ORAL at 08:17

## 2021-02-07 RX ADMIN — Medication 2 MILLIGRAM(S): at 10:35

## 2021-02-07 RX ADMIN — LITHIUM CARBONATE 1800 MILLIGRAM(S): 300 TABLET, EXTENDED RELEASE ORAL at 20:01

## 2021-02-07 RX ADMIN — HALOPERIDOL DECANOATE 5 MILLIGRAM(S): 100 INJECTION INTRAMUSCULAR at 10:34

## 2021-02-07 RX ADMIN — PANTOPRAZOLE SODIUM 40 MILLIGRAM(S): 20 TABLET, DELAYED RELEASE ORAL at 08:17

## 2021-02-07 RX ADMIN — OLANZAPINE 10 MILLIGRAM(S): 15 TABLET, FILM COATED ORAL at 20:01

## 2021-02-08 PROCEDURE — 99232 SBSQ HOSP IP/OBS MODERATE 35: CPT

## 2021-02-08 RX ADMIN — LITHIUM CARBONATE 1800 MILLIGRAM(S): 300 TABLET, EXTENDED RELEASE ORAL at 21:16

## 2021-02-08 RX ADMIN — Medication 50 MILLIGRAM(S): at 21:16

## 2021-02-08 RX ADMIN — PANTOPRAZOLE SODIUM 40 MILLIGRAM(S): 20 TABLET, DELAYED RELEASE ORAL at 09:58

## 2021-02-08 RX ADMIN — OLANZAPINE 10 MILLIGRAM(S): 15 TABLET, FILM COATED ORAL at 09:58

## 2021-02-08 RX ADMIN — OLANZAPINE 10 MILLIGRAM(S): 15 TABLET, FILM COATED ORAL at 21:15

## 2021-02-08 RX ADMIN — HALOPERIDOL DECANOATE 5 MILLIGRAM(S): 100 INJECTION INTRAMUSCULAR at 21:16

## 2021-02-08 NOTE — BH INPATIENT PSYCHIATRY PROGRESS NOTE - NSBHFUPINTERVALHXFT_PSY_A_CORE
Patient is followed up for psychosis, and disorganization. Chart, medications and labs reviewed.  Patient is discussed with nursing staff, and no significant interval events.  Patient is observed in dayroom, with headphones, dancing provocatively in from of nurses station.  Patient responded well to verbal redirection.   Patient’s psychotic symptoms are still evident and pronounced.  She remains disorganized, delusional, and internally preoccupied. Although her self-care has improved (no longer smearing feces on face) patient’s continued impaired reality testing and bizarre behaviors cause problems with her performance ADL’s, she continues to need staff encouragement.  Patient struggled to identify positive results that could occur from her giving increased attention to appearance.   Patient reports “that’s how white people see me.” Was provided with additional feedback about these areas.   However, there are some improvements since her admission.  Patient’s level of aggression and frequency of prn’s have significantly decreased.  Level of functioning is improving.  Patient is more re-directable, and she has continued compliance with all standing medications.  Patient offers no physical complaints.

## 2021-02-08 NOTE — BH INPATIENT PSYCHIATRY PROGRESS NOTE - OTHER
Patient is internally preoccupied, she is observed talking to self. Patient denies any AH  2/8/21: continues to talk to self, RIS Bizarre animated affect Bizarre, disorganized, sexually provocative  Aparna has slightly attenuated Patient continues to smear feces on the walls and on her face  2/8/21: Patient has stopped smearing feces on face/body and walls.  Hygiene still remains an issue but is improving

## 2021-02-08 NOTE — BH INPATIENT PSYCHIATRY PROGRESS NOTE - NSBHCHARTREVIEWVS_PSY_A_CORE FT
Vital Signs Last 24 Hrs  T(C): 36.2 (08 Feb 2021 06:43), Max: 36.5 (07 Feb 2021 14:45)  T(F): 97.1 (08 Feb 2021 06:43), Max: 97.7 (07 Feb 2021 14:45)  HR: 77 (07 Feb 2021 07:58) (77 - 77)  BP: 114/68 (07 Feb 2021 07:58) (114/68 - 114/68)  BP(mean): --  RR: 18 (07 Feb 2021 21:54) (18 - 18)  SpO2: 100% (07 Feb 2021 21:54) (100% - 100%)

## 2021-02-09 PROCEDURE — 99232 SBSQ HOSP IP/OBS MODERATE 35: CPT

## 2021-02-09 RX ORDER — OLANZAPINE 15 MG/1
10 TABLET, FILM COATED ORAL AT BEDTIME
Refills: 0 | Status: DISCONTINUED | OUTPATIENT
Start: 2021-02-09 | End: 2021-04-07

## 2021-02-09 RX ORDER — OLANZAPINE 15 MG/1
15 TABLET, FILM COATED ORAL DAILY
Refills: 0 | Status: DISCONTINUED | OUTPATIENT
Start: 2021-02-10 | End: 2021-04-07

## 2021-02-09 RX ADMIN — LITHIUM CARBONATE 1800 MILLIGRAM(S): 300 TABLET, EXTENDED RELEASE ORAL at 20:51

## 2021-02-09 RX ADMIN — OLANZAPINE 10 MILLIGRAM(S): 15 TABLET, FILM COATED ORAL at 20:52

## 2021-02-09 RX ADMIN — PANTOPRAZOLE SODIUM 40 MILLIGRAM(S): 20 TABLET, DELAYED RELEASE ORAL at 08:43

## 2021-02-09 RX ADMIN — Medication 2 MILLIGRAM(S): at 13:30

## 2021-02-09 RX ADMIN — HALOPERIDOL DECANOATE 5 MILLIGRAM(S): 100 INJECTION INTRAMUSCULAR at 13:30

## 2021-02-09 RX ADMIN — Medication 2 MILLIGRAM(S): at 00:46

## 2021-02-09 RX ADMIN — Medication 50 MILLIGRAM(S): at 13:30

## 2021-02-09 RX ADMIN — OLANZAPINE 10 MILLIGRAM(S): 15 TABLET, FILM COATED ORAL at 08:43

## 2021-02-09 NOTE — BH INPATIENT PSYCHIATRY PROGRESS NOTE - OTHER
Patient continues to smear feces on the walls and on her face  2/8/21: Patient has stopped smearing feces on face/body and walls.  Hygiene still remains an issue but is improving  Bizarre, disorganized, sexually provocative  Aparna has slightly attenuated Patient is internally preoccupied, she is observed talking to self. Patient denies any AH  2/8/21: continues to talk to self, RIS Bizarre animated affect

## 2021-02-09 NOTE — BH INPATIENT PSYCHIATRY PROGRESS NOTE - NSBHCHARTREVIEWVS_PSY_A_CORE FT
Vital Signs Last 24 Hrs  T(C): 36.7 (09 Feb 2021 08:33), Max: 36.9 (09 Feb 2021 06:35)  T(F): 98 (09 Feb 2021 08:33), Max: 98.4 (09 Feb 2021 06:35)  HR: 76 (09 Feb 2021 08:33) (76 - 76)  BP: 110/65 (09 Feb 2021 08:33) (110/65 - 110/65)  BP(mean): --  RR: --  SpO2: --

## 2021-02-09 NOTE — BH INPATIENT PSYCHIATRY PROGRESS NOTE - NSBHFUPINTERVALHXFT_PSY_A_CORE
Patient is followed up for psychosis, and disorganization. Chart, medications and labs reviewed.  Patient is discussed with nursing staff, and no significant interval events.  Received po prn medications last night Haldol 5mg ativan 2mg and Benadryl 50mg  with good effect. Patient is seen lifting her hospital gown, disrobing exposing self to male staff.  Patient responded well to verbal redirection.   Patient’s psychotic symptoms are still evident and pronounced.  She remains sexually preoccupied, disorganized, delusional, and internally preoccupied. Patient’s continued impaired reality testing and bizarre behaviors are evident.   She remains compliant with all medications.  Titrate Olanzapine 15mg daily, and 10mg qhs.  Per  State application was submitted.  Patient offers no physical complaints.

## 2021-02-09 NOTE — BH INPATIENT PSYCHIATRY PROGRESS NOTE - NSBHASSESSSUMMFT_PSY_ALL_CORE
Plan:  >Obs: Routine checks appropriate--visible on the unit, no recent aggression on unit.  No current SI. No need for CO, patient not expected to pose risk to self or others in controlled inpatient setting.  >Psychiatric Meds:  Observe for tolerability and efficacy. Received Prolixin Deconate 50mg on 1/26/21 , Increase Olanzapine 15mg daily , 10mg qhs for psychosis, Lithium 1800mg daily for mood stabilization.  >Labs: Lithium serum level resulted 1.2 on 1/29/21.   >Medical: No acute concerns.   >Social: milieu /structured therapy  >Treatment Interventions: Groups and Individual Therapy/CBT, Motivational counseling for substance abuse related issues.   >Dispo: Proceeding with State Hospitalization

## 2021-02-10 PROCEDURE — 99232 SBSQ HOSP IP/OBS MODERATE 35: CPT

## 2021-02-10 RX ADMIN — OLANZAPINE 10 MILLIGRAM(S): 15 TABLET, FILM COATED ORAL at 19:42

## 2021-02-10 RX ADMIN — PANTOPRAZOLE SODIUM 40 MILLIGRAM(S): 20 TABLET, DELAYED RELEASE ORAL at 08:28

## 2021-02-10 RX ADMIN — Medication 50 MILLIGRAM(S): at 11:49

## 2021-02-10 RX ADMIN — Medication 2 MILLIGRAM(S): at 11:49

## 2021-02-10 RX ADMIN — Medication 600 MILLIGRAM(S): at 13:21

## 2021-02-10 RX ADMIN — OLANZAPINE 15 MILLIGRAM(S): 15 TABLET, FILM COATED ORAL at 08:29

## 2021-02-10 RX ADMIN — HALOPERIDOL DECANOATE 5 MILLIGRAM(S): 100 INJECTION INTRAMUSCULAR at 11:49

## 2021-02-10 RX ADMIN — LITHIUM CARBONATE 1800 MILLIGRAM(S): 300 TABLET, EXTENDED RELEASE ORAL at 19:41

## 2021-02-10 NOTE — BH INPATIENT PSYCHIATRY PROGRESS NOTE - NSBHCHARTREVIEWVS_PSY_A_CORE FT
Vital Signs Last 24 Hrs  T(C): 36.9 (10 Feb 2021 10:00), Max: 36.9 (10 Feb 2021 10:00)  T(F): 98.4 (10 Feb 2021 10:00), Max: 98.4 (10 Feb 2021 10:00)  HR: 94 (10 Feb 2021 10:00) (94 - 94)  BP: 127/77 (10 Feb 2021 10:00) (127/77 - 127/77)  BP(mean): --  RR: 18 (10 Feb 2021 10:00) (17 - 18)  SpO2: 100% (10 Feb 2021 10:00) (100% - 100%)

## 2021-02-10 NOTE — BH INPATIENT PSYCHIATRY PROGRESS NOTE - NSBHASSESSSUMMFT_PSY_ALL_CORE
Plan:  >Obs: Routine checks appropriate--visible on the unit, no recent aggression on unit.  No current SI. No need for CO, patient not expected to pose risk to self or others in controlled inpatient setting.  >Psychiatric Meds:  Observe for tolerability and efficacy. Received Prolixin Deconate 50mg on 1/26/21 , Increase Olanzapine 15mg daily , 10mg qhs for psychosis, Lithium 1800mg daily for mood stabilization.  >Labs: Lithium serum level resulted 1.2 on 1/29/21.  Lithium order for 2/11/21  >Medical: No acute concerns.   >Social: milieu /structured therapy  >Treatment Interventions: Groups and Individual Therapy/CBT, Motivational counseling for substance abuse related issues.   >Dispo: Proceeding with State Hospitalization

## 2021-02-10 NOTE — BH INPATIENT PSYCHIATRY PROGRESS NOTE - OTHER
Bizarre, disorganized, sexually provocative  Aparna has slightly attenuated Patient is internally preoccupied, she is observed talking to self. Patient denies any AH  2/8/21: continues to talk to self, RIS Bizarre animated affect Patient continues to smear feces on the walls and on her face  2/8/21: Patient has stopped smearing feces on face/body and walls.  Hygiene still remains an issue but is improving

## 2021-02-10 NOTE — BH INPATIENT PSYCHIATRY PROGRESS NOTE - NSBHFUPINTERVALHXFT_PSY_A_CORE
Patient is followed up for psychosis, and disorganization. Chart, medications and labs reviewed.  Patient is discussed with nursing staff, and no significant interval events.  Patient’s psychotic symptoms are still evident and pronounced.  She remains sexually preoccupied, disorganized, delusional, and internally preoccupied. Patient’s continued impaired reality testing and bizarre behaviors are evident.   She remains compliant with all medications.  Titrate Olanzapine 15mg daily, and 10mg qhs.  Lithium 1800mg daily, lithium level for 2/11/21. Per  State application was submitted.  Patient offers no physical complaints.

## 2021-02-11 LAB
GAMMA INTERFERON BACKGROUND BLD IA-ACNC: 0.01 IU/ML — SIGNIFICANT CHANGE UP
LITHIUM SERPL-MCNC: 1.3 MMOL/L — HIGH (ref 0.6–1.2)
M TB IFN-G BLD-IMP: ABNORMAL
M TB IFN-G CD4+ BCKGRND COR BLD-ACNC: 0.01 IU/ML — SIGNIFICANT CHANGE UP
M TB IFN-G CD4+CD8+ BCKGRND COR BLD-ACNC: 0.01 IU/ML — SIGNIFICANT CHANGE UP
QUANT TB PLUS MITOGEN MINUS NIL: 0.1 IU/ML — SIGNIFICANT CHANGE UP

## 2021-02-11 PROCEDURE — 99232 SBSQ HOSP IP/OBS MODERATE 35: CPT

## 2021-02-11 RX ADMIN — HALOPERIDOL DECANOATE 5 MILLIGRAM(S): 100 INJECTION INTRAMUSCULAR at 12:49

## 2021-02-11 RX ADMIN — Medication 2 MILLIGRAM(S): at 20:22

## 2021-02-11 RX ADMIN — OLANZAPINE 15 MILLIGRAM(S): 15 TABLET, FILM COATED ORAL at 09:54

## 2021-02-11 RX ADMIN — HALOPERIDOL DECANOATE 5 MILLIGRAM(S): 100 INJECTION INTRAMUSCULAR at 20:22

## 2021-02-11 RX ADMIN — OLANZAPINE 10 MILLIGRAM(S): 15 TABLET, FILM COATED ORAL at 20:22

## 2021-02-11 RX ADMIN — Medication 50 MILLIGRAM(S): at 20:22

## 2021-02-11 RX ADMIN — Medication 2 MILLIGRAM(S): at 12:49

## 2021-02-11 RX ADMIN — LITHIUM CARBONATE 1800 MILLIGRAM(S): 300 TABLET, EXTENDED RELEASE ORAL at 20:21

## 2021-02-11 RX ADMIN — PANTOPRAZOLE SODIUM 40 MILLIGRAM(S): 20 TABLET, DELAYED RELEASE ORAL at 09:54

## 2021-02-11 RX ADMIN — Medication 50 MILLIGRAM(S): at 12:49

## 2021-02-11 NOTE — BH INPATIENT PSYCHIATRY PROGRESS NOTE - NSBHFUPINTERVALHXFT_PSY_A_CORE
Patient is followed up for psychosis, and disorganization. Chart, medications and labs reviewed.  Patient is discussed with nursing staff, and no significant interval events.  Patient is observed in dayroom, eating breakfast, patient is smiling, pleasant upon approach.  Patient is dressed inappropriately, patient’s sweater appears two sizes to small, and hospital pants 2 sizes two big.  Patient’s breasts and buttocks are exposed.  Discussed this with patient she reports “I like it this way.”  Writer informs patient that appropriate size clothing will be given to her.  During session, discussed her progress in treatment, and what is still problematic. Patient reports “I feel much better, I can’t believe I was doing what I was doing.” Patient elaborates, referring to when she was smearing feces on her face and body “I’m happy I’m not doing that anymore.” Patient reports “I felt that I was shit so I left I should wear shit on my face.”  Discussed feelings of poor self-image.  Patient ended discussion by stating “I’m just yearning to survive.”  Today patient seems a bit more linear, able to tolerate interview more. However, patient’s psychotic symptoms are still evident.  She remains sexually preoccupied, disorganized, delusional, and internally preoccupied. At times is observed talking to herself. Patient’s continued impaired reality testing and bizarre behaviors are evident.   She remains compliant with all medications, Olanzapine 15mg daily, and 10mg qhs.  Lithium 1800mg daily, lithium level ordered for today. Per  State application was submitted.  Patient offers no physical complaints.

## 2021-02-11 NOTE — BH INPATIENT PSYCHIATRY PROGRESS NOTE - NSBHASSESSSUMMFT_PSY_ALL_CORE
Plan:  >Obs: Routine checks appropriate--visible on the unit, no recent aggression on unit.  No current SI. No need for CO, patient not expected to pose risk to self or others in controlled inpatient setting.  >Psychiatric Meds:  Observe for tolerability and efficacy. Received Prolixin Deconate 50mg on 1/26/21, next dose on 2/26/21 , Increase Olanzapine 15mg daily , 10mg qhs for psychosis, Lithium 1800mg daily for mood stabilization.  >Labs: Lithium serum level resulted 1.2 on 1/29/21.  Lithium order for 2/11/21  >Medical: No acute concerns.   >Social: milieu /structured therapy  >Treatment Interventions: Groups and Individual Therapy/CBT, Motivational counseling for substance abuse related issues.   >Dispo: Proceeding with State Hospitalization

## 2021-02-11 NOTE — BH INPATIENT PSYCHIATRY PROGRESS NOTE - NSBHCHARTREVIEWVS_PSY_A_CORE FT
Vital Signs Last 24 Hrs  T(C): 35.6 (11 Feb 2021 06:28), Max: 36.9 (10 Feb 2021 10:00)  T(F): 96.1 (11 Feb 2021 06:28), Max: 98.4 (10 Feb 2021 10:00)  HR: 94 (10 Feb 2021 10:00) (94 - 94)  BP: 127/77 (10 Feb 2021 10:00) (127/77 - 127/77)  BP(mean): --  RR: 18 (10 Feb 2021 20:18) (18 - 18)  SpO2: 99% (10 Feb 2021 20:18) (99% - 100%)

## 2021-02-11 NOTE — UM REPORT PROGRESS NOTE - NSUMSWACTION_GEN_A_CORE FT
MI submitted [facility] state application on 2/9. [facility] requested updated Lithium level, which is being drawn today.  will send records over.  MI submitted requested supplemental documentation to [facility]. Application pending review.  Pt has been accepted to [facility] and is on waiting list. Will be transferred once female bed is available.

## 2021-02-12 PROCEDURE — 99232 SBSQ HOSP IP/OBS MODERATE 35: CPT

## 2021-02-12 RX ORDER — LITHIUM CARBONATE 300 MG/1
1350 TABLET, EXTENDED RELEASE ORAL AT BEDTIME
Refills: 0 | Status: DISCONTINUED | OUTPATIENT
Start: 2021-02-12 | End: 2021-04-07

## 2021-02-12 RX ADMIN — Medication 2 MILLIGRAM(S): at 18:39

## 2021-02-12 RX ADMIN — Medication 2 MILLIGRAM(S): at 17:01

## 2021-02-12 RX ADMIN — PANTOPRAZOLE SODIUM 40 MILLIGRAM(S): 20 TABLET, DELAYED RELEASE ORAL at 08:19

## 2021-02-12 RX ADMIN — LITHIUM CARBONATE 1350 MILLIGRAM(S): 300 TABLET, EXTENDED RELEASE ORAL at 19:48

## 2021-02-12 RX ADMIN — OLANZAPINE 15 MILLIGRAM(S): 15 TABLET, FILM COATED ORAL at 08:19

## 2021-02-12 RX ADMIN — OLANZAPINE 10 MILLIGRAM(S): 15 TABLET, FILM COATED ORAL at 19:49

## 2021-02-12 RX ADMIN — HALOPERIDOL DECANOATE 5 MILLIGRAM(S): 100 INJECTION INTRAMUSCULAR at 17:01

## 2021-02-12 RX ADMIN — Medication 50 MILLIGRAM(S): at 17:01

## 2021-02-12 NOTE — BH INPATIENT PSYCHIATRY PROGRESS NOTE - NSBHCHARTREVIEWVS_PSY_A_CORE FT
Vital Signs Last 24 Hrs  T(C): 36.4 (12 Feb 2021 06:30), Max: 36.8 (11 Feb 2021 22:43)  T(F): 97.5 (12 Feb 2021 06:30), Max: 98.2 (11 Feb 2021 22:43)  HR: --  BP: --  BP(mean): --  RR: 20 (11 Feb 2021 21:42) (20 - 20)  SpO2: 99% (11 Feb 2021 21:42) (99% - 99%)

## 2021-02-12 NOTE — BH INPATIENT PSYCHIATRY PROGRESS NOTE - NSBHASSESSSUMMFT_PSY_ALL_CORE
Plan:  >Obs: Routine checks appropriate--visible on the unit, no recent aggression on unit.  No current SI. No need for CO, patient not expected to pose risk to self or others in controlled inpatient setting.  >Psychiatric Meds:  Observe for tolerability and efficacy. Received Prolixin Deconate 50mg on 1/26/21, next dose on 2/26/21 , Increase Olanzapine 15mg daily , 10mg qhs for psychosis, decrease Lithium 1350mg daily for mood stabilization.  >Labs: Lithium serum level resulted 1.2 on 1/29/21.  Lithium order for 2/11/21, resulted 1.3 reduce Lithium dose   >Medical: No acute concerns.   >Social: milieu /structured therapy  >Treatment Interventions: Groups and Individual Therapy/CBT, Motivational counseling for substance abuse related issues.   >Dispo: Proceeding with State Hospitalization, all paperwork submitted

## 2021-02-12 NOTE — BH INPATIENT PSYCHIATRY PROGRESS NOTE - NSBHCHARTREVIEWLAB_PSY_A_CORE FT
Lithium serum level resulted 1.2 on 1/29/21  Lipid panel WNL ,  no signs of metabolic syndrome  Lithium serum level resulted 1.3 on 2/11/21, no signs of toxicity, reduce Lithium dose

## 2021-02-12 NOTE — BH INPATIENT PSYCHIATRY PROGRESS NOTE - NSBHFUPINTERVALHXFT_PSY_A_CORE
Patient is followed up for psychosis, and disorganization. Chart, medications and labs reviewed.  Patient is discussed with nursing staff, and no significant interval events. Psychotic symptoms are still evident.  She remains sexually preoccupied, disorganized, delusional, and internally preoccupied. At times is observed talking to herself. She remains compliant with all medications, Olanzapine 15mg daily, and 10mg qhs.  Lithium 1800mg daily, seems to be working well with patient’s affective component of her symptoms, and aggression. Obvious signs of diminution of the intensity of her symptoms.  Lithium level ordered yesterday, resulted 1.3 slightly elevated levels, no signs of toxicity. Will reduce Lithium daily dose to 1350mg. Per  State application was submitted.  Patient offers no physical complaints.

## 2021-02-12 NOTE — BH INPATIENT PSYCHIATRY PROGRESS NOTE - OTHER
Bizarre animated affect Bizarre, disorganized, sexually provocative  Aparna has slightly attenuated Patient is internally preoccupied, she is observed talking to self. Patient denies any AH  2/8/21: continues to talk to self, RIS Patient continues to smear feces on the walls and on her face  2/8/21: Patient has stopped smearing feces on face/body and walls.  Hygiene still remains an issue but is improving

## 2021-02-13 RX ADMIN — PANTOPRAZOLE SODIUM 40 MILLIGRAM(S): 20 TABLET, DELAYED RELEASE ORAL at 09:29

## 2021-02-13 RX ADMIN — Medication 2 MILLIGRAM(S): at 11:15

## 2021-02-13 RX ADMIN — LITHIUM CARBONATE 1350 MILLIGRAM(S): 300 TABLET, EXTENDED RELEASE ORAL at 21:28

## 2021-02-13 RX ADMIN — Medication 50 MILLIGRAM(S): at 11:15

## 2021-02-13 RX ADMIN — OLANZAPINE 15 MILLIGRAM(S): 15 TABLET, FILM COATED ORAL at 09:28

## 2021-02-13 RX ADMIN — OLANZAPINE 10 MILLIGRAM(S): 15 TABLET, FILM COATED ORAL at 21:28

## 2021-02-13 RX ADMIN — HALOPERIDOL DECANOATE 5 MILLIGRAM(S): 100 INJECTION INTRAMUSCULAR at 11:15

## 2021-02-14 RX ADMIN — HALOPERIDOL DECANOATE 5 MILLIGRAM(S): 100 INJECTION INTRAMUSCULAR at 10:42

## 2021-02-14 RX ADMIN — OLANZAPINE 10 MILLIGRAM(S): 15 TABLET, FILM COATED ORAL at 19:43

## 2021-02-14 RX ADMIN — Medication 50 MILLIGRAM(S): at 19:44

## 2021-02-14 RX ADMIN — PANTOPRAZOLE SODIUM 40 MILLIGRAM(S): 20 TABLET, DELAYED RELEASE ORAL at 08:44

## 2021-02-14 RX ADMIN — Medication 2 MILLIGRAM(S): at 19:43

## 2021-02-14 RX ADMIN — OLANZAPINE 15 MILLIGRAM(S): 15 TABLET, FILM COATED ORAL at 08:44

## 2021-02-14 RX ADMIN — LITHIUM CARBONATE 1350 MILLIGRAM(S): 300 TABLET, EXTENDED RELEASE ORAL at 19:43

## 2021-02-14 RX ADMIN — HALOPERIDOL DECANOATE 5 MILLIGRAM(S): 100 INJECTION INTRAMUSCULAR at 19:43

## 2021-02-14 RX ADMIN — Medication 50 MILLIGRAM(S): at 10:42

## 2021-02-14 RX ADMIN — Medication 2 MILLIGRAM(S): at 10:42

## 2021-02-15 RX ADMIN — PANTOPRAZOLE SODIUM 40 MILLIGRAM(S): 20 TABLET, DELAYED RELEASE ORAL at 08:37

## 2021-02-15 RX ADMIN — LITHIUM CARBONATE 1350 MILLIGRAM(S): 300 TABLET, EXTENDED RELEASE ORAL at 20:21

## 2021-02-15 RX ADMIN — OLANZAPINE 15 MILLIGRAM(S): 15 TABLET, FILM COATED ORAL at 09:26

## 2021-02-15 RX ADMIN — OLANZAPINE 10 MILLIGRAM(S): 15 TABLET, FILM COATED ORAL at 20:21

## 2021-02-15 RX ADMIN — Medication 2 MILLIGRAM(S): at 20:21

## 2021-02-15 RX ADMIN — Medication 50 MILLIGRAM(S): at 20:21

## 2021-02-16 PROCEDURE — 99232 SBSQ HOSP IP/OBS MODERATE 35: CPT

## 2021-02-16 RX ADMIN — HALOPERIDOL DECANOATE 5 MILLIGRAM(S): 100 INJECTION INTRAMUSCULAR at 10:47

## 2021-02-16 RX ADMIN — OLANZAPINE 15 MILLIGRAM(S): 15 TABLET, FILM COATED ORAL at 08:46

## 2021-02-16 RX ADMIN — LITHIUM CARBONATE 1350 MILLIGRAM(S): 300 TABLET, EXTENDED RELEASE ORAL at 19:24

## 2021-02-16 RX ADMIN — Medication 2 MILLIGRAM(S): at 15:02

## 2021-02-16 RX ADMIN — HALOPERIDOL DECANOATE 5 MILLIGRAM(S): 100 INJECTION INTRAMUSCULAR at 19:26

## 2021-02-16 RX ADMIN — PANTOPRAZOLE SODIUM 40 MILLIGRAM(S): 20 TABLET, DELAYED RELEASE ORAL at 08:46

## 2021-02-16 RX ADMIN — Medication 50 MILLIGRAM(S): at 12:13

## 2021-02-16 RX ADMIN — OLANZAPINE 10 MILLIGRAM(S): 15 TABLET, FILM COATED ORAL at 19:24

## 2021-02-16 RX ADMIN — Medication 50 MILLIGRAM(S): at 19:26

## 2021-02-16 NOTE — BH INPATIENT PSYCHIATRY PROGRESS NOTE - OTHER
Patient is internally preoccupied, she is observed talking to self. Patient denies any AH  2/8/21: continues to talk to self, RIS Patient continues to smear feces on the walls and on her face  2/8/21: Patient has stopped smearing feces on face/body and walls.  Hygiene still remains an issue but is improving  Bizarre animated affect Bizarre, disorganized, sexually provocative  Aparna has slightly attenuated

## 2021-02-16 NOTE — BH INPATIENT PSYCHIATRY PROGRESS NOTE - NSBHFUPINTERVALHXFT_PSY_A_CORE
Patient is discussed in treatment team. No weekend issues. No aggression, and has remained compliant with standing medications. No SE.  Patient is seen on unit, ADL’s have improved, asking for hair cut and to put name on list for cosmetology  (no longer smearing feces on face).  Patient is calmer today, and engaging in session. Patient inquired about process to State Hospitalization. Patient is informed that paperwork was submitted, awaiting response.  Patient responded well, appropriate reports “ok I’ll wait.”  She offers no complaints.  Per patient and sleep log she is sleeping through the night.  Patient is eating more appropriately, no longer eating out of the trash.  Remains psychotic, disorganized and RUTH.  Symptoms have attenuated some, and she is in better behavioral control.   Addendum: Later in the day, patient is overheard shouting in her room.  Upon approach patient reports “I’m fine not doing anything wrong as long as I’m shouting in my room not in the hallway.”  Patient reports no complaints, states “I just feel like shouting.”

## 2021-02-16 NOTE — BH INPATIENT PSYCHIATRY PROGRESS NOTE - NSBHCHARTREVIEWVS_PSY_A_CORE FT
Vital Signs Last 24 Hrs  T(C): 36.4 (16 Feb 2021 06:42), Max: 36.5 (15 Feb 2021 21:46)  T(F): 97.6 (16 Feb 2021 06:42), Max: 97.7 (15 Feb 2021 21:46)  HR: 80 (15 Feb 2021 08:09) (80 - 80)  BP: 102/62 (15 Feb 2021 08:09) (102/62 - 102/62)  BP(mean): --  RR: --  SpO2: --

## 2021-02-17 LAB — LITHIUM SERPL-MCNC: 0.9 MMOL/L — SIGNIFICANT CHANGE UP (ref 0.6–1.2)

## 2021-02-17 PROCEDURE — 99232 SBSQ HOSP IP/OBS MODERATE 35: CPT

## 2021-02-17 RX ORDER — SIMETHICONE 80 MG/1
80 TABLET, CHEWABLE ORAL EVERY 6 HOURS
Refills: 0 | Status: DISCONTINUED | OUTPATIENT
Start: 2021-02-17 | End: 2021-04-07

## 2021-02-17 RX ADMIN — OLANZAPINE 15 MILLIGRAM(S): 15 TABLET, FILM COATED ORAL at 08:22

## 2021-02-17 RX ADMIN — SIMETHICONE 80 MILLIGRAM(S): 80 TABLET, CHEWABLE ORAL at 13:13

## 2021-02-17 RX ADMIN — Medication 2 MILLIGRAM(S): at 17:36

## 2021-02-17 RX ADMIN — Medication 2 MILLIGRAM(S): at 10:28

## 2021-02-17 RX ADMIN — PANTOPRAZOLE SODIUM 40 MILLIGRAM(S): 20 TABLET, DELAYED RELEASE ORAL at 08:20

## 2021-02-17 RX ADMIN — OLANZAPINE 10 MILLIGRAM(S): 15 TABLET, FILM COATED ORAL at 20:25

## 2021-02-17 RX ADMIN — LITHIUM CARBONATE 1350 MILLIGRAM(S): 300 TABLET, EXTENDED RELEASE ORAL at 20:24

## 2021-02-17 NOTE — BH INPATIENT PSYCHIATRY PROGRESS NOTE - OTHER
Bizarre animated affect Patient continues to smear feces on the walls and on her face  2/8/21: Patient has stopped smearing feces on face/body and walls.  Hygiene still remains an issue but is improving  Bizarre, disorganized, sexually provocative  Aparna has slightly attenuated Patient is internally preoccupied, she is observed talking to self. Patient denies any AH  2/8/21: continues to talk to self, RIS

## 2021-02-17 NOTE — BH INPATIENT PSYCHIATRY PROGRESS NOTE - NSBHCHARTREVIEWVS_PSY_A_CORE FT
Vital Signs Last 24 Hrs  T(C): 36.4 (17 Feb 2021 06:39), Max: 36.8 (16 Feb 2021 14:52)  T(F): 97.5 (17 Feb 2021 06:39), Max: 98.3 (16 Feb 2021 14:52)  HR: 80 (16 Feb 2021 22:10) (80 - 80)  BP: 130/70 (16 Feb 2021 22:10) (130/70 - 130/70)  BP(mean): --  RR: 18 (16 Feb 2021 22:10) (18 - 18)  SpO2: 100% (16 Feb 2021 22:10) (100% - 100%)

## 2021-02-17 NOTE — BH INPATIENT PSYCHIATRY PROGRESS NOTE - NSBHFUPINTERVALHXFT_PSY_A_CORE
Patient is discussed in treatment team. No interval events. No aggression, and has remained compliant with standing medications. No SE.  Patient inquired about process to State Hospitalization. Patient is informed that paperwork was submitted, and that writer received phone call from Jennifer yesterday in regards to her application.  Application is still being reviewed, awaiting response.  Patient responded well.  She offers no complaints.  Per patient and sleep log she is sleeping through the night.  Patient is eating more appropriately, no longer eating out of the trash.  Remains psychotic, disorganized and RUTH.  Although symptoms have attenuated some, and she is in better behavioral control, patient remains symptomatic. Still having bouts of psychotic aggression, as evidenced by behavior yesterday afternoon when patient was heard shouting in her room. Patient is scheduled today for bloodwork, Lithium serum level.  Patient is explain necessity for periodic blood Lithium levels.

## 2021-02-18 PROCEDURE — 99232 SBSQ HOSP IP/OBS MODERATE 35: CPT

## 2021-02-18 RX ADMIN — LITHIUM CARBONATE 1350 MILLIGRAM(S): 300 TABLET, EXTENDED RELEASE ORAL at 20:48

## 2021-02-18 RX ADMIN — OLANZAPINE 10 MILLIGRAM(S): 15 TABLET, FILM COATED ORAL at 20:48

## 2021-02-18 RX ADMIN — Medication 50 MILLIGRAM(S): at 13:43

## 2021-02-18 RX ADMIN — HALOPERIDOL DECANOATE 5 MILLIGRAM(S): 100 INJECTION INTRAMUSCULAR at 13:42

## 2021-02-18 RX ADMIN — Medication 2 MILLIGRAM(S): at 13:42

## 2021-02-18 RX ADMIN — SIMETHICONE 80 MILLIGRAM(S): 80 TABLET, CHEWABLE ORAL at 13:43

## 2021-02-18 RX ADMIN — OLANZAPINE 15 MILLIGRAM(S): 15 TABLET, FILM COATED ORAL at 08:35

## 2021-02-18 RX ADMIN — PANTOPRAZOLE SODIUM 40 MILLIGRAM(S): 20 TABLET, DELAYED RELEASE ORAL at 08:35

## 2021-02-18 NOTE — BH INPATIENT PSYCHIATRY PROGRESS NOTE - NSBHCHARTREVIEWLAB_PSY_A_CORE FT
Lithium serum level resulted 1.2 on 1/29/21  Lipid panel WNL ,  no signs of metabolic syndrome  Lithium serum level resulted 1.3 on 2/11/21, no signs of toxicity, reduce Lithium dose  Lithium level resulted 0.9 on 2/17/21

## 2021-02-18 NOTE — BH INPATIENT PSYCHIATRY PROGRESS NOTE - OTHER
Patient continues to smear feces on the walls and on her face  2/8/21: Patient has stopped smearing feces on face/body and walls.  Hygiene still remains an issue but is improving  Bizarre animated affect Bizarre, disorganized, sexually provocative  Aparna has slightly attenuated Patient is internally preoccupied, she is observed talking to self. Patient denies any AH  2/8/21: continues to talk to self, RIS

## 2021-02-18 NOTE — BH INPATIENT PSYCHIATRY PROGRESS NOTE - NSBHFUPINTERVALHXFT_PSY_A_CORE
Patient is discussed in treatment team. No interval events. Has remained compliant with standing medications. No SE.  She offers no complaints.  Per patient and sleep log she is sleeping through the night.  Good appetite.   Remains psychotic, disorganized and RUTH.  Although symptoms have attenuated some, and she is in better behavioral control, patient remains symptomatic. Still having bouts of psychotic aggression. Lithium serum level resulted 0.9 on 2/17.  ADL’s looks much improved today.  Patient had a haircut yesterday Writer comments on her appearance, provides patient with positive reinforcement, she reports “white people see me as garbage.”  Waiting on State

## 2021-02-18 NOTE — BH INPATIENT PSYCHIATRY PROGRESS NOTE - NSBHCHARTREVIEWVS_PSY_A_CORE FT
Vital Signs Last 24 Hrs  T(C): 36.5 (18 Feb 2021 06:43), Max: 36.5 (18 Feb 2021 06:43)  T(F): 97.7 (18 Feb 2021 06:43), Max: 97.7 (18 Feb 2021 06:43)  HR: --  BP: --  BP(mean): --  RR: 18 (17 Feb 2021 20:55) (18 - 18)  SpO2: 99% (17 Feb 2021 20:55) (99% - 99%)

## 2021-02-18 NOTE — BH INPATIENT PSYCHIATRY PROGRESS NOTE - NSBHASSESSSUMMFT_PSY_ALL_CORE
Plan:  >Obs: Routine checks appropriate--visible on the unit, no recent aggression on unit.  No current SI. No need for CO, patient not expected to pose risk to self or others in controlled inpatient setting.  >Psychiatric Meds:  Observe for tolerability and efficacy. Received Prolixin Deconate 50mg on 1/26/21, next dose on 2/26/21 , Increase Olanzapine 15mg daily , 10mg qhs for psychosis, decrease Lithium 1350mg daily for mood stabilization.  >Labs: Lithium serum level resulted 1.2 on 1/29/21.  Lithium order for 2/11/21, resulted 1.3 reduce Lithium dose Lithium level resulted 0.9 on 2/17/21.   >Medical: No acute concerns.   >Social: milieu /structured therapy  >Treatment Interventions: Groups and Individual Therapy/CBT, Motivational counseling for substance abuse related issues.   >Dispo: Proceeding with State Hospitalization, all paperwork submitted

## 2021-02-19 PROCEDURE — 99232 SBSQ HOSP IP/OBS MODERATE 35: CPT

## 2021-02-19 RX ADMIN — OLANZAPINE 15 MILLIGRAM(S): 15 TABLET, FILM COATED ORAL at 08:08

## 2021-02-19 RX ADMIN — OLANZAPINE 10 MILLIGRAM(S): 15 TABLET, FILM COATED ORAL at 18:57

## 2021-02-19 RX ADMIN — LITHIUM CARBONATE 1350 MILLIGRAM(S): 300 TABLET, EXTENDED RELEASE ORAL at 18:57

## 2021-02-19 RX ADMIN — PANTOPRAZOLE SODIUM 40 MILLIGRAM(S): 20 TABLET, DELAYED RELEASE ORAL at 08:10

## 2021-02-19 NOTE — BH INPATIENT PSYCHIATRY PROGRESS NOTE - NSBHFUPINTERVALHXFT_PSY_A_CORE
Patient is discussed in treatment team. No interval events. Has remained compliant with standing medications. No SE.  She offers no complaints. Poor sleep last night reports she woke up around 0100 a.m., but did not ask for po prn. Per sleep log patient able to fall back asleep at 0300 a.m. No appetite concerns. Remains psychotic, disorganized and RUTH at baseline.  Symptoms have attenuated some. She is in better behavioral control today, but sporadically having bouts of aggression, not directed at any particular person.  Lithium serum level resulted 0.9 on 2/17.  ADL’s looks much improved today, patient had a recent haircut  Waiting on State.  Patient is discussed in treatment team. No interval events. Has remained compliant with standing medications. No SE.  She offers no complaints. Slept in late today. Poor sleep last night, pt reports she woke up around 0100 a.m, went back to sleep at 2:15am.  Per sleep log patient woke up again at 3:45am- 4:30am, but did not ask for po prn.  No appetite concerns. Remains psychotic, disorganized and RUTH at baseline.  Symptoms have attenuated some. She is in better behavioral control today, but sporadically having bouts of aggression, not directed at any particular person.  Lithium serum level resulted 0.9 on 2/17.  ADL’s looks much improved today, patient had a recent haircut  Waiting on State.

## 2021-02-19 NOTE — BH TREATMENT PLAN - NSTXPSYCHOINTERRN_PSY_ALL_CORE
Educate patient on utiizing coping skills and comply with medication reigmen.
Assess patient thought process, encourage patient to share thoughts/feelings with staff, educate patient on treatment compliance.

## 2021-02-19 NOTE — BH TREATMENT PLAN - NSTXDISORGINTERRN_PSY_ALL_CORE
Assess patient mood/behavior/thought process, encourage patient to share thoughts/feelings with staff, reorient/redirect patient as needed, educate patient on importance of medication/treatment compliance.
Provide reality based orientaiton/coimmunicaiton as needed.

## 2021-02-19 NOTE — BH INPATIENT PSYCHIATRY PROGRESS NOTE - OTHER
Bizarre, disorganized, sexually provocative  Aparna has slightly attenuated Bizarre animated affect Patient continues to smear feces on the walls and on her face  2/8/21: Patient has stopped smearing feces on face/body and walls.  Hygiene still remains an issue but is improving  Patient is internally preoccupied, she is observed talking to self. Patient denies any AH  2/8/21: continues to talk to self, RIS

## 2021-02-19 NOTE — BH INPATIENT PSYCHIATRY PROGRESS NOTE - NSBHCHARTREVIEWVS_PSY_A_CORE FT
Vital Signs Last 24 Hrs  T(C): 36.4 (19 Feb 2021 06:16), Max: 36.5 (18 Feb 2021 22:33)  T(F): 97.5 (19 Feb 2021 06:16), Max: 97.7 (18 Feb 2021 22:33)  HR: --  BP: --  BP(mean): --  RR: 16 (18 Feb 2021 21:55) (16 - 16)  SpO2: --

## 2021-02-19 NOTE — BH TREATMENT PLAN - NSTXMEDICINTERRN_PSY_ALL_CORE
Educate patient on medication names, indications, dosages, frequency, and possible side effects.
Educate patient on importance of complianc with medication reigmen.

## 2021-02-19 NOTE — BH TREATMENT PLAN - NSTXMANICINTERRN_PSY_ALL_CORE
Educare and encourage patient to comply with medication regimen to reduce manic symptoms.
Assess patient mood/behavior, educate patient on treatment compliance, encourage patient to utilize coping skills.

## 2021-02-20 PROCEDURE — 99232 SBSQ HOSP IP/OBS MODERATE 35: CPT

## 2021-02-20 RX ORDER — DIPHENHYDRAMINE HCL 50 MG
50 CAPSULE ORAL ONCE
Refills: 0 | Status: COMPLETED | OUTPATIENT
Start: 2021-02-20 | End: 2021-02-20

## 2021-02-20 RX ORDER — HALOPERIDOL DECANOATE 100 MG/ML
5 INJECTION INTRAMUSCULAR ONCE
Refills: 0 | Status: COMPLETED | OUTPATIENT
Start: 2021-02-20 | End: 2021-02-20

## 2021-02-20 RX ADMIN — Medication 50 MILLIGRAM(S): at 08:57

## 2021-02-20 RX ADMIN — HALOPERIDOL DECANOATE 5 MILLIGRAM(S): 100 INJECTION INTRAMUSCULAR at 08:57

## 2021-02-20 RX ADMIN — OLANZAPINE 10 MILLIGRAM(S): 15 TABLET, FILM COATED ORAL at 20:45

## 2021-02-20 RX ADMIN — HALOPERIDOL DECANOATE 5 MILLIGRAM(S): 100 INJECTION INTRAMUSCULAR at 21:03

## 2021-02-20 RX ADMIN — Medication 2 MILLIGRAM(S): at 08:58

## 2021-02-20 RX ADMIN — Medication 2 MILLIGRAM(S): at 21:02

## 2021-02-20 RX ADMIN — LITHIUM CARBONATE 1350 MILLIGRAM(S): 300 TABLET, EXTENDED RELEASE ORAL at 20:45

## 2021-02-20 NOTE — BH INPATIENT PSYCHIATRY PROGRESS NOTE - NSBHCHARTREVIEWVS_PSY_A_CORE FT
Vital Signs Last 24 Hrs  T(C): 37 (20 Feb 2021 06:51), Max: 37 (20 Feb 2021 06:51)  T(F): 98.6 (20 Feb 2021 06:51), Max: 98.6 (20 Feb 2021 06:51)  HR: --  BP: --  BP(mean): --  RR: --  SpO2: --

## 2021-02-20 NOTE — BH INPATIENT PSYCHIATRY PROGRESS NOTE - NSBHFUPINTERVALHXFT_PSY_A_CORE
Patient is followed up for Schizophrenia. Writer called to activate IM due to patient’s aggression on unit.  Patient is observed in dayroom during breakfast, extremely aggressive, shouting at staff. . Patient unable to be verbally de-escalated, and refused po prn.  Writer activation IM medication Haldol 5mg Ativan 2mg and Benadryl 50mg due to patient severe agitation. PES present. Per staff patient has been agitated, verbally abusive and threatening towards staff unable to be verbally re-directed.

## 2021-02-20 NOTE — BH INPATIENT PSYCHIATRY PROGRESS NOTE - OTHER
Patient continues to smear feces on the walls and on her face  2/8/21: Patient has stopped smearing feces on face/body and walls.  Hygiene still remains an issue but is improving  Patient is internally preoccupied, she is observed talking to self. Patient denies any AH  2/8/21: continues to talk to self, RIS Bizarre animated affect Bizarre, disorganized, sexually provocative  Aparna has slightly attenuated

## 2021-02-20 NOTE — BH INPATIENT PSYCHIATRY PROGRESS NOTE - NSBHASSESSSUMMFT_PSY_ALL_CORE
Activate emergent IM medication Haldol 5mg Ativan 2mg and Benadryl 50mg for severe agitatio/aggression

## 2021-02-21 RX ORDER — CALCIUM CARBONATE 500(1250)
1 TABLET ORAL ONCE
Refills: 0 | Status: COMPLETED | OUTPATIENT
Start: 2021-02-21 | End: 2021-02-21

## 2021-02-21 RX ADMIN — LITHIUM CARBONATE 1350 MILLIGRAM(S): 300 TABLET, EXTENDED RELEASE ORAL at 21:05

## 2021-02-21 RX ADMIN — Medication 1 TABLET(S): at 18:54

## 2021-02-21 RX ADMIN — Medication 50 MILLIGRAM(S): at 15:26

## 2021-02-21 RX ADMIN — OLANZAPINE 10 MILLIGRAM(S): 15 TABLET, FILM COATED ORAL at 21:05

## 2021-02-21 RX ADMIN — Medication 2 MILLIGRAM(S): at 21:05

## 2021-02-21 RX ADMIN — Medication 2 MILLIGRAM(S): at 15:26

## 2021-02-21 RX ADMIN — HALOPERIDOL DECANOATE 5 MILLIGRAM(S): 100 INJECTION INTRAMUSCULAR at 15:26

## 2021-02-22 PROCEDURE — 99232 SBSQ HOSP IP/OBS MODERATE 35: CPT

## 2021-02-22 RX ADMIN — Medication 50 MILLIGRAM(S): at 21:33

## 2021-02-22 RX ADMIN — OLANZAPINE 10 MILLIGRAM(S): 15 TABLET, FILM COATED ORAL at 21:33

## 2021-02-22 RX ADMIN — HALOPERIDOL DECANOATE 5 MILLIGRAM(S): 100 INJECTION INTRAMUSCULAR at 09:07

## 2021-02-22 RX ADMIN — Medication 2 MILLIGRAM(S): at 09:07

## 2021-02-22 RX ADMIN — LITHIUM CARBONATE 1350 MILLIGRAM(S): 300 TABLET, EXTENDED RELEASE ORAL at 21:33

## 2021-02-22 RX ADMIN — Medication 650 MILLIGRAM(S): at 15:40

## 2021-02-22 RX ADMIN — Medication 2 MILLIGRAM(S): at 21:33

## 2021-02-22 RX ADMIN — Medication 50 MILLIGRAM(S): at 09:07

## 2021-02-22 RX ADMIN — PANTOPRAZOLE SODIUM 40 MILLIGRAM(S): 20 TABLET, DELAYED RELEASE ORAL at 09:07

## 2021-02-22 RX ADMIN — HALOPERIDOL DECANOATE 5 MILLIGRAM(S): 100 INJECTION INTRAMUSCULAR at 21:33

## 2021-02-22 RX ADMIN — OLANZAPINE 15 MILLIGRAM(S): 15 TABLET, FILM COATED ORAL at 09:07

## 2021-02-22 NOTE — BH INPATIENT PSYCHIATRY PROGRESS NOTE - NSBHFUPINTERVALHXFT_PSY_A_CORE
No events reported overnight.  Patient reports fair sleep and appetite.  Patient denies physical complaints.  Patient denies SI/HI.  Patient endorses delusions, still disorganized, bizarre, with internal preoccupied.  Pt denies psychiatric symptoms but is not a reliable informant of her internal experience. Per nursing she refused her medications over the weekend, and received IM medications on Saturday for aggression.  Patient apologizes to writer today for her outburst over the weekend.  When asked about what trigger her outburst patient reports “I was thinking abuot racism.”  Today patient took all her medications.  Denies SI/HI.

## 2021-02-22 NOTE — BH INPATIENT PSYCHIATRY PROGRESS NOTE - NSBHCHARTREVIEWVS_PSY_A_CORE FT
Vital Signs Last 24 Hrs  T(C): 36.6 (22 Feb 2021 06:34), Max: 36.6 (22 Feb 2021 06:34)  T(F): 97.8 (22 Feb 2021 06:34), Max: 97.8 (22 Feb 2021 06:34)  HR: --  BP: --  BP(mean): --  RR: 18 (21 Feb 2021 20:24) (18 - 18)  SpO2: 99% (21 Feb 2021 20:24) (99% - 99%)

## 2021-02-22 NOTE — BH INPATIENT PSYCHIATRY PROGRESS NOTE - OTHER
Patient is internally preoccupied, she is observed talking to self. Patient denies any AH  2/8/21: continues to talk to self, RIS Bizarre, disorganized, sexually provocative  Aparna has slightly attenuated Bizarre animated affect

## 2021-02-23 PROCEDURE — 99232 SBSQ HOSP IP/OBS MODERATE 35: CPT

## 2021-02-23 RX ADMIN — OLANZAPINE 10 MILLIGRAM(S): 15 TABLET, FILM COATED ORAL at 20:33

## 2021-02-23 RX ADMIN — LITHIUM CARBONATE 1350 MILLIGRAM(S): 300 TABLET, EXTENDED RELEASE ORAL at 20:33

## 2021-02-23 RX ADMIN — PANTOPRAZOLE SODIUM 40 MILLIGRAM(S): 20 TABLET, DELAYED RELEASE ORAL at 09:55

## 2021-02-23 RX ADMIN — OLANZAPINE 15 MILLIGRAM(S): 15 TABLET, FILM COATED ORAL at 09:54

## 2021-02-23 NOTE — BH INPATIENT PSYCHIATRY PROGRESS NOTE - NSBHCHARTREVIEWVS_PSY_A_CORE FT
Vital Signs Last 24 Hrs  T(C): 36.5 (22 Feb 2021 18:55), Max: 36.5 (22 Feb 2021 18:55)  T(F): 97.7 (22 Feb 2021 18:55), Max: 97.7 (22 Feb 2021 18:55)  HR: --  BP: --  BP(mean): --  RR: --  SpO2: --

## 2021-02-23 NOTE — BH INPATIENT PSYCHIATRY PROGRESS NOTE - NSBHASSESSSUMMFT_PSY_ALL_CORE
Plan:  >Obs: Routine checks appropriate--visible on the unit, no recent aggression on unit.  No current SI. No need for CO, patient not expected to pose risk to self or others in controlled inpatient setting.  >Psychiatric Meds:  Observe for tolerability and efficacy. Received Prolixin Deconate 50mg on 1/26/21, next dose on 2/26/21 , Increase Olanzapine 15mg daily , 10mg qhs for psychosis, decrease Lithium 1350mg daily for mood stabilization.  >Labs: Lithium serum level resulted 1.2 on 1/29/21.  Lithium order for 2/11/21, resulted 1.3 reduce Lithium dose Lithium level resulted 0.9 on 2/17/21.   >Medical: No acute concerns.   >Social: milieu /structured therapy  >Treatment Interventions: Groups and Individual Therapy/CBT, Motivational counseling for substance abuse related issues.   >Dispo: Patient has been accepted for State Hospitalization at [place].  Patient is on waiting list for bed.

## 2021-02-23 NOTE — BH INPATIENT PSYCHIATRY PROGRESS NOTE - NSBHFUPINTERVALHXFT_PSY_A_CORE
Patient is discussed in treatment team: per SW patient has been accepted for State Hospitalization at [place].  Patient is on waiting list for bed.  Per nursing no events reported overnight.   Patient denies physical complaints. Patient endorses delusions, still disorganized, bizarre, with internal preoccupied. Patient seems to be at baseline, symptomatically stable.  No aggression on unit.  Remains compliant with her standing medications. Prolixin Deconate 50mg due on 2/26.   Denies SI/HI. Waiting for bed at [place].

## 2021-02-23 NOTE — BH INPATIENT PSYCHIATRY PROGRESS NOTE - OTHER
Bizarre animated affect Bizarre, disorganized, sexually provocative  Aparna has slightly attenuated Patient is internally preoccupied, she is observed talking to self. Patient denies any AH  2/8/21: continues to talk to self, RIS

## 2021-02-24 PROCEDURE — 99232 SBSQ HOSP IP/OBS MODERATE 35: CPT

## 2021-02-24 RX ORDER — FLUPHENAZINE HYDROCHLORIDE 1 MG/1
50 TABLET, FILM COATED ORAL ONCE
Refills: 0 | Status: COMPLETED | OUTPATIENT
Start: 2021-02-24 | End: 2021-02-24

## 2021-02-24 RX ADMIN — HALOPERIDOL DECANOATE 5 MILLIGRAM(S): 100 INJECTION INTRAMUSCULAR at 19:52

## 2021-02-24 RX ADMIN — ONDANSETRON 4 MILLIGRAM(S): 8 TABLET, FILM COATED ORAL at 15:09

## 2021-02-24 RX ADMIN — Medication 50 MILLIGRAM(S): at 00:24

## 2021-02-24 RX ADMIN — FLUPHENAZINE HYDROCHLORIDE 50 MILLIGRAM(S): 1 TABLET, FILM COATED ORAL at 13:44

## 2021-02-24 RX ADMIN — Medication 2 MILLIGRAM(S): at 19:52

## 2021-02-24 RX ADMIN — Medication 50 MILLIGRAM(S): at 19:51

## 2021-02-24 RX ADMIN — HALOPERIDOL DECANOATE 5 MILLIGRAM(S): 100 INJECTION INTRAMUSCULAR at 00:24

## 2021-02-24 RX ADMIN — OLANZAPINE 10 MILLIGRAM(S): 15 TABLET, FILM COATED ORAL at 20:03

## 2021-02-24 RX ADMIN — OLANZAPINE 15 MILLIGRAM(S): 15 TABLET, FILM COATED ORAL at 09:50

## 2021-02-24 RX ADMIN — LITHIUM CARBONATE 1350 MILLIGRAM(S): 300 TABLET, EXTENDED RELEASE ORAL at 20:04

## 2021-02-24 RX ADMIN — Medication 2 MILLIGRAM(S): at 00:23

## 2021-02-24 RX ADMIN — PANTOPRAZOLE SODIUM 40 MILLIGRAM(S): 20 TABLET, DELAYED RELEASE ORAL at 09:51

## 2021-02-24 NOTE — BH INPATIENT PSYCHIATRY PROGRESS NOTE - NSBHASSESSSUMMFT_PSY_ALL_CORE
Plan:  >Obs: Routine checks appropriate--visible on the unit, no recent aggression on unit.  No current SI. No need for CO, patient not expected to pose risk to self or others in controlled inpatient setting.  >Psychiatric Meds:  Observe for tolerability and efficacy. Received Prolixin Deconate 50mg on 1/26/21, next dose on 2/26/21 , Increase Olanzapine 15mg daily , 10mg qhs for psychosis, decrease Lithium 1350mg daily for mood stabilization.  >Labs: Lithium serum level resulted 1.2 on 1/29/21.  Lithium order for 2/11/21, resulted 1.3 reduce Lithium dose Lithium level resulted 0.9 on 2/17/21.   >Medical: No acute concerns.   >Social: milieu /structured therapy  >Treatment Interventions: Groups and Individual Therapy/CBT, Motivational counseling for substance abuse related issues.   >Dispo: Patient has been accepted for State Hospitalization at [place].  Patient is on waiting list for bed.

## 2021-02-24 NOTE — BH INPATIENT PSYCHIATRY PROGRESS NOTE - NSBHCHARTREVIEWVS_PSY_A_CORE FT
Vital Signs Last 24 Hrs  T(C): 36.3 (24 Feb 2021 06:15), Max: 36.6 (23 Feb 2021 20:05)  T(F): 97.4 (24 Feb 2021 06:15), Max: 97.8 (23 Feb 2021 20:05)  HR: 88 (23 Feb 2021 20:05) (88 - 88)  BP: 126/84 (23 Feb 2021 20:05) (126/84 - 126/84)  BP(mean): --  RR: 18 (23 Feb 2021 23:03) (18 - 18)  SpO2: 100% (23 Feb 2021 23:03) (100% - 100%)

## 2021-02-24 NOTE — BH INPATIENT PSYCHIATRY PROGRESS NOTE - NSBHFUPINTERVALHXFT_PSY_A_CORE
Patient is discussed in treatment team. Per nursing patient had verbal altercation with another peer last evening, required po prn (given Haldol 5mg Ativan 2mg and Benadryl 50mg) with good effect. Patient is on waiting list for bed at [facility].  Per nursing no events reported overnight.  Patient is observed in bed.  Discussed her PALACIOS, scheduled for injection on 2/26.  Patient reports “I know I have to take it.”  Patient denies physical complaints. Patient endorses delusions, still disorganized, bizarre, with internal preoccupied. Patient seems to be at baseline, symptomatically stable.  No aggression on unit for several days, no prn for aggression.  Remains compliant with her standing medications. Prolixin Deconate 50mg due on 2/26.   Denies SI/HI. Waiting for bed at [place].

## 2021-02-25 PROCEDURE — 99232 SBSQ HOSP IP/OBS MODERATE 35: CPT

## 2021-02-25 RX ORDER — CALCIUM CARBONATE 500(1250)
1 TABLET ORAL DAILY
Refills: 0 | Status: DISCONTINUED | OUTPATIENT
Start: 2021-02-25 | End: 2021-02-25

## 2021-02-25 RX ORDER — CALCIUM CARBONATE 500(1250)
1 TABLET ORAL EVERY 6 HOURS
Refills: 0 | Status: DISCONTINUED | OUTPATIENT
Start: 2021-02-25 | End: 2021-04-07

## 2021-02-25 RX ADMIN — Medication 1 TABLET(S): at 13:33

## 2021-02-25 RX ADMIN — PANTOPRAZOLE SODIUM 40 MILLIGRAM(S): 20 TABLET, DELAYED RELEASE ORAL at 08:33

## 2021-02-25 RX ADMIN — Medication 2 MILLIGRAM(S): at 11:54

## 2021-02-25 RX ADMIN — SIMETHICONE 80 MILLIGRAM(S): 80 TABLET, CHEWABLE ORAL at 14:27

## 2021-02-25 RX ADMIN — LITHIUM CARBONATE 1350 MILLIGRAM(S): 300 TABLET, EXTENDED RELEASE ORAL at 21:01

## 2021-02-25 RX ADMIN — OLANZAPINE 15 MILLIGRAM(S): 15 TABLET, FILM COATED ORAL at 08:32

## 2021-02-25 RX ADMIN — OLANZAPINE 10 MILLIGRAM(S): 15 TABLET, FILM COATED ORAL at 21:00

## 2021-02-25 RX ADMIN — SIMETHICONE 80 MILLIGRAM(S): 80 TABLET, CHEWABLE ORAL at 21:00

## 2021-02-25 RX ADMIN — SIMETHICONE 80 MILLIGRAM(S): 80 TABLET, CHEWABLE ORAL at 01:45

## 2021-02-25 RX ADMIN — Medication 1 TABLET(S): at 08:33

## 2021-02-25 RX ADMIN — HALOPERIDOL DECANOATE 5 MILLIGRAM(S): 100 INJECTION INTRAMUSCULAR at 11:54

## 2021-02-25 RX ADMIN — Medication 50 MILLIGRAM(S): at 11:54

## 2021-02-25 NOTE — BH INPATIENT PSYCHIATRY PROGRESS NOTE - NSBHFUPINTERVALHXFT_PSY_A_CORE
Patient is discussed in treatment team. Per nursing patient had verbal altercation with staff, patient began pounding on walls, when asked by staff why she was doing this patient reports that the noise level on unit was irritating her.  Per nursing patient began screaming at staff while they were working on construction in another patient’s room. Patient was given po prn Haldol 5mg Ativan 2mg and Benadryl 50mg , with desired effect.  Patient is observed in front of nursing station, reports feeling “better.”  Patient inquired about bed status at [place].  Patient received her Prolixin Dec 50mg on 2/25/21, patient requested to have it one day early.  Patient denies physical complaints. Patient endorses delusions, still disorganized, bizarre, with internal preoccupied. Patient seems to be at baseline, symptomatically stable.  Remains compliant with her standing medications. Denies SI/HI. Waiting for bed at [place].

## 2021-02-25 NOTE — BH INPATIENT PSYCHIATRY PROGRESS NOTE - NSBHASSESSSUMMFT_PSY_ALL_CORE
Plan:  >Obs: Routine checks appropriate--visible on the unit, no recent aggression on unit.  No current SI. No need for CO, patient not expected to pose risk to self or others in controlled inpatient setting.  >Psychiatric Meds:  Observe for tolerability and efficacy. Received Prolixin Deconate 50mg on 1/26/21, next dose on 2/26/21 , Increase Olanzapine 15mg daily , 10mg qhs for psychosis, decrease Lithium 1350mg daily for mood stabilization.  >Labs: Lithium serum level resulted 1.2 on 1/29/21.  Lithium order for 2/11/21, resulted 1.3 reduce Lithium dose Lithium level resulted 0.9 on 2/17/21.   >Medical: No acute concerns.   >Diet: regular  >Social: milieu /structured therapy  >Treatment Interventions: Groups and Individual Therapy/CBT, Motivational counseling for substance abuse related issues.   >Dispo: Patient has been accepted for State Hospitalization at [place].  Patient is on waiting list for bed.

## 2021-02-25 NOTE — BH INPATIENT PSYCHIATRY PROGRESS NOTE - OTHER
Bizarre, disorganized, sexually provocative  Aparna has slightly attenuated  Still having bouts of aggression, however, frequency has significantly decreased Patient is internally preoccupied, she is observed talking to self. Patient denies any AH  2/8/21: continues to talk to self, RIS Bizarre animated affect

## 2021-02-25 NOTE — BH INPATIENT PSYCHIATRY PROGRESS NOTE - NSBHCHARTREVIEWVS_PSY_A_CORE FT
Vital Signs Last 24 Hrs  T(C): 36.2 (25 Feb 2021 06:08), Max: 36.7 (24 Feb 2021 23:09)  T(F): 97.2 (25 Feb 2021 06:08), Max: 98 (24 Feb 2021 23:09)  HR: 84 (24 Feb 2021 23:09) (84 - 84)  BP: 124/78 (24 Feb 2021 23:09) (124/78 - 124/78)  BP(mean): --  RR: 18 (24 Feb 2021 23:09) (18 - 18)  SpO2: 100% (24 Feb 2021 23:09) (100% - 100%)

## 2021-02-26 PROCEDURE — 99232 SBSQ HOSP IP/OBS MODERATE 35: CPT

## 2021-02-26 RX ADMIN — Medication 1 TABLET(S): at 18:42

## 2021-02-26 RX ADMIN — OLANZAPINE 15 MILLIGRAM(S): 15 TABLET, FILM COATED ORAL at 09:12

## 2021-02-26 RX ADMIN — PANTOPRAZOLE SODIUM 40 MILLIGRAM(S): 20 TABLET, DELAYED RELEASE ORAL at 09:12

## 2021-02-26 RX ADMIN — LITHIUM CARBONATE 1350 MILLIGRAM(S): 300 TABLET, EXTENDED RELEASE ORAL at 20:51

## 2021-02-26 RX ADMIN — Medication 2 MILLIGRAM(S): at 17:44

## 2021-02-26 RX ADMIN — SIMETHICONE 80 MILLIGRAM(S): 80 TABLET, CHEWABLE ORAL at 18:42

## 2021-02-26 RX ADMIN — Medication 50 MILLIGRAM(S): at 17:44

## 2021-02-26 RX ADMIN — HALOPERIDOL DECANOATE 5 MILLIGRAM(S): 100 INJECTION INTRAMUSCULAR at 17:44

## 2021-02-26 RX ADMIN — OLANZAPINE 10 MILLIGRAM(S): 15 TABLET, FILM COATED ORAL at 20:51

## 2021-02-26 NOTE — BH INPATIENT PSYCHIATRY PROGRESS NOTE - OTHER
Bizarre animated affect Patient is internally preoccupied, she is observed talking to self. Patient denies any AH  2/8/21: continues to talk to self, RIS Bizarre, disorganized, sexually provocative  Aparna has slightly attenuated  Still having bouts of aggression, however, frequency has significantly decreased

## 2021-02-26 NOTE — BH INPATIENT PSYCHIATRY PROGRESS NOTE - NSBHASSESSSUMMFT_PSY_ALL_CORE
Plan:  >Obs: Routine checks appropriate--visible on the unit, no recent aggression on unit.  No current SI. No need for CO, patient not expected to pose risk to self or others in controlled inpatient setting.  >Psychiatric Meds:  Observe for tolerability and efficacy. Received Prolixin Deconate 50mg on 1/26/21, next dose on 2/26/21 , Increase Olanzapine 15mg daily , 10mg qhs for psychosis, decrease Lithium 1350mg daily for mood stabilization.  >Labs: Lithium serum level resulted 1.2 on 1/29/21.  Lithium order for 2/11/21, resulted 1.3 reduce Lithium dose Lithium level resulted 0.9 on 2/17/21.   >Medical: No acute concerns.   >Diet: regular  >Social: milieu /structured therapy  >Treatment Interventions: Groups and Individual Therapy/CBT, Motivational counseling for substance abuse related issues.   >Dispo: Patient has been accepted for State Hospitalization at [place].  Patient is on waiting list for bed.

## 2021-02-26 NOTE — BH INPATIENT PSYCHIATRY PROGRESS NOTE - NSBHFUPINTERVALHXFT_PSY_A_CORE
Chart reviewed, and case discussed with treatment team.  No interval events.  Patient remains compliant with all standing medications, received Prolixin Dec 50mg on 2/25/21.  No SE noted.  Overall in good behavior, pt offers no complaints.  Good sleep and appetite reported.  Patient reports she is having continuous flatulence, patient given simethicone with fair response.  No other complaints.  Patient endorses delusions, still disorganized, bizarre, with internal preoccupied. Patient seems to be at baseline, symptomatically stable.  Denies SI/HI. Waiting for bed at [place].

## 2021-02-26 NOTE — BH INPATIENT PSYCHIATRY PROGRESS NOTE - NSBHCHARTREVIEWVS_PSY_A_CORE FT
Vital Signs Last 24 Hrs  T(C): 36.7 (26 Feb 2021 06:38), Max: 36.7 (26 Feb 2021 06:38)  T(F): 98 (26 Feb 2021 06:38), Max: 98 (26 Feb 2021 06:38)  HR: 77 (25 Feb 2021 08:24) (77 - 77)  BP: 101/68 (25 Feb 2021 08:24) (101/68 - 101/68)  BP(mean): --  RR: --  SpO2: --

## 2021-02-27 RX ADMIN — PANTOPRAZOLE SODIUM 40 MILLIGRAM(S): 20 TABLET, DELAYED RELEASE ORAL at 09:45

## 2021-02-27 RX ADMIN — Medication 1 TABLET(S): at 19:39

## 2021-02-27 RX ADMIN — Medication 50 MILLIGRAM(S): at 18:02

## 2021-02-27 RX ADMIN — Medication 2 MILLIGRAM(S): at 18:01

## 2021-02-27 RX ADMIN — OLANZAPINE 10 MILLIGRAM(S): 15 TABLET, FILM COATED ORAL at 19:37

## 2021-02-27 RX ADMIN — OLANZAPINE 15 MILLIGRAM(S): 15 TABLET, FILM COATED ORAL at 09:45

## 2021-02-27 RX ADMIN — LITHIUM CARBONATE 1350 MILLIGRAM(S): 300 TABLET, EXTENDED RELEASE ORAL at 19:38

## 2021-02-27 RX ADMIN — HALOPERIDOL DECANOATE 5 MILLIGRAM(S): 100 INJECTION INTRAMUSCULAR at 18:02

## 2021-02-28 RX ADMIN — LITHIUM CARBONATE 1350 MILLIGRAM(S): 300 TABLET, EXTENDED RELEASE ORAL at 19:35

## 2021-02-28 RX ADMIN — OLANZAPINE 10 MILLIGRAM(S): 15 TABLET, FILM COATED ORAL at 19:35

## 2021-02-28 RX ADMIN — Medication 50 MILLIGRAM(S): at 13:45

## 2021-02-28 RX ADMIN — HALOPERIDOL DECANOATE 5 MILLIGRAM(S): 100 INJECTION INTRAMUSCULAR at 13:45

## 2021-02-28 RX ADMIN — OLANZAPINE 15 MILLIGRAM(S): 15 TABLET, FILM COATED ORAL at 10:11

## 2021-02-28 RX ADMIN — Medication 1 TABLET(S): at 10:47

## 2021-02-28 RX ADMIN — PANTOPRAZOLE SODIUM 40 MILLIGRAM(S): 20 TABLET, DELAYED RELEASE ORAL at 10:12

## 2021-02-28 RX ADMIN — Medication 2 MILLIGRAM(S): at 13:44

## 2021-02-28 RX ADMIN — SIMETHICONE 80 MILLIGRAM(S): 80 TABLET, CHEWABLE ORAL at 14:05

## 2021-03-01 PROCEDURE — 99232 SBSQ HOSP IP/OBS MODERATE 35: CPT

## 2021-03-01 RX ADMIN — Medication 2 MILLIGRAM(S): at 18:03

## 2021-03-01 RX ADMIN — Medication 1 TABLET(S): at 11:59

## 2021-03-01 RX ADMIN — HALOPERIDOL DECANOATE 5 MILLIGRAM(S): 100 INJECTION INTRAMUSCULAR at 20:47

## 2021-03-01 RX ADMIN — PANTOPRAZOLE SODIUM 40 MILLIGRAM(S): 20 TABLET, DELAYED RELEASE ORAL at 08:29

## 2021-03-01 RX ADMIN — OLANZAPINE 15 MILLIGRAM(S): 15 TABLET, FILM COATED ORAL at 08:29

## 2021-03-01 RX ADMIN — Medication 50 MILLIGRAM(S): at 20:47

## 2021-03-01 RX ADMIN — Medication 2 MILLIGRAM(S): at 11:20

## 2021-03-01 RX ADMIN — LITHIUM CARBONATE 1350 MILLIGRAM(S): 300 TABLET, EXTENDED RELEASE ORAL at 20:48

## 2021-03-01 RX ADMIN — OLANZAPINE 10 MILLIGRAM(S): 15 TABLET, FILM COATED ORAL at 20:47

## 2021-03-01 NOTE — BH INPATIENT PSYCHIATRY PROGRESS NOTE - NSBHCHARTREVIEWINVESTIGATE_PSY_A_CORE FT
3y1m/o presenting with rash today. family endorse noting rash to right cheek yesterday, now rash involving face, b.l arm and leg. family endorses noting subjective fever 4 days ago, temp was 99 oral per mom. patient also having cough and rhinorhea. denies cp, sob, n, v, abd pain, recent travel, sick contact. EKG: NSR  QTC: 464ms

## 2021-03-01 NOTE — BH INPATIENT PSYCHIATRY PROGRESS NOTE - NSBHASSESSSUMMFT_PSY_ALL_CORE
Plan:  >Obs: Routine checks appropriate--visible on the unit, no recent aggression on unit.  No current SI. No need for CO, patient not expected to pose risk to self or others in controlled inpatient setting.  >Psychiatric Meds:  Observe for tolerability and efficacy. Received Prolixin Deconate 50mg on 1/26/21, next dose on 2/26/21 , Increase Olanzapine 15mg daily , 10mg qhs for psychosis, decrease Lithium 1350mg daily for mood stabilization.  >Labs: Lithium serum level resulted 1.2 on 1/29/21.  Lithium order for 2/11/21, resulted 1.3 reduce Lithium dose Lithium level resulted 0.9 on 2/17/21.   >Medical: No acute concerns.   >Diet: regular  >Social: milieu /structured therapy  >Treatment Interventions: Groups and Individual Therapy/CBT, Motivational counseling for substance abuse related issues.   >Dispo: Patient has been accepted for State Hospitalization at [place].  Patient is on waiting list for bed.

## 2021-03-01 NOTE — BH INPATIENT PSYCHIATRY PROGRESS NOTE - OTHER
Bizarre, disorganized, sexually provocative  Aparna has slightly attenuated  Still having bouts of aggression, however, frequency has significantly decreased Bizarre animated affect Patient is internally preoccupied, she is observed talking to self. Patient denies any AH  2/8/21: continues to talk to self, RIS

## 2021-03-01 NOTE — BH INPATIENT PSYCHIATRY PROGRESS NOTE - NSBHCHARTREVIEWVS_PSY_A_CORE FT
Vital Signs Last 24 Hrs  T(C): 36.7 (28 Feb 2021 21:35), Max: 36.7 (28 Feb 2021 21:35)  T(F): 98 (28 Feb 2021 21:35), Max: 98 (28 Feb 2021 21:35)  HR:   BP:   BP(mean): --  RR:   SpO2:

## 2021-03-01 NOTE — BH INPATIENT PSYCHIATRY PROGRESS NOTE - NSBHFUPINTERVALHXFT_PSY_A_CORE
Chart reviewed, and case discussed with treatment team.  Per nursing overall patient had a quite weekend.  However last night patient had verbal altercation with another peer, was easily redirected and accepted po prn medication with good effect. No further aggression on unit.  Patient remains compliant with all standing medications, received Prolixin Dec 50mg on 2/25/21.  No SE noted.  Patient offers no complaints, inquired about State transfer.  Patient is explained that she was accepted and is on waiting list. Good sleep and appetite reported.  Patient endorses delusions, still disorganized, bizarre, with internal preoccupied. Patient seems to be at baseline, symptomatically stable. No clinical worsening.  Denies SI/HI. Waiting for bed at [place].

## 2021-03-02 PROCEDURE — 99232 SBSQ HOSP IP/OBS MODERATE 35: CPT

## 2021-03-02 RX ADMIN — Medication 2 MILLIGRAM(S): at 13:59

## 2021-03-02 RX ADMIN — OLANZAPINE 15 MILLIGRAM(S): 15 TABLET, FILM COATED ORAL at 12:21

## 2021-03-02 RX ADMIN — LITHIUM CARBONATE 1350 MILLIGRAM(S): 300 TABLET, EXTENDED RELEASE ORAL at 20:06

## 2021-03-02 RX ADMIN — HALOPERIDOL DECANOATE 5 MILLIGRAM(S): 100 INJECTION INTRAMUSCULAR at 20:05

## 2021-03-02 RX ADMIN — HALOPERIDOL DECANOATE 5 MILLIGRAM(S): 100 INJECTION INTRAMUSCULAR at 13:58

## 2021-03-02 RX ADMIN — Medication 50 MILLIGRAM(S): at 13:59

## 2021-03-02 RX ADMIN — OLANZAPINE 10 MILLIGRAM(S): 15 TABLET, FILM COATED ORAL at 20:06

## 2021-03-02 RX ADMIN — Medication 50 MILLIGRAM(S): at 20:05

## 2021-03-02 RX ADMIN — Medication 2 MILLIGRAM(S): at 20:05

## 2021-03-02 NOTE — BH INPATIENT PSYCHIATRY PROGRESS NOTE - NSBHCHARTREVIEWVS_PSY_A_CORE FT
Vital Signs Last 24 Hrs  T(C): 36.6 (02 Mar 2021 00:34), Max: 36.6 (01 Mar 2021 18:53)  T(F): 97.8 (02 Mar 2021 00:34), Max: 97.8 (01 Mar 2021 18:53)  HR: --  BP: --  BP(mean): --  RR: 18 (02 Mar 2021 00:34) (18 - 18)  SpO2: 99% (02 Mar 2021 00:34) (99% - 99%)

## 2021-03-02 NOTE — BH INPATIENT PSYCHIATRY PROGRESS NOTE - NSBHASSESSSUMMFT_PSY_ALL_CORE
Plan:  >Obs: Routine checks appropriate--visible on the unit, no recent aggression on unit.  No current SI. No need for CO, patient not expected to pose risk to self or others in controlled inpatient setting.  >Psychiatric Meds:  Observe for tolerability and efficacy. Received Prolixin Deconate 50mg on 1/26/21, next dose on 2/26/21 , Increase Olanzapine 15mg daily , 10mg qhs for psychosis, decrease Lithium 1350mg daily for mood stabilization.  >Labs: Lithium serum level resulted 1.2 on 1/29/21.  Lithium order for 2/11/21, resulted 1.3 reduce Lithium dose Lithium level resulted 0.9 on 2/17/21.   >Medical: No acute concerns.   >Diet: regular  >Social: milieu /structured therapy  >Treatment Interventions: Groups and Individual Therapy/CBT, Motivational counseling for substance abuse related issues.   >Dispo: Patient has been accepted for State Hospitalization at [place].  Patient is on waiting list for bed.

## 2021-03-02 NOTE — BH INPATIENT PSYCHIATRY PROGRESS NOTE - OTHER
Bizarre animated affect Bizarre, disorganized, sexually provocative  Aparna has slightly attenuated  Still having bouts of aggression, however, frequency has significantly decreased Patient is internally preoccupied, she is observed talking to self. Patient denies any AH  2/8/21: continues to talk to self, RIS

## 2021-03-03 PROCEDURE — 99232 SBSQ HOSP IP/OBS MODERATE 35: CPT

## 2021-03-03 RX ADMIN — Medication 50 MILLIGRAM(S): at 16:23

## 2021-03-03 RX ADMIN — OLANZAPINE 10 MILLIGRAM(S): 15 TABLET, FILM COATED ORAL at 20:05

## 2021-03-03 RX ADMIN — LITHIUM CARBONATE 1350 MILLIGRAM(S): 300 TABLET, EXTENDED RELEASE ORAL at 20:05

## 2021-03-03 RX ADMIN — HALOPERIDOL DECANOATE 5 MILLIGRAM(S): 100 INJECTION INTRAMUSCULAR at 16:23

## 2021-03-03 RX ADMIN — Medication 2 MILLIGRAM(S): at 16:23

## 2021-03-03 NOTE — BH INPATIENT PSYCHIATRY PROGRESS NOTE - NSBHASSESSSUMMFT_PSY_ALL_CORE
Plan:  >Obs: Routine checks appropriate--visible on the unit, no recent aggression on unit.  No current SI. No need for CO, patient not expected to pose risk to self or others in controlled inpatient setting.  >Psychiatric Meds:  Observe for tolerability and efficacy. Received Prolixin Deconate 50mg on 1/26/21, next dose on 2/26/21 , Increase Olanzapine 15mg daily , 10mg qhs for psychosis, decrease Lithium 1350mg daily for mood stabilization.  >Labs: Lithium serum level resulted 1.2 on 1/29/21.  Lithium order for 2/11/21, resulted 1.3 reduce Lithium dose Lithium level resulted 0.9 on 2/17/21.   >Medical: No acute concerns.   >Diet: regular  >Social: milieu /structured therapy  >Treatment Interventions: Groups and Individual Therapy/CBT, Motivational counseling for substance abuse related issues.   >Dispo: Patient has been accepted for State Hospitalization at [place].  Patient is on waiting list for bed.

## 2021-03-03 NOTE — BH INPATIENT PSYCHIATRY PROGRESS NOTE - NSBHCHARTREVIEWVS_PSY_A_CORE FT
Vital Signs Last 24 Hrs  T(C): 36.2 (03 Mar 2021 06:32), Max: 36.8 (02 Mar 2021 22:34)  T(F): 97.2 (03 Mar 2021 06:32), Max: 98.2 (02 Mar 2021 22:34)  HR: --  BP: --  BP(mean): --  RR: 18 (02 Mar 2021 22:19) (18 - 18)  SpO2: 99% (02 Mar 2021 22:19) (99% - 99%)

## 2021-03-03 NOTE — BH INPATIENT PSYCHIATRY PROGRESS NOTE - NSBHFUPINTERVALHXFT_PSY_A_CORE
Chart reviewed, and case discussed with treatment team.  Per nursing yesterday evening patient was observed nude in hallway, with feces on her face and body.  Patient was easily redirected to take a shower.  Patient complied, showered and changed into her own clothing.  Today patient is seen at the nursing station, she smiles brightly at writer and unsolicited stated “I did it again yesterday.”  When asked further patient admits to smearing feces on her face/body.  Patient reports that she did that because “that’s how white people see me.”  Patient remains compliant with all standing medications. No SE noted.  Good sleep and appetite reported.  Patient endorses delusions, psychotic disorganization, bizarre, with internal preoccupied. Patient seems to be at baseline, symptomatically stable. No clinical worsening.  Denies SI/HI. Waiting for bed at [place].

## 2021-03-04 PROCEDURE — 99232 SBSQ HOSP IP/OBS MODERATE 35: CPT

## 2021-03-04 RX ADMIN — HALOPERIDOL DECANOATE 5 MILLIGRAM(S): 100 INJECTION INTRAMUSCULAR at 20:25

## 2021-03-04 RX ADMIN — OLANZAPINE 10 MILLIGRAM(S): 15 TABLET, FILM COATED ORAL at 20:24

## 2021-03-04 RX ADMIN — PANTOPRAZOLE SODIUM 40 MILLIGRAM(S): 20 TABLET, DELAYED RELEASE ORAL at 08:44

## 2021-03-04 RX ADMIN — HALOPERIDOL DECANOATE 5 MILLIGRAM(S): 100 INJECTION INTRAMUSCULAR at 12:07

## 2021-03-04 RX ADMIN — OLANZAPINE 15 MILLIGRAM(S): 15 TABLET, FILM COATED ORAL at 08:45

## 2021-03-04 RX ADMIN — Medication 50 MILLIGRAM(S): at 20:25

## 2021-03-04 RX ADMIN — LITHIUM CARBONATE 1350 MILLIGRAM(S): 300 TABLET, EXTENDED RELEASE ORAL at 20:24

## 2021-03-04 RX ADMIN — Medication 2 MILLIGRAM(S): at 12:07

## 2021-03-04 RX ADMIN — Medication 50 MILLIGRAM(S): at 12:06

## 2021-03-04 RX ADMIN — SIMETHICONE 80 MILLIGRAM(S): 80 TABLET, CHEWABLE ORAL at 21:07

## 2021-03-04 NOTE — BH INPATIENT PSYCHIATRY PROGRESS NOTE - OTHER
Patient is internally preoccupied, she is observed talking to self. Patient denies any AH  2/8/21: continues to talk to self, RIS Bizarre animated affect Bizarre, disorganized, sexually provocative  Aparna has slightly attenuated  Still having bouts of aggression, however, frequency has significantly decreased

## 2021-03-04 NOTE — BH INPATIENT PSYCHIATRY PROGRESS NOTE - NSBHASSESSSUMMFT_PSY_ALL_CORE
Plan:  >Obs: Routine checks appropriate--visible on the unit, no recent aggression on unit.  No current SI. No need for CO, patient not expected to pose risk to self or others in controlled inpatient setting.  >Psychiatric Meds:  Observe for tolerability and efficacy. Received Prolixin Deconate 50mg on 1/26/21, next dose on 2/26/21 , Increase Olanzapine 15mg daily , 10mg qhs for psychosis, decrease Lithium 1350mg daily for mood stabilization.  >Labs: Lithium serum level resulted 1.2 on 1/29/21.  Lithium order for 2/11/21, resulted 1.3 reduce Lithium dose Lithium level resulted 0.9 on 2/17/21.   >Medical: No acute concerns.   >Diet: regular  >Social: milieu /structured therapy  >Treatment Interventions: Groups and Individual Therapy/CBT, Motivational counseling for substance abuse related issues.   >Dispo: Patient has been accepted for State Hospitalization at [place].  Patient is on waiting list for bed.

## 2021-03-04 NOTE — BH INPATIENT PSYCHIATRY PROGRESS NOTE - NSBHFUPINTERVALHXFT_PSY_A_CORE
Chart reviewed, and case discussed with treatment team.  Patient remains compliant with all standing medications. No SE noted.  Good sleep and appetite reported.  Patient is observed on unit today, appropriately dressed in own clothes and is wearing makeup.  Patient reports being a good mood.  As interview went on patient’s response to questions became more irrelevant, illogical, loosely associated. Patient endorses delusions, psychotic disorganization, bizarre, with internal preoccupied. Patient shows writer her tattoos on her forearms, began yelling “I want to stand out from the rest.” Patient seems to be at baseline, symptomatically stable. No clinical worsening.  Denies SI/HI. Waiting for bed at [place].

## 2021-03-04 NOTE — BH INPATIENT PSYCHIATRY PROGRESS NOTE - NSBHCHARTREVIEWVS_PSY_A_CORE FT
Vital Signs Last 24 Hrs  T(C): 36.5 (04 Mar 2021 06:35), Max: 36.5 (04 Mar 2021 06:35)  T(F): 97.7 (04 Mar 2021 06:35), Max: 97.7 (04 Mar 2021 06:35)  HR: --  BP: --  BP(mean): --  RR: 18 (03 Mar 2021 23:53) (18 - 18)  SpO2: 99% (03 Mar 2021 23:53) (99% - 99%)

## 2021-03-05 PROCEDURE — 99232 SBSQ HOSP IP/OBS MODERATE 35: CPT

## 2021-03-05 RX ADMIN — OLANZAPINE 10 MILLIGRAM(S): 15 TABLET, FILM COATED ORAL at 22:19

## 2021-03-05 RX ADMIN — HALOPERIDOL DECANOATE 5 MILLIGRAM(S): 100 INJECTION INTRAMUSCULAR at 14:14

## 2021-03-05 RX ADMIN — HALOPERIDOL DECANOATE 5 MILLIGRAM(S): 100 INJECTION INTRAMUSCULAR at 20:03

## 2021-03-05 RX ADMIN — Medication 50 MILLIGRAM(S): at 20:02

## 2021-03-05 RX ADMIN — LITHIUM CARBONATE 1350 MILLIGRAM(S): 300 TABLET, EXTENDED RELEASE ORAL at 22:18

## 2021-03-05 RX ADMIN — Medication 50 MILLIGRAM(S): at 14:14

## 2021-03-05 RX ADMIN — Medication 2 MILLIGRAM(S): at 20:20

## 2021-03-05 RX ADMIN — Medication 2 MILLIGRAM(S): at 14:13

## 2021-03-05 NOTE — BH INPATIENT PSYCHIATRY PROGRESS NOTE - NSBHASSESSSUMMFT_PSY_ALL_CORE
Plan:  >Obs: Routine checks appropriate--visible on the unit, no recent aggression on unit.  No current SI. No need for CO, patient not expected to pose risk to self or others in controlled inpatient setting.  >Psychiatric Meds:  Observe for tolerability and efficacy. Received Prolixin Deconate 50mg on 1/26/21, next dose on 2/26/21 , Increase Olanzapine 15mg daily , 10mg qhs for psychosis, decrease Lithium 1350mg daily for mood stabilization.  >Labs: Lithium serum level resulted 1.2 on 1/29/21.  Lithium order for 2/11/21, resulted 1.3 reduce Lithium dose Lithium level resulted 0.9 on 2/17/21.   >Medical: No acute concerns.   >Diet: regular  >Social: milieu /structured therapy  >Treatment Interventions: Groups and Individual Therapy/CBT, Motivational counseling for substance abuse related issues.   >Dispo: Patient has been accepted for State Hospitalization at [place].  Patient is on waiting list for bed.

## 2021-03-05 NOTE — BH INPATIENT PSYCHIATRY PROGRESS NOTE - NSBHCHARTREVIEWVS_PSY_A_CORE FT
Vital Signs Last 24 Hrs  T(C): 37 (05 Mar 2021 07:26), Max: 37 (05 Mar 2021 07:26)  T(F): 98.6 (05 Mar 2021 07:26), Max: 98.6 (05 Mar 2021 07:26)  HR: --  BP: --  BP(mean): --  RR: 18 (05 Mar 2021 00:04) (18 - 18)  SpO2: 99% (05 Mar 2021 00:04) (99% - 99%)

## 2021-03-05 NOTE — BH INPATIENT PSYCHIATRY PROGRESS NOTE - NSBHFUPINTERVALHXFT_PSY_A_CORE
Chart reviewed, and case discussed with treatment team.  Patient remains compliant with all standing medications. No SE noted.  Good sleep and appetite reported.  Patient reports being in a “fine” mood.  Patient is seen dancing in front of nursing station, easily redirected.   She remains illogical, loosely associated. delusional, psychotic disorganization, bizarre, with internal preoccupied. Patient seems to be at baseline, symptomatically stable. No aggression on unit, no prn for aggression. No clinical worsening.  Denies SI/HI. Waiting for bed at [place].

## 2021-03-06 RX ORDER — B-COMPLEX WITH VITAMIN C
1 CAPSULE ORAL
Refills: 0 | Status: DISCONTINUED | OUTPATIENT
Start: 2021-03-06 | End: 2021-04-07

## 2021-03-06 RX ADMIN — Medication 2 MILLIGRAM(S): at 17:58

## 2021-03-06 RX ADMIN — LITHIUM CARBONATE 1350 MILLIGRAM(S): 300 TABLET, EXTENDED RELEASE ORAL at 20:03

## 2021-03-06 RX ADMIN — Medication 50 MILLIGRAM(S): at 17:58

## 2021-03-06 RX ADMIN — SIMETHICONE 80 MILLIGRAM(S): 80 TABLET, CHEWABLE ORAL at 11:41

## 2021-03-06 RX ADMIN — OLANZAPINE 15 MILLIGRAM(S): 15 TABLET, FILM COATED ORAL at 09:29

## 2021-03-06 RX ADMIN — OLANZAPINE 10 MILLIGRAM(S): 15 TABLET, FILM COATED ORAL at 20:02

## 2021-03-06 RX ADMIN — PANTOPRAZOLE SODIUM 40 MILLIGRAM(S): 20 TABLET, DELAYED RELEASE ORAL at 09:29

## 2021-03-06 RX ADMIN — HALOPERIDOL DECANOATE 5 MILLIGRAM(S): 100 INJECTION INTRAMUSCULAR at 17:58

## 2021-03-07 RX ADMIN — HALOPERIDOL DECANOATE 5 MILLIGRAM(S): 100 INJECTION INTRAMUSCULAR at 07:34

## 2021-03-07 RX ADMIN — OLANZAPINE 10 MILLIGRAM(S): 15 TABLET, FILM COATED ORAL at 20:09

## 2021-03-07 RX ADMIN — PANTOPRAZOLE SODIUM 40 MILLIGRAM(S): 20 TABLET, DELAYED RELEASE ORAL at 07:34

## 2021-03-07 RX ADMIN — HALOPERIDOL DECANOATE 5 MILLIGRAM(S): 100 INJECTION INTRAMUSCULAR at 15:49

## 2021-03-07 RX ADMIN — Medication 50 MILLIGRAM(S): at 15:51

## 2021-03-07 RX ADMIN — Medication 2 MILLIGRAM(S): at 12:08

## 2021-03-07 RX ADMIN — OLANZAPINE 15 MILLIGRAM(S): 15 TABLET, FILM COATED ORAL at 07:34

## 2021-03-07 RX ADMIN — LITHIUM CARBONATE 1350 MILLIGRAM(S): 300 TABLET, EXTENDED RELEASE ORAL at 20:09

## 2021-03-08 PROCEDURE — 99232 SBSQ HOSP IP/OBS MODERATE 35: CPT

## 2021-03-08 RX ADMIN — PANTOPRAZOLE SODIUM 40 MILLIGRAM(S): 20 TABLET, DELAYED RELEASE ORAL at 08:59

## 2021-03-08 RX ADMIN — OLANZAPINE 10 MILLIGRAM(S): 15 TABLET, FILM COATED ORAL at 20:10

## 2021-03-08 RX ADMIN — Medication 50 MILLIGRAM(S): at 16:37

## 2021-03-08 RX ADMIN — Medication 2 MILLIGRAM(S): at 16:38

## 2021-03-08 RX ADMIN — OLANZAPINE 15 MILLIGRAM(S): 15 TABLET, FILM COATED ORAL at 08:58

## 2021-03-08 RX ADMIN — SIMETHICONE 80 MILLIGRAM(S): 80 TABLET, CHEWABLE ORAL at 17:36

## 2021-03-08 RX ADMIN — LITHIUM CARBONATE 1350 MILLIGRAM(S): 300 TABLET, EXTENDED RELEASE ORAL at 20:10

## 2021-03-08 RX ADMIN — HALOPERIDOL DECANOATE 5 MILLIGRAM(S): 100 INJECTION INTRAMUSCULAR at 16:37

## 2021-03-08 NOTE — BH INPATIENT PSYCHIATRY PROGRESS NOTE - NSBHCHARTREVIEWVS_PSY_A_CORE FT
Vital Signs Last 24 Hrs  T(C): 36.1 (08 Mar 2021 06:20), Max: 36.7 (07 Mar 2021 19:54)  T(F): 96.9 (08 Mar 2021 06:20), Max: 98 (07 Mar 2021 19:54)  HR: --  BP: --  BP(mean): --  RR: 20 (07 Mar 2021 22:04) (20 - 20)  SpO2: 100% (07 Mar 2021 22:04) (100% - 100%)

## 2021-03-08 NOTE — BH INPATIENT PSYCHIATRY PROGRESS NOTE - NSBHASSESSSUMMFT_PSY_ALL_CORE
Plan:  >Obs: Routine checks appropriate--visible on the unit, no recent aggression on unit.  No current SI. No need for CO, patient not expected to pose risk to self or others in controlled inpatient setting.  >Psychiatric Meds:  Observe for tolerability and efficacy. Received Prolixin Deconate 50mg on 1/26/21, next dose on 2/26/21 , Increase Olanzapine 15mg daily , 10mg qhs for psychosis, decrease Lithium 1350mg daily for mood stabilization.  >Labs: Lithium serum level resulted 1.2 on 1/29/21.  Lithium order for 2/11/21, resulted 1.3 reduce Lithium dose Lithium level resulted 0.9 on 2/17/21.   >Medical: No acute concerns.   >Diet: regular  >Social: milieu /structured therapy  >Treatment Interventions: Groups and Individual Therapy/CBT, Motivational counseling for substance abuse related issues.   >Dispo: Patient has been accepted for State Hospitalization at [place].  Patient is on waiting list for bed.

## 2021-03-08 NOTE — BH INPATIENT PSYCHIATRY PROGRESS NOTE - NSBHADMITIPREASONDETAILS_PSY_A_CORE FT
Inability to care for self, disorganization
Disorganization
Inability to care for self, disorganization
Patient remains psychotic. Continue current medications. 
Inability to care for self, disorganization
Patient is less psychotic but is refusing medications now. 
Inability to care for self, disorganization
Patient remains psychotic. Continue current medications. 
Inability to care for self, disorganization

## 2021-03-08 NOTE — BH INPATIENT PSYCHIATRY PROGRESS NOTE - NSBHROSSYSTEMS_PSY_ALL_CORE
Respiratory.../Gastrointestinal...

## 2021-03-09 PROCEDURE — 99233 SBSQ HOSP IP/OBS HIGH 50: CPT

## 2021-03-09 RX ORDER — DIPHENHYDRAMINE HCL 50 MG
50 CAPSULE ORAL ONCE
Refills: 0 | Status: COMPLETED | OUTPATIENT
Start: 2021-03-09 | End: 2021-03-09

## 2021-03-09 RX ORDER — HALOPERIDOL DECANOATE 100 MG/ML
5 INJECTION INTRAMUSCULAR ONCE
Refills: 0 | Status: COMPLETED | OUTPATIENT
Start: 2021-03-09 | End: 2021-03-09

## 2021-03-09 RX ADMIN — Medication 2 MILLIGRAM(S): at 15:49

## 2021-03-09 RX ADMIN — Medication 2 MILLIGRAM(S): at 23:55

## 2021-03-09 RX ADMIN — Medication 50 MILLIGRAM(S): at 23:55

## 2021-03-09 RX ADMIN — HALOPERIDOL DECANOATE 5 MILLIGRAM(S): 100 INJECTION INTRAMUSCULAR at 23:53

## 2021-03-09 RX ADMIN — Medication 2 MILLIGRAM(S): at 16:19

## 2021-03-09 RX ADMIN — Medication 50 MILLIGRAM(S): at 16:19

## 2021-03-09 RX ADMIN — PANTOPRAZOLE SODIUM 40 MILLIGRAM(S): 20 TABLET, DELAYED RELEASE ORAL at 09:06

## 2021-03-09 RX ADMIN — HALOPERIDOL DECANOATE 5 MILLIGRAM(S): 100 INJECTION INTRAMUSCULAR at 15:49

## 2021-03-09 RX ADMIN — HALOPERIDOL DECANOATE 5 MILLIGRAM(S): 100 INJECTION INTRAMUSCULAR at 16:19

## 2021-03-09 RX ADMIN — Medication 50 MILLIGRAM(S): at 15:49

## 2021-03-09 RX ADMIN — OLANZAPINE 15 MILLIGRAM(S): 15 TABLET, FILM COATED ORAL at 09:06

## 2021-03-09 NOTE — BH INPATIENT PSYCHIATRY PROGRESS NOTE - NSBHFUPINTERVALHXFT_PSY_A_CORE
Chart reviewed, and case discussed with treatment team. Some aggression last night with other peers, required po prn medications received Haldol 5mg Benadryl 50mg and Ativan 2mg, with desired effect.   No clinical worsening. Patient remains compliant with all standing medications. No SE noted.  Patient is dressed appropriately today, ADL’s are good.  Patient is seen in dayroom during breakfast kneeling at table and eating.  Patient is offered a chair but refuses it.  Patient reports feeling “fine.”  Patient reports “I know what got me here, I wanted to get shot by police because I’m black.”  Patient repeatedly stating “that’s how I got here.”  She remains nonsensical, loosely associated. delusional, psychotic disorganization, bizarre, with internal preoccupied. Patient seems to be at baseline, symptomatically stable. No aggression on unit, no prn for aggression. Denies SI/HI. Waiting for bed at [place].

## 2021-03-09 NOTE — BH INPATIENT PSYCHIATRY PROGRESS NOTE - NSBHCHARTREVIEWLAB_PSY_A_CORE FT
updated labs ordered for 3/10/21: CBc, CMP Lithium serum level resulted 1.2 on 1/29/21  Lipid panel WNL ,  no signs of metabolic syndrome  Lithium serum level resulted 1.3 on 2/11/21, no signs of toxicity, reduce Lithium dose  Lithium level resulted 0.9 on 2/17/21  updated labs ordered for 3/10/21: CBC, CMP, Lithium

## 2021-03-09 NOTE — BH INPATIENT PSYCHIATRY PROGRESS NOTE - NSBHCHARTREVIEWVS_PSY_A_CORE FT
Vital Signs Last 24 Hrs  T(C): 36.5 (09 Mar 2021 06:30), Max: 36.5 (09 Mar 2021 06:30)  T(F): 97.7 (09 Mar 2021 06:30), Max: 97.7 (09 Mar 2021 06:30)  HR: --  BP: --  BP(mean): --  RR: 18 (08 Mar 2021 20:27) (18 - 18)  SpO2: 100% (08 Mar 2021 20:27) (100% - 100%)

## 2021-03-09 NOTE — BH INPATIENT PSYCHIATRY PROGRESS NOTE - NSBHASSESSSUMMFT_PSY_ALL_CORE
Plan:  >Obs: Routine checks appropriate--visible on the unit, no recent aggression on unit.  No current SI. No need for CO, patient not expected to pose risk to self or others in controlled inpatient setting.  >Psychiatric Meds:  Observe for tolerability and efficacy. Received Prolixin Deconate 50mg on 1/26/21, next dose on 2/26/21 , Increase Olanzapine 15mg daily , 10mg qhs for psychosis, decrease Lithium 1350mg daily for mood stabilization.  >Labs: Lithium serum level resulted 1.2 on 1/29/21.  Lithium order for 2/11/21, resulted 1.3 reduce Lithium dose Lithium level resulted 0.9 on 2/17/21.   >Medical: No acute concerns.   >Diet: regular  >Social: milieu /structured therapy  >Treatment Interventions: Groups and Individual Therapy/CBT, Motivational counseling for substance abuse related issues.   >Dispo: Patient has been accepted for State Hospitalization at [place].  Patient is on waiting list for bed.   Plan:  >Obs: Routine checks appropriate--visible on the unit, no recent aggression on unit.  No current SI. No need for CO, patient not expected to pose risk to self or others in controlled inpatient setting.  >Psychiatric Meds:  Observe for tolerability and efficacy. Received Prolixin Deconate 50mg on 1/26/21, next dose on 2/26/21 , Increase Olanzapine 15mg daily , 10mg qhs for psychosis, decrease Lithium 1350mg daily for mood stabilization.  >Labs: Lithium serum level resulted 1.2 on 1/29/21.  Lithium order for 2/11/21, resulted 1.3 reduce Lithium dose Lithium level resulted 0.9 on 2/17/21. Updated labs ordered for 3/10/21: CBC, CMP, Lithium, EKG   >Medical: No acute concerns.   >Diet: regular  >Social: milieu /structured therapy  >Treatment Interventions: Groups and Individual Therapy/CBT, Motivational counseling for substance abuse related issues.   >Dispo: Patient has been accepted for State Hospitalization at [place].  Patient is on waiting list for bed.

## 2021-03-09 NOTE — BH CHART NOTE - NSEVENTNOTEFT_PSY_ALL_CORE
Interval History:  After restraint placement at 4:30pm, pt physical exam completed. Pt placed in restraints comfortably. Previous prn medication with modest effect, patient denies physical pain or complaints. Her TP is tangential with loose associations.    T(C): 36.5 (03-09-21 @ 06:30), Max: 36.5 (03-09-21 @ 06:30)  HR: --  BP: --  RR: 18 (03-08-21 @ 20:27) (18 - 18)  SpO2: 100% (03-08-21 @ 20:27) (100% - 100%)    Physical Exam:  Gen: Patient placed comfortably in restraints, NAD.   HEENT: NC/AT,  EOMI.   Resp: Breathing comfortably, nonlabored   Ext: Restraints placed appropriately on all extremities (able to easily fit 2 fingers under each restraint). No clubbing, edema, or cyanosis. 5/5 strength throughout all extremities. 2+ pulses of all extremities.     Assessment:  RIKI called for patient agitation and destruction of property. Pt received IM prn medication at 4pm with poor effect and required 4-point restraints for continued agitation. Pt placed comfortably in restraints, clinically stable with exam otherwise unremarkable.     Patient released from restraints at 5:15pm.

## 2021-03-10 PROCEDURE — 99233 SBSQ HOSP IP/OBS HIGH 50: CPT

## 2021-03-10 RX ADMIN — LITHIUM CARBONATE 1350 MILLIGRAM(S): 300 TABLET, EXTENDED RELEASE ORAL at 21:21

## 2021-03-10 RX ADMIN — HALOPERIDOL DECANOATE 5 MILLIGRAM(S): 100 INJECTION INTRAMUSCULAR at 11:34

## 2021-03-10 RX ADMIN — Medication 2 MILLIGRAM(S): at 11:33

## 2021-03-10 RX ADMIN — Medication 2 MILLIGRAM(S): at 21:22

## 2021-03-10 RX ADMIN — HALOPERIDOL DECANOATE 5 MILLIGRAM(S): 100 INJECTION INTRAMUSCULAR at 21:23

## 2021-03-10 RX ADMIN — Medication 50 MILLIGRAM(S): at 21:22

## 2021-03-10 RX ADMIN — OLANZAPINE 10 MILLIGRAM(S): 15 TABLET, FILM COATED ORAL at 21:22

## 2021-03-10 RX ADMIN — Medication 50 MILLIGRAM(S): at 11:33

## 2021-03-10 NOTE — BH INPATIENT PSYCHIATRY PROGRESS NOTE - NSBHCHARTREVIEWVS_PSY_A_CORE FT
Vital Signs Last 24 Hrs  T(C): 36.1 (10 Mar 2021 06:20), Max: 36.8 (09 Mar 2021 16:45)  T(F): 96.9 (10 Mar 2021 06:20), Max: 98.2 (09 Mar 2021 16:45)  HR: 88 (09 Mar 2021 16:45) (88 - 88)  BP: 118/81 (09 Mar 2021 16:45) (118/81 - 118/81)  BP(mean): --  RR: 18 (09 Mar 2021 16:45) (18 - 18)  SpO2: 100% (09 Mar 2021 16:45) (100% - 100%)

## 2021-03-10 NOTE — BH INPATIENT PSYCHIATRY PROGRESS NOTE - NSBHASSESSSUMMFT_PSY_ALL_CORE
Plan:  >Obs: Routine checks appropriate--visible on the unit, no recent aggression on unit.  No current SI. No need for CO, patient not expected to pose risk to self or others in controlled inpatient setting.  >Psychiatric Meds:  Observe for tolerability and efficacy. Received Prolixin Deconate 50mg on 1/26/21, next dose on 2/26/21 , Increase Olanzapine 15mg daily , 10mg qhs for psychosis, decrease Lithium 1350mg daily for mood stabilization.  >Labs: Lithium serum level resulted 1.2 on 1/29/21.  Lithium order for 2/11/21, resulted 1.3 reduce Lithium dose Lithium level resulted 0.9 on 2/17/21. Updated labs ordered for 3/10/21: CBC, CMP, Lithium, EKG   >Medical: No acute concerns.   >Diet: regular  >Social: milieu /structured therapy  >Treatment Interventions: Groups and Individual Therapy/CBT, Motivational counseling for substance abuse related issues.   >Dispo: Patient has been accepted for State Hospitalization at [place].  Patient is on waiting list for bed.

## 2021-03-10 NOTE — BH INPATIENT PSYCHIATRY PROGRESS NOTE - NSBHCHARTREVIEWLAB_PSY_A_CORE FT
Lithium serum level resulted 1.2 on 1/29/21  Lipid panel WNL ,  no signs of metabolic syndrome  Lithium serum level resulted 1.3 on 2/11/21, no signs of toxicity, reduce Lithium dose  Lithium level resulted 0.9 on 2/17/21  updated labs ordered for 3/10/21: CBC, CMP, Lithium

## 2021-03-10 NOTE — BH INPATIENT PSYCHIATRY PROGRESS NOTE - OTHER
Patient is internally preoccupied, she is observed talking to self. Patient denies any AH  2/8/21: continues to talk to self, RIS Bizarre, disorganized, sexually provocative  Aparna has slightly attenuated  Still having bouts of aggression, however, frequency has significantly decreased Bizarre animated affect

## 2021-03-10 NOTE — BH INPATIENT PSYCHIATRY PROGRESS NOTE - NSBHFUPINTERVALHXFT_PSY_A_CORE
Chart reviewed, and case discussed with treatment team. Per nursing patient agitated yesterday evening, while outside during fresh air break,  smearing dirt on her face, refusing to come back in when fresh air break was over . Per nursing, pt was destroying property and non-redirectable. Patient agitation and destruction of property. Pt received IM prn medication at 4pm with poor effect and required 4-point restraints for continued agitation. Pt was placed in 4 pt restraints from 4:30 - 5:15.  Patient is asked about this today, patient went into a psychotic rant,  nonsensical. Patient in better mood today, calmer on unit, dressed appropriately in own clothes, has appropriate make up on, hair combed.  Patient remains compliant with all standing medications. No SE noted.  + psychotic disorganization, bizarre, with internal preoccupied. Patient seems to be at baseline, symptomatically stable. No aggression on unit, no prn for aggression. Denies SI/HI. Waiting for bed at [place].

## 2021-03-11 PROCEDURE — 99233 SBSQ HOSP IP/OBS HIGH 50: CPT

## 2021-03-11 RX ADMIN — HALOPERIDOL DECANOATE 5 MILLIGRAM(S): 100 INJECTION INTRAMUSCULAR at 16:50

## 2021-03-11 RX ADMIN — Medication 2 MILLIGRAM(S): at 16:50

## 2021-03-11 RX ADMIN — PANTOPRAZOLE SODIUM 40 MILLIGRAM(S): 20 TABLET, DELAYED RELEASE ORAL at 08:20

## 2021-03-11 RX ADMIN — OLANZAPINE 15 MILLIGRAM(S): 15 TABLET, FILM COATED ORAL at 08:20

## 2021-03-11 RX ADMIN — LITHIUM CARBONATE 1350 MILLIGRAM(S): 300 TABLET, EXTENDED RELEASE ORAL at 20:43

## 2021-03-11 RX ADMIN — Medication 50 MILLIGRAM(S): at 16:51

## 2021-03-11 RX ADMIN — OLANZAPINE 10 MILLIGRAM(S): 15 TABLET, FILM COATED ORAL at 20:43

## 2021-03-11 NOTE — BH INPATIENT PSYCHIATRY PROGRESS NOTE - NSBHASSESSSUMMFT_PSY_ALL_CORE
Plan:  >Obs: Routine checks appropriate--visible on the unit, no recent aggression on unit.  No current SI. No need for CO, patient not expected to pose risk to self or others in controlled inpatient setting.  >Psychiatric Meds:  Observe for tolerability and efficacy. Received Prolixin Deconate 50mg on 1/26/21, next dose on 2/26/21 , Increase Olanzapine 15mg daily , 10mg qhs for psychosis, decrease Lithium 1350mg daily for mood stabilization.  >Labs: Lithium serum level resulted 1.2 on 1/29/21.  Lithium order for 2/11/21, resulted 1.3 reduce Lithium dose Lithium level resulted 0.9 on 2/17/21. Updated labs ordered for 3/10/21: CBC, CMP, Lithium, EKG   >Medical: No acute concerns.   >Diet: regular  >Social: milieu /structured therapy  >Treatment Interventions: Groups and Individual Therapy/CBT, Motivational counseling for substance abuse related issues.   >Dispo: Patient has been accepted for State Hospitalization at [place].  Patient is on waiting list for bed.

## 2021-03-11 NOTE — BH INPATIENT PSYCHIATRY PROGRESS NOTE - NSBHCHARTREVIEWVS_PSY_A_CORE FT
Vital Signs Last 24 Hrs  T(C): 36.3 (11 Mar 2021 06:18), Max: 36.3 (10 Mar 2021 14:17)  T(F): 97.3 (11 Mar 2021 06:18), Max: 97.4 (10 Mar 2021 14:17)  HR: --  BP: --  BP(mean): --  RR: --  SpO2: --

## 2021-03-11 NOTE — ED PROVIDER NOTE - PHYSICAL EXAMINATION
CONSTITUTIONAL: Well appearing, well nourished, awake, alert, oriented to person, place, time/situation and in no apparent distress  ENMT: Airway patent  EYES: Clear bilaterally  CARDIAC: Normal rate, regular rhythm.  RESPIRATORY: Breath sounds clear and equal bilaterally.  ABDOMEN: Abdomen soft, non-tender, no guarding  MUSCULOSKELETAL: Spine appears normal, no deformities, equal active FROM bilaterally  NEUROLOGIC: CN II-XII grossly intact, moves all extremities without lateralization  SKIN: Exposed skin normal color for race, warm, dry and intact, feet dirty from walking barefoot  PSCYH: Calm and cooperative I will STOP taking the medications listed below when I get home from the hospital:  None I will STOP taking the medications listed below when I get home from the hospital:    Aspir 81

## 2021-03-12 PROCEDURE — 99232 SBSQ HOSP IP/OBS MODERATE 35: CPT

## 2021-03-12 RX ADMIN — HALOPERIDOL DECANOATE 5 MILLIGRAM(S): 100 INJECTION INTRAMUSCULAR at 16:32

## 2021-03-12 RX ADMIN — Medication 2 MILLIGRAM(S): at 16:32

## 2021-03-12 RX ADMIN — Medication 50 MILLIGRAM(S): at 16:31

## 2021-03-12 NOTE — BH INPATIENT PSYCHIATRY PROGRESS NOTE - NSBHCHARTREVIEWVS_PSY_A_CORE FT
Vital Signs Last 24 Hrs  T(C): 36.4 (12 Mar 2021 06:12), Max: 36.4 (11 Mar 2021 14:40)  T(F): 97.6 (12 Mar 2021 06:12), Max: 97.6 (12 Mar 2021 06:12)  HR: --  BP: --  BP(mean): --  RR: --  SpO2: --

## 2021-03-12 NOTE — BH INPATIENT PSYCHIATRY PROGRESS NOTE - NSBHASSESSSUMMFT_PSY_ALL_CORE
Plan:  >Obs: Routine checks appropriate--visible on the unit, no recent aggression on unit.  No current SI. No need for CO, patient not expected to pose risk to self or others in controlled inpatient setting.  >Psychiatric Meds:  Observe for tolerability and efficacy. Received Prolixin Deconate 50mg on 1/26/21, next dose on 2/26/21 , Increase Olanzapine 15mg daily , 10mg qhs for psychosis, decrease Lithium 1350mg daily for mood stabilization.  >Labs: Lithium serum level resulted 1.2 on 1/29/21.  Lithium order for 2/11/21, resulted 1.3 reduce Lithium dose Lithium level resulted 0.9 on 2/17/21. Updated labs ordered for 3/10/21: CBC, CMP, Lithium, EKG   >Medical: No acute concerns.   >Diet: regular  >Social: milieu /structured therapy  >Treatment Interventions: Groups and Individual Therapy/CBT, Motivational counseling for substance abuse related issues.   >Dispo: Patient has been accepted for State Hospitalization at [place].  Patient is on waiting list for bed.

## 2021-03-12 NOTE — BH INPATIENT PSYCHIATRY PROGRESS NOTE - NSBHFUPINTERVALHXFT_PSY_A_CORE
Patient seen for follow up for psychosis, chart reviewed, and case discussed with treatment team. Per nursing last evening patient became unraveled, agitated towards staff, shouting at staff/peers,  pt also urinated in a cup while in dayroom then proceeded to drink her urine. Highly sexually provocative,  pt lifted her shirt to male staff.  Patient given po prn Haldol 5mg Ativan 2mg and Benadryl 50mg with good effect. Today patient is seen in her room, still a bit irritable with staff, shouts at staff  “I don’t want to be bothered.”  Refused morning medications and blood work.  Her thoughts remain disorganized, tangential, non-sensical.  Waiting on State transfer.

## 2021-03-13 PROCEDURE — 99232 SBSQ HOSP IP/OBS MODERATE 35: CPT

## 2021-03-13 RX ADMIN — SIMETHICONE 80 MILLIGRAM(S): 80 TABLET, CHEWABLE ORAL at 18:31

## 2021-03-13 RX ADMIN — Medication 600 MILLIGRAM(S): at 15:20

## 2021-03-13 RX ADMIN — Medication 2 MILLIGRAM(S): at 13:30

## 2021-03-13 RX ADMIN — OLANZAPINE 10 MILLIGRAM(S): 15 TABLET, FILM COATED ORAL at 20:03

## 2021-03-13 RX ADMIN — Medication 600 MILLIGRAM(S): at 14:24

## 2021-03-13 RX ADMIN — Medication 50 MILLIGRAM(S): at 13:30

## 2021-03-13 RX ADMIN — HALOPERIDOL DECANOATE 5 MILLIGRAM(S): 100 INJECTION INTRAMUSCULAR at 13:31

## 2021-03-13 RX ADMIN — Medication 1 TABLET(S): at 19:50

## 2021-03-13 RX ADMIN — LITHIUM CARBONATE 1350 MILLIGRAM(S): 300 TABLET, EXTENDED RELEASE ORAL at 20:03

## 2021-03-13 RX ADMIN — Medication 2 MILLIGRAM(S): at 19:50

## 2021-03-14 RX ADMIN — HALOPERIDOL DECANOATE 5 MILLIGRAM(S): 100 INJECTION INTRAMUSCULAR at 11:21

## 2021-03-14 RX ADMIN — Medication 2 MILLIGRAM(S): at 20:31

## 2021-03-14 RX ADMIN — OLANZAPINE 15 MILLIGRAM(S): 15 TABLET, FILM COATED ORAL at 08:28

## 2021-03-14 RX ADMIN — Medication 1 TABLET(S): at 08:28

## 2021-03-14 RX ADMIN — OLANZAPINE 10 MILLIGRAM(S): 15 TABLET, FILM COATED ORAL at 20:31

## 2021-03-14 RX ADMIN — Medication 50 MILLIGRAM(S): at 11:21

## 2021-03-14 RX ADMIN — Medication 2 MILLIGRAM(S): at 11:21

## 2021-03-14 RX ADMIN — PANTOPRAZOLE SODIUM 40 MILLIGRAM(S): 20 TABLET, DELAYED RELEASE ORAL at 08:28

## 2021-03-14 RX ADMIN — HALOPERIDOL DECANOATE 5 MILLIGRAM(S): 100 INJECTION INTRAMUSCULAR at 20:31

## 2021-03-14 RX ADMIN — LITHIUM CARBONATE 1350 MILLIGRAM(S): 300 TABLET, EXTENDED RELEASE ORAL at 20:30

## 2021-03-14 RX ADMIN — Medication 50 MILLIGRAM(S): at 20:30

## 2021-03-15 PROCEDURE — 99232 SBSQ HOSP IP/OBS MODERATE 35: CPT

## 2021-03-15 RX ORDER — HYDROXYZINE HCL 10 MG
50 TABLET ORAL ONCE
Refills: 0 | Status: COMPLETED | OUTPATIENT
Start: 2021-03-15 | End: 2021-03-15

## 2021-03-15 RX ADMIN — Medication 50 MILLIGRAM(S): at 14:02

## 2021-03-15 RX ADMIN — OLANZAPINE 15 MILLIGRAM(S): 15 TABLET, FILM COATED ORAL at 08:55

## 2021-03-15 RX ADMIN — Medication 50 MILLIGRAM(S): at 20:05

## 2021-03-15 RX ADMIN — Medication 2 MILLIGRAM(S): at 13:24

## 2021-03-15 RX ADMIN — OLANZAPINE 10 MILLIGRAM(S): 15 TABLET, FILM COATED ORAL at 20:04

## 2021-03-15 RX ADMIN — HALOPERIDOL DECANOATE 5 MILLIGRAM(S): 100 INJECTION INTRAMUSCULAR at 13:24

## 2021-03-15 RX ADMIN — HALOPERIDOL DECANOATE 5 MILLIGRAM(S): 100 INJECTION INTRAMUSCULAR at 20:04

## 2021-03-15 RX ADMIN — Medication 2 MILLIGRAM(S): at 20:04

## 2021-03-15 RX ADMIN — LITHIUM CARBONATE 1350 MILLIGRAM(S): 300 TABLET, EXTENDED RELEASE ORAL at 20:04

## 2021-03-15 RX ADMIN — Medication 50 MILLIGRAM(S): at 13:24

## 2021-03-15 RX ADMIN — PANTOPRAZOLE SODIUM 40 MILLIGRAM(S): 20 TABLET, DELAYED RELEASE ORAL at 08:55

## 2021-03-15 NOTE — BH INPATIENT PSYCHIATRY PROGRESS NOTE - NSBHCHARTREVIEWVS_PSY_A_CORE FT
Vital Signs Last 24 Hrs  T(C): 36.2 (15 Mar 2021 06:59), Max: 36.4 (14 Mar 2021 19:18)  T(F): 97.2 (15 Mar 2021 06:59), Max: 97.6 (14 Mar 2021 19:18)  HR: --  BP: --  BP(mean): --  RR: 20 (14 Mar 2021 21:48) (20 - 20)  SpO2: 98% (14 Mar 2021 21:48) (98% - 98%)

## 2021-03-15 NOTE — BH INPATIENT PSYCHIATRY PROGRESS NOTE - NSBHASSESSSUMMFT_PSY_ALL_CORE
Plan:  >Obs: Routine checks appropriate--visible on the unit, no recent aggression on unit.  No current SI. No need for CO, patient not expected to pose risk to self or others in controlled inpatient setting.  >Psychiatric Meds:  Observe for tolerability and efficacy. Received Prolixin Deconate 50mg on 1/26/21, next dose on 2/26/21 , Increase Olanzapine 15mg daily , 10mg qhs for psychosis, decrease Lithium 1350mg daily for mood stabilization.  >Labs: Lithium serum level resulted 1.2 on 1/29/21.  Lithium order for 2/11/21, resulted 1.3 reduce Lithium dose Lithium level resulted 0.9 on 2/17/21. Updated labs ordered for 3/10/21: CBC, CMP, Lithium, EKG   >Medical: No acute concerns.   >Diet: regular  >Social: milieu /structured therapy  >Treatment Interventions: Groups and Individual Therapy/CBT, Motivational counseling for substance abuse related issues.   >Dispo: Patient has been accepted for State Hospitalization at [place].  Patient is on waiting list for bed.

## 2021-03-15 NOTE — BH INPATIENT PSYCHIATRY PROGRESS NOTE - NSBHFUPINTERVALHXFT_PSY_A_CORE
Patient seen for follow up for psychosis, chart reviewed, and case discussed with treatment team. Per nursing lno interval events. Patient given po prn Haldol 5mg Ativan 2mg and Benadryl 50mg with good effect. Today patient is seen in her room, still a bit irritable  but in better behavioral control.  Took all standing  medications, refused blood work.  Her thoughts remain disorganized, tangential, non-sensical.  Waiting on State transfer.

## 2021-03-16 PROCEDURE — 99232 SBSQ HOSP IP/OBS MODERATE 35: CPT

## 2021-03-16 RX ORDER — DIPHENHYDRAMINE HCL 50 MG
50 CAPSULE ORAL ONCE
Refills: 0 | Status: COMPLETED | OUTPATIENT
Start: 2021-03-16 | End: 2021-03-16

## 2021-03-16 RX ORDER — HALOPERIDOL DECANOATE 100 MG/ML
5 INJECTION INTRAMUSCULAR ONCE
Refills: 0 | Status: COMPLETED | OUTPATIENT
Start: 2021-03-16 | End: 2021-03-16

## 2021-03-16 RX ADMIN — HALOPERIDOL DECANOATE 5 MILLIGRAM(S): 100 INJECTION INTRAMUSCULAR at 15:45

## 2021-03-16 RX ADMIN — Medication 50 MILLIGRAM(S): at 12:45

## 2021-03-16 RX ADMIN — Medication 50 MILLIGRAM(S): at 15:46

## 2021-03-16 RX ADMIN — Medication 2 MILLIGRAM(S): at 15:46

## 2021-03-16 RX ADMIN — PANTOPRAZOLE SODIUM 40 MILLIGRAM(S): 20 TABLET, DELAYED RELEASE ORAL at 08:55

## 2021-03-16 RX ADMIN — LITHIUM CARBONATE 1350 MILLIGRAM(S): 300 TABLET, EXTENDED RELEASE ORAL at 20:32

## 2021-03-16 RX ADMIN — Medication 1 TABLET(S): at 15:58

## 2021-03-16 RX ADMIN — HALOPERIDOL DECANOATE 5 MILLIGRAM(S): 100 INJECTION INTRAMUSCULAR at 12:45

## 2021-03-16 RX ADMIN — Medication 2 MILLIGRAM(S): at 12:45

## 2021-03-16 RX ADMIN — OLANZAPINE 10 MILLIGRAM(S): 15 TABLET, FILM COATED ORAL at 20:32

## 2021-03-16 RX ADMIN — HALOPERIDOL DECANOATE 5 MILLIGRAM(S): 100 INJECTION INTRAMUSCULAR at 21:49

## 2021-03-16 RX ADMIN — OLANZAPINE 15 MILLIGRAM(S): 15 TABLET, FILM COATED ORAL at 08:55

## 2021-03-16 RX ADMIN — Medication 50 MILLIGRAM(S): at 21:49

## 2021-03-16 RX ADMIN — Medication 2 MILLIGRAM(S): at 21:49

## 2021-03-16 NOTE — BH INPATIENT PSYCHIATRY PROGRESS NOTE - NSBHASSESSSUMMFT_PSY_ALL_CORE
Plan:  >Obs: Routine checks appropriate--visible on the unit, no recent aggression on unit.  No current SI. No need for CO, patient not expected to pose risk to self or others in controlled inpatient setting.  >Psychiatric Meds:  Observe for tolerability and efficacy. Received Prolixin Deconate 50mg on 1/26/21, next dose on 2/26/21 , Increase Olanzapine 15mg daily , 10mg qhs for psychosis, decrease Lithium 1350mg daily for mood stabilization.  >Labs: Lithium serum level resulted 1.2 on 1/29/21.  Lithium order for 2/11/21, resulted 1.3 reduce Lithium dose Lithium level resulted 0.9 on 2/17/21. Updated labs ordered for 3/10/21: CBC, CMP, Lithium, EKG   >Medical: No acute concerns.   >Diet: regular  >Social: milieu /structured therapy  >Treatment Interventions: Groups and Individual Therapy/CBT, Motivational counseling for substance abuse related issues.   >Dispo: Patient has been accepted for State Hospitalization at [place].  Patient is on waiting list for bed.

## 2021-03-16 NOTE — BH INPATIENT PSYCHIATRY PROGRESS NOTE - NSBHCHARTREVIEWVS_PSY_A_CORE FT
Vital Signs Last 24 Hrs  T(C): 36.2 (16 Mar 2021 06:35), Max: 36.7 (15 Mar 2021 19:21)  T(F): 97.1 (16 Mar 2021 06:35), Max: 98.1 (15 Mar 2021 19:21)  HR: --  BP: --  BP(mean): --  RR: --  SpO2: --

## 2021-03-16 NOTE — BH INPATIENT PSYCHIATRY PROGRESS NOTE - NSBHFUPINTERVALHXFT_PSY_A_CORE
Patient seen for follow up for psychosis, chart reviewed, and case discussed with treatment team. No interval events.  Patient is observed in her room, calm, quite, minimally engaging, seemed annoyed.  Patient stated she only wants to know when she is [facility] transferred to [facility].  Took all standing medications last night and today.  Her thoughts remain disorganized, tangential, non-sensical.  Waiting on State transfer.

## 2021-03-17 PROCEDURE — 99232 SBSQ HOSP IP/OBS MODERATE 35: CPT

## 2021-03-17 RX ADMIN — HALOPERIDOL DECANOATE 5 MILLIGRAM(S): 100 INJECTION INTRAMUSCULAR at 22:17

## 2021-03-17 RX ADMIN — PANTOPRAZOLE SODIUM 40 MILLIGRAM(S): 20 TABLET, DELAYED RELEASE ORAL at 10:50

## 2021-03-17 RX ADMIN — LITHIUM CARBONATE 1350 MILLIGRAM(S): 300 TABLET, EXTENDED RELEASE ORAL at 22:16

## 2021-03-17 RX ADMIN — OLANZAPINE 10 MILLIGRAM(S): 15 TABLET, FILM COATED ORAL at 22:17

## 2021-03-17 RX ADMIN — OLANZAPINE 15 MILLIGRAM(S): 15 TABLET, FILM COATED ORAL at 10:50

## 2021-03-17 RX ADMIN — Medication 50 MILLIGRAM(S): at 22:17

## 2021-03-17 NOTE — BH INPATIENT PSYCHIATRY PROGRESS NOTE - OTHER
Bizarre animated affect Patient is internally preoccupied, she is observed talking to self. Patient denies any AH  2/8/21: continues to talk to self, RIS Bizarre, disorganized, sexually provocative  Aparna has slightly attenuated  Still having bouts of aggression, however, frequency has significantly decreased  Sexually charged

## 2021-03-17 NOTE — BH INPATIENT PSYCHIATRY PROGRESS NOTE - NSBHASSESSSUMMFT_PSY_ALL_CORE
Plan:  >Obs: Routine checks appropriate--visible on the unit, no recent aggression on unit.  No current SI. No need for CO, patient not expected to pose risk to self or others in controlled inpatient setting.  >Psychiatric Meds:  Observe for tolerability and efficacy. Received Prolixin Deconate 50mg on 1/26/21, next dose on 2/26/21 , Increase Olanzapine 15mg daily , 10mg qhs for psychosis, decrease Lithium 1350mg daily for mood stabilization.  >Labs: Lithium serum level resulted 1.2 on 1/29/21.  Lithium order for 2/11/21, resulted 1.3 reduce Lithium dose Lithium level resulted 0.9 on 2/17/21. Updated labs ordered for 3/10/21: CBC, CMP, Lithium, EKG   >Medical: No acute concerns.   >Diet: regular  >Social: milieu /structured therapy  >Treatment Interventions: Groups and Individual Therapy/CBT, Motivational counseling for substance abuse related issues.   >Dispo: Patient has been accepted for State Hospitalization at [place].  Patient is on waiting list for bed.

## 2021-03-17 NOTE — BH INPATIENT PSYCHIATRY PROGRESS NOTE - NSBHFUPINTERVALHXFT_PSY_A_CORE
Patient seen for follow up for psychosis, chart reviewed, and case discussed with treatment team. Staff reported the pt. was making sexual gestures with food items last night. Today, she reported her mood as good. She asked when she is going to state. She then began screaming, slamming her room door after being told she could not have additional orange juice.  Her thoughts remain disorganized, tangential, non-sensical.  Pt. denied AVH and delusions, SI/HI intent or plan. Medication compliant. No SE noted.

## 2021-03-17 NOTE — BH INPATIENT PSYCHIATRY PROGRESS NOTE - NSBHCHARTREVIEWVS_PSY_A_CORE FT
Vital Signs Last 24 Hrs  T(C): 36.3 (17 Mar 2021 06:56), Max: 36.3 (17 Mar 2021 06:56)  T(F): 97.4 (17 Mar 2021 06:56), Max: 97.4 (17 Mar 2021 06:56)  HR: --  BP: --pt. refused V/S  BP(mean): --  RR: --  SpO2: --

## 2021-03-17 NOTE — ED ADULT TRIAGE NOTE - NS ED NURSE BANDS TYPE
Pt asking to speak with you re: his lab results, said he got a call stating his labs are fine, then another call stating his labs are not fine, want to know what is the problem, call pt @ 4:30p [phone]    Name band;

## 2021-03-18 PROCEDURE — 99232 SBSQ HOSP IP/OBS MODERATE 35: CPT

## 2021-03-18 RX ADMIN — Medication 1 TABLET(S): at 20:54

## 2021-03-18 RX ADMIN — HALOPERIDOL DECANOATE 5 MILLIGRAM(S): 100 INJECTION INTRAMUSCULAR at 20:55

## 2021-03-18 RX ADMIN — OLANZAPINE 10 MILLIGRAM(S): 15 TABLET, FILM COATED ORAL at 20:55

## 2021-03-18 RX ADMIN — SIMETHICONE 80 MILLIGRAM(S): 80 TABLET, CHEWABLE ORAL at 12:55

## 2021-03-18 RX ADMIN — Medication 1 TABLET(S): at 12:55

## 2021-03-18 RX ADMIN — Medication 2 MILLIGRAM(S): at 10:46

## 2021-03-18 RX ADMIN — HALOPERIDOL DECANOATE 5 MILLIGRAM(S): 100 INJECTION INTRAMUSCULAR at 10:46

## 2021-03-18 RX ADMIN — Medication 50 MILLIGRAM(S): at 10:46

## 2021-03-18 RX ADMIN — Medication 50 MILLIGRAM(S): at 20:56

## 2021-03-18 RX ADMIN — LITHIUM CARBONATE 1350 MILLIGRAM(S): 300 TABLET, EXTENDED RELEASE ORAL at 20:55

## 2021-03-18 RX ADMIN — Medication 2 MILLIGRAM(S): at 20:55

## 2021-03-18 NOTE — BH INPATIENT PSYCHIATRY PROGRESS NOTE - NSBHCHARTREVIEWVS_PSY_A_CORE FT
Vital Signs Last 24 Hrs  T(C): 36.4 (18 Mar 2021 06:51), Max: 36.4 (18 Mar 2021 06:51)  T(F): 97.5 (18 Mar 2021 06:51), Max: 97.5 (18 Mar 2021 06:51)  HR: --  BP: --  BP(mean): --  RR: --  SpO2: --

## 2021-03-18 NOTE — BH INPATIENT PSYCHIATRY PROGRESS NOTE - NSBHASSESSSUMMFT_PSY_ALL_CORE
Plan:  >Obs: Routine checks appropriate--visible on the unit, no recent aggression on unit.  No current SI. No need for CO, patient not expected to pose risk to self or others in controlled inpatient setting.  >Psychiatric Meds:  Observe for tolerability and efficacy. Received Prolixin Deconate 50mg on 1/26/21, next dose on 2/26/21 , Increase Olanzapine 15mg daily , 10mg qhs for psychosis, decrease Lithium 1350mg daily for mood stabilization.  >Labs: Lithium serum level resulted 1.2 on 1/29/21.  Lithium order for 2/11/21, resulted 1.3 reduce Lithium dose Lithium level resulted 0.9 on 2/17/21. Updated labs ordered for 3/10/21: CBC, CMP, Lithium, EKG   >Medical: No acute concerns.   >Diet: regular  >Social: milieu /structured therapy  >Treatment Interventions: Groups and Individual Therapy/CBT, Motivational counseling for substance abuse related issues.   >Dispo: Patient has been accepted for State Hospitalization at [place].  Patient is on waiting list for bed.

## 2021-03-18 NOTE — BH INPATIENT PSYCHIATRY PROGRESS NOTE - OTHER
Bizarre animated affect Bizarre, disorganized, sexually provocative  Aparna has slightly attenuated  Still having bouts of aggression, however, frequency has significantly decreased  Sexually charged Patient is internally preoccupied, she is observed talking to self. Patient denies any AH  2/8/21: continues to talk to self, RIS

## 2021-03-18 NOTE — BH INPATIENT PSYCHIATRY PROGRESS NOTE - NSBHFUPINTERVALHXFT_PSY_A_CORE
Patient seen for follow up for psychosis, chart reviewed, and case discussed with treatment team. Staff reported the pt. was making sexual gestures yesterday with food, less sexually preoccupied today. More irritable, appears frustrated today.   Although pt reported her mood being “fine” perseverative about her [facility] date to [facility], pointing finger at writer demanding when she will go to State. Then told writer to “get out of my room.” Her thoughts remain disorganized, tangential, non-sensical.  Pt. denied AVH and delusions, SI/HI intent or plan. Medication compliant. No SE noted.

## 2021-03-19 PROCEDURE — 99232 SBSQ HOSP IP/OBS MODERATE 35: CPT

## 2021-03-19 RX ADMIN — LITHIUM CARBONATE 1350 MILLIGRAM(S): 300 TABLET, EXTENDED RELEASE ORAL at 20:05

## 2021-03-19 RX ADMIN — OLANZAPINE 15 MILLIGRAM(S): 15 TABLET, FILM COATED ORAL at 08:17

## 2021-03-19 RX ADMIN — Medication 2 MILLIGRAM(S): at 16:36

## 2021-03-19 RX ADMIN — PANTOPRAZOLE SODIUM 40 MILLIGRAM(S): 20 TABLET, DELAYED RELEASE ORAL at 08:18

## 2021-03-19 RX ADMIN — HALOPERIDOL DECANOATE 5 MILLIGRAM(S): 100 INJECTION INTRAMUSCULAR at 16:36

## 2021-03-19 RX ADMIN — Medication 50 MILLIGRAM(S): at 16:36

## 2021-03-19 RX ADMIN — OLANZAPINE 10 MILLIGRAM(S): 15 TABLET, FILM COATED ORAL at 20:04

## 2021-03-19 RX ADMIN — Medication 1 TABLET(S): at 08:17

## 2021-03-19 NOTE — BH INPATIENT PSYCHIATRY PROGRESS NOTE - NSBHASSESSSUMMFT_PSY_ALL_CORE
Plan:  >Obs: Routine checks appropriate--visible on the unit, no recent aggression on unit.  No current SI. No need for CO, patient not expected to pose risk to self or others in controlled inpatient setting.  >Psychiatric Meds:  Observe for tolerability and efficacy. Received Prolixin Deconate 50mg on 1/26/21, next dose on 2/26/21 , Increase Olanzapine 15mg daily , 10mg qhs for psychosis, decrease Lithium 1350mg daily for mood stabilization.  >Labs: Lithium serum level resulted 1.2 on 1/29/21.  Lithium order for 2/11/21, resulted 1.3 reduce Lithium dose Lithium level resulted 0.9 on 2/17/21. Updated labs ordered for 3/10/21: CBC, CMP, Lithium, EKG   >Medical: No acute concerns.   >Diet: regular  >Social: milieu /structured therapy  >Treatment Interventions: Groups and Individual Therapy/CBT, Motivational counseling for substance abuse related issues.   >Dispo: Patient has been accepted for State Hospitalization at [place].  Patient is on waiting list for bed.

## 2021-03-19 NOTE — BH INPATIENT PSYCHIATRY PROGRESS NOTE - NSBHCHARTREVIEWVS_PSY_A_CORE FT
Vital Signs Last 24 Hrs  T(C): 36.3 (19 Mar 2021 06:19), Max: 36.7 (18 Mar 2021 18:38)  T(F): 97.4 (19 Mar 2021 06:19), Max: 98.1 (18 Mar 2021 18:38)  HR: 86 (19 Mar 2021 08:10) (86 - 86)  BP: 110/66 (19 Mar 2021 08:10) (110/66 - 110/66)  BP(mean): --  RR: --  SpO2: --

## 2021-03-19 NOTE — BH INPATIENT PSYCHIATRY PROGRESS NOTE - NSBHFUPINTERVALHXFT_PSY_A_CORE
Patient seen for follow up for psychosis, chart reviewed, and case discussed with treatment team. Per staff patient was very irritable last night, received po prn, but kept mostly to herself.  She refused her morning medications yesterday but took her evening medications without incident. Patient is seen in hallway and agrees to interview. Patient’s affect is bizarre, and irritable. Patient stated “I can’t do this anymore”  when patient is asked to elaborate pt went on psychotic rant about “white people look at me like trash.”  Patient is currently menstruating and reports that she “drank her blood.”  Per nursing staff patient was observed licking her sanitary napkin, smearing blood on her lips/face.  Patient was showered with much encouragement from staff.  Patient is due her Prolixin Dec 50mg PALACIOS next week 3/25, discussed with patient to receive it early, however patient is adamant about receiving it next week.  Her thoughts remain disorganized, tangential, non-sensical.  Pt. denied AVH and delusions, SI/HI intent or plan. Medication compliant. No SE noted.

## 2021-03-19 NOTE — BH INPATIENT PSYCHIATRY PROGRESS NOTE - OTHER
Patient is internally preoccupied, she is observed talking to self. Patient denies any AH  2/8/21: continues to talk to self, RIS Bizarre, disorganized, sexually provocative  Aparna has slightly attenuated  Still having bouts of aggression, however, frequency has significantly decreased  Sexually charged Bizarre animated affect

## 2021-03-20 RX ADMIN — LITHIUM CARBONATE 1350 MILLIGRAM(S): 300 TABLET, EXTENDED RELEASE ORAL at 20:42

## 2021-03-20 RX ADMIN — HALOPERIDOL DECANOATE 5 MILLIGRAM(S): 100 INJECTION INTRAMUSCULAR at 20:43

## 2021-03-20 RX ADMIN — Medication 2 MILLIGRAM(S): at 08:27

## 2021-03-20 RX ADMIN — Medication 2 MILLIGRAM(S): at 20:42

## 2021-03-20 RX ADMIN — OLANZAPINE 10 MILLIGRAM(S): 15 TABLET, FILM COATED ORAL at 20:42

## 2021-03-20 RX ADMIN — OLANZAPINE 15 MILLIGRAM(S): 15 TABLET, FILM COATED ORAL at 08:27

## 2021-03-20 RX ADMIN — PANTOPRAZOLE SODIUM 40 MILLIGRAM(S): 20 TABLET, DELAYED RELEASE ORAL at 08:27

## 2021-03-20 RX ADMIN — Medication 50 MILLIGRAM(S): at 20:42

## 2021-03-21 RX ADMIN — Medication 1 TABLET(S): at 11:34

## 2021-03-21 RX ADMIN — PANTOPRAZOLE SODIUM 40 MILLIGRAM(S): 20 TABLET, DELAYED RELEASE ORAL at 10:36

## 2021-03-21 RX ADMIN — Medication 2 MILLIGRAM(S): at 17:47

## 2021-03-21 RX ADMIN — OLANZAPINE 10 MILLIGRAM(S): 15 TABLET, FILM COATED ORAL at 21:39

## 2021-03-21 RX ADMIN — HALOPERIDOL DECANOATE 5 MILLIGRAM(S): 100 INJECTION INTRAMUSCULAR at 17:47

## 2021-03-21 RX ADMIN — OLANZAPINE 15 MILLIGRAM(S): 15 TABLET, FILM COATED ORAL at 10:36

## 2021-03-21 RX ADMIN — Medication 50 MILLIGRAM(S): at 17:47

## 2021-03-21 RX ADMIN — LITHIUM CARBONATE 1350 MILLIGRAM(S): 300 TABLET, EXTENDED RELEASE ORAL at 21:39

## 2021-03-22 PROCEDURE — 99232 SBSQ HOSP IP/OBS MODERATE 35: CPT

## 2021-03-22 RX ADMIN — HALOPERIDOL DECANOATE 5 MILLIGRAM(S): 100 INJECTION INTRAMUSCULAR at 19:44

## 2021-03-22 RX ADMIN — OLANZAPINE 15 MILLIGRAM(S): 15 TABLET, FILM COATED ORAL at 09:03

## 2021-03-22 RX ADMIN — Medication 2 MILLIGRAM(S): at 19:43

## 2021-03-22 RX ADMIN — Medication 1 TABLET(S): at 09:04

## 2021-03-22 RX ADMIN — PANTOPRAZOLE SODIUM 40 MILLIGRAM(S): 20 TABLET, DELAYED RELEASE ORAL at 09:04

## 2021-03-22 RX ADMIN — LITHIUM CARBONATE 1350 MILLIGRAM(S): 300 TABLET, EXTENDED RELEASE ORAL at 19:44

## 2021-03-22 RX ADMIN — Medication 50 MILLIGRAM(S): at 19:44

## 2021-03-22 RX ADMIN — OLANZAPINE 10 MILLIGRAM(S): 15 TABLET, FILM COATED ORAL at 19:43

## 2021-03-22 NOTE — BH INPATIENT PSYCHIATRY PROGRESS NOTE - NSBHFUPINTERVALHXFT_PSY_A_CORE
Case discussed with treatment team. No significant issues from the weekend.  Per staff overall patient was very  in better behavioral control.  However yesterday afternood, patient received po prn (Haldol 5mg Ativan 2mg and Bendaryl 50mg) for disrobing in hallway.  Patient is observed in her room today, reports feeing “ok” Patient is due her Prolixin Dec 50mg PALACIOS this week on  3/25, reminded patient that her PALACIOS is due in a few days.  Her thoughts remain disorganized, tangential, non-sensical.  Pt. denied AVH and delusions, SI/HI intent or plan. Medication compliant. No SE noted.

## 2021-03-22 NOTE — BH INPATIENT PSYCHIATRY PROGRESS NOTE - NSBHCHARTREVIEWVS_PSY_A_CORE FT
Vital Signs Last 24 Hrs  T(C): 36.6 (22 Mar 2021 06:32), Max: 36.6 (21 Mar 2021 19:19)  T(F): 97.8 (22 Mar 2021 06:32), Max: 97.8 (21 Mar 2021 19:19)  HR: --  BP: --  BP(mean): --  RR: 18 (21 Mar 2021 21:00) (18 - 18)  SpO2: 98% (21 Mar 2021 21:00) (98% - 98%)

## 2021-03-22 NOTE — BH INPATIENT PSYCHIATRY PROGRESS NOTE - NSBHASSESSSUMMFT_PSY_ALL_CORE
Plan:  >Obs: Routine checks appropriate--visible on the unit, no recent aggression on unit.  No current SI. No need for CO, patient not expected to pose risk to self or others in controlled inpatient setting.  >Psychiatric Meds:  Observe for tolerability and efficacy. Continue current medication regimen. Scheduled to received Prolixin Dec 50mg on 3/25/21  >Labs: Lithium serum level resulted 1.2 on 1/29/21.  Lithium order for 2/11/21, resulted 1.3 reduce Lithium dose Lithium level resulted 0.9 on 2/17/21. Updated labs ordered for 3/10/21: CBC, CMP, Lithium, EKG   >Medical: No acute concerns.   >Diet: regular  >Social: milieu /structured therapy  >Treatment Interventions: Groups and Individual Therapy/CBT, Motivational counseling for substance abuse related issues.   >Dispo: Patient has been accepted for State Hospitalization at [place].  Patient is on waiting list for bed.

## 2021-03-23 RX ORDER — FLUPHENAZINE HYDROCHLORIDE 1 MG/1
50 TABLET, FILM COATED ORAL ONCE
Refills: 0 | Status: COMPLETED | OUTPATIENT
Start: 2021-03-23 | End: 2021-03-23

## 2021-03-23 RX ADMIN — OLANZAPINE 10 MILLIGRAM(S): 15 TABLET, FILM COATED ORAL at 20:20

## 2021-03-23 RX ADMIN — PANTOPRAZOLE SODIUM 40 MILLIGRAM(S): 20 TABLET, DELAYED RELEASE ORAL at 08:57

## 2021-03-23 RX ADMIN — Medication 50 MILLIGRAM(S): at 19:45

## 2021-03-23 RX ADMIN — Medication 2 MILLIGRAM(S): at 19:45

## 2021-03-23 RX ADMIN — HALOPERIDOL DECANOATE 5 MILLIGRAM(S): 100 INJECTION INTRAMUSCULAR at 19:45

## 2021-03-23 RX ADMIN — LITHIUM CARBONATE 1350 MILLIGRAM(S): 300 TABLET, EXTENDED RELEASE ORAL at 20:20

## 2021-03-23 RX ADMIN — OLANZAPINE 15 MILLIGRAM(S): 15 TABLET, FILM COATED ORAL at 08:57

## 2021-03-23 RX ADMIN — FLUPHENAZINE HYDROCHLORIDE 50 MILLIGRAM(S): 1 TABLET, FILM COATED ORAL at 14:40

## 2021-03-23 NOTE — BH INPATIENT PSYCHIATRY PROGRESS NOTE - NSBHASSESSSUMMFT_PSY_ALL_CORE
Plan:  >Obs: Routine checks appropriate--visible on the unit, no recent aggression on unit.  No current SI. No need for CO, patient not expected to pose risk to self or others in controlled inpatient setting.  >Psychiatric Meds:  Observe for tolerability and efficacy. Continue current medication regimen. Scheduled to received Prolixin Dec 50mg on 3/25/21  >Labs: Lithium serum level resulted 1.2 on 1/29/21.  Lithium order for 2/11/21, resulted 1.3 reduce Lithium dose Lithium level resulted 0.9 on 2/17/21. Updated labs ordered for 3/10/21: CBC, CMP, Lithium, EKG   >Medical: No acute concerns.   >Diet: regular  >Social: milieu /structured therapy  >Treatment Interventions: Groups and Individual Therapy/CBT, Motivational counseling for substance abuse related issues.   >Dispo: Patient has been accepted for State Hospitalization at [place].  Patient is on waiting list for bed.

## 2021-03-23 NOTE — BH INPATIENT PSYCHIATRY PROGRESS NOTE - NSBHCHARTREVIEWVS_PSY_A_CORE FT
Vital Signs Last 24 Hrs  T(C): 36.7 (23 Mar 2021 06:27), Max: 36.7 (23 Mar 2021 06:27)  T(F): 98 (23 Mar 2021 06:27), Max: 98 (23 Mar 2021 06:27)  HR: 86 (22 Mar 2021 09:01) (86 - 86)  BP: 140/84 (22 Mar 2021 09:01) (140/84 - 140/84)  BP(mean): --  RR: 18 (22 Mar 2021 20:32) (18 - 18)  SpO2: 99% (22 Mar 2021 20:32) (98% - 99%)

## 2021-03-23 NOTE — BH INPATIENT PSYCHIATRY PROGRESS NOTE - NSBHFUPINTERVALHXFT_PSY_A_CORE
Case discussed with treatment team. No significant issues from the weekend.  Per staff no interval events.  Patient is scheduled court today for retention.  Patient is due her Prolixin Dec 50mg PALACIOS this week on 3/25.  Her thoughts remain disorganized, tangential, non-sensical.  No clinical worsening.  Pt. denied AVH and delusions, SI/HI intent or plan. Medication compliant. No SE noted.

## 2021-03-24 PROCEDURE — 99232 SBSQ HOSP IP/OBS MODERATE 35: CPT

## 2021-03-24 RX ADMIN — HALOPERIDOL DECANOATE 5 MILLIGRAM(S): 100 INJECTION INTRAMUSCULAR at 01:46

## 2021-03-24 RX ADMIN — Medication 2 MILLIGRAM(S): at 10:45

## 2021-03-24 RX ADMIN — Medication 50 MILLIGRAM(S): at 20:51

## 2021-03-24 RX ADMIN — Medication 2 MILLIGRAM(S): at 01:46

## 2021-03-24 RX ADMIN — OLANZAPINE 15 MILLIGRAM(S): 15 TABLET, FILM COATED ORAL at 10:45

## 2021-03-24 RX ADMIN — Medication 50 MILLIGRAM(S): at 01:46

## 2021-03-24 RX ADMIN — LITHIUM CARBONATE 1350 MILLIGRAM(S): 300 TABLET, EXTENDED RELEASE ORAL at 20:51

## 2021-03-24 RX ADMIN — Medication 50 MILLIGRAM(S): at 10:45

## 2021-03-24 RX ADMIN — OLANZAPINE 10 MILLIGRAM(S): 15 TABLET, FILM COATED ORAL at 20:51

## 2021-03-24 RX ADMIN — Medication 2 MILLIGRAM(S): at 20:51

## 2021-03-24 NOTE — BH INPATIENT PSYCHIATRY PROGRESS NOTE - OTHER
Patient is internally preoccupied, she is observed talking to self. Patient denies any AH  2/8/21: continues to talk to self, RIS Bizarre animated affect Bizarre, disorganized, sexually provocative  Aparna has slightly attenuated  Still having bouts of aggression, however, frequency has significantly decreased  Sexually charged

## 2021-03-24 NOTE — BH INPATIENT PSYCHIATRY PROGRESS NOTE - NSBHCHARTREVIEWVS_PSY_A_CORE FT
Vital Signs Last 24 Hrs  T(C): 36.2 (24 Mar 2021 06:43), Max: 37.1 (23 Mar 2021 22:50)  T(F): 97.2 (24 Mar 2021 06:43), Max: 98.8 (23 Mar 2021 22:50)  HR: --  BP: --  BP(mean): --  RR: --  SpO2: --

## 2021-03-24 NOTE — BH INPATIENT PSYCHIATRY PROGRESS NOTE - NSBHASSESSSUMMFT_PSY_ALL_CORE
Plan:  >Obs: Routine checks appropriate--visible on the unit, no recent aggression on unit.  No current SI. No need for CO, patient not expected to pose risk to self or others in controlled inpatient setting.  >Psychiatric Meds:  Observe for tolerability and efficacy. Continue current medication regimen. Scheduled to received Prolixin Dec 50mg on 3/25/21  >Labs: Lithium serum level resulted 1.2 on 1/29/21.  Lithium order for 2/11/21, resulted 1.3 reduce Lithium dose Lithium level resulted 0.9 on 2/17/21. Updated labs ordered for 3/10/21: CBC, CMP, Lithium, EKG   >Medical: No acute concerns.   >Diet: regular  >Social: milieu /structured therapy  >Treatment Interventions: Groups and Individual Therapy/CBT, Motivational counseling for substance abuse related issues.   >Dispo: Patient has been accepted for State Hospitalization at [place].  Patient is on waiting list for bed.   Plan:  >Obs: Routine checks appropriate--visible on the unit, no recent aggression on unit.  No current SI. No need for CO, patient not expected to pose risk to self or others in controlled inpatient setting.  >Psychiatric Meds:  Continue current medication regimen. Received Prolixin Dec 50mg on 3/23/21  >Labs: Lithium serum level resulted 1.2 on 1/29/21.  Lithium order for 2/11/21, resulted 1.3 reduce Lithium dose Lithium level resulted 0.9 on 2/17/21.   >Medical: No acute concerns.   >Diet: regular  >Social: milieu /structured therapy  >Treatment Interventions: Groups and Individual Therapy/CBT, Motivational counseling for substance abuse related issues.   >Dispo: Patient has been accepted for State Hospitalization at [place].  Patient is on waiting list for bed.

## 2021-03-24 NOTE — BH INPATIENT PSYCHIATRY PROGRESS NOTE - NSBHFUPINTERVALHXFT_PSY_A_CORE
Case discussed with treatment team. Per nursing patient had difficult evening yesterday, patient’s behavior more escalated last night than in prior nights.  Patient was observed running in the hallways, while shouting, patient placed self on floor refusing to get up. Support team was called at 7:35pm. Patient receive prn Haldol 5mg Ativan 2mg and Benadryl 50mg, with good effect, patient able to deescalate herself and slept until 0100.  Given po prn’s again Haldol 5mg Ativan 2mg and Benadryl 50mg, with good effect, patient was able to sleep through the rest of the night. Patient was scheduled for court yesterday for retention, and was held further for retention. This may have contributed to patient’s acting out last evening.  Patient received her Prolixin Dec 50mg PALACIOS yesterday.  Patient is seen in dayroom today, very calm upon approach and engaging.  Patient inquired about court outcome, writer informed her that she will be her on retention until transfer to State. Her thoughts remain disorganized, tangential, non-sensical.  Pt. denied AVH and delusions, SI/HI intent or plan. Medication compliant. No SE noted. Waiting on State transfer.

## 2021-03-25 PROCEDURE — 99232 SBSQ HOSP IP/OBS MODERATE 35: CPT

## 2021-03-25 RX ORDER — DIPHENHYDRAMINE HCL 50 MG
50 CAPSULE ORAL ONCE
Refills: 0 | Status: DISCONTINUED | OUTPATIENT
Start: 2021-03-25 | End: 2021-04-07

## 2021-03-25 RX ORDER — HALOPERIDOL DECANOATE 100 MG/ML
5 INJECTION INTRAMUSCULAR ONCE
Refills: 0 | Status: DISCONTINUED | OUTPATIENT
Start: 2021-03-25 | End: 2021-04-07

## 2021-03-25 RX ADMIN — HALOPERIDOL DECANOATE 5 MILLIGRAM(S): 100 INJECTION INTRAMUSCULAR at 12:51

## 2021-03-25 RX ADMIN — PANTOPRAZOLE SODIUM 40 MILLIGRAM(S): 20 TABLET, DELAYED RELEASE ORAL at 08:16

## 2021-03-25 RX ADMIN — HALOPERIDOL DECANOATE 5 MILLIGRAM(S): 100 INJECTION INTRAMUSCULAR at 18:02

## 2021-03-25 RX ADMIN — OLANZAPINE 15 MILLIGRAM(S): 15 TABLET, FILM COATED ORAL at 08:16

## 2021-03-25 RX ADMIN — Medication 50 MILLIGRAM(S): at 18:02

## 2021-03-25 RX ADMIN — LITHIUM CARBONATE 1350 MILLIGRAM(S): 300 TABLET, EXTENDED RELEASE ORAL at 20:17

## 2021-03-25 RX ADMIN — OLANZAPINE 10 MILLIGRAM(S): 15 TABLET, FILM COATED ORAL at 20:17

## 2021-03-25 RX ADMIN — Medication 50 MILLIGRAM(S): at 12:52

## 2021-03-25 RX ADMIN — Medication 2 MILLIGRAM(S): at 18:03

## 2021-03-25 RX ADMIN — Medication 2 MILLIGRAM(S): at 12:51

## 2021-03-25 NOTE — BH INPATIENT PSYCHIATRY PROGRESS NOTE - NSBHASSESSSUMMFT_PSY_ALL_CORE
Plan:  >Obs: Routine checks appropriate--visible on the unit, no recent aggression on unit.  No current SI. No need for CO, patient not expected to pose risk to self or others in controlled inpatient setting.  >Psychiatric Meds:  Continue current medication regimen. Received Prolixin Dec 50mg on 3/23/21  >Labs: Lithium serum level resulted 1.2 on 1/29/21.  Lithium order for 2/11/21, resulted 1.3 reduce Lithium dose Lithium level resulted 0.9 on 2/17/21.   >Medical: No acute concerns.   >Diet: regular  >Social: milieu /structured therapy  >Treatment Interventions: Groups and Individual Therapy/CBT, Motivational counseling for substance abuse related issues.   >Dispo: Patient has been accepted for State Hospitalization at [place].  Patient is on waiting list for bed.

## 2021-03-25 NOTE — BH INPATIENT PSYCHIATRY PROGRESS NOTE - NSBHFUPINTERVALHXFT_PSY_A_CORE
No interval events.  Patient is seen in dayroom today, very calm upon approach and engaging.  Her thoughts remain disorganized, tangential, non-sensical.  Pt. denied AVH and delusions, SI/HI intent or plan. Medication compliant. No SE noted. Waiting on State transfer.

## 2021-03-25 NOTE — BH INPATIENT PSYCHIATRY PROGRESS NOTE - NSBHCHARTREVIEWVS_PSY_A_CORE FT
Vital Signs Last 24 Hrs  T(C): 36.3 (25 Mar 2021 06:35), Max: 36.3 (25 Mar 2021 06:35)  T(F): 97.3 (25 Mar 2021 06:35), Max: 97.3 (25 Mar 2021 06:35)  HR: --  BP: --  BP(mean): --  RR: 16 (24 Mar 2021 21:19) (16 - 16)  SpO2: 99% (24 Mar 2021 21:19) (99% - 99%)

## 2021-03-26 PROCEDURE — 99232 SBSQ HOSP IP/OBS MODERATE 35: CPT

## 2021-03-26 RX ADMIN — Medication 50 MILLIGRAM(S): at 18:40

## 2021-03-26 RX ADMIN — LITHIUM CARBONATE 1350 MILLIGRAM(S): 300 TABLET, EXTENDED RELEASE ORAL at 19:49

## 2021-03-26 RX ADMIN — PANTOPRAZOLE SODIUM 40 MILLIGRAM(S): 20 TABLET, DELAYED RELEASE ORAL at 09:13

## 2021-03-26 RX ADMIN — HALOPERIDOL DECANOATE 5 MILLIGRAM(S): 100 INJECTION INTRAMUSCULAR at 18:41

## 2021-03-26 RX ADMIN — OLANZAPINE 15 MILLIGRAM(S): 15 TABLET, FILM COATED ORAL at 09:14

## 2021-03-26 RX ADMIN — Medication 2 MILLIGRAM(S): at 18:40

## 2021-03-26 RX ADMIN — OLANZAPINE 10 MILLIGRAM(S): 15 TABLET, FILM COATED ORAL at 19:49

## 2021-03-26 NOTE — BH TREATMENT PLAN - NSTXPATIENTPARTICIPATE_PSY_ALL_CORE
Patient participated in identification of needs/problems/goals for treatment
No, patient unwilling to participate
Patient participated in identification of needs/problems/goals for treatment/Patient participated in defining interventions
Patient participated in identification of needs/problems/goals for treatment

## 2021-03-26 NOTE — BH INPATIENT PSYCHIATRY PROGRESS NOTE - NSBHASSESSSUMMFT_PSY_ALL_CORE
Plan:  >Obs: Routine checks appropriate--visible on the unit, no recent aggression on unit.  No current SI. No need for CO, patient not expected to pose risk to self or others in controlled inpatient setting.  >Psychiatric Meds:  Continue current medication regimen. Received Prolixin Dec 50mg on 3/23/21  >Labs: Lithium serum level resulted 1.2 on 1/29/21.  Lithium order for 2/11/21, resulted 1.3 reduce Lithium dose Lithium level resulted 0.9 on 2/17/21.   >Medical: No acute concerns.   >Diet: regular  >Social: milieu /structured therapy  >Treatment Interventions: Groups and Individual Therapy/CBT, Motivational counseling for substance abuse related issues.   >Dispo: Patient has been accepted for State Hospitalization at [place].  Patient is on waiting list for bed.

## 2021-03-26 NOTE — BH INPATIENT PSYCHIATRY PROGRESS NOTE - NSBHFUPINTERVALHXFT_PSY_A_CORE
Per nursing patient had a difficult night.  At about 6pm last evening, unprovoked patient began punching walls, shouting in the hallways. PES called and patient given po prns.   Patient is seen in dayroom today, calmer than last night,  upon approach patient is smiling and asked writer where she shops, stating “I need to buy clothes to go to work”  makes random statements “ when do you make my medication.”  Her thoughts remain disorganized, tangential.  Pt. denied AVH and delusions, SI/HI intent or plan. Medication compliant. No SE noted. Waiting on State transfer.

## 2021-03-26 NOTE — BH TREATMENT PLAN - NSTXDISORGINTERPR_PSY_ALL_CORE
Pt made no progress over this past week. As per team, pt was running in the hallway last night, placed herself on floor, refused to get up.  Writer will continues to encourage pt to continue to demonstrate behavioral control.

## 2021-03-26 NOTE — BH INPATIENT PSYCHIATRY PROGRESS NOTE - NSBHCHARTREVIEWVS_PSY_A_CORE FT
Vital Signs Last 24 Hrs  T(C): 36.2 (26 Mar 2021 06:20), Max: 36.6 (25 Mar 2021 20:00)  T(F): 97.1 (26 Mar 2021 06:20), Max: 97.8 (25 Mar 2021 20:00)  HR: --  BP: --  BP(mean): --  RR: 18 (25 Mar 2021 20:10) (18 - 18)  SpO2: 98% (25 Mar 2021 20:10) (98% - 98%)

## 2021-03-26 NOTE — BH TREATMENT PLAN - NSTXDCOTHRINTERSW_PSY_ALL_CORE
SW will provide support and psychoeducation. SW will assist in state transfer when bed is available.
SW has maintained contact with pt's housing and ACT team. MI will request state admin hearing once pt has been in the hospital for 2 weeks.
MI submitted state application, pending review.
Writer is a float covering for unit SW today. SW will complete state application.

## 2021-03-26 NOTE — BH TREATMENT PLAN - NSTXMANICGOAL_PSY_ALL_CORE
State a reason daily why adherence to mood stabilizers is necessary
Be able to identify the early signs of trina (e.g. sleep and mood changes) and to employ coping strategies to minimize acting out
Exhibit a substantial reduction in elated/angry acting out, and pressured speech that prevents mutual conversation

## 2021-03-26 NOTE — BH TREATMENT PLAN - NSTXDISORGGOAL_PSY_ALL_CORE
Will demonstrate the ability to maintain reality orientation and communicate clearly with others during 2 conversations with staff daily
Other...
Will demonstrate the ability to maintain reality orientation and communicate clearly with others during 2 conversations with staff daily
Will demonstrate the ability to maintain reality orientation and communicate clearly with others during 2 conversations with staff daily

## 2021-03-26 NOTE — BH TREATMENT PLAN - NSTXPLANTHERAPYSESSIONSFT_PSY_ALL_CORE
02-18-21  Type of therapy: Coping skills,Medication management  Type of session: Individual  Level of patient participation: Attentive,Engaged,Participates  Duration of participation: 15 minutes  Therapy conducted by: Psych rehab  Therapy Summary: During the individual therapy session, pt stated she is waiting for State. Writer provided support.  Pt stated she takes her medication and she will get PALACIOS on 2/26/2021. Writer has encouraged pt to explore possible coping skills to manage symptoms. Pt was not receptive. Over this past week, pt was observed to responding to internal stimuli and talking to herself. Pt continues to reported talking to herself is her coping skills. Pt had verbal altercation with one of her peer after this specific peer made racial comment to her. Writer intervene and pt was able to response to redirection. Writer has encouraged pt to utilize her coping skills and reviewed her coping skills with her including music and reach out to staff. Pt was receptive. Writer also counseled benefits of journaling. Pt was receptive and writer provided pt with a journal book. Pt was receptive. Pt currently denies SI, HI, AH and VH.     In response to COIVD-19 outbreak, there is a shift change in hospital wide protocols and policies. All structure programming and groups were limited to 1:1 interaction to prompted safety. Pt has not attended any group despite psychiatric rehabilitation staff’s daily prompting. Pt was observed to be self-isolative, showed poor interaction with others. Pt remains to have poor ADLs.  
  03-24-21  Type of therapy: Psychoeducation  Type of session: Individual  Level of patient participation: Disruptive,Engaged,Participated with encouragement  Duration of participation: 15 minutes  Therapy conducted by: Psych rehab  Therapy Summary: Writer attempted to meet with pt multiple times today. Pt was sleeping despite multiple attempts. Pt is unable to participate in mental status exam and CGI. The following information was obtained based on individual therapy session over this past week.   Pt remains to be delusional, disorganized over this past week. Pt was observed to crawl on the floor and started to make dog barking sound on 3/22/2021 during the afternoon group and required redirection. During the individual therapy session yesterday, pt stated she was behave like that because she wanted to make a statement. Pt stated some  think Black people are dogs, Black people think  eats dogs. Pt then stated she is this writer’s dog. Pt remains to be disorganized, delusional, making racist statement. Writer provided support and psychoeducation. Pt was not receptive. Pt remains to have poor insight into her illness and poor judgment. Pt then started to talk about her hair and stated she used to spend $1400 dollars on wigs. Pt denies SI, HI, AH and VH.   In response to COIVD-19 outbreak, there is a shift change in hospital wide protocols and policies. Psychiatric rehabilitation staff has been providing groups with requirement of social distancing and wearing mask. Pt has not attend group despite psychiatric rehabilitation staff’s prompting. Pt was observed to be visible in the day room, interacts well with selective peers. Pt remains to have poor ADLs.  
  02-04-21  Type of therapy: Coping skills,Medication management  Type of session: Individual  Level of patient participation: Disruptive,Engaged,Participated with encouragement  Duration of participation: 15 minutes  Therapy conducted by: Psych rehab  Therapy Summary: Over this past week, pt remains to disorganized, response to internal stimuli, integrally preoccupied, sexually preoccupied, talking/yelling to herself. Pt was observed to dance provocative in the hallway sometimes. Pt was observed to come out the hallway naked yesterday, however, pt was able to response to redirection. As per team, pt was eating food from garbage can yesterday. Staff intervene and she was able to response to redirection. During the individual therapy session, writer addressed pt’s inappropriate behavior. Pt was receptive and stated she was upset because she doesn’t have enough clothes. Writer provided some clothes before, however, writer will continue to provide support and clothes. Writer discussed the importance of medication/treatment compliance. Pt was receptive and stated she will take all her medication from now on.  Pt currently denies SI, HI, AH and VH.     In response to COIVD-19 outbreak, there is a shift change in hospital wide protocols and policies. All structure programming and groups were limited to 1:1 interaction to prompted safety. Pt was observed to be visible on the unit, dancing and pacing in the hallway. Pt showed poor ADLs.  
  01-21-21  Type of therapy: Medication management, Psychoeducation  Type of session: Individual  Level of patient participation: Not engaged, Resistance to participation  Duration of participation: Pt refused to participate   Therapy conducted by: Psych rehab  Therapy Summary: Pt refused to meet with this writer for individual therapy session. Pt remains to be disorganized, internally preoccupied, labile, responding to internal stimuli, talking to herself, and screaming at times. As per team, pt has been smearing feces all over, licking feces, dinking her urinate over this past week.   In response to COIVD-19 outbreak, there is a shift change in hospital wide protocols and policies. Psychiatric rehabilitation staff has been providing group with requirement of social distancing and wearing mask. Pt did not attend any group despite psychiatric rehabilitation staff’s daily prompting. Pt was observed to be self-isolative and showed minimal interactions with others. Pt is disheveled and showed poor ADLs.

## 2021-03-26 NOTE — BH TREATMENT PLAN - NSTXDCOTHRGOAL_PSY_ALL_CORE
Pt will show a reduction of disorganization and engage meaningfully with writer to identify a safe discharge plan. Pt will comply with recommended tx plan and medications for 5 days, identify 2 benefits for adhering to tx.

## 2021-03-26 NOTE — BH TREATMENT PLAN - NSTXMEDICINTERPR_PSY_ALL_CORE
Pt made minimal progress over this past week. Pt stated she takes Zyprexa, Ativan and Clozapine.   Pt is unable to verbalize accurate medication list and time to take medication. Writer will encourage pt to recognize her medication regimen/dosage, and time to take her medication.
Pt made some progress over this past week. Pt has demonstrated daily compliant with medication. Writer will encourage pt continues to demonstrate medication/treatment compliance.
Pt made minimal progress over this past week. At the beginning of this week, pt refused to take her medication despite staff’s prompting. However, pt has been comply with medication towards the end of this past week. Pt would benefit from continue to demonstrate medication/treatment compliance.
Pt met her goal over this past week. Pt has demonstrated compliant with medication regimen and dosage and was confirm with treatment team.  Pt has not verbalized benefits of medication compliance.   Pt would benefit from explore benefits of medication compliance.

## 2021-03-26 NOTE — BH TREATMENT PLAN - NSTXMEDICGOAL_PSY_ALL_CORE
Take all medications as prescribed
Take all medications as prescribed
Be able to state dosages and times to take medications
Take all medications as prescribed

## 2021-03-27 RX ADMIN — HALOPERIDOL DECANOATE 5 MILLIGRAM(S): 100 INJECTION INTRAMUSCULAR at 12:35

## 2021-03-27 RX ADMIN — HALOPERIDOL DECANOATE 5 MILLIGRAM(S): 100 INJECTION INTRAMUSCULAR at 20:24

## 2021-03-27 RX ADMIN — OLANZAPINE 10 MILLIGRAM(S): 15 TABLET, FILM COATED ORAL at 20:23

## 2021-03-27 RX ADMIN — Medication 50 MILLIGRAM(S): at 20:24

## 2021-03-27 RX ADMIN — OLANZAPINE 15 MILLIGRAM(S): 15 TABLET, FILM COATED ORAL at 11:09

## 2021-03-27 RX ADMIN — Medication 50 MILLIGRAM(S): at 12:35

## 2021-03-27 RX ADMIN — LITHIUM CARBONATE 1350 MILLIGRAM(S): 300 TABLET, EXTENDED RELEASE ORAL at 20:22

## 2021-03-27 RX ADMIN — PANTOPRAZOLE SODIUM 40 MILLIGRAM(S): 20 TABLET, DELAYED RELEASE ORAL at 11:09

## 2021-03-27 RX ADMIN — Medication 600 MILLIGRAM(S): at 22:10

## 2021-03-27 RX ADMIN — Medication 2 MILLIGRAM(S): at 13:26

## 2021-03-27 RX ADMIN — Medication 2 MILLIGRAM(S): at 20:24

## 2021-03-27 RX ADMIN — Medication 600 MILLIGRAM(S): at 20:23

## 2021-03-28 RX ADMIN — HALOPERIDOL DECANOATE 5 MILLIGRAM(S): 100 INJECTION INTRAMUSCULAR at 19:41

## 2021-03-28 RX ADMIN — Medication 2 MILLIGRAM(S): at 19:40

## 2021-03-28 RX ADMIN — PANTOPRAZOLE SODIUM 40 MILLIGRAM(S): 20 TABLET, DELAYED RELEASE ORAL at 12:53

## 2021-03-28 RX ADMIN — OLANZAPINE 15 MILLIGRAM(S): 15 TABLET, FILM COATED ORAL at 12:52

## 2021-03-28 RX ADMIN — HALOPERIDOL DECANOATE 5 MILLIGRAM(S): 100 INJECTION INTRAMUSCULAR at 13:46

## 2021-03-28 RX ADMIN — Medication 50 MILLIGRAM(S): at 19:40

## 2021-03-28 RX ADMIN — LITHIUM CARBONATE 1350 MILLIGRAM(S): 300 TABLET, EXTENDED RELEASE ORAL at 19:40

## 2021-03-28 RX ADMIN — Medication 50 MILLIGRAM(S): at 13:46

## 2021-03-28 RX ADMIN — Medication 2 MILLIGRAM(S): at 13:47

## 2021-03-28 RX ADMIN — OLANZAPINE 10 MILLIGRAM(S): 15 TABLET, FILM COATED ORAL at 19:39

## 2021-03-29 LAB — LITHIUM SERPL-MCNC: 0.8 MMOL/L — SIGNIFICANT CHANGE UP (ref 0.6–1.2)

## 2021-03-29 PROCEDURE — 99232 SBSQ HOSP IP/OBS MODERATE 35: CPT

## 2021-03-29 RX ADMIN — LITHIUM CARBONATE 1350 MILLIGRAM(S): 300 TABLET, EXTENDED RELEASE ORAL at 20:00

## 2021-03-29 RX ADMIN — Medication 50 MILLIGRAM(S): at 19:59

## 2021-03-29 RX ADMIN — OLANZAPINE 10 MILLIGRAM(S): 15 TABLET, FILM COATED ORAL at 20:00

## 2021-03-29 RX ADMIN — OLANZAPINE 15 MILLIGRAM(S): 15 TABLET, FILM COATED ORAL at 08:19

## 2021-03-29 RX ADMIN — Medication 1 TABLET(S): at 08:19

## 2021-03-29 RX ADMIN — PANTOPRAZOLE SODIUM 40 MILLIGRAM(S): 20 TABLET, DELAYED RELEASE ORAL at 08:19

## 2021-03-29 RX ADMIN — HALOPERIDOL DECANOATE 5 MILLIGRAM(S): 100 INJECTION INTRAMUSCULAR at 19:59

## 2021-03-29 RX ADMIN — Medication 2 MILLIGRAM(S): at 19:59

## 2021-03-29 NOTE — BH INPATIENT PSYCHIATRY PROGRESS NOTE - NSBHFUPINTERVALHXFT_PSY_A_CORE
Per nursing patient had a difficult day yesterday.  Patient’s mood was sad, tearful after she realized it was Psalm Sunday.  Patient began crying, shouting “they killed him.”  Patient required po prn, with good effect.  Today patient is seen in her room calmer, patient reports she was upset but “I feel ok now.”  Patient offers no complaints.  Her thoughts remain disorganized, tangential.  Pt. denied AVH and delusions, SI/HI intent or plan. Medication compliant. No SE noted. Waiting on State transfer. Per , State transfer is delay 2/2 +Covid outbreak on unit at [place].

## 2021-03-29 NOTE — BH INPATIENT PSYCHIATRY PROGRESS NOTE - NSBHCHARTREVIEWVS_PSY_A_CORE FT
Vital Signs Last 24 Hrs  T(C): 36.2 (29 Mar 2021 06:35), Max: 36.6 (28 Mar 2021 22:46)  T(F): 97.1 (29 Mar 2021 06:35), Max: 97.9 (28 Mar 2021 22:46)  HR: --  BP: --  BP(mean): --  RR: 18 (28 Mar 2021 20:12) (18 - 18)  SpO2: 98% (28 Mar 2021 20:12) (98% - 98%)

## 2021-03-29 NOTE — BH INPATIENT PSYCHIATRY PROGRESS NOTE - NSBHASSESSSUMMFT_PSY_ALL_CORE
Plan:  >Obs: Routine checks appropriate--visible on the unit, no recent aggression on unit.  No current SI. No need for CO, patient not expected to pose risk to self or others in controlled inpatient setting.  >Psychiatric Meds:  Continue current medication regimen. Received Prolixin Dec 50mg on 3/23/21  >Labs: Lithium serum level resulted 1.2 on 1/29/21.  Lithium order for 2/11/21, resulted 1.3 reduce Lithium dose Lithium level resulted 0.9 on 2/17/21.   >Medical: No acute concerns.   >Diet: regular  >Social: milieu /structured therapy  >Treatment Interventions: Groups and Individual Therapy/CBT, Motivational counseling for substance abuse related issues.   >Dispo: Patient has been accepted for State Hospitalization at [place].  Patient is on waiting list for bed.

## 2021-03-30 RX ADMIN — Medication 50 MILLIGRAM(S): at 16:12

## 2021-03-30 RX ADMIN — HALOPERIDOL DECANOATE 5 MILLIGRAM(S): 100 INJECTION INTRAMUSCULAR at 16:13

## 2021-03-30 RX ADMIN — OLANZAPINE 15 MILLIGRAM(S): 15 TABLET, FILM COATED ORAL at 09:59

## 2021-03-30 RX ADMIN — PANTOPRAZOLE SODIUM 40 MILLIGRAM(S): 20 TABLET, DELAYED RELEASE ORAL at 09:59

## 2021-03-30 RX ADMIN — Medication 2 MILLIGRAM(S): at 16:13

## 2021-03-30 RX ADMIN — OLANZAPINE 10 MILLIGRAM(S): 15 TABLET, FILM COATED ORAL at 20:15

## 2021-03-30 RX ADMIN — LITHIUM CARBONATE 1350 MILLIGRAM(S): 300 TABLET, EXTENDED RELEASE ORAL at 20:15

## 2021-03-30 NOTE — BH INPATIENT PSYCHIATRY PROGRESS NOTE - NSBHCHARTREVIEWVS_PSY_A_CORE FT
Vital Signs Last 24 Hrs  T(C): 36.6 (30 Mar 2021 06:38), Max: 36.6 (30 Mar 2021 06:38)  T(F): 97.8 (30 Mar 2021 06:38), Max: 97.8 (30 Mar 2021 06:38)  HR: --  BP: --  BP(mean): --  RR: 18 (29 Mar 2021 21:34) (18 - 18)  SpO2: 98% (29 Mar 2021 21:34) (98% - 98%)

## 2021-03-30 NOTE — BH INPATIENT PSYCHIATRY PROGRESS NOTE - NSBHASSESSSUMMFT_PSY_ALL_CORE
Plan:  >Obs: Routine checks appropriate--visible on the unit, no recent aggression on unit.  No current SI. No need for CO, patient not expected to pose risk to self or others in controlled inpatient setting.  >Psychiatric Meds:  Continue current medication regimen. Received Prolixin Dec 50mg on 3/23/21  >Labs: Lithium serum level resulted 1.2 on 1/29/21.  Lithium order for 2/11/21, resulted 1.3 reduce Lithium dose Lithium level resulted 0.9 on 2/17/21.   >Medical: No acute concerns.   >Diet: regular  >Social: milieu /structured therapy  >Treatment Interventions: Groups and Individual Therapy/CBT, Motivational counseling for substance abuse related issues.   >Dispo: Patient has been accepted for State Hospitalization at [place].  Patient is on waiting list for bed.

## 2021-03-30 NOTE — BH INPATIENT PSYCHIATRY PROGRESS NOTE - NSBHFUPINTERVALHXFT_PSY_A_CORE
Per nursing, no interval events.  Patient low profile last evening on unit. Patient participated in fresh air break, took her standing medications without incident, slept throughout the night. Patient offers no complaints.  Her thoughts remain disorganized, tangential.  Pt. denied AVH and delusions, SI/HI intent or plan. Medication compliant. No SE noted. Waiting on State transfer. Per SW, State transfer is delay 2/2 +Covid outbreak on unit at [place].

## 2021-03-31 PROCEDURE — 99232 SBSQ HOSP IP/OBS MODERATE 35: CPT

## 2021-03-31 RX ADMIN — Medication 2 MILLIGRAM(S): at 20:10

## 2021-03-31 RX ADMIN — LITHIUM CARBONATE 1350 MILLIGRAM(S): 300 TABLET, EXTENDED RELEASE ORAL at 20:10

## 2021-03-31 RX ADMIN — Medication 50 MILLIGRAM(S): at 20:11

## 2021-03-31 RX ADMIN — HALOPERIDOL DECANOATE 5 MILLIGRAM(S): 100 INJECTION INTRAMUSCULAR at 20:10

## 2021-03-31 RX ADMIN — PANTOPRAZOLE SODIUM 40 MILLIGRAM(S): 20 TABLET, DELAYED RELEASE ORAL at 10:49

## 2021-03-31 RX ADMIN — OLANZAPINE 10 MILLIGRAM(S): 15 TABLET, FILM COATED ORAL at 20:09

## 2021-03-31 RX ADMIN — OLANZAPINE 15 MILLIGRAM(S): 15 TABLET, FILM COATED ORAL at 10:49

## 2021-03-31 NOTE — BH INPATIENT PSYCHIATRY PROGRESS NOTE - NSBHFUPINTERVALHXFT_PSY_A_CORE
Per nursing, patient running nude in hallways last evening, but was easily re-directable back to her room and given hospital gown, did not need po prn’s.  Patient reports “all my clothes are dirty so I began running.” No further interval events.  Took her standing medications without incident, slept throughout the night. Patient offers no complaints.  Per  patient has been given a bed at [place], patient can be transferred next Wednesday 4/7/21. Patient has been informed.

## 2021-03-31 NOTE — BH INPATIENT PSYCHIATRY PROGRESS NOTE - NSBHCHARTREVIEWVS_PSY_A_CORE FT
Vital Signs Last 24 Hrs  T(C): 36.6 (31 Mar 2021 06:37), Max: 36.6 (31 Mar 2021 06:37)  T(F): 97.9 (31 Mar 2021 06:37), Max: 97.9 (31 Mar 2021 06:37)  HR: --  BP: --  BP(mean): --  RR: 18 (30 Mar 2021 23:12) (18 - 18)  SpO2: 99% (30 Mar 2021 23:12) (99% - 99%)

## 2021-03-31 NOTE — BH INPATIENT PSYCHIATRY PROGRESS NOTE - NSBHASSESSSUMMFT_PSY_ALL_CORE
Plan:  >Obs: Routine checks appropriate--visible on the unit, no recent aggression on unit.  No current SI. No need for CO, patient not expected to pose risk to self or others in controlled inpatient setting.  >Psychiatric Meds:  Continue current medication regimen. Received Prolixin Dec 50mg on 3/23/21  >Labs: Lithium serum level resulted 1.2 on 1/29/21.  Lithium order for 2/11/21, resulted 1.3 reduce Lithium dose Lithium level resulted 0.9 on 2/17/21.   >Medical: No acute concerns.   >Diet: regular  >Social: milieu /structured therapy  >Treatment Interventions: Groups and Individual Therapy/CBT, Motivational counseling for substance abuse related issues.   >Dispo: Patient has been accepted for State Hospitalization at [place].  Patient is on waiting list for bed.   Plan:  >Obs: Routine checks appropriate--visible on the unit, no recent aggression on unit.  No current SI. No need for CO, patient not expected to pose risk to self or others in controlled inpatient setting.  >Psychiatric Meds:  Continue current medication regimen. Received Prolixin Dec 50mg on 3/23/21  >Labs: Lithium serum level resulted 1.2 on 1/29/21.  Lithium order for 2/11/21, resulted 1.3 reduce Lithium dose Lithium level resulted 0.9 on 2/17/21.   >Medical: No acute concerns.   >Diet: regular  >Social: milieu /structured therapy  >Treatment Interventions: Groups and Individual Therapy/CBT, Motivational counseling for substance abuse related issues.   >Dispo: Patient has been accepted for State Hospitalization at [place].  Patient has been given a bed, dc next Wednesday.

## 2021-04-01 PROCEDURE — 99232 SBSQ HOSP IP/OBS MODERATE 35: CPT

## 2021-04-01 RX ADMIN — LITHIUM CARBONATE 1350 MILLIGRAM(S): 300 TABLET, EXTENDED RELEASE ORAL at 19:57

## 2021-04-01 RX ADMIN — OLANZAPINE 10 MILLIGRAM(S): 15 TABLET, FILM COATED ORAL at 19:58

## 2021-04-01 RX ADMIN — Medication 1 TABLET(S): at 23:02

## 2021-04-01 NOTE — BH INPATIENT PSYCHIATRY PROGRESS NOTE - NSBHCHARTREVIEWINVESTIGATE_PSY_A_CORE FT
Anesthesia reports 300mg Propofol, 50mg Lidocaine and 600mL NS given during procedure. Received report from anesthesia staff on vital signs and status of patient. EKG:NSR  QTc: 464ms

## 2021-04-01 NOTE — BH INPATIENT PSYCHIATRY PROGRESS NOTE - NSBHASSESSSUMMFT_PSY_ALL_CORE
Plan:  >Obs: Routine checks appropriate--visible on the unit, no recent aggression on unit.  No current SI. No need for CO, patient not expected to pose risk to self or others in controlled inpatient setting.  >Psychiatric Meds:  Continue current medication regimen. Received Prolixin Dec 50mg on 3/23/21  >Labs: Lithium serum level resulted 1.2 on 1/29/21.  Lithium order for 2/11/21, resulted 1.3 reduce Lithium dose Lithium level resulted 0.9 on 2/17/21.   >Medical: No acute concerns.   >Diet: regular  >Social: milieu /structured therapy  >Treatment Interventions: Groups and Individual Therapy/CBT, Motivational counseling for substance abuse related issues.   >Dispo: Patient has been accepted for [facility] [place].  Patient has been given a bed, dc next Wednesday 4/7/21.

## 2021-04-01 NOTE — BH INPATIENT PSYCHIATRY PROGRESS NOTE - NSBHFUPINTERVALHXFT_PSY_A_CORE
Chart, medications and labs reviewed.  Patient is discussed in treatment team: discussed her upcoming transfer for [facility] to [facility]. The patient has continued to maintain gains on the unit.  No interval event, behavior has been well controlled, no prn’s for aggression.  She reports eating and sleeping well. Patient has been compliant with medications, tolerating them well.   Patient reports she is excited to be leaving next week.  She has been given a bed at [place], patient will be transferred next Wednesday 4/7/21. Patient will need Covid PCR prior to leaving, has been ordered for Monday 4/5/21. FUF completed and emailed to MI.

## 2021-04-01 NOTE — BH INPATIENT PSYCHIATRY PROGRESS NOTE - OTHER
Bizarre, disorganized, sexually provocative  [place] has slightly attenuated  Still having bouts of aggression, however, frequency has significantly decreased  Sexually charged Patient is internally preoccupied, she is observed talking to self. Patient denies any AH  2/8/21: continues to talk to self, RIS Bizarre animated affect

## 2021-04-01 NOTE — BH INPATIENT PSYCHIATRY PROGRESS NOTE - NSBHCHARTREVIEWVS_PSY_A_CORE FT
Vital Signs Last 24 Hrs  T(C): 36.2 (01 Apr 2021 00:24), Max: 37 (31 Mar 2021 14:20)  T(F): 97.1 (01 Apr 2021 00:24), Max: 98.6 (31 Mar 2021 14:20)  HR: --  BP: --  BP(mean): --  RR: 18 (01 Apr 2021 00:24) (18 - 18)  SpO2: 100% (01 Apr 2021 00:24) (100% - 100%)

## 2021-04-02 PROCEDURE — 99232 SBSQ HOSP IP/OBS MODERATE 35: CPT

## 2021-04-02 RX ADMIN — PANTOPRAZOLE SODIUM 40 MILLIGRAM(S): 20 TABLET, DELAYED RELEASE ORAL at 08:14

## 2021-04-02 RX ADMIN — OLANZAPINE 15 MILLIGRAM(S): 15 TABLET, FILM COATED ORAL at 08:15

## 2021-04-02 RX ADMIN — Medication 1 TABLET(S): at 13:28

## 2021-04-02 RX ADMIN — LITHIUM CARBONATE 1350 MILLIGRAM(S): 300 TABLET, EXTENDED RELEASE ORAL at 19:57

## 2021-04-02 RX ADMIN — Medication 1 TABLET(S): at 08:14

## 2021-04-02 RX ADMIN — OLANZAPINE 10 MILLIGRAM(S): 15 TABLET, FILM COATED ORAL at 19:57

## 2021-04-02 NOTE — BH INPATIENT PSYCHIATRY PROGRESS NOTE - NSBHFUPINTERVALHXFT_PSY_A_CORE
Patient is discussed in treatment team.  No interval events, behavior has been well controlled, no prn’s for aggression.  No clinical worsening.  Patient remains symptomatically stable. She reports eating and sleeping well. Patient has been compliant with medications, tolerating them well.   Patient will be transferred next Wednesday 4/7/21. Patient will need Covid PCR prior to leaving, has been ordered for Monday 4/5/21. FUF completed and emailed to SW. Patient has refused for the Covid vaccination at discharge.

## 2021-04-02 NOTE — BH INPATIENT PSYCHIATRY PROGRESS NOTE - NSBHCHARTREVIEWVS_PSY_A_CORE FT
Vital Signs Last 24 Hrs  T(C): 36.2 (02 Apr 2021 06:41), Max: 36.4 (01 Apr 2021 18:42)  T(F): 97.1 (02 Apr 2021 06:41), Max: 97.5 (01 Apr 2021 18:42)  HR: --  BP: --  BP(mean): --  RR: 18 (01 Apr 2021 20:02) (18 - 18)  SpO2: 98% (01 Apr 2021 20:02) (98% - 98%)

## 2021-04-02 NOTE — BH INPATIENT PSYCHIATRY PROGRESS NOTE - NSBHASSESSSUMMFT_PSY_ALL_CORE
Plan:  >Obs: Routine checks appropriate--visible on the unit, no recent aggression on unit.  No current SI. No need for CO, patient not expected to pose risk to self or others in controlled inpatient setting.  >Psychiatric Meds:  Continue current medication regimen. Received Prolixin Dec 50mg on 3/23/21  >Labs: CBC/CMP/[place], Covid PCR scheduled on 4/5/21 in preparation for dc  >Medical: No acute concerns.   >Diet: regular  >Social: milieu /structured therapy  >Treatment Interventions: Groups and Individual Therapy/CBT, Motivational counseling for substance abuse related issues.   >Dispo: Patient has been accepted for [facility] at [place].  Patient has been given a bed, dc next Wednesday 4/7/21.

## 2021-04-03 RX ORDER — SODIUM CHLORIDE 0.65 %
1 AEROSOL, SPRAY (ML) NASAL EVERY 12 HOURS
Refills: 0 | Status: DISCONTINUED | OUTPATIENT
Start: 2021-04-03 | End: 2021-04-07

## 2021-04-03 RX ADMIN — PANTOPRAZOLE SODIUM 40 MILLIGRAM(S): 20 TABLET, DELAYED RELEASE ORAL at 09:18

## 2021-04-03 RX ADMIN — Medication 600 MILLIGRAM(S): at 10:19

## 2021-04-03 RX ADMIN — Medication 600 MILLIGRAM(S): at 18:28

## 2021-04-03 RX ADMIN — OLANZAPINE 15 MILLIGRAM(S): 15 TABLET, FILM COATED ORAL at 09:18

## 2021-04-03 RX ADMIN — Medication 600 MILLIGRAM(S): at 10:50

## 2021-04-03 RX ADMIN — Medication 600 MILLIGRAM(S): at 18:21

## 2021-04-03 RX ADMIN — OLANZAPINE 10 MILLIGRAM(S): 15 TABLET, FILM COATED ORAL at 20:06

## 2021-04-03 RX ADMIN — LITHIUM CARBONATE 1350 MILLIGRAM(S): 300 TABLET, EXTENDED RELEASE ORAL at 20:06

## 2021-04-03 RX ADMIN — Medication 2 MILLIGRAM(S): at 19:26

## 2021-04-04 RX ADMIN — OLANZAPINE 10 MILLIGRAM(S): 15 TABLET, FILM COATED ORAL at 20:38

## 2021-04-04 RX ADMIN — LITHIUM CARBONATE 1350 MILLIGRAM(S): 300 TABLET, EXTENDED RELEASE ORAL at 20:38

## 2021-04-04 RX ADMIN — OLANZAPINE 15 MILLIGRAM(S): 15 TABLET, FILM COATED ORAL at 08:09

## 2021-04-04 RX ADMIN — Medication 2 MILLIGRAM(S): at 20:44

## 2021-04-04 RX ADMIN — PANTOPRAZOLE SODIUM 40 MILLIGRAM(S): 20 TABLET, DELAYED RELEASE ORAL at 08:09

## 2021-04-04 RX ADMIN — Medication 50 MILLIGRAM(S): at 20:37

## 2021-04-04 RX ADMIN — Medication 1 SPRAY(S): at 02:59

## 2021-04-04 RX ADMIN — SIMETHICONE 80 MILLIGRAM(S): 80 TABLET, CHEWABLE ORAL at 09:02

## 2021-04-04 RX ADMIN — Medication 2 MILLIGRAM(S): at 11:44

## 2021-04-04 RX ADMIN — HALOPERIDOL DECANOATE 5 MILLIGRAM(S): 100 INJECTION INTRAMUSCULAR at 20:44

## 2021-04-05 LAB
ALBUMIN SERPL ELPH-MCNC: 3.7 G/DL — SIGNIFICANT CHANGE UP (ref 3.3–5)
ALP SERPL-CCNC: 118 U/L — SIGNIFICANT CHANGE UP (ref 40–120)
ALT FLD-CCNC: 22 U/L — SIGNIFICANT CHANGE UP (ref 4–33)
ANION GAP SERPL CALC-SCNC: 10 MMOL/L — SIGNIFICANT CHANGE UP (ref 7–14)
AST SERPL-CCNC: 15 U/L — SIGNIFICANT CHANGE UP (ref 4–32)
BILIRUB SERPL-MCNC: <0.2 MG/DL — SIGNIFICANT CHANGE UP (ref 0.2–1.2)
BUN SERPL-MCNC: 12 MG/DL — SIGNIFICANT CHANGE UP (ref 7–23)
CALCIUM SERPL-MCNC: 9.3 MG/DL — SIGNIFICANT CHANGE UP (ref 8.4–10.5)
CHLORIDE SERPL-SCNC: 106 MMOL/L — SIGNIFICANT CHANGE UP (ref 98–107)
CO2 SERPL-SCNC: 22 MMOL/L — SIGNIFICANT CHANGE UP (ref 22–31)
CREAT SERPL-MCNC: 0.82 MG/DL — SIGNIFICANT CHANGE UP (ref 0.5–1.3)
GLUCOSE SERPL-MCNC: 132 MG/DL — HIGH (ref 70–99)
HCT VFR BLD CALC: 37.9 % — SIGNIFICANT CHANGE UP (ref 34.5–45)
HGB BLD-MCNC: 11.4 G/DL — LOW (ref 11.5–15.5)
LITHIUM SERPL-MCNC: 1.1 MMOL/L — SIGNIFICANT CHANGE UP (ref 0.6–1.2)
MCHC RBC-ENTMCNC: 28 PG — SIGNIFICANT CHANGE UP (ref 27–34)
MCHC RBC-ENTMCNC: 30.1 GM/DL — LOW (ref 32–36)
MCV RBC AUTO: 93.1 FL — SIGNIFICANT CHANGE UP (ref 80–100)
NRBC # BLD: 0 /100 WBCS — SIGNIFICANT CHANGE UP
NRBC # FLD: 0 K/UL — SIGNIFICANT CHANGE UP
PLATELET # BLD AUTO: 280 K/UL — SIGNIFICANT CHANGE UP (ref 150–400)
POTASSIUM SERPL-MCNC: 3.8 MMOL/L — SIGNIFICANT CHANGE UP (ref 3.5–5.3)
POTASSIUM SERPL-SCNC: 3.8 MMOL/L — SIGNIFICANT CHANGE UP (ref 3.5–5.3)
PROT SERPL-MCNC: 7.2 G/DL — SIGNIFICANT CHANGE UP (ref 6–8.3)
RBC # BLD: 4.07 M/UL — SIGNIFICANT CHANGE UP (ref 3.8–5.2)
RBC # FLD: 16.3 % — HIGH (ref 10.3–14.5)
SARS-COV-2 RNA SPEC QL NAA+PROBE: SIGNIFICANT CHANGE UP
SODIUM SERPL-SCNC: 138 MMOL/L — SIGNIFICANT CHANGE UP (ref 135–145)
WBC # BLD: 8.64 K/UL — SIGNIFICANT CHANGE UP (ref 3.8–10.5)
WBC # FLD AUTO: 8.64 K/UL — SIGNIFICANT CHANGE UP (ref 3.8–10.5)

## 2021-04-05 PROCEDURE — 99232 SBSQ HOSP IP/OBS MODERATE 35: CPT

## 2021-04-05 RX ADMIN — Medication 50 MILLIGRAM(S): at 19:43

## 2021-04-05 RX ADMIN — LITHIUM CARBONATE 1350 MILLIGRAM(S): 300 TABLET, EXTENDED RELEASE ORAL at 19:42

## 2021-04-05 RX ADMIN — Medication 50 MILLIGRAM(S): at 08:01

## 2021-04-05 RX ADMIN — OLANZAPINE 10 MILLIGRAM(S): 15 TABLET, FILM COATED ORAL at 19:42

## 2021-04-05 RX ADMIN — PANTOPRAZOLE SODIUM 40 MILLIGRAM(S): 20 TABLET, DELAYED RELEASE ORAL at 08:00

## 2021-04-05 RX ADMIN — Medication 1 SPRAY(S): at 19:50

## 2021-04-05 RX ADMIN — OLANZAPINE 15 MILLIGRAM(S): 15 TABLET, FILM COATED ORAL at 08:00

## 2021-04-05 RX ADMIN — Medication 2 MILLIGRAM(S): at 19:43

## 2021-04-05 RX ADMIN — HALOPERIDOL DECANOATE 5 MILLIGRAM(S): 100 INJECTION INTRAMUSCULAR at 08:00

## 2021-04-05 RX ADMIN — Medication 2 MILLIGRAM(S): at 07:59

## 2021-04-05 RX ADMIN — Medication 1 SPRAY(S): at 01:41

## 2021-04-05 RX ADMIN — HALOPERIDOL DECANOATE 5 MILLIGRAM(S): 100 INJECTION INTRAMUSCULAR at 19:43

## 2021-04-05 NOTE — BH INPATIENT PSYCHIATRY PROGRESS NOTE - NSBHFUPINTERVALHXFT_PSY_A_CORE
Patient is discussed in treatment team.  No interval events, behavior has been well controlled, no prn’s for aggression.  No clinical worsening.  Patient remains symptomatically stable. She reports eating and sleeping well. [facility],  patient is explained the process.  Patient has been compliant with medications, tolerating them well.   Patient will be transferred on Wednesday 4/7/21 to [place]. Patient scheduled for Covid PCR testing, Lithium/CBC/CMP blood work today.  Patient has not consented for the Covid vaccination at discharge.

## 2021-04-05 NOTE — BH INPATIENT PSYCHIATRY PROGRESS NOTE - NSBHCHARTREVIEWVS_PSY_A_CORE FT
Vital Signs Last 24 Hrs  T(C): 36.4 (05 Apr 2021 06:36), Max: 36.8 (04 Apr 2021 14:28)  T(F): 97.6 (05 Apr 2021 06:36), Max: 98.3 (04 Apr 2021 14:28)  HR: --  BP: --  BP(mean): --  RR: 20 (04 Apr 2021 22:15) (20 - 20)  SpO2: 98% (04 Apr 2021 22:15) (98% - 98%) Vital Signs Last 24 Hrs  T(C): 36.4 (05 Apr 2021 06:36), Max: 36.8 (04 Apr 2021 14:28)  T(F): 97.6 (05 Apr 2021 06:36), Max: 98.3 (04 Apr 2021 14:28)  HR: --92  BP: --116/69  BP(mean): --  RR: 20 (04 Apr 2021 22:15) (20 - 20)  SpO2: 98% (04 Apr 2021 22:15) (98% - 98%)

## 2021-04-05 NOTE — BH INPATIENT PSYCHIATRY PROGRESS NOTE - NSBHASSESSSUMMFT_PSY_ALL_CORE
Plan:  >Obs: Routine checks appropriate--visible on the unit, no recent aggression on unit.  No current SI. No need for CO, patient not expected to pose risk to self or others in controlled inpatient setting.  >Psychiatric Meds:  Continue current medication regimen. Received Prolixin Dec 50mg on 3/23/21  >Labs: CBC/CMP/[place], Covid PCR scheduled on 4/5/21 in preparation for dc  >Medical: No acute concerns.   >Diet: regular  >Social: milieu /structured therapy  >Treatment Interventions: Groups and Individual Therapy/CBT, Motivational counseling for substance abuse related issues.   >Dispo: Patient has been accepted for [facility] at [place].  Patient has been given a bed, dc next Wednesday 4/7/21.

## 2021-04-06 PROCEDURE — 99232 SBSQ HOSP IP/OBS MODERATE 35: CPT

## 2021-04-06 RX ORDER — ALBUTEROL 90 UG/1
2 AEROSOL, METERED ORAL
Qty: 0 | Refills: 0 | DISCHARGE
Start: 2021-04-06

## 2021-04-06 RX ORDER — PANTOPRAZOLE SODIUM 20 MG/1
1 TABLET, DELAYED RELEASE ORAL
Qty: 30 | Refills: 0
Start: 2021-04-06 | End: 2021-05-05

## 2021-04-06 RX ORDER — OLANZAPINE 15 MG/1
1 TABLET, FILM COATED ORAL
Qty: 30 | Refills: 0
Start: 2021-04-06 | End: 2021-05-05

## 2021-04-06 RX ORDER — LITHIUM CARBONATE 300 MG/1
3 TABLET, EXTENDED RELEASE ORAL
Qty: 90 | Refills: 0
Start: 2021-04-06 | End: 2021-05-05

## 2021-04-06 RX ORDER — FLUPHENAZINE HYDROCHLORIDE 1 MG/1
50 TABLET, FILM COATED ORAL
Qty: 1 | Refills: 0
Start: 2021-04-06

## 2021-04-06 RX ADMIN — Medication 2 MILLIGRAM(S): at 23:05

## 2021-04-06 RX ADMIN — Medication 2 MILLIGRAM(S): at 16:06

## 2021-04-06 RX ADMIN — HALOPERIDOL DECANOATE 5 MILLIGRAM(S): 100 INJECTION INTRAMUSCULAR at 23:05

## 2021-04-06 RX ADMIN — PANTOPRAZOLE SODIUM 40 MILLIGRAM(S): 20 TABLET, DELAYED RELEASE ORAL at 08:10

## 2021-04-06 RX ADMIN — Medication 50 MILLIGRAM(S): at 16:06

## 2021-04-06 RX ADMIN — Medication 50 MILLIGRAM(S): at 23:05

## 2021-04-06 RX ADMIN — OLANZAPINE 15 MILLIGRAM(S): 15 TABLET, FILM COATED ORAL at 08:10

## 2021-04-06 RX ADMIN — LITHIUM CARBONATE 1350 MILLIGRAM(S): 300 TABLET, EXTENDED RELEASE ORAL at 20:18

## 2021-04-06 RX ADMIN — OLANZAPINE 10 MILLIGRAM(S): 15 TABLET, FILM COATED ORAL at 20:19

## 2021-04-06 RX ADMIN — Medication 1 TABLET(S): at 08:10

## 2021-04-06 RX ADMIN — Medication 1 SPRAY(S): at 16:18

## 2021-04-06 RX ADMIN — HALOPERIDOL DECANOATE 5 MILLIGRAM(S): 100 INJECTION INTRAMUSCULAR at 16:06

## 2021-04-06 NOTE — BH INPATIENT PSYCHIATRY PROGRESS NOTE - NSBHCHARTREVIEWVS_PSY_A_CORE FT
Vital Signs Last 24 Hrs  T(C): 36.3 (06 Apr 2021 06:30), Max: 36.8 (05 Apr 2021 14:42)  T(F): 97.3 (06 Apr 2021 06:30), Max: 98.3 (05 Apr 2021 14:42)  HR: --  BP: --  BP(mean): --  RR: --  SpO2: -- Vital Signs Last 24 Hrs  T(C): 36.3 (06 Apr 2021 06:30), Max: 36.8 (05 Apr 2021 14:42)  T(F): 97.3 (06 Apr 2021 06:30), Max: 98.3 (05 Apr 2021 14:42)  HR: --86  BP: --109/64  BP(mean): --  RR: --  SpO2: --

## 2021-04-06 NOTE — BH INPATIENT PSYCHIATRY PROGRESS NOTE - NSBHFUPINTERVALHXFT_PSY_A_CORE
Patient is seen for psychosis.  Patient is discussed in multidisciplinary team: reviewed discharge plans for tomorrow, labs, medications, chart reviewed. Patient is scheduled for transfer to [facility] tomorrow.  Per  all paperwork have been submitted to [facility] and patient is prepared for transfer. Patient’s Covid PCR resulted negative on 4/5.  Lithium serum level 1.1, CBC/CMP resulted WNL.   Patient has not consented for the Covid vaccination at discharge.  Patient reports improved mood, but feels anxious about transfer tomorrow.  She showed some improvement in her symptoms, with a gradual reduction of her internal preoccupation, thought disorganization, and paranoia.  At baseline patient is symptomatically stable.  She has been compliant with her medications, no SE. Patient is due next Prolixin Deconate 50mg on 4/24.  She denies any SI/HI/AVH, at this time.  Improved functioning, and improved interaction with others is also evident. Patient offers no complaints.

## 2021-04-06 NOTE — BH INPATIENT PSYCHIATRY PROGRESS NOTE - NSBHASSESSSUMMFT_PSY_ALL_CORE
Plan:  >Obs: Routine checks appropriate--visible on the unit, no recent aggression on unit.  No current SI. No need for CO, patient not expected to pose risk to self or others in controlled inpatient setting.  >Psychiatric Meds:  Continue current medication regimen. Received Prolixin Dec 50mg on 3/23/21  >Labs: CBC/CMP are WNL, Lithium serum level 1.1,  Covid PCR resulted negative  >Medical: No acute concerns.   >Diet: regular  >Social: milieu /structured therapy  >Treatment Interventions: Groups and Individual Therapy/CBT, Motivational counseling for substance abuse related issues.   >Dispo: Patient has been accepted for [facility] at [facility] tomorrow 4/7/21.

## 2021-04-07 VITALS — TEMPERATURE: 97 F

## 2021-04-07 PROCEDURE — 99238 HOSP IP/OBS DSCHRG MGMT 30/<: CPT

## 2021-04-07 RX ORDER — FLUPHENAZINE HYDROCHLORIDE 1 MG/1
50 TABLET, FILM COATED ORAL
Qty: 1 | Refills: 0
Start: 2021-04-07

## 2021-04-07 RX ORDER — OLANZAPINE 15 MG/1
1 TABLET, FILM COATED ORAL
Qty: 30 | Refills: 0
Start: 2021-04-07 | End: 2021-05-06

## 2021-04-07 RX ORDER — ALBUTEROL 90 UG/1
2 AEROSOL, METERED ORAL
Qty: 8 | Refills: 0
Start: 2021-04-07 | End: 2021-04-07

## 2021-04-07 RX ORDER — PANTOPRAZOLE SODIUM 20 MG/1
1 TABLET, DELAYED RELEASE ORAL
Qty: 30 | Refills: 0
Start: 2021-04-07 | End: 2021-05-06

## 2021-04-07 RX ORDER — LITHIUM CARBONATE 300 MG/1
3 TABLET, EXTENDED RELEASE ORAL
Qty: 90 | Refills: 0
Start: 2021-04-07 | End: 2021-05-06

## 2021-04-07 RX ADMIN — PANTOPRAZOLE SODIUM 40 MILLIGRAM(S): 20 TABLET, DELAYED RELEASE ORAL at 08:04

## 2021-04-07 RX ADMIN — OLANZAPINE 15 MILLIGRAM(S): 15 TABLET, FILM COATED ORAL at 08:04

## 2021-04-07 RX ADMIN — Medication 1 TABLET(S): at 08:04

## 2021-04-07 NOTE — BH INPATIENT PSYCHIATRY PROGRESS NOTE - NSBHMSEMUSCLE_PSY_A_CORE
Normal muscle tone/strength

## 2021-04-07 NOTE — BH INPATIENT PSYCHIATRY PROGRESS NOTE - NSTXPROBMEDIC_PSY_ALL_CORE
MEDICATION/TREATMENT NON-COMPLIANCE

## 2021-04-07 NOTE — BH INPATIENT PSYCHIATRY PROGRESS NOTE - NSBHPROGSUIC_PSY_A_CORE
no

## 2021-04-07 NOTE — BH INPATIENT PSYCHIATRY PROGRESS NOTE - NSBHMSESPEECH_PSY_A_CORE
Normal volume, rate, productivity, spontaneity and articulation
Normal volume, rate, productivity, spontaneity and articulation
Abnormal as indicated, otherwise normal...
Normal volume, rate, productivity, spontaneity and articulation
Abnormal as indicated, otherwise normal...
Normal volume, rate, productivity, spontaneity and articulation

## 2021-04-07 NOTE — BH INPATIENT PSYCHIATRY PROGRESS NOTE - NSBHMSEGROOM_PSY_A_CORE
Poor
Good
Poor
Fair
Poor
Fair
Poor
Good
Good
Poor
Fair
Fair
Good
Poor
Good
Poor
Good
Fair
Fair
Good
Poor
Fair
Good
Poor
Good
Poor
Fair
Poor
Fair
Good
Poor
Poor
Good
Good
Poor
Poor
Good
Poor
Fair
Fair
Good
Poor
Good
Fair
Fair
Poor
Fair
Good

## 2021-04-07 NOTE — BH INPATIENT PSYCHIATRY PROGRESS NOTE - NSBHCHARTREVIEWVS_PSY_A_CORE FT
Vital Signs Last 24 Hrs  T(C): 36.3 (07 Apr 2021 06:44), Max: 36.8 (06 Apr 2021 18:18)  T(F): 97.3 (07 Apr 2021 06:44), Max: 98.2 (06 Apr 2021 18:18)  HR: --  BP: --  BP(mean): --  RR: --  SpO2: -- Vital Signs Last 24 Hrs  T(C): 36.3 (07 Apr 2021 06:44), Max: 36.8 (06 Apr 2021 18:18)  T(F): 97.3 (07 Apr 2021 06:44), Max: 98.2 (06 Apr 2021 18:18)  HR: --106  BP: --132/90  BP(mean): --  RR: --  SpO2: --

## 2021-04-07 NOTE — BH INPATIENT PSYCHIATRY PROGRESS NOTE - NSBHCONSULTIPREASON_PSY_A_CORE
other reason

## 2021-04-07 NOTE — BH INPATIENT PSYCHIATRY PROGRESS NOTE - NSTXPSYCHODATETRGT_PSY_ALL_CORE
03-Mar-2021
10-Feb-2021
24-Feb-2021
06-Apr-2021
10-Mar-2021
06-Apr-2021
24-Mar-2021
03-Mar-2021
24-Mar-2021
10-Mar-2021
24-Mar-2021
24-Mar-2021
03-Feb-2021
10-Feb-2021
24-Feb-2021
03-Feb-2021
17-Feb-2021
03-Mar-2021
10-Mar-2021
24-Mar-2021
24-Mar-2021
10-Feb-2021
24-Mar-2021
24-Feb-2021
10-Mar-2021
17-Feb-2021
10-Mar-2021
03-Mar-2021
10-Mar-2021
03-Feb-2021
10-Mar-2021
10-Mar-2021
24-Mar-2021
24-Feb-2021
10-Feb-2021
24-Mar-2021
06-Apr-2021
06-Apr-2021
03-Mar-2021
10-Mar-2021
03-Feb-2021
03-Feb-2021
06-Apr-2021
24-Mar-2021
17-Feb-2021
06-Apr-2021
10-Mar-2021
24-Feb-2021
17-Feb-2021
24-Feb-2021

## 2021-04-07 NOTE — BH INPATIENT PSYCHIATRY PROGRESS NOTE - NSTXMANICMODTX_PSY_ALL_CORE
Yes

## 2021-04-07 NOTE — [facility] INPATIENT PSYCHIATRY PROGRESS NOTE - PRN MEDS
MEDICATIONS  (PRN):  ALBUTerol    90 MICROgram(s) HFA Inhaler 2 Puff(s) Inhalation every 6 hours PRN asthma attack  diphenhydrAMINE 50 milliGRAM(s) Oral every 6 hours PRN agitation  diphenhydrAMINE   Injectable 50 milliGRAM(s) IntraMuscular once PRN EPS  haloperidol     Tablet 5 milliGRAM(s) Oral every 6 hours PRN agitation  haloperidol    Injectable 5 milliGRAM(s) IntraMuscular once PRN agitaion  LORazepam     Tablet 2 milliGRAM(s) Oral every 6 hours PRN Agitation  LORazepam   Injectable 2 milliGRAM(s) IntraMuscular once PRN agitation  LORazepam   Injectable 2 milliGRAM(s) IntraMuscular once PRN agitation  
MEDICATIONS  (PRN):  acetaminophen   Tablet .. 650 milliGRAM(s) Oral every 6 hours PRN Mild Pain (1 - 3), Moderate Pain (4 - 6)  ALBUTerol    90 MICROgram(s) HFA Inhaler 2 Puff(s) Inhalation every 6 hours PRN asthma attack  diphenhydrAMINE 50 milliGRAM(s) Oral every 6 hours PRN agitation  diphenhydrAMINE   Injectable 50 milliGRAM(s) IntraMuscular once PRN EPS  haloperidol     Tablet 5 milliGRAM(s) Oral every 6 hours PRN agitation  haloperidol    Injectable 5 milliGRAM(s) IntraMuscular once PRN agitaion  ibuprofen  Tablet. 600 milliGRAM(s) Oral every 8 hours PRN Severe Pain (7 - 10)  loperamide 2 milliGRAM(s) Oral every 4 hours PRN GI  LORazepam     Tablet 2 milliGRAM(s) Oral every 6 hours PRN agitation  LORazepam   Injectable 2 milliGRAM(s) IntraMuscular once PRN agitation  ondansetron    Tablet 4 milliGRAM(s) Oral every 6 hours PRN N/V  
MEDICATIONS  (PRN):  acetaminophen   Tablet .. 650 milliGRAM(s) Oral every 6 hours PRN Mild Pain (1 - 3), Moderate Pain (4 - 6)  ALBUTerol    90 MICROgram(s) HFA Inhaler 2 Puff(s) Inhalation every 6 hours PRN asthma attack  diphenhydrAMINE 50 milliGRAM(s) Oral every 6 hours PRN agitation  diphenhydrAMINE   Injectable 50 milliGRAM(s) IntraMuscular once PRN EPS  haloperidol     Tablet 5 milliGRAM(s) Oral every 6 hours PRN agitation  haloperidol    Injectable 5 milliGRAM(s) IntraMuscular once PRN agitaion  ibuprofen  Tablet. 600 milliGRAM(s) Oral every 8 hours PRN Severe Pain (7 - 10)  loperamide 2 milliGRAM(s) Oral every 4 hours PRN GI  LORazepam     Tablet 2 milliGRAM(s) Oral every 6 hours PRN anxiety/agitation  LORazepam   Injectable 2 milliGRAM(s) IntraMuscular once PRN agitation  ondansetron    Tablet 4 milliGRAM(s) Oral every 6 hours PRN N/V  
MEDICATIONS  (PRN):  acetaminophen   Tablet .. 650 milliGRAM(s) Oral every 6 hours PRN Mild Pain (1 - 3), Moderate Pain (4 - 6)  ALBUTerol    90 MICROgram(s) HFA Inhaler 2 Puff(s) Inhalation every 6 hours PRN asthma attack  calcium carbonate    500 mG (Tums) Chewable 1 Tablet(s) Chew every 6 hours PRN GI  diphenhydrAMINE 50 milliGRAM(s) Oral every 6 hours PRN agitation  diphenhydrAMINE   Injectable 50 milliGRAM(s) IntraMuscular once PRN EPS  haloperidol     Tablet 5 milliGRAM(s) Oral every 6 hours PRN agitation  haloperidol    Injectable 5 milliGRAM(s) IntraMuscular once PRN agitaion  ibuprofen  Tablet. 600 milliGRAM(s) Oral every 8 hours PRN Severe Pain (7 - 10)  lactase 1 Tablet(s) Oral three times a day with meals PRN before dairy products  loperamide 2 milliGRAM(s) Oral every 4 hours PRN GI  LORazepam     Tablet 2 milliGRAM(s) Oral every 6 hours PRN anxiety/agitation  LORazepam   Injectable 2 milliGRAM(s) IntraMuscular once PRN Agitation  ondansetron    Tablet 4 milliGRAM(s) Oral every 6 hours PRN N/V  simethicone 80 milliGRAM(s) Chew every 6 hours PRN Gas  
MEDICATIONS  (PRN):  acetaminophen   Tablet .. 650 milliGRAM(s) Oral every 6 hours PRN Mild Pain (1 - 3), Moderate Pain (4 - 6)  ALBUTerol    90 MICROgram(s) HFA Inhaler 2 Puff(s) Inhalation every 6 hours PRN asthma attack  calcium carbonate    500 mG (Tums) Chewable 1 Tablet(s) Chew every 6 hours PRN GI  diphenhydrAMINE 50 milliGRAM(s) Oral every 6 hours PRN agitation  diphenhydrAMINE   Injectable 50 milliGRAM(s) IntraMuscular once PRN EPS  haloperidol     Tablet 5 milliGRAM(s) Oral every 6 hours PRN agitation  haloperidol    Injectable 5 milliGRAM(s) IntraMuscular once PRN agitaion  ibuprofen  Tablet. 600 milliGRAM(s) Oral every 8 hours PRN Severe Pain (7 - 10)  lactase 1 Tablet(s) Oral three times a day with meals PRN before dairy products  loperamide 2 milliGRAM(s) Oral every 4 hours PRN GI  LORazepam     Tablet 2 milliGRAM(s) Oral every 6 hours PRN anxiety/agitation  LORazepam   Injectable 2 milliGRAM(s) IntraMuscular once PRN agitation  ondansetron    Tablet 4 milliGRAM(s) Oral every 6 hours PRN N/V  simethicone 80 milliGRAM(s) Chew every 6 hours PRN Gas  
MEDICATIONS  (PRN):  ALBUTerol    90 MICROgram(s) HFA Inhaler 2 Puff(s) Inhalation every 6 hours PRN asthma attack  diphenhydrAMINE 50 milliGRAM(s) Oral every 6 hours PRN agitation  diphenhydrAMINE   Injectable 50 milliGRAM(s) IntraMuscular once PRN EPS  haloperidol     Tablet 5 milliGRAM(s) Oral every 6 hours PRN agitation  haloperidol    Injectable 5 milliGRAM(s) IntraMuscular once PRN agitaion  LORazepam     Tablet 2 milliGRAM(s) Oral every 6 hours PRN agitation  LORazepam   Injectable 2 milliGRAM(s) IntraMuscular once PRN Agitation  LORazepam   Injectable 2 milliGRAM(s) IntraMuscular once PRN Agitation  
MEDICATIONS  (PRN):  acetaminophen   Tablet .. 650 milliGRAM(s) Oral every 6 hours PRN Mild Pain (1 - 3), Moderate Pain (4 - 6)  ALBUTerol    90 MICROgram(s) HFA Inhaler 2 Puff(s) Inhalation every 6 hours PRN asthma attack  diphenhydrAMINE 50 milliGRAM(s) Oral every 6 hours PRN agitation  diphenhydrAMINE   Injectable 50 milliGRAM(s) IntraMuscular once PRN EPS  haloperidol     Tablet 5 milliGRAM(s) Oral every 6 hours PRN agitation  haloperidol    Injectable 5 milliGRAM(s) IntraMuscular once PRN agitaion  ibuprofen  Tablet. 600 milliGRAM(s) Oral every 8 hours PRN Severe Pain (7 - 10)  loperamide 2 milliGRAM(s) Oral every 4 hours PRN GI  LORazepam     Tablet 2 milliGRAM(s) Oral every 6 hours PRN anxiety/agitation  LORazepam   Injectable 2 milliGRAM(s) IntraMuscular once PRN agitation  ondansetron    Tablet 4 milliGRAM(s) Oral every 6 hours PRN N/V  
MEDICATIONS  (PRN):  acetaminophen   Tablet .. 650 milliGRAM(s) Oral every 6 hours PRN Mild Pain (1 - 3), Moderate Pain (4 - 6)  ALBUTerol    90 MICROgram(s) HFA Inhaler 2 Puff(s) Inhalation every 6 hours PRN asthma attack  calcium carbonate    500 mG (Tums) Chewable 1 Tablet(s) Chew every 6 hours PRN GI  diphenhydrAMINE 50 milliGRAM(s) Oral every 6 hours PRN agitation  diphenhydrAMINE   Injectable 50 milliGRAM(s) IntraMuscular once PRN EPS  diphenhydrAMINE   Injectable 50 milliGRAM(s) IntraMuscular once PRN EPS  haloperidol     Tablet 5 milliGRAM(s) Oral every 6 hours PRN agitation  haloperidol    Injectable 5 milliGRAM(s) IntraMuscular once PRN agitaion  haloperidol    Injectable 5 milliGRAM(s) IntraMuscular once PRN agitaion  ibuprofen  Tablet. 600 milliGRAM(s) Oral every 8 hours PRN Severe Pain (7 - 10)  lactase 1 Tablet(s) Oral three times a day with meals PRN before dairy products  loperamide 2 milliGRAM(s) Oral every 4 hours PRN GI  LORazepam     Tablet 2 milliGRAM(s) Oral every 6 hours PRN anxiety/agitation  LORazepam   Injectable 2 milliGRAM(s) IntraMuscular once PRN agitation  LORazepam   Injectable 2 milliGRAM(s) IntraMuscular once PRN agitation  ondansetron    Tablet 4 milliGRAM(s) Oral every 6 hours PRN N/V  simethicone 80 milliGRAM(s) Chew every 6 hours PRN Gas  
MEDICATIONS  (PRN):  acetaminophen   Tablet .. 650 milliGRAM(s) Oral every 6 hours PRN Mild Pain (1 - 3), Moderate Pain (4 - 6)  ALBUTerol    90 MICROgram(s) HFA Inhaler 2 Puff(s) Inhalation every 6 hours PRN asthma attack  calcium carbonate    500 mG (Tums) Chewable 1 Tablet(s) Chew every 6 hours PRN GI  diphenhydrAMINE 50 milliGRAM(s) Oral every 6 hours PRN agitation  diphenhydrAMINE   Injectable 50 milliGRAM(s) IntraMuscular once PRN EPS  diphenhydrAMINE   Injectable 50 milliGRAM(s) IntraMuscular once PRN EPS  haloperidol     Tablet 5 milliGRAM(s) Oral every 6 hours PRN agitation  haloperidol    Injectable 5 milliGRAM(s) IntraMuscular once PRN agitaion  haloperidol    Injectable 5 milliGRAM(s) IntraMuscular once PRN agitaion  ibuprofen  Tablet. 600 milliGRAM(s) Oral every 8 hours PRN Severe Pain (7 - 10)  lactase 1 Tablet(s) Oral three times a day with meals PRN before dairy products  loperamide 2 milliGRAM(s) Oral every 4 hours PRN GI  LORazepam     Tablet 2 milliGRAM(s) Oral every 6 hours PRN agiation/anxiety  LORazepam   Injectable 2 milliGRAM(s) IntraMuscular once PRN agitation  ondansetron    Tablet 4 milliGRAM(s) Oral every 6 hours PRN N/V  simethicone 80 milliGRAM(s) Chew every 6 hours PRN Gas  sodium chloride 0.65% Nasal 1 Spray(s) Both Nostrils every 12 hours PRN Nasal Congestion  
MEDICATIONS  (PRN):  acetaminophen   Tablet .. 650 milliGRAM(s) Oral every 6 hours PRN Mild Pain (1 - 3), Moderate Pain (4 - 6)  ALBUTerol    90 MICROgram(s) HFA Inhaler 2 Puff(s) Inhalation every 6 hours PRN asthma attack  calcium carbonate    500 mG (Tums) Chewable 1 Tablet(s) Chew every 6 hours PRN GI  diphenhydrAMINE 50 milliGRAM(s) Oral every 6 hours PRN agitation  diphenhydrAMINE   Injectable 50 milliGRAM(s) IntraMuscular once PRN EPS  haloperidol     Tablet 5 milliGRAM(s) Oral every 6 hours PRN agitation  haloperidol    Injectable 5 milliGRAM(s) IntraMuscular once PRN agitaion  ibuprofen  Tablet. 600 milliGRAM(s) Oral every 8 hours PRN Severe Pain (7 - 10)  lactase 1 Tablet(s) Oral three times a day with meals PRN before dairy products  loperamide 2 milliGRAM(s) Oral every 4 hours PRN GI  LORazepam     Tablet 2 milliGRAM(s) Oral every 6 hours PRN anxiety/agitation  LORazepam   Injectable 2 milliGRAM(s) IntraMuscular once PRN agitation  ondansetron    Tablet 4 milliGRAM(s) Oral every 6 hours PRN N/V  simethicone 80 milliGRAM(s) Chew every 6 hours PRN Gas  
MEDICATIONS  (PRN):  acetaminophen   Tablet .. 650 milliGRAM(s) Oral every 6 hours PRN Mild Pain (1 - 3), Moderate Pain (4 - 6)  ALBUTerol    90 MICROgram(s) HFA Inhaler 2 Puff(s) Inhalation every 6 hours PRN asthma attack  calcium carbonate    500 mG (Tums) Chewable 1 Tablet(s) Chew every 6 hours PRN GI  diphenhydrAMINE 50 milliGRAM(s) Oral every 6 hours PRN agitation  diphenhydrAMINE   Injectable 50 milliGRAM(s) IntraMuscular once PRN EPS  diphenhydrAMINE   Injectable 50 milliGRAM(s) IntraMuscular once PRN EPS  haloperidol     Tablet 5 milliGRAM(s) Oral every 6 hours PRN agitation  haloperidol    Injectable 5 milliGRAM(s) IntraMuscular once PRN agitaion  haloperidol    Injectable 5 milliGRAM(s) IntraMuscular once PRN agitaion  ibuprofen  Tablet. 600 milliGRAM(s) Oral every 8 hours PRN Severe Pain (7 - 10)  lactase 1 Tablet(s) Oral three times a day with meals PRN before dairy products  loperamide 2 milliGRAM(s) Oral every 4 hours PRN GI  LORazepam     Tablet 2 milliGRAM(s) Oral every 6 hours PRN agiation/anxiety  LORazepam   Injectable 2 milliGRAM(s) IntraMuscular once PRN agitation  ondansetron    Tablet 4 milliGRAM(s) Oral every 6 hours PRN N/V  simethicone 80 milliGRAM(s) Chew every 6 hours PRN Gas  
MEDICATIONS  (PRN):  acetaminophen   Tablet .. 650 milliGRAM(s) Oral every 6 hours PRN Mild Pain (1 - 3), Moderate Pain (4 - 6)  ALBUTerol    90 MICROgram(s) HFA Inhaler 2 Puff(s) Inhalation every 6 hours PRN asthma attack  calcium carbonate    500 mG (Tums) Chewable 1 Tablet(s) Chew every 6 hours PRN GI  diphenhydrAMINE 50 milliGRAM(s) Oral every 6 hours PRN agitation  diphenhydrAMINE   Injectable 50 milliGRAM(s) IntraMuscular once PRN EPS  diphenhydrAMINE   Injectable 50 milliGRAM(s) IntraMuscular once PRN EPS  haloperidol     Tablet 5 milliGRAM(s) Oral every 6 hours PRN agitation  haloperidol    Injectable 5 milliGRAM(s) IntraMuscular once PRN agitaion  haloperidol    Injectable 5 milliGRAM(s) IntraMuscular once PRN agitaion  ibuprofen  Tablet. 600 milliGRAM(s) Oral every 8 hours PRN Severe Pain (7 - 10)  lactase 1 Tablet(s) Oral three times a day with meals PRN before dairy products  loperamide 2 milliGRAM(s) Oral every 4 hours PRN GI  LORazepam     Tablet 2 milliGRAM(s) Oral every 6 hours PRN agiation/anxiety  LORazepam   Injectable 2 milliGRAM(s) IntraMuscular once PRN agitation  ondansetron    Tablet 4 milliGRAM(s) Oral every 6 hours PRN N/V  simethicone 80 milliGRAM(s) Chew every 6 hours PRN Gas  
MEDICATIONS  (PRN):  ALBUTerol    90 MICROgram(s) HFA Inhaler 2 Puff(s) Inhalation every 6 hours PRN asthma attack  diphenhydrAMINE 50 milliGRAM(s) Oral every 6 hours PRN agitation  diphenhydrAMINE   Injectable 50 milliGRAM(s) IntraMuscular once PRN EPS  haloperidol     Tablet 5 milliGRAM(s) Oral every 6 hours PRN agitation  haloperidol    Injectable 5 milliGRAM(s) IntraMuscular once PRN agitaion  loperamide 2 milliGRAM(s) Oral every 4 hours PRN GI  LORazepam     Tablet 2 milliGRAM(s) Oral every 6 hours PRN agitation  LORazepam   Injectable 2 milliGRAM(s) IntraMuscular once PRN agitation  ondansetron    Tablet 4 milliGRAM(s) Oral every 6 hours PRN N/V  
MEDICATIONS  (PRN):  ALBUTerol    90 MICROgram(s) HFA Inhaler 2 Puff(s) Inhalation every 6 hours PRN asthma attack  diphenhydrAMINE 50 milliGRAM(s) Oral every 6 hours PRN agitation  diphenhydrAMINE   Injectable 50 milliGRAM(s) IntraMuscular once PRN EPS  haloperidol     Tablet 5 milliGRAM(s) Oral every 6 hours PRN agitation  haloperidol    Injectable 5 milliGRAM(s) IntraMuscular once PRN agitaion  LORazepam     Tablet 2 milliGRAM(s) Oral every 6 hours PRN agitation  LORazepam   Injectable 2 milliGRAM(s) IntraMuscular once PRN Agitation  LORazepam   Injectable 2 milliGRAM(s) IntraMuscular once PRN Agitation  
MEDICATIONS  (PRN):  ALBUTerol    90 MICROgram(s) HFA Inhaler 2 Puff(s) Inhalation every 6 hours PRN asthma attack  diphenhydrAMINE 50 milliGRAM(s) Oral every 6 hours PRN agitation  diphenhydrAMINE   Injectable 50 milliGRAM(s) IntraMuscular once PRN EPS  haloperidol     Tablet 5 milliGRAM(s) Oral every 6 hours PRN agitation  haloperidol    Injectable 5 milliGRAM(s) IntraMuscular once PRN agitaion  LORazepam     Tablet 2 milliGRAM(s) Oral every 6 hours PRN agitation  LORazepam   Injectable 2 milliGRAM(s) IntraMuscular once PRN Agitation  LORazepam   Injectable 2 milliGRAM(s) IntraMuscular once PRN Agitation  
MEDICATIONS  (PRN):  acetaminophen   Tablet .. 650 milliGRAM(s) Oral every 6 hours PRN Mild Pain (1 - 3), Moderate Pain (4 - 6)  ALBUTerol    90 MICROgram(s) HFA Inhaler 2 Puff(s) Inhalation every 6 hours PRN asthma attack  calcium carbonate    500 mG (Tums) Chewable 1 Tablet(s) Chew every 6 hours PRN GI  diphenhydrAMINE 50 milliGRAM(s) Oral every 6 hours PRN agitation  diphenhydrAMINE   Injectable 50 milliGRAM(s) IntraMuscular once PRN EPS  haloperidol     Tablet 5 milliGRAM(s) Oral every 6 hours PRN agitation  haloperidol    Injectable 5 milliGRAM(s) IntraMuscular once PRN agitaion  ibuprofen  Tablet. 600 milliGRAM(s) Oral every 8 hours PRN Severe Pain (7 - 10)  lactase 1 Tablet(s) Oral three times a day with meals PRN before dairy products  loperamide 2 milliGRAM(s) Oral every 4 hours PRN GI  LORazepam     Tablet 2 milliGRAM(s) Oral every 6 hours PRN anxiety/agitation  LORazepam   Injectable 2 milliGRAM(s) IntraMuscular once PRN agitation  ondansetron    Tablet 4 milliGRAM(s) Oral every 6 hours PRN N/V  simethicone 80 milliGRAM(s) Chew every 6 hours PRN Gas  
MEDICATIONS  (PRN):  ALBUTerol    90 MICROgram(s) HFA Inhaler 2 Puff(s) Inhalation every 6 hours PRN asthma attack  diphenhydrAMINE 50 milliGRAM(s) Oral every 6 hours PRN agitation  diphenhydrAMINE   Injectable 50 milliGRAM(s) IntraMuscular once PRN EPS  haloperidol     Tablet 5 milliGRAM(s) Oral every 6 hours PRN agitation  haloperidol    Injectable 5 milliGRAM(s) IntraMuscular once PRN agitaion  LORazepam     Tablet 2 milliGRAM(s) Oral every 6 hours PRN agitation  LORazepam   Injectable 2 milliGRAM(s) IntraMuscular once PRN Agitation  LORazepam   Injectable 2 milliGRAM(s) IntraMuscular once PRN Agitation  
MEDICATIONS  (PRN):  acetaminophen   Tablet .. 650 milliGRAM(s) Oral every 6 hours PRN Mild Pain (1 - 3), Moderate Pain (4 - 6)  ALBUTerol    90 MICROgram(s) HFA Inhaler 2 Puff(s) Inhalation every 6 hours PRN asthma attack  calcium carbonate    500 mG (Tums) Chewable 1 Tablet(s) Chew every 6 hours PRN GI  diphenhydrAMINE 50 milliGRAM(s) Oral every 6 hours PRN agitation  diphenhydrAMINE   Injectable 50 milliGRAM(s) IntraMuscular once PRN EPS  diphenhydrAMINE   Injectable 50 milliGRAM(s) IntraMuscular once PRN EPS  haloperidol     Tablet 5 milliGRAM(s) Oral every 6 hours PRN agitation  haloperidol    Injectable 5 milliGRAM(s) IntraMuscular once PRN agitaion  haloperidol    Injectable 5 milliGRAM(s) IntraMuscular once PRN agitaion  ibuprofen  Tablet. 600 milliGRAM(s) Oral every 8 hours PRN Severe Pain (7 - 10)  lactase 1 Tablet(s) Oral three times a day with meals PRN before dairy products  loperamide 2 milliGRAM(s) Oral every 4 hours PRN GI  LORazepam     Tablet 2 milliGRAM(s) Oral every 6 hours PRN agitation/anxiety  LORazepam   Injectable 2 milliGRAM(s) IntraMuscular once PRN agitation  ondansetron    Tablet 4 milliGRAM(s) Oral every 6 hours PRN N/V  simethicone 80 milliGRAM(s) Chew every 6 hours PRN Gas  sodium chloride 0.65% Nasal 1 Spray(s) Both Nostrils every 12 hours PRN Nasal Congestion  
MEDICATIONS  (PRN):  acetaminophen   Tablet .. 650 milliGRAM(s) Oral every 6 hours PRN Mild Pain (1 - 3), Moderate Pain (4 - 6)  ALBUTerol    90 MICROgram(s) HFA Inhaler 2 Puff(s) Inhalation every 6 hours PRN asthma attack  calcium carbonate    500 mG (Tums) Chewable 1 Tablet(s) Chew every 6 hours PRN GI  diphenhydrAMINE 50 milliGRAM(s) Oral every 6 hours PRN agitation  diphenhydrAMINE   Injectable 50 milliGRAM(s) IntraMuscular once PRN EPS  haloperidol     Tablet 5 milliGRAM(s) Oral every 6 hours PRN agitation  haloperidol    Injectable 5 milliGRAM(s) IntraMuscular once PRN agitaion  ibuprofen  Tablet. 600 milliGRAM(s) Oral every 8 hours PRN Severe Pain (7 - 10)  lactase 1 Tablet(s) Oral three times a day with meals PRN before dairy products  loperamide 2 milliGRAM(s) Oral every 4 hours PRN GI  LORazepam     Tablet 2 milliGRAM(s) Oral every 6 hours PRN anxiety/agitation  LORazepam   Injectable 2 milliGRAM(s) IntraMuscular once PRN agitation  ondansetron    Tablet 4 milliGRAM(s) Oral every 6 hours PRN N/V  simethicone 80 milliGRAM(s) Chew every 6 hours PRN Gas  
MEDICATIONS  (PRN):  ALBUTerol    90 MICROgram(s) HFA Inhaler 2 Puff(s) Inhalation every 6 hours PRN asthma attack  diphenhydrAMINE 50 milliGRAM(s) Oral every 6 hours PRN agitation  diphenhydrAMINE   Injectable 50 milliGRAM(s) IntraMuscular once PRN EPS  haloperidol     Tablet 5 milliGRAM(s) Oral every 6 hours PRN agitation  haloperidol    Injectable 5 milliGRAM(s) IntraMuscular once PRN agitaion  LORazepam     Tablet 2 milliGRAM(s) Oral every 6 hours PRN Agitation  LORazepam   Injectable 2 milliGRAM(s) IntraMuscular once PRN agitation  LORazepam   Injectable 2 milliGRAM(s) IntraMuscular once PRN agitation  
MEDICATIONS  (PRN):  ALBUTerol    90 MICROgram(s) HFA Inhaler 2 Puff(s) Inhalation every 6 hours PRN asthma attack  diphenhydrAMINE 50 milliGRAM(s) Oral every 6 hours PRN agitation  diphenhydrAMINE   Injectable 50 milliGRAM(s) IntraMuscular once PRN EPS  haloperidol     Tablet 5 milliGRAM(s) Oral every 6 hours PRN agitation  haloperidol    Injectable 5 milliGRAM(s) IntraMuscular once PRN agitaion  LORazepam     Tablet 2 milliGRAM(s) Oral every 6 hours PRN Agitation  LORazepam   Injectable 2 milliGRAM(s) IntraMuscular once PRN agitation  LORazepam   Injectable 2 milliGRAM(s) IntraMuscular once PRN agitation  
MEDICATIONS  (PRN):  acetaminophen   Tablet .. 650 milliGRAM(s) Oral every 6 hours PRN Mild Pain (1 - 3), Moderate Pain (4 - 6)  ALBUTerol    90 MICROgram(s) HFA Inhaler 2 Puff(s) Inhalation every 6 hours PRN asthma attack  diphenhydrAMINE 50 milliGRAM(s) Oral every 6 hours PRN agitation  diphenhydrAMINE   Injectable 50 milliGRAM(s) IntraMuscular once PRN EPS  haloperidol     Tablet 5 milliGRAM(s) Oral every 6 hours PRN agitation  haloperidol    Injectable 5 milliGRAM(s) IntraMuscular once PRN agitaion  ibuprofen  Tablet. 600 milliGRAM(s) Oral every 8 hours PRN Severe Pain (7 - 10)  loperamide 2 milliGRAM(s) Oral every 4 hours PRN GI  LORazepam     Tablet 2 milliGRAM(s) Oral every 6 hours PRN agitation/anxiety  LORazepam   Injectable 2 milliGRAM(s) IntraMuscular once PRN agitation  ondansetron    Tablet 4 milliGRAM(s) Oral every 6 hours PRN N/V  
MEDICATIONS  (PRN):  acetaminophen   Tablet .. 650 milliGRAM(s) Oral every 6 hours PRN Mild Pain (1 - 3), Moderate Pain (4 - 6)  ALBUTerol    90 MICROgram(s) HFA Inhaler 2 Puff(s) Inhalation every 6 hours PRN asthma attack  diphenhydrAMINE 50 milliGRAM(s) Oral every 6 hours PRN agitation  diphenhydrAMINE   Injectable 50 milliGRAM(s) IntraMuscular once PRN EPS  haloperidol     Tablet 5 milliGRAM(s) Oral every 6 hours PRN agitation  haloperidol    Injectable 5 milliGRAM(s) IntraMuscular once PRN agitaion  ibuprofen  Tablet. 600 milliGRAM(s) Oral every 8 hours PRN Severe Pain (7 - 10)  loperamide 2 milliGRAM(s) Oral every 4 hours PRN GI  LORazepam     Tablet 2 milliGRAM(s) Oral every 6 hours PRN agitation/anxiety  LORazepam   Injectable 2 milliGRAM(s) IntraMuscular once PRN agitation  ondansetron    Tablet 4 milliGRAM(s) Oral every 6 hours PRN N/V  
MEDICATIONS  (PRN):  acetaminophen   Tablet .. 650 milliGRAM(s) Oral every 6 hours PRN Mild Pain (1 - 3), Moderate Pain (4 - 6)  ALBUTerol    90 MICROgram(s) HFA Inhaler 2 Puff(s) Inhalation every 6 hours PRN asthma attack  calcium carbonate    500 mG (Tums) Chewable 1 Tablet(s) Chew every 6 hours PRN GI  diphenhydrAMINE 50 milliGRAM(s) Oral every 6 hours PRN agitation  diphenhydrAMINE   Injectable 50 milliGRAM(s) IntraMuscular once PRN EPS  haloperidol     Tablet 5 milliGRAM(s) Oral every 6 hours PRN agitation  haloperidol    Injectable 5 milliGRAM(s) IntraMuscular once PRN agitaion  ibuprofen  Tablet. 600 milliGRAM(s) Oral every 8 hours PRN Severe Pain (7 - 10)  loperamide 2 milliGRAM(s) Oral every 4 hours PRN GI  LORazepam     Tablet 2 milliGRAM(s) Oral every 6 hours PRN agitation/anxiety  LORazepam   Injectable 2 milliGRAM(s) IntraMuscular once PRN agitation  ondansetron    Tablet 4 milliGRAM(s) Oral every 6 hours PRN N/V  simethicone 80 milliGRAM(s) Chew every 6 hours PRN Gas  
MEDICATIONS  (PRN):  acetaminophen   Tablet .. 650 milliGRAM(s) Oral every 6 hours PRN Mild Pain (1 - 3), Moderate Pain (4 - 6)  ALBUTerol    90 MICROgram(s) HFA Inhaler 2 Puff(s) Inhalation every 6 hours PRN asthma attack  diphenhydrAMINE 50 milliGRAM(s) Oral every 6 hours PRN agitation  diphenhydrAMINE   Injectable 50 milliGRAM(s) IntraMuscular once PRN EPS  haloperidol     Tablet 5 milliGRAM(s) Oral every 6 hours PRN agitation  haloperidol    Injectable 5 milliGRAM(s) IntraMuscular once PRN agitaion  ibuprofen  Tablet. 600 milliGRAM(s) Oral every 8 hours PRN Severe Pain (7 - 10)  loperamide 2 milliGRAM(s) Oral every 4 hours PRN GI  LORazepam     Tablet 2 milliGRAM(s) Oral every 6 hours PRN agitation/anxiety  LORazepam   Injectable 2 milliGRAM(s) IntraMuscular once PRN agitation  ondansetron    Tablet 4 milliGRAM(s) Oral every 6 hours PRN N/V  
MEDICATIONS  (PRN):  acetaminophen   Tablet .. 650 milliGRAM(s) Oral every 6 hours PRN Mild Pain (1 - 3), Moderate Pain (4 - 6)  ALBUTerol    90 MICROgram(s) HFA Inhaler 2 Puff(s) Inhalation every 6 hours PRN asthma attack  calcium carbonate    500 mG (Tums) Chewable 1 Tablet(s) Chew every 6 hours PRN GI  diphenhydrAMINE 50 milliGRAM(s) Oral every 6 hours PRN agitation  diphenhydrAMINE   Injectable 50 milliGRAM(s) IntraMuscular once PRN EPS  haloperidol     Tablet 5 milliGRAM(s) Oral every 6 hours PRN agitation  haloperidol    Injectable 5 milliGRAM(s) IntraMuscular once PRN agitaion  ibuprofen  Tablet. 600 milliGRAM(s) Oral every 8 hours PRN Severe Pain (7 - 10)  lactase 1 Tablet(s) Oral three times a day with meals PRN before dairy products  loperamide 2 milliGRAM(s) Oral every 4 hours PRN GI  LORazepam     Tablet 2 milliGRAM(s) Oral every 6 hours PRN agitation/anxiety  LORazepam   Injectable 2 milliGRAM(s) IntraMuscular once PRN agitation  ondansetron    Tablet 4 milliGRAM(s) Oral every 6 hours PRN N/V  simethicone 80 milliGRAM(s) Chew every 6 hours PRN Gas  
MEDICATIONS  (PRN):  acetaminophen   Tablet .. 650 milliGRAM(s) Oral every 6 hours PRN Mild Pain (1 - 3), Moderate Pain (4 - 6)  ALBUTerol    90 MICROgram(s) HFA Inhaler 2 Puff(s) Inhalation every 6 hours PRN asthma attack  diphenhydrAMINE 50 milliGRAM(s) Oral every 6 hours PRN agitation  diphenhydrAMINE   Injectable 50 milliGRAM(s) IntraMuscular once PRN EPS  haloperidol     Tablet 5 milliGRAM(s) Oral every 6 hours PRN agitation  haloperidol    Injectable 5 milliGRAM(s) IntraMuscular once PRN agitaion  ibuprofen  Tablet. 600 milliGRAM(s) Oral every 8 hours PRN Severe Pain (7 - 10)  loperamide 2 milliGRAM(s) Oral every 4 hours PRN GI  LORazepam     Tablet 2 milliGRAM(s) Oral every 6 hours PRN agitation/anxiety  LORazepam   Injectable 2 milliGRAM(s) IntraMuscular once PRN agitation  ondansetron    Tablet 4 milliGRAM(s) Oral every 6 hours PRN N/V  
MEDICATIONS  (PRN):  ALBUTerol    90 MICROgram(s) HFA Inhaler 2 Puff(s) Inhalation every 6 hours PRN asthma attack  diphenhydrAMINE 50 milliGRAM(s) Oral every 6 hours PRN agitation  diphenhydrAMINE   Injectable 50 milliGRAM(s) IntraMuscular once PRN EPS  haloperidol     Tablet 5 milliGRAM(s) Oral every 6 hours PRN agitation  haloperidol    Injectable 5 milliGRAM(s) IntraMuscular once PRN agitaion  LORazepam     Tablet 2 milliGRAM(s) Oral every 6 hours PRN agitation  LORazepam   Injectable 2 milliGRAM(s) IntraMuscular once PRN Agitation  LORazepam   Injectable 2 milliGRAM(s) IntraMuscular once PRN Agitation  
MEDICATIONS  (PRN):  acetaminophen   Tablet .. 650 milliGRAM(s) Oral every 6 hours PRN Mild Pain (1 - 3), Moderate Pain (4 - 6)  ALBUTerol    90 MICROgram(s) HFA Inhaler 2 Puff(s) Inhalation every 6 hours PRN asthma attack  calcium carbonate    500 mG (Tums) Chewable 1 Tablet(s) Chew every 6 hours PRN GI  diphenhydrAMINE 50 milliGRAM(s) Oral every 6 hours PRN agitation  diphenhydrAMINE   Injectable 50 milliGRAM(s) IntraMuscular once PRN EPS  haloperidol     Tablet 5 milliGRAM(s) Oral every 6 hours PRN agitation  haloperidol    Injectable 5 milliGRAM(s) IntraMuscular once PRN agitaion  ibuprofen  Tablet. 600 milliGRAM(s) Oral every 8 hours PRN Severe Pain (7 - 10)  lactase 1 Tablet(s) Oral three times a day with meals PRN before dairy products  loperamide 2 milliGRAM(s) Oral every 4 hours PRN GI  LORazepam     Tablet 2 milliGRAM(s) Oral every 6 hours PRN anxiety/agitation  LORazepam   Injectable 2 milliGRAM(s) IntraMuscular once PRN Agitation  ondansetron    Tablet 4 milliGRAM(s) Oral every 6 hours PRN N/V  simethicone 80 milliGRAM(s) Chew every 6 hours PRN Gas  
MEDICATIONS  (PRN):  acetaminophen   Tablet .. 650 milliGRAM(s) Oral every 6 hours PRN Mild Pain (1 - 3), Moderate Pain (4 - 6)  ALBUTerol    90 MICROgram(s) HFA Inhaler 2 Puff(s) Inhalation every 6 hours PRN asthma attack  calcium carbonate    500 mG (Tums) Chewable 1 Tablet(s) Chew every 6 hours PRN GI  diphenhydrAMINE 50 milliGRAM(s) Oral every 6 hours PRN agitation  diphenhydrAMINE   Injectable 50 milliGRAM(s) IntraMuscular once PRN EPS  haloperidol     Tablet 5 milliGRAM(s) Oral every 6 hours PRN agitation  haloperidol    Injectable 5 milliGRAM(s) IntraMuscular once PRN agitaion  ibuprofen  Tablet. 600 milliGRAM(s) Oral every 8 hours PRN Severe Pain (7 - 10)  loperamide 2 milliGRAM(s) Oral every 4 hours PRN GI  LORazepam     Tablet 2 milliGRAM(s) Oral every 6 hours PRN agitation/anxiety  LORazepam   Injectable 2 milliGRAM(s) IntraMuscular once PRN agitation  ondansetron    Tablet 4 milliGRAM(s) Oral every 6 hours PRN N/V  simethicone 80 milliGRAM(s) Chew every 6 hours PRN Gas  
MEDICATIONS  (PRN):  ALBUTerol    90 MICROgram(s) HFA Inhaler 2 Puff(s) Inhalation every 6 hours PRN asthma attack  diphenhydrAMINE 50 milliGRAM(s) Oral every 6 hours PRN agitation  diphenhydrAMINE   Injectable 50 milliGRAM(s) IntraMuscular once PRN EPS  haloperidol     Tablet 5 milliGRAM(s) Oral every 6 hours PRN agitation  haloperidol    Injectable 5 milliGRAM(s) IntraMuscular once PRN agitaion  loperamide 2 milliGRAM(s) Oral every 4 hours PRN GI  LORazepam     Tablet 2 milliGRAM(s) Oral every 6 hours PRN agitation  LORazepam   Injectable 2 milliGRAM(s) IntraMuscular once PRN agitation  
MEDICATIONS  (PRN):  ALBUTerol    90 MICROgram(s) HFA Inhaler 2 Puff(s) Inhalation every 6 hours PRN asthma attack  diphenhydrAMINE 50 milliGRAM(s) Oral every 6 hours PRN agitation  diphenhydrAMINE   Injectable 50 milliGRAM(s) IntraMuscular once PRN EPS  haloperidol     Tablet 5 milliGRAM(s) Oral every 6 hours PRN agitation  haloperidol    Injectable 5 milliGRAM(s) IntraMuscular once PRN agitaion  LORazepam     Tablet 2 milliGRAM(s) Oral every 6 hours PRN Agitation  LORazepam   Injectable 2 milliGRAM(s) IntraMuscular once PRN agitation  LORazepam   Injectable 2 milliGRAM(s) IntraMuscular once PRN agitation  
MEDICATIONS  (PRN):  acetaminophen   Tablet .. 650 milliGRAM(s) Oral every 6 hours PRN Mild Pain (1 - 3), Moderate Pain (4 - 6)  ALBUTerol    90 MICROgram(s) HFA Inhaler 2 Puff(s) Inhalation every 6 hours PRN asthma attack  calcium carbonate    500 mG (Tums) Chewable 1 Tablet(s) Chew every 6 hours PRN GI  diphenhydrAMINE 50 milliGRAM(s) Oral every 6 hours PRN agitation  diphenhydrAMINE   Injectable 50 milliGRAM(s) IntraMuscular once PRN EPS  haloperidol     Tablet 5 milliGRAM(s) Oral every 6 hours PRN agitation  haloperidol    Injectable 5 milliGRAM(s) IntraMuscular once PRN agitaion  ibuprofen  Tablet. 600 milliGRAM(s) Oral every 8 hours PRN Severe Pain (7 - 10)  lactase 1 Tablet(s) Oral three times a day with meals PRN before dairy products  loperamide 2 milliGRAM(s) Oral every 4 hours PRN GI  LORazepam     Tablet 2 milliGRAM(s) Oral every 6 hours PRN agitation/anxiety  LORazepam   Injectable 2 milliGRAM(s) IntraMuscular once PRN agitation  ondansetron    Tablet 4 milliGRAM(s) Oral every 6 hours PRN N/V  simethicone 80 milliGRAM(s) Chew every 6 hours PRN Gas  
MEDICATIONS  (PRN):  acetaminophen   Tablet .. 650 milliGRAM(s) Oral every 6 hours PRN Mild Pain (1 - 3), Moderate Pain (4 - 6)  ALBUTerol    90 MICROgram(s) HFA Inhaler 2 Puff(s) Inhalation every 6 hours PRN asthma attack  diphenhydrAMINE 50 milliGRAM(s) Oral every 6 hours PRN agitation  diphenhydrAMINE   Injectable 50 milliGRAM(s) IntraMuscular once PRN EPS  haloperidol     Tablet 5 milliGRAM(s) Oral every 6 hours PRN agitation  haloperidol    Injectable 5 milliGRAM(s) IntraMuscular once PRN agitaion  ibuprofen  Tablet. 600 milliGRAM(s) Oral every 8 hours PRN Severe Pain (7 - 10)  loperamide 2 milliGRAM(s) Oral every 4 hours PRN GI  LORazepam     Tablet 2 milliGRAM(s) Oral every 6 hours PRN agitation/anxiety  LORazepam   Injectable 2 milliGRAM(s) IntraMuscular once PRN aggression  ondansetron    Tablet 4 milliGRAM(s) Oral every 6 hours PRN N/V  simethicone 80 milliGRAM(s) Chew every 6 hours PRN Gas  
MEDICATIONS  (PRN):  acetaminophen   Tablet .. 650 milliGRAM(s) Oral every 6 hours PRN Mild Pain (1 - 3), Moderate Pain (4 - 6)  ALBUTerol    90 MICROgram(s) HFA Inhaler 2 Puff(s) Inhalation every 6 hours PRN asthma attack  diphenhydrAMINE 50 milliGRAM(s) Oral every 6 hours PRN agitation  diphenhydrAMINE   Injectable 50 milliGRAM(s) IntraMuscular once PRN EPS  haloperidol     Tablet 5 milliGRAM(s) Oral every 6 hours PRN agitation  haloperidol    Injectable 5 milliGRAM(s) IntraMuscular once PRN agitaion  ibuprofen  Tablet. 600 milliGRAM(s) Oral every 8 hours PRN Severe Pain (7 - 10)  loperamide 2 milliGRAM(s) Oral every 4 hours PRN GI  LORazepam     Tablet 2 milliGRAM(s) Oral every 6 hours PRN agitation/anxiety  LORazepam   Injectable 2 milliGRAM(s) IntraMuscular once PRN agitation  ondansetron    Tablet 4 milliGRAM(s) Oral every 6 hours PRN N/V  simethicone 80 milliGRAM(s) Chew every 6 hours PRN Gas  
MEDICATIONS  (PRN):  acetaminophen   Tablet .. 650 milliGRAM(s) Oral every 6 hours PRN Mild Pain (1 - 3), Moderate Pain (4 - 6)  ALBUTerol    90 MICROgram(s) HFA Inhaler 2 Puff(s) Inhalation every 6 hours PRN asthma attack  diphenhydrAMINE 50 milliGRAM(s) Oral every 6 hours PRN agitation  diphenhydrAMINE   Injectable 50 milliGRAM(s) IntraMuscular once PRN EPS  haloperidol     Tablet 5 milliGRAM(s) Oral every 6 hours PRN agitation  haloperidol    Injectable 5 milliGRAM(s) IntraMuscular once PRN agitaion  ibuprofen  Tablet. 600 milliGRAM(s) Oral every 8 hours PRN Severe Pain (7 - 10)  loperamide 2 milliGRAM(s) Oral every 4 hours PRN GI  LORazepam     Tablet 2 milliGRAM(s) Oral every 6 hours PRN anxiety/agitation  LORazepam   Injectable 2 milliGRAM(s) IntraMuscular once PRN agitation  ondansetron    Tablet 4 milliGRAM(s) Oral every 6 hours PRN N/V  
MEDICATIONS  (PRN):  acetaminophen   Tablet .. 650 milliGRAM(s) Oral every 6 hours PRN Mild Pain (1 - 3), Moderate Pain (4 - 6)  ALBUTerol    90 MICROgram(s) HFA Inhaler 2 Puff(s) Inhalation every 6 hours PRN asthma attack  calcium carbonate    500 mG (Tums) Chewable 1 Tablet(s) Chew every 6 hours PRN GI  diphenhydrAMINE 50 milliGRAM(s) Oral every 6 hours PRN agitation  diphenhydrAMINE   Injectable 50 milliGRAM(s) IntraMuscular once PRN EPS  haloperidol     Tablet 5 milliGRAM(s) Oral every 6 hours PRN agitation  haloperidol    Injectable 5 milliGRAM(s) IntraMuscular once PRN agitaion  ibuprofen  Tablet. 600 milliGRAM(s) Oral every 8 hours PRN Severe Pain (7 - 10)  lactase 1 Tablet(s) Oral three times a day with meals PRN before dairy products  loperamide 2 milliGRAM(s) Oral every 4 hours PRN GI  LORazepam     Tablet 2 milliGRAM(s) Oral every 6 hours PRN anxiety/agitation  LORazepam   Injectable 2 milliGRAM(s) IntraMuscular once PRN agitation  ondansetron    Tablet 4 milliGRAM(s) Oral every 6 hours PRN N/V  simethicone 80 milliGRAM(s) Chew every 6 hours PRN Gas  
MEDICATIONS  (PRN):  ALBUTerol    90 MICROgram(s) HFA Inhaler 2 Puff(s) Inhalation every 6 hours PRN asthma attack  diphenhydrAMINE 50 milliGRAM(s) Oral every 6 hours PRN agitation  diphenhydrAMINE   Injectable 50 milliGRAM(s) IntraMuscular once PRN EPS  haloperidol     Tablet 5 milliGRAM(s) Oral every 6 hours PRN agitation  haloperidol    Injectable 5 milliGRAM(s) IntraMuscular once PRN agitaion  loperamide 2 milliGRAM(s) Oral every 4 hours PRN GI  LORazepam     Tablet 2 milliGRAM(s) Oral every 6 hours PRN agitation  LORazepam   Injectable 2 milliGRAM(s) IntraMuscular once PRN agitation  ondansetron    Tablet 4 milliGRAM(s) Oral every 6 hours PRN N/V  
MEDICATIONS  (PRN):  acetaminophen   Tablet .. 650 milliGRAM(s) Oral every 6 hours PRN Mild Pain (1 - 3), Moderate Pain (4 - 6)  ALBUTerol    90 MICROgram(s) HFA Inhaler 2 Puff(s) Inhalation every 6 hours PRN asthma attack  diphenhydrAMINE 50 milliGRAM(s) Oral every 6 hours PRN agitation  diphenhydrAMINE   Injectable 50 milliGRAM(s) IntraMuscular once PRN EPS  haloperidol     Tablet 5 milliGRAM(s) Oral every 6 hours PRN agitation  haloperidol    Injectable 5 milliGRAM(s) IntraMuscular once PRN agitaion  ibuprofen  Tablet. 600 milliGRAM(s) Oral every 8 hours PRN Severe Pain (7 - 10)  loperamide 2 milliGRAM(s) Oral every 4 hours PRN GI  LORazepam     Tablet 2 milliGRAM(s) Oral every 6 hours PRN agitation/anxiety  LORazepam   Injectable 2 milliGRAM(s) IntraMuscular once PRN aggression  ondansetron    Tablet 4 milliGRAM(s) Oral every 6 hours PRN N/V  simethicone 80 milliGRAM(s) Chew every 6 hours PRN Gas  
MEDICATIONS  (PRN):  acetaminophen   Tablet .. 650 milliGRAM(s) Oral every 6 hours PRN Mild Pain (1 - 3), Moderate Pain (4 - 6)  ALBUTerol    90 MICROgram(s) HFA Inhaler 2 Puff(s) Inhalation every 6 hours PRN asthma attack  calcium carbonate    500 mG (Tums) Chewable 1 Tablet(s) Chew every 6 hours PRN GI  diphenhydrAMINE 50 milliGRAM(s) Oral every 6 hours PRN agitation  diphenhydrAMINE   Injectable 50 milliGRAM(s) IntraMuscular once PRN EPS  diphenhydrAMINE   Injectable 50 milliGRAM(s) IntraMuscular once PRN EPS  haloperidol     Tablet 5 milliGRAM(s) Oral every 6 hours PRN agitation  haloperidol    Injectable 5 milliGRAM(s) IntraMuscular once PRN agitaion  haloperidol    Injectable 5 milliGRAM(s) IntraMuscular once PRN agitaion  ibuprofen  Tablet. 600 milliGRAM(s) Oral every 8 hours PRN Severe Pain (7 - 10)  lactase 1 Tablet(s) Oral three times a day with meals PRN before dairy products  loperamide 2 milliGRAM(s) Oral every 4 hours PRN GI  LORazepam     Tablet 2 milliGRAM(s) Oral every 6 hours PRN anxiety/agitation  LORazepam   Injectable 2 milliGRAM(s) IntraMuscular once PRN agitation  LORazepam   Injectable 2 milliGRAM(s) IntraMuscular once PRN agitation  ondansetron    Tablet 4 milliGRAM(s) Oral every 6 hours PRN N/V  simethicone 80 milliGRAM(s) Chew every 6 hours PRN Gas  
MEDICATIONS  (PRN):  acetaminophen   Tablet .. 650 milliGRAM(s) Oral every 6 hours PRN Mild Pain (1 - 3), Moderate Pain (4 - 6)  ALBUTerol    90 MICROgram(s) HFA Inhaler 2 Puff(s) Inhalation every 6 hours PRN asthma attack  calcium carbonate    500 mG (Tums) Chewable 1 Tablet(s) Chew every 6 hours PRN GI  diphenhydrAMINE 50 milliGRAM(s) Oral every 6 hours PRN agitation  diphenhydrAMINE   Injectable 50 milliGRAM(s) IntraMuscular once PRN EPS  diphenhydrAMINE   Injectable 50 milliGRAM(s) IntraMuscular once PRN EPS  haloperidol     Tablet 5 milliGRAM(s) Oral every 6 hours PRN agitation  haloperidol    Injectable 5 milliGRAM(s) IntraMuscular once PRN agitaion  haloperidol    Injectable 5 milliGRAM(s) IntraMuscular once PRN agitaion  ibuprofen  Tablet. 600 milliGRAM(s) Oral every 8 hours PRN Severe Pain (7 - 10)  lactase 1 Tablet(s) Oral three times a day with meals PRN before dairy products  loperamide 2 milliGRAM(s) Oral every 4 hours PRN GI  LORazepam     Tablet 2 milliGRAM(s) Oral every 6 hours PRN agiation/anxiety  LORazepam   Injectable 2 milliGRAM(s) IntraMuscular once PRN agitation  ondansetron    Tablet 4 milliGRAM(s) Oral every 6 hours PRN N/V  simethicone 80 milliGRAM(s) Chew every 6 hours PRN Gas  
MEDICATIONS  (PRN):  acetaminophen   Tablet .. 650 milliGRAM(s) Oral every 6 hours PRN Mild Pain (1 - 3), Moderate Pain (4 - 6)  ALBUTerol    90 MICROgram(s) HFA Inhaler 2 Puff(s) Inhalation every 6 hours PRN asthma attack  calcium carbonate    500 mG (Tums) Chewable 1 Tablet(s) Chew every 6 hours PRN GI  diphenhydrAMINE 50 milliGRAM(s) Oral every 6 hours PRN agitation  diphenhydrAMINE   Injectable 50 milliGRAM(s) IntraMuscular once PRN EPS  diphenhydrAMINE   Injectable 50 milliGRAM(s) IntraMuscular once PRN EPS  haloperidol     Tablet 5 milliGRAM(s) Oral every 6 hours PRN agitation  haloperidol    Injectable 5 milliGRAM(s) IntraMuscular once PRN agitaion  haloperidol    Injectable 5 milliGRAM(s) IntraMuscular once PRN agitaion  ibuprofen  Tablet. 600 milliGRAM(s) Oral every 8 hours PRN Severe Pain (7 - 10)  lactase 1 Tablet(s) Oral three times a day with meals PRN before dairy products  loperamide 2 milliGRAM(s) Oral every 4 hours PRN GI  LORazepam     Tablet 2 milliGRAM(s) Oral every 6 hours PRN agitation/anxiety  LORazepam   Injectable 2 milliGRAM(s) IntraMuscular once PRN agitation  ondansetron    Tablet 4 milliGRAM(s) Oral every 6 hours PRN N/V  simethicone 80 milliGRAM(s) Chew every 6 hours PRN Gas  sodium chloride 0.65% Nasal 1 Spray(s) Both Nostrils every 12 hours PRN Nasal Congestion  
MEDICATIONS  (PRN):  acetaminophen   Tablet .. 650 milliGRAM(s) Oral every 6 hours PRN Mild Pain (1 - 3), Moderate Pain (4 - 6)  ALBUTerol    90 MICROgram(s) HFA Inhaler 2 Puff(s) Inhalation every 6 hours PRN asthma attack  calcium carbonate    500 mG (Tums) Chewable 1 Tablet(s) Chew every 6 hours PRN GI  diphenhydrAMINE 50 milliGRAM(s) Oral every 6 hours PRN agitation  diphenhydrAMINE   Injectable 50 milliGRAM(s) IntraMuscular once PRN EPS  haloperidol     Tablet 5 milliGRAM(s) Oral every 6 hours PRN agitation  haloperidol    Injectable 5 milliGRAM(s) IntraMuscular once PRN agitaion  ibuprofen  Tablet. 600 milliGRAM(s) Oral every 8 hours PRN Severe Pain (7 - 10)  lactase 1 Tablet(s) Oral three times a day with meals PRN before dairy products  loperamide 2 milliGRAM(s) Oral every 4 hours PRN GI  LORazepam     Tablet 2 milliGRAM(s) Oral every 6 hours PRN anxiety/agitation  LORazepam   Injectable 2 milliGRAM(s) IntraMuscular once PRN Agitation  ondansetron    Tablet 4 milliGRAM(s) Oral every 6 hours PRN N/V  simethicone 80 milliGRAM(s) Chew every 6 hours PRN Gas  
MEDICATIONS  (PRN):  acetaminophen   Tablet .. 650 milliGRAM(s) Oral every 6 hours PRN Mild Pain (1 - 3), Moderate Pain (4 - 6)  ALBUTerol    90 MICROgram(s) HFA Inhaler 2 Puff(s) Inhalation every 6 hours PRN asthma attack  diphenhydrAMINE 50 milliGRAM(s) Oral every 6 hours PRN agitation  diphenhydrAMINE   Injectable 50 milliGRAM(s) IntraMuscular once PRN EPS  haloperidol     Tablet 5 milliGRAM(s) Oral every 6 hours PRN agitation  haloperidol    Injectable 5 milliGRAM(s) IntraMuscular once PRN agitaion  ibuprofen  Tablet. 600 milliGRAM(s) Oral every 8 hours PRN Severe Pain (7 - 10)  loperamide 2 milliGRAM(s) Oral every 4 hours PRN GI  LORazepam     Tablet 2 milliGRAM(s) Oral every 6 hours PRN agitation/anxiety  LORazepam   Injectable 2 milliGRAM(s) IntraMuscular once PRN agitation  ondansetron    Tablet 4 milliGRAM(s) Oral every 6 hours PRN N/V  simethicone 80 milliGRAM(s) Chew every 6 hours PRN Gas  
MEDICATIONS  (PRN):  ALBUTerol    90 MICROgram(s) HFA Inhaler 2 Puff(s) Inhalation every 6 hours PRN asthma attack  diphenhydrAMINE 50 milliGRAM(s) Oral every 6 hours PRN agitation  diphenhydrAMINE   Injectable 50 milliGRAM(s) IntraMuscular once PRN EPS  haloperidol     Tablet 5 milliGRAM(s) Oral every 6 hours PRN agitation  haloperidol    Injectable 5 milliGRAM(s) IntraMuscular once PRN agitaion  LORazepam     Tablet 2 milliGRAM(s) Oral every 6 hours PRN Agitation  LORazepam   Injectable 2 milliGRAM(s) IntraMuscular once PRN agitation  LORazepam   Injectable 2 milliGRAM(s) IntraMuscular once PRN agitation  
MEDICATIONS  (PRN):  acetaminophen   Tablet .. 650 milliGRAM(s) Oral every 6 hours PRN Mild Pain (1 - 3), Moderate Pain (4 - 6)  ALBUTerol    90 MICROgram(s) HFA Inhaler 2 Puff(s) Inhalation every 6 hours PRN asthma attack  calcium carbonate    500 mG (Tums) Chewable 1 Tablet(s) Chew every 6 hours PRN GI  diphenhydrAMINE 50 milliGRAM(s) Oral every 6 hours PRN agitation  diphenhydrAMINE   Injectable 50 milliGRAM(s) IntraMuscular once PRN EPS  haloperidol     Tablet 5 milliGRAM(s) Oral every 6 hours PRN agitation  haloperidol    Injectable 5 milliGRAM(s) IntraMuscular once PRN agitaion  ibuprofen  Tablet. 600 milliGRAM(s) Oral every 8 hours PRN Severe Pain (7 - 10)  lactase 1 Tablet(s) Oral three times a day with meals PRN before dairy products  loperamide 2 milliGRAM(s) Oral every 4 hours PRN GI  LORazepam     Tablet 2 milliGRAM(s) Oral every 6 hours PRN anxiety/agitation  LORazepam   Injectable 2 milliGRAM(s) IntraMuscular once PRN Agitation  ondansetron    Tablet 4 milliGRAM(s) Oral every 6 hours PRN N/V  simethicone 80 milliGRAM(s) Chew every 6 hours PRN Gas  
MEDICATIONS  (PRN):  acetaminophen   Tablet .. 650 milliGRAM(s) Oral every 6 hours PRN Mild Pain (1 - 3), Moderate Pain (4 - 6)  ALBUTerol    90 MICROgram(s) HFA Inhaler 2 Puff(s) Inhalation every 6 hours PRN asthma attack  calcium carbonate    500 mG (Tums) Chewable 1 Tablet(s) Chew every 6 hours PRN GI  diphenhydrAMINE 50 milliGRAM(s) Oral every 6 hours PRN agitation  diphenhydrAMINE   Injectable 50 milliGRAM(s) IntraMuscular once PRN EPS  haloperidol     Tablet 5 milliGRAM(s) Oral every 6 hours PRN agitation  haloperidol    Injectable 5 milliGRAM(s) IntraMuscular once PRN agitaion  ibuprofen  Tablet. 600 milliGRAM(s) Oral every 8 hours PRN Severe Pain (7 - 10)  lactase 1 Tablet(s) Oral three times a day with meals PRN before dairy products  loperamide 2 milliGRAM(s) Oral every 4 hours PRN GI  LORazepam     Tablet 2 milliGRAM(s) Oral every 6 hours PRN anxiety/agitation  LORazepam   Injectable 2 milliGRAM(s) IntraMuscular once PRN agitation  ondansetron    Tablet 4 milliGRAM(s) Oral every 6 hours PRN N/V  simethicone 80 milliGRAM(s) Chew every 6 hours PRN Gas  
MEDICATIONS  (PRN):  acetaminophen   Tablet .. 650 milliGRAM(s) Oral every 6 hours PRN Mild Pain (1 - 3), Moderate Pain (4 - 6)  ALBUTerol    90 MICROgram(s) HFA Inhaler 2 Puff(s) Inhalation every 6 hours PRN asthma attack  diphenhydrAMINE 50 milliGRAM(s) Oral every 6 hours PRN agitation  diphenhydrAMINE   Injectable 50 milliGRAM(s) IntraMuscular once PRN EPS  haloperidol     Tablet 5 milliGRAM(s) Oral every 6 hours PRN agitation  haloperidol    Injectable 5 milliGRAM(s) IntraMuscular once PRN agitaion  ibuprofen  Tablet. 600 milliGRAM(s) Oral every 8 hours PRN Severe Pain (7 - 10)  loperamide 2 milliGRAM(s) Oral every 4 hours PRN GI  LORazepam     Tablet 2 milliGRAM(s) Oral every 6 hours PRN agitation/anxiety  LORazepam   Injectable 2 milliGRAM(s) IntraMuscular once PRN agitation  ondansetron    Tablet 4 milliGRAM(s) Oral every 6 hours PRN N/V  
MEDICATIONS  (PRN):  acetaminophen   Tablet .. 650 milliGRAM(s) Oral every 6 hours PRN Mild Pain (1 - 3), Moderate Pain (4 - 6)  ALBUTerol    90 MICROgram(s) HFA Inhaler 2 Puff(s) Inhalation every 6 hours PRN asthma attack  diphenhydrAMINE 50 milliGRAM(s) Oral every 6 hours PRN agitation  diphenhydrAMINE   Injectable 50 milliGRAM(s) IntraMuscular once PRN EPS  haloperidol     Tablet 5 milliGRAM(s) Oral every 6 hours PRN agitation  haloperidol    Injectable 5 milliGRAM(s) IntraMuscular once PRN agitaion  ibuprofen  Tablet. 600 milliGRAM(s) Oral every 8 hours PRN Severe Pain (7 - 10)  loperamide 2 milliGRAM(s) Oral every 4 hours PRN GI  LORazepam     Tablet 2 milliGRAM(s) Oral every 6 hours PRN agitation/anxiety  LORazepam   Injectable 2 milliGRAM(s) IntraMuscular once PRN aggression  ondansetron    Tablet 4 milliGRAM(s) Oral every 6 hours PRN N/V  simethicone 80 milliGRAM(s) Chew every 6 hours PRN Gas  
MEDICATIONS  (PRN):  acetaminophen   Tablet .. 650 milliGRAM(s) Oral every 6 hours PRN Mild Pain (1 - 3), Moderate Pain (4 - 6)  ALBUTerol    90 MICROgram(s) HFA Inhaler 2 Puff(s) Inhalation every 6 hours PRN asthma attack  calcium carbonate    500 mG (Tums) Chewable 1 Tablet(s) Chew every 6 hours PRN GI  diphenhydrAMINE 50 milliGRAM(s) Oral every 6 hours PRN agitation  diphenhydrAMINE   Injectable 50 milliGRAM(s) IntraMuscular once PRN EPS  haloperidol     Tablet 5 milliGRAM(s) Oral every 6 hours PRN agitation  haloperidol    Injectable 5 milliGRAM(s) IntraMuscular once PRN agitaion  ibuprofen  Tablet. 600 milliGRAM(s) Oral every 8 hours PRN Severe Pain (7 - 10)  loperamide 2 milliGRAM(s) Oral every 4 hours PRN GI  LORazepam     Tablet 2 milliGRAM(s) Oral every 6 hours PRN agitation/anxiety  LORazepam   Injectable 2 milliGRAM(s) IntraMuscular once PRN agitation  ondansetron    Tablet 4 milliGRAM(s) Oral every 6 hours PRN N/V  simethicone 80 milliGRAM(s) Chew every 6 hours PRN Gas  
MEDICATIONS  (PRN):  acetaminophen   Tablet .. 650 milliGRAM(s) Oral every 6 hours PRN Mild Pain (1 - 3), Moderate Pain (4 - 6)  ALBUTerol    90 MICROgram(s) HFA Inhaler 2 Puff(s) Inhalation every 6 hours PRN asthma attack  calcium carbonate    500 mG (Tums) Chewable 1 Tablet(s) Chew every 6 hours PRN GI  diphenhydrAMINE 50 milliGRAM(s) Oral every 6 hours PRN agitation  diphenhydrAMINE   Injectable 50 milliGRAM(s) IntraMuscular once PRN EPS  diphenhydrAMINE   Injectable 50 milliGRAM(s) IntraMuscular once PRN EPS  haloperidol     Tablet 5 milliGRAM(s) Oral every 6 hours PRN agitation  haloperidol    Injectable 5 milliGRAM(s) IntraMuscular once PRN agitaion  haloperidol    Injectable 5 milliGRAM(s) IntraMuscular once PRN agitaion  ibuprofen  Tablet. 600 milliGRAM(s) Oral every 8 hours PRN Severe Pain (7 - 10)  lactase 1 Tablet(s) Oral three times a day with meals PRN before dairy products  loperamide 2 milliGRAM(s) Oral every 4 hours PRN GI  LORazepam     Tablet 2 milliGRAM(s) Oral every 6 hours PRN agiation/anxiety  LORazepam   Injectable 2 milliGRAM(s) IntraMuscular once PRN agitation  ondansetron    Tablet 4 milliGRAM(s) Oral every 6 hours PRN N/V  simethicone 80 milliGRAM(s) Chew every 6 hours PRN Gas  
MEDICATIONS  (PRN):  acetaminophen   Tablet .. 650 milliGRAM(s) Oral every 6 hours PRN Mild Pain (1 - 3), Moderate Pain (4 - 6)  ALBUTerol    90 MICROgram(s) HFA Inhaler 2 Puff(s) Inhalation every 6 hours PRN asthma attack  diphenhydrAMINE 50 milliGRAM(s) Oral every 6 hours PRN agitation  diphenhydrAMINE   Injectable 50 milliGRAM(s) IntraMuscular once PRN EPS  haloperidol     Tablet 5 milliGRAM(s) Oral every 6 hours PRN agitation  haloperidol    Injectable 5 milliGRAM(s) IntraMuscular once PRN agitaion  ibuprofen  Tablet. 600 milliGRAM(s) Oral every 8 hours PRN Severe Pain (7 - 10)  loperamide 2 milliGRAM(s) Oral every 4 hours PRN GI  LORazepam     Tablet 2 milliGRAM(s) Oral every 6 hours PRN agitation/anxiety  LORazepam   Injectable 2 milliGRAM(s) IntraMuscular once PRN aggression  ondansetron    Tablet 4 milliGRAM(s) Oral every 6 hours PRN N/V  
MEDICATIONS  (PRN):  acetaminophen   Tablet .. 650 milliGRAM(s) Oral every 6 hours PRN Mild Pain (1 - 3), Moderate Pain (4 - 6)  ALBUTerol    90 MICROgram(s) HFA Inhaler 2 Puff(s) Inhalation every 6 hours PRN asthma attack  diphenhydrAMINE 50 milliGRAM(s) Oral every 6 hours PRN agitation  diphenhydrAMINE   Injectable 50 milliGRAM(s) IntraMuscular once PRN EPS  haloperidol     Tablet 5 milliGRAM(s) Oral every 6 hours PRN agitation  haloperidol    Injectable 5 milliGRAM(s) IntraMuscular once PRN agitaion  ibuprofen  Tablet. 600 milliGRAM(s) Oral every 8 hours PRN Severe Pain (7 - 10)  loperamide 2 milliGRAM(s) Oral every 4 hours PRN GI  LORazepam     Tablet 2 milliGRAM(s) Oral every 6 hours PRN anxiety/agitation  LORazepam   Injectable 2 milliGRAM(s) IntraMuscular once PRN agitation  ondansetron    Tablet 4 milliGRAM(s) Oral every 6 hours PRN N/V  
MEDICATIONS  (PRN):  ALBUTerol    90 MICROgram(s) HFA Inhaler 2 Puff(s) Inhalation every 6 hours PRN asthma attack  diphenhydrAMINE 50 milliGRAM(s) Oral every 6 hours PRN agitation  diphenhydrAMINE   Injectable 50 milliGRAM(s) IntraMuscular once PRN EPS  haloperidol     Tablet 5 milliGRAM(s) Oral every 6 hours PRN agitation  haloperidol    Injectable 5 milliGRAM(s) IntraMuscular once PRN agitaion  loperamide 2 milliGRAM(s) Oral every 4 hours PRN GI  LORazepam     Tablet 2 milliGRAM(s) Oral every 6 hours PRN agitation  LORazepam   Injectable 2 milliGRAM(s) IntraMuscular once PRN agitation  
MEDICATIONS  (PRN):  acetaminophen   Tablet .. 650 milliGRAM(s) Oral every 6 hours PRN Mild Pain (1 - 3), Moderate Pain (4 - 6)  ALBUTerol    90 MICROgram(s) HFA Inhaler 2 Puff(s) Inhalation every 6 hours PRN asthma attack  calcium carbonate    500 mG (Tums) Chewable 1 Tablet(s) Chew every 6 hours PRN GI  diphenhydrAMINE 50 milliGRAM(s) Oral every 6 hours PRN agitation  diphenhydrAMINE   Injectable 50 milliGRAM(s) IntraMuscular once PRN EPS  haloperidol     Tablet 5 milliGRAM(s) Oral every 6 hours PRN agitation  haloperidol    Injectable 5 milliGRAM(s) IntraMuscular once PRN agitaion  ibuprofen  Tablet. 600 milliGRAM(s) Oral every 8 hours PRN Severe Pain (7 - 10)  lactase 1 Tablet(s) Oral three times a day with meals PRN before dairy products  loperamide 2 milliGRAM(s) Oral every 4 hours PRN GI  LORazepam     Tablet 2 milliGRAM(s) Oral every 6 hours PRN anxiety/agitation  LORazepam   Injectable 2 milliGRAM(s) IntraMuscular once PRN agitation  ondansetron    Tablet 4 milliGRAM(s) Oral every 6 hours PRN N/V  simethicone 80 milliGRAM(s) Chew every 6 hours PRN Gas  
MEDICATIONS  (PRN):  acetaminophen   Tablet .. 650 milliGRAM(s) Oral every 6 hours PRN Mild Pain (1 - 3), Moderate Pain (4 - 6)  ALBUTerol    90 MICROgram(s) HFA Inhaler 2 Puff(s) Inhalation every 6 hours PRN asthma attack  calcium carbonate    500 mG (Tums) Chewable 1 Tablet(s) Chew every 6 hours PRN GI  diphenhydrAMINE 50 milliGRAM(s) Oral every 6 hours PRN agitation  diphenhydrAMINE   Injectable 50 milliGRAM(s) IntraMuscular once PRN EPS  haloperidol     Tablet 5 milliGRAM(s) Oral every 6 hours PRN agitation  haloperidol    Injectable 5 milliGRAM(s) IntraMuscular once PRN agitaion  ibuprofen  Tablet. 600 milliGRAM(s) Oral every 8 hours PRN Severe Pain (7 - 10)  lactase 1 Tablet(s) Oral three times a day with meals PRN before dairy products  loperamide 2 milliGRAM(s) Oral every 4 hours PRN GI  LORazepam     Tablet 2 milliGRAM(s) Oral every 6 hours PRN anxiety/agitation  LORazepam   Injectable 2 milliGRAM(s) IntraMuscular once PRN agitation  ondansetron    Tablet 4 milliGRAM(s) Oral every 6 hours PRN N/V  simethicone 80 milliGRAM(s) Chew every 6 hours PRN Gas  
MEDICATIONS  (PRN):  acetaminophen   Tablet .. 650 milliGRAM(s) Oral every 6 hours PRN Mild Pain (1 - 3), Moderate Pain (4 - 6)  ALBUTerol    90 MICROgram(s) HFA Inhaler 2 Puff(s) Inhalation every 6 hours PRN asthma attack  calcium carbonate    500 mG (Tums) Chewable 1 Tablet(s) Chew every 6 hours PRN GI  diphenhydrAMINE 50 milliGRAM(s) Oral every 6 hours PRN agitation  diphenhydrAMINE   Injectable 50 milliGRAM(s) IntraMuscular once PRN EPS  haloperidol     Tablet 5 milliGRAM(s) Oral every 6 hours PRN agitation  haloperidol    Injectable 5 milliGRAM(s) IntraMuscular once PRN agitaion  ibuprofen  Tablet. 600 milliGRAM(s) Oral every 8 hours PRN Severe Pain (7 - 10)  loperamide 2 milliGRAM(s) Oral every 4 hours PRN GI  LORazepam     Tablet 2 milliGRAM(s) Oral every 6 hours PRN agitation/anxiety  LORazepam   Injectable 2 milliGRAM(s) IntraMuscular once PRN agitation  ondansetron    Tablet 4 milliGRAM(s) Oral every 6 hours PRN N/V  simethicone 80 milliGRAM(s) Chew every 6 hours PRN Gas  
MEDICATIONS  (PRN):  acetaminophen   Tablet .. 650 milliGRAM(s) Oral every 6 hours PRN Mild Pain (1 - 3), Moderate Pain (4 - 6)  ALBUTerol    90 MICROgram(s) HFA Inhaler 2 Puff(s) Inhalation every 6 hours PRN asthma attack  calcium carbonate    500 mG (Tums) Chewable 1 Tablet(s) Chew every 6 hours PRN GI  diphenhydrAMINE 50 milliGRAM(s) Oral every 6 hours PRN agitation  diphenhydrAMINE   Injectable 50 milliGRAM(s) IntraMuscular once PRN EPS  haloperidol     Tablet 5 milliGRAM(s) Oral every 6 hours PRN agitation  haloperidol    Injectable 5 milliGRAM(s) IntraMuscular once PRN agitaion  ibuprofen  Tablet. 600 milliGRAM(s) Oral every 8 hours PRN Severe Pain (7 - 10)  loperamide 2 milliGRAM(s) Oral every 4 hours PRN GI  LORazepam     Tablet 2 milliGRAM(s) Oral every 6 hours PRN agitation/anxiety  LORazepam   Injectable 2 milliGRAM(s) IntraMuscular once PRN agitation  ondansetron    Tablet 4 milliGRAM(s) Oral every 6 hours PRN N/V  simethicone 80 milliGRAM(s) Chew every 6 hours PRN Gas  
MEDICATIONS  (PRN):  acetaminophen   Tablet .. 650 milliGRAM(s) Oral every 6 hours PRN Mild Pain (1 - 3), Moderate Pain (4 - 6)  ALBUTerol    90 MICROgram(s) HFA Inhaler 2 Puff(s) Inhalation every 6 hours PRN asthma attack  diphenhydrAMINE 50 milliGRAM(s) Oral every 6 hours PRN agitation  diphenhydrAMINE   Injectable 50 milliGRAM(s) IntraMuscular once PRN EPS  haloperidol     Tablet 5 milliGRAM(s) Oral every 6 hours PRN agitation  haloperidol    Injectable 5 milliGRAM(s) IntraMuscular once PRN agitaion  ibuprofen  Tablet. 600 milliGRAM(s) Oral every 8 hours PRN Severe Pain (7 - 10)  loperamide 2 milliGRAM(s) Oral every 4 hours PRN GI  LORazepam     Tablet 2 milliGRAM(s) Oral every 6 hours PRN agitation/anxiety  LORazepam   Injectable 2 milliGRAM(s) IntraMuscular once PRN aggression  ondansetron    Tablet 4 milliGRAM(s) Oral every 6 hours PRN N/V  simethicone 80 milliGRAM(s) Chew every 6 hours PRN Gas  
MEDICATIONS  (PRN):  acetaminophen   Tablet .. 650 milliGRAM(s) Oral every 6 hours PRN Mild Pain (1 - 3), Moderate Pain (4 - 6)  ALBUTerol    90 MICROgram(s) HFA Inhaler 2 Puff(s) Inhalation every 6 hours PRN asthma attack  diphenhydrAMINE 50 milliGRAM(s) Oral every 6 hours PRN agitation  diphenhydrAMINE   Injectable 50 milliGRAM(s) IntraMuscular once PRN EPS  haloperidol     Tablet 5 milliGRAM(s) Oral every 6 hours PRN agitation  haloperidol    Injectable 5 milliGRAM(s) IntraMuscular once PRN agitaion  ibuprofen  Tablet. 600 milliGRAM(s) Oral every 8 hours PRN Severe Pain (7 - 10)  loperamide 2 milliGRAM(s) Oral every 4 hours PRN GI  LORazepam     Tablet 2 milliGRAM(s) Oral every 6 hours PRN agitation/anxiety  LORazepam   Injectable 2 milliGRAM(s) IntraMuscular once PRN aggression  ondansetron    Tablet 4 milliGRAM(s) Oral every 6 hours PRN N/V  simethicone 80 milliGRAM(s) Chew every 6 hours PRN Gas  
MEDICATIONS  (PRN):  acetaminophen   Tablet .. 650 milliGRAM(s) Oral every 6 hours PRN Mild Pain (1 - 3), Moderate Pain (4 - 6)  ALBUTerol    90 MICROgram(s) HFA Inhaler 2 Puff(s) Inhalation every 6 hours PRN asthma attack  calcium carbonate    500 mG (Tums) Chewable 1 Tablet(s) Chew every 6 hours PRN GI  diphenhydrAMINE 50 milliGRAM(s) Oral every 6 hours PRN agitation  diphenhydrAMINE   Injectable 50 milliGRAM(s) IntraMuscular once PRN EPS  haloperidol     Tablet 5 milliGRAM(s) Oral every 6 hours PRN agitation  haloperidol    Injectable 5 milliGRAM(s) IntraMuscular once PRN agitaion  ibuprofen  Tablet. 600 milliGRAM(s) Oral every 8 hours PRN Severe Pain (7 - 10)  loperamide 2 milliGRAM(s) Oral every 4 hours PRN GI  LORazepam     Tablet 2 milliGRAM(s) Oral every 6 hours PRN agitation/anxiety  LORazepam   Injectable 2 milliGRAM(s) IntraMuscular once PRN agitation  ondansetron    Tablet 4 milliGRAM(s) Oral every 6 hours PRN N/V  simethicone 80 milliGRAM(s) Chew every 6 hours PRN Gas

## 2021-04-07 NOTE — BH INPATIENT PSYCHIATRY PROGRESS NOTE - NSTXDCOTHRPROGRES_PSY_ALL_CORE
No Change
Improving
No Change
No Change
Improving
No Change
Improving
No Change
Improving
No Change
Improving
Worsening
No Change
Worsening
No Change
Improving
No Change
Worsening
Improving
No Change
Worsening
No Change
Improving
Improving
No Change
Improving
No Change
No Change
Improving
No Change
Improving
No Change
Improving
No Change
No Change
Worsening
No Change
Improving
No Change
Improving
No Change
Improving
Improving
No Change
No Change
Improving

## 2021-04-07 NOTE — BH INPATIENT PSYCHIATRY PROGRESS NOTE - NSBHMETABOLICLABS_PSY_ALL_CORE
Labs within last 12 months

## 2021-04-07 NOTE — BH INPATIENT PSYCHIATRY PROGRESS NOTE - NSBHMSEINTELL_PSY_A_CORE
Below Average

## 2021-04-07 NOTE — BH INPATIENT PSYCHIATRY PROGRESS NOTE - NS ED [facility] REVIEW OF ED CHART AVAILABLE LABS REVIEWED
Yes

## 2021-04-07 NOTE — BH INPATIENT PSYCHIATRY PROGRESS NOTE - NSBHCONSBHPROVDETAILS_PSY_A_CORE  FT
[facility] [phone]
[facility] [phone]
[facility] [phone]
[facility] [phone]
[facility] [phone]
[facility] [phone]
[facility] [phone]
[facility] [phone]
[facility] [phone]
[facility] [phone]
[facility] [phone]
[facility] [phone]
[facility] [phone]
[facility] [phone]
[facility] [phone]
[facility] [phone]
[facility] [phone]
[facility] [phone]
[facility] [phone]
[facility] [phone]
[facility] [phone]
[facility] [phone]
[facility] [phone]
[facility] [phone]
[facility] [phone]
[facility] [phone]
[facility] [phone]
[facility] [phone]
[facility] [phone]
[facility] [phone]
[facility] [phone]
[facility] [phone]
[facility] [phone]
[facility] [phone]
[facility] [phone]
[facility] [phone]
[facility] [phone]
[facility] [phone]
[facility] [phone]
[facility] [phone]
[facility] [phone]
[facility] [phone]
[facility] [phone]
[facility] [phone]
[facility] [phone]
[facility] [phone]
[facility] [phone]
[facility] [phone]
[facility] [phone]
[facility] [phone]
[facility] [phone]
[facility] [phone]
[facility] [phone]
[facility] [phone]
[facility] [phone]
[facility] [phone]
[facility] [phone]
[facility] [phone]
[facility] [phone]
[facility] [phone]
[facility] [phone]

## 2021-04-07 NOTE — BH INPATIENT PSYCHIATRY PROGRESS NOTE - NSBHADMITMEDEDUDETAILS_PSY_A_CORE FT
Symptom stabilization 
Improved reality testing. Improved mood and behavior stabilization. Symptoms reduction and stabilization. Functional improvement. Improved socialization. 
Symptom stabilization 
Improved reality testing. Improved mood and behavior stabilization. Symptoms reduction and stabilization. Functional improvement. Improved socialization. 
Symptom stabilization 
Improved reality testing. Improved mood and behavior stabilization. Symptoms reduction and stabilization. Functional improvement. Improved socialization. 
Improved reality testing. Improved mood and behavior stabilization. Symptoms reduction and stabilization. Functional improvement. Improved socialization. 
Symptom stabilization 
Improved reality testing. Improved mood and behavior stabilization. Symptoms reduction and stabilization. Functional improvement. Improved socialization. 
Symptom stabilization 
Improved reality testing. Improved mood and behavior stabilization. Symptoms reduction and stabilization. Functional improvement. Improved socialization. 
Symptom stabilization 
Improved reality testing. Improved mood and behavior stabilization. Symptoms reduction and stabilization. Functional improvement. Improved socialization. 
Symptom stabilization 
Improved reality testing. Improved mood and behavior stabilization. Symptoms reduction and stabilization. Functional improvement. Improved socialization. 
Symptom stabilization 
Improved reality testing. Improved mood and behavior stabilization. Symptoms reduction and stabilization. Functional improvement. Improved socialization. 
Symptom stabilization 
Symptom stabilization 
Improved reality testing. Improved mood and behavior stabilization. Symptoms reduction and stabilization. Functional improvement. Improved socialization. 
Symptom stabilization 
Improved reality testing. Improved mood and behavior stabilization. Symptoms reduction and stabilization. Functional improvement. Improved socialization. 
Symptom stabilization 
Symptom stabilization 
Improved reality testing. Improved mood and behavior stabilization. Symptoms reduction and stabilization. Functional improvement. Improved socialization. 
Symptom stabilization 
Improved reality testing. Improved mood and behavior stabilization. Symptoms reduction and stabilization. Functional improvement. Improved socialization. 
Symptom stabilization 
Improved reality testing. Improved mood and behavior stabilization. Symptoms reduction and stabilization. Functional improvement. Improved socialization.

## 2021-04-07 NOTE — BH INPATIENT PSYCHIATRY PROGRESS NOTE - NSTXDISORGDATEEST_PSY_ALL_CORE
13-Jan-2021
10-Mar-2021
17-Feb-2021
13-Jan-2021
16-Jan-2021
13-Jan-2021
10-Mar-2021
13-Jan-2021
17-Feb-2021
03-Mar-2021
13-Jan-2021
10-Mar-2021
16-Jan-2021
10-Mar-2021
13-Jan-2021
13-Jan-2021
24-Feb-2021
10-Mar-2021
13-Jan-2021
16-Jan-2021
10-Mar-2021
13-Jan-2021
16-Jan-2021
24-Feb-2021
17-Feb-2021
30-Mar-2021
13-Jan-2021
24-Feb-2021
13-Jan-2021
03-Mar-2021
10-Mar-2021
16-Jan-2021
13-Jan-2021
17-Feb-2021
17-Feb-2021
16-Jan-2021
10-Mar-2021
10-Mar-2021
13-Jan-2021
24-Feb-2021
10-Mar-2021
13-Jan-2021
10-Mar-2021
24-Feb-2021
13-Jan-2021
13-Jan-2021
03-Mar-2021
03-Mar-2021
10-Mar-2021
17-Feb-2021
03-Mar-2021

## 2021-04-07 NOTE — BH INPATIENT PSYCHIATRY PROGRESS NOTE - NSBHMSEPERCEPT_PSY_A_CORE
Other
Auditory hallucinations/Other
Auditory hallucinations/Other
Other
Auditory hallucinations/Other
Other

## 2021-04-07 NOTE — BH INPATIENT PSYCHIATRY PROGRESS NOTE - NSBHMSEGAIT_PSY_A_CORE
Normal gait / station

## 2021-04-07 NOTE — BH INPATIENT PSYCHIATRY PROGRESS NOTE - NSBHMSEBEHAV_PSY_A_CORE
Cooperative/Other
Cooperative/Hostile/Other
Uncooperative
Uncooperative/Hostile/Other
Cooperative
Cooperative/Other
Uncooperative/Other
Uncooperative/Other
Uncooperative
Uncooperative/Other
Cooperative/Hostile/Other
Cooperative/Other
Cooperative/Other
Uncooperative
Cooperative/Hostile/Other
Cooperative/Other
Cooperative/Other
Uncooperative/Other
Cooperative/Hostile/Other
Cooperative/Other
Cooperative/Hostile/Other
Uncooperative/Hostile/Other
Uncooperative/Other
Cooperative/Other
Uncooperative/Other
Cooperative/Hostile/Other
Uncooperative
Uncooperative
Cooperative/Hostile/Other
Uncooperative
Uncooperative/Hostile/Other
Cooperative/Other
Cooperative/Hostile/Other
Cooperative
Uncooperative
Cooperative/Hostile/Other
Cooperative/Hostile/Other
Cooperative/Other
Uncooperative/Other
Uncooperative
Cooperative/Hostile/Other
Uncooperative/Other
Cooperative/Other
Cooperative/Other
Cooperative/Hostile/Other
Cooperative/Other
Uncooperative
Uncooperative/Other
Uncooperative/Other
Cooperative/Hostile/Other
Cooperative/Hostile/Other
Uncooperative/Other
Cooperative
Uncooperative
Cooperative/Other
Uncooperative/Other
Uncooperative/Other

## 2021-04-07 NOTE — BH INPATIENT PSYCHIATRY PROGRESS NOTE - LEVEL OF CONSCIOUSNESS
Alert

## 2021-04-07 NOTE — BH INPATIENT PSYCHIATRY PROGRESS NOTE - NSCGISEVERITYSCORE_CAL_PSY_ALL_CORE
4
5
4
4
5
4
5
5
4
6
4
5
4
4
5
5
4
5
6
6
4
6
4
6
5
6
4
6
4
6
4
5
5
4
4

## 2021-04-07 NOTE — BH INPATIENT PSYCHIATRY PROGRESS NOTE - NS ED [facility] REVIEW OF ED CHART VITAL SIGNS REVIEWED
Yes

## 2021-04-07 NOTE — [facility] INPATIENT PSYCHIATRY PROGRESS NOTE - CURRENT MEDICATION
MEDICATIONS  (STANDING):  fluPHENAZine decanoate Injectable, Long Acting 25 milliGRAM(s) IntraMuscular once  lithium CR (ESKALITH-CR) 1800 milliGRAM(s) Oral at bedtime  OLANZapine 10 milliGRAM(s) Oral two times a day  PPD  5 Tuberculin Unit(s) Injectable 5 Unit(s) IntraDermal once    MEDICATIONS  (PRN):  ALBUTerol    90 MICROgram(s) HFA Inhaler 2 Puff(s) Inhalation every 6 hours PRN asthma attack  diphenhydrAMINE 50 milliGRAM(s) Oral every 6 hours PRN agitation  diphenhydrAMINE   Injectable 50 milliGRAM(s) IntraMuscular once PRN EPS  haloperidol     Tablet 5 milliGRAM(s) Oral every 6 hours PRN agitation  haloperidol    Injectable 5 milliGRAM(s) IntraMuscular once PRN agitaion  LORazepam     Tablet 2 milliGRAM(s) Oral every 6 hours PRN agitation  LORazepam   Injectable 2 milliGRAM(s) IntraMuscular once PRN Agitation  LORazepam   Injectable 2 milliGRAM(s) IntraMuscular once PRN Agitation  
MEDICATIONS  (STANDING):  lithium CR (ESKALITH-CR) 1350 milliGRAM(s) Oral at bedtime  OLANZapine 10 milliGRAM(s) Oral at bedtime  OLANZapine 15 milliGRAM(s) Oral daily  pantoprazole    Tablet 40 milliGRAM(s) Oral before breakfast    MEDICATIONS  (PRN):  acetaminophen   Tablet .. 650 milliGRAM(s) Oral every 6 hours PRN Mild Pain (1 - 3), Moderate Pain (4 - 6)  ALBUTerol    90 MICROgram(s) HFA Inhaler 2 Puff(s) Inhalation every 6 hours PRN asthma attack  calcium carbonate    500 mG (Tums) Chewable 1 Tablet(s) Chew every 6 hours PRN GI  diphenhydrAMINE 50 milliGRAM(s) Oral every 6 hours PRN agitation  diphenhydrAMINE   Injectable 50 milliGRAM(s) IntraMuscular once PRN EPS  haloperidol     Tablet 5 milliGRAM(s) Oral every 6 hours PRN agitation  haloperidol    Injectable 5 milliGRAM(s) IntraMuscular once PRN agitaion  ibuprofen  Tablet. 600 milliGRAM(s) Oral every 8 hours PRN Severe Pain (7 - 10)  lactase 1 Tablet(s) Oral three times a day with meals PRN before dairy products  loperamide 2 milliGRAM(s) Oral every 4 hours PRN GI  LORazepam     Tablet 2 milliGRAM(s) Oral every 6 hours PRN anxiety/agitation  LORazepam   Injectable 2 milliGRAM(s) IntraMuscular once PRN Agitation  ondansetron    Tablet 4 milliGRAM(s) Oral every 6 hours PRN N/V  simethicone 80 milliGRAM(s) Chew every 6 hours PRN Gas  
MEDICATIONS  (STANDING):  lithium CR (ESKALITH-CR) 1350 milliGRAM(s) Oral at bedtime  OLANZapine 10 milliGRAM(s) Oral at bedtime  OLANZapine 15 milliGRAM(s) Oral daily  pantoprazole    Tablet 40 milliGRAM(s) Oral before breakfast    MEDICATIONS  (PRN):  acetaminophen   Tablet .. 650 milliGRAM(s) Oral every 6 hours PRN Mild Pain (1 - 3), Moderate Pain (4 - 6)  ALBUTerol    90 MICROgram(s) HFA Inhaler 2 Puff(s) Inhalation every 6 hours PRN asthma attack  calcium carbonate    500 mG (Tums) Chewable 1 Tablet(s) Chew every 6 hours PRN GI  diphenhydrAMINE 50 milliGRAM(s) Oral every 6 hours PRN agitation  diphenhydrAMINE   Injectable 50 milliGRAM(s) IntraMuscular once PRN EPS  haloperidol     Tablet 5 milliGRAM(s) Oral every 6 hours PRN agitation  haloperidol    Injectable 5 milliGRAM(s) IntraMuscular once PRN agitaion  ibuprofen  Tablet. 600 milliGRAM(s) Oral every 8 hours PRN Severe Pain (7 - 10)  loperamide 2 milliGRAM(s) Oral every 4 hours PRN GI  LORazepam     Tablet 2 milliGRAM(s) Oral every 6 hours PRN agitation/anxiety  LORazepam   Injectable 2 milliGRAM(s) IntraMuscular once PRN agitation  ondansetron    Tablet 4 milliGRAM(s) Oral every 6 hours PRN N/V  simethicone 80 milliGRAM(s) Chew every 6 hours PRN Gas  
MEDICATIONS  (STANDING):  lithium CR (ESKALITH-CR) 1800 milliGRAM(s) Oral at bedtime  OLANZapine 10 milliGRAM(s) Oral two times a day  PPD  5 Tuberculin Unit(s) Injectable 5 Unit(s) IntraDermal once    MEDICATIONS  (PRN):  ALBUTerol    90 MICROgram(s) HFA Inhaler 2 Puff(s) Inhalation every 6 hours PRN asthma attack  diphenhydrAMINE 50 milliGRAM(s) Oral every 6 hours PRN agitation  diphenhydrAMINE   Injectable 50 milliGRAM(s) IntraMuscular once PRN EPS  haloperidol     Tablet 5 milliGRAM(s) Oral every 6 hours PRN agitation  haloperidol    Injectable 5 milliGRAM(s) IntraMuscular once PRN agitaion  LORazepam     Tablet 2 milliGRAM(s) Oral every 6 hours PRN agitation  LORazepam   Injectable 2 milliGRAM(s) IntraMuscular once PRN Agitation  LORazepam   Injectable 2 milliGRAM(s) IntraMuscular once PRN Agitation  
MEDICATIONS  (STANDING):  lithium CR (ESKALITH-CR) 1800 milliGRAM(s) Oral at bedtime  OLANZapine 10 milliGRAM(s) Oral two times a day  pantoprazole    Tablet 40 milliGRAM(s) Oral before breakfast    MEDICATIONS  (PRN):  ALBUTerol    90 MICROgram(s) HFA Inhaler 2 Puff(s) Inhalation every 6 hours PRN asthma attack  diphenhydrAMINE 50 milliGRAM(s) Oral every 6 hours PRN agitation  diphenhydrAMINE   Injectable 50 milliGRAM(s) IntraMuscular once PRN EPS  haloperidol     Tablet 5 milliGRAM(s) Oral every 6 hours PRN agitation  haloperidol    Injectable 5 milliGRAM(s) IntraMuscular once PRN agitaion  loperamide 2 milliGRAM(s) Oral every 4 hours PRN GI  LORazepam     Tablet 2 milliGRAM(s) Oral every 6 hours PRN agitation  LORazepam   Injectable 2 milliGRAM(s) IntraMuscular once PRN agitation  ondansetron    Tablet 4 milliGRAM(s) Oral every 6 hours PRN N/V  
MEDICATIONS  (STANDING):  lithium CR (ESKALITH-CR) 1800 milliGRAM(s) Oral at bedtime  OLANZapine 10 milliGRAM(s) Oral two times a day  pantoprazole    Tablet 40 milliGRAM(s) Oral before breakfast    MEDICATIONS  (PRN):  acetaminophen   Tablet .. 650 milliGRAM(s) Oral every 6 hours PRN Mild Pain (1 - 3), Moderate Pain (4 - 6)  ALBUTerol    90 MICROgram(s) HFA Inhaler 2 Puff(s) Inhalation every 6 hours PRN asthma attack  diphenhydrAMINE 50 milliGRAM(s) Oral every 6 hours PRN agitation  diphenhydrAMINE   Injectable 50 milliGRAM(s) IntraMuscular once PRN EPS  haloperidol     Tablet 5 milliGRAM(s) Oral every 6 hours PRN agitation  haloperidol    Injectable 5 milliGRAM(s) IntraMuscular once PRN agitaion  ibuprofen  Tablet. 600 milliGRAM(s) Oral every 8 hours PRN Severe Pain (7 - 10)  loperamide 2 milliGRAM(s) Oral every 4 hours PRN GI  LORazepam     Tablet 2 milliGRAM(s) Oral every 6 hours PRN agitation/anxiety  LORazepam   Injectable 2 milliGRAM(s) IntraMuscular once PRN agitation  ondansetron    Tablet 4 milliGRAM(s) Oral every 6 hours PRN N/V  
MEDICATIONS  (STANDING):  lithium CR (ESKALITH-CR) 1800 milliGRAM(s) Oral at bedtime  OLANZapine 10 milliGRAM(s) Oral two times a day  pantoprazole    Tablet 40 milliGRAM(s) Oral before breakfast  PPD  5 Tuberculin Unit(s) Injectable 5 Unit(s) IntraDermal once    MEDICATIONS  (PRN):  ALBUTerol    90 MICROgram(s) HFA Inhaler 2 Puff(s) Inhalation every 6 hours PRN asthma attack  diphenhydrAMINE 50 milliGRAM(s) Oral every 6 hours PRN agitation  diphenhydrAMINE   Injectable 50 milliGRAM(s) IntraMuscular once PRN EPS  haloperidol     Tablet 5 milliGRAM(s) Oral every 6 hours PRN agitation  haloperidol    Injectable 5 milliGRAM(s) IntraMuscular once PRN agitaion  loperamide 2 milliGRAM(s) Oral every 4 hours PRN GI  LORazepam     Tablet 2 milliGRAM(s) Oral every 6 hours PRN agitation  LORazepam   Injectable 2 milliGRAM(s) IntraMuscular once PRN agitation  ondansetron    Tablet 4 milliGRAM(s) Oral every 6 hours PRN N/V  
MEDICATIONS  (STANDING):  lithium CR (ESKALITH-CR) 900 milliGRAM(s) Oral at bedtime  OLANZapine 10 milliGRAM(s) Oral two times a day    MEDICATIONS  (PRN):  ALBUTerol    90 MICROgram(s) HFA Inhaler 2 Puff(s) Inhalation every 6 hours PRN asthma attack  diphenhydrAMINE 50 milliGRAM(s) Oral every 6 hours PRN agitation  diphenhydrAMINE   Injectable 50 milliGRAM(s) IntraMuscular once PRN EPS  haloperidol     Tablet 5 milliGRAM(s) Oral every 6 hours PRN agitation  haloperidol    Injectable 5 milliGRAM(s) IntraMuscular once PRN agitaion  LORazepam     Tablet 2 milliGRAM(s) Oral every 6 hours PRN Agitation  LORazepam   Injectable 2 milliGRAM(s) IntraMuscular once PRN agitation  LORazepam   Injectable 2 milliGRAM(s) IntraMuscular once PRN agitation  
MEDICATIONS  (STANDING):  lithium CR (ESKALITH-CR) 900 milliGRAM(s) Oral at bedtime  OLANZapine 10 milliGRAM(s) Oral two times a day    MEDICATIONS  (PRN):  ALBUTerol    90 MICROgram(s) HFA Inhaler 2 Puff(s) Inhalation every 6 hours PRN asthma attack  diphenhydrAMINE 50 milliGRAM(s) Oral every 6 hours PRN agitation  diphenhydrAMINE   Injectable 50 milliGRAM(s) IntraMuscular once PRN EPS  haloperidol     Tablet 5 milliGRAM(s) Oral every 6 hours PRN agitation  haloperidol    Injectable 5 milliGRAM(s) IntraMuscular once PRN agitaion  LORazepam     Tablet 2 milliGRAM(s) Oral every 6 hours PRN Agitation  LORazepam   Injectable 2 milliGRAM(s) IntraMuscular once PRN agitation  LORazepam   Injectable 2 milliGRAM(s) IntraMuscular once PRN agitation  
MEDICATIONS  (STANDING):  lithium CR (ESKALITH-CR) 1350 milliGRAM(s) Oral at bedtime  OLANZapine 10 milliGRAM(s) Oral at bedtime  OLANZapine 15 milliGRAM(s) Oral daily  pantoprazole    Tablet 40 milliGRAM(s) Oral before breakfast    MEDICATIONS  (PRN):  acetaminophen   Tablet .. 650 milliGRAM(s) Oral every 6 hours PRN Mild Pain (1 - 3), Moderate Pain (4 - 6)  ALBUTerol    90 MICROgram(s) HFA Inhaler 2 Puff(s) Inhalation every 6 hours PRN asthma attack  calcium carbonate    500 mG (Tums) Chewable 1 Tablet(s) Chew every 6 hours PRN GI  diphenhydrAMINE 50 milliGRAM(s) Oral every 6 hours PRN agitation  diphenhydrAMINE   Injectable 50 milliGRAM(s) IntraMuscular once PRN EPS  diphenhydrAMINE   Injectable 50 milliGRAM(s) IntraMuscular once PRN EPS  haloperidol     Tablet 5 milliGRAM(s) Oral every 6 hours PRN agitation  haloperidol    Injectable 5 milliGRAM(s) IntraMuscular once PRN agitaion  haloperidol    Injectable 5 milliGRAM(s) IntraMuscular once PRN agitaion  ibuprofen  Tablet. 600 milliGRAM(s) Oral every 8 hours PRN Severe Pain (7 - 10)  lactase 1 Tablet(s) Oral three times a day with meals PRN before dairy products  loperamide 2 milliGRAM(s) Oral every 4 hours PRN GI  LORazepam     Tablet 2 milliGRAM(s) Oral every 6 hours PRN agiation/anxiety  LORazepam   Injectable 2 milliGRAM(s) IntraMuscular once PRN agitation  ondansetron    Tablet 4 milliGRAM(s) Oral every 6 hours PRN N/V  simethicone 80 milliGRAM(s) Chew every 6 hours PRN Gas  
MEDICATIONS  (STANDING):  lithium CR (ESKALITH-CR) 1350 milliGRAM(s) Oral at bedtime  OLANZapine 10 milliGRAM(s) Oral at bedtime  OLANZapine 15 milliGRAM(s) Oral daily  pantoprazole    Tablet 40 milliGRAM(s) Oral before breakfast    MEDICATIONS  (PRN):  acetaminophen   Tablet .. 650 milliGRAM(s) Oral every 6 hours PRN Mild Pain (1 - 3), Moderate Pain (4 - 6)  ALBUTerol    90 MICROgram(s) HFA Inhaler 2 Puff(s) Inhalation every 6 hours PRN asthma attack  diphenhydrAMINE 50 milliGRAM(s) Oral every 6 hours PRN agitation  diphenhydrAMINE   Injectable 50 milliGRAM(s) IntraMuscular once PRN EPS  haloperidol     Tablet 5 milliGRAM(s) Oral every 6 hours PRN agitation  haloperidol    Injectable 5 milliGRAM(s) IntraMuscular once PRN agitaion  ibuprofen  Tablet. 600 milliGRAM(s) Oral every 8 hours PRN Severe Pain (7 - 10)  loperamide 2 milliGRAM(s) Oral every 4 hours PRN GI  LORazepam     Tablet 2 milliGRAM(s) Oral every 6 hours PRN anxiety/agitation  LORazepam   Injectable 2 milliGRAM(s) IntraMuscular once PRN agitation  ondansetron    Tablet 4 milliGRAM(s) Oral every 6 hours PRN N/V  
MEDICATIONS  (STANDING):  lithium CR (ESKALITH-CR) 1800 milliGRAM(s) Oral at bedtime  OLANZapine 10 milliGRAM(s) Oral two times a day    MEDICATIONS  (PRN):  ALBUTerol    90 MICROgram(s) HFA Inhaler 2 Puff(s) Inhalation every 6 hours PRN asthma attack  diphenhydrAMINE 50 milliGRAM(s) Oral every 6 hours PRN agitation  diphenhydrAMINE   Injectable 50 milliGRAM(s) IntraMuscular once PRN EPS  haloperidol     Tablet 5 milliGRAM(s) Oral every 6 hours PRN agitation  haloperidol    Injectable 5 milliGRAM(s) IntraMuscular once PRN agitaion  LORazepam     Tablet 2 milliGRAM(s) Oral every 6 hours PRN agitation  LORazepam   Injectable 2 milliGRAM(s) IntraMuscular once PRN Agitation  LORazepam   Injectable 2 milliGRAM(s) IntraMuscular once PRN Agitation  
MEDICATIONS  (STANDING):  lithium CR (ESKALITH-CR) 1350 milliGRAM(s) Oral at bedtime  OLANZapine 10 milliGRAM(s) Oral at bedtime  OLANZapine 15 milliGRAM(s) Oral daily  pantoprazole    Tablet 40 milliGRAM(s) Oral before breakfast    MEDICATIONS  (PRN):  acetaminophen   Tablet .. 650 milliGRAM(s) Oral every 6 hours PRN Mild Pain (1 - 3), Moderate Pain (4 - 6)  ALBUTerol    90 MICROgram(s) HFA Inhaler 2 Puff(s) Inhalation every 6 hours PRN asthma attack  calcium carbonate    500 mG (Tums) Chewable 1 Tablet(s) Chew every 6 hours PRN GI  diphenhydrAMINE 50 milliGRAM(s) Oral every 6 hours PRN agitation  diphenhydrAMINE   Injectable 50 milliGRAM(s) IntraMuscular once PRN EPS  diphenhydrAMINE   Injectable 50 milliGRAM(s) IntraMuscular once PRN EPS  haloperidol     Tablet 5 milliGRAM(s) Oral every 6 hours PRN agitation  haloperidol    Injectable 5 milliGRAM(s) IntraMuscular once PRN agitaion  haloperidol    Injectable 5 milliGRAM(s) IntraMuscular once PRN agitaion  ibuprofen  Tablet. 600 milliGRAM(s) Oral every 8 hours PRN Severe Pain (7 - 10)  lactase 1 Tablet(s) Oral three times a day with meals PRN before dairy products  loperamide 2 milliGRAM(s) Oral every 4 hours PRN GI  LORazepam     Tablet 2 milliGRAM(s) Oral every 6 hours PRN agitation/anxiety  LORazepam   Injectable 2 milliGRAM(s) IntraMuscular once PRN agitation  ondansetron    Tablet 4 milliGRAM(s) Oral every 6 hours PRN N/V  simethicone 80 milliGRAM(s) Chew every 6 hours PRN Gas  sodium chloride 0.65% Nasal 1 Spray(s) Both Nostrils every 12 hours PRN Nasal Congestion  
MEDICATIONS  (STANDING):  lithium CR (ESKALITH-CR) 900 milliGRAM(s) Oral at bedtime  OLANZapine 10 milliGRAM(s) Oral two times a day    MEDICATIONS  (PRN):  ALBUTerol    90 MICROgram(s) HFA Inhaler 2 Puff(s) Inhalation every 6 hours PRN asthma attack  diphenhydrAMINE 50 milliGRAM(s) Oral every 6 hours PRN agitation  diphenhydrAMINE   Injectable 50 milliGRAM(s) IntraMuscular once PRN EPS  haloperidol     Tablet 5 milliGRAM(s) Oral every 6 hours PRN agitation  haloperidol    Injectable 5 milliGRAM(s) IntraMuscular once PRN agitaion  LORazepam     Tablet 2 milliGRAM(s) Oral every 6 hours PRN Agitation  LORazepam   Injectable 2 milliGRAM(s) IntraMuscular once PRN agitation  LORazepam   Injectable 2 milliGRAM(s) IntraMuscular once PRN agitation  
MEDICATIONS  (STANDING):  lithium CR (ESKALITH-CR) 1350 milliGRAM(s) Oral at bedtime  OLANZapine 10 milliGRAM(s) Oral at bedtime  OLANZapine 15 milliGRAM(s) Oral daily  pantoprazole    Tablet 40 milliGRAM(s) Oral before breakfast    MEDICATIONS  (PRN):  acetaminophen   Tablet .. 650 milliGRAM(s) Oral every 6 hours PRN Mild Pain (1 - 3), Moderate Pain (4 - 6)  ALBUTerol    90 MICROgram(s) HFA Inhaler 2 Puff(s) Inhalation every 6 hours PRN asthma attack  calcium carbonate    500 mG (Tums) Chewable 1 Tablet(s) Chew every 6 hours PRN GI  diphenhydrAMINE 50 milliGRAM(s) Oral every 6 hours PRN agitation  diphenhydrAMINE   Injectable 50 milliGRAM(s) IntraMuscular once PRN EPS  haloperidol     Tablet 5 milliGRAM(s) Oral every 6 hours PRN agitation  haloperidol    Injectable 5 milliGRAM(s) IntraMuscular once PRN agitaion  ibuprofen  Tablet. 600 milliGRAM(s) Oral every 8 hours PRN Severe Pain (7 - 10)  lactase 1 Tablet(s) Oral three times a day with meals PRN before dairy products  loperamide 2 milliGRAM(s) Oral every 4 hours PRN GI  LORazepam     Tablet 2 milliGRAM(s) Oral every 6 hours PRN agitation/anxiety  LORazepam   Injectable 2 milliGRAM(s) IntraMuscular once PRN agitation  ondansetron    Tablet 4 milliGRAM(s) Oral every 6 hours PRN N/V  simethicone 80 milliGRAM(s) Chew every 6 hours PRN Gas  
MEDICATIONS  (STANDING):  lithium CR (ESKALITH-CR) 1800 milliGRAM(s) Oral at bedtime  OLANZapine 10 milliGRAM(s) Oral two times a day    MEDICATIONS  (PRN):  ALBUTerol    90 MICROgram(s) HFA Inhaler 2 Puff(s) Inhalation every 6 hours PRN asthma attack  diphenhydrAMINE 50 milliGRAM(s) Oral every 6 hours PRN agitation  diphenhydrAMINE   Injectable 50 milliGRAM(s) IntraMuscular once PRN EPS  haloperidol     Tablet 5 milliGRAM(s) Oral every 6 hours PRN agitation  haloperidol    Injectable 5 milliGRAM(s) IntraMuscular once PRN agitaion  LORazepam     Tablet 2 milliGRAM(s) Oral every 6 hours PRN agitation  LORazepam   Injectable 2 milliGRAM(s) IntraMuscular once PRN Agitation  LORazepam   Injectable 2 milliGRAM(s) IntraMuscular once PRN Agitation  
MEDICATIONS  (STANDING):  lithium CR (ESKALITH-CR) 1350 milliGRAM(s) Oral at bedtime  OLANZapine 10 milliGRAM(s) Oral at bedtime  OLANZapine 15 milliGRAM(s) Oral daily  pantoprazole    Tablet 40 milliGRAM(s) Oral before breakfast    MEDICATIONS  (PRN):  acetaminophen   Tablet .. 650 milliGRAM(s) Oral every 6 hours PRN Mild Pain (1 - 3), Moderate Pain (4 - 6)  ALBUTerol    90 MICROgram(s) HFA Inhaler 2 Puff(s) Inhalation every 6 hours PRN asthma attack  calcium carbonate    500 mG (Tums) Chewable 1 Tablet(s) Chew every 6 hours PRN GI  diphenhydrAMINE 50 milliGRAM(s) Oral every 6 hours PRN agitation  diphenhydrAMINE   Injectable 50 milliGRAM(s) IntraMuscular once PRN EPS  diphenhydrAMINE   Injectable 50 milliGRAM(s) IntraMuscular once PRN EPS  haloperidol     Tablet 5 milliGRAM(s) Oral every 6 hours PRN agitation  haloperidol    Injectable 5 milliGRAM(s) IntraMuscular once PRN agitaion  haloperidol    Injectable 5 milliGRAM(s) IntraMuscular once PRN agitaion  ibuprofen  Tablet. 600 milliGRAM(s) Oral every 8 hours PRN Severe Pain (7 - 10)  lactase 1 Tablet(s) Oral three times a day with meals PRN before dairy products  loperamide 2 milliGRAM(s) Oral every 4 hours PRN GI  LORazepam     Tablet 2 milliGRAM(s) Oral every 6 hours PRN agiation/anxiety  LORazepam   Injectable 2 milliGRAM(s) IntraMuscular once PRN agitation  ondansetron    Tablet 4 milliGRAM(s) Oral every 6 hours PRN N/V  simethicone 80 milliGRAM(s) Chew every 6 hours PRN Gas  
MEDICATIONS  (STANDING):  lithium CR (ESKALITH-CR) 1350 milliGRAM(s) Oral at bedtime  OLANZapine 10 milliGRAM(s) Oral at bedtime  OLANZapine 15 milliGRAM(s) Oral daily  pantoprazole    Tablet 40 milliGRAM(s) Oral before breakfast    MEDICATIONS  (PRN):  acetaminophen   Tablet .. 650 milliGRAM(s) Oral every 6 hours PRN Mild Pain (1 - 3), Moderate Pain (4 - 6)  ALBUTerol    90 MICROgram(s) HFA Inhaler 2 Puff(s) Inhalation every 6 hours PRN asthma attack  calcium carbonate    500 mG (Tums) Chewable 1 Tablet(s) Chew every 6 hours PRN GI  diphenhydrAMINE 50 milliGRAM(s) Oral every 6 hours PRN agitation  diphenhydrAMINE   Injectable 50 milliGRAM(s) IntraMuscular once PRN EPS  haloperidol     Tablet 5 milliGRAM(s) Oral every 6 hours PRN agitation  haloperidol    Injectable 5 milliGRAM(s) IntraMuscular once PRN agitaion  ibuprofen  Tablet. 600 milliGRAM(s) Oral every 8 hours PRN Severe Pain (7 - 10)  lactase 1 Tablet(s) Oral three times a day with meals PRN before dairy products  loperamide 2 milliGRAM(s) Oral every 4 hours PRN GI  LORazepam     Tablet 2 milliGRAM(s) Oral every 6 hours PRN agitation/anxiety  LORazepam   Injectable 2 milliGRAM(s) IntraMuscular once PRN agitation  ondansetron    Tablet 4 milliGRAM(s) Oral every 6 hours PRN N/V  simethicone 80 milliGRAM(s) Chew every 6 hours PRN Gas  
MEDICATIONS  (STANDING):  lithium CR (ESKALITH-CR) 1350 milliGRAM(s) Oral at bedtime  OLANZapine 10 milliGRAM(s) Oral at bedtime  OLANZapine 15 milliGRAM(s) Oral daily  pantoprazole    Tablet 40 milliGRAM(s) Oral before breakfast    MEDICATIONS  (PRN):  acetaminophen   Tablet .. 650 milliGRAM(s) Oral every 6 hours PRN Mild Pain (1 - 3), Moderate Pain (4 - 6)  ALBUTerol    90 MICROgram(s) HFA Inhaler 2 Puff(s) Inhalation every 6 hours PRN asthma attack  diphenhydrAMINE 50 milliGRAM(s) Oral every 6 hours PRN agitation  diphenhydrAMINE   Injectable 50 milliGRAM(s) IntraMuscular once PRN EPS  haloperidol     Tablet 5 milliGRAM(s) Oral every 6 hours PRN agitation  haloperidol    Injectable 5 milliGRAM(s) IntraMuscular once PRN agitaion  ibuprofen  Tablet. 600 milliGRAM(s) Oral every 8 hours PRN Severe Pain (7 - 10)  loperamide 2 milliGRAM(s) Oral every 4 hours PRN GI  LORazepam     Tablet 2 milliGRAM(s) Oral every 6 hours PRN agitation/anxiety  LORazepam   Injectable 2 milliGRAM(s) IntraMuscular once PRN aggression  ondansetron    Tablet 4 milliGRAM(s) Oral every 6 hours PRN N/V  simethicone 80 milliGRAM(s) Chew every 6 hours PRN Gas  
MEDICATIONS  (STANDING):  lithium CR (ESKALITH-CR) 1350 milliGRAM(s) Oral at bedtime  OLANZapine 10 milliGRAM(s) Oral at bedtime  OLANZapine 15 milliGRAM(s) Oral daily  pantoprazole    Tablet 40 milliGRAM(s) Oral before breakfast    MEDICATIONS  (PRN):  acetaminophen   Tablet .. 650 milliGRAM(s) Oral every 6 hours PRN Mild Pain (1 - 3), Moderate Pain (4 - 6)  ALBUTerol    90 MICROgram(s) HFA Inhaler 2 Puff(s) Inhalation every 6 hours PRN asthma attack  calcium carbonate    500 mG (Tums) Chewable 1 Tablet(s) Chew every 6 hours PRN GI  diphenhydrAMINE 50 milliGRAM(s) Oral every 6 hours PRN agitation  diphenhydrAMINE   Injectable 50 milliGRAM(s) IntraMuscular once PRN EPS  diphenhydrAMINE   Injectable 50 milliGRAM(s) IntraMuscular once PRN EPS  haloperidol     Tablet 5 milliGRAM(s) Oral every 6 hours PRN agitation  haloperidol    Injectable 5 milliGRAM(s) IntraMuscular once PRN agitaion  haloperidol    Injectable 5 milliGRAM(s) IntraMuscular once PRN agitaion  ibuprofen  Tablet. 600 milliGRAM(s) Oral every 8 hours PRN Severe Pain (7 - 10)  lactase 1 Tablet(s) Oral three times a day with meals PRN before dairy products  loperamide 2 milliGRAM(s) Oral every 4 hours PRN GI  LORazepam     Tablet 2 milliGRAM(s) Oral every 6 hours PRN agiation/anxiety  LORazepam   Injectable 2 milliGRAM(s) IntraMuscular once PRN agitation  ondansetron    Tablet 4 milliGRAM(s) Oral every 6 hours PRN N/V  simethicone 80 milliGRAM(s) Chew every 6 hours PRN Gas  
MEDICATIONS  (STANDING):  lithium CR (ESKALITH-CR) 1350 milliGRAM(s) Oral at bedtime  OLANZapine 10 milliGRAM(s) Oral at bedtime  OLANZapine 15 milliGRAM(s) Oral daily  pantoprazole    Tablet 40 milliGRAM(s) Oral before breakfast    MEDICATIONS  (PRN):  acetaminophen   Tablet .. 650 milliGRAM(s) Oral every 6 hours PRN Mild Pain (1 - 3), Moderate Pain (4 - 6)  ALBUTerol    90 MICROgram(s) HFA Inhaler 2 Puff(s) Inhalation every 6 hours PRN asthma attack  calcium carbonate    500 mG (Tums) Chewable 1 Tablet(s) Chew every 6 hours PRN GI  diphenhydrAMINE 50 milliGRAM(s) Oral every 6 hours PRN agitation  diphenhydrAMINE   Injectable 50 milliGRAM(s) IntraMuscular once PRN EPS  haloperidol     Tablet 5 milliGRAM(s) Oral every 6 hours PRN agitation  haloperidol    Injectable 5 milliGRAM(s) IntraMuscular once PRN agitaion  ibuprofen  Tablet. 600 milliGRAM(s) Oral every 8 hours PRN Severe Pain (7 - 10)  lactase 1 Tablet(s) Oral three times a day with meals PRN before dairy products  loperamide 2 milliGRAM(s) Oral every 4 hours PRN GI  LORazepam     Tablet 2 milliGRAM(s) Oral every 6 hours PRN anxiety/agitation  LORazepam   Injectable 2 milliGRAM(s) IntraMuscular once PRN Agitation  ondansetron    Tablet 4 milliGRAM(s) Oral every 6 hours PRN N/V  simethicone 80 milliGRAM(s) Chew every 6 hours PRN Gas  
MEDICATIONS  (STANDING):  lithium CR (ESKALITH-CR) 1350 milliGRAM(s) Oral at bedtime  OLANZapine 10 milliGRAM(s) Oral at bedtime  OLANZapine 15 milliGRAM(s) Oral daily  pantoprazole    Tablet 40 milliGRAM(s) Oral before breakfast    MEDICATIONS  (PRN):  acetaminophen   Tablet .. 650 milliGRAM(s) Oral every 6 hours PRN Mild Pain (1 - 3), Moderate Pain (4 - 6)  ALBUTerol    90 MICROgram(s) HFA Inhaler 2 Puff(s) Inhalation every 6 hours PRN asthma attack  calcium carbonate    500 mG (Tums) Chewable 1 Tablet(s) Chew every 6 hours PRN GI  diphenhydrAMINE 50 milliGRAM(s) Oral every 6 hours PRN agitation  diphenhydrAMINE   Injectable 50 milliGRAM(s) IntraMuscular once PRN EPS  haloperidol     Tablet 5 milliGRAM(s) Oral every 6 hours PRN agitation  haloperidol    Injectable 5 milliGRAM(s) IntraMuscular once PRN agitaion  ibuprofen  Tablet. 600 milliGRAM(s) Oral every 8 hours PRN Severe Pain (7 - 10)  lactase 1 Tablet(s) Oral three times a day with meals PRN before dairy products  loperamide 2 milliGRAM(s) Oral every 4 hours PRN GI  LORazepam     Tablet 2 milliGRAM(s) Oral every 6 hours PRN anxiety/agitation  LORazepam   Injectable 2 milliGRAM(s) IntraMuscular once PRN agitation  ondansetron    Tablet 4 milliGRAM(s) Oral every 6 hours PRN N/V  simethicone 80 milliGRAM(s) Chew every 6 hours PRN Gas  
MEDICATIONS  (STANDING):  lithium CR (ESKALITH-CR) 900 milliGRAM(s) Oral at bedtime  OLANZapine 10 milliGRAM(s) Oral two times a day    MEDICATIONS  (PRN):  ALBUTerol    90 MICROgram(s) HFA Inhaler 2 Puff(s) Inhalation every 6 hours PRN asthma attack  diphenhydrAMINE 50 milliGRAM(s) Oral every 6 hours PRN agitation  diphenhydrAMINE   Injectable 50 milliGRAM(s) IntraMuscular once PRN EPS  haloperidol     Tablet 5 milliGRAM(s) Oral every 6 hours PRN agitation  haloperidol    Injectable 5 milliGRAM(s) IntraMuscular once PRN agitaion  LORazepam     Tablet 2 milliGRAM(s) Oral every 6 hours PRN Agitation  LORazepam   Injectable 2 milliGRAM(s) IntraMuscular once PRN agitation  LORazepam   Injectable 2 milliGRAM(s) IntraMuscular once PRN agitation  
MEDICATIONS  (STANDING):  lithium CR (ESKALITH-CR) 1350 milliGRAM(s) Oral at bedtime  OLANZapine 10 milliGRAM(s) Oral at bedtime  OLANZapine 15 milliGRAM(s) Oral daily  pantoprazole    Tablet 40 milliGRAM(s) Oral before breakfast    MEDICATIONS  (PRN):  acetaminophen   Tablet .. 650 milliGRAM(s) Oral every 6 hours PRN Mild Pain (1 - 3), Moderate Pain (4 - 6)  ALBUTerol    90 MICROgram(s) HFA Inhaler 2 Puff(s) Inhalation every 6 hours PRN asthma attack  calcium carbonate    500 mG (Tums) Chewable 1 Tablet(s) Chew every 6 hours PRN GI  diphenhydrAMINE 50 milliGRAM(s) Oral every 6 hours PRN agitation  diphenhydrAMINE   Injectable 50 milliGRAM(s) IntraMuscular once PRN EPS  diphenhydrAMINE   Injectable 50 milliGRAM(s) IntraMuscular once PRN EPS  haloperidol     Tablet 5 milliGRAM(s) Oral every 6 hours PRN agitation  haloperidol    Injectable 5 milliGRAM(s) IntraMuscular once PRN agitaion  haloperidol    Injectable 5 milliGRAM(s) IntraMuscular once PRN agitaion  ibuprofen  Tablet. 600 milliGRAM(s) Oral every 8 hours PRN Severe Pain (7 - 10)  lactase 1 Tablet(s) Oral three times a day with meals PRN before dairy products  loperamide 2 milliGRAM(s) Oral every 4 hours PRN GI  LORazepam     Tablet 2 milliGRAM(s) Oral every 6 hours PRN agitation/anxiety  LORazepam   Injectable 2 milliGRAM(s) IntraMuscular once PRN agitation  ondansetron    Tablet 4 milliGRAM(s) Oral every 6 hours PRN N/V  simethicone 80 milliGRAM(s) Chew every 6 hours PRN Gas  sodium chloride 0.65% Nasal 1 Spray(s) Both Nostrils every 12 hours PRN Nasal Congestion  
MEDICATIONS  (STANDING):  lithium CR (ESKALITH-CR) 900 milliGRAM(s) Oral at bedtime  OLANZapine 10 milliGRAM(s) Oral two times a day    MEDICATIONS  (PRN):  ALBUTerol    90 MICROgram(s) HFA Inhaler 2 Puff(s) Inhalation every 6 hours PRN asthma attack  diphenhydrAMINE 50 milliGRAM(s) Oral every 6 hours PRN agitation  diphenhydrAMINE   Injectable 50 milliGRAM(s) IntraMuscular once PRN EPS  haloperidol     Tablet 5 milliGRAM(s) Oral every 6 hours PRN agitation  haloperidol    Injectable 5 milliGRAM(s) IntraMuscular once PRN agitaion  LORazepam     Tablet 2 milliGRAM(s) Oral every 6 hours PRN Agitation  LORazepam   Injectable 2 milliGRAM(s) IntraMuscular once PRN agitation  LORazepam   Injectable 2 milliGRAM(s) IntraMuscular once PRN agitation  
MEDICATIONS  (STANDING):  lithium CR (ESKALITH-CR) 1350 milliGRAM(s) Oral at bedtime  OLANZapine 10 milliGRAM(s) Oral at bedtime  OLANZapine 15 milliGRAM(s) Oral daily  pantoprazole    Tablet 40 milliGRAM(s) Oral before breakfast    MEDICATIONS  (PRN):  acetaminophen   Tablet .. 650 milliGRAM(s) Oral every 6 hours PRN Mild Pain (1 - 3), Moderate Pain (4 - 6)  ALBUTerol    90 MICROgram(s) HFA Inhaler 2 Puff(s) Inhalation every 6 hours PRN asthma attack  diphenhydrAMINE 50 milliGRAM(s) Oral every 6 hours PRN agitation  diphenhydrAMINE   Injectable 50 milliGRAM(s) IntraMuscular once PRN EPS  haloperidol     Tablet 5 milliGRAM(s) Oral every 6 hours PRN agitation  haloperidol    Injectable 5 milliGRAM(s) IntraMuscular once PRN agitaion  ibuprofen  Tablet. 600 milliGRAM(s) Oral every 8 hours PRN Severe Pain (7 - 10)  loperamide 2 milliGRAM(s) Oral every 4 hours PRN GI  LORazepam     Tablet 2 milliGRAM(s) Oral every 6 hours PRN agitation/anxiety  LORazepam   Injectable 2 milliGRAM(s) IntraMuscular once PRN aggression  ondansetron    Tablet 4 milliGRAM(s) Oral every 6 hours PRN N/V  simethicone 80 milliGRAM(s) Chew every 6 hours PRN Gas  
MEDICATIONS  (STANDING):  lithium CR (ESKALITH-CR) 1350 milliGRAM(s) Oral at bedtime  OLANZapine 10 milliGRAM(s) Oral at bedtime  OLANZapine 15 milliGRAM(s) Oral daily  pantoprazole    Tablet 40 milliGRAM(s) Oral before breakfast    MEDICATIONS  (PRN):  acetaminophen   Tablet .. 650 milliGRAM(s) Oral every 6 hours PRN Mild Pain (1 - 3), Moderate Pain (4 - 6)  ALBUTerol    90 MICROgram(s) HFA Inhaler 2 Puff(s) Inhalation every 6 hours PRN asthma attack  diphenhydrAMINE 50 milliGRAM(s) Oral every 6 hours PRN agitation  diphenhydrAMINE   Injectable 50 milliGRAM(s) IntraMuscular once PRN EPS  haloperidol     Tablet 5 milliGRAM(s) Oral every 6 hours PRN agitation  haloperidol    Injectable 5 milliGRAM(s) IntraMuscular once PRN agitaion  ibuprofen  Tablet. 600 milliGRAM(s) Oral every 8 hours PRN Severe Pain (7 - 10)  loperamide 2 milliGRAM(s) Oral every 4 hours PRN GI  LORazepam     Tablet 2 milliGRAM(s) Oral every 6 hours PRN anxiety/agitation  LORazepam   Injectable 2 milliGRAM(s) IntraMuscular once PRN agitation  ondansetron    Tablet 4 milliGRAM(s) Oral every 6 hours PRN N/V  
MEDICATIONS  (STANDING):  lithium CR (ESKALITH-CR) 1800 milliGRAM(s) Oral at bedtime  OLANZapine 10 milliGRAM(s) Oral two times a day    MEDICATIONS  (PRN):  ALBUTerol    90 MICROgram(s) HFA Inhaler 2 Puff(s) Inhalation every 6 hours PRN asthma attack  diphenhydrAMINE 50 milliGRAM(s) Oral every 6 hours PRN agitation  diphenhydrAMINE   Injectable 50 milliGRAM(s) IntraMuscular once PRN EPS  haloperidol     Tablet 5 milliGRAM(s) Oral every 6 hours PRN agitation  haloperidol    Injectable 5 milliGRAM(s) IntraMuscular once PRN agitaion  LORazepam     Tablet 2 milliGRAM(s) Oral every 6 hours PRN agitation  LORazepam   Injectable 2 milliGRAM(s) IntraMuscular once PRN Agitation  LORazepam   Injectable 2 milliGRAM(s) IntraMuscular once PRN Agitation  
MEDICATIONS  (STANDING):  calcium carbonate    500 mG (Tums) Chewable 1 Tablet(s) Chew daily  lithium CR (ESKALITH-CR) 1350 milliGRAM(s) Oral at bedtime  OLANZapine 10 milliGRAM(s) Oral at bedtime  OLANZapine 15 milliGRAM(s) Oral daily  pantoprazole    Tablet 40 milliGRAM(s) Oral before breakfast    MEDICATIONS  (PRN):  acetaminophen   Tablet .. 650 milliGRAM(s) Oral every 6 hours PRN Mild Pain (1 - 3), Moderate Pain (4 - 6)  ALBUTerol    90 MICROgram(s) HFA Inhaler 2 Puff(s) Inhalation every 6 hours PRN asthma attack  diphenhydrAMINE 50 milliGRAM(s) Oral every 6 hours PRN agitation  diphenhydrAMINE   Injectable 50 milliGRAM(s) IntraMuscular once PRN EPS  haloperidol     Tablet 5 milliGRAM(s) Oral every 6 hours PRN agitation  haloperidol    Injectable 5 milliGRAM(s) IntraMuscular once PRN agitaion  ibuprofen  Tablet. 600 milliGRAM(s) Oral every 8 hours PRN Severe Pain (7 - 10)  loperamide 2 milliGRAM(s) Oral every 4 hours PRN GI  LORazepam     Tablet 2 milliGRAM(s) Oral every 6 hours PRN agitation/anxiety  LORazepam   Injectable 2 milliGRAM(s) IntraMuscular once PRN agitation  ondansetron    Tablet 4 milliGRAM(s) Oral every 6 hours PRN N/V  simethicone 80 milliGRAM(s) Chew every 6 hours PRN Gas  
MEDICATIONS  (STANDING):  lithium CR (ESKALITH-CR) 1350 milliGRAM(s) Oral at bedtime  OLANZapine 10 milliGRAM(s) Oral at bedtime  OLANZapine 15 milliGRAM(s) Oral daily  pantoprazole    Tablet 40 milliGRAM(s) Oral before breakfast    MEDICATIONS  (PRN):  acetaminophen   Tablet .. 650 milliGRAM(s) Oral every 6 hours PRN Mild Pain (1 - 3), Moderate Pain (4 - 6)  ALBUTerol    90 MICROgram(s) HFA Inhaler 2 Puff(s) Inhalation every 6 hours PRN asthma attack  calcium carbonate    500 mG (Tums) Chewable 1 Tablet(s) Chew every 6 hours PRN GI  diphenhydrAMINE 50 milliGRAM(s) Oral every 6 hours PRN agitation  diphenhydrAMINE   Injectable 50 milliGRAM(s) IntraMuscular once PRN EPS  diphenhydrAMINE   Injectable 50 milliGRAM(s) IntraMuscular once PRN EPS  haloperidol     Tablet 5 milliGRAM(s) Oral every 6 hours PRN agitation  haloperidol    Injectable 5 milliGRAM(s) IntraMuscular once PRN agitaion  haloperidol    Injectable 5 milliGRAM(s) IntraMuscular once PRN agitaion  ibuprofen  Tablet. 600 milliGRAM(s) Oral every 8 hours PRN Severe Pain (7 - 10)  lactase 1 Tablet(s) Oral three times a day with meals PRN before dairy products  loperamide 2 milliGRAM(s) Oral every 4 hours PRN GI  LORazepam     Tablet 2 milliGRAM(s) Oral every 6 hours PRN agiation/anxiety  LORazepam   Injectable 2 milliGRAM(s) IntraMuscular once PRN agitation  ondansetron    Tablet 4 milliGRAM(s) Oral every 6 hours PRN N/V  simethicone 80 milliGRAM(s) Chew every 6 hours PRN Gas  sodium chloride 0.65% Nasal 1 Spray(s) Both Nostrils every 12 hours PRN Nasal Congestion  
MEDICATIONS  (STANDING):  lithium CR (ESKALITH-CR) 1350 milliGRAM(s) Oral at bedtime  OLANZapine 10 milliGRAM(s) Oral at bedtime  OLANZapine 15 milliGRAM(s) Oral daily  pantoprazole    Tablet 40 milliGRAM(s) Oral before breakfast    MEDICATIONS  (PRN):  acetaminophen   Tablet .. 650 milliGRAM(s) Oral every 6 hours PRN Mild Pain (1 - 3), Moderate Pain (4 - 6)  ALBUTerol    90 MICROgram(s) HFA Inhaler 2 Puff(s) Inhalation every 6 hours PRN asthma attack  calcium carbonate    500 mG (Tums) Chewable 1 Tablet(s) Chew every 6 hours PRN GI  diphenhydrAMINE 50 milliGRAM(s) Oral every 6 hours PRN agitation  diphenhydrAMINE   Injectable 50 milliGRAM(s) IntraMuscular once PRN EPS  haloperidol     Tablet 5 milliGRAM(s) Oral every 6 hours PRN agitation  haloperidol    Injectable 5 milliGRAM(s) IntraMuscular once PRN agitaion  ibuprofen  Tablet. 600 milliGRAM(s) Oral every 8 hours PRN Severe Pain (7 - 10)  lactase 1 Tablet(s) Oral three times a day with meals PRN before dairy products  loperamide 2 milliGRAM(s) Oral every 4 hours PRN GI  LORazepam     Tablet 2 milliGRAM(s) Oral every 6 hours PRN anxiety/agitation  LORazepam   Injectable 2 milliGRAM(s) IntraMuscular once PRN agitation  ondansetron    Tablet 4 milliGRAM(s) Oral every 6 hours PRN N/V  simethicone 80 milliGRAM(s) Chew every 6 hours PRN Gas  
MEDICATIONS  (STANDING):  lithium CR (ESKALITH-CR) 1800 milliGRAM(s) Oral at bedtime  OLANZapine 10 milliGRAM(s) Oral two times a day  pantoprazole    Tablet 40 milliGRAM(s) Oral before breakfast    MEDICATIONS  (PRN):  acetaminophen   Tablet .. 650 milliGRAM(s) Oral every 6 hours PRN Mild Pain (1 - 3), Moderate Pain (4 - 6)  ALBUTerol    90 MICROgram(s) HFA Inhaler 2 Puff(s) Inhalation every 6 hours PRN asthma attack  diphenhydrAMINE 50 milliGRAM(s) Oral every 6 hours PRN agitation  diphenhydrAMINE   Injectable 50 milliGRAM(s) IntraMuscular once PRN EPS  haloperidol     Tablet 5 milliGRAM(s) Oral every 6 hours PRN agitation  haloperidol    Injectable 5 milliGRAM(s) IntraMuscular once PRN agitaion  ibuprofen  Tablet. 600 milliGRAM(s) Oral every 8 hours PRN Severe Pain (7 - 10)  loperamide 2 milliGRAM(s) Oral every 4 hours PRN GI  LORazepam     Tablet 2 milliGRAM(s) Oral every 6 hours PRN agitation/anxiety  LORazepam   Injectable 2 milliGRAM(s) IntraMuscular once PRN agitation  ondansetron    Tablet 4 milliGRAM(s) Oral every 6 hours PRN N/V  
MEDICATIONS  (STANDING):  lithium CR (ESKALITH-CR) 1350 milliGRAM(s) Oral at bedtime  OLANZapine 10 milliGRAM(s) Oral at bedtime  OLANZapine 15 milliGRAM(s) Oral daily  pantoprazole    Tablet 40 milliGRAM(s) Oral before breakfast    MEDICATIONS  (PRN):  acetaminophen   Tablet .. 650 milliGRAM(s) Oral every 6 hours PRN Mild Pain (1 - 3), Moderate Pain (4 - 6)  ALBUTerol    90 MICROgram(s) HFA Inhaler 2 Puff(s) Inhalation every 6 hours PRN asthma attack  calcium carbonate    500 mG (Tums) Chewable 1 Tablet(s) Chew every 6 hours PRN GI  diphenhydrAMINE 50 milliGRAM(s) Oral every 6 hours PRN agitation  diphenhydrAMINE   Injectable 50 milliGRAM(s) IntraMuscular once PRN EPS  haloperidol     Tablet 5 milliGRAM(s) Oral every 6 hours PRN agitation  haloperidol    Injectable 5 milliGRAM(s) IntraMuscular once PRN agitaion  ibuprofen  Tablet. 600 milliGRAM(s) Oral every 8 hours PRN Severe Pain (7 - 10)  lactase 1 Tablet(s) Oral three times a day with meals PRN before dairy products  loperamide 2 milliGRAM(s) Oral every 4 hours PRN GI  LORazepam     Tablet 2 milliGRAM(s) Oral every 6 hours PRN anxiety/agitation  LORazepam   Injectable 2 milliGRAM(s) IntraMuscular once PRN agitation  ondansetron    Tablet 4 milliGRAM(s) Oral every 6 hours PRN N/V  simethicone 80 milliGRAM(s) Chew every 6 hours PRN Gas  
MEDICATIONS  (STANDING):  lithium CR (ESKALITH-CR) 1350 milliGRAM(s) Oral at bedtime  OLANZapine 10 milliGRAM(s) Oral at bedtime  OLANZapine 15 milliGRAM(s) Oral daily  pantoprazole    Tablet 40 milliGRAM(s) Oral before breakfast    MEDICATIONS  (PRN):  acetaminophen   Tablet .. 650 milliGRAM(s) Oral every 6 hours PRN Mild Pain (1 - 3), Moderate Pain (4 - 6)  ALBUTerol    90 MICROgram(s) HFA Inhaler 2 Puff(s) Inhalation every 6 hours PRN asthma attack  calcium carbonate    500 mG (Tums) Chewable 1 Tablet(s) Chew every 6 hours PRN GI  diphenhydrAMINE 50 milliGRAM(s) Oral every 6 hours PRN agitation  diphenhydrAMINE   Injectable 50 milliGRAM(s) IntraMuscular once PRN EPS  diphenhydrAMINE   Injectable 50 milliGRAM(s) IntraMuscular once PRN EPS  haloperidol     Tablet 5 milliGRAM(s) Oral every 6 hours PRN agitation  haloperidol    Injectable 5 milliGRAM(s) IntraMuscular once PRN agitaion  haloperidol    Injectable 5 milliGRAM(s) IntraMuscular once PRN agitaion  ibuprofen  Tablet. 600 milliGRAM(s) Oral every 8 hours PRN Severe Pain (7 - 10)  lactase 1 Tablet(s) Oral three times a day with meals PRN before dairy products  loperamide 2 milliGRAM(s) Oral every 4 hours PRN GI  LORazepam     Tablet 2 milliGRAM(s) Oral every 6 hours PRN anxiety/agitation  LORazepam   Injectable 2 milliGRAM(s) IntraMuscular once PRN agitation  LORazepam   Injectable 2 milliGRAM(s) IntraMuscular once PRN agitation  ondansetron    Tablet 4 milliGRAM(s) Oral every 6 hours PRN N/V  simethicone 80 milliGRAM(s) Chew every 6 hours PRN Gas  
MEDICATIONS  (STANDING):  lithium CR (ESKALITH-CR) 1350 milliGRAM(s) Oral at bedtime  OLANZapine 10 milliGRAM(s) Oral at bedtime  OLANZapine 15 milliGRAM(s) Oral daily  pantoprazole    Tablet 40 milliGRAM(s) Oral before breakfast    MEDICATIONS  (PRN):  acetaminophen   Tablet .. 650 milliGRAM(s) Oral every 6 hours PRN Mild Pain (1 - 3), Moderate Pain (4 - 6)  ALBUTerol    90 MICROgram(s) HFA Inhaler 2 Puff(s) Inhalation every 6 hours PRN asthma attack  calcium carbonate    500 mG (Tums) Chewable 1 Tablet(s) Chew every 6 hours PRN GI  diphenhydrAMINE 50 milliGRAM(s) Oral every 6 hours PRN agitation  diphenhydrAMINE   Injectable 50 milliGRAM(s) IntraMuscular once PRN EPS  haloperidol     Tablet 5 milliGRAM(s) Oral every 6 hours PRN agitation  haloperidol    Injectable 5 milliGRAM(s) IntraMuscular once PRN agitaion  ibuprofen  Tablet. 600 milliGRAM(s) Oral every 8 hours PRN Severe Pain (7 - 10)  lactase 1 Tablet(s) Oral three times a day with meals PRN before dairy products  loperamide 2 milliGRAM(s) Oral every 4 hours PRN GI  LORazepam     Tablet 2 milliGRAM(s) Oral every 6 hours PRN anxiety/agitation  LORazepam   Injectable 2 milliGRAM(s) IntraMuscular once PRN agitation  ondansetron    Tablet 4 milliGRAM(s) Oral every 6 hours PRN N/V  simethicone 80 milliGRAM(s) Chew every 6 hours PRN Gas  
MEDICATIONS  (STANDING):  lithium CR (ESKALITH-CR) 1800 milliGRAM(s) Oral at bedtime  OLANZapine 10 milliGRAM(s) Oral two times a day  pantoprazole    Tablet 40 milliGRAM(s) Oral before breakfast    MEDICATIONS  (PRN):  acetaminophen   Tablet .. 650 milliGRAM(s) Oral every 6 hours PRN Mild Pain (1 - 3), Moderate Pain (4 - 6)  ALBUTerol    90 MICROgram(s) HFA Inhaler 2 Puff(s) Inhalation every 6 hours PRN asthma attack  diphenhydrAMINE 50 milliGRAM(s) Oral every 6 hours PRN agitation  diphenhydrAMINE   Injectable 50 milliGRAM(s) IntraMuscular once PRN EPS  haloperidol     Tablet 5 milliGRAM(s) Oral every 6 hours PRN agitation  haloperidol    Injectable 5 milliGRAM(s) IntraMuscular once PRN agitaion  ibuprofen  Tablet. 600 milliGRAM(s) Oral every 8 hours PRN Severe Pain (7 - 10)  loperamide 2 milliGRAM(s) Oral every 4 hours PRN GI  LORazepam     Tablet 2 milliGRAM(s) Oral every 6 hours PRN agitation/anxiety  LORazepam   Injectable 2 milliGRAM(s) IntraMuscular once PRN agitation  ondansetron    Tablet 4 milliGRAM(s) Oral every 6 hours PRN N/V  
MEDICATIONS  (STANDING):  lithium CR (ESKALITH-CR) 1800 milliGRAM(s) Oral at bedtime  OLANZapine 10 milliGRAM(s) Oral two times a day  pantoprazole    Tablet 40 milliGRAM(s) Oral before breakfast    MEDICATIONS  (PRN):  acetaminophen   Tablet .. 650 milliGRAM(s) Oral every 6 hours PRN Mild Pain (1 - 3), Moderate Pain (4 - 6)  ALBUTerol    90 MICROgram(s) HFA Inhaler 2 Puff(s) Inhalation every 6 hours PRN asthma attack  diphenhydrAMINE 50 milliGRAM(s) Oral every 6 hours PRN agitation  diphenhydrAMINE   Injectable 50 milliGRAM(s) IntraMuscular once PRN EPS  haloperidol     Tablet 5 milliGRAM(s) Oral every 6 hours PRN agitation  haloperidol    Injectable 5 milliGRAM(s) IntraMuscular once PRN agitaion  ibuprofen  Tablet. 600 milliGRAM(s) Oral every 8 hours PRN Severe Pain (7 - 10)  loperamide 2 milliGRAM(s) Oral every 4 hours PRN GI  LORazepam     Tablet 2 milliGRAM(s) Oral every 6 hours PRN agitation/anxiety  LORazepam   Injectable 2 milliGRAM(s) IntraMuscular once PRN agitation  ondansetron    Tablet 4 milliGRAM(s) Oral every 6 hours PRN N/V  
MEDICATIONS  (STANDING):  lithium CR (ESKALITH-CR) 1350 milliGRAM(s) Oral at bedtime  OLANZapine 10 milliGRAM(s) Oral at bedtime  OLANZapine 15 milliGRAM(s) Oral daily  pantoprazole    Tablet 40 milliGRAM(s) Oral before breakfast    MEDICATIONS  (PRN):  acetaminophen   Tablet .. 650 milliGRAM(s) Oral every 6 hours PRN Mild Pain (1 - 3), Moderate Pain (4 - 6)  ALBUTerol    90 MICROgram(s) HFA Inhaler 2 Puff(s) Inhalation every 6 hours PRN asthma attack  calcium carbonate    500 mG (Tums) Chewable 1 Tablet(s) Chew every 6 hours PRN GI  diphenhydrAMINE 50 milliGRAM(s) Oral every 6 hours PRN agitation  diphenhydrAMINE   Injectable 50 milliGRAM(s) IntraMuscular once PRN EPS  haloperidol     Tablet 5 milliGRAM(s) Oral every 6 hours PRN agitation  haloperidol    Injectable 5 milliGRAM(s) IntraMuscular once PRN agitaion  ibuprofen  Tablet. 600 milliGRAM(s) Oral every 8 hours PRN Severe Pain (7 - 10)  loperamide 2 milliGRAM(s) Oral every 4 hours PRN GI  LORazepam     Tablet 2 milliGRAM(s) Oral every 6 hours PRN agitation/anxiety  LORazepam   Injectable 2 milliGRAM(s) IntraMuscular once PRN agitation  ondansetron    Tablet 4 milliGRAM(s) Oral every 6 hours PRN N/V  simethicone 80 milliGRAM(s) Chew every 6 hours PRN Gas  
MEDICATIONS  (STANDING):  lithium CR (ESKALITH-CR) 1350 milliGRAM(s) Oral at bedtime  OLANZapine 10 milliGRAM(s) Oral at bedtime  OLANZapine 15 milliGRAM(s) Oral daily  pantoprazole    Tablet 40 milliGRAM(s) Oral before breakfast    MEDICATIONS  (PRN):  acetaminophen   Tablet .. 650 milliGRAM(s) Oral every 6 hours PRN Mild Pain (1 - 3), Moderate Pain (4 - 6)  ALBUTerol    90 MICROgram(s) HFA Inhaler 2 Puff(s) Inhalation every 6 hours PRN asthma attack  calcium carbonate    500 mG (Tums) Chewable 1 Tablet(s) Chew every 6 hours PRN GI  diphenhydrAMINE 50 milliGRAM(s) Oral every 6 hours PRN agitation  diphenhydrAMINE   Injectable 50 milliGRAM(s) IntraMuscular once PRN EPS  haloperidol     Tablet 5 milliGRAM(s) Oral every 6 hours PRN agitation  haloperidol    Injectable 5 milliGRAM(s) IntraMuscular once PRN agitaion  ibuprofen  Tablet. 600 milliGRAM(s) Oral every 8 hours PRN Severe Pain (7 - 10)  loperamide 2 milliGRAM(s) Oral every 4 hours PRN GI  LORazepam     Tablet 2 milliGRAM(s) Oral every 6 hours PRN agitation/anxiety  LORazepam   Injectable 2 milliGRAM(s) IntraMuscular once PRN agitation  ondansetron    Tablet 4 milliGRAM(s) Oral every 6 hours PRN N/V  simethicone 80 milliGRAM(s) Chew every 6 hours PRN Gas  
MEDICATIONS  (STANDING):  lithium CR (ESKALITH-CR) 1350 milliGRAM(s) Oral at bedtime  OLANZapine 10 milliGRAM(s) Oral at bedtime  OLANZapine 15 milliGRAM(s) Oral daily  pantoprazole    Tablet 40 milliGRAM(s) Oral before breakfast    MEDICATIONS  (PRN):  acetaminophen   Tablet .. 650 milliGRAM(s) Oral every 6 hours PRN Mild Pain (1 - 3), Moderate Pain (4 - 6)  ALBUTerol    90 MICROgram(s) HFA Inhaler 2 Puff(s) Inhalation every 6 hours PRN asthma attack  calcium carbonate    500 mG (Tums) Chewable 1 Tablet(s) Chew every 6 hours PRN GI  diphenhydrAMINE 50 milliGRAM(s) Oral every 6 hours PRN agitation  diphenhydrAMINE   Injectable 50 milliGRAM(s) IntraMuscular once PRN EPS  haloperidol     Tablet 5 milliGRAM(s) Oral every 6 hours PRN agitation  haloperidol    Injectable 5 milliGRAM(s) IntraMuscular once PRN agitaion  ibuprofen  Tablet. 600 milliGRAM(s) Oral every 8 hours PRN Severe Pain (7 - 10)  lactase 1 Tablet(s) Oral three times a day with meals PRN before dairy products  loperamide 2 milliGRAM(s) Oral every 4 hours PRN GI  LORazepam     Tablet 2 milliGRAM(s) Oral every 6 hours PRN anxiety/agitation  LORazepam   Injectable 2 milliGRAM(s) IntraMuscular once PRN Agitation  ondansetron    Tablet 4 milliGRAM(s) Oral every 6 hours PRN N/V  simethicone 80 milliGRAM(s) Chew every 6 hours PRN Gas  
MEDICATIONS  (STANDING):  lithium CR (ESKALITH-CR) 1800 milliGRAM(s) Oral at bedtime  OLANZapine 10 milliGRAM(s) Oral two times a day  PPD  5 Tuberculin Unit(s) Injectable 5 Unit(s) IntraDermal once    MEDICATIONS  (PRN):  ALBUTerol    90 MICROgram(s) HFA Inhaler 2 Puff(s) Inhalation every 6 hours PRN asthma attack  diphenhydrAMINE 50 milliGRAM(s) Oral every 6 hours PRN agitation  diphenhydrAMINE   Injectable 50 milliGRAM(s) IntraMuscular once PRN EPS  haloperidol     Tablet 5 milliGRAM(s) Oral every 6 hours PRN agitation  haloperidol    Injectable 5 milliGRAM(s) IntraMuscular once PRN agitaion  loperamide 2 milliGRAM(s) Oral every 4 hours PRN GI  LORazepam     Tablet 2 milliGRAM(s) Oral every 6 hours PRN agitation  LORazepam   Injectable 2 milliGRAM(s) IntraMuscular once PRN agitation  
MEDICATIONS  (STANDING):  lithium CR (ESKALITH-CR) 1800 milliGRAM(s) Oral at bedtime  OLANZapine 10 milliGRAM(s) Oral two times a day  pantoprazole    Tablet 40 milliGRAM(s) Oral before breakfast    MEDICATIONS  (PRN):  acetaminophen   Tablet .. 650 milliGRAM(s) Oral every 6 hours PRN Mild Pain (1 - 3), Moderate Pain (4 - 6)  ALBUTerol    90 MICROgram(s) HFA Inhaler 2 Puff(s) Inhalation every 6 hours PRN asthma attack  diphenhydrAMINE 50 milliGRAM(s) Oral every 6 hours PRN agitation  diphenhydrAMINE   Injectable 50 milliGRAM(s) IntraMuscular once PRN EPS  haloperidol     Tablet 5 milliGRAM(s) Oral every 6 hours PRN agitation  haloperidol    Injectable 5 milliGRAM(s) IntraMuscular once PRN agitaion  ibuprofen  Tablet. 600 milliGRAM(s) Oral every 8 hours PRN Severe Pain (7 - 10)  loperamide 2 milliGRAM(s) Oral every 4 hours PRN GI  LORazepam     Tablet 2 milliGRAM(s) Oral every 6 hours PRN agitation  LORazepam   Injectable 2 milliGRAM(s) IntraMuscular once PRN agitation  ondansetron    Tablet 4 milliGRAM(s) Oral every 6 hours PRN N/V  
MEDICATIONS  (STANDING):  lithium CR (ESKALITH-CR) 1350 milliGRAM(s) Oral at bedtime  OLANZapine 10 milliGRAM(s) Oral at bedtime  OLANZapine 15 milliGRAM(s) Oral daily  pantoprazole    Tablet 40 milliGRAM(s) Oral before breakfast    MEDICATIONS  (PRN):  acetaminophen   Tablet .. 650 milliGRAM(s) Oral every 6 hours PRN Mild Pain (1 - 3), Moderate Pain (4 - 6)  ALBUTerol    90 MICROgram(s) HFA Inhaler 2 Puff(s) Inhalation every 6 hours PRN asthma attack  diphenhydrAMINE 50 milliGRAM(s) Oral every 6 hours PRN agitation  diphenhydrAMINE   Injectable 50 milliGRAM(s) IntraMuscular once PRN EPS  haloperidol     Tablet 5 milliGRAM(s) Oral every 6 hours PRN agitation  haloperidol    Injectable 5 milliGRAM(s) IntraMuscular once PRN agitaion  ibuprofen  Tablet. 600 milliGRAM(s) Oral every 8 hours PRN Severe Pain (7 - 10)  loperamide 2 milliGRAM(s) Oral every 4 hours PRN GI  LORazepam     Tablet 2 milliGRAM(s) Oral every 6 hours PRN agitation/anxiety  LORazepam   Injectable 2 milliGRAM(s) IntraMuscular once PRN aggression  ondansetron    Tablet 4 milliGRAM(s) Oral every 6 hours PRN N/V  simethicone 80 milliGRAM(s) Chew every 6 hours PRN Gas  
MEDICATIONS  (STANDING):  lithium CR (ESKALITH-CR) 1350 milliGRAM(s) Oral at bedtime  OLANZapine 10 milliGRAM(s) Oral at bedtime  OLANZapine 15 milliGRAM(s) Oral daily  pantoprazole    Tablet 40 milliGRAM(s) Oral before breakfast    MEDICATIONS  (PRN):  acetaminophen   Tablet .. 650 milliGRAM(s) Oral every 6 hours PRN Mild Pain (1 - 3), Moderate Pain (4 - 6)  ALBUTerol    90 MICROgram(s) HFA Inhaler 2 Puff(s) Inhalation every 6 hours PRN asthma attack  calcium carbonate    500 mG (Tums) Chewable 1 Tablet(s) Chew every 6 hours PRN GI  diphenhydrAMINE 50 milliGRAM(s) Oral every 6 hours PRN agitation  diphenhydrAMINE   Injectable 50 milliGRAM(s) IntraMuscular once PRN EPS  haloperidol     Tablet 5 milliGRAM(s) Oral every 6 hours PRN agitation  haloperidol    Injectable 5 milliGRAM(s) IntraMuscular once PRN agitaion  ibuprofen  Tablet. 600 milliGRAM(s) Oral every 8 hours PRN Severe Pain (7 - 10)  loperamide 2 milliGRAM(s) Oral every 4 hours PRN GI  LORazepam     Tablet 2 milliGRAM(s) Oral every 6 hours PRN agitation/anxiety  LORazepam   Injectable 2 milliGRAM(s) IntraMuscular once PRN agitation  ondansetron    Tablet 4 milliGRAM(s) Oral every 6 hours PRN N/V  simethicone 80 milliGRAM(s) Chew every 6 hours PRN Gas  
MEDICATIONS  (STANDING):  lithium CR (ESKALITH-CR) 1350 milliGRAM(s) Oral at bedtime  OLANZapine 10 milliGRAM(s) Oral at bedtime  OLANZapine 15 milliGRAM(s) Oral daily  pantoprazole    Tablet 40 milliGRAM(s) Oral before breakfast    MEDICATIONS  (PRN):  acetaminophen   Tablet .. 650 milliGRAM(s) Oral every 6 hours PRN Mild Pain (1 - 3), Moderate Pain (4 - 6)  ALBUTerol    90 MICROgram(s) HFA Inhaler 2 Puff(s) Inhalation every 6 hours PRN asthma attack  diphenhydrAMINE 50 milliGRAM(s) Oral every 6 hours PRN agitation  diphenhydrAMINE   Injectable 50 milliGRAM(s) IntraMuscular once PRN EPS  haloperidol     Tablet 5 milliGRAM(s) Oral every 6 hours PRN agitation  haloperidol    Injectable 5 milliGRAM(s) IntraMuscular once PRN agitaion  ibuprofen  Tablet. 600 milliGRAM(s) Oral every 8 hours PRN Severe Pain (7 - 10)  loperamide 2 milliGRAM(s) Oral every 4 hours PRN GI  LORazepam     Tablet 2 milliGRAM(s) Oral every 6 hours PRN agitation/anxiety  LORazepam   Injectable 2 milliGRAM(s) IntraMuscular once PRN agitation  ondansetron    Tablet 4 milliGRAM(s) Oral every 6 hours PRN N/V  simethicone 80 milliGRAM(s) Chew every 6 hours PRN Gas  
MEDICATIONS  (STANDING):  lithium CR (ESKALITH-CR) 1350 milliGRAM(s) Oral at bedtime  OLANZapine 10 milliGRAM(s) Oral at bedtime  OLANZapine 15 milliGRAM(s) Oral daily  pantoprazole    Tablet 40 milliGRAM(s) Oral before breakfast    MEDICATIONS  (PRN):  acetaminophen   Tablet .. 650 milliGRAM(s) Oral every 6 hours PRN Mild Pain (1 - 3), Moderate Pain (4 - 6)  ALBUTerol    90 MICROgram(s) HFA Inhaler 2 Puff(s) Inhalation every 6 hours PRN asthma attack  calcium carbonate    500 mG (Tums) Chewable 1 Tablet(s) Chew every 6 hours PRN GI  diphenhydrAMINE 50 milliGRAM(s) Oral every 6 hours PRN agitation  diphenhydrAMINE   Injectable 50 milliGRAM(s) IntraMuscular once PRN EPS  haloperidol     Tablet 5 milliGRAM(s) Oral every 6 hours PRN agitation  haloperidol    Injectable 5 milliGRAM(s) IntraMuscular once PRN agitaion  ibuprofen  Tablet. 600 milliGRAM(s) Oral every 8 hours PRN Severe Pain (7 - 10)  loperamide 2 milliGRAM(s) Oral every 4 hours PRN GI  LORazepam     Tablet 2 milliGRAM(s) Oral every 6 hours PRN agitation/anxiety  LORazepam   Injectable 2 milliGRAM(s) IntraMuscular once PRN agitation  ondansetron    Tablet 4 milliGRAM(s) Oral every 6 hours PRN N/V  simethicone 80 milliGRAM(s) Chew every 6 hours PRN Gas  
MEDICATIONS  (STANDING):  lithium CR (ESKALITH-CR) 1350 milliGRAM(s) Oral at bedtime  OLANZapine 10 milliGRAM(s) Oral at bedtime  OLANZapine 15 milliGRAM(s) Oral daily  pantoprazole    Tablet 40 milliGRAM(s) Oral before breakfast    MEDICATIONS  (PRN):  acetaminophen   Tablet .. 650 milliGRAM(s) Oral every 6 hours PRN Mild Pain (1 - 3), Moderate Pain (4 - 6)  ALBUTerol    90 MICROgram(s) HFA Inhaler 2 Puff(s) Inhalation every 6 hours PRN asthma attack  diphenhydrAMINE 50 milliGRAM(s) Oral every 6 hours PRN agitation  diphenhydrAMINE   Injectable 50 milliGRAM(s) IntraMuscular once PRN EPS  haloperidol     Tablet 5 milliGRAM(s) Oral every 6 hours PRN agitation  haloperidol    Injectable 5 milliGRAM(s) IntraMuscular once PRN agitaion  ibuprofen  Tablet. 600 milliGRAM(s) Oral every 8 hours PRN Severe Pain (7 - 10)  loperamide 2 milliGRAM(s) Oral every 4 hours PRN GI  LORazepam     Tablet 2 milliGRAM(s) Oral every 6 hours PRN agitation/anxiety  LORazepam   Injectable 2 milliGRAM(s) IntraMuscular once PRN aggression  ondansetron    Tablet 4 milliGRAM(s) Oral every 6 hours PRN N/V  
MEDICATIONS  (STANDING):  lithium CR (ESKALITH-CR) 1800 milliGRAM(s) Oral at bedtime  OLANZapine 10 milliGRAM(s) Oral two times a day  pantoprazole    Tablet 40 milliGRAM(s) Oral before breakfast    MEDICATIONS  (PRN):  acetaminophen   Tablet .. 650 milliGRAM(s) Oral every 6 hours PRN Mild Pain (1 - 3), Moderate Pain (4 - 6)  ALBUTerol    90 MICROgram(s) HFA Inhaler 2 Puff(s) Inhalation every 6 hours PRN asthma attack  diphenhydrAMINE 50 milliGRAM(s) Oral every 6 hours PRN agitation  diphenhydrAMINE   Injectable 50 milliGRAM(s) IntraMuscular once PRN EPS  haloperidol     Tablet 5 milliGRAM(s) Oral every 6 hours PRN agitation  haloperidol    Injectable 5 milliGRAM(s) IntraMuscular once PRN agitaion  ibuprofen  Tablet. 600 milliGRAM(s) Oral every 8 hours PRN Severe Pain (7 - 10)  loperamide 2 milliGRAM(s) Oral every 4 hours PRN GI  LORazepam     Tablet 2 milliGRAM(s) Oral every 6 hours PRN agitation/anxiety  LORazepam   Injectable 2 milliGRAM(s) IntraMuscular once PRN agitation  ondansetron    Tablet 4 milliGRAM(s) Oral every 6 hours PRN N/V  
MEDICATIONS  (STANDING):  lithium CR (ESKALITH-CR) 1350 milliGRAM(s) Oral at bedtime  OLANZapine 10 milliGRAM(s) Oral at bedtime  OLANZapine 15 milliGRAM(s) Oral daily  pantoprazole    Tablet 40 milliGRAM(s) Oral before breakfast    MEDICATIONS  (PRN):  acetaminophen   Tablet .. 650 milliGRAM(s) Oral every 6 hours PRN Mild Pain (1 - 3), Moderate Pain (4 - 6)  ALBUTerol    90 MICROgram(s) HFA Inhaler 2 Puff(s) Inhalation every 6 hours PRN asthma attack  diphenhydrAMINE 50 milliGRAM(s) Oral every 6 hours PRN agitation  diphenhydrAMINE   Injectable 50 milliGRAM(s) IntraMuscular once PRN EPS  haloperidol     Tablet 5 milliGRAM(s) Oral every 6 hours PRN agitation  haloperidol    Injectable 5 milliGRAM(s) IntraMuscular once PRN agitaion  ibuprofen  Tablet. 600 milliGRAM(s) Oral every 8 hours PRN Severe Pain (7 - 10)  loperamide 2 milliGRAM(s) Oral every 4 hours PRN GI  LORazepam     Tablet 2 milliGRAM(s) Oral every 6 hours PRN agitation/anxiety  LORazepam   Injectable 2 milliGRAM(s) IntraMuscular once PRN aggression  ondansetron    Tablet 4 milliGRAM(s) Oral every 6 hours PRN N/V  simethicone 80 milliGRAM(s) Chew every 6 hours PRN Gas  
MEDICATIONS  (STANDING):  lithium CR (ESKALITH-CR) 1350 milliGRAM(s) Oral at bedtime  OLANZapine 15 milliGRAM(s) Oral daily  OLANZapine 10 milliGRAM(s) Oral at bedtime  pantoprazole    Tablet 40 milliGRAM(s) Oral before breakfast    MEDICATIONS  (PRN):  acetaminophen   Tablet .. 650 milliGRAM(s) Oral every 6 hours PRN Mild Pain (1 - 3), Moderate Pain (4 - 6)  ALBUTerol    90 MICROgram(s) HFA Inhaler 2 Puff(s) Inhalation every 6 hours PRN asthma attack  calcium carbonate    500 mG (Tums) Chewable 1 Tablet(s) Chew every 6 hours PRN GI  diphenhydrAMINE 50 milliGRAM(s) Oral every 6 hours PRN agitation  diphenhydrAMINE   Injectable 50 milliGRAM(s) IntraMuscular once PRN EPS  haloperidol     Tablet 5 milliGRAM(s) Oral every 6 hours PRN agitation  haloperidol    Injectable 5 milliGRAM(s) IntraMuscular once PRN agitaion  ibuprofen  Tablet. 600 milliGRAM(s) Oral every 8 hours PRN Severe Pain (7 - 10)  lactase 1 Tablet(s) Oral three times a day with meals PRN before dairy products  loperamide 2 milliGRAM(s) Oral every 4 hours PRN GI  LORazepam     Tablet 2 milliGRAM(s) Oral every 6 hours PRN anxiety/agitation  LORazepam   Injectable 2 milliGRAM(s) IntraMuscular once PRN Agitation  ondansetron    Tablet 4 milliGRAM(s) Oral every 6 hours PRN N/V  simethicone 80 milliGRAM(s) Chew every 6 hours PRN Gas  
MEDICATIONS  (STANDING):  lithium CR (ESKALITH-CR) 1350 milliGRAM(s) Oral at bedtime  OLANZapine 10 milliGRAM(s) Oral at bedtime  OLANZapine 15 milliGRAM(s) Oral daily  pantoprazole    Tablet 40 milliGRAM(s) Oral before breakfast    MEDICATIONS  (PRN):  acetaminophen   Tablet .. 650 milliGRAM(s) Oral every 6 hours PRN Mild Pain (1 - 3), Moderate Pain (4 - 6)  ALBUTerol    90 MICROgram(s) HFA Inhaler 2 Puff(s) Inhalation every 6 hours PRN asthma attack  calcium carbonate    500 mG (Tums) Chewable 1 Tablet(s) Chew every 6 hours PRN GI  diphenhydrAMINE 50 milliGRAM(s) Oral every 6 hours PRN agitation  diphenhydrAMINE   Injectable 50 milliGRAM(s) IntraMuscular once PRN EPS  diphenhydrAMINE   Injectable 50 milliGRAM(s) IntraMuscular once PRN EPS  haloperidol     Tablet 5 milliGRAM(s) Oral every 6 hours PRN agitation  haloperidol    Injectable 5 milliGRAM(s) IntraMuscular once PRN agitaion  haloperidol    Injectable 5 milliGRAM(s) IntraMuscular once PRN agitaion  ibuprofen  Tablet. 600 milliGRAM(s) Oral every 8 hours PRN Severe Pain (7 - 10)  lactase 1 Tablet(s) Oral three times a day with meals PRN before dairy products  loperamide 2 milliGRAM(s) Oral every 4 hours PRN GI  LORazepam     Tablet 2 milliGRAM(s) Oral every 6 hours PRN agiation/anxiety  LORazepam   Injectable 2 milliGRAM(s) IntraMuscular once PRN agitation  ondansetron    Tablet 4 milliGRAM(s) Oral every 6 hours PRN N/V  simethicone 80 milliGRAM(s) Chew every 6 hours PRN Gas  
MEDICATIONS  (STANDING):  lithium CR (ESKALITH-CR) 1350 milliGRAM(s) Oral at bedtime  OLANZapine 10 milliGRAM(s) Oral at bedtime  OLANZapine 15 milliGRAM(s) Oral daily  pantoprazole    Tablet 40 milliGRAM(s) Oral before breakfast    MEDICATIONS  (PRN):  acetaminophen   Tablet .. 650 milliGRAM(s) Oral every 6 hours PRN Mild Pain (1 - 3), Moderate Pain (4 - 6)  ALBUTerol    90 MICROgram(s) HFA Inhaler 2 Puff(s) Inhalation every 6 hours PRN asthma attack  calcium carbonate    500 mG (Tums) Chewable 1 Tablet(s) Chew every 6 hours PRN GI  diphenhydrAMINE 50 milliGRAM(s) Oral every 6 hours PRN agitation  diphenhydrAMINE   Injectable 50 milliGRAM(s) IntraMuscular once PRN EPS  haloperidol     Tablet 5 milliGRAM(s) Oral every 6 hours PRN agitation  haloperidol    Injectable 5 milliGRAM(s) IntraMuscular once PRN agitaion  ibuprofen  Tablet. 600 milliGRAM(s) Oral every 8 hours PRN Severe Pain (7 - 10)  lactase 1 Tablet(s) Oral three times a day with meals PRN before dairy products  loperamide 2 milliGRAM(s) Oral every 4 hours PRN GI  LORazepam     Tablet 2 milliGRAM(s) Oral every 6 hours PRN anxiety/agitation  LORazepam   Injectable 2 milliGRAM(s) IntraMuscular once PRN agitation  ondansetron    Tablet 4 milliGRAM(s) Oral every 6 hours PRN N/V  simethicone 80 milliGRAM(s) Chew every 6 hours PRN Gas  
MEDICATIONS  (STANDING):  lithium CR (ESKALITH-CR) 1800 milliGRAM(s) Oral at bedtime  OLANZapine 10 milliGRAM(s) Oral at bedtime  OLANZapine 15 milliGRAM(s) Oral daily  pantoprazole    Tablet 40 milliGRAM(s) Oral before breakfast    MEDICATIONS  (PRN):  acetaminophen   Tablet .. 650 milliGRAM(s) Oral every 6 hours PRN Mild Pain (1 - 3), Moderate Pain (4 - 6)  ALBUTerol    90 MICROgram(s) HFA Inhaler 2 Puff(s) Inhalation every 6 hours PRN asthma attack  diphenhydrAMINE 50 milliGRAM(s) Oral every 6 hours PRN agitation  diphenhydrAMINE   Injectable 50 milliGRAM(s) IntraMuscular once PRN EPS  haloperidol     Tablet 5 milliGRAM(s) Oral every 6 hours PRN agitation  haloperidol    Injectable 5 milliGRAM(s) IntraMuscular once PRN agitaion  ibuprofen  Tablet. 600 milliGRAM(s) Oral every 8 hours PRN Severe Pain (7 - 10)  loperamide 2 milliGRAM(s) Oral every 4 hours PRN GI  LORazepam     Tablet 2 milliGRAM(s) Oral every 6 hours PRN anxiety/agitation  LORazepam   Injectable 2 milliGRAM(s) IntraMuscular once PRN agitation  ondansetron    Tablet 4 milliGRAM(s) Oral every 6 hours PRN N/V  
MEDICATIONS  (STANDING):  lithium CR (ESKALITH-CR) 1350 milliGRAM(s) Oral at bedtime  OLANZapine 10 milliGRAM(s) Oral at bedtime  OLANZapine 15 milliGRAM(s) Oral daily  pantoprazole    Tablet 40 milliGRAM(s) Oral before breakfast    MEDICATIONS  (PRN):  acetaminophen   Tablet .. 650 milliGRAM(s) Oral every 6 hours PRN Mild Pain (1 - 3), Moderate Pain (4 - 6)  ALBUTerol    90 MICROgram(s) HFA Inhaler 2 Puff(s) Inhalation every 6 hours PRN asthma attack  diphenhydrAMINE 50 milliGRAM(s) Oral every 6 hours PRN agitation  diphenhydrAMINE   Injectable 50 milliGRAM(s) IntraMuscular once PRN EPS  haloperidol     Tablet 5 milliGRAM(s) Oral every 6 hours PRN agitation  haloperidol    Injectable 5 milliGRAM(s) IntraMuscular once PRN agitaion  ibuprofen  Tablet. 600 milliGRAM(s) Oral every 8 hours PRN Severe Pain (7 - 10)  loperamide 2 milliGRAM(s) Oral every 4 hours PRN GI  LORazepam     Tablet 2 milliGRAM(s) Oral every 6 hours PRN agitation/anxiety  LORazepam   Injectable 2 milliGRAM(s) IntraMuscular once PRN aggression  ondansetron    Tablet 4 milliGRAM(s) Oral every 6 hours PRN N/V  simethicone 80 milliGRAM(s) Chew every 6 hours PRN Gas  
MEDICATIONS  (STANDING):  lithium CR (ESKALITH-CR) 1800 milliGRAM(s) Oral at bedtime  OLANZapine 10 milliGRAM(s) Oral two times a day  pantoprazole    Tablet 40 milliGRAM(s) Oral before breakfast  PPD  5 Tuberculin Unit(s) Injectable 5 Unit(s) IntraDermal once    MEDICATIONS  (PRN):  ALBUTerol    90 MICROgram(s) HFA Inhaler 2 Puff(s) Inhalation every 6 hours PRN asthma attack  diphenhydrAMINE 50 milliGRAM(s) Oral every 6 hours PRN agitation  diphenhydrAMINE   Injectable 50 milliGRAM(s) IntraMuscular once PRN EPS  haloperidol     Tablet 5 milliGRAM(s) Oral every 6 hours PRN agitation  haloperidol    Injectable 5 milliGRAM(s) IntraMuscular once PRN agitaion  loperamide 2 milliGRAM(s) Oral every 4 hours PRN GI  LORazepam     Tablet 2 milliGRAM(s) Oral every 6 hours PRN agitation  LORazepam   Injectable 2 milliGRAM(s) IntraMuscular once PRN agitation  
MEDICATIONS  (STANDING):  lithium CR (ESKALITH-CR) 1800 milliGRAM(s) Oral at bedtime  OLANZapine 10 milliGRAM(s) Oral at bedtime  OLANZapine 15 milliGRAM(s) Oral daily  pantoprazole    Tablet 40 milliGRAM(s) Oral before breakfast    MEDICATIONS  (PRN):  acetaminophen   Tablet .. 650 milliGRAM(s) Oral every 6 hours PRN Mild Pain (1 - 3), Moderate Pain (4 - 6)  ALBUTerol    90 MICROgram(s) HFA Inhaler 2 Puff(s) Inhalation every 6 hours PRN asthma attack  diphenhydrAMINE 50 milliGRAM(s) Oral every 6 hours PRN agitation  diphenhydrAMINE   Injectable 50 milliGRAM(s) IntraMuscular once PRN EPS  haloperidol     Tablet 5 milliGRAM(s) Oral every 6 hours PRN agitation  haloperidol    Injectable 5 milliGRAM(s) IntraMuscular once PRN agitaion  ibuprofen  Tablet. 600 milliGRAM(s) Oral every 8 hours PRN Severe Pain (7 - 10)  loperamide 2 milliGRAM(s) Oral every 4 hours PRN GI  LORazepam     Tablet 2 milliGRAM(s) Oral every 6 hours PRN anxiety/agitation  LORazepam   Injectable 2 milliGRAM(s) IntraMuscular once PRN agitation  ondansetron    Tablet 4 milliGRAM(s) Oral every 6 hours PRN N/V  
MEDICATIONS  (STANDING):  lithium CR (ESKALITH-CR) 1350 milliGRAM(s) Oral at bedtime  OLANZapine 10 milliGRAM(s) Oral at bedtime  OLANZapine 15 milliGRAM(s) Oral daily  pantoprazole    Tablet 40 milliGRAM(s) Oral before breakfast    MEDICATIONS  (PRN):  acetaminophen   Tablet .. 650 milliGRAM(s) Oral every 6 hours PRN Mild Pain (1 - 3), Moderate Pain (4 - 6)  ALBUTerol    90 MICROgram(s) HFA Inhaler 2 Puff(s) Inhalation every 6 hours PRN asthma attack  calcium carbonate    500 mG (Tums) Chewable 1 Tablet(s) Chew every 6 hours PRN GI  diphenhydrAMINE 50 milliGRAM(s) Oral every 6 hours PRN agitation  diphenhydrAMINE   Injectable 50 milliGRAM(s) IntraMuscular once PRN EPS  haloperidol     Tablet 5 milliGRAM(s) Oral every 6 hours PRN agitation  haloperidol    Injectable 5 milliGRAM(s) IntraMuscular once PRN agitaion  ibuprofen  Tablet. 600 milliGRAM(s) Oral every 8 hours PRN Severe Pain (7 - 10)  loperamide 2 milliGRAM(s) Oral every 4 hours PRN GI  LORazepam     Tablet 2 milliGRAM(s) Oral every 6 hours PRN agitation/anxiety  LORazepam   Injectable 2 milliGRAM(s) IntraMuscular once PRN agitation  ondansetron    Tablet 4 milliGRAM(s) Oral every 6 hours PRN N/V  simethicone 80 milliGRAM(s) Chew every 6 hours PRN Gas  
MEDICATIONS  (STANDING):  lithium CR (ESKALITH-CR) 1350 milliGRAM(s) Oral at bedtime  OLANZapine 10 milliGRAM(s) Oral at bedtime  OLANZapine 15 milliGRAM(s) Oral daily  pantoprazole    Tablet 40 milliGRAM(s) Oral before breakfast    MEDICATIONS  (PRN):  acetaminophen   Tablet .. 650 milliGRAM(s) Oral every 6 hours PRN Mild Pain (1 - 3), Moderate Pain (4 - 6)  ALBUTerol    90 MICROgram(s) HFA Inhaler 2 Puff(s) Inhalation every 6 hours PRN asthma attack  calcium carbonate    500 mG (Tums) Chewable 1 Tablet(s) Chew every 6 hours PRN GI  diphenhydrAMINE 50 milliGRAM(s) Oral every 6 hours PRN agitation  diphenhydrAMINE   Injectable 50 milliGRAM(s) IntraMuscular once PRN EPS  diphenhydrAMINE   Injectable 50 milliGRAM(s) IntraMuscular once PRN EPS  haloperidol     Tablet 5 milliGRAM(s) Oral every 6 hours PRN agitation  haloperidol    Injectable 5 milliGRAM(s) IntraMuscular once PRN agitaion  haloperidol    Injectable 5 milliGRAM(s) IntraMuscular once PRN agitaion  ibuprofen  Tablet. 600 milliGRAM(s) Oral every 8 hours PRN Severe Pain (7 - 10)  lactase 1 Tablet(s) Oral three times a day with meals PRN before dairy products  loperamide 2 milliGRAM(s) Oral every 4 hours PRN GI  LORazepam     Tablet 2 milliGRAM(s) Oral every 6 hours PRN anxiety/agitation  LORazepam   Injectable 2 milliGRAM(s) IntraMuscular once PRN agitation  LORazepam   Injectable 2 milliGRAM(s) IntraMuscular once PRN agitation  ondansetron    Tablet 4 milliGRAM(s) Oral every 6 hours PRN N/V  simethicone 80 milliGRAM(s) Chew every 6 hours PRN Gas

## 2021-04-07 NOTE — BH INPATIENT PSYCHIATRY PROGRESS NOTE - NSBHMSEKNOW_PSY_A_CORE
Unable to assess
Normal
Unable to assess
Normal
Unable to assess

## 2021-04-07 NOTE — BH INPATIENT PSYCHIATRY PROGRESS NOTE - NSBHMSEJUDGE_PSY_A_CORE
Poor

## 2021-04-07 NOTE — BH DISCHARGE NOTE NURSING/SOCIAL WORK/PSYCH REHAB - NSDCPRGOAL_PSY_ALL_CORE
Psych rehab staff met with patient in order to discuss progress towards psychiatric rehabilitation goal during the last seven days. Patient has made minimal progress towards goal, as patient has not maintained behavioral control. Patient was observed to be verbally aggressive towards staff and peers during the last seven days and exposed her breasts to staff and peers due to disorganized behavior.     On the unit, patient was visible, disorganized, interacting minimally with peers. During session, patient was calm and cooperative. Patient states she is hopeful in regards to transfer to [facility] hospital and the treatment she will receive. Writer provided support. Patient and writer completed safety planning form.     Patient attended approximately 50% of psych rehab groups. Patient was unable to tolerate session structure most times, however was appropriate and verbally engaged.

## 2021-04-07 NOTE — BH INPATIENT PSYCHIATRY PROGRESS NOTE - NSBHATTESTSEENBY_PSY_A_CORE
NP without Attending Psychiatrist
attending Psychiatrist without NP/Trainee
NP without Attending Psychiatrist

## 2021-04-07 NOTE — BH INPATIENT PSYCHIATRY PROGRESS NOTE - NSBHMSEMOOD_PSY_A_CORE
Normal
Irritable/Angry
Irritable
Irritable
Normal
Irritable
Normal
Depressed/Irritable
Normal
Irritable/Angry
Normal
Irritable
Irritable
Normal
Normal
Irritable
Normal
Irritable
Normal
Anxious/Irritable
Normal
Irritable
Normal
Irritable
Normal
Irritable
Normal

## 2021-04-07 NOTE — BH INPATIENT PSYCHIATRY PROGRESS NOTE - NSTXMEDICDATEEST_PSY_ALL_CORE
14-Jan-2021
24-Feb-2021
18-Feb-2021
18-Feb-2021
10-Mar-2021
13-Jan-2021
13-Jan-2021
18-Feb-2021
14-Jan-2021
18-Feb-2021
18-Feb-2021
10-Mar-2021
14-Jan-2021
10-Mar-2021
13-Jan-2021
14-Jan-2021
14-Jan-2021
17-Feb-2021
14-Jan-2021
13-Jan-2021
14-Jan-2021
13-Jan-2021
18-Feb-2021
14-Jan-2021
18-Jan-2021
13-Jan-2021
18-Feb-2021
10-Mar-2021
10-Mar-2021
13-Jan-2021
30-Mar-2021
18-Feb-2021
10-Mar-2021
18-Feb-2021
14-Jan-2021
13-Jan-2021
14-Jan-2021
18-Feb-2021
18-Feb-2021
10-Mar-2021
13-Jan-2021
18-Jan-2021
14-Jan-2021
14-Jan-2021
18-Jan-2021
14-Jan-2021
10-Mar-2021
17-Feb-2021
24-Feb-2021
10-Mar-2021
18-Feb-2021
03-Mar-2021

## 2021-04-07 NOTE — BH INPATIENT PSYCHIATRY PROGRESS NOTE - NSTXDISORGDATETRGT_PSY_ALL_CORE
03-Feb-2021
03-Mar-2021
03-Mar-2021
17-Mar-2021
24-Feb-2021
10-Mar-2021
10-Feb-2021
31-Mar-2021
31-Mar-2021
17-Feb-2021
31-Mar-2021
03-Feb-2021
17-Feb-2021
17-Mar-2021
24-Mar-2021
03-Mar-2021
10-Feb-2021
24-Mar-2021
10-Mar-2021
24-Feb-2021
10-Mar-2021
17-Feb-2021
07-Apr-2021
07-Apr-2021
24-Feb-2021
24-Mar-2021
17-Mar-2021
03-Feb-2021
24-Feb-2021
10-Feb-2021
10-Feb-2021
24-Mar-2021
31-Mar-2021
24-Mar-2021
07-Apr-2021
03-Mar-2021
03-Feb-2021
24-Mar-2021
07-Apr-2021
24-Feb-2021
03-Mar-2021
24-Feb-2021
03-Feb-2021
17-Feb-2021
17-Mar-2021
07-Apr-2021
06-Apr-2021
10-Mar-2021
10-Mar-2021
17-Mar-2021

## 2021-04-07 NOTE — BH INPATIENT PSYCHIATRY PROGRESS NOTE - NSTXPSYCHODATEEST_PSY_ALL_CORE
17-Feb-2021
03-Mar-2021
13-Jan-2021
16-Jan-2021
13-Jan-2021
16-Jan-2021
13-Jan-2021
24-Feb-2021
03-Mar-2021
13-Jan-2021
16-Jan-2021
30-Mar-2021
24-Feb-2021
03-Mar-2021
16-Jan-2021
03-Mar-2021
13-Jan-2021
13-Jan-2021
16-Jan-2021
24-Feb-2021
16-Jan-2021
24-Feb-2021
30-Mar-2021
16-Jan-2021
30-Mar-2021
03-Mar-2021
13-Jan-2021
03-Mar-2021
16-Jan-2021
13-Jan-2021
13-Jan-2021
03-Mar-2021
13-Jan-2021
03-Mar-2021
03-Mar-2021
30-Mar-2021
17-Feb-2021
17-Feb-2021
13-Jan-2021
17-Feb-2021
03-Mar-2021
30-Mar-2021
13-Jan-2021
30-Mar-2021
17-Feb-2021
17-Feb-2021
13-Jan-2021
24-Feb-2021

## 2021-04-07 NOTE — BH INPATIENT PSYCHIATRY PROGRESS NOTE - NSTXDCOTHRDATEEST_PSY_ALL_CORE
17-Feb-2021
17-Mar-2021
10-Mar-2021
14-Jan-2021
24-Feb-2021
10-Mar-2021
03-Feb-2021
03-Mar-2021
24-Mar-2021
14-Jan-2021
27-Jan-2021
20-Jan-2021
10-Feb-2021
17-Mar-2021
20-Jan-2021
31-Mar-2021
10-Mar-2021
24-Feb-2021
03-Feb-2021
03-Mar-2021
03-Mar-2021
20-Jan-2021
31-Mar-2021
03-Mar-2021
14-Jan-2021
03-Feb-2021
14-Jan-2021
27-Jan-2021
20-Jan-2021
14-Jan-2021
27-Jan-2021
31-Mar-2021
24-Feb-2021
20-Jan-2021
24-Mar-2021
17-Feb-2021
24-Feb-2021
10-Feb-2021
03-Feb-2021
10-Mar-2021
10-Feb-2021
17-Feb-2021
24-Mar-2021
31-Mar-2021
31-Mar-2021
03-Mar-2021
17-Feb-2021
24-Mar-2021
14-Jan-2021
17-Mar-2021
17-Feb-2021
17-Mar-2021
03-Feb-2021
24-Feb-2021
27-Jan-2021
30-Mar-2021
27-Jan-2021
24-Feb-2021
17-Feb-2021
17-Mar-2021
17-Mar-2021

## 2021-04-07 NOTE — BH INPATIENT PSYCHIATRY PROGRESS NOTE - NSTXPSYCHOPROGRES_PSY_ALL_CORE
Improving
No Change
Improving
No Change
Improving
No Change
Improving
No Change
Improving
No Change
No Change
Improving
No Change
Improving
No Change
Improving
Improving
No Change
No Change
Improving

## 2021-04-07 NOTE — BH INPATIENT PSYCHIATRY PROGRESS NOTE - ADDITIONAL DETAILS / COMMENTS
Patient lacks insight into her mental illness, not oriented to situation
Patient lacks insight into her mental illness, not oriented to situation
Patient lacks insight into her mental illness, not oriented to situation  3/12: patient has made some progress, but continues to need treatment interventions and observation
Patient lacks insight into her mental illness, not oriented to situation  3/12: patient has made some progress, but continues to need treatment interventions and observation
Patient presents extremely disorganized, bizarre
She refused medications this morning. 
Patient lacks insight into her mental illness, not oriented to situation  3/12: patient has made some progress, but continues to need treatment interventions and observation
She refused medications this morning. 
Patient lacks insight into her mental illness, not oriented to situation  3/12: patient has made some progress, but continues to need treatment interventions and observation
Patient lacks insight into her mental illness, not oriented to situation
Patient lacks insight into her mental illness, not oriented to situation  3/12: patient has made some progress, but continues to need treatment interventions and observation
Patient lacks insight into her mental illness, not oriented to situation
Patient lacks insight into her mental illness, not oriented to situation
She refused medications this morning. 
Patient lacks insight into her mental illness, not oriented to situation
Patient lacks insight into her mental illness, not oriented to situation  3/12: patient has made some progress, but continues to need treatment interventions and observation
Patient lacks insight into her mental illness, not oriented to situation
Patient lacks insight into her mental illness, not oriented to situation
She refused medications this morning. 
Patient lacks insight into her mental illness, not oriented to situation
Patient lacks insight into her mental illness, not oriented to situation  3/12: patient has made some progress, but continues to need treatment interventions and observation
Patient presents extremely disorganized, bizarre
Patient lacks insight into her mental illness, not oriented to situation
Patient is clearer and more organized. She refused medications this morning. 
Patient presents extremely disorganized, bizarre
Patient lacks insight into her mental illness, not oriented to situation  3/12: patient has made some progress, but continues to need treatment interventions and observation
Patient lacks insight into her mental illness, not oriented to situation
She refused medications this morning. 
Patient lacks insight into her mental illness, not oriented to situation  3/12: patient has made some progress, but continues to need treatment interventions and observation
She refused medications this morning. 
Patient lacks insight into her mental illness, not oriented to situation
Patient lacks insight into her mental illness, not oriented to situation  3/12: patient has made some progress, but continues to need treatment interventions and observation
She refused medications this morning. 
Patient lacks insight into her mental illness, not oriented to situation  3/12: patient has made some progress, but continues to need treatment interventions and observation
Patient lacks insight into her mental illness, not oriented to situation  3/12: patient has made some progress, but continues to need treatment interventions and observation
She refused medications this morning. 
Patient lacks insight into her mental illness, not oriented to situation
Patient lacks insight into her mental illness, not oriented to situation  3/12: patient has made some progress, but continues to need treatment interventions and observation
Patient lacks insight into her mental illness, not oriented to situation
She refused medications this morning. 
Patient lacks insight into her mental illness, not oriented to situation
She refused medications this morning. 
Patient lacks insight into her mental illness, not oriented to situation
Patient lacks insight into her mental illness, not oriented to situation  3/12: patient has made some progress, but continues to need treatment interventions and observation
She refused medications this morning. 
Patient lacks insight into her mental illness, not oriented to situation  3/12: patient has made some progress, but continues to need treatment interventions and observation

## 2021-04-07 NOTE — BH INPATIENT PSYCHIATRY PROGRESS NOTE - NSBHMSEIMPULSE_PSY_A_CORE
Impaired

## 2021-04-07 NOTE — BH INPATIENT PSYCHIATRY PROGRESS NOTE - NSBHMSERECMEM_PSY_A_CORE
Impaired
Unable to assess
Impaired
Unable to assess
Impaired
Unable to assess
Impaired
Unable to assess
Impaired
Impaired
Unable to assess
Unable to assess
Impaired
Unable to assess
Impaired
Unable to assess
Impaired
Unable to assess
Impaired

## 2021-04-07 NOTE — BH INPATIENT PSYCHIATRY PROGRESS NOTE - NS ED [facility] MED ROS PSYCHIATRIC
See HPI

## 2021-04-07 NOTE — BH INPATIENT PSYCHIATRY PROGRESS NOTE - NSBHANTIPSYCHOTIC_PSY_ALL_CORE
Yes...

## 2021-04-07 NOTE — BH INPATIENT PSYCHIATRY PROGRESS NOTE - NSICDXBHSECONDARYDX_PSY_ALL_CORE
Borderline personality disorder   F60.3  Bipolar affective disorder, current episode depressed, current episode severity unspecified   F31.30  
Borderline personality disorder   F60.3  
Borderline personality disorder   F60.3  Bipolar affective disorder, current episode depressed, current episode severity unspecified   F31.30  

## 2021-04-07 NOTE — BH INPATIENT PSYCHIATRY PROGRESS NOTE - NSTXMEDICPROGRES_PSY_ALL_CORE
Improving
No Change
Met - goal discontinued
Improving
No Change
Met - goal discontinued
Improving
Improving
No Change
Improving
No Change
No Change
Improving
Improving
No Change
Met - goal discontinued
Improving
Improving
Met - goal discontinued
No Change
Improving
No Change
Improving
No Change
Improving
Met - goal discontinued
Improving
No Change
Improving
No Change
Met - goal discontinued
Improving
No Change
Improving
Met - goal discontinued
No Change
Improving
No Change
Improving
Improving
No Change
Improving
Met - goal discontinued
Improving
Met - goal discontinued

## 2021-04-07 NOTE — BH INPATIENT PSYCHIATRY PROGRESS NOTE - NSBHMSEAFFRANGE_PSY_A_CORE
Constricted
Constricted
Full
Constricted
Full
Constricted
Full
Constricted
Full
Constricted
Constricted
Full
Full
Constricted
Full
Constricted
Full
Constricted
Full
Constricted
Full

## 2021-04-07 NOTE — BH INPATIENT PSYCHIATRY PROGRESS NOTE - NSTXMANICDATETRGT_PSY_ALL_CORE
24-Feb-2021
17-Feb-2021
03-Mar-2021
10-Mar-2021
24-Mar-2021
06-Apr-2021
03-Mar-2021
06-Apr-2021
24-Feb-2021
24-Mar-2021
03-Mar-2021
24-Mar-2021
06-Apr-2021
17-Feb-2021
24-Mar-2021
10-Mar-2021
03-Feb-2021
06-Apr-2021
03-Feb-2021
17-Feb-2021
10-Mar-2021
10-Feb-2021
06-Apr-2021
10-Feb-2021
17-Feb-2021
24-Feb-2021
03-Feb-2021
24-Mar-2021
24-Feb-2021
10-Mar-2021
10-Mar-2021
10-Feb-2021
24-Mar-2021
10-Feb-2021
10-Mar-2021
03-Feb-2021
06-Apr-2021
10-Mar-2021
24-Mar-2021
03-Feb-2021
24-Feb-2021
03-Mar-2021
03-Mar-2021
24-Mar-2021
24-Feb-2021
10-Mar-2021

## 2021-04-07 NOTE — BH INPATIENT PSYCHIATRY PROGRESS NOTE - NSBHMSETHTASSOC_PSY_A_CORE
Loose
Normal
Loose
Normal
Loose
Normal
Loose
Normal
Loose
Normal
Normal
Loose
Normal
Loose
Normal
Loose
Normal
Loose
Normal
Loose
Normal
Loose

## 2021-04-07 NOTE — BH INPATIENT PSYCHIATRY PROGRESS NOTE - NSBHCONTPROVIDER_PSY_ALL_CORE
Yes...

## 2021-04-07 NOTE — BH INPATIENT PSYCHIATRY PROGRESS NOTE - NSTXMANICDATEEST_PSY_ALL_CORE
13-Jan-2021
17-Feb-2021
13-Jan-2021
03-Mar-2021
03-Mar-2021
13-Jan-2021
30-Mar-2021
13-Jan-2021
03-Mar-2021
13-Jan-2021
17-Feb-2021
13-Jan-2021
17-Feb-2021
13-Jan-2021
24-Feb-2021
03-Mar-2021
03-Mar-2021
13-Jan-2021
30-Mar-2021
13-Jan-2021
24-Feb-2021
03-Mar-2021
13-Jan-2021
13-Jan-2021
24-Feb-2021
13-Jan-2021
17-Feb-2021
24-Feb-2021
24-Feb-2021
03-Mar-2021
03-Mar-2021
13-Jan-2021
17-Feb-2021
13-Jan-2021
17-Feb-2021
13-Jan-2021
30-Mar-2021
30-Mar-2021
03-Mar-2021
03-Mar-2021
30-Mar-2021
30-Mar-2021
13-Jan-2021

## 2021-04-07 NOTE — BH INPATIENT PSYCHIATRY PROGRESS NOTE - NSTXDISORGGOALOTHER_PSY_ALL_CORE
will maintain good behavioral control
maintain good behavioral control
maintain good behavioral control
will maintain good behavioral control
maintain good behavioral control
will maintain good behavioral control
maintain good behavioral control
maintain good behavioral control

## 2021-04-07 NOTE — BH INPATIENT PSYCHIATRY PROGRESS NOTE - NSBHMSETHTPROC_PSY_A_CORE
Disorganized/Illogical/Impaired reasoning
Linear
Disorganized/Illogical/Impaired reasoning

## 2021-04-07 NOTE — BH INPATIENT PSYCHIATRY PROGRESS NOTE - NSBHPSYCHOLCOGORIENT_PSY_A_CORE
Oriented to time, place, person, situation
Not fully oriented...
Oriented to time, place, person, situation
Not fully oriented...
Oriented to time, place, person, situation
Not fully oriented...
Oriented to time, place, person, situation

## 2021-04-07 NOTE — BH INPATIENT PSYCHIATRY PROGRESS NOTE - NSTXPROBMANIC_PSY_ALL_CORE
MANIC SYMPTOMS

## 2021-04-07 NOTE — BH INPATIENT PSYCHIATRY PROGRESS NOTE - NSBHMSEAFFCONG_PSY_A_CORE
Congruent
Non-congruent
Congruent
Non-congruent
Congruent

## 2021-04-07 NOTE — BH INPATIENT PSYCHIATRY PROGRESS NOTE - NSCGISEVERILLNESS_PSY_ALL_CORE
4 = Moderately ill – overt symptoms causing noticeable, but modest, functional impairment or distress; symptom level may warrant medication
6 = Severely ill - disruptive pathology, behavior and function are frequently influenced by symptoms, may require assistance from others
4 = Moderately ill – overt symptoms causing noticeable, but modest, functional impairment or distress; symptom level may warrant medication
4 = Moderately ill – overt symptoms causing noticeable, but modest, functional impairment or distress; symptom level may warrant medication
5 = Markedly ill - intrusive symptoms that distinctly impair social/occupational function or cause intrusive levels of distress
6 = Severely ill - disruptive pathology, behavior and function are frequently influenced by symptoms, may require assistance from others
6 = Severely ill - disruptive pathology, behavior and function are frequently influenced by symptoms, may require assistance from others
5 = Markedly ill - intrusive symptoms that distinctly impair social/occupational function or cause intrusive levels of distress
4 = Moderately ill – overt symptoms causing noticeable, but modest, functional impairment or distress; symptom level may warrant medication
5 = Markedly ill - intrusive symptoms that distinctly impair social/occupational function or cause intrusive levels of distress
4 = Moderately ill – overt symptoms causing noticeable, but modest, functional impairment or distress; symptom level may warrant medication
4 = Moderately ill – overt symptoms causing noticeable, but modest, functional impairment or distress; symptom level may warrant medication
6 = Severely ill - disruptive pathology, behavior and function are frequently influenced by symptoms, may require assistance from others
4 = Moderately ill – overt symptoms causing noticeable, but modest, functional impairment or distress; symptom level may warrant medication
5 = Markedly ill - intrusive symptoms that distinctly impair social/occupational function or cause intrusive levels of distress
4 = Moderately ill – overt symptoms causing noticeable, but modest, functional impairment or distress; symptom level may warrant medication
4 = Moderately ill – overt symptoms causing noticeable, but modest, functional impairment or distress; symptom level may warrant medication
5 = Markedly ill - intrusive symptoms that distinctly impair social/occupational function or cause intrusive levels of distress
5 = Markedly ill - intrusive symptoms that distinctly impair social/occupational function or cause intrusive levels of distress
6 = Severely ill - disruptive pathology, behavior and function are frequently influenced by symptoms, may require assistance from others
5 = Markedly ill - intrusive symptoms that distinctly impair social/occupational function or cause intrusive levels of distress
6 = Severely ill - disruptive pathology, behavior and function are frequently influenced by symptoms, may require assistance from others
4 = Moderately ill – overt symptoms causing noticeable, but modest, functional impairment or distress; symptom level may warrant medication
5 = Markedly ill - intrusive symptoms that distinctly impair social/occupational function or cause intrusive levels of distress
4 = Moderately ill – overt symptoms causing noticeable, but modest, functional impairment or distress; symptom level may warrant medication
5 = Markedly ill - intrusive symptoms that distinctly impair social/occupational function or cause intrusive levels of distress
4 = Moderately ill – overt symptoms causing noticeable, but modest, functional impairment or distress; symptom level may warrant medication
5 = Markedly ill - intrusive symptoms that distinctly impair social/occupational function or cause intrusive levels of distress
4 = Moderately ill – overt symptoms causing noticeable, but modest, functional impairment or distress; symptom level may warrant medication
5 = Markedly ill - intrusive symptoms that distinctly impair social/occupational function or cause intrusive levels of distress
4 = Moderately ill – overt symptoms causing noticeable, but modest, functional impairment or distress; symptom level may warrant medication

## 2021-04-07 NOTE — BH INPATIENT PSYCHIATRY PROGRESS NOTE - NSBHMETABOLIC_PSY_ALL_CORE_FT
BMI: BMI (kg/m2): 38.4 (03-06-21 @ 16:00)  HbA1c: A1C with Estimated Average Glucose Result: 5.6 % (12-23-20 @ 10:49)    Glucose: POCT Blood Glucose.: 89 mg/dL (01-13-21 @ 17:45)    BP: --  Lipid Panel: Date/Time: 12-23-20 @ 10:49  Cholesterol, Serum: 148  Direct LDL: --  HDL Cholesterol, Serum: 52  Total Cholesterol/HDL Ration Measurement: --  Triglycerides, Serum: 101  
BMI: BMI (kg/m2): 38.4 (03-06-21 @ 16:00)  HbA1c: A1C with Estimated Average Glucose Result: 5.6 % (12-23-20 @ 10:49)    Glucose: POCT Blood Glucose.: 89 mg/dL (01-13-21 @ 17:45)    BP: --  Lipid Panel: Date/Time: 12-23-20 @ 10:49  Cholesterol, Serum: 148  Direct LDL: --  HDL Cholesterol, Serum: 52  Total Cholesterol/HDL Ration Measurement: --  Triglycerides, Serum: 101  
BMI:   HbA1c: A1C with Estimated Average Glucose Result: 5.6 % (12-23-20 @ 10:49)    Glucose: POCT Blood Glucose.: 89 mg/dL (01-13-21 @ 17:45)    BP: --  Lipid Panel: Date/Time: 12-23-20 @ 10:49  Cholesterol, Serum: 148  Direct LDL: --  HDL Cholesterol, Serum: 52  Total Cholesterol/HDL Ration Measurement: --  Triglycerides, Serum: 101  
BMI:   HbA1c: A1C with Estimated Average Glucose Result: 5.6 % (12-23-20 @ 10:49)    Glucose: POCT Blood Glucose.: 89 mg/dL (01-13-21 @ 17:45)    BP: 121/73 (02-04-21 @ 08:25) (120/78 - 123/80)  Lipid Panel: Date/Time: 12-23-20 @ 10:49  Cholesterol, Serum: 148  Direct LDL: --  HDL Cholesterol, Serum: 52  Total Cholesterol/HDL Ration Measurement: --  Triglycerides, Serum: 101  
BMI:   HbA1c: A1C with Estimated Average Glucose Result: 5.6 % (12-23-20 @ 10:49)    Glucose: POCT Blood Glucose.: 89 mg/dL (01-13-21 @ 17:45)    BP: 130/70 (02-16-21 @ 22:10) (130/70 - 130/70)  Lipid Panel: Date/Time: 12-23-20 @ 10:49  Cholesterol, Serum: 148  Direct LDL: --  HDL Cholesterol, Serum: 52  Total Cholesterol/HDL Ration Measurement: --  Triglycerides, Serum: 101  
BMI: BMI (kg/m2): 38.4 (03-06-21 @ 16:00)  HbA1c: A1C with Estimated Average Glucose Result: 5.6 % (12-23-20 @ 10:49)    Glucose: POCT Blood Glucose.: 89 mg/dL (01-13-21 @ 17:45)    BP: 118/81 (03-09-21 @ 16:45) (118/81 - 118/81)  Lipid Panel: Date/Time: 12-23-20 @ 10:49  Cholesterol, Serum: 148  Direct LDL: --  HDL Cholesterol, Serum: 52  Total Cholesterol/HDL Ration Measurement: --  Triglycerides, Serum: 101  
BMI:   HbA1c: A1C with Estimated Average Glucose Result: 5.6 % (12-23-20 @ 10:49)    Glucose: POCT Blood Glucose.: 89 mg/dL (01-13-21 @ 17:45)    BP: --  Lipid Panel: Date/Time: 12-23-20 @ 10:49  Cholesterol, Serum: 148  Direct LDL: --  HDL Cholesterol, Serum: 52  Total Cholesterol/HDL Ration Measurement: --  Triglycerides, Serum: 101  
BMI: BMI (kg/m2): 38.4 (03-06-21 @ 16:00)  HbA1c: A1C with Estimated Average Glucose Result: 5.6 % (12-23-20 @ 10:49)    Glucose: POCT Blood Glucose.: 89 mg/dL (01-13-21 @ 17:45)    BP: --  Lipid Panel: Date/Time: 12-23-20 @ 10:49  Cholesterol, Serum: 148  Direct LDL: --  HDL Cholesterol, Serum: 52  Total Cholesterol/HDL Ration Measurement: --  Triglycerides, Serum: 101  
BMI:   HbA1c: A1C with Estimated Average Glucose Result: 5.6 % (12-23-20 @ 10:49)    Glucose: POCT Blood Glucose.: 89 mg/dL (01-13-21 @ 17:45)    BP: 120/61 (01-13-21 @ 22:41) (120/61 - 128/75)  Lipid Panel: Date/Time: 12-23-20 @ 10:49  Cholesterol, Serum: 148  Direct LDL: --  HDL Cholesterol, Serum: 52  Total Cholesterol/HDL Ration Measurement: --  Triglycerides, Serum: 101  
BMI: BMI (kg/m2): 38.4 (03-06-21 @ 16:00)  HbA1c: A1C with Estimated Average Glucose Result: 5.6 % (12-23-20 @ 10:49)    Glucose: POCT Blood Glucose.: 89 mg/dL (01-13-21 @ 17:45)    BP: 118/81 (03-09-21 @ 16:45) (118/81 - 118/81)  Lipid Panel: Date/Time: 12-23-20 @ 10:49  Cholesterol, Serum: 148  Direct LDL: --  HDL Cholesterol, Serum: 52  Total Cholesterol/HDL Ration Measurement: --  Triglycerides, Serum: 101  
BMI: BMI (kg/m2): 38.4 (03-06-21 @ 16:00)  HbA1c: A1C with Estimated Average Glucose Result: 5.6 % (12-23-20 @ 10:49)    Glucose: POCT Blood Glucose.: 89 mg/dL (01-13-21 @ 17:45)    BP: --  Lipid Panel: Date/Time: 12-23-20 @ 10:49  Cholesterol, Serum: 148  Direct LDL: --  HDL Cholesterol, Serum: 52  Total Cholesterol/HDL Ration Measurement: --  Triglycerides, Serum: 101  
BMI: BMI (kg/m2): 38.4 (03-06-21 @ 16:00)  HbA1c: A1C with Estimated Average Glucose Result: 5.6 % (12-23-20 @ 10:49)    Glucose: POCT Blood Glucose.: 89 mg/dL (01-13-21 @ 17:45)    BP: --  Lipid Panel: Date/Time: 12-23-20 @ 10:49  Cholesterol, Serum: 148  Direct LDL: --  HDL Cholesterol, Serum: 52  Total Cholesterol/HDL Ration Measurement: --  Triglycerides, Serum: 101  
BMI:   HbA1c: A1C with Estimated Average Glucose Result: 5.6 % (12-23-20 @ 10:49)    Glucose: POCT Blood Glucose.: 89 mg/dL (01-13-21 @ 17:45)    BP: 121/73 (02-04-21 @ 08:25) (120/78 - 121/73)  Lipid Panel: Date/Time: 12-23-20 @ 10:49  Cholesterol, Serum: 148  Direct LDL: --  HDL Cholesterol, Serum: 52  Total Cholesterol/HDL Ration Measurement: --  Triglycerides, Serum: 101  
BMI: BMI (kg/m2): 38.4 (03-06-21 @ 16:00)  HbA1c: A1C with Estimated Average Glucose Result: 5.6 % (12-23-20 @ 10:49)    Glucose: POCT Blood Glucose.: 89 mg/dL (01-13-21 @ 17:45)    BP: --  Lipid Panel: Date/Time: 12-23-20 @ 10:49  Cholesterol, Serum: 148  Direct LDL: --  HDL Cholesterol, Serum: 52  Total Cholesterol/HDL Ration Measurement: --  Triglycerides, Serum: 101  
BMI:   HbA1c: A1C with Estimated Average Glucose Result: 5.6 % (12-23-20 @ 10:49)    Glucose: POCT Blood Glucose.: 89 mg/dL (01-13-21 @ 17:45)    BP: 101/68 (02-25-21 @ 08:24) (101/68 - 126/84)  Lipid Panel: Date/Time: 12-23-20 @ 10:49  Cholesterol, Serum: 148  Direct LDL: --  HDL Cholesterol, Serum: 52  Total Cholesterol/HDL Ration Measurement: --  Triglycerides, Serum: 101  
BMI:   HbA1c: A1C with Estimated Average Glucose Result: 5.6 % (12-23-20 @ 10:49)    Glucose: POCT Blood Glucose.: 89 mg/dL (01-13-21 @ 17:45)    BP: --  Lipid Panel: Date/Time: 12-23-20 @ 10:49  Cholesterol, Serum: 148  Direct LDL: --  HDL Cholesterol, Serum: 52  Total Cholesterol/HDL Ration Measurement: --  Triglycerides, Serum: 101  
BMI:   HbA1c: A1C with Estimated Average Glucose Result: 5.6 % (12-23-20 @ 10:49)    Glucose: POCT Blood Glucose.: 89 mg/dL (01-13-21 @ 17:45)    BP: --  Lipid Panel: Date/Time: 12-23-20 @ 10:49  Cholesterol, Serum: 148  Direct LDL: --  HDL Cholesterol, Serum: 52  Total Cholesterol/HDL Ration Measurement: --  Triglycerides, Serum: 101  
BMI:   HbA1c: A1C with Estimated Average Glucose Result: 5.6 % (12-23-20 @ 10:49)    Glucose: POCT Blood Glucose.: 89 mg/dL (01-13-21 @ 17:45)    BP: 103/- (01-18-21 @ 22:29) (103/- - 103/-)  Lipid Panel: Date/Time: 12-23-20 @ 10:49  Cholesterol, Serum: 148  Direct LDL: --  HDL Cholesterol, Serum: 52  Total Cholesterol/HDL Ration Measurement: --  Triglycerides, Serum: 101  
BMI:   HbA1c: A1C with Estimated Average Glucose Result: 5.6 % (12-23-20 @ 10:49)    Glucose: POCT Blood Glucose.: 89 mg/dL (01-13-21 @ 17:45)    BP: --  Lipid Panel: Date/Time: 12-23-20 @ 10:49  Cholesterol, Serum: 148  Direct LDL: --  HDL Cholesterol, Serum: 52  Total Cholesterol/HDL Ration Measurement: --  Triglycerides, Serum: 101  
BMI:   HbA1c: A1C with Estimated Average Glucose Result: 5.6 % (12-23-20 @ 10:49)    Glucose: POCT Blood Glucose.: 89 mg/dL (01-13-21 @ 17:45)    BP: --  Lipid Panel: Date/Time: 12-23-20 @ 10:49  Cholesterol, Serum: 148  Direct LDL: --  HDL Cholesterol, Serum: 52  Total Cholesterol/HDL Ration Measurement: --  Triglycerides, Serum: 101  
BMI:   HbA1c: A1C with Estimated Average Glucose Result: 5.6 % (12-23-20 @ 10:49)    Glucose: POCT Blood Glucose.: 89 mg/dL (01-13-21 @ 17:45)    BP: 103/- (01-18-21 @ 22:29) (103/- - 103/-)  Lipid Panel: Date/Time: 12-23-20 @ 10:49  Cholesterol, Serum: 148  Direct LDL: --  HDL Cholesterol, Serum: 52  Total Cholesterol/HDL Ration Measurement: --  Triglycerides, Serum: 101  
BMI:   HbA1c: A1C with Estimated Average Glucose Result: 5.6 % (12-23-20 @ 10:49)    Glucose: POCT Blood Glucose.: 89 mg/dL (01-13-21 @ 17:45)    BP: --  Lipid Panel: Date/Time: 12-23-20 @ 10:49  Cholesterol, Serum: 148  Direct LDL: --  HDL Cholesterol, Serum: 52  Total Cholesterol/HDL Ration Measurement: --  Triglycerides, Serum: 101  
BMI: BMI (kg/m2): 38.4 (03-06-21 @ 16:00)  HbA1c: A1C with Estimated Average Glucose Result: 5.6 % (12-23-20 @ 10:49)    Glucose: POCT Blood Glucose.: 89 mg/dL (01-13-21 @ 17:45)    BP: --  Lipid Panel: Date/Time: 12-23-20 @ 10:49  Cholesterol, Serum: 148  Direct LDL: --  HDL Cholesterol, Serum: 52  Total Cholesterol/HDL Ration Measurement: --  Triglycerides, Serum: 101  
BMI:   HbA1c: A1C with Estimated Average Glucose Result: 5.6 % (12-23-20 @ 10:49)    Glucose: POCT Blood Glucose.: 89 mg/dL (01-13-21 @ 17:45)    BP: --  Lipid Panel: Date/Time: 12-23-20 @ 10:49  Cholesterol, Serum: 148  Direct LDL: --  HDL Cholesterol, Serum: 52  Total Cholesterol/HDL Ration Measurement: --  Triglycerides, Serum: 101  
BMI:   HbA1c: A1C with Estimated Average Glucose Result: 5.6 % (12-23-20 @ 10:49)    Glucose: POCT Blood Glucose.: 89 mg/dL (01-13-21 @ 17:45)    BP: --  Lipid Panel: Date/Time: 12-23-20 @ 10:49  Cholesterol, Serum: 148  Direct LDL: --  HDL Cholesterol, Serum: 52  Total Cholesterol/HDL Ration Measurement: --  Triglycerides, Serum: 101  
BMI:   HbA1c: A1C with Estimated Average Glucose Result: 5.6 % (12-23-20 @ 10:49)    Glucose: POCT Blood Glucose.: 89 mg/dL (01-13-21 @ 17:45)    BP: 114/68 (02-07-21 @ 07:58) (107/78 - 114/68)  Lipid Panel: Date/Time: 12-23-20 @ 10:49  Cholesterol, Serum: 148  Direct LDL: --  HDL Cholesterol, Serum: 52  Total Cholesterol/HDL Ration Measurement: --  Triglycerides, Serum: 101  
BMI:   HbA1c: A1C with Estimated Average Glucose Result: 5.6 % (12-23-20 @ 10:49)    Glucose: POCT Blood Glucose.: 89 mg/dL (01-13-21 @ 17:45)    BP: 130/70 (02-16-21 @ 22:10) (130/70 - 130/70)  Lipid Panel: Date/Time: 12-23-20 @ 10:49  Cholesterol, Serum: 148  Direct LDL: --  HDL Cholesterol, Serum: 52  Total Cholesterol/HDL Ration Measurement: --  Triglycerides, Serum: 101  
BMI:   HbA1c: A1C with Estimated Average Glucose Result: 5.6 % (12-23-20 @ 10:49)    Glucose: POCT Blood Glucose.: 89 mg/dL (01-13-21 @ 17:45)    BP: 103/- (01-18-21 @ 22:29) (103/- - 103/-)  Lipid Panel: Date/Time: 12-23-20 @ 10:49  Cholesterol, Serum: 148  Direct LDL: --  HDL Cholesterol, Serum: 52  Total Cholesterol/HDL Ration Measurement: --  Triglycerides, Serum: 101  
BMI:   HbA1c: A1C with Estimated Average Glucose Result: 5.6 % (12-23-20 @ 10:49)    Glucose: POCT Blood Glucose.: 89 mg/dL (01-13-21 @ 17:45)    BP: 127/77 (02-10-21 @ 10:00) (110/65 - 127/77)  Lipid Panel: Date/Time: 12-23-20 @ 10:49  Cholesterol, Serum: 148  Direct LDL: --  HDL Cholesterol, Serum: 52  Total Cholesterol/HDL Ration Measurement: --  Triglycerides, Serum: 101  
BMI: BMI (kg/m2): 38.4 (03-06-21 @ 16:00)  HbA1c: A1C with Estimated Average Glucose Result: 5.6 % (12-23-20 @ 10:49)    Glucose: POCT Blood Glucose.: 89 mg/dL (01-13-21 @ 17:45)    BP: 140/84 (03-22-21 @ 09:01) (140/84 - 140/84)  Lipid Panel: Date/Time: 12-23-20 @ 10:49  Cholesterol, Serum: 148  Direct LDL: --  HDL Cholesterol, Serum: 52  Total Cholesterol/HDL Ration Measurement: --  Triglycerides, Serum: 101  
BMI: BMI (kg/m2): 38.4 (03-06-21 @ 16:00)  HbA1c: A1C with Estimated Average Glucose Result: 5.6 % (12-23-20 @ 10:49)    Glucose: POCT Blood Glucose.: 89 mg/dL (01-13-21 @ 17:45)    BP: --  Lipid Panel: Date/Time: 12-23-20 @ 10:49  Cholesterol, Serum: 148  Direct LDL: --  HDL Cholesterol, Serum: 52  Total Cholesterol/HDL Ration Measurement: --  Triglycerides, Serum: 101  
BMI:   HbA1c: A1C with Estimated Average Glucose Result: 5.6 % (12-23-20 @ 10:49)    Glucose: POCT Blood Glucose.: 89 mg/dL (01-13-21 @ 17:45)    BP: --  Lipid Panel: Date/Time: 12-23-20 @ 10:49  Cholesterol, Serum: 148  Direct LDL: --  HDL Cholesterol, Serum: 52  Total Cholesterol/HDL Ration Measurement: --  Triglycerides, Serum: 101  
BMI: BMI (kg/m2): 38.4 (03-06-21 @ 16:00)  HbA1c: A1C with Estimated Average Glucose Result: 5.6 % (12-23-20 @ 10:49)    Glucose: POCT Blood Glucose.: 89 mg/dL (01-13-21 @ 17:45)    BP: --  Lipid Panel: Date/Time: 12-23-20 @ 10:49  Cholesterol, Serum: 148  Direct LDL: --  HDL Cholesterol, Serum: 52  Total Cholesterol/HDL Ration Measurement: --  Triglycerides, Serum: 101  
BMI: BMI (kg/m2): 38.4 (03-06-21 @ 16:00)  HbA1c: A1C with Estimated Average Glucose Result: 5.6 % (12-23-20 @ 10:49)    Glucose: POCT Blood Glucose.: 89 mg/dL (01-13-21 @ 17:45)    BP: 110/66 (03-19-21 @ 08:10) (110/66 - 110/66)  Lipid Panel: Date/Time: 12-23-20 @ 10:49  Cholesterol, Serum: 148  Direct LDL: --  HDL Cholesterol, Serum: 52  Total Cholesterol/HDL Ration Measurement: --  Triglycerides, Serum: 101  
BMI:   HbA1c: A1C with Estimated Average Glucose Result: 5.6 % (12-23-20 @ 10:49)    Glucose: POCT Blood Glucose.: 89 mg/dL (01-13-21 @ 17:45)    BP: --  Lipid Panel: Date/Time: 12-23-20 @ 10:49  Cholesterol, Serum: 148  Direct LDL: --  HDL Cholesterol, Serum: 52  Total Cholesterol/HDL Ration Measurement: --  Triglycerides, Serum: 101  
BMI:   HbA1c: A1C with Estimated Average Glucose Result: 5.6 % (12-23-20 @ 10:49)    Glucose: POCT Blood Glucose.: 89 mg/dL (01-13-21 @ 17:45)    BP: 124/78 (02-24-21 @ 23:09) (124/78 - 126/84)  Lipid Panel: Date/Time: 12-23-20 @ 10:49  Cholesterol, Serum: 148  Direct LDL: --  HDL Cholesterol, Serum: 52  Total Cholesterol/HDL Ration Measurement: --  Triglycerides, Serum: 101  
BMI:   HbA1c: A1C with Estimated Average Glucose Result: 5.6 % (12-23-20 @ 10:49)    Glucose: POCT Blood Glucose.: 89 mg/dL (01-13-21 @ 17:45)    BP: 126/84 (02-23-21 @ 20:05) (126/84 - 126/84)  Lipid Panel: Date/Time: 12-23-20 @ 10:49  Cholesterol, Serum: 148  Direct LDL: --  HDL Cholesterol, Serum: 52  Total Cholesterol/HDL Ration Measurement: --  Triglycerides, Serum: 101  
BMI:   HbA1c: A1C with Estimated Average Glucose Result: 5.6 % (12-23-20 @ 10:49)    Glucose: POCT Blood Glucose.: 89 mg/dL (01-13-21 @ 17:45)    BP: --  Lipid Panel: Date/Time: 12-23-20 @ 10:49  Cholesterol, Serum: 148  Direct LDL: --  HDL Cholesterol, Serum: 52  Total Cholesterol/HDL Ration Measurement: --  Triglycerides, Serum: 101  
BMI:   HbA1c: A1C with Estimated Average Glucose Result: 5.6 % (12-23-20 @ 10:49)    Glucose: POCT Blood Glucose.: 89 mg/dL (01-13-21 @ 17:45)    BP: 123/80 (02-02-21 @ 09:00) (123/80 - 123/80)  Lipid Panel: Date/Time: 12-23-20 @ 10:49  Cholesterol, Serum: 148  Direct LDL: --  HDL Cholesterol, Serum: 52  Total Cholesterol/HDL Ration Measurement: --  Triglycerides, Serum: 101  
BMI: BMI (kg/m2): 38.4 (03-06-21 @ 16:00)  HbA1c: A1C with Estimated Average Glucose Result: 5.6 % (12-23-20 @ 10:49)    Glucose: POCT Blood Glucose.: 89 mg/dL (01-13-21 @ 17:45)    BP: 118/81 (03-09-21 @ 16:45) (118/81 - 118/81)  Lipid Panel: Date/Time: 12-23-20 @ 10:49  Cholesterol, Serum: 148  Direct LDL: --  HDL Cholesterol, Serum: 52  Total Cholesterol/HDL Ration Measurement: --  Triglycerides, Serum: 101  
BMI:   HbA1c: A1C with Estimated Average Glucose Result: 5.6 % (12-23-20 @ 10:49)    Glucose: POCT Blood Glucose.: 89 mg/dL (01-13-21 @ 17:45)    BP: 120/61 (01-13-21 @ 22:41) (120/61 - 123/77)  Lipid Panel: Date/Time: 12-23-20 @ 10:49  Cholesterol, Serum: 148  Direct LDL: --  HDL Cholesterol, Serum: 52  Total Cholesterol/HDL Ration Measurement: --  Triglycerides, Serum: 101  
BMI:   HbA1c: A1C with Estimated Average Glucose Result: 5.6 % (12-23-20 @ 10:49)    Glucose: POCT Blood Glucose.: 89 mg/dL (01-13-21 @ 17:45)    BP: 124/78 (02-28-21 @ 21:35) (124/78 - 124/78)  Lipid Panel: Date/Time: 12-23-20 @ 10:49  Cholesterol, Serum: 148  Direct LDL: --  HDL Cholesterol, Serum: 52  Total Cholesterol/HDL Ration Measurement: --  Triglycerides, Serum: 101  
BMI:   HbA1c: A1C with Estimated Average Glucose Result: 5.6 % (12-23-20 @ 10:49)    Glucose: POCT Blood Glucose.: 89 mg/dL (01-13-21 @ 17:45)    BP: 127/77 (02-10-21 @ 10:00) (110/65 - 127/77)  Lipid Panel: Date/Time: 12-23-20 @ 10:49  Cholesterol, Serum: 148  Direct LDL: --  HDL Cholesterol, Serum: 52  Total Cholesterol/HDL Ration Measurement: --  Triglycerides, Serum: 101  
BMI: BMI (kg/m2): 38.4 (03-06-21 @ 16:00)  HbA1c: A1C with Estimated Average Glucose Result: 5.6 % (12-23-20 @ 10:49)    Glucose: POCT Blood Glucose.: 89 mg/dL (01-13-21 @ 17:45)    BP: --  Lipid Panel: Date/Time: 12-23-20 @ 10:49  Cholesterol, Serum: 148  Direct LDL: --  HDL Cholesterol, Serum: 52  Total Cholesterol/HDL Ration Measurement: --  Triglycerides, Serum: 101  
BMI: BMI (kg/m2): 38.4 (03-06-21 @ 16:00)  HbA1c: A1C with Estimated Average Glucose Result: 5.6 % (12-23-20 @ 10:49)    Glucose: POCT Blood Glucose.: 89 mg/dL (01-13-21 @ 17:45)    BP: --  Lipid Panel: Date/Time: 12-23-20 @ 10:49  Cholesterol, Serum: 148  Direct LDL: --  HDL Cholesterol, Serum: 52  Total Cholesterol/HDL Ration Measurement: --  Triglycerides, Serum: 101  
BMI: BMI (kg/m2): 38.4 (03-06-21 @ 16:00)  HbA1c: A1C with Estimated Average Glucose Result: 5.6 % (12-23-20 @ 10:49)    Glucose: POCT Blood Glucose.: 89 mg/dL (01-13-21 @ 17:45)    BP: 140/84 (03-22-21 @ 09:01) (140/84 - 140/84)  Lipid Panel: Date/Time: 12-23-20 @ 10:49  Cholesterol, Serum: 148  Direct LDL: --  HDL Cholesterol, Serum: 52  Total Cholesterol/HDL Ration Measurement: --  Triglycerides, Serum: 101  
BMI: BMI (kg/m2): 38.4 (03-06-21 @ 16:00)  HbA1c: A1C with Estimated Average Glucose Result: 5.6 % (12-23-20 @ 10:49)    Glucose: POCT Blood Glucose.: 89 mg/dL (01-13-21 @ 17:45)    BP: --  Lipid Panel: Date/Time: 12-23-20 @ 10:49  Cholesterol, Serum: 148  Direct LDL: --  HDL Cholesterol, Serum: 52  Total Cholesterol/HDL Ration Measurement: --  Triglycerides, Serum: 101  
BMI:   HbA1c: A1C with Estimated Average Glucose Result: 5.6 % (12-23-20 @ 10:49)    Glucose: POCT Blood Glucose.: 89 mg/dL (01-13-21 @ 17:45)    BP: 127/77 (02-10-21 @ 10:00) (127/77 - 127/77)  Lipid Panel: Date/Time: 12-23-20 @ 10:49  Cholesterol, Serum: 148  Direct LDL: --  HDL Cholesterol, Serum: 52  Total Cholesterol/HDL Ration Measurement: --  Triglycerides, Serum: 101  
BMI: BMI (kg/m2): 38.4 (03-06-21 @ 16:00)  HbA1c: A1C with Estimated Average Glucose Result: 5.6 % (12-23-20 @ 10:49)    Glucose: POCT Blood Glucose.: 89 mg/dL (01-13-21 @ 17:45)    BP: --  Lipid Panel: Date/Time: 12-23-20 @ 10:49  Cholesterol, Serum: 148  Direct LDL: --  HDL Cholesterol, Serum: 52  Total Cholesterol/HDL Ration Measurement: --  Triglycerides, Serum: 101  
BMI:   HbA1c: A1C with Estimated Average Glucose Result: 5.6 % (12-23-20 @ 10:49)    Glucose: POCT Blood Glucose.: 89 mg/dL (01-13-21 @ 17:45)    BP: 124/78 (02-28-21 @ 21:35) (122/76 - 124/78)  Lipid Panel: Date/Time: 12-23-20 @ 10:49  Cholesterol, Serum: 148  Direct LDL: --  HDL Cholesterol, Serum: 52  Total Cholesterol/HDL Ration Measurement: --  Triglycerides, Serum: 101  
BMI:   HbA1c: A1C with Estimated Average Glucose Result: 5.6 % (12-23-20 @ 10:49)    Glucose: POCT Blood Glucose.: 89 mg/dL (01-13-21 @ 17:45)    BP: --  Lipid Panel: Date/Time: 12-23-20 @ 10:49  Cholesterol, Serum: 148  Direct LDL: --  HDL Cholesterol, Serum: 52  Total Cholesterol/HDL Ration Measurement: --  Triglycerides, Serum: 101  
BMI: BMI (kg/m2): 38.4 (03-06-21 @ 16:00)  HbA1c: A1C with Estimated Average Glucose Result: 5.6 % (12-23-20 @ 10:49)    Glucose: POCT Blood Glucose.: 89 mg/dL (01-13-21 @ 17:45)    BP: 129/67 (04-02-21 @ 14:25) (129/67 - 129/67)  Lipid Panel: Date/Time: 12-23-20 @ 10:49  Cholesterol, Serum: 148  Direct LDL: --  HDL Cholesterol, Serum: 52  Total Cholesterol/HDL Ration Measurement: --  Triglycerides, Serum: 101  
BMI: BMI (kg/m2): 38.4 (03-06-21 @ 16:00)  HbA1c: A1C with Estimated Average Glucose Result: 5.6 % (12-23-20 @ 10:49)    Glucose: POCT Blood Glucose.: 89 mg/dL (01-13-21 @ 17:45)    BP: --  Lipid Panel: Date/Time: 12-23-20 @ 10:49  Cholesterol, Serum: 148  Direct LDL: --  HDL Cholesterol, Serum: 52  Total Cholesterol/HDL Ration Measurement: --  Triglycerides, Serum: 101  
BMI: BMI (kg/m2): 38.4 (03-06-21 @ 16:00)  HbA1c: A1C with Estimated Average Glucose Result: 5.6 % (12-23-20 @ 10:49)    Glucose: POCT Blood Glucose.: 89 mg/dL (01-13-21 @ 17:45)    BP: 140/84 (03-22-21 @ 09:01) (140/84 - 140/84)  Lipid Panel: Date/Time: 12-23-20 @ 10:49  Cholesterol, Serum: 148  Direct LDL: --  HDL Cholesterol, Serum: 52  Total Cholesterol/HDL Ration Measurement: --  Triglycerides, Serum: 101  
BMI:   HbA1c: A1C with Estimated Average Glucose Result: 5.6 % (12-23-20 @ 10:49)    Glucose: POCT Blood Glucose.: 89 mg/dL (01-13-21 @ 17:45)    BP: 110/65 (02-09-21 @ 08:33) (110/65 - 114/68)  Lipid Panel: Date/Time: 12-23-20 @ 10:49  Cholesterol, Serum: 148  Direct LDL: --  HDL Cholesterol, Serum: 52  Total Cholesterol/HDL Ration Measurement: --  Triglycerides, Serum: 101  
BMI:   HbA1c: A1C with Estimated Average Glucose Result: 5.6 % (12-23-20 @ 10:49)    Glucose: POCT Blood Glucose.: 89 mg/dL (01-13-21 @ 17:45)    BP: 102/62 (02-15-21 @ 08:09) (102/62 - 102/62)  Lipid Panel: Date/Time: 12-23-20 @ 10:49  Cholesterol, Serum: 148  Direct LDL: --  HDL Cholesterol, Serum: 52  Total Cholesterol/HDL Ration Measurement: --  Triglycerides, Serum: 101  
BMI:   HbA1c: A1C with Estimated Average Glucose Result: 5.6 % (12-23-20 @ 10:49)    Glucose: POCT Blood Glucose.: 89 mg/dL (01-13-21 @ 17:45)    BP: 124/78 (02-28-21 @ 21:35) (122/76 - 124/78)  Lipid Panel: Date/Time: 12-23-20 @ 10:49  Cholesterol, Serum: 148  Direct LDL: --  HDL Cholesterol, Serum: 52  Total Cholesterol/HDL Ration Measurement: --  Triglycerides, Serum: 101  
BMI:   HbA1c: A1C with Estimated Average Glucose Result: 5.6 % (12-23-20 @ 10:49)    Glucose: POCT Blood Glucose.: 89 mg/dL (01-13-21 @ 17:45)    BP: 130/70 (02-16-21 @ 22:10) (102/62 - 130/70)  Lipid Panel: Date/Time: 12-23-20 @ 10:49  Cholesterol, Serum: 148  Direct LDL: --  HDL Cholesterol, Serum: 52  Total Cholesterol/HDL Ration Measurement: --  Triglycerides, Serum: 101  
BMI: BMI (kg/m2): 38.4 (03-06-21 @ 16:00)  HbA1c: A1C with Estimated Average Glucose Result: 5.6 % (12-23-20 @ 10:49)    Glucose: POCT Blood Glucose.: 89 mg/dL (01-13-21 @ 17:45)    BP: --  Lipid Panel: Date/Time: 12-23-20 @ 10:49  Cholesterol, Serum: 148  Direct LDL: --  HDL Cholesterol, Serum: 52  Total Cholesterol/HDL Ration Measurement: --  Triglycerides, Serum: 101

## 2021-04-07 NOTE — BH INPATIENT PSYCHIATRY PROGRESS NOTE - NSTXDISORGPROGRES_PSY_ALL_CORE
Improving
Improving
No Change
No Change
Improving
No Change
Improving
No Change
Improving
No Change
Improving
No Change
Improving
Improving
No Change
Improving
No Change
No Change
Improving
Improving
No Change
Improving
No Change
Improving
No Change
No Change
Improving
No Change
No Change

## 2021-04-07 NOTE — BH INPATIENT PSYCHIATRY PROGRESS NOTE - NSBHMSEMOVE_PSY_A_CORE
No abnormal movements

## 2021-04-07 NOTE — BH INPATIENT PSYCHIATRY PROGRESS NOTE - NSBHMSEBODY_PSY_A_CORE
Obese

## 2021-04-07 NOTE — BH INPATIENT PSYCHIATRY PROGRESS NOTE - NSICDXBHPRIMARYDX_PSY_ALL_CORE
Schizophrenia   F20.9  

## 2021-04-07 NOTE — BH INPATIENT PSYCHIATRY PROGRESS NOTE - NSTXMEDICGOAL_PSY_ALL_CORE
Take all medications as prescribed
Take all medications as prescribed
Be able to describe the benefit of medication/treatment
Be able to state dosages and times to take medications
Be able to describe the benefit of medication/treatment
Be able to describe the benefit of medication/treatment
Take all medications as prescribed
Take all medications as prescribed
Be able to state dosages and times to take medications
Take all medications as prescribed
Take all medications as prescribed
Be able to state dosages and times to take medications
Take all medications as prescribed
Be able to state dosages and times to take medications
Take all medications as prescribed
Be able to describe the benefit of medication/treatment
Take all medications as prescribed
Be able to describe the benefit of medication/treatment
Be able to describe the benefit of medication/treatment
Take all medications as prescribed
Be able to describe the benefit of medication/treatment
Take all medications as prescribed
Be able to describe the benefit of medication/treatment
Take all medications as prescribed
Be able to describe the benefit of medication/treatment
Take all medications as prescribed
Be able to describe the benefit of medication/treatment
Take all medications as prescribed
Take all medications as prescribed
Be able to describe the benefit of medication/treatment
Take all medications as prescribed
Be able to describe the benefit of medication/treatment
Be able to describe the benefit of medication/treatment
Be able to state dosages and times to take medications
Take all medications as prescribed
Be able to state dosages and times to take medications
Take all medications as prescribed
Be able to describe the benefit of medication/treatment
Take all medications as prescribed
Be able to describe the benefit of medication/treatment
Be able to describe the benefit of medication/treatment
Take all medications as prescribed
Take all medications as prescribed

## 2021-04-07 NOTE — BH INPATIENT PSYCHIATRY PROGRESS NOTE - NSBHMSETHTCONTENT_PSY_A_CORE
Delusions/Preoccupations
Delusions
Delusions/Preoccupations
Delusions
Delusions/Preoccupations
Delusions
Delusions
Delusions/Preoccupations
Delusions
Delusions/Preoccupations
Delusions/Preoccupations
Delusions
Delusions/Preoccupations
Delusions
Delusions/Preoccupations
Delusions/Preoccupations
Delusions
Delusions
Delusions/Preoccupations
Unremarkable
Delusions
Delusions/Preoccupations
Delusions
Delusions/Preoccupations
Delusions/Preoccupations
Delusions
Delusions
Delusions/Preoccupations
Delusions
Delusions/Preoccupations

## 2021-04-07 NOTE — BH INPATIENT PSYCHIATRY PROGRESS NOTE - NSBHMSEAFFQUAL_PSY_A_CORE
Elevated/Irritable/Other
Euthymic/Other
Irritable/Other
Euthymic/Other
Irritable/Other
Euthymic/Other
Irritable/Other
Euthymic/Other
Irritable/Other
Irritable/Other
Euthymic/Other
Elevated/Irritable/Other
Euthymic/Other
Irritable/Other
Irritable/Other
Euthymic/Other
Irritable/Other
Euthymic/Other
Elevated/Irritable/Other
Euthymic/Other
Irritable/Other
Euthymic/Other
Irritable/Other
Euthymic/Other
Euthymic/Other
Irritable/Other
Euthymic/Other

## 2021-04-07 NOTE — BH INPATIENT PSYCHIATRY PROGRESS NOTE - NSBHMSEATTEN_PSY_A_CORE
Normal
Impaired
Normal
Impaired
Normal
Impaired
Normal

## 2021-04-07 NOTE — BH INPATIENT PSYCHIATRY PROGRESS NOTE - NSBHMSERELATED_PSY_A_CORE
Fair
Fair
Poor
Fair
Poor
Fair
Poor
Poor
Fair
Poor
Fair
Poor
Poor
Fair
Poor
Fair
Poor
Fair
Fair
Poor
Fair
Poor
Fair
Poor
Fair
Poor
Fair
Poor
Fair

## 2021-04-07 NOTE — BH INPATIENT PSYCHIATRY PROGRESS NOTE - NSBHINPTBILLING_PSY_ALL_CORE
00316 - Inpatient Moderate Complexity
19316 - Inpatient Moderate Complexity
63457 - Inpatient Moderate Complexity
06970 - Inpatient Moderate Complexity
87437 - Inpatient High Complexity
74530 - Inpatient Moderate Complexity
72562 - Inpatient Moderate Complexity
63684 - Inpatient Moderate Complexity
95873 - Inpatient High Complexity
28762 - Inpatient Moderate Complexity
12965 - Inpatient Moderate Complexity
54209 - Inpatient Moderate Complexity
15197 - Inpatient Moderate Complexity
53591 - Inpatient High Complexity
65774 - Inpatient Moderate Complexity
17579 - Inpatient Moderate Complexity
98127 - Inpatient Moderate Complexity
50137 - Inpatient Moderate Complexity
62577 - Inpatient Moderate Complexity
77521 - Inpatient Moderate Complexity
65665 - Inpatient Moderate Complexity
15305 - Inpatient Moderate Complexity
89949 - Inpatient Moderate Complexity
94716 - Inpatient Moderate Complexity
67419 - Inpatient Moderate Complexity
88958 - Inpatient Moderate Complexity
84964 - Inpatient Moderate Complexity
93311 - Inpatient Moderate Complexity
72612 - Inpatient Moderate Complexity
95664 - Inpatient Moderate Complexity
87077 - Inpatient Moderate Complexity
55040 - Inpatient Moderate Complexity
36480 - Inpatient Moderate Complexity
44212 - Inpatient High Complexity
04063 - Inpatient Moderate Complexity
89409 - Inpatient Moderate Complexity
03028 - Inpatient Moderate Complexity
11035 - Inpatient Moderate Complexity
61677 - Inpatient Moderate Complexity
23350 - Inpatient Moderate Complexity
97133 - Inpatient Moderate Complexity
62156 - Inpatient Moderate Complexity
96776 - Inpatient High Complexity
40503 - Hospital Discharge Day Management; 30 min or less
98907 - Inpatient Moderate Complexity
33622 - Inpatient Moderate Complexity
49252 - Inpatient Moderate Complexity
29927 - Inpatient Moderate Complexity
36332 - Inpatient Moderate Complexity
53989 - Inpatient Moderate Complexity
50861 - Inpatient Moderate Complexity
88022 - Inpatient Moderate Complexity
57303 - Inpatient Moderate Complexity
49669 - Inpatient Moderate Complexity
81006 - Inpatient Moderate Complexity
17156 - Inpatient Moderate Complexity
53143 - Inpatient High Complexity
84793 - Inpatient Moderate Complexity
57477 - Inpatient Moderate Complexity
38065 - Inpatient Moderate Complexity
93877 - Inpatient Moderate Complexity

## 2021-04-07 NOTE — BH INPATIENT PSYCHIATRY PROGRESS NOTE - NSTREATMENTCERT_PSY_ALL_CORE
.

## 2021-04-07 NOTE — BH INPATIENT PSYCHIATRY PROGRESS NOTE - NSCGIIMPROVESXSCORE_CAL_PSY_ALL_CORE
3
6
6
3
6
3
4
3
4
3
4
3
3
4
3
4
6
6
3
4
4
6
6
3
3
6
3
4
3

## 2021-04-07 NOTE — BH INPATIENT PSYCHIATRY PROGRESS NOTE - NSTXMANICGOAL_PSY_ALL_CORE
Be able to identify the early signs of trina (e.g. sleep and mood changes) and to employ coping strategies to minimize acting out
State a reason daily why adherence to mood stabilizers is necessary
Demonstrate a substantial reduction in both hyperactive pacing on the unit and social intrusiveness
Exhibit a substantial reduction in elated/angry acting out, and pressured speech that prevents mutual conversation
Be able to identify the early signs of trina (e.g. sleep and mood changes) and to employ coping strategies to minimize acting out
Exhibit a substantial reduction in elated/angry acting out, and pressured speech that prevents mutual conversation
Be able to identify the early signs of trina (e.g. sleep and mood changes) and to employ coping strategies to minimize acting out
Be able to identify the early signs of trina (e.g. sleep and mood changes) and to employ coping strategies to minimize acting out
Exhibit a substantial reduction in elated/angry acting out, and pressured speech that prevents mutual conversation
Be able to identify the early signs of trina (e.g. sleep and mood changes) and to employ coping strategies to minimize acting out
State a reason daily why adherence to mood stabilizers is necessary
Be able to identify the early signs of trina (e.g. sleep and mood changes) and to employ coping strategies to minimize acting out
Exhibit a substantial reduction in elated/angry acting out, and pressured speech that prevents mutual conversation
Be able to identify the early signs of trina (e.g. sleep and mood changes) and to employ coping strategies to minimize acting out
Be able to identify the early signs of trina (e.g. sleep and mood changes) and to employ coping strategies to minimize acting out
Exhibit a substantial reduction in elated/angry acting out, and pressured speech that prevents mutual conversation
State a reason daily why adherence to mood stabilizers is necessary
Be able to identify the early signs of trina (e.g. sleep and mood changes) and to employ coping strategies to minimize acting out
Demonstrate a substantial reduction in both hyperactive pacing on the unit and social intrusiveness
Be able to identify the early signs of trina (e.g. sleep and mood changes) and to employ coping strategies to minimize acting out
State a reason daily why adherence to mood stabilizers is necessary
Be able to identify the early signs of trina (e.g. sleep and mood changes) and to employ coping strategies to minimize acting out
State a reason daily why adherence to mood stabilizers is necessary
Be able to identify the early signs of trina (e.g. sleep and mood changes) and to employ coping strategies to minimize acting out
Exhibit a substantial reduction in elated/angry acting out, and pressured speech that prevents mutual conversation
State a reason daily why adherence to mood stabilizers is necessary
Exhibit a substantial reduction in elated/angry acting out, and pressured speech that prevents mutual conversation
Be able to identify the early signs of trina (e.g. sleep and mood changes) and to employ coping strategies to minimize acting out
Demonstrate a substantial reduction in both hyperactive pacing on the unit and social intrusiveness
Be able to identify the early signs of trina (e.g. sleep and mood changes) and to employ coping strategies to minimize acting out
Exhibit a substantial reduction in elated/angry acting out, and pressured speech that prevents mutual conversation
Be able to identify the early signs of trina (e.g. sleep and mood changes) and to employ coping strategies to minimize acting out
Be able to identify the early signs of trina (e.g. sleep and mood changes) and to employ coping strategies to minimize acting out
Demonstrate a substantial reduction in both hyperactive pacing on the unit and social intrusiveness
Be able to identify the early signs of trina (e.g. sleep and mood changes) and to employ coping strategies to minimize acting out
Exhibit a substantial reduction in elated/angry acting out, and pressured speech that prevents mutual conversation
Exhibit a substantial reduction in elated/angry acting out, and pressured speech that prevents mutual conversation
Be able to identify the early signs of trina (e.g. sleep and mood changes) and to employ coping strategies to minimize acting out

## 2021-04-07 NOTE — BH INPATIENT PSYCHIATRY PROGRESS NOTE - NSTXPROBDISORG_PSY_ALL_CORE
DISORGANIZATION OF THOUGHT/BEHAVIOR

## 2021-04-07 NOTE — BH INPATIENT PSYCHIATRY PROGRESS NOTE - MSE OPTIONS
Structured MSE

## 2021-04-07 NOTE — BH INPATIENT PSYCHIATRY PROGRESS NOTE - NSTXDISORGDATENEW_PSY_ALL_CORE
19-Feb-2021

## 2021-04-07 NOTE — BH INPATIENT PSYCHIATRY PROGRESS NOTE - NSBHMSEREMMEM_PSY_A_CORE
Impaired
Impaired
Unable to assess
Impaired
Unable to assess
Impaired
Unable to assess
Impaired
Unable to assess
Impaired
Unable to assess
Impaired
Impaired
Unable to assess
Impaired
Unable to assess
Impaired

## 2021-04-07 NOTE — BH INPATIENT PSYCHIATRY PROGRESS NOTE - NSBHMSELANG_PSY_A_CORE
No abnormalities noted
Impaired repetition
Impaired repetition
No abnormalities noted
Impaired repetition
No abnormalities noted

## 2021-04-07 NOTE — BH INPATIENT PSYCHIATRY PROGRESS NOTE - NSTXPROBPSYCHO_PSY_ALL_CORE
PSYCHOTIC SYMPTOMS

## 2021-04-07 NOTE — BH INPATIENT PSYCHIATRY PROGRESS NOTE - NSTXDCOTHRGOAL_PSY_ALL_CORE
Pt will show a reduction of disorganization and engage meaningfully with writer to identify a safe discharge plan. Pt will comply with recommended tx plan and medications for 5 days, identify 2 benefits for adhering to tx.
Pt will comply with recommended tx plan and medication for 5 dayss, identify 2 benefits for adhering to tx.
Pt will comply with recommended tx plan and medication for 5 dayss, identify 2 benefits for adhering to tx.
Pt will show a reduction of disorganization and engage meaningfully with writer to identify a safe discharge plan. Pt will comply with recommended tx plan and medications for 5 days, identify 2 benefits for adhering to tx.
Pt will comply with recommended tx plan and medication for 5 dayss, identify 2 benefits for adhering to tx.
Pt will show a reduction of disorganization and engage meaningfully with writer to identify a safe discharge plan. Pt will comply with recommended tx plan and medications for 5 days, identify 2 benefits for adhering to tx.
Pt will show a reduction of disorganization and engage meaningfully with writer to identify a safe discharge plan. Pt will comply with recommended tx plan and medications for 5 days, identify 2 benefits for adhering to tx.
Pt will comply with recommended tx plan and medication for 5 dayss, identify 2 benefits for adhering to tx.
Pt will show a reduction of disorganization and engage meaningfully with writer to identify a safe discharge plan. Pt will comply with recommended tx plan and medications for 5 days, identify 2 benefits for adhering to tx.
Pt will comply with recommended tx plan and medication for 5 dayss, identify 2 benefits for adhering to tx.
Pt will show a reduction of disorganization and engage meaningfully with writer to identify a safe discharge plan. Pt will comply with recommended tx plan and medications for 5 days, identify 2 benefits for adhering to tx.
Pt will comply with recommended tx plan and medication for 5 dayss, identify 2 benefits for adhering to tx.
Pt will show a reduction of disorganization and engage meaningfully with writer to identify a safe discharge plan. Pt will comply with recommended tx plan and medications for 5 days, identify 2 benefits for adhering to tx.
Patient will display improved thought process, treatment compliance, and improved communication/coping skills for disposition plan.
Pt will show a reduction of disorganization and engage meaningfully with writer to identify a safe discharge plan. Pt will comply with recommended tx plan and medications for 5 days, identify 2 benefits for adhering to tx.

## 2021-04-07 NOTE — BH INPATIENT PSYCHIATRY PROGRESS NOTE - NSCGIIMPROVESX_PSY_ALL_CORE
3 = Minimally improved - slightly better with little or no clinically meaningful reduction of symptoms.  Represents very little change in basic clinical status, level of care, or functional capacity.
4 = No change - symptoms remain essentially unchanged
3 = Minimally improved - slightly better with little or no clinically meaningful reduction of symptoms.  Represents very little change in basic clinical status, level of care, or functional capacity.
3 = Minimally improved - slightly better with little or no clinically meaningful reduction of symptoms.  Represents very little change in basic clinical status, level of care, or functional capacity.
6 = Much worse - clinically significant increase in symptoms and diminished functioning
6 = Much worse - clinically significant increase in symptoms and diminished functioning
4 = No change - symptoms remain essentially unchanged
3 = Minimally improved - slightly better with little or no clinically meaningful reduction of symptoms.  Represents very little change in basic clinical status, level of care, or functional capacity.
4 = No change - symptoms remain essentially unchanged
3 = Minimally improved - slightly better with little or no clinically meaningful reduction of symptoms.  Represents very little change in basic clinical status, level of care, or functional capacity.
3 = Minimally improved - slightly better with little or no clinically meaningful reduction of symptoms.  Represents very little change in basic clinical status, level of care, or functional capacity.
4 = No change - symptoms remain essentially unchanged
3 = Minimally improved - slightly better with little or no clinically meaningful reduction of symptoms.  Represents very little change in basic clinical status, level of care, or functional capacity.
4 = No change - symptoms remain essentially unchanged
4 = No change - symptoms remain essentially unchanged
3 = Minimally improved - slightly better with little or no clinically meaningful reduction of symptoms.  Represents very little change in basic clinical status, level of care, or functional capacity.
6 = Much worse - clinically significant increase in symptoms and diminished functioning
3 = Minimally improved - slightly better with little or no clinically meaningful reduction of symptoms.  Represents very little change in basic clinical status, level of care, or functional capacity.
6 = Much worse - clinically significant increase in symptoms and diminished functioning
3 = Minimally improved - slightly better with little or no clinically meaningful reduction of symptoms.  Represents very little change in basic clinical status, level of care, or functional capacity.
3 = Minimally improved - slightly better with little or no clinically meaningful reduction of symptoms.  Represents very little change in basic clinical status, level of care, or functional capacity.
6 = Much worse - clinically significant increase in symptoms and diminished functioning
3 = Minimally improved - slightly better with little or no clinically meaningful reduction of symptoms.  Represents very little change in basic clinical status, level of care, or functional capacity.
6 = Much worse - clinically significant increase in symptoms and diminished functioning
3 = Minimally improved - slightly better with little or no clinically meaningful reduction of symptoms.  Represents very little change in basic clinical status, level of care, or functional capacity.
6 = Much worse - clinically significant increase in symptoms and diminished functioning
4 = No change - symptoms remain essentially unchanged
6 = Much worse - clinically significant increase in symptoms and diminished functioning
3 = Minimally improved - slightly better with little or no clinically meaningful reduction of symptoms.  Represents very little change in basic clinical status, level of care, or functional capacity.
4 = No change - symptoms remain essentially unchanged
3 = Minimally improved - slightly better with little or no clinically meaningful reduction of symptoms.  Represents very little change in basic clinical status, level of care, or functional capacity.
3 = Minimally improved - slightly better with little or no clinically meaningful reduction of symptoms.  Represents very little change in basic clinical status, level of care, or functional capacity.

## 2021-04-07 NOTE — BH INPATIENT PSYCHIATRY PROGRESS NOTE - NSBHADMITMEDEDUDETAILS_A_CORE FT
Risk and benefits discussed with patient
Risk and benefits discussed with patient
Patient was informed of risk/benefit of medication, alternative treatment and no treatment.  Patient was educated about side effects of his medications, including EPS, TD.   
Risk and benefits discussed with patient
Risk and benefits discussed
Risk and benefits discussed
Patient was informed of risk/benefit of medication, alternative treatment and no treatment.  Patient was educated about side effects of his medications, including EPS, TD.   
Patient was informed of risk/benefit of medication, alternative treatment and no treatment.  Patient was educated about side effects of his medications, including EPS, TD.   
Risk and benefits discussed
Patient was informed of risk/benefit of medication, alternative treatment and no treatment.  Patient was educated about side effects of his medications, including EPS, TD.   
Risk and benefits discussed
Risk and benefits discussed with patient
Risk and benefits discussed with patient
Risk and benefits discussed
Risk and benefits discussed with patient
Risk and benefits discussed
Risk and benefits discussed with patient
Risk and benefits discussed with patient
Risk and benefits discussed
Risk and benefits discussed with patient
Patient was informed of risk/benefit of medication, alternative treatment and no treatment.  Patient was educated about side effects of his medications, including EPS, TD.   
Risk and benefits discussed with patient
Risk and benefits discussed with patient
Risk and benefits discussed
Risk and benefits discussed
Risk and benefits discussed with patient
Risk and benefits discussed
Risk and benefits discussed
Risk and benefits discussed with patient
Risk and benefits discussed
Risk and benefits discussed with patient
Risk and benefits discussed
Risk and benefits discussed with patient
Risk and benefits discussed
Risk and benefits discussed with patient

## 2021-04-07 NOTE — BH INPATIENT PSYCHIATRY PROGRESS NOTE - NSBHFUPINTERVALCCFT_PSY_A_CORE
Psychosis  Waiting on bed at [place]
Psychosis and disorganization  Waiting on State transfer
Psychotic decompensation
Psychosis and disorganization
Psychotic decompensation
Psychosis  Waiting on bed at [place]
Psychosis  Waiting on bed at [place]
Psychosis  Waiting on bed at [employer]
Psychosis and disorganization
Psychosis  Waiting on bed at [place]
Psychotic decompensation
Psychosis  Waiting on bed at [place]
Psychotic decompensation
Psychosis and disorganization
Patient seen for follow up for psychosis.
Psychosis  Waiting on bed at [place]
Patient seen for follow up for psychosis.
Psychosis  Waiting on bed at [place]
Psychosis  Waiting on bed at [place]
Psychosis  Waiting on bed at [place]
Psychosis  Waiting on bed at [place]
Psychosis  Waiting on bed at [place]
Disorganization, psychotic decompensation
Psychosis  Waiting on bed at [place]
Patient seen for follow up for psychosis.
Psychosis and disorganization  Waiting on State transfer
Aggitation
Psychosis and disorganization
Psychosis  Waiting on bed at [place]
Psychosis  Waiting on bed at [place]
Psychotic decompensation
Psychosis  State transfer to [place]
Psychosis and disorganization
Psychosis  Waiting on bed at [place]
Psychosis and disorganization
Psychotic decompensation
Psychosis and disorganization  Waiting on State transfer
Psychosis and disorganization
Psychotic decompensation
Psychosis and disorganization
Psychotic decompensation
Psychosis  Waiting on bed at [place]
Psychosis and disorganization
Discharge day management: State transfer to [place]  
Psychosis  Waiting on bed at [place]
Psychosis and disorganization
Psychosis and disorganization
Psychosis and disorganization  Waiting on State transfer
Psychosis  Waiting on bed at [place]
Psychosis and disorganization
Psychosis and disorganization
Psychosis  Waiting on bed at [place]
Psychosis and disorganization

## 2021-04-07 NOTE — BH INPATIENT PSYCHIATRY PROGRESS NOTE - NSBHMSEHYG_PSY_A_CORE
Plan  1. Stop all NSAIDS, herbal supplements and vitamins for 7 days.  2. NPO as per [facility] instructions.  3. Take the following medications ( Synthroid ) with small sips of water on the morning of your procedure/surgery.  4. Use EZ sponges as directed  5. Labs, EKG, CXR as per surgeon. COVID test on 09/20/2020, pt instructed.  6. PMD visit for optimization prior to surgery as per surgeon  7. Advised to quarantine prior to surgery
Poor
Fair
Poor
Fair
Other
Fair
Other
Good
Good
Fair
Other
Other
Poor
Fair
Poor
Other
Other
Good
Other
Good
Other
Good
Other
Other
Good
Fair
Good
Other
Good
Other
Good
Fair
Fair
Good
Other
Fair
Good
Good
Other
Good
Other
Other
Good
Good
Other
Good
Other
Other
Fair
Good
Good
Fair
Good
Other
Fair
Other
Fair
Other
Fair

## 2021-04-07 NOTE — BH INPATIENT PSYCHIATRY PROGRESS NOTE - NSTXMEDICDATENEW_PSY_ALL_CORE
19-Feb-2021
17-Mar-2021
19-Feb-2021
17-Mar-2021
19-Feb-2021
19-Feb-2021
17-Mar-2021
19-Feb-2021
17-Mar-2021
19-Feb-2021
17-Mar-2021
19-Feb-2021
17-Mar-2021
17-Mar-2021
19-Feb-2021
17-Mar-2021
17-Mar-2021
19-Feb-2021
17-Mar-2021
17-Mar-2021
19-Feb-2021
19-Feb-2021
17-Mar-2021
19-Feb-2021
17-Mar-2021
19-Feb-2021
17-Mar-2021
19-Feb-2021
17-Mar-2021

## 2021-04-07 NOTE — BH INPATIENT PSYCHIATRY PROGRESS NOTE - NSTXDCOTHRDATETRGT_PSY_ALL_CORE
17-Feb-2021
03-Feb-2021
17-Mar-2021
24-Feb-2021
24-Feb-2021
03-Mar-2021
10-Feb-2021
31-Mar-2021
21-Jan-2021
07-Apr-2021
24-Feb-2021
03-Feb-2021
27-Jan-2021
31-Mar-2021
27-Jan-2021
24-Mar-2021
24-Mar-2021
27-Jan-2021
27-Jan-2021
21-Jan-2021
03-Mar-2021
06-Apr-2021
03-Feb-2021
03-Mar-2021
17-Mar-2021
21-Jan-2021
10-Feb-2021
24-Mar-2021
24-Mar-2021
21-Jan-2021
03-Mar-2021
17-Mar-2021
31-Mar-2021
24-Feb-2021
03-Mar-2021
17-Mar-2021
24-Mar-2021
07-Apr-2021
10-Feb-2021
03-Mar-2021
21-Jan-2021
10-Feb-2021
21-Jan-2021
24-Feb-2021
31-Mar-2021
10-Feb-2021
17-Mar-2021
27-Jan-2021
03-Feb-2021
07-Apr-2021
24-Mar-2021
07-Apr-2021
17-Feb-2021
03-Feb-2021
17-Mar-2021
17-Mar-2021
07-Apr-2021
17-Feb-2021
24-Feb-2021

## 2021-04-07 NOTE — BH DISCHARGE NOTE NURSING/SOCIAL WORK/PSYCH REHAB - NSCDUDCCRISIS_PSY_A_CORE
Counts include 234 beds at the [facility] Well  1 (306) Counts include 234 beds at the [facility]-WELL ([phone])  Text "WELL" to [phone]  Website: www.[employer] 1 (248) 171-TSND (5185) Website: www.safehorizon.org/.National Suicide Prevention Lifeline 2 (294) 968-6041/.  [employer]  1 (891) LIFENET (556-5687)/.  [facility]  [address] [place] [phone]   Hours:  Monday through Friday from 9 AM to 3 PM/.  [place] [employer]  Aff[employer] - Veterans Crisis Line  0 (135) 229-7027, Option 1

## 2021-04-07 NOTE — BH INPATIENT PSYCHIATRY PROGRESS NOTE - NSTXMEDICDATETRGT_PSY_ALL_CORE
10-Mar-2021
07-Apr-2021
10-Feb-2021
03-Feb-2021
10-Mar-2021
11-Feb-2021
18-Feb-2021
10-Mar-2021
21-Jan-2021
11-Feb-2021
24-Feb-2021
24-Mar-2021
24-Feb-2021
10-Mar-2021
04-Mar-2021
07-Apr-2021
07-Apr-2021
21-Jan-2021
21-Jan-2021
28-Jan-2021
17-Feb-2021
28-Jan-2021
31-Mar-2021
28-Jan-2021
04-Feb-2021
04-Mar-2021
06-Apr-2021
03-Mar-2021
10-Mar-2021
24-Mar-2021
24-Mar-2021
28-Jan-2021
04-Feb-2021
10-Mar-2021
04-Feb-2021
28-Jan-2021
04-Feb-2021
07-Apr-2021
07-Apr-2021
10-Mar-2021
24-Mar-2021
24-Mar-2021
18-Feb-2021
18-Feb-2021
31-Mar-2021
10-Mar-2021
17-Feb-2021
31-Mar-2021
18-Feb-2021
11-Feb-2021
24-Mar-2021
03-Mar-2021
18-Feb-2021
10-Mar-2021
31-Mar-2021
18-Feb-2021
10-Mar-2021
04-Mar-2021

## 2021-04-07 NOTE — BH INPATIENT PSYCHIATRY PROGRESS NOTE - GENERAL APPEARANCE
Well developed
No deformities present
Well developed
No deformities present
Well developed
Well developed
No deformities present
Well developed
Well developed
No deformities present
Well developed
No deformities present
Well developed
No deformities present
No deformities present
Well developed
No deformities present
Well developed
Well developed/Other
No deformities present
Well developed
Well developed
Well developed/Other
Well developed
No deformities present
Well developed
Well developed
No deformities present
Well developed
No deformities present
Well developed
No deformities present
No deformities present
Well developed

## 2021-04-07 NOTE — BH INPATIENT PSYCHIATRY PROGRESS NOTE - NSBHFUPINTERVALHXFT_PSY_A_CORE
Patient seen individually for discharge day management. The patient's case has been reviewed with the treatment team.  Spent performing discharge risk assessment, safety planning, performing clinical eval, planning for dc and providing psychoeducation about meds, sx and aftercare. On exam today the patient was cooperative, patient reports feeling “anxious” about transfer to [facility]. Patient’s symptoms of psychotic process have lessened and she is at baseline.  Denies suicidal thoughts no plan or intent. Denies HI/AVH, delusions, or other symptoms of psychotic process are reported at this time.  She showed some improvement in her symptoms, with a gradual reduction of her internal preoccupation, paranoia, aggression, and thought disorganization. She has remained compliant with medications, no adverse effects noted.  Medication teaching, risk/benefits of PALACIOS discussed.  Patient consented to PALACIOS, received Prolixin Deconate 50mg during course of treatment, next dosing due on 4/23/21. Overall remained in good behavioral control.  She denied any suicidal or homicidal ideations throughout hospitalization. Improved level of functioning is evident, with appropriate interaction with others.   She will be discharged today, transferred to [facility].   Patient left under no distress.

## 2021-04-07 NOTE — BH INPATIENT PSYCHIATRY PROGRESS NOTE - NSTXPROBDCOTHR_PSY_ALL_CORE
DISCHARGE ISSUE - OTHER

## 2021-04-07 NOTE — BH INPATIENT PSYCHIATRY PROGRESS NOTE - NSTXDISORGGOAL_PSY_ALL_CORE
Will demonstrate the ability to maintain reality orientation and communicate clearly with others during 2 conversations with staff daily
Other...
Other...
Will demonstrate purposeful and predictable thoughts/behaviors by making a request
Will identify 2 coping skills that assist in organizing
Will make at least 3 goal and reality oriented statements during therapy
Will verbalize 1 strategy to successfully cope with disturbed thinking
Will demonstrate the ability to maintain reality orientation and communicate clearly with others during 2 conversations with staff daily
Other...
Will demonstrate the ability to maintain reality orientation and communicate clearly with others during 2 conversations with staff daily
Will demonstrate the ability to maintain reality orientation and communicate clearly with others during 2 conversations with staff daily
Will demonstrate purposeful and predictable thoughts/behaviors by making a request
Other...
Will demonstrate the ability to maintain reality orientation and communicate clearly with others during 2 conversations with staff daily
Will demonstrate the ability to maintain reality orientation and communicate clearly with others during 2 conversations with staff daily
Will verbalize 1 strategy to successfully cope with disturbed thinking
Will demonstrate the ability to maintain reality orientation and communicate clearly with others during 2 conversations with staff daily
Will demonstrate the ability to maintain reality orientation and communicate clearly with others during 2 conversations with staff daily
Will verbalize 1 strategy to successfully cope with disturbed thinking
Will demonstrate the ability to maintain reality orientation and communicate clearly with others during 2 conversations with staff daily
Will demonstrate the ability to maintain reality orientation and communicate clearly with others during 2 conversations with staff daily
Other...
Will make at least 3 goal and reality oriented statements during therapy
Will demonstrate the ability to maintain reality orientation and communicate clearly with others during 2 conversations with staff daily
Will make at least 3 goal and reality oriented statements during therapy
Will demonstrate the ability to maintain reality orientation and communicate clearly with others during 2 conversations with staff daily
Other...
Will demonstrate purposeful and predictable thoughts/behaviors by making a request
Will identify 2 coping skills that assist in organizing
Will make at least 3 goal and reality oriented statements during therapy
Will demonstrate the ability to maintain reality orientation and communicate clearly with others during 2 conversations with staff daily
Will make at least 3 goal and reality oriented statements during therapy
Will demonstrate the ability to maintain reality orientation and communicate clearly with others during 2 conversations with staff daily
Will identify 2 coping skills that assist in organizing
Will verbalize 1 strategy to successfully cope with disturbed thinking
Other...
Will identify 2 coping skills that assist in organizing
Will demonstrate the ability to maintain reality orientation and communicate clearly with others during 2 conversations with staff daily
Will verbalize 1 strategy to successfully cope with disturbed thinking
Will demonstrate the ability to maintain reality orientation and communicate clearly with others during 2 conversations with staff daily
Will demonstrate the ability to maintain reality orientation and communicate clearly with others during 2 conversations with staff daily
Will verbalize 1 strategy to successfully cope with disturbed thinking
Other...
Will demonstrate purposeful and predictable thoughts/behaviors by making a request
Other...
Will demonstrate the ability to maintain reality orientation and communicate clearly with others during 2 conversations with staff daily
Will demonstrate the ability to maintain reality orientation and communicate clearly with others during 2 conversations with staff daily
Other...
Will identify 2 coping skills that assist in organizing
Will demonstrate the ability to maintain reality orientation and communicate clearly with others during 2 conversations with staff daily
Other...
Will demonstrate the ability to maintain reality orientation and communicate clearly with others during 2 conversations with staff daily
Other...

## 2021-04-07 NOTE — BH INPATIENT PSYCHIATRY PROGRESS NOTE - NSBHMSEEYE_PSY_A_CORE
Fair
Fair
Good
Good
Poor
Fair
Good
Fair
Fair
Good
Good
Fair
Good
Other
Fair
Good
Good
Poor
Fair
Good
Poor
Fair
Fair
Poor
Good
Poor
Fair
Good
Fair
Poor
Fair
Poor
Fair
Poor
Fair
Poor
Fair
Good
Poor
Poor
Fair
Good
Fair
Other
Fair
Poor
Fair
Good
Fair
Good
Poor
Fair
Good

## 2021-04-07 NOTE — BH INPATIENT PSYCHIATRY PROGRESS NOTE - NSTREATMENTCERTY_PSY_ALL_CORE

## 2021-04-07 NOTE — BH INPATIENT PSYCHIATRY PROGRESS NOTE - NSBHFUPMEDSE_PSY_A_CORE
None known

## 2021-04-07 NOTE — BH INPATIENT PSYCHIATRY PROGRESS NOTE - NSTXMANICPROGRES_PSY_ALL_CORE
No Change
Improving
No Change
Improving
Met - goal discontinued
Improving
No Change
Improving
Met - goal discontinued
Improving
Improving
No Change
Improving
No Change
Improving
Improving
Met - goal discontinued
No Change
Improving
No Change
Improving
No Change
Improving
Met - goal discontinued
Improving
Met - goal discontinued
No Change
No Change
Improving

## 2021-04-07 NOTE — BH DISCHARGE NOTE NURSING/SOCIAL WORK/PSYCH REHAB - PATIENT PORTAL LINK FT
You can access the FollowMyHealth Patient Portal offered by [facility] by registering at the following website: [email or web address]. By joining [facility]’s FollowMyHealth portal, you will also be able to view your health information using other applications (apps) compatible with our system.

## 2021-04-07 NOTE — BH INPATIENT PSYCHIATRY PROGRESS NOTE - NSTXPSYCHOGOAL_PSY_ALL_CORE
Will be able to report experiencing hallucinations to staff
Will report hearing a voice only momentarily a few times a day instead of all day
Will ask for PRN medication to manage hallucinations
Will be able to report experiencing hallucinations to staff
Will ask for PRN medication to manage hallucinations
Will be able to report experiencing hallucinations to staff
Will be able to report experiencing hallucinations to staff
Will report using relaxation skills 3 times a day to reduce anxiety about delusions/hallucinations
Will be able to report experiencing hallucinations to staff
Will report hearing a voice only momentarily a few times a day instead of all day
Will be able to report experiencing hallucinations to staff
Will ask for PRN medication to manage hallucinations
Will be able to report experiencing hallucinations to staff
Will be able to report experiencing hallucinations to staff
Will report hearing a voice only momentarily a few times a day instead of all day
Will identify 2 coping skills that assist with focus on reality
Will be able to report experiencing hallucinations to staff
Will report hearing a voice only momentarily a few times a day instead of all day
Will ask for PRN medication to manage hallucinations
Will be able to report experiencing hallucinations to staff
Will report hearing a voice only momentarily a few times a day instead of all day
Will be able to report experiencing hallucinations to staff
Will report hearing a voice only momentarily a few times a day instead of all day
Will report using relaxation skills 3 times a day to reduce anxiety about delusions/hallucinations
Will be able to report experiencing hallucinations to staff
Will ask for PRN medication to manage hallucinations
Will report using relaxation skills 3 times a day to reduce anxiety about delusions/hallucinations
Will be able to report experiencing hallucinations to staff
Will report hearing a voice only momentarily a few times a day instead of all day
Will be able to report experiencing hallucinations to staff
Will be able to report experiencing hallucinations to staff
Will ask for PRN medication to manage hallucinations
Will be able to report experiencing hallucinations to staff
Will identify 2 coping skills that assist with focus on reality
Will be able to report experiencing hallucinations to staff
Will report hearing a voice only momentarily a few times a day instead of all day
Will identify 2 coping skills that assist with focus on reality
Will ask for PRN medication to manage hallucinations
Will report hearing a voice only momentarily a few times a day instead of all day
Will be able to report experiencing hallucinations to staff
Will ask for PRN medication to manage hallucinations
Will identify 2 coping skills that assist with focus on reality
Will identify 2 coping skills that assist with focus on reality
Will ask for PRN medication to manage hallucinations
Will be able to report experiencing hallucinations to staff
Will report using relaxation skills 3 times a day to reduce anxiety about delusions/hallucinations
Will ask for PRN medication to manage hallucinations

## 2021-05-05 NOTE — ED ADULT NURSE NOTE - COVID-19 RESULT DATE/TIME
Consent: The patient's verbal consent was obtained including but not limited to risks of crusting, scabbing, blistering, scarring, darker or lighter pigmentary change, recurrence, incomplete removal and infection. 02-Jun-2020 16:26

## 2021-05-10 NOTE — ED ADULT NURSE NOTE - NSSEPSISSUSPECTED_ED_A_ED
Received request via: Patient    Was the patient seen in the last year in this department? Yes    Does the patient have an active prescription (recently filled or refills available) for medication(s) requested? No  
No
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
## 2021-06-09 NOTE — ED BEHAVIORAL HEALTH ASSESSMENT NOTE - INSIGHT (REGARDING PSYCHIATRIC ILLNESS)
"Anticoagulation Management    Unable to reach Eileen today.    Today's INR result of 3.2 is Therapeutic (goal INR of 2.5-3.5).     Follow up required to wrote \"blood in bowel movement\" on template.    Left message to continue current dose of warfarin 2.5 mg tonight.       ACN to follow up    Kristie Yanez RN              "
ANTICOAGULATION  MANAGEMENT    Assessment     Today's INR result of 3.2 is Therapeutic (goal INR of 2.5-3.5)        Warfarin taken as previously instructed    No new diet changes affecting INR    No new medication/supplements affecting INR    Small amount of bright red blood after a bowel movement. Patient states no further issues and she has a history of hemorrhoids    Previous INR was Therapeutic    Plan:     Spoke with Elieen regarding INR result and instructed:     Warfarin Dosing Instructions:  Continue current warfarin dose 5 mg daily on Sun/Tues; and 2.5 mg daily rest of week  (0 % change)    Instructed patient to follow up no later than: 4 weeks    Education provided: importance of therapeutic range    Eileen verbalizes understanding and agrees to warfarin dosing plan.    Instructed to call the [facility] for any changes, questions or concerns. (#[phone])   ?   Kristie Yanez RN    Subjective/Objective:      Eileen Donald, a 61 y.o. female is on warfarin.     Eileen reports:     Home warfarin dose: as updated on anticoagulation calendar per template     Missed doses: No     Medication changes:  No     S/S of bleeding or thromboembolism:  Yes Small amount of bright red blood after a bowel movement when wiping     New Injury or illness:  No     Changes in diet or alcohol consumption:  No     Upcoming surgery, procedure or cardioversion:  No    Anticoagulation Episode Summary     Current INR goal:   2.5-3.5   TTR:   59.8 % (1 y)   Next INR check:   7/29/2020   INR from last check:   3.20 (7/1/2020)   Weekly max warfarin dose:      Target end date:      INR check location:      Preferred lab:      Send INR reminders to:   [facility]    Indications    Heart valve replaced [Z95.2]           Comments:            Anticoagulation Care Providers     Provider Role Specialty Phone number    Tito Salazar MD Referring Family Medicine [phone]        
Other

## 2021-06-15 NOTE — ED PROVIDER NOTE - PSYCHIATRIC PERCEPTION
Initial Anesthesia Post-op Note    Patient: Nahum Giraldo  Procedure(s) Performed: EGD  Anesthesia type: No value filed.    Vitals Value Taken Time   Temp 36 °C (96.8 °F) 06/15/21 1259   Pulse 88 06/15/21 1259   Resp 25 06/15/21 1259   SpO2 98 % 06/15/21 1259   /67 06/15/21 1259         Patient Location: PACU Phase 1  Post-op Vital Signs:stable  Level of Consciousness: awake and alert  Respiratory Status: spontaneous ventilation  Cardiovascular stable  Hydration: euvolemic  Pain Management: adequately controlled  Handoff: Handoff to receiving nurse was performed and questions were answered  Vomiting: none  Nausea: None  Airway Patency:patent  Post-op Assessment: no complications and patient tolerated procedure well with no complications      No complications documented.  
normal

## 2021-06-24 NOTE — ED PROVIDER NOTE - PRINCIPAL DIAGNOSIS
Unlikely covid.  Anticipate negative covid test.  If negative, then you need to follow up with Dr. Claros within 3 business days or establish with local primary MD.  You should see GI specialist.  Increase omeprazole to 40 mg a day on EMPTY stomach.  If worse in the interim, then go to the ER (fever, dizziness, vomiting, abdominal pain, chest pain, or shortness of breath.    Recent labs for thyroid and liver and electrolytes and kidneys all relatively normal May 21.    You had your vaccine and have not had any covid exposures.  You should be able to see your primary MD.   Ovarian cyst

## 2021-07-06 NOTE — BH PSYCHOLOGY - GROUP THERAPY NOTE - NSPSYCHOLGRPCOGINT_PSY_A_CORE FT
Impression: Other specified postprocedural states: Z98.890. Plan: s/p LASIK OU, monitor. Cognitive/behavioral therapy, Emotion regulation/coping skills taught, Psychoeducation

## 2021-08-10 NOTE — ED BEHAVIORAL HEALTH ASSESSMENT NOTE - VIOLENCE PROTECTIVE FACTORS:
Quality 431: Preventive Care And Screening: Unhealthy Alcohol Use - Screening: Patient screened for unhealthy alcohol use using a single question and scores less than 2 times per year
Quality 111:Pneumonia Vaccination Status For Older Adults: Pneumococcal Vaccination Previously Received
Detail Level: Detailed
Quality 226: Preventive Care And Screening: Tobacco Use: Screening And Cessation Intervention: Patient screened for tobacco use and is an ex/non-smoker
Sobriety/Residential stability/Engagement in treatment

## 2021-08-27 NOTE — ED BEHAVIORAL HEALTH ASSESSMENT NOTE - NS ED BHA HOMICIDALITY PRESENT CURRENT IDEATION
lov- 8/10/21    Testosterone/H & H/psa - 2/4/21    Testosterone last filled 270 g with 1 refill on 7/13/21- should have a refill left    Called our pharmacy. He still has refills left. Pharmacy will let compounding pharmacy know patient is requesting refill.   None known

## 2021-08-27 NOTE — ED ADULT TRIAGE NOTE - NS ED NURSE AMBULANCES
[facility]
Size Of Lesion In Cm (Optional): 0
Detail Level: Detailed

## 2021-10-06 ENCOUNTER — EMERGENCY (EMERGENCY)
Facility: HOSPITAL | Age: 41
LOS: 1 days | Discharge: HOME | End: 2021-10-06
Admitting: EMERGENCY MEDICINE
Payer: MEDICAID

## 2021-10-06 VITALS
TEMPERATURE: 99 F | SYSTOLIC BLOOD PRESSURE: 152 MMHG | RESPIRATION RATE: 18 BRPM | OXYGEN SATURATION: 100 % | DIASTOLIC BLOOD PRESSURE: 92 MMHG | HEART RATE: 90 BPM | HEIGHT: 66 IN

## 2021-10-06 PROCEDURE — 99283 EMERGENCY DEPT VISIT LOW MDM: CPT
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
## 2021-10-06 NOTE — ED BEHAVIORAL HEALTH NOTE - BEHAVIORAL HEALTH NOTE
Worker called patient’s residence [facility] ([phone]) multiple times and line was busy. Worker then called  Supriya Chakraborty ([phone]) and  Devora Yin ([phone]) and left messages for call back.
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
## 2021-10-06 NOTE — ED PROVIDER NOTE - PATIENT PORTAL LINK FT
You can access the FollowMyHealth Patient Portal offered by [facility] by registering at the following website: [email or web address]. By joining [employer]’s FollowMyHealth portal, you will also be able to view your health information using other applications (apps) compatible with our system.

## 2021-10-06 NOTE — ED PROVIDER NOTE - PROGRESS NOTE DETAILS
NP Bereczky- On reeval, patient is calm, alert and oriented x 3,and denies SI, HI, hallucinations, no acute s/s of acute tirna or florid psychosis, no s/s of intoxication. Pt did not test limits and maintained appropriate boundaries, while in BH.

## 2021-10-07 ENCOUNTER — EMERGENCY (EMERGENCY)
Facility: HOSPITAL | Age: 41
LOS: 1 days | Discharge: HOME | End: 2021-10-07
Admitting: STUDENT IN AN ORGANIZED HEALTH CARE EDUCATION/TRAINING PROGRAM
Payer: MEDICAID

## 2021-10-07 VITALS
OXYGEN SATURATION: 100 % | HEART RATE: 90 BPM | RESPIRATION RATE: 20 BRPM | DIASTOLIC BLOOD PRESSURE: 77 MMHG | TEMPERATURE: 99 F | SYSTOLIC BLOOD PRESSURE: 144 MMHG | HEIGHT: 66 IN

## 2021-10-07 PROCEDURE — 99283 EMERGENCY DEPT VISIT LOW MDM: CPT

## 2021-10-07 PROCEDURE — 90792 PSYCH DIAG EVAL W/MED SRVCS: CPT

## 2021-10-07 RX ORDER — QUETIAPINE FUMARATE 200 MG/1
25 TABLET, FILM COATED ORAL ONCE
Refills: 0 | Status: COMPLETED | OUTPATIENT
Start: 2021-10-07 | End: 2021-10-07

## 2021-10-07 RX ORDER — DIPHENHYDRAMINE HCL 50 MG
25 CAPSULE ORAL ONCE
Refills: 0 | Status: COMPLETED | OUTPATIENT
Start: 2021-10-07 | End: 2021-10-07

## 2021-10-07 RX ADMIN — Medication 25 MILLIGRAM(S): at 17:28

## 2021-10-07 RX ADMIN — QUETIAPINE FUMARATE 25 MILLIGRAM(S): 200 TABLET, FILM COATED ORAL at 17:28
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
## 2021-10-07 NOTE — ED BEHAVIORAL HEALTH ASSESSMENT NOTE - OTHER
other residents chronically limited but improved than past ED visits dislike of residence upset with home chronically limited
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
## 2021-10-07 NOTE — ED BEHAVIORAL HEALTH ASSESSMENT NOTE - DESCRIPTION
Linear, no prns were given. did not exhibit any agitation or aggression.   Vital Signs Last 24 Hrs  T(C): 37.1 (07 Oct 2021 14:13), Max: 37.1 (07 Oct 2021 14:13)  T(F): 98.8 (07 Oct 2021 14:13), Max: 98.8 (07 Oct 2021 14:13)  HR: 90 (07 Oct 2021 14:13) (90 - 90)  BP: 144/77 (07 Oct 2021 14:13) (144/77 - 144/77)  BP(mean): --  RR: 20 (07 Oct 2021 14:13) (20 - 20)  SpO2: 100% (07 Oct 2021 14:13) (100% - 100%) GERD, Asthma, gallbladder calculus without cholecystitis currently lives in [facility], [facility], reports no contact with family, currently unemployed.

## 2021-10-07 NOTE — ED ADULT NURSE REASSESSMENT NOTE - NS ED NURSE REASSESS COMMENT FT1
pt calm & cooperative denies si/hi/avh presently cleared by psych d/c by provider pt verbalizing understanding of d/c instructions.

## 2021-10-07 NOTE — ED BEHAVIORAL HEALTH ASSESSMENT NOTE - RISK ASSESSMENT
Moderate Acute Suicide Risk Pt is at a chronic risk of harm to self and others given hx of chronic aggression, agitation and poor frustration tolerance when she does not like her current situation. Risk factors include multiple psych hospitalizations, poor frustration tolerance, hx of aggression but currently patient is not exhibiting any signs and symptoms of psychosis, she is more insightful, remorseful for behavior, motivated for treatment, adamantly denying any AH/VH, denying any si/i/p, hi/i/p and Is denying all substance use.
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
## 2021-10-07 NOTE — ED BEHAVIORAL HEALTH NOTE - BEHAVIORAL HEALTH NOTE
Per provider, Dr. Petit, patient is cleared and is able to return to their previous residence ,[facility] .  has spoken to FORD Childers ([phone]),  confirmed that patient’s mode of transportation is [employer] and that patient travels INDEPENDENTLY. Clinical provider is in agreement with [employer]AXI back to group home. Verbal huddle regarding coordination of care completed with interdisciplinary team.  Worker attempted to book taxi with [facility] site but patient is not eligible. Worker informed nurse Liseth to inform pt’s  of zulma ineligibility. [facility] unable to arrange car services. Worker arranged for taxi through [employer] account 50. Worker arranged for taxi back to [facility]. 100 ADDRESS : [address], Booking # Booking #[id number]   Price:  USD 10.68    Worker explained patient is cleared for discharge.

## 2021-10-07 NOTE — ED BEHAVIORAL HEALTH ASSESSMENT NOTE - PAST PSYCHOTROPIC MEDICATION
numerous - see CVM,   lithium 900 milliGRAM(s) Oral at bedtime  OLANZapine 15 milliGRAM(s) Oral at bedtime  prolixin Dec 50mg IM, received 12/22/20 at [facility], ?pt states is due 1/26/21  Albuterol prn

## 2021-10-07 NOTE — ED BEHAVIORAL HEALTH ASSESSMENT NOTE - SUMMARY
The patient is a 41-year-old, single,  woman, non caregiver, unemployed, domiciled on [place] grounds building [facility]; with past psychiatric history of bipolar disorder, Borderline Personality Disorder, hx of cannabis abuse, and multiple inpatient psychiatric hospitalizations (>20, most recently inpatient [facility] but was d/c on 3 day letter to [facility]), with hx of recurrent and chronic suicidal gestures and suicidal ideation; with a history of property destruction when upset, past legal issues (none current), no access to weapons; with PMH of GERD & asthma; BIB EMS from [facility] called by staff for agitation.   Patient at this time is calm and cooperative. She is remorseful for her behavior and admits she engaged in this threatening behavior out of frustration with her housing/financial situation. She was insightful enough to admit that she exhibited poor judgment and is motivated to work with a  to change her housing situation. She denied all AH/VH, did not exhibit any psychotic symptoms, did not appear internally preoccupied, does not appear manic and is adamantly denying any SI/I/P, HI/I/P. She has been adherent with treatment (as marked on [facility] Documents sent with patient), accepted her HS dose of Seroquel and requested Benadryl as well.   At this time she does not meet criteria for inpatient psych hospitalization and is declining voluntary psych hospitalization.
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
## 2021-10-07 NOTE — ED PROVIDER NOTE - PATIENT PORTAL LINK FT
You can access the FollowMyHealth Patient Portal offered by [facility] by registering at the following website: [email or web address]. By joining [employer]s FollowMyHealth portal, you will also be able to view your health information using other applications (apps) compatible with our system.
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
## 2021-10-13 NOTE — ED BEHAVIORAL HEALTH ASSESSMENT NOTE - NS ED BHA PLAN
Treat and Release Xolair Counseling:  Patient informed of potential adverse effects including but not limited to fever, muscle aches, rash and allergic reactions.  The patient verbalized understanding of the proper use and possible adverse effects of Xolair.  All of the patient's questions and concerns were addressed.

## 2021-10-14 ENCOUNTER — EMERGENCY (EMERGENCY)
Facility: HOSPITAL | Age: 41
LOS: 1 days | Discharge: HOME | End: 2021-10-14
Attending: EMERGENCY MEDICINE | Admitting: EMERGENCY MEDICINE
Payer: MEDICAID

## 2021-10-14 VITALS
SYSTOLIC BLOOD PRESSURE: 137 MMHG | HEIGHT: 66 IN | OXYGEN SATURATION: 100 % | TEMPERATURE: 98 F | RESPIRATION RATE: 16 BRPM | HEART RATE: 96 BPM | DIASTOLIC BLOOD PRESSURE: 77 MMHG

## 2021-10-14 PROCEDURE — 99283 EMERGENCY DEPT VISIT LOW MDM: CPT

## 2021-10-14 NOTE — ED PROVIDER NOTE - PHYSICAL EXAMINATION
GEN:   comfortable, in no apparent distress, AOx3  EYES:   PERRL, extra-occular movements intact  RESP:   clear to auscultation bilaterally, non-labored, speaking in full sentences  ABD:   soft, non tender, no guarding  :   no cva tenderness  MSK:   no musculoskeletal tenderness, 5/5 strength, moving all extremities  SKIN:   dry, intact, no rash  NEURO:   AOx3, no focal weakness or loss of sensation, gait normal, GCS 15  PSYCH: calm, cooperative, no evidence of hallucinations, paranoia, intoxication or withdrawal from any substances.

## 2021-10-14 NOTE — ED PROVIDER NOTE - PATIENT PORTAL LINK FT
You can access the FollowMyHealth Patient Portal offered by [facility] by registering at the following website: [email or web address]. By joining [employer]’s FollowMyHealth portal, you will also be able to view your health information using other applications (apps) compatible with our system.
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
## 2021-10-14 NOTE — ED PROVIDER NOTE - CLINICAL SUMMARY MEDICAL DECISION MAKING FREE TEXT BOX
Patient well known to this ED presents to the ED for aggressive behavior.  Medical evaluation performed. There is no clinical evidence of intoxication or any acute medical problem requiring immediate intervention. Patient behavior expected from her baseline intellectual function and chronic psychiatric conditions. No acute psychiatric emergency, however pt is calm cooperative and denies SI and HI and not hallucinating. Will engage SW to obtain collateral from facility and if no concerns will discharge back to facility.
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
## 2021-10-25 NOTE — ED ADULT NURSE NOTE - CCCP TRG CHIEF CMPLNT
Hospital Medicine Daily Progress Note    Date of Service  10/25/2021    Chief Complaint  Idalia HALE is a 80 y.o. female admitted 10/24/2021 with sepsis, ground-level fall, right acromial distal fracture, UTI    Hospital Course  80-year-old female past medical history of COPD, emphysema, urinary incontinence, mitral valve prolapse, prior VT, hypertension, lung cancer, A. fib was transferred from [facility] for sepsis, hypokalemia.  Please refer to H&P of Dr. Hurtado for further details.  On arrival potassium was repleted.  Patient was started on ceftriaxone concern for urinary tract infection, sepsis.  Also noted distal third clavicle fracture with minimal displacement.  Orthopedic was consulted and no surgical intervention was deemed at this time.  Continue pain management.  She will need to follow-up with orthopedic in 2 to 4 weeks and repeat x-ray      Interval Problem Update  Patient sleeping, easy to arouse.vitals stable. Spoke to her sister, Jl. I did provide updates. Pt follows with Dr. Natarajan oncology, [facility] . She is not a candidate for any therapy because she is not strong. She has spinal infection, wound vac. Dr. Hicks follows. Lives on her own in [place]. Daughter and sister have been able to hire a caregiver for evening and morning against her wish. She still have no DPOA, and wants to take decisions about medical condition. She has expressed that she wants everything done to save life, but still not compliant and she is weak. Palliative consult in place     10/25- more awake. Almost A&O x3. She tells me that she feels lot better. Still urine culture pending. Continue current antibiotic. Goal to transition tomorrow to oral antibiotic and transition home with home health.  I did discuss with  Her about goals of care, code status. She still tells me that she wants everything. She is emotional. She states that if all resuscitation does no good there is no need, but then when I tell  that I cannot tell for sure, but most likely the outcomes are futile, she still persist on full code. She refuses placement. She has caregiver and home health.     I have personally seen and examined the patient at bedside. I discussed the plan of care with patient, family, bedside RN,  and orthopedics.    Consultants/Specialty  orthopedics    Code Status  Full Code    Disposition  Patient is not medically cleared.   Anticipate discharge to to be determine .  I have placed the appropriate orders for post-discharge needs.    Review of Systems  Review of Systems   Constitutional: Positive for malaise/fatigue. Negative for chills and fever.   HENT: Negative for sore throat.    Respiratory: Negative for cough and shortness of breath.    Cardiovascular: Negative for chest pain and leg swelling.   Gastrointestinal: Negative for abdominal pain, diarrhea and vomiting.   Genitourinary: Negative for dysuria and urgency.   Musculoskeletal: Positive for back pain.   Neurological: Negative for dizziness and headaches.   Psychiatric/Behavioral: The patient is not nervous/anxious and does not have insomnia.         Physical Exam  Temp:  [36.4 °C (97.5 °F)-36.7 °C (98 °F)] 36.7 °C (98 °F)  Pulse:  [] 99  Resp:  [16-18] 16  BP: (115-118)/(62-74) 118/73  SpO2:  [92 %-94 %] 94 %    Physical Exam  Vitals and nursing note reviewed.   Constitutional:       General: She is not in acute distress.     Appearance: She is ill-appearing.      Comments: Frail,    HENT:      Head: Normocephalic and atraumatic.   Eyes:      General: No scleral icterus.  Cardiovascular:      Rate and Rhythm: Normal rate.      Heart sounds: Murmur heard.     Pulmonary:      Effort: Pulmonary effort is normal. No respiratory distress.      Breath sounds: No wheezing.   Abdominal:      General: There is no distension.      Palpations: Abdomen is soft.      Tenderness: There is no abdominal tenderness.   Musculoskeletal:      Comments: Wound vac on  her back    Skin:     General: Skin is dry.      Coloration: Skin is not jaundiced.   Neurological:      Mental Status: She is disoriented.      Motor: No weakness.   Psychiatric:         Mood and Affect: Mood normal.         Behavior: Behavior normal.         Thought Content: Thought content normal.         Judgment: Judgment normal.         Fluids  No intake or output data in the 24 hours ending 10/25/21 1206    Laboratory  Recent Labs     10/24/21  0715 10/25/21  0432   WBC 11.9* 10.5   RBC 4.07* 4.73   HEMOGLOBIN 11.1* 13.4   HEMATOCRIT 36.9* 42.1   MCV 90.7 89.0   MCH 27.3 28.3   MCHC 30.1* 31.8*   RDW 50.7* 51.1*   PLATELETCT 353 354   MPV 9.0 9.5     Recent Labs     10/24/21  0715 10/25/21  0432   SODIUM 139 139   POTASSIUM 3.7 3.3*   CHLORIDE 106 106   CO2 21 20   GLUCOSE 117* 102*   BUN 10 8   CREATININE 0.61 0.46*   CALCIUM 8.7 9.1     Recent Labs     10/24/21  0715   INR 1.01               Imaging  DX-SHOULDER 2+ RIGHT   Final Result         Acute comminuted and mildly displaced fracture of the distal clavicle. No involvement of the AC joint.           Assessment/Plan  Infection of lumbar spine (HCC)- (present on admission)  Assessment & Plan  She has wound vac in place  Follows with Dr. Hicks  No other records available     Closed nondisplaced fracture of acromial end of right clavicle- (present on admission)  Assessment & Plan  Patient was transferred with a sling, will continue for support.  Pain control.  PT/OT  Right upper extremity neurovascularly intact, range of motion intact  No surgery per ortho  Needs to follow up as OP     Encephalopathy- (present on admission)  Assessment & Plan  In setting of UTI and sepsis  -Treat UTI/sepsis, if her mentation does not fully clear should pursue further work-up.  -vitals and neuro checks q4h  - IVF  - attempt to minimize risk of delirium such as avoiding day time napping and promote night time sleep, monitor for constipation, remove lines/tubing not needed,  avoid early lab draws and vital checks, limit polypharmacy as able, and keep close to the window  10/25- resolved      Chronic pain syndrome- (present on admission)  Assessment & Plan  Methadone morphine on patient's home med list, she is little encephalopathic with her UTI, will hold scheduled narcotics at this time.      Acquired hypothyroidism- (present on admission)  Assessment & Plan  Continue Synthroid 50 mcg daily    Paroxysmal atrial fibrillation (HCC)- (present on admission)  Assessment & Plan  History of, in sinus rhythm at outside facility, EKG ordered.  Continue metoprolol and Xarelto  10/24- resume metoprolol. Pt tachy   10/25- improving. Consider increase metoprolol if still on high side     Sepsis (HCC)- (present on admission)  Assessment & Plan  This is Sepsis Present on admission  SIRS criteria identified on my evaluation include: Tachycardia, with heart rate greater than 90 BPM, Tachypnea, with respirations greater than 20 per minute and Leukocytosis, with WBC greater than 12,000  Source is Urinary  Sepsis protocol initiated  Fluid resuscitation ordered per protocol  IV antibiotics as appropriate for source of sepsis  While organ dysfunction may be noted elsewhere in this problem list or in the chart, degree of organ dysfunction does not meet CMS criteria for severe sepsis    Ceftriaxone ordered, follow up blood and urine culture.   Transition to oral antibiotic once culture available       COPD (chronic obstructive pulmonary disease) (HCC)- (present on admission)  Assessment & Plan  Not in acute exacerbation.  DuoNebs as needed.      Non-small cell lung cancer (NSCLC) (HCC)- (present on admission)  Assessment & Plan  Follows with oncology  Stage 4 per sister  Not a candidate for treatment  Poor prognosis        VTE prophylaxis: therapeutic anticoagulation with xarelto     I have performed a physical exam and reviewed and updated ROS and Plan today (10/25/2021). In review of yesterday's note  (10/24/2021), there are no changes except as documented above.       aggressive behavior

## 2021-10-28 NOTE — BH CHART NOTE - NSPSYPRGNOTEFT_PSY_ALL_CORE
Patient attended Cognitive Behavioral Therapy Group. Group was facilitated by Licensed Staff Psychologist and Trainee. Group lasted 30 minutes, and ended early due to a medical emergency on the unit. The group started with a brief check-in during which Pts were asked to share their favorite place to spend time and why. The group then focused on the topic of mindfulness and being in the present moment and engaged in a meditation exercise.  explained concepts, reinforced participation, and engaged patients in the discussion.      Pt appeared adequately groomed and casually dressed. Pt appeared engaged in the group discussion as evidenced by her willingness to participate independently. During check-in, Pt shared that she enjoys going to [place], adding that her family has a timeshare there and she always took trips with them in the summer. Pt was quiet during group discussion but listened attentively. Pt engaged in the mediation exercise. Speech was WNL. PT was oriented X3. PT was appropriate with others.     Patient/surrogate refused vaccine...

## 2021-11-02 NOTE — ED ADULT NURSE NOTE - SUICIDE SCREENING QUESTION 3
SENT BY PMD TO BE EVALUATED FOR CONFUSION ON/OFF X 3 WEEKS, SLIGHT WEAKNESS ON THE LEFT ARM S/P STROKE No

## 2021-11-03 NOTE — ED ADULT TRIAGE NOTE - NS ED NURSE AMBULANCES
Preferred pharmacy verified and updated.    Pt advised to check with pharmacy first before picking up prescriptions.      Pt advised their refill will be addressed within 24-48 hrs.    Please call patient back if any questions, comments or concerns.    Telephone: CELL: [phone]       Patient is out of medication.     [facility]

## 2021-11-16 NOTE — ED ADULT NURSE NOTE - OBJECTIVE STATEMENT
[facility]      Date of Patient's Visit: 11/16/2021    Progress note    Patient Care Team:  Karoline Dakin, MD as PCP - General (Internal Medicine)  Karoline Dakin, MD as PCP - [facility] Empaneled Provider  Sophy Vargas [address] 2020    LUNG    Cervical cancer ([address] 1990    s/p ALISON/Prudencio Ceballos MD at [address]  12/5/2019    BRONCHOSCOPY BIOPSY BRONCHUS performed by Prudencio Ceballos MD at [address]  12/5/2019    BRONCHOSCOPY ALVEOLAR LAVAGE performed by Prudencio Ceballos MD at [address]  02/18/2020    ENDOSCOPIC BRONCHOSCOPY ULTRASOUND WITH BIOPSY    BRONCHOSCOPY N/A 2/18/2020    BRONCHOSCOPY ENDOBRONCHIAL ULTRASOUND performed by Prudencio Ceballos MD at [address]  2/18/2020    BRONCHOSCOPY performed by Prudencio Ceballos MD at [address] Bilateral 2/12/2020    [address], Huey Stoddard MD at [address] / [address] N/A 9/14/2017    EXCISION UPPER BACK LIPOMA  performed by Huey Stoddard MD at [address] [facility] 10/9/2017    INCISION AND DRAINAGE RIGHT BUTTOCK ABSCESS (ON BED 15MIN) performed by Huey Stoddard MD at [facility]  Tyson Armenta MD at [address] [facility] EGD TRANSORAL BIOPSY SINGLE/MULTIPLE N/A 3/18/2017    EGD ESOPHAGOGASTRODUODENOSCOPY with cold biopsy and photos performed by Tyson Armenta MD at [address] [facility] [facility]  SHUN Reed  Attending Physician, [facility]   Faculty, Internal Medicine Residency Program  [address] [place] [address]  11/16/2021, 10:01 AM Received pt in  pt bought in by EMS from [facility] for threatening to light a gas can pt calm & cooperative denies si/hi/avh presently safety & comfort measures maintained eval on going.

## 2021-11-25 NOTE — ED PROVIDER NOTE - CARE PLAN
Principal Discharge DX:	Borderline personality disorder  Secondary Diagnosis:	Schizoaffective disorder 98

## 2021-12-15 NOTE — ED ADULT NURSE NOTE - NS PRO PASSIVE SMOKE EXP
Voicemail left to patient's daughter, Venessa Orona, about transfer to . Call back number provided. Unknown

## 2022-02-10 NOTE — ED PROVIDER NOTE - DOMESTIC TRAVEL HIGH RISK QUESTION
Rx Refill Note  Requested Prescriptions     Pending Prescriptions Disp Refills   • Victoza 18 MG/3ML solution pen-injector injection [Pharmacy Med Name: VICTOZA 18MG/3ML INJ PEN 3ML(2PACK)] 6 mL 1     Sig: ADMINISTER 1.2 MG UNDER THE SKIN EVERY DAY AS DIRECTED      Last office visit with prescribing clinician: 1/12/2022      Next office visit with prescribing clinician: 5/17/2022            Ashley Tilley MA  02/10/22, 09:59 EST   No

## 2022-02-15 NOTE — BH PATIENT PROFILE - FUNCTIONAL SCREEN CURRENT LEVEL: DRESSING, MLM
Verbal/bedside shift report received from [facility]. Report consisted of: SBAR, MAR, vitals, ed plan of care, labs/dx results. 0 = independent

## 2022-02-24 NOTE — ED ADULT TRIAGE NOTE - SPO2 (%)
98 Ear Star Wedge Flap Text: The defect edges were debeveled with a #15 blade scalpel.  Given the location of the defect and the proximity to free margins (helical rim) an ear star wedge flap was deemed most appropriate.  Using a sterile surgical marker, the appropriate flap was drawn incorporating the defect and placing the expected incisions between the helical rim and antihelix where possible.  The area thus outlined was incised through and through with a #15 scalpel blade.

## 2022-02-28 NOTE — ED ADULT NURSE NOTE - HIV OFFER
patient reports swallowing her bridge yesterday 5 pm. reports having upper abdominal pain
Previously Declined (within the last year)

## 2022-04-03 NOTE — ED ADULT NURSE NOTE - NS ED NURSE DISCH DISPOSITION
Pt presented to the ED as a walk in. Pt stated he has had left ear pain since Friday. Pt also c/o sore throat. Pt tried ear drops yesterday with no relief.   Discharged

## 2022-05-07 NOTE — ED ADULT NURSE NOTE - SUICIDE SCREENING DEPRESSION
Negative [facility]  Neurology  [address] [place] [address]  Phone: [phone]  Fax: [phone]

## 2022-05-13 NOTE — ED ADULT NURSE NOTE - SUICIDE SCREENING DEPRESSION
Iesha Wu is a 57 year old female presenting with RT side lower back and buttock pain.    Denies Latex allergy or sensitivity.     There were no vitals taken for this visit.    No changes to Patient's medical history or social history since their last visit.    ALLERGIES:   Allergen Reactions   • Codeine Other (See Comments)       Patient notes that she currently is a smoker.   Negative

## 2022-05-24 NOTE — ED PROVIDER NOTE - NS HIV RISK FACTOR YES
Rod -     Follow-up with me in 1 month.    Clarify with mom if the undeveloped testicle was removed.  If not an undescended we would want to see urology and evaluate that further.    Blood work repeat today repeating calcium and protein levels.  Were a little off in [place].    For the headaches and benign palpitations, we may need to consider propranolol (beta blocker).  Journal / write down the headaches the next month.  Start zyrtec 10 mg daily.  If persistent symptoms at follow-up in 1 month we will talk about starting that beta-blocker (propranolol).  For headaches when they occur, good job not overusing meds.  Ensure you don't use the same medicine for headache more than 3x per week, because that can cause medication overuse headaches.    Schedule eye exam.    Fill out release of vaccine records from [facility] and/or your pediatrician or prior family doc's office.  
Declined

## 2022-06-08 NOTE — ED ADULT NURSE NOTE - DOES PATIENT HAVE ADVANCE DIRECTIVE
From: Tyrone Reynolds  To: Dr. Isabel Mcclure: 6/8/2022 11:57 AM EDT  Subject: Risedronate sodium once a month    Is it possible to [facility] ? Thank you .  I wont need a refill until end of June [address] Luc[address] Moreland No

## 2022-06-14 NOTE — ED BEHAVIORAL HEALTH ASSESSMENT NOTE - PREFERRED LANGUAGE
Ja Garrett is a 45 y.o. female who presents today for  Chief Complaint   Patient presents with    [facility]       HPI:  Transferring care from [facility]. ADHD stable on Strattera, managed by psychiatry. 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 She has had bilateral heel pain ongoing for several months. She works at [employer], stands, walks prolonged hours during her shift. He has burning sensation at times in feet. She has seen [place] [facility] in the past but not recently. She mentions occasional R thumb numbness, chronic issue, stable. Wears wrist braces at night. Recent labs reviewed. Lipids have worsened, tot chol 226, trig 166, . No hx of hyperlipidemia. She has had some weight gain over the past several months. Remainder of labs are stable.   Lab Results   Component Value Date    CHOL 226 (H) 06/07/2022    CHOL 158 (L) 06/11/2021    CHOL 178 05/22/2020     Lab Results   Component Value Date    TRIG 166 (H) 06/07/2022    TRIG 73 06/11/2021    TRIG 114 05/22/2020     Lab Results   Component Value Date    HDL 58 (L) 06/07/2022    HDL 63 (L) 06/11/2021    HDL 57 (L) 05/22/2020     Lab Results   Component Value Date    LDLCALC 135 06/07/2022    LDLCALC 80 06/11/2021    LDLCALC 98 05/22/2020     No results found for: LABVLDL, VLDL  No results found for: CHOLHDLRATIO  Lab Results   Component Value Date     06/07/2022    K 3.9 06/07/2022     06/07/2022    CO2 29 06/07/2022    BUN 9 06/07/2022    CREATININE 0.7 06/07/2022    GLUCOSE 103 06/07/2022    CALCIUM 9.4 06/07/2022    PROT 7.8 06/07/2022    LABALBU 4.1 06/07/2022    BILITOT 0.3 06/07/2022    ALKPHOS 90 06/07/2022    AST 21 06/07/2022    ALT 28 06/07/2022    LABGLOM >60 06/07/2022    GFRAA >59 06/07/2022    AGRATIO 1.1 11/04/2021    GLOB 3.4 11/04/2021       Lab Results   Component Value Date    WBC 5.1 06/07/2022    HGB 14.0 06/07/2022    HCT 43.8 06/07/2022    MCV 91.3 06/07/2022     06/07/2022     Lab Results   Component Value Date    VITD25 31.2 06/07/2022       Review of Systems   Constitutional: Negative for chills and fever. HENT: Negative for congestion, ear pain and sore throat. Respiratory: Negative for cough, chest tightness, shortness of breath and wheezing. Cardiovascular: Negative for chest pain. Gastrointestinal: Negative for abdominal pain, diarrhea, nausea and vomiting. Musculoskeletal: Positive for arthralgias (heel pain). Negative for myalgias. Skin: Negative for rash. Neurological: Negative for dizziness and light-headedness.        Past Medical History:   Diagnosis Date    Anemia     Iron deficiency    Back pain     Chronic cholecystitis without calculus 6/6/2016    Degenerative disc disease     Glaucoma (increased eye pressure)     candidate for due to high pressure/ preventative eye drops    Hepatic steatosis     Kidney stones 9/27/2019    Renal stones        Current Outpatient Medications   Medication Sig Dispense Refill    ferrous sulfate (IRON 325) 325 (65 Fe) MG tablet TAKE 1 TABLET BY MOUTH EVERY DAY 30 tablet 2    cholestyramine (QUESTRAN) 4 g packet TAKE 1 PACKET BY MOUTH MIXED IN JUICE 2 TIMES DAILY (WITH MEALS) 60 packet 5    omeprazole (PRILOSEC) 20 MG delayed release capsule TAKE 1 CAPSULE BY MOUTH EVERY DAY 90 capsule 2    Cholecalciferol (VITAMIN D3) 50 MCG (2000 UT) CAPS TAKE 1 CAPSULE BY MOUTH EVERY DAY 90 capsule 3    atomoxetine (STRATTERA) 10 MG capsule Take 10 mg by mouth daily      aspirin 500 MG tablet Take 500 mg by mouth every 6 hours as needed for Pain      diphenhydrAMINE (BENADRYL) 25 MG capsule Take 25 mg by mouth every 6 hours as needed for Itching      loratadine (CLARITIN) 10 MG tablet Take 10 mg by mouth daily      timolol (TIMOPTIC) 0.5 % ophthalmic solution Place 1 drop into both eyes daily       L-Lysine 1000 MG TABS Take by mouth Take one tablet on Fridays       No current facility-administered medications for this visit. Allergies   Allergen Reactions    Clindamycin/Lincomycin Other (See Comments)     Pt had rectal bleeding while on this medication       Past Surgical History:   Procedure Laterality Date    CHOLECYSTECTOMY  2016    CHOLECYSTECTOMY  2016    KIDNEY STONE SURGERY      [place] CYSTO/URETERO/PYELOSCOPY W/LITHOTRIPSY Left 10/31/2017    CYSTOSCOPY URETEROSCOPY stone removal STENT PLACEMENT performed by Lorenzo Cox MD at [facility] OR       Social History     Tobacco Use    Smoking status: Former Smoker     Packs/day: 0.50     Years: 5.00     Pack years: 2.50     Types: Cigarettes     Quit date: 2004     Years since quittin.4    Smokeless tobacco: Never Used   Substance Use Topics    Alcohol use: No    Drug use: No       Family History   Problem Relation Age of Onset    Hypertension Mother     Diabetes Mother     Substance Abuse Father     Coronary Art Dis Paternal Grandfather     Breast Cancer Maternal Aunt     Stomach Cancer Maternal Aunt        /74   Pulse 77   Ht 5' 5\" (1.651 m)   Wt 208 lb (94.3 kg)   SpO2 97%   BMI 34.61 kg/m²     Physical Exam  Vitals reviewed. Constitutional:       General: She is not in acute distress. Appearance: Normal appearance. She is well-developed. HENT:      Head: Normocephalic. Eyes:      Conjunctiva/sclera: Conjunctivae normal.      Pupils: Pupils are equal, round, and reactive to light. Neck:      Thyroid: No thyromegaly. Vascular: No carotid bruit or JVD. Trachea: No tracheal deviation. Cardiovascular:      Rate and Rhythm: Normal rate and regular rhythm. Heart sounds: Normal heart sounds. No murmur heard. Pulmonary:      Effort: Pulmonary effort is normal. No respiratory distress. Breath sounds: Normal breath sounds. No wheezing or rhonchi. Musculoskeletal:         General: Normal range of motion. Cervical back: Normal range of motion and neck supple.    Lymphadenopathy: Cervical: No cervical adenopathy. Skin:     General: Skin is warm and dry. Findings: No rash. Neurological:      Mental Status: She is alert. Psychiatric:         Mood and Affect: Mood normal.         Behavior: Behavior normal.         Thought Content: Thought content normal.         ASSESSMENT/PLAN:  1. Mixed hyperlipidemia  -New finding on lab. Discussed dietary changes, weight loss. -Fasting CMP, lipid in 6 months. - Comprehensive Metabolic Panel; Future  - Lipid Panel; Future    2. Attention deficit hyperactivity disorder (ADHD), predominantly inattentive type  -Stable, continue management per psychiatry. 3. Iron deficiency anemia, unspecified iron deficiency anemia type  -Stable, continue management per hematology. - CBC with Auto Differential; Future    4. Vitamin D deficiency  -Stable, controlled. Repeat lab in 6 months.  - Vitamin D 25 Hydroxy; Future    5. Pain of both heels  -Likely plantar fasciitis. Refer to podiatry for evaluation, recommendations.  -Discussed foot stretches, conservative management. - External Referral To Podiatry    6. Screening mammogram for breast cancer    - DEZ DIGITAL SCREEN W OR WO CAD BILATERAL; Future    7. Women's annual routine gynecological examination    - Ryan Johnson NP, OB/GYN, [place]         Return in about 6 months (around 12/14/2022) for follow up, 30 minute visit. Tab Miller was seen today for establish care. Diagnoses and all orders for this visit:    Mixed hyperlipidemia  -     Comprehensive Metabolic Panel; Future  -     Lipid Panel; Future    Attention deficit hyperactivity disorder (ADHD), predominantly inattentive type    Iron deficiency anemia, unspecified iron deficiency anemia type  -     CBC with Auto Differential; Future    Vitamin D deficiency  -     Vitamin D 25 Hydroxy;  Future    Pain of both heels  -     External Referral To Podiatry    Screening mammogram for breast cancer  -     DEZ DIGITAL SCREEN W OR WO CAD for help? Call 911 anytime you think you may need emergency care. For example, call if:     Your foot turns pale, white, blue, or cold. Call your doctor now or seek immediate medical care if:     You cannot move or stand on your foot.      Your foot looks twisted or out of its normal position.      Your foot is not stable when you step down.      You have signs of infection, such as:  ? Increased pain, swelling, warmth, or redness. ? Red streaks leading from the sore area. ? Pus draining from a place on your foot. ? A fever.      Your foot is numb or tingly. Watch closely for changes in your health, and be sure to contact your doctor if:     You do not get better as expected.      You have bruises from an injury that last longer than 2 weeks. Where can you learn more? Go to https://ciValuepeRallyPointconsueloeb.Kypha. org and sign in to your Mowjow account. Enter E545 in the [employer] box to learn more about \"Foot Pain: Care Instructions. \"     If you do not have an account, please click on the \"Sign Up Now\" link. Current as of: July 1, 2021               Content Version: 13.2  © 3510-5785 [employer] Care instructions adapted under license by [facility] ([facility]). If you have questions about a medical condition or this instruction, always ask your healthcare professional. [place] [address] any warranty or liability for your use of this information. Patient Education        Plantar Fasciitis: Exercises  Introduction  Here are some examples of exercises for you to try. The exercises may be suggested for a condition or for rehabilitation. Start each exercise slowly. Ease off the exercises if you start to have pain. You will be told when to start these exercises and which ones will work bestfor you. How to do the exercises  Towel stretch    1. Sit with your legs extended and knees straight. 2. Place a towel around your foot just under the toes.   3. Hold each end of the towel in each hand, with your hands above your knees. 4. Pull back with the towel so that your foot stretches toward you. 5. Hold the position for at least 15 to 30 seconds. 6. Repeat 2 to 4 times a session, up to 5 sessions a day. Calf stretch    This exercise stretches the muscles at the back of the lower leg (the calf) andthe Achilles tendon. Do this exercise 3 or 4 times a day, 5 days a week. 1. Stand facing a wall with your hands on the wall at about eye level. Put the leg you want to stretch about a step behind your other leg. 2. Keeping your back heel on the floor, bend your front knee until you feel a stretch in the back leg. 3. Hold the stretch for 15 to 30 seconds. Repeat 2 to 4 times. Plantar fascia and calf stretch    Stretching the plantar fascia and calf muscles can increase flexibility and decrease heel pain. You can do this exercise several times each day and beforeand after activity. 1. Stand on a step as shown above. Be sure to hold on to the banister. 2. Slowly let your heels down over the edge of the step as you relax your calf muscles. You should feel a gentle stretch across the bottom of your foot and up the back of your leg to your knee. 3. Hold the stretch about 15 to 30 seconds, and then tighten your calf muscle a little to bring your heel back up to the level of the step. Repeat 2 to 4 times. Towel curls    Make this exercise more challenging by placing a weighted object, such as asoup can, on the other end of the towel. 1. While sitting, place your foot on a towel on the floor and scrunch the towel toward you with your toes. 2. Then, also using your toes, push the towel away from you. [place] pickups    1. Put marbles on the floor next to a cup.  2. Using your toes, try to lift the marbles up from the floor and put them in the cup. Follow-up care is a key part of your treatment and safety.  Be sure to make and go to all appointments, and call your doctor if you are having problems. It's also a good idea to know your test results and keep alist of the medicines you take. Where can you learn more? Go to https://ClipsourcepepicAisleFinder.[employer]. org and sign in to your [employer] account. Enter O049 in the [facility] box to learn more about \"Plantar Fasciitis: Exercises. \"     If you do not have an account, please click on the \"Sign Up Now\" link. Current as of: July 1, 2021               Content Version: 13.2  © 0630-9009 [employer] Incorporated. Care instructions adapted under license by [facility] ([facility]). If you have questions about a medical condition or this instruction, always ask your healthcare professional. [address] any warranty or liability for your use of this information. Patient voicesunderstanding and agrees to plans along with risks and benefits of plan. Counseling:  Tracy Das case, medications and options were discussed in detail. Patient was instructed to call the office if she questionsregarding her treatment. Should her conditions worsen, she should return to office to be reassessed by ALEXA Ricardo. she Should to go the closest Emergency Department for any emergency. They verbalizedunderstanding the above instructions. Return in about 6 months (around 12/14/2022) for follow up, 30 minute visit. English

## 2022-06-18 ENCOUNTER — INPATIENT (INPATIENT)
Facility: HOSPITAL | Age: 42
LOS: 1 days | Discharge: NURSING, REHAB OR LONG-TERM CARE | End: 2022-06-20
Attending: STUDENT IN AN ORGANIZED HEALTH CARE EDUCATION/TRAINING PROGRAM | Admitting: STUDENT IN AN ORGANIZED HEALTH CARE EDUCATION/TRAINING PROGRAM
Payer: MEDICAID

## 2022-06-18 VITALS
TEMPERATURE: 98 F | DIASTOLIC BLOOD PRESSURE: 83 MMHG | HEIGHT: 66 IN | HEART RATE: 79 BPM | RESPIRATION RATE: 16 BRPM | OXYGEN SATURATION: 100 % | SYSTOLIC BLOOD PRESSURE: 139 MMHG

## 2022-06-18 LAB
ALBUMIN SERPL ELPH-MCNC: 3.9 G/DL — SIGNIFICANT CHANGE UP (ref 3.3–5)
ALP SERPL-CCNC: 156 U/L — HIGH (ref 40–120)
ALT FLD-CCNC: 58 U/L — HIGH (ref 4–33)
ANION GAP SERPL CALC-SCNC: 11 MMOL/L — SIGNIFICANT CHANGE UP (ref 7–14)
APPEARANCE UR: CLEAR — SIGNIFICANT CHANGE UP
AST SERPL-CCNC: 33 U/L — HIGH (ref 4–32)
BACTERIA # UR AUTO: NEGATIVE — SIGNIFICANT CHANGE UP
BASE EXCESS BLDV CALC-SCNC: 1.2 MMOL/L — SIGNIFICANT CHANGE UP (ref -2–3)
BASOPHILS # BLD AUTO: 0.02 K/UL — SIGNIFICANT CHANGE UP (ref 0–0.2)
BASOPHILS NFR BLD AUTO: 0.2 % — SIGNIFICANT CHANGE UP (ref 0–2)
BILIRUB SERPL-MCNC: <0.2 MG/DL — SIGNIFICANT CHANGE UP (ref 0.2–1.2)
BILIRUB UR-MCNC: NEGATIVE — SIGNIFICANT CHANGE UP
BLOOD GAS VENOUS COMPREHENSIVE RESULT: SIGNIFICANT CHANGE UP
BUN SERPL-MCNC: 12 MG/DL — SIGNIFICANT CHANGE UP (ref 7–23)
CALCIUM SERPL-MCNC: 9.1 MG/DL — SIGNIFICANT CHANGE UP (ref 8.4–10.5)
CHLORIDE BLDV-SCNC: 105 MMOL/L — SIGNIFICANT CHANGE UP (ref 96–108)
CHLORIDE SERPL-SCNC: 104 MMOL/L — SIGNIFICANT CHANGE UP (ref 98–107)
CO2 BLDV-SCNC: 30 MMOL/L — HIGH (ref 22–26)
CO2 SERPL-SCNC: 24 MMOL/L — SIGNIFICANT CHANGE UP (ref 22–31)
COLOR SPEC: YELLOW — SIGNIFICANT CHANGE UP
CREAT SERPL-MCNC: 0.75 MG/DL — SIGNIFICANT CHANGE UP (ref 0.5–1.3)
DIFF PNL FLD: NEGATIVE — SIGNIFICANT CHANGE UP
EGFR: 101 ML/MIN/1.73M2 — SIGNIFICANT CHANGE UP
EOSINOPHIL # BLD AUTO: 0.21 K/UL — SIGNIFICANT CHANGE UP (ref 0–0.5)
EOSINOPHIL NFR BLD AUTO: 2.3 % — SIGNIFICANT CHANGE UP (ref 0–6)
EPI CELLS # UR: 1 /HPF — SIGNIFICANT CHANGE UP (ref 0–5)
GAS PNL BLDV: 138 MMOL/L — SIGNIFICANT CHANGE UP (ref 136–145)
GLUCOSE BLDV-MCNC: 115 MG/DL — HIGH (ref 70–99)
GLUCOSE SERPL-MCNC: 110 MG/DL — HIGH (ref 70–99)
GLUCOSE UR QL: NEGATIVE — SIGNIFICANT CHANGE UP
HCO3 BLDV-SCNC: 28 MMOL/L — SIGNIFICANT CHANGE UP (ref 22–29)
HCT VFR BLD CALC: 40.8 % — SIGNIFICANT CHANGE UP (ref 34.5–45)
HCT VFR BLDA CALC: 38 % — SIGNIFICANT CHANGE UP (ref 34.5–46.5)
HGB BLD CALC-MCNC: 12.6 G/DL — SIGNIFICANT CHANGE UP (ref 11.5–15.5)
HGB BLD-MCNC: 12.2 G/DL — SIGNIFICANT CHANGE UP (ref 11.5–15.5)
HYALINE CASTS # UR AUTO: 0 /LPF — SIGNIFICANT CHANGE UP (ref 0–7)
IANC: 4.33 K/UL — SIGNIFICANT CHANGE UP (ref 1.8–7.4)
IMM GRANULOCYTES NFR BLD AUTO: 0.3 % — SIGNIFICANT CHANGE UP (ref 0–1.5)
KETONES UR-MCNC: NEGATIVE — SIGNIFICANT CHANGE UP
LACTATE BLDV-MCNC: 1.6 MMOL/L — SIGNIFICANT CHANGE UP (ref 0.5–2)
LEUKOCYTE ESTERASE UR-ACNC: NEGATIVE — SIGNIFICANT CHANGE UP
LIDOCAIN IGE QN: 31 U/L — SIGNIFICANT CHANGE UP (ref 7–60)
LYMPHOCYTES # BLD AUTO: 3.54 K/UL — HIGH (ref 1–3.3)
LYMPHOCYTES # BLD AUTO: 39.5 % — SIGNIFICANT CHANGE UP (ref 13–44)
MCHC RBC-ENTMCNC: 28.4 PG — SIGNIFICANT CHANGE UP (ref 27–34)
MCHC RBC-ENTMCNC: 29.9 GM/DL — LOW (ref 32–36)
MCV RBC AUTO: 94.9 FL — SIGNIFICANT CHANGE UP (ref 80–100)
MONOCYTES # BLD AUTO: 0.83 K/UL — SIGNIFICANT CHANGE UP (ref 0–0.9)
MONOCYTES NFR BLD AUTO: 9.3 % — SIGNIFICANT CHANGE UP (ref 2–14)
NEUTROPHILS # BLD AUTO: 4.33 K/UL — SIGNIFICANT CHANGE UP (ref 1.8–7.4)
NEUTROPHILS NFR BLD AUTO: 48.4 % — SIGNIFICANT CHANGE UP (ref 43–77)
NITRITE UR-MCNC: NEGATIVE — SIGNIFICANT CHANGE UP
NRBC # BLD: 0 /100 WBCS — SIGNIFICANT CHANGE UP
NRBC # FLD: 0 K/UL — SIGNIFICANT CHANGE UP
PCO2 BLDV: 55 MMHG — HIGH (ref 39–42)
PH BLDV: 7.32 — SIGNIFICANT CHANGE UP (ref 7.32–7.43)
PH UR: 6.5 — SIGNIFICANT CHANGE UP (ref 5–8)
PLATELET # BLD AUTO: 278 K/UL — SIGNIFICANT CHANGE UP (ref 150–400)
PO2 BLDV: 23 MMHG — SIGNIFICANT CHANGE UP
POTASSIUM BLDV-SCNC: 4.1 MMOL/L — SIGNIFICANT CHANGE UP (ref 3.5–5.1)
POTASSIUM SERPL-MCNC: 3.9 MMOL/L — SIGNIFICANT CHANGE UP (ref 3.5–5.3)
POTASSIUM SERPL-SCNC: 3.9 MMOL/L — SIGNIFICANT CHANGE UP (ref 3.5–5.3)
PROT SERPL-MCNC: 7.8 G/DL — SIGNIFICANT CHANGE UP (ref 6–8.3)
PROT UR-MCNC: ABNORMAL
RBC # BLD: 4.3 M/UL — SIGNIFICANT CHANGE UP (ref 3.8–5.2)
RBC # FLD: 14.4 % — SIGNIFICANT CHANGE UP (ref 10.3–14.5)
RBC CASTS # UR COMP ASSIST: 3 /HPF — SIGNIFICANT CHANGE UP (ref 0–4)
SAO2 % BLDV: 38 % — SIGNIFICANT CHANGE UP
SODIUM SERPL-SCNC: 139 MMOL/L — SIGNIFICANT CHANGE UP (ref 135–145)
SP GR SPEC: 1.02 — SIGNIFICANT CHANGE UP (ref 1–1.05)
UROBILINOGEN FLD QL: SIGNIFICANT CHANGE UP
WBC # BLD: 8.96 K/UL — SIGNIFICANT CHANGE UP (ref 3.8–10.5)
WBC # FLD AUTO: 8.96 K/UL — SIGNIFICANT CHANGE UP (ref 3.8–10.5)
WBC UR QL: 2 /HPF — SIGNIFICANT CHANGE UP (ref 0–5)

## 2022-06-18 PROCEDURE — 99285 EMERGENCY DEPT VISIT HI MDM: CPT | Mod: GC

## 2022-06-18 PROCEDURE — 76705 ECHO EXAM OF ABDOMEN: CPT | Mod: 26

## 2022-06-18 RX ORDER — ACETAMINOPHEN 500 MG
1000 TABLET ORAL ONCE
Refills: 0 | Status: COMPLETED | OUTPATIENT
Start: 2022-06-18 | End: 2022-06-18

## 2022-06-18 RX ORDER — SODIUM CHLORIDE 9 MG/ML
1000 INJECTION INTRAMUSCULAR; INTRAVENOUS; SUBCUTANEOUS ONCE
Refills: 0 | Status: COMPLETED | OUTPATIENT
Start: 2022-06-18 | End: 2022-06-18

## 2022-06-18 RX ORDER — ONDANSETRON 8 MG/1
4 TABLET, FILM COATED ORAL ONCE
Refills: 0 | Status: COMPLETED | OUTPATIENT
Start: 2022-06-18 | End: 2022-06-18

## 2022-06-18 RX ADMIN — Medication 1000 MILLIGRAM(S): at 23:59

## 2022-06-18 RX ADMIN — SODIUM CHLORIDE 1000 MILLILITER(S): 9 INJECTION INTRAMUSCULAR; INTRAVENOUS; SUBCUTANEOUS at 22:22

## 2022-06-18 RX ADMIN — Medication 400 MILLIGRAM(S): at 22:38

## 2022-06-18 RX ADMIN — ONDANSETRON 4 MILLIGRAM(S): 8 TABLET, FILM COATED ORAL at 22:23

## 2022-06-18 NOTE — ED PROVIDER NOTE - CLINICAL SUMMARY MEDICAL DECISION MAKING FREE TEXT BOX
40 yo F with GERD, fibroids, depression and asthma p/w epigastric abd pain for 2.5 hours, sharp, constant, non-radiating. Differential diagnosis includes but is not limited to pancreatitis vs. cholecystitis vs. biliary colic vs. gastroenteritis vs. GERD. pt VSS. Vomiting after initial encounter, order zofran. will start fluids, get RUQ US, labs, covid swab. Will give analgesics and reassess. dispo pending results/pain control.

## 2022-06-18 NOTE — ED PROVIDER NOTE - PHYSICAL EXAMINATION
General: WN/WD NAD  Head: Atraumatic, normocephalic  Eyes: EOM grossly in tact, no scleral icterus, no discharge  ENT: moist mucous membranes  Neurology: A&Ox 3  Respiratory: CTAB, no wheezing, normal respiratory effort  CV: RRR, good s1/s2, no S3, Extremities warm and well perfused  Abdominal: Soft, non-distended, minimally tender in epigastric region  Extremities: No edema, no deformities  Skin: warm and dry. No rashes

## 2022-06-18 NOTE — ED ADULT NURSE NOTE - NSIMPLEMENTINTERV_GEN_ALL_ED
Implemented All Fall Risk Interventions:  [place] to call system. Call bell, personal items and telephone within reach. Instruct patient to call for assistance. Room bathroom lighting operational. Non-slip footwear when patient is off stretcher. Physically safe environment: no spills, clutter or unnecessary equipment. Stretcher in lowest position, wheels locked, appropriate side rails in place. Provide visual cue, wrist band, yellow gown, etc. Monitor gait and stability. Monitor for mental status changes and reorient to person, place, and time. Review medications for side effects contributing to fall risk. Reinforce activity limits and safety measures with patient and family.

## 2022-06-18 NOTE — ED PROVIDER NOTE - ATTENDING CONTRIBUTION TO CARE
Attending note:   After face to face evaluation of this patient, I concur with above noted hx, pe, and care plan for this patient.  Amaya: 41 yof with GERD, fibroids, depression complaining of epigastric and bilateral upper abdominal pain for few hours, not similar to previous GERD pain but has had similar in the past. Pt also noted nausea and while in ED pt vomited multiple times. No fever, no alcohol intake, no sick contacts, no diarrhea. Pt is well appearing, obese, clear lungs, RRR, abd soft with epigastric tn and LUQ tn, no lower abdominal tn, no edema.   Pt with epigastric abd pain - GB issues vs GERD vs AGE - labs, US, pain meds, sx relief, fluids and reassess.

## 2022-06-18 NOTE — ED ADULT TRIAGE NOTE - CHIEF COMPLAINT QUOTE
Brought in by EMS from [facility]. c/o upper abd pain that started 1.5x hours PTA associated with nausea. Denies vomiting, fever or chills. PMH gallstone, GERD, fibroids, bipolar disorder, schizoaffective disorder

## 2022-06-18 NOTE — ED PROVIDER NOTE - OBJECTIVE STATEMENT
42 yo F with GERD, fibroids, depression and asthma p/w epigastric abd pain for 2.5 hours, sharp, constant, non-radiating. No prior abd surgeries. Hasn't take any medications for this. Pt is nauseous, hasn't vomited yet. Had prior imaging with gallstones, no intervention at that time. No fever, cough, congestion, urinary sx. No exacerbating or relieving factors.

## 2022-06-18 NOTE — ED ADULT NURSE NOTE - OBJECTIVE STATEMENT
Brought in by EMS from [facility]. c/o upper abd pain that started 1.5x hours PTA associated with nausea. Denies vomiting, fever or chills. PMH gallstone, GERD, fibroids, bipolar disorder, schizoaffective disorder.  PT A&OX4.  No distress noted.  Denies CP, no SOB noted.  Resp WDL.  Skin intact.  Right 20G IVL in place.  Labs sent.  IVF in place and tolerating well.  Zofran IVP given as ordered.  PT noted vomiting upon assessment.  Will continue to monitor. Brought in by EMS from [facility]. c/o upper abd pain that started 1.5x hours PTA associated with nausea. Denies vomiting, fever or chills. PMH gallstone, GERD, fibroids, bipolar disorder, schizoaffective disorder.  PT A&OX4.  No distress noted.  Denies CP, no SOB noted.  Resp WDL.  Skin intact.  Right 20G IVL in place.  Labs sent.  IVF in place and tolerating well.  Zofran IVP given as ordered.  PT noted vomiting upon assessment.  PT refused for FS to be done.  Dr Amaya aware.  Lactate and BMP sent.  Will continue to monitor.

## 2022-06-18 NOTE — ED ADULT NURSE NOTE - CHIEF COMPLAINT QUOTE
Brought in by EMS from [facility]. c/o upper abd pain that started 1.5x hours PTA associated with nausea. Denies vomiting, fever or chills. PMH gallstone, GERD, fibroids, bipolar disorder, schizoaffective disorder

## 2022-06-19 ENCOUNTER — TRANSCRIPTION ENCOUNTER (OUTPATIENT)
Age: 42
End: 2022-06-19

## 2022-06-19 DIAGNOSIS — R10.13 EPIGASTRIC PAIN: ICD-10-CM

## 2022-06-19 LAB — SARS-COV-2 RNA SPEC QL NAA+PROBE: SIGNIFICANT CHANGE UP

## 2022-06-19 PROCEDURE — 99222 1ST HOSP IP/OBS MODERATE 55: CPT | Mod: GC,57

## 2022-06-19 RX ORDER — AMPICILLIN SODIUM AND SULBACTAM SODIUM 250; 125 MG/ML; MG/ML
3 INJECTION, POWDER, FOR SUSPENSION INTRAMUSCULAR; INTRAVENOUS EVERY 6 HOURS
Refills: 0 | Status: DISCONTINUED | OUTPATIENT
Start: 2022-06-19 | End: 2022-06-20

## 2022-06-19 RX ORDER — QUETIAPINE FUMARATE 200 MG/1
200 TABLET, FILM COATED ORAL AT BEDTIME
Refills: 0 | Status: DISCONTINUED | OUTPATIENT
Start: 2022-06-19 | End: 2022-06-20

## 2022-06-19 RX ORDER — SODIUM CHLORIDE 9 MG/ML
1000 INJECTION, SOLUTION INTRAVENOUS
Refills: 0 | Status: DISCONTINUED | OUTPATIENT
Start: 2022-06-19 | End: 2022-06-19

## 2022-06-19 RX ORDER — ONDANSETRON 8 MG/1
4 TABLET, FILM COATED ORAL EVERY 6 HOURS
Refills: 0 | Status: DISCONTINUED | OUTPATIENT
Start: 2022-06-19 | End: 2022-06-20

## 2022-06-19 RX ORDER — ENOXAPARIN SODIUM 100 MG/ML
40 INJECTION SUBCUTANEOUS EVERY 24 HOURS
Refills: 0 | Status: DISCONTINUED | OUTPATIENT
Start: 2022-06-19 | End: 2022-06-20

## 2022-06-19 RX ORDER — OXCARBAZEPINE 300 MG/1
300 TABLET, FILM COATED ORAL
Refills: 0 | Status: DISCONTINUED | OUTPATIENT
Start: 2022-06-19 | End: 2022-06-20

## 2022-06-19 RX ADMIN — AMPICILLIN SODIUM AND SULBACTAM SODIUM 200 GRAM(S): 250; 125 INJECTION, POWDER, FOR SUSPENSION INTRAMUSCULAR; INTRAVENOUS at 03:37

## 2022-06-19 RX ADMIN — AMPICILLIN SODIUM AND SULBACTAM SODIUM 200 GRAM(S): 250; 125 INJECTION, POWDER, FOR SUSPENSION INTRAMUSCULAR; INTRAVENOUS at 09:04

## 2022-06-19 RX ADMIN — SODIUM CHLORIDE 100 MILLILITER(S): 9 INJECTION, SOLUTION INTRAVENOUS at 09:04

## 2022-06-19 RX ADMIN — AMPICILLIN SODIUM AND SULBACTAM SODIUM 200 GRAM(S): 250; 125 INJECTION, POWDER, FOR SUSPENSION INTRAMUSCULAR; INTRAVENOUS at 21:00

## 2022-06-19 RX ADMIN — SODIUM CHLORIDE 100 MILLILITER(S): 9 INJECTION, SOLUTION INTRAVENOUS at 03:38

## 2022-06-19 RX ADMIN — ENOXAPARIN SODIUM 40 MILLIGRAM(S): 100 INJECTION SUBCUTANEOUS at 03:38

## 2022-06-19 RX ADMIN — QUETIAPINE FUMARATE 200 MILLIGRAM(S): 200 TABLET, FILM COATED ORAL at 21:01

## 2022-06-19 RX ADMIN — OXCARBAZEPINE 300 MILLIGRAM(S): 300 TABLET, FILM COATED ORAL at 19:25

## 2022-06-19 RX ADMIN — AMPICILLIN SODIUM AND SULBACTAM SODIUM 200 GRAM(S): 250; 125 INJECTION, POWDER, FOR SUSPENSION INTRAMUSCULAR; INTRAVENOUS at 15:09

## 2022-06-19 RX ADMIN — ONDANSETRON 4 MILLIGRAM(S): 8 TABLET, FILM COATED ORAL at 07:35

## 2022-06-19 NOTE — H&P ADULT - NSHPPHYSICALEXAM_GEN_ALL_CORE
T(C): 36.8 (06-18-22 @ 23:59), Max: 37 (06-18-22 @ 22:28)  HR: 87 (06-18-22 @ 23:59) (79 - 88)  BP: 126/72 (06-18-22 @ 23:59) (126/72 - 146/87)  RR: 18 (06-18-22 @ 23:59) (16 - 18)  SpO2: 100% (06-18-22 @ 23:59) (100% - 100%)    Physical Exam:  General: NAD, alert  HEENT: normocephalic, PERRLA  CVS: RRR, no murmur  Chest: CTABL  Abdomen: soft, mild to moderate epigastric and RUQ tenderness, nicholas positive  Extremities: peripheral pulses palpable

## 2022-06-19 NOTE — DISCHARGE NOTE PROVIDER - CARE PROVIDER_API CALL
Key Lozano)  Critical Care Medicine; Surgery  [phone] [place] [address]  Phone: [phone]  Fax: [phone]  Follow Up Time: 2 weeks

## 2022-06-19 NOTE — H&P ADULT - HISTORY OF PRESENT ILLNESS
41F who presented with acute onset constant epigastric pain since yesterday. No significant past surgical hx. Associated with nausea and vomiting. Workup c/w acute cholecystitis.

## 2022-06-19 NOTE — H&P ADULT - NSHPLABSRESULTS_GEN_ALL_CORE
Labs:  CAPILLARY BLOOD GLUCOSE                              12.2   8.96  )-----------( 278      ( 2022 22:17 )             40.8       Auto Immature Granulocyte %: 0.3 % (22 @ 22:17)  Auto Neutrophil %: 48.4 % (22 @ 22:17)        139  |  104  |  12  ----------------------------<  110<H>  3.9   |  24  |  0.75      Calcium, Total Serum: 9.1 mg/dL (22 @ 22:17)      LFTs:             7.8  | <0.2 | 33       ------------------[156     ( 2022 22:17 )  3.9  | x    | 58          Lipase:31     Amylase:x         Blood Gas Venous - Lactate: 1.6 mmol/L (22 @ 22:17)      Coags:            Urinalysis Basic - ( 2022 23:29 )    Color: Yellow / Appearance: Clear / S.025 / pH: x  Gluc: x / Ketone: Negative  / Bili: Negative / Urobili: <2 mg/dL   Blood: x / Protein: 30 mg/dL / Nitrite: Negative   Leuk Esterase: Negative / RBC: 3 /HPF / WBC 2 /HPF   Sq Epi: x / Non Sq Epi: 1 /HPF / Bacteria: Negative      Radiology:  < from: US Abdomen Upper Quadrant Right (22 @ 23:06) >    IMPRESSION:  Cholelithiasis with gallbladder wall thickening. Positive sonographic   Parks sign. If there is further clinical concern for acute   cholecystitis, a HIDA scan is suggested for further evaluation.    < end of copied text >

## 2022-06-19 NOTE — DISCHARGE NOTE PROVIDER - NSDCQMAMI_CARD_ALL_CORE
Orthopedics Cardiology Cardiology Orthopedics Orthopedics Orthopedics Cardiology Hospitalist Hospitalist Infectious Disease Infectious Disease Infectious Disease No Hospitalist Hospitalist

## 2022-06-19 NOTE — DISCHARGE NOTE PROVIDER - HOSPITAL COURSE
40 yo Female who presented with abdominal pain of acute onset, found to have symptomatic cholelithiasis. Patient was initially planned for surgery, however, patient ultimately chose not to pursue surgical management of her disease, electing for non operative management with antibiotics. Hospital day 2, patient was afebrile, labs within normal limits, pain well controlled, and tolerating a diet. Patient deemed stable for a safe discharge home. Counseled on return precautions and instructed to continue with oral antibiotics for 2 weeks and follow up in the office in 2 weeks. 42 yo Female who presented with abdominal pain of acute onset, found to have symptomatic cholelithiasis. Patient was initially planned for surgery, however, patient ultimately chose not to pursue surgical management of her disease, electing for non operative management with antibiotics. Hospital day 2, patient was afebrile, labs within normal limits, pain well controlled, and tolerating a diet. Patient deemed stable for a safe discharge home. Counseled on return precautions and instructed to continue with oral antibiotics for 2 weeks and follow up in the office in 2 weeks.  Surgery remains an option and is offered to patient should she change her mind and wish to pursue definitive surgical treatment.

## 2022-06-19 NOTE — ED ADULT NURSE NOTE - NS ED NOTE  TALK SOMEONE YN
CHIEF COMPLAINT/HISTORY OF PRESENT ILLNESS:    Smiley Robins is a 80 year old female that presents to the Urgent Care with  Nitesh for   Chief Complaint   Patient presents with   • Covid Infection     Cough, chest and head congestion , bodyache, headache      Smiley has had 5 days of cough, congestion, body ache, headache.  Patient tested positive COVID at home. She does have some diarrhea, but not much.     Denies fevers, SOB, vomiting    Over-the-counter:  None     is also sick. Reviewed nurses/medical assistant's notes.    Past Medical History:  Problem list: Reviewed.  Allergies:   ALLERGIES:   Allergen Reactions   • Amoxicillin HIVES   • Bactrim Ds VOMITING   • Lisinopril Cough       Medication list:  Current Outpatient Medications   Medication Sig Dispense Refill   • doxycycline hyclate (VIBRAMYCIN) 100 MG capsule Take 1 capsule by mouth in the morning and 1 capsule before bedtime. Take with food. 14 capsule 0   • nystatin (MYCOSTATIN) 224274 UNIT/GM cream APPLY CREAM TOPICALLY TO AFFECTED AREA TWICE DAILY FOR 2 WEEKS     • triamcinolone (ARISTOCORT) 0.1 % cream Apply to affected areas on hips bid prn 60 g 1   • ondansetron (Zofran ODT) 4 MG disintegrating tablet Place 1 tablet onto the tongue 30 minutes before starting bowel prep. May take every 6 hours as needed for nausea. 5 tablet 0   • atorvastatin (LIPITOR) 40 MG tablet Take 1 tablet by mouth nightly. 90 tablet 3   • amLODIPine (NORVASC) 5 MG tablet Take 1 tablet by mouth daily. 90 tablet 3   • acetaminophen (TYLENOL) 500 MG tablet Take 2 tablets by mouth every 6 hours as needed for Pain or Fever.     • Multiple Vitamins-Minerals (ZINC PO) Take 1 capsule by mouth daily.     • nitroGLYcerin (NITROSTAT) 0.4 MG sublingual tablet Place 1 tablet under the tongue every 5 minutes as needed for Chest pain.     • aspirin 81 MG tablet Take 81 mg by mouth daily.       No current facility-administered medications for this visit.     Social  History:      Social History     Tobacco Use   Smoking Status Never Smoker   Smokeless Tobacco Never Used         GENERAL:  General appearance:  Well developed and well nourished.  Nontoxic  VITAL SIGNS: WNL, Afebrile  Vitals:    06/19/22 1411   BP: 104/64   BP Location: LUE - Left upper extremity   Patient Position: Sitting   Cuff Size: Large Adult   Pulse: 65   Resp: 16   Temp: 97.4 °F (36.3 °C)   TempSrc: Temporal   SpO2: 98%   Weight: 75.3 kg (166 lb)   Height: 5' 2.5\" (1.588 m)     SKIN:  Clear to inspection and palpation.  HEAD:  Normocephalic   EYES: Conjunctivae and lids clear without injection.    ENT: Normal ear canals.  TMs clear without erythema.  Hearing within normal limits.  Sinuses nontender to palpation. Lips and dentition within normal limits.   Oropharynx clear without erythema.  tonsils surgically removed. No petechiae or exudate.  NECK:  Supple.  bilateral cervical lymphadenopathy .    HEART:  Normal S1 and S2.  No murmurs, rubs or gallops.   RESPIRATORY:  Full, easy respiratory effort.  Clear to auscultation bilaterally. No wheezing or rhonchi  noted. Patient sitting comfortably in the room. Speaking in complete sentences.   PSYCHOLOGICAL:  Alert and oriented x3, Pleasant, Cooperative    Procedures/Diagnostic Tests:  COVID/Flu:  Positive COVID, negative flu  BMP:  Potassium 5.5, glucose 107, BUN 21, creatinine 1.0, GFR 56, otherwise normal called patient 3:22 p.m.      Smiley was seen today for covid infection.    Diagnoses and all orders for this visit:    COVID-19 virus infection    Acute cough  -     SARS-COV-2/INFLUENZA BY PCR  -     BASIC METABOLIC PANEL; Future    Body aches  -     SARS-COV-2/INFLUENZA BY PCR  -     BASIC METABOLIC PANEL; Future    Generalized headaches  -     SARS-COV-2/INFLUENZA BY PCR    Patient presents after 5 days of cough, body ache, headache, congestion.  Patient is positive COVID at home.  COVID/flu positive COVID, negative flu.  On exam bilateral lungs are  clear to auscultation.  BMP does show a slightly elevated potassium.  I advised patient to drink plenty of fluids.  I do recommend she follow up with her PCP to have potassium rechecked in a few days.  I recommend patient avoid any potassium supplements or foods high in potassium the next few days.  Patient can use over-the-counter Tylenol for additional symptomatic relief.  She should follow-up for any new or worsening symptoms.      Patient does qualify for Paxlovid based on age. Patient was provided with the fact sheet.  BMP stable.  Patient declines medication.       Smiley is in agreement with this plan.  For any worsening symptoms return to the clinic, primary care provider, or emergency room.     she was appreciative of her care today.          Sarah Jones PA-C  Working under the direct supervision of Dr. Richie Rosas       No

## 2022-06-19 NOTE — H&P ADULT - ASSESSMENT
Assessment:  41y Female with acute cholecystitis.    Plan:  - admit to surgery  - NPO, IVF  - IV antibiotics  - plan for OR for lap divya  - plan dw Dr Lozano    Surgery  [id number] Assessment:  41y Female with acute cholecystitis.    Plan:  - admit to surgery  - NPO, IVF  - IV antibiotics  - plan for OR for lap divya  - need to confirm full med rec in AM, patient does not remember dosage of medications she takes  - plan dw Dr Lozano    Surgery  [id number]

## 2022-06-19 NOTE — PATIENT PROFILE ADULT - FALL HARM RISK - UNIVERSAL INTERVENTIONS
Bed in lowest position, wheels locked, appropriate side rails in place/Call bell, personal items and telephone in reach/Instruct patient to call for assistance before getting out of bed or chair/Non-slip footwear when patient is out of bed/[place] to call system/Physically safe environment - no spills, clutter or unnecessary equipment/Purposeful Proactive Rounding/Room/bathroom lighting operational, light cord in reach

## 2022-06-19 NOTE — DISCHARGE NOTE PROVIDER - NSDCFUADDINST_GEN_ALL_CORE_FT
PAIN CONTROL: Please take tylenol and motrin for pain control. You may take each every 6 hours, alternating the two every 3 hours. For example, Tylenol at 9am, Motrin at 12pm, Tylenol at 3pm, etc.     ANTIBIOTICS: Please continue with 14 days of antibiotics, as prescribed.     DIET: Return to your usual diet.    NOTIFY YOUR SURGEON IF YOU HAVE: any fever (over 100.4 F) persistent nausea/vomiting, or if your pain is not controlled on your discharge pain medications, unable to urinate.    Please follow up with your primary care physician in one week regarding your hospitalization, bring copies of your discharge paperwork.    Please follow-up with your surgeon, within 1-2 weeks following discharge- please call to schedule an appointment.

## 2022-06-19 NOTE — DISCHARGE NOTE PROVIDER - NSDCMRMEDTOKEN_GEN_ALL_CORE_FT
amoxicillin-clavulanate 875 mg-125 mg oral tablet: 1 tab(s) orally every 12 hours   pantoprazole 40 mg oral delayed release tablet: 1 tab(s) orally once a day (before a meal)  SEROquel:   Trileptal:

## 2022-06-20 ENCOUNTER — TRANSCRIPTION ENCOUNTER (OUTPATIENT)
Age: 42
End: 2022-06-20

## 2022-06-20 VITALS
OXYGEN SATURATION: 100 % | RESPIRATION RATE: 16 BRPM | SYSTOLIC BLOOD PRESSURE: 142 MMHG | HEART RATE: 87 BPM | TEMPERATURE: 99 F | DIASTOLIC BLOOD PRESSURE: 95 MMHG

## 2022-06-20 LAB
ALBUMIN SERPL ELPH-MCNC: 4 G/DL — SIGNIFICANT CHANGE UP (ref 3.3–5)
ALP SERPL-CCNC: 151 U/L — HIGH (ref 40–120)
ALT FLD-CCNC: 52 U/L — HIGH (ref 4–33)
ANION GAP SERPL CALC-SCNC: 10 MMOL/L — SIGNIFICANT CHANGE UP (ref 7–14)
AST SERPL-CCNC: 27 U/L — SIGNIFICANT CHANGE UP (ref 4–32)
BILIRUB SERPL-MCNC: <0.2 MG/DL — SIGNIFICANT CHANGE UP (ref 0.2–1.2)
BUN SERPL-MCNC: 8 MG/DL — SIGNIFICANT CHANGE UP (ref 7–23)
CALCIUM SERPL-MCNC: 9.1 MG/DL — SIGNIFICANT CHANGE UP (ref 8.4–10.5)
CHLORIDE SERPL-SCNC: 103 MMOL/L — SIGNIFICANT CHANGE UP (ref 98–107)
CO2 SERPL-SCNC: 26 MMOL/L — SIGNIFICANT CHANGE UP (ref 22–31)
CREAT SERPL-MCNC: 0.74 MG/DL — SIGNIFICANT CHANGE UP (ref 0.5–1.3)
CULTURE RESULTS: SIGNIFICANT CHANGE UP
EGFR: 103 ML/MIN/1.73M2 — SIGNIFICANT CHANGE UP
GLUCOSE BLDC GLUCOMTR-MCNC: 83 MG/DL — SIGNIFICANT CHANGE UP (ref 70–99)
GLUCOSE SERPL-MCNC: 62 MG/DL — LOW (ref 70–99)
HCT VFR BLD CALC: 39.1 % — SIGNIFICANT CHANGE UP (ref 34.5–45)
HGB BLD-MCNC: 12.3 G/DL — SIGNIFICANT CHANGE UP (ref 11.5–15.5)
MAGNESIUM SERPL-MCNC: 1.9 MG/DL — SIGNIFICANT CHANGE UP (ref 1.6–2.6)
MCHC RBC-ENTMCNC: 28.9 PG — SIGNIFICANT CHANGE UP (ref 27–34)
MCHC RBC-ENTMCNC: 31.5 GM/DL — LOW (ref 32–36)
MCV RBC AUTO: 92 FL — SIGNIFICANT CHANGE UP (ref 80–100)
NRBC # BLD: 0 /100 WBCS — SIGNIFICANT CHANGE UP
NRBC # FLD: 0 K/UL — SIGNIFICANT CHANGE UP
PHOSPHATE SERPL-MCNC: 2.4 MG/DL — LOW (ref 2.5–4.5)
PLATELET # BLD AUTO: 283 K/UL — SIGNIFICANT CHANGE UP (ref 150–400)
POTASSIUM SERPL-MCNC: 4.2 MMOL/L — SIGNIFICANT CHANGE UP (ref 3.5–5.3)
POTASSIUM SERPL-SCNC: 4.2 MMOL/L — SIGNIFICANT CHANGE UP (ref 3.5–5.3)
PROT SERPL-MCNC: 7.6 G/DL — SIGNIFICANT CHANGE UP (ref 6–8.3)
RBC # BLD: 4.25 M/UL — SIGNIFICANT CHANGE UP (ref 3.8–5.2)
RBC # FLD: 14.5 % — SIGNIFICANT CHANGE UP (ref 10.3–14.5)
SODIUM SERPL-SCNC: 139 MMOL/L — SIGNIFICANT CHANGE UP (ref 135–145)
SPECIMEN SOURCE: SIGNIFICANT CHANGE UP
WBC # BLD: 8.82 K/UL — SIGNIFICANT CHANGE UP (ref 3.8–10.5)
WBC # FLD AUTO: 8.82 K/UL — SIGNIFICANT CHANGE UP (ref 3.8–10.5)

## 2022-06-20 PROCEDURE — 99232 SBSQ HOSP IP/OBS MODERATE 35: CPT | Mod: GC

## 2022-06-20 RX ADMIN — OXCARBAZEPINE 300 MILLIGRAM(S): 300 TABLET, FILM COATED ORAL at 07:02

## 2022-06-20 RX ADMIN — AMPICILLIN SODIUM AND SULBACTAM SODIUM 200 GRAM(S): 250; 125 INJECTION, POWDER, FOR SUSPENSION INTRAMUSCULAR; INTRAVENOUS at 04:02

## 2022-06-20 RX ADMIN — AMPICILLIN SODIUM AND SULBACTAM SODIUM 200 GRAM(S): 250; 125 INJECTION, POWDER, FOR SUSPENSION INTRAMUSCULAR; INTRAVENOUS at 09:18

## 2022-06-20 NOTE — PROGRESS NOTE ADULT - SUBJECTIVE AND OBJECTIVE BOX
Surgery Progress Note     Subjective/24hour Events:   Patient seen and examined.       Vital Signs:  Vital Signs Last 24 Hrs  T(C): 36.9 (19 Jun 2022 22:00), Max: 37.2 (19 Jun 2022 14:22)  T(F): 98.4 (19 Jun 2022 22:00), Max: 98.9 (19 Jun 2022 14:22)  HR: 70 (19 Jun 2022 22:00) (70 - 77)  BP: 147/86 (19 Jun 2022 22:00) (132/75 - 150/83)  BP(mean): --  RR: 17 (19 Jun 2022 22:00) (16 - 18)  SpO2: 100% (19 Jun 2022 22:00) (100% - 100%)    CAPILLARY BLOOD GLUCOSE          I&O's Detail        Physical Exam:  Gen:  CV:  Resp:  Abd:  Ext:      Labs:    06-18    139  |  104  |  12  ----------------------------<  110<H>  3.9   |  24  |  0.75    Ca    9.1      18 Jun 2022 22:17    TPro  7.8  /  Alb  3.9  /  TBili  <0.2  /  DBili  x   /  AST  33<H>  /  ALT  58<H>  /  AlkPhos  156<H>  06-18    LIVER FUNCTIONS - ( 18 Jun 2022 22:17 )  Alb: 3.9 g/dL / Pro: 7.8 g/dL / ALK PHOS: 156 U/L / ALT: 58 U/L / AST: 33 U/L / GGT: x                                 12.2   8.96  )-----------( 278      ( 18 Jun 2022 22:17 )             40.8          Surgery Daily Progress Note  =====================================================  Interval / Overnight Events: No acute events overnight.      HPI:  Patient is a 41 year old female with a PMHx of GERD, bipolar disorder, borderline personality disorder, asthma and depression who presented with acute onset constant epigastric pain. (19 Jun 2022 02:37)      PAST MEDICAL & SURGICAL HISTORY:  GERD (Gastroesophageal Reflux Disease)  Bipolar Disorder  Borderline Personality Disorder  Cholelithiasis  Asthma  Depression  Obesity  Fibroids  No significant past surgical history        ALLERGIES:  Depakote (Other)  No Known Allergies    --------------------------------------------------------------------------------------    MEDICATIONS:    Neurologic Medications  ondansetron Injectable 4 milliGRAM(s) IV Push every 6 hours PRN Nausea and/or Vomiting  OXcarbazepine 300 milliGRAM(s) Oral two times a day  QUEtiapine 200 milliGRAM(s) Oral at bedtime    Hematologic/Oncologic Medications  enoxaparin Injectable 40 milliGRAM(s) SubCutaneous every 24 hours    Antimicrobial/Immunologic Medications  ampicillin/sulbactam  IVPB 3 Gram(s) IV Intermittent every 6 hours    --------------------------------------------------------------------------------------    VITAL SIGNS:  T(C): 36.8 (20 Jun 2022 08:58), Max: 37.2 (19 Jun 2022 14:22)  T(F): 98.3 (20 Jun 2022 08:58), Max: 98.9 (19 Jun 2022 14:22)  HR: 88 (20 Jun 2022 08:58) (70 - 88)  BP: 140/88 (20 Jun 2022 08:58) (121/76 - 150/83)  RR: 16 (20 Jun 2022 08:58) (16 - 18)  SpO2: 100% (20 Jun 2022 08:58) (100% - 100%)    --------------------------------------------------------------------------------------    EXAM    NEUROLOGY   Exam: Normal, in no acute distress.    HEENT  Exam: Normocephalic, atraumatic.    RESPIRATORY  Exam: Normal expansion / effort.    CARDIOVASCULAR  Exam: S1, S2.  Regular rate and rhythm.    GI/NUTRITION  Exam: Abdomen soft, Non-distended.  Minimal tenderness to palpation of right upper quadrant.  Current Diet: Regular diet    MUSCULOSKELETAL  Exam: All extremities moving spontaneously without limitations.      HEMATOLOGIC  [x] VTE Prophylaxis: enoxaparin Injectable 40 milliGRAM(s) SubCutaneous every 24 hours      INFECTIOUS DISEASE  Antimicrobials/Immunologic Medications:  ampicillin/sulbactam  IVPB 3 Gram(s) IV Intermittent every 6 hours    --------------------------------------------------------------------------------------

## 2022-06-20 NOTE — DISCHARGE NOTE NURSING/CASE MANAGEMENT/SOCIAL WORK - NSDCPNINST_GEN_ALL_CORE
Call MD with any changes or increased signs of infection fever/shaking chills, persistent nausea and vomiting. Continue to drink plenty of fluids, and take all of Antibiotics as prescribed until completed. Follow-up with MD as instructed for continuity of care.

## 2022-06-20 NOTE — DISCHARGE NOTE NURSING/CASE MANAGEMENT/SOCIAL WORK - NSDPDISTO_GEN_ALL_CORE
Pt voiding VS stable Afebrile. pt with positive bowel sounds jonny po diet./[facility]

## 2022-06-20 NOTE — DISCHARGE NOTE NURSING/CASE MANAGEMENT/SOCIAL WORK - PATIENT PORTAL LINK FT
You can access the FollowMyHealth Patient Portal offered by [facility] by registering at the following website: [email or web address]. By joining [facility]’s FollowMyHealth portal, you will also be able to view your health information using other applications (apps) compatible with our system.

## 2022-06-20 NOTE — DISCHARGE NOTE NURSING/CASE MANAGEMENT/SOCIAL WORK - NSDCPEFALRISK_GEN_ALL_CORE
For information on Fall & Injury Prevention, visit: [email or web address] OR  [email or web address] OR  https://www.cdc.gov/steadi/patient.html

## 2022-06-20 NOTE — PROGRESS NOTE ADULT - ASSESSMENT
41 year old female with acute cholecystitis.    Plan:  - Non-operative management  - IV abx, dc with PO abx  - Pain control  - Regular diet  - DVT ppx: LVX  - Dispo planning      B Team Surgery  [id number] Patient is a 41 year old female with a PMHx of GERD, bipolar disorder, borderline personality disorder, asthma and depression who presented with acute onset constant epigastric pain.  Abdominal ultrasound performed showing cholelithiasis with gallbladder wall thickening.  Positive sonographic Parks sign.  Patient offered operative intervention, however refused.      PLAN:  - Regular diet  - Continue with IV Unasyn while inpatient  - Continue home medications  - Out of bed  - Pain control  - VTE prophylaxis with Lovenox subcutaneous  - Discharge planning with Augmentin x2 weeks total - patient to make follow up appointment and may have surgery at a later date if she wishes      #[id number]  B Team Surgery

## 2022-06-20 NOTE — DISCHARGE NOTE NURSING/CASE MANAGEMENT/SOCIAL WORK - NSDCPECAREGIVERED_GEN_ALL_CORE
Medline and carenotes for cholecystitis, Augmentin, as well as DC Medications and side effects literature for patient reference. Medline and carenotes for cholecystitis, Amoxicillin, as well as DC Medications and side effects literature for patient reference.

## 2022-06-21 ENCOUNTER — INPATIENT (INPATIENT)
Facility: HOSPITAL | Age: 42
LOS: 0 days | Discharge: HOME | DRG: 446 | End: 2022-06-22
Attending: TRANSPLANT SURGERY | Admitting: TRANSPLANT SURGERY
Payer: COMMERCIAL

## 2022-06-21 VITALS
OXYGEN SATURATION: 100 % | WEIGHT: 250 LBS | TEMPERATURE: 97 F | SYSTOLIC BLOOD PRESSURE: 128 MMHG | HEIGHT: 66 IN | HEART RATE: 87 BPM | RESPIRATION RATE: 20 BRPM | DIASTOLIC BLOOD PRESSURE: 86 MMHG

## 2022-06-21 PROCEDURE — 99285 EMERGENCY DEPT VISIT HI MDM: CPT | Mod: GC

## 2022-06-21 PROCEDURE — 93010 ELECTROCARDIOGRAM REPORT: CPT | Mod: GC

## 2022-06-21 NOTE — ED ADULT TRIAGE NOTE - CHIEF COMPLAINT QUOTE
Chest pain x today, pt reports pain is sharp in nature, non-radiating. Pt also endorsing abdominal pain x 3 days, d/c yesterday from [facility] with dx of gallstones, per EMS pt refused surgery. Denies N/V/D.

## 2022-06-22 ENCOUNTER — TRANSCRIPTION ENCOUNTER (OUTPATIENT)
Age: 42
End: 2022-06-22

## 2022-06-22 VITALS — RESPIRATION RATE: 18 BRPM | HEART RATE: 77 BPM | SYSTOLIC BLOOD PRESSURE: 108 MMHG | OXYGEN SATURATION: 98 %

## 2022-06-22 DIAGNOSIS — K80.20 CALCULUS OF GALLBLADDER WITHOUT CHOLECYSTITIS WITHOUT OBSTRUCTION: ICD-10-CM

## 2022-06-22 LAB
ALBUMIN SERPL ELPH-MCNC: 3.6 G/DL — SIGNIFICANT CHANGE UP (ref 3.3–5)
ALP SERPL-CCNC: 141 U/L — HIGH (ref 40–120)
ALT FLD-CCNC: 40 U/L — SIGNIFICANT CHANGE UP (ref 10–45)
ANION GAP SERPL CALC-SCNC: 9 MMOL/L — SIGNIFICANT CHANGE UP (ref 5–17)
APPEARANCE UR: CLEAR — SIGNIFICANT CHANGE UP
APTT BLD: 30.6 SEC — SIGNIFICANT CHANGE UP (ref 27.5–35.5)
AST SERPL-CCNC: 21 U/L — SIGNIFICANT CHANGE UP (ref 10–40)
BACTERIA # UR AUTO: ABNORMAL
BASE EXCESS BLDV CALC-SCNC: 2.3 MMOL/L — HIGH (ref -2–2)
BASOPHILS # BLD AUTO: 0.03 K/UL — SIGNIFICANT CHANGE UP (ref 0–0.2)
BASOPHILS NFR BLD AUTO: 0.4 % — SIGNIFICANT CHANGE UP (ref 0–2)
BILIRUB SERPL-MCNC: 0.1 MG/DL — LOW (ref 0.2–1.2)
BILIRUB UR-MCNC: NEGATIVE — SIGNIFICANT CHANGE UP
BLD GP AB SCN SERPL QL: NEGATIVE — SIGNIFICANT CHANGE UP
BUN SERPL-MCNC: 12 MG/DL — SIGNIFICANT CHANGE UP (ref 7–23)
CA-I SERPL-SCNC: 1.32 MMOL/L — SIGNIFICANT CHANGE UP (ref 1.15–1.33)
CALCIUM SERPL-MCNC: 9.4 MG/DL — SIGNIFICANT CHANGE UP (ref 8.4–10.5)
CHLORIDE BLDV-SCNC: 105 MMOL/L — SIGNIFICANT CHANGE UP (ref 96–108)
CHLORIDE SERPL-SCNC: 106 MMOL/L — SIGNIFICANT CHANGE UP (ref 96–108)
CO2 BLDV-SCNC: 32 MMOL/L — HIGH (ref 22–26)
CO2 SERPL-SCNC: 26 MMOL/L — SIGNIFICANT CHANGE UP (ref 22–31)
COLOR SPEC: SIGNIFICANT CHANGE UP
CREAT SERPL-MCNC: 0.75 MG/DL — SIGNIFICANT CHANGE UP (ref 0.5–1.3)
DIFF PNL FLD: ABNORMAL
EGFR: 101 ML/MIN/1.73M2 — SIGNIFICANT CHANGE UP
EOSINOPHIL # BLD AUTO: 0.2 K/UL — SIGNIFICANT CHANGE UP (ref 0–0.5)
EOSINOPHIL NFR BLD AUTO: 2.6 % — SIGNIFICANT CHANGE UP (ref 0–6)
EPI CELLS # UR: 4 /HPF — SIGNIFICANT CHANGE UP
FLUAV AG NPH QL: SIGNIFICANT CHANGE UP
FLUBV AG NPH QL: SIGNIFICANT CHANGE UP
GAS PNL BLDV: 139 MMOL/L — SIGNIFICANT CHANGE UP (ref 136–145)
GAS PNL BLDV: SIGNIFICANT CHANGE UP
GAS PNL BLDV: SIGNIFICANT CHANGE UP
GLUCOSE BLDV-MCNC: 126 MG/DL — HIGH (ref 70–99)
GLUCOSE SERPL-MCNC: 132 MG/DL — HIGH (ref 70–99)
GLUCOSE UR QL: NEGATIVE — SIGNIFICANT CHANGE UP
HCG SERPL-ACNC: <2 MIU/ML — SIGNIFICANT CHANGE UP
HCO3 BLDV-SCNC: 30 MMOL/L — HIGH (ref 22–29)
HCT VFR BLD CALC: 37.6 % — SIGNIFICANT CHANGE UP (ref 34.5–45)
HCT VFR BLDA CALC: 35 % — SIGNIFICANT CHANGE UP (ref 34.5–46.5)
HGB BLD CALC-MCNC: 11.6 G/DL — LOW (ref 11.7–16.1)
HGB BLD-MCNC: 11.3 G/DL — LOW (ref 11.5–15.5)
HOROWITZ INDEX BLDV+IHG-RTO: SIGNIFICANT CHANGE UP
HYALINE CASTS # UR AUTO: 1 /LPF — SIGNIFICANT CHANGE UP (ref 0–2)
IMM GRANULOCYTES NFR BLD AUTO: 0.5 % — SIGNIFICANT CHANGE UP (ref 0–1.5)
INR BLD: 1.09 RATIO — SIGNIFICANT CHANGE UP (ref 0.88–1.16)
KETONES UR-MCNC: NEGATIVE — SIGNIFICANT CHANGE UP
LACTATE BLDV-MCNC: 1.7 MMOL/L — SIGNIFICANT CHANGE UP (ref 0.7–2)
LEUKOCYTE ESTERASE UR-ACNC: ABNORMAL
LIDOCAIN IGE QN: 32 U/L — SIGNIFICANT CHANGE UP (ref 7–60)
LYMPHOCYTES # BLD AUTO: 2.73 K/UL — SIGNIFICANT CHANGE UP (ref 1–3.3)
LYMPHOCYTES # BLD AUTO: 35.9 % — SIGNIFICANT CHANGE UP (ref 13–44)
MAGNESIUM SERPL-MCNC: 2 MG/DL — SIGNIFICANT CHANGE UP (ref 1.6–2.6)
MCHC RBC-ENTMCNC: 28.3 PG — SIGNIFICANT CHANGE UP (ref 27–34)
MCHC RBC-ENTMCNC: 30.1 GM/DL — LOW (ref 32–36)
MCV RBC AUTO: 94 FL — SIGNIFICANT CHANGE UP (ref 80–100)
MONOCYTES # BLD AUTO: 0.66 K/UL — SIGNIFICANT CHANGE UP (ref 0–0.9)
MONOCYTES NFR BLD AUTO: 8.7 % — SIGNIFICANT CHANGE UP (ref 2–14)
NEUTROPHILS # BLD AUTO: 3.94 K/UL — SIGNIFICANT CHANGE UP (ref 1.8–7.4)
NEUTROPHILS NFR BLD AUTO: 51.9 % — SIGNIFICANT CHANGE UP (ref 43–77)
NITRITE UR-MCNC: NEGATIVE — SIGNIFICANT CHANGE UP
NRBC # BLD: 0 /100 WBCS — SIGNIFICANT CHANGE UP (ref 0–0)
PCO2 BLDV: 65 MMHG — HIGH (ref 39–42)
PH BLDV: 7.28 — LOW (ref 7.32–7.43)
PH UR: 6.5 — SIGNIFICANT CHANGE UP (ref 5–8)
PLATELET # BLD AUTO: 263 K/UL — SIGNIFICANT CHANGE UP (ref 150–400)
PO2 BLDV: 27 MMHG — SIGNIFICANT CHANGE UP (ref 25–45)
POTASSIUM BLDV-SCNC: 4.3 MMOL/L — SIGNIFICANT CHANGE UP (ref 3.5–5.1)
POTASSIUM SERPL-MCNC: 4.5 MMOL/L — SIGNIFICANT CHANGE UP (ref 3.5–5.3)
POTASSIUM SERPL-SCNC: 4.5 MMOL/L — SIGNIFICANT CHANGE UP (ref 3.5–5.3)
PROT SERPL-MCNC: 7.1 G/DL — SIGNIFICANT CHANGE UP (ref 6–8.3)
PROT UR-MCNC: ABNORMAL
PROTHROM AB SERPL-ACNC: 12.6 SEC — SIGNIFICANT CHANGE UP (ref 10.5–13.4)
RBC # BLD: 4 M/UL — SIGNIFICANT CHANGE UP (ref 3.8–5.2)
RBC # FLD: 14.4 % — SIGNIFICANT CHANGE UP (ref 10.3–14.5)
RBC CASTS # UR COMP ASSIST: 68 /HPF — HIGH (ref 0–4)
RH IG SCN BLD-IMP: POSITIVE — SIGNIFICANT CHANGE UP
RSV RNA NPH QL NAA+NON-PROBE: SIGNIFICANT CHANGE UP
SAO2 % BLDV: 33.4 % — LOW (ref 67–88)
SARS-COV-2 RNA SPEC QL NAA+PROBE: SIGNIFICANT CHANGE UP
SODIUM SERPL-SCNC: 141 MMOL/L — SIGNIFICANT CHANGE UP (ref 135–145)
SP GR SPEC: 1.02 — SIGNIFICANT CHANGE UP (ref 1.01–1.02)
UROBILINOGEN FLD QL: NEGATIVE — SIGNIFICANT CHANGE UP
WBC # BLD: 7.6 K/UL — SIGNIFICANT CHANGE UP (ref 3.8–10.5)
WBC # FLD AUTO: 7.6 K/UL — SIGNIFICANT CHANGE UP (ref 3.8–10.5)
WBC UR QL: 6 /HPF — HIGH (ref 0–5)

## 2022-06-22 PROCEDURE — 99285 EMERGENCY DEPT VISIT HI MDM: CPT

## 2022-06-22 PROCEDURE — 85025 COMPLETE CBC W/AUTO DIFF WBC: CPT

## 2022-06-22 PROCEDURE — 71045 X-RAY EXAM CHEST 1 VIEW: CPT

## 2022-06-22 PROCEDURE — 85014 HEMATOCRIT: CPT

## 2022-06-22 PROCEDURE — 87637 SARSCOV2&INF A&B&RSV AMP PRB: CPT

## 2022-06-22 PROCEDURE — 85730 THROMBOPLASTIN TIME PARTIAL: CPT

## 2022-06-22 PROCEDURE — 86850 RBC ANTIBODY SCREEN: CPT

## 2022-06-22 PROCEDURE — 81001 URINALYSIS AUTO W/SCOPE: CPT

## 2022-06-22 PROCEDURE — 85610 PROTHROMBIN TIME: CPT

## 2022-06-22 PROCEDURE — 87086 URINE CULTURE/COLONY COUNT: CPT

## 2022-06-22 PROCEDURE — 82803 BLOOD GASES ANY COMBINATION: CPT

## 2022-06-22 PROCEDURE — 86901 BLOOD TYPING SEROLOGIC RH(D): CPT

## 2022-06-22 PROCEDURE — 99223 1ST HOSP IP/OBS HIGH 75: CPT | Mod: 57

## 2022-06-22 PROCEDURE — 83605 ASSAY OF LACTIC ACID: CPT

## 2022-06-22 PROCEDURE — 82435 ASSAY OF BLOOD CHLORIDE: CPT

## 2022-06-22 PROCEDURE — 76705 ECHO EXAM OF ABDOMEN: CPT

## 2022-06-22 PROCEDURE — 82947 ASSAY GLUCOSE BLOOD QUANT: CPT

## 2022-06-22 PROCEDURE — 85018 HEMOGLOBIN: CPT

## 2022-06-22 PROCEDURE — 84295 ASSAY OF SERUM SODIUM: CPT

## 2022-06-22 PROCEDURE — 96374 THER/PROPH/DIAG INJ IV PUSH: CPT

## 2022-06-22 PROCEDURE — 84132 ASSAY OF SERUM POTASSIUM: CPT

## 2022-06-22 PROCEDURE — 84702 CHORIONIC GONADOTROPIN TEST: CPT

## 2022-06-22 PROCEDURE — 96375 TX/PRO/DX INJ NEW DRUG ADDON: CPT

## 2022-06-22 PROCEDURE — 83690 ASSAY OF LIPASE: CPT

## 2022-06-22 PROCEDURE — 93005 ELECTROCARDIOGRAM TRACING: CPT

## 2022-06-22 PROCEDURE — 82330 ASSAY OF CALCIUM: CPT

## 2022-06-22 PROCEDURE — 83735 ASSAY OF MAGNESIUM: CPT

## 2022-06-22 PROCEDURE — 86900 BLOOD TYPING SEROLOGIC ABO: CPT

## 2022-06-22 PROCEDURE — 80053 COMPREHEN METABOLIC PANEL: CPT

## 2022-06-22 PROCEDURE — 71045 X-RAY EXAM CHEST 1 VIEW: CPT | Mod: 26

## 2022-06-22 PROCEDURE — 76705 ECHO EXAM OF ABDOMEN: CPT | Mod: 26

## 2022-06-22 RX ORDER — QUETIAPINE FUMARATE 200 MG/1
0 TABLET, FILM COATED ORAL
Qty: 0 | Refills: 0 | DISCHARGE

## 2022-06-22 RX ORDER — QUETIAPINE FUMARATE 200 MG/1
25 TABLET, FILM COATED ORAL AT BEDTIME
Refills: 0 | Status: DISCONTINUED | OUTPATIENT
Start: 2022-06-22 | End: 2022-06-22

## 2022-06-22 RX ORDER — ACETAMINOPHEN 500 MG
975 TABLET ORAL ONCE
Refills: 0 | Status: COMPLETED | OUTPATIENT
Start: 2022-06-22 | End: 2022-06-22

## 2022-06-22 RX ORDER — OXCARBAZEPINE 300 MG/1
300 TABLET, FILM COATED ORAL
Refills: 0 | Status: DISCONTINUED | OUTPATIENT
Start: 2022-06-22 | End: 2022-06-22

## 2022-06-22 RX ORDER — SODIUM CHLORIDE 9 MG/ML
1000 INJECTION, SOLUTION INTRAVENOUS
Refills: 0 | Status: DISCONTINUED | OUTPATIENT
Start: 2022-06-22 | End: 2022-06-22

## 2022-06-22 RX ORDER — ONDANSETRON 8 MG/1
4 TABLET, FILM COATED ORAL ONCE
Refills: 0 | Status: COMPLETED | OUTPATIENT
Start: 2022-06-22 | End: 2022-06-22

## 2022-06-22 RX ORDER — ENOXAPARIN SODIUM 100 MG/ML
40 INJECTION SUBCUTANEOUS EVERY 24 HOURS
Refills: 0 | Status: DISCONTINUED | OUTPATIENT
Start: 2022-06-22 | End: 2022-06-22

## 2022-06-22 RX ORDER — OXCARBAZEPINE 300 MG/1
0 TABLET, FILM COATED ORAL
Qty: 0 | Refills: 0 | DISCHARGE

## 2022-06-22 RX ORDER — SODIUM CHLORIDE 9 MG/ML
1000 INJECTION INTRAMUSCULAR; INTRAVENOUS; SUBCUTANEOUS ONCE
Refills: 0 | Status: COMPLETED | OUTPATIENT
Start: 2022-06-22 | End: 2022-06-22

## 2022-06-22 RX ORDER — PIPERACILLIN AND TAZOBACTAM 4; .5 G/20ML; G/20ML
3.38 INJECTION, POWDER, LYOPHILIZED, FOR SOLUTION INTRAVENOUS EVERY 8 HOURS
Refills: 0 | Status: DISCONTINUED | OUTPATIENT
Start: 2022-06-22 | End: 2022-06-22

## 2022-06-22 RX ORDER — PIPERACILLIN AND TAZOBACTAM 4; .5 G/20ML; G/20ML
3.38 INJECTION, POWDER, LYOPHILIZED, FOR SOLUTION INTRAVENOUS ONCE
Refills: 0 | Status: COMPLETED | OUTPATIENT
Start: 2022-06-22 | End: 2022-06-22

## 2022-06-22 RX ORDER — NICOTINE POLACRILEX 2 MG
1 GUM BUCCAL DAILY
Refills: 0 | Status: DISCONTINUED | OUTPATIENT
Start: 2022-06-22 | End: 2022-06-22

## 2022-06-22 RX ORDER — HYDROMORPHONE HYDROCHLORIDE 2 MG/ML
0.25 INJECTION INTRAMUSCULAR; INTRAVENOUS; SUBCUTANEOUS EVERY 6 HOURS
Refills: 0 | Status: DISCONTINUED | OUTPATIENT
Start: 2022-06-22 | End: 2022-06-22

## 2022-06-22 RX ORDER — ACETAMINOPHEN 500 MG
1000 TABLET ORAL EVERY 6 HOURS
Refills: 0 | Status: DISCONTINUED | OUTPATIENT
Start: 2022-06-22 | End: 2022-06-22

## 2022-06-22 RX ORDER — FAMOTIDINE 10 MG/ML
20 INJECTION INTRAVENOUS ONCE
Refills: 0 | Status: COMPLETED | OUTPATIENT
Start: 2022-06-22 | End: 2022-06-22

## 2022-06-22 RX ADMIN — Medication 1 PATCH: at 11:29

## 2022-06-22 RX ADMIN — ONDANSETRON 4 MILLIGRAM(S): 8 TABLET, FILM COATED ORAL at 01:36

## 2022-06-22 RX ADMIN — FAMOTIDINE 20 MILLIGRAM(S): 10 INJECTION INTRAVENOUS at 01:36

## 2022-06-22 RX ADMIN — Medication 975 MILLIGRAM(S): at 07:15

## 2022-06-22 RX ADMIN — PIPERACILLIN AND TAZOBACTAM 25 GRAM(S): 4; .5 INJECTION, POWDER, LYOPHILIZED, FOR SOLUTION INTRAVENOUS at 17:34

## 2022-06-22 RX ADMIN — Medication 975 MILLIGRAM(S): at 01:36

## 2022-06-22 RX ADMIN — SODIUM CHLORIDE 1000 MILLILITER(S): 9 INJECTION INTRAMUSCULAR; INTRAVENOUS; SUBCUTANEOUS at 03:09

## 2022-06-22 RX ADMIN — PIPERACILLIN AND TAZOBACTAM 200 GRAM(S): 4; .5 INJECTION, POWDER, LYOPHILIZED, FOR SOLUTION INTRAVENOUS at 07:25

## 2022-06-22 RX ADMIN — OXCARBAZEPINE 300 MILLIGRAM(S): 300 TABLET, FILM COATED ORAL at 12:15

## 2022-06-22 NOTE — ED PROVIDER NOTE - ATTENDING CONTRIBUTION TO CARE
MD Carrasquillo:  patient seen and evaluated personally.   I agree with the History & Physical,  Impression & Plan other than what was detailed in my note.  MD Carrasquillo  42 y/o f presenting to ed w/ cc of abdominal pain (not chest pain), no previous abd surg, worse n ruq, has been constant x 3 days, recently seen at [facility], was dx w/ cholelithiasis, pos nausea, no change in bm, no bloody stools, pt is also reporting cp that started earlier today, sharp substernal, non radiating, no associated, sob, diapohoresis, no arm/back, non pleuritic, no hx of dvt/pe, no hemoptysis, no recent travels/surgeries, not tachy, afebrile vitals stable  non toxic well appearing, NC/AT,  conjunctiva non conjected, sclera anicteric, moist mucous membranes, neck supple, heart sounds, normal, no mrg, lungs cta b/l no wrr, abd soft mild ruq distended w/ no tenderness, no visual deformities of extremities, axox3, , normal mood and affect, not consistent w/ acs ekg shows nsr 78 bpm, nad, no sig st changes, low risk for acs, perc negative, possilby secondary to gi, will get repeat ruq us, plan for cbc, cmp, ua.

## 2022-06-22 NOTE — ED ADULT NURSE NOTE - CHIEF COMPLAINT QUOTE
Chest pain x today, pt reports pain is sharp in nature, non-radiating. Pt also endorsing abdominal pain x 3 days, d/c yesterday from [facility] with dx of gallstones, per EMS pt refused surgery. Denies N/V/D.

## 2022-06-22 NOTE — DISCHARGE NOTE PROVIDER - EXTENDED VTE YES NO FOR MLM ENOXAPARIN
----- Message from Anette Randle MD sent at 5/15/2020  3:10 PM CDT -----  Please reschedule his DAMIAN since and will be out that week maybe to the week after or any time after   ,

## 2022-06-22 NOTE — DISCHARGE NOTE PROVIDER - NSDCMRMEDTOKEN_GEN_ALL_CORE_FT
SEROquel 25 mg oral tablet: 1 tab(s) orally once a day (at bedtime)  Trileptal 300 mg oral tablet: 1 tab(s) orally 2 times a day   amoxicillin-clavulanate 875 mg-125 mg oral tablet: 1 tab(s) orally 2 times a day   SEROquel 25 mg oral tablet: 1 tab(s) orally once a day (at bedtime)  Trileptal 300 mg oral tablet: 1 tab(s) orally 2 times a day

## 2022-06-22 NOTE — H&P ADULT - NSHPLABSRESULTS_GEN_ALL_CORE
LABS:                        11.3   7.60  )-----------( 263      ( 22 Jun 2022 01:51 )             37.6     06-22    141  |  106  |  12  ----------------------------<  132<H>  4.5   |  26  |  0.75    Ca    9.4      22 Jun 2022 01:51  Phos  2.4     06-20  Mg     2.0     06-22    TPro  7.1  /  Alb  3.6  /  TBili  0.1<L>  /  DBili  x   /  AST  21  /  ALT  40  /  AlkPhos  141<H>  06-22    PT/INR - ( 22 Jun 2022 01:51 )   PT: 12.6 sec;   INR: 1.09 ratio         PTT - ( 22 Jun 2022 01:51 )  PTT:30.6 sec    CAPILLARY BLOOD GLUCOSE        LIVER FUNCTIONS - ( 22 Jun 2022 01:51 )  Alb: 3.6 g/dL / Pro: 7.1 g/dL / ALK PHOS: 141 U/L / ALT: 40 U/L / AST: 21 U/L / GGT: x             RADIOLOGY & ADDITIONAL STUDIES:

## 2022-06-22 NOTE — PROGRESS NOTE ADULT - ASSESSMENT
41y F PMHx of fibroids, cholelithiasis, GERD, borderline personality/bipolar disorder presents to the ED with abdominal pain that began 4 days ago. Patient afebrile, vitals wnl. Labs with elevated Alk Phos. RUQ U/S significant for cholelithiasis with 1.8cm gallstone in neck and mild GB wall thickening.     - as pain has resolve, will PO challenge  - f/u AM labs  - VTE ppx  - on Zosyn    ACS [id number]

## 2022-06-22 NOTE — DISCHARGE NOTE PROVIDER - CARE PROVIDER_API CALL
Mau Curiel (MD)  Surgery; Surgical Critical Care  [address], [address]  [place] [address]  Phone: [phone]  Fax: [phone]  Follow Up Time: 2 weeks   Roberto Cooper (MD)  Surgery; Surgical Critical Care  [address], [address]  [place] [address]  Phone: [phone]  Fax: [phone]  Follow Up Time: 2 weeks

## 2022-06-22 NOTE — DISCHARGE NOTE PROVIDER - PROVIDER TOKENS
PROVIDER:[TOKEN:[[id number]],FOLLOWUP:[2 weeks]] PROVIDER:[TOKEN:[[id number]],FOLLOWUP:[2 weeks]]

## 2022-06-22 NOTE — ED PROVIDER NOTE - OBJECTIVE STATEMENT
40 yo F PMHx of cholelithiasis, GERD, borderline personality, disorder, presenting to ED for c/o abd pain, greatest in RUQ. Associated nausea w/o vomiting. Has not eaten in 24 hours. Recently evaluated for same on Jun 19th, declined surgery at that time. States she is having same pain. No diarrhea, melena, hematochezia. Denies changes in urination. Also reporting mild substernal sharp chest pains radiating from epigastrium. Denies SOB, cough, congestion, or fevers.

## 2022-06-22 NOTE — ED ADULT NURSE NOTE - OBJECTIVE STATEMENT
40 y/o female PMHx Asthma, Bipolar Disorder, Borderline Personality Disorder, Cholelithiasis, Depression, Fibroids, GERD, Obesity arrives to [facility] ED from [facility] with c/o chest pain. Pt states d/c from [facility] monday for cholelithiasis, was supposed to have surgery but endorses was afraid to have surgery, arrives today with continued RUQ pain and acute onset midsternal chest pain while at rest today. Patient is A&Ox4. Respirations spontaneous and unlabored. Denies SOB, dyspnea, cough, palpitations. EKG done, placed on CM. RUQ abdominal pain, TTP. +nausea. Denies urinary symptoms. Denies fever/chills. No sick contacts. Skin is warm/dry and normal for race. Ambulates independently at baseline.

## 2022-06-22 NOTE — DISCHARGE NOTE PROVIDER - NSDCCPCAREPLAN_GEN_ALL_CORE_FT
PRINCIPAL DISCHARGE DIAGNOSIS  Diagnosis: Cholelithiases  Assessment and Plan of Treatment: Diet: Low fat diet   Follow up outpatient with Dr. Curiel for elective removal of your gallbladder within 2 weeks of discharge.

## 2022-06-22 NOTE — PROGRESS NOTE ADULT - SUBJECTIVE AND OBJECTIVE BOX
SURGERY DAILY PROGRESS NOTE:       SUBJECTIVE/ROS: Patient seen and evaluated on AM rounds in the ED. Patient reports that her pain has resolved. Last meal was yesterday at 5pm. Last BM last night. Denies nausea, vomiting, chest pain, shortness of breath. Never had abdominal surgery before.        OBJECTIVE:    Vital Signs Last 24 Hrs  T(C): 36.4 (22 Jun 2022 07:42), Max: 36.6 (22 Jun 2022 04:53)  T(F): 97.6 (22 Jun 2022 07:42), Max: 97.8 (22 Jun 2022 04:53)  HR: 77 (22 Jun 2022 07:42) (71 - 87)  BP: 101/86 (22 Jun 2022 07:42) (101/86 - 130/58)  BP(mean): 86 (22 Jun 2022 04:53) (77 - 86)  RR: 19 (22 Jun 2022 07:42) (14 - 20)  SpO2: 97% (22 Jun 2022 07:42) (97% - 100%)  I&O's Detail    Daily Height in cm: 167.64 (21 Jun 2022 20:52)    Daily   MEDICATIONS  (STANDING):  enoxaparin Injectable 40 milliGRAM(s) SubCutaneous every 24 hours  nicotine -  14 mG/24Hr(s) Patch 1 patch Transdermal daily  OXcarbazepine 300 milliGRAM(s) Oral two times a day  piperacillin/tazobactam IVPB.. 3.375 Gram(s) IV Intermittent every 8 hours  QUEtiapine 25 milliGRAM(s) Oral at bedtime    MEDICATIONS  (PRN):      LABS:                        11.3   7.60  )-----------( 263      ( 22 Jun 2022 01:51 )             37.6     06-22    141  |  106  |  12  ----------------------------<  132<H>  4.5   |  26  |  0.75    Ca    9.4      22 Jun 2022 01:51  Phos  2.4     06-20  Mg     2.0     06-22    TPro  7.1  /  Alb  3.6  /  TBili  0.1<L>  /  DBili  x   /  AST  21  /  ALT  40  /  AlkPhos  141<H>  06-22    PT/INR - ( 22 Jun 2022 01:51 )   PT: 12.6 sec;   INR: 1.09 ratio         PTT - ( 22 Jun 2022 01:51 )  PTT:30.6 sec        PHYSICAL EXAM:  General: NAD, resting comfortably  Pulmonary: normal resp effort on RA  Abdominal: soft, minimal minimally TTP in RUQ, no rebound or guarding  Extremities: WWP, normal strength, no clubbing/cyanosis/edema  Neuro: A/O x 3, CNs II-XII grossly intact, normal sensation, no focal deficits  Psych: affect appropriate

## 2022-06-22 NOTE — DISCHARGE NOTE PROVIDER - HOSPITAL COURSE
41y F PMHx of fibroids, cholelithiasis, GERD, borderline personality/bipolar disorder presents to the ED with abdominal pain that began 4 days ago. Patient afebrile, vitals wnl. Labs with elevated Alk Phos. RUQ U/S significant for cholelithiasis with 1.8cm gallstone in neck and mild GB wall thickening. Patients pain resolved, diet advanced and patient cleared for discharge home. Patient to follow up outpatient with the surgeon for elective removal of gallbladder.

## 2022-06-22 NOTE — DISCHARGE NOTE NURSING/CASE MANAGEMENT/SOCIAL WORK - NSDCPEFALRISK_GEN_ALL_CORE
For information on Fall & Injury Prevention, visit: [email or web address] OR  [email or web address] OR  https://www.cdc.gov/steadi/patient.html

## 2022-06-22 NOTE — DISCHARGE NOTE PROVIDER - CARE PROVIDERS DIRECT ADDRESSES
[email or web address]

## 2022-06-22 NOTE — ED PROVIDER NOTE - CLINICAL SUMMARY MEDICAL DECISION MAKING FREE TEXT BOX
40 yo F w/ recent admission for abd pain 2/2 to cholelithiasis. Declined surgery. Returning now for same. Labs, imaging, symptomatic tx, will reassess.

## 2022-06-22 NOTE — ED ADULT NURSE NOTE - NSFALLRSKASSESSDT_ED_ALL_ED
Patient should use the [employer]zone coupon for the 7.5% gel so that the medication is $35.   22-Jun-2022 01:09

## 2022-06-22 NOTE — DISCHARGE NOTE NURSING/CASE MANAGEMENT/SOCIAL WORK - PATIENT PORTAL LINK FT
You can access the FollowMyHealth Patient Portal offered by [facility] by registering at the following website: [email or web address]. By joining [facility]’s FollowMyHealth portal, you will also be able to view your health information using other applications (apps) compatible with our system.

## 2022-06-22 NOTE — ED PROVIDER NOTE - PHYSICAL EXAMINATION
Gen: A&Ox4   HEENT: Atraumatic. Mucous membranes moist, no scleral icterus.  CV: RRR. No significant lower extremity edema.   Resp: Respirations unlabored. CTAB, no rales, no wheezes.  GI: Abdomen ttp in RUQ, otherwise not significantly tender to palpation, soft and non-distended.   Skin/MSK: No open wounds. No ecchymosis appreciated.  Neuro: Following commands. EOMI. Pupils ERRL.  Psych: Appropriate mood, cooperative

## 2022-06-22 NOTE — H&P ADULT - HISTORY OF PRESENT ILLNESS
41-year-old woman with PMHx of fibroids, cholelithiasis, GERD, borderline personality/bipolar disorder presents to the ED with abdominal pain that began 4 days ago. Since coming to the ED, she has since developed chest pain that began at about 21:00 last night. She describes the pain in both her abdomen and chest as "sharp." She denies SOB, gas, and vomiting. She endorses nausea. She says she had her last bowel movement yesterday, and it was a "mushy" consistency. She states this change to her bowel movements began about 2 weeks ago. Denies fevers, chills, vomiting, SOB, dysuria.

## 2022-06-22 NOTE — ED ADULT NURSE NOTE - NSFALLRSKASSESSTYPE_ED_ALL_ED
Initial (On Arrival) Azithromycin Counseling:  I discussed with the patient the risks of azithromycin including but not limited to GI upset, allergic reaction, drug rash, diarrhea, and yeast infections.

## 2022-06-22 NOTE — ED PROVIDER NOTE - NS ED ROS FT
Gen: Denies fever.   HEENT: Denies headache. Denies congestion.  CV: +chest pain. Denies lightheadedness.  Skin: Denies rash.   Resp: Denies SOB. Denies cough.  GI: +abd pain. +nausea. Denies vomiting. Denies diarrhea. Denies melena. Denies hematochezia.  Msk: Denies extremity swelling. Denies extremity pain.  : Denies dysuria. Denies hematuria.  Neuro: Denies LOC. Denies dizziness. Denies new numbness/tingling. Denies new focal weakness.  Psych: Denies SI

## 2022-06-22 NOTE — H&P ADULT - NSHPPHYSICALEXAM_GEN_ALL_CORE
Physical Exam:  General: NAD, resting comfortably  HEENT: NC/AT, EOMI, normal hearing, no oral lesions, no LAD, neck supple  Pulmonary: normal resp effort on RA  Cardiovascular: RRR  Abdominal: soft, TTP in RUQ, no rebound or guarding  Extremities: WWP, normal strength, no clubbing/cyanosis/edema  Neuro: A/O x 3, CNs II-XII grossly intact, normal sensation, no focal deficits  Pulses: palpable distal pulses  Psych: affect appropriate    Vital Signs Last 24 Hrs  T(C): 36.6 (22 Jun 2022 04:53), Max: 36.6 (22 Jun 2022 04:53)  T(F): 97.8 (22 Jun 2022 04:53), Max: 97.8 (22 Jun 2022 04:53)  HR: 71 (22 Jun 2022 04:53) (71 - 87)  BP: 114/72 (22 Jun 2022 04:53) (114/72 - 130/58)  BP(mean): 86 (22 Jun 2022 04:53) (77 - 86)  RR: 14 (22 Jun 2022 04:53) (14 - 20)  SpO2: 100% (22 Jun 2022 04:53) (100% - 100%)

## 2022-06-22 NOTE — H&P ADULT - ASSESSMENT
ASSESSMENT/PLAN: 41y F PMHx of fibroids, cholelithiasis, GERD, borderline personality/bipolar disorder presents to the ED with abdominal pain that began 4 days ago. Patient afebrile, vitals wnl. Labs with elevated Alk Phos. RUQ U/S significant for cholelithiasis with 1.8cm gallstone in neck and mild GB wall thickening.     - Admit to ACS under Dr. Gibson  - Added on for lap divya  - Consent signed and placed in chart  - NPO/IVF  - IV Abx: Zosyn  - Pain control  - VTE PPx  - Home meds    Patient discussed with attending, Dr. Gibson.     Lorin Miranda MD  PGY2 Consult Resident  ACS/Trauma Surgery  [id number]

## 2022-06-22 NOTE — ED ADULT NURSE REASSESSMENT NOTE - NS ED NURSE REASSESS COMMENT FT1
Report received from FORD Baugh in [place]. Pt remains in the ED. Resting comfortably in bed. A&Ox3. Breathing spontaneously and unlabored. NAD. VSS. Pt safety maintained. Will continue to monitor. Awaiting dispo.

## 2022-06-22 NOTE — DISCHARGE NOTE NURSING/CASE MANAGEMENT/SOCIAL WORK - NSDCVIVACCINE_GEN_ALL_CORE_FT
Tdap; 13-Oct-2014 04:40; Tho Morel (RN); [employer]; [id number]; IntraMuscular; Dorsogluteal Right.; 0.5 milliLiter(s);

## 2022-06-23 LAB
CULTURE RESULTS: SIGNIFICANT CHANGE UP
SPECIMEN SOURCE: SIGNIFICANT CHANGE UP

## 2022-06-25 ENCOUNTER — INPATIENT (INPATIENT)
Facility: HOSPITAL | Age: 42
LOS: 0 days | Discharge: LEFT BEFORE DISCHARGE | End: 2022-06-26
Attending: SURGERY | Admitting: SURGERY
Payer: MEDICAID

## 2022-06-25 VITALS
DIASTOLIC BLOOD PRESSURE: 82 MMHG | RESPIRATION RATE: 18 BRPM | HEIGHT: 66 IN | HEART RATE: 87 BPM | OXYGEN SATURATION: 100 % | SYSTOLIC BLOOD PRESSURE: 130 MMHG | TEMPERATURE: 98 F

## 2022-06-25 LAB
ALBUMIN SERPL ELPH-MCNC: 3.4 G/DL — SIGNIFICANT CHANGE UP (ref 3.3–5)
ALP SERPL-CCNC: 148 U/L — HIGH (ref 40–120)
ALT FLD-CCNC: 72 U/L — HIGH (ref 4–33)
ANION GAP SERPL CALC-SCNC: 9 MMOL/L — SIGNIFICANT CHANGE UP (ref 7–14)
AST SERPL-CCNC: 60 U/L — HIGH (ref 4–32)
BASOPHILS # BLD AUTO: 0.02 K/UL — SIGNIFICANT CHANGE UP (ref 0–0.2)
BASOPHILS NFR BLD AUTO: 0.2 % — SIGNIFICANT CHANGE UP (ref 0–2)
BILIRUB SERPL-MCNC: <0.2 MG/DL — SIGNIFICANT CHANGE UP (ref 0.2–1.2)
BUN SERPL-MCNC: 13 MG/DL — SIGNIFICANT CHANGE UP (ref 7–23)
CALCIUM SERPL-MCNC: 8.5 MG/DL — SIGNIFICANT CHANGE UP (ref 8.4–10.5)
CHLORIDE SERPL-SCNC: 106 MMOL/L — SIGNIFICANT CHANGE UP (ref 98–107)
CO2 SERPL-SCNC: 24 MMOL/L — SIGNIFICANT CHANGE UP (ref 22–31)
CREAT SERPL-MCNC: 0.79 MG/DL — SIGNIFICANT CHANGE UP (ref 0.5–1.3)
EGFR: 95 ML/MIN/1.73M2 — SIGNIFICANT CHANGE UP
EOSINOPHIL # BLD AUTO: 0.27 K/UL — SIGNIFICANT CHANGE UP (ref 0–0.5)
EOSINOPHIL NFR BLD AUTO: 3.3 % — SIGNIFICANT CHANGE UP (ref 0–6)
GLUCOSE SERPL-MCNC: 123 MG/DL — HIGH (ref 70–99)
HCT VFR BLD CALC: 38 % — SIGNIFICANT CHANGE UP (ref 34.5–45)
HGB BLD-MCNC: 11.4 G/DL — LOW (ref 11.5–15.5)
IANC: 4.29 K/UL — SIGNIFICANT CHANGE UP (ref 1.8–7.4)
IMM GRANULOCYTES NFR BLD AUTO: 0.5 % — SIGNIFICANT CHANGE UP (ref 0–1.5)
LIDOCAIN IGE QN: 29 U/L — SIGNIFICANT CHANGE UP (ref 7–60)
LYMPHOCYTES # BLD AUTO: 3.04 K/UL — SIGNIFICANT CHANGE UP (ref 1–3.3)
LYMPHOCYTES # BLD AUTO: 36.8 % — SIGNIFICANT CHANGE UP (ref 13–44)
MCHC RBC-ENTMCNC: 28.7 PG — SIGNIFICANT CHANGE UP (ref 27–34)
MCHC RBC-ENTMCNC: 30 GM/DL — LOW (ref 32–36)
MCV RBC AUTO: 95.7 FL — SIGNIFICANT CHANGE UP (ref 80–100)
MONOCYTES # BLD AUTO: 0.6 K/UL — SIGNIFICANT CHANGE UP (ref 0–0.9)
MONOCYTES NFR BLD AUTO: 7.3 % — SIGNIFICANT CHANGE UP (ref 2–14)
NEUTROPHILS # BLD AUTO: 4.29 K/UL — SIGNIFICANT CHANGE UP (ref 1.8–7.4)
NEUTROPHILS NFR BLD AUTO: 51.9 % — SIGNIFICANT CHANGE UP (ref 43–77)
NRBC # BLD: 0 /100 WBCS — SIGNIFICANT CHANGE UP
NRBC # FLD: 0 K/UL — SIGNIFICANT CHANGE UP
PLATELET # BLD AUTO: 266 K/UL — SIGNIFICANT CHANGE UP (ref 150–400)
POTASSIUM SERPL-MCNC: 4.2 MMOL/L — SIGNIFICANT CHANGE UP (ref 3.5–5.3)
POTASSIUM SERPL-SCNC: 4.2 MMOL/L — SIGNIFICANT CHANGE UP (ref 3.5–5.3)
PROT SERPL-MCNC: 6.8 G/DL — SIGNIFICANT CHANGE UP (ref 6–8.3)
RBC # BLD: 3.97 M/UL — SIGNIFICANT CHANGE UP (ref 3.8–5.2)
RBC # FLD: 14 % — SIGNIFICANT CHANGE UP (ref 10.3–14.5)
SODIUM SERPL-SCNC: 139 MMOL/L — SIGNIFICANT CHANGE UP (ref 135–145)
TROPONIN T, HIGH SENSITIVITY RESULT: 9 NG/L — SIGNIFICANT CHANGE UP
WBC # BLD: 8.26 K/UL — SIGNIFICANT CHANGE UP (ref 3.8–10.5)
WBC # FLD AUTO: 8.26 K/UL — SIGNIFICANT CHANGE UP (ref 3.8–10.5)

## 2022-06-25 PROCEDURE — 76705 ECHO EXAM OF ABDOMEN: CPT | Mod: 26

## 2022-06-25 PROCEDURE — 99285 EMERGENCY DEPT VISIT HI MDM: CPT

## 2022-06-25 PROCEDURE — 71046 X-RAY EXAM CHEST 2 VIEWS: CPT | Mod: 26

## 2022-06-25 NOTE — ED ADULT TRIAGE NOTE - CHIEF COMPLAINT QUOTE
Patient arrives with ems from [facility] 40 , c/o mid sternal chest pain since 6 pm tonight.  4 baby asa given by ems , h/o gall stones.

## 2022-06-25 NOTE — ED PROVIDER NOTE - OBJECTIVE STATEMENT
40 yo f pmh fibroids, cholelithiasis, GERD, borderline personality/bipolar disorder, pw cp. recently dc a few days prior for symptomatic cholelithiasis, due for outpatient procedure. reports today had sudden onset mid sternal cp, non radiating, non exer, non pleur, no prior hx, occurred while at rest, mild improved pta. denies sob, cough, congestion, trauma, skin changes.

## 2022-06-25 NOTE — ED PROVIDER NOTE - CLINICAL SUMMARY MEDICAL DECISION MAKING FREE TEXT BOX
Reinier Tsang, DO: 40 yo f pmh fibroids, cholelithiasis, GERD, borderline personality/bipolar disorder, pw cp. recently dc a few days prior for symptomatic cholelithiasis, due for outpatient procedure. reports today had sudden onset mid sternal cp, non radiating, non exer, non pleur, no prior hx, occurred while at rest, mild improved pta. denies sob, cough, congestion, trauma, skin changes. arrives hds, well appearing, atypical for acs. does not seem like gb etiology however given recent episode will eval for cholecystitis. eval for acs. perc negative. do not suspect aortic catastrophe. labs, imaging, reassess.

## 2022-06-25 NOTE — ED ADULT NURSE NOTE - OBJECTIVE STATEMENT
Received in intake 9 with c/o mid sternal pain. Patient from [place] building 40. Patient is alert and oriented x 4,safety maintained. not in acute distress. 20G IV line inserted in right AC due labs sent. Patient awaits US.

## 2022-06-25 NOTE — ED ADULT NURSE NOTE - CHIEF COMPLAINT QUOTE
Patient arrives with ems from [facility] 40 , c/o mid sternal chest pain since 6 pm tonight.  4 baby asa given by ems , h/o gall stones.

## 2022-06-25 NOTE — ED PROVIDER NOTE - NS ED ROS FT
GENERAL: No fever or chills, //             EYES: no change in vision, //             HEENT: no trouble swallowing or speaking, //             CARDIAC: chest pain, //              PULMONARY: no cough or SOB, //             GI: no abdominal pain, no nausea or no vomiting, no diarrhea or constipation, //             : No changes in urination,  //            SKIN: no rashes,  //            NEURO: no headache,  //             MSK: No joint pain otherwise as HPI or negative. ~Reinier Tsang, DO

## 2022-06-26 VITALS
TEMPERATURE: 98 F | HEART RATE: 80 BPM | OXYGEN SATURATION: 100 % | SYSTOLIC BLOOD PRESSURE: 133 MMHG | DIASTOLIC BLOOD PRESSURE: 61 MMHG | RESPIRATION RATE: 17 BRPM

## 2022-06-26 DIAGNOSIS — K81.0 ACUTE CHOLECYSTITIS: ICD-10-CM

## 2022-06-26 LAB
BLD GP AB SCN SERPL QL: NEGATIVE — SIGNIFICANT CHANGE UP
FLUAV AG NPH QL: SIGNIFICANT CHANGE UP
FLUBV AG NPH QL: SIGNIFICANT CHANGE UP
HCG SERPL-ACNC: <5 MIU/ML — SIGNIFICANT CHANGE UP
RH IG SCN BLD-IMP: POSITIVE — SIGNIFICANT CHANGE UP
RSV RNA NPH QL NAA+NON-PROBE: SIGNIFICANT CHANGE UP
SARS-COV-2 RNA SPEC QL NAA+PROBE: SIGNIFICANT CHANGE UP

## 2022-06-26 PROCEDURE — 99223 1ST HOSP IP/OBS HIGH 75: CPT | Mod: GC

## 2022-06-26 RX ORDER — ENOXAPARIN SODIUM 100 MG/ML
40 INJECTION SUBCUTANEOUS EVERY 12 HOURS
Refills: 0 | Status: DISCONTINUED | OUTPATIENT
Start: 2022-06-26 | End: 2022-06-26

## 2022-06-26 RX ORDER — OXYCODONE HYDROCHLORIDE 5 MG/1
2.5 TABLET ORAL EVERY 6 HOURS
Refills: 0 | Status: DISCONTINUED | OUTPATIENT
Start: 2022-06-26 | End: 2022-06-26

## 2022-06-26 RX ORDER — OXYCODONE HYDROCHLORIDE 5 MG/1
5 TABLET ORAL EVERY 6 HOURS
Refills: 0 | Status: DISCONTINUED | OUTPATIENT
Start: 2022-06-26 | End: 2022-06-26

## 2022-06-26 RX ORDER — CEFTRIAXONE 500 MG/1
2000 INJECTION, POWDER, FOR SOLUTION INTRAMUSCULAR; INTRAVENOUS EVERY 24 HOURS
Refills: 0 | Status: DISCONTINUED | OUTPATIENT
Start: 2022-06-26 | End: 2022-06-26

## 2022-06-26 RX ORDER — METRONIDAZOLE 500 MG
500 TABLET ORAL EVERY 8 HOURS
Refills: 0 | Status: DISCONTINUED | OUTPATIENT
Start: 2022-06-26 | End: 2022-06-26

## 2022-06-26 RX ORDER — IBUPROFEN 200 MG
200 TABLET ORAL ONCE
Refills: 0 | Status: COMPLETED | OUTPATIENT
Start: 2022-06-26 | End: 2022-06-26

## 2022-06-26 RX ORDER — OXCARBAZEPINE 300 MG/1
300 TABLET, FILM COATED ORAL
Refills: 0 | Status: DISCONTINUED | OUTPATIENT
Start: 2022-06-26 | End: 2022-06-26

## 2022-06-26 RX ORDER — QUETIAPINE FUMARATE 200 MG/1
25 TABLET, FILM COATED ORAL AT BEDTIME
Refills: 0 | Status: DISCONTINUED | OUTPATIENT
Start: 2022-06-26 | End: 2022-06-26

## 2022-06-26 RX ORDER — ACETAMINOPHEN 500 MG
650 TABLET ORAL EVERY 6 HOURS
Refills: 0 | Status: DISCONTINUED | OUTPATIENT
Start: 2022-06-26 | End: 2022-06-26

## 2022-06-26 RX ADMIN — OXYCODONE HYDROCHLORIDE 5 MILLIGRAM(S): 5 TABLET ORAL at 09:55

## 2022-06-26 RX ADMIN — Medication 100 MILLIGRAM(S): at 13:12

## 2022-06-26 RX ADMIN — ENOXAPARIN SODIUM 40 MILLIGRAM(S): 100 INJECTION SUBCUTANEOUS at 06:20

## 2022-06-26 RX ADMIN — Medication 650 MILLIGRAM(S): at 08:55

## 2022-06-26 RX ADMIN — OXCARBAZEPINE 300 MILLIGRAM(S): 300 TABLET, FILM COATED ORAL at 06:16

## 2022-06-26 RX ADMIN — CEFTRIAXONE 100 MILLIGRAM(S): 500 INJECTION, POWDER, FOR SOLUTION INTRAMUSCULAR; INTRAVENOUS at 03:29

## 2022-06-26 RX ADMIN — Medication 100 MILLIGRAM(S): at 06:19

## 2022-06-26 RX ADMIN — Medication 650 MILLIGRAM(S): at 10:00

## 2022-06-26 RX ADMIN — Medication 200 MILLIGRAM(S): at 16:26

## 2022-06-26 NOTE — H&P ADULT - HISTORY OF PRESENT ILLNESS
HPI: 41yF w/ PMH sig for fibroids, GERD, and BPD. No PSH. Pt presented to [facility] ED on 6/25/22 w/ upper abdominal pain for 2 weeks. Pt was recently seen at [facility] ED on 6/22 for a similar complaint and set up w/ out pt f/u for elective lap divya. She was also at [facility] on 6/19 w/ the same issue and scheduled for lap divya, but declined surgery at the last minute. Pt states pain is constant, located in the upper abdomen, and radiates into her chest and back. She denies any changes in pain w/ PO intake. She had multiple episodes of NBNB emesis a few days ago, but nothing t/d. She denies fevers or chills. She reports loose bowel movements, but denies any melena, hematochezia, or BRBPR.    Pt failed PO challenge in ED and states abdominal pain was worse after food.

## 2022-06-26 NOTE — H&P ADULT - NSHPPHYSICALEXAM_GEN_ALL_CORE
Gen: WD, WN, NAD  HEENT: PERRLA, EOMI, Oropharynx clear, No scleral icterus  Neck: Supple, no JVD  Lungs: No increased WoB  Heart: RRR  Abd: Soft, ND, minimal TTP RUQ and epigastrium, negative Parks's sign, No HSM, No rebound or guarding, No scars  Ext: WWP, No clubbing, cyanosis, or edema  Neuro: AAOx3, CN II-XII grossly intact, No focal deficits  Psych: Appropriate mood and affect

## 2022-06-26 NOTE — H&P ADULT - ATTENDING COMMENTS
Pt seen and examined.  Agree with resident eval and plan. Imp: Acute cholecystitis.  IV antibiotics and NPO.  Laparoscopic cholecystectomy

## 2022-06-26 NOTE — CONSULT NOTE ADULT - SUBJECTIVE AND OBJECTIVE BOX
General Surgery Consult  Consulting attending: Dr. Garcia    HPI: 41yF w/ PMH sig for fibroids, GERD, and BPD. No PSH. Pt presented to [facility] ED on 6/25/22 w/ upper abdominal pain for 2 weeks. Pt was recently seen at [facility] ED on 6/22 for a similar complaint and set up w/ out pt f/u for elective lap divya. She was also at [facility] on 6/19 w/ the same issue and scheduled for lap divya, but declined surgery at the last minute. Pt states pain is constant, located in the upper abdomen, and radiates into her chest and back. She denies any changes in pain w/ PO intake. She had multiple episodes of NBNB emesis a few days ago, but nothing t/d. She denies fevers or chills. She reports loose bowel movements, but denies any melena, hematochezia, or BRBPR.      PAST MEDICAL HISTORY:  GERD (Gastroesophageal Reflux Disease)    Bipolar Disorder    Borderline Personality Disorder    Cholelithiasis    Asthma    Depression    Obesity    Fibroids        PAST SURGICAL HISTORY:  No significant past surgical history    Bipolar disorder    Asthma in adult    H/O gastroesophageal reflux (GERD)    No significant past surgical history    No significant past surgical history        MEDICATIONS:      ALLERGIES:  Depakote (Other)  No Known Allergies      VITALS & I/Os:  Vital Signs Last 24 Hrs  T(C): 36.9 (25 Jun 2022 19:11), Max: 36.9 (25 Jun 2022 19:11)  T(F): 98.4 (25 Jun 2022 19:11), Max: 98.4 (25 Jun 2022 19:11)  HR: 87 (25 Jun 2022 19:11) (87 - 87)  BP: 130/82 (25 Jun 2022 19:11) (130/82 - 130/82)  BP(mean): --  RR: 18 (25 Jun 2022 19:11) (18 - 18)  SpO2: 100% (25 Jun 2022 19:11) (100% - 100%)    I&O's Summary      PHYSICAL EXAM:  General: No acute distress  Respiratory: Nonlabored  Cardiovascular: RRR  Abdominal: Soft, nondistended, Mild TTP RUQ and epigastrium, negative Parks's sign. No rebound or guarding. No organomegaly, no palpable mass.  Extremities: Warm    LABS:                        11.4   8.26  )-----------( 266      ( 25 Jun 2022 22:16 )             38.0     06-25    139  |  106  |  13  ----------------------------<  123<H>  4.2   |  24  |  0.79    Ca    8.5      25 Jun 2022 22:15    TPro  6.8  /  Alb  3.4  /  TBili  <0.2  /  DBili  x   /  AST  60<H>  /  ALT  72<H>  /  AlkPhos  148<H>  06-25    Lactate:        IMAGING:  < from: US Abdomen Limited (06.25.22 @ 23:02) >  FINDINGS:      Bileducts: Normal caliber. Common bile duct measures 3 mm.    Gallbladder: Gallbladder normally distended. Sludge/stones layering in   the gallbladder and stone again noted in the gallbladder neck measuring   up to 1.9 cm. 5 mm gallbladder polyp. Mild gallbladder wall thickening.   Negative sonographic Parks sign.    IMPRESSION:    Cholelithiasis and including a gallstone in the gallbladder neck   measuring up to 1.9 cm. Mild gallbladder wall thickening. Negative   sonographic Parks sign. Findings equivocal for cholecystitis. A nuclear   medicine hepatobiliary scan can be performed for further evaluation.    < end of copied text >

## 2022-06-26 NOTE — H&P ADULT - NSHPRISKHIVSCREEN_GEN_ALL_CORE
Offered and patient declined Atrial fibrillation    CAD S/P percutaneous coronary angioplasty    Diverticulitis    History of stomach cancer  25 years ago-- had stomach surgery  HTN (hypertension)    Hyperlipidemia

## 2022-06-26 NOTE — H&P ADULT - NSHPLABSRESULTS_GEN_ALL_CORE
LABS:  CBC (06-25 @ 22:16)                              11.4<L>                         8.26    )----------------(  266        51.9  % Neutrophils, 36.8  % Lymphocytes, ANC: 4.29                                38.0                  BMP (06-25 @ 22:15)             139     |  106     |  13    		Ca++ --      Ca 8.5                ---------------------------------( 123<H>		Mg --                 4.2     |  24      |  0.79  			Ph --        LFTs (06-25 @ 22:15)      TPro 6.8 / Alb 3.4 / TBili <0.2 / DBili -- / AST 60<H> / ALT 72<H> / AlkPhos 148<H>          -> Clean Catch Clean Catch (Midstream) Culture (06-22 @ 17:39)     NG    NG    <10,000 CFU/mL Normal Urogenital Lisa    -> Clean Catch Clean Catch (Midstream) Culture (06-19 @ 08:47)     NG    NG    <10,000 CFU/mL Normal Urogenital Lisa    IMAGING:  < from: US Abdomen Limited (06.25.22 @ 23:02) >    FINDINGS:      Bileducts: Normal caliber. Common bile duct measures 3 mm.    Gallbladder: Gallbladder normally distended. Sludge/stones layering in   the gallbladder and stone again noted in the gallbladder neck measuring   up to 1.9 cm. 5 mm gallbladder polyp. Mild gallbladder wall thickening.   Negative sonographic Parks sign.    IMPRESSION:    Cholelithiasis and including a gallstone in the gallbladder neck   measuring up to 1.9 cm. Mild gallbladder wall thickening. Negative   sonographic Parks sign. Findings equivocal for cholecystitis. A nuclear   medicine hepatobiliary scan can be performed for further evaluation.    < end of copied text >

## 2022-06-26 NOTE — CONSULT NOTE ADULT - ASSESSMENT
41yF w/ biliary colic    - No s/s of acute cholecystitis  - Recommend PO challenge  - Pt has established out pt f/u w/ [facility] ACS service for elective lap divya  - Pt discussed w/ Dr. Jose Ren PGY-3

## 2022-06-26 NOTE — PATIENT PROFILE ADULT - NSPROHMSYMPCOND_GEN_A_NUR
Patient swabbed for COVID and tolerated well. Swab sent to lab. No other needs expressed at this time. behavioral health

## 2022-06-26 NOTE — H&P ADULT - ASSESSMENT
41yF w/ symptomatic cholelithiasis    - Admit to B Team, Dr. Garcia  - IV abx  - Will add on for lap divya  - Resume home meds  - Pt discussed w/ Dr. Garcia  - Please page [phone] w/ any questions    BRENDA Ren PGY-3

## 2022-06-26 NOTE — PATIENT PROFILE ADULT - FALL HARM RISK - UNIVERSAL INTERVENTIONS
Bed in lowest position, wheels locked, appropriate side rails in place/Call bell, personal items and telephone in reach/Instruct patient to call for assistance before getting out of bed or chair/Non-slip footwear when patient is out of bed/[place] to call system/Physically safe environment - no spills, clutter or unnecessary equipment/Purposeful Proactive Rounding/Room/bathroom lighting operational, light cord in reach

## 2022-06-27 NOTE — CHART NOTE - NSCHARTNOTEFT_GEN_A_CORE
team told by ED that patient left AMA without signing forms or speaking to a surgery team member around 4pm on 6/26/2022 team told by ED that patient left AMA without signing forms or speaking to a surgery team member around 4pm on 6/26/2022    B TEAM SURGERY  [id number]

## 2022-07-02 ENCOUNTER — EMERGENCY (EMERGENCY)
Facility: HOSPITAL | Age: 42
LOS: 1 days | Discharge: HOME | End: 2022-07-02
Attending: STUDENT IN AN ORGANIZED HEALTH CARE EDUCATION/TRAINING PROGRAM | Admitting: STUDENT IN AN ORGANIZED HEALTH CARE EDUCATION/TRAINING PROGRAM

## 2022-07-02 VITALS
HEIGHT: 66 IN | DIASTOLIC BLOOD PRESSURE: 80 MMHG | HEART RATE: 90 BPM | RESPIRATION RATE: 16 BRPM | OXYGEN SATURATION: 100 % | SYSTOLIC BLOOD PRESSURE: 142 MMHG | TEMPERATURE: 98 F

## 2022-07-02 PROCEDURE — 99283 EMERGENCY DEPT VISIT LOW MDM: CPT

## 2022-07-02 RX ORDER — ACETAMINOPHEN 500 MG
650 TABLET ORAL ONCE
Refills: 0 | Status: COMPLETED | OUTPATIENT
Start: 2022-07-02 | End: 2022-07-02

## 2022-07-02 RX ORDER — IBUPROFEN 200 MG
600 TABLET ORAL ONCE
Refills: 0 | Status: COMPLETED | OUTPATIENT
Start: 2022-07-02 | End: 2022-07-02

## 2022-07-02 RX ADMIN — Medication 650 MILLIGRAM(S): at 20:39

## 2022-07-02 RX ADMIN — Medication 600 MILLIGRAM(S): at 20:39

## 2022-07-02 NOTE — ED PROVIDER NOTE - ATTENDING APP SHARED VISIT CONTRIBUTION OF CARE
I (Ashkan) agree with above, I performed a history and physical. Counseled fly medical staff, physician assistant, and/or medical student on medical decision making as documented. Medical decisions and treatment interventions were made in real time during the patient encounter. Additionally and/or with the following exceptions: The patient presented to the ED with rle pain in the calf. ambulatory, felt better with meds, no assymetry, clinically no concern for dvt, suspect msk pain. Safe discharge planned with social work. Return precautions reviewed. Patient verbalized understand of conditions for return and plan for follow up.

## 2022-07-02 NOTE — ED PROVIDER NOTE - PATIENT PORTAL LINK FT
You can access the FollowMyHealth Patient Portal offered by [facility] by registering at the following website: [email or web address]. By joining [facility]’s FollowMyHealth portal, you will also be able to view your health information using other applications (apps) compatible with our system.

## 2022-07-02 NOTE — ED ADULT TRIAGE NOTE - CHIEF COMPLAINT QUOTE
Pt sent from [facility] 40 for c/o RLE pain since this am after stretching.  Difficulty bearing weight.

## 2022-07-02 NOTE — PROVIDER CONTACT NOTE (OTHER) - ACTION/TREATMENT ORDERED:
[employer] arranged for -next available p/u is 3:00 a.m. trip # 29A.  [facility] auth # [id number].

## 2022-07-02 NOTE — ED PROVIDER NOTE - MUSCULOSKELETAL, MLM
PAST MEDICAL HISTORY:  AICD (automatic cardioverter/defibrillator) present     Anemia     Aortic valve stenosis     Arteriosclerotic heart disease (ASHD)     Atrial fibrillation     Atrial fibrillation     CHF (congestive heart failure)     Constipation     Constipation     Dementia     Dementia     Dementia     Depression     Diabetes     Diabetes     DM (diabetes mellitus)     H/O bladder infections     H/O cystitis     H/O ongoing treatment with hormonal therapy     Hematuria     Hyperlipemia     Hypertension     Osteomyelitis of finger of left hand     Personal history of radiation therapy     Prostate ca     Prostate CA     Scrotal abscess     Urinary retention     VHD (valvular heart disease)      Spine appears normal, range of motion is not limited, no muscle or joint tenderness

## 2022-07-02 NOTE — ED PROVIDER NOTE - MUSCULOSKELETAL, MLM
Spine appears normal, range of motion is not limited, FROM b/l hips/knees/ankles,+ rt calf ttp, no le edema b/l, no erythema, sensation and strength intact distally, 2+ DP

## 2022-07-02 NOTE — PROVIDER CONTACT NOTE (OTHER) - RECOMMENDATIONS
Will arrange ambulette transport for pt; discussed with provider who is in agreement.  Verbal huddle complete.

## 2022-07-02 NOTE — ED PROVIDER NOTE - CLINICAL SUMMARY MEDICAL DECISION MAKING FREE TEXT BOX
40 y/o F with rt calf pain, clinically does not appear to be DVT and pt without concerning risk factors for dvt. Suspect calf muscle strain/spasm, will give pain control and reassess

## 2022-07-02 NOTE — ED PROVIDER NOTE - OBJECTIVE STATEMENT
40 y/o F PMH fibroids, GERD, and BPD c/o right calf pain since 0800 this am. Pt first noticed pain while she was stretching in bed. Pt states she has not taken anything for pain. Denies fever, chills, CP, SOB, led swelling, numbness/tingling/weakness, h/o dvt/pe, ocp use, recent travel/sx.

## 2022-07-02 NOTE — PROVIDER CONTACT NOTE (OTHER) - ASSESSMENT
SW called [facility], [phone], spoke with staff member, Andree, who reports that pt is a resident and can return.  As per Andree, pt travels back via ambulette.  Pt is requesting an ambulette as well, as per provider.

## 2022-07-03 VITALS
SYSTOLIC BLOOD PRESSURE: 145 MMHG | DIASTOLIC BLOOD PRESSURE: 89 MMHG | OXYGEN SATURATION: 100 % | RESPIRATION RATE: 16 BRPM | TEMPERATURE: 98 F | HEART RATE: 78 BPM

## 2022-07-03 NOTE — ED ADULT NURSE NOTE - CHIEF COMPLAINT QUOTE
Pt sent from [facility] 40 for c/o RLE pain since this am after stretching.  Difficulty bearing weight.

## 2022-07-03 NOTE — ED ADULT NURSE REASSESSMENT NOTE - NS ED NURSE REASSESS COMMENT FT1
Pt currently sleeping on stretcher. Respirations even and unlabored. pt well appearing, NAD noted. Pending transport pick-up to go back to [facility].

## 2022-07-09 ENCOUNTER — EMERGENCY (EMERGENCY)
Facility: HOSPITAL | Age: 42
LOS: 1 days | Discharge: HOME | End: 2022-07-09
Attending: EMERGENCY MEDICINE | Admitting: EMERGENCY MEDICINE

## 2022-07-09 VITALS
OXYGEN SATURATION: 99 % | DIASTOLIC BLOOD PRESSURE: 52 MMHG | HEIGHT: 66 IN | HEART RATE: 89 BPM | SYSTOLIC BLOOD PRESSURE: 110 MMHG | TEMPERATURE: 98 F | RESPIRATION RATE: 16 BRPM

## 2022-07-09 PROCEDURE — 93010 ELECTROCARDIOGRAM REPORT: CPT

## 2022-07-09 PROCEDURE — 99284 EMERGENCY DEPT VISIT MOD MDM: CPT | Mod: 25,GC

## 2022-07-09 NOTE — ED PROVIDER NOTE - CLINICAL SUMMARY MEDICAL DECISION MAKING FREE TEXT BOX
42 yo F hx of symptomatic cholelithiasis, GERD, BPD presents with chest pain and abdominal pain that is consistent with prior pain from her symptomatic cholelithiasis. Patient's EKG is NSR, CP is reproducible. Abd ttp in bilateral upper quadrants R>L. CP low risk, HEART score 0, would obtain trops, CXR - rule out ACS, pneumonia, pneumothorax. PERCs out for PE. Abdominal pain concerning for cholelithiasis vs acute divya - would get labs, RUQ. Patient would not like to have further testing done and would like to go home. Discussed benefits and risks of testing, patient expresses understanding and demonstrates capacity. Patient has stable vitals, low risk for ACS. Abd pain has been stable for last two months. 42 yo F hx of symptomatic cholelithiasis, GERD, BPD presents with chest pain and abdominal pain that is consistent with prior pain from her symptomatic cholelithiasis. Patient's EKG is NSR, CP is reproducible. Abd ttp in bilateral upper quadrants R>L. CP low risk, HEART score 0, would obtain trops, CXR - rule out ACS, pneumonia, pneumothorax. PERCs out for PE. Abdominal pain concerning for cholelithiasis vs acute divya - would get labs, RUQ. Patient would not like to have further testing done and would like to go home. Discussed benefits and risks of testing, patient expresses understanding and demonstrates capacity. Patient has stable vitals, low risk for ACS. Abd pain has been stable for last two months.  Lee: 42 yo with chest pain and epigastric pain this am. Now pt feels better and does not want to be seen and evaluated in ED. Patient offered different options and reasons for not wanting to be seen explored. Patient understands the risks of refusiong evaluation. Patient with good vitals and not in extremis. will follow pts wishes and arrange d/c

## 2022-07-09 NOTE — PROVIDER CONTACT NOTE (OTHER) - ASSESSMENT
verbal huddle done Annie Bello) referred this case as pt has been medically cleared for discharged pt from residence at [place] . Writer contacted pts group home spoke with staff Mal Dye and verified that pt resides at [facility]  building [address] 11th floor, B side  [phone]. Staff Mal bragg Aide pt is a Mode of transportation for discharge said to be INDEPENDENTLY travel via – [employer] and taxi. Writer met with the pt and was informed that she has [employer]. medical team was made aware of this intervention and in agreement with this plan.  Verbal huddle occurred. No further social work intervention is needed for this visit.

## 2022-07-09 NOTE — ED PROVIDER NOTE - NS ED ROS FT
GENERAL: no fever, no chills, no weight loss  CARDIAC: + chest pain, no palpitations   PULMONARY: no cough, no shortness of breath, no wheezing  GI: +abdominal pain, +nausea, +vomiting, no diarrhea, no constipation, no melena, no hematochezia, no hematemesis  : no changes in urination, no dysuria, no frequency, no hematuria, no discharge  SKIN: no rashes  NEURO: no headache, no numbness, no weakness  MSK: no joint pain, no muscle pain, no back pain, no calf pain

## 2022-07-09 NOTE — ED PROVIDER NOTE - PROGRESS NOTE DETAILS
Patient left prior to receiving discharge papers and seeing SW to get home to [facility].    Annie Bello MD, PGY2

## 2022-07-09 NOTE — ED PROVIDER NOTE - OBJECTIVE STATEMENT
42 yo F PMHx of GERD, cholelithiasis, BPD presents with chest pain for few hours, abd pain for several weeks. Chest pain started suddenly, sharp, substernal, non exertional, worse leaning forward, no SOB, no leg swelling. Abd pain has been ongoing, located bilateral upper quadrants, associated with nausea, vomiting 2 days prior NBNB with no changes in BMs, urination. Has recently been diagnosed with symptomatic cholelithiasis and was admitted to surgery for resection of the GB however patient left AMA. Says pain is similar to her gallbladder pain that she had prior. No fevers, chills.

## 2022-07-09 NOTE — ED PROVIDER NOTE - ATTENDING CONTRIBUTION TO CARE
I performed a history and physical exam of the patient and discussed their management with the resident and /or advanced care provider. I reviewed the resident and /or ACP's note and agree with the documented findings and plan of care. My medical decision making and observations are found above.  Lungs clear abd soft with mild epigastric tenderness.

## 2022-07-09 NOTE — ED PROVIDER NOTE - PHYSICAL EXAMINATION
GENERAL: no acute distress, non-toxic appearing  HEAD: normocephalic, atraumatic  HEENT: normal conjunctiva, oral mucosa moist, neck supple  CARDIAC: +midsternal reproducible ttp, regular rate and rhythm, normal S1 and S2,  no appreciable murmurs  PULM: clear to ascultation bilaterally, no crackles, rales, rhonchi, or wheezing  GI: +RUQ, LUQ TTP, +Parks's, abdomen nondistended, soft, no guarding or rebound tenderness  : no CVA tenderness, no suprapubic tenderness  NEURO: alert and oriented x 3, normal speech, moving all extremities   MSK: no visible deformities, no peripheral edema, calf tenderness/redness/swelling  SKIN: no visible rashes, dry, well-perfused  PSYCH: appropriate mood and affect

## 2022-07-09 NOTE — ED PROVIDER NOTE - PATIENT PORTAL LINK FT
You can access the FollowMyHealth Patient Portal offered by [facility] by registering at the following website: [email or web address]. By joining [employer]’s FollowMyHealth portal, you will also be able to view your health information using other applications (apps) compatible with our system.

## 2022-07-09 NOTE — ED ADULT TRIAGE NOTE - CHIEF COMPLAINT QUOTE
Patient brought to ER by EMS from [place] for chest pain times 1 hour and generalized abdominal pain. 324mg aspirin.

## 2022-07-09 NOTE — ED PROVIDER NOTE - NSFOLLOWUPCLINICS_GEN_ALL_ED_FT
[facility]  General Surgery  [address] [place] [address] - 3rd [place] [address]  Phone: [phone]  Fax:

## 2022-07-09 NOTE — ED PROVIDER NOTE - NSFOLLOWUPINSTRUCTIONS_ED_ALL_ED_FT
You were seen for chest pain and abdominal pain. You would like to leave without further work up or evaluation. Please follow up with surgery (number below) to remove your gallbladder as it is giving you ongoing pain. Your EKG was normal. Please follow up with your primary care doctor.    Please return to the Emergency Room if you have fevers, chills, ongoing nausea, vomiting, chest pain with shortness of breath, nausea, vomiting, numbness or tingling. IF your abdominal pain gets worse and you have a fever, please return as well.

## 2022-07-10 ENCOUNTER — EMERGENCY (EMERGENCY)
Facility: HOSPITAL | Age: 42
LOS: 1 days | Discharge: HOME | End: 2022-07-10
Attending: EMERGENCY MEDICINE | Admitting: EMERGENCY MEDICINE

## 2022-07-10 VITALS
HEIGHT: 66 IN | SYSTOLIC BLOOD PRESSURE: 149 MMHG | OXYGEN SATURATION: 100 % | RESPIRATION RATE: 15 BRPM | TEMPERATURE: 98 F | DIASTOLIC BLOOD PRESSURE: 101 MMHG | HEART RATE: 86 BPM

## 2022-07-10 PROCEDURE — 99283 EMERGENCY DEPT VISIT LOW MDM: CPT | Mod: GC

## 2022-07-10 RX ORDER — METOCLOPRAMIDE HCL 10 MG
10 TABLET ORAL ONCE
Refills: 0 | Status: COMPLETED | OUTPATIENT
Start: 2022-07-10 | End: 2022-07-10

## 2022-07-10 RX ORDER — IBUPROFEN 200 MG
600 TABLET ORAL ONCE
Refills: 0 | Status: COMPLETED | OUTPATIENT
Start: 2022-07-10 | End: 2022-07-10

## 2022-07-10 RX ORDER — ACETAMINOPHEN 500 MG
975 TABLET ORAL ONCE
Refills: 0 | Status: COMPLETED | OUTPATIENT
Start: 2022-07-10 | End: 2022-07-10

## 2022-07-10 RX ADMIN — Medication 975 MILLIGRAM(S): at 08:24

## 2022-07-10 RX ADMIN — Medication 600 MILLIGRAM(S): at 10:11

## 2022-07-10 RX ADMIN — Medication 10 MILLIGRAM(S): at 10:11

## 2022-07-10 NOTE — ED PROVIDER NOTE - CLINICAL SUMMARY MEDICAL DECISION MAKING FREE TEXT BOX
Given Patient is a 42 yo female who presents with 1 day of unilateral HA assocated with n/v. Neuro exam was unremarkable. Given mild severity and unilateral location, HA concerning for primary HA vs migraine. Less concerning for SAH. Will treat empirically with Tylenol.

## 2022-07-10 NOTE — ED PROVIDER NOTE - OBJECTIVE STATEMENT
She reports a headache of approximately 12 hours associated with 1 episode of NBNB vomiting. The headache is localized right unilaterally and described as throbbing, rated 10/10, and no provoking factors were noted. She is not reporting any current n/v. She also denies any fever, chills, CP, or abd pain.

## 2022-07-10 NOTE — ED PROVIDER NOTE - PATIENT PORTAL LINK FT
You can access the FollowMyHealth Patient Portal offered by [facility] by registering at the following website: [email or web address]. By joining [employer]’s FollowMyHealth portal, you will also be able to view your health information using other applications (apps) compatible with our system.

## 2022-07-10 NOTE — ED PROVIDER NOTE - ATTENDING CONTRIBUTION TO CARE
Brief HPI:  42 yo Female with PMH of BPD, cholelithiasis, and GERD complaining of headache.  States headache started today, right sided, non-acute onset, 10/10, associated with one episode of vomiting.  States that she has had headaches like in past but not as severe.  Denies fever, chills, neck pain or stiffness, numbness, tingling, weakness, visual changes, trauma.  Denies etoh or illicit drug use.  Not on anticoagulation.     Vitals:   Reviewed    Exam:    GEN:  Non-toxic appearing, non-distressed, speaking full sentences, non-diaphoretic, AAOx3  HEENT:  NCAT, neck supple, EOMI, PERRLA, sclera anicteric, no conjunctival pallor or injection, no stridor, normal voice, no tonsillar exudate, uvula midline  CV:  regular rhythm and rate, s1/s2 audible, no murmurs, rubs or gallops, peripheral pulses 2+ and symmetric  PULM:  non-labored respirations, lungs clear to auscultation bilaterally, no wheezes, crackles or rales  ABD:  non distended, non-tender, no rebound, no guarding, negative Parks's sign, bowel sounds normal, no cvat  MSK:  no gross deformity, non-tender extremities and joints, range of motion grossly normal appearing, no extremity edema, extremities warm and well perfused   NEURO:  AAOx3, CN II-XII intact, motor 5/5 in upper and lower extremities bilaterally, sensation grossly intact in extremities and trunk, finger to nose testing wnl, no nystagmus, negative Romberg, no pronator drift, no gait deficit  SKIN:  warm, dry, no rash or vesicles     A/P:  41y Female with PMH of BPD, cholelithiasis, and GERD complaining of headache. Low concern for subarachnoid hemorrhage as headache not acute in onset, not maximal in onset, and is without associated photophobia or neck stiffness.  Low concern for meningitis as patient without fever, photophobia, or neck stiffness.  Suspect primary headache syndrome.  Plan for analgesia. Dispo pending.

## 2022-07-10 NOTE — ED PROVIDER NOTE - PHYSICAL EXAMINATION
GENERAL: well appearing in no acute distress, non-toxic appearing  HEAD: TENDERNESS TO PALPATION OF RIGHT TEMPLE, normocephalic, atraumatic  HEENT: neck supple, no JVD  CARDIAC: regular rate and rhythm, normal S1S2, no appreciable murmurs, 2+ pulses in UE/LE b/l  PULM: normal breath sounds, clear to ascultation bilaterally, no rales, rhonchi, wheezing  GI: abdomen nondistended, soft, nontender, no guarding, rebound tenderness  : no CVA tenderness b/l, no suprapubic tenderness  NEURO: no focal motor or sensory deficits, CN2-12 intact, normal speech, PERRLA, EOMI, normal gait, AAOx3  MSK: no peripheral edema, no calf tenderness b/l  SKIN: well-perfused, extremities warm, no visible rashes  PSYCH: appropriate mood and affect

## 2022-07-10 NOTE — ED PROVIDER NOTE - NS ED ROS FT
General: denies fever, chills  HENT: denies nasal congestion, rhinorrhea  Eyes: denies visual changes, blurred vision  CV: denies chest pain, palpitations  Resp: denies difficulty breathing, cough  Abdominal: denies nausea, vomiting, diarrhea, abdominal pain  : denies urinary pain or discharge  MSK: denies muscle aches, leg swelling  Neuro: denies headaches, numbness, tingling  Skin: denies rashes, bruises General: denies fever, chills  HENT: denies nasal congestion, rhinorrhea  Eyes: denies visual changes, blurred vision  CV: denies chest pain, palpitations  Resp: denies difficulty breathing, cough  Abdominal: denies nausea, vomiting, diarrhea, abdominal pain  : denies urinary pain or discharge  MSK: denies muscle aches, leg swelling  Neuro: HEADACHE, denies numbness, tingling  Skin: denies rashes, bruises

## 2022-07-10 NOTE — PROVIDER CONTACT NOTE (OTHER) - ASSESSMENT
Per provider, Dr. Witt, patient is cleared and is able to return to their previous residence, [facility] [place].  has spoken to Mr. Romano (Mental health therapy aide)  P#, [phone], at [facility].  confirmed that patient's mode of transportation is  [facility] WITH SUPERVISION. Clinical provider is in agreement with [facility] back to group home. Verbal huddle regarding coordination of care completed with interdisciplinary team.    Transportation coordinated via [facility] ([phone]). [place] to  patient at 2:30pm (Trip #152A).

## 2022-07-10 NOTE — PROVIDER CONTACT NOTE (OTHER) - BACKGROUND
SW received a referral from the medical team requesting assistance with discharge planning of group home pt.

## 2022-07-10 NOTE — ED ADULT TRIAGE NOTE - CHIEF COMPLAINT QUOTE
Pt endorsing N/V and generalized abdominal pain x1 day. no distress noted. PMhx asthma Gerd Bipolar DO

## 2022-07-10 NOTE — ED ADULT NURSE NOTE - OBJECTIVE STATEMENT
A&Ox4, PMH of GERD, bipolar, presents to ED c/o headache and one episode of vomiting. respirations are even and unlabored, sating at 100% on RA, medicated as ordered.

## 2022-07-10 NOTE — ED PROVIDER NOTE - PROGRESS NOTE DETAILS
Witt: she reports no change in Witt: she reports no change in headache after tylenol. Will add on reglan and motrin PO. Witt: she reports mild improvement to her HA and is requesting to return home to [place]. Will coordinate with SW to arrange transport

## 2022-07-10 NOTE — ED PROVIDER NOTE - NSFOLLOWUPINSTRUCTIONS_ED_ALL_ED_FT
You were seen in the ER for a headache.    Please follow up with your primary care doctor.    Please return to the ER if you have worsening symptoms including blurred vision, fever, chest pain, shortness of breath, abdominal pain, nausea, vomiting, diarrhea, weakness or lightheadedness/fainting.

## 2022-07-17 ENCOUNTER — EMERGENCY (EMERGENCY)
Facility: HOSPITAL | Age: 42
LOS: 1 days | Discharge: HOME | End: 2022-07-17
Attending: STUDENT IN AN ORGANIZED HEALTH CARE EDUCATION/TRAINING PROGRAM | Admitting: STUDENT IN AN ORGANIZED HEALTH CARE EDUCATION/TRAINING PROGRAM

## 2022-07-17 VITALS
TEMPERATURE: 98 F | DIASTOLIC BLOOD PRESSURE: 83 MMHG | OXYGEN SATURATION: 100 % | HEART RATE: 83 BPM | SYSTOLIC BLOOD PRESSURE: 136 MMHG | RESPIRATION RATE: 15 BRPM

## 2022-07-17 VITALS
OXYGEN SATURATION: 98 % | RESPIRATION RATE: 18 BRPM | TEMPERATURE: 98 F | DIASTOLIC BLOOD PRESSURE: 85 MMHG | HEART RATE: 89 BPM | HEIGHT: 66 IN | SYSTOLIC BLOOD PRESSURE: 139 MMHG

## 2022-07-17 PROCEDURE — 99283 EMERGENCY DEPT VISIT LOW MDM: CPT

## 2022-07-17 RX ORDER — LIDOCAINE 4 G/100G
1 CREAM TOPICAL ONCE
Refills: 0 | Status: COMPLETED | OUTPATIENT
Start: 2022-07-17 | End: 2022-07-17

## 2022-07-17 RX ORDER — IBUPROFEN 200 MG
800 TABLET ORAL ONCE
Refills: 0 | Status: COMPLETED | OUTPATIENT
Start: 2022-07-17 | End: 2022-07-17

## 2022-07-17 RX ORDER — ACETAMINOPHEN 500 MG
650 TABLET ORAL ONCE
Refills: 0 | Status: COMPLETED | OUTPATIENT
Start: 2022-07-17 | End: 2022-07-17

## 2022-07-17 RX ADMIN — Medication 650 MILLIGRAM(S): at 18:49

## 2022-07-17 RX ADMIN — LIDOCAINE 1 PATCH: 4 CREAM TOPICAL at 18:50

## 2022-07-17 RX ADMIN — Medication 800 MILLIGRAM(S): at 19:31

## 2022-07-17 NOTE — ED ADULT NURSE NOTE - CHIEF COMPLAINT QUOTE
p/t sent from [facility] for eval, lower back pain for few days, p/t denies any trauma, ambulatory, p/t appears calm and cooperative @ present

## 2022-07-17 NOTE — ED PROVIDER NOTE - ATTENDING APP SHARED VISIT CONTRIBUTION OF CARE
I (Ashkan) agree with above, I performed a history and physical. Counseled fly medical staff, physician assistant, and/or medical student on medical decision making as documented. Medical decisions and treatment interventions were made in real time during the patient encounter. Additionally and/or with the following exceptions: The patient presented to the ED with back pain. no trauma, no saddle anesthesia, urinary retention, weakness, numbness, ambulatory, felt better with pain meds. sw for discharge back to [facility] follow up. Return precautions reviewed. Patient verbalized understand of conditions for return and plan for follow up. Patient was instructed to utilize [phone] to obtain follow up as indicated.

## 2022-07-17 NOTE — ED PROVIDER NOTE - CLINICAL SUMMARY MEDICAL DECISION MAKING FREE TEXT BOX
42 year old female with PMH of Bipolar disorder, Depression, Gallstones, GERD and Asthma presents to the ED complaining of lower back pain today. HD stable. Imp: Low back pain without any red flags suggestive of spinal cord compression or epidural abscess. Plan for meds, and reassess.

## 2022-07-17 NOTE — ED ADULT TRIAGE NOTE - CHIEF COMPLAINT QUOTE
p/t sent from [facility] for eval, lower back pain for few days, p/t denies any trauma, ambulatory, p/t appears calm and cooperative @ present

## 2022-07-17 NOTE — ED PROVIDER NOTE - OBJECTIVE STATEMENT
42 year old female with PMH of Bipolar disorder, Depression, Gallstones, GERD and Asthma presents to the ED complaining of lower back pain today. Pt states she walked 5 blocks and immediately after she started to experience lower back pain. Pt describes pain localized to the lumbar region, non-radiating, no relieving or aggravating factors. Denies associated urinary/bowel incontinence, weakness, numbness or tingling sensation, urinary symptoms, fevers and chills. Denies fall or trauma. Denies any other complaints.

## 2022-07-17 NOTE — ED PROVIDER NOTE - PATIENT PORTAL LINK FT
You can access the FollowMyHealth Patient Portal offered by [facility] by registering at the following website: [email or web address]. By joining [employer]’s FollowMyHealth portal, you will also be able to view your health information using other applications (apps) compatible with our system.

## 2022-07-17 NOTE — ED ADULT NURSE NOTE - SUICIDE SCREENING DEPRESSION
Render Note In Bullet Format When Appropriate: No Medical Necessity Clause: This procedure was medically necessary because the lesions that were treated were tender. Consent: The patient's consent was obtained including but not limited to risks of crusting, scabbing, blistering, scarring, darker or lighter pigmentary change, recurrence, incomplete removal and infection. Show Applicator Variable?: Yes Post-Care Instructions: I reviewed with the patient in detail post-care instructions. Patient is to wear sunprotection, and avoid picking at any of the treated lesions. Pt may apply Vaseline to crusted or scabbing areas. Detail Level: Simple Medical Necessity Information: It is in your best interest to select a reason for this procedure from the list below. All of these items fulfill various CMS LCD requirements except the new and changing color options. Negative

## 2022-07-17 NOTE — ED PROVIDER NOTE - MUSCULOSKELETAL MINIMAL EXAM
mild-moderate paraspinal lumbar spine tenderness, no midline tenderness/atraumatic/normal range of motion

## 2022-07-17 NOTE — ED ADULT NURSE NOTE - OBJECTIVE STATEMENT
Receive pt. in Intake room 9 alert and oriented x 4, presenting to the ER with complaints of lower back pain. Pt. stated " I was walking today and my lower back started hurting me very bad so I call the ambulance". Pt. stated that she is from [facility] 40. Medicated as ordered, pt. ambulating without difficulty. Call bell place within reach.

## 2022-07-19 ENCOUNTER — APPOINTMENT (OUTPATIENT)
Dept: TRAUMA SURGERY | Facility: HOSPITAL | Age: 42
End: 2022-07-19

## 2022-07-19 VITALS
HEART RATE: 88 BPM | SYSTOLIC BLOOD PRESSURE: 151 MMHG | WEIGHT: 273 LBS | HEIGHT: 66 IN | DIASTOLIC BLOOD PRESSURE: 66 MMHG | BODY MASS INDEX: 43.87 KG/M2 | TEMPERATURE: 97.3 F

## 2022-07-19 PROCEDURE — 99213 OFFICE O/P EST LOW 20 MIN: CPT

## 2022-07-23 ENCOUNTER — EMERGENCY (EMERGENCY)
Facility: HOSPITAL | Age: 42
LOS: 1 days | Discharge: LEFT BEFORE DISCHARGE | End: 2022-07-23
Admitting: EMERGENCY MEDICINE

## 2022-07-23 VITALS
RESPIRATION RATE: 17 BRPM | TEMPERATURE: 98 F | DIASTOLIC BLOOD PRESSURE: 102 MMHG | HEIGHT: 66 IN | HEART RATE: 96 BPM | SYSTOLIC BLOOD PRESSURE: 147 MMHG | OXYGEN SATURATION: 100 %

## 2022-07-23 PROCEDURE — L9991: CPT

## 2022-07-23 NOTE — ED ADULT TRIAGE NOTE - CHIEF COMPLAINT QUOTE
pt from [facility] 40, gandhi 11-b. states "I felt light headed after smoking 2 cigarettes" denies cp, sob, n/v/d and h/a

## 2022-07-23 NOTE — ED ADULT NURSE NOTE - CHIEF COMPLAINT QUOTE
pt from [facility] 40, gandhi 11-b. states "I felt light headed after smoking 2 cigarettes" denies cp, sob, n/v/d and h/a

## 2022-07-29 NOTE — HISTORY OF PRESENT ILLNESS
[de-identified] : 43yo F resident of [facility], presenting for consultation for recurrent abdominal pain.  Pt reports the pain is across her mid/upper abdomen. She is unsure as to whether the pain is triggered by eating, however she confirms the pain is worsened by eating. She is able to tolerate PO, denies nausea/vomiting. Has regular bowel movements. Denies fevers/chills. \par \par Pt has been seen multiple times in the ER for similar abdominal pain. Has been recommended for cholecystectomy in the past, however pt declined and left AMA. She was also prescribed a course of abx for acute cholecystitis for another episode.

## 2022-07-29 NOTE — PHYSICAL EXAM
[Normal Breath Sounds] : Normal breath sounds [Normal Heart Sounds] : normal heart sounds [No Rash or Lesion] : No rash or lesion [Calm] : calm [de-identified] : NAD [de-identified] : Soft, nondistended, nontender  [de-identified] : No deformities

## 2022-07-29 NOTE — PLAN
[FreeTextEntry1] : - Schedule for elective laparoscopic cholecystectomy, possible open \par - Guided to return to ER if pain recurs and does not resolve

## 2022-07-29 NOTE — ASSESSMENT
[FreeTextEntry1] : 43yo F with known cholelithiasis, presenting for evaluation of intermittent upper abdominal pain, exacerbated by eating, concerning for biliary colic. Discussed with the patient that her pain is most likely related to her gallbladder, however there is a chance that it is not, and that she may remain symptomatic after cholecystectomy. The risks, benefits, and alternatives to surgery including the risks of bleeding, infection, or injury to nearby structures were discussed with the pt and she chose to proceed with surgery.
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
## 2022-08-10 ENCOUNTER — APPOINTMENT (OUTPATIENT)
Dept: TRAUMA SURGERY | Facility: HOSPITAL | Age: 42
End: 2022-08-10

## 2022-08-17 NOTE — ED ADULT TRIAGE NOTE - NS ED NOTE AC HIGH RISK COUNTRIES
No Quality 226: Preventive Care And Screening: Tobacco Use: Screening And Cessation Intervention: Patient screened for tobacco use, is a smoker AND received Cessation Counseling within the Previous 12 Months Detail Level: Simple

## 2022-08-22 NOTE — PATIENT PROFILE ADULT - FUNCTIONAL ASSESSMENT - DAILY ACTIVITY 2.
4 = No assist / stand by assistance
Follow up with your pediatrician in 1-2 days.   Take antibiotics as prescribed.   Take Motrin and Tylenol as needed for pain.  Soft diet as tolerated.   Clean and apply bacitracin to the abrasions on the skin.   Follow up with plastic surgery as instructed.

## 2022-08-25 NOTE — ED PROVIDER NOTE - NS ED MD TWO NIGHTS YN
Continue to work on healthy diet.   Detail Level: Detailed Quality 110: Preventive Care And Screening: Influenza Immunization: Influenza immunization was not ordered or administered, reason not given Quality 431: Preventive Care And Screening: Unhealthy Alcohol Use - Screening: Patient not identified as an unhealthy alcohol user when screened for unhealthy alcohol use using a systematic screening method Quality 130: Documentation Of Current Medications In The Medical Record: Current Medications Documented Quality 226: Preventive Care And Screening: Tobacco Use: Screening And Cessation Intervention: Patient screened for tobacco use and is an ex/non-smoker Yes

## 2022-08-27 ENCOUNTER — EMERGENCY (EMERGENCY)
Facility: HOSPITAL | Age: 42
LOS: 1 days | Discharge: LEFT BEFORE DISCHARGE | End: 2022-08-27
Attending: EMERGENCY MEDICINE | Admitting: EMERGENCY MEDICINE

## 2022-08-27 VITALS
DIASTOLIC BLOOD PRESSURE: 89 MMHG | SYSTOLIC BLOOD PRESSURE: 135 MMHG | HEART RATE: 89 BPM | TEMPERATURE: 98 F | OXYGEN SATURATION: 100 % | RESPIRATION RATE: 16 BRPM

## 2022-08-27 VITALS
HEIGHT: 66 IN | SYSTOLIC BLOOD PRESSURE: 147 MMHG | HEART RATE: 88 BPM | OXYGEN SATURATION: 99 % | DIASTOLIC BLOOD PRESSURE: 90 MMHG | RESPIRATION RATE: 18 BRPM | TEMPERATURE: 98 F

## 2022-08-27 LAB
FLUAV AG NPH QL: SIGNIFICANT CHANGE UP
FLUBV AG NPH QL: SIGNIFICANT CHANGE UP
RSV RNA NPH QL NAA+NON-PROBE: SIGNIFICANT CHANGE UP
SARS-COV-2 RNA SPEC QL NAA+PROBE: SIGNIFICANT CHANGE UP

## 2022-08-27 PROCEDURE — 99284 EMERGENCY DEPT VISIT MOD MDM: CPT | Mod: GC

## 2022-08-27 PROCEDURE — 71046 X-RAY EXAM CHEST 2 VIEWS: CPT | Mod: 26

## 2022-08-27 RX ORDER — DEXAMETHASONE 0.5 MG/5ML
8 ELIXIR ORAL ONCE
Refills: 0 | Status: DISCONTINUED | OUTPATIENT
Start: 2022-08-27 | End: 2022-08-27

## 2022-08-27 RX ORDER — ACETAMINOPHEN 500 MG
650 TABLET ORAL ONCE
Refills: 0 | Status: COMPLETED | OUTPATIENT
Start: 2022-08-27 | End: 2022-08-27

## 2022-08-27 RX ORDER — KETOROLAC TROMETHAMINE 30 MG/ML
15 SYRINGE (ML) INJECTION ONCE
Refills: 0 | Status: DISCONTINUED | OUTPATIENT
Start: 2022-08-27 | End: 2022-08-27

## 2022-08-27 RX ORDER — IBUPROFEN 200 MG
600 TABLET ORAL ONCE
Refills: 0 | Status: COMPLETED | OUTPATIENT
Start: 2022-08-27 | End: 2022-08-27

## 2022-08-27 RX ADMIN — Medication 600 MILLIGRAM(S): at 13:09

## 2022-08-27 RX ADMIN — Medication 650 MILLIGRAM(S): at 13:08

## 2022-08-27 NOTE — ED PROVIDER NOTE - NS ED ROS FT
CONSTITUTIONAL: No fevers, no chills, no lightheadedness, no dizziness  EYES: no visual changes, no eye pain  EARS: no ear drainage, no ear pain, no change in hearing  NOSE: no nasal congestion  MOUTH/THROAT: no sore throat  CV: +chest pain, no palpitations  RESP: No SOB, no cough  GI: No n/v/d, no abd pain  : no dysuria, no hematuria, no flank pain  MSK: no back pain, no extremity pain  SKIN: no rashes  NEURO: no headache, no focal weakness, no decreased sensation/parasthesias   PSYCHIATRIC: no known mental health issues

## 2022-08-27 NOTE — ED PROVIDER NOTE - PROGRESS NOTE DETAILS
Patient expressed to the nurse that she would like to leave the emergency department. Patient is pending social work services as she is a [place] patient. When Nurse went to follow up, patient was no longer in her room or in the bathroom. Attending Rachel LUJAN overhead called patient 2x to return to intake but patient has not returned. Called numbers listed in chart with no response.    KIM Hogan aware and investigating. Social work is on break until 2:45.    Digna Triana, PGY1 Rachel: pt eloped from ED- spoke with Mal at [place] who confirmed pt able to travel independently. I discussed with him that I do not have result of her chest xray or covid test and advised to call back in 2 hours for results.

## 2022-08-27 NOTE — ED PROVIDER NOTE - OBJECTIVE STATEMENT
43yo F, relevant med hx of GERD, asthma, bronchitis, presents with 1 week history of dry cough and 4 hour history of chest pain. Patient is an everyday smoker and reports that 43yo F, relevant med hx of GERD, asthma, bronchitis, presents with 1 week history of dry cough and 4 hour history of chest pain. Patient is an everyday smoker and reports that this cough is different from her usual bronchitis. It is nonproductive and she describes it as "hacking". No associated symptoms such as fever or URI symptoms. Chest pain began approximately 4 hours prior to presentation. Patient was at rest when she suddenly felt a sharp midline upper sternal pain. Pain is constant, radiates upwards to her neck, not associated with meals or position. Patient had a similar pain in the past which went away on its own after a day. Denies shortness of breath, nausea, vomiting, diaphoresis, or worsening of the pain. Has not taken any medication/analgesics prior to arrival.

## 2022-08-27 NOTE — ED PROVIDER NOTE - CLINICAL SUMMARY MEDICAL DECISION MAKING FREE TEXT BOX
EKG unremarkable and unchaged from prior EKG. More likely muscular than cardiac in origin given pain is reproducible.  Motrin, Tylenol, CXR to r/o pneumonia, hold off on labs. 43yo F, relevant med hx of GERD, asthma, bronchitis, presents with 1 week history of dry cough and 4 hour history of chest pain. EKG unremarkable and unchanged from prior EKG. More likely muscular than cardiac in origin given pain is reproducible on exam. Will give Motrin, Tylenol, CXR to r/o pneumonia, hold off on labs depending on reassessment.

## 2022-08-27 NOTE — ED ADULT NURSE REASSESSMENT NOTE - NS ED NURSE REASSESS COMMENT FT1
pt was found in intake hallway, stated "I want to go home." pt was still in hospital gown, escorted back to room. provider made aware, I went back to update patient that she would be getting discharged and arranged a ride back to [employer] pt was not found in room, restrooms, waiting rooms, or outside of hospital. provider aware, called for patient overhead. ANM notified, plan is to contact [facility] to determine if patient is capable to go home on her own and provide herself with public transportation. No IV placed, no personal belongings found in room.

## 2022-08-27 NOTE — ED ADULT NURSE REASSESSMENT NOTE - NS ED NURSE REASSESS COMMENT FT1
Author of this note spoke with Mal (Mental Health Therapist) from [facility] 40 [phone], who states pt. is able to travel independently.

## 2022-08-27 NOTE — ED PROVIDER NOTE - PHYSICAL EXAMINATION
Physical Exam:  Gen: NAD, AOx3, non-toxic appearing, able to ambulate without assistance  Head: NCAT  HEENT: EOMI, PEERLA, normal conjunctiva, tongue midline, oral mucosa moist  Lung: CTAB, no respiratory distress, no wheezes/rhonchi/rales B/L, speaking in full sentences  CV: RRR, no murmurs, rubs or gallops; reproducible midline suprasternal chest pain; no ttp at L or R chest wall  Abd: soft, NT, ND, no guarding, no rigidity, no rebound tenderness, no CVA tenderness   MSK: no visible deformities, ROM normal in UE/LE, no back pain  Neuro: No focal sensory or motor deficits  Skin: Warm, well perfused, no rash, no leg swelling  Psych: normal affect, calm

## 2022-08-27 NOTE — ED ADULT NURSE NOTE - OBJECTIVE STATEMENT
received pt in intake room 1, 42 yr/o female A+OX4, ambulatory at baseline. PMH of bipolar, asthma, fibroids, and GERD. pt presented to the ED C/O chest pain and SOB for 2 days. VSS, RR even and unlabored. no s/s of resp distress noted. +2 pedal pulses noted, no edema noted. swab sent. pt is stable at this time.

## 2022-08-27 NOTE — ED PROVIDER NOTE - ATTENDING CONTRIBUTION TO CARE
Patient with medical history as above presents with 4 days of cough that is dry as well as right-sided upper chest pain that started today.  Chest pain not exertional, reproducible with palpation.  Vital signs stable, lungs clear to auscultation bilaterally.  No concern for cardiac issue at this time given reproducible on exam, very atypical in nature and normal EKG.  Plan for chest x-ray as well as COVID swab.  Patient very well-appearing likely discharge back to [facility] with PMD follow-up and return precautions.

## 2022-08-27 NOTE — ED PROVIDER NOTE - NSFOLLOWUPINSTRUCTIONS_ED_ALL_ED_FT
WHAT YOU NEED TO KNOW:    Costochondritis is a condition that causes pain in the cartilage that connect your ribs to your sternum (breastbone). Cartilage is the tough, bendable tissue that protects your bones.    DISCHARGE INSTRUCTIONS:    Medicines:    Acetaminophen: This medicine decreases pain. Acetaminophen is available without a doctor's order. Ask how much to take and how often to take it. Follow directions. Acetaminophen can cause liver damage if not taken correctly.    NSAIDs, such as ibuprofen, help decrease swelling, pain, and fever. This medicine is available with or without a doctor's order. NSAIDs can cause stomach bleeding or kidney problems in certain people. If you take blood thinner medicine, always ask if NSAIDs are safe for you. Always read the medicine label and follow directions. Do not give these medicines to children younger than 6 months without direction from a healthcare provider.    Take your medicine as directed. Contact your healthcare provider if you think your medicine is not helping or if you have side effects. Tell him or her if you are allergic to any medicine. Keep a list of the medicines, vitamins, and herbs you take. Include the amounts, and when and why you take them. Bring the list or the pill bottles to follow-up visits. Carry your medicine list with you in case of an emergency.  Follow up with your doctor as directed: Write down your questions so you remember to ask them during your visits.    Rest: You may need to get more rest. Learn which movements and activities cause pain, and avoid doing them. Do not carry objects, such as a purse or backpack, if this is painful. Avoid activities such as rowing and weightlifting until your pain decreases or goes away. Ask which activities are best for you to do while you recover.    Heat: Heat helps decrease pain in some patients. Apply heat on the area for 20 to 30 minutes every 2 hours for as many days as directed.    Ice: Ice helps decrease swelling and pain. Ice may also help prevent tissue damage. Use an ice pack, or put crushed ice in a plastic bag. Cover it with a towel and place it on the painful area for 15 to 20 minutes every hour or as directed.    Stretching exercises: Gentle stretching may help your symptoms.  a doorway and put your hands on the door frame at the level of your ears or shoulders. Take 1 step forward and gently stretch your chest. Try this with your hands higher up on the doorway.    Contact your healthcare provider if:    You have a fever.    The painful areas of your chest look swollen, red, and feel warm to the touch.    You cannot sleep because of the pain.    You have questions or concerns about your condition or care.

## 2022-08-28 NOTE — ED ADULT TRIAGE NOTE - NSPATIENTFLAG_GEN_A_ER
Purple DH (Discharge Huddle; Vulnerable Patient)
Detail Level: Detailed
DVT ppx: Lovenox  Diet: NPO while on BiPAP  Dispo: Pending clinical improvement

## 2022-09-01 NOTE — BH INPATIENT PSYCHIATRY PROGRESS NOTE - NSBHCHARTREVIEWLAB_PSY_A_CORE FT
Mohs Case Number: [id number] - [id number] Date Of Previous Biopsy (Optional): 06 / 29 / 2022 Previous Accession (Optional): Mayelana Metz - [id number] Biopsy Photograph Reviewed: Yes Referring Physician (Optional): AURELIA Mendoza Consent Type: Consent 1 (Standard) Eye Shield Used: No Surgeon Performing Repair (Optional): Oneal Mahmood MD Initial Size Of Lesion: 1.1 X Size Of Lesion In Cm (Optional): 0 Number Of Stages: 1 Primary Defect Length In Cm (Final Defect Size - Required For Flaps/Grafts): 1.2 Repair Type: Referred to mid-level for closure Oculoplastic Surgeon Procedure Text (A): After obtaining clear surgical margins the patient was sent to oculoplastics for surgical repair. The patient understands they will receive post-surgical care and follow-up from the referring physician's office. Oculoplastic Surgeon Procedure Text (B): After obtaining clear surgical margins the patient was sent to oculoplastics for surgical repair.  The patient understands they will receive post-surgical care and follow-up from the referring physician's office. Otolaryngologist Procedure Text (A): After obtaining clear surgical margins the patient was sent to otolaryngology for surgical repair. The patient understands they will receive post-surgical care and follow-up from the referring physician's office. Otolaryngologist Procedure Text (B): After obtaining clear surgical margins the patient was sent to otolaryngology for surgical repair.  The patient understands they will receive post-surgical care and follow-up from the referring physician's office. Plastic Surgeon (A): Preethi Couch PA-C Plastic Surgeon (B): Dr. Mervin Toledo Plastic Surgeon (C): Augustina Navarro MD Plastic Surgeon (D): Dr. Douglas Levine Plastic Surgeon (E): Stephanie Gupta Plastic Surgeon (F): Surya Fishman MD Plastic Surgeon Procedure Text (A): After obtaining clear surgical margins the patient was sent to plastics for surgical repair. The patient understands they will receive post-surgical care and follow-up from the referring physician's office. Plastic Surgeon Procedure Text (B): After obtaining clear surgical margins the patient was sent to plastics for surgical repair.  The patient understands they will receive post-surgical care and follow-up from the referring physician's office. Which Mid-Level Are You Referring To?: A Mid-Level (A): Julianna Sylvester PA-C Mid-Level (D): Cawood Pulse, PA-C Mid-Level Procedure Text (A): After obtaining clear surgical margins the patient was sent to a mid-level provider for surgical repair. The patient understands they will receive post-surgical care and follow-up from the mid-level provider. Mid-Level Procedure Text (B): After obtaining clear surgical margins the patient was sent to a mid-level provider for surgical repair.  The patient understands they will receive post-surgical care and follow-up from the mid-level provider. Provider (A): Dr. Ashley Haider for wound care Provider (B): Jocelynn Gottlieb Provider (F): Dr. Ebonie Alcala Provider Procedure Text (A): After obtaining clear surgical margins the defect was repaired by another provider. Asc Procedure Text (A): After obtaining clear surgical margins the patient was sent to an [facility] for surgical repair. The patient understands they will receive post-surgical care and follow-up from the [facility] physician. Asc Procedure Text (B): After obtaining clear surgical margins the patient was sent to an [facility] for surgical repair.  The patient understands they will receive post-surgical care and follow-up from the [facility] physician. Suturegard Retention Suture: 2-0 Nylon Retention Suture Bite Size: 3 mm Length To Time In Minutes Device Was In Place: 10 Undermining Type: Entire Wound Debridement Text: The wound edges were debrided prior to proceeding with the closure to facilitate wound healing. Helical Rim Text: The closure involved the helical rim. Vermilion Border Text: The closure involved the vermilion border. Nostril Rim Text: The closure involved the nostril rim. Retention Suture Text: Retention sutures were placed to support the closure and prevent dehiscence. Area H Indication Text: Tumors in this location are included in Area H (eyelids, eyebrows, nose, lips, chin, ear, pre-auricular, post-auricular, temple, genitalia, hands, feet, ankles and areola). Tissue conservation is critical in these anatomic locations. Area M Indication Text: Tumors in this location are included in Area M (cheek, forehead, scalp, neck, jawline and pretibial skin). Mohs surgery is indicated for tumors in these anatomic locations. Area L Indication Text: Tumors in this location are included in Area L (trunk and extremities). Mohs surgery is indicated for larger tumors, or tumors with aggressive histologic features, in these anatomic locations. Tumor Debulked?: not debulked Depth Of Tumor Invasion (For Histology): tumor not visualized (deep and peripheral margins are clear of tumor) Perineural Invasion (For Histology - Be Specific If Possible): absent Special Stains Stage 1 - Results: Base On Clearance Noted Above Stage 2: Additional Anesthesia Type: 1% lidocaine with epinephrine Staging Info: By selecting yes to the question above you will include information on AJCC 8 tumor staging in your Mohs note. Information on tumor staging will be automatically added for SCCs on the head and neck. AJCC 8 includes tumor size, tumor depth, perineural involvement and bone invasion. Tumor Depth: Less than 6mm from granular layer and no invasion beyond the subcutaneous fat Was The Patient On Physician Recommended Anticoagulation Therapy?: Please Select the Appropriate Response Medical Necessity Statement: Based on my medical judgement; after reviewing the pertinent pathology report, physical exam findings and the various treatment options for skin cancer treatment;Â standard excision and/or destruction technique methods are not clinically sufficient treatmentÂ  options. Â To prevent the risk of compromising surgical cure and reconstruction, prompt microscopic examination of the surgical margins is necessary. Based on the given indication(s), maximum conservation of healthy tissue, and high cure rate, Mohs surgery is the most appropriate treatment for this cancer. Alternatives Discussed Intro (Do Not Add Period): I discussed alternative treatments to Mohs surgery and specifically discussed the risks and benefits of Consent 1/Introductory Paragraph: Various treatment options for skin cancer treatment; including but not limited to no treatment, cryosurgery or cryotherapy, excision, radiation therapy, electrodessicationÂ and curettage, topical therapeutic agents and light therapy were discussed in depth with the patient. The rationale for Mohs was explained to the patient and consent was obtained. The risks, benefits and alternatives to therapy were discussed in detail. Specifically, the risks of infection, scarring, bleeding, prolonged wound healing, incomplete removal, allergy to anesthesia, nerve injury and recurrence were addressed. Prior to the procedure, the treatment site was clearly identified and confirmed by the patient and the patient agreed that Mohs surgery is the best treatment option for their lesion. No guarantees were made or implied;Â close follow-up was stressed out to the patient. Â All components of Universal Protocol/PAUSE Rule completed.  Srinivasan Enciso MD operated in two distinct and integrated capacities as the surgeon and pathologist. Consent 2/Introductory Paragraph: Mohs surgery was explained to the patient and consent was obtained. The risks, benefits and alternatives to therapy were discussed in detail. Specifically, the risks of infection, scarring, bleeding, prolonged wound healing, incomplete removal, allergy to anesthesia, nerve injury and recurrence were addressed. Prior to the procedure, the treatment site was clearly identified and confirmed by the patient. All components of Universal Protocol/PAUSE Rule completed. Consent 3/Introductory Paragraph: I gave the patient a chance to ask questions they had about the procedure. Following this I explained the Mohs procedure and consent was obtained. The risks, benefits and alternatives to therapy were discussed in detail. Specifically, the risks of infection, scarring, bleeding, prolonged wound healing, incomplete removal, allergy to anesthesia, nerve injury and recurrence were addressed. Prior to the procedure, the treatment site was clearly identified and confirmed by the patient. All components of Universal Protocol/PAUSE Rule completed. Consent (Temporal Branch)/Introductory Paragraph: The rationale for Mohs was explained to the patient and consent was obtained. The risks, benefits and alternatives to therapy were discussed in detail. Specifically, the risks of damage to the temporal branch of the facial nerve, infection, scarring, bleeding, prolonged wound healing, incomplete removal, allergy to anesthesia, and recurrence were addressed. Prior to the procedure, the treatment site was clearly identified and confirmed by the patient. All components of Universal Protocol/PAUSE Rule completed. Consent (Marginal Mandibular)/Introductory Paragraph: The rationale for Mohs was explained to the patient and consent was obtained. The risks, benefits and alternatives to therapy were discussed in detail. Specifically, the risks of damage to the marginal mandibular branch of the facial nerve, infection, scarring, bleeding, prolonged wound healing, incomplete removal, allergy to anesthesia, and recurrence were addressed. Prior to the procedure, the treatment site was clearly identified and confirmed by the patient. All components of Universal Protocol/PAUSE Rule completed. Consent (Spinal Accessory)/Introductory Paragraph: The rationale for Mohs was explained to the patient and consent was obtained. The risks, benefits and alternatives to therapy were discussed in detail. Specifically, the risks of damage to the spinal accessory nerve, infection, scarring, bleeding, prolonged wound healing, incomplete removal, allergy to anesthesia, and recurrence were addressed. Prior to the procedure, the treatment site was clearly identified and confirmed by the patient. All components of Universal Protocol/PAUSE Rule completed. Consent (Near Eyelid Margin)/Introductory Paragraph: The rationale for Mohs was explained to the patient and consent was obtained. The risks, benefits and alternatives to therapy were discussed in detail. Specifically, the risks of ectropion or eyelid deformity, infection, scarring, bleeding, prolonged wound healing, incomplete removal, allergy to anesthesia, nerve injury and recurrence were addressed. Prior to the procedure, the treatment site was clearly identified and confirmed by the patient. All components of Universal Protocol/PAUSE Rule completed. Consent (Ear)/Introductory Paragraph: The rationale for Mohs was explained to the patient and consent was obtained. The risks, benefits and alternatives to therapy were discussed in detail. Specifically, the risks of ear deformity, infection, scarring, bleeding, prolonged wound healing, incomplete removal, allergy to anesthesia, nerve injury and recurrence were addressed. Prior to the procedure, the treatment site was clearly identified and confirmed by the patient. All components of Universal Protocol/PAUSE Rule completed. Consent (Nose)/Introductory Paragraph: The rationale for Mohs was explained to the patient and consent was obtained. The risks, benefits and alternatives to therapy were discussed in detail. Specifically, the risks of nasal deformity, changes in the flow of air through the nose, infection, scarring, bleeding, prolonged wound healing, incomplete removal, allergy to anesthesia, nerve injury and recurrence were addressed. Prior to the procedure, the treatment site was clearly identified and confirmed by the patient. All components of Universal Protocol/PAUSE Rule completed. Consent (Lip)/Introductory Paragraph: The rationale for Mohs was explained to the patient and consent was obtained. The risks, benefits and alternatives to therapy were discussed in detail. Specifically, the risks of lip deformity, changes in the oral aperture, infection, scarring, bleeding, prolonged wound healing, incomplete removal, allergy to anesthesia, nerve injury and recurrence were addressed. Prior to the procedure, the treatment site was clearly identified and confirmed by the patient. All components of Universal Protocol/PAUSE Rule completed. Consent (Scalp)/Introductory Paragraph: The rationale for Mohs was explained to the patient and consent was obtained. The risks, benefits and alternatives to therapy were discussed in detail. Specifically, the risks of changes in hair growth pattern secondary to repair, infection, scarring, bleeding, prolonged wound healing, incomplete removal, allergy to anesthesia, nerve injury and recurrence were addressed. Prior to the procedure, the treatment site was clearly identified and confirmed by the patient. All components of Universal Protocol/PAUSE Rule completed. Detail Level: Detailed Postop Diagnosis: same Surgeon: Jasmine Garcia MD Anesthesia Type: 1% lidocaine with 1:200,000 epinephrine Anesthesia Volume In Cc: 3 Hemostasis: Electrocautery Estimated Blood Loss (Cc): minimal Anesthesia Type: 1% lidocaine with epinephrine and a 1:10 solution of 8.4% sodium bicarbonate Brow Lift Text: A midfrontal incision was made medially to the defect to allow access to the tissues just superior to the left eyebrow. Following careful dissection inferiorly in a supraperiosteal plane to the level of the left eyebrow, several 3-0 monocryl sutures were used to resuspend the eyebrow orbicularis oculi muscular unit to the superior frontal bone periosteum. This resulted in an appropriate reapproximation of static eyebrow symmetry and correction of the left brow ptosis. Suturegard Intro: Intraoperative tissue expansion was performed, utilizing the SUTUREGARD device, in order to reduce wound tension. Suturegard Body: The suture ends were repeatedly re-tightened and re-clamped to achieve the desired tissue expansion. Hemigard Intro: Due to skin fragility and wound tension, it was decided to use HEMIGARD adhesive retention suture devices to permit a linear closure. The skin was cleaned and dried for a 6cm distance away from the wound. Excessive hair, if present, was removed to allow for adhesion. Hemigard Postcare Instructions: The HEMIGARD strips are to remain completely dry for at least 5-7 days. Donor Site Anesthesia Type: same as repair anesthesia Closure 2 Information: This tab is for additional flaps and grafts, including complex repair and grafts and complex repair and flaps. You can also specify a different location for the additional defect, if the location is the same you do not need to select a new one. We will insert the automated text for the repair you select below just as we do for solitary flaps and grafts. Please note that at this time if you select a location with a different insurance zone you will need to override the ICD10 and CPT if appropriate. Closure 3 Information: This tab is for additional flaps and grafts above and beyond our usual structured repairs. Please note if you enter information here it will not currently bill and you will need to add the billing information manually. Closure 4 Information: This tab is for additional flaps and grafts above and beyond our usual structured repairs.  Please note if you enter information here it will not currently bill and you will need to add the billing information manually. Wound Care: Vaseline Dressing: pressure dressing with telfa Wound Care (No Sutures): Petrolatum Dressing (No Sutures): dry sterile dressing Unna Boot Text: An Unna boot was placed to help immobilize the limb and facilitate more rapid healing. Home Suture Removal Text: Patient was provided instructions on removing sutures and will remove their sutures at home. If they have any questions or difficulties they will call the office. Post-Care Instructions: WOUND CARE INSTRUCTIONS\\n\\nI reviewed the post-care instructions with the patient in detail. \\n\\nKeep our initial bandage on and dry for 48-72 hours as directed. After 48-72 hours,\\n\\n1. Cleanse the wound with peroxide gently. If there is a lot of crusting/scabbing, soak the site with peroxide to soften. \\n2. Apply a generous amount of Vaseline to the surgical site. DO NOT use Neosporin. \\n3. Cover the wound with a dressing. You can use a non-stick pad with paper tape or regular bandages. \\n4. Repeat twice daily, once in the morning, once in the evening. \\n\\n\\nPAIN DAHCNUT\\S\\L3. It is common to experience swelling, bruising, and drainage after surgery. To decrease pain, use extra strength Tylenol as directed on the bottle. If necessary, it is OK to take Tylenol AND Ibuprofen. Please follow the instructions as directed on the bottles. If these over-the-counter medicines do not help with your pain, please call our office. \\n\\n2. To decrease pain, patients can also try using an ice pack, a bag of frozen green peas, or a bag of frozen marshmellows. Place the ice pack on top of the bandage for 20-30 minutes, then take a break for 20-30 minutes (place the ice pack back in the freezer to get it cold again). Repeat as many times as necessary. \\n\\n\\nBLEEDING CONTROL\\n\\nIf bleeding should occur, apply firm direct pressure to the site for 30 minutes without easing up. If bleeding continues after 30 minutes, please call the office at any time. In rare instances, it may be necessary to go to the nearest emergency room. \\n\\n\\nMISCELLANEOUS INFORMATION\\n\\nThings to avoid while healing:\\n - NO heavy lifting, exercise, or swimming for the next 14 days. \\n - NO exposure to tap water for the next 48-72 hours. \\n - NO exposure to hot tub, swimming pool, or ocean water for the next 14 days. \\n\\n\\nCONTACT INFORMATION\\n\\nShould you have any questions or concerns, please call:\\n\\n1. [address] Location = [phone]\\n\\n2.  [facility] = [phone] Pain Refusal Text: I offered to prescribe pain medication but the patient refused to take this medication. Lithium serum level resulted 1.2 on 1/29/21  Lipid panel WNL ,  no signs of metabolic syndrome  Lithium serum level resulted 1.3 on 2/11/21, no signs of toxicity, reduce Lithium dose  Lithium level resulted 0.9 on 2/17/21  updated labs ordered for 3/10/21: CBC, CMP, Lithium Mauc Instructions: By selecting yes to the question below the [facility] number will be added into the note. This will be calculated automatically based on the diagnosis chosen, the size entered, the body zone selected (H,M,L) and the specific indications you chose. You will also have the option to override the Mohs AUC if you disagree with the automatically calculated number and this option is found in the Case Summary tab. Where Do You Want The Question To Include Opioid Counseling Located?: Case Summary Tab Eye Protection Verbiage: Before proceeding with the stage, a plastic scleral shield was inserted. The globe was anesthetized with a few drops of 1% lidocaine with 1:100,000 epinephrine. Then, an appropriate sized scleral shield was chosen and coated with lacrilube ointment. The shield was gently inserted and left in place for the duration of each stage. After the stage was completed, the shield was gently removed. Mohs Method Verbiage: An incision at a 45 degree angle following the standard Mohs approach was done and the specimen was harvested as a microscopic controlled layer. Surgeon/Pathologist Verbiage (Will Incorporate Name Of Surgeon From Intro If Not Blank): operated in two distinct and integrated capacities as the surgeon and pathologist. [address] was completed at this time and should be considered part of the medical record. Refer to attached Mohs Map for additional details including but not limited to: address where microscope slide was read, patient name, patient medical record number, date of service, patient insurance, referring provider, biopsy date, tumor type, tumor site, initial pre-operative size, Mohs accession #, Mohs indications, details of Mohs procedure including stages, debulk status, tumor type / pattern / morphology, depth of invasion, inflammation, perineural invasion, scar tissue, margins status, tissue blocks, and final measurement size. Mohs Histo Method Verbiage: Each section was then chromacoded and processed in the Mohs lab using the Mohs protocol and submitted for frozen section. Subsequent Stages Histo Method Verbiage: Using a similar technique to that described above, a thin layer of tissue was removed from all areas where tumor was visible on the previous stage. The tissue was again oriented, mapped, dyed, and processed as above. Mohs Rapid Report Verbiage: The area of clinically evident tumor was marked with skin marking ink and appropriately hatched. The initial incision was made following the Mohs approach through the skin. The specimen was taken to the lab, divided into the necessary number of pieces, chromacoded and processed according to the Mohs protocol. This was repeated in successive stages until a tumor free defect was achieved. Complex Repair Preamble Text (Leave Blank If You Do Not Want): Extensive wide undermining was performed. Intermediate Repair Preamble Text (Leave Blank If You Do Not Want): Undermining was performed with blunt dissection. Non-Graft Cartilage Fenestration Text: The cartilage was fenestrated with a 2mm punch biopsy to help facilitate healing. Graft Cartilage Fenestration Text: The cartilage was fenestrated with a 2mm punch biopsy to help facilitate graft survival and healing. Secondary Intention Text (Leave Blank If You Do Not Want): The defect will heal with secondary intention. No Repair - Repaired With Adjacent Surgical Defect Text (Leave Blank If You Do Not Want): After obtaining clear surgical margins the defect was repaired concurrently with another surgical defect which was in close approximation. Adjacent Tissue Transfer Text: The defect edges were debeveled with a #15 scalpel blade. Given the location of the defect and the proximity to free margins an adjacent tissue transfer was deemed most appropriate. Using a sterile surgical marker, an appropriate flap was drawn incorporating the defect and placing the expected incisions within the relaxed skin tension lines where possible. The area thus outlined was incised deep to adipose tissue with a #15 scalpel blade. The skin margins were undermined to an appropriate distance in all directions utilizing iris scissors. Advancement Flap (Single) Text: The defect edges were debeveled with a #15 scalpel blade. Given the location of the defect and the proximity to free margins an advancement flap was deemed most appropriate. Using a sterile surgical marker, an appropriate advancement flap was drawn incorporating the defect and placing the expected incisions within the relaxed skin tension lines where possible. The area thus outlined was incised deep to adipose tissue with a #15 scalpel blade. The skin margins were undermined 1cm in all directions utilizing iris scissors to facilitate a smooth advancement movement of the flap. Please see above for flap location, total flap area, primary defect dimensions and area, and secondary defect dimensions and area. Advancement Flap (Double) Text: The defect edges were debeveled with a #15 scalpel blade. Given the location of the defect and the proximity to free margins a double advancement flap was deemed most appropriate. Using a sterile surgical marker, an appropriate double advancement flap was drawn incorporating the defect and placing the expected incisions within the relaxed skin tension lines where possible. The area thus outlined was incised deep to adipose tissue with a #15 scalpel blade. The skin margins were undermined 1cm in all directions utilizing iris scissors to facilitate a smooth double advancement movement of the flap. Please see above for flap location, total flap area, primary defect dimensions and area, and secondary defect dimensions and area. Burow's Advancement Flap Text: The defect edges were debeveled with a #15 scalpel blade. Given the location of the defect and the proximity to free margins a Burow's advancement flap was deemed most appropriate. Using a sterile surgical marker, the appropriate advancement flap was drawn incorporating the defect and placing the expected incisions within the relaxed skin tension lines where possible. The area thus outlined was incised deep to adipose tissue with a #15 scalpel blade. The skin margins were undermined to an appropriate distance in all directions utilizing iris scissors. Chonodrocutaneous Helical Advancement Flap Text: The defect edges were debeveled with a #15 scalpel blade. Given the location of the defect and the proximity to free margins a chondrocutaneous helical advancement flap was deemed most appropriate. Using a sterile surgical marker, the appropriate advancement flap was drawn incorporating the defect and placing the expected incisions within the relaxed skin tension lines where possible. The area thus outlined was incised deep to adipose tissue with a #15 scalpel blade. The skin margins were undermined to an appropriate distance in all directions utilizing iris scissors. Crescentic Advancement Flap Text: The defect edges were debeveled with a #15 scalpel blade. Given the location of the defect and the proximity to free margins a crescentic advancement flap was deemed most appropriate. Using a sterile surgical marker, an appropriate crescentic advancement flap was drawn incorporating the defect and placing the expected incisions within the relaxed skin tension lines where possible. The area thus outlined was incised deep to adipose tissue with a #15 scalpel blade. The skin margins were undermined 1cm in all directions utilizing iris scissors to facilitate a smooth crescentic advancement movement of the flap. Please see above for flap location, total flap area, primary defect dimensions and area, and secondary defect dimensions and area. A-T Advancement Flap Text: The defect edges were debeveled with a #15 scalpel blade. Given the location of the defect, shape of the defect and the proximity to free margins an A-T advancement flap was deemed most appropriate. Using a sterile surgical marker, an appropriate advancement flap was drawn incorporating the defect and placing the expected incisions within the relaxed skin tension lines where possible. The area thus outlined was incised deep to adipose tissue with a #15 scalpel blade. The skin margins were undermined to an appropriate distance in all directions utilizing iris scissors. O-T Advancement Flap Text: The defect edges were debeveled with a #15 scalpel blade. Given the location of the defect, shape of the defect and the proximity to free margins an O-T advancement flap was deemed most appropriate. Using a sterile surgical marker, an appropriate advancement flap was drawn incorporating the defect and placing the expected incisions within the relaxed skin tension lines where possible. The area thus outlined was incised deep to adipose tissue with a #15 scalpel blade. The skin margins were undermined to an appropriate distance in all directions utilizing iris scissors. O-L Flap Text: The defect edges were debeveled with a #15 scalpel blade. Given the location of the defect, shape of the defect and the proximity to free margins an O-L flap was deemed most appropriate. Using a sterile surgical marker, an appropriate advancement flap was drawn incorporating the defect and placing the expected incisions within the relaxed skin tension lines where possible. The area thus outlined was incised deep to adipose tissue with a #15 scalpel blade. The skin margins were undermined to an appropriate distance in all directions utilizing iris scissors. O-Z Flap Text: The defect edges were debeveled with a #15 scalpel blade. Given the location of the defect, shape of the defect and the proximity to free margins an O-Z flap was deemed most appropriate. Using a sterile surgical marker, an appropriate transposition flap was drawn incorporating the defect and placing the expected incisions within the relaxed skin tension lines where possible. The area thus outlined was incised deep to adipose tissue with a #15 scalpel blade. The skin margins were undermined to an appropriate distance in all directions utilizing iris scissors. Double O-Z Flap Text: The defect edges were debeveled with a #15 scalpel blade. Given the location of the defect, shape of the defect and the proximity to free margins a Double O-Z flap was deemed most appropriate. Using a sterile surgical marker, an appropriate transposition flap was drawn incorporating the defect and placing the expected incisions within the relaxed skin tension lines where possible. The area thus outlined was incised deep to adipose tissue with a #15 scalpel blade. The skin margins were undermined to an appropriate distance in all directions utilizing iris scissors. V-Y Flap Text: The defect edges were debeveled with a #15 scalpel blade. ? Given the location of the defect and the proximity to free margins a V to Y Flap was deemed most appropriate. ? Using a sterile surgical marker, an appropriate V to Y Flap was drawn incorporating the defect and placing the expected incisions within the relaxed skin tension lines where possible. ??? The area thus outlined was incised deep to adipose tissue with a #15 scalpel blade. ? The skin margins were undermined 1cm in all directions utilizing iris scissors to facilitate a smooth advancement movement of the flap. \\n?\\nPlease see above for flap location, total flap area, primary defect dimensions and area, and secondary defect dimensions and area. Advancement-Rotation Flap Text: The defect edges were debeveled with a #15 scalpel blade. Given the location of the defect, shape of the defect and the proximity to free margins an advancement-rotation flap was deemed most appropriate. Using a sterile surgical marker, an appropriate flap was drawn incorporating the defect and placing the expected incisions within the relaxed skin tension lines where possible. The area thus outlined was incised deep to adipose tissue with a #15 scalpel blade. The skin margins were undermined to an appropriate distance in all directions utilizing iris scissors. Mercedes Flap Text: The defect edges were debeveled with a #15 scalpel blade. Given the location of the defect, shape of the defect and the proximity to free margins a Mercedes flap was deemed most appropriate. Using a sterile surgical marker, an appropriate advancement flap was drawn incorporating the defect and placing the expected incisions within the relaxed skin tension lines where possible. The area thus outlined was incised deep to adipose tissue with a #15 scalpel blade. The skin margins were undermined to an appropriate distance in all directions utilizing iris scissors. Modified Advancement Flap Text: The defect edges were debeveled with a #15 scalpel blade. Given the location of the defect, shape of the defect and the proximity to free margins a modified advancement flap was deemed most appropriate. Using a sterile surgical marker, an appropriate advancement flap was drawn incorporating the defect and placing the expected incisions within the relaxed skin tension lines where possible. The area thus outlined was incised deep to adipose tissue with a #15 scalpel blade. The skin margins were undermined to an appropriate distance in all directions utilizing iris scissors. Mucosal Advancement Flap Text: Given the location of the defect, shape of the defect and the proximity to free margins a mucosal advancement flap was deemed most appropriate. Incisions were made with a 15 blade scalpel in the appropriate fashion along the cutaneous vermillion border and the mucosal lip. The remaining actinically damaged mucosal tissue was excised. The mucosal advancement flap was then elevated to the gingival sulcus with care taken to preserve the neurovascular structures and advanced into the primary defect. Care was taken to ensure that precise realignment of the vermillion border was achieved. Peng Advancement Flap Text: The defect edges were debeveled with a #15 scalpel blade. Given the location of the defect, shape of the defect and the proximity to free margins a Peng advancement flap was deemed most appropriate. Using a sterile surgical marker, an appropriate advancement flap was drawn incorporating the defect and placing the expected incisions within the relaxed skin tension lines where possible. The area thus outlined was incised deep to adipose tissue with a #15 scalpel blade. The skin margins were undermined to an appropriate distance in all directions utilizing iris scissors. Hatchet Flap Text: The defect edges were debeveled with a #15 scalpel blade. Given the location of the defect, shape of the defect and the proximity to free margins a hatchet flap was deemed most appropriate. Using a sterile surgical marker, an appropriate hatchet flap was drawn incorporating the defect and placing the expected incisions within the relaxed skin tension lines where possible. The area thus outlined was incised deep to adipose tissue with a #15 scalpel blade. The skin margins were undermined to an appropriate distance in all directions utilizing iris scissors. Rotation Flap Text: The defect edges were debeveled with a #15 scalpel blade. Given the location of the defect and the proximity to free margins a rotation flap was deemed most appropriate. Using a sterile surgical marker, an appropriate rotation flap was drawn incorporating the defect and placing the expected incisions within the relaxed skin tension lines where possible. The area thus outlined was incised deep to adipose tissue with a #15 scalpel blade. The skin margins were undermined 1cm in all directions utilizing iris scissors to facilitate a smooth rotation movement of the flap. \\n\\n \\n\\nPlease see above for flap location, total flap area, primary defect dimensions and area, and secondary defect dimensions and area. Spiral Flap Text: The defect edges were debeveled with a #15 scalpel blade. Given the location of the defect, shape of the defect and the proximity to free margins a spiral flap was deemed most appropriate. Using a sterile surgical marker, an appropriate rotation flap was drawn incorporating the defect and placing the expected incisions within the relaxed skin tension lines where possible. The area thus outlined was incised deep to adipose tissue with a #15 scalpel blade. The skin margins were undermined to an appropriate distance in all directions utilizing iris scissors. Staged Advancement Flap Text: The defect edges were debeveled with a #15 scalpel blade. Given the location of the defect, shape of the defect and the proximity to free margins a staged advancement flap was deemed most appropriate. Using a sterile surgical marker, an appropriate advancement flap was drawn incorporating the defect and placing the expected incisions within the relaxed skin tension lines where possible. The area thus outlined was incised deep to adipose tissue with a #15 scalpel blade. The skin margins were undermined to an appropriate distance in all directions utilizing iris scissors. Star Wedge Flap Text: The defect edges were debeveled with a #15 scalpel blade. Given the location of the defect, shape of the defect and the proximity to free margins a star wedge flap was deemed most appropriate. Using a sterile surgical marker, an appropriate rotation flap was drawn incorporating the defect and placing the expected incisions within the relaxed skin tension lines where possible. The area thus outlined was incised deep to adipose tissue with a #15 scalpel blade. The skin margins were undermined to an appropriate distance in all directions utilizing iris scissors. Transposition Flap Text: After a lengthy discussion with the patient regarding the wound treatment reconstruction options available including but not limited to simple repair, intermediate repair, complex repair, adjacent tissue transfer, tissue graft as well as allowing the lesion to repair by secondary intention. It was determined that due to the defect location, complexity, and given indications; an adjacent tissue transfer (transposition flap) would be the most appropriate means for repair of the surgical defect as the defect extended through the skin into the subcutaneous tissues, and required rearrangement or transfer of adjacent tissue to repair the surgical wound. \\n\\n\\n\\n\\nThe defect edges were debeveled with a #15 scalpel blade. Given the location of the defect and the proximity to free margins a transposition flap was deemed most appropriate. Using a sterile surgical marker, an appropriate transposition flap was drawn incorporating the defect. The area thus outlined was incised deep to adipose tissue with a #15 scalpel blade. The skin margins were undermined to an appropriate distance in all directions utilizing iris scissors. Muscle Hinge Flap Text: The defect edges were debeveled with a #15 scalpel blade. Given the size, depth and location of the defect and the proximity to free margins a muscle hinge flap was deemed most appropriate. Using a sterile surgical marker, an appropriate hinge flap was drawn incorporating the defect. The area thus outlined was incised with a #15 scalpel blade. The skin margins were undermined to an appropriate distance in all directions utilizing iris scissors. Mustarde Flap Text: The defect edges were debeveled with a #15 scalpel blade. Given the size, depth and location of the defect and the proximity to free margins a Mustarde flap was deemed most appropriate. Using a sterile surgical marker, an appropriate flap was drawn incorporating the defect. The area thus outlined was incised with a #15 scalpel blade. The skin margins were undermined to an appropriate distance in all directions utilizing iris scissors. Nasal Turnover Hinge Flap Text: The defect edges were debeveled with a #15 scalpel blade. Given the size, depth, location of the defect and the defect being full thickness a nasal turnover hinge flap was deemed most appropriate. Using a sterile surgical marker, an appropriate hinge flap was drawn incorporating the defect. The area thus outlined was incised with a #15 scalpel blade. The flap was designed to recreate the nasal mucosal lining and the alar rim. The skin margins were undermined to an appropriate distance in all directions utilizing iris scissors. Nasalis-Muscle-Based Myocutaneous Island Pedicle Flap Text: Using a #15 blade, an incision was made around the donor flap to the level of the nasalis muscle. Wide lateral undermining was then performed in both the subcutaneous plane above the nasalis muscle, and in a submuscular plane just above periosteum. This allowed the formation of a free nasalis muscle axial pedicle (based on the angular artery) which was still attached to the actual cutaneous flap, increasing its mobility and vascular viability. Hemostasis was obtained with pinpoint electrocoagulation. The flap was mobilized into position and the pivotal anchor points positioned and stabilized with buried interrupted sutures. Subcutaneous and dermal tissues were closed in a multilayered fashion with sutures. Tissue redundancies were excised, and the epidermal edges were apposed without significant tension and sutured with sutures. Orbicularis Oris Muscle Flap Text: The defect edges were debeveled with a #15 scalpel blade. Given that the defect affected the competency of the oral sphincter an obicularis oris muscle flap was deemed most appropriate to restore this competency and normal muscle function. Using a sterile surgical marker, an appropriate flap was drawn incorporating the defect. The area thus outlined was incised with a #15 scalpel blade. Melolabial Transposition Flap Text: The defect edges were debeveled with a #15 scalpel blade. Given the location of the defect and the proximity to free margins a melolabial flap was deemed most appropriate. Using a sterile surgical marker, an appropriate melolabial transposition flap was drawn incorporating the defect. The area thus outlined was incised deep to adipose tissue with a #15 scalpel blade. The skin margins were undermined to an appropriate distance in all directions utilizing iris scissors. Rhombic Flap Text: The defect edges were debeveled with a #15 scalpel blade. ? Given the location of the defect and the proximity to free margins a rhombic flap was deemed most appropriate. ? Using a sterile surgical marker, an appropriate rhombic flap was drawn incorporating the defect and placing the expected incisions within the relaxed skin tension lines where possible. ??? The area thus outlined was incised deep to adipose tissue with a #15 scalpel blade. ? The skin margins were undermined 1cm in all directions utilizing iris scissors to facilitate a smooth transposition movement of the flap. \\n?\\nPlease see above for flap location, total flap area, primary defect dimensions and area, and secondary defect dimensions and area. Rhomboid Transposition Flap Text: The defect edges were debeveled with a #15 scalpel blade. Given the location of the defect and the proximity to free margins a rhomboid transposition flap was deemed most appropriate. Using a sterile surgical marker, an appropriate rhomboid flap was drawn incorporating the defect. The area thus outlined was incised deep to adipose tissue with a #15 scalpel blade. The skin margins were undermined to an appropriate distance in all directions utilizing iris scissors. Bi-Rhombic Flap Text: The defect edges were debeveled with a #15 scalpel blade. Given the location of the defect and the proximity to free margins a bi-rhombic flap was deemed most appropriate. Using a sterile surgical marker, an appropriate rhombic flap was drawn incorporating the defect. The area thus outlined was incised deep to adipose tissue with a #15 scalpel blade. The skin margins were undermined to an appropriate distance in all directions utilizing iris scissors. Helical Rim Advancement Flap Text: The defect edges were debeveled with a #15 blade scalpel. Given the location of the defect and the proximity to free margins (helical rim) a double helical rim advancement flap was deemed most appropriate. Using a sterile surgical marker, the appropriate advancement flaps were drawn incorporating the defect and placing the expected incisions between the helical rim and antihelix where possible. The area thus outlined was incised through and through with a #15 scalpel blade. With a skin hook and iris scissors, the flaps were gently and sharply undermined and freed up. Bilateral Helical Rim Advancement Flap Text: The defect edges were debeveled with a #15 blade scalpel. Given the location of the defect and the proximity to free margins (helical rim) a bilateral helical rim advancement flap was deemed most appropriate. Using a sterile surgical marker, the appropriate advancement flaps were drawn incorporating the defect and placing the expected incisions between the helical rim and antihelix where possible. The area thus outlined was incised through and through with a #15 scalpel blade. With a skin hook and iris scissors, the flaps were gently and sharply undermined and freed up. Ear Star Wedge Flap Text: The defect edges were debeveled with a #15 blade scalpel. Given the location of the defect and the proximity to free margins (helical rim) an ear star wedge flap was deemed most appropriate. Using a sterile surgical marker, the appropriate flap was drawn incorporating the defect and placing the expected incisions between the helical rim and antihelix where possible. The area thus outlined was incised through and through with a #15 scalpel blade. Banner Transposition Flap Text: The defect edges were debeveled with a #15 scalpel blade. Given the location of the defect and the proximity to free margins a Banner transposition flap was deemed most appropriate. Using a sterile surgical marker, an appropriate flap drawn around the defect. The area thus outlined was incised deep to adipose tissue with a #15 scalpel blade. The skin margins were undermined to an appropriate distance in all directions utilizing iris scissors. Bilobed Flap Text: The defect edges were debeveled with a #15 scalpel blade. Given the location of the defect and the proximity to free margins a bilobe flap was deemed most appropriate. Using a sterile surgical marker, an appropriate bilobe flap drawn around the defect. The area thus outlined was incised deep to adipose tissue with a #15 scalpel blade. The skin margins were undermined to an appropriate distance in all directions utilizing iris scissors. Bilobed Transposition Flap Text: The defect edges were debeveled with a #15 scalpel blade. Given the location of the defect and the proximity to free margins a bilobed transposition flap was deemed most appropriate. Using a sterile surgical marker, an appropriate bilobe flap drawn around the defect. The area thus outlined was incised deep to adipose tissue with a #15 scalpel blade. The skin margins were undermined to an appropriate distance in all directions utilizing iris scissors. Trilobed Flap Text: The defect edges were debeveled with a #15 scalpel blade. Given the location of the defect and the proximity to free margins a trilobed flap was deemed most appropriate. Using a sterile surgical marker, an appropriate trilobed flap drawn around the defect. The area thus outlined was incised deep to adipose tissue with a #15 scalpel blade. The skin margins were undermined to an appropriate distance in all directions utilizing iris scissors. Dorsal Nasal Flap Text: The defect edges were debeveled with a #15 scalpel blade. Given the location of the defect and the proximity to free margins a dorsal nasal flap was deemed most appropriate. Using a sterile surgical marker, an appropriate dorsal nasal flap was drawn around the defect. The area thus outlined was incised deep to adipose tissue with a #15 scalpel blade. The skin margins were undermined to an appropriate distance in all directions utilizing iris scissors. Island Pedicle Flap Text: The defect edges were debeveled with a #15 scalpel blade. Given the location of the defect, shape of the defect and the proximity to free margins an island pedicle advancement flap was deemed most appropriate. Using a sterile surgical marker, an appropriate advancement flap was drawn incorporating the defect, outlining the appropriate donor tissue and placing the expected incisions within the relaxed skin tension lines where possible. The area thus outlined was incised deep to adipose tissue with a #15 scalpel blade. The skin margins were undermined to an appropriate distance in all directions around the primary defect and laterally outward around the island pedicle utilizing iris scissors. There was minimal undermining beneath the pedicle flap. Island Pedicle Flap With Canthal Suspension Text: The defect edges were debeveled with a #15 scalpel blade. Given the location of the defect, shape of the defect and the proximity to free margins an island pedicle advancement flap was deemed most appropriate. Using a sterile surgical marker, an appropriate advancement flap was drawn incorporating the defect, outlining the appropriate donor tissue and placing the expected incisions within the relaxed skin tension lines where possible. The area thus outlined was incised deep to adipose tissue with a #15 scalpel blade. The skin margins were undermined to an appropriate distance in all directions around the primary defect and laterally outward around the island pedicle utilizing iris scissors. There was minimal undermining beneath the pedicle flap. A suspension suture was placed in the canthal tendon to prevent tension and prevent ectropion. Alar Island Pedicle Flap Text: The defect edges were debeveled with a #15 scalpel blade. Given the location of the defect, shape of the defect and the proximity to the alar rim an island pedicle advancement flap was deemed most appropriate. Using a sterile surgical marker, an appropriate advancement flap was drawn incorporating the defect, outlining the appropriate donor tissue and placing the expected incisions within the nasal ala running parallel to the alar rim. The area thus outlined was incised with a #15 scalpel blade. The skin margins were undermined minimally to an appropriate distance in all directions around the primary defect and laterally outward around the island pedicle utilizing iris scissors. There was minimal undermining beneath the pedicle flap. Double Island Pedicle Flap Text: The defect edges were debeveled with a #15 scalpel blade. Given the location of the defect, shape of the defect and the proximity to free margins a double island pedicle advancement flap was deemed most appropriate. Using a sterile surgical marker, an appropriate advancement flap was drawn incorporating the defect, outlining the appropriate donor tissue and placing the expected incisions within the relaxed skin tension lines where possible. The area thus outlined was incised deep to adipose tissue with a #15 scalpel blade. The skin margins were undermined to an appropriate distance in all directions around the primary defect and laterally outward around the island pedicle utilizing iris scissors. There was minimal undermining beneath the pedicle flap. Island Pedicle Flap-Requiring Vessel Identification Text: The defect edges were debeveled with a #15 scalpel blade. Given the location of the defect, shape of the defect and the proximity to free margins an island pedicle advancement flap was deemed most appropriate. Using a sterile surgical marker, an appropriate advancement flap was drawn, based on the axial vessel mentioned above, incorporating the defect, outlining the appropriate donor tissue and placing the expected incisions within the relaxed skin tension lines where possible. The area thus outlined was incised deep to adipose tissue with a #15 scalpel blade. The skin margins were undermined to an appropriate distance in all directions around the primary defect and laterally outward around the island pedicle utilizing iris scissors. There was minimal undermining beneath the pedicle flap. Keystone Flap Text: The defect edges were debeveled with a #15 scalpel blade. Given the location of the defect, shape of the defect a keystone flap was deemed most appropriate. Using a sterile surgical marker, an appropriate keystone flap was drawn incorporating the defect, outlining the appropriate donor tissue and placing the expected incisions within the relaxed skin tension lines where possible. The area thus outlined was incised deep to adipose tissue with a #15 scalpel blade. The skin margins were undermined to an appropriate distance in all directions around the primary defect and laterally outward around the flap utilizing iris scissors. O-T Plasty Text: The defect edges were debeveled with a #15 scalpel blade. Given the location of the defect, shape of the defect and the proximity to free margins an O-T plasty was deemed most appropriate. Using a sterile surgical marker, an appropriate O-T plasty was drawn incorporating the defect and placing the expected incisions within the relaxed skin tension lines where possible. The area thus outlined was incised deep to adipose tissue with a #15 scalpel blade. The skin margins were undermined to an appropriate distance in all directions utilizing iris scissors. O-Z Plasty Text: The defect edges were debeveled with a #15 scalpel blade. Given the location of the defect, shape of the defect and the proximity to free margins an O-Z plasty (double transposition flap) was deemed most appropriate. Using a sterile surgical marker, the appropriate transposition flaps were drawn incorporating the defect and placing the expected incisions within the relaxed skin tension lines where possible. The area thus outlined was incised deep to adipose tissue with a #15 scalpel blade. The skin margins were undermined to an appropriate distance in all directions utilizing iris scissors. Hemostasis was achieved with electrocautery. The flaps were then transposed into place, one clockwise and the other counterclockwise, and anchored with interrupted buried subcutaneous sutures. Double O-Z Plasty Text: The defect edges were debeveled with a #15 scalpel blade. Given the location of the defect, shape of the defect and the proximity to free margins a Double O-Z plasty (double transposition flap) was deemed most appropriate. Using a sterile surgical marker, the appropriate transposition flaps were drawn incorporating the defect and placing the expected incisions within the relaxed skin tension lines where possible. The area thus outlined was incised deep to adipose tissue with a #15 scalpel blade. The skin margins were undermined to an appropriate distance in all directions utilizing iris scissors. Hemostasis was achieved with electrocautery. The flaps were then transposed into place, one clockwise and the other counterclockwise, and anchored with interrupted buried subcutaneous sutures. V-Y Plasty Text: The defect edges were debeveled with a #15 scalpel blade. Given the location of the defect, shape of the defect and the proximity to free margins an V-Y advancement flap was deemed most appropriate. Using a sterile surgical marker, an appropriate advancement flap was drawn incorporating the defect and placing the expected incisions within the relaxed skin tension lines where possible. The area thus outlined was incised deep to adipose tissue with a #15 scalpel blade. The skin margins were undermined to an appropriate distance in all directions utilizing iris scissors. H Plasty Text: Given the location of the defect, shape of the defect and the proximity to free margins a H-plasty was deemed most appropriate for repair. Using a sterile surgical marker, the appropriate advancement arms of the H-plasty were drawn incorporating the defect and placing the expected incisions within the relaxed skin tension lines where possible. The area thus outlined was incised deep to adipose tissue with a #15 scalpel blade. The skin margins were undermined to an appropriate distance in all directions utilizing iris scissors. The opposing advancement arms were then advanced into place in opposite direction and anchored with interrupted buried subcutaneous sutures. W Plasty Text: The defect edges were debeveled with a #15 scalpel blade. Given the location of the defect and the proximity to free margins a W - plasty was deemed most appropriate. Using a sterile surgical marker, an appropriate W - plasty was drawn incorporating the defect and placing the expected incisions within the relaxed skin tension lines where possible. The area thus outlined was incised deep to adipose tissue with a #15 scalpel blade. The skin margins were undermined 1cm in all directions utilizing iris scissors to facilitate a W-plasty or jagged transposition movement of the flap. \\n\\n \\n\\nPlease see above for flap location, total flap area, primary defect dimensions and area, and secondary defect dimensions and area. Z Plasty Text: The lesion was extirpated to the level of the fat with a #15 scalpel blade. Given the location of the defect, shape of the defect and the proximity to free margins a Z-plasty was deemed most appropriate for repair. Using a sterile surgical marker, the appropriate transposition arms of the Z-plasty were drawn incorporating the defect and placing the expected incisions within the relaxed skin tension lines where possible. The area thus outlined was incised deep to adipose tissue with a #15 scalpel blade. The skin margins were undermined to an appropriate distance in all directions utilizing iris scissors. The opposing transposition arms were then transposed into place in opposite direction and anchored with interrupted buried subcutaneous sutures. Zygomaticofacial Flap Text: Given the location of the defect, shape of the defect and the proximity to free margins a zygomaticofacial flap was deemed most appropriate for repair. Using a sterile surgical marker, the appropriate flap was drawn incorporating the defect and placing the expected incisions within the relaxed skin tension lines where possible. The area thus outlined was incised deep to adipose tissue with a #15 scalpel blade with preservation of a vascular pedicle. The skin margins were undermined to an appropriate distance in all directions utilizing iris scissors. The flap was then placed into the defect and anchored with interrupted buried subcutaneous sutures. Cheek Interpolation Flap Text: A decision was made to reconstruct the defect utilizing an interpolation axial flap and a staged reconstruction. A telfa template was made of the defect. This telfa template was then used to outline the Cheek Interpolation flap. The donor area for the pedicle flap was then injected with anesthesia. The flap was excised through the skin and subcutaneous tissue down to the layer of the underlying musculature. The interpolation flap was carefully excised within this deep plane to maintain its blood supply. The edges of the donor site were undermined. The donor site was closed in a primary fashion. The pedicle was then rotated into position and sutured. Once the tube was sutured into place, adequate blood supply was confirmed with blanching and refill. The pedicle was then wrapped with xeroform gauze and dressed appropriately with a telfa and gauze bandage to ensure continued blood supply and protect the attached pedicle. Cheek-To-Nose Interpolation Flap Text: A decision was made to reconstruct the defect utilizing an interpolation axial flap and a staged reconstruction. A telfa template was made of the defect. This telfa template was then used to outline the Cheek-To-Nose Interpolation flap. The donor area for the pedicle flap was then injected with anesthesia. The flap was excised through the skin and subcutaneous tissue down to the layer of the underlying musculature. The interpolation flap was carefully excised within this deep plane to maintain its blood supply. The edges of the donor site were undermined. The donor site was closed in a primary fashion. The pedicle was then rotated into position and sutured. Once the tube was sutured into place, adequate blood supply was confirmed with blanching and refill. The pedicle was then wrapped with xeroform gauze and dressed appropriately with a telfa and gauze bandage to ensure continued blood supply and protect the attached pedicle. Interpolation Flap Text: A decision was made to reconstruct the defect utilizing an interpolation axial flap and a staged reconstruction. A telfa template was made of the defect. This telfa template was then used to outline the interpolation flap. The donor area for the pedicle flap was then injected with anesthesia. The flap was excised through the skin and subcutaneous tissue down to the layer of the underlying musculature. The interpolation flap was carefully excised within this deep plane to maintain its blood supply. The edges of the donor site were undermined. The donor site was closed in a primary fashion. The pedicle was then rotated into position and sutured. Once the tube was sutured into place, adequate blood supply was confirmed with blanching and refill. The pedicle was then wrapped with xeroform gauze and dressed appropriately with a telfa and gauze bandage to ensure continued blood supply and protect the attached pedicle. Melolabial Interpolation Flap Text: A decision was made to reconstruct the defect utilizing an interpolation axial flap and a staged reconstruction. A telfa template was made of the defect. This telfa template was then used to outline the melolabial interpolation flap. The donor area for the pedicle flap was then injected with anesthesia. The flap was excised through the skin and subcutaneous tissue down to the layer of the underlying musculature. The pedicle flap was carefully excised within this deep plane to maintain its blood supply. The edges of the donor site were undermined. The donor site was closed in a primary fashion. The pedicle was then rotated into position and sutured. Once the tube was sutured into place, adequate blood supply was confirmed with blanching and refill. The pedicle was then wrapped with xeroform gauze and dressed appropriately with a telfa and gauze bandage to ensure continued blood supply and protect the attached pedicle. Mastoid Interpolation Flap Text: A decision was made to reconstruct the defect utilizing an interpolation axial flap and a staged reconstruction. A telfa template was made of the defect. This telfa template was then used to outline the mastoid interpolation flap. The donor area for the pedicle flap was then injected with anesthesia. The flap was excised through the skin and subcutaneous tissue down to the layer of the underlying musculature. The pedicle flap was carefully excised within this deep plane to maintain its blood supply. The edges of the donor site were undermined. The donor site was closed in a primary fashion. The pedicle was then rotated into position and sutured. Once the tube was sutured into place, adequate blood supply was confirmed with blanching and refill. The pedicle was then wrapped with xeroform gauze and dressed appropriately with a telfa and gauze bandage to ensure continued blood supply and protect the attached pedicle. Posterior Auricular Interpolation Flap Text: A decision was made to reconstruct the defect utilizing an interpolation axial flap and a staged reconstruction. A telfa template was made of the defect. This telfa template was then used to outline the posterior auricular interpolation flap. The donor area for the pedicle flap was then injected with anesthesia. The flap was excised through the skin and subcutaneous tissue down to the layer of the underlying musculature. The pedicle flap was carefully excised within this deep plane to maintain its blood supply. The edges of the donor site were undermined. The donor site was closed in a primary fashion. The pedicle was then rotated into position and sutured. Once the tube was sutured into place, adequate blood supply was confirmed with blanching and refill. The pedicle was then wrapped with xeroform gauze and dressed appropriately with a telfa and gauze bandage to ensure continued blood supply and protect the attached pedicle. Paramedian Forehead Flap Text: A decision was made to reconstruct the defect utilizing an interpolation axial flap and a staged reconstruction. A telfa template was made of the defect. This telfa template was then used to outline the paramedian forehead pedicle flap. The donor area for the pedicle flap was then injected with anesthesia. The flap was excised through the skin and subcutaneous tissue down to the layer of the underlying musculature. The pedicle flap was carefully excised within this deep plane to maintain its blood supply. The edges of the donor site were undermined. The donor site was closed in a primary fashion. The pedicle was then rotated into position and sutured. Once the tube was sutured into place, adequate blood supply was confirmed with blanching and refill. The pedicle was then wrapped with xeroform gauze and dressed appropriately with a telfa and gauze bandage to ensure continued blood supply and protect the attached pedicle. Abbe Flap (Upper To Lower Lip) Text: The defect of the lower lip was assessed and measured. Given the location and size of the defect, an Abbe flap was deemed most appropriate. Using a sterile surgical marker, an appropriate Abbe flap was measured and drawn on the upper lip. Local anesthesia was then infiltrated. A scalpel was then used to incise the upper lip through and through the skin, vermilion, muscle and mucosa, leaving the flap pedicled on the opposite side. The flap was then rotated and transferred to the lower lip defect. The flap was then sutured into place with a three layer technique, closing the orbicularis oris muscle layer with subcutaneous buried sutures, followed by a mucosal layer and an epidermal layer. Abbe Flap (Lower To Upper Lip) Text: The defect of the upper lip was assessed and measured. Given the location and size of the defect, an Abbe flap was deemed most appropriate. Using a sterile surgical marker, an appropriate Abbe flap was measured and drawn on the lower lip. Local anesthesia was then infiltrated. A scalpel was then used to incise the upper lip through and through the skin, vermilion, muscle and mucosa, leaving the flap pedicled on the opposite side. The flap was then rotated and transferred to the lower lip defect. The flap was then sutured into place with a three layer technique, closing the orbicularis oris muscle layer with subcutaneous buried sutures, followed by a mucosal layer and an epidermal layer. Estlander Flap (Upper To Lower Lip) Text: The defect of the lower lip was assessed and measured. Given the location and size of the defect, an Estlander flap was deemed most appropriate. Using a sterile surgical marker, an appropriate Estlander flap was measured and drawn on the upper lip. Local anesthesia was then infiltrated. A scalpel was then used to incise the lateral aspect of the flap, through skin, muscle and mucosa, leaving the flap pedicled medially. The flap was then rotated and positioned to fill the lower lip defect. The flap was then sutured into place with a three layer technique, closing the orbicularis oris muscle layer with subcutaneous buried sutures, followed by a mucosal layer and an epidermal layer. Estlander Flap (Lower To Upper Lip) Text: The defect of the lower lip was assessed and measured.  Given the location and size of the defect, an Estlander flap was deemed most appropriate.  Using a sterile surgical marker, an appropriate Estlander flap was measured and drawn on the upper lip. Local anesthesia was then infiltrated. A scalpel was then used to incise the lateral aspect of the flap, through skin, muscle and mucosa, leaving the flap pedicled medially.  The flap was then rotated and positioned to fill the lower lip defect.  The flap was then sutured into place with a three layer technique, closing the orbicularis oris muscle layer with subcutaneous buried sutures, followed by a mucosal layer and an epidermal layer. Cheiloplasty (Less Than 50%) Text: A decision was made to reconstruct the defect with a  cheiloplasty. The defect was undermined extensively. Additional obicularis oris muscle was excised with a 15 blade scalpel. The defect was converted into a full thickness wedge, of less than 50% of the vertical height of the lip, to facilite a better cosmetic result. Small vessels were then tied off with 5-0 monocyrl. The obicularis oris, superficial fascia, adipose and dermis were then reapproximated. After the deeper layers were approximated the epidermis was reapproximated with particular care given to realign the vermillion border. Cheiloplasty (Complex) Text: A decision was made to reconstruct the defect with a  cheiloplasty. The defect was undermined extensively. Additional obicularis oris muscle was excised with a 15 blade scalpel. The defect was converted into a full thickness wedge to facilite a better cosmetic result. Small vessels were then tied off with 5-0 monocyrl. The obicularis oris, superficial fascia, adipose and dermis were then reapproximated. After the deeper layers were approximated the epidermis was reapproximated with particular care given to realign the vermillion border. Ear Wedge Repair Text: A wedge excision was completed by carrying down an excision through the full thickness of the ear and cartilage with an inward facing Burow's triangle. The wound was then closed in a layered fashion. Full Thickness Lip Wedge Repair (Flap) Text: Given the location of the defect and the proximity to free margins a full thickness wedge repair was deemed most appropriate. Using a sterile surgical marker, the appropriate repair was drawn incorporating the defect and placing the expected incisions perpendicular to the vermillion border. The vermillion border was also meticulously outlined to ensure appropriate reapproximation during the repair. The area thus outlined was incised through and through with a #15 scalpel blade. The muscularis and dermis were reaproximated with deep sutures following hemostasis. Care was taken to realign the vermillion border before proceeding with the superficial closure. Once the vermillion was realigned the superfical and mucosal closure was finished. Ftsg Text: The defect edges were debeveled with a #15 scalpel blade. Given the location of the defect, shape of the defect and the proximity to free margins a full thickness skin graft was deemed most appropriate. Using a sterile surgical marker, the primary defect shape was transferred to the donor site. The area thus outlined was incised deep to adipose tissue with a #15 scalpel blade. The harvested graft was then trimmed of adipose tissue until only dermis and epidermis was left. The skin margins of the secondary defect were undermined to an appropriate distance in all directions utilizing iris scissors. The secondary defect was closed with interrupted buried subcutaneous sutures. The skin edges were then re-apposed with running  sutures. The skin graft was then placed in the primary defect and oriented appropriately. Split-Thickness Skin Graft Text: The defect edges were debeveled with a #15 scalpel blade. Given the location of the defect, shape of the defect and the proximity to free margins a split thickness skin graft was deemed most appropriate. Using a sterile surgical marker, the primary defect shape was transferred to the donor site. The split thickness graft was then harvested. The skin graft was then placed in the primary defect and oriented appropriately. Burow's Graft Text: The defect edges were debeveled with a #15 scalpel blade. Given the location of the defect, shape of the defect, the proximity to free margins and the presence of a standing cone deformity a Burow's skin graft was deemed most appropriate. The standing cone was removed and this tissue was then trimmed to the shape of the primary defect. The adipose tissue was also removed until only dermis and epidermis were left. The skin margins of the secondary defect were undermined to an appropriate distance in all directions utilizing iris scissors. The secondary defect was closed with interrupted buried subcutaneous sutures. The skin edges were then re-apposed with running  sutures. The skin graft was then placed in the primary defect and oriented appropriately. Cartilage Graft Text: The defect edges were debeveled with a #15 scalpel blade. Given the location of the defect, shape of the defect, the fact the defect involved a full thickness cartilage defect a cartilage graft was deemed most appropriate. An appropriate donor site was identified, cleansed, and anesthetized. The cartilage graft was then harvested and transferred to the recipient site, oriented appropriately and then sutured into place. The secondary defect was then repaired using a primary closure. Composite Graft Text: The defect edges were debeveled with a #15 scalpel blade. Given the location of the defect, shape of the defect, the proximity to free margins and the fact the defect was full thickness a composite graft was deemed most appropriate. The defect was outline and then transferred to the donor site. A full thickness graft was then excised from the donor site. The graft was then placed in the primary defect, oriented appropriately and then sutured into place. The secondary defect was then repaired using a primary closure. Epidermal Autograft Text: The defect edges were debeveled with a #15 scalpel blade. Given the location of the defect, shape of the defect and the proximity to free margins an epidermal autograft was deemed most appropriate. Using a sterile surgical marker, the primary defect shape was transferred to the donor site. The epidermal graft was then harvested. The skin graft was then placed in the primary defect and oriented appropriately. Dermal Autograft Text: The defect edges were debeveled with a #15 scalpel blade. Given the location of the defect, shape of the defect and the proximity to free margins a dermal autograft was deemed most appropriate. Using a sterile surgical marker, the primary defect shape was transferred to the donor site. The area thus outlined was incised deep to adipose tissue with a #15 scalpel blade. The harvested graft was then trimmed of adipose and epidermal tissue until only dermis was left. The skin graft was then placed in the primary defect and oriented appropriately. Skin Substitute Text: The defect edges were debeveled with a #15 scalpel blade. Given the location of the defect, shape of the defect and the proximity to free margins a skin substitute graft was deemed most appropriate. The graft material was trimmed to fit the size of the defect. The graft was then placed in the primary defect and oriented appropriately. Tissue Cultured Epidermal Autograft Text: The defect edges were debeveled with a #15 scalpel blade. Given the location of the defect, shape of the defect and the proximity to free margins a tissue cultured epidermal autograft was deemed most appropriate. The graft was then trimmed to fit the size of the defect. The graft was then placed in the primary defect and oriented appropriately. Xenograft Text: The defect edges were debeveled with a #15 scalpel blade. Given the location of the defect, shape of the defect and the proximity to free margins a xenograft was deemed most appropriate. The graft was then trimmed to fit the size of the defect. The graft was then placed in the primary defect and oriented appropriately. Purse String (Simple) Text: Given the location of the defect and the characteristics of the surrounding skin a pursestring closure was deemed most appropriate. Undermining was performed circumfirentially around the surgical defect. A purstring suture was then placed and tightened. Purse String (Intermediate) Text: Given the location of the defect and the characteristics of the surrounding skin a pursestring intermediate closure was deemed most appropriate. Undermining was performed circumfirentially around the surgical defect. A purstring suture was then placed and tightened. Partial Purse String (Simple) Text: Given the location of the defect and the characteristics of the surrounding skin a simple purse string closure was deemed most appropriate. Undermining was performed circumfirentially around the surgical defect. A purse string suture was then placed and tightened. Wound tension only allowed a partial closure of the circular defect. Partial Purse String (Intermediate) Text: Given the location of the defect and the characteristics of the surrounding skin an intermediate purse string closure was deemed most appropriate. Undermining was performed circumfirentially around the surgical defect. A purse string suture was then placed and tightened. Wound tension only allowed a partial closure of the circular defect. Localized Dermabrasion Text: The patient was draped in routine manner. Localized dermabrasion using 3 x 17 mm wire brush was performed in routine manner to papillary dermis. This spot dermabrasion is being performed to complete skin cancer reconstruction. It also will eliminate the other sun damaged precancerous cells that are known to be part of the regional effect of a lifetime's worth of sun exposure. This localized dermabrasion is therapeutic and should not be considered cosmetic in any regard. Tarsorrhaphy Text: A tarsorrhaphy was performed using Frost sutures. Complex Repair And Flap Additional Text (Will Appearing After The Standard Complex Repair Text): The complex repair was not sufficient to completely close the primary defect. The remaining additional defect was repaired with the flap mentioned below. Complex Repair And Graft Additional Text (Will Appearing After The Standard Complex Repair Text): The complex repair was not sufficient to completely close the primary defect. The remaining additional defect was repaired with the graft mentioned below. Manual Repair Warning Statement: We plan on removing the manually selected variable below in favor of our much easier automatic structured text blocks found in the previous tab. We decided to do this to help make the flow better and give you the full power of structured data. Manual selection is never going to be ideal in our platform and I would encourage you to avoid using manual selection from this point on, especially since I will be sunsetting this feature. It is important that you do one of two things with the customized text below. First, you can save all of the text in a word file so you can have it for future reference. Second, transfer the text to the appropriate area in the Library tab. Lastly, if there is a flap or graft type which we do not have you need to let us know right away so I can add it in before the variable is hidden. No need to panic, we plan to give you roughly 6 months to make the change. Same Histology In Subsequent Stages Text: The pattern and morphology of the tumor is as described in the first stage. No Residual Tumor Seen Histology Text: There were no malignant cells seen in the sections examined. Inflammation Suggestive Of Cancer Camouflage Histology Text: There was a dense lymphocytic infiltrate which prevented adequate histologic evaluation of adjacent structures. Bcc Histology Text: Beneath an irregular epidermis, there are small nodular masses of basaloid neoplastic cells with nuclear pleomorphism attached to the basal layer and surrounded by a loose fibrous stroma and mild inflammation in the dermis. The findings are typical for a basal cell carcinoma. Bcc Infiltrative Histology Text: There were numerous aggregates of basaloid cells demonstrating an infiltrative pattern. Bcc Infundibulocystic Histology Text: Beneath an irregular epidermis, there are small nodular masses  of basaloid neoplastic cells aggregating in a cystic formation with nuclear pleomorphism attached to the basal layer and surrounded by a loose fibrous stroma and mild inflammation in the dermis. The findings are typical for a basal cell carcinoma. Bcc Micronodular Histology Text: Beneath an irregular epidermis, there are extremely small but infiltrative nodular masses of basaloid neoplastic cells with nuclear pleomorphism attached to the basal layer and surrounded by a loose fibrous stroma and mild inflammation in the dermis. The findings are typical for a basal cell carcinoma. Bcc  Morpheaform/Sclerosing Histology Text: Beneath an irregular epidermis, there are bizarrely shaped masses of basaloid neoplastic cells with nuclear pleomorphism attached to the basal layer and surrounded by a rapidly forming scar and mild inflammation in the dermis. The findings are typical for a basal cell carcinoma. Bcc  Nodular Histology Text: Nodular aggregates of basaloid cells with large, hyperchromatic, oval nuclei and little cytoplasm aligning densely in a palisade pattern at the periphery of the nest Bcc  Nodulocystic Histology Text: Beneath an irregular epidermis, there are small nodular masses of basaloid neoplastic cells aggregating in a cystic formation with nuclear pleomorphism attached to the basal layer and surrounded by a loose fibrous stroma and mild inflammation in the dermis. The findings are typical for a basal cell carcinoma. Bcc Pigmented Histology Text: Functional melanocytes are scattered through the basal cell carcinoma tumor islands and there are numerous melanophages in the stroma Bcc Superficial Histology Text: On the basal layer of an irregular epidermis, there are small nodular masses of basaloid neoplastic cells with nuclear pleomorphism attached to the basal layer and surrounded by a loose fibrous stroma and mild inflammation in the dermis. The findings are typical for a basal cell carcinoma. Mixed Superficial And Nodular Bcc Histology Text: On the basal layer of and beneath an irregular epidermis, there are small nodular masses of basaloid neoplastic cells with nuclear pleomorphism attached to the basal layer and surrounded by a loose fibrous stroma and mild inflammation in the dermis. The findings are typical for a basal cell carcinoma. Mixed Nodular And Infiltrative Bcc Histology Text: There are narrow strands of spiky, irregular basal cell carcinoma cells infiltrating between collagen bundles with mucin-rich stroma Mixed Nodular And Micronodular Bcc Histology Text: Beneath an irregular epidermis, there are varying sizes of nodular masses of basaloid neoplastic cells with nuclear pleomorphism attached to the basal layer and surrounded by a loose fibrous stroma and mild inflammation in the dermis. The findings are typical for a basal cell carcinoma. Scc Histology Text: There are nests of squamous epithelial cells arising from the epidermis and extending into the dermis. The malignant cells are large with abundant eosinophilic cytoplasm and a large vesicular nucleus. Scc Moderately Differentiated Histology Text: There are nests of squamous epithelial cells arising from the epidermis and extending into the dermis. The malignant cells are large with abundant eosinophilic cytoplasm and a large nucleus. Keratin pearls are also present. Scc Poorly Differentiated Histology Text: There are nests of squamous epithelial cells arising from the epidermis and extending into the dermis. The malignant cells are poorly differentiated. Scc Acantholytic Histology Text: There are nests of squamous epithelial cells arising from the epidermis and extending into the dermis.  The malignant cells are demonstratic acantholysis Scc Ka Subtype Histology Text: There is well-differentiated squamous epithelium with pleomorphism and masses of keratin. Scc In Situ Histology Text: Atypia of the keratinocytes across the full thickness of the epidermis with loss of the granular layer and overlying zones of parakeratosis Melanoma In Situ Histology Text: Histologically, the lesion is asymmetrical, poorly circumscribed with architectural disturbance and marked cytological atypia Afx Histology Text: Bizarre multinucleated tumor cells in hypercellular, spindly stroma with frequent mitotic figures. There are also smaller fibroblastic cells with pleomorphism and angulated nuclei confined to the dermis. Basosquamous Cell Carcinoma Histology Text: + areas of BCC, with large and rounded basaloid cells and + areas of SCC, with polygonal squamoid cells with abundant eosinophilic cytoplasm, large nuclei, and prominent nucleoli. Hidradenocarcinoma Histology Text: On histologic exam, there are nodular, epithelioid, basaloid tumor cells with irregular infiltration of the surrounding dermis and foci of duct formation. Mart-1 - Positive Histology Text: MART-1 staining demonstrates areas of higher density and clustering of melanocytes with Pagetoid spread upwards within the epidermis. The surgical margins are positive for tumor cells. Mart-1 - Negative Histology Text: MART-1 staining demonstrates a normal density and pattern of melanocytes along the dermal-epidermal junction. The surgical margins are negative for tumor cells. Information: Selecting Yes will display possible errors in your note based on the variables you have selected. This validation is only offered as a suggestion for you. PLEASE NOTE THAT THE VALIDATION TEXT WILL BE REMOVED WHEN YOU FINALIZE YOUR NOTE. IF YOU WANT TO FAX A PRELIMINARY NOTE YOU WILL NEED TO TOGGLE THIS TO 'NO' IF YOU DO NOT WANT IT IN YOUR FAXED NOTE.

## 2022-09-02 ENCOUNTER — EMERGENCY (EMERGENCY)
Facility: HOSPITAL | Age: 42
LOS: 1 days | Discharge: HOME | End: 2022-09-02
Admitting: EMERGENCY MEDICINE

## 2022-09-02 VITALS
DIASTOLIC BLOOD PRESSURE: 70 MMHG | RESPIRATION RATE: 16 BRPM | HEART RATE: 70 BPM | SYSTOLIC BLOOD PRESSURE: 104 MMHG | TEMPERATURE: 99 F | OXYGEN SATURATION: 99 %

## 2022-09-02 VITALS
HEART RATE: 88 BPM | SYSTOLIC BLOOD PRESSURE: 150 MMHG | HEIGHT: 66 IN | RESPIRATION RATE: 16 BRPM | DIASTOLIC BLOOD PRESSURE: 110 MMHG | TEMPERATURE: 98 F | OXYGEN SATURATION: 100 %

## 2022-09-02 PROCEDURE — 99283 EMERGENCY DEPT VISIT LOW MDM: CPT

## 2022-09-02 RX ORDER — ACETAMINOPHEN 500 MG
650 TABLET ORAL ONCE
Refills: 0 | Status: COMPLETED | OUTPATIENT
Start: 2022-09-02 | End: 2022-09-02

## 2022-09-02 RX ADMIN — Medication 650 MILLIGRAM(S): at 20:16

## 2022-09-02 NOTE — ED PROVIDER NOTE - PATIENT PORTAL LINK FT
You can access the FollowMyHealth Patient Portal offered by [facility] by registering at the following website: [email or web address]. By joining [employer]’s FollowMyHealth portal, you will also be able to view your health information using other applications (apps) compatible with our system.

## 2022-09-02 NOTE — ED PROVIDER NOTE - CLINICAL SUMMARY MEDICAL DECISION MAKING FREE TEXT BOX
Medical evaluation performed. There is no clinical evidence of intoxication or any acute medical problem requiring immediate intervention.   SW consulted. Discuss plan with . D/C to [place] via cab 41 y/o F M hx  Bipolar, BPD  Medical evaluation performed. There is no clinical evidence of intoxication or any acute medical problem requiring immediate intervention.   SW consulted. Discuss plan with . D/C to [place] via cab

## 2022-09-02 NOTE — ED PROVIDER NOTE - OBJECTIVE STATEMENT
41 y/o F M hx  Bipolar, BPD presents to ER  w c/o felling " down and sad".  Admits to multiple social stressors.     Denies SI/AH/VH/HI. Denies pain, SOB, fever, chills, chest, abdominal discomfort.  Denies falling, punching or kicking any objects. Denies use of alcohol .  No evidence of physical, injuries, broken skin or deformities. Admits to medication compliance.

## 2022-09-02 NOTE — ED BEHAVIORAL HEALTH NOTE - BEHAVIORAL HEALTH NOTE
As per request of provider, writer contacted [facility] [place] [phone] and spoke w/ Aung VILLAGRAN. The following information is per MHTA  Pt is a 41 YO female domiciled at [facility]. Patient was requesting to come to the ED today. pt reported to staff that she was not feeling well and was not in the right mental state. Patient denied any si/hi/AVH. Patient presented anxious, depressed and angry. Patient has a hx of aggression but was not aggressive today. MHTA reports patient is inconsistent w/ her medication and smokes K2. MHTA unsure if patient smoked today. MHTA reports patient’s eating, sleeping and hygiene is at baseline. At baseline, patient presents well but did MHTA did not elaborate. He reports paperwork with further information was sent over with the patient. Patient has a hx of bipolar disorder. MHTA unsure of patient’s last hospitalization psychiatrically. No [id number] safety concerns reported and patient can travel independently if cleared for discharge. Writer agreed to keep staff updated. As per request of provider, writer contacted [facility] on [address] [phone] and spoke w/ Aung VILLAGRAN. The following information is per MHTA  Pt is a 43 YO female domiciled at [facility]. Patient was requesting to come to the ED today. pt reported to staff that she was not feeling well and was not in the right mental state. Patient denied any si/hi/AVH. Patient presented anxious, depressed and angry. Patient has a hx of aggression but was not aggressive today. MHTA reports patient is inconsistent w/ her medication and smokes K2. MHTA unsure if patient smoked today. MHTA reports patient’s eating, sleeping and hygiene is at baseline. At baseline, patient presents well but did MHTA did not elaborate. He reports paperwork with further information was sent over with the patient. Patient has a hx of bipolar disorder. MHTA unsure of patient’s last hospitalization psychiatrically. No [id number] safety concerns reported and patient can travel independently if cleared for discharge. Writer agreed to keep staff updated.    Per provider, KIRSTY villaseñor, patient is cleared and is able to return to their previous residence, [address] [place] [address].  has spoken to SPARKLE Hale ([phone]) ,  confirmed that patients mode of transportation is [employer] and that patient travels INDEPENDENTLY. Clinical provider is in agreement with [facility] back to group home. Verbal huddle regarding coordination of care completed with interdisciplinary team.   Transportation coordinated via [facility] (inv# [id number]) W/ [employer] [phone]. Ride scheduled for 8:45PM.

## 2022-09-02 NOTE — ED PROVIDER NOTE - NS ED MD DISPO DISCHARGE CCDA
At [facility], one important tool we use to improve our patient services is our Patient Survey.  Following your visit you may receive our survey in the mail.    Please take the time to complete the survey.    If your visit with us was great, we want to hear about it.    If we can improve, please let us know how.       Get your Prescription filled at [facility]!    · [facility] uses the same medical record your doctor uses. More accurate and timely information for your doctor and your pharmacy means more effective and safer health care for you and your family.  · [facility] accepts all of the major insurance plans which means you pay the same copay wherever you go.  · [facility] has a prescription savings club with over 200 medications available for $3.99 for a 30 day supply. *$5.00 yearly fee to join the club  · [facility] Pharmacists take the time to explain your medication regimen to you.  · [facility] will mail your medications to you free of postage and handling charges. We love christen.              Patient/Caregiver provided printed discharge information.

## 2022-09-02 NOTE — ED ADULT NURSE NOTE - CHIEF COMPLAINT QUOTE
Pt presents from [facility] 40, endorses depression/anxiety over the past several days, denies SI/HI, denies hallucinations.

## 2022-09-02 NOTE — ED ADULT TRIAGE NOTE - CHIEF COMPLAINT QUOTE
Pt presents from [facility] 40, endorses depression/anxiety over the past several days, denies SI/HI, denies hallucinations.

## 2022-09-02 NOTE — ED ADULT NURSE NOTE - OBJECTIVE STATEMENT
Received pt in  pt c/o depression denies si/hi/avh presently, pt requesting food safety & comfort measures maintained eval on going.

## 2022-09-03 ENCOUNTER — EMERGENCY (EMERGENCY)
Facility: HOSPITAL | Age: 42
LOS: 1 days | Discharge: HOME | End: 2022-09-03
Attending: EMERGENCY MEDICINE | Admitting: EMERGENCY MEDICINE

## 2022-09-03 VITALS
HEIGHT: 66 IN | RESPIRATION RATE: 18 BRPM | HEART RATE: 82 BPM | SYSTOLIC BLOOD PRESSURE: 143 MMHG | DIASTOLIC BLOOD PRESSURE: 92 MMHG | OXYGEN SATURATION: 99 % | TEMPERATURE: 98 F

## 2022-09-03 VITALS
DIASTOLIC BLOOD PRESSURE: 107 MMHG | SYSTOLIC BLOOD PRESSURE: 142 MMHG | OXYGEN SATURATION: 100 % | TEMPERATURE: 97 F | HEART RATE: 80 BPM | RESPIRATION RATE: 18 BRPM

## 2022-09-03 PROCEDURE — 99283 EMERGENCY DEPT VISIT LOW MDM: CPT

## 2022-09-03 PROCEDURE — 71045 X-RAY EXAM CHEST 1 VIEW: CPT | Mod: 26

## 2022-09-03 RX ADMIN — Medication 100 MILLIGRAM(S): at 12:03

## 2022-09-03 NOTE — PROVIDER CONTACT NOTE (OTHER) - ASSESSMENT
Willam Galindo)    referred this case as pt is from [facility]  building # 40. Writer resides at [facility] [address] 11th floor, B side  [phone]   .  Writer spoke with [facility]  therapy Aide TA - mode of travel is cab   and requested send the pt via cab.  Writer reviewed the chart and arranged trip via  [facility] online portal #  trip invoice number #  [id number].  Writer reached out to [employer] at [phone] spoke with informed that pt will be picked up at 5:30 pm and writer and informed cab has to come to adult ED ramp to . Writer informed the medical team and the pt on this intervention and plan and in agreement. Verbal Huddle been completed. NO further SW intervention needed for this visit.

## 2022-09-03 NOTE — ED ADULT NURSE NOTE - OBJECTIVE STATEMENT
Patient is a 43 yo female, h/x bipolar, GERD, asthma, presenting from [place] with cough x 1 week, vomited once yesterday. AAOx4, no signs of distress, denies chest pain, shortness of breath, fever, chills, nausea, diarrhea, any other symptoms. Denies sick contacts. RVP sent per orders. Medicated for cough. Fall precautions maintained.

## 2022-09-03 NOTE — ED PROVIDER NOTE - NSFOLLOWUPINSTRUCTIONS_ED_ALL_ED_FT
Follow up with your PMD within 48-72 hrs. Show copies of your reports given to you. Take all of your medications as previously prescribed.   Return to ER for any shortness of breath, worsening cough, or further concern.

## 2022-09-03 NOTE — ED PROVIDER NOTE - PATIENT PORTAL LINK FT
You can access the FollowMyHealth Patient Portal offered by [facility] by registering at the following website: [email or web address]. By joining [employer]’s FollowMyHealth portal, you will also be able to view your health information using other applications (apps) compatible with our system.

## 2022-09-03 NOTE — ED PROVIDER NOTE - PHYSICAL EXAMINATION
ATTENDING PHYSICAL EXAM  GEN - NAD; well appearing; A+O x3, speaking comfortably, RR 12-14reg.  O2 100%RA.  Noted periodic coughing.  HEAD - NC/AT; EYES/NOSE - PERRL, EOMI, mucous membranes moist, no discharge; THROAT: Oral cavity and pharynx normal. No inflammation, swelling, exudate, or lesions  NECK: Neck supple, non-tender without lymphadenopathy, no masses, no JVD  PULMONARY - CTA b/l, symmetric breath sounds, no w/r/r  CARDIAC -s1s2, RRR, no M,R,G  ABDOMEN - +NABS, ND, NT, soft, no guarding, no rebound, no masses   BACK - no CVA tenderness, No vertebral or paravertebral tenderness  EXTREMITIES - symmetric pulses, 2+ dp, capillary refill < 2 seconds, no clubbing, no cyanosis, no edema  NEUROLOGIC - alert, CN 2-12 intact, no focal deficits  PSYCH - insight and judgment nl, memory nl, affect nl, thought nl, denies SI/HI/AH/VH

## 2022-09-03 NOTE — ED PROVIDER NOTE - PROGRESS NOTE DETAILS
Patient re-assessed.  Feels much better.  Ambulating comfortably.  O2 remains 100%.  Results reviewed.  CXR wet-read entered.  SW assistance appreciated.

## 2022-09-03 NOTE — ED PROVIDER NOTE - CROS ED CARDIOVAS ALL NEG
ROOM # 223    Living Situation (if not independent, order SW consult): Memory care.  Facility name: [facility]  : Daughter Stacia.     Activity level at baseline: A1  Activity level on admit: A2 total care.       Patient registered to observation; given Patient Bill of Rights; given the opportunity to ask questions about observation status and their plan of care.  Patient has been oriented to the observation room, bathroom and call light is in place.    Discussed discharge goals and expectations with patient/family.            negative...

## 2022-09-03 NOTE — ED ADULT NURSE REASSESSMENT NOTE - NS ED NURSE REASSESS COMMENT FT1
AAOx4, no signs of distress, reports cough improved, pending x-ray results, fall precautions maintained

## 2022-09-03 NOTE — ED ADULT TRIAGE NOTE - ARRIVAL FROM
Detail Level: Zone Detail Level: Detailed Detail Level: Generalized Hospitals/Psychiatric Facilities

## 2022-09-03 NOTE — ED PROVIDER NOTE - CLINICAL SUMMARY MEDICAL DECISION MAKING FREE TEXT BOX
43 y/o F c/o cough x 1 week.  Will check CXR for pneumonia.  Normal RR and sats.  Will check UCG. 43 y/o F c/o cough x 1 week.  Will check CXR for pneumonia.  Normal RR and sats.  Will check UCG, covid.

## 2022-09-05 ENCOUNTER — EMERGENCY (EMERGENCY)
Facility: HOSPITAL | Age: 42
LOS: 1 days | Discharge: HOME | End: 2022-09-05
Attending: EMERGENCY MEDICINE | Admitting: EMERGENCY MEDICINE

## 2022-09-05 VITALS
DIASTOLIC BLOOD PRESSURE: 95 MMHG | RESPIRATION RATE: 18 BRPM | OXYGEN SATURATION: 100 % | TEMPERATURE: 98 F | SYSTOLIC BLOOD PRESSURE: 153 MMHG | HEART RATE: 98 BPM

## 2022-09-05 VITALS
OXYGEN SATURATION: 99 % | HEIGHT: 66 IN | SYSTOLIC BLOOD PRESSURE: 122 MMHG | RESPIRATION RATE: 18 BRPM | DIASTOLIC BLOOD PRESSURE: 86 MMHG | TEMPERATURE: 98 F | HEART RATE: 108 BPM

## 2022-09-05 DIAGNOSIS — F60.9 PERSONALITY DISORDER, UNSPECIFIED: ICD-10-CM

## 2022-09-05 DIAGNOSIS — F25.0 SCHIZOAFFECTIVE DISORDER, BIPOLAR TYPE: ICD-10-CM

## 2022-09-05 PROCEDURE — 90792 PSYCH DIAG EVAL W/MED SRVCS: CPT

## 2022-09-05 PROCEDURE — 99283 EMERGENCY DEPT VISIT LOW MDM: CPT

## 2022-09-05 NOTE — ED BEHAVIORAL HEALTH ASSESSMENT NOTE - HPI (INCLUDE ILLNESS QUALITY, SEVERITY, DURATION, TIMING, CONTEXT, MODIFYING FACTORS, ASSOCIATED SIGNS AND SYMPTOMS)
PATIENT IS WELL KNOWN TO WRITER WHO TREATED HER AS INPATIENT AT [facility] UNIT 2B IN SEPTEMBER OF 2016 & [facility] ED 2018: Patient is a single, 42-year-old  female, non caregiver, unemployed, on disability for mental illness, usually domiciled at psychiatric residences on [facility], currently at [facility], on AOT at this time, followed by [facility] ACT Team (phone [phone]; Orville Gorman [phone]); with past psychiatric history of most likely actual Schizoaffective Bipolar Type, Borderline Personality Disorder, Cannabis abuse, > 30 prior inpatient psychiatric hospitalizations most recently at [place] [address] from 1/10/18-1/26/18 (not taking her medications and engaging in highly disorganized behavior such as drinking out of the toilet), previously also at  from 11/6/17-12/21/17, has been back to [facility] ED twice since that time, with numerous prior visits to the [facility] ED (well-known to staff), 3 prior suicide attempts (last in 2012 via OD), recurrent suicidal gestures and suicidal ideation (none recently), history of property destruction when upset, long hx of sexually provocative behavior (both out in the community and while on inpatient units), no history of arrests or access to weapons, long hx of medication and treatment noncompliance who presents today from her [facility] residence after Patient went up to staff and said she feels "agitated and confused." Hence 911 was called and she was sent to the ED for evaluation.    COLLATERAL FROM [facility] on [facility] [phone]: talked to staff Mr Tobar who reported that Patient reported having suicidal thoughts to staff. She is on AOT and she has been medication complaint. EMS picked her up downstairs in the [facility] area so Mr Tobar did not know if she went willingly or not. PATIENT IS WELL KNOWN TO WRITER WHO TREATED HER AS INPATIENT AT [facility] UNIT 2B IN SEPTEMBER OF 2016 & [facility] ED 2018: Patient is a single, 42-year-old  female, non caregiver, unemployed, on disability for mental illness, usually domiciled at psychiatric residences on [facility], currently at [facility], on AOT at this time, followed by [facility] ACT Team (phone [phone]; Orville Gorman [phone]); with past psychiatric history of most likely actual Schizoaffective Bipolar Type, Borderline Personality Disorder, Polysubstance Abuse, > 30 prior inpatient psychiatric hospitalizations most recently at [facility] from 1/13/2021-4/7/2021 (Proloxin Dec 50mg IM Lithium 1350mg PO qhs, Zyprexa 15mg PO in am and 10mg PO qhs) with transfer to [facility] hospitalization ([place]) for highly disorganized behavior (smearing feces in the wall, eating feces, turning in a [place] over and over again, with myriad of ED visits including [facility], 3 prior suicide attempts (last in 2012 via OD), recurrent chronic suicidal gestures and suicidal ideation; history of property destruction when upset, long hx of sexually provocative behavior (both out in the community and while on inpatient units requiring 1;1 staff observation); hx of physical altercations, hx of verbal threats, hx of property destruction; long hx of medication and treatment noncompliance resulting in AOT and PALACIOS administration, who presents today from her residence for suicidal ideation.     COLLATERAL FROM [facility] on [facility] [phone]: talked to staff Mr Tobar who reported that Patient reported having suicidal thoughts to staff. She is on AOT and she has been medication complaint. EMS picked her up downstairs in the [facility] area so Mr Tobar did not know if she went willingly or not. PATIENT IS WELL KNOWN TO WRITER WHO TREATED HER AS INPATIENT AT [facility] UNIT 2B IN 2016 & [facility] ED 2018: Patient is a single, 42-year-old  female, non caregiver, unemployed, on disability for mental illness, usually domiciled at psychiatric residences on [facility], currently at [facility], on AOT at this time, followed by [facility] ACT Team (phone [phone]; Orville Fieldst [phone]); with past psychiatric history of most likely actual Schizoaffective Bipolar Type, Borderline Personality Disorder, Polysubstance Abuse, > 30 prior inpatient psychiatric hospitalizations most recently at [facility] from 2021-2021 (Proloxin Dec 50mg IM Lithium 1350mg PO qhs, Zyprexa 15mg PO in am and 10mg PO qhs) with transfer to [facility] hospitalization ([place]) for highly disorganized behavior (smearing feces in the wall, eating feces, turning in a [place] over and over again, with myriad of ED visits including [facility], 3 prior suicide attempts (last in  via OD), recurrent chronic suicidal gestures and suicidal ideation; history of property destruction when upset, long hx of sexually provocative behavior (both out in the community and while on inpatient units requiring 1;1 staff observation); hx of physical altercations, hx of verbal threats, hx of property destruction; long hx of medication and treatment noncompliance resulting in AOT and PALACIOS administration, who presents today from her residence for suicidal ideation.     COLLATERAL FROM [facility] on [facility] [phone]: talked to staff Mr Tobar who reported that Patient reported having suicidal thoughts to staff. She is on AOT and she has been medication complaint. EMS picked her up downstairs in the [facility] area so Mr Tobar did not know if she went willingly or not.    EXAM: Patient is calm, cooperative, polite and recognizes Writer. Patient maintains linear thought process during exam; slight latency which is residual, chronic sxs for Patient. She has been medication compliant and feels like her medications are helping. Denies adverse side effects - vast weight gain noted since 2016. Patient reports that she has been upset at her Case Coordinator "not helping me get a new ID card." Pt's [place]- [place] card  and she needs assistance renewing it online or by mail as she does not have Internet access on her own nor have a bank card/check for which to pay renewal fee with. Her Case Coordinator says her card was not working. (Of note, the [facility] does give money to Case Coordination team for basic needs such as clothing for State patients.) PATIENT IS WELL KNOWN TO WRITER WHO TREATED HER AS INPATIENT AT [facility] UNIT 2B IN 2016 & [facility] ED 2018: Patient is a single, 42-year-old  female, non caregiver, unemployed, on disability for mental illness, usually domiciled at psychiatric residences on [facility], currently at [facility], on AOT at this time, followed by [facility] ACT Team (phone [phone]; Orville Fieldst [phone]); with past psychiatric history of most likely actual Schizoaffective Bipolar Type, Borderline Personality Disorder, Polysubstance Abuse, > 30 prior inpatient psychiatric hospitalizations most recently at [facility] from 2021-2021 (Prolixin Dec 50mg IM Lithium 1350mg PO qhs, Zyprexa 15mg PO in am and 10mg PO qhs) with transfer to [facility] hospitalization ([place]) for highly disorganized behavior (smearing feces in the wall, eating feces, turning in a [place] over and over again, with myriad of ED visits including [facility], 3 prior suicide attempts (last in  via OD), recurrent chronic suicidal gestures and suicidal ideation; history of property destruction when upset, long hx of sexually provocative behavior (both out in the community and while on inpatient units requiring 1;1 staff observation); hx of physical altercations, hx of verbal threats, hx of property destruction; long hx of medication and treatment noncompliance resulting in AOT and PALACIOS administration, who presents today from her residence for suicidal ideation.     COLLATERAL FROM [facility] on [facility] [phone]: talked to staff Mr Tobar who reported that Patient reported having suicidal thoughts to staff. She is on AOT and she has been medication complaint. Patient has not been recently agitated or violent and has not engaged in any disorganized behavior. She is able to return to residence. EMS picked her up downstairs in the [facility] area so Mr Tobar did not know if she went willingly or not.     EXAM: Patient is calm, cooperative, polite and recognizes Writer. Patient maintains linear thought process during exam; slight latency which is residual, chronic sxs for Patient. She has been medication compliant and feels like her medications are helping. Denies adverse side effects - vast weight gain noted since 2016. Patient reports that she has been upset at her Case Coordinator "not helping me get a new ID card." Pt's non- [place] card  and she needs assistance renewing it online or by mail as she does not have Internet access on her own nor have a bank card/check for which to pay renewal fee with. Her Case Coordinator says her card was not working. (Of note, the [facility] does give money to Case Coordination team for basic needs such as clothing for State patients.) Patient was thus frustrated as she wants to go back to part-time work as she likes working and having money and she needs a valid, active ID card to get a job. Hence Patient felt distraught and endorsed suicidal ideation to her staff at her residence. Denied she had a well formulated plan.  Patient perked up when she saw Writer, started to chat and reported that she no longer felt suicidal. Patient also interested in [employer] as they provide vocational training and she heard good things about them. She last worked 3 months ago and misses it. Patient otherwise reports that she has been functioning at baseline, medication compliant and denies substance use. Patient endorses stable euthymic mood, regular sleep / appetite / energy level. Denies any symptoms of hypomania/trina/psychosis/major depression/ anxiety/panic. Denies any active or passive suicidal or homicidal ideation. Names protective factors (nestor; has future goals and plans; feels stable at this time; hope for future). No access to weapons.

## 2022-09-05 NOTE — ED BEHAVIORAL HEALTH ASSESSMENT NOTE - DESCRIPTION
GERD as per records,  Patient does not know much about her biological family, she attended some college in the past calm, cooperative, polite

## 2022-09-05 NOTE — ED PROVIDER NOTE - IV ALTEPLASE ADMIN OUTSIDE HIDDEN
Preoperative diagnosis: Fertility, desire for sterilization    Postoperative diagnosis: Same as above    Procedure performed: Laparoscopic tubal sterilization    Specimens removed: None    Anesthesia: General    Surgeon: Dr. Ronald Washington    Assistant: None    Estimated blood loss: 10 cc    Blood products: None    Grafts/implants: None    Complications: None    Description of the procedure:This patient was taken to the operating room, placed on the operating table in the supine position, and prepped and draped in usual fashion for laparoscopic surgery.  A Mayers catheter was placed in her bladder.  A Hulka tenaculum was placed in the uterus.  Following this, a 5 mm Optiview trocar was used to enter through the umbilicus.  Insufflation was accomplished, and we identified the fimbriated ends of the tubes on both sides, and we used bipolar cautery, to burn a 3 cm segment of fallopian tube, 3 cm from the uterus.    All fascial incisions over 8 mm were closed with Vicryl, and the rest of her incisions were closed, after evacuation of CO2, with subcuticular Vicryl and Steri-Strips.  All instruments and the catheter were removed and the patient was returned to the post anesthetic recovery room in satisfactory condition with clear urine.    Significant points about this procedure: Nothing unusual.  Her abdominal wall was thick and we had to go from a short trocar to a long trocar to get in.     show

## 2022-09-05 NOTE — ED BEHAVIORAL HEALTH ASSESSMENT NOTE - NSSUICPROTFACT_PSY_ALL_CORE
lives in supervised residence, AOT, on an PALACIOS/Identifies reasons for living/Positive therapeutic relationships

## 2022-09-05 NOTE — ED ADULT NURSE NOTE - OBJECTIVE STATEMENT
Pt requested to her staff to be sent to ed from [place] for SI. Pt is cooperative calm on arrival. talking, and laughing to herself. Admits to si, using drugs today (marijuana), AH, Denies any HI, VH, Etoh.

## 2022-09-05 NOTE — ED PROVIDER NOTE - MDM ORDERS SUBMITTED SELECTION
Problem: Communication  Goal: The ability to communicate needs accurately and effectively will improve  Outcome: PROGRESSING AS EXPECTED  Note: Plan of care discussed with patient, questions answered. Patient encouraged to verbalize feelings and concerns. Verbalized understanding.   Problem: Safety  Goal: Will remain free from injury  Outcome: PROGRESSING AS EXPECTED  Note: Call light in reach, bed in lowest and locked position, necessary belongings in reach. Patient educated on use of call light for assistance. Verbalized understanding.   Problem: Bowel/Gastric:  Goal: Normal bowel function is maintained or improved  Outcome: PROGRESSING AS EXPECTED  Problem: Knowledge Deficit  Goal: Knowledge of disease process/condition, treatment plan, diagnostic tests, and medications will improve  Outcome: PROGRESSING AS EXPECTED  Problem: Skin Integrity  Goal: Risk for impaired skin integrity will decrease  Outcome: PROGRESSING AS EXPECTED      Labs/Medications

## 2022-09-05 NOTE — ED BEHAVIORAL HEALTH ASSESSMENT NOTE - OTHER PAST PSYCHIATRIC HISTORY (INCLUDE DETAILS REGARDING ONSET, COURSE OF ILLNESS, INPATIENT/OUTPATIENT TREATMENT)
> 12 prior inpatient psychiatric admission to [facility] alone since 2011, lifetime inpatient admissions > 20   - > 31 prior [facility] ED visits alone  - 3 prior suicide attempts (last in 2012 via OD) as per records, recurrent suicidal gestures and suicidal ideation (none recently), history of property destruction ((breaking TV screens while hospitalized) when upset / acting out   - long hx of sexually provocative, acting out behaviors (during last [facility] inpatient admission, patient had to be placed on CO 1:1 after trying to perform oral sex on a male patient on the dayroom, taken off CO then was going into male patient's room, overheard offering them sex and was placed back on CO  - in 2017 was also followed by ACT Team [therapist Katie [phone], ACT ([facility])/ Victor Manuel Browning : [phone]; AOT /Grace Donnelly: [phone]; Psychiatrist / Dr. Flores: [phone], [phone], [phone]],
Yes

## 2022-09-05 NOTE — ED BEHAVIORAL HEALTH ASSESSMENT NOTE - SUMMARY
Patient well known to Writer with Schizoaffective Disorder, medication compliant, on AOT, presenting at baseline who made a suicidal statement out of frustration at her Case Coordinator not helping her renew her  ID card so she could apply for jobs. Denied suicidality in the ED and was happy to receive information in how to apply for an ID card, was given a letter by Writer on [employer] letterhead requesting exemption from time limits on ID card apps (proof as per American with Disabilities Act) as well as info about [employer] [place] office location/# and an application for their services printed out and all given to Patient in envelopes. Patient happily accepted and felt ready to return to her residence. + able to contract for safety and names protective factors, coping skills.

## 2022-09-05 NOTE — ED ADULT TRIAGE NOTE - CHIEF COMPLAINT QUOTE
brought in by ems from [facility] for suicidal ideation. hx attempts in the past. denies recent attempt. reports compliance with meds. pt laughing uncontrollably in triage.

## 2022-09-05 NOTE — ED BEHAVIORAL HEALTH ASSESSMENT NOTE - OTHER
peers on AOT, on an PALACIOS staff at [facility] higher end of fair appropriate slight latency (chronic); mild concreteness

## 2022-09-05 NOTE — ED BEHAVIORAL HEALTH NOTE - BEHAVIORAL HEALTH NOTE
DISCHARGE    Per Dr. Riley, Pt is clear for d/c back to their previous residence [address] [place] [address]: [address]   SW contacted the home tel#: [phone] and spoke w/ Erika Candelario who states that pt is safe to travel with SUPERVISION via [facility] back to group home. Verbal huddle re: coordiantion of care completed w/ interdisciplinary team.     MI arranged for transport via [employer] AT tel# [phone]  Spoke w/ Ailyn DOUGLASS who states pt will be picked up at 9:30PM for transportation to their group home.   CONFIRMATION#: [id number] DISCHARGE    Per Dr. Riley, Pt is clear for d/c back to their previous residence [address] [place] [address]: [place]. \[facility]""[address].   SW contacted the home tel#: [phone] and spoke w/ Erika Candelario who states that pt is safe to travel with SUPERVISION via [facility] back to group home. Verbal huddle re: coordination of care completed w/ interdisciplinary team.     MI arranged for transport via [facility] AT tel# [phone]  Spoke w/ Ailyn DOUGLASS who states pt will be picked up at 9:30PM for transportation to their group home.   CONFIRMATION#: [id number]

## 2022-09-05 NOTE — ED BEHAVIORAL HEALTH ASSESSMENT NOTE - VIOLENCE RISK FACTORS:
Substance abuse/Affective dysregulation/Impulsivity/Lack of insight into violence risk/need for treatment

## 2022-09-05 NOTE — ED ADULT NURSE NOTE - CHIEF COMPLAINT QUOTE
brought in by ems from [facility] for suicidal ideation. hx attempts in the past. denies recent attempt. reports compliance with meds. pt laughing uncontrollably in triage.

## 2022-09-05 NOTE — ED BEHAVIORAL HEALTH ASSESSMENT NOTE - THOUGHT ASSOCIATIONS
Contact Information: If you have any questions, concerns, pended studies, tests and/or procedures, or emergencies regarding your inpatient behavioral health visit  Please contact [facility] behavioral health unit [phone] and ask to speak to a , nurse or physician  A contact is available 24 hours/ 7 days a week at this number  Summary of Procedures Performed During your Stay:  Below is a list of major procedures performed during your hospital stay and a summary of results:  - Cardiac Procedures/Studies: ekg  Pending Studies (From admission, onward)    None        If studies are pending at discharge, follow up with your PCP and/or referring provider 
Normal

## 2022-09-05 NOTE — ED PROVIDER NOTE - OBJECTIVE STATEMENT
Attendinyo female presents with suicidal ideation.  pt is a resident of [facility] at [facility].  today was stating to staff that she did not want to live anymore and is having suicidal thoughts.  here states she is not thinking of living anymore but has no plan for suicide.  no fever or chills.  no change in meds recently.  pt states she smoked some marijuana today but denies other drug or alcohol use.

## 2022-09-05 NOTE — ED ADULT NURSE REASSESSMENT NOTE - NS ED NURSE REASSESS COMMENT FT1
alert no distress senior care here to  patient  patient is calm and cooperative  has no c/o NO CP dizziness or sob

## 2022-09-05 NOTE — ED BEHAVIORAL HEALTH ASSESSMENT NOTE - DETAILS
Poor response to Geodon; Depakote (Increased ammonia levels) history of property destruction ((breaking TV screens while hospitalized) when upset / acting out see HPI; hx of SAs; chronic intermittent suicidal ideation/gestures staff aware of ED presentation and DC back see above

## 2022-09-05 NOTE — ED BEHAVIORAL HEALTH ASSESSMENT NOTE - RISK ASSESSMENT
Patient currently at high risk for subsequent decompensation which includes aggression and violence when Patient is off medications for long periods of time. She is presenting with highly impulsive, disorganized behavior, engaging in bizarre behaviors that are also unsanitary (drinking out of toiler), poor self care/poor hygiene, ideas of reference she is acting on, impaired reality testing, poor insight and judgment, in the context of noncompliance. Patient is also high risk as she has a long hx of sexually provocative, acting out behaviors when decompensated and is very high risk to be taken advantage of, Hx of suicide of suicide attempts and aggression. Needs inpatient level of care at this. Chronic risk factors: extensive psychiatric hx; hx of substance abuse, Axis II; hx of noncompliance requiring AOT/PALACIOS; hx of SAs, SIB; hx of aggressive behavior. Protective factors: young; healthy; currently medication and treatment compliant; no recent SA/SIB/substance use, on AOT, on an PALACIOS, lives in supervised residence; access to health services. No acute risk factors identified Low Acute Suicide Risk

## 2022-09-06 NOTE — ED PROVIDER NOTE - GENITOURINARY EXTERNAL GENERAL. FEMALE
-- DO NOT REPLY / DO NOT REPLY ALL --  -- Message is from Engagement Center Operations (ECO) --    ONLY TO BE USED WITHIN A REFILL MEDICATION ENCOUNTER    Med Refill  Is the patient currently having Emergent symptoms?: No    Has patient contacted the pharmacy? No, direct patient to contact pharmacy first as they will be able to process the refill request directly    Is this the first request for the medication in the last 48 hours?: Yes    Patient is requesting a medication refill - medication is on active medication list    Patient is currently OUT of the requested medication - sent as HIGH priority      Full name of the provider who ordered the medication: DION RUIZ    Clinic site name / Account # for provider: [facility] - [address]     Preferred Pharmacy: Pharmacy  [id number] [place], [place] - [address] [address] [facility] [address]    Patient confirmed the above pharmacy as correct?  Yes      Caller Information       Type Contact Phone/Fax    09/06/2022 08:48 AM CDT Phone (Incoming) Sue Malin (Self) [phone] (M)          Alternative phone number: None    Can a detailed message be left?: Yes    Patient is completely out of medication: verify if patient is currently experiencing symptoms. If patient is symptomatic, proceed with front end triage instead of medication refill. If patient is not symptomatic but is completely out of medication, kevin as High priority when routing. Inform patient: “Please call back with any questions or concerns and if your condition becomes life threatening, you should seek immediate medical assistance by calling 911 or going to the Emergency Department for evaluation.”    Inform all patients: \"Please be aware the return phone call may come from an unidentified phone number and your refill request will be addressed as soon as the clinical team reviews your message.\"  
Medication name: HYDROcodone-acetaminophen (NORCO) 5-325 MG per tablet  Last Refill Details: Date 08/06/2022 # of tablets: 60, # of refills approved: 0   Ordering provider name: Fina Walker MD  Last office visit: 7/22/2022 with provider Fina Walker MD    Unable to refill protocol. Patient informed a note will be sent to provider for further review. Confirmed dose and pharmacy information.   
normal

## 2022-09-06 NOTE — ED BEHAVIORAL HEALTH NOTE - BEHAVIORAL HEALTH NOTE
high risk log:  writer called brie and spoke mayner - mhta- who states she is doing "ok". Writer encouraged pt to follow up icm worker.

## 2022-09-07 NOTE — ED PROVIDER NOTE - CROS ED NEURO ALL NEG
Subjective   Patient ID: Pranay is a 25 year old male.    Chief Complaint: Fistula; Follow Up    HPI  {History Review (Optional):95392::\"Patient's medications, allergies, past medical, surgical, social and family histories were reviewed and updated as appropriate.\"}    Review of Systems  Objective   Physical Exam  Assessment   {Assess/Plan SmartLinks (Optional):[id number]}   - - -

## 2022-09-08 ENCOUNTER — EMERGENCY (EMERGENCY)
Facility: HOSPITAL | Age: 42
LOS: 1 days | Discharge: HOME | End: 2022-09-08
Attending: EMERGENCY MEDICINE | Admitting: EMERGENCY MEDICINE

## 2022-09-08 VITALS
SYSTOLIC BLOOD PRESSURE: 154 MMHG | HEART RATE: 93 BPM | HEIGHT: 66 IN | DIASTOLIC BLOOD PRESSURE: 95 MMHG | TEMPERATURE: 97 F | RESPIRATION RATE: 18 BRPM

## 2022-09-08 VITALS — OXYGEN SATURATION: 100 %

## 2022-09-08 PROCEDURE — 99284 EMERGENCY DEPT VISIT MOD MDM: CPT | Mod: 25

## 2022-09-08 PROCEDURE — 93010 ELECTROCARDIOGRAM REPORT: CPT

## 2022-09-08 PROCEDURE — 71046 X-RAY EXAM CHEST 2 VIEWS: CPT | Mod: 26

## 2022-09-08 NOTE — ED PROVIDER NOTE - PHYSICAL EXAMINATION
CONSTITUTIONAL: Well-appearing; well-nourished; in no apparent distress;  HEAD: Normocephalic, atraumatic;  EYES: conjunctiva and sclera WNL;  ENT: normal nose; no rhinorrhea;   NECK/LYMPH: Supple;  CARD: Normal S1, S2; no murmurs, rubs, or gallops noted  RESP: Normal chest excursion with respiration; breath sounds clear and equal bilaterally; no wheezes, rhonchi, or rales noted. + reproducible chest wall midsternal ttp. no visible rash or ecchymoses.  ABD/GI: soft, non-distended; non-tender; no palpable organomegaly, no pulsatile mass  EXT/MS: moves all extremities; distal pulses are normal, no pedal edema  SKIN: Normal for age and race; warm; dry; good turgor; no apparent lesions or exudate noted  NEURO: Awake, alert, oriented x 3, no gross deficits, CN II-XII grossly intact, no motor or sensory deficit noted  PSYCH: Normal mood; appropriate affect

## 2022-09-08 NOTE — ED PROVIDER NOTE - NSFOLLOWUPINSTRUCTIONS_ED_ALL_ED_FT
You were seen in our Emergency Department for chest pain.  Continue your home medications.  Follow up with your PMD in 48-72 hours.   Follow up with your Cardiologist in 48-72 hours (see referral list provided) for further outpatient cardiac workup.  Bring copies of all paperwork/results to your doctor.  If you develop worsening pain, shortness of breath, dizziness, palpitations, rectal bleeding, numbness, tingling, weakness or any worsening symptoms return to our ED for evaluation.

## 2022-09-08 NOTE — ED PROVIDER NOTE - NSFOLLOWUPCLINICS_GEN_ALL_ED_FT
[facility]  Cardiology  [address] [place] [address]  Phone: [phone]  Fax:

## 2022-09-08 NOTE — ED ADULT NURSE REASSESSMENT NOTE - NS ED NURSE REASSESS COMMENT FT1
pt bought over from intake was immediately discharged  soon after  . pt was  told to wait for social work to arraigned transportation. Pt was seated in zone 1. Pt was then unable to be located in ER.

## 2022-09-08 NOTE — ED PROVIDER NOTE - OBJECTIVE STATEMENT
42yoF with PMH of GERD, HTN, bipolar d/o, depression, anxiety, presenting to ED w/ midsternal chest pain, sharp, starting around 2:30PM today. symptoms occurred while smoking her cigarette. admits to chronic dry cough. denies associated sob, nausea or diaphoresis. nonpleuritic, nonexertional. No prior cardiac workups. adopted - no known family hx. deneis recent travel, long car rides, leg swelling, ocp use, cancer hx or recent surgeries. pt denies palps, abd pain, n/v/d/c, weakness, fatigue, headcahes sore throat. 42yoF with PMH of GERD, HTN, bipolar d/o, depression, anxiety, presenting to ED w/ midsternal chest pain, sharp, starting around 2:30PM today. symptoms occurred while smoking her cigarette. admits to chronic dry cough. denies associated sob, nausea or diaphoresis. nonpleuritic, nonexertional. No prior cardiac workups. adopted - no known family hx. deneis recent travel, long car rides, leg swelling, ocp use, cancer hx or recent surgeries. pt denies palps, abd pain, n/v/d/c, weakness, fatigue, headaches sore throat.

## 2022-09-08 NOTE — ED PROVIDER NOTE - ATTENDING APP SHARED VISIT CONTRIBUTION OF CARE
Seen and examined, have discussed plan of care with PA. Pt. NAD at time of exam, had several hrs of CP at rest, EKG done during CP with no acute ischemic changes and similar to prev. Pt. has chronic cough and tob use, no recent illness, no fever/chills. No SOB at time of exam, CP resolved in ED. MMM, clear lungs, heart reg, abd soft, NT to palp, no CVAT. no edema, NT calves. Pt. would like to decline blood tests and return to her home. Amenable to CXR and repeat EKG. If repeat EKG unchanged, will dc home.

## 2022-09-08 NOTE — ED PROVIDER NOTE - CLINICAL SUMMARY MEDICAL DECISION MAKING FREE TEXT BOX
42yoF daily smoker, hx of BIpolar d/o anxiety, depression, GERD, p/w chest pain, midsternal, nonexertional, nonpleuritic, dry cough  well appearing, satting well on RA, ECG NSR nonischemic    will check xray to r/o PNA  repeat ECG  pt was offered blood work/trop, however declined as she dislikes needles.  reassess

## 2022-09-08 NOTE — ED PROVIDER NOTE - PROGRESS NOTE DETAILS
repeat ECG NSR no acute ischemic changes  CXR unremarkable  pt chest pain free  will d/c with outpatient cardio follow up. repeat ECG NSR no acute ischemic changes  CXR unremarkable  pt chest pain free  will d/c with outpatient cardio follow up.  pt from [facility], RN staff made aware to touch base w/ social work for transport back to [facility]. pt made aware that  will be providing transport.

## 2022-09-08 NOTE — ED ADULT TRIAGE NOTE - CHIEF COMPLAINT QUOTE
Pt c/o chest pain. From [facility], was smoking a cigarette and had sudden onset of chest pain. Received 324mg ASA by EMS. Denies SOB, nausea/vomiting. PMHx Bipolar, BPD, Asthma

## 2022-09-08 NOTE — ED PROVIDER NOTE - PATIENT PORTAL LINK FT
You can access the FollowMyHealth Patient Portal offered by [facility] by registering at the following website: [email or web address]. By joining [employer]’s FollowMyHealth portal, you will also be able to view your health information using other applications (apps) compatible with our system.

## 2022-09-08 NOTE — PROVIDER CONTACT NOTE (OTHER) - ASSESSMENT
Pt was discharged earlier, Per RN she did not wait for transportation, walked out. I called [place] [id number] , 11 th floor, gandhi B [phone], spoke with Rosalva Counsellor and was told pt arrived back in to her room at 8:36 PM, per Rosalva pt is returned safely with no reported incidents. Notified Vane YOUNGBLOOD/Mgr.

## 2022-09-10 ENCOUNTER — EMERGENCY (EMERGENCY)
Facility: HOSPITAL | Age: 42
LOS: 1 days | Discharge: HOME | End: 2022-09-10
Attending: STUDENT IN AN ORGANIZED HEALTH CARE EDUCATION/TRAINING PROGRAM | Admitting: EMERGENCY MEDICINE

## 2022-09-10 VITALS
OXYGEN SATURATION: 100 % | SYSTOLIC BLOOD PRESSURE: 143 MMHG | RESPIRATION RATE: 16 BRPM | HEART RATE: 87 BPM | DIASTOLIC BLOOD PRESSURE: 87 MMHG | HEIGHT: 66 IN | TEMPERATURE: 98 F

## 2022-09-10 LAB
ALBUMIN SERPL ELPH-MCNC: 3.8 G/DL — SIGNIFICANT CHANGE UP (ref 3.3–5)
ALP SERPL-CCNC: 121 U/L — HIGH (ref 40–120)
ALT FLD-CCNC: 25 U/L — SIGNIFICANT CHANGE UP (ref 4–33)
AMPHET UR-MCNC: NEGATIVE — SIGNIFICANT CHANGE UP
ANION GAP SERPL CALC-SCNC: 8 MMOL/L — SIGNIFICANT CHANGE UP (ref 7–14)
APAP SERPL-MCNC: <10 UG/ML — LOW (ref 15–25)
APPEARANCE UR: CLEAR — SIGNIFICANT CHANGE UP
AST SERPL-CCNC: 22 U/L — SIGNIFICANT CHANGE UP (ref 4–32)
B PERT DNA SPEC QL NAA+PROBE: SIGNIFICANT CHANGE UP
B PERT+PARAPERT DNA PNL SPEC NAA+PROBE: SIGNIFICANT CHANGE UP
BACTERIA # UR AUTO: ABNORMAL
BARBITURATES UR SCN-MCNC: NEGATIVE — SIGNIFICANT CHANGE UP
BASOPHILS # BLD AUTO: 0.03 K/UL — SIGNIFICANT CHANGE UP (ref 0–0.2)
BASOPHILS NFR BLD AUTO: 0.3 % — SIGNIFICANT CHANGE UP (ref 0–2)
BENZODIAZ UR-MCNC: NEGATIVE — SIGNIFICANT CHANGE UP
BILIRUB SERPL-MCNC: <0.2 MG/DL — SIGNIFICANT CHANGE UP (ref 0.2–1.2)
BILIRUB UR-MCNC: NEGATIVE — SIGNIFICANT CHANGE UP
BORDETELLA PARAPERTUSSIS (RAPRVP): SIGNIFICANT CHANGE UP
BUN SERPL-MCNC: 12 MG/DL — SIGNIFICANT CHANGE UP (ref 7–23)
C PNEUM DNA SPEC QL NAA+PROBE: SIGNIFICANT CHANGE UP
CALCIUM SERPL-MCNC: 9.2 MG/DL — SIGNIFICANT CHANGE UP (ref 8.4–10.5)
CHLORIDE SERPL-SCNC: 106 MMOL/L — SIGNIFICANT CHANGE UP (ref 98–107)
CO2 SERPL-SCNC: 26 MMOL/L — SIGNIFICANT CHANGE UP (ref 22–31)
COCAINE METAB.OTHER UR-MCNC: NEGATIVE — SIGNIFICANT CHANGE UP
COLOR SPEC: YELLOW — SIGNIFICANT CHANGE UP
CREAT SERPL-MCNC: 0.88 MG/DL — SIGNIFICANT CHANGE UP (ref 0.5–1.3)
CREATININE URINE RESULT, DAU: 250 MG/DL — SIGNIFICANT CHANGE UP
DIFF PNL FLD: NEGATIVE — SIGNIFICANT CHANGE UP
EGFR: 83 ML/MIN/1.73M2 — SIGNIFICANT CHANGE UP
EOSINOPHIL # BLD AUTO: 0.22 K/UL — SIGNIFICANT CHANGE UP (ref 0–0.5)
EOSINOPHIL NFR BLD AUTO: 2.4 % — SIGNIFICANT CHANGE UP (ref 0–6)
EPI CELLS # UR: 13 /HPF — HIGH (ref 0–5)
ETHANOL SERPL-MCNC: <10 MG/DL — SIGNIFICANT CHANGE UP
FLUAV SUBTYP SPEC NAA+PROBE: SIGNIFICANT CHANGE UP
FLUBV RNA SPEC QL NAA+PROBE: SIGNIFICANT CHANGE UP
GLUCOSE SERPL-MCNC: 98 MG/DL — SIGNIFICANT CHANGE UP (ref 70–99)
GLUCOSE UR QL: NEGATIVE — SIGNIFICANT CHANGE UP
HADV DNA SPEC QL NAA+PROBE: SIGNIFICANT CHANGE UP
HCG SERPL-ACNC: <5 MIU/ML — SIGNIFICANT CHANGE UP
HCOV 229E RNA SPEC QL NAA+PROBE: SIGNIFICANT CHANGE UP
HCOV HKU1 RNA SPEC QL NAA+PROBE: SIGNIFICANT CHANGE UP
HCOV NL63 RNA SPEC QL NAA+PROBE: SIGNIFICANT CHANGE UP
HCOV OC43 RNA SPEC QL NAA+PROBE: SIGNIFICANT CHANGE UP
HCT VFR BLD CALC: 40.1 % — SIGNIFICANT CHANGE UP (ref 34.5–45)
HGB BLD-MCNC: 12.1 G/DL — SIGNIFICANT CHANGE UP (ref 11.5–15.5)
HMPV RNA SPEC QL NAA+PROBE: SIGNIFICANT CHANGE UP
HPIV1 RNA SPEC QL NAA+PROBE: SIGNIFICANT CHANGE UP
HPIV2 RNA SPEC QL NAA+PROBE: SIGNIFICANT CHANGE UP
HPIV3 RNA SPEC QL NAA+PROBE: SIGNIFICANT CHANGE UP
HPIV4 RNA SPEC QL NAA+PROBE: SIGNIFICANT CHANGE UP
HYALINE CASTS # UR AUTO: 2 /LPF — SIGNIFICANT CHANGE UP (ref 0–7)
IANC: 4.98 K/UL — SIGNIFICANT CHANGE UP (ref 1.8–7.4)
IMM GRANULOCYTES NFR BLD AUTO: 0.2 % — SIGNIFICANT CHANGE UP (ref 0–1.5)
KETONES UR-MCNC: NEGATIVE — SIGNIFICANT CHANGE UP
LEUKOCYTE ESTERASE UR-ACNC: ABNORMAL
LYMPHOCYTES # BLD AUTO: 2.98 K/UL — SIGNIFICANT CHANGE UP (ref 1–3.3)
LYMPHOCYTES # BLD AUTO: 33 % — SIGNIFICANT CHANGE UP (ref 13–44)
M PNEUMO DNA SPEC QL NAA+PROBE: SIGNIFICANT CHANGE UP
MCHC RBC-ENTMCNC: 27.7 PG — SIGNIFICANT CHANGE UP (ref 27–34)
MCHC RBC-ENTMCNC: 30.2 GM/DL — LOW (ref 32–36)
MCV RBC AUTO: 91.8 FL — SIGNIFICANT CHANGE UP (ref 80–100)
METHADONE UR-MCNC: NEGATIVE — SIGNIFICANT CHANGE UP
MONOCYTES # BLD AUTO: 0.81 K/UL — SIGNIFICANT CHANGE UP (ref 0–0.9)
MONOCYTES NFR BLD AUTO: 9 % — SIGNIFICANT CHANGE UP (ref 2–14)
NEUTROPHILS # BLD AUTO: 4.98 K/UL — SIGNIFICANT CHANGE UP (ref 1.8–7.4)
NEUTROPHILS NFR BLD AUTO: 55.1 % — SIGNIFICANT CHANGE UP (ref 43–77)
NITRITE UR-MCNC: NEGATIVE — SIGNIFICANT CHANGE UP
NRBC # BLD: 0 /100 WBCS — SIGNIFICANT CHANGE UP (ref 0–0)
NRBC # FLD: 0 K/UL — SIGNIFICANT CHANGE UP (ref 0–0)
OPIATES UR-MCNC: NEGATIVE — SIGNIFICANT CHANGE UP
OXYCODONE UR-MCNC: NEGATIVE — SIGNIFICANT CHANGE UP
PCP SPEC-MCNC: SIGNIFICANT CHANGE UP
PCP UR-MCNC: NEGATIVE — SIGNIFICANT CHANGE UP
PH UR: 6.5 — SIGNIFICANT CHANGE UP (ref 5–8)
PLATELET # BLD AUTO: 291 K/UL — SIGNIFICANT CHANGE UP (ref 150–400)
POTASSIUM SERPL-MCNC: 4.3 MMOL/L — SIGNIFICANT CHANGE UP (ref 3.5–5.3)
POTASSIUM SERPL-SCNC: 4.3 MMOL/L — SIGNIFICANT CHANGE UP (ref 3.5–5.3)
PROT SERPL-MCNC: 7.1 G/DL — SIGNIFICANT CHANGE UP (ref 6–8.3)
PROT UR-MCNC: ABNORMAL
RAPID RVP RESULT: SIGNIFICANT CHANGE UP
RBC # BLD: 4.37 M/UL — SIGNIFICANT CHANGE UP (ref 3.8–5.2)
RBC # FLD: 13.9 % — SIGNIFICANT CHANGE UP (ref 10.3–14.5)
RBC CASTS # UR COMP ASSIST: 2 /HPF — SIGNIFICANT CHANGE UP (ref 0–4)
RSV RNA SPEC QL NAA+PROBE: SIGNIFICANT CHANGE UP
RV+EV RNA SPEC QL NAA+PROBE: SIGNIFICANT CHANGE UP
SALICYLATES SERPL-MCNC: <0.3 MG/DL — LOW (ref 15–30)
SARS-COV-2 RNA SPEC QL NAA+PROBE: SIGNIFICANT CHANGE UP
SODIUM SERPL-SCNC: 140 MMOL/L — SIGNIFICANT CHANGE UP (ref 135–145)
SP GR SPEC: 1.03 — SIGNIFICANT CHANGE UP (ref 1.01–1.05)
THC UR QL: NEGATIVE — SIGNIFICANT CHANGE UP
TOXICOLOGY SCREEN, DRUGS OF ABUSE, SERUM RESULT: SIGNIFICANT CHANGE UP
TSH SERPL-MCNC: 1.9 UIU/ML — SIGNIFICANT CHANGE UP (ref 0.27–4.2)
UROBILINOGEN FLD QL: SIGNIFICANT CHANGE UP
WBC # BLD: 9.04 K/UL — SIGNIFICANT CHANGE UP (ref 3.8–10.5)
WBC # FLD AUTO: 9.04 K/UL — SIGNIFICANT CHANGE UP (ref 3.8–10.5)
WBC UR QL: 4 /HPF — SIGNIFICANT CHANGE UP (ref 0–5)

## 2022-09-10 PROCEDURE — 90792 PSYCH DIAG EVAL W/MED SRVCS: CPT

## 2022-09-10 PROCEDURE — 93010 ELECTROCARDIOGRAM REPORT: CPT

## 2022-09-10 PROCEDURE — 99285 EMERGENCY DEPT VISIT HI MDM: CPT | Mod: 25

## 2022-09-10 RX ORDER — QUETIAPINE FUMARATE 200 MG/1
200 TABLET, FILM COATED ORAL AT BEDTIME
Refills: 0 | Status: DISCONTINUED | OUTPATIENT
Start: 2022-09-10 | End: 2022-09-14

## 2022-09-10 RX ORDER — HALOPERIDOL DECANOATE 100 MG/ML
10 INJECTION INTRAMUSCULAR AT BEDTIME
Refills: 0 | Status: DISCONTINUED | OUTPATIENT
Start: 2022-09-10 | End: 2022-09-14

## 2022-09-10 RX ADMIN — HALOPERIDOL DECANOATE 10 MILLIGRAM(S): 100 INJECTION INTRAMUSCULAR at 22:00

## 2022-09-10 NOTE — ED BEHAVIORAL HEALTH ASSESSMENT NOTE - OTHER PAST PSYCHIATRIC HISTORY (INCLUDE DETAILS REGARDING ONSET, COURSE OF ILLNESS, INPATIENT/OUTPATIENT TREATMENT)
> 12 prior inpatient psychiatric admission to [facility] alone since 2011, lifetime inpatient admissions > 20   - > 31 prior [facility] ED visits alone  - 3 prior suicide attempts (last in 2012 via OD) as per records, recurrent suicidal gestures and suicidal ideation (none recently), history of property destruction ((breaking TV screens while hospitalized) when upset / acting out   - long hx of sexually provocative, acting out behaviors (during last [facility] inpatient admission, patient had to be placed on CO 1:1 after trying to perform oral sex on a male patient on the dayroom, taken off CO then was going into male patient's room, overheard offering them sex and was placed back on CO  - in 2017 was also followed by ACT Team [therapist Katie [phone], ACT ([facility])/ Victor Manuel Browning : [phone]; AOT /Grace Donnelly: [phone]; Psychiatrist / Dr. Flores: [phone], [phone], [phone]],

## 2022-09-10 NOTE — ED BEHAVIORAL HEALTH ASSESSMENT NOTE - OTHER
appropriate expansive, at times coquettish slight latency (chronic); mild concreteness peers staff at [facility] elated "suicidal" on AOT at times flirtatious low end of fair to fair

## 2022-09-10 NOTE — ED ADULT NURSE NOTE - OBJECTIVE STATEMENT
41 y/o F arrives to  area c/o SI, when asked for plan, states "I can't really say." From [facility] [address]. PMH: bipolar disorder, schizophrenia, borderline personality disorder, GERD, asthma. A&Ox4, ambulatory, calm/cooperative, neg SOB, denies CP, neg N/V/D/recent illness. Denies HI, denies A/V/T hallucinations. Denies ETOH/drug use. Safety maintained. Will continue to monitor.

## 2022-09-10 NOTE — ED PROVIDER NOTE - OBJECTIVE STATEMENT
41 y/o F BIBA from [facility] w c/o worsening depression and suicidal  ideations to overdose on pills. 43 y/o F hx Bipolar, BPD BIBA from [facility] w c/o worsening depression and suicidal  ideations to overdose on pills. Admits to multiple social stressors and inability to cope. Denies VH/AH/HI. Denies pain, SOB, fever, chills, chest, abdominal discomfort.  Denies falling, punching or kicking any objects. Denies use of alcohol or illicit.  No evidence of physical, injuries, broken skin or deformities.

## 2022-09-10 NOTE — ED PROVIDER NOTE - NSFOLLOWUPINSTRUCTIONS_ED_ALL_ED_FT
Continue taking any prescribed medications you were taking before arriving at the ER.    Return to the ER if you feel like hurting yourself or someone else.    If you need psychiatric treatment and/or connection to a community Psychiatrist, follow up at the [facility]: Monday - Friday between 9 AM - 3 PM at [phone] [place] [address], [phone].

## 2022-09-10 NOTE — ED BEHAVIORAL HEALTH ASSESSMENT NOTE - PSYCHIATRIC ISSUES AND PLAN (INCLUDE STANDING AND PRN MEDICATION)
Patient insists that she is on monthly haldol PALACIOS - and she is due for 200mg IM haldol decanoate this upcoming Tuesday which she gets at the [facility]. She asked if she could get it earlier here and she feels like she needs it. However, haldol dec is NOT listed on her current med list at the residence nor on the list she was sent with. Otherwise, Patient is on a suboptimal regimen which would not be enough to target her Schizoaffective Disorder

## 2022-09-10 NOTE — ED ADULT NURSE NOTE - PRO INTERPRETER NEED 2
Verbal - The patient responds to verbal stimuli by opening their eyes when someone speaks to them. The patient is not fully oriented to time, place, or person. English

## 2022-09-10 NOTE — ED BEHAVIORAL HEALTH ASSESSMENT NOTE - RISK ASSESSMENT
Chronic risk factors: extensive psychiatric hx; hx of substance abuse, Axis II; hx of noncompliance requiring AOT/PALACIOS; hx of SAs, SIB; hx of aggressive behavior. Protective factors: young; healthy; currently medication and treatment compliant; no recent SA/SIB/substance use, on AOT, on an PALACIOS, lives in supervised residence; access to health services. No acute risk factors identified Low Acute Suicide Risk Chronic risk factors: extensive psychiatric hx; hx of substance abuse, Axis II; hx of noncompliance requiring AOT/PALACIOS; hx of SAs, SIB; hx of aggressive behavior. Protective factors: young; healthy; currently medication and treatment compliant; no recent SA/SIB/substance use, on AOT, on an PALACIOS, lives in supervised residence; access to health services.

## 2022-09-10 NOTE — ED PROVIDER NOTE - PATIENT PORTAL LINK FT
You can access the FollowMyHealth Patient Portal offered by [facility] by registering at the following website: [email or web address]. By joining [employer]’s FollowMyHealth portal, you will also be able to view your health information using other applications (apps) compatible with our system.

## 2022-09-10 NOTE — ED ADULT TRIAGE NOTE - CHIEF COMPLAINT QUOTE
pt from [facility] pt verbalized wanting to kill herself/  pt calm at triage  states I want to talk to some one

## 2022-09-10 NOTE — ED BEHAVIORAL HEALTH ASSESSMENT NOTE - DETAILS
see HPI; hx of SAs; chronic intermittent suicidal ideation/gestures history of property destruction ((breaking TV screens while hospitalized) when upset / acting out Poor response to Geodon; Depakote (Increased ammonia levels) staff at residence aware of ED presentation ED NP aware adviced to call 911 or come to the nearest ED should symptoms worsen; have increasing bouts of agitation/aggressive behavior; having SI/HI; or call [phone]

## 2022-09-10 NOTE — ED BEHAVIORAL HEALTH ASSESSMENT NOTE - DESCRIPTION
as per records,  Patient does not know much about her biological family, she attended some college in the past calm, cooperative, polite GERD

## 2022-09-10 NOTE — ED BEHAVIORAL HEALTH ASSESSMENT NOTE - REASON
Patient wants to come in voluntarily but there are no beds. Hold in ED - haldol 10mg PO qhs for tonight; already took her other meds earlier.

## 2022-09-10 NOTE — ED BEHAVIORAL HEALTH ASSESSMENT NOTE - PREFERRED LANGUAGE
Appointment scheduled.   
Patient calling she made an online apt. But it didn't show on our records for today  but it did on her asking if you can call her back with an apt. Thank you.  
English

## 2022-09-10 NOTE — ED BEHAVIORAL HEALTH ASSESSMENT NOTE - SUMMARY
Patient well known to Writer with Schizoaffective Disorder, medication compliant, on AOT, last seen by Writer on Monday, presents again at Patient well known to Writer with Schizoaffective Disorder, medication compliant, on AOT, last seen by Writer on Monday, presents again  with some changes in affect, engaging more oddle, some elation/lability and suicidal ideation without plan (though this is chronic). MAIN ISSUE: Patient insists that she is on monthly haldol PALACIOS - and she is due for 200mg IM haldol decanoate this upcoming Tuesday which she gets [facility]. She asked if she could get it earlier here and she feels like she needs it. However, haldol dec is NOT listed on her current med list at the residence nor on the list she was sent with. Otherwise, Patient is on a suboptimal regimen which would not be enough to target her Schizoaffective Disorder - quetiapine 200mg PO qhs, trileptal / oxcarbazepine 300mg PO bid. Patient wants to come in voluntarily but there are no beds. Hold in ED - haldol 10mg PO qhs for tonight; already took her other meds earlier.

## 2022-09-10 NOTE — ED BEHAVIORAL HEALTH ASSESSMENT NOTE - HPI (INCLUDE ILLNESS QUALITY, SEVERITY, DURATION, TIMING, CONTEXT, MODIFYING FACTORS, ASSOCIATED SIGNS AND SYMPTOMS)
PATIENT IS WELL KNOWN TO WRITER WHO TREATED HER AS INPATIENT AT [facility] IN SEPTEMBER OF 2016 & [facility] ED 2018: Patient is a single, 42-year-old  female, non caregiver, unemployed, on disability for mental illness, usually domiciled at psychiatric residences on [facility], currently at [facility], on AOT at this time, followed by [facility] ACT Team (phone [phone]; [place] [phone]); with past psychiatric history of most likely actual Schizoaffective Bipolar Type, Borderline Personality Disorder, Polysubstance Abuse, > 30 prior inpatient psychiatric hospitalizations most recently at [facility] from 1/13/2021-4/7/2021 (Prolixin Dec 50mg IM Lithium 1350mg PO qhs, Zyprexa 15mg PO in am and 10mg PO qhs) with transfer to [facility] hospitalization ([place]) for highly disorganized behavior (smearing feces in the wall, eating feces, turning in a [place] over and over again, with myriad of ED visits including [facility], 3 prior suicide attempts (last in 2012 via OD), recurrent chronic suicidal gestures and suicidal ideation; history of property destruction when upset, long hx of sexually provocative behavior (both out in the community and while on inpatient units requiring 1;1 staff observation); hx of physical altercations, hx of verbal threats, hx of property destruction; long hx of medication and treatment noncompliance resulting in AOT and PALACIOS administration, last seen by Writer < 6 days prior, who presents today from her residence for suicidal ideation.     COLLATERAL FROM [facility] on [facility] [phone]: talked to staff Ms Blackwell who states that patient reported to staff today that she was suicidal. They helped her implement coping skills such as going back to her room to listen to music, deep breathing. Patient then returned and said that she was not feeling better so staff called 911 as per protocol. Patient otherwise has been her usual self, no aggression or violence. No specific events, triggers that happened as far as staff knows. Her Psychiatrist is Vlad Garcia MD.      CURRENT MEDICATIONS as per Ms Montesa: **NOT currently on haldol decanoate IM monthly injection, quetiapine 200mg PO qhs, trileptal / oxcarbazepine 300mg PO bid, Orlistat 60mg PO qd with food, claritin 10mg po qd, colace 100mg Po qhs, senna 8.6mg 2 tabs PO qhs, pseudoephedrine 30mg PO qd, Metamucil PO qd     EXAM: Patient is calm, cooperative, polite and recognizes Writer again. Went to  office this week and is awaiting to get her new social security card which she then will take to [facility] to get her ID renewed. Then going to [employer] with her new ID to apply for a job and training. + affect more off then when last seen, wide stare, flirtatious and elated. Reports that she "gave away" all her clothing to her roommate today, did not accept any money for it and has not real explanation to why she did that. + reports not sleeping well this week. States she came to the ED to "see you" (Writer) and was coqeuttish. + states that she is due for her haldol decanoate 200mg IM monthly shot this upcoming Tuesday and asked if she can get it earlier. Patient thinks she is going into "another episode." She reports compliance with medications. Feels unwell and maintains that she would like to end her life thought no specific plan.

## 2022-09-10 NOTE — ED BEHAVIORAL HEALTH ASSESSMENT NOTE - CURRENT MEDICATION
**NOT currently on haldol decanoate IM monthly injection as per staff at residence (not on list), quetiapine 200mg PO qhs, trileptal / oxcarbazepine 300mg PO bid, Orlistat 60mg PO qd with food, claritin 10mg po qd, colace 100mg Po qhs, senna 8.6mg 2 tabs PO qhs, pseudoephedrine 30mg PO qd, Metamucil PO qd **Patient insists that she is on monthly haldol PALACIOS - not on her current med rec at the residence nor list she was sent with - due on Tuesday as per Pt; quetiapine 200mg PO qhs, trileptal / oxcarbazepine 300mg PO bid, Orlistat 60mg PO qd with food, claritin 10mg po qd, colace 100mg Po qhs, senna 8.6mg 2 tabs PO qhs, pseudoephedrine 30mg PO qd, Metamucil PO qd

## 2022-09-10 NOTE — ED BEHAVIORAL HEALTH ASSESSMENT NOTE - NS ED BHA PLAN TR MEDICATIONS2 FT
continue all prescribed meds: quetiapine 200mg PO qhs, trileptal 300mg PO bid, Orlistat 60mg PO qd with food, claritin 10mg po qd, colace 100mg Po qhs, senna 8.6mg 2 tabs PO qhs, pseudoephedrine 30mg PO qd, Metamucil PO qd

## 2022-09-10 NOTE — ED BEHAVIORAL HEALTH ASSESSMENT NOTE - PAST PSYCHOTROPIC MEDICATION
Lithium 600mg BID, Zyprexa 20mg BID Klonopin 0.5mg BID; Prolixin 5mg BID x 2 weeks Prolixin Decanoate 12.5mg IM Q2 weeks; Haldol, risperdal, Abilify, Geodon, thorazine, depakote, zyprexa

## 2022-09-11 VITALS
DIASTOLIC BLOOD PRESSURE: 86 MMHG | SYSTOLIC BLOOD PRESSURE: 139 MMHG | RESPIRATION RATE: 16 BRPM | HEART RATE: 82 BPM | OXYGEN SATURATION: 100 %

## 2022-09-11 DIAGNOSIS — F60.3 BORDERLINE PERSONALITY DISORDER: ICD-10-CM

## 2022-09-11 LAB
COVID-19 SPIKE DOMAIN AB INTERP: POSITIVE
COVID-19 SPIKE DOMAIN ANTIBODY RESULT: >250 U/ML — HIGH
SARS-COV-2 IGG+IGM SERPL QL IA: >250 U/ML — HIGH
SARS-COV-2 IGG+IGM SERPL QL IA: POSITIVE

## 2022-09-11 NOTE — BH CONSULTATION LIAISON PROGRESS NOTE - NSBHCHARTREVIEWLAB_PSY_A_CORE FT
LABS:                        12.1   9.04  )-----------( 291      ( 10 Sep 2022 20:12 )             40.1     10 Sep 2022 20:12    140    |  106    |  12     ----------------------------<  98     4.3     |  26     |  0.88     Ca    9.2        10 Sep 2022 20:12    TPro  7.1    /  Alb  3.8    /  TBili  <0.2   /  DBili  x      /  AST  22     /  ALT  25     /  AlkPhos  121    10 Sep 2022 20:12    BAL < 10; tox negative    COVID: negative; Ab +

## 2022-09-11 NOTE — BH CONSULTATION LIAISON PROGRESS NOTE - NSBHCHARTREVIEWVS_PSY_A_CORE FT
Vital Signs Last 24 Hrs  T(C): 36.8 (11 Sep 2022 06:30), Max: 36.8 (11 Sep 2022 03:21)  T(F): 98.2 (11 Sep 2022 06:30), Max: 98.3 (11 Sep 2022 03:21)  HR: 72 (11 Sep 2022 06:30) (72 - 87)  BP: 132/79 (11 Sep 2022 06:30) (123/70 - 143/87)  BP(mean): --  RR: 16 (11 Sep 2022 06:30) (16 - 16)  SpO2: 100% (11 Sep 2022 06:30) (100% - 100%)    Parameters below as of 11 Sep 2022 06:30  Patient On (Oxygen Delivery Method): room air

## 2022-09-11 NOTE — BH CONSULTATION LIAISON PROGRESS NOTE - CURRENT MEDICATION
MEDICATIONS  (STANDING):  haloperidol     Tablet 10 milliGRAM(s) Oral at bedtime  QUEtiapine 200 milliGRAM(s) Oral at bedtime    MEDICATIONS  (PRN):

## 2022-09-11 NOTE — BH CONSULTATION LIAISON PROGRESS NOTE - LEVEL OF CONSCIOUSNESS
----- Message from MINDY Oro Alert

## 2022-09-11 NOTE — BH CONSULTATION LIAISON PROGRESS NOTE - NSBHASSESSMENTFT_PSY_ALL_CORE
42/F with hx of SAD and borderline personality disorder, with hx of noncompliance to meds and aftercare; currently on AOT/ ACT; with hx of Polysubstance Abuse, and > 30 prior in-Pt psych admissions.  she has a hx of 3 prior suicide attempts (last in 2012 via OD) with recurrent chronic suicidal gestures and suicidal ideation.  Pt has hx of property destruction when dysregulated; hx of physical altercations and making verbal threats as well as with long hx of sexually provocative behavior.  Was seen yesterday for suicidal ideation.     at this time, is not harboring any passive or active SI or HI. she is not manifesting any signs/ symptoms suggestive of severe depression; whilst did endorse feeling anxious, nothing to suggest that said symptoms have been worsening. Pt is not acutely manic nor floridly psychotic. is not acutely intoxicated (has past hx of polysubstance use) nor in withdrawal.  she is not delirious. Pt has rescinded request for voluntary psych admission. at this time, there is no justification to pursue involuntary psych admission for this Pt. hence, she will be discharged back to the community where she will continue to be followed up by her BHPs. outreach was made to Pt's Psych residence.. spoke with Mr Resendez who reported that in the past and recently, the Pt has been resorting to these "attention seeking behaviors" like verbalizing SI; ending up being sent to the ED then getting discharged subsequently.        RECOMMENDATIONS:   1. Psychoeducation provided.  Encouraged follow up with OP psych services.  No indication for emergent psych meds changes at this time. reinforced need for continued compliance with current psych meds as well as the importance of PALACIOS (if she is on int).  also discussed role of psychotherapy.  Lastly, Discussed impact of polysubstance use on her health and well being as well as the importance of sobriety.     2. Emergency protocol reviewed.  Safety plan completed with Pt using the [place] Safety Plan.  Pt and Mr Resendez were adviced to call 911 or come to the nearest ED should symptoms worsen; have increasing bouts of agitation/aggressive behavior; having SI/HI; or call [phone]    3. follow up with psychiatrist this coming Tuesday

## 2022-09-11 NOTE — ED BEHAVIORAL HEALTH NOTE - BEHAVIORAL HEALTH NOTE
Patient is a single, 42-year-old  female, non caregiver, unemployed, on disability for mental illness, usually domiciled at psychiatric residences on [place] grounds, currently at [facility], on AOT at this time, followed by [facility] (phone [phone]; Orville Gorman [phone]) in the past; with past psychiatric history of most likely actual Schizoaffective Bipolar Type, Borderline Personality Disorder, Polysubstance Abuse, > 30 prior inpatient psychiatric hospitalizations most recently at [facility] from 1/13/2021-4/7/2021, with myriad of ED visits including [facility], 3 prior suicide attempts (last in 2012 via OD), recurrent chronic suicidal gestures and suicidal ideation; history of property destruction when upset, long hx of sexually provocative behavior (both out in the community and while on inpatient units requiring 1;1 staff observation); hx of physical altercations, hx of verbal threats, hx of property destruction; long hx of medication and treatment noncompliance resulting in AOT and PALACIOS administration, who presents today from her residence for suicidal ideation.     Pt. was evaluated by Dr. Riley yesterday and was asked for reevaluation.  Pt. was seen this morning. When asked about SI , pt. continue to endorse suicidal ideations with no specific plan. Pt. has a hx of SA in the past. Pt. requested inpatient admission and signed voluntary papers.  Pt, is aware that no beds are available at [facility]. pt. will be boarding now.    Per signout- "MAIN ISSUE: Patient insists that she is on monthly haldol PALACIOS - and she is due for 200mg IM haldol decanoate this upcoming Tuesday which she gets at the [facility]". Per collateral, pt. is not on Haldol dec.  Tried to contact ACt team Orville Gorman [phone]- Informed that pt. is not in their program anymore.    ICU Vital Signs Last 24 Hrs  T(C): 36.8 (11 Sep 2022 06:30), Max: 36.8 (11 Sep 2022 03:21)  T(F): 98.2 (11 Sep 2022 06:30), Max: 98.3 (11 Sep 2022 03:21)  HR: 72 (11 Sep 2022 06:30) (72 - 87)  BP: 132/79 (11 Sep 2022 06:30) (123/70 - 143/87)  BP(mean): --  ABP: --  ABP(mean): --  RR: 16 (11 Sep 2022 06:30) (16 - 16)  SpO2: 100% (11 Sep 2022 06:30) (100% - 100%)    O2 Parameters below as of 11 Sep 2022 06:30  Patient On (Oxygen Delivery Method): room air Patient is a single, 42-year-old  female, non caregiver, unemployed, on disability for mental illness, usually domiciled at psychiatric residences on [place] grounds, currently at [facility], on AOT at this time, followed by [facility] Team (phone [phone]; Orville Gorman [phone]) in the past; with past psychiatric history of most likely actual Schizoaffective Bipolar Type, Borderline Personality Disorder, Polysubstance Abuse, > 30 prior inpatient psychiatric hospitalizations most recently at [facility] from 1/13/2021-4/7/2021, with myriad of ED visits including [facility], 3 prior suicide attempts (last in 2012 via OD), recurrent chronic suicidal gestures and suicidal ideation; history of property destruction when upset, long hx of sexually provocative behavior (both out in the community and while on inpatient units requiring 1;1 staff observation); hx of physical altercations, hx of verbal threats, hx of property destruction; long hx of medication and treatment noncompliance resulting in AOT and PALACIOS administration, who presents today from her residence for suicidal ideation.     Pt. was evaluated by Dr. Riley yesterday and was asked for reevaluation.  Pt. was seen this morning. When asked about SI , pt. continue to endorse suicidal ideations with no specific plan. Pt. has a hx of SA in the past. Pt. requested inpatient admission and signed voluntary papers.  Pt, is aware that no beds are available at [facility]. pt. will be boarding now.    Per signout- "MAIN ISSUE: Patient insists that she is on monthly haldol PALACIOS - and she is due for 200mg IM haldol decanoate this upcoming Tuesday which she gets at the [facility]". Per collateral, pt. is not on Haldol dec.  Tried to contact ACt team Orville Gorman [phone]- Informed that pt. is not in their program anymore.    Radha Meza ( AOT team) -[phone]) - called and left a message with callback number.    ICU Vital Signs Last 24 Hrs  T(C): 36.8 (11 Sep 2022 06:30), Max: 36.8 (11 Sep 2022 03:21)  T(F): 98.2 (11 Sep 2022 06:30), Max: 98.3 (11 Sep 2022 03:21)  HR: 72 (11 Sep 2022 06:30) (72 - 87)  BP: 132/79 (11 Sep 2022 06:30) (123/70 - 143/87)  BP(mean): --  ABP: --  ABP(mean): --  RR: 16 (11 Sep 2022 06:30) (16 - 16)  SpO2: 100% (11 Sep 2022 06:30) (100% - 100%)    O2 Parameters below as of 11 Sep 2022 06:30  Patient On (Oxygen Delivery Method): room air

## 2022-09-11 NOTE — ED BEHAVIORAL HEALTH NOTE - BEHAVIORAL HEALTH NOTE
DISCHARGE    Per provider, Dr. Valencia, pt is CLEAR for d/c back to [facility] at [address] [facility] [facility] [address] [place], [place]. SW called [facility] Tel# [phone] and spoke w/ Mr. Resendez who confirmed that patient is safe to travel supervised via [facility]. Provider in agreement. Group huddle completed.     SW contacted [facility] transport #[phone] and spoke w/ Stephanie BAUGH who confirmed [facility]SP[facility] will  pt at 12:30PM from  ED at [facility].     [facility] care ref# [id number]  [facility] Ref #[id number]

## 2022-09-11 NOTE — ED ADULT NURSE REASSESSMENT NOTE - NS ED NURSE REASSESS COMMENT FT1
RN Rounding Note 2:15am- Pt sleeping, respirations even and non labored. Pt awaiting reassessment in AM.

## 2022-09-11 NOTE — BH CONSULTATION LIAISON PROGRESS NOTE - NSBHFUPINTERVALHXFT_PSY_A_CORE
42/F with hx of SAD and borderline personality disorder, with hx of noncompliance to meds and aftercare; currently on AOT/ ACT; with hx of Polysubstance Abuse, and > 30 prior in-Pt psych admissions.  she has a hx of 3 prior suicide attempts (last in 2012 via OD) with recurrent chronic suicidal gestures and suicidal ideation.  Pt has hx of property destruction when dysregulated; hx of physical altercations and making verbal threats as well as with long hx of sexually provocative behavior.  Was seen yesterday for suicidal ideation.     initial collateral information gathered noted that Pt reported to staff that she was suicidal. staff then attempted to intervene by helping Pt implement coping skills.  However, Pt reported that she was still "not feeling better". staff called 911 as per protocol. staff reported that Pt was "otherwise has been her usual self". no reported aggression or violence. No specific events, triggers that happened.      on initial encounter at the  ED, she was noted to be calm, cooperative, polite.  reported that she had not been sleeping well this week. Pt thinks she is going into "another episode." added that she feels unwell and maintains that she would like to end her life thought no specific plan.  on subsequent re-evaluation, the Pt continued to harbor suicidal ideations with no specific plan. Pt requested 9.13 in-Pt admission. she was made aware that no beds are available    she is seen bedside (at 850AM today): reported that whilst feeling anxious "every now and then" , her anxiety has not been worsening. she denied experiencing any signs/ symptoms suggestive  of severe MDD; no acute manic or florid psychotic symptoms. currently, not harboring any passive or active SI or HI. is requesting to be discharged now as "she has some things to do".     called [facility] at [phone] and spoke with staff, Mr Resendez: Per Mr Resendez, the Pt has been in and out of ED's for the past week with the same complaint of SI. Pt has not verbalized any active SI; no intent or plans raised. Pt whenever being brought to the ED, ends up being discharged. Mr Resendez did not raise any safety concerns. Pt can go back to [place] grounds.      as per  ED staff, overnight the Pt has remained calm and cooperative.  There has been no reported agitation/aggressive behavior.  No verbalization of activeSI/HI.   Pt was not showing any signs/symptoms of intoxication or withdrawal.  she is not delirious.  Pt has not tested limits.. Has maintained appropriate boundaries. Pt has been easily redirected.  Overall, there has been no management issues.

## 2022-09-17 ENCOUNTER — INPATIENT (INPATIENT)
Facility: HOSPITAL | Age: 42
LOS: 3 days | Discharge: HOME | End: 2022-09-21
Attending: PSYCHIATRY & NEUROLOGY | Admitting: PSYCHIATRY & NEUROLOGY

## 2022-09-17 VITALS
SYSTOLIC BLOOD PRESSURE: 138 MMHG | HEART RATE: 88 BPM | RESPIRATION RATE: 20 BRPM | DIASTOLIC BLOOD PRESSURE: 96 MMHG | OXYGEN SATURATION: 100 % | TEMPERATURE: 97 F | HEIGHT: 66 IN

## 2022-09-17 LAB
ALBUMIN SERPL ELPH-MCNC: 3.6 G/DL — SIGNIFICANT CHANGE UP (ref 3.3–5)
ALP SERPL-CCNC: 126 U/L — HIGH (ref 40–120)
ALT FLD-CCNC: 19 U/L — SIGNIFICANT CHANGE UP (ref 4–33)
ANION GAP SERPL CALC-SCNC: 9 MMOL/L — SIGNIFICANT CHANGE UP (ref 7–14)
APAP SERPL-MCNC: <10 UG/ML — LOW (ref 15–25)
AST SERPL-CCNC: 15 U/L — SIGNIFICANT CHANGE UP (ref 4–32)
BASOPHILS # BLD AUTO: 0.02 K/UL — SIGNIFICANT CHANGE UP (ref 0–0.2)
BASOPHILS NFR BLD AUTO: 0.2 % — SIGNIFICANT CHANGE UP (ref 0–2)
BILIRUB SERPL-MCNC: <0.2 MG/DL — SIGNIFICANT CHANGE UP (ref 0.2–1.2)
BUN SERPL-MCNC: 12 MG/DL — SIGNIFICANT CHANGE UP (ref 7–23)
CALCIUM SERPL-MCNC: 9.4 MG/DL — SIGNIFICANT CHANGE UP (ref 8.4–10.5)
CHLORIDE SERPL-SCNC: 106 MMOL/L — SIGNIFICANT CHANGE UP (ref 98–107)
CO2 SERPL-SCNC: 25 MMOL/L — SIGNIFICANT CHANGE UP (ref 22–31)
CREAT SERPL-MCNC: 0.75 MG/DL — SIGNIFICANT CHANGE UP (ref 0.5–1.3)
EGFR: 101 ML/MIN/1.73M2 — SIGNIFICANT CHANGE UP
EOSINOPHIL # BLD AUTO: 0.23 K/UL — SIGNIFICANT CHANGE UP (ref 0–0.5)
EOSINOPHIL NFR BLD AUTO: 2.8 % — SIGNIFICANT CHANGE UP (ref 0–6)
ETHANOL SERPL-MCNC: <10 MG/DL — SIGNIFICANT CHANGE UP
GLUCOSE SERPL-MCNC: 99 MG/DL — SIGNIFICANT CHANGE UP (ref 70–99)
HCG SERPL-ACNC: <5 MIU/ML — SIGNIFICANT CHANGE UP
HCT VFR BLD CALC: 39.1 % — SIGNIFICANT CHANGE UP (ref 34.5–45)
HGB BLD-MCNC: 12 G/DL — SIGNIFICANT CHANGE UP (ref 11.5–15.5)
IANC: 4.51 K/UL — SIGNIFICANT CHANGE UP (ref 1.8–7.4)
IMM GRANULOCYTES NFR BLD AUTO: 0.2 % — SIGNIFICANT CHANGE UP (ref 0–0.9)
LYMPHOCYTES # BLD AUTO: 2.6 K/UL — SIGNIFICANT CHANGE UP (ref 1–3.3)
LYMPHOCYTES # BLD AUTO: 31.7 % — SIGNIFICANT CHANGE UP (ref 13–44)
MCHC RBC-ENTMCNC: 27.8 PG — SIGNIFICANT CHANGE UP (ref 27–34)
MCHC RBC-ENTMCNC: 30.7 GM/DL — LOW (ref 32–36)
MCV RBC AUTO: 90.7 FL — SIGNIFICANT CHANGE UP (ref 80–100)
MONOCYTES # BLD AUTO: 0.83 K/UL — SIGNIFICANT CHANGE UP (ref 0–0.9)
MONOCYTES NFR BLD AUTO: 10.1 % — SIGNIFICANT CHANGE UP (ref 2–14)
NEUTROPHILS # BLD AUTO: 4.51 K/UL — SIGNIFICANT CHANGE UP (ref 1.8–7.4)
NEUTROPHILS NFR BLD AUTO: 55 % — SIGNIFICANT CHANGE UP (ref 43–77)
NRBC # BLD: 0 /100 WBCS — SIGNIFICANT CHANGE UP (ref 0–0)
NRBC # FLD: 0 K/UL — SIGNIFICANT CHANGE UP (ref 0–0)
PLATELET # BLD AUTO: 253 K/UL — SIGNIFICANT CHANGE UP (ref 150–400)
POTASSIUM SERPL-MCNC: 3.9 MMOL/L — SIGNIFICANT CHANGE UP (ref 3.5–5.3)
POTASSIUM SERPL-SCNC: 3.9 MMOL/L — SIGNIFICANT CHANGE UP (ref 3.5–5.3)
PROT SERPL-MCNC: 7.2 G/DL — SIGNIFICANT CHANGE UP (ref 6–8.3)
RBC # BLD: 4.31 M/UL — SIGNIFICANT CHANGE UP (ref 3.8–5.2)
RBC # FLD: 14.3 % — SIGNIFICANT CHANGE UP (ref 10.3–14.5)
SALICYLATES SERPL-MCNC: <0.3 MG/DL — LOW (ref 15–30)
SODIUM SERPL-SCNC: 140 MMOL/L — SIGNIFICANT CHANGE UP (ref 135–145)
TOXICOLOGY SCREEN, DRUGS OF ABUSE, SERUM RESULT: SIGNIFICANT CHANGE UP
TSH SERPL-MCNC: 2.39 UIU/ML — SIGNIFICANT CHANGE UP (ref 0.27–4.2)
WBC # BLD: 8.21 K/UL — SIGNIFICANT CHANGE UP (ref 3.8–10.5)
WBC # FLD AUTO: 8.21 K/UL — SIGNIFICANT CHANGE UP (ref 3.8–10.5)

## 2022-09-17 PROCEDURE — 93010 ELECTROCARDIOGRAM REPORT: CPT

## 2022-09-17 PROCEDURE — 99285 EMERGENCY DEPT VISIT HI MDM: CPT | Mod: 25

## 2022-09-17 RX ORDER — OXCARBAZEPINE 300 MG/1
300 TABLET, FILM COATED ORAL
Refills: 0 | Status: DISCONTINUED | OUTPATIENT
Start: 2022-09-18 | End: 2022-09-21

## 2022-09-17 RX ORDER — DIPHENHYDRAMINE HCL 50 MG
50 CAPSULE ORAL EVERY 6 HOURS
Refills: 0 | Status: DISCONTINUED | OUTPATIENT
Start: 2022-09-18 | End: 2022-09-21

## 2022-09-17 RX ORDER — HALOPERIDOL DECANOATE 100 MG/ML
5 INJECTION INTRAMUSCULAR EVERY 6 HOURS
Refills: 0 | Status: DISCONTINUED | OUTPATIENT
Start: 2022-09-18 | End: 2022-09-21

## 2022-09-17 RX ORDER — LORATADINE 10 MG/1
10 TABLET ORAL DAILY
Refills: 0 | Status: DISCONTINUED | OUTPATIENT
Start: 2022-09-18 | End: 2022-09-21

## 2022-09-17 RX ORDER — PSYLLIUM SEED (WITH DEXTROSE)
1 POWDER (GRAM) ORAL DAILY
Refills: 0 | Status: DISCONTINUED | OUTPATIENT
Start: 2022-09-18 | End: 2022-09-21

## 2022-09-17 RX ORDER — SENNA PLUS 8.6 MG/1
2 TABLET ORAL AT BEDTIME
Refills: 0 | Status: DISCONTINUED | OUTPATIENT
Start: 2022-09-18 | End: 2022-09-21

## 2022-09-17 RX ORDER — HALOPERIDOL DECANOATE 100 MG/ML
5 INJECTION INTRAMUSCULAR ONCE
Refills: 0 | Status: DISCONTINUED | OUTPATIENT
Start: 2022-09-18 | End: 2022-09-21

## 2022-09-17 RX ORDER — DIPHENHYDRAMINE HCL 50 MG
50 CAPSULE ORAL ONCE
Refills: 0 | Status: DISCONTINUED | OUTPATIENT
Start: 2022-09-18 | End: 2022-09-21

## 2022-09-17 RX ORDER — POLYETHYLENE GLYCOL 3350 17 G/17G
17 POWDER, FOR SOLUTION ORAL AT BEDTIME
Refills: 0 | Status: DISCONTINUED | OUTPATIENT
Start: 2022-09-18 | End: 2022-09-21

## 2022-09-17 RX ORDER — QUETIAPINE FUMARATE 200 MG/1
200 TABLET, FILM COATED ORAL AT BEDTIME
Refills: 0 | Status: DISCONTINUED | OUTPATIENT
Start: 2022-09-18 | End: 2022-09-21

## 2022-09-17 RX ADMIN — Medication 200 MILLIGRAM(S): at 23:33

## 2022-09-17 NOTE — ED BEHAVIORAL HEALTH NOTE - BEHAVIORAL HEALTH NOTE
COLLATERAL FROM [facility] on [facility] [phone]: talked to staff Ms Erika Candelario MHTA: Patient has been her usual self, medication compliant - she has no access to her pills/staff keeps them locked by nurses station. Patient is "on/off" but "not bad" and frequently wants to go to the hospital in the evening. Ms Candelario thinks in part because she likes the food here. Given Pt's frequent reports of suicidality, staff did call the On-call psychiatrist at [place] who recommended that she be sent to ED nonetheless because she said a specific plan to overdose on a arsenic. Psychiatrist-on-call did not come to evaluate Patient at the residence.       CURRENT MEDICATIONS: list does not have haldol decanoate IM monthly injection listed but Pt insists she gets it via her ACT team which is possible, quetiapine 200mg PO qhs, trileptal / oxcarbazepine 300mg PO bid, Orlistat 60mg PO qd with food, claritin 10mg po qd, colace 100mg Po qhs, senna 8.6mg 2 tabs PO qhs, pseudoephedrine 30mg PO qd, Metamucil PO qd COLLATERAL FROM [facility] on [facility] [phone]: talked to staff Ms Erika Candelario MHTA: Patient has been her usual self, medication compliant - she has no access to her pills/staff keeps them locked by nurses station. Patient is "on/off" but "not bad" and frequently wants to go to the hospital in the evening. Ms Candelario thinks in part because she likes the food here. Given Pt's frequent reports of suicidality, staff did call the On-call psychiatrist at [place] who recommended that she be sent to ED nonetheless because she said a specific plan to overdose on a arsenic. At this time, Patient has no known access to arsenic nor can state where she would obtain it. Of note, Psychiatrist-on-call did not come to evaluate Patient at the residence.       CURRENT MEDICATIONS: list does not have haldol decanoate IM monthly injection listed but Pt insists she gets it via her ACT team which is possible, quetiapine 200mg PO qhs, trileptal / oxcarbazepine 300mg PO bid, Orlistat 60mg PO qd with food, claritin 10mg po qd, colace 100mg Po qhs, senna 8.6mg 2 tabs PO qhs, pseudoephedrine 30mg PO qd, Metamucil PO qd

## 2022-09-17 NOTE — ED PROVIDER NOTE - OBJECTIVE STATEMENT
43 y/o F hx Bipolar, BPD BIBA from [facility] w c/o worsening depression and suicidal  ideations to overdose on pills. Admits to multiple social stressors and inability to cope. Denies VH/AH/HI. Denies pain, SOB, fever, chills, chest, abdominal discomfort.  Denies falling, punching or kicking any objects. Denies use of alcohol or illicit.  No evidence of physical, injuries, broken skin or deformities.

## 2022-09-17 NOTE — ED BEHAVIORAL HEALTH ASSESSMENT NOTE - HPI (INCLUDE ILLNESS QUALITY, SEVERITY, DURATION, TIMING, CONTEXT, MODIFYING FACTORS, ASSOCIATED SIGNS AND SYMPTOMS)
Patient is a single, 42-year-old woman, single, non caregiver, unemployed, on disability for mental illness, usually domiciled at psychiatric residences on [place] grounds, currently at [facility], on AOT at this time, followed by [employer] [facility] ACT Team (phone [phone]; Orville Gorman [phone]); with past psychiatric history of most likely actual Schizoaffective Bipolar Type, Borderline Personality Disorder, Polysubstance Abuse, > 30 prior inpatient psychiatric hospitalizations most recently at [facility] from 1/13/2021-4/7/2021 (Prolixin Dec 50mg IM Lithium 1350mg PO qhs, Zyprexa 15mg PO in am and 10mg PO qhs) with transfer to [facility] hospitalization ([place]) for highly disorganized behavior (smearing feces in the wall, eating feces, turning in a [place] over and over again, with myriad of ED visits including [facility], 3 prior suicide attempts (last in 2012 via OD), recurrent chronic suicidal gestures and suicidal ideation; history of property destruction when upset, long hx of sexually provocative behavior (both out in the community and while on inpatient units requiring 1;1 staff observation); hx of physical altercations, hx of verbal threats, hx of property destruction; long hx of medication and treatment noncompliance resulting in AOT and PALACIOS administration, last seen in [facility] ED 9/10/22, who presents today from her residence for suicidal ideation.     The patient is calm and cooperative on interview, explains that she has been feeling increasingly depressed and suicidal over the past year, however insists that this has been acutely worsening over the past week in the context of various psychosocial stressors. Most saliently, she describes being called constantly by a man named Yusufcarolina Andrew who used to be a peer eris at the residence, insisting that he has been asking her for money and sex, denies receiving threats however he has been calling more frequently over the past week. (Per PCA at bedside, EMS did mention that he called several times in the brief moments they were with her in her residence). Aside from this, she reports feeling existentially concerned that nothing has been going on with her life, she wants to work but does not have proper identification, does not feel safe on [place] grounds as other patients approach her for money and sex, and does not feel like she has any personal or professional support citing no friends or family or staff she trusts. She denies AH, VH, HI, IOR, paranoia, and substance use aside from 5 cigarettes daily. In addition to daily depressed mood, anhedonia, restlessness, guilt, hopelessness, worthlessness, she insists that her suicidal thinking has intensified over the past few days. She has frequently been having the thought to blow herself up with intent and plan to drop a match into a loose gas line. She explains that she has already tried and been successful unscrewing the lid for the gas line in front of building [address] [place] [address]. Although she has not yet obtained matches she reasons this would not be difficult. She reiterates that these thoughts have been intensifying to the point that she does not feel safe returning home out of fear that she would hurt herself. She requests voluntary admission.     COLLATERAL FROM [facility] on [facility] [phone]: talked to staff Ms Erika Candelario RAFAELTA: Patient has been her usual self, medication compliant - she has no access to her pills/staff keeps them locked by nurses station. Patient is "on/off" but "not bad" and frequently wants to go to the hospital in the evening. Ms Candelario thinks in part because she likes the food here. Given Pt's frequent reports of suicidality, staff did call the On-call psychiatrist at [place] who recommended that she be sent to ED nonetheless because she said a specific plan to overdose on a arsenic. At this time, Patient has no known access to arsenic nor can state where she would obtain it. Of note, Psychiatrist-on-call did not come to evaluate Patient at the residence.       CURRENT MEDICATIONS: list does not have haldol decanoate IM monthly injection listed but Pt insists she gets it via her ACT team which is possible, quetiapine 200mg PO qhs, trileptal / oxcarbazepine 300mg PO bid, Orlistat 60mg PO qd with food, claritin 10mg po qd, colace 100mg Po qhs, senna 8.6mg 2 tabs PO qhs, pseudoephedrine 30mg PO qd, Metamucil PO qd.    COVID Exposure Screen- Patient    1.	*Have you had a COVID-19 test in the last 90 days?  (  ) Yes   ( x ) No   (  ) Unknown- Reason: _____  IF YES PROCEED TO QUESTION #2. IF NO OR UNKNOWN, PLEASE SKIP TO QUESTION #3.  2.	Date of test(s) and result(s): ________    3.	*Have you tested positive for COVID-19 antibodies? (  ) Yes   ( x ) No   (  ) Unknown- Reason: _____  IF YES PROCEED TO QUESTION #4. IF NO or UNKNOWN, PLEASE SKIP TO QUESTION #5.  4.	Date of positive antibody test: ________    5.	*Have you received 2 doses of the COVID-19 vaccine? ( x ) Yes   (  ) No   (  ) Unknown- Reason: _____   IF YES PROCEED TO QUESTION #6. IF NO or UNKNOWN, PLEASE SKIP TO QUESTION #7.  6.	Date of second dose: uncertain however insists she received J&J + Booster    7.	*In the past 10 days, have you been around anyone with a positive COVID-19 test?* (  ) Yes   ( x ) No   (  ) Unknown- Reason: ____  IF YES PROCEED TO QUESTION #8. IF NO or UNKNOWN, PLEASE SKIP TO QUESTION #13.  8.	Were you within 6 feet of them for at least 15 minutes? (  ) Yes   (  ) No   (  ) Unknown- Reason: _____  9.	Have you provided care for them? (  ) Yes   (  ) No   (  ) Unknown- Reason: ______  10.	Have you had direct physical contact with them (touched, hugged, or kissed them)? (  ) Yes   (  ) No    (  ) Unknown- Reason: _____  11.	Have you shared eating or drinking utensils with them? (  ) Yes   (  ) No    (  ) Unknown- Reason: ____  12.	Have they sneezed, coughed, or somehow gotten respiratory droplets on you? (  ) Yes   (  ) No    (  ) Unknown- Reason: ______    13.	*Have you been out of [place] within the past 10 days?* (  ) Yes   ( x ) No   (  ) Unknown- Reason: _____  IF YES PLEASE ANSWER THE FOLLOWING QUESTIONS:  14.	Which state/country have you been to? ______  15.	Were you there over 24 hours? (  ) Yes   (  ) No    (  ) Unknown- Reason: ______  16.	Date of return to [facility]: ______ Patient is a single, 42-year-old woman, single, non caregiver, unemployed, on disability for mental illness, usually domiciled at psychiatric residences on [place] grounds, currently at [facility], on AOT at this time, followed by [facility] ACT Team (phone [phone]; Orville Gorman [phone]); with past psychiatric history of most likely actual Schizoaffective Bipolar Type, Borderline Personality Disorder, Polysubstance Abuse, > 30 prior inpatient psychiatric hospitalizations most recently at [facility] from 1/13/2021-4/7/2021 (Prolixin Dec 50mg IM Lithium 1350mg PO qhs, Zyprexa 15mg PO in am and 10mg PO qhs) with transfer to [facility] hospitalization ([place]) for highly disorganized behavior (smearing feces in the wall, eating feces, turning in a [place] over and over again, with myriad of ED visits including [facility], 3 prior suicide attempts (last in 2012 via OD), recurrent chronic suicidal gestures and suicidal ideation; history of property destruction when upset, long hx of sexually provocative behavior (both out in the community and while on inpatient units requiring 1;1 staff observation); hx of physical altercations, hx of verbal threats, hx of property destruction; long hx of medication and treatment noncompliance resulting in AOT and PALACIOS administration, last seen in [facility] ED 9/10/22, who presents today from her residence for suicidal ideation.     The patient is calm and cooperative on interview, explains that she has been feeling increasingly depressed and suicidal over the past year, however insists that this has been acutely worsening over the past week in the context of various psychosocial stressors. Most saliently, she describes being called constantly by a man named Yusufcarolina Andrew who used to be a peer eris at the residence, insisting that he has been asking her for money and sex, denies receiving threats however he has been calling more frequently over the past week. (Per PCA at bedside, EMS did mention that he called several times in the brief moments they were with her in her residence). Aside from this, she reports feeling existentially concerned that nothing has been going on with her life, she wants to work but does not have proper identification, does not feel safe on [place] grounds as other patients approach her for money and sex, and does not feel like she has any personal or professional support citing no friends or family or staff she trusts. She denies AH, VH, HI, IOR, paranoia, and substance use aside from 5 cigarettes daily. In addition to daily depressed mood, anhedonia, restlessness, guilt, hopelessness, worthlessness, she insists that her suicidal thinking has intensified over the past few days. She has frequently been having the thought to blow herself up with intent and plan to drop a match into a loose gas line. She explains that she has already tried and been successful unscrewing the lid for the gas line in front of building [address] [place] [address]. Although she has not yet obtained matches she reasons this would not be difficult. She reiterates that these thoughts have been intensifying to the point that she does not feel safe returning home out of fear that she would hurt herself. She requests voluntary admission.     COLLATERAL FROM [facility] on [facility] [phone]: talked to staff Ms Erika Candelario RAFAELTA: Patient has been her usual self, medication compliant - she has no access to her pills/staff keeps them locked by nurses station. Patient is "on/off" but "not bad" and frequently wants to go to the hospital in the evening. Ms Candelario thinks in part because she likes the food here. Given Pt's frequent reports of suicidality, staff did call the On-call psychiatrist at [place] who recommended that she be sent to ED nonetheless because she said a specific plan to overdose on a arsenic. At this time, Patient has no known access to arsenic nor can state where she would obtain it. Of note, Psychiatrist-on-call did not come to evaluate Patient at the residence.       CURRENT MEDICATIONS: list does not have haldol decanoate IM monthly injection listed but Pt insists she gets it via her ACT team which is possible, quetiapine 200mg PO qhs, trileptal / oxcarbazepine 300mg PO bid, Orlistat 60mg PO qd with food, claritin 10mg po qd, colace 100mg Po qhs, senna 8.6mg 2 tabs PO qhs, pseudoephedrine 30mg PO qd, Metamucil PO qd.    COVID Exposure Screen- Patient    1.	*Have you had a COVID-19 test in the last 90 days?  ( x ) Yes   (  ) No   (  ) Unknown- Reason: _____  IF YES PROCEED TO QUESTION #2. IF NO OR UNKNOWN, PLEASE SKIP TO QUESTION #3.  2.	Date of test(s) and result(s): Not Detected on 9/10/22    3.	*Have you tested positive for COVID-19 antibodies? ( x ) Yes   (  ) No   (  ) Unknown- Reason: _____  IF YES PROCEED TO QUESTION #4. IF NO or UNKNOWN, PLEASE SKIP TO QUESTION #5.  4.	Date of positive antibody test: 9/10/22    5.	*Have you received 2 doses of the COVID-19 vaccine? ( x ) Yes   (  ) No   (  ) Unknown- Reason: _____   IF YES PROCEED TO QUESTION #6. IF NO or UNKNOWN, PLEASE SKIP TO QUESTION #7.  6.	Date of second dose: uncertain however insists she received J&J + Booster    7.	*In the past 10 days, have you been around anyone with a positive COVID-19 test?* (  ) Yes   ( x ) No   (  ) Unknown- Reason: ____  IF YES PROCEED TO QUESTION #8. IF NO or UNKNOWN, PLEASE SKIP TO QUESTION #13.  8.	Were you within 6 feet of them for at least 15 minutes? (  ) Yes   (  ) No   (  ) Unknown- Reason: _____  9.	Have you provided care for them? (  ) Yes   (  ) No   (  ) Unknown- Reason: ______  10.	Have you had direct physical contact with them (touched, hugged, or kissed them)? (  ) Yes   (  ) No    (  ) Unknown- Reason: _____  11.	Have you shared eating or drinking utensils with them? (  ) Yes   (  ) No    (  ) Unknown- Reason: ____  12.	Have they sneezed, coughed, or somehow gotten respiratory droplets on you? (  ) Yes   (  ) No    (  ) Unknown- Reason: ______    13.	*Have you been out of [place] within the past 10 days?* (  ) Yes   ( x ) No   (  ) Unknown- Reason: _____  IF YES PLEASE ANSWER THE FOLLOWING QUESTIONS:  14.	Which state/country have you been to? ______  15.	Were you there over 24 hours? (  ) Yes   (  ) No    (  ) Unknown- Reason: ______  16.	Date of return to [facility]: ______

## 2022-09-17 NOTE — ED ADULT NURSE NOTE - COVID-19 RESULT DATE/TIME
How Many Skin Cancers Have You Had?: one What Is The Reason For Today's Visit?: History of Non-Melanoma Skin Cancer When Was Your Last Cancer Diagnosed?: 2020 10-Sep-2022 22:31
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
## 2022-09-17 NOTE — ED BEHAVIORAL HEALTH ASSESSMENT NOTE - DETAILS
Poor response to Geodon; Depakote (Increased ammonia levels) dry cough several months history of property destruction ((breaking TV screens while hospitalized) when upset / acting out chronic SI intensifying over the past week; has intent and plan and has prepared [facility] contacted
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
## 2022-09-17 NOTE — ED BEHAVIORAL HEALTH ASSESSMENT NOTE - DESCRIPTION
as per records,  Patient does not know much about her biological family, she attended some college in the past The patient is pleasant, calm, and cooperative in the  ED and did not require emergency medications or restraints at any point.    ICU Vital Signs Last 24 Hrs  T(C): 36.2 (17 Sep 2022 20:05), Max: 36.2 (17 Sep 2022 20:05)  T(F): 97.1 (17 Sep 2022 20:05), Max: 97.1 (17 Sep 2022 20:05)  HR: 88 (17 Sep 2022 20:05) (88 - 88)  BP: 138/96 (17 Sep 2022 20:05) (138/96 - 138/96)  BP(mean): --  ABP: --  ABP(mean): --  RR: 20 (17 Sep 2022 20:05) (20 - 20)  SpO2: 100% (17 Sep 2022 20:05) (100% - 100%)    O2 Parameters below as of 17 Sep 2022 20:05  Patient On (Oxygen Delivery Method): room air GERD

## 2022-09-17 NOTE — ED BEHAVIORAL HEALTH ASSESSMENT NOTE - SUICIDAL IDEATION DETAILS
well formed plan to drop match into gas line, has already identified building and opened the lid on this gas line to ensure her plan was feasible

## 2022-09-17 NOTE — ED BEHAVIORAL HEALTH ASSESSMENT NOTE - RISK ASSESSMENT
The patient's chronic risk of suicide is elevated by extensive psychiatric history including multiple hospitalizations, prior substance abuse, personality pathology, history of nonadherence requiring ACT/AOT, multiple prior SAs, SIB, lack of social supports, and history of aggressive behavior. Her risk is acutely elevated by depression and suicidal ideation with intent, plan, and preparatory actions. This risk is mitigated by treatment adherence, PALACIOS, ACT/AOT, residential stability, and sobriety. As such, her overall acute risk of suicide is elevated. The patient requested voluntary admission due to feeling unsafe at home but contracted for safety on the unit. As such, she does not require CO for suicidality at this time. This should be reevaluated on the unit. High Acute Suicide Risk

## 2022-09-17 NOTE — ED BEHAVIORAL HEALTH ASSESSMENT NOTE - PSYCHIATRIC ISSUES AND PLAN (INCLUDE STANDING AND PRN MEDICATION)
continue Seroquel 200mg PO QHS and Trileptal 300mg PO BID. Patient insists she received Haldol Dec PALACIOS 9/13, primary team to confirm with ACT team

## 2022-09-17 NOTE — ED BEHAVIORAL HEALTH ASSESSMENT NOTE - NSSUICPROTFACT_PSY_ALL_CORE
lives in supervised residence, AOT, on an PALACIOS/Positive therapeutic relationships/Frustration tolerance

## 2022-09-17 NOTE — ED BEHAVIORAL HEALTH ASSESSMENT NOTE - MEDICAL ISSUES AND PLAN (INCLUDE STANDING AND PRN MEDICATION)
continue Olistat 60mg PO QD with food, claritin 10mg PO QD, colace 100mg QHS, senna 2 tabs QHS, pseudoephedrine 30mg QD, metamucil PO QD claritin 10mg PO QD, colace 100mg QHS, senna 2 tabs QHS, metamucil PO QD

## 2022-09-17 NOTE — ED BEHAVIORAL HEALTH ASSESSMENT NOTE - NSBHATTESTCOMMENTATTENDFT_PSY_A_CORE
In brief, pt is a 43 YO F w/ known history of schizoaffective disorder with documented prior manic episodes with noted hypersexual behavior, recently hospitalized inpatient at [place] for 7 months, since discharge has been living at [facility] on [place] [address].  Pt identifies feeling dissatisfied with current treatment and desires group and individual psychotherapy, notes that prior hospitalization at [facility] in 2021 provided "DBT" skills which she believes may be helpful in overcoming her suicidal thoughts.  Pt currently endorsing depressed mood with low energy, appearing anhedonic and withdrawn, endorsing active SI with plan to light match and open gas line.  While the feasibility and lethality of such an attempt is unclear, pt's thought process leading to elaborate SI is nonetheless concerning and warrants higher level of care to be provided through voluntary inpatient psychiatric hospitalization.

## 2022-09-17 NOTE — ED BEHAVIORAL HEALTH ASSESSMENT NOTE - ATTENTION / CONCENTRATION
Appointment was made ronnie Figueroa 1/22/18  
Patient had more than one question about his procedure. Forwarded a message to Marta GOODEN Surgery scheduler to make an appointment for the patient to come in and see me.   
Patient is calling wanting to speak with RN in regards to the recovery time for the spinal cord stimulator implant.   Please call patient to discuss.   
Normal

## 2022-09-17 NOTE — ED BEHAVIORAL HEALTH ASSESSMENT NOTE - CURRENT MEDICATION
Patient insists that she is on monthly haldol PALACIOS, most recently 9/13/22, not on med rec however likely due to being administered by ACT team; quetiapine 200mg PO qhs, trileptal / oxcarbazepine 300mg PO bid, Orlistat 60mg PO qd with food, claritin 10mg po qd, colace 100mg Po qhs, senna 8.6mg 2 tabs PO qhs, pseudoephedrine 30mg PO qd, Metamucil PO qd

## 2022-09-18 DIAGNOSIS — F19.10 OTHER PSYCHOACTIVE SUBSTANCE ABUSE, UNCOMPLICATED: ICD-10-CM

## 2022-09-18 DIAGNOSIS — F32.9 MAJOR DEPRESSIVE DISORDER, SINGLE EPISODE, UNSPECIFIED: ICD-10-CM

## 2022-09-18 DIAGNOSIS — F60.3 BORDERLINE PERSONALITY DISORDER: ICD-10-CM

## 2022-09-18 LAB
B PERT DNA SPEC QL NAA+PROBE: SIGNIFICANT CHANGE UP
B PERT+PARAPERT DNA PNL SPEC NAA+PROBE: SIGNIFICANT CHANGE UP
BORDETELLA PARAPERTUSSIS (RAPRVP): SIGNIFICANT CHANGE UP
C PNEUM DNA SPEC QL NAA+PROBE: SIGNIFICANT CHANGE UP
COVID-19 SPIKE DOMAIN AB INTERP: POSITIVE
COVID-19 SPIKE DOMAIN ANTIBODY RESULT: >250 U/ML — HIGH
FLUAV SUBTYP SPEC NAA+PROBE: SIGNIFICANT CHANGE UP
FLUBV RNA SPEC QL NAA+PROBE: SIGNIFICANT CHANGE UP
HADV DNA SPEC QL NAA+PROBE: SIGNIFICANT CHANGE UP
HCOV 229E RNA SPEC QL NAA+PROBE: SIGNIFICANT CHANGE UP
HCOV HKU1 RNA SPEC QL NAA+PROBE: SIGNIFICANT CHANGE UP
HCOV NL63 RNA SPEC QL NAA+PROBE: SIGNIFICANT CHANGE UP
HCOV OC43 RNA SPEC QL NAA+PROBE: SIGNIFICANT CHANGE UP
HMPV RNA SPEC QL NAA+PROBE: SIGNIFICANT CHANGE UP
HPIV1 RNA SPEC QL NAA+PROBE: SIGNIFICANT CHANGE UP
HPIV2 RNA SPEC QL NAA+PROBE: SIGNIFICANT CHANGE UP
HPIV3 RNA SPEC QL NAA+PROBE: SIGNIFICANT CHANGE UP
HPIV4 RNA SPEC QL NAA+PROBE: SIGNIFICANT CHANGE UP
M PNEUMO DNA SPEC QL NAA+PROBE: SIGNIFICANT CHANGE UP
RAPID RVP RESULT: SIGNIFICANT CHANGE UP
RSV RNA SPEC QL NAA+PROBE: SIGNIFICANT CHANGE UP
RV+EV RNA SPEC QL NAA+PROBE: SIGNIFICANT CHANGE UP
SARS-COV-2 IGG+IGM SERPL QL IA: >250 U/ML — HIGH
SARS-COV-2 IGG+IGM SERPL QL IA: POSITIVE
SARS-COV-2 RNA SPEC QL NAA+PROBE: SIGNIFICANT CHANGE UP

## 2022-09-18 PROCEDURE — 99222 1ST HOSP IP/OBS MODERATE 55: CPT

## 2022-09-18 RX ADMIN — Medication 1 PACKET(S): at 09:12

## 2022-09-18 RX ADMIN — Medication 100 MILLIGRAM(S): at 21:07

## 2022-09-18 RX ADMIN — OXCARBAZEPINE 300 MILLIGRAM(S): 300 TABLET, FILM COATED ORAL at 20:56

## 2022-09-18 RX ADMIN — OXCARBAZEPINE 300 MILLIGRAM(S): 300 TABLET, FILM COATED ORAL at 09:11

## 2022-09-18 RX ADMIN — LORATADINE 10 MILLIGRAM(S): 10 TABLET ORAL at 09:11
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
## 2022-09-18 NOTE — BH INPATIENT PSYCHIATRY ASSESSMENT NOTE - RISK ASSESSMENT
The patient's chronic risk of suicide is elevated by extensive psychiatric history including multiple hospitalizations, prior substance abuse, personality pathology, history of nonadherence requiring ACT/AOT, multiple prior SAs, SIB, lack of social supports, and history of aggressive behavior. Her risk is acutely elevated by depression and suicidal ideation with intent, plan, and preparatory actions. This risk is mitigated by treatment adherence, PALACIOS, ACT/AOT, residential stability, and sobriety. As such, her overall acute risk of suicide is elevated. The patient requested voluntary admission due to feeling unsafe at home but contracted for safety on the unit. As such, she does not require CO for suicidality at this time. This should be reevaluated on the unit.

## 2022-09-18 NOTE — PSYCHIATRIC REHAB INITIAL EVALUATION - NSBHPRRECOMMEND_PSY_ALL_CORE
Pt is a 41 y/o  single female. Pt had hx of multiple psychiatric inpatient admissions and her last admission was at [facility] from Jan to April 2021, then transferred to [facility]. Pt had hx of aggression, sexual provocative behavior, and SA via OD. Pt also had long hx of medication non-compliance.  Pt is currently in treatment with [facility] Act Team and AOT. Pt was admitted to [facility] due to suicidal ideation. Pt stated Dominick Morales who used to be a peer eris at the residence, he has been calling her frequently and asking for money and sex which trigger her suicidal ideation. Pt stated she had a plan to drop a match into a loose gas line to kill herself. Chart indicated, pt has already tried and been successful unscrewed the lid for the gas line in front of building [address] [place] [address]. Pt endorsed mood swing, increased energy, depressed, and anxious. Pt denies AH, VH, and HI. Pt admitted she smoke a joint of marijuana once a month.      Writer met with pt for initial assessment, introduced self and treatment team. Writer has oriented psychiatric rehabilitation department function and services. Writer also provided a copy of welcome letter and the unit schedule. Writer has reviewed unit programming and structure activities with pt. Pt was receptive. Writer also oriented and discussed hospital wide policies and protocols changes in response to covid-19. Psychiatric rehabilitation staff has been providing groups with requirement of wearing mask and social distancing during the group session. Pt was receptive. Writer also reviewed COVID-19 prevention tips including social distancing, wearing mask and hand hygiene.   During this assessment, pt is polit, pleasant, cooperative.

## 2022-09-18 NOTE — BH INPATIENT PSYCHIATRY ASSESSMENT NOTE - HPI (INCLUDE ILLNESS QUALITY, SEVERITY, DURATION, TIMING, CONTEXT, MODIFYING FACTORS, ASSOCIATED SIGNS AND SYMPTOMS)
Patient was seen and evaluated, chart, medications and labs reviewed. Case discussed with nursing team.  On service for this 42 year old single female.  Patient has prior  PPH of Schizoaffective Disorder, Borderline Personality and Polysubstance Abuse.  Patient has multiple prior  hospitalizations, > 30 prior inpatient psychiatric hospitalizations most recently at [facility] from 1/13/2021-4/7/2021. Patient is now hospitalized with a primary problem of worsening mood and suicidal ideations.  With formulated plan to blow herself up with intent and plan to drop a match into a loose gas line. She explains that she has already tried and been successful unscrewing the lid for the gas line in front of building [address] [place] [address].  Patient admitted to [facility] on a 9.13 legal status. I have reviewed the initial psychiatric assessment in the electronic medical record, including the history of present illness, past psychiatric history, family/social history (no pertinent changes), and exam, and have confirmed the salient findings dated 9/17/22.    As per chart review, transferring records indicated the following:  Patient is a single, 42-year-old woman, single, non caregiver, unemployed, on disability for mental illness, usually domiciled at psychiatric residences on [place] grounds, currently at [facility], on AOT at this time, followed by [facility] Team (phone [phone]; Orville Gorman [phone]); with past psychiatric history of most likely actual Schizoaffective Bipolar Type, Borderline Personality Disorder, Polysubstance Abuse, > 30 prior inpatient psychiatric hospitalizations most recently at [facility] from 1/13/2021-4/7/2021 (Prolixin Dec 50mg IM Lithium 1350mg PO qhs, Zyprexa 15mg PO in am and 10mg PO qhs) with transfer to [facility] hospitalization ([place]) for highly disorganized behavior (smearing feces in the wall, eating feces, turning in a [place] over and over again, with myriad of ED visits including [facility], 3 prior suicide attempts (last in 2012 via OD), recurrent chronic suicidal gestures and suicidal ideation; history of property destruction when upset, long hx of sexually provocative behavior (both out in the community and while on inpatient units requiring 1;1 staff observation); hx of physical altercations, hx of verbal threats, hx of property destruction; long hx of medication and treatment noncompliance resulting in AOT and PALACIOS administration, last seen in [facility] ED 9/10/22, who presents today from her residence for suicidal ideation.     The patient is calm and cooperative on interview, explains that she has been feeling increasingly depressed and suicidal over the past year, however insists that this has been acutely worsening over the past week in the context of various psychosocial stressors. Most saliently, she describes being called constantly by a man named Dominick Morales who used to be a peer eris at the residence, insisting that he has been asking her for money and sex, denies receiving threats however he has been calling more frequently over the past week. (Per PCA at bedside, EMS did mention that he called several times in the brief moments they were with her in her residence). Aside from this, she reports feeling existentially concerned that nothing has been going on with her life, she wants to work but does not have proper identification, does not feel safe on [place] grounds as other patients approach her for money and sex, and does not feel like she has any personal or professional support citing no friends or family or staff she trusts. She denies AH, VH, HI, IOR, paranoia, and substance use aside from 5 cigarettes daily. In addition to daily depressed mood, anhedonia, restlessness, guilt, hopelessness, worthlessness, she insists that her suicidal thinking has intensified over the past few days. She has frequently been having the thought to blow herself up with intent and plan to drop a match into a loose gas line. She explains that she has already tried and been successful unscrewing the lid for the gas line in front of building [address] [place] [address]. Although she has not yet obtained matches she reasons this would not be difficult. She reiterates that these thoughts have been intensifying to the point that she does not feel safe returning home out of fear that she would hurt herself. She requests voluntary admission.     COLLATERAL FROM [facility] on [facility] grounds [phone]: talked to staff Ms Backie Andree VILLAGRAN: Patient has been her usual self, medication compliant - she has no access to her pills/staff keeps them locked by nurses station. Patient is "on/off" but "not bad" and frequently wants to go to the hospital in the evening. Ms Candelario thinks in part because she likes the food here. Given Pt's frequent reports of suicidality, staff did call the On-call psychiatrist at [facility] who recommended that she be sent to ED nonetheless because she said a specific plan to overdose on a arsenic. At this time, Patient has no known access to arsenic nor can state where she would obtain it. Of note, Psychiatrist-on-call did not come to evaluate Patient at the residence.     CURRENT MEDICATIONS: list does not have haldol decanoate IM monthly injection listed but Pt insists she gets it via her ACT team which is possible, quetiapine 200mg PO qhs, trileptal / oxcarbazepine 300mg PO bid, Orlistat 60mg PO qd with food, claritin 10mg po qd, colace 100mg Po qhs, senna 8.6mg 2 tabs PO qhs, pseudoephedrine 30mg PO qd, Metamucil PO qd.    On unit:               Patient was seen and evaluated, chart, medications and labs reviewed. Case discussed with nursing team.  On service for this 42 year old single female.  Patient has prior  PPH of Schizoaffective Disorder, Borderline Personality and Polysubstance Abuse.  Patient has multiple prior  hospitalizations, > 30 prior inpatient psychiatric hospitalizations most recently at [facility] from 1/13/2021-4/7/2021. Patient is now hospitalized with a primary problem of worsening mood and suicidal ideations.  With formulated plan to blow herself up with intent and plan to drop a match into a loose gas line. She explains that she has already tried and been successful unscrewing the lid for the gas line in front of building [address] [place] [address].  Patient admitted to [facility] on a 9.13 legal status. I have reviewed the initial psychiatric assessment in the electronic medical record, including the history of present illness, past psychiatric history, family/social history (no pertinent changes), and exam, and have confirmed the salient findings dated 9/17/22.    As per chart review, transferring records indicated the following:  Patient is a single, 42-year-old woman, single, non caregiver, unemployed, on disability for mental illness, usually domiciled at psychiatric residences on [place] grounds, currently at [facility], on AOT at this time, followed by [facility] Team (phone [phone]; Ovrille Gorman [phone]); with past psychiatric history of most likely actual Schizoaffective Bipolar Type, Borderline Personality Disorder, Polysubstance Abuse, > 30 prior inpatient psychiatric hospitalizations most recently at [facility] from 1/13/2021-4/7/2021 (Prolixin Dec 50mg IM Lithium 1350mg PO qhs, Zyprexa 15mg PO in am and 10mg PO qhs) with transfer to [facility] hospitalization ([place]) for highly disorganized behavior (smearing feces in the wall, eating feces, turning in a [place] over and over again, with myriad of ED visits including [facility], 3 prior suicide attempts (last in 2012 via OD), recurrent chronic suicidal gestures and suicidal ideation; history of property destruction when upset, long hx of sexually provocative behavior (both out in the community and while on inpatient units requiring 1;1 staff observation); hx of physical altercations, hx of verbal threats, hx of property destruction; long hx of medication and treatment noncompliance resulting in AOT and PALACIOS administration, last seen in [facility] ED 9/10/22, who presents today from her residence for suicidal ideation.     The patient is calm and cooperative on interview, explains that she has been feeling increasingly depressed and suicidal over the past year, however insists that this has been acutely worsening over the past week in the context of various psychosocial stressors. Most saliently, she describes being called constantly by a man named Dominick Morales who used to be a peer eris at the residence, insisting that he has been asking her for money and sex, denies receiving threats however he has been calling more frequently over the past week. (Per PCA at bedside, EMS did mention that he called several times in the brief moments they were with her in her residence). Aside from this, she reports feeling existentially concerned that nothing has been going on with her life, she wants to work but does not have proper identification, does not feel safe on [place] grounds as other patients approach her for money and sex, and does not feel like she has any personal or professional support citing no friends or family or staff she trusts. She denies AH, VH, HI, IOR, paranoia, and substance use aside from 5 cigarettes daily. In addition to daily depressed mood, anhedonia, restlessness, guilt, hopelessness, worthlessness, she insists that her suicidal thinking has intensified over the past few days. She has frequently been having the thought to blow herself up with intent and plan to drop a match into a loose gas line. She explains that she has already tried and been successful unscrewing the lid for the gas line in front of building [address] [place] [address]. Although she has not yet obtained matches she reasons this would not be difficult. She reiterates that these thoughts have been intensifying to the point that she does not feel safe returning home out of fear that she would hurt herself. She requests voluntary admission.     COLLATERAL FROM [facility] on [facility] grounds [phone]: talked to staff Ms Backie Andree VILLAGRAN: Patient has been her usual self, medication compliant - she has no access to her pills/staff keeps them locked by nurses station. Patient is "on/off" but "not bad" and frequently wants to go to the hospital in the evening. Ms Candelario thinks in part because she likes the food here. Given Pt's frequent reports of suicidality, staff did call the On-call psychiatrist at [place] who recommended that she be sent to ED nonetheless because she said a specific plan to overdose on a arsenic. At this time, Patient has no known access to arsenic nor can state where she would obtain it. Of note, Psychiatrist-on-call did not come to evaluate Patient at the residence.     CURRENT MEDICATIONS: list does not have haldol decanoate IM monthly injection listed but Pt insists she gets it via her ACT team which is possible, quetiapine 200mg PO qhs, trileptal / oxcarbazepine 300mg PO bid, Orlistat 60mg PO qd with food, claritin 10mg po qd, colace 100mg Po qhs, senna 8.6mg 2 tabs PO qhs, pseudoephedrine 30mg PO qd, Metamucil PO qd.    On unit: information received from: Chart review and patient interview  Discussed precipitants to warrant services,  patient reports “I wanted to commit suicide”   Patient reports more worsening mood and depression “most of my life”  However, insists that this has been acutely worsening over the past week in the context of various psychosocial stressors:  she describes being called constantly by a man named Dominick Morales who used to be a peer eris at the residence, insisting that he has been asking her for money and sex, he has been calling more frequently over the past week.  Patient also reports dissatisfied about how her life is going “I feel like I’m not doing anything with my life”  Patient reports active SI with formulated plan,  endorsing plan to light match and open gas line.    Patient describes chronic low disruptive mood and persistent anxiety.  Patient reports symptoms are persistent most of the day, more days than none.   She also reports symptoms of difficulty concentrating, perseverative thoughts, poor appetite, insomnia, feelings of sadness, anhedonia, hopelessness, worthlessness. Patient reports it has been overwhelming, and a significant emotional strain.     Patient’s facial expression, demeanor, posture/attitude during interview convey an underlying depressed/anxious mood. At time of interview patient denies SI/SIB/HI, no formulated plan or intent, and engages in safety planning.  Pt identifies feeling dissatisfied with current treatment and desires group and individual psychotherapy, notes that prior hospitalization at [facility] in 2021 provided "DBT" skills which she believes may be helpful in overcoming her suicidal thoughts.      Client denies any current AVH, IOR, delusions, psychotic disorganization or paranoia.  Denies history of aggression or violence. No access to firearm. Patient denies any symptoms suggestive of active trina: (denied grandiosity/ racing thoughts/ increased goal directed activities or engaged in risk taking behavior/ no pressured speech/ no elevated mood/ denied any increased in energy level causing sleep disruption), no signs of catatonia. She denies obsessive, intrusive and persistent thoughts or compulsive, ritualistic acts are reported. Patient denies any active legal issues, is not currently under any kind of court supervision.  Denies substance use, reviewed admitting u-tox, resulted negative. PMH: Gerd, thyroid issues,

## 2022-09-18 NOTE — BH PATIENT PROFILE - FALL HARM RISK - UNIVERSAL INTERVENTIONS
Bed in lowest position, wheels locked, appropriate side rails in place/Call bell, personal items and telephone in reach/Instruct patient to call for assistance before getting out of bed or chair/Non-slip footwear when patient is out of bed/[place] to call system/Physically safe environment - no spills, clutter or unnecessary equipment/Purposeful Proactive Rounding/Room/bathroom lighting operational, light cord in reach

## 2022-09-18 NOTE — BH INPATIENT PSYCHIATRY ASSESSMENT NOTE - NSBHATTESTAPPBILLTIME_PSY_A_CORE
I attest my time as [place] is greater than 50% of the total combined time spent on qualifying patient care activities. I have reviewed and verified the documentation.

## 2022-09-18 NOTE — BH INPATIENT PSYCHIATRY ASSESSMENT NOTE - CURRENT MEDICATION
MEDICATIONS  (STANDING):  loratadine 10 milliGRAM(s) Oral daily  OXcarbazepine 300 milliGRAM(s) Oral two times a day  polyethylene glycol 3350 17 Gram(s) Oral at bedtime  psyllium Powder 1 Packet(s) Oral daily  QUEtiapine 200 milliGRAM(s) Oral at bedtime  senna 2 Tablet(s) Oral at bedtime    MEDICATIONS  (PRN):  diphenhydrAMINE 50 milliGRAM(s) Oral every 6 hours PRN EPS ppx  diphenhydrAMINE Injectable 50 milliGRAM(s) IntraMuscular once PRN EPS ppx  haloperidol     Tablet 5 milliGRAM(s) Oral every 6 hours PRN agitation  haloperidol    Injectable 5 milliGRAM(s) IntraMuscular once PRN Agitation  LORazepam     Tablet 2 milliGRAM(s) Oral every 6 hours PRN Agitation  LORazepam   Injectable 2 milliGRAM(s) IntraMuscular Once PRN Agitation

## 2022-09-18 NOTE — BH INPATIENT PSYCHIATRY ASSESSMENT NOTE - DETAILS
Poor response to Geodon; Depakote (Increased ammonia levels) history of property destruction ((breaking TV screens while hospitalized) when upset / acting out chronic SI intensifying over the past week; has intent and plan and has prepared

## 2022-09-18 NOTE — BH INPATIENT PSYCHIATRY ASSESSMENT NOTE - NSBHASSESSSUMMFT_PSY_ALL_CORE
42 year old single female.  Patient has prior  PPH of Schizoaffective Disorder, Borderline Personality and Polysubstance Abuse.  Patient has multiple prior  hospitalizations, > 30 prior inpatient psychiatric hospitalizations most recently at [facility] from 1/13/2021-4/7/2021. Patient is now hospitalized with a primary problem of worsening mood and suicidal ideations.  With formulated plan to blow herself up with intent and plan to drop a match into a loose gas line. She explains that she has already tried and been successful unscrewing the lid for the gas line in front of building [address] [place] [address].  Patient admitted to [facility] on a 9.13 legal status.     Plan:  >Legal: 9.13  >Obs: Routine, no current SI. no need for CO, patient not expected to pose risk to self or others in controlled inpatient setting  >Psychiatric Meds: Restart outpatient medication regimen: quetiapine 200mg PO qhs, trileptal / oxcarbazepine 300mg PO bid, Orlistat 60mg PO qd with food, claritin 10mg po qd, colace 100mg Po qhs, senna 8.6mg 2 tabs PO qhs, pseudoephedrine 30mg PO qd, Metamucil PO qd. Observe for tolerability and efficacy.   PRN medications:  Ativan 2mg oral Q6HR PRN for agitation and anxiety.  Haldol 5mg oral Q6HR PRN for agitation.   Benadryl 50mg oral Q6HR PRN for agitation.   Vistaril 50mg oral Q6HR PRN for anxiety.  Desyrel 50mg oral QHS PRN for insomnia.   >Labs: Admission labs reviewed, no acute findings. Labs pending for tomorrow: A1c and Lipid panel. QT/QTc: 396/454ms. Hold antipsychotics if QTc >500  >Medical:   No acute concerns. No consultations needed at this time. No indication for CIWA/CINA. Patient with consistently stable VS, no visible physical symptoms of withdrawal.  BAL <10 , utox negative.  During the course of treatment, will collaborate with medical team to manage medical issues.  >Diet: Regular  >Social: milieu/structured therapy  >Treatment Interventions: Groups and Individual Therapy/CBT, Motivational counseling for substance abuse related issues.   >Dispo: Collateral and dispo planning pending further symptom and medication optimization. Refer back to ACT Team

## 2022-09-18 NOTE — BH INPATIENT PSYCHIATRY ASSESSMENT NOTE - NSBHCHARTREVIEWVS_PSY_A_CORE FT
Vital Signs Last 24 Hrs  T(C): 36.6 (09-18-22 @ 06:24), Max: 37 (09-18-22 @ 03:21)  T(F): 97.8 (09-18-22 @ 06:24), Max: 98.6 (09-18-22 @ 03:21)  HR: 74 (09-18-22 @ 02:03) (74 - 88)  BP: 129/65 (09-18-22 @ 02:03) (129/65 - 138/96)  BP(mean): --  RR: 18 (09-18-22 @ 02:03) (18 - 20)  SpO2: 100% (09-18-22 @ 02:03) (100% - 100%)    Orthostatic VS  09-18-22 @ 03:17  Lying BP: --/-- HR: --  Sitting BP: 123/81 HR: 77  Standing BP: --/-- HR: --  Site: --  Mode: --   Vital Signs Last 24 Hrs  T(C): 36.6 (09-18-22 @ 06:24), Max: 37 (09-18-22 @ 03:21)  T(F): 97.8 (09-18-22 @ 06:24), Max: 98.6 (09-18-22 @ 03:21)  HR: 74 (09-18-22 @ 02:03) (74 - 88)  BP: 129/65 (09-18-22 @ 02:03) (129/65 - 138/96)  BP(mean): --  RR: 18 (09-18-22 @ 02:03) (18 - 20)  SpO2: 100% (09-18-22 @ 02:03) (100% - 100%)    Orthostatic VS  09-18-22 @ 08:25  Lying BP: --/-- HR: --  Sitting BP: 122/76 HR: 85  Standing BP: 118/77 HR: 91  Site: --  Mode: --  Orthostatic VS  09-18-22 @ 03:17  Lying BP: --/-- HR: --  Sitting BP: 123/81 HR: 77  Standing BP: --/-- HR: --  Site: --  Mode: --

## 2022-09-18 NOTE — BH INPATIENT PSYCHIATRY ASSESSMENT NOTE - ATTENDING COMMENTS
Patient seen by me in conference room for initial inpatient interview.  I personally examined the patient and I was directly involved in the management plan and recommendations of the care provided to the patient. I have reviewed the admission record and reviewed the patient’s physical examination, review of systems and there are no pertinent positive findings on the review of systems and the admission labs. I have discussed the case with the NP and I have reviewed the NP's note. I agree with the progress note’s contents and I have made changes if indicated.        42 year old single female.  Patient has prior  PPH of Schizoaffective Disorder, Borderline Personality and Polysubstance Abuse.  Patient has multiple prior  hospitalizations, > 30 prior inpatient psychiatric hospitalizations most recently at [facility] from 1/13/2021-4/7/2021. Patient is now hospitalized with a primary problem of worsening mood and suicidal ideations.  With formulated plan to blow herself up with intent and plan to drop a match into a loose gas line. She explains that she has already tried and been successful unscrewing the lid for the gas line in front of building [address] [place] [address].  Patient admitted to [facility] on a 9.13 legal status.     Patient well known to writer from previous admissions. States in many ways she has improved but feels she will never get out of being on the grounnds of [facility] and with all the exposure to drugs especially K2  Reports that after using she will feel hopeless and suicidal  Wishes that she can live independently    Plan:  >Legal: 9.13  >Obs: Routine, no current SI. no need for CO, patient not expected to pose risk to self or others in controlled inpatient setting  >Psychiatric Meds: Restart outpatient medication regimen: quetiapine 200mg PO qhs, trileptal / oxcarbazepine 300mg PO bid,

## 2022-09-18 NOTE — BH PATIENT PROFILE - HOME MEDICATIONS
amoxicillin-clavulanate 875 mg-125 mg oral tablet , 1 tab(s) orally 2 times a day   Trileptal 300 mg oral tablet , 1 tab(s) orally 2 times a day  SEROquel 25 mg oral tablet , 1 tab(s) orally once a day (at bedtime)

## 2022-09-18 NOTE — BH INPATIENT PSYCHIATRY ASSESSMENT NOTE - DESCRIPTION
as per records,  Patient does not know much about her biological family, she attended some college in the past

## 2022-09-18 NOTE — BH INPATIENT PSYCHIATRY ASSESSMENT NOTE - NSBHMETABOLIC_PSY_ALL_CORE_FT
BMI: BMI (kg/m2): 40.4 (09-17-22 @ 20:05)  HbA1c:   Glucose: POCT Blood Glucose.: 95 mg/dL (07-23-22 @ 14:45)    BP: 129/65 (09-18-22 @ 02:03) (129/65 - 138/96)  Lipid Panel:

## 2022-09-18 NOTE — BH INPATIENT PSYCHIATRY ASSESSMENT NOTE - NSBHMEDSOTHERFT_PSY_A_CORE
CURRENT MEDICATIONS: list does not have haldol decanoate IM monthly injection listed but Pt insists she gets it via her ACT team which is possible, quetiapine 200mg PO qhs, trileptal / oxcarbazepine 300mg PO bid, Orlistat 60mg PO qd with food, claritin 10mg po qd, colace 100mg Po qhs, senna 8.6mg 2 tabs PO qhs, pseudoephedrine 30mg PO qd, Metamucil PO qd.

## 2022-09-18 NOTE — BH INPATIENT PSYCHIATRY ASSESSMENT NOTE - NSTXSUICIDDATETRGT_PSY_ALL_CORE
[Appropriately responsive] : appropriately responsive [Alert] : alert [No Acute Distress] : no acute distress [No Lymphadenopathy] : no lymphadenopathy [Regular Rate Rhythm] : regular rate rhythm [No Murmurs] : no murmurs [Clear to Auscultation B/L] : clear to auscultation bilaterally [Soft] : soft [Non-tender] : non-tender [Non-distended] : non-distended [No HSM] : No HSM 25-Sep-2022 [No Lesions] : no lesions [No Mass] : no mass [Oriented x3] : oriented x3 [Examination Of The Breasts] : a normal appearance [No Masses] : no breast masses were palpable [Labia Majora] : normal [Labia Minora] : normal [Normal] : normal [Uterine Adnexae] : normal

## 2022-09-19 PROCEDURE — 90853 GROUP PSYCHOTHERAPY: CPT

## 2022-09-19 PROCEDURE — 99231 SBSQ HOSP IP/OBS SF/LOW 25: CPT

## 2022-09-19 RX ADMIN — LORATADINE 10 MILLIGRAM(S): 10 TABLET ORAL at 08:33

## 2022-09-19 RX ADMIN — Medication 100 MILLIGRAM(S): at 17:15

## 2022-09-19 RX ADMIN — OXCARBAZEPINE 300 MILLIGRAM(S): 300 TABLET, FILM COATED ORAL at 08:34

## 2022-09-19 RX ADMIN — OXCARBAZEPINE 300 MILLIGRAM(S): 300 TABLET, FILM COATED ORAL at 20:07

## 2022-09-19 NOTE — BH SOCIAL WORK INITIAL PSYCHOSOCIAL EVALUATION - NSCMSPTSTRENGTHS_PSY_ALL_CORE
PT has an involved ACT team as well as loves to get dressed up and wear make up which motivates positive behavior. PT has an involved ACT team as well as loves to get dressed up and wear make up which motivates positive behavior./Motivated

## 2022-09-19 NOTE — BH INPATIENT PSYCHIATRY PROGRESS NOTE - NSBHCHARTREVIEWVS_PSY_A_CORE FT
Vital Signs Last 24 Hrs  T(C): 36.9 (09-19-22 @ 14:47), Max: 36.9 (09-18-22 @ 22:16)  T(F): 98.4 (09-19-22 @ 14:47), Max: 98.4 (09-18-22 @ 22:16)  HR: --  BP: --  BP(mean): --  RR: --  SpO2: --    Orthostatic VS  09-19-22 @ 08:45  Lying BP: --/-- HR: --  Sitting BP: 129/75 HR: 71  Standing BP: 130/89 HR: 83  Site: --  Mode: --  Orthostatic VS  09-18-22 @ 19:33  Lying BP: --/-- HR: --  Sitting BP: 137/89 HR: 92  Standing BP: 135/71 HR: 84  Site: --  Mode: electronic  Orthostatic VS  09-18-22 @ 08:25  Lying BP: --/-- HR: --  Sitting BP: 122/76 HR: 85  Standing BP: 118/77 HR: 91  Site: --  Mode: --  Orthostatic VS  09-18-22 @ 03:17  Lying BP: --/-- HR: --  Sitting BP: 123/81 HR: 77  Standing BP: --/-- HR: --  Site: --  Mode: --   Vital Signs Last 24 Hrs  T(C): 36.8 (09-19-22 @ 19:21), Max: 36.9 (09-18-22 @ 22:16)  T(F): 98.3 (09-19-22 @ 19:21), Max: 98.4 (09-18-22 @ 22:16)  HR: --  BP: --  BP(mean): --  RR: --  SpO2: --    Orthostatic VS  09-19-22 @ 19:21  Lying BP: --/-- HR: --  Sitting BP: 113/70 HR: 80  Standing BP: 101/81 HR: 80  Site: --  Mode: --  Orthostatic VS  09-19-22 @ 08:45  Lying BP: --/-- HR: --  Sitting BP: 129/75 HR: 71  Standing BP: 130/89 HR: 83  Site: --  Mode: --  Orthostatic VS  09-18-22 @ 19:33  Lying BP: --/-- HR: --  Sitting BP: 137/89 HR: 92  Standing BP: 135/71 HR: 84  Site: --  Mode: electronic  Orthostatic VS  09-18-22 @ 08:25  Lying BP: --/-- HR: --  Sitting BP: 122/76 HR: 85  Standing BP: 118/77 HR: 91  Site: --  Mode: --  Orthostatic VS  09-18-22 @ 03:17  Lying BP: --/-- HR: --  Sitting BP: 123/81 HR: 77  Standing BP: --/-- HR: --  Site: --  Mode: --

## 2022-09-19 NOTE — BH SOCIAL WORK INITIAL PSYCHOSOCIAL EVALUATION - OTHER PAST PSYCHIATRIC HISTORY (INCLUDE DETAILS REGARDING ONSET, COURSE OF ILLNESS, INPATIENT/OUTPATIENT TREATMENT)
Pt is a 42 year old female, single, unemployed, on disability, and domiciled at psychiatric residence on [place] grounds. per clinicals pt has a pphx of bipolar disorder, borderline personality disorder. Pt has had over 20 inpt hospital stays, most recently at [facility] 1/13/21-4/7/21 and was transferred to [facility] hospital for highly disorganized behaviors (smearing feces on the wall and eating feces). Pt is now followed by [facility] Dr. Garcia. Hx of recurrent and chronic SI and gestures, legal issues, and destruction of property. 3 prior SA: last 2012 via OD.  Per clinicals pt has a hx of hearing voices. per clinicals pt has a hx of sexually provocative behaviors, hx of physical altercations, property destruction, and verbal threats.   Per clinicals pt was admitted due to depression and suicidality that has been worsening over the past week. per clinicals pt had plan and intent to blow herself up with a gas line however pt was unable to find a match at the time vs overdosing on arsenic. pt declines having access to arsenic and reports that she was not going to actually blow herself up with gas line but she wanted to ensure she would be admitted.  Med hx of GERD & asthma Pt is a 42 year old female, single, unemployed, on disability, and domiciled at psychiatric residence on [place] grounds. per clinicals pt has a pphx of bipolar disorder vs. Schizoaffective bipolar type, and borderline personality disorder. Pt has had over 20 inpt hospital stays, most recently at [facility] 1/13/21-4/7/21 and [facility] for highly disorganized behaviors (smearing feces on the wall and eating feces). Pt is now followed by [facility] Dr. Garcia. Hx of recurrent and chronic SI and gestures, legal issues, and destruction of property. 3 prior SA: last 2012 via OD.  Per clinicals pt has a hx of hearing voices. per clinicals pt has a hx of sexually provocative behaviors, hx of physical altercations, property destruction, and verbal threats.   Per clinicals pt was admitted due to depression and suicidality that has been worsening over the past week. per clinicals pt had plan and intent to blow herself up with a gas line however pt was unable to find a match at the time vs overdosing on arsenic. pt declines having access to arsenic and reports that she was not going to actually blow herself up with gas line but she wanted to ensure she would be admitted. Lmsw and NP met with pt to discuss pt admission and to formulate tx plan. pt presents calm, cooperative, and discharge focused. pt reports submitting 3 day letter. pt reports she no longer has SI and only felt hopeless for a short time due to getting harrassing phone calls from an ex boyfriend. pt denies SI/HI/AH/VH. Pt presents with organized thought process.  Med hx of GERD & asthma

## 2022-09-19 NOTE — BH SOCIAL WORK INITIAL PSYCHOSOCIAL EVALUATION - NSHIGHRISKBEHFT_PSY_ALL_CORE
Sexual behavior-exposing herself on the unit and disrobing  Aggressive behavior- destruction of property when upset Per clinicals pt has a hx of Sexual behavior-exposing herself on the unit and disrobing and hx of Aggressive behavior- destruction of property when upset. However pt is not displaying either during current admission.

## 2022-09-19 NOTE — BH PSYCHOLOGY - GROUP THERAPY NOTE - NSPSYCHOLGRPCOGPT_PSY_A_CORE FT
Pt attended Cognitive Behavioral Therapy Group. Acceptance and Commitment Therapy skills and concepts also utilized. The group started with a brief check-in during which patients shared one person they would love to meet. This activity generated insightful participation and pts responded well to prompt. Next, the group participated in a relaxation exercise that involved imagining a relaxing scene. Pt shared that she imagined fresh baked cookies. Lastly, the group discussed stigma, as it relates to mental health and individuals'’ identities. Group leaders explained concepts, reinforced participation, and engaged patients in discussion.

## 2022-09-19 NOTE — BH SOCIAL WORK INITIAL PSYCHOSOCIAL EVALUATION - NSPTSTATEDGOAL_PSY_ALL_CORE
Pt did not specify goals due to being disoriented in interview. Pt commented on being in the hospital "for the rest of my life" to which SW assured her she would not be here that long.  none reported

## 2022-09-19 NOTE — BH PSYCHOLOGY - GROUP THERAPY NOTE - TOKEN PULL-DIAGNOSIS
Primary Diagnosis:  Schizoaffective disorder [F25.9]        Problem Dx:   Polysubstance abuse [F19.10]      Borderline personality disorder [F60.3]

## 2022-09-19 NOTE — BH PSYCHOLOGY - GROUP THERAPY NOTE - NSBHPSYCHOLPARTICIPCOMMENT_PSY_A_CORE FT
Pt was engaged in group discussion, but would often interrupt instead of waiting her turn to share. Pt was, however, redirectable. Thought processes linear, speech within normal limits, and oriented x3.

## 2022-09-20 PROCEDURE — 99231 SBSQ HOSP IP/OBS SF/LOW 25: CPT

## 2022-09-20 RX ORDER — ORLISTAT 120 MG
1 CAPSULE ORAL
Qty: 60 | Refills: 0
Start: 2022-09-20 | End: 2022-10-19

## 2022-09-20 RX ADMIN — Medication 100 MILLIGRAM(S): at 10:50

## 2022-09-20 RX ADMIN — OXCARBAZEPINE 300 MILLIGRAM(S): 300 TABLET, FILM COATED ORAL at 19:58

## 2022-09-20 RX ADMIN — OXCARBAZEPINE 300 MILLIGRAM(S): 300 TABLET, FILM COATED ORAL at 08:14

## 2022-09-20 RX ADMIN — Medication 100 MILLIGRAM(S): at 20:00

## 2022-09-20 RX ADMIN — Medication 1 PACKET(S): at 08:14

## 2022-09-20 RX ADMIN — LORATADINE 10 MILLIGRAM(S): 10 TABLET ORAL at 08:14

## 2022-09-20 NOTE — BH DISCHARGE NOTE NURSING/SOCIAL WORK/PSYCH REHAB - PATIENT PORTAL LINK FT
You can access the FollowMyHealth Patient Portal offered by [facility] by registering at the following website: [email or web address]. By joining [facility]’s FollowMyHealth portal, you will also be able to view your health information using other applications (apps) compatible with our system.

## 2022-09-20 NOTE — BH DISCHARGE NOTE NURSING/SOCIAL WORK/PSYCH REHAB - NSCDUDCCRISIS_PSY_A_CORE
[facility] Well  1 (254) [facility]-WELL ([phone])  Text "WELL" to 25126  Website: www.[address]/.[facility] 1 (362) 681-ILSP (8942) Website: www.safehorizon.org/.National Suicide Prevention Lifeline 0 (014) 366-6746/.  [facility]  1 (405) LIFENET (055-5161)/.  [facility]’s Behavioral [facility] Crisis [facility]  [address] [place] [address] [phone]   Hours:  Monday through Friday from 9 AM to 3 PM/.  [facility] [facility]  Affairs - Veterans Crisis Line  6 [phone], Option 1 [facility] Well  1 (333) [facility]-WELL ([phone])  Text "WELL" to 53030  Website: www.[place]/.[facility]iz[facility] 1 (795) 891-VOLA (5689) Website: www.safehorizon.org/.National Suicide Prevention Lifeline 9 (619) 297-1308/.  [facility]  1 (650) LIFENET (145-8320)/.  [facility]’s Behavioral Health Crisis Center  [address] [place] [address] [phone]   Hours:  Monday through Friday from 9 AM to 3 PM/.  [facility] of  Affairs - [facility] Crisis Line  1 [phone], Option 1/Other... [facility] Well  1 (379) [facility]-WELL ([phone])  Text "WELL" to 16719  Website: www.[employer]/.[facility]izons 1 (850) 071-IYOR (5172) Website: www.safehorizon.org/.National Suicide Prevention Lifeline 9 (150) 236-0623/.  [facility]  1 (713) LIFENET (554-1001)/.  [facility]’s Behavioral Health Crisis Center  [address] [place] [address] [phone]   Hours:  Monday through Friday from 9 AM to 3 PM/.  [facility] [facility] of  Affairs - [facility] Crisis Line  [phone], Option 1

## 2022-09-20 NOTE — BH TREATMENT PLAN - NSTXSUICIDINTERPR_PSY_ALL_CORE
Pt met her goal. Pt has identified her coping skill such as go on internet, listen to music and dancing to manage symptoms.

## 2022-09-20 NOTE — BH DISCHARGE NOTE NURSING/SOCIAL WORK/PSYCH REHAB - DISCHARGE INSTRUCTIONS AFTERCARE APPOINTMENTS
In order to check the location, date, or time of your aftercare appointment, please refer to your Discharge Instructions Document given to you upon leaving the hospital.  If you have lost the instructions please call [phone]

## 2022-09-20 NOTE — BH TREATMENT PLAN - NSTXDCOTHRGOAL_PSY_ALL_CORE
Pt will show a reduction of symptoms and engage meaningfully with writer to identify a safe discharge plan. Pt will comply with recommended tx plan and medications for 5 days, identify 2 benefits for adhering to tx.

## 2022-09-20 NOTE — BH TREATMENT PLAN - NSTXDCOTHRINTERSW_PSY_ALL_CORE
SW will provide support, insight, discharge planning, psychoeducation, and will maintain contact with identified supports.

## 2022-09-20 NOTE — BH DISCHARGE NOTE NURSING/SOCIAL WORK/PSYCH REHAB - NSDCVIVACCINE_GEN_ALL_CORE_FT
Tdap; 13-Oct-2014 04:40; Tho Morel (RN); [employer]; [id number]; IntraMuscular; Dorsogluteal Right.; 0.5 milliLiter(s);

## 2022-09-20 NOTE — BH INPATIENT PSYCHIATRY PROGRESS NOTE - NSBHCHARTREVIEWVS_PSY_A_CORE FT
Vital Signs Last 24 Hrs  T(C): 37 (09-20-22 @ 14:35), Max: 37 (09-20-22 @ 14:35)  T(F): 98.6 (09-20-22 @ 14:35), Max: 98.6 (09-20-22 @ 14:35)  HR: --  BP: --  BP(mean): --  RR: --  SpO2: --    Orthostatic VS  09-20-22 @ 08:17  Lying BP: --/-- HR: --  Sitting BP: 129/66 HR: 71  Standing BP: 120/75 HR: 80  Site: --  Mode: electronic  Orthostatic VS  09-19-22 @ 19:21  Lying BP: --/-- HR: --  Sitting BP: 113/70 HR: 80  Standing BP: 101/81 HR: 80  Site: --  Mode: --  Orthostatic VS  09-19-22 @ 08:45  Lying BP: --/-- HR: --  Sitting BP: 129/75 HR: 71  Standing BP: 130/89 HR: 83  Site: --  Mode: --  Orthostatic VS  09-18-22 @ 19:33  Lying BP: --/-- HR: --  Sitting BP: 137/89 HR: 92  Standing BP: 135/71 HR: 84  Site: --  Mode: electronic

## 2022-09-20 NOTE — BH DISCHARGE NOTE NURSING/SOCIAL WORK/PSYCH REHAB - NSDCPRGOAL_PSY_ALL_CORE
Pt submitted 3DLS.  Pt made some progress towards her discharge. Pt has identified her coping skill such as go on internet, listen to music and dancing to manage symptoms. During this hospitalization, pt has demonstrated good behavioral control since her admission. Pt has utilized her coping skills such as listening to music and talking to staff to manage her symptoms. Pt denies SI, HI, AH and VH. Pt has demonstrated daily compliance with medication and regimen. Pt has verbally agreed to continue to comply with medication post-discharge. During this hospitalization, pt showed 70% of group attendance. Pt was typically quiet in group session, participated well with prompting. Pt was visible on the unit, interacts well with selective peers. Pt maintained fair ADLs. Pt has participated in her safety plan. Pt was provided with press ganey survey.

## 2022-09-20 NOTE — BH TREATMENT PLAN - NSCMSPTSTRENGTHS_PSY_ALL_CORE
PT has an involved ACT team as well as loves to get dressed up and wear make up which motivates positive behavior./Able to adapt/Motivated/Physically healthy

## 2022-09-20 NOTE — BH DISCHARGE NOTE NURSING/SOCIAL WORK/PSYCH REHAB - NSDCPRRECOMMEND_PSY_ALL_CORE
Please follow up your treatment with ACT Team.  Please follow up your treatment with [facility] on 09/23/22 at 11:00 AM.

## 2022-09-21 VITALS — TEMPERATURE: 97 F

## 2022-09-21 PROCEDURE — 99239 HOSP IP/OBS DSCHRG MGMT >30: CPT

## 2022-09-21 RX ORDER — QUETIAPINE FUMARATE 200 MG/1
1 TABLET, FILM COATED ORAL
Qty: 0 | Refills: 0 | DISCHARGE

## 2022-09-21 RX ORDER — LORATADINE 10 MG/1
1 TABLET ORAL
Qty: 0 | Refills: 0 | DISCHARGE
Start: 2022-09-21

## 2022-09-21 RX ADMIN — OXCARBAZEPINE 300 MILLIGRAM(S): 300 TABLET, FILM COATED ORAL at 09:45

## 2022-09-21 RX ADMIN — Medication 100 MILLIGRAM(S): at 09:48

## 2022-09-21 RX ADMIN — LORATADINE 10 MILLIGRAM(S): 10 TABLET ORAL at 09:46

## 2022-09-21 NOTE — BH INPATIENT PSYCHIATRY DISCHARGE NOTE - NSBHDCRISKMITIGATE_PSY_ALL_CORE
Referral to ACT Team/Long acting injectable medication/Medications targeting suicidality/non-suicidal self injurious behavior

## 2022-09-21 NOTE — BH INPATIENT PSYCHIATRY PROGRESS NOTE - CURRENT MEDICATION
MEDICATIONS  (STANDING):  loratadine 10 milliGRAM(s) Oral daily  OXcarbazepine 300 milliGRAM(s) Oral two times a day  polyethylene glycol 3350 17 Gram(s) Oral at bedtime  psyllium Powder 1 Packet(s) Oral daily  QUEtiapine 200 milliGRAM(s) Oral at bedtime  senna 2 Tablet(s) Oral at bedtime    MEDICATIONS  (PRN):  diphenhydrAMINE 50 milliGRAM(s) Oral every 6 hours PRN EPS ppx  diphenhydrAMINE Injectable 50 milliGRAM(s) IntraMuscular once PRN EPS ppx  guaiFENesin Oral Liquid (Sugar-Free) 100 milliGRAM(s) Oral every 6 hours PRN cough  haloperidol     Tablet 5 milliGRAM(s) Oral every 6 hours PRN agitation  haloperidol    Injectable 5 milliGRAM(s) IntraMuscular once PRN Agitation  LORazepam     Tablet 2 milliGRAM(s) Oral every 6 hours PRN Agitation  LORazepam   Injectable 2 milliGRAM(s) IntraMuscular Once PRN Agitation  

## 2022-09-21 NOTE — BH INPATIENT PSYCHIATRY PROGRESS NOTE - NSICDXBHSECONDARYDX_PSY_ALL_CORE
Borderline personality disorder   F60.3  Polysubstance abuse   F19.10  

## 2022-09-21 NOTE — BH INPATIENT PSYCHIATRY PROGRESS NOTE - NSBHFUPINTERVALHXFT_PSY_A_CORE
Follow-up for SI and plan, Hx of SAD. Chart reviewed and discussed with team. No events reported overnight. The pt. slept and is eating adequately. She is medication adherent, tolerating them well. Writer attempted to confirm Prolixin Dec dose with ACT team, but was unable to reach a worker; will reattempt. Vitals are stable.  The patient stated "[place] and became overwhelmed." She reports a former [employer] employee, whom she used to be friends with, keeps calling her and asking for sexual favors. The pt. stated she became overwhelmed and experienced SI. She reports she did not attempt to kill herself. She stated she did not attempt to get into the "gas line." The pt. stated prior to this events, she was doing well. She denied mood Sx and anxiety. Pt. denied active s/i/p, h/i/p, A/H, V/H and delusions. She reports she is medication adherent in the community. Pt. submitted a 72 hour letter today.
Follow-up for SI and plan, Hx of SAD. Chart reviewed and discussed with team. No events reported overnight. The pt. slept and is eating adequately. She is medication adherent, tolerating them well. Vitals are stable.  Pt. reports her mood is "good." She reports having a dream last night about Armageddon. She denies it was a nightmare. The pt. denies s/i/p. She also denies  A/H, V/H,  h/i/p and delusions. She denies other concerns. 
Follow-up for SI and plan, Hx of SAD. Chart reviewed and discussed with team. No events reported overnight. The pt. slept and is eating adequately. She is medication adherent, tolerating them well. Vitals are stable.  Pt. reports her mood is "good."  She reports she is both happy and unhappy to return home. She reports she does not like the residence. The pt. was given a staff member's name who can help her.o She felt reassured. Pt. was also able to state that she would reach out to her providers and staff at the residence if Sx return.    The pt. denied A/H, V/H, s/i/p, h/i/p and delusions. Pt. was seen by the treatment to assess risk. The pt. is not an acute risk of harm to herself or others and is appropriate for discharge. The pt. was advised to call 911, visit the nearest ED or the crisis clinic if Sx worsen.

## 2022-09-21 NOTE — BH INPATIENT PSYCHIATRY PROGRESS NOTE - PRN MEDS
MEDICATIONS  (PRN):  diphenhydrAMINE 50 milliGRAM(s) Oral every 6 hours PRN EPS ppx  diphenhydrAMINE Injectable 50 milliGRAM(s) IntraMuscular once PRN EPS ppx  guaiFENesin Oral Liquid (Sugar-Free) 100 milliGRAM(s) Oral every 6 hours PRN cough  haloperidol     Tablet 5 milliGRAM(s) Oral every 6 hours PRN agitation  haloperidol    Injectable 5 milliGRAM(s) IntraMuscular once PRN Agitation  LORazepam     Tablet 2 milliGRAM(s) Oral every 6 hours PRN Agitation  LORazepam   Injectable 2 milliGRAM(s) IntraMuscular Once PRN Agitation  

## 2022-09-21 NOTE — BH INPATIENT PSYCHIATRY PROGRESS NOTE - NSBHFUPINTERVALCCFT_PSY_A_CORE
"I had a dream about Armageddon"
Followup for SAD
"I [place] and I became overwhelmed'

## 2022-09-21 NOTE — BH INPATIENT PSYCHIATRY DISCHARGE NOTE - OTHER PAST PSYCHIATRIC HISTORY (INCLUDE DETAILS REGARDING ONSET, COURSE OF ILLNESS, INPATIENT/OUTPATIENT TREATMENT)
Pt is a 42 year old female, single, unemployed, on disability, and domiciled at psychiatric residence on [place] grounds. per clinicals pt has a pphx of bipolar disorder vs. Schizoaffective bipolar type, and borderline personality disorder. Pt has had over 20 inpt hospital stays, most recently at [facility] 1/13/21-4/7/21 and [facility] state [facility] for highly disorganized behaviors (smearing feces on the wall and eating feces). Pt is now followed by [facility] Dr. Garcia. Hx of recurrent and chronic SI and gestures, legal issues, and destruction of property. 3 prior SA: last 2012 via OD.  Per clinicals pt has a hx of hearing voices. per clinicals pt has a hx of sexually provocative behaviors, hx of physical altercations, property destruction, and verbal threats.   Per clinicals pt was admitted due to depression and suicidality that has been worsening over the past week. per clinicals pt had plan and intent to blow herself up with a gas line however pt was unable to find a match at the time vs overdosing on arsenic. pt declines having access to arsenic and reports that she was not going to actually blow herself up with gas line but she wanted to ensure she would be admitted. Lmsw and NP met with pt to discuss pt admission and to formulate tx plan. pt presents calm, cooperative, and discharge focused. pt reports submitting 3 day letter. pt reports she no longer has SI and only felt hopeless for a short time due to getting harrassing phone calls from an ex boyfriend. pt denies SI/HI/AH/VH. Pt presents with organized thought process.  Med hx of GERD & asthma

## 2022-09-21 NOTE — BH INPATIENT PSYCHIATRY DISCHARGE NOTE - NSBHMETABOLIC_PSY_ALL_CORE_FT
BMI: BMI (kg/m2): 40.4 (09-17-22 @ 20:05)  HbA1c:   Glucose: POCT Blood Glucose.: 95 mg/dL (07-23-22 @ 14:45)    BP: --  Lipid Panel:

## 2022-09-21 NOTE — BH INPATIENT PSYCHIATRY PROGRESS NOTE - NSBHCHARTREVIEWVS_PSY_A_CORE FT
Vital Signs Last 24 Hrs  T(C): 36.1 (09-21-22 @ 08:05), Max: 37 (09-20-22 @ 22:37)  T(F): 96.9 (09-21-22 @ 08:05), Max: 98.6 (09-20-22 @ 22:37)  HR: --  BP: --  BP(mean): --  RR: --  SpO2: --    Orthostatic VS  09-21-22 @ 08:05  Lying BP: --/-- HR: --  Sitting BP: 135/86 HR: 70  Standing BP: 147/87 HR: 79  Site: --  Mode: --  Orthostatic VS  09-20-22 @ 19:21  Lying BP: --/-- HR: --  Sitting BP: 110/61 HR: 78  Standing BP: 126/74 HR: 81  Site: --  Mode: --  Orthostatic VS  09-20-22 @ 08:17  Lying BP: --/-- HR: --  Sitting BP: 129/66 HR: 71  Standing BP: 120/75 HR: 80  Site: --  Mode: electronic  Orthostatic VS  09-19-22 @ 19:21  Lying BP: --/-- HR: --  Sitting BP: 113/70 HR: 80  Standing BP: 101/81 HR: 80  Site: --  Mode: --

## 2022-09-21 NOTE — BH INPATIENT PSYCHIATRY PROGRESS NOTE - NSCGISEVERILLNESS_PSY_ALL_CORE
3 = Mildly ill – clearly established symptoms with minimal, if any, distress or difficulty in social and occupational function

## 2022-09-21 NOTE — BH INPATIENT PSYCHIATRY PROGRESS NOTE - NSCGIIMPROVESXSCORE_CAL_PSY_ALL_CORE
December 1, 2017      Zhang Denise MD  [address]  [place] [address]           [facility] - Orthopedics  [address], 5th [address]  [facility] [address]  Phone: [phone]          Patient: Anthony Hanna   MR Number: [id number]   YOB: 1956   Date of Visit: 12/1/2017       Dear Dr. Zhang Denise:    Thank you for referring Anthony Hanna to me for evaluation. Attached you will find relevant portions of my assessment and plan of care.    If you have questions, please do not hesitate to call me. I look forward to following Anthony Hanna along with you.    Sincerely,    Abbey Leach PA-C    Enclosure  CC:  No Recipients    If you would like to receive this communication electronically, please contact [email or web address] or [phone] to request more information on [employer] access.    For providers and/or their staff who would like to refer a patient to [facility], please contact us through our one-stop-shop provider referral line, Michael Mcgill, at [phone].    If you feel you have received this communication in error or would no longer like to receive these types of communications, please e-mail [email or web address]          2

## 2022-09-21 NOTE — BH INPATIENT PSYCHIATRY DISCHARGE NOTE - HPI (INCLUDE ILLNESS QUALITY, SEVERITY, DURATION, TIMING, CONTEXT, MODIFYING FACTORS, ASSOCIATED SIGNS AND SYMPTOMS)
Patient was seen and evaluated, chart, medications and labs reviewed. Case discussed with nursing team.  On service for this 42 year old single female.  Patient has prior  PPH of Schizoaffective Disorder, Borderline Personality and Polysubstance Abuse.  Patient has multiple prior  hospitalizations, > 30 prior inpatient psychiatric hospitalizations most recently at [facility] from 1/13/2021-4/7/2021. Patient is now hospitalized with a primary problem of worsening mood and suicidal ideations.  With formulated plan to blow herself up with intent and plan to drop a match into a loose gas line. She explains that she has already tried and been successful unscrewing the lid for the gas line in front of building [address] [place] [address].  Patient admitted to [facility] on a 9.13 legal status. I have reviewed the initial psychiatric assessment in the electronic medical record, including the history of present illness, past psychiatric history, family/social history (no pertinent changes), and exam, and have confirmed the salient findings dated 9/17/22.    As per chart review, transferring records indicated the following:  Patient is a single, 42-year-old woman, single, non caregiver, unemployed, on disability for mental illness, usually domiciled at psychiatric residences on [place] grounds, currently at [facility], on AOT at this time, followed by [facility] ACT Team (phone [phone]; Orville Gorman [phone]); with past psychiatric history of most likely actual Schizoaffective Bipolar Type, Borderline Personality Disorder, Polysubstance Abuse, > 30 prior inpatient psychiatric hospitalizations most recently at [facility] from 1/13/2021-4/7/2021 (Prolixin Dec 50mg IM Lithium 1350mg PO qhs, Zyprexa 15mg PO in am and 10mg PO qhs) with transfer to [facility] hospitalization ([place]) for highly disorganized behavior (smearing feces in the wall, eating feces, turning in a [place] over and over again, with myriad of ED visits including [facility], 3 prior suicide attempts (last in 2012 via OD), recurrent chronic suicidal gestures and suicidal ideation; history of property destruction when upset, long hx of sexually provocative behavior (both out in the community and while on inpatient units requiring 1;1 staff observation); hx of physical altercations, hx of verbal threats, hx of property destruction; long hx of medication and treatment noncompliance resulting in AOT and PALACIOS administration, last seen in [facility] ED 9/10/22, who presents today from her residence for suicidal ideation.     The patient is calm and cooperative on interview, explains that she has been feeling increasingly depressed and suicidal over the past year, however insists that this has been acutely worsening over the past week in the context of various psychosocial stressors. Most saliently, she describes being called constantly by a man named Dominick Morales who used to be a peer eris at the residence, insisting that he has been asking her for money and sex, denies receiving threats however he has been calling more frequently over the past week. (Per PCA at bedside, EMS did mention that he called several times in the brief moments they were with her in her residence). Aside from this, she reports feeling existentially concerned that nothing has been going on with her life, she wants to work but does not have proper identification, does not feel safe on [place] grounds as other patients approach her for money and sex, and does not feel like she has any personal or professional support citing no friends or family or staff she trusts. She denies AH, VH, HI, IOR, paranoia, and substance use aside from 5 cigarettes daily. In addition to daily depressed mood, anhedonia, restlessness, guilt, hopelessness, worthlessness, she insists that her suicidal thinking has intensified over the past few days. She has frequently been having the thought to blow herself up with intent and plan to drop a match into a loose gas line. She explains that she has already tried and been successful unscrewing the lid for the gas line in front of building [address] [place] [address]. Although she has not yet obtained matches she reasons this would not be difficult. She reiterates that these thoughts have been intensifying to the point that she does not feel safe returning home out of fear that she would hurt herself. She requests voluntary admission.     COLLATERAL FROM [facility] on [facility] grounds [phone]: talked to staff Ms Backie Andree VILLAGRAN: Patient has been her usual self, medication compliant - she has no access to her pills/staff keeps them locked by nurses station. Patient is "on/off" but "not bad" and frequently wants to go to the hospital in the evening. Ms Candelario thinks in part because she likes the food here. Given Pt's frequent reports of suicidality, staff did call the On-call psychiatrist at [place] who recommended that she be sent to ED nonetheless because she said a specific plan to overdose on a arsenic. At this time, Patient has no known access to arsenic nor can state where she would obtain it. Of note, Psychiatrist-on-call did not come to evaluate Patient at the residence.     CURRENT MEDICATIONS: list does not have haldol decanoate IM monthly injection listed but Pt insists she gets it via her ACT team which is possible, quetiapine 200mg PO qhs, trileptal / oxcarbazepine 300mg PO bid, Orlistat 60mg PO qd with food, claritin 10mg po qd, colace 100mg Po qhs, senna 8.6mg 2 tabs PO qhs, pseudoephedrine 30mg PO qd, Metamucil PO qd.    On unit: information received from: Chart review and patient interview  Discussed precipitants to warrant services,  patient reports “I wanted to commit suicide”   Patient reports more worsening mood and depression “most of my life”  However, insists that this has been acutely worsening over the past week in the context of various psychosocial stressors:  she describes being called constantly by a man named Dominick Morales who used to be a peer eris at the residence, insisting that he has been asking her for money and sex, he has been calling more frequently over the past week.  Patient also reports dissatisfied about how her life is going “I feel like I’m not doing anything with my life”  Patient reports active SI with formulated plan,  endorsing plan to light match and open gas line.    Patient describes chronic low disruptive mood and persistent anxiety.  Patient reports symptoms are persistent most of the day, more days than none.   She also reports symptoms of difficulty concentrating, perseverative thoughts, poor appetite, insomnia, feelings of sadness, anhedonia, hopelessness, worthlessness. Patient reports it has been overwhelming, and a significant emotional strain.     Patient’s facial expression, demeanor, posture/attitude during interview convey an underlying depressed/anxious mood. At time of interview patient denies SI/SIB/HI, no formulated plan or intent, and engages in safety planning.  Pt identifies feeling dissatisfied with current treatment and desires group and individual psychotherapy, notes that prior hospitalization at [facility] in 2021 provided "DBT" skills which she believes may be helpful in overcoming her suicidal thoughts.      Client denies any current AVH, IOR, delusions, psychotic disorganization or paranoia.  Denies history of aggression or violence. No access to firearm. Patient denies any symptoms suggestive of active trina: (denied grandiosity/ racing thoughts/ increased goal directed activities or engaged in risk taking behavior/ no pressured speech/ no elevated mood/ denied any increased in energy level causing sleep disruption), no signs of catatonia. She denies obsessive, intrusive and persistent thoughts or compulsive, ritualistic acts are reported. Patient denies any active legal issues, is not currently under any kind of court supervision.  Denies substance use, reviewed admitting u-tox, resulted negative. PMH: Gerd, thyroid issues,

## 2022-09-21 NOTE — BH INPATIENT PSYCHIATRY PROGRESS NOTE - NSBHATTESTTYPEVISIT_PSY_A_CORE
On-site Attending supervising [place] (99XXX codes)

## 2022-09-21 NOTE — BH INPATIENT PSYCHIATRY PROGRESS NOTE - NSBHATTESTBILLINGAW_PSY_A_CORE
[id number] Inpatient care - low complexity - 15 minutes
11725-[id number] Inpatient care - low complexity - 15 minutes
[id number]-[id number] Inpatient care - low complexity - 15 minutes

## 2022-09-21 NOTE — BH INPATIENT PSYCHIATRY DISCHARGE NOTE - NSBHSUICIDESTATUS_PSY_ALL_CORE
Pt. denies active and passive SI. Risk factors include extensive psychiatric history including multiple hospitalizations, prior substance abuse, personality pathology, history of nonadherence requiring ACT/AOT, multiple prior SAs, SIB, lack of social supports, and history of aggressive behavior.

## 2022-09-21 NOTE — BH INPATIENT PSYCHIATRY DISCHARGE NOTE - HOSPITAL COURSE
to be completed Per Inpatient Pyschiatric Assessment:” The patient is calm and cooperative on interview, explains that she has been feeling increasingly depressed and suicidal over the past year, however insists that this has been acutely worsening over the past week in the context of various psychosocial stressors. Most saliently, she describes being called constantly by a man named Dominick Morales who used to be a peer eris at the residence, insisting that he has been asking her for money and sex, denies receiving threats however he has been calling more frequently over the past week. (Per PCA at bedside, EMS did mention that he called several times in the brief moments they were with her in her residence). Aside from this, she reports feeling existentially concerned that nothing has been going on with her life, she wants to work but does not have proper identification, does not feel safe on [place] grounds as other patients approach her for money and sex, and does not feel like she has any personal or professional support citing no friends or family or staff she trusts. She denies AH, VH, HI, IOR, paranoia, and substance use aside from 5 cigarettes daily. In addition to daily depressed mood, anhedonia, restlessness, guilt, hopelessness, worthlessness, she insists that her suicidal thinking has intensified over the past few days. She has frequently been having the thought to blow herself up with intent and plan to drop a match into a loose gas line. She explains that she has already tried and been successful unscrewing the lid for the gas line in front of building [address] [place] [address]. Although she has not yet obtained matches she reasons this would not be difficult. She reiterates that these thoughts have been intensifying to the point that she does not feel safe returning home out of fear that she would hurt herself. She requests voluntary admission.  COLLATERAL FROM [facility] on [facility] [phone]: talked to staff Ms Erika Candelario SPARKLE: Patient has been her usual self, medication compliant - she has no access to her pills/staff keeps them locked by nurses station. Patient is "on/off" but "not bad" and frequently wants to go to the hospital in the evening. Ms Candelario thinks in part because she likes the food here. Given Pt's frequent reports of suicidality, staff did call the On-call psychiatrist at [place] who recommended that she be sent to ED nonetheless because she said a specific plan to overdose on a arsenic. At this time, Patient has no known access to arsenic nor can state where she would obtain it.”  “Discussed precipitants to warrant services,  patient reports “I wanted to commit suicide”   Patient reports more worsening mood and depression “most of my life”  However, insists that this has been acutely worsening over the past week in the context of various psychosocial stressors:  she describes being called constantly by a man named Dominick Morales who used to be a peer eris at the residence, insisting that he has been asking her for money and sex, he has been calling more frequently over the past week.  Patient also reports dissatisfied about how her life is going “I feel like I’m not doing anything with my life”  Patient reports active SI with formulated plan,  endorsing plan to light match and open gas line.    Patient describes chronic low disruptive mood and persistent anxiety.  Patient reports symptoms are persistent most of the day, more days than none.   She also reports symptoms of difficulty concentrating, perseverative thoughts, poor appetite, insomnia, feelings of sadness, anhedonia, hopelessness, worthlessness. Patient reports it has been overwhelming, and a significant emotional strain.     Patient’s facial expression, demeanor, posture/attitude during interview convey an underlying depressed/anxious mood. At time of interview patient denies SI/SIB/HI, no formulated plan or intent, and engages in safety planning.  Pt identifies feeling dissatisfied with current treatment and desires group and individual psychotherapy, notes that prior hospitalization at [facility] in 2021 provided "DBT" skills which she believes may be helpful in overcoming her suicidal thoughts.    Client denies any current AVH, IOR, delusions, psychotic disorganization or paranoia.  Denies history of aggression or violence. No access to firearm. Patient denies any symptoms suggestive of active trina: (denied grandiosity/ racing thoughts/ increased goal directed activities or engaged in risk taking behavior/ no pressured speech/ no elevated mood/ denied any increased in energy level causing sleep disruption), no signs of catatonia. She denies obsessive, intrusive and persistent thoughts or compulsive, ritualistic acts are reported. Patient denies any active legal issues, is not currently under any kind of court supervision.  Denies substance use, reviewed admitting u-tox, resulted negative. PMH: Gerd, thyroid issues. “  Soon after admission, the pt. submitted a 72-hour letter requesting discharge. Per inpatient progress note on 9/19, “She reports a former [employer] employee, whom she used to be friends with, keeps calling her and asking for sexual favors. The pt. stated she became overwhelmed and experienced SI. She reports she did not attempt to kill herself. She stated she did not attempt to get into the "gas line." The pt. stated prior to this events, she was doing well. She denied mood Sx and anxiety. Pt. denied active s/i/p, h/i/p, A/H, V/H and delusions. She reports she is medication adherent in the community.” The pt. stated she also sought admission in order to obtain reprieve from her residence. She reports she does not like the environment but is agreeable to returning and seeking assistance from staff and administration.  The patient was bright and engaged with staff on the unit. She attended groups and engaged in self-care. Her thoughts were linear, goal oriented and logical. She continued to deny active and passive s/i/p. The pt. did not demonstrate warning signs of acute self-harm or suicide while admitted. She was not deemed an acute risk of harm to self and discharged on 72-hour letter.    Patient was maintained on home medications. No changes to medications were made; see discharge medications below. Writer verified Haldol Decanoate 100mg was administered 9/13. All medications administered with good effect and tolerability. Symptoms gradually improved over the course of hospitalization. The patient was made aware of the risks and benefits of each medication and tolerated them well with no apparent side effects.   There were no behavioral problems on the unit.  Patient did not become agitated, did not require emergency intramuscular medications or seclusion/restraints, and did not self-harm on the unit. Patient remained actively engaged in treatment and participated in individual, group, and milieu therapy.  Patient got along appropriately with staff and peers. Pt. residence and psychiatrist contacted prior to discharge for safety planning and disposition planning.   Medically, during this hospitalization she remained stable.   Suicidal and aggression risk assessments were performed on the day of discharge. The patient is at elevated chronic risk of self-harm due to an underlying mood and psychotic disorder. She is not actively suicidal or manic, making her appropriate for less restrictive treatment as an outpatient without need for continued inpatient hospitalization. Protective factors for suicide include future orientation, therapeutic supports, treatment engagement, med compliance, lack of access, lack of substance use, residential stability. Risk factors include impulsivity, psychosocial stressors, poor social support, previous SA, chronic SI, frequent hospitalizations, psychotic disorder, mood disorder., Hx of substance use.  Immediate risk was minimized by inpatient admission to a safe environment with appropriate supervision and limited access to lethal means. Future risk was minimized before discharge by treatment of anxiety/depressive symptoms, maximizing outpatient support, providing relevant patient education, discussing emergency procedures, and ensuring close follow-up. Patient denies all suicidal and aggressive/homicidal ideation, intent and plan, and, in view of demonstrated clinical improvement, is not judged to be an acute danger to self or others at this time. Although the patient remains at their chronic baseline risk of self-harm, such risk cannot be further ameliorated by continued inpatient treatment and the patient is therefore appropriate for discharge.   On the day of discharge, the patient’s mood and affect are normal/euthymic. Patient denies depressed mood and anxiety. Patient is not hopeless. Sleep and appetite are also normal (at baseline).  Pt’s motor performance and productivity of speech are WNL. Pt denies symptoms of psychosis including AH/VH with no apparent delusions (or is at baseline/not preoccupied with delusions).  Patient denies S/H/I/I/P.   Patient has made clinically meaningful progress during this hospitalization and has clearly benefited from medications and psychotherapy. On day of discharge, the patient has improved significantly and no longer requires inpatient treatment and care. Pt will be discharged and follow-up with outpatient care.    Patient was provided with extensive psychoeducation on treatment options and motivational counseling targeting healthy lifestyle. Patient was instructed on actions for crisis situations, understood and agreed to follow instructions for handling crisis, including coming to ER or calling 911 should the patient or their family feel that they are in danger of hurting self or others. Patient also was given Suicide Prevention Lifeline number 1-498.321.6554 and provided with instructions on its use.   Patient was advised to avoid driving until their reactions are not impaired by medications. Motivational counseling was provided. Family is supportive and was provided with similar safety plan instructions.   Patient will be discharged with the following DSM5 Diagnoses:    PRINCIPAL DISCHARGE DIAGNOSIS  Diagnosis: Schizoaffective disorder    Patient will be discharged on the following medications:   Haldol Decanoate 100 mg/mL intramuscular solution: 100 milligram(s) intramuscular once a month  loratadine 10 mg oral tablet: 1 tab(s) orally once a day  orlistat 60 mg oral capsule: 1 cap(s) orally 2 times a day with food  SEROquel 200 mg oral tablet: 1 tab(s) orally once a day (at bedtime)  Trileptal 300 mg oral tablet: 1 tab(s) orally 2 times a day    Verbal handoff was given Dr. Garcia at [facility] [phone] including discussion of hospital course, treatment, and medications. The patient’s appointment is on 23-Sep-2022 at 11:00AM.  Inpatient Provider:  JAZMIN Deutsch-BC  [phone]

## 2022-09-21 NOTE — BH INPATIENT PSYCHIATRY PROGRESS NOTE - NSBHASSESSSUMMFT_PSY_ALL_CORE
42 year old single female.  Patient has prior  PPH of Schizoaffective Disorder, Borderline Personality and Polysubstance Abuse.  Patient has multiple prior  hospitalizations, > 30 prior inpatient psychiatric hospitalizations most recently at [facility] from 1/13/2021-4/7/2021. Patient is now hospitalized with a primary problem of worsening mood and suicidal ideations.  With formulated plan to blow herself up with intent and plan to drop a match into a loose gas line. She explains that she has already tried and been successful unscrewing the lid for the gas line in front of building [address] [place] [address].  Patient admitted to [facility] on a 9.13 legal status.     Upon assessment, pt. demonstrates euthymic mood, no warning signs of suicide, consistent with pt. reports. Overt psychosis not assessed. She is able to contract for safety. She is medication adherent. Pt. is not assessed to be an acute risk of harm to herself at this time. She submitted a 72 hour letter and will be discharged today.      Plan:  Haldol Decanoate 100 mg/mL intramuscular solution: 100 milligram(s) intramuscular once a month; last dose 9/13  loratadine 10 mg oral tablet: 1 tab(s) orally once a day  orlistat 60 mg oral capsule: 1 cap(s) orally 2 times a day with food  SEROquel 200 mg oral tablet: 1 tab(s) orally once a day (at bedtime)  Trileptal 300 mg oral tablet: 1 tab(s) orally 2 times a day    Dispo: Followup with Dr. Garcia   
 42 year old single female.  Patient has prior  PPH of Schizoaffective Disorder, Borderline Personality and Polysubstance Abuse.  Patient has multiple prior  hospitalizations, > 30 prior inpatient psychiatric hospitalizations most recently at [facility] from 1/13/2021-4/7/2021. Patient is now hospitalized with a primary problem of worsening mood and suicidal ideations.  With formulated plan to blow herself up with intent and plan to drop a match into a loose gas line. She explains that she has already tried and been successful unscrewing the lid for the gas line in front of building [address] [place] [address].  Patient admitted to [facility] on a 9.13 legal status.     Upon assessment, pt. demonstrates euthymic mood, no warning signs of suicide, consistent with pt. reports. Overt psychotic not assessed. She is able to contract for safety. She is medication adherent. Pt. is not assessed to be an acute risk of harm to herself at this time. She submitted a 72 hour letter and will be discharged on Wednesday.  Haldol Dec 100mg last administered on 9/13, confirmed by outpatient psychiatrist Dr. Garcia. She is agreeable with discharge tomorrow. Staff at residence also consulted who state the pt. is attention seeking at times, and likely sough hospitalization for secondary gain.     Plan:  >Legal: 9.13  >Obs: Routine, no current SI. no need for CO, patient not expected to pose risk to self or others in controlled inpatient setting  >Psychiatric Meds: Restart outpatient medication regimen: quetiapine 200mg PO qhs, trileptal / oxcarbazepine 300mg PO bid, Orlistat 60mg PO qd with food, claritin 10mg po qd, colace 100mg Po qhs, senna 8.6mg 2 tabs PO qhs, pseudoephedrine 30mg PO qd, Metamucil PO qd. Observe for tolerability and efficacy.   PRN medications:  Ativan 2mg oral Q6HR PRN for agitation and anxiety.  Haldol 5mg oral Q6HR PRN for agitation.   Benadryl 50mg oral Q6HR PRN for agitation.   Vistaril 50mg oral Q6HR PRN for anxiety.  Desyrel 50mg oral QHS PRN for insomnia.   >Labs: Admission labs reviewed, no acute findings. Labs pending for tomorrow: A1c and Lipid panel. QT/QTc: 396/454ms. Hold antipsychotics if QTc >500  >Medical:   No acute concerns. No consultations needed at this time. No indication for CIWA/CINA. Patient with consistently stable VS, no visible physical symptoms of withdrawal.  BAL <10 , utox negative.  During the course of treatment, will collaborate with medical team to manage medical issues.  >Diet: Regular  >Social: milieu/structured therapy  >Treatment Interventions: Groups and Individual Therapy/CBT, Motivational counseling for substance abuse related issues.   >Dispo: Collateral and dispo planning pending further symptom and medication optimization. Refer back to ACT Team    
 42 year old single female.  Patient has prior  PPH of Schizoaffective Disorder, Borderline Personality and Polysubstance Abuse.  Patient has multiple prior  hospitalizations, > 30 prior inpatient psychiatric hospitalizations most recently at [facility] from 1/13/2021-4/7/2021. Patient is now hospitalized with a primary problem of worsening mood and suicidal ideations.  With formulated plan to blow herself up with intent and plan to drop a match into a loose gas line. She explains that she has already tried and been successful unscrewing the lid for the gas line in front of building [address] [place] [address].  Patient admitted to [facility] on a 9.13 legal status.     Upon assessment, pt. demonstrates euthymic mood, no warning signs of suicide, consistent with pt. reports. Overt psychotic not assessed. She is able to contract for safety. She is medication adherent. Pt. is not assessed to be an acute risk of harm to herself at this time. She submitted a 72 hour letter and will be discharged on Wednesday.  Confirming Prolixin Dec dose.  Plan:  >Legal: 9.13  >Obs: Routine, no current SI. no need for CO, patient not expected to pose risk to self or others in controlled inpatient setting  >Psychiatric Meds: Restart outpatient medication regimen: quetiapine 200mg PO qhs, trileptal / oxcarbazepine 300mg PO bid, Orlistat 60mg PO qd with food, claritin 10mg po qd, colace 100mg Po qhs, senna 8.6mg 2 tabs PO qhs, pseudoephedrine 30mg PO qd, Metamucil PO qd. Observe for tolerability and efficacy.   PRN medications:  Ativan 2mg oral Q6HR PRN for agitation and anxiety.  Haldol 5mg oral Q6HR PRN for agitation.   Benadryl 50mg oral Q6HR PRN for agitation.   Vistaril 50mg oral Q6HR PRN for anxiety.  Desyrel 50mg oral QHS PRN for insomnia.   >Labs: Admission labs reviewed, no acute findings. Labs pending for tomorrow: A1c and Lipid panel. QT/QTc: 396/454ms. Hold antipsychotics if QTc >500  >Medical:   No acute concerns. No consultations needed at this time. No indication for CIWA/CINA. Patient with consistently stable VS, no visible physical symptoms of withdrawal.  BAL <10 , utox negative.  During the course of treatment, will collaborate with medical team to manage medical issues.  >Diet: Regular  >Social: milieu/structured therapy  >Treatment Interventions: Groups and Individual Therapy/CBT, Motivational counseling for substance abuse related issues.   >Dispo: Collateral and dispo planning pending further symptom and medication optimization. Refer back to ACT Team

## 2022-09-21 NOTE — BH INPATIENT PSYCHIATRY DISCHARGE NOTE - ATTENDING DISCHARGE PHYSICAL EXAMINATION:
On day of discharge, patient continues to be more stable than on previous admissions, at baseline including persistent hypomanic/manic symptoms, continued chronically elevated risk though insufficient acute risk to self or others to warrant conversion to involuntary, discharged on 72 hour letter. Continued limited insight, somewhat improved judgment. Medication compliant. In behavioral control.

## 2022-09-21 NOTE — BH INPATIENT PSYCHIATRY PROGRESS NOTE - NSBHMSEBEHAV_PSY_A_CORE
· May take benadryl as needed with other allergy medication  · Keep a list of your medicines with you. List all of the prescription medicines, nonprescription medicines, supplements, natural remedies, and vitamins that you take. Tell your healthcare providers who treat you about all of the products you are taking. Your provider can provide you with a form to keep track of them. Just ask. · Follow the directions that come with your medicine, including information about food or alcohol. Make sure you know how and when to take your medicine. Do not take more or less than you are supposed to take. · Keep all medicines out of the reach of children. · Store medicines according to the directions on the label. · Monitor yourself. Learn to know how your body reacts to your new medicine and keep track of how it makes you feel before attempting (If your provider has allowed you to do so) to drive or go to work. · Seek emergency medical attention if you think you have used too much of this medicine. An overdose of any prescription medicine can be fatal. Overdose symptoms may include extreme drowsiness, muscle weakness, confusion, cold and clammy skin, pinpoint pupils, shallow breathing, slow heart rate, fainting, or coma. · Don't share prescription medicines with others, even when they seem to have the same symptoms. What may be good for you may be harmful to others. · If you are no longer taking a prescribed medication and you have pills left please take your pills out of their original containers. Mix crushed pills with an undesirable substance, such as cat litter or used coffee grounds. Put the mixture into a disposable container with a lid, such as an empty margarine tub, or into a sealable bag. Cover up or remove any of your personal information on the empty containers by covering it with black permanent marker or duct tape. Place the sealed container with the mixture, and the empty drug containers, in the trash. Cooperative

## 2022-09-21 NOTE — BH INPATIENT PSYCHIATRY PROGRESS NOTE - NSBHMETABOLIC_PSY_ALL_CORE_FT
BMI: BMI (kg/m2): 40.4 (09-17-22 @ 20:05)  HbA1c:   Glucose: POCT Blood Glucose.: 95 mg/dL (07-23-22 @ 14:45)    BP: --  Lipid Panel: 
BMI: BMI (kg/m2): 40.4 (09-17-22 @ 20:05)  HbA1c:   Glucose: POCT Blood Glucose.: 95 mg/dL (07-23-22 @ 14:45)    BP: 129/65 (09-18-22 @ 02:03) (129/65 - 138/96)  Lipid Panel: 
BMI: BMI (kg/m2): 40.4 (09-17-22 @ 20:05)  HbA1c:   Glucose: POCT Blood Glucose.: 95 mg/dL (07-23-22 @ 14:45)    BP: 129/65 (09-18-22 @ 02:03) (129/65 - 138/96)  Lipid Panel:

## 2022-09-21 NOTE — BH INPATIENT PSYCHIATRY DISCHARGE NOTE - NSDCMRMEDTOKEN_GEN_ALL_CORE_FT
Haldol Decanoate 100 mg/mL intramuscular solution: 100 milligram(s) intramuscular once a month  loratadine 10 mg oral tablet: 1 tab(s) orally once a day  orlistat 60 mg oral capsule: 1 cap(s) orally 2 times a day with food  SEROquel 200 mg oral tablet: 1 tab(s) orally once a day (at bedtime)  Trileptal 300 mg oral tablet: 1 tab(s) orally 2 times a day   acetaminophen 500 mg oral tablet: 2 tab(s) orally every 6 hours  Haldol Decanoate 100 mg/mL intramuscular solution: 100 milligram(s) intramuscular once a month  ibuprofen 400 mg oral tablet: 1 tab(s) orally every 6 hours  loratadine 10 mg oral tablet: 1 tab(s) orally once a day  orlistat 60 mg oral capsule: 1 cap(s) orally 2 times a day with food  oxyCODONE 5 mg oral tablet: 1 tab(s) orally every 6 hours as needed for SEVERE pain MDD:4  Trileptal 300 mg oral tablet: 1 tab(s) orally 2 times a day

## 2022-09-26 ENCOUNTER — RESULT REVIEW (OUTPATIENT)
Age: 42
End: 2022-09-26

## 2022-09-26 ENCOUNTER — INPATIENT (INPATIENT)
Facility: HOSPITAL | Age: 42
LOS: 1 days | Discharge: PSYCHIATRIC OR SUBSTANCE USE FACILITY | End: 2022-09-28
Attending: SURGERY | Admitting: SURGERY

## 2022-09-26 VITALS
SYSTOLIC BLOOD PRESSURE: 137 MMHG | RESPIRATION RATE: 18 BRPM | HEIGHT: 66 IN | HEART RATE: 71 BPM | TEMPERATURE: 97 F | OXYGEN SATURATION: 100 % | DIASTOLIC BLOOD PRESSURE: 79 MMHG

## 2022-09-26 LAB
ALBUMIN SERPL ELPH-MCNC: 3.7 G/DL — SIGNIFICANT CHANGE UP (ref 3.3–5)
ALP SERPL-CCNC: 135 U/L — HIGH (ref 40–120)
ALT FLD-CCNC: 47 U/L — HIGH (ref 4–33)
ANION GAP SERPL CALC-SCNC: 11 MMOL/L — SIGNIFICANT CHANGE UP (ref 7–14)
APPEARANCE UR: CLEAR — SIGNIFICANT CHANGE UP
APTT BLD: 35.6 SEC — SIGNIFICANT CHANGE UP (ref 27–36.3)
AST SERPL-CCNC: 28 U/L — SIGNIFICANT CHANGE UP (ref 4–32)
BASOPHILS # BLD AUTO: 0.03 K/UL — SIGNIFICANT CHANGE UP (ref 0–0.2)
BASOPHILS NFR BLD AUTO: 0.4 % — SIGNIFICANT CHANGE UP (ref 0–2)
BILIRUB SERPL-MCNC: <0.2 MG/DL — SIGNIFICANT CHANGE UP (ref 0.2–1.2)
BILIRUB UR-MCNC: NEGATIVE — SIGNIFICANT CHANGE UP
BLD GP AB SCN SERPL QL: NEGATIVE — SIGNIFICANT CHANGE UP
BUN SERPL-MCNC: 7 MG/DL — SIGNIFICANT CHANGE UP (ref 7–23)
CALCIUM SERPL-MCNC: 9 MG/DL — SIGNIFICANT CHANGE UP (ref 8.4–10.5)
CHLORIDE SERPL-SCNC: 105 MMOL/L — SIGNIFICANT CHANGE UP (ref 98–107)
CO2 SERPL-SCNC: 23 MMOL/L — SIGNIFICANT CHANGE UP (ref 22–31)
COLOR SPEC: SIGNIFICANT CHANGE UP
CREAT SERPL-MCNC: 0.74 MG/DL — SIGNIFICANT CHANGE UP (ref 0.5–1.3)
DIFF PNL FLD: NEGATIVE — SIGNIFICANT CHANGE UP
EGFR: 102 ML/MIN/1.73M2 — SIGNIFICANT CHANGE UP
EOSINOPHIL # BLD AUTO: 0.24 K/UL — SIGNIFICANT CHANGE UP (ref 0–0.5)
EOSINOPHIL NFR BLD AUTO: 3.2 % — SIGNIFICANT CHANGE UP (ref 0–6)
GLUCOSE SERPL-MCNC: 88 MG/DL — SIGNIFICANT CHANGE UP (ref 70–99)
GLUCOSE UR QL: NEGATIVE — SIGNIFICANT CHANGE UP
HCT VFR BLD CALC: 41.7 % — SIGNIFICANT CHANGE UP (ref 34.5–45)
HGB BLD-MCNC: 12.7 G/DL — SIGNIFICANT CHANGE UP (ref 11.5–15.5)
IANC: 3.67 K/UL — SIGNIFICANT CHANGE UP (ref 1.8–7.4)
IMM GRANULOCYTES NFR BLD AUTO: 0.3 % — SIGNIFICANT CHANGE UP (ref 0–0.9)
INR BLD: 1.09 RATIO — SIGNIFICANT CHANGE UP (ref 0.88–1.16)
KETONES UR-MCNC: NEGATIVE — SIGNIFICANT CHANGE UP
LEUKOCYTE ESTERASE UR-ACNC: NEGATIVE — SIGNIFICANT CHANGE UP
LYMPHOCYTES # BLD AUTO: 2.91 K/UL — SIGNIFICANT CHANGE UP (ref 1–3.3)
LYMPHOCYTES # BLD AUTO: 39.1 % — SIGNIFICANT CHANGE UP (ref 13–44)
MCHC RBC-ENTMCNC: 27.7 PG — SIGNIFICANT CHANGE UP (ref 27–34)
MCHC RBC-ENTMCNC: 30.5 GM/DL — LOW (ref 32–36)
MCV RBC AUTO: 91 FL — SIGNIFICANT CHANGE UP (ref 80–100)
MONOCYTES # BLD AUTO: 0.58 K/UL — SIGNIFICANT CHANGE UP (ref 0–0.9)
MONOCYTES NFR BLD AUTO: 7.8 % — SIGNIFICANT CHANGE UP (ref 2–14)
NEUTROPHILS # BLD AUTO: 3.67 K/UL — SIGNIFICANT CHANGE UP (ref 1.8–7.4)
NEUTROPHILS NFR BLD AUTO: 49.2 % — SIGNIFICANT CHANGE UP (ref 43–77)
NITRITE UR-MCNC: NEGATIVE — SIGNIFICANT CHANGE UP
NRBC # BLD: 0 /100 WBCS — SIGNIFICANT CHANGE UP (ref 0–0)
NRBC # FLD: 0 K/UL — SIGNIFICANT CHANGE UP (ref 0–0)
PH UR: 7 — SIGNIFICANT CHANGE UP (ref 5–8)
PLATELET # BLD AUTO: 316 K/UL — SIGNIFICANT CHANGE UP (ref 150–400)
POTASSIUM SERPL-MCNC: 4.2 MMOL/L — SIGNIFICANT CHANGE UP (ref 3.5–5.3)
POTASSIUM SERPL-SCNC: 4.2 MMOL/L — SIGNIFICANT CHANGE UP (ref 3.5–5.3)
PROT SERPL-MCNC: 7.5 G/DL — SIGNIFICANT CHANGE UP (ref 6–8.3)
PROT UR-MCNC: ABNORMAL
PROTHROM AB SERPL-ACNC: 12.6 SEC — SIGNIFICANT CHANGE UP (ref 10.5–13.4)
RBC # BLD: 4.58 M/UL — SIGNIFICANT CHANGE UP (ref 3.8–5.2)
RBC # FLD: 14 % — SIGNIFICANT CHANGE UP (ref 10.3–14.5)
RH IG SCN BLD-IMP: POSITIVE — SIGNIFICANT CHANGE UP
SODIUM SERPL-SCNC: 139 MMOL/L — SIGNIFICANT CHANGE UP (ref 135–145)
SP GR SPEC: 1.01 — SIGNIFICANT CHANGE UP (ref 1.01–1.05)
UROBILINOGEN FLD QL: SIGNIFICANT CHANGE UP
WBC # BLD: 7.45 K/UL — SIGNIFICANT CHANGE UP (ref 3.8–10.5)
WBC # FLD AUTO: 7.45 K/UL — SIGNIFICANT CHANGE UP (ref 3.8–10.5)

## 2022-09-26 PROCEDURE — 99283 EMERGENCY DEPT VISIT LOW MDM: CPT

## 2022-09-26 PROCEDURE — 76705 ECHO EXAM OF ABDOMEN: CPT | Mod: 26

## 2022-09-26 RX ORDER — ONDANSETRON 8 MG/1
4 TABLET, FILM COATED ORAL ONCE
Refills: 0 | Status: COMPLETED | OUTPATIENT
Start: 2022-09-26 | End: 2022-09-26

## 2022-09-26 RX ORDER — KETOROLAC TROMETHAMINE 30 MG/ML
15 SYRINGE (ML) INJECTION ONCE
Refills: 0 | Status: DISCONTINUED | OUTPATIENT
Start: 2022-09-26 | End: 2022-09-26

## 2022-09-26 RX ADMIN — ONDANSETRON 4 MILLIGRAM(S): 8 TABLET, FILM COATED ORAL at 20:30

## 2022-09-26 RX ADMIN — Medication 15 MILLIGRAM(S): at 20:30

## 2022-09-26 NOTE — ED ADULT TRIAGE NOTE - NS ED TRIAGE AVPU SCALE
L rib pain when taking a deep breath in x 1 week. No recent injury, no SOB.
Alert-The patient is alert, awake and responds to voice. The patient is oriented to time, place, and person. The triage nurse is able to obtain subjective information.

## 2022-09-26 NOTE — H&P ADULT - ATTENDING COMMENTS
Symptomatic cholelithiasis  a.  Admit to B surgery / EMILE CANSECO  b.  Keep NPO at this time  c.  Continue IVF resuscitation  d.  Plan for laparoscopic cholecystectomy  e.  Note of abnormal LFTs, With top normal CBD diameter.  Trend LFTS, MRCP likely.  Possible need for ERCP discussed Symptomatic cholelithiasis  a.  Admit to B surgery / EMILE CANSECO  b.  Keep NPO at this time  c.  Continue IVF resuscitation  d.  Plan for laparoscopic cholecystectomy

## 2022-09-26 NOTE — H&P ADULT - NSHPLABSRESULTS_GEN_ALL_CORE
----------  LABORATORY DATA:  ----------                        12.7   7.45  )-----------( 316      ( 26 Sep 2022 20:28 )             41.7                   139  |  105  |  7   ----------------------------<  88  4.2   |  23  |  0.74    Ca    9.0      26 Sep 2022 20:28    TPro  7.5  /  Alb  3.7  /  TBili  <0.2  /  DBili  x   /  AST  28  /  ALT  47<H>  /  AlkPhos  135<H>      LIVER FUNCTIONS - ( 26 Sep 2022 20:28 )  Alb: 3.7 g/dL / Pro: 7.5 g/dL / ALK PHOS: 135 U/L / ALT: 47 U/L / AST: 28 U/L / GGT: x           PT/INR - ( 26 Sep 2022 20:28 )   PT: 12.6 sec;   INR: 1.09 ratio         PTT - ( 26 Sep 2022 20:28 )  PTT:35.6 sec  Urinalysis Basic - ( 26 Sep 2022 20:20 )    Color: Light Yellow / Appearance: Clear / S.012 / pH: x  Gluc: x / Ketone: Negative  / Bili: Negative / Urobili: <2 mg/dL   Blood: x / Protein: Trace / Nitrite: Negative   Leuk Esterase: Negative / RBC: x / WBC x   Sq Epi: x / Non Sq Epi: x / Bacteria: x    < from: US Abdomen Upper Quadrant Right (22 @ 21:07) >    IMPRESSION:  Cholelithiasis with positive sonographic Parks sign without other   secondary signs of acute cholecystitis. These findings are equivocal for   acute cholecystitis and HIDA scan should be considered if there is high   clinical suspicion for cystic duct obstruction      < end of copied text >

## 2022-09-26 NOTE — H&P ADULT - ASSESSMENT
42F PMH GERD, shcizoaffective, BPD, biliary cholic with cholelithiasis (prior presentations in June 2022, left hospital AMA before surgery, followed up in 7/2022) now presenting with one day of RUQ abdominal pain. Here with symptomatic cholelithiasis.    Plan:  - Admit to Dr. Zarco  - Consulted psychiatry for evaluation given recent admit for SI, discharged 2021, Dr. Mady Brooks, reported not need for psychiatric evaluation at this time  - NPO, IVF  - Added on, to be consented for laparoscopic cholecystectomy, possible open  - labs  -dvt ppx  - Home trileptal, seroquel (reports no longer takes this but is on most recent medrec)    Aung Palacios  General Surgery  B Team  [id number]       42F PMH GERD, shcizoaffective, BPD, biliary cholic with cholelithiasis (prior presentations in June 2022, left hospital AMA before surgery, followed up in 7/2022) now presenting with one day of RUQ abdominal pain. Here with symptomatic cholelithiasis.    Plan:  - Admit to Dr. Zarco  - Consulted psychiatry for evaluation given recent admit for SI, discharged 2021, Dr. Mady Brooks, reported not need for psychiatric evaluation at this time  - NPO, IVF  - Added on, consented, patient acknowledged understanding and reiterated risks, benefits, and alternatives associated with surgery  - labs  -dvt ppx  - Home trileptal, seroquel (reports no longer takes this but is on most recent medrec)    Aung Palacios  General Surgery  B Team  [id number]

## 2022-09-26 NOTE — ED ADULT NURSE NOTE - OBJECTIVE STATEMENT
Patient came in with the complaints of abdominal pain. As per patient her pain started few hours ago with nausea. No other complaints. Specimens collected and sent. Medications given as ordered. Patient tolerated well. Nursing care continues

## 2022-09-26 NOTE — ED PROVIDER NOTE - OBJECTIVE STATEMENT
42 yof with hx of gallstones, offered surgery few months ago but pt eloped. Pt states she was to f/u with Dr. Lozano but she was scared. Pt states that she has intermittent upper abdominal pain with nausea and vomiting but few hours ago, pain became severe. Pt denies any fever, cp or SOB. Pt states she is willing to get surgery now.

## 2022-09-26 NOTE — ED PROVIDER NOTE - CROS ED ROS STATEMENT
DASH diet; diet against stopping hypertension  low salt  low fat  low sugar all other ROS negative except as per HPI

## 2022-09-26 NOTE — H&P ADULT - HISTORY OF PRESENT ILLNESS
42F PMH GERD, shcizoaffective, BPD, biliary cholic with cholelithiasis (prior presentations in June 2022, left hospital AMA before surgery, followed up in 7/2022) now presenting with one day of RUQ abdominal pain.    Patient reports 3 hours of abdominal pain, RUQ, constant, radiating to LUQ. Did not eat today, no known exacerbating or relieving factors. Denies F/N/C, N/V/D, chest pain, sob, dysuria.    Recent admission for suicidal ideation, discharged on 9/21 after improvement in symptoms to [facility].

## 2022-09-26 NOTE — H&P ADULT - NSHPPHYSICALEXAM_GEN_ALL_CORE
Vital Signs Last 24 Hrs  T(C): 37.1 (26 Sep 2022 21:45), Max: 37.1 (26 Sep 2022 21:45)  T(F): 98.7 (26 Sep 2022 21:45), Max: 98.7 (26 Sep 2022 21:45)  HR: 69 (26 Sep 2022 21:45) (69 - 71)  BP: 118/79 (26 Sep 2022 21:45) (118/79 - 137/79)  BP(mean): --  RR: 16 (26 Sep 2022 21:45) (16 - 18)  SpO2: 98% (26 Sep 2022 21:45) (98% - 100%)    Parameters below as of 26 Sep 2022 21:45  Patient On (Oxygen Delivery Method): room air    General: well developed, well nourished, NAD  Neuro: alert and oriented, no focal deficits, moves all extremities spontaneously  Respiratory: airway patent, respirations unlabored  CVS: regular rate  Abdomen: soft, mildly tender RUQ  Extremities: no edema, sensation and movement grossly intact  Skin: warm, dry, appropriate color

## 2022-09-26 NOTE — ED PROVIDER NOTE - CLINICAL SUMMARY MEDICAL DECISION MAKING FREE TEXT BOX
42 yof with known cholelithiasis with symptomatic biliary colic likely - will get labs, US and pain relief and antiemetics, will call surgical consult after.

## 2022-09-27 ENCOUNTER — TRANSCRIPTION ENCOUNTER (OUTPATIENT)
Age: 42
End: 2022-09-27

## 2022-09-27 DIAGNOSIS — K80.50 CALCULUS OF BILE DUCT WITHOUT CHOLANGITIS OR CHOLECYSTITIS WITHOUT OBSTRUCTION: ICD-10-CM

## 2022-09-27 LAB — SARS-COV-2 RNA SPEC QL NAA+PROBE: SIGNIFICANT CHANGE UP

## 2022-09-27 PROCEDURE — 99223 1ST HOSP IP/OBS HIGH 75: CPT

## 2022-09-27 PROCEDURE — 88304 TISSUE EXAM BY PATHOLOGIST: CPT | Mod: 26

## 2022-09-27 PROCEDURE — 99222 1ST HOSP IP/OBS MODERATE 55: CPT | Mod: 57

## 2022-09-27 DEVICE — LIGATING CLIPS WECK HEMOLOK POLYMER MEDIUM-LARGE (GREEN) 6: Type: IMPLANTABLE DEVICE | Status: FUNCTIONAL

## 2022-09-27 RX ORDER — OXCARBAZEPINE 300 MG/1
300 TABLET, FILM COATED ORAL
Refills: 0 | Status: DISCONTINUED | OUTPATIENT
Start: 2022-09-27 | End: 2022-09-28

## 2022-09-27 RX ORDER — DEXTROSE MONOHYDRATE, SODIUM CHLORIDE, AND POTASSIUM CHLORIDE 50; .745; 4.5 G/1000ML; G/1000ML; G/1000ML
1000 INJECTION, SOLUTION INTRAVENOUS
Refills: 0 | Status: DISCONTINUED | OUTPATIENT
Start: 2022-09-27 | End: 2022-09-28

## 2022-09-27 RX ORDER — ACETAMINOPHEN 500 MG
1000 TABLET ORAL EVERY 6 HOURS
Refills: 0 | Status: DISCONTINUED | OUTPATIENT
Start: 2022-09-27 | End: 2022-09-28

## 2022-09-27 RX ORDER — OXYCODONE HYDROCHLORIDE 5 MG/1
5 TABLET ORAL EVERY 6 HOURS
Refills: 0 | Status: DISCONTINUED | OUTPATIENT
Start: 2022-09-27 | End: 2022-09-28

## 2022-09-27 RX ORDER — IBUPROFEN 200 MG
400 TABLET ORAL EVERY 6 HOURS
Refills: 0 | Status: DISCONTINUED | OUTPATIENT
Start: 2022-09-27 | End: 2022-09-28

## 2022-09-27 RX ORDER — QUETIAPINE FUMARATE 200 MG/1
200 TABLET, FILM COATED ORAL AT BEDTIME
Refills: 0 | Status: DISCONTINUED | OUTPATIENT
Start: 2022-09-27 | End: 2022-09-28

## 2022-09-27 RX ORDER — OXYCODONE HYDROCHLORIDE 5 MG/1
2.5 TABLET ORAL EVERY 6 HOURS
Refills: 0 | Status: DISCONTINUED | OUTPATIENT
Start: 2022-09-27 | End: 2022-09-28

## 2022-09-27 RX ORDER — ENOXAPARIN SODIUM 100 MG/ML
40 INJECTION SUBCUTANEOUS EVERY 24 HOURS
Refills: 0 | Status: DISCONTINUED | OUTPATIENT
Start: 2022-09-27 | End: 2022-09-28

## 2022-09-27 RX ADMIN — Medication 400 MILLIGRAM(S): at 13:47

## 2022-09-27 RX ADMIN — DEXTROSE MONOHYDRATE, SODIUM CHLORIDE, AND POTASSIUM CHLORIDE 120 MILLILITER(S): 50; .745; 4.5 INJECTION, SOLUTION INTRAVENOUS at 09:33

## 2022-09-27 RX ADMIN — Medication 1000 MILLIGRAM(S): at 13:47

## 2022-09-27 RX ADMIN — OXYCODONE HYDROCHLORIDE 5 MILLIGRAM(S): 5 TABLET ORAL at 11:15

## 2022-09-27 RX ADMIN — OXYCODONE HYDROCHLORIDE 5 MILLIGRAM(S): 5 TABLET ORAL at 12:07

## 2022-09-27 RX ADMIN — Medication 400 MILLIGRAM(S): at 13:48

## 2022-09-27 RX ADMIN — OXCARBAZEPINE 300 MILLIGRAM(S): 300 TABLET, FILM COATED ORAL at 05:27

## 2022-09-27 RX ADMIN — Medication 1000 MILLIGRAM(S): at 13:48

## 2022-09-27 NOTE — BH CONSULTATION LIAISON ASSESSMENT NOTE - SUMMARY
Pt is a 43 y/o F, single, non-caregiver, unemployed, on disability for mental illness, domiciled at [facility], on AOT at this time, with past psychiatric history of schizoaffective disorder, borderline personality disorder, polysubstance abuse, >30 prior inpatient psychiatric hospitalizations most recently at [facility] from 9/18-9/21,  hx of highly disorganized behavior requiring transfer to [facility] hospitalization ([place]) such as smearing feces in the wall, eating feces, turning in a [place] over and over again, myriad of ED visits including [facility], 3 prior suicide attempts (last in 2012 via OD), recurrent chronic suicidal gestures and suicidal ideation, hx of property destruction when upset, long hx of sexually provocative behavior (both out in the community and while on inpatient units requiring 1:1 staff observation), hx of physical altercations, hx of verbal threats, hx of property destruction, long hx of medication and tx noncompliance resulting in AOT and PALACIOS administration, who presents today for evaluation of psychiatric stability and capacity to consent for laparoscopic cholecystectomy.     With Pt's permission, discussion about risks/benefits of surgery was conducted with [facility] Psych, Pt's primary surgical team, and Care Coordinator Veronica Hernandez. Pt demonstrated understanding of the kind of surgical procedure about to be conducted and the risks and benefits of the surgery. Pt was able to respond back in her own words what the risks and benefits of the procedure are. Pt demonstrated capacity to consent for surgery and is psychiatrically stable.     PLAN  --Cont Haldol 5mg q4 PRN PO/IM/IV  --Cont Seroquel 200mg PO at bedtime, pending collateral from Pt's psychiatrist Dr. Wyman ([phone]) to obtain further information about Pt's baseline and past seroquel adherence  --Cont Trileptal 300mg PO BID   --Ativan 2mg q4 PO/IV/IM PRN for agitation  --Benadryl 25mg PRN for refractory agitation and extrapyramidal symptoms  --Received Haldol Dec last dose on 9/13  --D/t Pt's hx of agitation and aggression, monitor for behavioral agitation and recommend PRN's, 1:1 constant observation, and security oversight as needed  Pt is a 41 y/o F, single, non-caregiver, unemployed, on disability for mental illness, domiciled at [facility], on AOT at this time, with past psychiatric history of schizoaffective disorder, as per chart: borderline personality disorder, polysubstance abuse, >30 prior inpatient psychiatric hospitalizations most recently at [facility] from 9/18-9/21,  hx of highly disorganized behavior [facility] such as smearing feces in the wall, eating feces, turning in a [place] over and over again, myriad of ED visits including [facility], 3 prior suicide attempts (last in 2012 via OD), recurrent chronic suicidal gestures and suicidal ideation, hx of property destruction when upset, long hx of sexually provocative behavior (both out in the community and while on inpatient units requiring 1:1 staff observation), hx of physical altercations, hx of verbal threats, hx of property destruction, long hx of medication and tx noncompliance resulting in AOT and PALACIOS administration, who presents with abdominal RUQ pain. Psychiatry consulted for capacity to consent for laparoscopic cholecystectomy. As per team pt. is in agreement and has already consented.     With Pt's permission, discussion about indication, risks/benefits of surgery was conducted with [facility] Psych, Pt's primary surgical team, and Care Coordinator Veronica Hernandez. Resident physician from Pt's surgical team discussed the indication, risks, benefits of surgery (cholecystectomy). Pt demonstrated understanding of the kind of surgical procedure about to be conducted and the risks and benefits of the surgery. Pt was able to respond back in her own words what the risks and benefits of the procedure are. Pt demonstrated capacity to consent for surgery.     Based on today's evaluation and collateral obtained, pt. seems to be psychiatrically stable, she has been compliant with her medication, and follow ups, in good behavioral  control and making her needs know    PLAN  -- Cont Seroquel 200mg PO at bedtime, pending collateral from Pt's psychiatrist Dr. Wyman ([phone]) to obtain further information about medication  --Cont Trileptal 300mg PO BID   - PRN Haldol 5mg q4h PO/IM/IV for agitation  --PRN Ativan 2mg q4h PO/IM/IV  for agitation  --Benadryl 25mg PRN for refractory agitation and extrapyramidal symptoms  --As per inpatient documents, pt. received Haldol Decanoate 100 mg/mL intramuscular solution: 100 milligram(s) intramuscular once a month; last dose 9/13  --***D/t Pt's hx of agitation, impulsivity and aggression, monitor for behavioral agitation and recommend low threshold for PRN's, 1:1 constant observation, and security oversight as needed   -HOLD antipsychotics if qtc >500  -Left a message for outpt. psychiatrist

## 2022-09-27 NOTE — PROGRESS NOTE ADULT - ATTENDING COMMENTS
I have personally interviewed and examined this patient, reviewed pertinent labs and imaging, and discussed the case with colleagues, residents, and physician assistants on B Team rounds.  More than 50% of this 35 minute encounter including face to face with the patient was spent counseling and/or coordination of care on acute cholecystitis.  Time included review of vitals, labs, imaging, discussion with consultants and coordination with the operating room/emergency department.    41yo F admitted with recurrent RUQ pain. Pt with known history of biliary colic, previously seen as both inpatient and outpatient. Admitted with diagnosis of acute cholecystitis. Pt reports feeling better today but is eager to proceed with cholecystectomy. Pt recently admitted for suicidal ideation, though appears of clear sensorium today.     - Psych eval for capacity and safety upon discharge.   - NPO   - IV abx   - lap divya pending OR availability     The active care issues are:  1. acute divya     The Acute Care Surgery (B Team) Attending Group Practice:  Dr. Key Lozano    urgent issues - spectra [phone]  nonurgent issues - [phone]  patient appointments or afterhours - [phone]

## 2022-09-27 NOTE — BH CONSULTATION LIAISON ASSESSMENT NOTE - RISK ASSESSMENT
Protective factors: stable housing, supportive relationship with care coordinator   Risk factors: past SI/SA, hx of agitation, aggression, property destruction Protective factors: stable housing, supportive relationship with care coordinator , compliant with meds. and follow ups, denies SI and HI  Risk factors: past SI/SA, hx of agitation, aggression, property destruction, h/o inpatient admissions, h/o non complince    Currently not at acute risk of harm to self or others

## 2022-09-27 NOTE — BH CONSULTATION LIAISON ASSESSMENT NOTE - HPI (INCLUDE ILLNESS QUALITY, SEVERITY, DURATION, TIMING, CONTEXT, MODIFYING FACTORS, ASSOCIATED SIGNS AND SYMPTOMS)
Pt is a 43 y/o F, single, non-caregiver, unemployed, on disability for mental illness, domiciled at [facility], on AOT at this time, with past psychiatric history of schizoaffective disorder, borderline personality disorder, polysubstance abuse, >30 prior inpatient psychiatric hospitalizations most recently at [facility] from 9/18-9/21,  hx of highly disorganized behavior requiring transfer to [facility] hospitalization ([place]) such as smearing feces in the wall, eating feces, turning in a [place] [place] over and over again, myriad of ED visits including [facility], 3 prior suicide attempts (last in 2012 via OD), recurrent chronic suicidal gestures and suicidal ideation, hx of property destruction when upset, long hx of sexually provocative behavior (both out in the community and while on inpatient units requiring 1:1 staff observation), hx of physical altercations, hx of verbal threats, hx of property destruction, long hx of medication and tx noncompliance resulting in AOT and PALACIOS administration, who presents today for evaluation of psychiatric stability and capacity to consent for laparoscopic cholecystectomy.     [facility] Psych Team was consulted by Pt's surgical team in order to assess psychiatric stability and capacity to consent for laparoscopic surgery in the context of Pt's psychiatric history. Pt was interviewed and evaluated on bedside. Her mood is "good" and she was smiling and cheerful during interview. Pt stated that she was scared about her upcoming surgery, but said she was looking forward to not having the abdominal pain afterwards. When asked to recount the risks and benefits of the surgery based on the conversation she had with the Surgery Team the day prior, Pt accurately recalled the risks of infection and bleeding and the benefits of not having ongoing pain. Pt's AOT care coordinator, Veronica Hernandez, joined the meeting and collateral was obtained (see below). Resident physician from Pt's surgical team joined. With Pt's permission, another conversation regarding the risks, benefits of surgery was conducted with Psych, Surgery, and Pt's care coordinator present. AAOx3. Pt denies SI, HI, AH, VH, paranoia, and delusions.    Collateral obtained from Pt's AOT care coordinator, Veronica Hernandez:  Ms. Hernandez has been working with Pt for the last 2 months. She stated that since Pt's discharge from [facility] on 9/21, Pt's mood has been stable, she takes initiative on asking for help and making her needs known, such as when she asked Staff to call the ambulance for her d/t her abdominal pain. Ms. Hernandez denied that Pt has had SI, HI, AVH since discharge from [facility]. Ms. Hernandez stated that she has no concerns regarding Pt's psychiatric stability at this time.   Pt is a 41 y/o F, single, non-caregiver, unemployed, on disability for mental illness, domiciled at [facility], on AOT at this time, with past psychiatric history of schizoaffective disorder, as per chart: borderline personality disorder, polysubstance abuse, >30 prior inpatient psychiatric hospitalizations most recently at [facility] from 9/18-9/21,  hx of highly disorganized behavior requiring transfer to [facility] hospitalization ([place]) such as smearing feces in the wall, eating feces, turning in a [place] over and over again, myriad of ED visits including [facility], 3 prior suicide attempts (last in 2012 via OD), recurrent chronic suicidal gestures and suicidal ideation, hx of property destruction when upset, long hx of sexually provocative behavior (both out in the community and while on inpatient units requiring 1:1 staff observation), hx of physical altercations, hx of verbal threats, hx of property destruction, long hx of medication and tx noncompliance resulting in AOT and PALACIOS administration, who presents with abdominal RUQ pain. Psychiatry consulted for capacity to consent for laparoscopic cholecystectomy. As per team pt. is in agreement and has already consented.     CL Psych Team was consulted by Pt's surgical team in order to assess psychiatric stability and capacity to consent for laparoscopic cholecystectomy in the context of Pt's psychiatric history and recent [facility] admission. Pt was interviewed and evaluated on bedside. Her mood is "good" and she was smiling and cheerful during interview. Pt stated that she was scared about her upcoming surgery, but said she was looking forward to not having the abdominal pain afterwards. When asked to recount the indication, risks and benefits of the surgery based on the conversation she had with the Surgery Team the day prior, Pt.  recalled the risks of infection and bleeding, possible need for blood transfusion, and the benefits of not having ongoing pain . Pt's AOT care coordinator, Veronica Hernandez, joined the meeting and collateral was obtained (see below, pt. provide the consent ). Patient currently  AAOx3, linear/goal directed, denies SI, HI, AH, VH, paranoia, and delusions.  Resident physician from Pt's surgical team joined. With Pt's permission, another conversation regarding the indication, risks, benefits of surgery (cholecystectomy) was conducted with Psych, Surgery, and Pt's care coordinator present. Surgical resident explained the proposed procedure, indication/risk/benefits of accepting and refusing the surgery were explained as well. Pt was able to respond back in her own words indication,  risks and benefits of the proposed procedure . She is currently agreeing for the surgery.     Collateral obtained from Pt's AOT care coordinator, Veronica Hernandez:  Ms. Hernandez has been working with Pt for the last 2 months. She stated that since Pt's discharge from [facility] on 9/21, Pt's mood has been stable, she takes initiative on asking for help and making her needs known, such as when she asked Staff to call the ambulance for her d/t her abdominal pain. Ms. Hernandez denied that Pt has had SI, HI, AVH, paranoia or delusion since discharge from [facility]. Ms. Hernandez stated that she has no concerns regarding Pt's psychiatric stability at this time. Patient has been in good behavioral control since dc from [facility] and has been compliant with her medications and outpatient follow ups.

## 2022-09-27 NOTE — BH CONSULTATION LIAISON ASSESSMENT NOTE - DETAILS
Adverse rxn to Depakote Admitted to [facility] Inpatient Psychiatry 9/18 d/t worsening mood and suicidal ideations, with formulated plan to blow herself up wit intent and plan to drop a match into a loose gas line. Admitted to [facility] on a 9.13 legal status. Discharged on 9/21 Admitted to [facility] Inpatient Psychiatry 9/18 d/t worsening mood and suicidal ideations, with formulated plan to blow herself up wit intent and plan to drop a match into a loose gas line. Admitted to 4. Discharged on 9/21     SEe HPI

## 2022-09-27 NOTE — BH CONSULTATION LIAISON ASSESSMENT NOTE - OTHER PAST PSYCHIATRIC HISTORY (INCLUDE DETAILS REGARDING ONSET, COURSE OF ILLNESS, INPATIENT/OUTPATIENT TREATMENT)
Past psychiatric history of schizoaffective disorder, borderline personality disorder, polysubstance abuse, >30 prior inpatient psychiatric hospitalizations most recently at [facility] from 9/18-9/21,  hx of highly disorganized behavior requiring transfer to state hospitalization ([place]) such as smearing feces in the wall, eating feces, turning in a [place] over and over again, myriad of ED visits including [facility], 3 prior suicide attempts (last in 2012 via OD), recurrent chronic suicidal gestures and suicidal ideation, hx of property destruction when upset, long hx of sexually provocative behavior (both out in the community and while on inpatient units requiring 1:1 staff observation), hx of physical altercations, hx of verbal threats, hx of property destruction, long hx of medication and tx noncompliance resulting in AOT and PALACIOS administration

## 2022-09-27 NOTE — BH CONSULTATION LIAISON ASSESSMENT NOTE - NSBHREFERDETAILS_PSY_A_CORE_FT
Capacity to consent for cholecystectomy  As per discussion with team, patient has already provided consent but given her psychiatric h/o they need psychiatry to evaluate for capacity to consent for cholecystectomy.

## 2022-09-27 NOTE — PATIENT PROFILE ADULT - FALL HARM RISK - UNIVERSAL INTERVENTIONS
Bed in lowest position, wheels locked, appropriate side rails in place/Call bell, personal items and telephone in reach/Instruct patient to call for assistance before getting out of bed or chair/Non-slip footwear when patient is out of bed/[place] to call system/Physically safe environment - no spills, clutter or unnecessary equipment/Purposeful Proactive Rounding/Room/bathroom lighting operational, light cord in reach

## 2022-09-27 NOTE — BH CONSULTATION LIAISON ASSESSMENT NOTE - ADDITIONAL DETAILS ALL
Admitted to [facility] Inpatient Psychiatry 9/18 d/t worsening mood and suicidal ideations, with formulated plan to blow herself up wit intent and plan to drop a match into a loose gas line. Admitted to [facility] on a 9.13 legal status. Discharged on 9/21

## 2022-09-27 NOTE — BH CONSULTATION LIAISON ASSESSMENT NOTE - VIOLENCE RISK FACTORS:
Affective dysregulation/Impulsivity/Noncompliance with treatment Affective dysregulation/Impulsivity/Other

## 2022-09-27 NOTE — BH CONSULTATION LIAISON ASSESSMENT NOTE - NSBHATTESTCOMMENTATTENDFT_PSY_A_CORE
Chart reviewed, pt. seen/evaluated with student, I agree with above assessment/plan. Patient oriented, well engaged, euthymic, smiling with linear/coherent thought process, pt. denies feeling depressed or hopeless, no evidence of trina or psychosis. Patient firmly denies passive or active SIIP, denies HIIP.  Patient reports compliance with medications. Met with patient's care coordinator from AOT team, she reports patient has been stable from psychiatric perspective, she has been in good behavioral control, making her needs known, coordinator denies any acute safety concerns. In terms of capacity: Resident physician from Pt's surgical team discussed the indication, risks, benefits of accepting/refusing the cholecystectomy. Patient was able to verbalize understanding of the indication/risk/benefits of the proposed surgery, able to appreciate her current medical condition, and able to manipulate information provided to her in a logical manner. Based on my evaluation, at this time pt. has capacity to consent for surgery. Plan as above, will follow

## 2022-09-27 NOTE — BH CONSULTATION LIAISON ASSESSMENT NOTE - NSBHCHARTREVIEWVS_PSY_A_CORE FT
Vital Signs Last 24 Hrs  T(C): 36.9 (27 Sep 2022 10:34), Max: 37.4 (27 Sep 2022 01:59)  T(F): 98.4 (27 Sep 2022 10:34), Max: 99.3 (27 Sep 2022 01:59)  HR: 72 (27 Sep 2022 10:34) (68 - 84)  BP: 132/82 (27 Sep 2022 10:34) (113/67 - 138/78)  BP(mean): --  RR: 18 (27 Sep 2022 10:34) (16 - 18)  SpO2: 100% (27 Sep 2022 10:34) (98% - 100%)    Parameters below as of 27 Sep 2022 10:04  Patient On (Oxygen Delivery Method): room air    CBC Full  -  ( 26 Sep 2022 20:28 )  WBC Count : 7.45 K/uL  RBC Count : 4.58 M/uL  Hemoglobin : 12.7 g/dL  Hematocrit : 41.7 %  Platelet Count - Automated : 316 K/uL  Mean Cell Volume : 91.0 fL  Mean Cell Hemoglobin : 27.7 pg  Mean Cell Hemoglobin Concentration : 30.5 gm/dL  Auto Neutrophil # : 3.67 K/uL  Auto Lymphocyte # : 2.91 K/uL  Auto Monocyte # : 0.58 K/uL  Auto Eosinophil # : 0.24 K/uL  Auto Basophil # : 0.03 K/uL  Auto Neutrophil % : 49.2 %  Auto Lymphocyte % : 39.1 %  Auto Monocyte % : 7.8 %  Auto Eosinophil % : 3.2 %  Auto Basophil % : 0.4 %  09-26    139  |  105  |  7   ----------------------------<  88  4.2   |  23  |  0.74    Ca    9.0      26 Sep 2022 20:28    TPro  7.5  /  Alb  3.7  /  TBili  <0.2  /  DBili  x   /  AST  28  /  ALT  47<H>  /  AlkPhos  135<H>  09-26

## 2022-09-27 NOTE — BH CONSULTATION LIAISON ASSESSMENT NOTE - CURRENT MEDICATION
MEDICATIONS  (STANDING):  acetaminophen     Tablet .. 1000 milliGRAM(s) Oral every 6 hours  dextrose 5% + sodium chloride 0.9% with potassium chloride 20 mEq/L 1000 milliLiter(s) (120 mL/Hr) IV Continuous <Continuous>  enoxaparin Injectable 40 milliGRAM(s) SubCutaneous every 24 hours  ibuprofen  Tablet. 400 milliGRAM(s) Oral every 6 hours  OXcarbazepine 300 milliGRAM(s) Oral two times a day  QUEtiapine 200 milliGRAM(s) Oral at bedtime    MEDICATIONS  (PRN):  oxyCODONE    IR 5 milliGRAM(s) Oral every 6 hours PRN Severe Pain (7 - 10)  oxyCODONE    IR 2.5 milliGRAM(s) Oral every 6 hours PRN Moderate Pain (4 - 6)

## 2022-09-27 NOTE — PROGRESS NOTE ADULT - ASSESSMENT
42F PMH GERD, shcizoaffective, BPD, biliary cholic with cholelithiasis (prior presentations in June 2022, left hospital AMA before surgery, followed up in 7/2022) now presenting with one day of RUQ abdominal pain. Here with symptomatic cholelithiasis.    Plan:  - Admit to Dr. Zarco  - Consulted psychiatry for evaluation given recent admit for SI, discharged 2021, Dr. Mady Brooks, reported not need for psychiatric evaluation at this time  - NPO, IVF  - Added on, consented, patient acknowledged understanding and reiterated risks, benefits, and alternatives associated with surgery  - labs  -dvt ppx  - Home trileptal, seroquel (reports no longer takes this but is on most recent medrec)      General Surgery  B Team  [id number]     42F PMH GERD, shcizoaffective, BPD, biliary cholic with cholelithiasis (prior presentations in June 2022, left hospital AMA before surgery, followed up in 7/2022) now presenting with one day of RUQ abdominal pain. Here with symptomatic cholelithiasis.    Plan:  - Admit to Dr. Zarco  - Consult psychiatry for evaluation given recent admit for SI, discharged 2021  - NPO, IVF  - Added on, consented, patient acknowledged understanding and reiterated risks, benefits, and alternatives associated with surgery  - labs  -dvt ppx  - Home trileptal, seroquel (reports no longer takes this but is on most recent medrec)      General Surgery  B Team  [id number]     42F PMH GERD, shcizoaffective, BPD, biliary cholic with cholelithiasis (prior presentations in June 2022, left hospital AMA before surgery, followed up in 7/2022) now presenting with one day of RUQ abdominal pain. Here with symptomatic cholelithiasis.    Plan:  - Consult psychiatry for evaluation given recent admit for SI, discharged 2021  - NPO/ IVF  - Added on, consented, patient acknowledged understanding and reiterated risks, benefits, and alternatives associated with surgery  - labs  -dvt ppx  - Home trileptal, seroquel (reports no longer takes this but is on most recent medrec)      General Surgery  B Team  [id number]

## 2022-09-27 NOTE — PATIENT PROFILE ADULT - HAVE YOU EXPERIENCED VIOLENCE OR A TRAUMATIC EVENT?
General:	Awake and active;   Head:		AFOF  Eyes:		Normally set bilaterally  Ears:		Patent bilaterally, no deformities  Nose/Mouth:	Nares patent, palate intact  Neck:		No masses, intact clavicles  Chest/Lungs:      Breath sounds equal to auscultation.   CV:		no murmur, normal pulses bilaterally  Abdomen:         Soft nontender, mild distension, no masses, bowel sounds present  :		Normal for gestational age  Back:		Intact skin, no sacral dimples or tags  Anus:		Grossly patent  Extremities:	FROM, no hip clicks  Skin:		Pink,   Neuro exam:	Appropriate tone, activity   no

## 2022-09-28 ENCOUNTER — TRANSCRIPTION ENCOUNTER (OUTPATIENT)
Age: 42
End: 2022-09-28

## 2022-09-28 VITALS
OXYGEN SATURATION: 100 % | TEMPERATURE: 98 F | DIASTOLIC BLOOD PRESSURE: 82 MMHG | SYSTOLIC BLOOD PRESSURE: 132 MMHG | RESPIRATION RATE: 18 BRPM | HEART RATE: 80 BPM

## 2022-09-28 PROCEDURE — 99232 SBSQ HOSP IP/OBS MODERATE 35: CPT

## 2022-09-28 PROCEDURE — 47562 LAPAROSCOPIC CHOLECYSTECTOMY: CPT | Mod: GC

## 2022-09-28 RX ORDER — OXYCODONE HYDROCHLORIDE 5 MG/1
1 TABLET ORAL
Qty: 4 | Refills: 0
Start: 2022-09-28 | End: 2022-09-28

## 2022-09-28 RX ORDER — QUETIAPINE FUMARATE 200 MG/1
1 TABLET, FILM COATED ORAL
Qty: 0 | Refills: 0 | DISCHARGE

## 2022-09-28 RX ORDER — OXYCODONE HYDROCHLORIDE 5 MG/1
1 TABLET ORAL
Qty: 8 | Refills: 0
Start: 2022-09-28 | End: 2022-09-29

## 2022-09-28 RX ORDER — FENTANYL CITRATE 50 UG/ML
25 INJECTION INTRAVENOUS
Refills: 0 | Status: DISCONTINUED | OUTPATIENT
Start: 2022-09-28 | End: 2022-09-28

## 2022-09-28 RX ORDER — ACETAMINOPHEN 500 MG
2 TABLET ORAL
Qty: 0 | Refills: 0 | DISCHARGE
Start: 2022-09-28

## 2022-09-28 RX ORDER — ONDANSETRON 8 MG/1
4 TABLET, FILM COATED ORAL ONCE
Refills: 0 | Status: DISCONTINUED | OUTPATIENT
Start: 2022-09-28 | End: 2022-09-28

## 2022-09-28 RX ORDER — IBUPROFEN 200 MG
1 TABLET ORAL
Qty: 0 | Refills: 0 | DISCHARGE
Start: 2022-09-28

## 2022-09-28 RX ADMIN — Medication 1000 MILLIGRAM(S): at 13:10

## 2022-09-28 RX ADMIN — Medication 400 MILLIGRAM(S): at 10:02

## 2022-09-28 RX ADMIN — OXCARBAZEPINE 300 MILLIGRAM(S): 300 TABLET, FILM COATED ORAL at 05:02

## 2022-09-28 RX ADMIN — Medication 1000 MILLIGRAM(S): at 05:32

## 2022-09-28 RX ADMIN — Medication 1000 MILLIGRAM(S): at 05:02

## 2022-09-28 RX ADMIN — FENTANYL CITRATE 25 MICROGRAM(S): 50 INJECTION INTRAVENOUS at 00:58

## 2022-09-28 RX ADMIN — DEXTROSE MONOHYDRATE, SODIUM CHLORIDE, AND POTASSIUM CHLORIDE 120 MILLILITER(S): 50; .745; 4.5 INJECTION, SOLUTION INTRAVENOUS at 01:53

## 2022-09-28 RX ADMIN — Medication 400 MILLIGRAM(S): at 01:55

## 2022-09-28 RX ADMIN — Medication 400 MILLIGRAM(S): at 10:32

## 2022-09-28 RX ADMIN — Medication 400 MILLIGRAM(S): at 15:43

## 2022-09-28 RX ADMIN — FENTANYL CITRATE 25 MICROGRAM(S): 50 INJECTION INTRAVENOUS at 01:12

## 2022-09-28 NOTE — DISCHARGE NOTE PROVIDER - CARE PROVIDER_API CALL
Tommy Luna)  Surgery; Surgical Critical Care  [address] [place] [address]  Phone: [phone]  Fax: [phone]  Follow Up Time: 2 weeks   Tommy Luna)  Surgery; Surgical Critical Care  [address] [place] [address]  Phone: [phone]  Fax: [phone]  Follow Up Time: 2 weeks    Dr. Zamzam Wyman ([phone]) Pt/s Psychiatrist  Phone: [phone]  Fax: (   )    -  Follow Up Time:

## 2022-09-28 NOTE — DISCHARGE NOTE NURSING/CASE MANAGEMENT/SOCIAL WORK - NSDCPNINST_GEN_ALL_CORE
Notify provider for fever greater than 100.4, of any worsening or unresolved pain, and/or any increase in swelling or redness.

## 2022-09-28 NOTE — DISCHARGE NOTE NURSING/CASE MANAGEMENT/SOCIAL WORK - NSDCVIVACCINE_GEN_ALL_CORE_FT
Tdap; 13-Oct-2014 04:40; Tho Morel (RN); [employer]; [id number]; IntraMuscular; Dorsogluteal Right.; 0.5 milliLiter(s);

## 2022-09-28 NOTE — PROGRESS NOTE ADULT - SUBJECTIVE AND OBJECTIVE BOX
TEAM Surgery Progress Note  Patient is a 42y old  Female who presents with a chief complaint of Biliary colic with cholelithiasis (26 Sep 2022 23:51)      INTERVAL EVENTS: Patient is POD# ***No acute events overnight.  SUBJECTIVE: Patient seen and examined at bedside with surgical team, patient without complaints. Denies fever, chills, CP, SOB nausea, vomiting, abdominal pain.    REVIEW OF SYSTEMS:  Constitutional: No fevers or chills. No malaise or weakness.  EENT: No vision changes. No ear pain. No nasal congestion or rhinitis. No throat pain or dysphagia.  Respiratory: No cough, wheezing, or SOB. No hemoptysis.  Cardiovascular: No chest pain or palpitations.  Gastrointestinal: No abdominal pain. No nausea, vomiting, diarrhea or constipation. No hematochezia. No melena.  Genitourinary: No dysuria, hematuria, or frequency.  Neurologic: No numbness or tingling. No weakness.  Skin: No rashes or pruritus.     OBJECTIVE:    Vital Signs Last 24 Hrs  T(C): 37.1 (26 Sep 2022 21:45), Max: 37.1 (26 Sep 2022 21:45)  T(F): 98.7 (26 Sep 2022 21:45), Max: 98.7 (26 Sep 2022 21:45)  HR: 69 (26 Sep 2022 21:45) (69 - 71)  BP: 118/79 (26 Sep 2022 21:45) (118/79 - 137/79)  BP(mean): --  RR: 16 (26 Sep 2022 21:45) (16 - 18)  SpO2: 98% (26 Sep 2022 21:45) (98% - 100%)    Parameters below as of 26 Sep 2022 21:45  Patient On (Oxygen Delivery Method): room air    I&O's Detail  MEDICATIONS  (STANDING):    MEDICATIONS  (PRN):      PHYSICAL EXAM:  Constitutional: A&Ox3, NAD  Respiratory: Unlabored breathing  Abdomen: Soft, nondistended, NTTP. No rebound or guarding.  Extremities: WWP, SANCHEZ spontaneously    LABS:                        12.7   7.45  )-----------( 316      ( 26 Sep 2022 20:28 )             41.7     -    139  |  105  |  7   ----------------------------<  88  4.2   |  23  |  0.74    Ca    9.0      26 Sep 2022 20:28    TPro  7.5  /  Alb  3.7  /  TBili  <0.2  /  DBili  x   /  AST  28  /  ALT  47<H>  /  AlkPhos  135<H>      PT/INR - ( 26 Sep 2022 20:28 )   PT: 12.6 sec;   INR: 1.09 ratio         PTT - ( 26 Sep 2022 20:28 )  PTT:35.6 sec  LIVER FUNCTIONS - ( 26 Sep 2022 20:28 )  Alb: 3.7 g/dL / Pro: 7.5 g/dL / ALK PHOS: 135 U/L / ALT: 47 U/L / AST: 28 U/L / GGT: x           Urinalysis Basic - ( 26 Sep 2022 20:20 )    Color: Light Yellow / Appearance: Clear / S.012 / pH: x  Gluc: x / Ketone: Negative  / Bili: Negative / Urobili: <2 mg/dL   Blood: x / Protein: Trace / Nitrite: Negative   Leuk Esterase: Negative / RBC: x / WBC x   Sq Epi: x / Non Sq Epi: x / Bacteria: x      ABO Interpretation: O (22 @ 21:15)      IMAGING:    
TEAM [ B ] Surgery Daily Progress Note  =====================================================    SUBJECTIVE: Patient seen and examined at bedside on AM rounds. Patient reports that they're feeling well. Tolerating diet, denies nausea, vomiting.    _Flatus/ +   BM. OOB/Amublating as tolerated. Denies fever, chills.    PAST MEDICAL & SURGICAL HISTORY:  GERD (Gastroesophageal Reflux Disease)  Bipolar Disorder  Borderline Personality Disorder  Cholelithiasis  Asthma  Depession  Obesity  Fibroids  No significant past surgical history    ALLERGIES:  Depakote (Other)  No Known Allergies    --------------------------------------------------------------------------------------    MEDICATIONS:    Neurologic Medications  acetaminophen     Tablet .. 1000 milliGRAM(s) Oral every 6 hours  ibuprofen  Tablet. 400 milliGRAM(s) Oral every 6 hours  OXcarbazepine 300 milliGRAM(s) Oral two times a day  oxyCODONE    IR 5 milliGRAM(s) Oral every 6 hours PRN Severe Pain (7 - 10)  oxyCODONE    IR 2.5 milliGRAM(s) Oral every 6 hours PRN Moderate Pain (4 - 6)  QUEtiapine 200 milliGRAM(s) Oral at bedtime    Gastrointestinal Medications  dextrose 5% + sodium chloride 0.9% with potassium chloride 20 mEq/L 1000 milliLiter(s) IV Continuous <Continuous>    Hematologic/Oncologic Medications  enoxaparin Injectable 40 milliGRAM(s) SubCutaneous every 24 hours  --------------------------------------------------------------------------------------    VITAL SIGNS:  T(C): 36.7 (09-28-22 @ 05:48), Max: 37.1 (09-27-22 @ 10:04)  HR: 84 (09-28-22 @ 05:48) (64 - 93)  BP: 139/79 (09-28-22 @ 05:48) (128/78 - 152/88)  RR: 18 (09-28-22 @ 05:48) (13 - 20)  SpO2: 98% (09-28-22 @ 05:48) (95% - 100%)  --------------------------------------------------------------------------------------    EXAM    General: NAD, resting in bed comfortably.  Cardiac: regular rate, warm and well perfused  Respiratory: Nonlabored respirations, normal cw expansion.  Abdomen: soft, nontender, nondistended.   Extremities: normal strength, FROM, no deformities    --------------------------------------------------------------------------------------    LABS                        12.7   7.45  )-----------( 316      ( 26 Sep 2022 20:28 )             41.7   09-26    139  |  105  |  7   ----------------------------<  88  4.2   |  23  |  0.74    Ca    9.0      26 Sep 2022 20:28    TPro  7.5  /  Alb  3.7  /  TBili  <0.2  /  DBili  x   /  AST  28  /  ALT  47<H>  /  AlkPhos  135<H>  09-26    --------------------------------------------------------------------------------------    INS AND OUTS:    09-27-22 @ 07:01  -  09-28-22 @ 07:00  --------------------------------------------------------  IN: 300 mL / OUT: 650 mL / NET: -350 mL      --------------------------------------------------------------------------------------

## 2022-09-28 NOTE — DISCHARGE NOTE PROVIDER - NSDCCPCAREPLAN_GEN_ALL_CORE_FT
PRINCIPAL DISCHARGE DIAGNOSIS  Diagnosis: Biliary colic  Assessment and Plan of Treatment: WOUND CARE:  Please keep incisions clean and dry. Please do not Scrub or rub incisions. Do not use lotion or powder on incisions.   BATHING: You may shower and/or sponge bathe. You may use warm soapy water in the shower and rinse, pat dry.  ACTIVITY: No heavy lifting or straining. Otherwise, you may return to your usual level of physical activity. If you are taking narcotic pain medication DO NOT drive a car, operate machinery or make important decisions.  DIET: LOW FAT DIET  NOTIFY YOUR SURGEON IF YOU HAVE: any bleeding that does not stop, any pus draining from your wound(s), any fever (over 100.4 F) persistent nausea/vomiting, or if your pain is not controlled on your discharge pain medications, unable to urinate.  Please follow up with your primary care physician in one week regarding your hospitalization, bring copies of your discharge paperwork.  Please follow up with your surgeon, Dr. Luna within 1-2 weeks of discharge.

## 2022-09-28 NOTE — BH CONSULTATION LIAISON PROGRESS NOTE - NSBHCHARTREVIEWVS_PSY_A_CORE FT
Vital Signs Last 24 Hrs  T(C): 36.7 (28 Sep 2022 05:48), Max: 37.1 (27 Sep 2022 10:04)  T(F): 98 (28 Sep 2022 05:48), Max: 98.8 (27 Sep 2022 10:04)  HR: 84 (28 Sep 2022 05:48) (64 - 93)  BP: 139/79 (28 Sep 2022 05:48) (128/78 - 152/88)  BP(mean): 97 (28 Sep 2022 01:45) (85 - 101)  RR: 18 (28 Sep 2022 05:48) (13 - 20)  SpO2: 98% (28 Sep 2022 05:48) (95% - 100%)    Parameters below as of 28 Sep 2022 05:48  Patient On (Oxygen Delivery Method): room air

## 2022-09-28 NOTE — BRIEF OPERATIVE NOTE - OPERATION/FINDINGS
Dx. Lap, normal appearing abdomen. Gallbladder without acute inflammation. Critical view achieved. Cystic duct and artery doubly clipped. Gallbladder taken en-bloc.

## 2022-09-28 NOTE — BH CONSULTATION LIAISON PROGRESS NOTE - NSBHASSESSMENTFT_PSY_ALL_CORE
Pt is a 41 y/o F, single, non-caregiver, unemployed, on disability for mental illness, domiciled at [facility], on AOT at this time, with past psychiatric history of schizoaffective disorder, as per chart: borderline personality disorder, polysubstance abuse, >30 prior inpatient psychiatric hospitalizations most recently at [facility] from 9/18-9/21,  hx of highly disorganized behavior requiring transfer to [facility] hospitalization ([place]) such as smearing feces in the wall, eating feces, turning in a [place] over and over again, myriad of ED visits including [facility], 3 prior suicide attempts (last in 2012 via OD), recurrent chronic suicidal gestures and suicidal ideation, hx of property destruction when upset, long hx of sexually provocative behavior (both out in the community and while on inpatient units requiring 1:1 staff observation), hx of physical altercations, hx of verbal threats, hx of property destruction, long hx of medication and tx noncompliance resulting in AOT and PALACIOS administration, who presents with abdominal RUQ pain. Psychiatry consulted for capacity to consent for laparoscopic cholecystectomy. As per team pt. was in agreement and had already consented.    9/27: With Pt's permission, discussion about indication, risks/benefits of surgery conducted with [facility] Psych, Pt's primary surgical team, and Care Coordinator Veronica Hernandez. Pt able to respond back in her own words the risks an dbenefits; demonstrated capacity to consent for surgery. Denies SI, HI, AH, VH, delusions, paranoia.   9/28: Pt is recovering well from surgery. States mood is good, feeling calm. AAOx3. Denies SI, HI, AH, VH, paranoia, and delusions. Per   Pt's outpatient psychiatrist, Pt had refused to take seroquel and was taken off seroquel. Only active psychiatric medications are trileptal and Haldol Decanoate. Pt has no psychiatric contraindications for discharge.     PLAN  --Cont Trileptal 300mg PO BID   - PRN Haldol 5mg q4h PO/IM/IV for agitation  --PRN Ativan 2mg q4h PO/IM/IV  for agitation  --Benadryl 25mg PRN for refractory agitation and extrapyramidal symptoms  --As per inpatient documents, pt received Haldol Decanoate 100 mg/mL intramuscular solution: 100 milligram(s) intramuscular once a month; last dose 9/13  --***D/t Pt's hx of agitation, impulsivity and aggression, monitor for behavioral agitation and recommend low threshold for PRN's, 1:1 constant observation, and security oversight as needed   -HOLD antipsychotics if qtc >500  --Pt is psychiatrically cleared for discharge Pt is a 43 y/o F, single, non-caregiver, unemployed, on disability for mental illness, domiciled at [facility], on AOT at this time, with past psychiatric history of schizoaffective disorder, as per chart: borderline personality disorder, polysubstance abuse, >30 prior inpatient psychiatric hospitalizations most recently at [facility] from 9/18-9/21,  hx of highly disorganized behavior requiring transfer to [facility] hospitalization ([place]) such as smearing feces in the wall, eating feces, turning in a [place] over and over again, myriad of ED visits including [facility], 3 prior suicide attempts (last in 2012 via OD), recurrent chronic suicidal gestures and suicidal ideation, hx of property destruction when upset, long hx of sexually provocative behavior (both out in the community and while on inpatient units requiring 1:1 staff observation), hx of physical altercations, hx of verbal threats, hx of property destruction, long hx of medication and tx noncompliance resulting in AOT and PALACIOS administration, who presents with abdominal RUQ pain. Psychiatry consulted for capacity to consent for laparoscopic cholecystectomy. As per team pt. was in agreement and had already consented.    9/27: With Pt's permission, discussion about indication, risks/benefits of surgery conducted with [facility] Psych, Pt's primary surgical team, and Care Coordinator Veronica Hernandez. Pt able to respond back in her own words the risks an dbenefits; demonstrated capacity to consent for surgery. Denies SI, HI, AH, VH, delusions, paranoia.   9/28: Pt is recovering well from surgery. States mood is good, feeling calm. AAOx3. Denies SI, HI, AH, VH, paranoia, and delusions. Per   Pt's outpatient psychiatrist, Pt had refused to take seroquel and was taken off seroquel. Only active psychiatric medications are trileptal and Haldol Decanoate. Pt has no psychiatric contraindications for discharge.     Contact made with Pt's care coordinator, Veronica Hernandez ([phone]). Discussed Pt is psychiatrically cleared for discharge, awaiting medical and surgical clearance.    PLAN  --Cont Trileptal 300mg PO BID   - PRN Haldol 5mg q4h PO/IM/IV for agitation  --PRN Ativan 2mg q4h PO/IM/IV  for agitation  --Benadryl 25mg PRN for refractory agitation and extrapyramidal symptoms  --As per inpatient documents, pt received Haldol Decanoate 100 mg/mL intramuscular solution: 100 milligram(s) intramuscular once a month; last dose 9/13  --***D/t Pt's hx of agitation, impulsivity and aggression, monitor for behavioral agitation and recommend low threshold for PRN's, 1:1 constant observation, and security oversight as needed   -HOLD antipsychotics if qtc >500  --Pt is psychiatrically cleared for discharge Pt is a 43 y/o F, single, non-caregiver, unemployed, on disability for mental illness, domiciled at [facility], on AOT at this time, with past psychiatric history of schizoaffective disorder, as per chart: borderline personality disorder, polysubstance abuse, >30 prior inpatient psychiatric hospitalizations most recently at [facility] from 9/18-9/21,  hx of highly disorganized behavior requiring transfer to [facility] hospitalization ([place]) such as smearing feces in the wall, eating feces, turning in a [place] over and over again, myriad of ED visits including [facility], 3 prior suicide attempts (last in 2012 via OD), recurrent chronic suicidal gestures and suicidal ideation, hx of property destruction when upset, long hx of sexually provocative behavior (both out in the community and while on inpatient units requiring 1:1 staff observation), hx of physical altercations, hx of verbal threats, hx of property destruction, long hx of medication and tx noncompliance resulting in AOT and PALACIOS administration, who presents with abdominal RUQ pain. Psychiatry consulted for capacity to consent for laparoscopic cholecystectomy. As per team pt. was in agreement and had already consented.    9/27: With Pt's permission, discussion about indication, risks/benefits of surgery conducted with [facility] Psych, Pt's primary surgical team, and Care Coordinator Veronica Hernandez. Pt able to respond back in her own words the risks an dbenefits; demonstrated capacity to consent for surgery. Denies SI, HI, AH, VH, delusions, paranoia.   9/28: Pt recovering s/p laparoscopic cholecystectomy. States mood is good, feeling calm. AAOx3. Denies SI, HI, AH, VH, paranoia, and delusions. Per   Pt's outpatient psychiatrist, Pt had refused to take seroquel and was taken off seroquel. Only active psychiatric medications are trileptal and Haldol Decanoate.     Contact made and care coordinated with Pt's care coordinator, Veronica Hernandez ([phone]). Updated her about pt.'s current status.     PLAN  - DISCONTINUE standing Seroquel as pt. has not been on it (Confirmed with outpatient psychiatrist)   --Cont Trileptal 300mg PO BID   - PRN Haldol 5mg q4h PO/IM/IV for agitation  --PRN Ativan 2mg q4h PO/IM/IV  for agitation  --Benadryl 25mg PRN for refractory agitation and extrapyramidal symptoms  --As per inpatient documents, pt received Haldol Decanoate 100 mg/mL intramuscular solution: 100 milligram(s) intramuscular once a month; last dose 9/13  --***D/t Pt's hx of agitation, impulsivity and aggression, monitor for behavioral agitation and recommend low threshold for PRN's, 1:1 constant observation, and security oversight as needed   -HOLD antipsychotics if qtc >500

## 2022-09-28 NOTE — BH CONSULTATION LIAISON PROGRESS NOTE - NSBHCONSULTFOLLOWAFTERCARE_PSY_A_CORE FT
Continue to follow up with AOT, care coordinator, Veronica Hernandez ([phone])  Continue to follow up with Pt's psychiatrist Dr. Wyman ([phone])

## 2022-09-28 NOTE — BH CONSULTATION LIAISON PROGRESS NOTE - NSBHATTESTCOMMENTATTENDFT_PSY_A_CORE
Chart reviewed, pt. seen/evaluated, I agree with above assessment/plan. Patient oriented, well engaged, euthymic. Patient reports she is feeling relaxed/happy as she had her procedure. She denies SIIP, denies HIIP, no evidence of trina or psychosis. Patient is amenable to follow up with her outpatient providers.  Plan as above, care coordinated with outpatient providers, please don't hesitate to call with any acute concerns/questions.  Chart reviewed, pt. seen/evaluated, I agree with above assessment/plan. Patient oriented, well engaged, euthymic. Patient reports she is feeling relaxed/happy as she had her procedure. She denies SIIP, denies HIIP, no evidence of trina or psychosis. Patient is amenable to follow up with her outpatient providers.  Plan as above, care coordinated with outpatient providers, please don't hesitate to call with any acute concerns/questions. Case d/[place] [phone].

## 2022-09-28 NOTE — DISCHARGE NOTE PROVIDER - HOSPITAL COURSE
42F PMH GERD, shcizoaffective, BPD, biliary cholic with cholelithiasis (prior presentations in June 2022, left hospital AMA before surgery, followed up in 7/2022) now presenting with one day of RUQ abdominal pain. Here with symptomatic cholelithiasis. The patient tolerated the procedure well. Post-operatively the patient was sent to the PACU. The patient was hemodynamically stable and was transferred to a surgical floor. Patient tolerated operation well and there were no post operative complications identified. The patient had daily wound care and was seen by physical therapy which recommended discharge to home/rehab. The patient's pain was controlled by IV pain medications and then by PO pain medications. The patient was advanced to a regular diet and tolerated it well. The patient was placed on home medications. At the time of discharge, the patient was hemodynamically stable, was tolerating PO diet, was voiding urine and passing stool, was ambulating, and was comfortable with adequate pain control. The patient was instructed to follow up with Dr. Luna within 1-2 weeks after discharge from the hospital. The patient/family felt comfortable with discharge. The patient had no other issues.

## 2022-09-28 NOTE — BH CONSULTATION LIAISON PROGRESS NOTE - NSBHFUPINTERVALHXFT_PSY_A_CORE
Pt recovering s/p laparoscopic cholecystectomy. AAOx3. Pt feeling more tired, has been sleeping more. Pt eating well. Pt stated her mood is "good" and she feels happy to be in a serene environment like the hospital, compared to her current residence where housemates can be aggressive. Pt stated that she is not taking the seroquel anymore; Team informed Pt that we will check in with her Psychiatrist on medication list. Pt denies current SI, HI, AH, and VH. Pt stated that she experiences "mental anguish" that comes and goes, and while she is stable now, is concerned how it will affect her later. Pt and writer spoke about coping mechanisms and finding support from her psychiatrist and therapist. Pt recognizes she has inner strengths on recognizing when to ask for help and how to take care of herself.      Collateral obtained from Pt's psychiatrist Dr. Wyman ([phone]). Per psychiatrist, Pt refusing to take seroquel and had not been taking seroquel. Pt is only taking Trileptal 300mg PO BID and Haldol Decanoate 200mg/mL IM, 200mg once a month, last dose 9/13. Psychiatrist stated that she has no psychosis and has had multiple psychiatric admissions, confirming Pt's chart.   Pt recovering s/p laparoscopic cholecystectomy. AAOx3. Pt feeling more tired, has been sleeping more. Pt eating well. Pt stated her mood is "good" and she feels happy to be in a serene environment like the hospital, compared to her current residence where housemates can be aggressive. Pt stated that she is not taking the seroquel anymore; Team informed Pt that we will check in with her Psychiatrist on medication list. Pt denies current SI, HI, AH, and VH. Pt stated that she experiences "mental anguish" that comes and goes, and while she is stable now, is concerned how it will affect her later. Pt and writer spoke about coping mechanisms and finding support from her psychiatrist and therapist. Pt recognizes she has inner strengths on recognizing when to ask for help and how to take care of herself.      Collateral obtained from Pt's psychiatrist Dr. Wyman ([phone]). Per psychiatrist, Pt refusing to take seroquel and had not been taking seroquel. Pt is only taking Trileptal 300mg PO BID and Haldol Decanoate 200mg/mL IM, 200mg once a month, last dose 9/13. Psychiatrist stated that she has no psychosis and has had multiple psychiatric admissions, confirming Pt's chart.     Chart reviewed, not requiring psychiatric PRNs. Pt recovering s/p laparoscopic cholecystectomy. AAOx3. Pt feeling more tired, has been sleeping more. Pt eating well. Pt stated her mood is "good" and she feels happy to be in a serene environment like the hospital, compared to her current residence where housemates can be difficult. Pt stated that she is not taking the seroquel anymore; Team informed Pt that we will check in with her Psychiatrist on medication list. Pt denies current SI, HI, AH, and VH. Pt and writer spoke about coping mechanisms and finding support from her psychiatrist and therapist. Pt recognizes she has inner strengths on recognizing when to ask for help and how to take care of herself.      Collateral obtained from Pt's psychiatrist Dr. Wyman ([phone]). Per psychiatrist, Pt refusing to take seroquel and had not been taking seroquel. Pt is only taking Trileptal 300mg PO BID and Haldol Decanoate once a month, last dose 9/13. Psychiatrist stated that she has no psychosis and has had multiple psychiatric admissions, confirming Pt's chart.  Updated her about patient current clinical status. She denies any acute concerns.

## 2022-09-28 NOTE — DISCHARGE NOTE PROVIDER - CARE PROVIDERS DIRECT ADDRESSES
[email or web address]

## 2022-09-28 NOTE — DISCHARGE NOTE PROVIDER - NSDCMRMEDTOKEN_GEN_ALL_CORE_FT
Haldol Decanoate 100 mg/mL intramuscular solution: 100 milligram(s) intramuscular once a month  loratadine 10 mg oral tablet: 1 tab(s) orally once a day  orlistat 60 mg oral capsule: 1 cap(s) orally 2 times a day with food  SEROquel 200 mg oral tablet: 1 tab(s) orally once a day (at bedtime)  Trileptal 300 mg oral tablet: 1 tab(s) orally 2 times a day   acetaminophen 500 mg oral tablet: 2 tab(s) orally every 6 hours  Haldol Decanoate 100 mg/mL intramuscular solution: 100 milligram(s) intramuscular once a month  ibuprofen 400 mg oral tablet: 1 tab(s) orally every 6 hours  loratadine 10 mg oral tablet: 1 tab(s) orally once a day  orlistat 60 mg oral capsule: 1 cap(s) orally 2 times a day with food  oxyCODONE 5 mg oral tablet: 1 tab(s) orally every 6 hours as needed for SEVERE pain MDD:4  SEROquel 200 mg oral tablet: 1 tab(s) orally once a day (at bedtime)  Trileptal 300 mg oral tablet: 1 tab(s) orally 2 times a day   acetaminophen 500 mg oral tablet: 2 tab(s) orally every 6 hours  Haldol Decanoate 100 mg/mL intramuscular solution: 100 milligram(s) intramuscular once a month  ibuprofen 400 mg oral tablet: 1 tab(s) orally every 6 hours  loratadine 10 mg oral tablet: 1 tab(s) orally once a day  orlistat 60 mg oral capsule: 1 cap(s) orally 2 times a day with food  oxyCODONE 5 mg oral tablet: 1 tab(s) orally every 6 hours as needed for SEVERE pain MDD:4  Trileptal 300 mg oral tablet: 1 tab(s) orally 2 times a day

## 2022-09-28 NOTE — DISCHARGE NOTE NURSING/CASE MANAGEMENT/SOCIAL WORK - PATIENT PORTAL LINK FT
You can access the FollowMyHealth Patient Portal offered by [facility] by registering at the following website: [email or web address]. By joining "[employer]"’s FollowMyHealth portal, you will also be able to view your health information using other applications (apps) compatible with our system.

## 2022-09-28 NOTE — DISCHARGE NOTE NURSING/CASE MANAGEMENT/SOCIAL WORK - NSDCPEFALRISK_GEN_ALL_CORE
Patient states that she was on amidarone and was having tremors. Patient spoke to EP and was taken off of medication. Patient was told it would take around two months for the medication to remove from her system. Patient states though her tremors have decreased significantly, she still has them from time to time. She is wondering if she needs to wait longer. Discontinued on 1/17/22. Dr. Vasquez out of office, routed to EP.   For information on Fall & Injury Prevention, visit: [email or web address] OR  [email or web address] OR  https://www.cdc.gov/steadi/patient.html

## 2022-09-28 NOTE — PROGRESS NOTE ADULT - ASSESSMENT
42F PMH GERD, shcizoaffective, BPD, biliary cholic with cholelithiasis (prior presentations in June 2022, left hospital AMA before surgery, followed up in 7/2022) now presenting with one day of RUQ abdominal pain. Here with symptomatic cholelithiasis now s/p lap divya    Plan:  - regular diet  - Psych recommendations:   PLAN  -- Cont Seroquel 200mg PO at bedtime, pending collateral from Pt's psychiatrist Dr. Wyman ([phone]) to obtain further information about medication  --Cont Trileptal 300mg PO BID   - PRN Haldol 5mg q4h PO/IM/IV for agitation  --PRN Ativan 2mg q4h PO/IM/IV  for agitation  --Benadryl 25mg PRN for refractory agitation and extrapyramidal symptoms  --As per inpatient documents, pt. received Haldol Decanoate 100 mg/mL intramuscular solution: 100 milligram(s) intramuscular once a month; last dose 9/13  --***D/t Pt's hx of agitation, impulsivity and aggression, monitor for behavioral agitation and recommend low threshold for PRN's, 1:1 constant observation, and security oversight as needed   -HOLD antipsychotics if qtc >500    -dispo planning to [facility]      General Surgery  B Team  [id number]

## 2022-09-28 NOTE — DISCHARGE NOTE PROVIDER - PROVIDER TOKENS
PROVIDER:[TOKEN:[[id number]],FOLLOWUP:[2 weeks]] PROVIDER:[TOKEN:[[id number]],FOLLOWUP:[2 weeks]],FREE:[LAST:[Zamzam],PHONE:[phone]],FAX:[(   )    -],ADDRESS:[Dr. Wyman ([phone]) Pt/s Psychiatrist]]

## 2022-09-28 NOTE — BH CONSULTATION LIAISON PROGRESS NOTE - NSBHCONSULTRECOMMENDOTHER_PSY_A_CORE FT
Patient is currently not at acute risk of harm to self or others at this time and does not meet the criteria for involuntary admission. Patient is in good mood, good behavioral control, compliant with her treatment/follow ups, future oriented, amenable to continue her medications and follow up with her AOT and psychiatrist. Has outpatient providers, collateral (coordinator from AOT and psychiatrist) has no safety concerns.

## 2022-09-29 NOTE — ED BEHAVIORAL HEALTH ASSESSMENT NOTE - JUDGMENT (REGARDING EVERYDAY EVENTS)
Pt is awake and alert with parents at the bedside.  Pt's vitals stable.  Pt's breathing is even and unlabored.  Pt tolerating PO without problem.  Pt awaiting on MD reassessment.  Comfort/safety maintained. Poor

## 2022-10-03 NOTE — ED ADULT NURSE NOTE - NSFALLRSKPASTHIST_ED_ALL_ED
From: Alen Escobar  To: Roman Dreyer  Sent: 10/2/2022 12:17 PM CDT  Subject: Blood work    Good afternoon Dr Marshall. I have an appointment with you on the 10th. That morning I am getting my blood work done. Can I make an appointment for that or a walk in only? If appointment do u have the phone #? Thank you.    no

## 2022-10-06 LAB — SURGICAL PATHOLOGY STUDY: SIGNIFICANT CHANGE UP

## 2022-10-14 ENCOUNTER — EMERGENCY (EMERGENCY)
Facility: HOSPITAL | Age: 42
LOS: 1 days | Discharge: HOME | End: 2022-10-14
Attending: EMERGENCY MEDICINE | Admitting: EMERGENCY MEDICINE

## 2022-10-14 VITALS
TEMPERATURE: 98 F | SYSTOLIC BLOOD PRESSURE: 150 MMHG | HEIGHT: 66 IN | OXYGEN SATURATION: 100 % | DIASTOLIC BLOOD PRESSURE: 91 MMHG | HEART RATE: 82 BPM | RESPIRATION RATE: 18 BRPM

## 2022-10-14 VITALS
OXYGEN SATURATION: 100 % | DIASTOLIC BLOOD PRESSURE: 73 MMHG | RESPIRATION RATE: 18 BRPM | SYSTOLIC BLOOD PRESSURE: 135 MMHG | HEART RATE: 80 BPM | TEMPERATURE: 98 F

## 2022-10-14 PROCEDURE — 99283 EMERGENCY DEPT VISIT LOW MDM: CPT

## 2022-10-14 NOTE — ED BEHAVIORAL HEALTH NOTE - BEHAVIORAL HEALTH NOTE
COLLATERAL FROM [facility] on [facility] [phone]: talked to staff  Rosalva VILLAGRAN and Zhang Boby ([phone]) Patient medication compliant .  Patient became very agitated and started to throw chairs after she was advised to clean her room because of excessive clutter and a violation order regarding the clutter. Mr. Landis reports that the patient did clean her room but forgot a huge pile of clothing. After asking her to clean it the patient because agitated and then started to yell and throw chairs. Patient presented similarly on Tuesday and was sent to [facility] and discharged. Patient received her monthly im at that time of Haldol 200 mg (next due 11/8). Patient is connected to a NP Ro Cruz ([phone]) [address] and has her next appointment on 10/21 @ 10:30am. No AH/VH/SI. Patient did say she was going to blow up the place. Patient is also linked to [facility] [phone]. Pt has a history of explosive behaviors. COLLATERAL FROM [facility] on [facility] grounds [phone]: talked to staff  Rosalva VILLAGRAN and Zhang Landis ([phone]) Patient medication compliant .  Patient became very agitated and started to throw chairs after she was advised to clean her room because of excessive clutter and a violation order regarding the clutter. Mr. Landis reports that the patient did clean her room but forgot a huge pile of clothing. After asking her to clean it the patient because agitated and then started to yell and throw chairs. Patient presented similarly on Tuesday and was sent to [facility] and discharged. Patient received her monthly im at that time of Haldol 200 mg (next due 11/8). Patient is connected to a NP Ro Cruz ([phone]) [address] and has her next appointment on 10/21 @ 10:30am. No AH/VH/SI. Patient did say she was going to blow up the place. Patient is also linked to [phone]. Pt has a history of explosive behaviors.    Per provider, Dr. Kirby, patient is cleared and is able to return to their previous residence, [facility] .  has spoken to  Zhang Landis ([phone]) ,  confirmed that patients mode of transportation is [employer] and that patient travels INDEPENDENTLY. Clinical provider is in agreement with [employer] back to group home. Verbal huddle regarding coordination of care completed with interdisciplinary team.

## 2022-10-14 NOTE — ED PROVIDER NOTE - NSFOLLOWUPINSTRUCTIONS_ED_ALL_ED_FT
PLEASE FOLLOW UP WITH YOUR PSYCHIATRIST AS YOUR USUALLY DO.    PLEASE TAKE ALL OF YOUR MEDICATIONS AS DIRECTED.    RETURN TO THE ER FOR ANY WORSENING SYMPTOMS OR COMPLAINTS.    1. TAKE ALL OF YOUR PRESCRIBED OR OVER THE COUNTER MEDICATIONS AS DIRECTED.    2. FOR PAIN OR FEVER YOU CAN TAKE IBUPROFEN (MOTRIN, ADVIL), NAPROXEN (ALLEVE) OR ACETAMINOPHEN (TYLENOL) AS NEEDED, AS DIRECTED ON PACKAGING.  3. FOLLOW UP WITH YOUR PRIMARY DOCTOR WITHIN 5 DAYS, OR SOONER IF DIRECTED.  4. IF YOU HAD LABS OR IMAGING DONE, YOU WERE GIVEN COPIES OF ALL LABS AND/OR IMAGING RESULTS FROM YOUR ER VISIT--PLEASE TAKE THEM WITH YOU TO YOUR FOLLOW UP APPOINTMENTS.  5. IF NEEDED, CALL PATIENT ACCESS SERVICES AT [phone] TO FIND A PRIMARY CARE PHYSICIAN.  OR CALL [phone] TO MAKE AN APPOINTMENT WITH THE CLINIC.  6. YOU CAN FIND PHYSICIANS OF ALL SPECIALITIES BY VISITING [facility] AND CLICKING ON "FIND A DOCTOR".  7. RETURN TO THE ER FOR ANY WORSENING SYMPTOMS OR CONCERNS.    THANK YOU FOR COMING TO [facility].  HAVE A NICE DAY.

## 2022-10-14 NOTE — ED BEHAVIORAL HEALTH NOTE - BEHAVIORAL HEALTH NOTE
Writer was contact to arrange transportation. Writer called [facility]- [phone] to set up taxi transportation for patient to return to [facility], invoice # [id number], [employer]- [employer] [phone], pick-up at 1pm.

## 2022-10-14 NOTE — ED PROVIDER NOTE - PATIENT PORTAL LINK FT
You can access the FollowMyHealth Patient Portal offered by [facility] by registering at the following website: [email or web address]. By joining [employer]’s FollowMyHealth portal, you will also be able to view your health information using other applications (apps) compatible with our system.

## 2022-10-14 NOTE — ED PROVIDER NOTE - PROGRESS NOTE DETAILS
Dr. Kirby: pt stable, in NAD, sleeping in BH but awakening easily; no agitation in ED; no acute psychiatric evaluation required, and medical screening exam complete; pt stable for discharge back to her group setting

## 2022-10-14 NOTE — ED ADULT NURSE NOTE - NSIMPLEMENTINTERV_GEN_ALL_ED
Implemented All Universal Safety Interventions:  [place] to call system. Call bell, personal items and telephone within reach. Instruct patient to call for assistance. Room bathroom lighting operational. Non-slip footwear when patient is off stretcher. Physically safe environment: no spills, clutter or unnecessary equipment. Stretcher in lowest position, wheels locked, appropriate side rails in place.

## 2022-10-14 NOTE — ED PROVIDER NOTE - CLINICAL SUMMARY MEDICAL DECISION MAKING FREE TEXT BOX
pt with medical/psychiatric disease as noted, in ED after episode of agitation, now resolved; pt stable and without acute need for medical or psychiatric intervention; plan to have SW arrange for transportation back to group home; I spoke to Zhang Landis, manager of the [facility], who is aware that pt is returning
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
## 2022-10-14 NOTE — ED ADULT NURSE NOTE - OBJECTIVE STATEMENT
Received pt in  pt sent from [facility] for agitation & aggression pt denies si/hi/avh presently, safety & comfort measures maintained eval on going.

## 2022-10-20 ENCOUNTER — EMERGENCY (EMERGENCY)
Facility: HOSPITAL | Age: 42
LOS: 1 days | Discharge: HOME | End: 2022-10-20
Attending: EMERGENCY MEDICINE | Admitting: EMERGENCY MEDICINE

## 2022-10-20 VITALS
TEMPERATURE: 98 F | RESPIRATION RATE: 18 BRPM | OXYGEN SATURATION: 100 % | DIASTOLIC BLOOD PRESSURE: 70 MMHG | SYSTOLIC BLOOD PRESSURE: 137 MMHG | HEIGHT: 66 IN | HEART RATE: 80 BPM

## 2022-10-20 PROCEDURE — 99284 EMERGENCY DEPT VISIT MOD MDM: CPT

## 2022-10-20 NOTE — ED BEHAVIORAL HEALTH NOTE - BEHAVIORAL HEALTH NOTE
Writer contacted [facility] on [facility] [phone] to inform staff that patient is cleared for discharge.  Writer spoke w/ SPARKLE lujan who confirmed patient travels via amb[employer]lette w/ supervision and the address is [address] [place] [address] 40. [place] [id number]. Per provider, MD Santiago, pt is CLEAR for d/c back to GROUP HOME at [address] [facility] 40. [place], [place]. SW called [facility] 40 Tel# [phone] and spoke w/ SPAKRLE Argueta who confirmed that patient is safe to travel supervised via [employer]. Provider in agreement. Group huddle completed.     SW contacted [facility] transport #[phone]

## 2022-10-20 NOTE — ED PROVIDER NOTE - PATIENT PORTAL LINK FT
You can access the FollowMyHealth Patient Portal offered by [facility] by registering at the following website: [email or web address]. By joining [employer]’s FollowMyHealth portal, you will also be able to view your health information using other applications (apps) compatible with our system.
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
## 2022-10-20 NOTE — ED ADULT TRIAGE NOTE - CHIEF COMPLAINT QUOTE
Patient brought to ER by EMS from [place] for c/o abdominal pain. Pt had her gallbladder removed three weeks ago and continues to have pain.

## 2022-10-20 NOTE — ED ADULT TRIAGE NOTE - NS ED NURSE AMBULANCES
Impression: Dry eye syndrome of bilateral lacrimal glands: H04.123. Plan: Consistent use of artificial tears is indicated for dryness noted. [facility]

## 2022-10-20 NOTE — ED PROVIDER NOTE - CLINICAL SUMMARY MEDICAL DECISION MAKING FREE TEXT BOX
patient with pain at surgical trocar site.  normal healing on exam. non tender, no redness, doubt infection.  will give return precautions

## 2022-10-20 NOTE — ED BEHAVIORAL HEALTH NOTE - BEHAVIORAL HEALTH NOTE
Per provider, Dr. Santiago, patient is cleared and is able to return to their previous residence, [facility] .  has spoken to SPARKLE Horan,  confirmed that patients mode of transportation is [employer]AXI, and that patient travels INDEPENDENTLY. Clinical provider is in agreement with [employer], back to group home. Verbal huddle regarding coordination of care completed with interdisciplinary team.  Worker called back pt’s residence and confirmed.  Worker called mas [phone] and spoke to aidee who arranged for taxi for patient’s return. Invoice #[id number].
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
## 2022-10-24 ENCOUNTER — EMERGENCY (EMERGENCY)
Facility: HOSPITAL | Age: 42
LOS: 1 days | Discharge: HOME | End: 2022-10-24
Admitting: STUDENT IN AN ORGANIZED HEALTH CARE EDUCATION/TRAINING PROGRAM

## 2022-10-24 VITALS
OXYGEN SATURATION: 99 % | HEART RATE: 79 BPM | SYSTOLIC BLOOD PRESSURE: 113 MMHG | DIASTOLIC BLOOD PRESSURE: 62 MMHG | TEMPERATURE: 98 F | RESPIRATION RATE: 16 BRPM

## 2022-10-24 VITALS
HEART RATE: 78 BPM | HEIGHT: 66 IN | TEMPERATURE: 98 F | RESPIRATION RATE: 16 BRPM | DIASTOLIC BLOOD PRESSURE: 90 MMHG | SYSTOLIC BLOOD PRESSURE: 153 MMHG | OXYGEN SATURATION: 98 %

## 2022-10-24 PROCEDURE — 99284 EMERGENCY DEPT VISIT MOD MDM: CPT

## 2022-10-24 NOTE — ED ADULT NURSE NOTE - NSIMPLEMENTINTERV_GEN_ALL_ED
Implemented All Universal Safety Interventions:  [place] to call system. Call bell, personal items and telephone within reach. Instruct patient to call for assistance. Room bathroom lighting operational. Non-slip footwear when patient is off stretcher. Physically safe environment: no spills, clutter or unnecessary equipment. Stretcher in lowest position, wheels locked, appropriate side rails in place.
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
## 2022-10-24 NOTE — ED PROVIDER NOTE - PATIENT PORTAL LINK FT
You can access the FollowMyHealth Patient Portal offered by [facility] by registering at the following website: [email or web address]. By joining [employer]’s FollowMyHealth portal, you will also be able to view your health information using other applications (apps) compatible with our system.
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
## 2022-10-24 NOTE — ED ADULT NURSE REASSESSMENT NOTE - NS ED NURSE REASSESS COMMENT FT1
Received pt and report at change of shift. Pt is currently awake, laying in bed, a&ox3, calm and cooperative. Pending MC and disposition. Will continue to monitor for safety.
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
## 2022-11-04 NOTE — ED PROVIDER NOTE - CRITERIA ADMIT PEDS PT
Bed: ED26  Expected date:   Expected time:   Means of arrival:   Comments:  593  78 F htn/hyperglycemia/weakness/chest pain/ams  1251   DATE:  11/4/2022   TIME OF RECEIPT FROM LAB:  5507  LAB TEST:  Troponin  LAB VALUE:  124  RESULTS GIVEN WITH READ-BACK TO (PROVIDER):  Kian Smallwood MD  TIME LAB VALUE REPORTED TO PROVIDER:   6236     Adult No

## 2022-11-08 ENCOUNTER — EMERGENCY (EMERGENCY)
Facility: HOSPITAL | Age: 42
LOS: 1 days | Discharge: HOME | End: 2022-11-08
Admitting: EMERGENCY MEDICINE

## 2022-11-08 VITALS
TEMPERATURE: 98 F | RESPIRATION RATE: 16 BRPM | DIASTOLIC BLOOD PRESSURE: 90 MMHG | HEART RATE: 74 BPM | SYSTOLIC BLOOD PRESSURE: 137 MMHG | OXYGEN SATURATION: 99 %

## 2022-11-08 VITALS
DIASTOLIC BLOOD PRESSURE: 95 MMHG | OXYGEN SATURATION: 100 % | TEMPERATURE: 98 F | HEART RATE: 80 BPM | RESPIRATION RATE: 16 BRPM | HEIGHT: 66 IN | SYSTOLIC BLOOD PRESSURE: 134 MMHG

## 2022-11-08 PROCEDURE — 90792 PSYCH DIAG EVAL W/MED SRVCS: CPT | Mod: GC

## 2022-11-08 PROCEDURE — 99284 EMERGENCY DEPT VISIT MOD MDM: CPT

## 2022-11-08 RX ORDER — HALOPERIDOL DECANOATE 100 MG/ML
5 INJECTION INTRAMUSCULAR ONCE
Refills: 0 | Status: COMPLETED | OUTPATIENT
Start: 2022-11-08 | End: 2022-11-08

## 2022-11-08 RX ORDER — OXCARBAZEPINE 300 MG/1
300 TABLET, FILM COATED ORAL ONCE
Refills: 0 | Status: COMPLETED | OUTPATIENT
Start: 2022-11-08 | End: 2022-11-08

## 2022-11-08 RX ADMIN — OXCARBAZEPINE 300 MILLIGRAM(S): 300 TABLET, FILM COATED ORAL at 19:52

## 2022-11-08 RX ADMIN — HALOPERIDOL DECANOATE 5 MILLIGRAM(S): 100 INJECTION INTRAMUSCULAR at 18:35

## 2022-11-08 NOTE — ED ADULT NURSE REASSESSMENT NOTE - NS ED NURSE REASSESS COMMENT FT1
around 1930pm pt observed by staff to enter [facility], lrft door open, got on her knees in front of toilet and was attempting to sip toilet water. Pt redirectable, and escorted out of bathroom. Will continue to monitor.

## 2022-11-08 NOTE — ED BEHAVIORAL HEALTH NOTE - BEHAVIORAL HEALTH NOTE
As per request of provider, writer obtained COLLATERAL FROM [facility] on [place] grounds [phone]. Writer spoke w/ MHTA Mr Gibson . the following information is per MHTA.    Patient is a 41 Yo female domiciled at [facility], hx of bipolar disorder, bib EMS activated by staff after patient reported she was hallucinating, not feeling well and that she asked to come ot the hospital. MHTA reports patient did not elaborate on hallucinating. MHTA reports patient did not endorse any si or hi. He reports patient seemed at baseline earlier today and there was no concerns prior to the patient coming up to staff asking for help. He reports patient ate dinner and took her medication prior to coming. MHTA unaware of any drug or alcohol use. He reports patient is compliant /w medication and treatment. MHTA reports patient is sleeping, eating and showering at baseline. MHTA reports patient is not violent or aggressive currently. NO further safety concerns reported. Writer agreed to keep staff updated. As per request of provider, writer obtained COLLATERAL FROM [facility] on [place] grounds [phone]. Writer spoke w/ MHTA Mr Gibson . the following information is per MHTA.     Patient is a 43 Yo female domiciled at [facility], hx of bipolar disorder, bib EMS activated by staff after patient reported she was hallucinating, not feeling well and that she asked to come ot the hospital. MHTA reports patient did not elaborate on hallucinating. MHTA reports patient did not endorse any si or hi. He reports patient seemed at baseline earlier today and there was no concerns prior to the patient coming up to staff asking for help. He reports patient ate dinner and took her medication prior to coming. MHTA unaware of any drug or alcohol use. He reports patient is compliant /w medication and treatment. MHTA reports patient is sleeping, eating and showering at baseline. MHTA reports patient is not violent or aggressive currently. NO further safety concerns reported. Writer agreed to keep staff updated.    [employer] [address] As per request of provider, writer obtained COLLATERAL FROM [facility] on [place] grounds [phone]. Writer spoke w/ MHTA Mr Gibson . the following information is per MHTA.     Patient is a 41 Yo female domiciled at [facility], hx of bipolar disorder, bib EMS activated by staff after patient reported she was hallucinating, not feeling well and that she asked to come ot the hospital. MHTA reports patient did not elaborate on hallucinating. MHTA reports patient did not endorse any si or hi. He reports patient seemed at baseline earlier today and there was no concerns prior to the patient coming up to staff asking for help. He reports patient ate dinner and took her medication prior to coming. MHTA unaware of any drug or alcohol use. He reports patient is compliant /w medication and treatment. MHTA reports patient is sleeping, eating and showering at baseline. MHTA reports patient is not violent or aggressive currently. NO further safety concerns reported. Writer agreed to keep staff updated.    Per provider, KIRSTY encarnacion, pt is CLEAR for d/c back to [facility] at [address] [facility], [facility] [address] [place], [place]. SW called [place] [address] Tel# [phone] and spoke w/ MHTA Mr gibson who confirmed that patient is safe to travel independently with taxi. Clinical provider is in agreement with taxi back to [facility] [place]. Verbal huddle regarding coordinator of care complete with interdisciplinary team.     Writer scheduled  taxi via [employer] Inv# [phone] w/ [employer] [phone]. ride scheduled for 11:15PM

## 2022-11-08 NOTE — ED ADULT NURSE NOTE - PRO INTERPRETER NEED 2
Pt hx DM presents to ED for hypoglycemia. Pt FS was 45 In the field. Pt received amp D50.  at triage. Pt took 16 units of Lantus and states "I didn't eat much today."
English

## 2022-11-08 NOTE — ED PROVIDER NOTE - NSFOLLOWUPINSTRUCTIONS_ED_ALL_ED_FT
PLEASE TAKE YOUR MEDICATION AS IT PRESCRIBED BY YOUR OUT PATIENT TEAM.  FOLLOW UP WITH YOUR PSYCHIATRIST AT YOUR EARLIEST CONVENIENCE.

## 2022-11-08 NOTE — ED BEHAVIORAL HEALTH ASSESSMENT NOTE - SAFETY PLAN ADDT'L DETAILS
Safety plan discussed with.../Education provided regarding environmental safety / lethal means restriction/Provision of National Suicide Prevention Lifeline 6-397-003-RQZI (6434)

## 2022-11-08 NOTE — ED BEHAVIORAL HEALTH ASSESSMENT NOTE - CURRENT MEDICATION
Patient insists that she is on monthly haldol PALACIOS, next is due this week, trileptal / oxcarbazepine 300mg PO bid

## 2022-11-08 NOTE — ED BEHAVIORAL HEALTH ASSESSMENT NOTE - HPI (INCLUDE ILLNESS QUALITY, SEVERITY, DURATION, TIMING, CONTEXT, MODIFYING FACTORS, ASSOCIATED SIGNS AND SYMPTOMS)
Patient is a single, 42-year-old woman, single, non caregiver, unemployed, on disability for mental illness, usually domiciled at psychiatric residences on [place] grounds, currently at [facility], on AOT at this time, followed by [facility] ACT Team (phone [phone]; Orville Gorman [phone]); with past psychiatric history of most likely actual Schizoaffective Bipolar Type, Borderline Personality Disorder, Polysubstance Abuse, > 30 prior inpatient psychiatric hospitalizations most recently at [place] [address] for SI 9/19-9/21/22 (dc on 3DL) and prolonged [facility] admission 1/13/2021-4/7/2021 (Prolixin Dec 50mg IM Lithium 1350mg PO qhs, Zyprexa 15mg PO in am and 10mg PO qhs) with transfer to [facility] hospitalization ([place]) for highly disorganized behavior (smearing feces in the wall, eating feces, turning in a [place] over and over again, with myriad of ED visits including [facility], 3 prior suicide attempts (last in 2012 via OD), recurrent chronic suicidal gestures and suicidal ideation; history of property destruction when upset, long hx of sexually provocative behavior (both out in the community and while on inpatient units requiring 1;1 staff observation); hx of physical altercations, hx of verbal threats, hx of property destruction; long hx of medication and treatment noncompliance resulting in AOT and PALACIOS administration, last seen in [facility] ED 10/24/22, who presents today from her residence for bizarre behaviors and suicidal ideation.     In the ED BH unit, Pt was seen drinking toilet water, received Haldol 5mg PO and Trileptal 300mg PO PRNs for disorganization. On evaluation, Pt appears bright, childlike/coy at times, states she feels "hyper" since arrival, feels very inquisitive regarding mental health. She admits to intermittent depression and anxiety, however denies any anhedonia, hopelessness, helplessness. Denies any acute stressors. States staff treat her well, she feels safe at home. She denies AH, VH, HI, IOR, paranoia, and substance use aside from 5 cigarettes daily. She admits to chronic intermittent passive and active suicidal ideation with a thought to "blow herself up", similar to prior admission. Pt denies any intent or plan to act on these thoughts. When Pt is asked to reflect on how current level of SI is different from her prior admission, Pt is unable to elaborate except that it "feels different". She cites her curiosity to learn more and her future as protective factors. Pt denies any violent ideation, intent, or plan. Pt is adherent with Haldol Deconate PALACIOS (unknown dosing, due this week) and Trileptal 300mg BID. She discontinued Seroquel 200mg one month ago with psychiatrist due to concerns with oversedation. Engaged with individual therapy. Denies any physical pain apart from back discomfort upon arrival.    See separate collateral from MI from [facility] on [facility] grounds [phone], briefly there are no acute safety concerns, Pt is medication adherent and due for Haldol Deconate PALACIOS this week. Patient is a single, 42-year-old woman, single, non caregiver, unemployed, on disability for mental illness, usually domiciled at psychiatric residences on [place] grounds, currently at [facility], on AOT at this time, followed by [facility] ACT Team (phone [phone]; Orville Gorman [phone]); with past psychiatric history of most likely actual Schizoaffective Bipolar Type, Borderline Personality Disorder, Polysubstance Abuse, > 30 prior inpatient psychiatric hospitalizations most recently at [place] [address] for SI 9/19-9/21/22 (dc on 3DL) and prolonged [facility] admission 1/13/2021-4/7/2021 (Prolixin Dec 50mg IM Lithium 1350mg PO qhs, Zyprexa 15mg PO in am and 10mg PO qhs) with transfer to [facility] hospitalization ([place]) for highly disorganized behavior (smearing feces in the wall, eating feces, turning in a [place] over and over again, with myriad of ED visits including [facility], 3 prior suicide attempts (last in 2012 via OD), recurrent chronic suicidal gestures and suicidal ideation; history of property destruction when upset, long hx of sexually provocative behavior (both out in the community and while on inpatient units requiring 1;1 staff observation); hx of physical altercations, hx of verbal threats, hx of property destruction; long hx of medication and treatment noncompliance resulting in AOT and PALACIOS administration, last seen in [facility] ED 10/24/22, who presents today from her residence for bizarre behaviors and suicidal ideation.     In the ED BH unit, Pt was seen drinking toilet water, received Haldol 5mg PO and Trileptal 300mg PO PRNs for disorganization. On evaluation, Pt appears bright, childlike/coy at times, states she feels "hyper" since arrival, feels very inquisitive regarding mental health. She admits to intermittent depression and anxiety, however denies any anhedonia, hopelessness, helplessness. Denies any acute stressors. States staff treat her well, she feels safe at home. She denies AH, VH, HI, IOR, paranoia, and substance use aside from 5 cigarettes daily. She admits to chronic intermittent passive and active suicidal ideation with a thought to "blow herself up", similar to prior admission. Pt denies any intent or plan to act on these thoughts. When Pt is asked to reflect on how current level of SI is different from her prior admission, Pt is unable to elaborate except that it "feels different". She cites her curiosity to learn more and her future as protective factors. Pt denies any violent ideation, intent, or plan. Pt is adherent with Haldol Decanoate PALACIOS (unknown dosing, due this week) and Trileptal 300mg BID. She discontinued Seroquel 200mg one month ago with psychiatrist due to concerns with oversedation. Engaged with individual therapy. Denies any physical pain apart from back discomfort upon arrival.    See separate collateral from MI from [facility] on [place] grounds [phone], briefly there are no acute safety concerns, Pt is medication adherent and due for Haldol Decanoate PALACIOS this week.

## 2022-11-08 NOTE — ED BEHAVIORAL HEALTH ASSESSMENT NOTE - MEDICATIONS (PRESCRIPTIONS, DIRECTIONS)
Haldol Deconate due this week, Trileptal 300mg BID Haldol Decanoate due this Thursday, Trileptal 300mg BID

## 2022-11-08 NOTE — ED ADULT TRIAGE NOTE - CHIEF COMPLAINT QUOTE
pt was having suicidal ideation at [place] as per staff, denies si/hi at this time. no auditory or visual hallucinations. arrives calm and cooperative. having feelings of depression at this time. hx. bipolar disorder, schizo disorder

## 2022-11-08 NOTE — ED BEHAVIORAL HEALTH ASSESSMENT NOTE - RISK ASSESSMENT
Low Acute Suicide Risk The patient's chronic risk of suicide is elevated by extensive psychiatric history including multiple hospitalizations, prior substance abuse, personality pathology, history of nonadherence requiring ACT/AOT, multiple prior SAs, SIB, lack of social supports, and history of aggressive behavior. This risk is mitigated by treatment adherence, PALACIOS, ACT/AOT, residential stability, and sobriety.  Pt has chronic passive and active suicidal ideation at baseline, however she denies any suicidal intent, plan, and preparatory actions. Pt does not meet criteria for involuntary psychiatric hospitalization and remains appropriate for outpatient treatment.

## 2022-11-08 NOTE — ED BEHAVIORAL HEALTH ASSESSMENT NOTE - PAST PSYCHOTROPIC MEDICATION
Lithium 600mg BID, Zyprexa 20mg BID Klonopin 0.5mg BID; Prolixin 5mg BID x 2 weeks Prolixin Decanoate 12.5mg IM Q2 weeks; Haldol, risperdal, Abilify, Geodon, thorazine, depakote, zyprexa, quetiapine 200mg PO qhs

## 2022-11-08 NOTE — ED PROVIDER NOTE - PROGRESS NOTE DETAILS
NP Jassi- pt after medication order and administration does not feel better, fixated on mental illness generally. Asking questions about how can she grow wings out of her back " like angels do". Escalating locking herself into the bathroom and drinking out of the toilet bowl. Psych consult requested. NP Jasis- psych consult recommendation out patent follow up. There is no clinical evidence of intoxication, or any acute medical problem requiring immediate intervention.

## 2022-11-08 NOTE — ED PROVIDER NOTE - PATIENT PORTAL LINK FT
You can access the FollowMyHealth Patient Portal offered by [facility] by registering at the following website: [email or web address]. By joining [employer]’s FollowMyHealth portal, you will also be able to view your health information using other applications (apps) compatible with our system.

## 2022-11-08 NOTE — ED PROVIDER NOTE - OBJECTIVE STATEMENT
This is a 42-year-old female past medical history of bipolar disorder borderline personality disorder asthma GERD with complaint of increased anxiety This is a 42-year-old female past medical history of bipolar disorder, borderline personality disorder, asthma GERD with c/o of increased anxiety, hallucination and bizarre behaviour. Patient sent in from [employer] after expressing bizarre ideations such as fixated on mental health.  Expressing tactile hallucinations, " feels a wing growing out of her back".

## 2022-11-08 NOTE — ED BEHAVIORAL HEALTH ASSESSMENT NOTE - DETAILS
updated residence staff Poor response to Geodon; Depakote (Increased ammonia levels) Pt aware to call 911 and return to the ED if acute safety concerns arise and symptoms worsen. has chronic SI per baseline with intermittent passive and active SI to "blow self up", denies any intent or plan history of property destruction ((breaking TV screens while hospitalized) when upset / acting out

## 2022-11-08 NOTE — ED ADULT NURSE REASSESSMENT NOTE - NS ED NURSE REASSESS COMMENT FT1
Pt ambulatory w steady gait, denies any complaints. Taxi here for pickup, pt walked outside with [employer] Alexa.

## 2022-11-08 NOTE — ED BEHAVIORAL HEALTH ASSESSMENT NOTE - SUMMARY
Patient is a single, 42-year-old woman, single, non caregiver, unemployed, on disability for mental illness, usually domiciled at psychiatric residences on [place] grounds, currently at [facility], on AOT at this time, followed by [facility] ACT Team (phone [phone]; Orville Gorman [phone]); with past psychiatric history of most likely actual Schizoaffective Bipolar Type, Borderline Personality Disorder, Polysubstance Abuse, > 30 prior inpatient psychiatric hospitalizations most recently at [place] [address] for SI 9/19-9/21/22 (dc on 3DL) and prolonged [facility] admission 1/13/2021-4/7/2021 (Prolixin Dec 50mg IM Lithium 1350mg PO qhs, Zyprexa 15mg PO in am and 10mg PO qhs) with transfer to [facility] hospitalization ([place]) for highly disorganized behavior (smearing feces in the wall, eating feces, turning in a [place] over and over again, with myriad of ED visits including [facility], 3 prior suicide attempts (last in 2012 via OD), recurrent chronic suicidal gestures and suicidal ideation; history of property destruction when upset, long hx of sexually provocative behavior (both out in the community and while on inpatient units requiring 1;1 staff observation); hx of physical altercations, hx of verbal threats, hx of property destruction; long hx of medication and treatment noncompliance resulting in AOT and PALACIOS administration, last seen in [facility] ED 10/24/22, who presents today from her residence for bizarre behaviors and suicidal ideation.     The patient's presentation is consistent with breakthrough psychotic symptoms of schizoaffective disorder in the context of upcoming Haldol Deconate PALACIOS due. Pt denies any acute psychosocial stressors and reports treatment adherence and abstinence from substances. Pt is disorganized however without other significant symptoms of psychosis due to medication adherence and as such does not present with risks of sexual impropriety and aggression she is known for in the past. She does not present with an active depressive or manic episode. Pt has chronic passive and active suicidal ideation at baseline, however she denies any suicidal intent, plan, and preparatory actions. She is future oriented, engaged in treatment, and help seeking. Pt does not meet criteria for involuntary psychiatric hospitalization and remains appropriate for outpatient treatment. She is aware to call 911 and return to the ED if acute safety concerns arise and symptoms worsen. Patient is a single, 42-year-old woman, single, non caregiver, unemployed, on disability for mental illness, usually domiciled at psychiatric residences on [place] grounds, currently at [facility], on AOT at this time, followed by [employer] [facility] ACT Team (phone [phone]; Orville Gorman [phone]); with past psychiatric history of most likely actual Schizoaffective Bipolar Type, Borderline Personality Disorder, Polysubstance Abuse, > 30 prior inpatient psychiatric hospitalizations most recently at [place] [address] for SI 9/19-9/21/22 (dc on 3DL) and prolonged [facility] admission 1/13/2021-4/7/2021 (Prolixin Dec 50mg IM Lithium 1350mg PO qhs, Zyprexa 15mg PO in am and 10mg PO qhs) with transfer to [facility] hospitalization ([place]) for highly disorganized behavior (smearing feces in the wall, eating feces, turning in a [place] over and over again, with myriad of ED visits including [facility], 3 prior suicide attempts (last in 2012 via OD), recurrent chronic suicidal gestures and suicidal ideation; history of property destruction when upset, long hx of sexually provocative behavior (both out in the community and while on inpatient units requiring 1;1 staff observation); hx of physical altercations, hx of verbal threats, hx of property destruction; long hx of medication and treatment noncompliance resulting in AOT and PALACIOS administration, last seen in [facility] ED 10/24/22, who presents today from her residence for bizarre behaviors and suicidal ideation.     The patient's presentation is consistent with breakthrough psychotic symptoms of schizoaffective disorder in the context of upcoming Haldol Decanoate PALACIOS due. Pt denies any acute psychosocial stressors and reports treatment adherence and abstinence from substances. though Pt is illogical in TP, she is not displaying gross thought disorder/ no disorganization in speech..  there are other significant worsening symptoms of psychosis. as such does not present with risks of sexual impropriety and aggression she is known for in the past. She does not present with an active depressive or manic episode. Pt has chronic passive and active suicidal ideation at baseline, however she denies any suicidal intent, plan, and preparatory actions. She is future oriented, engaged in treatment, and help seeking. Pt does not meet criteria for involuntary psychiatric hospitalization and remains appropriate for outpatient treatment. She is aware to call 911 and return to the ED if acute safety concerns arise and symptoms worsen.

## 2022-11-08 NOTE — ED BEHAVIORAL HEALTH ASSESSMENT NOTE - OTHER PAST PSYCHIATRIC HISTORY (INCLUDE DETAILS REGARDING ONSET, COURSE OF ILLNESS, INPATIENT/OUTPATIENT TREATMENT)
> 13 prior inpatient psychiatric admission to [facility] alone since 2011, lifetime inpatient admissions > 20   - > 32 prior [facility] ED visits alone  - 3 prior suicide attempts (last in 2012 via OD) as per records, recurrent suicidal gestures and suicidal ideation (none recently), history of property destruction ((breaking TV screens while hospitalized) when upset / acting out   - long hx of sexually provocative, acting out behaviors (during last [facility] inpatient admission, patient had to be placed on CO 1:1 after trying to perform oral sex on a male patient on the dayroom, taken off CO then was going into male patient's room, overheard offering them sex and was placed back on CO  - in 2017 was also followed by ACT Team [therapist Katie [phone], ACT ([facility])/ Victor Manuel Browning : [phone]; AOT /Grace Donnelly: [phone]; Psychiatrist / Dr. Flores: [phone], [phone], [phone]],

## 2022-11-08 NOTE — ED BEHAVIORAL HEALTH ASSESSMENT NOTE - DESCRIPTION
In the ED, patient acted bizarrely and drank from toilet water, received PO PRNs for disorganization. She has been pleasant, calm, and cooperative otherwise.    Vital Signs Last 24 Hrs  T(C): 36.9 (08 Nov 2022 20:43), Max: 36.9 (08 Nov 2022 20:43)  T(F): 98.4 (08 Nov 2022 20:43), Max: 98.4 (08 Nov 2022 20:43)  HR: 74 (08 Nov 2022 20:43) (74 - 80)  BP: 137/90 (08 Nov 2022 20:43) (134/95 - 137/90)  BP(mean): --  RR: 16 (08 Nov 2022 20:43) (16 - 16)  SpO2: 99% (08 Nov 2022 20:43) (99% - 100%)    Parameters below as of 08 Nov 2022 20:43  Patient On (Oxygen Delivery Method): room air GERD as per records,  Patient does not know much about her biological family, she attended some college in the past

## 2022-11-08 NOTE — ED PROVIDER NOTE - CLINICAL SUMMARY MEDICAL DECISION MAKING FREE TEXT BOX
This is a 42-year-old female past medical history of bipolar disorder, borderline personality disorder, asthma GERD with c/o of increased anxiety, hallucination and bizarre behaviour. Patient sent in from [place] after expressing bizarre ideations such as fixated on mental health.  Expressing tactile hallucinations, " feels a wing growing out of her back".  She expresses compliance with medication. Medication  report which was sent from [place], reviewed and appears she is noncompliant with her psychiatric meds such as Trileptal. Reports long injection shot haldol is due on Thursday.

## 2022-11-08 NOTE — ED BEHAVIORAL HEALTH ASSESSMENT NOTE - NSBHATTESTCOMMENTATTENDFT_PSY_A_CORE
42/F with hx of SAD complicated by borderline PD; high utilizer of mental health facilities; has > 30 prior inpatient psychiatric hospitalizations; ; 3 prior suicide attempts (last in 2012 via OD), has hx of recurrent chronic suicidal gestures and suicidal ideation; and polysubstance abuse.  Today, presented as a referral from her residence due to evaluation of bizarre behaviors and suicidal ideation.     at this time, whilst she does present with impaired reasoning, she is not floridly thought disordered. Pt is not exhibiting any florid psychotic symptoms enough to justify pursuing psych admission.  Pt does not present with risks of sexual impropriety and aggression she is known for in the past. at this time, she does not appear to be manifesting any signs/ symptoms of acute trina; severe depression or severe anxiety. she is not harboring any current active/ passive SI or HI.  Pt is future oriented, engaged in treatment, and help seeking. Pt does not meet criteria for involuntary psychiatric hospitalization and remains appropriate for outpatient treatment.

## 2022-11-10 ENCOUNTER — EMERGENCY (EMERGENCY)
Facility: HOSPITAL | Age: 42
LOS: 1 days | Discharge: HOME | End: 2022-11-10
Admitting: EMERGENCY MEDICINE

## 2022-11-10 VITALS
HEART RATE: 85 BPM | DIASTOLIC BLOOD PRESSURE: 72 MMHG | HEIGHT: 66 IN | RESPIRATION RATE: 18 BRPM | SYSTOLIC BLOOD PRESSURE: 152 MMHG | OXYGEN SATURATION: 98 % | TEMPERATURE: 98 F

## 2022-11-10 PROCEDURE — 90792 PSYCH DIAG EVAL W/MED SRVCS: CPT | Mod: GC

## 2022-11-10 PROCEDURE — 99284 EMERGENCY DEPT VISIT MOD MDM: CPT

## 2022-11-10 RX ORDER — HALOPERIDOL DECANOATE 100 MG/ML
5 INJECTION INTRAMUSCULAR ONCE
Refills: 0 | Status: COMPLETED | OUTPATIENT
Start: 2022-11-10 | End: 2022-11-10

## 2022-11-10 RX ADMIN — Medication 2 MILLIGRAM(S): at 19:25

## 2022-11-10 RX ADMIN — HALOPERIDOL DECANOATE 5 MILLIGRAM(S): 100 INJECTION INTRAMUSCULAR at 19:25

## 2022-11-10 NOTE — ED BEHAVIORAL HEALTH ASSESSMENT NOTE - CURRENT MEDICATION
Patient is on monthly haldol PALACIOS which she most recently received yesterday (confirmed with residence), trileptal / oxcarbazepine 300mg PO bid (adherent per patient and residence)

## 2022-11-10 NOTE — ED ADULT NURSE REASSESSMENT NOTE - NS ED NURSE REASSESS COMMENT FT1
PT noted drinking toilet water from the bathroom.  PT noted restless and erratic behavior.  No distress noted.  Seen by NP, ordered to given Haldol 5mg and Ativan 2mg IM.  PT tolerated well.  PT being evaluated by psych at this time.  Will continue to monitor.

## 2022-11-10 NOTE — ED BEHAVIORAL HEALTH ASSESSMENT NOTE - DETAILS
updated residence staff Pt aware to call 911 and return to the ED if acute safety concerns arise and symptoms worsen. has chronic SI per baseline with intermittent passive and active SI to "blow self up", reports intent and plan on this visit, denies any precipitating factors Poor response to Geodon; Depakote (Increased ammonia levels) history of property destruction ((breaking TV screens while hospitalized) when upset / acting out history of property destruction (breaking TV screens while hospitalized) when upset / acting out

## 2022-11-10 NOTE — ED BEHAVIORAL HEALTH ASSESSMENT NOTE - DESCRIPTION
On arrival to the  ED, patient singing and dancing. Patient drank her own urine from cup and then toilet. Patient received Haldol 5mg IM and Ativan IM with good effect. Afterward, calm and cooperative with staff.    ICU Vital Signs Last 24 Hrs  T(C): 36.7 (10 Nov 2022 18:23), Max: 36.7 (10 Nov 2022 18:23)  T(F): 98 (10 Nov 2022 18:23), Max: 98 (10 Nov 2022 18:23)  HR: 85 (10 Nov 2022 18:23) (85 - 85)  BP: 152/72 (10 Nov 2022 18:23) (152/72 - 152/72)  BP(mean): --  ABP: --  ABP(mean): --  RR: 18 (10 Nov 2022 18:23) (18 - 18)  SpO2: 98% (10 Nov 2022 18:23) (98% - 98%)    O2 Parameters below as of 10 Nov 2022 18:23  Patient On (Oxygen Delivery Method): room air as per records,  Patient does not know much about her biological family, she attended some college in the past GERD

## 2022-11-10 NOTE — ED BEHAVIORAL HEALTH ASSESSMENT NOTE - NSCURPASTPSYDX_PSY_ALL_CORE
Psychotic disorder/Alcohol/Substance Use disorders/Cluster B Personality disorder/traits Psychotic disorder/Cluster B Personality disorder/traits

## 2022-11-10 NOTE — ED ADULT NURSE NOTE - OBJECTIVE STATEMENT
p/t with hx bipolar sent from [facility] for BOBBI schulz no plan, p/t appears calm and cooperative @ present sent to  for MD schulz.  PT came to ED c/o SI.  PT A&OX4.  No distress noted.  Breathing non-labored.  PT states she has hx of taking 30 pills of Digoxin in 2012.  PT denies AH and VH.  Belongings removed.  Will continue to monitor.

## 2022-11-10 NOTE — ED BEHAVIORAL HEALTH ASSESSMENT NOTE - NSSUICPROTFACT_PSY_ALL_CORE
lives in supervised residence, AOT, on an PALACIOS/Positive therapeutic relationships/Frustration tolerance lives in supervised residence, AOT, on an PALACIOS/Identifies reasons for living/Positive therapeutic relationships/Frustration tolerance

## 2022-11-10 NOTE — ED PROVIDER NOTE - OBJECTIVE STATEMENT
This is a 42-year-old female past medical history of bipolar disorder, borderline personality disorder, asthma, GERD with c/o of increased anxiety, and suicidal ideation with no plans. Patient presenting from [facility], after being d/magui from here yesterday. she said that she is increasingly preoccupied by the thoughts to kill herself. she denies any HI/AH/ and denies any other complaints. He reports that in 2012 he attempted to kill self by taking a full bottle of Digoxin and was admitted at [facility] for that. She states she wants to be admitted inpatient. This is a 42-year-old female past medical history of bipolar disorder, borderline personality disorder, asthma, GERD with c/o of increased anxiety, and suicidal ideation with no plans. Patient presenting from [place], after being d/magui from here yesterday (11/09/22). she said that she is increasingly preoccupied by the thoughts to kill herself and thus returned for admission.. she denies any HI/AH/ and denies any other complaints. He reports that in 2012 he attempted to kill self by taking a full bottle of Digoxin and was admitted at [facility] for that. She states she wants to be admitted inpatient.

## 2022-11-10 NOTE — ED ADULT NURSE NOTE - NS_BH TRG QUESTION4_ED_ALL_ED
Application of Fluoride Varnish    Dental Fluoride Varnish and Post-Treatment Instructions: Reviewed with parents   used: No    Dental Fluoride applied to teeth by: MAYELA RAMESH MA  Fluoride was well tolerated    LOT #: [id number]  EXPIRATION DATE:  07/31/20      MAYELA RAMESH MA   Yes

## 2022-11-10 NOTE — ED BEHAVIORAL HEALTH ASSESSMENT NOTE - RISK ASSESSMENT
The patient's chronic risk of suicide is elevated by extensive psychiatric history including multiple hospitalizations, prior substance abuse, personality pathology, history of nonadherence requiring ACT/AOT, multiple prior SAs, SIB, lack of social supports, and history of aggressive behavior. This risk is mitigated by treatment adherence, PALACIOS, ACT/AOT, residential stability, and sobriety.  Pt has chronic passive and active suicidal ideation at baseline, however despite her initial reporting of intent and plan, has not made preparatory actions. Pt does not meet criteria for involuntary psychiatric hospitalization and remains appropriate for outpatient treatment. Low Acute Suicide Risk The patient's chronic risk of suicide is elevated by extensive psychiatric history including multiple hospitalizations, prior substance abuse, personality pathology, history of nonadherence requiring ACT/AOT, multiple prior SAs, SIB, lack of social supports, and history of aggressive behavior. This risk is mitigated by treatment adherence, PALACIOS, ACT/AOT, residential stability, and sobriety.  Pt has chronic suicidal ideation at baseline, however despite her initial reporting of intent and plan, has not made preparatory actions and is not able to formulate a logical plan. Pt does not meet criteria for involuntary psychiatric hospitalization and remains appropriate for outpatient treatment.

## 2022-11-10 NOTE — ED BEHAVIORAL HEALTH ASSESSMENT NOTE - PSYCHIATRIC RESIDENCE
[facility] [address] [place] [address]. [facility] [address] [place] [address])
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
## 2022-11-10 NOTE — ED ADULT NURSE NOTE - CHIEF COMPLAINT QUOTE
p/t with hx bipolar sent from [facility] for zeus,  SI no plan, p/t appears calm and cooperative @ present sent to  for MD schulz
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
## 2022-11-10 NOTE — ED ADULT TRIAGE NOTE - CHIEF COMPLAINT QUOTE
p/t with hx bipolar sent from [facility] for zeus,  SI no plan, p/t appears calm and cooperative @ present sent to  for MD schulz

## 2022-11-10 NOTE — BH SAFETY PLAN - ENVIRONMENT SAFETY 3:
Schedule surgery for Char Denney- [id number]- with MD Juvenal Lee MD  has reviewed the risks, benefits, indications, complications and alternatives with the patient. An opportunity to ask questions provided and all were answered. Patient expresses understanding of the recommended procedure(s) outlined below and wishes to proceed with the surgery.    SURGERY SCHEDULING REQUIREMENTS INCLUDE:     Procedure:  Bilateral breast reduction  Excision of right axillary breast tissue and complex closure      [place], [id number], [id number]    Diagnosis: symptomatic macromastia  Accessory breast tissue    Anesthesia: General    Duration of Procedure: 3 hours     Cosmetics: Cosmetics: None     Surgeon Fee: na      Cosmetic Surgery Time  :        Combined Case:    No    Office Consult Fee:    No    Reimbursement of Office Consult Fee:   No    Location of Procedure: [facility]    Preferred Scheduling Dates/Times: Routine (next available or patient preference)    Type of Admit:Day Surgery     To Assist: Surgical Assist (SA) and RODRIGUEZ Magana      Co-Surgeon: None    Co-Surgeon Starting With Lead Surgeon: No     Team Requirement/Surgical Team: Amount of Teams: 1 Team     Special Equipment: Cosmetic-Power Assisted (PAL) Liposuction    Product Ordering Form: None     Notify Rep(s): None     Position: Supine     Pre-Op Clearance Performed By: PCP: Romeo Burger DO    Pre- Op Testing: BMP, CBC, Mammogram , Per routine (Anesthesiology recommendations)     Post Operative Appointment Needed In:  1 week and Next Business day     Preoperative appointment needed in plastic surgery clinic:  Yes- Per Patient Preference      Pre-Op Medications Needing Review: None       Antibiotics Needed Pre-Op:  No     Antibiotics Needed Post-Op:  No        talk to staff

## 2022-11-10 NOTE — ED BEHAVIORAL HEALTH ASSESSMENT NOTE - SAFETY PLAN ADDT'L DETAILS
Safety plan discussed with.../Education provided regarding environmental safety / lethal means restriction/Provision of National Suicide Prevention Lifeline 7-005-612-YQRN (7110)

## 2022-11-10 NOTE — ED PROVIDER NOTE - PATIENT PORTAL LINK FT
You can access the FollowMyHealth Patient Portal offered by [facility] by registering at the following website: [email or web address]. By joining [employer]’s FollowMyHealth portal, you will also be able to view your health information using other applications (apps) compatible with our system.

## 2022-11-10 NOTE — ED PROVIDER NOTE - PROGRESS NOTE DETAILS
Sera NP: Patient increasingly  visible on the unit patient,  going to the bathroom and drinking water from the potty.  Attempt to redirect the patient was not successful. patient was medicated 5mg haldol and 2mg ativan. to de-escalate , now Pt is calm and cooperative, awaiting Psych consult

## 2022-11-10 NOTE — ED PROVIDER NOTE - CLINICAL SUMMARY MEDICAL DECISION MAKING FREE TEXT BOX
Based on medical assessment and observation on the unit  there is no  any acute medical problem requiring immediate intervention.  At present time pat in not a harm to self or others and can be safely discharge to back to [place].
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
## 2022-11-11 ENCOUNTER — EMERGENCY (EMERGENCY)
Facility: HOSPITAL | Age: 42
LOS: 1 days | Discharge: HOME | End: 2022-11-11
Admitting: EMERGENCY MEDICINE

## 2022-11-11 VITALS
OXYGEN SATURATION: 100 % | RESPIRATION RATE: 16 BRPM | HEIGHT: 66 IN | TEMPERATURE: 98 F | SYSTOLIC BLOOD PRESSURE: 144 MMHG | DIASTOLIC BLOOD PRESSURE: 87 MMHG | HEART RATE: 96 BPM

## 2022-11-11 PROCEDURE — 90792 PSYCH DIAG EVAL W/MED SRVCS: CPT

## 2022-11-11 PROCEDURE — 99284 EMERGENCY DEPT VISIT MOD MDM: CPT

## 2022-11-11 RX ORDER — HALOPERIDOL DECANOATE 100 MG/ML
5 INJECTION INTRAMUSCULAR ONCE
Refills: 0 | Status: DISCONTINUED | OUTPATIENT
Start: 2022-11-11 | End: 2022-11-11

## 2022-11-11 RX ORDER — HALOPERIDOL DECANOATE 100 MG/ML
5 INJECTION INTRAMUSCULAR ONCE
Refills: 0 | Status: COMPLETED | OUTPATIENT
Start: 2022-11-11 | End: 2022-11-11

## 2022-11-11 RX ADMIN — HALOPERIDOL DECANOATE 5 MILLIGRAM(S): 100 INJECTION INTRAMUSCULAR at 19:20

## 2022-11-11 RX ADMIN — Medication 2 MILLIGRAM(S): at 19:20

## 2022-11-11 NOTE — ED BEHAVIORAL HEALTH ASSESSMENT NOTE - CURRENT ACTIVE IDEATION
60 Yes

## 2022-11-11 NOTE — ED PROVIDER NOTE - CLINICAL SUMMARY MEDICAL DECISION MAKING FREE TEXT BOX
43 y/o  F hx Bipolar D/O   Medical evaluation performed. There is no clinical evidence of intoxication or any acute medical problem requiring immediate intervention. Patient is awaiting psychiatric consultation. Final disposition will be determined by psychiatrist.

## 2022-11-11 NOTE — ED BEHAVIORAL HEALTH ASSESSMENT NOTE - CURRENT MEDICATION
Haldol Decanoate 100IM qmonthly, loratadine 10 mg PO qd, Orlistat 60 mg PO BID, SEROquel 200 mg qhs, Trileptal 300 mg PO bid

## 2022-11-11 NOTE — ED ADULT NURSE NOTE - OBJECTIVE STATEMENT
Received pt in  pt calm & cooperative c/o depression & hi towards roommate pt denies si/avh presently, emotional reassurance provided safety & comfort measures maintained eval on going.
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
## 2022-11-11 NOTE — ED BEHAVIORAL HEALTH ASSESSMENT NOTE - DETAILS
Poor response to Geodon; Depakote (Increased ammonia levels) hx of SAs; chronic intermittent suicidal ideation/gestures n/a residence aware of return back in AM see HPI
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
## 2022-11-11 NOTE — ED BEHAVIORAL HEALTH ASSESSMENT NOTE - RISK ASSESSMENT
Chronic risk factors: extensive psychiatric hx; hx of substance abuse, Axis II; hx of noncompliance requiring AOT/PALACIOS; hx of SAs, SIB; hx of aggressive behavior. Protective factors: young; healthy; currently medication and treatment compliant; no recent SA/SIB/substance use, on AOT, on an PALACIOS, lives in supervised residence; access to health services. Low Acute Suicide Risk
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
## 2022-11-11 NOTE — ED ADULT NURSE NOTE - CAS DISCH TRANSFER METHOD
Impression: Dry eye syndrome of bilateral lacrimal glands: H04.123. Plan: Discussed diagnosis in detail with patient. Dry eye accounts for the patient's symptoms. Dry eye is a chronic condition and does not have a cure and will need artificial tears for maintenance. There is no evidence of permanent changes to the cornea. Recommend Systane Balance or Refresh Repair OU QID longterm. Monitor for changes. Transportation service

## 2022-11-11 NOTE — ED PROVIDER NOTE - NSFOLLOWUPINSTRUCTIONS_ED_ALL_ED_FT
Please follow up with your doctors as scheduled.    Return to the emergency department as needed if symptoms worsen.
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
## 2022-11-11 NOTE — ED PROVIDER NOTE - PATIENT PORTAL LINK FT
You can access the FollowMyHealth Patient Portal offered by [facility] by registering at the following website: [email or web address]. By joining [employer]’s FollowMyHealth portal, you will also be able to view your health information using other applications (apps) compatible with our system.

## 2022-11-11 NOTE — ED BEHAVIORAL HEALTH ASSESSMENT NOTE - VIOLENCE PROTECTIVE FACTORS:
Residential stability/Relationship stability/Sobriety/Engagement in treatment/Affective Stability/Insight into violence risk and need for management/treatment/Good treatment response/compliance
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
## 2022-11-12 VITALS
OXYGEN SATURATION: 100 % | SYSTOLIC BLOOD PRESSURE: 142 MMHG | DIASTOLIC BLOOD PRESSURE: 84 MMHG | TEMPERATURE: 98 F | HEART RATE: 74 BPM | RESPIRATION RATE: 16 BRPM

## 2022-11-12 RX ORDER — OLANZAPINE 15 MG/1
10 TABLET, FILM COATED ORAL ONCE
Refills: 0 | Status: DISCONTINUED | OUTPATIENT
Start: 2022-11-12 | End: 2022-11-12

## 2022-11-12 NOTE — ED ADULT NURSE REASSESSMENT NOTE - NS ED NURSE REASSESS COMMENT FT1
Received report from night RN pt calm & cooperative denies si/hi/avh presently, pt cleared & d/c by provider transport arranged by MI.

## 2022-11-12 NOTE — ED ADULT NURSE REASSESSMENT NOTE - NS ED NURSE REASSESS COMMENT FT1
Received report from night RN pt calm & cooperative denies si/hi/avh presently, pt tolerated breakfast safety & comfort measures maintained eval on going.
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
## 2022-11-12 NOTE — BH CONSULTATION LIAISON PROGRESS NOTE - NSBHFUPINTERVALHXFT_PSY_A_CORE
No episodes of aggression, no psychotic behaviors, sleeping well, eating breakfast, cooperative with staff.  No episodes of aggression, no psychotic behaviors, sleeping well, eating breakfast, cooperative with staff.  No repeat PRN agitation medications.
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
## 2022-11-12 NOTE — BH CONSULTATION LIAISON PROGRESS NOTE - NSBHCONSULTFOLLOWAFTERCARE_PSY_A_CORE FT
[facility] Per outpatient treatment team

## 2022-11-12 NOTE — BH CONSULTATION LIAISON PROGRESS NOTE - NSBHCHARTREVIEWVS_PSY_A_CORE FT
Vital Signs Last 24 Hrs  T(C): 36.7 (12 Nov 2022 05:52), Max: 36.7 (11 Nov 2022 18:10)  T(F): 98 (12 Nov 2022 05:52), Max: 98 (11 Nov 2022 18:10)  HR: 89 (12 Nov 2022 05:52) (89 - 96)  BP: 153/94 (12 Nov 2022 05:52) (144/87 - 153/94)  BP(mean): --  RR: 18 (12 Nov 2022 05:52) (16 - 18)  SpO2: 98% (12 Nov 2022 05:52) (98% - 100%)    Parameters below as of 12 Nov 2022 05:52  Patient On (Oxygen Delivery Method): room air

## 2022-11-13 ENCOUNTER — EMERGENCY (EMERGENCY)
Facility: HOSPITAL | Age: 42
LOS: 1 days | Discharge: HOME | End: 2022-11-13
Attending: STUDENT IN AN ORGANIZED HEALTH CARE EDUCATION/TRAINING PROGRAM | Admitting: STUDENT IN AN ORGANIZED HEALTH CARE EDUCATION/TRAINING PROGRAM

## 2022-11-13 VITALS
RESPIRATION RATE: 18 BRPM | DIASTOLIC BLOOD PRESSURE: 83 MMHG | HEART RATE: 78 BPM | SYSTOLIC BLOOD PRESSURE: 138 MMHG | TEMPERATURE: 98 F | HEIGHT: 66 IN | OXYGEN SATURATION: 100 %

## 2022-11-13 LAB
ALBUMIN SERPL ELPH-MCNC: 3.4 G/DL — SIGNIFICANT CHANGE UP (ref 3.3–5)
ALP SERPL-CCNC: 120 U/L — SIGNIFICANT CHANGE UP (ref 40–120)
ALT FLD-CCNC: 17 U/L — SIGNIFICANT CHANGE UP (ref 4–33)
ANION GAP SERPL CALC-SCNC: 9 MMOL/L — SIGNIFICANT CHANGE UP (ref 7–14)
AST SERPL-CCNC: 13 U/L — SIGNIFICANT CHANGE UP (ref 4–32)
BASOPHILS # BLD AUTO: 0.02 K/UL — SIGNIFICANT CHANGE UP (ref 0–0.2)
BASOPHILS NFR BLD AUTO: 0.2 % — SIGNIFICANT CHANGE UP (ref 0–2)
BILIRUB SERPL-MCNC: <0.2 MG/DL — SIGNIFICANT CHANGE UP (ref 0.2–1.2)
BUN SERPL-MCNC: 8 MG/DL — SIGNIFICANT CHANGE UP (ref 7–23)
CALCIUM SERPL-MCNC: 9 MG/DL — SIGNIFICANT CHANGE UP (ref 8.4–10.5)
CHLORIDE SERPL-SCNC: 105 MMOL/L — SIGNIFICANT CHANGE UP (ref 98–107)
CO2 SERPL-SCNC: 23 MMOL/L — SIGNIFICANT CHANGE UP (ref 22–31)
CREAT SERPL-MCNC: 0.65 MG/DL — SIGNIFICANT CHANGE UP (ref 0.5–1.3)
EGFR: 111 ML/MIN/1.73M2 — SIGNIFICANT CHANGE UP
EOSINOPHIL # BLD AUTO: 0.14 K/UL — SIGNIFICANT CHANGE UP (ref 0–0.5)
EOSINOPHIL NFR BLD AUTO: 1.7 % — SIGNIFICANT CHANGE UP (ref 0–6)
GLUCOSE SERPL-MCNC: 93 MG/DL — SIGNIFICANT CHANGE UP (ref 70–99)
HCG SERPL-ACNC: <5 MIU/ML — SIGNIFICANT CHANGE UP
HCT VFR BLD CALC: 36.8 % — SIGNIFICANT CHANGE UP (ref 34.5–45)
HGB BLD-MCNC: 11.3 G/DL — LOW (ref 11.5–15.5)
IANC: 4.37 K/UL — SIGNIFICANT CHANGE UP (ref 1.8–7.4)
IMM GRANULOCYTES NFR BLD AUTO: 0.4 % — SIGNIFICANT CHANGE UP (ref 0–0.9)
LYMPHOCYTES # BLD AUTO: 2.65 K/UL — SIGNIFICANT CHANGE UP (ref 1–3.3)
LYMPHOCYTES # BLD AUTO: 33 % — SIGNIFICANT CHANGE UP (ref 13–44)
MAGNESIUM SERPL-MCNC: 1.9 MG/DL — SIGNIFICANT CHANGE UP (ref 1.6–2.6)
MCHC RBC-ENTMCNC: 28.1 PG — SIGNIFICANT CHANGE UP (ref 27–34)
MCHC RBC-ENTMCNC: 30.7 GM/DL — LOW (ref 32–36)
MCV RBC AUTO: 91.5 FL — SIGNIFICANT CHANGE UP (ref 80–100)
MONOCYTES # BLD AUTO: 0.83 K/UL — SIGNIFICANT CHANGE UP (ref 0–0.9)
MONOCYTES NFR BLD AUTO: 10.3 % — SIGNIFICANT CHANGE UP (ref 2–14)
NEUTROPHILS # BLD AUTO: 4.37 K/UL — SIGNIFICANT CHANGE UP (ref 1.8–7.4)
NEUTROPHILS NFR BLD AUTO: 54.4 % — SIGNIFICANT CHANGE UP (ref 43–77)
NRBC # BLD: 0 /100 WBCS — SIGNIFICANT CHANGE UP (ref 0–0)
NRBC # FLD: 0 K/UL — SIGNIFICANT CHANGE UP (ref 0–0)
PLATELET # BLD AUTO: 272 K/UL — SIGNIFICANT CHANGE UP (ref 150–400)
POTASSIUM SERPL-MCNC: 4 MMOL/L — SIGNIFICANT CHANGE UP (ref 3.5–5.3)
POTASSIUM SERPL-SCNC: 4 MMOL/L — SIGNIFICANT CHANGE UP (ref 3.5–5.3)
PROT SERPL-MCNC: 6.9 G/DL — SIGNIFICANT CHANGE UP (ref 6–8.3)
RBC # BLD: 4.02 M/UL — SIGNIFICANT CHANGE UP (ref 3.8–5.2)
RBC # FLD: 14.9 % — HIGH (ref 10.3–14.5)
SODIUM SERPL-SCNC: 137 MMOL/L — SIGNIFICANT CHANGE UP (ref 135–145)
TROPONIN T, HIGH SENSITIVITY RESULT: 6 NG/L — SIGNIFICANT CHANGE UP
WBC # BLD: 8.04 K/UL — SIGNIFICANT CHANGE UP (ref 3.8–10.5)
WBC # FLD AUTO: 8.04 K/UL — SIGNIFICANT CHANGE UP (ref 3.8–10.5)

## 2022-11-13 PROCEDURE — 99285 EMERGENCY DEPT VISIT HI MDM: CPT

## 2022-11-13 PROCEDURE — 71045 X-RAY EXAM CHEST 1 VIEW: CPT | Mod: 26

## 2022-11-13 PROCEDURE — 93010 ELECTROCARDIOGRAM REPORT: CPT

## 2022-11-13 NOTE — ED PROVIDER NOTE - NSFOLLOWUPINSTRUCTIONS_ED_ALL_ED_FT
Chest Pain    Chest pain can be caused by many different conditions which may or may not be dangerous. Causes include heartburn, lung infections, heart attack, blood clot in lungs, skin infections, strain or damage to muscle, cartilage, or bones, etc. In addition to a history and physical examination, an electrocardiogram (ECG) or other lab tests may have been performed to determine the cause of your chest pain. Follow up with your primary care provider or with a cardiologist as instructed.     - Follow up with a cardiologist if you continue to have these pains.   - Follow up with primary care provider.     SEEK IMMEDIATE MEDICAL CARE IF YOU HAVE ANY OF THE FOLLOWING SYMPTOMS: worsening chest pain, coughing up blood, unexplained back/neck/jaw pain, severe abdominal pain, dizziness or lightheadedness, fainting, shortness of breath, sweaty or clammy skin, vomiting, or racing heart beat. These symptoms may represent a serious problem that is an emergency. Do not wait to see if the symptoms will go away. Get medical help right away. Call 911 and do not drive yourself to the hospital.

## 2022-11-13 NOTE — ED PROVIDER NOTE - ATTENDING CONTRIBUTION TO CARE
42-year-old female with past medical history of bipolar disorder asthma borderline personality disorder depression presenting with right-sided chest pain since 4 PMfrom [facility]  Patient states she was sitting there had some pain which was present on the right side of her chest and the left side of her chest she was given aspirin pain has subsequently resolved.  States she has some pain with palpation there denies shortness of breath, fever, nausea, vomiting, abdominal pain.  Patient states she had similar episode a few months prior.  Patient well-appearing resting comfortably satting 100% heart rate 78.  denies fever, chills, +chest pain, SOB, abdominal pain, diarrhea, dysuria, syncope, bleeding, new rash,weakness, numbness, blurred vision    ROS  otherwise negative as per HPI  Gen: Awake, Alert, WD, WN, NAD  Head:  NC/AT  Eyes:  PERRL, EOMI, Conjunctiva pink, lids normal, no scleral icterus  ENT: OP clear, no exudates, no erythema, uvula midline,  moist mucus membranes  Neck: supple, nontender, no meningismus, no JVD, trachea midline  Cardiac/CV:  S1 S2, RRR, no M/G/R  Respiratory/Pulm:  CTAB, good air movement, normal resp effort, no wheezes/stridor/retractions/rales/rhonchi  Gastrointestinal/Abdomen:  Soft, nontender, nondistended, +BS, no rebound/guarding  Back:  no CVAT, no MLT  Ext:  warm, well perfused, moving all extremities spontaneously, no peripheral edema, distal pulses intact  Skin: intact, no rash  Neuro:  AAOx3, sensation intact, motor 5/5 x 4 extremities, normal gait, speech clear  mdm as above

## 2022-11-13 NOTE — ED PROVIDER NOTE - CLINICAL SUMMARY MEDICAL DECISION MAKING FREE TEXT BOX
42-year-old female with past medical history of bipolar disorder asthma borderline personality disorder depression presenting with right-sided chest pain since 4 PMfrom [facility].  Patient states she was sitting there had some pain which was present on the right side of her chest and the left side of her chest she was given aspirin pain has subsequently resolved. pt w/ reproducible pain on exam, PERC neative , heart score of 1. will check labs xray , meds reassess .

## 2022-11-13 NOTE — ED ADULT NURSE NOTE - OBJECTIVE STATEMENT
A&Ox4. ambulatory. c/o chest pain. NAD. pt denies SOB,  dizziness, weakness, urinary symptoms, HA, n/v/d, fevers, chills. respirations are even and un labored. skin intact. 20g placed to LAC. labs drawn and sent. safety precautions maintained. EKG obtained.

## 2022-11-13 NOTE — ED PROVIDER NOTE - NSICDXPASTSURGICALHX_GEN_ALL_CORE_FT
PAST SURGICAL HISTORY:  No significant past surgical history     
Attending Attestation (For Attendings USE Only)...

## 2022-11-13 NOTE — ED PROVIDER NOTE - PATIENT PORTAL LINK FT
You can access the FollowMyHealth Patient Portal offered by [facility] by registering at the following website: [email or web address]. By joining [employer]’s FollowMyHealth portal, you will also be able to view your health information using other applications (apps) compatible with our system.

## 2022-11-13 NOTE — ED PROVIDER NOTE - PHYSICAL EXAMINATION
CONSTITUTIONAL: NAD  SKIN: Warm dry  HEAD: NCAT  EYES: NL inspection  ENT: MMM  NECK: Supple; non tender.  CARD: RRR; no specific area of tenderness but says some reproduction mid chest when palpated   RESP: CTAB, no crackles/wheezing  ABD: S/NT no R/G  EXT: no pedal edema  NEURO: Grossly unremarkable  PSYCH: Cooperative, appropriate.

## 2022-11-13 NOTE — PROVIDER CONTACT NOTE (OTHER) - ASSESSMENT
As per provider, pt is CLEAR for d/c back to GROUP HOME at [address] [facility] [place], [place]. MI called [facility] [address] Tel# [phone] and spoke w/ Ms. Simmons, nurse, who confirmed that patient is safe to travel  with supervision . Clinical provider is in agreement with ambulette to group home. Verbal huddle regarding coordinator of care complete with interdisciplinary team.     MI schedule Scarlett Amb[facility]lette at [phone].  [facility] Inv# [id number]

## 2022-11-13 NOTE — ED ADULT NURSE NOTE - NSIMPLEMENTINTERV_GEN_ALL_ED
Implemented All Universal Safety Interventions:  [place] to call system. Call bell, personal items and telephone within reach. Instruct patient to call for assistance. Room bathroom lighting operational. Non-slip footwear when patient is off stretcher. Physically safe environment: no spills, clutter or unnecessary equipment. Stretcher in lowest position, wheels locked, appropriate side rails in place.

## 2022-11-13 NOTE — ED PROVIDER NOTE - NS ED ROS FT
Constitutional:  See HPI  ENMT: No neck pain or stiffness  Cardiac:  +chest pain, currently pain free  Respiratory:  No cough or respiratory distress.   GI:  No nausea, vomiting, abdominal pain.  MS:  No back pain.  Neuro:  No headache/numb/tingling/weakness  Skin:  No skin rash  Except as documented in the HPI,  all other systems are negative

## 2022-11-13 NOTE — ED PROVIDER NOTE - OBJECTIVE STATEMENT
43yo F with PMH of bipolar, borderline personality disorder, hx of seizures, hx of GERD, hx of behavioral disturbance presents from [facility] with chest pain. ~5pm, sharp in b/l upper chest in point areas lasting for few min w/ resolution, while sitting. No assoc diaphoresis, SOB, or other sxs. No other sxs. No SOB, no LE edema. Reproducible somewhat to palpation. No trauma/rashes/bruising in area.

## 2022-11-13 NOTE — ED PROVIDER NOTE - NSFOLLOWUPCLINICS_GEN_ALL_ED_FT
[facility]  Cardiology  [address] [place] [id number]  Phone: [phone]  Fax:

## 2022-11-13 NOTE — ED ADULT TRIAGE NOTE - CHIEF COMPLAINT QUOTE
states" I am having chest pain since this evening ". h/o depression, asthma, seizures. patient is given  mg by EMS

## 2022-11-17 ENCOUNTER — EMERGENCY (EMERGENCY)
Facility: HOSPITAL | Age: 42
LOS: 1 days | Discharge: HOME | End: 2022-11-17
Admitting: STUDENT IN AN ORGANIZED HEALTH CARE EDUCATION/TRAINING PROGRAM

## 2022-11-17 VITALS
OXYGEN SATURATION: 100 % | RESPIRATION RATE: 18 BRPM | SYSTOLIC BLOOD PRESSURE: 146 MMHG | DIASTOLIC BLOOD PRESSURE: 95 MMHG | HEART RATE: 69 BPM | TEMPERATURE: 99 F

## 2022-11-17 VITALS
DIASTOLIC BLOOD PRESSURE: 95 MMHG | OXYGEN SATURATION: 100 % | SYSTOLIC BLOOD PRESSURE: 119 MMHG | TEMPERATURE: 98 F | RESPIRATION RATE: 16 BRPM | HEART RATE: 78 BPM | HEIGHT: 66 IN

## 2022-11-17 PROCEDURE — 99284 EMERGENCY DEPT VISIT MOD MDM: CPT

## 2022-11-17 RX ORDER — HALOPERIDOL DECANOATE 100 MG/ML
5 INJECTION INTRAMUSCULAR ONCE
Refills: 0 | Status: COMPLETED | OUTPATIENT
Start: 2022-11-17 | End: 2022-11-17

## 2022-11-17 RX ORDER — CHLORPROMAZINE HCL 10 MG
50 TABLET ORAL ONCE
Refills: 0 | Status: COMPLETED | OUTPATIENT
Start: 2022-11-17 | End: 2022-11-17

## 2022-11-17 RX ADMIN — HALOPERIDOL DECANOATE 5 MILLIGRAM(S): 100 INJECTION INTRAMUSCULAR at 20:32

## 2022-11-17 RX ADMIN — Medication 50 MILLIGRAM(S): at 20:32

## 2022-11-17 NOTE — ED BEHAVIORAL HEALTH NOTE - BEHAVIORAL HEALTH NOTE
Patient resides at [address] [place] [address], [facility]. [facility]. SW called [facility] [address] Tel# [phone] at [facility] and spoke to TA Ms. Argueta:    She states that today the patient had a meeting with her team today and after the meeting pt became agitated. Patient then went up to the  and reported that she is having homicidal thoughts (no specific person noted). Patient was not physically violent. No drugs or alcohol noted. Patient is linked to Dr. Cruz ([phone]). Patient did not present with AH/VH today. Patient did not present with any bizarre behaviors today. Case discussed with KIRSTY Colmenares.

## 2022-11-17 NOTE — ED ADULT NURSE NOTE - OBJECTIVE STATEMENT
break coverage Rn: pt received to piper from EMS after staff called. as per pt she told staff that she is feeling homicidal. states it is not toward anyone or thing in particular but wants to hurt someone, states she has a lighter in her bag and can make a molotov cocktail. pt calm and corporative. pt undressed and placed in  clothing. belongings cataloged and secured. pt pending further acp eval.

## 2022-11-17 NOTE — ED PROVIDER NOTE - CLINICAL SUMMARY MEDICAL DECISION MAKING FREE TEXT BOX
This is a 42-year-old female with a history of bipolar borderline personality disorder history of seizures history of GERD history of behavioral disturbances presenting from [place] [facility] with homicidal ideations. Patient tolerating p.o. eating and drinking does not have any suicidal ideations anymore will DC back to [facility]

## 2022-11-17 NOTE — ED PROVIDER NOTE - NSFOLLOWUPINSTRUCTIONS_ED_ALL_ED_FT
Rest, drink plenty of fluids.  Advance activity as tolerated.  Continue all previously prescribed medications as directed.  Follow up with your primary care physician in 48-72 hours- bring copies of your results.  Return to the ER for worsening or persistent symptoms, and/or ANY NEW OR CONCERNING SYMPTOMS. If you have issues obtaining follow up, please call: [phone] to obtain a doctor or specialist who takes your insurance in your area.  You may call [phone] to make an appointment with the internal medicine clinic.

## 2022-11-17 NOTE — ED ADULT NURSE NOTE - CHIEF COMPLAINT QUOTE
Pt from [facility] , sent for homicidal..  Denies SI, hallucinations

## 2022-11-17 NOTE — ED ADULT NURSE NOTE - NSHOSCREENINGQ3_ED_ALL_ED
Detail Level: Detailed Add 31391 Cpt? (Important Note: In 2017 The Use Of 55297 Is Being Tracked By Cms To Determine Future Global Period Reimbursement For Global Periods): yes No

## 2022-11-17 NOTE — ED BEHAVIORAL HEALTH NOTE - BEHAVIORAL HEALTH NOTE
Per provider,ARTEM Colmenares, pt is CLEAR for d/c back to GROUP HOME at [address] [facility] [address]. [place], [place]. SW called [facility] 40 Tel# [phone] and spoke w/ SPARKLE Horan who confirmed that patient is safe to travel independently with taxi. Clinical provider is in agreement with taxi back to group home. Verbal huddle regarding coordinator of care complete with interdisciplinary team.     Writer scheduled  taxi via [facility] Inv# [id number] w/ [employer] [phone]. ride scheduled for 9:45PM.

## 2022-11-17 NOTE — ED PROVIDER NOTE - OBJECTIVE STATEMENT
This is a 42-year-old female with a history of bipolar borderline personality disorder history of seizures history of GERD history of behavioral disturbances presenting from [facility] with homicidal ideations patient states that she had these several hours ago however now has resolved she currently is not aggressive not agitated she is not complaining of anything no have not having any suicidal or homicidal ideations right now no auditory or visual hallucinations.  She states that at the time she did not really have anyone that she wanted to kill just stated that she was homicidal no thoughts of actually harming anyone and states that those thoughts have actually resolved now.  Patient denies having any nausea vomiting diarrhea fever chills body aches headaches dizziness abdominal pain dysuria hematuria urinary urgency frequency.  Patient is well-known to the emergency room staff

## 2022-11-17 NOTE — ED PROVIDER NOTE - PATIENT PORTAL LINK FT
You can access the FollowMyHealth Patient Portal offered by [facility] by registering at the following website: [email or web address]. By joining [employer]’s FollowMyHealth portal, you will also be able to view your health information using other applications (apps) compatible with our system.

## 2022-11-21 NOTE — ED BEHAVIORAL HEALTH ASSESSMENT NOTE - NSBHINFOSOURCE_PSY_ALL_CORE
Patient informed by phone and agree's to plan.  Will call back to schedule at later time.    Patient/Collateral...

## 2022-12-29 NOTE — ED ADULT NURSE NOTE - CCCP TRG CHIEF CMPLNT
psychiatric evaluation Rotation Flap Text: The defect edges were debeveled with a #15 scalpel blade.  Given the location of the defect, shape of the defect and the proximity to free margins a rotation flap was deemed most appropriate.  Using a sterile surgical marker, an appropriate rotation flap was drawn incorporating the defect and placing the expected incisions within the relaxed skin tension lines where possible.    The area thus outlined was incised deep to adipose tissue with a #15 scalpel blade.  The skin margins were undermined to an appropriate distance in all directions utilizing iris scissors.

## 2023-01-03 NOTE — BH CONSULTATION LIAISON ASSESSMENT NOTE - VIOLENCE PROTECTIVE FACTORS:
Final Anesthesia Post-op Assessment    Patient: Fang Tapia  Procedure(s) Performed: HYSTEROSCOPY WITH ENDOMETRIAL ABLATION AND POLYPECTOMY  Anesthesia type: General    Vitals Value Taken Time   Temp 36.4 °C (97.5 °F) 01/03/23 1604   Pulse 64 01/03/23 1615   Resp 17 01/03/23 1615   SpO2 100 % 01/03/23 1615   /62 01/03/23 1615         Patient Location: Phase II  Post-op Vital Signs:stable  Level of Consciousness: awake and alert  Respiratory Status: spontaneous ventilation  Cardiovascular stable  Hydration: euvolemic  Pain Management: adequately controlled  Vomiting: none  Nausea: None  Airway Patency:patent  Post-op Assessment: awake, alert, appropriately conversant, or baseline, no complications, patient tolerated procedure well with no complications, no evidence of recall and dentition within defined limits      No notable events documented.    Residential stability/Insight into violence risk and need for management/treatment Residential stability/Engagement in treatment/Affective Stability/Insight into violence risk and need for management/treatment/Good treatment response/compliance

## 2023-01-05 NOTE — ED BEHAVIORAL HEALTH ASSESSMENT NOTE - DOMICILE TYPE
Psychiatric Residence Xeldavidz Pregnancy And Lactation Text: This medication is Pregnancy Category D and is not considered safe during pregnancy.  The risk during breast feeding is also uncertain.

## 2023-01-13 NOTE — ED ADULT NURSE NOTE - NS ED NOTE ABUSE SUSPICION NEGLECT YN
Kylah iMller was contacted by a pharmacy technician at [address] [place] [address] to discuss medication adherence. She was identified for pharmacy outreach based on prescription fill history of her Cholesterol medicine(s), Pravastatin 10 mg. Last dispensed on 7/5/22 for a 90-day supply. The patient is taking the medication(s). She reports missing an average of 4 or more doses on a weekly basis. The following barrier(s) to medication adherence were identified:    Side effects        The following solution(s) for medication adherence were recommended:     Patient mentioned that she was taking Pravastatin at night and getting legs cramps so she stopped taking it for a while. Tech offered to have patient speak to pharmacist. Patient declined and stated that she started taking the Pravastatin in the morning and that now she no longer has the leg cramps. No further follow-up is required at this time. Patient was given the pharmacy team phone number should future questions or concerns arise.       Shoshana Cunningham, [facility] Technician Specialist  [phone] No

## 2023-01-20 NOTE — ED BEHAVIORAL HEALTH ASSESSMENT NOTE - NS ED BHA SUICIDALITY PRESENT CURRENT INTENT SUICIDE ATTEMPT
Patient: Abdulaziz Rausch    Procedure: Procedure(s):  IRRIGATION AND DEBRIDEMENT digits 2 and 3 right foot       Anesthesia Type:  MAC    Note:  Disposition: Outpatient   Postop Pain Control: Uneventful            Sign Out: Well controlled pain   PONV: No   Neuro/Psych: Uneventful            Sign Out: Acceptable/Baseline neuro status   Airway/Respiratory: Uneventful            Sign Out: Acceptable/Baseline resp. status   CV/Hemodynamics: Uneventful            Sign Out: Acceptable CV status; No obvious hypovolemia; No obvious fluid overload   Other NRE:    DID A NON-ROUTINE EVENT OCCUR?            Last vitals:  Vitals:    01/20/23 1045 01/20/23 1112 01/20/23 1154   BP: (!) 153/76 120/70 (!) 84/52   Pulse: 76 71 67   Resp:  12 13   Temp:  36.6  C (97.9  F) 36.7  C (98  F)   SpO2: 99% 98% 98%       Electronically Signed By: Elenita Delacruz MD  January 20, 2023  12:04 PM   [facility]

## 2023-01-25 NOTE — ED ADULT NURSE NOTE - CHIEF COMPLAINT QUOTE
I'm having grand delusions "voices are telling me that I am the queen and to run the world"  Denies SI/HI
no

## 2023-02-08 NOTE — ED ADULT TRIAGE NOTE - TEMPERATURE IN CELSIUS (DEGREES C)
36.9 Olanzapine Counseling- I discussed with the patient the common side effects of olanzapine including but are not limited to: lack of energy, dry mouth, increased appetite, sleepiness, tremor, constipation, dizziness, changes in behavior, or restlessness.  Explained that teenagers are more likely to experience headaches, abdominal pain, pain in the arms or legs, tiredness, and sleepiness.  Serious side effects include but are not limited: increased risk of death in elderly patients who are confused, have memory loss, or dementia-related psychosis; hyperglycemia; increased cholesterol and triglycerides; and weight gain.

## 2023-02-20 NOTE — ED BEHAVIORAL HEALTH ASSESSMENT NOTE - SUICIDE RISK FACTORS
Pharmacy faxed request for medication refill.    Preferred pharmacy set up and verified    Your Patient state of permanent resident requires your approval to increase dispensed quantity. We have processed the prescription as written to prevent therapy delays.  If you would like to increase the quantity to maximize their insurance benefits. Please send us a new prescription for a 100 day supply for future fills.     Order # [id number]   Highly impulsive behavior

## 2023-03-01 NOTE — ED BEHAVIORAL HEALTH ASSESSMENT NOTE - SUICIDE PROTECTIVE FACTORS
Detail Level: Simple Detail Level: Zone Identifies reasons for living/Future oriented/Positive therapeutic relationships/Supportive social network or family/Fear of death or dying due to pain/suffering

## 2023-03-07 NOTE — ED BEHAVIORAL HEALTH ASSESSMENT NOTE - NICOTINE
What Type Of Note Output Would You Prefer (Optional)?: Bullet Format Hpi Title: Evaluation of Skin Lesions How Severe Are Your Spot(S)?: mild Have Your Spot(S) Been Treated In The Past?: has not been treated Year Removed: 1900 Additional History: Here for FBE Yes

## 2023-03-12 ENCOUNTER — EMERGENCY (EMERGENCY)
Facility: HOSPITAL | Age: 43
LOS: 1 days | Discharge: HOME | End: 2023-03-12
Attending: STUDENT IN AN ORGANIZED HEALTH CARE EDUCATION/TRAINING PROGRAM | Admitting: STUDENT IN AN ORGANIZED HEALTH CARE EDUCATION/TRAINING PROGRAM
Payer: MEDICAID

## 2023-03-12 VITALS
OXYGEN SATURATION: 100 % | RESPIRATION RATE: 16 BRPM | SYSTOLIC BLOOD PRESSURE: 128 MMHG | DIASTOLIC BLOOD PRESSURE: 86 MMHG | HEART RATE: 96 BPM | TEMPERATURE: 99 F

## 2023-03-12 LAB
ALBUMIN SERPL ELPH-MCNC: 3.3 G/DL — SIGNIFICANT CHANGE UP (ref 3.3–5)
ALP SERPL-CCNC: 134 U/L — HIGH (ref 40–120)
ALT FLD-CCNC: 38 U/L — HIGH (ref 4–33)
ANION GAP SERPL CALC-SCNC: 9 MMOL/L — SIGNIFICANT CHANGE UP (ref 7–14)
AST SERPL-CCNC: 22 U/L — SIGNIFICANT CHANGE UP (ref 4–32)
BASOPHILS # BLD AUTO: 0.03 K/UL — SIGNIFICANT CHANGE UP (ref 0–0.2)
BASOPHILS NFR BLD AUTO: 0.3 % — SIGNIFICANT CHANGE UP (ref 0–2)
BILIRUB SERPL-MCNC: <0.2 MG/DL — SIGNIFICANT CHANGE UP (ref 0.2–1.2)
BUN SERPL-MCNC: 12 MG/DL — SIGNIFICANT CHANGE UP (ref 7–23)
CALCIUM SERPL-MCNC: 8.9 MG/DL — SIGNIFICANT CHANGE UP (ref 8.4–10.5)
CHLORIDE SERPL-SCNC: 108 MMOL/L — HIGH (ref 98–107)
CO2 SERPL-SCNC: 25 MMOL/L — SIGNIFICANT CHANGE UP (ref 22–31)
CREAT SERPL-MCNC: 0.76 MG/DL — SIGNIFICANT CHANGE UP (ref 0.5–1.3)
EGFR: 99 ML/MIN/1.73M2 — SIGNIFICANT CHANGE UP
EOSINOPHIL # BLD AUTO: 0.19 K/UL — SIGNIFICANT CHANGE UP (ref 0–0.5)
EOSINOPHIL NFR BLD AUTO: 2.1 % — SIGNIFICANT CHANGE UP (ref 0–6)
FLUAV AG NPH QL: SIGNIFICANT CHANGE UP
FLUBV AG NPH QL: SIGNIFICANT CHANGE UP
GLUCOSE SERPL-MCNC: 94 MG/DL — SIGNIFICANT CHANGE UP (ref 70–99)
HCT VFR BLD CALC: 36.8 % — SIGNIFICANT CHANGE UP (ref 34.5–45)
HGB BLD-MCNC: 11.1 G/DL — LOW (ref 11.5–15.5)
IANC: 5.47 K/UL — SIGNIFICANT CHANGE UP (ref 1.8–7.4)
IMM GRANULOCYTES NFR BLD AUTO: 0.5 % — SIGNIFICANT CHANGE UP (ref 0–0.9)
LYMPHOCYTES # BLD AUTO: 2.67 K/UL — SIGNIFICANT CHANGE UP (ref 1–3.3)
LYMPHOCYTES # BLD AUTO: 29.1 % — SIGNIFICANT CHANGE UP (ref 13–44)
MCHC RBC-ENTMCNC: 27.6 PG — SIGNIFICANT CHANGE UP (ref 27–34)
MCHC RBC-ENTMCNC: 30.2 GM/DL — LOW (ref 32–36)
MCV RBC AUTO: 91.5 FL — SIGNIFICANT CHANGE UP (ref 80–100)
MONOCYTES # BLD AUTO: 0.78 K/UL — SIGNIFICANT CHANGE UP (ref 0–0.9)
MONOCYTES NFR BLD AUTO: 8.5 % — SIGNIFICANT CHANGE UP (ref 2–14)
NEUTROPHILS # BLD AUTO: 5.47 K/UL — SIGNIFICANT CHANGE UP (ref 1.8–7.4)
NEUTROPHILS NFR BLD AUTO: 59.5 % — SIGNIFICANT CHANGE UP (ref 43–77)
NRBC # BLD: 0 /100 WBCS — SIGNIFICANT CHANGE UP (ref 0–0)
NRBC # FLD: 0 K/UL — SIGNIFICANT CHANGE UP (ref 0–0)
NT-PROBNP SERPL-SCNC: 33 PG/ML — SIGNIFICANT CHANGE UP
PLATELET # BLD AUTO: 285 K/UL — SIGNIFICANT CHANGE UP (ref 150–400)
POTASSIUM SERPL-MCNC: 3.6 MMOL/L — SIGNIFICANT CHANGE UP (ref 3.5–5.3)
POTASSIUM SERPL-SCNC: 3.6 MMOL/L — SIGNIFICANT CHANGE UP (ref 3.5–5.3)
PROT SERPL-MCNC: 6.5 G/DL — SIGNIFICANT CHANGE UP (ref 6–8.3)
RBC # BLD: 4.02 M/UL — SIGNIFICANT CHANGE UP (ref 3.8–5.2)
RBC # FLD: 14.2 % — SIGNIFICANT CHANGE UP (ref 10.3–14.5)
RSV RNA NPH QL NAA+NON-PROBE: SIGNIFICANT CHANGE UP
SARS-COV-2 RNA SPEC QL NAA+PROBE: SIGNIFICANT CHANGE UP
SODIUM SERPL-SCNC: 142 MMOL/L — SIGNIFICANT CHANGE UP (ref 135–145)
TROPONIN T, HIGH SENSITIVITY RESULT: 10 NG/L — SIGNIFICANT CHANGE UP
TROPONIN T, HIGH SENSITIVITY RESULT: 8 NG/L — SIGNIFICANT CHANGE UP
WBC # BLD: 9.19 K/UL — SIGNIFICANT CHANGE UP (ref 3.8–10.5)
WBC # FLD AUTO: 9.19 K/UL — SIGNIFICANT CHANGE UP (ref 3.8–10.5)

## 2023-03-12 PROCEDURE — 71045 X-RAY EXAM CHEST 1 VIEW: CPT | Mod: 26

## 2023-03-12 PROCEDURE — 99285 EMERGENCY DEPT VISIT HI MDM: CPT | Mod: GC

## 2023-03-12 NOTE — ED PROVIDER NOTE - NSFOLLOWUPINSTRUCTIONS_ED_ALL_ED_FT
You were seen in the Emergency Department for chest pain. Your blood work, ekg, and xrays did not show any emergent medical problems that require hospitalization or inpatient treatment. Follow up with your primary care doctor and cardiologist as discussed.       1) Continue all previously prescribed medications as directed.    2) Follow up with your primary care physician - take copies of your results.    3) Return to the Emergency Department for worsening or persistent symptoms, and/or ANY NEW OR CONCERNING SYMPTOMS.

## 2023-03-12 NOTE — ED ADULT NURSE NOTE - CHIEF COMPLAINT QUOTE
Patient arrives with ems from [facility] for c/o mid chest pain worse with palpation as per ems .

## 2023-03-12 NOTE — PROVIDER CONTACT NOTE (OTHER) - RECOMMENDATIONS
Pt to travel back to the residence via ambulette; discussed with provider, who is aware and in agreement with the plan.  verbal huddle complete.

## 2023-03-12 NOTE — ED PROVIDER NOTE - ATTENDING CONTRIBUTION TO CARE
I have personally performed a history and physical examination of the patient and discussed management with the resident as well as the patient.  I reviewed the resident's note and agree with the documented findings and plan of care.  I have authored and modified critical sections of the Provider Note, including but not limited to HPI, Physical Exam and MDM.    41yo F pmh seizures, borderline, bipolar - from [facility] presenting to emergency department with 1 episode of substernal chest pain starting today roughly 2 hours ago associated with shortness of breath no diaphoresis.  Pain is sharp reproducible when pressing on her own chest, nonexertional.  Patient endorses similar pain in the past however states it has never lasted this long before.  Examination unremarkable, no edema, JVD.  Low suspicion for ACS.  Independent review of EKG shows no STEMI or TWI.  Will obtain CBC, CMP, CXR.  Independent review of CXR shows no evidence of focal consolidation or pleural effusion.  Will obtain TNI, BNP, low suspicion heart failure.  Will provide symptomatic control as needed.

## 2023-03-12 NOTE — ED PROVIDER NOTE - PATIENT PORTAL LINK FT
You can access the FollowMyHealth Patient Portal offered by [facility] by registering at the following website: [email or web address]. By joining [employer]’s FollowMyHealth portal, you will also be able to view your health information using other applications (apps) compatible with our system.

## 2023-03-12 NOTE — ED PROVIDER NOTE - CLINICAL SUMMARY MEDICAL DECISION MAKING FREE TEXT BOX
43yo F pmh seizures, borderline, bipolar - from [facility] presenting to emergency department with 1 episode of substernal chest pain starting today roughly 2 hours ago associated with shortness of breath no diaphoresis.  Pain is sharp reproducible when pressing on her own chest, nonexertional.  Has otherwise been at baseline.  Does not denies any abdominal pain, headache, visual changes, dysuria or other changes.    Presents emergency department with normal vitals, normal tensive, 99.4 oral and 100% on room air.  Generally well-appearing no acute distress speaking in full sentences with no audible wheezing or stridor.  Breath sounds clear bilaterally, well-perfused extremities with 2+ pulses throughout.  Patient is obese, soft abdomen nontender.  No skin lesions    Atypical chest pain as is sharp, reproducible and nonexertional.  Patient borderline febrile with oral temp of 99.4, will consider pursuing infectious work-up.  Unlikely ACS but will check cardiac enzymes.  EKG normal sinus with no signs of ischemia.  Ultimately patient looks well plan to discharge if work-up otherwise negative

## 2023-03-12 NOTE — ED ADULT NURSE NOTE - OBJECTIVE STATEMENT
Pt. A&Ox4, c/o chest pain and RLQ pain starting a few hours ago. Denies SOB, dizziness, n/v/d or fevers. #20g IV placed to right AC, labs sent. MD at bedside for eval. Vitals stable. Will continue to monitor.

## 2023-03-12 NOTE — ED PROVIDER NOTE - PHYSICAL EXAMINATION
GEN - NAD; well appearing; A&O x3   HEAD - NC/AT   EYES- PERRL, EOMI  ENT: Airway patent, mmm  PULMONARY - CTA b/l, symmetric breath sounds. No W/R/R.  CARDIAC -s1s2, RRR, no M,G,R, No JVD  ABDOMEN - +BS, ND, NT, soft, no guarding, no rebound, no masses , no rigidity  BACK - Normal  spine   EXTREMITIES - FROM, symmetric pulses, capillary refill < 2 seconds, no edema, 5/5 strength in b/l UE and LE  SKIN - no rash or bruising   NEUROLOGIC - alert, speech clear, no focal deficits  PSYCH -nl mood/affect, nl insight.

## 2023-03-12 NOTE — ED PROVIDER NOTE - OBJECTIVE STATEMENT
43yo F pmh seizures, borderline, bipolar - from [facility] presenting to emergency department with 1 episode of substernal chest pain starting today roughly 2 hours ago associated with shortness of breath no diaphoresis.  Pain is sharp reproducible when pressing on her own chest, nonexertional.  Has otherwise been at baseline.  Does not denies any abdominal pain, headache, visual changes, dysuria or other changes.

## 2023-03-12 NOTE — PROVIDER CONTACT NOTE (OTHER) - ASSESSMENT
MI called residence [phone]; spoke with staff member Brenda, who confirmed that pt is from the residence and can return.  As per Allie, pt is new to the residence, and she does not know her way around very well.  She reports pt should travel via ambulette to ensure she returns to the residence safely.

## 2023-03-12 NOTE — ED PROVIDER NOTE - NS ED ROS FT
ROS   GENERAL: No fever, no chills, no malaise, no fatigue   ENT: No earache, no coryza, no sore throat   RESPIRATORY: No cough, no dyspnea, no pleuritic chest pain   HEART: see hpi  ABDOMEN: No abdominal pain, no nausea, no vomiting, no diarrhea   MUSCULAR-SKELETAL: No myalgia, no arthralgia   NEUROLOGY: No headache, no vertigo, no paresthesia, no focal deficits, no diplopia   SKIN: No rash, no evidence of trauma  All other ROS are negative

## 2023-03-12 NOTE — ED PROVIDER NOTE - PROGRESS NOTE DETAILS
PROSPER Luna (PGY-3) - pt w/ stable trops, not consistent w ACS. Labs otherwise not actionable. Alexandria diaz back to riccardo w/ mary kate OP.

## 2023-03-12 NOTE — ED ADULT TRIAGE NOTE - CHIEF COMPLAINT QUOTE
Patient arrives with ems from [facility] for c/o mid chest pain worse with palpation as per ems .

## 2023-03-13 VITALS
OXYGEN SATURATION: 100 % | TEMPERATURE: 98 F | DIASTOLIC BLOOD PRESSURE: 80 MMHG | SYSTOLIC BLOOD PRESSURE: 140 MMHG | RESPIRATION RATE: 15 BRPM | HEART RATE: 89 BPM

## 2023-03-17 NOTE — ED BEHAVIORAL HEALTH ASSESSMENT NOTE - NS ED BHA PLAN TR BH CONTACTED FT
What Type Of Note Output Would You Prefer (Optional)?: Standard Output How Severe Are Your Spot(S)?: moderate Have Your Spot(S) Been Treated In The Past?: has not been treated Hpi Title: Evaluation of Skin Lesions message

## 2023-03-27 NOTE — ED BEHAVIORAL HEALTH ASSESSMENT NOTE - KNOWN PSYCHIATRIC ADMISSION WITHIN THE PAST 30 DAYS
Dermatology clinic in [phone]  REMINDER: Please complete labs within 1 week of appointment.   Please complete satisfaction survey when received. Thank you.    Yes

## 2023-04-03 ENCOUNTER — EMERGENCY (EMERGENCY)
Facility: HOSPITAL | Age: 43
LOS: 1 days | Discharge: HOME | End: 2023-04-03
Admitting: EMERGENCY MEDICINE
Payer: MEDICAID

## 2023-04-03 VITALS
RESPIRATION RATE: 17 BRPM | SYSTOLIC BLOOD PRESSURE: 150 MMHG | TEMPERATURE: 98 F | DIASTOLIC BLOOD PRESSURE: 90 MMHG | HEART RATE: 85 BPM | OXYGEN SATURATION: 100 %

## 2023-04-03 VITALS
DIASTOLIC BLOOD PRESSURE: 65 MMHG | SYSTOLIC BLOOD PRESSURE: 145 MMHG | TEMPERATURE: 98 F | OXYGEN SATURATION: 98 % | RESPIRATION RATE: 18 BRPM | HEART RATE: 85 BPM

## 2023-04-03 PROCEDURE — 99284 EMERGENCY DEPT VISIT MOD MDM: CPT
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
## 2023-04-03 NOTE — ED ADULT TRIAGE NOTE - CHIEF COMPLAINT QUOTE
p/t with hx schizophrenia, sent from [facility]  for eval, aggressive behavior towards staff, p/t denies any SI or HI, appears calm and cooperative

## 2023-04-03 NOTE — ED PROVIDER NOTE - NSFOLLOWUPINSTRUCTIONS_ED_ALL_ED_FT
Follow up with your primary care physician and psychiatrist in 48-72 hours.  You may also see the psychiatrist at [facility]:    [address] [place] [address]  Phone: [phone]      SEEK IMMEDIATE MEDICAL CARE IF YOU HAVE ANY OF THE FOLLOWING SYMPTOMS: thoughts about hurting or killing yourself, thoughts about hurting or killing somebody else, hallucinations or any other worsening or persistent symptoms OR ANY NEW OR CONCERNING SYMPTOMS.

## 2023-04-03 NOTE — ED PROVIDER NOTE - PATIENT PORTAL LINK FT
You can access the FollowMyHealth Patient Portal offered by [facility] by registering at the following website: [email or web address]. By joining [employer]’s FollowMyHealth portal, you will also be able to view your health information using other applications (apps) compatible with our system.

## 2023-04-03 NOTE — ED PROVIDER NOTE - CLINICAL SUMMARY MEDICAL DECISION MAKING FREE TEXT BOX
Patient presents for agitation from [place].  Well known by this ER.  Since Pt's arrival at the  ED, the Pt has remained calm and cooperative. There was no occurrence of agitation/aggressive behavior.  Pt did not verbalize any passive/ active SI/HI. There were no signs/symptoms of acute trina or florid psychosis.  Pt did not exhibit any signs/ symptoms  of intoxication or withdrawal.  Pt is not delirious.  Pt did not test limits and was able to maintain appropriate boundaries.  Pt was easily redirected.  Overall, there was no management issues whilst Pt was at the  ED. Collateral shows no acute safety concerns, will discharge. Patient presents for agitation from [place].  Well known by this ER.  Since Pt's arrival at the  ED, the Pt has remained calm and cooperative. There was no occurrence of agitation/aggressive behavior.  Pt did not verbalize any passive/ active SI/HI. There were no signs/symptoms of acute trina or florid psychosis.  Pt did not exhibit any signs/ symptoms  of intoxication or withdrawal.  Pt is not delirious.  Pt did not test limits and was able to maintain appropriate boundaries.  Pt was easily redirected.  Overall, there was no management issues whilst Pt was at the  ED. Collateral shows no acute safety concerns, will discharge.  Patient to go back via taxi as [place] states this is usual mode of transport for patient.

## 2023-04-03 NOTE — ED BEHAVIORAL HEALTH NOTE - BEHAVIORAL HEALTH NOTE
Janessa Castillo's Trip  Date: 04/03/2023 Time: call Inv# [id number]  Reason for Trip  Discharge    Medicaid ID (ROM #)  [id number]    [place] Address  [address] [place] [address]    [place] Phone  [phone]    Contact Name  Moriah Castro    Contact Phone  [phone]    Transport  sfilatino    Transport Phone  [phone]    Medical Provider  Claudia Aldana    Phone  [phone]    Transport Type  Taxi/Livery    Trip Legs  Upon Arrival  Call    Additional Duane(s)  NO    Car Seats  NO    Service Animal  NO    Pickup Address  [address] [place] [id number]    [address]  Destination Address  [address] [place] [address]    Apt/Suite  Pickup Time  3:45pm
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
## 2023-04-03 NOTE — ED BEHAVIORAL HEALTH NOTE - BEHAVIORAL HEALTH NOTE
worker called [employer] and could not get a hold  of vendor. worker called back maril[employer] ([phone]) and changed vendor to [employer] ([phone])    new invoice #[id number]
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
## 2023-04-03 NOTE — ED ADULT NURSE NOTE - HAVE YOU RECEIVED AT LEAST TWO PFIZER AND/OR MODERNA VACCINATIONS (IN ANY COMBINATION) AND/OR ONE JOHNSON & JOHNSON VACCINATION?
Vaccine Information Sheet, \"Influenza - Inactivated\"  given to [employer], or parent/legal guardian of  [employer] and verbalized understanding. Patient responses:    Have you ever had a reaction to a flu vaccine? No  Do you have any current illness? No  Have you ever had [place] Syndrome? No  Do you have a serious allergy to any of the follow: Neomycin, Polymyxin, Thimerosal, eggs or egg products? No    Flu vaccine given per order. Please see immunization tab. Risks and benefits explained. Current VIS given.       Immunizations Administered     Name Date Dose Route    Influenza, Quadv, IM, PF (6 mo and older Fluzone, Flulaval, Fluarix, and 3 yrs and older Afluria) 9/29/2020 0.5 mL Intramuscular    Site: Deltoid- Left    Lot: [id number]    [address]: [id number] Yes

## 2023-04-06 NOTE — ED POST DISCHARGE NOTE - ADDITIONAL DOCUMENTATION
from [facility] called stating patient was sent back without discharge papers. Requesting discharge paperwork be sent. I faxed the paperwork to them.

## 2023-04-13 NOTE — ED PROVIDER NOTE - CROS ED PSYCH ALL NEG
Has The Growth Been Previously Biopsied?: has been previously biopsied
Body Location Override (Optional): R scalp
- - -

## 2023-04-21 NOTE — ED BEHAVIORAL HEALTH ASSESSMENT NOTE - AXIS IV
Caller: Juany Carlin    Relationship: Self    Best call back number: [phone]    Who are you requesting to speak with (clinical staff, provider,  specific staff member): DR WESTBROOK OR MA    What was the call regarding: PATIENT STATES THAT HER BLOOD PRESSURE HAS BEEN RUNNING HIGH THE LAST FEW DAYS. SHE IS CONCERNED, AND WOULD LIKE TO DISCUSS IF DR WESTBROOK WOULD RECOMMEND SHE TAKE THE CHLORTHALIDONE SHE HAD BEEN ON PREVIOUSLY. PLEASE CALL AND ADVISE    Do you require a callback: YES  
HUB OKAY TO SAY         Let's give it some time to see if it corrects and see her at her upcoming appointment.  Limit salty foods, plenty of fluids, and rest.  Limit caffeine and other stimulants.  No cold medications with stimulants since you mentioned she was sick.           
Let's give it some time to see if it corrects and see her at her upcoming appointment.  Limit salty foods, plenty of fluids, and rest.  Limit caffeine and other stimulants.  No cold medications with stimulants since you mentioned she was sick.  
Pt states its been 163-166/90 but pt was sick. Pt states it only elevated after she been moving around pt states she has had a few headaches she also states shes stressed . Do you want her to wait to see you next week ?  
Pt states its been 163-166/90 but pt was sick. Pt states it only elevated after she been moving around pt states she has had a few headaches she also states shes stressed . Do you want her to wait to see you next week ?  
Problems with primary support/Problem related to social environment/Housing problems/Occupational problems

## 2023-04-27 ENCOUNTER — INPATIENT (INPATIENT)
Facility: HOSPITAL | Age: 43
LOS: 10 days | Discharge: HOME | End: 2023-05-08
Attending: PSYCHIATRY & NEUROLOGY | Admitting: PSYCHIATRY & NEUROLOGY
Payer: MEDICAID

## 2023-04-27 VITALS
TEMPERATURE: 98 F | HEART RATE: 85 BPM | SYSTOLIC BLOOD PRESSURE: 160 MMHG | OXYGEN SATURATION: 98 % | RESPIRATION RATE: 18 BRPM | DIASTOLIC BLOOD PRESSURE: 80 MMHG

## 2023-04-27 DIAGNOSIS — R45.851 SUICIDAL IDEATIONS: ICD-10-CM

## 2023-04-27 LAB
ALBUMIN SERPL ELPH-MCNC: 3.9 G/DL — SIGNIFICANT CHANGE UP (ref 3.3–5)
ALP SERPL-CCNC: 146 U/L — HIGH (ref 40–120)
ALT FLD-CCNC: 26 U/L — SIGNIFICANT CHANGE UP (ref 4–33)
ANION GAP SERPL CALC-SCNC: 12 MMOL/L — SIGNIFICANT CHANGE UP (ref 7–14)
APAP SERPL-MCNC: <10 UG/ML — LOW (ref 15–25)
AST SERPL-CCNC: 20 U/L — SIGNIFICANT CHANGE UP (ref 4–32)
BASOPHILS # BLD AUTO: 0.03 K/UL — SIGNIFICANT CHANGE UP (ref 0–0.2)
BASOPHILS NFR BLD AUTO: 0.4 % — SIGNIFICANT CHANGE UP (ref 0–2)
BILIRUB SERPL-MCNC: <0.2 MG/DL — SIGNIFICANT CHANGE UP (ref 0.2–1.2)
BUN SERPL-MCNC: 14 MG/DL — SIGNIFICANT CHANGE UP (ref 7–23)
CALCIUM SERPL-MCNC: 9.3 MG/DL — SIGNIFICANT CHANGE UP (ref 8.4–10.5)
CHLORIDE SERPL-SCNC: 106 MMOL/L — SIGNIFICANT CHANGE UP (ref 98–107)
CO2 SERPL-SCNC: 21 MMOL/L — LOW (ref 22–31)
CREAT SERPL-MCNC: 0.87 MG/DL — SIGNIFICANT CHANGE UP (ref 0.5–1.3)
EGFR: 84 ML/MIN/1.73M2 — SIGNIFICANT CHANGE UP
EOSINOPHIL # BLD AUTO: 0.27 K/UL — SIGNIFICANT CHANGE UP (ref 0–0.5)
EOSINOPHIL NFR BLD AUTO: 3.8 % — SIGNIFICANT CHANGE UP (ref 0–6)
ETHANOL SERPL-MCNC: <10 MG/DL — SIGNIFICANT CHANGE UP
FLUAV AG NPH QL: SIGNIFICANT CHANGE UP
FLUBV AG NPH QL: SIGNIFICANT CHANGE UP
GLUCOSE SERPL-MCNC: 97 MG/DL — SIGNIFICANT CHANGE UP (ref 70–99)
HCT VFR BLD CALC: 40.5 % — SIGNIFICANT CHANGE UP (ref 34.5–45)
HGB BLD-MCNC: 12.4 G/DL — SIGNIFICANT CHANGE UP (ref 11.5–15.5)
IANC: 4.18 K/UL — SIGNIFICANT CHANGE UP (ref 1.8–7.4)
IMM GRANULOCYTES NFR BLD AUTO: 0.3 % — SIGNIFICANT CHANGE UP (ref 0–0.9)
LYMPHOCYTES # BLD AUTO: 2.18 K/UL — SIGNIFICANT CHANGE UP (ref 1–3.3)
LYMPHOCYTES # BLD AUTO: 30.5 % — SIGNIFICANT CHANGE UP (ref 13–44)
MCHC RBC-ENTMCNC: 28.3 PG — SIGNIFICANT CHANGE UP (ref 27–34)
MCHC RBC-ENTMCNC: 30.6 GM/DL — LOW (ref 32–36)
MCV RBC AUTO: 92.5 FL — SIGNIFICANT CHANGE UP (ref 80–100)
MONOCYTES # BLD AUTO: 0.46 K/UL — SIGNIFICANT CHANGE UP (ref 0–0.9)
MONOCYTES NFR BLD AUTO: 6.4 % — SIGNIFICANT CHANGE UP (ref 2–14)
NEUTROPHILS # BLD AUTO: 4.18 K/UL — SIGNIFICANT CHANGE UP (ref 1.8–7.4)
NEUTROPHILS NFR BLD AUTO: 58.6 % — SIGNIFICANT CHANGE UP (ref 43–77)
NRBC # BLD: 0 /100 WBCS — SIGNIFICANT CHANGE UP (ref 0–0)
NRBC # FLD: 0 K/UL — SIGNIFICANT CHANGE UP (ref 0–0)
PLATELET # BLD AUTO: 281 K/UL — SIGNIFICANT CHANGE UP (ref 150–400)
POTASSIUM SERPL-MCNC: 3.8 MMOL/L — SIGNIFICANT CHANGE UP (ref 3.5–5.3)
POTASSIUM SERPL-SCNC: 3.8 MMOL/L — SIGNIFICANT CHANGE UP (ref 3.5–5.3)
PROT SERPL-MCNC: 7.6 G/DL — SIGNIFICANT CHANGE UP (ref 6–8.3)
RBC # BLD: 4.38 M/UL — SIGNIFICANT CHANGE UP (ref 3.8–5.2)
RBC # FLD: 14.1 % — SIGNIFICANT CHANGE UP (ref 10.3–14.5)
RSV RNA NPH QL NAA+NON-PROBE: SIGNIFICANT CHANGE UP
SALICYLATES SERPL-MCNC: <0.3 MG/DL — LOW (ref 15–30)
SARS-COV-2 RNA SPEC QL NAA+PROBE: SIGNIFICANT CHANGE UP
SODIUM SERPL-SCNC: 139 MMOL/L — SIGNIFICANT CHANGE UP (ref 135–145)
TSH SERPL-MCNC: 1.23 UIU/ML — SIGNIFICANT CHANGE UP (ref 0.27–4.2)
WBC # BLD: 7.14 K/UL — SIGNIFICANT CHANGE UP (ref 3.8–10.5)
WBC # FLD AUTO: 7.14 K/UL — SIGNIFICANT CHANGE UP (ref 3.8–10.5)

## 2023-04-27 PROCEDURE — 99285 EMERGENCY DEPT VISIT HI MDM: CPT

## 2023-04-27 RX ORDER — DIPHENHYDRAMINE HCL 50 MG
50 CAPSULE ORAL ONCE
Refills: 0 | Status: DISCONTINUED | OUTPATIENT
Start: 2023-04-27 | End: 2023-05-08

## 2023-04-27 RX ORDER — OXCARBAZEPINE 300 MG/1
300 TABLET, FILM COATED ORAL ONCE
Refills: 0 | Status: COMPLETED | OUTPATIENT
Start: 2023-04-27 | End: 2023-04-27

## 2023-04-27 RX ORDER — DIPHENHYDRAMINE HCL 50 MG
50 CAPSULE ORAL EVERY 6 HOURS
Refills: 0 | Status: DISCONTINUED | OUTPATIENT
Start: 2023-04-27 | End: 2023-05-08

## 2023-04-27 RX ORDER — HALOPERIDOL DECANOATE 100 MG/ML
5 INJECTION INTRAMUSCULAR EVERY 6 HOURS
Refills: 0 | Status: DISCONTINUED | OUTPATIENT
Start: 2023-04-27 | End: 2023-05-08

## 2023-04-27 RX ORDER — OXCARBAZEPINE 300 MG/1
300 TABLET, FILM COATED ORAL
Refills: 0 | Status: DISCONTINUED | OUTPATIENT
Start: 2023-04-27 | End: 2023-05-08

## 2023-04-27 RX ADMIN — OXCARBAZEPINE 300 MILLIGRAM(S): 300 TABLET, FILM COATED ORAL at 21:45
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
## 2023-04-27 NOTE — BH PATIENT PROFILE - FALL HARM RISK - UNIVERSAL INTERVENTIONS
Bed in lowest position, wheels locked, appropriate side rails in place/Call bell, personal items and telephone in reach/Instruct patient to call for assistance before getting out of bed or chair/Non-slip footwear when patient is out of bed/Francisco to call system/Physically safe environment - no spills, clutter or unnecessary equipment/Purposeful Proactive Rounding/Room/bathroom lighting operational, light cord in reach

## 2023-04-27 NOTE — ED ADULT TRIAGE NOTE - CHIEF COMPLAINT QUOTE
patient with hx bipolar sent from [facility] for eval, SI with plan, patient appears calm and cooperative @ present,   sent to  for Md schulz

## 2023-04-27 NOTE — ED BEHAVIORAL HEALTH NOTE - BEHAVIORAL HEALTH NOTE
COVID Exposure Screen- Patient   1.	*Have you had a COVID-19 test in the last 90 days?  (  ) Yes   ( X ) No   (  ) Unknown- Reason: _____   IF YES PROCEED TO QUESTION #2. IF NO OR UNKNOWN, PLEASE SKIP TO QUESTION #3.   2.	Date of test(s) and result(s): ________   3.	*Have you tested positive for COVID-19 antibodies? (  ) Yes   ( X  ) No   (  ) Unknown- Reason: _____   IF YES PROCEED TO QUESTION #4. IF NO or UNKNOWN, PLEASE SKIP TO QUESTION #5.   4.	Date of positive antibody test: ________   5.	*Have you received 2 doses of the COVID-19 vaccine? (  ) Yes   ( X ) No   (  ) Unknown- Reason:  (received 1 dose of J&J)    IF YES PROCEED TO QUESTION #6. IF NO or UNKNOWN, PLEASE SKIP TO QUESTION #7.   6.	Date of second dose: ________   7.	*In the past 10 days, have you been around anyone with a positive COVID-19 test?* (  ) Yes   ( X ) No   (  ) Unknown- Reason: ____   IF YES PROCEED TO QUESTION #8. IF NO or UNKNOWN, PLEASE SKIP TO QUESTION #13.   8.	Were you within 6 feet of them for at least 15 minutes? (  ) Yes   (  ) No   (  ) Unknown- Reason: _____   9.	Have you provided care for them? (  ) Yes   (  ) No   (  ) Unknown- Reason: ______   10.	Have you had direct physical contact with them (touched, hugged, or kissed them)? (  ) Yes   (  ) No    (  ) Unknown- Reason: _____   11.	Have you shared eating or drinking utensils with them? (  ) Yes   (  ) No    (  ) Unknown- Reason: ____   12.	Have they sneezed, coughed, or somehow gotten respiratory droplets on you? (  ) Yes   (  ) No    (  ) Unknown- Reason: ______   13.	*Have you been out of [place] within the past 10 days?* (  ) Yes   ( X ) No   (  ) Unknown- Reason: _____   IF YES PLEASE ANSWER THE FOLLOWING QUESTIONS:   14.	Which state/country have you been to? ______   15.	Were you there over 24 hours? (  ) Yes   (  ) No    (  ) Unknown- Reason: ______   16.	Date of return to [facility]: ______
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
## 2023-04-27 NOTE — ED BEHAVIORAL HEALTH ASSESSMENT NOTE - CURRENT MEDICATION
Patient is on monthly haldol PALACIOS which pt reports she received yesterday, trileptal / oxcarbazepine 300mg PO bid (adherence per patient)

## 2023-04-27 NOTE — ED BEHAVIORAL HEALTH ASSESSMENT NOTE - PSYCHIATRIC ISSUES AND PLAN (INCLUDE STANDING AND PRN MEDICATION)
Continue Trileptal 300mg bid, Continue Haldol dec 200mg IM monthly (last injection date needs to be verified), Haldol 5mg PO/IM and Benadryl 50mg PO/IM prn for agitation

## 2023-04-27 NOTE — ED BEHAVIORAL HEALTH NOTE - BEHAVIORAL HEALTH NOTE
Writer called pt's residence [employer] [address] [place] [address]  and spoke w/ jose manuel pozo who provided the following information.    Patient is a 43 Yo female domiciled at [address] [place] [address], hx of schizoaffective disorder, bib ems activated by staff due to depression. Jose Manuel reports he is unsure of the specifics because it was the staff during the morning shift who sent her in. he says as per notes, pt reported not feeling happy, sad and depressed. He is unsure of any si. He reports the patient has not been violent or aggressive. He is unaware of any recent AVH. He reports patient has presented well this week including today. He is unaware of any drug or alcohol use. He says patient was requesting to come to the hospital. He is unsure of medical problems and vaccine status. Patient’s psychiatrist is Dr Toscano ([phone]) and Therapist is Chayo Garcia ([phone]). He says patient has been compliant w/ medication. He was unsure when patient last got her IM. No further safety concerns reported. patient sent w/ facesheet. Writer called pt's residence [facility] [address]  and spoke w/ jose manuel pozo who provided the following information.    Patient is a 43 Yo female domiciled at [address] [place] [address], hx of schizoaffective disorder, bib ems activated by staff due to depression. Jose Manuel reports he is unsure of the specifics because it was the staff during the morning shift who sent her in. he says as per notes, pt reported not feeling happy, sad and depressed. He is unsure of any si. He reports the patient has not been violent or aggressive. He is unaware of any recent AVH. He reports patient has presented well this week including today. He is unaware of any drug or alcohol use. He says patient was requesting to come to the hospital. He is unsure of medical problems and vaccine status. Patient’s psychiatrist is Dr Toscano ([phone]) and Therapist is Chayo Garcia ([phone]). He says patient has been compliant w/ medication. He was unsure when patient last got her IM. No further safety concerns reported. patient sent w/ facesheet.    Writer informed counselor machelle ([phone]) from [address] that patient would be admitted to [facility].

## 2023-04-27 NOTE — ED ADULT NURSE NOTE - CHIEF COMPLAINT QUOTE
patient with hx bipolar sent from [facility] for eval, SI with plan, patient appears calm and cooperative @ present,   sent to  for Md schulz
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
## 2023-04-27 NOTE — ED PROVIDER NOTE - OBJECTIVE STATEMENT
This is a 42-year-old female with a past medical history of bipolar, borderline personality disorder history of seizures, GERD presented to the emergency room for having suicidal ideations for the last several months.  She denies having any alcohol or drug use she denies having any attempt to commit suicide she denies having any plan to hurt herself.  She denies having any auditory or visual hallucinations she denies having any medical complaints and has no physical complaints or concerns no illicit drug use or alcohol use she is requesting food patient also was found drinking out of toilet as well she denies having any fever chills body aches headaches dizziness chest pain exertional dyspnea. Pt currently states that she is feeling better at this time.

## 2023-04-27 NOTE — ED ADULT NURSE REASSESSMENT NOTE - NS ED NURSE REASSESS COMMENT FT1
pt going into bathroom and attempting to drink from the toilet. pt verbally redirected and given clean water to drink. remains pending psych consult.
report given to FORD Kennedy [phone]. pending security for transport to [facility]
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
## 2023-04-27 NOTE — ED BEHAVIORAL HEALTH ASSESSMENT NOTE - ADDITIONAL DETAILS ALL
see HPI Currently endorsing active suicidal ideation, with plan, no intent. Has been researching way to end her life.
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
## 2023-04-27 NOTE — ED ADULT NURSE NOTE - OBJECTIVE STATEMENT
pt received to , brought in by EMS from [facility] where pt lives. pt states she is having suicidal thoughts. when asked about an active plan, pt giggles and states "I cant tell you". pt well appearing, dressed in nice clothing with makeup. calm, pleasant and corporative. denies homicidal ideations, auditory and visual hallucinations. pt undressed, placed in  clothing. belongings cataloged and secured. pt pending psych consult.
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
## 2023-04-27 NOTE — ED BEHAVIORAL HEALTH ASSESSMENT NOTE - DESCRIPTION
Pt is calm and cooperative with staff.    ICU Vital Signs Last 24 Hrs  T(C): 36.7 (10 Nov 2022 18:23), Max: 36.7 (10 Nov 2022 18:23)  T(F): 98 (10 Nov 2022 18:23), Max: 98 (10 Nov 2022 18:23)  HR: 85 (10 Nov 2022 18:23) (85 - 85)  BP: 152/72 (10 Nov 2022 18:23) (152/72 - 152/72)  BP(mean): --  ABP: --  ABP(mean): --  RR: 18 (10 Nov 2022 18:23) (18 - 18)  SpO2: 98% (10 Nov 2022 18:23) (98% - 98%)    O2 Parameters below as of 10 Nov 2022 18:23  Patient On (Oxygen Delivery Method): room air GERD as per records,  Patient does not know much about her biological family, she attended some college in the past

## 2023-04-27 NOTE — ED BEHAVIORAL HEALTH ASSESSMENT NOTE - SUICIDAL IDEATION DETAILS
Pt states she has a plan to kill herself with a Molotov cocktail. Pt states she's going to buy alcohol tonight (to make the Molotov cocktail)

## 2023-04-27 NOTE — ED BEHAVIORAL HEALTH ASSESSMENT NOTE - NSSUICPROTFACT_PSY_ALL_CORE
lives in supervised residence, AOT, on an PALACIOS/Identifies reasons for living/Positive therapeutic relationships/Frustration tolerance Stable supportive housing/Positive therapeutic relationships

## 2023-04-27 NOTE — ED BEHAVIORAL HEALTH ASSESSMENT NOTE - NSBHATTESTCOMMENTATTENDFT_PSY_A_CORE
Patient is a single, 42-year-old woman, single, non caregiver, unemployed, on disability for mental illness, domiciled at psychiatric residence at [address], with past psychiatric history of Schizoaffective Bipolar Type, Borderline Personality Disorder, presenting today from her residence for suicidal ideation.     On exam, patient is endorsing depressive symptoms and active suicidal ideation with plan. Per records, patient has chronically endorsed plans to blow up herself without having logical reasoning as to how she would accomplish this. Today, pt has active plan to blow herself up by creating a Molotov cocktail and endorses knowing everything she needs to make this and plans on going out to buy one component for it tonight (alcohol). Patient is at higher risk of harm to self and she does  meet criteria for psychiatric admission at this time. Patient will be admitted voluntarily.

## 2023-04-27 NOTE — BH PATIENT PROFILE - HOME MEDICATIONS
oxyCODONE 5 mg oral tablet , 1 tab(s) orally every 6 hours as needed for SEVERE pain MDD:4  ibuprofen 400 mg oral tablet , 1 tab(s) orally every 6 hours  acetaminophen 500 mg oral tablet , 2 tab(s) orally every 6 hours  Haldol Decanoate 100 mg/mL intramuscular solution , 100 milligram(s) intramuscular once a month  loratadine 10 mg oral tablet , 1 tab(s) orally once a day  orlistat 60 mg oral capsule , 1 cap(s) orally 2 times a day with food  Trileptal 300 mg oral tablet , 1 tab(s) orally 2 times a day

## 2023-04-27 NOTE — ED ADULT NURSE NOTE - NSIMPLEMENTINTERV_GEN_ALL_ED
Implemented All Universal Safety Interventions:  [place] to call system. Call bell, personal items and telephone within reach. Instruct patient to call for assistance. Room bathroom lighting operational. Non-slip footwear when patient is off stretcher. Physically safe environment: no spills, clutter or unnecessary equipment. Stretcher in lowest position, wheels locked, appropriate side rails in place.

## 2023-04-27 NOTE — ED BEHAVIORAL HEALTH ASSESSMENT NOTE - NSCURPASTPSYDX_PSY_ALL_CORE
Psychotic disorder/Cluster B Personality disorder/traits Mood disorder/Psychotic disorder/Cluster B Personality disorder/traits

## 2023-04-27 NOTE — ED PROVIDER NOTE - CLINICAL SUMMARY MEDICAL DECISION MAKING FREE TEXT BOX
This is a 42-year-old female with a past medical history of bipolar, borderline personality disorder history of seizures, GERD presented to the emergency room for having suicidal ideations for the last several months.  SI-will monitor and reassess

## 2023-04-27 NOTE — ED BEHAVIORAL HEALTH ASSESSMENT NOTE - RISK ASSESSMENT
Risk factors: +current active SIIP, h/o SA, h/o psych admissions. Not future oriented.     Protective factors:  no access to weapons, no active substance abuse, good physical health, domiciled,   positive therapeutic relationship, engaged in treatment, compliant with treatment, help-seeking behaviors,     Overall, pt is at higher risk of harm to self and she does  meet criteria for psychiatric admission at this time. Patient will be admitted voluntarily.

## 2023-04-28 PROCEDURE — 99223 1ST HOSP IP/OBS HIGH 75: CPT

## 2023-04-28 RX ORDER — IBUPROFEN 200 MG
400 TABLET ORAL EVERY 6 HOURS
Refills: 0 | Status: DISCONTINUED | OUTPATIENT
Start: 2023-04-28 | End: 2023-05-08

## 2023-04-28 RX ORDER — HYDROXYZINE HCL 10 MG
50 TABLET ORAL EVERY 6 HOURS
Refills: 0 | Status: DISCONTINUED | OUTPATIENT
Start: 2023-04-28 | End: 2023-05-08

## 2023-04-28 RX ADMIN — OXCARBAZEPINE 300 MILLIGRAM(S): 300 TABLET, FILM COATED ORAL at 20:09

## 2023-04-28 RX ADMIN — OXCARBAZEPINE 300 MILLIGRAM(S): 300 TABLET, FILM COATED ORAL at 08:33

## 2023-04-28 RX ADMIN — Medication 400 MILLIGRAM(S): at 12:21

## 2023-04-28 RX ADMIN — Medication 400 MILLIGRAM(S): at 12:03
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
## 2023-04-28 NOTE — BH INPATIENT PSYCHIATRY ASSESSMENT NOTE - MSE UNSTRUCTURED FT
Pt is a 41yo woman with good grooming and hygiene, dressed in hospital gown. She is calm and cooperative with appropriate eye contact. No psychomotor abnormalities noted. Speech is normal in rate and volume, spontaneous. Stated mood is "depressed." Affect is euthymic, full, mood-incongruent. TP is linear. TC: without evidence of overt delusions, denies SIIP at this time, denies HIIP. No perceptual disturbances noted. Insight is limited. Judgement is limited. Impulse control is intact at this time.

## 2023-04-28 NOTE — BH INPATIENT PSYCHIATRY ASSESSMENT NOTE - ADDITIONAL DETAILS ALL
Currently endorsing active suicidal ideation, with plan, no intent. Has been researching way to end her life.
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
## 2023-04-28 NOTE — BH SOCIAL WORK INITIAL PSYCHOSOCIAL EVALUATION - NSCMSPTSTRENGTHS_PSY_ALL_CORE
PT has an involved ACT team as well as loves to get dressed up and wear make up which motivates positive behavior./Motivated

## 2023-04-28 NOTE — BH INPATIENT PSYCHIATRY ASSESSMENT NOTE - DETAILS
Poor response to Geodon; Depakote (Increased ammonia levels) Pt endorses having worsening SI for a couple of months now. Pt states she has a plan to kill herself with a Molotov cocktail. She reports she found information about it on the internet and knows everything she needs to get to make it. Pt states she's going to buy alcohol tonight (to make the Molotov cocktail) per chart review, history of property destruction (breaking TV screens while hospitalized) when upset / acting out

## 2023-04-28 NOTE — BH INPATIENT PSYCHIATRY ASSESSMENT NOTE - NSBHMETABOLIC_PSY_ALL_CORE_FT
BMI: BMI (kg/m2): 28.5 (11-17-22 @ 19:18)  HbA1c:   Glucose: POCT Blood Glucose.: 95 mg/dL (07-23-22 @ 14:45)    BP: 143/81 (04-27-23 @ 18:56) (143/81 - 160/80)  Lipid Panel:

## 2023-04-28 NOTE — BH SOCIAL WORK INITIAL PSYCHOSOCIAL EVALUATION - NSHIGHRISKBEHFT_PSY_ALL_CORE
Per clinicals pt has a hx of Sexual behavior-exposing herself on the unit and disrobing and hx of Aggressive behavior- destruction of property when upset. However pt is not displaying either during current admission.

## 2023-04-28 NOTE — BH INPATIENT PSYCHIATRY ASSESSMENT NOTE - CURRENT MEDICATION
MEDICATIONS  (STANDING):  OXcarbazepine 300 milliGRAM(s) Oral two times a day    MEDICATIONS  (PRN):  diphenhydrAMINE 50 milliGRAM(s) Oral every 6 hours PRN Agitation  diphenhydrAMINE Injectable 50 milliGRAM(s) IntraMuscular once PRN Agitation  haloperidol     Tablet 5 milliGRAM(s) Oral every 6 hours PRN Agitation  haloperidol    Injectable 5 milliGRAM(s) IntraMuscular every 6 hours PRN Agitation  ibuprofen  Tablet. 400 milliGRAM(s) Oral every 6 hours PRN Mild Pain (1 - 3), Moderate Pain (4 - 6)
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
## 2023-04-28 NOTE — PSYCHIATRIC REHAB INITIAL EVALUATION - NSBHPRRECOMMEND_PSY_ALL_CORE
Writer met with pt. to orient her to psych rehab staff and services. Pt. is a 42 y.o, woman, admitted for suicidal ideation. Pt. presented with a calm mood and a bright affect. Throughout the session the pt. was a reliable historian, receptive, engaged and forthcoming with personal information. Pt. identified her primary reason for admission as suicidal ideation but was unable to elaborate on specific stressors. Pt. reports that there are different coping skills that she engages in but in a provocative manner yelled “bestiality”. When pt. was redirected, she was amendable and was brought back to healthy coping skills. Psych rehab staff was able to establish a collaborative treatment goal focusing on healthy coping skills. Pt. denies SI/HI/TH/AH/VH.
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
## 2023-04-28 NOTE — BH INPATIENT PSYCHIATRY ASSESSMENT NOTE - NSBHCHARTREVIEWVS_PSY_A_CORE FT
Vital Signs Last 24 Hrs  T(C): 36.6 (04-28-23 @ 08:30), Max: 36.9 (04-27-23 @ 18:56)  T(F): 97.9 (04-28-23 @ 08:30), Max: 98.5 (04-27-23 @ 18:56)  HR: 81 (04-27-23 @ 18:56) (81 - 85)  BP: 143/81 (04-27-23 @ 18:56) (143/81 - 160/80)  BP(mean): --  RR: 17 (04-27-23 @ 18:56) (17 - 18)  SpO2: 100% (04-27-23 @ 18:56) (98% - 100%)    Orthostatic VS  04-28-23 @ 08:30  Lying BP: --/-- HR: --  Sitting BP: 146/90 HR: 77  Standing BP: 122/85 HR: 85  Site: --  Mode: --

## 2023-04-29 PROCEDURE — 99223 1ST HOSP IP/OBS HIGH 75: CPT

## 2023-04-29 RX ORDER — SIMETHICONE 80 MG/1
80 TABLET, CHEWABLE ORAL EVERY 4 HOURS
Refills: 0 | Status: DISCONTINUED | OUTPATIENT
Start: 2023-04-29 | End: 2023-05-08

## 2023-04-29 RX ADMIN — OXCARBAZEPINE 300 MILLIGRAM(S): 300 TABLET, FILM COATED ORAL at 08:20

## 2023-04-29 RX ADMIN — SIMETHICONE 80 MILLIGRAM(S): 80 TABLET, CHEWABLE ORAL at 11:05

## 2023-04-29 RX ADMIN — OXCARBAZEPINE 300 MILLIGRAM(S): 300 TABLET, FILM COATED ORAL at 20:39

## 2023-04-29 NOTE — BH INPATIENT PSYCHIATRY PROGRESS NOTE - NSBHFUPINTERVALHXFT_PSY_A_CORE
Patient is followed up for psychotic decompensation and SI.   Chart, medications and labs reviewed, no acute findings.  Patient is discussed with nursing staff. Per nursing no significant interval events.  She remains compliant with all standing medications, no SE observed or reported.  Patient remains in fair behavioral control, there has been no aggression, no prns for aggression. Reports eating and sleeping well.  Patient is observed in dayroom. Patient reports “feeling better”   reports feeling depressed but improving. Reports that on admission she felt suicidal with  plan to kill herself with a Molotov cocktail. She reports she was looking information about it on the internet and knows how to make it. At time of interview she denied SI/HI denies intent/plan on unit, engages in safety plan. Pt c/o abdominal pain, gas/bloating pain.Will order Simethicone,    She denies AVH, delusions, FOI, or IOR.  No acute medical concerns, VSS.   Continue to monitor and provide therapeutic support.
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
## 2023-04-29 NOTE — BH INPATIENT PSYCHIATRY PROGRESS NOTE - MSE UNSTRUCTURED FT
Pt is a 43yo woman with good grooming and hygiene, dressed in hospital gown. She is calm and cooperative with appropriate eye contact. No psychomotor abnormalities noted. Speech is normal in rate and volume, spontaneous. Stated mood is "depressed." Affect is euthymic, full, mood-incongruent. TP is linear. TC: without evidence of overt delusions, denies SIIP at this time, denies HIIP. No perceptual disturbances noted. Insight is limited. Judgement is limited. Impulse control is intact at this time.

## 2023-04-30 PROCEDURE — 99223 1ST HOSP IP/OBS HIGH 75: CPT

## 2023-04-30 RX ADMIN — OXCARBAZEPINE 300 MILLIGRAM(S): 300 TABLET, FILM COATED ORAL at 20:27

## 2023-04-30 RX ADMIN — OXCARBAZEPINE 300 MILLIGRAM(S): 300 TABLET, FILM COATED ORAL at 08:14

## 2023-04-30 RX ADMIN — Medication 400 MILLIGRAM(S): at 16:51

## 2023-04-30 RX ADMIN — Medication 400 MILLIGRAM(S): at 18:00

## 2023-04-30 NOTE — BH INPATIENT PSYCHIATRY PROGRESS NOTE - NSBHFUPINTERVALHXFT_PSY_A_CORE
Patient is followed up for psychotic decompensation and SI.   Chart, medications and labs reviewed, no acute findings.  Patient is discussed with nursing staff. Per nursing no significant interval events.  She remains compliant with all standing medications, no SE observed or reported.  Patient remains in fair behavioral control, there has been no aggression, no prns for aggression. Reports eating and sleeping well.  Patient is observed in her room. Patient reports  feeling well overall. At time of interview she denied SI/HI denies intent/plan on unit, engages in safety plan.  She denies AVH, delusions, FOI, or IOR.  No acute medical concerns, VSS.   Continue to monitor and provide therapeutic support.
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
## 2023-04-30 NOTE — BH INPATIENT PSYCHIATRY PROGRESS NOTE - NSBHCHARTREVIEWVS_PSY_A_CORE FT
Vital Signs Last 24 Hrs  T(C): --  T(F): --  HR: --  BP: --  BP(mean): --  RR: --  SpO2: --    Orthostatic VS  04-29-23 @ 07:04  Lying BP: --/-- HR: --  Sitting BP: 133/81 HR: 94  Standing BP: 132/82 HR: 92  Site: --  Mode: --  Orthostatic VS  04-28-23 @ 19:56  Lying BP: --/-- HR: --  Sitting BP: 115/90 HR: 63  Standing BP: 115/94 HR: 75  Site: upper left arm  Mode: electronic

## 2023-05-01 RX ADMIN — OXCARBAZEPINE 300 MILLIGRAM(S): 300 TABLET, FILM COATED ORAL at 20:34

## 2023-05-01 RX ADMIN — OXCARBAZEPINE 300 MILLIGRAM(S): 300 TABLET, FILM COATED ORAL at 08:45
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
## 2023-05-01 NOTE — BH INPATIENT PSYCHIATRY PROGRESS NOTE - NSBHFUPINTERVALHXFT_PSY_A_CORE
No acute overnight events reported. In the last 24 hours, patient did not receive or requ No acute overnight events reported. In the last 24 hours, patient did not receive or require PRN medications. Patient compliant with standing medications and in good behavorial control while on unit. Per sleep log, patient sleeping well. Patient engaged in the unit community, attending groups, and social with peers.     Case discussed and reviewed with nursing staff. On exam, patient observed ambulating on unit without socks or shoes, stating "I want to be closer to the earth". Patient is calm, pleasant, and cooperative throughout assessment. Patient reports improved mood and sleep. Patient denies SIIP, no reported HIIP or AVH. She reports feeling safe on the unit and "likes it here" because it is more peaceful than her environment at home. Patient reports feeling more hopeful. She is inquiring about volunteer/work opportunities at [facility] or a day program. Patient amenable to plan for discharge later this week, but is requesting assistance SW to locate day program.

## 2023-05-01 NOTE — BH INPATIENT PSYCHIATRY PROGRESS NOTE - MSE UNSTRUCTURED FT
Pt is a 41yo woman with good grooming and hygiene, dressed in hospital gown. She is calm and cooperative with appropriate eye contact. No psychomotor abnormalities noted. Speech is normal in rate and volume, spontaneous. Stated mood is "depressed." Affect is euthymic, full, mood-incongruent. TP is linear. TC: without evidence of overt delusions, denies SIIP at this time, denies HIIP. No perceptual disturbances noted. Insight is limited. Judgement is limited. Impulse control is intact at this time.  Pt is a 43yo woman with good grooming and hygiene, dressed in casual clothing. She is calm and cooperative with appropriate eye contact. No psychomotor abnormalities noted. Speech is normal in rate and volume, spontaneous. Stated mood is "fine." Affect is euthymic, full, mood-congruent. TP is linear. TC: without evidence of overt delusions, denies SIIP at this time, denies HIIP. No perceptual disturbances noted. Insight is limited. Judgement is limited. Impulse control is intact at this time.

## 2023-05-01 NOTE — BH INPATIENT PSYCHIATRY PROGRESS NOTE - NSBHCONSBHPROVDETAILS_PSY_A_CORE  FT
Called Dr Toscano at [phone], left message Spoke with therapist Dr. Paige Garcia at [phone]; psychiatrist Dr. Cruz currently out of the office

## 2023-05-01 NOTE — BH INPATIENT PSYCHIATRY PROGRESS NOTE - NSBHCHARTREVIEWVS_PSY_A_CORE FT
Vital Signs Last 24 Hrs  T(C): 36.8 (05-01-23 @ 07:55), Max: 36.9 (04-30-23 @ 19:18)  T(F): 98.2 (05-01-23 @ 07:55), Max: 98.4 (04-30-23 @ 19:18)  HR: --  BP: --  BP(mean): --  RR: --  SpO2: --    Orthostatic VS  05-01-23 @ 07:55  Lying BP: --/-- HR: --  Sitting BP: 133/65 HR: 92  Standing BP: --/-- HR: --  Site: --  Mode: --  Orthostatic VS  04-30-23 @ 19:18  Lying BP: --/-- HR: --  Sitting BP: 145/90 HR: 77  Standing BP: 140/89 HR: 71  Site: --  Mode: --   Vital Signs Last 24 Hrs  T(C): 36.8 (05-01-23 @ 07:55), Max: 36.8 (05-01-23 @ 07:55)  T(F): 98.2 (05-01-23 @ 07:55), Max: 98.2 (05-01-23 @ 07:55)  HR: --  BP: --  BP(mean): --  RR: --  SpO2: --    Orthostatic VS  05-01-23 @ 07:55  Lying BP: --/-- HR: --  Sitting BP: 133/65 HR: 92  Standing BP: --/-- HR: --  Site: --  Mode: --  Orthostatic VS  04-30-23 @ 19:18  Lying BP: --/-- HR: --  Sitting BP: 145/90 HR: 77  Standing BP: 140/89 HR: 71  Site: --  Mode: --   Vital Signs Last 24 Hrs  T(C): --  T(F): --  HR: --  BP: --  BP(mean): --  RR: --  SpO2: --    Orthostatic VS  05-01-23 @ 07:55  Lying BP: --/-- HR: --  Sitting BP: 133/65 HR: 92  Standing BP: --/-- HR: --  Site: --  Mode: --  Orthostatic VS  04-30-23 @ 19:18  Lying BP: --/-- HR: --  Sitting BP: 145/90 HR: 77  Standing BP: 140/89 HR: 71  Site: --  Mode: --

## 2023-05-02 PROCEDURE — 99231 SBSQ HOSP IP/OBS SF/LOW 25: CPT | Mod: GC

## 2023-05-02 RX ADMIN — SIMETHICONE 80 MILLIGRAM(S): 80 TABLET, CHEWABLE ORAL at 19:22

## 2023-05-02 RX ADMIN — Medication 400 MILLIGRAM(S): at 11:00

## 2023-05-02 RX ADMIN — Medication 400 MILLIGRAM(S): at 10:15

## 2023-05-02 RX ADMIN — OXCARBAZEPINE 300 MILLIGRAM(S): 300 TABLET, FILM COATED ORAL at 20:22

## 2023-05-02 RX ADMIN — OXCARBAZEPINE 300 MILLIGRAM(S): 300 TABLET, FILM COATED ORAL at 08:16

## 2023-05-02 NOTE — BH INPATIENT PSYCHIATRY PROGRESS NOTE - MSE UNSTRUCTURED FT
Pt is a 41yo woman with good grooming and hygiene, dressed in casual clothing. She is calm and cooperative with appropriate eye contact. No psychomotor abnormalities noted. Speech is normal in rate and volume, spontaneous. Stated mood is "fine." Affect is euthymic, full, mood-congruent. TP is linear. TC: without evidence of overt delusions, denies SIIP at this time, denies HIIP. No perceptual disturbances noted. Insight is limited. Judgement is limited. Impulse control is intact at this time.  Pt is a 43yo woman with good grooming and hygiene, dressed in hospital gown. She is calm and cooperative with appropriate eye contact. No psychomotor abnormalities noted. Speech is normal in rate and volume, spontaneous. Stated mood is "fine." Affect is euthymic, full, mood-congruent. TP is linear. TC: without evidence of overt delusions, denies SIIP at this time, denies HIIP. No perceptual disturbances noted. Insight is limited. Judgement is limited. Impulse control is intact at this time.
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
## 2023-05-02 NOTE — BH INPATIENT PSYCHIATRY PROGRESS NOTE - NSBHFUPINTERVALHXFT_PSY_A_CORE
No acute overnight events reported. In the last 24 hours, patient did not receive or require PRN medications. Patient has been in good behavorial control and compliant with standing medications. Per sleep log, patient sleeping well.     Case discussed and reviewed with nursing staff. Per nursing report, patient is pleasant and social with peers on the unit. Refused vital this morning. Patient  No acute overnight events reported. In the last 24 hours, patient did not receive or require PRN medications. Patient has been in good behavorial control and compliant with standing medications. Per sleep log, patient sleeping well. Patient visible in unit community and attending group therapy.    Case discussed and reviewed with nursing staff. Per nursing report, patient is pleasant and social with peers on the unit. Refused vital this morning. On approach, patient is observed in the milieu interacting amongst peers. On exam, patient is pleasant, calm and cooperative. Patient notifies team that she is dressed in a hospital gown because she is washing her clothing. Patient describes her mood as "good". Reports she is feeling safe on the unit and expresses his dissatisfaction for her current housing facility. Patient inquired about status of day program, informed of possible options by SW. Patient amenable with plan for discharge for later this week. Denies SIIP, HIIP, AVH. Reports good appetite and sleep.

## 2023-05-02 NOTE — BH INPATIENT PSYCHIATRY PROGRESS NOTE - NSBHCHARTREVIEWVS_PSY_A_CORE FT
Vital Signs Last 24 Hrs  T(C): --  T(F): --  HR: --  BP: --  BP(mean): --  RR: --  SpO2: --    Orthostatic VS  05-01-23 @ 07:55  Lying BP: --/-- HR: --  Sitting BP: 133/65 HR: 92  Standing BP: --/-- HR: --  Site: --  Mode: --  Orthostatic VS  04-30-23 @ 19:18  Lying BP: --/-- HR: --  Sitting BP: 145/90 HR: 77  Standing BP: 140/89 HR: 71  Site: --  Mode: --

## 2023-05-03 PROCEDURE — 99231 SBSQ HOSP IP/OBS SF/LOW 25: CPT

## 2023-05-03 RX ADMIN — OXCARBAZEPINE 300 MILLIGRAM(S): 300 TABLET, FILM COATED ORAL at 20:19

## 2023-05-03 RX ADMIN — OXCARBAZEPINE 300 MILLIGRAM(S): 300 TABLET, FILM COATED ORAL at 08:56

## 2023-05-03 NOTE — BH INPATIENT PSYCHIATRY PROGRESS NOTE - NSBHFUPINTERVALHXFT_PSY_A_CORE
No acute overnight events reported. In the last 24 hours, patient did not receive or require PRN medications. Patient in good behavorial control and compliant with standing medications.     Case discussed and reviewed with nursing. Per nursing report, patient is pleasant and bright, visible in the unit community.  No acute overnight events reported. In the last 24 hours, patient did not receive or require PRN medications. Patient in good behavorial control and compliant with standing medications.     Case discussed and reviewed with nursing. Per nursing report, patient is pleasant and bright, visible in the unit community. On approach, patient is observed interacting with peers in milieu. On exam, patient is pleasant and cooperative. Patient reports her mood as "good". Endorses good appetite and sleep, engaging in self-care (showering). Patient expresses her disdain for her housing. When discussing coping skills to manage stressful housing environment, patient states to avoid triggers and engage in hobbies to distract herself. Patient looking forward to finding a day program to attend to avoid her housing facility, as much as possible. Patient also reports hoping to return to work, one day, in customer service. She previously worked in the banking sector in 2006. Denies SIIP, KAMALJIT, AVH. Denies psychotic symptoms. Patient requesting to leave unit on Monday, as opposed to planned discharge on Friday, because she "likes it here". When probed about details on why she like being hospitalized, the patient states she feels supported by staff and the environment is better (in reference to her housing).
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
## 2023-05-03 NOTE — BH INPATIENT PSYCHIATRY PROGRESS NOTE - MSE UNSTRUCTURED FT
Pt is a 43yo woman with good grooming and hygiene, dressed in hospital gown. She is calm and cooperative with appropriate eye contact. No psychomotor abnormalities noted. Speech is normal in rate and volume, spontaneous. Stated mood is "fine." Affect is euthymic, full, mood-congruent. TP is linear. TC: without evidence of overt delusions, denies SIIP at this time, denies HIIP. No perceptual disturbances noted. Insight is limited. Judgement is limited. Impulse control is intact at this time.

## 2023-05-04 PROCEDURE — 90853 GROUP PSYCHOTHERAPY: CPT

## 2023-05-04 RX ADMIN — OXCARBAZEPINE 300 MILLIGRAM(S): 300 TABLET, FILM COATED ORAL at 20:41

## 2023-05-04 RX ADMIN — HALOPERIDOL DECANOATE 5 MILLIGRAM(S): 100 INJECTION INTRAMUSCULAR at 14:08

## 2023-05-04 RX ADMIN — OXCARBAZEPINE 300 MILLIGRAM(S): 300 TABLET, FILM COATED ORAL at 08:27

## 2023-05-04 RX ADMIN — Medication 50 MILLIGRAM(S): at 14:08

## 2023-05-04 RX ADMIN — Medication 2 MILLIGRAM(S): at 14:08

## 2023-05-04 NOTE — BH INPATIENT PSYCHIATRY PROGRESS NOTE - NSBHFUPINTERVALHXFT_PSY_A_CORE
Chart reviewed and case discussed with treatment team. No events reported overnight. Sleep and appetite is fair. Patient reported feeling "good" and that the medications help her "stay calm, think differently, more positive". Patient denies any AVH or SI/HI. Patient is compliant with medications, no adverse effects reported.
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
## 2023-05-05 PROCEDURE — 99231 SBSQ HOSP IP/OBS SF/LOW 25: CPT

## 2023-05-05 RX ADMIN — SIMETHICONE 80 MILLIGRAM(S): 80 TABLET, CHEWABLE ORAL at 20:14

## 2023-05-05 RX ADMIN — OXCARBAZEPINE 300 MILLIGRAM(S): 300 TABLET, FILM COATED ORAL at 08:09

## 2023-05-05 RX ADMIN — OXCARBAZEPINE 300 MILLIGRAM(S): 300 TABLET, FILM COATED ORAL at 20:12

## 2023-05-05 NOTE — BH TREATMENT PLAN - NSTXSUICIDINTERMD_PSY_ALL_CORE
continue to encourage individual and group therapy 
continue to encourage individual and group therapy

## 2023-05-05 NOTE — BH TREATMENT PLAN - NSTXDCOTHRINTERSW_PSY_ALL_CORE
SW will support patient as she stabilizes over the next several days
SW will support patient as she stabilizes over the next several days

## 2023-05-05 NOTE — BH TREATMENT PLAN - NSTXDCOTHRGOAL_PSY_ALL_CORE
Patient will stabilize mood and symptoms over the next several days
Patient will stabilize mood and symptoms over the next several days

## 2023-05-05 NOTE — BH DISCHARGE NOTE NURSING/SOCIAL WORK/PSYCH REHAB - NSCDUDCCRISIS_PSY_A_CORE
[facility]  [phone] [facility] ([phone])  Text "WELL" to [phone]  Website: [email or web address]  [facility]  1 [phone] [facility] [phone])/.  [facility]  [address] [place], [place] [address] [phone]   Hours:  Monday through Friday from 9 AM to 3 PM/[facility]

## 2023-05-05 NOTE — BH TREATMENT PLAN - NSTXDEPRESINTERRN_PSY_ALL_CORE
provide support and encouragement to accept medication
provide support and encouragement to accept medication

## 2023-05-05 NOTE — BH DISCHARGE NOTE NURSING/SOCIAL WORK/PSYCH REHAB - NSDCPRRECOMMEND_PSY_ALL_CORE
Psychiatric Rehabilitation staff recommends that the patient engage in outpatient services for continued medication and symptom management. Upon discharge, patient has an appointment scheduled with [facility].

## 2023-05-05 NOTE — BH DISCHARGE NOTE NURSING/SOCIAL WORK/PSYCH REHAB - NSDCVIVACCINE_GEN_ALL_CORE_FT
Tdap; 13-Oct-2014 04:40; Tho Morel (RN); [employer]; [id number]; IntraMuscular; Dorsogluteal Right.; 0.5 milliLiter(s);
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
## 2023-05-05 NOTE — BH DISCHARGE NOTE NURSING/SOCIAL WORK/PSYCH REHAB - PATIENT PORTAL LINK FT
You can access the FollowMyHealth Patient Portal offered by [facility] by registering at the following website: [email or web address]. By joining [employer]’s FollowMyHealth portal, you will also be able to view your health information using other applications (apps) compatible with our system.

## 2023-05-05 NOTE — BH TREATMENT PLAN - NSTXPATIENTPARTICIPATE_PSY_ALL_CORE
Patient participated in identification of needs/problems/goals for treatment/Patient participated in defining interventions/Patient participated in development of after care plan
Patient participated in identification of needs/problems/goals for treatment

## 2023-05-05 NOTE — BH INPATIENT PSYCHIATRY PROGRESS NOTE - MSE UNSTRUCTURED FT
Pt is a 41yo woman with good grooming and hygiene, dressed in hospital gown. She is calm and cooperative with appropriate eye contact. No psychomotor abnormalities noted. Speech is normal in rate and volume, spontaneous. Stated mood is "fine." Affect is euthymic, full, mood-congruent. TP is linear. TC: without evidence of overt delusions, denies SIIP, denies HIIP. No perceptual disturbances noted. Insight is fair. Judgement is fair. Impulse control is intact at this time.

## 2023-05-05 NOTE — BH DISCHARGE NOTE NURSING/SOCIAL WORK/PSYCH REHAB - NSDCPEWEB_GEN_ALL_CORE
[facility] for Tobacco Control website --- [email or web address] website --- [email or web address]

## 2023-05-05 NOTE — BH DISCHARGE NOTE NURSING/SOCIAL WORK/PSYCH REHAB - NSBHDCAGENCY1FT_PSY_A_CORE
[facility]  [facility] [place]

## 2023-05-05 NOTE — BH TREATMENT PLAN - NSTXVIOLNTGOAL_PSY_ALL_CORE
Will demonstrate ability to tolerate peer conflicts without aggression 3x weekly
Will identify how anger may be a response to other feelings

## 2023-05-05 NOTE — BH TREATMENT PLAN - NSTXPSYCHOINTERMD_PSY_ALL_CORE
well controlled on monthly Haldol Dec and Trileptal 
well controlled on monthly Haldol Dec and Trileptal

## 2023-05-05 NOTE — BH TREATMENT PLAN - NSTXPSYCHOGOAL_PSY_ALL_CORE
Will identify 2 coping skills that help mitigate hallucinations
Will be able to report experiencing hallucinations to staff

## 2023-05-05 NOTE — BH TREATMENT PLAN - NSCMSPTSTRENGTHS_PSY_ALL_CORE
PT has an involved ACT team as well as loves to get dressed up and wear make up which motivates positive behavior./Motivated
PT has an involved ACT team as well as loves to get dressed up and wear make up which motivates positive behavior./Compliance to treatment/Motivated/Positive attitude

## 2023-05-05 NOTE — BH DISCHARGE NOTE NURSING/SOCIAL WORK/PSYCH REHAB - NSDCPRGOAL_PSY_ALL_CORE
Writer was unable to meet with the pt due to the pt discharging at an earlier time than anticipated, however, the following information will be based off of the pt's chart. During stay, the pt was able to safety plan for discharge and the pt was readily able to identify coping skills, triggers, social supports, and reasons for living. Upon admission, the patient presented with suicidal thoughts with plans to make a bomb and was admitted to [place] [address]. During the inpatient stay, the pt was intermittently visible in the therapeutic milieu/community and engaged in some groups. On the unit, the pt demonstrated some provocative behavior and displayed poor frustration tolerance. Pt encouraged by team to utilize coping skills. Upon discharge, the pt is demonstrating calm and controlled behaviors and is able to contract for safety. The patient was able to  make progress towards specified psychiatric rehabilitation goal during the current stay. The patient exhibits medication compliance, fair ADLs, and fair behavioral control. The patient denies SI/HI/AH/VH upon discharge. Psych rehab staff recommends that the pt continue to engage in outpatient treatment for medication management, support, and psychotherapy services.

## 2023-05-05 NOTE — BH TREATMENT PLAN - NSTXVIOLNTINTERMD_PSY_ALL_CORE
PO and IM prns are available (thus far pt in good behavioral control)
PO and IM prns are available (thus far pt in good behavioral control)

## 2023-05-05 NOTE — BH DISCHARGE NOTE NURSING/SOCIAL WORK/PSYCH REHAB - DISCHARGE INSTRUCTIONS AFTERCARE APPOINTMENTS
In order to check the location, date, or time of your aftercare appointment, please refer to your Discharge Instructions Document given to you upon leaving the hospital.  If you have lost the instructions please call [phone]

## 2023-05-05 NOTE — BH DISCHARGE NOTE NURSING/SOCIAL WORK/PSYCH REHAB - NSDCPEEMAIL_GEN_ALL_CORE
[facility] for Tobacco Control email [email or web address]
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
## 2023-05-05 NOTE — BH TREATMENT PLAN - NSTXPLANTHERAPYSESSIONSFT_PSY_ALL_CORE
05-04-23  Type of therapy: Cognitive behavior therapy,Coping skills,Creative arts therapy,Leisure development,Peer advocate,Pet therapy,Psychoeducation,Safety planning  Type of session: Individual  Level of patient participation: Participates  Duration of participation: 15 minutes  Therapy conducted by: Psych rehab  Therapy Summary: Writer met with pt. to assess progress of psych rehab goal over the past week. Pt. agreed to meet with writer and presented with a bright affect and congruent mood. Pt. was very minimal throughout the conversation. Pt. stated that she was feeling “good” that she was not having any thoughts to hurt herself and that she likes the hospital because she feels safer here than she does anywhere else. Writer supported pt. by listening to her and by encouraging her to find the things in the hospital that made her feel safe and see how she can find those things outside of the hospital. Pt. was receptive to this and thanked writer.     Over the past 7 days, the pt. was visible on the unit, attended 80% of the groups that were offered, and exhibited positive interactions with select peers. Pt. is engaged in treatment as evidenced by her medication compliance and her behavioral control on the unit. Pt. is thankful for her treatment team and thanks them often. Pt. denies SI/HI/TH/AH/VH while here.     Pt. has made progress towards established psych rehab goal as evidenced by pt. stating that watching TV has been a coping skill while on the unit. Pt. is encouraged to continue to identify and utilize coping skills while admitted. Over the next seven days, Psychiatric rehabilitation staff will continue to provide encouragement, support, psychotherapy, and psychoeducation to assist the patient in the progression of established treatment goal

## 2023-05-05 NOTE — BH TREATMENT PLAN - NSTXCOPEINTERPR_PSY_ALL_CORE
Over the next 7 days, Psychiatric Rehabilitation staff will utilize group and individual psychotherapy, and psychoeducation to assist the patient in reaching established treatment goal.
Pt. has made progress towards established psych rehab goal as evidenced by pt. stating that watching TV has been a coping skill while on the unit. Pt. is encouraged to continue to identify and utilize coping skills while admitted. Over the next seven days, Psychiatric rehabilitation staff will continue to provide encouragement, support, psychotherapy, and psychoeducation to assist the patient in the progression of established treatment goal

## 2023-05-06 VITALS — TEMPERATURE: 98 F

## 2023-05-06 RX ADMIN — Medication 400 MILLIGRAM(S): at 20:21

## 2023-05-06 RX ADMIN — Medication 400 MILLIGRAM(S): at 21:30

## 2023-05-06 RX ADMIN — OXCARBAZEPINE 300 MILLIGRAM(S): 300 TABLET, FILM COATED ORAL at 08:29

## 2023-05-06 RX ADMIN — OXCARBAZEPINE 300 MILLIGRAM(S): 300 TABLET, FILM COATED ORAL at 20:21

## 2023-05-07 RX ADMIN — Medication 400 MILLIGRAM(S): at 09:05

## 2023-05-07 RX ADMIN — Medication 400 MILLIGRAM(S): at 07:40

## 2023-05-07 RX ADMIN — OXCARBAZEPINE 300 MILLIGRAM(S): 300 TABLET, FILM COATED ORAL at 08:03

## 2023-05-07 RX ADMIN — Medication 400 MILLIGRAM(S): at 16:28

## 2023-05-07 RX ADMIN — Medication 400 MILLIGRAM(S): at 13:48

## 2023-05-07 RX ADMIN — OXCARBAZEPINE 300 MILLIGRAM(S): 300 TABLET, FILM COATED ORAL at 20:22

## 2023-05-07 RX ADMIN — SIMETHICONE 80 MILLIGRAM(S): 80 TABLET, CHEWABLE ORAL at 09:04

## 2023-05-08 PROCEDURE — 99239 HOSP IP/OBS DSCHRG MGMT >30: CPT

## 2023-05-08 RX ORDER — OXCARBAZEPINE 300 MG/1
1 TABLET, FILM COATED ORAL
Qty: 0 | Refills: 0 | DISCHARGE

## 2023-05-08 RX ORDER — HALOPERIDOL DECANOATE 100 MG/ML
100 INJECTION INTRAMUSCULAR
Qty: 0 | Refills: 0 | DISCHARGE

## 2023-05-08 RX ORDER — OXCARBAZEPINE 300 MG/1
1 TABLET, FILM COATED ORAL
Qty: 60 | Refills: 0
Start: 2023-05-08 | End: 2023-06-06

## 2023-05-08 RX ADMIN — Medication 400 MILLIGRAM(S): at 10:17

## 2023-05-08 RX ADMIN — OXCARBAZEPINE 300 MILLIGRAM(S): 300 TABLET, FILM COATED ORAL at 08:25

## 2023-05-08 NOTE — BH INPATIENT PSYCHIATRY PROGRESS NOTE - CURRENT MEDICATION
MEDICATIONS  (STANDING):  OXcarbazepine 300 milliGRAM(s) Oral two times a day    MEDICATIONS  (PRN):  diphenhydrAMINE 50 milliGRAM(s) Oral every 6 hours PRN Agitation  diphenhydrAMINE Injectable 50 milliGRAM(s) IntraMuscular once PRN Agitation  haloperidol     Tablet 5 milliGRAM(s) Oral every 6 hours PRN Agitation  haloperidol    Injectable 5 milliGRAM(s) IntraMuscular every 6 hours PRN Agitation  hydrOXYzine hydrochloride 50 milliGRAM(s) Oral every 6 hours PRN anxiety  ibuprofen  Tablet. 400 milliGRAM(s) Oral every 6 hours PRN Mild Pain (1 - 3), Moderate Pain (4 - 6)  LORazepam     Tablet 2 milliGRAM(s) Oral every 6 hours PRN severe anxiety  LORazepam   Injectable 2 milliGRAM(s) IntraMuscular once PRN severe agitation  simethicone 80 milliGRAM(s) Chew every 4 hours PRN gas/bloating pain  
MEDICATIONS  (STANDING):  OXcarbazepine 300 milliGRAM(s) Oral two times a day    MEDICATIONS  (PRN):  diphenhydrAMINE 50 milliGRAM(s) Oral every 6 hours PRN Agitation  diphenhydrAMINE Injectable 50 milliGRAM(s) IntraMuscular once PRN Agitation  haloperidol     Tablet 5 milliGRAM(s) Oral every 6 hours PRN Agitation  haloperidol    Injectable 5 milliGRAM(s) IntraMuscular every 6 hours PRN Agitation  hydrOXYzine hydrochloride 50 milliGRAM(s) Oral every 6 hours PRN anxiety  ibuprofen  Tablet. 400 milliGRAM(s) Oral every 6 hours PRN Mild Pain (1 - 3), Moderate Pain (4 - 6)  LORazepam     Tablet 2 milliGRAM(s) Oral every 6 hours PRN severe anxiety  LORazepam   Injectable 2 milliGRAM(s) IntraMuscular once PRN severe agitation  simethicone 80 milliGRAM(s) Chew every 4 hours PRN gas/bloating pain  
MEDICATIONS  (STANDING):  OXcarbazepine 300 milliGRAM(s) Oral two times a day    MEDICATIONS  (PRN):  diphenhydrAMINE 50 milliGRAM(s) Oral every 6 hours PRN Agitation  diphenhydrAMINE Injectable 50 milliGRAM(s) IntraMuscular once PRN Agitation  haloperidol     Tablet 5 milliGRAM(s) Oral every 6 hours PRN Agitation  haloperidol    Injectable 5 milliGRAM(s) IntraMuscular every 6 hours PRN Agitation  hydrOXYzine hydrochloride 50 milliGRAM(s) Oral every 6 hours PRN anxiety  ibuprofen  Tablet. 400 milliGRAM(s) Oral every 6 hours PRN Mild Pain (1 - 3), Moderate Pain (4 - 6)  LORazepam     Tablet 2 milliGRAM(s) Oral every 6 hours PRN severe anxiety  LORazepam   Injectable 2 milliGRAM(s) IntraMuscular once PRN severe agitation  
MEDICATIONS  (STANDING):  OXcarbazepine 300 milliGRAM(s) Oral two times a day    MEDICATIONS  (PRN):  diphenhydrAMINE 50 milliGRAM(s) Oral every 6 hours PRN Agitation  diphenhydrAMINE Injectable 50 milliGRAM(s) IntraMuscular once PRN Agitation  haloperidol     Tablet 5 milliGRAM(s) Oral every 6 hours PRN Agitation  haloperidol    Injectable 5 milliGRAM(s) IntraMuscular every 6 hours PRN Agitation  hydrOXYzine hydrochloride 50 milliGRAM(s) Oral every 6 hours PRN anxiety  ibuprofen  Tablet. 400 milliGRAM(s) Oral every 6 hours PRN Mild Pain (1 - 3), Moderate Pain (4 - 6)  LORazepam     Tablet 2 milliGRAM(s) Oral every 6 hours PRN severe anxiety  LORazepam   Injectable 2 milliGRAM(s) IntraMuscular once PRN severe agitation  simethicone 80 milliGRAM(s) Chew every 4 hours PRN gas/bloating pain  
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
## 2023-05-08 NOTE — BH PSYCHOLOGY - GROUP THERAPY NOTE - NSPSYCHOLGRPGENGOAL_PSY_A_CORE
assessment/decrease symptoms/improve level of independent functioning/improve social/vocational/coping skills/prevent relapse/psychoeducation/treatment compliance
assessment/decrease symptoms/improve level of independent functioning/improve social/vocational/coping skills/psychoeducation/treatment compliance
assessment/decrease symptoms/improve level of independent functioning/improve social/vocational/coping skills/prevent relapse/psychoeducation/treatment compliance

## 2023-05-08 NOTE — BH INPATIENT PSYCHIATRY PROGRESS NOTE - NSTXDEPRESINTERMD_PSY_ALL_CORE
continue to encourage individual and group therapy

## 2023-05-08 NOTE — BH INPATIENT PSYCHIATRY PROGRESS NOTE - NSTXPSYCHOGOAL_PSY_ALL_CORE
Will be able to report experiencing hallucinations to staff
Will be able to report experiencing hallucinations to staff
Will identify 2 coping skills that help mitigate hallucinations
Will be able to report experiencing hallucinations to staff
Will be able to report experiencing hallucinations to staff
Will identify 2 coping skills that help mitigate hallucinations
Will be able to report experiencing hallucinations to staff
Will identify 2 coping skills that help mitigate hallucinations

## 2023-05-08 NOTE — BH INPATIENT PSYCHIATRY PROGRESS NOTE - NSTXDCOTHRGOAL_PSY_ALL_CORE
Patient will stabilize mood and symptoms over the next several days

## 2023-05-08 NOTE — BH INPATIENT PSYCHIATRY PROGRESS NOTE - PRN MEDS
MEDICATIONS  (PRN):  diphenhydrAMINE 50 milliGRAM(s) Oral every 6 hours PRN Agitation  diphenhydrAMINE Injectable 50 milliGRAM(s) IntraMuscular once PRN Agitation  haloperidol     Tablet 5 milliGRAM(s) Oral every 6 hours PRN Agitation  haloperidol    Injectable 5 milliGRAM(s) IntraMuscular every 6 hours PRN Agitation  hydrOXYzine hydrochloride 50 milliGRAM(s) Oral every 6 hours PRN anxiety  ibuprofen  Tablet. 400 milliGRAM(s) Oral every 6 hours PRN Mild Pain (1 - 3), Moderate Pain (4 - 6)  LORazepam     Tablet 2 milliGRAM(s) Oral every 6 hours PRN severe anxiety  LORazepam   Injectable 2 milliGRAM(s) IntraMuscular once PRN severe agitation  simethicone 80 milliGRAM(s) Chew every 4 hours PRN gas/bloating pain  
MEDICATIONS  (PRN):  diphenhydrAMINE 50 milliGRAM(s) Oral every 6 hours PRN Agitation  diphenhydrAMINE Injectable 50 milliGRAM(s) IntraMuscular once PRN Agitation  haloperidol     Tablet 5 milliGRAM(s) Oral every 6 hours PRN Agitation  haloperidol    Injectable 5 milliGRAM(s) IntraMuscular every 6 hours PRN Agitation  hydrOXYzine hydrochloride 50 milliGRAM(s) Oral every 6 hours PRN anxiety  ibuprofen  Tablet. 400 milliGRAM(s) Oral every 6 hours PRN Mild Pain (1 - 3), Moderate Pain (4 - 6)  LORazepam     Tablet 2 milliGRAM(s) Oral every 6 hours PRN severe anxiety  LORazepam   Injectable 2 milliGRAM(s) IntraMuscular once PRN severe agitation  
MEDICATIONS  (PRN):  diphenhydrAMINE 50 milliGRAM(s) Oral every 6 hours PRN Agitation  diphenhydrAMINE Injectable 50 milliGRAM(s) IntraMuscular once PRN Agitation  haloperidol     Tablet 5 milliGRAM(s) Oral every 6 hours PRN Agitation  haloperidol    Injectable 5 milliGRAM(s) IntraMuscular every 6 hours PRN Agitation  hydrOXYzine hydrochloride 50 milliGRAM(s) Oral every 6 hours PRN anxiety  ibuprofen  Tablet. 400 milliGRAM(s) Oral every 6 hours PRN Mild Pain (1 - 3), Moderate Pain (4 - 6)  LORazepam     Tablet 2 milliGRAM(s) Oral every 6 hours PRN severe anxiety  LORazepam   Injectable 2 milliGRAM(s) IntraMuscular once PRN severe agitation  simethicone 80 milliGRAM(s) Chew every 4 hours PRN gas/bloating pain  

## 2023-05-08 NOTE — BH PSYCHOLOGY - GROUP THERAPY NOTE - NSPSYCHOLGRPGENPT_PSY_A_CORE FT
Patient attended the psychology cognitive-behavior therapy group. The discussion was focused on the topic of behavioral activation. Group expectations, guidelines, confidentiality and its limitations were reviewed. The group began with a description of behavioral activation as a treatment technique to decrease avoidance and isolation. Group members then learned how to introduce activities gradually into daily schedules. Also discussed were methods to create a behavioral activation schedule to assist in improving mood and activity levels.  Group members demonstrated their understanding through active participation, discussion, and by asking clarifying questions. Discussion stemmed from the group's focus on the connection between thoughts, feelings and behaviors. Group members were provided with a handout describing the concepts and strategies discussed during group.
Patient attended the psychology cognitive-behavior therapy group. The discussion was focused on the topic of Assertive Communication.  Group expectations, guidelines, confidentiality, and limitations were reviewed.  The group began with a description of assertiveness communication. Group members then learned about the importance of nonverbal assertive behaviors such as posture, eye contact, and facial expressions. Group members then reviewed the components and benefits of assertive communication. Discussion stemmed from the group's focus on the connection between thoughts, feelings and behaviors.  Group members demonstrated their understanding through active participation, discussion, and by asking clarifying questions.  Members were also provided with a handout describing the concepts and strategies discussed during group.
Patient attended the psychology cognitive-behavior therapy group. The discussion was focused on the topic of Developing and Using Social Supports. Group expectations, guidelines, confidentiality, and limitations were reviewed. The group began with a description of the benefits and characteristics of supportive relationships. Group members were then asked to reflect on their support system and identify obstacles in developing social supports. The session then focused on qualities the members need and want from others in their support system. Discussion stemmed from the group's focus on the connection between thoughts, feelings, and behaviors. Group members demonstrated their understanding through active participation, discussion, and by asking clarifying questions. Members were also provided with a handout describing the concepts and strategies discussed during group. 
Patient attended the psychology cognitive-behavior therapy group. The discussion was focused on the topic of Managing Stress.  Group expectations, guidelines, confidentiality, and limitations were reviewed.  The group began with a description of stress and its biological functions regarding survival. Group members then learned about the importance of stress for human productivity. Strategies to manage stress were reviewed. Group members were encouraged to explore methods of adaptive stress reduction.  Discussion stemmed from the group's focus on the connection between thoughts, feelings and behaviors.  Group members demonstrated their understanding through active participation, discussion, and by asking clarifying questions.  Members were also provided with a handout describing the concepts and strategies discussed during group. 
Patient attended the psychology cognitive-behavior therapy group. The discussion was focused on the topic of Time Management.  Group expectations, guidelines, confidentiality, and limitations were reviewed.  The group began with a description of central steps for effective time management. Group members then learned about how to take the necessary steps towards action planning and decision making to make choices and effectively achieve desired outcomes.  Discussion stemmed from the group's focus on the connection between thoughts, feelings and behaviors.  Group members demonstrated their understanding through active participation, discussion, and by asking clarifying questions.  Members were also provided with a handout describing the concepts and strategies discussed during group.
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
## 2023-05-08 NOTE — BH INPATIENT PSYCHIATRY DISCHARGE NOTE - NSBHDCHANDOFFFT_PSY_ALL_CORE
left voicemail message for Dr. Cruz at [phone]. SW to also fax discharge summary. Please contact Dr. Clancy at [phone] with further questions.

## 2023-05-08 NOTE — BH INPATIENT PSYCHIATRY PROGRESS NOTE - NSBHTIMEACTIVITIESPERFORMED_PSY_A_CORE
pt interview, chart review, team meeting, documentation 
pt interview, phone call with pt's therapist, team meeting, documentation
pt interview, chart review, team meeting, documentation 
day of discharge planning, pt interview, team meeting, documentation, hand off to outpt team

## 2023-05-08 NOTE — BH INPATIENT PSYCHIATRY DISCHARGE NOTE - NSPRESENTSXS_PSY_ALL_CORE
pt has no more emesis or abd pain since emptied colonoscopy on 9/25. Today BP up some but pt didn't bring in pills on 9/25 for me to fill, she did it herself so questionable. Filled box for 2 weeks
Refusal or inability to complete safety plan

## 2023-05-08 NOTE — BH INPATIENT PSYCHIATRY PROGRESS NOTE - MSE UNSTRUCTURED FT
Pt is a 43yo woman with good grooming and hygiene, dressed in hospital gown. She is calm and cooperative with appropriate eye contact. No psychomotor abnormalities noted. Speech is normal in rate and volume, spontaneous. Stated mood is "fine." Affect is euthymic, full, mood-congruent. TP is linear. TC: without evidence of overt delusions, denies SIIP, denies HIIP. No perceptual disturbances noted. Insight is fair. Judgement is fair. Impulse control is intact at this time.  Pt is a 43yo woman with good grooming and hygiene, dressed in casual attire. She is calm and cooperative with appropriate eye contact. No psychomotor abnormalities noted. Speech is normal in rate and volume, spontaneous. Stated mood is "fine." Affect is euthymic, full, mood-congruent. TP is linear. TC: without evidence of overt delusions, denies SIIP, denies HIIP. No perceptual disturbances noted. Insight is fair. Judgement is fair. Impulse control is intact at this time.

## 2023-05-08 NOTE — BH PSYCHOLOGY - GROUP THERAPY NOTE - NSBHPSYCHOLASSESSPROV_PSY_A_CORE
Licensed Psychologist and Psychology Trainee
Licensed Psychologist and Psychology Trainee
Licensed Psychologist
Psychology Trainee only
Psychology Trainee only

## 2023-05-08 NOTE — BH PSYCHOLOGY - GROUP THERAPY NOTE - NSBHPSYCHOLRESPONSE_PSY_A_CORE
Symptoms reduced/Coping skills acquired/Accepted support
other...
Symptoms reduced/Coping skills acquired/Accepted support

## 2023-05-08 NOTE — BH INPATIENT PSYCHIATRY PROGRESS NOTE - NSTXSUICIDINTERMD_PSY_ALL_CORE
continue to encourage individual and group therapy 
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
## 2023-05-08 NOTE — BH PSYCHOLOGY - GROUP THERAPY NOTE - NSBHPSYCHOLPARTICIPCOMMENT_PSY_A_CORE FT
Patient appeared attentive to the group discussion and was able to read out loud when prompted. However, patient often asked questions that appeared oppositional and irrelevant to the group topic. For example, when discussing nonverbal assertive behaviors patient asked, “But what if a serial killer does that?” Patient was receptive to redirection from the .
Patient initially appeared attentive to the group discussion. Later, patient often interrupted the group discussion with questions that appeared oppositional and irrelevant to the group topic. For example, when discussing characteristics of a supportive relationship patient said, “What if I don’t need others? I’m my own best friend.” Patient was receptive to redirection. However, she often continued to require it. Patient briefly left the group before returning to her seat. 
Patient appeared interested in the group discussion. She was observed following along with the provided worksheet and was able to read out loud when prompted. Patient briefly left the group before returning to her seat. Patient was able to engage with  and other members appropriately. 
Patient was able to participate and engage with the other group members appropriately.  Patient appeared attentive and contributed to the group discussion. Patient was able to share a personal experience related to the topic that was well-received by the fellow group members.
Patient appeared attentive to the group discussion and was able to read out loud when prompted. However, patient often asked questions that appeared oppositional and irrelevant to the group topic. For example, when discussing strategies to manage stress patient asked, “But what if I get nightmares?” Patient was receptive to redirection from the .

## 2023-05-08 NOTE — BH PSYCHOLOGY - GROUP THERAPY NOTE - NSPSYCHOLGRPGENINT_PSY_A_CORE
cognitive/behavioral therapy/problem solving techniques discussed/stress management/supported coping skills/supportive therapy/treatment compliance encouraged/social skills training

## 2023-05-08 NOTE — BH INPATIENT PSYCHIATRY PROGRESS NOTE - NSTXPROBVIOLNT_PSY_ALL_CORE
VIOLENT/AGGRESSIVE BEHAVIOR

## 2023-05-08 NOTE — BH INPATIENT PSYCHIATRY DISCHARGE NOTE - ATTENDING DISCHARGE PHYSICAL EXAMINATION:
Discharge MSE: Pt is a 41yo woman with good grooming and hygiene, dressed in casual attire. She is calm and cooperative with appropriate eye contact. No psychomotor abnormalities noted. Speech is normal in rate and volume, spontaneous. Stated mood is "fine." Affect is euthymic, full, mood-congruent. TP is linear. TC: without evidence of overt delusions, denies SIIP, denies HIIP. No perceptual disturbances noted. Insight is fair. Judgement is fair. Impulse control is intact at this time.

## 2023-05-08 NOTE — BH INPATIENT PSYCHIATRY PROGRESS NOTE - NSTXVIOLNTINTERMD_PSY_ALL_CORE
PO and IM prns are available (thus far pt in good behavioral control)

## 2023-05-08 NOTE — ED ADULT TRIAGE NOTE - MODE OF ARRIVAL
EMS Composite Graft Text: The defect edges were debeveled with a #15 scalpel blade.  Given the location of the defect, shape of the defect, the proximity to free margins and the fact the defect was full thickness a composite graft was deemed most appropriate.  The defect was outline and then transferred to the donor site.  A full thickness graft was then excised from the donor site. The graft was then placed in the primary defect, oriented appropriately and then sutured into place.  The secondary defect was then repaired using a primary closure.

## 2023-05-08 NOTE — BH INPATIENT PSYCHIATRY PROGRESS NOTE - NSTXPSYCHOINTERMD_PSY_ALL_CORE
well controlled on monthly Haldol Dec and Trileptal 

## 2023-05-08 NOTE — BH INPATIENT PSYCHIATRY PROGRESS NOTE - NSBHCHARTREVIEWVS_PSY_A_CORE FT
Vital Signs Last 24 Hrs  T(C): --  T(F): --  HR: --  BP: --  BP(mean): --  RR: --  SpO2: --    Orthostatic VS  05-06-23 @ 19:33  Lying BP: --/-- HR: --  Sitting BP: 143/84 HR: 79  Standing BP: --/-- HR: --  Site: --  Mode: --

## 2023-05-08 NOTE — BH INPATIENT PSYCHIATRY PROGRESS NOTE - NSTXSUICIDGOAL_PSY_ALL_CORE
Be able to state 3 reasons for living
Will identify and utilize 2 coping skills
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
## 2023-05-08 NOTE — BH INPATIENT PSYCHIATRY PROGRESS NOTE - NSICDXBHPRIMARYDX_PSY_ALL_CORE
Schizoaffective disorder, bipolar type   F25.0  

## 2023-05-08 NOTE — BH INPATIENT PSYCHIATRY DISCHARGE NOTE - ATTENDING DISCHARGE PHYSICAL EXAM:
Subjective:       Patient ID: Luisa Cummings is a 42 y.o. female.    Chief Complaint: Sore Throat; Diarrhea; and Fatigue     I have reviewed the PMH,  and  for this patient. She presents today for evaluation of flu-like symptoms. She has been having sore throat, diarrhea, and fatigue. Her syptoms have been going on about 2 days. She is not having headache or runny nose. She does have a dry cough. She took a flu shot this year .       Sore Throat    This is a new problem. The current episode started in the past 7 days. The problem has been unchanged. Neither side of throat is experiencing more pain than the other. Pertinent negatives include no abdominal pain, congestion, coughing, diarrhea, ear discharge, ear pain, headaches, shortness of breath or vomiting. She has had no exposure to strep. She has tried acetaminophen for the symptoms. The treatment provided mild relief.     Active Ambulatory Problems     Diagnosis Date Noted    Anxiety disorder 08/25/2014    Benign essential hypertension 08/25/2014    Irritable bowel syndrome 08/25/2014    DUB (dysfunctional uterine bleeding) 08/25/2014    Gastroesophageal reflux disease 11/02/2017    Family history of colon cancer 11/02/2017     Resolved Ambulatory Problems     Diagnosis Date Noted    Peptic ulcer 08/25/2014    Screening for lipid disorders 11/02/2017    Dyspnea 11/16/2017    Stress at home 01/29/2018     Past Medical History:   Diagnosis Date    Acne     Anxiety     Essential hypertension, benign     History of peptic ulcer     Irritable bowel syndrome (IBS)     Premature menopause on hormone replacement therapy        HEALTH MAINTENANCE:   Health Maintenance   Topic Date Due    TETANUS VACCINE  09/26/1992    Influenza Vaccine  08/01/2018    Mammogram  12/11/2019    Pap Smear with HPV Cotest  11/11/2021    Lipid Panel  Completed       Review of Systems   Constitutional: Negative for chills, fatigue and fever.   HENT: Positive for  sore throat. Negative for congestion, ear discharge, ear pain and rhinorrhea.    Eyes: Negative for discharge, redness and itching.   Respiratory: Negative for cough, chest tightness, shortness of breath and wheezing.    Cardiovascular: Negative for chest pain, palpitations and leg swelling.   Gastrointestinal: Negative for abdominal pain, constipation, diarrhea, nausea and vomiting.   Endocrine: Negative for cold intolerance and heat intolerance.   Genitourinary: Negative for dysuria, flank pain, frequency and hematuria.   Musculoskeletal: Negative for arthralgias, back pain, joint swelling and myalgias.   Skin: Negative for color change and rash.   Neurological: Negative for dizziness, tremors, numbness and headaches.   Psychiatric/Behavioral: Negative for dysphoric mood and sleep disturbance. The patient is not nervous/anxious.        Objective:          LABS    CMP  Sodium   Date Value Ref Range Status   11/02/2017 141 136 - 145 mmol/L Final     Potassium   Date Value Ref Range Status   11/02/2017 3.8 3.5 - 5.1 mmol/L Final     Chloride   Date Value Ref Range Status   11/02/2017 102 95 - 110 mmol/L Final     CO2   Date Value Ref Range Status   11/02/2017 30 (H) 23 - 29 mmol/L Final     Glucose   Date Value Ref Range Status   11/02/2017 87 70 - 110 mg/dL Final     BUN, Bld   Date Value Ref Range Status   11/02/2017 11 7 - 17 mg/dL Final     Creatinine   Date Value Ref Range Status   11/02/2017 0.75 0.50 - 1.40 mg/dL Final     Calcium   Date Value Ref Range Status   11/02/2017 9.6 8.7 - 10.5 mg/dL Final     Total Protein   Date Value Ref Range Status   11/02/2017 7.3 6.0 - 8.4 g/dL Final     Albumin   Date Value Ref Range Status   11/02/2017 4.4 3.5 - 5.2 g/dL Final     Total Bilirubin   Date Value Ref Range Status   11/02/2017 0.4 0.1 - 1.0 mg/dL Final     Comment:     For infants and newborns, interpretation of results should be based  on gestational age, weight and in agreement with  clinical  observations.  Premature Infant recommended reference ranges:  Up to 24 hours.............<8.0 mg/dL  Up to 48 hours............<12.0 mg/dL  3-5 days..................<15.0 mg/dL  6-29 days.................<15.0 mg/dL       Alkaline Phosphatase   Date Value Ref Range Status   11/02/2017 51 38 - 126 U/L Final     AST   Date Value Ref Range Status   11/02/2017 24 15 - 46 U/L Final     ALT   Date Value Ref Range Status   11/02/2017 26 10 - 44 U/L Final     Anion Gap   Date Value Ref Range Status   11/02/2017 9 8 - 16 mmol/L Final     eGFR if    Date Value Ref Range Status   11/02/2017 >60.0 >60 mL/min/1.73 m^2 Final     eGFR if non    Date Value Ref Range Status   11/02/2017 >60.0 >60 mL/min/1.73 m^2 Final     Comment:     Calculation used to obtain the estimated glomerular filtration  rate (eGFR) is the CKD-EPI equation.          No results found for: WBC, HGB, HCT, MCV, PLT    No results for input(s): TSH, HDL, CHOL, TRIG, LDLCALC, CHOLHDL, NONHDLCHOL, TOTALCHOLEST in the last 2160 hours.      A1C:  No results for input(s): HGBA1C in the last 2160 hours.      Physical Exam   Constitutional: She appears well-developed and well-nourished.   HENT:   Head: Normocephalic and atraumatic.   Right Ear: External ear normal. Tympanic membrane is erythematous. A middle ear effusion is present.   Left Ear: External ear normal. Tympanic membrane is erythematous. A middle ear effusion is present.   Nose: Rhinorrhea present. Right sinus exhibits maxillary sinus tenderness. Right sinus exhibits no frontal sinus tenderness. Left sinus exhibits maxillary sinus tenderness. Left sinus exhibits no frontal sinus tenderness.   Mouth/Throat: No posterior oropharyngeal edema or posterior oropharyngeal erythema. No tonsillar exudate.   Eyes: Conjunctivae and EOM are normal. Pupils are equal, round, and reactive to light.   Neck: No thyromegaly present.   Cardiovascular: Normal rate, regular rhythm,  normal heart sounds and intact distal pulses.   No murmur heard.  Pulmonary/Chest: Effort normal and breath sounds normal. She has no wheezes.   Abdominal: She exhibits no distension. There is no tenderness.   Musculoskeletal: She exhibits no edema or tenderness.   Lymphadenopathy:     She has no cervical adenopathy.   Neurological: She displays normal reflexes. No cranial nerve deficit.   Skin: Skin is warm and dry. No rash noted.   Psychiatric: She has a normal mood and affect. Her behavior is normal.       Flu test was negative      Assessment and Plan:     Problem List Items Addressed This Visit     None      Visit Diagnoses     Bilateral acute serous otitis media, recurrence not specified    -  Primary - treat with zpak and steroid shot.     Relevant Medications    triamcinolone acetonide injection 40 mg (Completed)    Acute maxillary sinusitis, recurrence not specified     - treat with zpak and steroid shot.     Relevant Medications    triamcinolone acetonide injection 40 mg (Completed)    Acute bronchitis, unspecified organism    - treat with zpak and steroid shot.     Relevant Medications    triamcinolone acetonide injection 40 mg (Completed)    Other Relevant Orders    POCT Influenza A/B          Orders Placed This Encounter    POCT Influenza A/B    triamcinolone acetonide injection 40 mg    azithromycin (Z-LILY) 250 MG tablet    promethazine-dextromethorphan (PROMETHAZINE-DM) 6.25-15 mg/5 mL Syrp    triamcinolone acetonide injection 40 mg         Follow-up with me in as needed.      Attending Discharge Physical Examination:

## 2023-05-08 NOTE — BH INPATIENT PSYCHIATRY PROGRESS NOTE - NSTXDEPRESGOAL_PSY_ALL_CORE
Exhibit improvements in self-grooming, hygiene, sleep and appetite
Attend and participate in at least 2 groups daily despite low mood/energy
Exhibit improvements in self-grooming, hygiene, sleep and appetite
Attend and participate in at least 2 groups daily despite low mood/energy
Exhibit improvements in self-grooming, hygiene, sleep and appetite
Attend and participate in at least 2 groups daily despite low mood/energy

## 2023-05-08 NOTE — BH INPATIENT PSYCHIATRY DISCHARGE NOTE - NSBHMETABOLIC_PSY_ALL_CORE_FT
BMI: BMI (kg/m2): 28.5 (11-17-22 @ 19:18)  HbA1c:   Glucose: POCT Blood Glucose.: 95 mg/dL (07-23-22 @ 14:45)    BP: 145/72 (05-05-23 @ 10:30) (145/72 - 145/72)  Lipid Panel:
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
## 2023-05-08 NOTE — BH INPATIENT PSYCHIATRY PROGRESS NOTE - NSTXVIOLNTGOAL_PSY_ALL_CORE
Will demonstrate ability to tolerate peer conflicts without aggression 3x weekly
Will identify how anger may be a response to other feelings
Will identify how anger may be a response to other feelings
Will demonstrate ability to tolerate peer conflicts without aggression 3x weekly
Will identify how anger may be a response to other feelings

## 2023-05-08 NOTE — BH INPATIENT PSYCHIATRY PROGRESS NOTE - NSBHATTESTTYPEVISIT_PSY_A_CORE
Attending Only
Attending with Resident/Fellow/Student
On-site Attending supervising [place] (99XXX codes)

## 2023-05-08 NOTE — BH PSYCHOLOGY - GROUP THERAPY NOTE - NSPSYCHOLGRPGENPROB_PSY_A_CORE
academic/vocational/social dysfunction/anxiety/depression

## 2023-05-08 NOTE — BH INPATIENT PSYCHIATRY PROGRESS NOTE - NSBHATTESTBILLING_PSY_A_CORE
99223-Initial OBS or IP - high complexity OR  mins
17334-Hdrnuhdsst OBS or IP - low complexity OR 25-34 mins
88825-Nmpyxvloho OBS or IP - low complexity OR 25-34 mins
28291-Kwtkksegzw OBS or IP - high complexity OR 50-79 mins
99223-Initial OBS or IP - high complexity OR  mins
40706-Mvplljiiem OBS or IP - moderate complexity OR 35-49 mins
02423-Dkvkcmlkkv OBS or IP - low complexity OR 25-34 mins
32601-Wkghtjyzrf OBS or IP - low complexity OR 25-34 mins

## 2023-05-08 NOTE — BH INPATIENT PSYCHIATRY PROGRESS NOTE - NSBHMETABOLIC_PSY_ALL_CORE_FT
BMI: BMI (kg/m2): 28.5 (11-17-22 @ 19:18)  HbA1c:   Glucose: POCT Blood Glucose.: 95 mg/dL (07-23-22 @ 14:45)    BP: --  Lipid Panel: 
BMI: BMI (kg/m2): 28.5 (11-17-22 @ 19:18)  HbA1c:   Glucose: POCT Blood Glucose.: 95 mg/dL (07-23-22 @ 14:45)    BP: --  Lipid Panel: 
BMI: BMI (kg/m2): 28.5 (11-17-22 @ 19:18)  HbA1c:   Glucose: POCT Blood Glucose.: 95 mg/dL (07-23-22 @ 14:45)    BP: 143/81 (04-27-23 @ 18:56) (143/81 - 160/80)  Lipid Panel: 
BMI: BMI (kg/m2): 28.5 (11-17-22 @ 19:18)  HbA1c:   Glucose: POCT Blood Glucose.: 95 mg/dL (07-23-22 @ 14:45)    BP: 143/81 (04-27-23 @ 18:56) (143/81 - 160/80)  Lipid Panel: 
BMI: BMI (kg/m2): 28.5 (11-17-22 @ 19:18)  HbA1c:   Glucose: POCT Blood Glucose.: 95 mg/dL (07-23-22 @ 14:45)    BP: --  Lipid Panel: 

## 2023-05-10 ENCOUNTER — EMERGENCY (EMERGENCY)
Facility: HOSPITAL | Age: 43
LOS: 1 days | Discharge: HOME | End: 2023-05-10
Attending: EMERGENCY MEDICINE | Admitting: EMERGENCY MEDICINE
Payer: MEDICAID

## 2023-05-10 VITALS
RESPIRATION RATE: 18 BRPM | TEMPERATURE: 98 F | SYSTOLIC BLOOD PRESSURE: 155 MMHG | OXYGEN SATURATION: 100 % | HEART RATE: 85 BPM | DIASTOLIC BLOOD PRESSURE: 92 MMHG

## 2023-05-10 VITALS
DIASTOLIC BLOOD PRESSURE: 94 MMHG | HEART RATE: 80 BPM | OXYGEN SATURATION: 100 % | SYSTOLIC BLOOD PRESSURE: 153 MMHG | RESPIRATION RATE: 16 BRPM | TEMPERATURE: 98 F

## 2023-05-10 PROCEDURE — 99284 EMERGENCY DEPT VISIT MOD MDM: CPT

## 2023-05-10 PROCEDURE — 72110 X-RAY EXAM L-2 SPINE 4/>VWS: CPT | Mod: 26

## 2023-05-10 RX ORDER — IBUPROFEN 200 MG
600 TABLET ORAL ONCE
Refills: 0 | Status: COMPLETED | OUTPATIENT
Start: 2023-05-10 | End: 2023-05-10

## 2023-05-10 RX ORDER — LIDOCAINE 4 G/100G
2 CREAM TOPICAL ONCE
Refills: 0 | Status: COMPLETED | OUTPATIENT
Start: 2023-05-10 | End: 2023-05-10

## 2023-05-10 RX ORDER — LIDOCAINE 4 G/100G
2 CREAM TOPICAL
Qty: 14 | Refills: 0
Start: 2023-05-10 | End: 2023-05-16

## 2023-05-10 RX ORDER — CYCLOBENZAPRINE HYDROCHLORIDE 10 MG/1
1 TABLET, FILM COATED ORAL
Qty: 21 | Refills: 0
Start: 2023-05-10 | End: 2023-05-16

## 2023-05-10 RX ORDER — DIAZEPAM 5 MG
5 TABLET ORAL ONCE
Refills: 0 | Status: DISCONTINUED | OUTPATIENT
Start: 2023-05-10 | End: 2023-05-10

## 2023-05-10 RX ORDER — CYCLOBENZAPRINE HYDROCHLORIDE 10 MG/1
10 TABLET, FILM COATED ORAL ONCE
Refills: 0 | Status: DISCONTINUED | OUTPATIENT
Start: 2023-05-10 | End: 2023-05-10

## 2023-05-10 RX ADMIN — LIDOCAINE 2 PATCH: 4 CREAM TOPICAL at 22:11

## 2023-05-10 RX ADMIN — Medication 600 MILLIGRAM(S): at 22:10

## 2023-05-10 RX ADMIN — Medication 5 MILLIGRAM(S): at 22:10

## 2023-05-10 NOTE — ED ADULT NURSE REASSESSMENT NOTE - NS ED NURSE REASSESS COMMENT FT1
Primary Care Telemedicine Note  The patient location is:  Patient's Home - [place]  The chief complaint leading to consultation is:   Chief Complaint   Patient presents with    Sinus Problem    ADHD             Total time:  see MDM below. The total time spent on the encounter, which includes face to face time and non-face to face time preparing to see the patient (eg, review of previous medical records, tests), Obtaining and/or reviewing separately obtained history, documenting clinical information in the electronic or other health record, independently interpreting results (not separately reported)/communicating results to the patient/family/caregiver, and/or care coordination (not separately reported).    Visit type: Virtual visit with synchronous audio and video  Each patient to whom he or she provides medical services by telemedicine is:  (1) informed of the relationship between the physician and patient and the respective role of any other health care provider with respect to management of the patient; and (2) notified that he or she may decline to receive medical services by telemedicine and may withdraw from such care at any time.  =================================================================    PLAN:      Problem List Items Addressed This Visit       Sinus symptom - Primary     Discussed risk and benefits of Singulair.  Patient agrees to proceed.  Reviewed CT scan.  Patient advised to follow-up with ENT.  Start Astelin.  Continue antihistamine orally.    Discussed condition course and signs and symptoms to expect.  Patient advised take anti-inflammatories and or Tylenol for pain or fever.  ER precautions.  Call MD or follow-up to clinic if not improving or worsening symptoms.             Relevant Medications    montelukast (SINGULAIR) 10 mg tablet    azelastine (ASTELIN) 137 mcg (0.1 %) nasal spray    Encounter for long-term (current) use of medications     Patient advised follow-up with PCP for  Pt. is discharged, awaiting d/c mode of transportation back to [facility]. Pt. calm and cooperative, will CTM. routine annual wellness and labs.           Inattention     Referral placed per patient request.  Patient should also follow-up with PCP.           Relevant Orders    Ambulatory referral/consult to Psychology        Medication Management for assessment above:   Medication List with Changes/Refills   New Medications    AZELASTINE (ASTELIN) 137 MCG (0.1 %) NASAL SPRAY    1 spray (137 mcg total) by Nasal route 2 (two) times daily.   Current Medications    FLUTICASONE PROPIONATE (FLONASE) 50 MCG/ACTUATION NASAL SPRAY    1 spray by Each Nostril route once daily.    LEVOCETIRIZINE (XYZAL) 5 MG TABLET        LISDEXAMFETAMINE (VYVANSE) 20 MG CAPSULE    Take 1 capsule (20 mg total) by mouth every morning.    MULTIVITAMIN (THERAGRAN) PER TABLET    Take 1 tablet by mouth once daily.   Changed and/or Refilled Medications    Modified Medication Previous Medication    MONTELUKAST (SINGULAIR) 10 MG TABLET montelukast (SINGULAIR) 10 mg tablet       Take 1 tablet (10 mg total) by mouth every evening.       Discontinued Medications    DEXTROAMPHETAMINE-AMPHETAMINE (ADDERALL) 20 MG TABLET    Take 1 tablet by mouth once daily.    FEXOFENADINE (ALLEGRA) 60 MG TABLET    Take 60 mg by mouth once daily.    OXYMETAZOLINE (AFRIN) 0.05 % NASAL SPRAY    2 sprays by Nasal route 2 (two) times daily.    PREDNISONE (DELTASONE) 10 MG TABLET    Take 3 tablets a day for 4 days (1 before breakfast, 1 after lunch, 1 before bed). Then take 2 tablets a day for 4 days (1 before breakfast, 1 before bed). Then take 1 tablet a day for 4 days (1 before breakfast).       Danny Prabhakar M.D.  ==========================================================================  Subjective:   Patient ID: Khoi Mueller is a 29 y.o. male.  has a past medical history of ADHD (attention deficit hyperactivity disorder) and Asthma.   Chief Complaint: Sinus Problem and ADHD (/)      Problem List Items Addressed This Visit       Sinus symptom - Primary    Overview     Chronic.   Recurrent.  Patient reports that he was prescribed Singulair Xyzal and Flonase.  Patient stopped taking the Singulair due to possible side effects.  Patient saw ENT and was given steroids and Augmentin.  Patient reports that it did get better but eventually returned with his chronic sinus symptoms.  CT scan was ordered previously.    CT [employer] Sinuses without  Narrative: EXAMINATION:  CT MEDTRONIC SINUSES WITHOUT    CLINICAL HISTORY:  Hypertrophy of nasal turbinates    TECHNIQUE:  CT of the paranasal sinuses was performed.  Coronal reconstructions were obtained.  Images were acquired per preoperative [email or web address] protocol.    COMPARISON:  None.    FINDINGS:  The frontal sinuses are clear bilaterally.    The ethmoid sinuses are clear bilaterally.    The sphenoid sinus and sphenoethmoid recesses are clear bilaterally.    There is mild mucosal thickening present along the floor of the left maxillary sinus.  Ostiomeatal complexes appear patent bilaterally.    No air-fluid levels visualized.    Nasal cavity: There is mild rightward bowing of the nasal septum.  No soft tissue masses visualized.    The lamina papyracea and roof of the ethmoids are grossly intact bilaterally. No evidence of carotid canal dehiscence.  Impression: Mild mucosal thickening in the left maxillary sinus.    Electronically signed by: Gian Young MD  Date:    10/21/2022  Time:    14:40             Current Assessment & Plan     Discussed risk and benefits of Singulair.  Patient agrees to proceed.  Reviewed CT scan.  Patient advised to follow-up with ENT.  Start Astelin.  Continue antihistamine orally.    Discussed condition course and signs and symptoms to expect.  Patient advised take anti-inflammatories and or Tylenol for pain or fever.  ER precautions.  Call MD or follow-up to clinic if not improving or worsening symptoms.             Encounter for long-term (current) use of medications    Overview     May 2023: Reviewed labs.  CHRONIC.  Stable. Compliant with medications for managed conditions. See medication list. No SE reported.   Routine lab analysis is being monitored. Refills were addressed.  Lab Results   Component Value Date    WBC 6.30 08/26/2022    HGB 16.1 08/26/2022    HCT 47.8 08/26/2022    MCV 91 08/26/2022     08/26/2022       Chemistry        Component Value Date/Time     08/26/2022 1650    K 4.7 08/26/2022 1650     08/26/2022 1650    CO2 27 08/26/2022 1650    BUN 9 08/26/2022 1650    CREATININE 1.0 08/26/2022 1650    GLU 91 08/26/2022 1650        Component Value Date/Time    CALCIUM 10.2 08/26/2022 1650    ALKPHOS 58 08/26/2022 1650    AST 16 08/26/2022 1650    ALT 22 08/26/2022 1650    BILITOT 0.9 08/26/2022 1650          Lab Results   Component Value Date    TSH 1.847 08/26/2022            Current Assessment & Plan     Patient advised follow-up with PCP for routine annual wellness and labs.           Inattention    Overview     Chronic.  Patient reports that he was officially diagnosed with ADHD although I have no records of this.  Patient seeing PCP with [facility] in [place].  Patient was prescribed Adderall and then now on Vyvanse.  Patient asking for referral to Psychiatry/Psychology for management of his Vyvanse.           Current Assessment & Plan     Referral placed per patient request.  Patient should also follow-up with PCP.               Review of patient's allergies indicates:  No Known Allergies  Current Outpatient Medications   Medication Instructions    azelastine (ASTELIN) 137 mcg, Nasal, 2 times daily    fluticasone propionate (FLONASE) 50 mcg/actuation nasal spray 1 spray, Each Nostril, Daily    levocetirizine (XYZAL) 5 MG tablet No dose, route, or frequency recorded.    montelukast (SINGULAIR) 10 mg, Oral, Nightly    multivitamin (THERAGRAN) per tablet 1 tablet, Oral, Daily    VYVANSE 20 mg, Oral, Every morning      I have reviewed the PMH, social history, FamilyHx, surgical history, allergies  and medications documented / confirmed by the patient at the time of this visit.  Review of Systems   Constitutional:  Negative for activity change and unexpected weight change.   HENT:  Positive for congestion and postnasal drip. Negative for hearing loss, rhinorrhea and trouble swallowing.    Eyes:  Negative for discharge and visual disturbance.   Respiratory:  Negative for chest tightness and wheezing.    Cardiovascular:  Negative for chest pain and palpitations.   Gastrointestinal:  Negative for blood in stool, constipation, diarrhea and vomiting.   Endocrine: Negative for polydipsia and polyuria.   Genitourinary:  Negative for difficulty urinating, hematuria and urgency.   Musculoskeletal:  Negative for arthralgias, joint swelling and neck pain.   Neurological:  Negative for weakness and headaches.   Psychiatric/Behavioral:  Negative for confusion and dysphoric mood.    Objective:   There were no vitals taken for this visit.  Physical Exam  Constitutional:       General: He is not in acute distress.     Appearance: He is well-developed. He is not diaphoretic.   HENT:      Head: Normocephalic and atraumatic.   Eyes:      Extraocular Movements: Extraocular movements intact.      Pupils: Pupils are equal, round, and reactive to light.   Pulmonary:      Effort: Pulmonary effort is normal.   Musculoskeletal:         General: Normal range of motion.      Cervical back: Normal range of motion.   Skin:     Findings: No rash.   Neurological:      Mental Status: He is alert and oriented to person, place, and time.   Psychiatric:         Attention and Perception: He is attentive.         Mood and Affect: Mood normal. Mood is not anxious or depressed. Affect is not labile, blunt, angry or inappropriate.         Speech: He is communicative. Speech is not rapid and pressured, delayed, slurred or tangential.         Behavior: Behavior normal. Behavior is not agitated, slowed, aggressive, withdrawn, hyperactive or combative.          Thought Content: Thought content normal. Thought content is not paranoid or delusional. Thought content does not include homicidal or suicidal ideation. Thought content does not include homicidal or suicidal plan.         Cognition and Memory: Memory is not impaired.         Judgment: Judgment normal. Judgment is not impulsive or inappropriate.       Assessment:     1. Sinus symptom    2. Encounter for long-term (current) use of medications    3. Inattention      MDM:     Total time: 21 minutes.  This includes total time spent on the encounter, which includes face to face time and non-face to face time preparing to see the patient (eg, review of previous medical records, tests), Obtaining and/or reviewing separately obtained history, documenting clinical information in the electronic or other health record, independently interpreting results (not separately reported)/communicating results to the patient/family/caregiver, and/or care coordination (not separately reported).    I have Reviewed and summarized old records.  I have performed thorough medication reconciliation today and discussed risk and benefits of medications.  I have reviewed labs.  All questions were answered.      I have signed for the following orders AND/OR meds.  Orders Placed This Encounter   Procedures    Ambulatory referral/consult to Psychology     Standing Status:   Future     Standing Expiration Date:   6/10/2024     Referral Priority:   Routine     Referral Type:   Psychiatric     Referral Reason:   Specialty Services Required     Requested Specialty:   Psychology     Number of Visits Requested:   1     Medications Ordered This Encounter   Medications    azelastine (ASTELIN) 137 mcg (0.1 %) nasal spray     Si spray (137 mcg total) by Nasal route 2 (two) times daily.     Dispense:  30 mL     Refill:  0    montelukast (SINGULAIR) 10 mg tablet     Sig: Take 1 tablet (10 mg total) by mouth every evening.     Dispense:  30 tablet      Refill:  12        Follow up if symptoms worsen or fail to improve.    If no improvement in symptoms or symptoms worsen, advised to call/follow-up at clinic or go to ER. Patient voiced understanding and all questions/concerns were addressed.   DISCLAIMER: This note was compiled by using a speech recognition dictation system and therefore please be aware that typographical / speech recognition errors can and do occur.  Please contact me if you see any errors specifically.    Danny Prabhakar M.D.       Office: [phone]   [address] [place] [address]  FAX: [phone]

## 2023-05-10 NOTE — ED ADULT NURSE NOTE - NSFALLRISKINTERV_ED_ALL_ED

## 2023-05-10 NOTE — ED PROVIDER NOTE - NSFOLLOWUPINSTRUCTIONS_ED_ALL_ED_FT
see below for medications routes doses and duration     We've got you covered from head to toe    Our specialty programs:    [facility]  [phone]  [email or web address]    Sabrina (Scheduling) team hours of operation:  Monday through Thursday, 7am to 8:30pm  Friday, 7am to 7pm  Saturday, 8am to 4pm  Back Pain  WHAT YOU NEED TO KNOW:  Back pain is common. It can be caused by many conditions, such as arthritis or the breakdown of spinal discs. Your risk for back pain is increased by injuries, lack of activity, or repeated bending and twisting. You may feel sore or stiff on one or both sides of your back. The pain may spread to your buttocks or thighs.  DISCHARGE INSTRUCTIONS:  Return to the emergency department if:   •You have pain, numbness, or weakness in one or both legs.  •Your pain becomes so severe that you cannot walk.  •You cannot control your urine or bowel movements.  •You have severe back pain with chest pain.  •You have severe back pain, nausea, and vomiting.  •You have severe back pain that spreads to your side or genital area.  Contact your healthcare provider if:   •You have back pain that does not get better with rest and pain medicine.  •You have a fever.  •You have pain that worsens when you are on your back or when you rest.  •You have pain that worsens when you cough or sneeze.  •You lose weight without trying.  •You have questions or concerns about your condition or care.  Medicines:   •NSAIDs help decrease swelling and pain. This medicine is available with or without a doctor's order. NSAIDs can cause stomach bleeding or kidney problems in certain people. If you take blood thinner medicine, always ask your healthcare provider if NSAIDs are safe for you. Always read the medicine label and follow directions.  •Acetaminophen decreases pain and fever. It is available without a doctor's order. Ask how much to take and how often to take it. Follow directions. Read the labels of all other medicines you are using to see if they also contain acetaminophen, or ask your doctor or pharmacist. Acetaminophen can cause liver damage if not taken correctly. Do not use more than 4 grams (4,000 milligrams) total of acetaminophen in one day.   •Muscle relaxers help decrease muscle spasms and back pain.  •Prescription pain medicine may be given. Ask your healthcare provider how to take this medicine safely. Some prescription pain medicines contain acetaminophen. Do not take other medicines that contain acetaminophen without talking to your healthcare provider. Too much acetaminophen may cause liver damage. Prescription pain medicine may cause constipation. Ask your healthcare provider how to prevent or treat constipation.   •Take your medicine as directed. Contact your healthcare provider if you think your medicine is not helping or if you have side effects. Tell him or her if you are allergic to any medicine. Keep a list of the medicines, vitamins, and herbs you take. Include the amounts, and when and why you take them. Bring the list or the pill bottles to follow-up visits. Carry your medicine list with you in case of an emergency.  How to manage your back pain:   •Apply ice on your back for 15 to 20 minutes every hour or as directed. Use an ice pack, or put crushed ice in a plastic bag. Cover it with a towel before you apply it to your skin. Ice helps prevent tissue damage and decreases pain.  •Apply heat on your back for 20 to 30 minutes every 2 hours for as many days as directed. Heat helps decrease pain and muscle spasms.  •Stay active as much as you can without causing more pain. Bed rest could make your back pain worse. Avoid heavy lifting until your pain is gone.  •Go to physical therapy as directed. A physical therapist can teach you exercises to help improve movement and strength, and to decrease pain.  Follow up with your healthcare provider in 2 weeks, or as directed: Write down your questions so you remember to ask them during your visits.

## 2023-05-10 NOTE — ED PROVIDER NOTE - WR ORDER NAME 1
Judd Lewis II is a 63 year old male here for  Chief Complaint   Patient presents with   • Cancer     Esophageal Cancer     Denies latex allergy or sensitivity.    Medication verified and med list updated.  PCP and Pharmacy verified.    Social History     Tobacco Use   Smoking Status Former Smoker   • Packs/day: 0.00   Smokeless Tobacco Never Used   Tobacco Comment    quit smoking 1994     Advance Directives Filed: No    ECOG:   ECOG   ECOG Performance Status        Height: No.  Ht Readings from Last 1 Encounters:   03/26/20 6' 0.28\" (1.836 m)     Weight:Yes, shoes off.  Wt Readings from Last 3 Encounters:   04/23/20 (!) 182.1 kg   04/09/20 (!) 186.3 kg   04/02/20 (!) 189.7 kg       BMI: There is no height or weight on file to calculate BMI.    REVIEW OF SYSTEMS  GENERAL:  Patient denies headache, fevers, chills, night sweats, but complains of: excessive fatigue, change in appetite, weight loss, dizziness and patient has a weight loss over 12 pounds.  He states he is unable to eat as most foods he is unable to swallow and vomits almost immediately  ALLERGIC/IMMUNOLOGIC: Verified allergies: Yes  EYES:  Patient denies significant visual difficulties, double vision, blurred vision  ENT/MOUTH: Patient denies problems with hearing, sore throat, sinus drainage, mouth sores  ENDOCRINE:  Patient denies diabetes, thyroid disease, hormone replacement, hot flashes  HEMATOLOGIC/LYMPHATIC: Patient denies easy bruising, bleeding, tender lymph nodes, swollen lymph nodes  BREASTS: Patient denies not reviewed at this time  RESPIRATORY:  Patient denies lung pain with breathing, cough, coughing up blood, shortness of breath  CARDIOVASCULAR:  Patient denies anginal chest pain, palpitations, shortness of breath when lying flat, peripheral edema  GASTROINTESTINAL: Patient denies abdominal pain , vomiting, diarrhea, GI bleeding, change in bowel habits, sensation of feeling full, but complains of: nausea, constipation, heartburn,  difficulty swallowing and patient is having increased difficulty eating.    : Patient denies blood in the urine, burning with urination, frequency, urgency, hesitancy, incontinence  MUSCULOSKELETAL:  Patient denies joint pain, bone pain, joint swelling, redness, decreased range of motion  SKIN:  Patient denies chronic rashes, inflammation, ulcerations, skin changes, itching  NEUROLOGIC:  Patient denies areas of focal weakness, sensory problems, tingling, but complains of: loss of balance, abnormal gait and numbness to fingers and toes  PSYCHIATRIC: Patient denies insomnia, depression, anxiety     Xray Lumbosacral Spine + Obliques

## 2023-05-10 NOTE — ED PROVIDER NOTE - MUSCULOSKELETAL, MLM
Spine appears normal, +paraspinal spasm on right; range of motion limited 2/2 pain +SLR right  no other muscle or joint tenderness

## 2023-05-10 NOTE — ED ADULT NURSE NOTE - OBJECTIVE STATEMENT
Patient received to intake by EMS coming from [facility]; c/o mechanical slip and fall earlier this evening. pmhx BPD, bipolar. pt a&ox4 endorsing lower back pain. Pt denies hitting head/LOC, alochol/drug use, blood thinner use, cp, sob, n/v, headache, dizziness, fever/chills, numbness/tingling. Breathing even, unlabored. No trauma noted, pt ambulatory to bathroom. Pt medicated as per MAR. Pending dispo.

## 2023-05-10 NOTE — ED PROVIDER NOTE - CLINICAL SUMMARY MEDICAL DECISION MAKING FREE TEXT BOX
The patient is a 42y morbidly obese Female who has a past medical and surgery history of GERD Cholelithiasis Bipolar Disorder SADisorder Borderline Personality Polysubstance abuse multiple psych admits (31; d/magui from last 2 days ago PTED with back pain in right lower back non radiating not associated with any bladder/bowel dysfx paresthesia Patient able to ambulate with some dyscomfort   ,    Vital Signs Last 24 Hrs  T(F): 97.9 HR: 80 BP: 153/94 RR: 16 SpO2: 100% (10 May 2023 22:15) (100% - 100%)  PE: as described; my additions and exceptions are noted in the chart    IMPRESSION/RISK:  Dx= back pain   Consideration include plain films done to r/o traumatic injury; negative  Plan  flexeri/naprosyn/lidocaine patch spine followup   RTED PRN The patient is a 42y morbidly obese Female who has a past medical and surgery history of GERD Cholelithiasis Bipolar Disorder SADisorder Borderline Personality Polysubstance abuse multiple psych admits (31; d/magui from last 2 days ago PTED with back pain in right lower back non radiating not associated with any bladder/bowel dysfx paresthesia Patient able to ambulate with some discomfort    Vital Signs Last 24 Hrs  T(F): 97.9 HR: 80 BP: 153/94 RR: 16 SpO2: 100% (10 May 2023 22:15) (100% - 100%)  PE: as described; my additions and exceptions are noted in the chart    IMPRESSION/RISK:  Dx= back pain   Consideration include plain films done to r/o traumatic injury; negative  Plan  flexeri/naprosyn/lidocaine patch spine followup   RTED PRN

## 2023-05-10 NOTE — ED PROVIDER NOTE - OBJECTIVE STATEMENT
The patient is a 42y morbidly obese Female who has a past medical and surgery history of GERD Cholelithiasis Bipolar Disorder SADisorder Borderline Personality Polysubstance abuse multiple psych admits (31; d/magui from last 2 days ago PTED with back pain in right lower back non radiating not associated with any bladder/bowel dysfx paresthesia Patient able to ambulate with some discomfort

## 2023-05-10 NOTE — PROVIDER CONTACT NOTE (OTHER) - ASSESSMENT
Notified by provider that pt is ready for d/c; needs transportation back to residence     Pt is from [facility] [address].  SW contacted residence [phone] and spoke to  staff,  Young  .  He  confirmed that patient mode of transportation is Taxi.  Provider is in agreement with Taxi back to [address] [place] [address] . MAS  Shahbaz states that pt insurance is not active .  contacted Jerald thompson using Account [employer]in the amount of 13.84 . Invoice # [id number]

## 2023-05-10 NOTE — ED PROVIDER NOTE - PATIENT PORTAL LINK FT
You can access the FollowMyHealth Patient Portal offered by [facility] by registering at the following website: [email or web address]. By joining [facility]’s FollowMyHealth portal, you will also be able to view your health information using other applications (apps) compatible with our system.

## 2023-05-10 NOTE — ED ADULT NURSE NOTE - CHIEF COMPLAINT QUOTE
PT arrived to ED s/p [facility] fall, PT was walking and slipped.  Denies hitting head, LOC.  Denies taking blood thinners.  PT c/o back pain, rated 9/10.  PT ambulatory, able to move all extremities.  [facility]

## 2023-05-10 NOTE — ED ADULT TRIAGE NOTE - CHIEF COMPLAINT QUOTE
PT arrived to ED s/p [facility] fall, PT was walking and slipped.  Denies hitting head, LOC.  Denies taking blood thinners.  PT c/o back pain, rated 9/10.  PT ambulatory, able to move all extremities.  [email or web address]

## 2023-05-13 ENCOUNTER — EMERGENCY (EMERGENCY)
Facility: HOSPITAL | Age: 43
LOS: 1 days | Discharge: HOME | End: 2023-05-13
Attending: STUDENT IN AN ORGANIZED HEALTH CARE EDUCATION/TRAINING PROGRAM | Admitting: STUDENT IN AN ORGANIZED HEALTH CARE EDUCATION/TRAINING PROGRAM
Payer: MEDICAID

## 2023-05-13 VITALS
TEMPERATURE: 98 F | RESPIRATION RATE: 16 BRPM | OXYGEN SATURATION: 100 % | HEART RATE: 84 BPM | SYSTOLIC BLOOD PRESSURE: 154 MMHG | DIASTOLIC BLOOD PRESSURE: 94 MMHG

## 2023-05-13 DIAGNOSIS — F60.3 BORDERLINE PERSONALITY DISORDER: ICD-10-CM

## 2023-05-13 DIAGNOSIS — F25.0 SCHIZOAFFECTIVE DISORDER, BIPOLAR TYPE: ICD-10-CM

## 2023-05-13 PROCEDURE — 90792 PSYCH DIAG EVAL W/MED SRVCS: CPT | Mod: GC

## 2023-05-13 PROCEDURE — 99284 EMERGENCY DEPT VISIT MOD MDM: CPT

## 2023-05-13 RX ORDER — LIDOCAINE 4 G/100G
1 CREAM TOPICAL ONCE
Refills: 0 | Status: COMPLETED | OUTPATIENT
Start: 2023-05-13 | End: 2023-05-13

## 2023-05-13 RX ORDER — HALOPERIDOL DECANOATE 100 MG/ML
5 INJECTION INTRAMUSCULAR ONCE
Refills: 0 | Status: COMPLETED | OUTPATIENT
Start: 2023-05-13 | End: 2023-05-13

## 2023-05-13 RX ORDER — IBUPROFEN 200 MG
600 TABLET ORAL ONCE
Refills: 0 | Status: COMPLETED | OUTPATIENT
Start: 2023-05-13 | End: 2023-05-13

## 2023-05-13 RX ORDER — ACETAMINOPHEN 500 MG
975 TABLET ORAL ONCE
Refills: 0 | Status: COMPLETED | OUTPATIENT
Start: 2023-05-13 | End: 2023-05-13

## 2023-05-13 RX ADMIN — LIDOCAINE 1 PATCH: 4 CREAM TOPICAL at 14:01

## 2023-05-13 RX ADMIN — Medication 2 MILLIGRAM(S): at 14:20

## 2023-05-13 RX ADMIN — HALOPERIDOL DECANOATE 5 MILLIGRAM(S): 100 INJECTION INTRAMUSCULAR at 14:15

## 2023-05-13 RX ADMIN — Medication 600 MILLIGRAM(S): at 14:31

## 2023-05-13 RX ADMIN — Medication 975 MILLIGRAM(S): at 14:01

## 2023-05-13 RX ADMIN — Medication 975 MILLIGRAM(S): at 14:31

## 2023-05-13 RX ADMIN — Medication 600 MILLIGRAM(S): at 14:01

## 2023-05-13 NOTE — ED PROVIDER NOTE - OBJECTIVE STATEMENT
Steven LUJAN: 42-year-old female, history of schizoaffective disorder, prior admissions for SI, [facility] outpatient resident, presents with a chief complaint of mechanical ground-level nonsyncopal fall earlier today she fell onto her back, ambulatory since, no urinary or bowel problems, can move everything if everything, is also complaining of SI as well as hearing voices that are telling her "evil things" she is never had these voices before, on chart review had recent admission for SI in the past, he denies any active plan, no intent to hurt others, while in  area patient was noted to be repeatedly throwing things in the bathroom and being very destructive.  Patient was able to be redirected however given for safety was medicated.

## 2023-05-13 NOTE — ED BEHAVIORAL HEALTH ASSESSMENT NOTE - SUMMARY
41yo F, single, non caregiver, on disability for mental illness, domiciled at psychiatric residence ([address]), with past psychiatric history of Schizoaffective Bipolar Type, Borderline Personality Disorder, Polysubstance Abuse, >30 psych hospitalizations (most recently - [facility] ML3 for SI, 4/27-5/8/23), 3 prior suicide attempts (last in 2012 via OD), chronic suicidal gestures and ideation; history of property destruction when upset, long hx of sexually provocative behavior (both out in the community and while on inpatient units requiring 1;1 staff observation); hx of physical altercations, hx of verbal threats; hx of treatment nonadherence resulting in AOT and PALACIOS administration, bibems activated by [facility] 2/2 pt reporting back pain s/p unwitnessed fall earlier today.     Patient was brought to the ED today due to complaint of physical pain and NOT for suicidal ideation. Per staff at patient's residence, she has been doing well since discharge on 5/8 and has not endorsed any SI or suicidal behavior this week. Patient first reported SI when she arrived to the ED. Though she described an idea to set fire to a gas line, the details of her plan were vague and not feasible (states that she doesn't have money to buy the materials needed). She has not made any preparatory actions on these ideas. Pt provides insight that she has often been hospitalized for "small things" and is seeking hospitalization today as this is "all [she] knows." She expresses desire for more daily activities and socialization, such as groups on a psychiatric inpatient unit. She has been psychiatrically stable at and following her recent discharge from [facility] on 5/8. Given the above, it seems most likely that patient is endorsing SI for secondary gain - socialization and activities on the inpatient psych unit. In order to address this need, writer discussed participating at [facility] in the outpatient setting, which patient accepted. Provided contact information to [facility] for the patient. Patient has chronic risk factors for harm to herself but NO IMMINENT risk for harm to herself. Psych hospitalization is not warranted at this time.

## 2023-05-13 NOTE — ED PROVIDER NOTE - NSFOLLOWUPINSTRUCTIONS_ED_ALL_ED_FT
Thank you for visiting our Emergency Department, it has been a pleasure taking part in your healthcare.  Please read and follow all of your discharge instructions. These instructions contain important information regarding your Emergency Department visit and future medical care.     Your discharge diagnosis is: thoughts of self harm  Please take all discharge medications as indicated below:  Please continue all medications as rx'd by your PMD.  Please follow up with your Primary Care Doctor (PMD) within x48 hours.  Bring and show your PMD all documents and results you were given during your ED visit.  If you do not have a primary care doctor please call [phone] to establish primary care.  A copy of resulted labs, imaging, and findings have been provided to you.   You can also access all of your results through the [facility].  If you have questions about your results, please call the Emergency Department.  During your visit and at time of discharge, you had a detailed discussion with your provider regarding your diagnosis, care management and discharge planning.  Topics that were discussed included but were not limited to: return precautions, follow up visits with existing or new providers, new prescriptions and/or medication changes, wound and/or splint/cast care, incidental laboratory/radiology findings, or other care   aspects specific to your diagnosis and treatment. You have been given the opportunity to have your questions answered. At this time you have been deemed stable and fit for discharge.  Return precautions to the Emergency Department include but are not limited to: unrelenting nausea, vomiting, fever, chills, chest pain, shortness of breath, dizziness, chest or abdominal pain, worsening back pain, syncope, blood in urine or stool, headache that doesn't resolve, numbness or tingling, loss of sensation, loss of motor function, or any other concerning symptoms.    Please bring all ED Documents you were given during your stay to your PMD.   They contain important information for you and your PMD, including incidental lab/radiology findings that your PMD should be aware of.

## 2023-05-13 NOTE — ED BEHAVIORAL HEALTH ASSESSMENT NOTE - OTHER PAST PSYCHIATRIC HISTORY (INCLUDE DETAILS REGARDING ONSET, COURSE OF ILLNESS, INPATIENT/OUTPATIENT TREATMENT)
> 13 prior inpatient psychiatric admission to [facility] alone since 2011, lifetime inpatient admissions > 20   - > 33 prior [facility] ED visits alone  - 3 prior suicide attempts (last in 2012 via OD) as per records, recurrent suicidal gestures and suicidal ideation, history of property destruction ((breaking TV screens while hospitalized) when upset / acting out   - long hx of sexually provocative, acting out behaviors (during last [facility] inpatient admission, patient had to be placed on CO 1:1 after trying to perform oral sex on a male patient on the dayroom, taken off CO then was going into male patient's room, overheard offering them sex and was placed back on CO  - in 2017 was also followed by [facility] [therapist Katie [phone], ACT ([facility])/ Victor Manuel Browning : [phone]; AOT /Grace Donnelly: [phone]; Psychiatrist / Dr. Flores: [phone], [phone], [phone]], > multiple prior inpatient psychiatric admission to [facility] alone since 2011, lifetime inpatient admissions > 20   - > 33 prior [facility] ED visits alone  - 3 prior suicide attempts (last in 2012 via OD) as per records, recurrent suicidal gestures and suicidal ideation, history of property destruction ((breaking TV screens while hospitalized) when upset / acting out   - long hx of sexually provocative, acting out behaviors (during last [facility] inpatient admission, patient had to be placed on CO 1:1 after trying to perform oral sex on a male patient on the dayroom, taken off CO then was going into male patient's room, overheard offering them sex and was placed back on CO  - in 2017 was also followed by [facility] [therapist Katie [phone], ACT ([facility])/ Victor Manuel Browning : [phone]; AOT /Grace Donnelly: [phone]; Psychiatrist / Dr. Flores: [phone], [phone], [phone]],

## 2023-05-13 NOTE — ED BEHAVIORAL HEALTH ASSESSMENT NOTE - DESCRIPTION
as per records,  Patient does not know much about her biological family, she attended some college in the past GERD Received IM haldol 5 and ativan 2.    ICU Vital Signs Last 24 Hrs  T(C): 36.7 (13 May 2023 13:31), Max: 36.7 (13 May 2023 13:31)  T(F): 98 (13 May 2023 13:31), Max: 98 (13 May 2023 13:31)  HR: 84 (13 May 2023 13:31) (84 - 84)  BP: 154/94 (13 May 2023 13:31) (154/94 - 154/94)  BP(mean): --  ABP: --  ABP(mean): --  RR: 16 (13 May 2023 13:31) (16 - 16)  SpO2: 100% (13 May 2023 13:31) (100% - 100%)    O2 Parameters below as of 13 May 2023 13:31  Patient On (Oxygen Delivery Method): room air

## 2023-05-13 NOTE — ED ADULT NURSE NOTE - OBJECTIVE STATEMENT
Pt presents to , alert&orientedx4, ambulatory, PMH BPD, Bipolar,  s/p mechanical fall today prior coming to ED with c/o lower back pain. Pt also reports auditory hallucinations with voices telling her to hurt herself. Pt denies any active plan, self-harm behaviors or HI. Pt is ambulatory, steady gait, denies any numbness/tingling sensations. Safety measures maintained, calm and cooperative on arrival, fairly groomed. Pending psych consult

## 2023-05-13 NOTE — ED PROVIDER NOTE - CLINICAL SUMMARY MEDICAL DECISION MAKING FREE TEXT BOX
Steven LUJAN:   Exam vital signs stable nontoxic-appearing physical exam as above, mild diffuse paraspinal tenderness, no point spinal tenderness, no saddle anesthesia, low concern for cord pathology, patient reports she did not hit her head, no LOC, however given SI and now with destructive and disruptive behavior in the  area will medicate will clear medically will discuss with psych, I have low concern for acute traumatic or cord pathology at this time.

## 2023-05-13 NOTE — ED PROVIDER NOTE - PATIENT PORTAL LINK FT
You can access the FollowMyHealth Patient Portal offered by [facility] by registering at the following website: [email or web address]. By joining [employer]’s FollowMyHealth portal, you will also be able to view your health information using other applications (apps) compatible with our system.

## 2023-05-13 NOTE — ED BEHAVIORAL HEALTH ASSESSMENT NOTE - HPI (INCLUDE ILLNESS QUALITY, SEVERITY, DURATION, TIMING, CONTEXT, MODIFYING FACTORS, ASSOCIATED SIGNS AND SYMPTOMS)
41yo F, single, non caregiver, on disability for mental illness, domiciled at psychiatric residence ([address]), with past psychiatric history of Schizoaffective Bipolar Type, Borderline Personality Disorder, Polysubstance Abuse, >30 psych hospitalizations (most recently - [facility] ML3 for SI, 4/27-5/8/23), 3 prior suicide attempts (last in 2012 via OD), chronic suicidal gestures and ideation; history of property destruction when upset, long hx of sexually provocative behavior (both out in the community and while on inpatient units requiring 1;1 staff observation); hx of physical altercations, hx of verbal threats; hx of treatment nonadherence resulting in AOT and PALACIOS administration, bibems activated by [facility] 2/2 pt report of back pain s/p unwitnessed fall earlier today. On arrival to the ED, patient reports SI. While in the  ED area, pt was agitated - throwing things in the bathroom, unable to be de-escalated verbally and received IM PRN.    Patient seen s/p receiving haldol 5mg IM and ativan 2mg IM. 41yo F, single, non caregiver, on disability for mental illness, domiciled at psychiatric residence ([address]), with past psychiatric history of Schizoaffective Bipolar Type, Borderline Personality Disorder, Polysubstance Abuse, >30 psych hospitalizations (most recently - [facility] for SI, 4/27-5/8/23), 3 prior suicide attempts (last in 2012 via OD), chronic suicidal gestures and ideation; history of property destruction when upset, long hx of sexually provocative behavior (both out in the community and while on inpatient units requiring 1;1 staff observation); hx of physical altercations, hx of verbal threats; hx of treatment nonadherence resulting in AOT and PALACIOS administration, bibems activated by [facility] 2/2 pt reporting back pain s/p unwitnessed fall earlier today. On arrival to the ED, patient reports SI. While in the  ED area, pt was agitated - throwing things in the bathroom, unable to be de-escalated verbally and received IM PRN.    Patient seen s/p receiving haldol 5mg IM and ativan 2mg IM. She was alert and sitting up in a chair in the hallway. Throughout the interview, patient's report of symptoms are conflicting. She states that she would like to be psychiatrically admitted today because she's "not doing well", though later stating that her recent hospitalization was helpful and that she felt well at discharge several days ago. She also states that her current medication regimen has been helpful and that she has had good treatment adherence (trileptal PO and haldol PALACIOS, individual weekly therapy). When asked about reported SI on arrival in the ED, she reports hearing a voice to kill herself. She endorses a plan to light a gas line on fire near her residence. She had these thoughts several days ago but did not act on them because she didn't have money to buy matches. She states that she will borrow money from her roommate to buy matches. When asked what kept her from borrowing money previously, she didn't answer. She also described harming herself/others with a molotov cocktail, but states that she really can't because she doesn't have money. She describes wanting to "get better", to have a "purpose in life". She states that "there is nothing to do in the residence... no activities, computers are broken, no books." When asked if she feels bored, she replies yes and states that she misses working. She states that she would like to be psychiatrically admitted to join groups on the inpatient setting. Patient also shares insight that, when she was a child, her father would repeatedly bring her to the hospital for "any small thing" and this is "all I know." She apologetically gestures to the mess she made in the restroom, "Look what I did, I need to go to the hospital." Psychoed provided regarding expectations for treatment/hospitalization as well as supportive therapy to reinforce patient's strengths. Discussed returning to patient's residence and [facility] to provide activities and purpose. Patient was amenable to this plan.    See  note for collateral from pt's psychiatric residence.

## 2023-05-13 NOTE — ED ADULT TRIAGE NOTE - CHIEF COMPLAINT QUOTE
Pt c/o increasing SI, depression and hearing voices.  Denies HI. .  Pt s/p trip and fall c/o back pain.  Denies head injury, LOC

## 2023-05-13 NOTE — ED ADULT NURSE REASSESSMENT NOTE - NSFALLRISKINTERV_ED_ALL_ED

## 2023-05-13 NOTE — CHART NOTE - NSCHARTNOTEFT_GEN_A_CORE
MI made aware patient has been cleared for d/c. MI called patients residence at [phone] and spoke with Jessica who reports patient is able to travel independently via taxi to return home. MI set up [employer] approved by MI supervisor Jose, booking #[id number]. No other SW needs at this time.
MI made aware patient was brought in by EMS secondary to a fall and then expressing SI in triage area. Patientt is from [employer] [facility] [address].  MI contacted residence [phone] spoke with Alexa, who reports EMS was called secondary to patient complaining of back pain related to an unwitnessed fall in her room. She also had an additional fall 3 days ago which lead her here to the ED. Alexa reported that patient has had good behavior and has been quite "calm" since she returned home from [facility] on 5/8. She reports patient has not expressed any kind of suicidal ideation to staff at the residence. She asked if we can fax d/c papers to [phone] upon patient discharge as patient "leaves the papers on the bus every time". Team aware. MI remains available if needed.

## 2023-05-13 NOTE — ED BEHAVIORAL HEALTH ASSESSMENT NOTE - SAFETY PLAN ADDT'L DETAILS
Safety plan discussed with.../Education provided regarding environmental safety / lethal means restriction/Provision of National Suicide Prevention Lifeline 6-211-757-EDPY (2198)

## 2023-05-13 NOTE — ED ADULT NURSE REASSESSMENT NOTE - NS ED NURSE REASSESS COMMENT FT1
Pt is cooperative at this time, psych consult in progress.
Pt became agitated and restless, went to bathroom and started yelling. Staff heard patient thrashing in bathroom, garbage can knocked out of its place. Bathroom wall significant dent noted. Staff was able to verbally de-escalate and bring patient back to room, pt medicated with 5mg Haldol and 2mg Ativan IM for safety.

## 2023-05-13 NOTE — BH SAFETY PLAN - WARNING SIGN 1
Called to see the patient in endo for recurrent UGIB    Patient is a 64 year old female Patient also with recent UGI bleed.  She presented to ED with hypotension with lactate of 3.2. Received IV fluid in ED, lactate down to 1.9 this am. No obvious GI bleed. No hematemesis or melena.   Today her hgb fell to 7 and was given 2 U PRBC and in the afternoon developed melena.  Dr. Yoo did and EGD and again found fresh blood and clot in her proximal stomach.  Post EGD she hads developed tachycardia but with a preserved BP.  Patient is going for a STAT CT angio and then to the ICU.          PAST MEDICAL & SURGICAL HISTORY:  Dorsalgia of lumbar region: on pain medication /baclofen po and pump  Self-catheterizes urinary bladder  Anemia: chronic  Uveitis  Osteoporosis  PAD (peripheral artery disease)  Hematoma: spinal  September  treated  2018  Paraplegia: on wheelchair goes to physical therapy 2 x weekly  Aortic dissection, thoracic: Type A Repaired   Blindness of left eye: hx corneal transplant 2018  Aug.2018  UTI (urinary tract infection): stable x 3 months  TIA (transient ischemic attack)  HTN (Hypertension): on meds  History of corneal transplant: left corneal transplant on 2018  Disorder of spine: unthetethering 2 x  Presence of IVC filter:  ?  S/P aortic dissection repair: Type A Dissection repair /   descending aortic aneurysm repair 2016  H/O Spinal surgery: laminectomies       FAMILY HISTORY:  Family history of Alzheimer's disease      Social Hx:    Allergies    No Known Allergies    Intolerances            ICU Vital Signs Last 24 Hrs  T(C): 37 (30 Aug 2019 13:58), Max: 37 (30 Aug 2019 13:58)  T(F): 98.6 (30 Aug 2019 13:58), Max: 98.6 (30 Aug 2019 13:58)  HR: 87 (30 Aug 2019 13:58) (85 - 94)  BP: 171/67 (30 Aug 2019 13:58) (140/65 - 171/67)  BP(mean): --  ABP: --  ABP(mean): --  RR: 18 (30 Aug 2019 13:58) (16 - 18)  SpO2: 100% (30 Aug 2019 13:58) (95% - 100%)          I&O's Summary    29 Aug 2019 07:01  -  30 Aug 2019 07:00  --------------------------------------------------------  IN: 0 mL / OUT: 1200 mL / NET: -1200 mL    30 Aug 2019 07:01  -  30 Aug 2019 17:20  --------------------------------------------------------  IN: 361 mL / OUT: 0 mL / NET: 361 mL                              7.0    7.70  )-----------( 296      ( 30 Aug 2019 07:02 )             22.4       08-    143  |  107  |  32<H>  ----------------------------<  91  4.4   |  28  |  0.56    Ca    8.3<L>      30 Aug 2019 07:02    TPro  5.5<L>  /  Alb  2.4<L>  /  TBili  0.4  /  DBili  x   /  AST  26  /  ALT  11<L>  /  AlkPhos  72                  Urinalysis Basic - ( 28 Aug 2019 23:57 )    Color: Yellow / Appearance: Clear / S.010 / pH: x  Gluc: x / Ketone: Negative  / Bili: Negative / Urobili: Negative mg/dL   Blood: x / Protein: 30 mg/dL / Nitrite: Negative   Leuk Esterase: Moderate / RBC: Negative /HPF / WBC >50   Sq Epi: x / Non Sq Epi: Few / Bacteria: Moderate        MEDICATIONS  (STANDING):  atorvastatin 40 milliGRAM(s) Oral at bedtime  baclofen 20 milliGRAM(s) Oral two times a day  cefTRIAXone Injectable. 1000 milliGRAM(s) IV Push every 24 hours  DULoxetine 20 milliGRAM(s) Oral daily  gabapentin 600 milliGRAM(s) Oral three times a day  labetalol 200 milliGRAM(s) Oral two times a day  pantoprazole Infusion 8 mG/Hr (10 mL/Hr) IV Continuous <Continuous>  polyethylene glycol 3350 17 Gram(s) Oral every 12 hours  valACYclovir 1000 milliGRAM(s) Oral daily    MEDICATIONS  (PRN):  acetaminophen   Tablet .. 650 milliGRAM(s) Oral every 6 hours PRN Temp greater or equal to 38C (100.4F), Mild Pain (1 - 3)  ondansetron Injectable 4 milliGRAM(s) IV Push every 6 hours PRN Nausea and/or Vomiting  prednisoLONE acetate 1% Suspension 1 Drop(s) Both EYES two times a day PRN home med  sodium chloride 2% Ophthalmic Solution 1 Drop(s) Both EYES two times a day PRN dry eye      DVT Prophylaxis: V    Advanced Directives:  Discussed with:    Visit Information: 85 min    ** Time is exclusive of billed procedures and/or teaching and/or routine family updates. feeling upset/angry

## 2023-05-13 NOTE — ED BEHAVIORAL HEALTH ASSESSMENT NOTE - DETAILS
per chart review, history of property destruction (breaking TV screens while hospitalized) when upset / acting out Poor response to Geodon; Depakote (Increased ammonia levels) discussed with residence see above see  safety plan note

## 2023-05-13 NOTE — ED BEHAVIORAL HEALTH ASSESSMENT NOTE - NSBHATTESTCOMMENTATTENDFT_PSY_A_CORE
Patient interviewed by attending. Per record, patient has had several presentations where she reports similar ideas to set a match to a gasline to kill herself. Patient's explanation as to how she would do this seems implausible. Patient endorsed prior to most recent psychiatric admission that she was going to make a Molotov cocktail, and as soon as she was admitted, denied any such ideas and there were no overts sx's of depressive or psychotic relapse. Today, patient is calm and pleasant on interview, gave inconsistent history as above, but ultimately agreed to safety plan, she expressed interest in going to [facility]. Patient is complaint with medication and was not displaying symptoms of acute psychosis in ED, affect was reactive and stable and thought processes were entirely linear. Patient's episode of agitation in the bathroom appears to be in keeping with purposeful attention seeking behavior and patient  expressed happiness with having been Furthermore, collateral from residence reports patient being calm since discharge from inpatient and denies that patient had expressed HI or SI today. Patient has chronic increased risk of harm to self and others, but current risk is not acutely elevated, and inpatient psychiatric admission will not serve to modify chronic risk factors or ameliorate her current dissatisfaction with social milieu of her residence.

## 2023-05-13 NOTE — ED ADULT TRIAGE NOTE - CCCP TRG CHIEF CMPLNT
Detail Level: Detailed
Add 16622 Cpt? (Important Note: In 2017 The Use Of 35284 Is Being Tracked By Cms To Determine Future Global Period Reimbursement For Global Periods): yes
suicidal thoughts

## 2023-05-13 NOTE — ED ADULT NURSE NOTE - DOES PATIENT HAVE ADVANCE DIRECTIVE
Routing refill request to provider for review/approval because:  Drug not on the [facility] refill protocol   Gonzalo Ellison RN           No

## 2023-05-13 NOTE — ED BEHAVIORAL HEALTH ASSESSMENT NOTE - RISK ASSESSMENT
Chronic risk factors: h/o SA, h/o psych admissions  Acute risk factors: bored at residence, active SI  Protective factors: no clear plan for suicide, no access to weapons (no firearms, states that she has no money to acquire means), no active substance abuse, good physical health, domiciled, positive therapeutic relationship, engaged in treatment (aot, individual therapy, outpt psych), compliant with treatment, help-seeking behaviors, intelligent, future-oriented (plans to start at [facility]), able to safety plan  Overall, pt is at higher chronic risk of harm to self with protective factors sufficiently mitigating acute risks. Any further risk is sufficiently mitigated in the outpatient setting (active psych tx, therapy on 5/19, plans for starting [facility]).

## 2023-05-13 NOTE — ED PROVIDER NOTE - PHYSICAL EXAMINATION
Steven LUJAN:  VITALS: Initial triage and subsequent vitals have been reviewed by me.  GEN APPEARANCE: Alert, cooperative. Non-toxic appearing. Well appearing. NAD.  HEAD: Atraumatic, normocephalic   EYES: PERRLa, EOMI, vision grossly intact.   EARS: Gross hearing intact.   NOSE: No nasal discharge, no external evidence of epistaxis.   NECK: Supple  CV: RRR, S1S2, no c/r/m/g. No cyanosis. Extremities warm, well perfused. Cap refill <2 seconds. No bruits.   LUNGS: CTAB. No wheezing. No rales. No rhonchi. No diminished breath sounds.   ABDOMEN: Soft, NTND. No guarding or rebound. No masses.   MSK/EXT: Spine appears normal, no spine point tenderness. No CVA ttp. Normal muscular development. Pelvis stable. No obvious joint or bony deformity, no peripheral edema. no saddle anesthesia    NEURO: Alert, follows commands. Weight bearing normal. Speech normal. Sensation and motor normal x4 extremities.   SKIN: Normal color for race, warm, dry and intact. No evidence of rash.  PSYCH: Normal mood and affect.

## 2023-05-14 NOTE — H&P ADULT - ATTENDING COMMENTS
I have reviewed the history, pertinent labs and imaging, and discussed the care with the consult resident.  More than 50% of this 55 minute encounter including face to face with the patient was spent counseling and/or coordination of care on acute cholecystitis.  Time included review of vitals, labs, imaging, discussion with consultants and coordination with the operating room/emergency department.    41F with h/o borderline personality disorder admitted with acute cholecystitis, with 1d of pain. Pt agreeable to cholecystectomy.     - NPO  - IVF   - Abx   - Plan for cholecystectomy pending OR availability     The active issues are:  1. acute cholecystitis   2. borderline personality disorder     The Acute Care Surgery (B Team) Attending Group Practice:  Dr. Key Lozano    urgent issues - spectra [id number]  nonurgent issues - [phone]  patient appointments or afterhours - [phone] .

## 2023-05-24 ENCOUNTER — EMERGENCY (EMERGENCY)
Facility: HOSPITAL | Age: 43
LOS: 1 days | Discharge: HOME | End: 2023-05-24
Admitting: EMERGENCY MEDICINE
Payer: COMMERCIAL

## 2023-05-24 VITALS
TEMPERATURE: 99 F | RESPIRATION RATE: 19 BRPM | OXYGEN SATURATION: 100 % | HEART RATE: 109 BPM | SYSTOLIC BLOOD PRESSURE: 145 MMHG | DIASTOLIC BLOOD PRESSURE: 100 MMHG

## 2023-05-24 PROCEDURE — 99284 EMERGENCY DEPT VISIT MOD MDM: CPT

## 2023-05-24 NOTE — ED PROVIDER NOTE - PROGRESS NOTE DETAILS
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 Explicitly denies si,hi.
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
## 2023-05-24 NOTE — ED PROVIDER NOTE - PATIENT PORTAL LINK FT
You can access the FollowMyHealth Patient Portal offered by [facility] by registering at the following website: [email or web address]. By joining [employer]’s FollowMyHealth portal, you will also be able to view your health information using other applications (apps) compatible with our system.

## 2023-05-24 NOTE — ED PROVIDER NOTE - NSFOLLOWUPINSTRUCTIONS_ED_ALL_ED_FT
Follow up with your primary care physician and psychiatrist in 48-72 hours.  You may also see the psychiatrist at [facility]:    [address] [place] [address]  Phone: [phone]    [facility] on [facility] [place] Information:    -Walk-in hours: Monday to Friday, 9am to 3pm   -Almost all walk-in patients will be able to see a psych prescriber the same day   -Scheduled, non-urgent, evening remote/virtual appointments are available on a limited basis. Call our  to inquire about these: [phone]. A crisis center clinician screens these requests in the late afternoon and if appropriate it takes a few days to set-up.   -Visits take about 2 to 4 hours total   -Mornings are the best time for patients to arrive    For Telehealth options try:  [place]: [facility] (to access psychiatrist or therapist)  [facility] [place]: [facility] (to access psychiatrist or therapist)  Better Help: [email or web address].com (Largest online therapy group)      SEEK IMMEDIATE MEDICAL CARE IF YOU HAVE ANY OF THE FOLLOWING SYMPTOMS: thoughts about hurting or killing yourself, thoughts about hurting or killing somebody else, hallucinations or any other worsening or persistent symptoms OR ANY NEW OR CONCERNING SYMPTOMS.
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
## 2023-05-24 NOTE — ED ADULT NURSE NOTE - NSFALLUNIVINTERV_ED_ALL_ED
Bed/Stretcher in lowest position, wheels locked, appropriate side rails in place/Call bell, personal items and telephone in reach/Instruct patient to call for assistance before getting out of bed/chair/stretcher/Non-slip footwear applied when patient is off stretcher/[place] to call system/Physically safe environment - no spills, clutter or unnecessary equipment/Purposeful proactive rounding/Room/bathroom lighting operational, light cord in reach

## 2023-05-28 ENCOUNTER — EMERGENCY (EMERGENCY)
Facility: HOSPITAL | Age: 43
LOS: 1 days | Discharge: HOME | End: 2023-05-28
Admitting: EMERGENCY MEDICINE
Payer: MEDICAID

## 2023-05-28 VITALS
TEMPERATURE: 99 F | SYSTOLIC BLOOD PRESSURE: 145 MMHG | DIASTOLIC BLOOD PRESSURE: 85 MMHG | HEART RATE: 91 BPM | RESPIRATION RATE: 17 BRPM | OXYGEN SATURATION: 99 %

## 2023-05-28 PROCEDURE — 99284 EMERGENCY DEPT VISIT MOD MDM: CPT
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
## 2023-05-28 NOTE — ED ADULT NURSE NOTE - NSFALLUNIVINTERV_ED_ALL_ED
Bed/Stretcher in lowest position, wheels locked, appropriate side rails in place/Call bell, personal items and telephone in reach/Instruct patient to call for assistance before getting out of bed/chair/stretcher/Non-slip footwear applied when patient is off stretcher/[place] to call system/Physically safe environment - no spills, clutter or unnecessary equipment/Purposeful proactive rounding/Room/bathroom lighting operational, light cord in reach

## 2023-05-28 NOTE — ED BEHAVIORAL HEALTH NOTE - BEHAVIORAL HEALTH NOTE
As per provider , SW contacted pt residence  to confirm pt mode of transportation and to inform staff pt will be returning. Call went to . SW will continue to contact staff to confirm pt mode of transportation None
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
## 2023-05-28 NOTE — ED BEHAVIORAL HEALTH NOTE - BEHAVIORAL HEALTH NOTE
Per provider, KIRSTY Chery, patient is cleared and is able to return to their previous residence, [facility].  has spoken to Jessica ([phone]),  confirmed that patients mode of transportation is TAXI and that patient travels INDEPENDENTLY. Clinical provider is in agreement with TAXI back to group home. Pt medicaid is inactive sw scheduled [place] TAXI with Account 50. Verbal huddle regarding coordination of care completed with interdisciplinary team. Worker arranged for taxi for patient.
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
## 2023-05-31 ENCOUNTER — EMERGENCY (EMERGENCY)
Facility: HOSPITAL | Age: 43
LOS: 1 days | Discharge: HOME | End: 2023-05-31
Admitting: EMERGENCY MEDICINE
Payer: COMMERCIAL

## 2023-05-31 VITALS
SYSTOLIC BLOOD PRESSURE: 144 MMHG | RESPIRATION RATE: 18 BRPM | TEMPERATURE: 98 F | HEART RATE: 84 BPM | OXYGEN SATURATION: 99 % | DIASTOLIC BLOOD PRESSURE: 81 MMHG

## 2023-05-31 VITALS
OXYGEN SATURATION: 100 % | SYSTOLIC BLOOD PRESSURE: 142 MMHG | DIASTOLIC BLOOD PRESSURE: 70 MMHG | RESPIRATION RATE: 18 BRPM | HEART RATE: 75 BPM | TEMPERATURE: 99 F

## 2023-05-31 PROCEDURE — 99283 EMERGENCY DEPT VISIT LOW MDM: CPT

## 2023-05-31 NOTE — ED ADULT NURSE NOTE - CAS EDN DISCHARGE ASSESSMENT
pt is clear for dc by provider, calm and cooperative on DC/Alert and oriented to person, place and time/Patient baseline mental status

## 2023-05-31 NOTE — ED PROVIDER NOTE - PATIENT PORTAL LINK FT
You can access the FollowMyHealth Patient Portal offered by [facility] by registering at the following website: [email or web address]. By joining [employer]’s FollowMyHealth portal, you will also be able to view your health information using other applications (apps) compatible with our system.

## 2023-05-31 NOTE — ED PROVIDER NOTE - OBJECTIVE STATEMENT
This is a 42-year-old female past medical history borderline and bipolar disorder with complaint of increased anxiety. Patient well knows to provider,  reports today she felt good.  She went outside but then after she had some anxiety and that she thought her staff members at [facility] she has intrusive thoughts and wants to come to the hospital.  Upon arrival she is smiling, calm, cooperative, and telling the provider she just wants wanted to come to the hospital to see the staff members. At this time denies any suicidal homicidal auditory visual hallucinations.

## 2023-05-31 NOTE — ED PROVIDER NOTE - CLINICAL SUMMARY MEDICAL DECISION MAKING FREE TEXT BOX
Spine appears normal, range of motion is not limited, no muscle or joint tenderness This is a 42-year-old female past medical history borderline and bipolar disorder with complaint of increased anxiety. Patient well knows to provider,  reports today she felt good.  She went outside but then after she had some anxiety and that she thought her staff members at [facility] she has intrusive thoughts and wants to come to the hospital.  Upon arrival she is smiling, calm, cooperative, and telling the provider she just wants wanted to come to the hospital to see the staff members. At this time denies any suicidal homicidal auditory visual hallucinations.

## 2023-05-31 NOTE — ED ADULT NURSE NOTE - NSFALLUNIVINTERV_ED_ALL_ED
Bed/Stretcher in lowest position, wheels locked, appropriate side rails in place/Call bell, personal items and telephone in reach/Instruct patient to call for assistance before getting out of bed/chair/stretcher/Non-slip footwear applied when patient is off stretcher/[place] to call system/Physically safe environment - no spills, clutter or unnecessary equipment/Purposeful proactive rounding/Room/bathroom lighting operational, light cord in reach
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
## 2023-05-31 NOTE — ED ADULT NURSE NOTE - OBJECTIVE STATEMENT
Pt arrives by ems for [facility] for c/o SI, and worsening anxiety, depression. Calm cooperative on arrival. Denies any specific plan. Evaluated by KIRSTY Keene, awaiting further evaluation

## 2023-05-31 NOTE — ED PROVIDER NOTE - NSFOLLOWUPINSTRUCTIONS_ED_ALL_ED_FT
Follow up with your primary care physician and psychiatrist in 48-72 hours.  You may also see the psychiatrist at [facility]:    [address] [place] [address]  Phone: [phone]    [facility] on [facility] [place] Information:    -Walk-in hours: Monday to Friday, 9am to 3pm   -Almost all walk-in patients will be able to see a psych prescriber the same day   -Scheduled, non-urgent, evening remote/virtual appointments are available on a limited basis. Call our  to inquire about these: [phone]. A crisis center clinician screens these requests in the late afternoon and if appropriate it takes a few days to set-up.   -Visits take about 2 to 4 hours total   -Mornings are the best time for patients to arrive    For Telehealth options try:  [place]: [email or web address] (to access psychiatrist or therapist)  [facility]: [employer] (to access psychiatrist or therapist)  Better Help: [email or web address] (Largest online therapy group)      SEEK IMMEDIATE MEDICAL CARE IF YOU HAVE ANY OF THE FOLLOWING SYMPTOMS: thoughts about hurting or killing yourself, thoughts about hurting or killing somebody else, hallucinations or any other worsening or persistent symptoms OR ANY NEW OR CONCERNING SYMPTOMS.

## 2023-06-02 ENCOUNTER — EMERGENCY (EMERGENCY)
Facility: HOSPITAL | Age: 43
LOS: 1 days | Discharge: HOME | End: 2023-06-02
Attending: EMERGENCY MEDICINE | Admitting: EMERGENCY MEDICINE
Payer: COMMERCIAL

## 2023-06-02 VITALS
TEMPERATURE: 99 F | RESPIRATION RATE: 18 BRPM | SYSTOLIC BLOOD PRESSURE: 147 MMHG | OXYGEN SATURATION: 100 % | DIASTOLIC BLOOD PRESSURE: 89 MMHG | HEART RATE: 91 BPM

## 2023-06-02 VITALS
TEMPERATURE: 98 F | DIASTOLIC BLOOD PRESSURE: 68 MMHG | HEART RATE: 88 BPM | RESPIRATION RATE: 16 BRPM | SYSTOLIC BLOOD PRESSURE: 123 MMHG | OXYGEN SATURATION: 100 %

## 2023-06-02 DIAGNOSIS — F60.3 BORDERLINE PERSONALITY DISORDER: ICD-10-CM

## 2023-06-02 DIAGNOSIS — F25.9 SCHIZOAFFECTIVE DISORDER, UNSPECIFIED: ICD-10-CM

## 2023-06-02 PROCEDURE — 99284 EMERGENCY DEPT VISIT MOD MDM: CPT
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
## 2023-06-02 NOTE — ED PROVIDER NOTE - NSICDXPASTMEDICALHX_GEN_ALL_CORE_FT
Unknown if ever smoked
PAST MEDICAL HISTORY:  Asthma     Bipolar Disorder     Borderline Personality Disorder     Cholelithiasis     Depression     Fibroids     GERD (Gastroesophageal Reflux Disease)     Obesity
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
## 2023-06-02 NOTE — ED BEHAVIORAL HEALTH ASSESSMENT NOTE - NS ED BHA HOMICIDALITY PRESENT AGGRESSION OTHERS LIFETIME
Mark Anthony Starr is calling to request a refill on the following medication(s):    Medication Request:  Requested Prescriptions     Pending Prescriptions Disp Refills    glimepiride (AMARYL) 2 MG tablet 30 tablet 3     Sig: Take 1 tablet by mouth every morning (before breakfast)       Last Visit Date (If Applicable):  9/14/4918    Next Visit Date:    11/28/2022
Yes

## 2023-06-02 NOTE — ED PROVIDER NOTE - CLINICAL SUMMARY MEDICAL DECISION MAKING FREE TEXT BOX
42-year-old female with multiple psychiatric admissions and history of bipolar disorder and borderline personality disorder.  Patient presents ED with suicidal ideation.  Patient has had multiple ED visits for similar.  Patient is extremely calm and cooperative.  And appropriate during exam.  Will ask psychiatry to get collateral collateral and evaluate patient.

## 2023-06-02 NOTE — ED ADULT NURSE NOTE - OBJECTIVE STATEMENT
Received pt in BH from Alie pt calm & cooperative c/o si pt denies hi/avh presently, emotional support provided eval on going.

## 2023-06-02 NOTE — ED BEHAVIORAL HEALTH NOTE - BEHAVIORAL HEALTH NOTE
Per provider,, patient is cleared and is able to return to their previous residence, [place] [employer].  has spoken to SPARKLE Liu([phone]),  confirmed that patients mode of transportation is [place]AXI and that patient travels INDEPENDENTLY. Clinical provider is in agreement with TAXI back to group home. Verbal huddle regarding coordination of care completed with interdisciplinary team. Worker arranged for taxi for patient. Pt’s Medicaid is inactive. Writer utilized [employer] for patient transport. Account 50 Booking #[id number].    patient resides at [address] [facility] [address]. Per provider,, patient is cleared and is able to return to their previous residence, [employer].  has spoken to SPARKLE Liu([phone]),  confirmed that patients mode of transportation is TAXI and that patient travels INDEPENDENTLY. Clinical provider is in agreement with [facility]AXI back to group home. Verbal huddle regarding coordination of care completed with interdisciplinary team. Worker arranged for taxi for patient. Pt’s Medicaid is inactive. Writer utilized [employer] for patient transport. Account 50 Booking ##[id number].    patient resides at [address] [facility] [address].
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
## 2023-06-02 NOTE — ED ADULT NURSE NOTE - NSFALLUNIVINTERV_ED_ALL_ED
Bed/Stretcher in lowest position, wheels locked, appropriate side rails in place/Call bell, personal items and telephone in reach/Instruct patient to call for assistance before getting out of bed/chair/stretcher/Non-slip footwear applied when patient is off stretcher/[place] to call system/Physically safe environment - no spills, clutter or unnecessary equipment/Purposeful proactive rounding/Room/bathroom lighting operational, light cord in reach

## 2023-06-02 NOTE — ED BEHAVIORAL HEALTH ASSESSMENT NOTE - OTHER
residents pt advised to utilize safety plan in times of distress and go to nearest hospital if symptoms acutely worsen

## 2023-06-02 NOTE — ED BEHAVIORAL HEALTH ASSESSMENT NOTE - SAFETY PLAN ADDT'L DETAILS
Safety plan discussed with.../Education provided regarding environmental safety / lethal means restriction/Provision of National Suicide Prevention Lifeline 4-452-975-SSNL (1452)

## 2023-06-02 NOTE — ED BEHAVIORAL HEALTH ASSESSMENT NOTE - DESCRIPTION
calm and cooperative  Vital Signs Last 24 Hrs  T(C): 36.8 (02 Jun 2023 14:10), Max: 37.2 (02 Jun 2023 13:12)  T(F): 98.2 (02 Jun 2023 14:10), Max: 99 (02 Jun 2023 13:12)  HR: 88 (02 Jun 2023 14:10) (88 - 91)  BP: 123/68 (02 Jun 2023 14:10) (123/68 - 147/89)  BP(mean): --  RR: 16 (02 Jun 2023 14:10) (16 - 18)  SpO2: 100% (02 Jun 2023 14:10) (100% - 100%)    Parameters below as of 02 Jun 2023 14:10  Patient On (Oxygen Delivery Method): room air GERD as per records,  Patient does not know much about her biological family, she attended some college in the past

## 2023-06-02 NOTE — ED BEHAVIORAL HEALTH ASSESSMENT NOTE - DETAILS
discussed with residence Poor response to Geodon; Depakote (Increased ammonia levels) per chart review, history of property destruction (breaking TV screens while hospitalized) when upset / acting out see above Safety plan from 5/13/23 reviewed, no changes to be made

## 2023-06-02 NOTE — ED PROVIDER NOTE - NSFOLLOWUPINSTRUCTIONS_ED_ALL_ED_FT
Please take your medications as previously prescribed.  Please return to the ED for any worsening symptoms.  Please follow-up with your psychiatrist in the next 1 week.
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
## 2023-06-02 NOTE — ED PROVIDER NOTE - PATIENT PORTAL LINK FT
You can access the FollowMyHealth Patient Portal offered by [facility] by registering at the following website: [email or web address]. By joining [employer]’s FollowMyHealth portal, you will also be able to view your health information using other applications (apps) compatible with our system.

## 2023-06-02 NOTE — ED PROVIDER NOTE - OBJECTIVE STATEMENT
42-year-old female with borderline personality disorder and bipolar disorder.  Patient resides on [facility].  Patient presents the ED with "suicidal ideation".  Patient has been seen multiple times in the last few weeks for similar episode.  Patient states that she is hearing voices telling her to harm herself although she has no plan.  Patient is also having visual hallucinations.  Patient states that she is seeing smoke without fire inside a charge.  Patient noted same hallucinations few days ago as per documentation .Patient has no suicidal plan or access to weapons as per patient report.

## 2023-06-07 ENCOUNTER — EMERGENCY (EMERGENCY)
Facility: HOSPITAL | Age: 43
LOS: 1 days | Discharge: HOME | End: 2023-06-07
Attending: STUDENT IN AN ORGANIZED HEALTH CARE EDUCATION/TRAINING PROGRAM | Admitting: STUDENT IN AN ORGANIZED HEALTH CARE EDUCATION/TRAINING PROGRAM
Payer: MEDICAID

## 2023-06-07 VITALS
HEART RATE: 86 BPM | RESPIRATION RATE: 16 BRPM | TEMPERATURE: 98 F | SYSTOLIC BLOOD PRESSURE: 144 MMHG | OXYGEN SATURATION: 100 % | DIASTOLIC BLOOD PRESSURE: 88 MMHG

## 2023-06-07 PROCEDURE — 99285 EMERGENCY DEPT VISIT HI MDM: CPT

## 2023-06-07 PROCEDURE — 90792 PSYCH DIAG EVAL W/MED SRVCS: CPT

## 2023-06-07 RX ORDER — ONDANSETRON 8 MG/1
4 TABLET, FILM COATED ORAL ONCE
Refills: 0 | Status: DISCONTINUED | OUTPATIENT
Start: 2023-06-07 | End: 2023-06-07

## 2023-06-07 RX ORDER — SODIUM CHLORIDE 9 MG/ML
1000 INJECTION INTRAMUSCULAR; INTRAVENOUS; SUBCUTANEOUS ONCE
Refills: 0 | Status: DISCONTINUED | OUTPATIENT
Start: 2023-06-07 | End: 2023-06-07

## 2023-06-07 RX ORDER — FAMOTIDINE 10 MG/ML
20 INJECTION INTRAVENOUS ONCE
Refills: 0 | Status: DISCONTINUED | OUTPATIENT
Start: 2023-06-07 | End: 2023-06-07

## 2023-06-07 RX ADMIN — Medication 30 MILLILITER(S): at 13:31

## 2023-06-07 NOTE — ED ADULT NURSE NOTE - OBJECTIVE STATEMENT
Pt awake and alert calm at this time from [place] with co abdominal pain no vomiting noted, medicated per md orders  .Pt appears comfortable ,expressed thoughts of SI to PA. pt no on co for safety awaiting to be sent over to .

## 2023-06-07 NOTE — ED ADULT NURSE REASSESSMENT NOTE - NS ED NURSE REASSESS COMMENT FT1
Pt received from intake, pt is calm and cooperative. Pt currently eating PO meals, pt is tolerating well. Denies any nausea. Psych at bedside. Awaiting further plan of care

## 2023-06-07 NOTE — ED PROVIDER NOTE - ATTENDING APP SHARED VISIT CONTRIBUTION OF CARE
I have evaluated the patient and agree with the documentation and assessment as made by the [facility]. We have discussed plan of care and work up for the patient.   This was a shared visit with the [facility]. I reviewed and verified the documentation and independently performed the documented:   History, Exam and Medical Decision Making.    42F, borderline, bipolar, depression, s/p lap divya, from [facility] who presents with abdominal pain. Had an episode after eating lunch earlier this afternoon. Diffuse in nature. No trauma/falls. Tolerating PO. Stooling/voiding without any issues. Now at this time without any medical complaints. On ROS, denies headaches, fevers, chills, cough, sputum, cp, sob, nvd, dysuria, hematuria, recent travel, trauma, syncope, black/bloody stools. Also complaining of SI. Physical: afebrile, well appearing, rrr, ctabl, abdomen soft, no le edema, pelvic (chaperoned with female rn): no cmt or adnexal ttp. Plan:  consultation.
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
## 2023-06-07 NOTE — ED PROVIDER NOTE - PROGRESS NOTE DETAILS
ARTEM ABEL: Pt admits to SI w/ plan to cut herself w/ needles inside the sharp container, no visual/auditory hallucination. Pt placed on 1:1 due to SI. Pt declined blood work after extensive conversation with attending, states it's her right. Discussed with attending, pt is well appearing, has unremarkable abdominal and pelvic exam, medically cleared for  w/ psych consult for SI. Psych consulted, patient brought over to , signed out to KIRSTY Keene. NP Jassi- pt reports she feels much improved, she said in the morning she was drinking coffee and that made her nauseated. PO tolerated well, no nausea, vomiting, or abd pain at this time. Denies willian de. Comfortable engaging with provider and staff members. She well known in .

## 2023-06-07 NOTE — ED ADULT NURSE NOTE - NSFALLUNIVINTERV_ED_ALL_ED
Pt was sent from Dr. Baker for low Hemoglobin 7.7 , HX of blood transfusion in the past on [facility] + SOB Denies chest pain. No active bleeding noted Bed/Stretcher in lowest position, wheels locked, appropriate side rails in place/Call bell, personal items and telephone in reach/Instruct patient to call for assistance before getting out of bed/chair/stretcher/Non-slip footwear applied when patient is off stretcher/[place] to call system/Physically safe environment - no spills, clutter or unnecessary equipment/Purposeful proactive rounding/Room/bathroom lighting operational, light cord in reach
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
## 2023-06-07 NOTE — ED ADULT NURSE NOTE - CHIEF COMPLAINT QUOTE
ambulatory in triage, from [place] c/o RLQ pain with nausea x1 hour, calm and cooperative, hx of bipolar, depression, denies any S/I, H/I

## 2023-06-07 NOTE — ED PROVIDER NOTE - OBJECTIVE STATEMENT
43 yo F with PMH of borderline personality, bipolar, depression, GERD, fibroids, s/p lap cholecystectomy 2022, BIBEMS from [facility] to ED c/o RLQ abdominal an hour ago after eating a/w nausea. Reports cramping pain, constant, 7/10, non radiating, no aggravating factors, currently improving.   admits having similar pain 2 months ago, resolved on its own. States she feels anxious, admitting suicidal ideation, looking at the sharp container in the room, thinking to hurt herself, cutting wrist with the needles. Denies any fever, chills, vomiting, chest pain, sob, dysuria, weakness, numbness, back pain, or any other complaints. 43 yo F with PMH of borderline personality, bipolar, depression, GERD, fibroids, s/p lap cholecystectomy 2022, BIBEMS from [place] to ED with abdominal an hour ago after eating a/w nausea. Reports cramping pain, constant, 5/10, non radiating, no aggravating factors, currently improving.   admits having similar pain 2 months ago, resolved on its own. States she feels anxious, admitting suicidal ideation, looking at the sharp container in the room, thinking to hurt herself, cutting wrist with the needles. Denies any fever, chills, vomiting, chest pain, sob, dysuria, weakness, numbness, back pain, or any other complaints.

## 2023-06-07 NOTE — ED PROVIDER NOTE - NSFOLLOWUPINSTRUCTIONS_ED_ALL_ED_FT
Abdominal Pain    Many things can cause abdominal pain. Many times, abdominal pain is not caused by a disease and will improve without treatment. Your health care provider will do a physical exam to determine if there is a dangerous cause of your pain; blood tests and imaging may help determine the cause of your pain. However, in many cases, no cause may be found and you may need further testing as an outpatient. Monitor your abdominal pain for any changes.     SEEK IMMEDIATE MEDICAL CARE IF YOU HAVE ANY OF THE FOLLOWING SYMPTOMS: worsening abdominal pain, uncontrollable vomiting, profuse diarrhea, inability to have bowel movements or pass gas, black or bloody stools, fever accompanying chest pain or back pain, or fainting. These symptoms may represent a serious problem that is an emergency. Do not wait to see if the symptoms will go away. Get medical help right away. Call 911 and do not drive yourself to the hospital.    Follow up with your primary care physician and psychiatrist in 48-72 hours.  You may also see the psychiatrist at [facility]:    [address] [address]  Phone: [phone]    [facility] on [facility] Information:    -Walk-in hours: Monday to Friday, 9am to 3pm   -Almost all walk-in patients will be able to see a psych prescriber the same day   -Scheduled, non-urgent, evening remote/virtual appointments are available on a limited basis. Call our  to inquire about these: [phone]. A [facility] clinician screens these requests in the late afternoon and if appropriate it takes a few days to set-up.   -Visits take about 2 to 4 hours total   -Mornings are the best time for patients to arrive    For Telehealth options try:  Teladoc: [email or web address] (to access psychiatrist or therapist)  Amwell: [email or web address] (to access psychiatrist or therapist)  Better Help: [email or web address] (Largest online therapy group)      SEEK IMMEDIATE MEDICAL CARE IF YOU HAVE ANY OF THE FOLLOWING SYMPTOMS: thoughts about hurting or killing yourself, thoughts about hurting or killing somebody else, hallucinations or any other worsening or persistent symptoms OR ANY NEW OR CONCERNING SYMPTOMS.

## 2023-06-07 NOTE — ED BEHAVIORAL HEALTH NOTE - BEHAVIORAL HEALTH NOTE
Writer called [employer] [phone] for collateral information. All information is as per VIKKI Liu:    Alexa states that today the patient was complaining of stomachache and breathing issues. Patient did not report any SI or SIB today. Patient was not presenting with AH/VH. Patient is medication compliant. Patient is linked to psychiatrist maximiliano. 73 Dr. Cruz ([phone]). Patient went to the hospital twice last week and was discharged both times. Patient said she wanted to go to the hospital yesterday for stomach pain but did not call. Patient has a history of frequent hospital visits. Patient can return with taxi if discharged. Writer called [employer] [phone] for collateral information. All information is as per VIKKI Liu:    Alexa states that today the patient was complaining of stomachache and breathing issues. Patient did not report any SI or SIB today. Patient was not presenting with AH/VH. Patient is medication compliant. Patient is linked to psychiatrist maximiliano. 73 Dr. Cruz ([phone]). Patient went to the hospital twice last week and was discharged both times. Patient said she wanted to go to the hospital yesterday for stomach pain but did not call. Patient has a history of frequent hospital visits. Patient can return with taxi if discharged.    Per provider, KIRSTY Perez , patient is cleared and is able to return to their previous residence, [employer].  has spoken to Admin Lisa ([phone])  ,  confirmed that patients mode of transportation is [employer] and that patient travels INDEPENDENTLY. Clinical provider is in agreement with [employer]  back to group home. Verbal huddle regarding coordination of care completed with interdisciplinary team.  Patient’s zulma is not active. Worker informed Lisa of patient’s inactive zulma.  Worker utilized account [employer] #[id number] for safe transport back.

## 2023-06-07 NOTE — ED PROVIDER NOTE - CLINICAL SUMMARY MEDICAL DECISION MAKING FREE TEXT BOX
43 yo F with PMH of borderline personality, bipolar, depression, GERD, fibroids, s/p lap cholecystectomy 2022, BIBEMS from [place] to ED c/o RLQ abdominal an hour ago after eating a/w nausea. well appearing female. There is no fever. unremarkable abdominal and pelvic exam. Pt's symptom is improving. Pt refused lab work in the ED, states it's her right; also expressing SI w/ plan to cut herself with needles from sharp container in the room. Pt has hx of SI, was seen in ED on 6/2 for SI, hx of SI attempt with overdose of digoxin. Pt is medically cleared for  w/ psych consult for SI. No visual/auditory hallucination.

## 2023-06-07 NOTE — ED BEHAVIORAL HEALTH ASSESSMENT NOTE - SUMMARY
41yo F, single, non caregiver, on disability for mental illness, domiciled at psychiatric residence ([facility] [address]), with past psychiatric history of Schizoaffective Bipolar Type, Borderline Personality Disorder, Polysubstance Abuse, >30 psych hospitalizations (most recently - [facility] for SI, 4/27-5/8/23), 3 prior suicide attempts (last in 2012 via OD), chronic suicidal gestures and ideation; history of property destruction when upset, long hx of sexually provocative behavior (both out in the community and while on inpatient units requiring 1;1 staff observation); hx of physical altercations, hx of verbal threats; hx of treatment nonadherence resulting in active AOT and PALACIOS administration, brought in by EMS for abdominal pain, while being evaluated by medical PA she made some provocative statements and psychiatry was consulted given her psychiatric history.     Patient has chronic suicidal ideation and dislike of her residence. On mental status exam, she is calm, cooperative, pleasant, smiling. There are no signs of psychosis or trina. No thought disorder evident. No psychomotor changes. No internal preoccupation or response to internal stimuli. Patient denies active/passive suicidal thoughts, plan or intent, has no homicidal ideation or violent ideation, is on AOT and adherent to treatment. Patient is future oriented, discussing plans to obtain ID so she can find a job, she stated that she would like "to volunteer in the hospital because she likes it here". Of note, she was eating lunch during assessment, she was not in acute distress and denied any abdominal pain to this writer.     Per collateral, patient presented to ED for abdominal pain that started yesterday, she has not made any suicidal statements, and has not reported any active psychiatric symptoms. She is compliant with medications.     Recommend ongoing outpatient care with community support. Patient would benefit from more structured day treatment and/or social rehab groups.

## 2023-06-07 NOTE — ED BEHAVIORAL HEALTH ASSESSMENT NOTE - DESCRIPTION
Vital Signs Last 24 Hrs  T(C): 36.8 (07 Jun 2023 11:38), Max: 36.8 (07 Jun 2023 11:38)  T(F): 98.2 (07 Jun 2023 11:38), Max: 98.2 (07 Jun 2023 11:38)  HR: 86 (07 Jun 2023 11:38) (86 - 86)  BP: 144/88 (07 Jun 2023 11:38) (144/88 - 144/88)  BP(mean): --  RR: 16 (07 Jun 2023 11:38) (16 - 16)  SpO2: 100% (07 Jun 2023 11:38) (100% - 100%)    Parameters below as of 07 Jun 2023 11:38  Patient On (Oxygen Delivery Method): room air as per records,  Patient does not know much about her biological family, she attended some college in the past GERD

## 2023-06-07 NOTE — ED BEHAVIORAL HEALTH ASSESSMENT NOTE - DETAILS
discussed with residence per chart review, history of property destruction (breaking TV screens while hospitalized) when upset / acting out Poor response to Geodon; Depakote (Increased ammonia levels) see above Safety plan from 5/13/23 reviewed, no changes to be made

## 2023-06-07 NOTE — ED ADULT TRIAGE NOTE - CHIEF COMPLAINT QUOTE
ambulatory in triage, from [place] c/o RLQ pain with nausea x1 hour, calm and cooperative, hx of bipolar, depression, denies any S/I, H/I

## 2023-06-07 NOTE — ED PROVIDER NOTE - PATIENT PORTAL LINK FT
You can access the FollowMyHealth Patient Portal offered by [facility] by registering at the following website: [email or web address]. By joining [employer]’s FollowMyHealth portal, you will also be able to view your health information using other applications (apps) compatible with our system.

## 2023-06-07 NOTE — ED BEHAVIORAL HEALTH ASSESSMENT NOTE - SAFETY PLAN ADDT'L DETAILS
Safety plan discussed with.../Education provided regarding environmental safety / lethal means restriction/Provision of National Suicide Prevention Lifeline 3-927-801-HDRI (2488)

## 2023-06-07 NOTE — ED BEHAVIORAL HEALTH ASSESSMENT NOTE - RISK ASSESSMENT
Chronic risk factors: h/o SA, h/o psych admissions  Acute risk factors: bored at residence, active SI  Protective factors: denies SI, no plan for suicide, no access to weapons, no active substance abuse, good physical health, domiciled, positive therapeutic relationship, engaged in treatment (AOT, PALACIOS, individual therapy, outpt psych), compliant with treatment, help-seeking behaviors, intelligent, future-oriented (plans to start at [facility]), able to safety plan  Overall, pt is at higher chronic risk of harm to self with protective factors sufficiently mitigating acute risks. Any further risk is sufficiently mitigated in the outpatient setting.

## 2023-06-12 ENCOUNTER — EMERGENCY (EMERGENCY)
Facility: HOSPITAL | Age: 43
LOS: 1 days | Discharge: HOME | End: 2023-06-12
Admitting: EMERGENCY MEDICINE
Payer: MEDICAID

## 2023-06-12 VITALS
OXYGEN SATURATION: 100 % | TEMPERATURE: 99 F | HEART RATE: 80 BPM | SYSTOLIC BLOOD PRESSURE: 157 MMHG | RESPIRATION RATE: 16 BRPM | DIASTOLIC BLOOD PRESSURE: 99 MMHG

## 2023-06-12 PROCEDURE — 99284 EMERGENCY DEPT VISIT MOD MDM: CPT

## 2023-06-12 NOTE — ED ADULT NURSE NOTE - NSFALLUNIVINTERV_ED_ALL_ED
Bed/Stretcher in lowest position, wheels locked, appropriate side rails in place/Call bell, personal items and telephone in reach/Instruct patient to call for assistance before getting out of bed/chair/stretcher/Non-slip footwear applied when patient is off stretcher/[place] to call system/Physically safe environment - no spills, clutter or unnecessary equipment/Purposeful proactive rounding/Room/bathroom lighting operational, light cord in reach

## 2023-06-12 NOTE — ED PROVIDER NOTE - OBJECTIVE STATEMENT
This is a 42-year-old female past medical history schizophrenia, bipolar disorder with increased anxiety. Patient reports she was watching a documentary on TV about  killings in [place]. She got very disturbed and anxious she called 911 This is a 42-year-old female past medical history schizophrenia, bipolar disorder with increased anxiety. Patient reports she was watching a documentary on TV about  killings in [place]. She got very disturbed and anxious she called 911.

## 2023-06-12 NOTE — ED ADULT NURSE NOTE - OBJECTIVE STATEMENT
pt received to , brought in by ambulance from [facility]. pt states she was watching a violent movie and became anxious so she called EMS. currently denies homicidal/suicidal ideations, auditory/visual hallucinations. pt calm and pleasant. pt undressed placed in  clothing, belongings cataloged and secured. pt eating tray of food. pending clearance.

## 2023-06-12 NOTE — ED PROVIDER NOTE - CLINICAL SUMMARY MEDICAL DECISION MAKING FREE TEXT BOX
This is a 42-year-old female past medical history schizophrenia, bipolar disorder with increased anxiety. Patient reports she was watching a documentary on TV about  killings in [place]. She got very disturbed and anxious she called 911.   Collateral info obtained by provider from [facility] ( [phone])

## 2023-06-12 NOTE — ED PROVIDER NOTE - PATIENT PORTAL LINK FT
You can access the FollowMyHealth Patient Portal offered by [facility] by registering at the following website: [email or web address]. By joining [employer]’s FollowMyHealth portal, you will also be able to view your health information using other applications (apps) compatible with our system.

## 2023-06-12 NOTE — ED PROVIDER NOTE - NSFOLLOWUPINSTRUCTIONS_ED_ALL_ED_FT
Follow up with your primary care physician and psychiatrist in 48-72 hours.  You may also see the psychiatrist at [facility]:    [address] [place] [address]  Phone: [phone]    [facility] on [facility] Information:    -Walk-in hours: Monday to Friday, 9am to 3pm   -Almost all walk-in patients will be able to see a psych prescriber the same day   -Scheduled, non-urgent, evening remote/virtual appointments are available on a limited basis. Call our  to inquire about these: [phone]. A crisis center clinician screens these requests in the late afternoon and if appropriate it takes a few days to set-up.   -Visits take about 2 to 4 hours total   -Mornings are the best time for patients to arrive    For Telehealth options try:  [employer]: [email or web address] (to access psychiatrist or therapist)  [employer]: [employer] (to access psychiatrist or therapist)  Better Help: [email or web address] (Largest online therapy group)      SEEK IMMEDIATE MEDICAL CARE IF YOU HAVE ANY OF THE FOLLOWING SYMPTOMS: thoughts about hurting or killing yourself, thoughts about hurting or killing somebody else, hallucinations or any other worsening or persistent symptoms OR ANY NEW OR CONCERNING SYMPTOMS.

## 2023-06-12 NOTE — ED ADULT TRIAGE NOTE - CHIEF COMPLAINT QUOTE
Pt. states she was watching a movie today and started to feel suicidal. No plan. Denies H/I or hallucinations. Denies drug or alcohol use.  KIRSTY Bass made aware.

## 2023-06-12 NOTE — ED ADULT TRIAGE NOTE - NS_BH TRG QUESTION2_ED_ALL_ED
Writer attempted to contact patient, no answer.  Detailed voice message left instructing patient to call back.     No

## 2023-06-12 NOTE — ED BEHAVIORAL HEALTH NOTE - BEHAVIORAL HEALTH NOTE
MI informed by KIRSTY Graff that pt is ready for discharge and needs a taxi back to her residence.  Pt is from [facility].  SW called residence and spoke with staff member, Joy, who confirmed that pt can return and she travels independently via taxi.  Discussed with provider, who is aware and in agreement with the plan.  Verbal huddle complete.  MI arranged [employer] taxi for pt transport back to residence.

## 2023-06-14 ENCOUNTER — EMERGENCY (EMERGENCY)
Facility: HOSPITAL | Age: 43
LOS: 1 days | Discharge: HOME | End: 2023-06-14
Admitting: STUDENT IN AN ORGANIZED HEALTH CARE EDUCATION/TRAINING PROGRAM
Payer: MEDICAID

## 2023-06-14 VITALS
RESPIRATION RATE: 18 BRPM | SYSTOLIC BLOOD PRESSURE: 145 MMHG | TEMPERATURE: 98 F | HEART RATE: 78 BPM | OXYGEN SATURATION: 100 % | DIASTOLIC BLOOD PRESSURE: 87 MMHG

## 2023-06-14 PROCEDURE — 93010 ELECTROCARDIOGRAM REPORT: CPT

## 2023-06-14 PROCEDURE — 99053 MED SERV 10PM-8AM 24 HR FAC: CPT

## 2023-06-14 PROCEDURE — 99284 EMERGENCY DEPT VISIT MOD MDM: CPT

## 2023-06-15 ENCOUNTER — EMERGENCY (EMERGENCY)
Facility: HOSPITAL | Age: 43
LOS: 1 days | Discharge: PSYCHIATRIC OR SUBSTANCE USE FACILITY | End: 2023-06-15
Admitting: EMERGENCY MEDICINE
Payer: MEDICAID

## 2023-06-15 VITALS
HEART RATE: 84 BPM | TEMPERATURE: 98 F | RESPIRATION RATE: 16 BRPM | SYSTOLIC BLOOD PRESSURE: 149 MMHG | DIASTOLIC BLOOD PRESSURE: 91 MMHG | OXYGEN SATURATION: 100 %

## 2023-06-15 PROCEDURE — 99284 EMERGENCY DEPT VISIT MOD MDM: CPT

## 2023-06-15 NOTE — ED PROVIDER NOTE - PATIENT PORTAL LINK FT
You can access the FollowMyHealth Patient Portal offered by [facility] by registering at the following website: [email or web address]. By joining [employer]’s FollowMyHealth portal, you will also be able to view your health information using other applications (apps) compatible with our system.

## 2023-06-15 NOTE — ED PROVIDER NOTE - PHYSICAL EXAMINATION
Vital signs reviewed.   CONSTITUTIONAL: Well-appearing; well-nourished; in no apparent distress. Non-toxic appearing.   HEAD: Normocephalic, atraumatic.  EYES: Normal conjunctiva and no sclera injection noted  ENT: normal nose; no rhinorrhea.  CARD: Normal S1, S2  RESP: Normal chest excursion with respiration; breath sounds clear and equal bilaterally;  ABD/GI: soft, non-distended; non-tender  EXT/MS: moves all extremities; distal pulses are normal, no pedal edema.  SKIN: Normal for age and race; warm; dry; good turgor; no apparent lesions or exudate noted.  NEURO: Awake, alert, oriented x 3  PSYCH: Normal mood; appropriate affect.

## 2023-06-15 NOTE — ED PROVIDER NOTE - PROGRESS NOTE DETAILS
ARTEM Avery- spoke with MI Horan- obtained collateral from Doron Liu staff member- compliant with meds, not violent or aggressive, no safety concerns. Will DC with strict return precautions

## 2023-06-15 NOTE — ED PROVIDER NOTE - NSFOLLOWUPINSTRUCTIONS_ED_ALL_ED_FT
Medical Clearance for Psychiatric Care    WHAT YOU NEED TO KNOW:    What is medical clearance for psychiatric care? Medical clearance for psychiatric care is a medical exam to make sure that a patient's psychiatric symptoms are not caused by a medical condition. Psychiatric symptoms such as delusions or hallucinations may be caused or made worse by medicine or a physical illness. Healthcare providers will provide any necessary treatment so that the patient may be safely transferred to a psychiatric facility. Talk to the patient's healthcare provider if you have questions or concerns about the patient's condition or care.    What tests may be needed to medically clear a patient for psychiatric care? The healthcare provider will ask if the patient takes medicine or has a history of psychiatric or health conditions. The provider will ask the patient about his or her symptoms and when they began. The patient may need any of the following:    A mental exam checks the patient's awareness and memory. The healthcare provider will ask the patient if he or she knows his or her name, his or her location, and the date.    A neurological exam checks the patient's vision, sensation, reflexes, and muscle function.    An EKG records the patient's heart rhythm to make sure it is safe to transfer him or her.    Blood and urine tests check for infection, test kidney function, or get information about the patient's overall health.    An x-ray takes pictures of the patient's chest to check for infection or fluid in the lungs.    A CT scan, or CAT scan, is a type of x-ray that uses a computer to take pictures of the patient's head. The scan checks for fluid or a tumor. The patient may be given a dye before the pictures are taken to help healthcare providers see the pictures better. Tell the healthcare provider if the patient has ever had a reaction to contrast liquid.  CARE AGREEMENT:    Patients have the right to help plan their care. To help with this plan, patients must learn about their health condition, how it may be treated, and when psychiatric care is needed. Treatment options should be discussed with healthcare providers. Patients and healthcare providers can work together to decide what care may be best.

## 2023-06-15 NOTE — ED PROVIDER NOTE - PATIENT PORTAL LINK FT
You can access the FollowMyHealth Patient Portal offered by [facility] by registering at the following website: [email or web address]. By joining [employer]’s FollowMyHealth portal, you will also be able to view your health information using other applications (apps) compatible with our system.

## 2023-06-15 NOTE — ED PROVIDER NOTE - OBJECTIVE STATEMENT
43 Y/O F w/ PMH of MDD, Bipolar disorder and HLD presents to ER for chest pain. Prior to arrival was at home sitting experienced sharp midsternal pain w/o radiation. Called EMS who gave her aspirin w/ relief. At present reports improvement of symptoms. No previous episodes. No hx of cardiac condition or MI. Follows up with JERRI Monzon. Pt is from [place] [facility]. Denies fever, chills, nausea/vomiting, dizziness, headache, palpitations, syncope or shortness of breath

## 2023-06-15 NOTE — ED ADULT TRIAGE NOTE - CHIEF COMPLAINT QUOTE
Hallucinations x 1 week and admits to suicidal ideation without plan. Denies homicidal ideation, drug and alcohol use. due for Haldol injection on 6/21. Hallucinations x 1 day and admits to suicidal ideation without plan. Denies homicidal ideation, drug and alcohol use. due for Haldol injection on 6/21.

## 2023-06-15 NOTE — ED PROVIDER NOTE - CLINICAL SUMMARY MEDICAL DECISION MAKING FREE TEXT BOX
41 yo F PMH Asthma, GERD, Fibroids, MDD, Bipolar, Borderline Personality Disorder BIBA for SI with no plan and AH x1 day. Denies VH/HI/ETOH/drug use.   Patient tried to reach Dr. Cruz without success.   No evidence of acute exacerbation  SW collateral   Dispo Home

## 2023-06-15 NOTE — ED PROVIDER NOTE - OBJECTIVE STATEMENT
43 yo F PMH Asthma, 41 yo F PMH Asthma, GERD, Fibroids, MDD, Bipolar, Borderline Personality Disorder BIBA for SI with no plan and AH x1 day. Denies VH/HI/ETOH/drug use. Endorses her Psychiatrist is Dr. Nancy Barnes and reports trying to contact her today without success. Reports tobacco use, denies etoh, marijuana, drug use. Reports upcoming haldol maintenance injection for 6/21/2023. 41 yo F PMH Asthma, GERD, Fibroids, MDD, Bipolar, Borderline Personality Disorder BIBA for SI with no plan and AH x1 day. Denies VH/HI/ETOH/drug use. Endorses her Psychiatrist is Dr. Nancy Barnes and reports trying to contact her today without success. No SI attempt prior. No command auditory hallucinations reported. Reports tobacco use, denies etoh, marijuana, drug use. Reports upcoming haldol maintenance injection for 6/21/2023. 41 yo F living PMH Asthma, GERD, Fibroids, MDD, Bipolar, Borderline Personality Disorder at [facility] facility has BIBA for SI with no plan and AH x1 day. Denies VH/HI/ETOH/drug use. Endorses her Psychiatrist is Dr. Nancy Barnes and reports trying to contact her today without success. No SI attempt prior. No command auditory hallucinations reported. Reports tobacco use, denies etoh, marijuana, drug use. Reports upcoming haldol maintenance injection for 6/21/2023. 41 yo F living PMH Asthma, GERD, Fibroids, MDD, Bipolar, Borderline Personality Disorder resides at [facility] BIBA for SI, no plan and non-command AH x1 day. Denies VH/HI/ETOH/drug use. Endorses her Psychiatrist is Dr. Nancy Barnes and reports trying to contact her today without success. No SI attempt prior.  Reports tobacco use, denies etoh, marijuana, drug use. Reports upcoming haldol maintenance injection for 6/21/2023.

## 2023-06-15 NOTE — ED ADULT NURSE NOTE - NSFALLUNIVINTERV_ED_ALL_ED
Bed/Stretcher in lowest position, wheels locked, appropriate side rails in place/Call bell, personal items and telephone in reach/Instruct patient to call for assistance before getting out of bed/chair/stretcher/Non-slip footwear applied when patient is off stretcher/[place] to call system/Physically safe environment - no spills, clutter or unnecessary equipment/Purposeful proactive rounding/Room/bathroom lighting operational, light cord in reach

## 2023-06-15 NOTE — ED PROVIDER NOTE - CLINICAL SUMMARY MEDICAL DECISION MAKING FREE TEXT BOX
41 Y/O F w/ PMH of MDD, Bipolar disorder and HLD presents to ER for chest pain.   PT with decision making capacity does not want further work up  Discussed adverse outcomes and risks patient requesting DC home  Will contact SW to arrange transport  Return precautions given

## 2023-06-15 NOTE — ED PROVIDER NOTE - NS ED ROS FT
Constitutional: (-) fever   Head: Normal cephalic, Atraumatic  Eyes/ENT: (-) sore throat  Cardiovascular: (+) chest pain, (-) wheezing  Respiratory: (-) cough, (-) shortness of breath  Gastrointestinal: (-) vomiting, (-) diarrhea, (-) abdominal pain  : (-) dysuria   Musculoskeletal: (-) back pain  Integumentary: (-) rash, (-) edema  Neurological: (-)loc  Allergic/Immunologic: (-) pruritus

## 2023-06-15 NOTE — PROVIDER CONTACT NOTE (OTHER) - ASSESSMENT
Called residence at [phone]; spoke with staff member, Young, who confirms pt is a resident and can return.  As per Zaid, pt travels independently via taxi.  Discussed with provider, who is in agreement with the plan.  verbal huddle complete.

## 2023-06-15 NOTE — ED PROVIDER NOTE - NSICDXNOPASTSURGICALHX_GEN_ALL_ED
Report received from off going RN, charting as noted. Patient A&Ox4, denies any pain or discomfort. Cardiac monitor in place, sinus sandi. Respirations even & unlabored, denies any numbness or tingling. IV site C/D/I, patent, negative s/s phlebitis or infiltration. Denies any chest pain, shortness of breath, nausea or dizziness. Will continue to monitor.
<-- Click to add NO significant Past Surgical History

## 2023-06-15 NOTE — ED PROVIDER NOTE - NSFOLLOWUPCLINICS_GEN_ALL_ED_FT
Cardiology at [facility]  Cardiology  [address] [place] [address]  Phone: [phone]  Fax:   Follow Up Time: Routine

## 2023-06-15 NOTE — ED BEHAVIORAL HEALTH NOTE - BEHAVIORAL HEALTH NOTE
Writer contacted [facility] ([phone]) spoke with VIKKI lipscomb who provided the following information.    Patient is a 41 Yo female domiciled at [facility], hx of bipolar schizophrenia as per Deisi, matt EMS activated by staff due to pt endorsing Si and hearing voices. She was unsure of details and said pt has no specific plan or intention. She reports the pt seemed fine and not worried when endorsing si and voices. Patient does not appear paranoid or delusional as per deisi. She reports patient is not violent or aggressive. No drug or alcohol use. She says patient is compliant w/ medication and sleeping, eating and showering at baseline. She says patient has complained about back and chest pain and was in ED yesterday. No further safety concerns reported. Writer contacted [facility] ([phone]) spoke with VIKKI lipscomb who provided the following information.    Patient is a 43 Yo female domiciled at [facility], hx of bipolar schizophrenia as per matt Lipscomb EMS activated by staff due to pt endorsing Si and hearing voices. She was unsure of details and said pt has no specific plan or intention. She reports the pt seemed fine and not worried when endorsing si and voices. Patient does not appear paranoid or delusional as per alexa. She reports patient is not violent or aggressive. No drug or alcohol use. She says patient is compliant w/ medication and sleeping, eating and showering at baseline. She says patient has complained about back and chest pain and was in ED yesterday. No further safety concerns reported.     Per provider, KIRSTY Lemons , patient is cleared and is able to return to their previous residence, [facility].  has spoken to alexa ([phone]) confirmed that patient’s mode of transportation is [employer] and that patient travels INDEPENDENTLY. Clinical provider is in agreement with [employer]  back to group home. Verbal huddle regarding coordination of care completed with interdisciplinary team.  Patient’s zulma is not active. Worker utilized [employer] #[id number] for safe transport back.    Alexa agreed to inform morning staff to have patient f/u with outpatient psychiatrist.    Writer attempted to contact patient’s psychiatrist Dr [phone] but the office was currently closed. Writer unable to leave voicemail.

## 2023-06-15 NOTE — ED ADULT NURSE NOTE - CHIEF COMPLAINT QUOTE
Hallucinations x 1 day and admits to suicidal ideation without plan. Denies homicidal ideation, drug and alcohol use. due for Haldol injection on 6/21.

## 2023-06-15 NOTE — ED ADULT NURSE NOTE - OBJECTIVE STATEMENT
Pt presents to , alert&orientedx4, ambulatory, hx of Borderline D/O and Bipolar coming to ED for evaluation of auditory hallucinations accompanied with passive SI with no intent or plan. Pt denies any self-harm, h/i, v/h at this time. On arrival, pt is calm and cooperative, speech normal, good eye contact and is fairly groomed. Safety measures maintained. Awaiting further plan of care

## 2023-06-16 ENCOUNTER — EMERGENCY (EMERGENCY)
Facility: HOSPITAL | Age: 43
LOS: 1 days | Discharge: HOME | End: 2023-06-16
Admitting: STUDENT IN AN ORGANIZED HEALTH CARE EDUCATION/TRAINING PROGRAM
Payer: COMMERCIAL

## 2023-06-16 VITALS
HEART RATE: 84 BPM | TEMPERATURE: 99 F | DIASTOLIC BLOOD PRESSURE: 84 MMHG | RESPIRATION RATE: 18 BRPM | OXYGEN SATURATION: 98 % | SYSTOLIC BLOOD PRESSURE: 141 MMHG

## 2023-06-16 PROCEDURE — 99284 EMERGENCY DEPT VISIT MOD MDM: CPT

## 2023-06-16 RX ADMIN — Medication 1 MILLIGRAM(S): at 21:35

## 2023-06-16 NOTE — ED BEHAVIORAL HEALTH NOTE - BEHAVIORAL HEALTH NOTE
Writer informed by team that pt is cleared for discharge at this time. Pt arrived with complaint of anxiety. Writer contacted [facility] at [phone]. Writer spoke with staff member, Jesús, at residence who reported that pt said she was not feeling good. Staff member aware of pt discharge. Staff member confirmed pt to travel INDEPENDENTLY via taxi. Pt medicaid is inactive. Writer therefore arranged transport with [employer] #[phone] with use of  [employer]. Trip cost is $13.13. Nursing informed. Pt to be escorted to taxi. Verbal huddle occurred with interdisciplinary team.

## 2023-06-16 NOTE — ED PROVIDER NOTE - PATIENT PORTAL LINK FT
You can access the FollowMyHealth Patient Portal offered by [facility] by registering at the following website: [email or web address]. By joining [employer]’s FollowMyHealth portal, you will also be able to view your health information using other applications (apps) compatible with our system.

## 2023-06-16 NOTE — ED ADULT NURSE NOTE - OBJECTIVE STATEMENT
Pt received to  accompanied by EMS from [place], [facility] [address]. Pt presents pleasant, smiling, calm and cooperative. Pt states she has increasing anxiety this evening and momentarily felt suicidal (denies plan) at her residence,  but denies SI/HI at present. Pt states she is looking forward to going home to "watch YouTube videos and going to [employer] on Sunday". Pt evaluated by PA and medicated as ordered and pt awaiting SW.

## 2023-06-16 NOTE — ED PROVIDER NOTE - NSFOLLOWUPINSTRUCTIONS_ED_ALL_ED_FT
you do not meet criteria for inpatient psychiatric admission at this time  please continue your home medications and follow up with your psychiatrist.   Return to ED for any worsening symptoms.

## 2023-06-16 NOTE — ED PROVIDER NOTE - PHYSICAL EXAMINATION
CONSTITUTIONAL: Well-appearing; well-nourished; in no apparent distress;  HEAD: Normocephalic, atraumatic;  EYES: conjunctiva and sclera WNL;  CARD: Normal S1, S2; no murmurs, rubs, or gallops noted  RESP: Normal chest excursion with respiration; breath sounds clear and equal bilaterally; no wheezes, rhonchi, or rales noted  EXT/MS: moves all extremities  SKIN: Normal for age and race; warm; dry; good turgor; no apparent lesions or exudate noted  NEURO: Awake, alert, oriented x 3, no gross deficits  PSYCH: Normal mood; appropriate affect; smiling, laughing, calm, pleasant, answers questions appropriately.

## 2023-06-16 NOTE — ED ADULT TRIAGE NOTE - CHIEF COMPLAINT QUOTE
suicidal ideation with intense anxiety x 1 hour. PT denies a plan but does report last SA was 2012. Hx schizophrenia, bi-polar

## 2023-06-16 NOTE — ED PROVIDER NOTE - CLINICAL SUMMARY MEDICAL DECISION MAKING FREE TEXT BOX
42yoF PMH of Asthma, GERD, Fibroids, MDD, Bipolar, Borderline Personality Disorder resides at [facility] BIBA for anxiety. feels worsening anxiety. Able to sleep. Denies SI/HI/AH/VH. Denies drug use. denies any cp, sob, palpitations, dizziness, weakness.    pt well appearing, alert, oriented, appropriate  SW for collateral from [facility]   will give dose of ativan, as pt has recv'd in past with improvement, and reassess/dc 42yoF PMH of Asthma, GERD, Fibroids, MDD, Bipolar, Borderline Personality Disorder resides at [facility] BIBA for anxiety. feels worsening anxiety. Able to sleep. Denies SI/HI/AH/VH. Denies drug use. denies any cp, sob, palpitations, dizziness, weakness.    pt well appearing, alert, oriented, appropriate, future oriented, appears very happy  SW for collateral from [facility]   will give dose of ativan, as pt has recv'd in past with improvement, and reassess/dc

## 2023-06-16 NOTE — ED PROVIDER NOTE - OBJECTIVE STATEMENT
42yoF PMH of Asthma, GERD, Fibroids, MDD, Bipolar, Borderline Personality Disorder resides at [facility] for anxiety. feels worsening anxiety. Able to sleep. Denies SI/HI/AH/VH. Denies drug use. denies any cp, sob, palpitations, dizziness, weakness. 42yoF PMH of Asthma, GERD, Fibroids, MDD, Bipolar, Borderline Personality Disorder resides at [facility] for anxiety. feels worsening anxiety. Able to sleep. Very excited to go to [employer] tomorrow. Denies SI/HI/AH/VH. Denies drug use. denies any cp, sob, palpitations, dizziness, weakness.

## 2023-06-16 NOTE — ED ADULT NURSE NOTE - NSFALLUNIVINTERV_ED_ALL_ED
Bed/Stretcher in lowest position, wheels locked, appropriate side rails in place/Instruct patient to call for assistance before getting out of bed/chair/stretcher/Non-slip footwear applied when patient is off stretcher/Physically safe environment - no spills, clutter or unnecessary equipment/Purposeful proactive rounding

## 2023-06-22 ENCOUNTER — EMERGENCY (EMERGENCY)
Facility: HOSPITAL | Age: 43
LOS: 1 days | Discharge: HOME | End: 2023-06-22
Attending: STUDENT IN AN ORGANIZED HEALTH CARE EDUCATION/TRAINING PROGRAM | Admitting: STUDENT IN AN ORGANIZED HEALTH CARE EDUCATION/TRAINING PROGRAM
Payer: COMMERCIAL

## 2023-06-22 VITALS
DIASTOLIC BLOOD PRESSURE: 80 MMHG | TEMPERATURE: 98 F | HEART RATE: 72 BPM | OXYGEN SATURATION: 97 % | RESPIRATION RATE: 17 BRPM | SYSTOLIC BLOOD PRESSURE: 141 MMHG

## 2023-06-22 PROCEDURE — 99285 EMERGENCY DEPT VISIT HI MDM: CPT | Mod: GC

## 2023-06-22 PROCEDURE — 71046 X-RAY EXAM CHEST 2 VIEWS: CPT | Mod: 26

## 2023-06-22 PROCEDURE — 93010 ELECTROCARDIOGRAM REPORT: CPT

## 2023-06-22 RX ORDER — ACETAMINOPHEN 500 MG
975 TABLET ORAL ONCE
Refills: 0 | Status: COMPLETED | OUTPATIENT
Start: 2023-06-22 | End: 2023-06-22

## 2023-06-22 RX ADMIN — Medication 975 MILLIGRAM(S): at 13:18

## 2023-06-22 RX ADMIN — Medication 975 MILLIGRAM(S): at 12:48

## 2023-06-22 NOTE — ED ADULT NURSE NOTE - NSFALLUNIVINTERV_ED_ALL_ED
Bed/Stretcher in lowest position, wheels locked, appropriate side rails in place/Call bell, personal items and telephone in reach/Instruct patient to call for assistance before getting out of bed/chair/stretcher/Non-slip footwear applied when patient is off stretcher/[place] to call system/Physically safe environment - no spills, clutter or unnecessary equipment/Purposeful proactive rounding/Room/bathroom lighting operational, light cord in reach

## 2023-06-22 NOTE — ED PROVIDER NOTE - NS ED ROS FT
Constitutional: No fever. no chills.   Eyes: No visual changes, eye pain or redness  HEENT: No throat pain, hearing changes, ear pain, nasal pain.   CV: + chest pain, no palpitations  Resp: No shortness of breath, no cough  GI: No abdominal pain. No nausea. no  vomiting.   : No dysuria, hematuria. no urinary frequency, no urinary urgency.  MSK: No musculoskeletal pain  Skin: No rash, lesions, no bruises  Neuro: No headache. No numbness or tingling. No weakness. No dizziness.  Allergy/Immunology: allergy per emr

## 2023-06-22 NOTE — ED PROVIDER NOTE - CLINICAL SUMMARY MEDICAL DECISION MAKING FREE TEXT BOX
Patient with several hours of chest pain earlier today now resolved in the ED.  No associated infectious symptoms.  Patient reports similar chest pain in past with negative ED work-ups in the past.  EKG normal sinus rhythm 70 bpm no significant ST elevation depression to inversion.  Normal axis and normal intervals.  Does have criteria for LVH.  Chest x-ray without infiltrate or pneumothorax.  Plan was to draw blood work as well initial attempt by the ED nurse unsuccessful, patient refusing additional blood draw time.  Patient reports she does not have any symptoms and would like to be discharged.  Explained to patient necessity for blood work and ensuring no cardiac etiology of pain patient expressed understanding but is still declining blood work.  Patient without symptoms at this time will arrange with social work for return to [facility].  Patient understands she can return at any time to complete her work-up or for evaluation if symptoms return. Patient with several hours of chest pain earlier today now resolved in the ED.  No associated infectious symptoms.  Patient reports similar chest pain in past with negative ED work-ups in the past.  EKG normal sinus rhythm 70 bpm no significant ST elevation depression to inversion.  Normal axis and normal intervals.  Does have criteria for LVH.  Chest x-ray without infiltrate or pneumothorax.  Plan was to draw blood work as well initial attempt by the ED nurse unsuccessful, patient refusing additional blood draw time.  Patient reports she does not have any symptoms and would like to be discharged.  Explained to patient necessity for blood work and ensuring no cardiac etiology of pain patient expressed understanding but is still declining blood work.  Patient expressed understanding of the importance of getting blood work. Patient without symptoms at this time will arrange with social work for return to [facility].  Patient understands she can return at any time to complete her work-up or for evaluation if symptoms return.

## 2023-06-22 NOTE — CHART NOTE - NSCHARTNOTEFT_GEN_A_CORE
was informed by provider that patient is cleared to return to her previous residence. Patient is from [facility], [address] [place] [address]/ [facility] [address] [facility] [address] [address] ([address]  contacted the residence ([phone]) and confirmed with Ms. Begum that patient can travel independently in a taxi. Clinical provider is in agreement with Taxi back to her residence. Verbal huddle regarding coordination of care completed with interdisciplinary team.   Patient does not have active Medicaid.  scheduled taxi through [employer]; booking # [id number]. Patient and provider is aware. No further social work intervention needed.

## 2023-06-22 NOTE — ED ADULT NURSE NOTE - CHIEF COMPLAINT QUOTE
Pt coming to ER from [facility]. Pt c/o of chest pain that started 30 minutes ago. States pain is sharp, midsternal and gets worse with inspiration. Denies headache, dizziness, nausea and vomiting. Hx anxiety, bipolar disorder. Denies suicidal ideation, homicidal ideation, visual and auditory hallucinations.

## 2023-06-22 NOTE — ED ADULT TRIAGE NOTE - NS ED NURSE AMBULANCES
[facility] Dermat[facility]  Dermatology  [address] [facility], [facility] [address]  [place] [address]  Phone: [phone]  Fax:   Follow Up Time: Routine [facility] Full range of motion of upper and lower extremities, no joint tenderness/swelling. Full range of motion of upper and lower extremities, no joint tenderness/swelling.

## 2023-06-22 NOTE — ED PROVIDER NOTE - PHYSICAL EXAMINATION
GEN: no acute respiratory distress. nontoxic, speaking comfortably in full sentences, ambulating with steady gait.  HEENT: NCAT. face symmetrical. PERRL 4mm, EOMI, MMM, oropharynx wnl.  Neck: no JVD, trachea midline, no lymphadenopathy, FROM  CV: RRR. +S1S2, no murmur. 2+ pulses in 4 extremities, cap refill <2 sec  Chest: CTA B/l. no wheezing, rales, rhonchi. no retractions. good air movement. mild right upper chest wall tenderness. no rash or ecchymosis  ABD: +BS, soft, non distended, non tender. No guarding/rebound.   : no cva or suprapubic tenderness  MSK: No clubbing, cyanosis, edema. FROM of all extremities. no tenderness to palpation. No midline or paraspinal tenderness.   Neuro: AAOX3.  Sensation intact, motor 5/5 throughout.   SKIN: No rash

## 2023-06-22 NOTE — ED PROVIDER NOTE - NSFOLLOWUPINSTRUCTIONS_ED_ALL_ED_FT
1) Please follow-up with your primary care doctor within the next 2-3 days.  Please call today for an appointment.   2) If you have any worsening of symptoms or any other concerns please return to the ED immediately.  3) Please take the medication you were prescribed today and continue taking your home medications as directed.  4) You may have been given a copy of your labs and/or imaging.  Please go over these with your primary care doctor.    Nonspecific Chest Pain      Chest pain can be caused by many different conditions. Some causes of chest pain can be life-threatening. These will require treatment right away. Serious causes of chest pain include:  •Heart attack.      •A tear in the body's main blood vessel.      •Redness and swelling (inflammation) around your heart.      •Blood clot in your lungs.      Other causes of chest pain may not be so serious. These include:  •Heartburn.      •Anxiety or stress.      •Damage to bones or muscles in your chest.      •Lung infections.      Chest pain can feel like:  •Pain or discomfort in your chest.      •Crushing, pressure, aching, or squeezing pain.       •Burning or tingling.       •Dull or sharp pain that is worse when you move, cough, or take a deep breath.       •Pain or discomfort that is also felt in your back, neck, jaw, shoulder, or arm, or pain that spreads to any of these areas.      It is hard to know whether your pain is caused by something that is serious or something that is not so serious. So it is important to see your doctor right away if you have chest pain.      Follow these instructions at home:    Medicines     •Take over-the-counter and prescription medicines only as told by your doctor.      •If you were prescribed an antibiotic medicine, take it as told by your doctor. Do not stop taking the antibiotic even if you start to feel better.        Lifestyle   A plate along with examples of foods in a healthy diet.   •Rest as told by your doctor.      • Do not use any products that contain nicotine or tobacco, such as cigarettes, e-cigarettes, and chewing tobacco. If you need help quitting, ask your doctor.      • Do not drink alcohol.    •Make lifestyle changes as told by your doctor. These may include:  •Getting regular exercise. Ask your doctor what activities are safe for you.      •Eating a heart-healthy diet. A diet and nutrition specialist (dietitian) can help you to learn healthy eating options.      •Staying at a healthy weight.      •Treating diabetes or high blood pressure, if needed.      •Lowering your stress. Activities such as yoga and relaxation techniques can help.        General instructions     •Pay attention to any changes in your symptoms. Tell your doctor about them or any new symptoms.      •Avoid any activities that cause chest pain.      •Keep all follow-up visits as told by your doctor. This is important. You may need more testing if your chest pain does not go away.        Contact a doctor if:    •Your chest pain does not go away.      •You feel depressed.      •You have a fever.        Get help right away if:    •Your chest pain is worse.      •You have a cough that gets worse, or you cough up blood.      •You have very bad (severe) pain in your belly (abdomen).      •You pass out (faint).    •You have either of these for no clear reason:  •Sudden chest discomfort.      •Sudden discomfort in your arms, back, neck, or jaw.        •You have shortness of breath at any time.      •You suddenly start to sweat, or your skin gets clammy.      •You feel sick to your stomach (nauseous).      •You throw up (vomit).      •You suddenly feel lightheaded or dizzy.      •You feel very weak or tired.      •Your heart starts to beat fast, or it feels like it is skipping beats.      These symptoms may be an emergency. Do not wait to see if the symptoms will go away. Get medical help right away. Call your local emergency services (911 in the [place] Do not drive yourself to the hospital.       Summary    •Chest pain can be caused by many different conditions. The cause may be serious and need treatment right away. If you have chest pain, see your doctor right away.      •Follow your doctor's instructions for taking medicines and making lifestyle changes.       •Keep all follow-up visits as told by your doctor. This includes visits for any further testing if your chest pain does not go away.      •Be sure to know the signs that show that your condition has become worse. Get help right away if you have these symptoms.      This information is not intended to replace advice given to you by your health care provider. Make sure you discuss any questions you have with your health care provider.      Dolor de pecho no específico en los adultos    Nonspecific Chest Pain, Adult      El dolor de pecho es dafne sensación molesta, opresiva o dolorosa en el pecho. El dolor se siente maldonado dafne aplastamiento, torsión u opresión en el pecho. Dafne persona puede sentir dafne sensación de ardor u hormigueo. El dolor de pecho también se puede sentir en la espalda, el allen, la mandíbula, el hombro o el brazo. Gretel dolor puede empeorar al moverse, estornudar o respirar profundamente.    El dolor de pecho puede deberse a dafne afección potencialmente mortal. Se debe tratar de inmediato. También puede ser provocado por algo que no es potencialmente mortal. Si tiene dolor de pecho, puede ser difícil saber la diferencia, por lo tanto es importante que obtenga ayuda de inmediato para asegurarse de que no tiene dafne afección grave.    Algunas causas potencialmente mortales del dolor de pecho incluyen:  •Infarto de miocardio.      •Un desgarro en el vaso sanguíneo principal del cuerpo (disección aórtica).      •Inflamación alrededor del corazón (pericarditis).      •Un problema en los pulmones, maldonado un coágulo de emily (embolia pulmonar) o un pulmón colapsado (neumotórax).      Algunas causas de dolor de pecho que no son potencialmente mortales incluyen:  •Acidez estomacal.      •Ansiedad o estrés.      •Daño de los huesos, los músculos y los cartílagos que conforman la pared torácica.      •Neumonía o bronquitis.      •Culebrilla (virus de la varicela zóster).      El dolor de pecho puede aparecer y desaparecer. También puede ser hernan. Sifuentes médico le hará pruebas clínicas y otros estudios para tratar de determinar la causa del dolor. El tratamiento dependerá de la causa del dolor de pecho.      Siga estas instrucciones en sifuentes casa:    Medicamentos     •Use los medicamentos de venta radha y los recetados solamente maldonado se lo haya indicado el médico.      •Si le recetaron un antibiótico, tómelo maldonado se lo haya indicado el médico. No deje de sarah el antibiótico aunque comience a sentirse mejor.      Actividad     •Evite las actividades que le causen dolor de pecho.      • No levante ningún objeto que pese más de 10 libras (4,5 kg) o que supere el límite de peso que le hayan indicado, hasta que el médico le diga que puede hacerlo.      •Karrie reposo maldonado se lo haya indicado el médico.       •Retome louisa actividades normales solo maldonado se lo haya indicado el médico. Pregúntele al médico qué actividades son seguras para usted.        Estilo de terri       A plate along with examples of foods in a healthy diet.       Silhouette of a person sitting on the floor doing yoga.     • No consuma ningún producto que contenga nicotina o tabaco, maldonado cigarrillos, cigarrillos electrónicos y tabaco de mascar. Si necesita ayuda para dejar de fumar, consulte al médico.      • No shari alcohol.    •Opte por un estilo de terri saludable maldonado se lo hayan recomendado. Puede incluir:  •Practicar actividad física con regularidad. Pídale al médico que le sugiera ejercicios que sheldon seguros para usted.      •Seguir dafne dieta cardiosaludable. Esta debe incluir muchas frutas y verduras frescas, cereales integrales, proteínas (magras) con bajo contenido de grasa y productos lácteos bajos en grasa. Un nutricionista podrá ayudarlo a hacer elecciones de alimentación saludables.      •Mantener un peso saludable.      •Controlar cualquier otra afección que tenga, maldonado presión arterial aditya (hipertensión) o diabetes.      •Disminuir el nivel de estrés; por ejemplo, con yoga o técnicas de relajación.        Instrucciones generales     •Esté atento a cualquier cambio en los síntomas.      •Es sifuentes responsabilidad obtener los resultados de cualquier prueba que se haya realizado. Consulte al médico o pregunte en el departamento donde se realizan las pruebas cuándo estarán listos los resultados.      •Concurra a todas las visitas de seguimiento maldonado se lo haya indicado el médico. [place] es importante.       •Es posible que le pidan que se someta a más pruebas si el dolor de pecho no desaparece.        Comuníquese con un médico si:    •El dolor de pecho no desaparece.      •Se siente deprimido.      •Tiene fiebre.      •Nota cambios en los síntomas o desarrolla síntomas nuevos.        Solicite ayuda de inmediato si:    •El dolor en el pecho empeora.      •Tiene tos que empeora o tose con emily.      •Siente un dolor intenso en el abdomen.      •Se desmaya.      •Tiene dafne molestia repentina e inexplicable en el pecho.      •Tiene molestias repentinas e inexplicables en los brazos, la espalda, el allen o la mandíbula.      •Le falta el aire en cualquier momento.      •Comienza a sudar de manera repentina o la piel se le humedece.      •Siente náuseas o vomita.      •Se siente repentinamente mareado o se desmaya.      •Tiene debilidad intensa, o debilidad o fatiga sin explicación.      •Siente que el corazón comienza a latir rápidamente o que se saltea latidos.      Estos síntomas pueden representar un problema grave que constituye dafne emergencia. No espere a bill si los síntomas desaparecen. Solicite atención médica de inmediato. Comuníquese con el servicio de emergencias de sifuentes localidad (911 en los [place]). No conduzca por louisa propios medios hasta el hospital.       Resumen    •El dolor de pecho puede ser provocado por dafne afección que es grave y requiere tratamiento urgente. También puede ser provocado por algo que no es potencialmente mortal.      •El médico puede hacerle pruebas clínicas y otros estudios para tratar de determinar la causa del dolor.      •Siga las instrucciones de sifuentes médico sobre sarah medicamentos, hacer cambios en sifuentes estilo de terri y recibir tratamiento de urgencia si los síntomas empeoran.      •Concurra a todas las visitas de seguimiento maldonado se lo haya indicado el médico. [place] incluye las visitas para realizarle otras pruebas si el dolor de pecho no desaparece.      Esta información no tiene maldonado fin reemplazar el consejo del médico. Asegúrese de hacerle al médico cualquier pregunta que tenga.

## 2023-06-22 NOTE — ED ADULT TRIAGE NOTE - CHIEF COMPLAINT QUOTE
Pt coming to ER from [facility] 74. Pt c/o of chest pain that started 30 minutes ago. States pain is sharp, midsternal and gets worse with inspiration. Denies headache, dizziness, nausea and vomiting. Hx anxiety, bipolar disorder. Denies suicidal ideation, homicidal ideation, visual and auditory hallucinations.

## 2023-06-22 NOTE — ED PROVIDER NOTE - OBJECTIVE STATEMENT
42-year-old female past medical history schizoaffective disorder, borderline personality disorder, GERD, asthma presents to ED after several hours of right-sided chest pain today.  No clear inciting event.  Denies any fever, chills, cough, shortness of breath, palpitations.  Denies any abdominal pain, nausea, vomiting.  Denies any back pain, extremity pain/numbness/weakness.  Patient denies alcohol, tobacco, drugs, marijuana.  Patient from [facility]. 72.  Per report does use marijuana occasionally.  At time of evaluation pain is since resolved.

## 2023-06-22 NOTE — ED ADULT NURSE NOTE - OBJECTIVE STATEMENT
Received patient in Intake 8 c/o chest pain today, patient denies SOB, fever. Patient is A&OX3, airway patent, breathing unlabored and even. Medications given as ordered. Side rails up and safety maintained. Received patient in Intake 8 from [facility] c/o chest pain today, patient denies SOB, fever. Patient is A&OX3, airway patent, breathing unlabored and even. Medications given as ordered. Side rails up and safety maintained. Received patient in Intake 8 from [facility] c/o right sided chest pain today, patient denies SOB, fever. PMHX Schizoaffective disorder, Border line personality disorder, GERD, Asthma. Patient is A&OX3, airway patent, breathing unlabored and even. Medications given as ordered. Side rails up and safety maintained. Received patient in Intake 8 from [facility] c/o right sided chest pain today, patient denies SOB, fever. PMHX Schizoaffective disorder, Borderline personality disorder, GERD, Asthma. Patient is A&OX3, airway patent, breathing unlabored and even. Medications given as ordered. Side rails up and safety maintained.

## 2023-06-22 NOTE — ED PROVIDER NOTE - PATIENT PORTAL LINK FT
You can access the FollowMyHealth Patient Portal offered by [facility] by registering at the following website: [email or web address]. By joining [employer]’s FollowMyHealth portal, you will also be able to view your health information using other applications (apps) compatible with our system.

## 2023-06-23 NOTE — BH INPATIENT PSYCHIATRY DISCHARGE NOTE - NSDCMRMEDTOKEN_GEN_ALL_CORE_FT
Detail Level: Detailed Detail Level: Simple Haldol Decanoate 100 mg/mL intramuscular solution: 200 milligram(s) intramuscularly every 4 weeks NEXT DUE 5/24/23  OXcarbazepine 300 mg oral tablet: 1 tab(s) orally 2 times a day

## 2023-07-05 ENCOUNTER — EMERGENCY (EMERGENCY)
Facility: HOSPITAL | Age: 43
LOS: 1 days | Discharge: HOME | End: 2023-07-05
Admitting: STUDENT IN AN ORGANIZED HEALTH CARE EDUCATION/TRAINING PROGRAM
Payer: MEDICAID

## 2023-07-05 VITALS
OXYGEN SATURATION: 100 % | TEMPERATURE: 98 F | HEART RATE: 94 BPM | SYSTOLIC BLOOD PRESSURE: 150 MMHG | DIASTOLIC BLOOD PRESSURE: 98 MMHG | RESPIRATION RATE: 16 BRPM

## 2023-07-05 VITALS
RESPIRATION RATE: 16 BRPM | TEMPERATURE: 98 F | SYSTOLIC BLOOD PRESSURE: 124 MMHG | DIASTOLIC BLOOD PRESSURE: 64 MMHG | HEART RATE: 78 BPM | OXYGEN SATURATION: 100 %

## 2023-07-05 PROCEDURE — 99284 EMERGENCY DEPT VISIT MOD MDM: CPT

## 2023-07-05 NOTE — ED BEHAVIORAL HEALTH NOTE - BEHAVIORAL HEALTH NOTE
Writer contacted [facility] ([phone]) to obtain collateral information. writer left a  requesting a return call. Writer contacted [facility] ([phone]) to obtain collateral information. Writer spoke w/ VIKKI garcia who provided following the information.    Patient is a 43 Yo female domiciled at [facility], hx of bipolar schizophrenia, bib EMS activated by staff due to pt endorsing Si and hearing voices. pt endorsed voices were telling her to hurt herself. no plan or intention noted. She reports the pt seemed fine and not worried when endorsing si and voices. Patient does not appear paranoid or delusional as per deisi. She reports patient is not violent or aggressive. No drug or alcohol use. She says patient is compliant w/ medication and sleeping, eating and showering at baseline. . No further safety concerns reported.  She says patient was triggered earlier because another resident was being aggressive with director which caused pt to be upset,  pt went outside, got tops of garbage can and tried to go after the peer but was unsuccessful. pt then took top back. pt good for a couple of hours Writer contacted [facility] ([phone]) to obtain collateral information. Writer spoke w/ VIKKI garcia who provided following the information.    Patient is a 41 Yo female domiciled at [facility], hx of bipolar schizophrenia, bib EMS activated by staff due to pt endorsing Si and hearing voices. pt endorsed voices were telling her to hurt herself. no plan or intention noted. She reports the pt seemed fine and not worried when endorsing  voices. Patient does not appear paranoid or delusional as per RA. She reports patient is not violent or aggressive. No drug or alcohol use. She says patient is compliant w/ medication and sleeping, eating and showering at baseline. . No further safety concerns reported.  She says patient was triggered earlier because another resident was being aggressive with director which caused pt to be upset,  pt went outside, got tops of garbage can and tried to go after the peer but was unsuccessful and was able to be redirected. RA reports patient was calm after and went into her room prior to asking to come to the ED. patient can return via taxi if cleared for discharge. Writer contacted [facility] ([phone]) to obtain collateral information. Writer spoke w/ VIKKI garcia who provided following the information.    Patient is a 43 Yo female domiciled at [facility], hx of bipolar schizophrenia, bib EMS activated by staff due to pt endorsing Si and hearing voices. pt endorsed voices were telling her to hurt herself. no plan or intention noted. She reports the pt seemed fine and not worried when endorsing  voices. Patient does not appear paranoid or delusional as per RA. She reports patient is not violent or aggressive. No drug or alcohol use. She says patient is compliant w/ medication and sleeping, eating and showering at baseline. . No further safety concerns reported.  She says patient was triggered earlier because another resident was being aggressive with director which caused pt to be upset,  pt went outside, got tops of garbage can and tried to go after the peer but was unsuccessful and was able to be redirected. RA reports patient was calm after and went into her room prior to asking to come to the ED. patient can return via taxi if cleared for discharge.    Per provider, KIRSTY Keene , patient is cleared and is able to return to their previous residence, [facility] [address].  has spoken to VIKKI Garcia([phone]) confirmed that patient’s mode of transportation is [employer] and that patient travels INDEPENDENTLY. Clinical provider is in agreement with [employer]  back to group home. Verbal huddle regarding coordination of care completed with interdisciplinary team.  Patient’s zulma is not active. Worker utilized account [employer] #[id number] for safe transport back. Writer contacted [facility] ([phone]) to obtain collateral information. Writer spoke w/ VIKKI garcia who provided following the information.    Patient is a 43 Yo female domiciled at [facility], hx of bipolar schizophrenia, bib EMS activated by staff due to pt endorsing Si and hearing voices. pt endorsed voices were telling her to hurt herself. no plan or intention noted. She reports the pt seemed fine and not worried when endorsing  voices. Patient does not appear paranoid or delusional as per RA. She reports patient is not violent or aggressive. No drug or alcohol use. She says patient is compliant w/ medication and sleeping, eating and showering at baseline. . No further safety concerns reported.  She says patient was triggered earlier because another resident was being aggressive with director which caused pt to be upset,  pt went outside, got tops of garbage can and tried to go after the peer but was unsuccessful and was able to be redirected. RA reports patient was calm after and went into her room prior to asking to come to the ED. patient can return via taxi if cleared for discharge.    Per provider, KIRSTY Keene , patient is cleared and is able to return to their previous residence, [facility] [address].  has spoken to VIKKI Garcia([phone]) confirmed that patient’s mode of transportation is [employer] and that patient travels INDEPENDENTLY. Clinical provider is in agreement with [employer]  back to group home. Verbal huddle regarding coordination of care completed with interdisciplinary team.  Patient’s zulma is not active as per MAS. Worker utilized account [employer] #[id number].

## 2023-07-05 NOTE — ED PROVIDER NOTE - PATIENT PORTAL LINK FT
You can access the FollowMyHealth Patient Portal offered by [facility] by registering at the following website: [email or web address]. By joining [employer]’s FollowMyHealth portal, you will also be able to view your health information using other applications (apps) compatible with our system.

## 2023-07-05 NOTE — ED PROVIDER NOTE - PROGRESS NOTE DETAILS
KIRSTY Keene- upon reevaluation pt feels better, expressing she is ready to go home. Food tray provided, requested tylenol ot right thigh pain, ( no visible s/s of injury, or trauma, no limited rom)   Transport arranged by stacia.

## 2023-07-05 NOTE — ED PROVIDER NOTE - OBJECTIVE STATEMENT
This is a 32-year-old female past medical history asthma, GERD, PPHx  bipolar and borderline personality disorder, depression, with complaint of increased anxiety auditory hallucination.  Reports she started to hear the voices 30 minutes prior to arrival the voices told her to hurt herself but she did not do that instead of that she came to the hospital. Pt well know to  staff members and provider. Appropriately dressed for the season, happy to see us and tell us about her upcoming birthday. This is a 32-year-old female past medical history asthma, GERD, PPHx  bipolar and borderline personality disorder, depression, with complaint of increased anxiety auditory hallucination.  Reports she started to hear the voices 30 minutes prior to arrival the voices told her to hurt herself but she did not do that instead of that she came to the hospital. Pt well know to  staff members and provider. Appropriately dressed for the season, happy to see us and tell us about her upcoming birthday, smiling at her baseline. willian Jimenez si.

## 2023-07-05 NOTE — ED ADULT NURSE NOTE - NSICDXPASTSURGICALHX_GEN_ALL_CORE_FT
"        Judy Bland   2017 3:20 PM   Office Visit   MRN: [id number]    Department:  [facility]   Dept Phone:  [phone]    Description:  Female : 1947   Provider:  MARKOS Kaplan           Reason for Visit     Follow-Up           Allergies as of 2017     Allergen Noted Reactions    Ultram [Tramadol Hcl] 2011       ELEVATED LIVER ENZYMES      You were diagnosed with     BACILIO (obstructive sleep apnea)   [486770]       Mild persistent asthma without complication   [809585]       Allergic rhinitis, unspecified allergic rhinitis trigger, unspecified rhinitis seasonality   [1653851]       Obesity (BMI 30-39.9)   [101421]       Shortness of breath   [786.05.ICD-9-CM]         Vital Signs     Blood Pressure Pulse Temperature Respirations Height Weight    118/78 mmHg 92 36.9 °C (98.4 °F) 16 1.676 m (5' 5.98\") 96.163 kg (212 lb)    Body Mass Index Oxygen Saturation Smoking Status             34.23 kg/m2 95% Never Smoker          Basic Information     Date Of Birth Sex Race Ethnicity Preferred Language    1947 Female White Non- English      Your appointments     2017  3:20 PM   Established Patient Pul with MARKOS Kaplan   [facility] (--)    [address] [place] [address] 200  [facility] [id number] [phone]              Problem List              ICD-10-CM Priority Class Noted - Resolved    Acquired polycythemia (Chronic) D75.1   2/15/2012 - Present    Benign essential hypertension (Chronic) I10   2/15/2012 - Present    Difficulty breathing R06.89   2/15/2012 - Present    Cough (Chronic) R05   2/15/2012 - Present    Hypercholesterolemia (Chronic) E78.00   2/15/2012 - Present    Murmur (Chronic) R01.1   2/15/2012 - Present    Obesity (Chronic) E66.9   2/15/2012 - Present    BACILIO (obstructive sleep apnea) (Chronic) G47.33   2/15/2012 - Present    Sinus tachycardia (Chronic) R00.0   2/15/2012 - Present    Arthropathy (Chronic) " M12.9   2/15/2012 - Present    Chronic hepatitis B (CMS-HCC) (Chronic) B18.1   2/15/2012 - Present    Esophageal reflux (Chronic) K21.9   2/15/2012 - Present    Hyperlipidemia (Chronic) E78.5   2/15/2012 - Present    Shortness of breath R06.02   2/26/2015 - Present    Allergic rhinitis due to allergen J30.9   6/13/2016 - Present    LLOYD (dyspnea on exertion) R06.09   3/16/2017 - Present    Obesity (BMI 30-39.9) E66.9   7/26/2017 - Present    Mild persistent asthma without complication J45.30   7/26/2017 - Present      Health Maintenance        Date Due Completion Dates    IMM DTaP/Tdap/Td Vaccine (1 - Tdap) 8/20/1964 ---    PAP SMEAR 8/20/1966 ---    COLONOSCOPY 8/20/1995 ---    IMM ZOSTER VACCINE 8/20/2005 ---    IMM PNEUMOCOCCAL 65+ (ADULT) LOW/MEDIUM RISK SERIES (1 of 2 - PCV13) 8/20/2010 ---    IMM INFLUENZA (1) 9/1/2017 ---    MAMMOGRAM 3/24/2018 3/24/2017, 3/4/2016, 2/25/2015, 2/20/2014, 2/7/2013, 2/3/2012, 1/27/2011, 1/22/2010, 1/22/2010, 1/21/2009, 1/21/2009, 1/17/2008, 1/17/2008, 1/15/2007, 1/11/2006, 1/10/2005    BONE DENSITY 12/29/2019 12/29/2014, 4/7/2009            Current Immunizations     13-VALENT PCV PREVNAR 1/26/2017    Influenza TIV (IM) 12/12/2016      Below and/or attached are the medications your provider expects you to take. Review all of your home medications and newly ordered medications with your provider and/or pharmacist. Follow medication instructions as directed by your provider and/or pharmacist. Please keep your medication list with you and share with your provider. Update the information when medications are discontinued, doses are changed, or new medications (including over-the-counter products) are added; and carry medication information at all times in the event of emergency situations     Allergies:  ULTRAM - (reactions not documented)               Medications  Valid as of: July 26, 2017 -  3:03 PM    Generic Name Brand Name Tablet Size Instructions for use    Acetaminophen    Take  by mouth. EXTRA STRENGH 2 TABS PRN         Albuterol Sulfate   Inhale  by mouth as needed.        ALPRAZolam (Tab) XANAX 0.5 MG Take 0.5 mg by mouth 1 time daily as needed.        Atorvastatin Calcium (Tab) LIPITOR 20 MG         Budesonide-Formoterol Fumarate (Aerosol) SYMBICORT 160-4.5 MCG/ACT USE 2 INHALATIONS ORALLY   TWICE DAILY WITH SPACER,   RINSE MOUTH AFTER EACH USE        Cetirizine HCl   Take  by mouth.        DilTIAZem HCl (Tab) CARDIZEM 60 MG TAKE ONE TABLET BY MOUTH EVERY 12 HOURS        DilTIAZem HCl (Tab) CARDIZEM 60 MG TAKE 1 TABLET EVERY 12     HOURS        Doxepin HCl (Tab) Doxepin HCl 3 MG Take 3 Tabs by mouth every bedtime.        Doxepin HCl (Cap) SINEQUAN 25 MG         Fluticasone Furoate-Vilanterol (AEROSOL POWDER, BREATH ACTIVATED) Fluticasone Furoate-Vilanterol 200-25 MCG/INH Take 1 Inhalation by mouth every day. Rinse mouth after use.        Fluticasone Furoate-Vilanterol (AEROSOL POWDER, BREATH ACTIVATED) Fluticasone Furoate-Vilanterol 200-25 MCG/INH Take 1 Inhalation by mouth every day. Rinse mouth after use.        Fluticasone Propionate (Suspension) FLONASE 50 MCG/ACT Spray 1 Spray in nose 2 times a day. Each Nostril         HydrOXYzine HCl (Tab) ATARAX 10 MG         Lisinopril-Hydrochlorothiazide (Tab) PRINZIDE, ZESTORETIC 20-12.5 MG         MetFORMIN HCl (Tab) GLUCOPHAGE 500 MG Take 500 mg by mouth every day.        MetFORMIN HCl (TABLET SR 24 HR) GLUCOPHAGE  MG         Montelukast Sodium (Tab) SINGULAIR 10 MG Take 10 mg by mouth every day.          Montelukast Sodium (Tab) SINGULAIR 10 MG Take 1 Tab by mouth every evening.        Pantoprazole Sodium (Tablet Delayed Response) PROTONIX 40 MG Take 40 mg by mouth 2 times a day.          RaNITidine HCl (Tab) ZANTAC 150 MG Take 150 mg by mouth every day.        Simvastatin (Tab) ZOCOR 20 MG Take 1 Tab by mouth every day.        Spironolactone-HCTZ (Tab) ALDACTAZIDE 25-25 MG TAKE ONE TABLET BY MOUTH EVERY DAY        .                  Medicines prescribed today were sent to:     [facility] - [place], [place] - [address] AT PORTAL TO REGISTERED [facility] SITES    [address] [facility] [address]    Phone: [phone] Fax: [phone]    Open 24 Hours?: No    [facility] - [place], [place] - [address]    [address] [facility] [facility] [address] [facility] [place] [address]    Phone: [phone] Fax: [phone]    Open 24 Hours?: No      Medication refill instructions:       If your prescription bottle indicates you have medication refills left, it is not necessary to call your provider’s office. Please contact your pharmacy and they will refill your medication.    If your prescription bottle indicates you do not have any refills left, you may request refills at any time through one of the following ways: The online [employer] system (except Urgent Care), by calling your provider’s office, or by asking your pharmacy to contact your provider’s office with a refill request. Medication refills are processed only during regular business hours and may not be available until the next business day. Your provider may request additional information or to have a follow-up visit with you prior to refilling your medication.   *Please Note: Medication refills are assigned a new Rx number when refilled electronically. Your pharmacy may indicate that no refills were authorized even though a new prescription for the same medication is available at the pharmacy. Please request the medicine by name with the pharmacy before contacting your provider for a refill.        Other Notes About Your Plan     [facility] Ph. [phone] Fax. [phone]             [employer] Access Code: [email or web address]  Expires: 8/25/2017  2:31 PM    [employer]  A secure, online tool to manage your health information     Access Information Management’s [employer]® is a secure, online tool that connects you to your personalized health information from the privacy of your home -- day or night -  making it very easy for you to manage your healthcare. Once the activation process is completed, you can even access your medical information using the Adylitica sebastian, which is available for free in the Apple Sebastian store or Google Play store.     Adylitica provides the following levels of access (as shown below):   My Chart Features   [facility] Primary Care Doctor [facility]  Specialists [facility]  Urgent  Care Non-[facility]  Primary Care  Doctor   Email your healthcare team securely and privately 24/7 X X X    Manage appointments: schedule your next appointment; view details of past/upcoming appointments X      Request prescription refills. X      View recent personal medical records, including lab and immunizations X X X X   View health record, including health history, allergies, medications X X X X   Read reports about your outpatient visits, procedures, consult and ER notes X X X X   See your discharge summary, which is a recap of your hospital and/or ER visit that includes your diagnosis, lab results, and care plan. X X       How to register for Adylitica:  1. Go to  [email or web address].  2. Click on the Sign Up Now box, which takes you to the New Member Sign Up page. You will need to provide the following information:  a. Enter your Adylitica Access Code exactly as it appears at the top of this page. (You will not need to use this code after you’ve completed the sign-up process. If you do not sign up before the expiration date, you must request a new code.)   b. Enter your date of birth.   c. Enter your home email address.   d. Click Submit, and follow the next screen’s instructions.  3. Create a Adylitica ID. This will be your Adylitica login ID and cannot be changed, so think of one that is secure and easy to remember.  4. Create a Adylitica password. You can change your password at any time.  5. Enter your Password Reset Question and Answer. This can be used at a later time if you forget your password.   6. Enter your e-mail  address. This allows you to receive e-mail notifications when new information is available in [employer].  7. Click Sign Up. You can now view your health information.    For assistance activating your [employer] account, call [phone]         PAST SURGICAL HISTORY:  No significant past surgical history

## 2023-07-05 NOTE — ED ADULT NURSE NOTE - OBJECTIVE STATEMENT
42 yof presents A&Ox4, coming from [facility] for auditory hallucinations telling her to harm herself. Denies any active plan or homicidal ideations. PHx bipolar disorder and borderline personality. Respirations even and unlabored. Pt denies any medical complaints at this time. Denies any drug or alcohol use. pt calm and cooperative at this time. Pending further orders. Safety maintained.

## 2023-07-05 NOTE — ED PROVIDER NOTE - NSFOLLOWUPINSTRUCTIONS_ED_ALL_ED_FT
Follow up with your primary care physician and psychiatrist in 48-72 hours.  You may also see the psychiatrist at [facility]:    [address] [place] [address]  Phone: [phone]    [facility] on [facility] Information:    -Walk-in hours: Monday to Friday, 9am to 3pm   -Almost all walk-in patients will be able to see a psych prescriber the same day   -Scheduled, non-urgent, evening remote/virtual appointments are available on a limited basis. Call our  to inquire about these: [phone]. A crisis center clinician screens these requests in the late afternoon and if appropriate it takes a few days to set-up.   -Visits take about 2 to 4 hours total   -Mornings are the best time for patients to arrive    For Telehealth options try:  SousaCamp: [email or web address] (to access psychiatrist or therapist)  AmCenoplex: [employer] (to access psychiatrist or therapist)  Better Help: betterhelp.com (Largest online therapy group)      SEEK IMMEDIATE MEDICAL CARE IF YOU HAVE ANY OF THE FOLLOWING SYMPTOMS: thoughts about hurting or killing yourself, thoughts about hurting or killing somebody else, hallucinations or any other worsening or persistent symptoms OR ANY NEW OR CONCERNING SYMPTOMS.

## 2023-07-05 NOTE — ED ADULT NURSE NOTE - CAS EDN DISCHARGE ASSESSMENT
pt is clear for dc by provider, calm and cooperative upon discharge, DC papers given to patient/Alert and oriented to person, place and time

## 2023-07-05 NOTE — ED PROVIDER NOTE - CLINICAL SUMMARY MEDICAL DECISION MAKING FREE TEXT BOX
This is a 32-year-old female past medical history asthma, GERD, PPHx  bipolar and borderline personality disorder, depression, with complaint of increased anxiety auditory hallucination.  Reports she started to hear the voices 30 minutes prior to arrival the voices told her to hurt herself but she did not do that instead of that she came to the hospital. Pt well know to  staff members and provider. Appropriately dressed for the season, happy to see us and tell us about her upcoming birthday, smiling at her baseline. Denies willian de.  collateral info by stacia, refer to the note.

## 2023-07-05 NOTE — ED PROVIDER NOTE - IV ALTEPLASE ADMIN OUTSIDE HIDDEN
Reason for call:  Form  Reason for Call:  Form, our goal is to have forms completed with 72 hours, however, some forms may require a visit or additional information.    Type of letter, form or note:  Medical    Who is the form from?:  Keke Gross [facility]     Where did the form come from: form was faxed in    What clinic location was the form placed at?: [facility] - [phone]    Where the form was placed: 's in box     What number is listed as a contact on the form?: [phone]       Additional comments: Fax back to [phone]    Call taken on 7/16/2019 at 9:46 AM by Leland Gaines         show

## 2023-07-05 NOTE — ED ADULT NURSE NOTE - NSFALLUNIVINTERV_ED_ALL_ED
Bed/Stretcher in lowest position, wheels locked, appropriate side rails in place/Call bell, personal items and telephone in reach/Instruct patient to call for assistance before getting out of bed/chair/stretcher/Non-slip footwear applied when patient is off stretcher/[place] to call system/Physically safe environment - no spills, clutter or unnecessary equipment/Purposeful proactive rounding/Room/bathroom lighting operational, light cord in reach

## 2023-07-05 NOTE — ED ADULT TRIAGE NOTE - CHIEF COMPLAINT QUOTE
pt calm and cooperative, from [facility]- onset of auditory hallucinations approx 30 min prior to arrival. voices telling her to hurt herself. no attempted and no plan. pt denies HI.

## 2023-07-10 ENCOUNTER — EMERGENCY (EMERGENCY)
Facility: HOSPITAL | Age: 43
LOS: 1 days | Discharge: HOME | End: 2023-07-10
Admitting: EMERGENCY MEDICINE
Payer: MEDICAID

## 2023-07-10 VITALS
OXYGEN SATURATION: 100 % | HEART RATE: 78 BPM | SYSTOLIC BLOOD PRESSURE: 160 MMHG | DIASTOLIC BLOOD PRESSURE: 90 MMHG | RESPIRATION RATE: 17 BRPM | TEMPERATURE: 98 F

## 2023-07-10 PROCEDURE — 99283 EMERGENCY DEPT VISIT LOW MDM: CPT

## 2023-07-10 RX ORDER — ACETAMINOPHEN 500 MG
650 TABLET ORAL ONCE
Refills: 0 | Status: COMPLETED | OUTPATIENT
Start: 2023-07-10 | End: 2023-07-10

## 2023-07-10 RX ADMIN — Medication 650 MILLIGRAM(S): at 21:25

## 2023-07-10 NOTE — ED PROVIDER NOTE - OBJECTIVE STATEMENT
41 y/o female pmh bipolar, gerd c/o low back pain x today. Pt states that she was sitting in a chair and her back started to hurt. Pt denies aggravating or relieving factors. Pt denies recent fall, injury or trauma. Denies cehst pain, sob, abd pain, n/v/d, numbness, tingling, weakness, bowel or bladder incontinence, dizziness, syncope, fever or chills. Pt is asking how she will get home tonight.

## 2023-07-10 NOTE — ED PROVIDER NOTE - CLINICAL SUMMARY MEDICAL DECISION MAKING FREE TEXT BOX
43 y/o female pmh gerd, bipolar c/o low back pain x today- no recent fall, injury or trauma, denies aggravating factors. No neuro deficits, no red flags, no spinal tenderness. likey MSK in nature. Pt is asking for a heating pad and to be sent home. Will speak with SW to arrange cab back to [facility], treat with tylenol, dc.

## 2023-07-10 NOTE — ED PROVIDER NOTE - ENMT, MLM
today
Airway patent, Nasal mucosa clear. Mouth with normal mucosa. Throat has no vesicles, no oropharyngeal exudates and uvula is midline.

## 2023-07-10 NOTE — ED PROVIDER NOTE - PATIENT PORTAL LINK FT
You can access the FollowMyHealth Patient Portal offered by [facility] by registering at the following website: [email or web address]. By joining [employer]’s FollowMyHealth portal, you will also be able to view your health information using other applications (apps) compatible with our system.

## 2023-07-11 NOTE — ED ADULT NURSE REASSESSMENT NOTE - NS ED NURSE REASSESS COMMENT FT1
Patient is cleared and is able to return to their previous residence, [facility] [address] ANM spoke to Adrienne Houser and confirmed that patient’s mode of transportation is [employer] and that patient travels INDEPENDENTLY. Clinical provider is in agreement with [employer]  back to group home. Verbal huddle regarding coordination of care completed with interdisciplinary team. Patient’s medicaid is not active as per MAS. Utilized SW account 50 for booking trip, pt transport set up through [employer] (vehicle#1) with tracking.

## 2023-07-11 NOTE — ED ADULT NURSE NOTE - PAIN: PRESENCE, MLM
..Post visit Population Health review of encounter with date of service  07/10/2023 with Luci.  All required HY components in encounter including:correct diagnosis, labs ordered,etc.       
denies pain/discomfort

## 2023-07-17 ENCOUNTER — EMERGENCY (EMERGENCY)
Facility: HOSPITAL | Age: 43
LOS: 1 days | Discharge: HOME | End: 2023-07-17
Admitting: STUDENT IN AN ORGANIZED HEALTH CARE EDUCATION/TRAINING PROGRAM
Payer: MEDICAID

## 2023-07-17 VITALS
DIASTOLIC BLOOD PRESSURE: 81 MMHG | OXYGEN SATURATION: 100 % | HEART RATE: 97 BPM | RESPIRATION RATE: 16 BRPM | SYSTOLIC BLOOD PRESSURE: 158 MMHG | TEMPERATURE: 98 F

## 2023-07-17 VITALS
DIASTOLIC BLOOD PRESSURE: 110 MMHG | RESPIRATION RATE: 18 BRPM | TEMPERATURE: 98 F | SYSTOLIC BLOOD PRESSURE: 160 MMHG | OXYGEN SATURATION: 100 % | HEART RATE: 85 BPM

## 2023-07-17 PROCEDURE — 99284 EMERGENCY DEPT VISIT MOD MDM: CPT

## 2023-07-17 RX ORDER — ACETAMINOPHEN 500 MG
650 TABLET ORAL ONCE
Refills: 0 | Status: COMPLETED | OUTPATIENT
Start: 2023-07-17 | End: 2023-07-17

## 2023-07-17 RX ADMIN — Medication 650 MILLIGRAM(S): at 16:36

## 2023-07-17 RX ADMIN — Medication 650 MILLIGRAM(S): at 16:20

## 2023-07-17 NOTE — ED ADULT NURSE NOTE - NSFALLUNIVINTERV_ED_ALL_ED
Bed/Stretcher in lowest position, wheels locked, appropriate side rails in place/Call bell, personal items and telephone in reach/Instruct patient to call for assistance before getting out of bed/chair/stretcher/Non-slip footwear applied when patient is off stretcher/[place] to call system/Physically safe environment - no spills, clutter or unnecessary equipment/Purposeful proactive rounding/Room/bathroom lighting operational, light cord in reach

## 2023-07-17 NOTE — ED BEHAVIORAL HEALTH NOTE - BEHAVIORAL HEALTH NOTE
Writer contacted [facility] ([phone]) to obtain collateral information. Writer spoke w/  Lisa who provided the following information.    Patient is a 43 Yo female domiciled at [facility], hx of bipolar schizophrenia, bib EMS activated by staff due to pt reported not feeling well and wanted to go to the hospital. She did not specify if she was not feeling well mentally or physically. She reports patient CM left for the day who would have more information. She says patient did not endorse any Si/hi/AVH. She reports patient seemed fine this morning. She says patient’s birthday is today and does not know if that had anything to do with it. She reports patient is not violent or aggressive. No drug or alcohol use.  She reports there were no safety concerns noted by staff. She says patient can return via taxi. Writer agreed to keep staff updated.

## 2023-07-17 NOTE — ED ADULT TRIAGE NOTE - CHIEF COMPLAINT QUOTE
From [facility] [address] reports feels anxious, denies S/I, H/I or A/V/H. calm and cooperative in triage, answers to questions appropriately. Pt also endorses right sided HA for the past 2 days, denies changes in vision. PERRLA extremities are strong and equal B/L.

## 2023-07-17 NOTE — ED ADULT NURSE NOTE - OBJECTIVE STATEMENT
Pt presents to , alert&orientedx4, ambulatory, hx of Borderline, bipolar coming to ED for anxiety. Pt also having headache, denies any blurry vision, dizziness or chest pain. On arrival, pt is calm and cooperative, denies any s/i, h/i, a/h and v/h at this time. Safety measures maintained. Awaiting further plan of care

## 2023-07-17 NOTE — ED BEHAVIORAL HEALTH NOTE - BEHAVIORAL HEALTH NOTE
Per provider, KIRSTY Keene , patient is cleared and is able to return to their previous residence, [facility] [place] ([address] [facility] [facility] [address]  Art previously spoke with /  Lisa and confirmed that patient’s mode of transportation is [employer] and that patient travels INDEPENDENTLY. Clinical provider is in agreement with [facility]  back to group home. Verbal huddle regarding coordination of care completed with interdisciplinary team.  Patient’s zulma is not active as per MAS. Worker utilized account 50 Booking #[id number]. Per provider, KIRSTY Keene , patient is cleared and is able to return to their previous residence, [facility] [place] ([address] [facility] [address]).  Art previously spoke with  Lisa and confirmed that patient’s mode of transportation is [employer] and that patient travels INDEPENDENTLY. Clinical provider is in agreement with [employer]  back to group home. Verbal huddle regarding coordination of care completed with interdisciplinary team.  Patient’s zulma is not active as per MAS. Worker utilized account 50 Booking #[id number].

## 2023-07-17 NOTE — ED PROVIDER NOTE - CLINICAL SUMMARY MEDICAL DECISION MAKING FREE TEXT BOX
This is a 43 yr old F, pmh asthma, pphx borderline and bipolar disorder, depression with c/o increased anxiety and sudden onset of headache. Arrived from [facility], after activating 911 on herself. Upon arrival pt with a big smile greeted provider and told she has her birthday today. She is well known to  ed. She said she is here to see provider and staff member, she is smiling why telling this. Calm cooperative, follows directions, appropriately dressed for the season, with good hygiene. Denies si, hi, ah, vh.  Collateral info by sw, refer to the note.

## 2023-07-17 NOTE — ED PROVIDER NOTE - OBJECTIVE STATEMENT
This is a 43 yr old F, pmh asthma, pphx borderline and bipolar disorder, depression with c/o increased anxiety and sudden onset of headache. Arrived from [facility], after activating 911 on herself. Upon arrival pt with a big smile greeted provider and told she has her birthday today. She is well known to  ed. She said she is here to see provider and staff member, she is smiling why telling this. Calm cooperative, follows directions, appropriately dressed for the season, with good hygiene. Denies si, hi, ah, vh.

## 2023-07-17 NOTE — ED PROVIDER NOTE - PATIENT PORTAL LINK FT
You can access the FollowMyHealth Patient Portal offered by [facility] by registering at the following website: [email or web address]. By joining [employer]’s FollowMyHealth portal, you will also be able to view your health information using other applications (apps) compatible with our system.

## 2023-07-17 NOTE — ED PROVIDER NOTE - NSFOLLOWUPINSTRUCTIONS_ED_ALL_ED_FT
Follow up with your primary care physician and psychiatrist in 48-72 hours.  You may also see the psychiatrist at [facility]:    [address] [place] [address]  Phone: [phone]    [facility] on [facility] [place] Information:    -Walk-in hours: Monday to Friday, 9am to 3pm   -Almost all walk-in patients will be able to see a psych prescriber the same day   -Scheduled, non-urgent, evening remote/virtual appointments are available on a limited basis. Call our  to inquire about these: [phone]. A crisis center clinician screens these requests in the late afternoon and if appropriate it takes a few days to set-up.   -Visits take about 2 to 4 hours total   -Mornings are the best time for patients to arrive    For Telehealth options try:  Peek: [email or web address] (to access psychiatrist or therapist)  AmStyroPower: SmarTots (to access psychiatrist or therapist)  Better Help: betterhelp.com (Largest online therapy group)      SEEK IMMEDIATE MEDICAL CARE IF YOU HAVE ANY OF THE FOLLOWING SYMPTOMS: thoughts about hurting or killing yourself, thoughts about hurting or killing somebody else, hallucinations or any other worsening or persistent symptoms OR ANY NEW OR CONCERNING SYMPTOMS.

## 2023-07-17 NOTE — ED PROVIDER NOTE - PROGRESS NOTE DETAILS
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 Denies any headache, n,v dizziness, lightheadedness, weakness.

## 2023-07-21 ENCOUNTER — EMERGENCY (EMERGENCY)
Facility: HOSPITAL | Age: 43
LOS: 1 days | Discharge: HOME | End: 2023-07-21
Admitting: EMERGENCY MEDICINE
Payer: MEDICAID

## 2023-07-21 VITALS
SYSTOLIC BLOOD PRESSURE: 163 MMHG | OXYGEN SATURATION: 99 % | TEMPERATURE: 99 F | DIASTOLIC BLOOD PRESSURE: 100 MMHG | RESPIRATION RATE: 16 BRPM | HEART RATE: 88 BPM

## 2023-07-21 PROCEDURE — 99284 EMERGENCY DEPT VISIT MOD MDM: CPT

## 2023-07-21 NOTE — ED ADULT NURSE REASSESSMENT NOTE - NS ED NURSE REASSESS COMMENT FT1
pt calm & cooperative denies si/hi/avh presently, pt cleared & d/c by provider transport arranged by MI.

## 2023-07-21 NOTE — ED ADULT NURSE NOTE - NSFALLUNIVINTERV_ED_ALL_ED
Bed/Stretcher in lowest position, wheels locked, appropriate side rails in place/Call bell, personal items and telephone in reach/Instruct patient to call for assistance before getting out of bed/chair/stretcher/Non-slip footwear applied when patient is off stretcher/[place] to call system/Physically safe environment - no spills, clutter or unnecessary equipment/Purposeful proactive rounding/Room/bathroom lighting operational, light cord in reach no

## 2023-07-21 NOTE — ED ADULT TRIAGE NOTE - CHIEF COMPLAINT QUOTE
c/o feeling anxious that started " a few minutes ago". Phx depression, borderline personality disorder, schizoaffective and Bipolar. Denies any medical complaints.

## 2023-07-21 NOTE — ED ADULT NURSE NOTE - OBJECTIVE STATEMENT
Received pt in  pt calm & cooperative c/o feeling anxious earlier, pt denies si/hi/avh presently, emotional support provided eval on going.

## 2023-07-21 NOTE — ED PROVIDER NOTE - PATIENT PORTAL LINK FT
You can access the FollowMyHealth Patient Portal offered by [facility] by registering at the following website: [email or web address]. By joining [facility]’s FollowMyHealth portal, you will also be able to view your health information using other applications (apps) compatible with our system.

## 2023-07-21 NOTE — ED PROVIDER NOTE - OBJECTIVE STATEMENT
42 yo F PMH Asthma, Bipolar, Depression, Schizoaffective presents [facility] ([id number]). 44 yo F PMH Asthma, Bipolar, Depression, Schizoaffective presents with increased anxiety from [facility] ([phone]). Presents with [employer] with a smile on her face. Reports she was feeling anxious earlier but not anymore. Presents calm and cooperative in ED. Denies SI/HI/AH/VH. Denies chest pain, sob, abdominal pain, fever, chills.

## 2023-07-21 NOTE — ED PROVIDER NOTE - CLINICAL SUMMARY MEDICAL DECISION MAKING FREE TEXT BOX
42 yo F PMH Asthma, Bipolar, Depression, Schizoaffective presents with increased anxiety from [facility] ([phone]). Presents with [employer] with a smile on her face. Reports she was feeling anxious earlier but not anymore. Presents calm and cooperative in ED. Denies SI/HI/AH/VH. Denies chest pain, sob, abdominal pain, fever, chills.  No concern for psychosis   SW collateral  Dispo Home

## 2023-07-21 NOTE — ED BEHAVIORAL HEALTH NOTE - BEHAVIORAL HEALTH NOTE
Writer contacted [facility] ([phone]) to obtain collateral information. Writer spoke Jason Espinosa who provided the following information.    Patient is a 44 Yo female domiciled at [facility], hx of bipolar schizophrenia, bib EMS activated by staff due to anxiety. She says patient did not endorse any Si/HI/AH/VH. She reports patient seemed fine this morning. She reports patient is not violent or aggressive. No drug or alcohol use.  She reports there were no safety concerns noted by staff. Patient is medication compliant. She says patient can return via taxi. Per provider KIRSTY Lemons, patient is cleared and is able to return to their previous residence,[facility] .  has spoken to  BIB Espinosa,  confirmed that patients mode of transportation is [employer], and that patient travels INDEPENDENTLY. Clinical provider is in agreement with [facility] back to group home. Verbal huddle regarding coordination of care completed with interdisciplinary team.

## 2023-08-06 NOTE — BH SAFETY PLAN - ENVIRONMENT SAFETY 1:
Interval History: Diuresis yesterday, labs improved. Remains on 1L NC. Pending CXR this morning. Mental status much improved. CT removed yesterday. Pulling >1L on IS    Medications:  Continuous Infusions:  Scheduled Meds:   acetaminophen  1,000 mg Oral Q8H    enoxparin  40 mg Subcutaneous Daily    furosemide (LASIX) injection  10 mg Intravenous Once    levalbuterol  0.63 mg Nebulization Q6H WAKE    LIDOcaine  1 patch Transdermal Q24H    methocarbamoL  500 mg Oral BID    mupirocin  1 g Nasal BID    nicotine  1 patch Transdermal Q24H    polyethylene glycol  17 g Oral Daily    senna-docusate 8.6-50 mg  1 tablet Oral BID     PRN Meds:0.9%  NaCl infusion (for blood administration), ceFAZolin (ANCEF) IVPB, dextrose 10%, dextrose 10%, glucagon (human recombinant), glucose, glucose, insulin aspart U-100, metoclopramide HCl, ondansetron, oxyCODONE     Review of patient's allergies indicates:   Allergen Reactions    Morphine Other (See Comments)     headaches    Latex, natural rubber Rash and Other (See Comments)     Redness and peeling only with bandaids    Penicillins Nausea And Vomiting and Rash     Objective:     Vital Signs (Most Recent):  Temp: 98.1 °F (36.7 °C) (08/06/23 0629)  Pulse: 94 (08/06/23 0629)  Resp: 18 (08/06/23 0629)  BP: 111/70 (08/06/23 0629)  SpO2: (!) 92 % (08/06/23 0629) Vital Signs (24h Range):  Temp:  [98.1 °F (36.7 °C)-98.4 °F (36.9 °C)] 98.1 °F (36.7 °C)  Pulse:  [] 94  Resp:  [16-18] 18  SpO2:  [88 %-97 %] 92 %  BP: ()/(59-75) 111/70     Intake/Output - Last 3 Shifts         08/04 0700  08/05 0659 08/05 0700  08/06 0659    P.O. 342     Total Intake(mL/kg) 342 (3.5)     Urine (mL/kg/hr) 1000 (0.4) 3375 (1.5)    Stool 0 0    Chest Tube 165     Total Output 1165 3375    Net -823 -3375          Urine Occurrence 3 x     Stool Occurrence 0 x 0 x            SpO2: (!) 92 %        Physical Exam  Constitutional:       General: She is not in acute distress.     Appearance: Normal appearance.  "  HENT:      Head: Normocephalic and atraumatic.   Cardiovascular:      Rate and Rhythm: Normal rate and regular rhythm.   Pulmonary:      Effort: Pulmonary effort is normal. No respiratory distress.      Comments: Incisions c/d/i  Abdominal:      General: Abdomen is flat. There is no distension.      Palpations: Abdomen is soft.      Tenderness: There is no abdominal tenderness.   Neurological:      General: No focal deficit present.      Mental Status: She is alert and oriented to person, place, and time.   Psychiatric:         Behavior: Behavior normal.         Thought Content: Thought content normal.            Significant Labs:  CBC:   No results for input(s): "WBC", "RBC", "HGB", "HCT", "PLT", "MCV", "MCH", "MCHC" in the last 48 hours.    CMP:   Recent Labs   Lab 08/06/23  0430   GLU 91   CALCIUM 9.4      K 3.7   CO2 25      BUN 14   CREATININE 1.0         Significant Diagnostics:  I have reviewed all pertinent imaging results/findings within the past 24 hours.    VTE Risk Mitigation (From admission, onward)           Ordered     enoxaparin injection 40 mg  Daily         08/03/23 1410     IP VTE HIGH RISK PATIENT  Once         08/03/23 1410     Place sequential compression device  Until discontinued         08/03/23 1410                  " call 911

## 2023-08-08 NOTE — ED BEHAVIORAL HEALTH ASSESSMENT NOTE - HPI (INCLUDE ILLNESS QUALITY, SEVERITY, DURATION, TIMING, CONTEXT, MODIFYING FACTORS, ASSOCIATED SIGNS AND SYMPTOMS)
Report received from EFRAÍN Sotelo.   Ms. Castillo is a single, 38-year-old  female, non caregiver, unemployed, domiciled on [place] [address], with past psychiatric history of  Schizoaffective Disorder, Borderline Personality Disorder, cannabis abuse, multiple inpatient psychiatric hospitalizations (>20), most recently [facility] 12/12-12/24 2018, with frequent visits to the [facility] (well-known to staff) 3 prior suicide attempts (last in 2012 via OD), recurrent suicidal gestures and suicidal ideation, history of property destruction when upset, past legal issues, no access to weapons. PMH GERD & asthma.  BIB EMS called by [facility] staff for verbal aggression. She became verbally agitated after she interpreted the staff as having mistreated someone that she considers a friend, and screamed and threw things. She did not hurt anyone.     Spoke with , Ms. Camargo, who reports that pt has "tantrums" often, but pt's condition has not substantially changed recently. She is concerned with her verbal agitation, but does not report any recent physical aggression.     On assessment in  ED, pt is calm, cooperative, and in good behavioral control. She reports that she told staff she saw "god" and the "[place]" after she lost a game of call of duty. She reports telling staff she wanted to come to the ED to get admitted and they declined to send her so she started throwing things so that EMS would come get her. She also reports not taking some of her medications, "so they could admit me for not taking my meds". She reports that lithium sedates her in the morning and she does not like taking it but has been taking her other medications. Reports she "wants a break" and to come into the hospital. She denies homicidal or violent ideation, intent, or plan. Pt denies suicidal ideation, intent, or plan. Pt denies paranoid ideation/delusions, denies AH/VH. She denies manic symptoms or depression. She denies any recent substance abuse. Ms. Castillo is a single, 38-year-old  female, non caregiver, unemployed, domiciled on [place] [address], with past psychiatric history of  Schizoaffective Disorder, Borderline Personality Disorder, cannabis abuse, multiple inpatient psychiatric hospitalizations (>20), most recently [facility] 12/12-12/24 2018, with frequent visits to the [facility] (well-known to staff) 3 prior suicide attempts (last in 2012 via OD), recurrent suicidal gestures and suicidal ideation, history of property destruction when upset, past legal issues, no access to weapons. PMH GERD & asthma.  BIB EMS called by [facility] staff for verbal aggression. She became verbally agitated after she interpreted the staff as having mistreated someone that she considers a friend, and screamed and threw things. She did not hurt anyone but staff became concerned for safety.      Spoke with , Ms. Camargo, who reports that pt has "tantrums" often, but pt's condition has not substantially changed recently. She is concerned with her verbal agitation, but does not report any recent physical aggression. Ms. Camargo asked if pt could be transferred to [facility] where they have a CPEP for extended observation, but it was explained that we could not transfer to another ED.    On assessment in  ED, pt has calm, cooperative, and in good behavioral control after being medicated with lorazepam po. She reports that she saw "[place]" and had thoughts about burning things "to get out of [place]," but isn't thinking about that now. She denies homicidal or violent ideation, intent, or plan. Pt denies suicidal ideation, intent, or plan. Pt denies paranoid ideation/delusions, denies AH/VH. She denies manic symptoms or depression. She denies any recent substance abuse.

## 2023-08-08 NOTE — ED ADULT NURSE NOTE - NS ED NURSE RECORD ANOTHER HT AND WT
1. Blood pressure is excellent, no adjustments are made. 2. She does have a history of hypothyroidism and efficacy and compliance will be assessed. 3. She has had mild pre-renal azotemia and I suggest making sure she increases her p.o. intake to minimize this getting any worse. 4. She has significantly increased cardiovascular risk created by her age, as well as her existing comorbidities particularly her persistent cigarette smoking which she has done for decades. No

## 2023-08-16 ENCOUNTER — INPATIENT (INPATIENT)
Facility: HOSPITAL | Age: 43
LOS: 19 days | Discharge: HOME | End: 2023-09-05
Attending: PSYCHIATRY & NEUROLOGY | Admitting: PSYCHIATRY & NEUROLOGY
Payer: MEDICAID

## 2023-08-16 VITALS
OXYGEN SATURATION: 100 % | RESPIRATION RATE: 16 BRPM | HEART RATE: 88 BPM | TEMPERATURE: 98 F | DIASTOLIC BLOOD PRESSURE: 101 MMHG | SYSTOLIC BLOOD PRESSURE: 155 MMHG

## 2023-08-16 DIAGNOSIS — F31.9 BIPOLAR DISORDER, UNSPECIFIED: ICD-10-CM

## 2023-08-16 DIAGNOSIS — F25.0 SCHIZOAFFECTIVE DISORDER, BIPOLAR TYPE: ICD-10-CM

## 2023-08-16 LAB
ALBUMIN SERPL ELPH-MCNC: 3.7 G/DL — SIGNIFICANT CHANGE UP (ref 3.3–5)
ALP SERPL-CCNC: 126 U/L — HIGH (ref 40–120)
ALT FLD-CCNC: 19 U/L — SIGNIFICANT CHANGE UP (ref 4–33)
ANION GAP SERPL CALC-SCNC: 12 MMOL/L — SIGNIFICANT CHANGE UP (ref 7–14)
APAP SERPL-MCNC: <10 UG/ML — LOW (ref 15–25)
AST SERPL-CCNC: 16 U/L — SIGNIFICANT CHANGE UP (ref 4–32)
BASOPHILS # BLD AUTO: 0.02 K/UL — SIGNIFICANT CHANGE UP (ref 0–0.2)
BASOPHILS NFR BLD AUTO: 0.3 % — SIGNIFICANT CHANGE UP (ref 0–2)
BILIRUB SERPL-MCNC: <0.2 MG/DL — SIGNIFICANT CHANGE UP (ref 0.2–1.2)
BUN SERPL-MCNC: 12 MG/DL — SIGNIFICANT CHANGE UP (ref 7–23)
CALCIUM SERPL-MCNC: 9.4 MG/DL — SIGNIFICANT CHANGE UP (ref 8.4–10.5)
CHLORIDE SERPL-SCNC: 106 MMOL/L — SIGNIFICANT CHANGE UP (ref 98–107)
CO2 SERPL-SCNC: 24 MMOL/L — SIGNIFICANT CHANGE UP (ref 22–31)
CREAT SERPL-MCNC: 1.03 MG/DL — SIGNIFICANT CHANGE UP (ref 0.5–1.3)
EGFR: 68 ML/MIN/1.73M2 — SIGNIFICANT CHANGE UP
EOSINOPHIL # BLD AUTO: 0.13 K/UL — SIGNIFICANT CHANGE UP (ref 0–0.5)
EOSINOPHIL NFR BLD AUTO: 1.6 % — SIGNIFICANT CHANGE UP (ref 0–6)
ETHANOL SERPL-MCNC: <10 MG/DL — SIGNIFICANT CHANGE UP
FLUAV AG NPH QL: SIGNIFICANT CHANGE UP
FLUBV AG NPH QL: SIGNIFICANT CHANGE UP
GLUCOSE SERPL-MCNC: 109 MG/DL — HIGH (ref 70–99)
HCG SERPL-ACNC: <1 MIU/ML — SIGNIFICANT CHANGE UP
HCT VFR BLD CALC: 37.2 % — SIGNIFICANT CHANGE UP (ref 34.5–45)
HGB BLD-MCNC: 11.5 G/DL — SIGNIFICANT CHANGE UP (ref 11.5–15.5)
IANC: 4.54 K/UL — SIGNIFICANT CHANGE UP (ref 1.8–7.4)
IMM GRANULOCYTES NFR BLD AUTO: 0.4 % — SIGNIFICANT CHANGE UP (ref 0–0.9)
LIDOCAIN IGE QN: 26 U/L — SIGNIFICANT CHANGE UP (ref 7–60)
LYMPHOCYTES # BLD AUTO: 2.58 K/UL — SIGNIFICANT CHANGE UP (ref 1–3.3)
LYMPHOCYTES # BLD AUTO: 32.4 % — SIGNIFICANT CHANGE UP (ref 13–44)
MCHC RBC-ENTMCNC: 27.8 PG — SIGNIFICANT CHANGE UP (ref 27–34)
MCHC RBC-ENTMCNC: 30.9 GM/DL — LOW (ref 32–36)
MCV RBC AUTO: 90.1 FL — SIGNIFICANT CHANGE UP (ref 80–100)
MONOCYTES # BLD AUTO: 0.67 K/UL — SIGNIFICANT CHANGE UP (ref 0–0.9)
MONOCYTES NFR BLD AUTO: 8.4 % — SIGNIFICANT CHANGE UP (ref 2–14)
NEUTROPHILS # BLD AUTO: 4.54 K/UL — SIGNIFICANT CHANGE UP (ref 1.8–7.4)
NEUTROPHILS NFR BLD AUTO: 56.9 % — SIGNIFICANT CHANGE UP (ref 43–77)
NRBC # BLD: 0 /100 WBCS — SIGNIFICANT CHANGE UP (ref 0–0)
NRBC # FLD: 0 K/UL — SIGNIFICANT CHANGE UP (ref 0–0)
PLATELET # BLD AUTO: 281 K/UL — SIGNIFICANT CHANGE UP (ref 150–400)
POTASSIUM SERPL-MCNC: 3.5 MMOL/L — SIGNIFICANT CHANGE UP (ref 3.5–5.3)
POTASSIUM SERPL-SCNC: 3.5 MMOL/L — SIGNIFICANT CHANGE UP (ref 3.5–5.3)
PROT SERPL-MCNC: 7.2 G/DL — SIGNIFICANT CHANGE UP (ref 6–8.3)
RBC # BLD: 4.13 M/UL — SIGNIFICANT CHANGE UP (ref 3.8–5.2)
RBC # FLD: 14.6 % — HIGH (ref 10.3–14.5)
RSV RNA NPH QL NAA+NON-PROBE: SIGNIFICANT CHANGE UP
SALICYLATES SERPL-MCNC: <0.3 MG/DL — LOW (ref 15–30)
SARS-COV-2 RNA SPEC QL NAA+PROBE: SIGNIFICANT CHANGE UP
SODIUM SERPL-SCNC: 142 MMOL/L — SIGNIFICANT CHANGE UP (ref 135–145)
TOXICOLOGY SCREEN, DRUGS OF ABUSE, SERUM RESULT: SIGNIFICANT CHANGE UP
TSH SERPL-MCNC: 1.68 UIU/ML — SIGNIFICANT CHANGE UP (ref 0.27–4.2)
WBC # BLD: 7.97 K/UL — SIGNIFICANT CHANGE UP (ref 3.8–10.5)
WBC # FLD AUTO: 7.97 K/UL — SIGNIFICANT CHANGE UP (ref 3.8–10.5)

## 2023-08-16 PROCEDURE — 99285 EMERGENCY DEPT VISIT HI MDM: CPT

## 2023-08-16 RX ORDER — ACETAMINOPHEN 500 MG
650 TABLET ORAL EVERY 6 HOURS
Refills: 0 | Status: DISCONTINUED | OUTPATIENT
Start: 2023-08-16 | End: 2023-09-05

## 2023-08-16 RX ORDER — HALOPERIDOL DECANOATE 100 MG/ML
5 INJECTION INTRAMUSCULAR ONCE
Refills: 0 | Status: DISCONTINUED | OUTPATIENT
Start: 2023-08-16 | End: 2023-09-05

## 2023-08-16 RX ORDER — OXCARBAZEPINE 300 MG/1
300 TABLET, FILM COATED ORAL
Refills: 0 | Status: DISCONTINUED | OUTPATIENT
Start: 2023-08-16 | End: 2023-09-05

## 2023-08-16 RX ORDER — HALOPERIDOL DECANOATE 100 MG/ML
5 INJECTION INTRAMUSCULAR EVERY 6 HOURS
Refills: 0 | Status: DISCONTINUED | OUTPATIENT
Start: 2023-08-16 | End: 2023-09-05

## 2023-08-16 RX ORDER — HYDROXYZINE HCL 10 MG
25 TABLET ORAL EVERY 6 HOURS
Refills: 0 | Status: DISCONTINUED | OUTPATIENT
Start: 2023-08-16 | End: 2023-09-05

## 2023-08-16 RX ADMIN — OXCARBAZEPINE 300 MILLIGRAM(S): 300 TABLET, FILM COATED ORAL at 21:08

## 2023-08-16 NOTE — ED BEHAVIORAL HEALTH NOTE - BEHAVIORAL HEALTH NOTE
SW requested to obtain collateral information. SW contacted [facility] ([phone]) where patient currently resides to obtain collateral information and spoke with RAJose Manuel and Aly YOUNGBLOOD who provided the following information:    Patient is a 43-year-old, English-speaking, Female, domiciled at [facility] with a history of bipolar schizophrenia. Patient was BIB by EMS activated by staff due to placing feces on walls and face. Patient reportedly did not endorse any SI/HI. Per RA, Jose Manuel patient has been increasingly erratic the past two weeks; laying on the floor screaming and supposedly "eating feces." Jose Manuel mentioned that patient was previously dating another resident in the home and he believes they may have broken up, which could be causing her abnormal behavior. MI spoke with Aly YOUNGBLOOD who advised that patient is prescribed Haldol (200mg IM x4 weeks) with last IM on 7/15 and Trileptal (300mg 2x a day) receiving morning dose today. SW advised that patient will be evaluated to determine next steps. Provider advised of above information. No further SW needs at this time. SW to remain available.

## 2023-08-16 NOTE — ED BEHAVIORAL HEALTH NOTE - BEHAVIORAL HEALTH NOTE
COVID Exposure Screen- Patient     1.            Have you been tested for COVID-19 in the last 90 days?  (  ) Yes  ( x ) No   (  ) Unknown- Reason: _____    IF YES: Date of test(s), type of test(s), result(s) for ALL tests in last 90 days: ________       2.            In the past 10 days, have you been around anyone with a positive COVID-19 test? (  ) Yes  (x  ) No   (  ) Unknown- Reason: ____    IF YES: Were you closer than 6 feet of them for a total of 15 minutes or more in a 24 hour period? (  ) Yes   (  ) No   ( ) Unknown- Reason: _____

## 2023-08-16 NOTE — BH PATIENT PROFILE - STATED REASON FOR ADMISSION
Bizarre Behavior at [facility] rubbing feces on herself eating out of garbage drinking from toilets
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
## 2023-08-16 NOTE — ED BEHAVIORAL HEALTH ASSESSMENT NOTE - DETAILS
notified Jose Manuel Luu at [facility] per chart review, history of property destruction (breaking TV screens while hospitalized) when upset / acting out Poor response to Geodon; Depakote (Increased ammonia levels) handoff provided deferred n/a

## 2023-08-16 NOTE — ED ADULT NURSE NOTE - CHIEF COMPLAINT QUOTE
[facility] and face.  Denies Si, HI.  Pt is calm and cooperative

## 2023-08-16 NOTE — ED BEHAVIORAL HEALTH ASSESSMENT NOTE - PAST PSYCHOTROPIC MEDICATION
as per chart review: Lithium 600mg BID, Zyprexa 20mg BID Klonopin 0.5mg BID; Prolixin 5mg BID x 2 weeks Prolixin Decanoate 12.5mg IM Q2 weeks; Haldol, risperdal, Abilify, Geodon, thorazine, depakote, zyprexa, quetiapine 200mg PO qhs

## 2023-08-16 NOTE — ED PROVIDER NOTE - OBJECTIVE STATEMENT
This is a 43-year-old female past medical history GERD cholelithiasis asthma past psychiatric history borderline personality disorder, bipolar disorder with complaint of bizarre behavior, irrational, not at her baseline.  Arrived from [facility] after staff members activated 911 due to her behavior, she was getting into the garbage and smearing feces on the wall and on her face. Upon arrival, calm cooperative,  reports she uses feces for her skin care, because of her "oily skin, she wanted to restore her  pH balance and feces usually works for her. Reports she got her haldol deaconate on August 14, last time seen her therapist last Friday. C/o vomiting x 3, green color today.

## 2023-08-16 NOTE — ED PROVIDER NOTE - PROGRESS NOTE DETAILS
NP Bereczky- labs unreamrkable, good behavioural control during bh stay, abd non tender no guarding, tolerating po fluid, lipase- wnl- no further work up need it  Psych recommendation inpatient tx.

## 2023-08-16 NOTE — ED BEHAVIORAL HEALTH ASSESSMENT NOTE - RISK ASSESSMENT
Patient has chronic elevated risk for self harm given Static Risk Factors:  past suicidal attempts, prior psychiatric hospitalizations, h/o psychiatric diagnosis, h/o impulsivity,    Chronic risk factors are acutely elevated at this time due to active psychiatric conditions and symptoms, acute decompensation, illogical thought process. Given these acute risk factors, patient appears to be at imminent risk for self harm at this time and meets criteria for emergency inpatient psychiatric hospitalization.

## 2023-08-16 NOTE — ED ADULT NURSE NOTE - OBJECTIVE STATEMENT
43 yof presents A&Ox4, ambulatory w/ steady gait. Pt BIBEMS, per ems staff at [facility] activated EMS because pt has been behaving erratically and rubbing feces on her face. Pt admits to rubbing feces on her face, states "I want to improve the pH balance on my face." pt calm and cooperative at this time. Denies any suicidal or homicidal ideations. PHx bipolar disorder, endorses compliance with medication. Respirations even and unlabored.  Pt denies any chest pain, sob, dizziness or discomfort. Pending psych consult. Safety maintained.

## 2023-08-16 NOTE — ED BEHAVIORAL HEALTH ASSESSMENT NOTE - DEPENDENTS
Patient and/or allowed family member informed of results at OV, no acute swelling, fracture, or dislocation.
None known

## 2023-08-16 NOTE — ED ADULT NURSE NOTE - NSFALLUNIVINTERV_ED_ALL_ED
Bed/Stretcher in lowest position, wheels locked, appropriate side rails in place/Call bell, personal items and telephone in reach/Instruct patient to call for assistance before getting out of bed/chair/stretcher/Non-slip footwear applied when patient is off stretcher/[place] to call system/Physically safe environment - no spills, clutter or unnecessary equipment/Purposeful proactive rounding/Room/bathroom lighting operational, light cord in reach

## 2023-08-16 NOTE — BH PATIENT PROFILE - HOME MEDICATIONS
Haldol Decanoate 100 mg/mL intramuscular solution , 200 milligram(s) intramuscularly every 4 weeks NEXT DUE 5/24/23  OXcarbazepine 300 mg oral tablet , 1 tab(s) orally 2 times a day

## 2023-08-16 NOTE — BH PATIENT PROFILE - FALL HARM RISK - UNIVERSAL INTERVENTIONS
Bed in lowest position, wheels locked, appropriate side rails in place/Call bell, personal items and telephone in reach/Instruct patient to call for assistance before getting out of bed or chair/Non-slip footwear when patient is out of bed/[place] to call system/Physically safe environment - no spills, clutter or unnecessary equipment/Purposeful Proactive Rounding/Room/bathroom lighting operational, light cord in reach
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
## 2023-08-16 NOTE — ED PROVIDER NOTE - CLINICAL SUMMARY MEDICAL DECISION MAKING FREE TEXT BOX
This is a 43-year-old female past medical history GERD cholelithiasis asthma past psychiatric history borderline personality disorder, bipolar disorder with complaint of bizarre behavior, irrational, not at her baseline.  Arrived from [facility] after staff members activated 911 due to her behavior, she was getting into the garbage and smearing feces on the wall and on her face. Upon arrival, calm cooperative,  reports she uses feces for her skin care, because of her "oily skin, she wanted to restore her  pH balance and feces usually works for her. Reports she got her haldol deaconate on August 14, last time seen her therapist last Friday. C/o vomiting x 3, green color today.
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
## 2023-08-16 NOTE — ED BEHAVIORAL HEALTH ASSESSMENT NOTE - DESCRIPTION
No acute events in triage. GERD, Asthma, Fibroids, HTN as per records,  Patient does not know much about her biological family, she attended some college in the past

## 2023-08-16 NOTE — ED ADULT TRIAGE NOTE - CHIEF COMPLAINT QUOTE
[facility] walls and face.  Denies Si, HI.  Pt is calm and cooperative

## 2023-08-17 PROCEDURE — 99223 1ST HOSP IP/OBS HIGH 75: CPT

## 2023-08-17 RX ADMIN — OXCARBAZEPINE 300 MILLIGRAM(S): 300 TABLET, FILM COATED ORAL at 08:30

## 2023-08-17 RX ADMIN — Medication 2 MILLIGRAM(S): at 08:29

## 2023-08-17 RX ADMIN — OXCARBAZEPINE 300 MILLIGRAM(S): 300 TABLET, FILM COATED ORAL at 20:03

## 2023-08-17 RX ADMIN — HALOPERIDOL DECANOATE 5 MILLIGRAM(S): 100 INJECTION INTRAMUSCULAR at 08:30

## 2023-08-17 NOTE — BH INPATIENT PSYCHIATRY ASSESSMENT NOTE - NSBHMETABOLIC_PSY_ALL_CORE_FT
BMI: BMI (kg/m2): 42.4 (08-16-23 @ 19:12)  HbA1c:   Glucose: POCT Blood Glucose.: 87 mg/dL (08-16-23 @ 17:09)    BP: 155/101 (08-16-23 @ 16:48) (155/101 - 155/101)  Lipid Panel:
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
## 2023-08-17 NOTE — PSYCHIATRIC REHAB INITIAL EVALUATION - NSBHPRRECOMMEND_PSY_ALL_CORE
Writer met with pt. to reorient her to psych rehab staff and services. Per chart "Pt is a 41 y/o AAF, single, non-caregiver, unemployed, on disability for mental illness, domiciled at [facility], on AOT at this time, with past psychiatric history of schizoaffective disorder, as per chart: borderline personality disorder, polysubstance abuse". Pt. has a hx of my psych admissions. pt. displayed a bright affect and congruent mood when talking to writer. pt. was dancing, and smiling. Pt. reported she was admitted because she smeared feces all over her face. when writer inquired why she was doing that she stated "I have oliy skin and I was trying to balance the PH in my skin". Pt. told writer, "I don't know why they sent me here when I was only doing that to myself and no on else, I was not bothering anyone". Pt. was receptive to reasoning provided by writer stating that it is concerning and not healthy. Pt. also reported that she was walking around outside without any shoes and stated that her reasoning was "I wanted to feel the vibrations under my feet like they do in [place]". Pt. again was understanding of reasoning as to why that would not be safe. Pt, then informed writer "I also have a very messy room, I googled messy rooms and they say that a messy rooms shows people that you have a creative mind, and I do have a creative mind". Writer and pt. discussed the differences between a mess and slightly disorganized, pt. then explained that the reason she does not clean her room is because she does not want a roommate. Writer and pt. discussed the importance of a clean space on mental health and overall self-esteem. Pt. was receptive to writer. Throughout the conversation, pt. was a reliable historian, forthcoming with information and her reason for admission matched the Ed reports. Pt.denies SI/HI/TH/AH/VH. Pt. and writer established a collaborative treatment goal. Pt. was receptive and amendable.

## 2023-08-17 NOTE — BH INPATIENT PSYCHIATRY ASSESSMENT NOTE - MSE UNSTRUCTURED FT
Appearance:   Behavior:  Speech:  Mood:   Affect:   Thought process:  Thought content:  Perceptions:  Insight:  Judgement:  Cognition: Appearance: wearing stained shirt, no bra  Behavior: noted to be twirling around excessively, pulling plants from outside and putting into hair  Speech: normal rate, rythem, volume, clearly articulated  Mood: "okay"  Affect: euthymic and stable on interview, though noted to elevated and irritable at times on unit  Thought process: linear and logical  Thought content: no kelby delusions elicited, appropriately responds to questions  Perceptions: denies AH, VH  Insight: fair, understands that she engages in odd and potentially unsafe behaviors  Judgement: some verbal aggression already on unit  Cognition: grossly intact
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
## 2023-08-17 NOTE — BH INPATIENT PSYCHIATRY ASSESSMENT NOTE - VIOLENCE RISK FACTORS:
Violent ideation/threat/speech/Affective dysregulation/Impulsivity/Noncompliance with treatment/History of violation of a legal mandate (e.g., parole, probation, AOT)

## 2023-08-17 NOTE — BH INPATIENT PSYCHIATRY ASSESSMENT NOTE - NSBHCHARTREVIEWVS_PSY_A_CORE FT
Vital Signs Last 24 Hrs  T(C): 36.9 (08-16-23 @ 16:48), Max: 36.9 (08-16-23 @ 16:48)  T(F): 98.5 (08-16-23 @ 16:48), Max: 98.5 (08-16-23 @ 16:48)  HR: 88 (08-16-23 @ 16:48) (88 - 88)  BP: 155/101 (08-16-23 @ 16:48) (155/101 - 155/101)  BP(mean): --  RR: 16 (08-16-23 @ 16:48) (16 - 16)  SpO2: 100% (08-16-23 @ 19:12) (100% - 100%)    Orthostatic VS  08-16-23 @ 19:12  Lying BP: --/-- HR: --  Sitting BP: 152/97 HR: 73  Standing BP: 151/93 HR: 71  Site: --  Mode: --   Vital Signs Last 24 Hrs  T(C): 36.7 (08-17-23 @ 08:33), Max: 36.9 (08-16-23 @ 16:48)  T(F): 98 (08-17-23 @ 08:33), Max: 98.5 (08-16-23 @ 16:48)  HR: 88 (08-16-23 @ 16:48) (88 - 88)  BP: 155/101 (08-16-23 @ 16:48) (155/101 - 155/101)  BP(mean): --  RR: 16 (08-16-23 @ 16:48) (16 - 16)  SpO2: 100% (08-16-23 @ 19:12) (100% - 100%)    Orthostatic VS  08-17-23 @ 08:33  Lying BP: --/-- HR: --  Sitting BP: 117/77 HR: 91  Standing BP: 130/83 HR: 86  Site: --  Mode: --  Orthostatic VS  08-16-23 @ 19:12  Lying BP: --/-- HR: --  Sitting BP: 152/97 HR: 73  Standing BP: 151/93 HR: 71  Site: --  Mode: --
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
## 2023-08-17 NOTE — BH INPATIENT PSYCHIATRY ASSESSMENT NOTE - NSICDXPASTMEDICALHX_GEN_ALL_CORE_FT
Patient Education        Learning About Lung Cancer Screening  What is screening for lung cancer? Lung cancer screening is a way to find some lung cancers early, when a cure is more likely. Lung cancer screening may help those who have the highest risk for lung cancer--people 55 and older who are or were heavy smokers. For most people, who aren't at increased risk, screening for lung cancer probably isn't helpful. Screening won't prevent cancer. And it may not find all lung cancers. Lung cancer screening may lower the risk of dying from lung cancer in a small number of people. How is it done? Lung cancer screening is done with a low-dose CT (computed tomography) scan. A CT scan uses X-rays, or radiation, to make detailed pictures of your body. Experts recommend that screening be done in medical centers that focus on finding and treating lung cancer. Who is screening recommended for? Lung screening is only recommended for people who are or were heavy smokers. That means people with a smoking history of at least 30 pack years. A pack year is a way to measure how heavy a smoker you are or were. To figure out your pack years, multiply how many packs a day on average (assuming 20 cigarettes per pack) you have smoked by how many years you have smoked. For example:  · If you smoked 1 pack a day for 15 years, that's 1 times 15. So you have a smoking history of 15 pack years. · If you smoked 2 packs a day for 15 years, that's 2 times 15. So you have a smoking history of 30 pack years. The U.S. Preventive Services Task Force recommends lung cancer screening if:  · You are 54to [de-identified]years old. · And you have a smoking history of at least 30 pack years. · And you still smoke, or you quit within the last 15 years. · And you are not in poor health overall. (Having a serious health problem might mean that you couldn't have treatment for lung cancer.  The treatment could be too high-risk, and it might not help you gets too big, it can cause serious problems. A bulging aorta is weak and can burst, or rupture. This causes life-threatening bleeding. If your doctor has determined that your aneurysm is small and not growing fast, it is safe to watch the aneurysm carefully and wait on surgery. If the aneurysm is larger, surgery may be the safest choice. In some cases, your doctor may be able to put in a type of graft, called a stent, to fix the aneurysm without doing major surgery. Follow-up care is a key part of your treatment and safety. Be sure to make and go to all appointments, and call your doctor if you are having problems. It's also a good idea to know your test results and keep a list of the medicines you take. How can you care for yourself at home? · Take your medicines exactly as prescribed. Call your doctor if you think you are having a problem with your medicine. You may take medicine to help lower blood pressure and cholesterol. · Follow a heart-healthy diet. ? Eat lots of fruits and vegetables, whole grains, and low-fat or nonfat dairy foods. ? Eat lean proteins, such as seafood, lean meats and poultry, eggs, beans, peas, nuts, seeds, and soy products. Limit saturated fat and avoid trans fat. ? Limit processed food, sodium, alcohol, and sweets. · If your doctor recommends it, get more exercise. Walking is a good choice. Bit by bit, increase the amount you walk every day. Try for at least 30 minutes on most days of the week. · Talk to your doctor about when you can do more active workouts. · Manage your weight. Being at a healthy weight will not likely change your aortic aneurysm, but it may lower the chance of complications if you ever need surgery. · Do not smoke or allow others to smoke around you. Smoking can make your condition worse. If you need help quitting, talk to your doctor about stop-smoking programs and medicines. These can increase your chances of quitting for good.   When should you call for help?  Call 911 anytime you think you may need emergency care. For example, call if:    · You have severe pain in your belly, back, or chest.     · You passed out (lost consciousness).     · You have severe trouble breathing.    Call your doctor now or seek immediate medical care if:    · You are dizzy or lightheaded, or you feel like you may faint.     · One or both feet change color, are painful, feel cool, or burn or tingle.    Watch closely for changes in your health, and be sure to contact your doctor if you have any problems. Where can you learn more? Go to https://[employer]Chain. org and sign in to your [employer] easily [employer] account. Enter Y976 in the Skinfix box to learn more about \"Abdominal Aortic Aneurysm: Care Instructions. \"     If you do not have an account, please click on the \"Sign Up Now\" link. Current as of: July 14, 2019  Content Version: 12.3  © 9838-1568 Healthwise, Incorporated. Care instructions adapted under license by [facility] ([facility]). If you have questions about a medical condition or this instruction, always ask your healthcare professional. [address] any warranty or liability for your use of this information. PAST MEDICAL HISTORY:  Asthma     Bipolar Disorder     Borderline Personality Disorder     Cholelithiasis     Depression     Fibroids     GERD (Gastroesophageal Reflux Disease)     Obesity

## 2023-08-17 NOTE — BH INPATIENT PSYCHIATRY ASSESSMENT NOTE - PAST PSYCHOTROPIC MEDICATION
Currently on Haldol Dec and Trileptal  Unknown past trials, but patient reports being on many medications

## 2023-08-18 PROCEDURE — 99232 SBSQ HOSP IP/OBS MODERATE 35: CPT

## 2023-08-18 RX ADMIN — OXCARBAZEPINE 300 MILLIGRAM(S): 300 TABLET, FILM COATED ORAL at 20:03

## 2023-08-18 RX ADMIN — OXCARBAZEPINE 300 MILLIGRAM(S): 300 TABLET, FILM COATED ORAL at 08:06
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
## 2023-08-18 NOTE — BH SOCIAL WORK INITIAL PSYCHOSOCIAL EVALUATION - NSBHTREATHXTYPE_PSY_ALL_CORE
[facility] OPD
Shoulder pain s/p fall. Likely MSK - pain reproducible to palpation and ROM. Will r/o bony injury with XR. Pain is non-pleuritic, not limiting pts breathing, low susp of rib fracture.  Low susp of PE - no increased SOB from baseline, no leg swelling, no cough, no hemoptysis.  D/t pts history and risk factors, will r/o ACS w trop, ekg, CXR.  Pt c/o mild anxiety, will give ativan PO.

## 2023-08-18 NOTE — BH INPATIENT PSYCHIATRY PROGRESS NOTE - MSE UNSTRUCTURED FT
Appearance: wearing stained shirt, no bra  Behavior: noted to be twirling around excessively, pulling plants from outside and putting into hair  Speech: normal rate, rythem, volume, clearly articulated  Mood: "okay"  Affect: euthymic and stable on interview, though noted to elevated and irritable at times on unit  Thought process: linear and logical  Thought content: no kelby delusions elicited, appropriately responds to questions  Perceptions: denies AH, VH  Insight: fair, understands that she engages in odd and potentially unsafe behaviors  Judgement: some verbal aggression already on unit  Cognition: grossly intact Appearance: wearing top and bra, disheveled   Behavior: noted to be twirling around excessively, smiling widely  Speech: normal rate, rhythm, volume, clearly articulated, kaykay  Mood: "okay"  Affect: elevated  Thought process: odd interjection of topics and analogies, such as bringing up Nazis when describing [place], but overall linear and logical  Thought content: no kelby delusions elicited, appropriately responds to questions  Perceptions: denies , VH  Insight: fair, understands that she engages in odd and potentially unsafe behaviors, though unable to see some of the actions that are concerning already on the unit (such as verbal agitation, taking top off)  Judgement: some verbal aggression already on unit, took top off yesterday in dayroom  Cognition: grossly intact
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
## 2023-08-18 NOTE — BH SOCIAL WORK INITIAL PSYCHOSOCIAL EVALUATION - NSBHLEGALRESTRAINPT_PSY_ALL_CORE
Large Joint Aspiration/Injection: bilateral knee    Date/Time: 4/28/2022 1:45 PM  Performed by: Pablo Dutton MD  Authorized by: Pablo Dutton MD     Consent Done?:  Yes (Verbal)  Indications:  Pain  Site marked: the procedure site was marked    Timeout: prior to procedure the correct patient, procedure, and site was verified      Details:  Needle Size:  18 G  Approach:  Anterolateral  Location:  Knee  Laterality:  Bilateral  Site:  Bilateral knee  Medications (Right):  48 mg hylan g-f 20 48 mg/6 mL  Medications (Left):  48 mg hylan g-f 20 48 mg/6 mL  Patient tolerance:  Patient tolerated the procedure well with no immediate complications      
No

## 2023-08-18 NOTE — BH INPATIENT PSYCHIATRY PROGRESS NOTE - NSBHCHARTREVIEWVS_PSY_A_CORE FT
Vital Signs Last 24 Hrs  T(C): 36.9 (08-18-23 @ 06:40), Max: 36.9 (08-18-23 @ 06:40)  T(F): 98.5 (08-18-23 @ 06:40), Max: 98.5 (08-18-23 @ 06:40)  HR: --  BP: --  BP(mean): --  RR: --  SpO2: --    Orthostatic VS  08-18-23 @ 06:40  Lying BP: --/-- HR: --  Sitting BP: 158/89 HR: 96  Standing BP: --/-- HR: --  Site: --  Mode: --  Orthostatic VS  08-17-23 @ 20:18  Lying BP: --/-- HR: --  Sitting BP: 152/89 HR: 104  Standing BP: 148/81 HR: 97  Site: --  Mode: --  Orthostatic VS  08-17-23 @ 08:33  Lying BP: --/-- HR: --  Sitting BP: 117/77 HR: 91  Standing BP: 130/83 HR: 86  Site: --  Mode: --  Orthostatic VS  08-16-23 @ 19:12  Lying BP: --/-- HR: --  Sitting BP: 152/97 HR: 73  Standing BP: 151/93 HR: 71  Site: --  Mode: --

## 2023-08-18 NOTE — BH INPATIENT PSYCHIATRY PROGRESS NOTE - NSBHFUPINTERVALHXFT_PSY_A_CORE
/89, HR 96, accepting medications, received Haldol 5/Ativan 2mg PO yesterday morning. Good behavioral control overnight. /89, HR 96, accepting medications, received Haldol 5/Ativan 2mg PO yesterday morning. Good behavioral control overnight.    Patient with strong presence on unit. Seen smiling widely, laughing, eating her banana and yelling "POTASSIUM" and twirling. She is very amenable to sitting down with this provider for interview. She states she is doing okay, and that it is "sporadic" around here. She likes being here and away from [facility]. She likes getting 3 meals a day, instead of having to look through trash to find food. Talks about "Jews" and "Nazis" and likens it to [facility], noting the deprivation of that setting. Patient also describes using her money to buy a yue crown, to feel like she's having a "bat mitzvah." Overall, feels she's doing very well here, and hasn't lashed out. Does acknowledge taking her top off yesterday-- laughs-- noting she was hot, but kept her bra on. Denies SI, HI, AH, VH. Discuss increasing Trileptal or starting lithium for aggression/impulsivity. Patient declines for now but will consider.

## 2023-08-18 NOTE — BH INPATIENT PSYCHIATRY PROGRESS NOTE - NSBHATTESTBILLING_PSY_A_CORE
99223-Initial OBS or IP - high complexity OR  mins 12364-[place] OBS or IP - moderate complexity OR 35-49 mins
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
## 2023-08-19 PROCEDURE — 99232 SBSQ HOSP IP/OBS MODERATE 35: CPT

## 2023-08-19 RX ADMIN — OXCARBAZEPINE 300 MILLIGRAM(S): 300 TABLET, FILM COATED ORAL at 20:27

## 2023-08-19 RX ADMIN — OXCARBAZEPINE 300 MILLIGRAM(S): 300 TABLET, FILM COATED ORAL at 08:05

## 2023-08-19 NOTE — BH INPATIENT PSYCHIATRY PROGRESS NOTE - NSBHCHARTREVIEWVS_PSY_A_CORE FT
Vital Signs Last 24 Hrs  T(C): 37.1 (08-18-23 @ 18:32), Max: 37.1 (08-18-23 @ 18:32)  T(F): 98.7 (08-18-23 @ 18:32), Max: 98.7 (08-18-23 @ 18:32)  HR: --  BP: --  BP(mean): --  RR: --  SpO2: --    Orthostatic VS  08-18-23 @ 18:32  Lying BP: --/-- HR: --  Sitting BP: 137/77 HR: 90  Standing BP: 148/71 HR: 90  Site: --  Mode: --  Orthostatic VS  08-18-23 @ 06:40  Lying BP: --/-- HR: --  Sitting BP: 158/89 HR: 96  Standing BP: --/-- HR: --  Site: --  Mode: --  Orthostatic VS  08-17-23 @ 20:18  Lying BP: --/-- HR: --  Sitting BP: 152/89 HR: 104  Standing BP: 148/81 HR: 97  Site: --  Mode: --  Orthostatic VS  08-17-23 @ 08:33  Lying BP: --/-- HR: --  Sitting BP: 117/77 HR: 91  Standing BP: 130/83 HR: 86  Site: --  Mode: --   Vital Signs Last 24 Hrs  T(C): 36.4 (08-19-23 @ 08:34), Max: 37.1 (08-18-23 @ 18:32)  T(F): 97.6 (08-19-23 @ 08:34), Max: 98.7 (08-18-23 @ 18:32)  HR: --  BP: --  BP(mean): --  RR: --  SpO2: --    Orthostatic VS  08-19-23 @ 08:34  Lying BP: --/-- HR: --  Sitting BP: 133/86 HR: 85  Standing BP: --/-- HR: --  Site: --  Mode: --  Orthostatic VS  08-18-23 @ 18:32  Lying BP: --/-- HR: --  Sitting BP: 137/77 HR: 90  Standing BP: 148/71 HR: 90  Site: --  Mode: --  Orthostatic VS  08-18-23 @ 06:40  Lying BP: --/-- HR: --  Sitting BP: 158/89 HR: 96  Standing BP: --/-- HR: --  Site: --  Mode: --  Orthostatic VS  08-17-23 @ 20:18  Lying BP: --/-- HR: --  Sitting BP: 152/89 HR: 104  Standing BP: 148/81 HR: 97  Site: --  Mode: --

## 2023-08-19 NOTE — BH INPATIENT PSYCHIATRY PROGRESS NOTE - NSBHFUPINTERVALHXFT_PSY_A_CORE
Patient is seen on unit followed up for psychotic decompensation.   Patient is discussed with nursing team. No interval events. Patient is known to writer from past admissions on ML4.   Patient says hi and waves to writer as writer approaches.  Patient is eating and sleeping well.  Per chart review patient has been in fair behavioral control, np prns needed.    Patient is observed in hallway, she is hyper, manic and cooperative.   Patient reports feeling “I’m fine I don’t know why I’m here”  (but per chart review pt presents after spreading feces on wall and eating feces).  She denies SI/SIB/HI no formulated plan, or intent on unit, engages in safety planning.   Patient denies AVH, paranoia , delusions or trina.  Pt presents very disorganized, manic, easily distracted and internally preoccupied. No acute medical concerns. Continue to monitor and provide therapeutic support.

## 2023-08-19 NOTE — BH INPATIENT PSYCHIATRY PROGRESS NOTE - MSE UNSTRUCTURED FT
Appearance: wearing top and bra, disheveled   Behavior: noted to be twirling around excessively, smiling widely  Speech: normal rate, rhythm, volume, clearly articulated, kaykay  Mood: "okay"  Affect: elevated  Thought process: odd interjection of topics and analogies, such as bringing up Nazis when describing [place], but overall linear and logical  Thought content: no kelby delusions elicited, appropriately responds to questions  Perceptions: denies , VH  Insight: fair, understands that she engages in odd and potentially unsafe behaviors, though unable to see some of the actions that are concerning already on the unit (such as verbal agitation, taking top off)  Judgement: some verbal aggression already on unit, took top off yesterday in dayroom  Cognition: grossly intact

## 2023-08-20 PROCEDURE — 99232 SBSQ HOSP IP/OBS MODERATE 35: CPT

## 2023-08-20 RX ADMIN — Medication 2 MILLIGRAM(S): at 11:34

## 2023-08-20 RX ADMIN — OXCARBAZEPINE 300 MILLIGRAM(S): 300 TABLET, FILM COATED ORAL at 08:00

## 2023-08-20 RX ADMIN — OXCARBAZEPINE 300 MILLIGRAM(S): 300 TABLET, FILM COATED ORAL at 20:53

## 2023-08-20 RX ADMIN — HALOPERIDOL DECANOATE 5 MILLIGRAM(S): 100 INJECTION INTRAMUSCULAR at 11:33

## 2023-08-20 RX ADMIN — Medication 650 MILLIGRAM(S): at 20:55

## 2023-08-20 RX ADMIN — Medication 650 MILLIGRAM(S): at 20:52

## 2023-08-20 NOTE — BH INPATIENT PSYCHIATRY PROGRESS NOTE - NSBHMETABOLIC_PSY_ALL_CORE_FT
BMI: BMI (kg/m2): 42.4 (08-16-23 @ 19:12)  HbA1c:   Glucose: POCT Blood Glucose.: 87 mg/dL (08-16-23 @ 17:09)    BP: --  Lipid Panel:

## 2023-08-20 NOTE — BH INPATIENT PSYCHIATRY PROGRESS NOTE - NSBHCHARTREVIEWVS_PSY_A_CORE FT
Vital Signs Last 24 Hrs  T(C): 36.7 (08-20-23 @ 06:44), Max: 36.7 (08-20-23 @ 06:44)  T(F): 98.1 (08-20-23 @ 06:44), Max: 98.1 (08-20-23 @ 06:44)  HR: --  BP: --  BP(mean): --  RR: --  SpO2: --    Orthostatic VS  08-20-23 @ 06:44  Lying BP: --/-- HR: --  Sitting BP: 150/92 HR: 90  Standing BP: 132/84 HR: 78  Site: left leg  Mode: electronic  Orthostatic VS  08-19-23 @ 08:34  Lying BP: --/-- HR: --  Sitting BP: 133/86 HR: 85  Standing BP: --/-- HR: --  Site: --  Mode: --  Orthostatic VS  08-18-23 @ 18:32  Lying BP: --/-- HR: --  Sitting BP: 137/77 HR: 90  Standing BP: 148/71 HR: 90  Site: --  Mode: --

## 2023-08-20 NOTE — BH INPATIENT PSYCHIATRY PROGRESS NOTE - NSBHASSESSSUMMFT_PSY_ALL_CORE
Pt is a 43 y/o AAF, single, non-caregiver, unemployed, on disability for mental illness, domiciled at [facility], on AOT at this time, with past psychiatric history of schizoaffective disorder, as per chart: borderline personality disorder, >30 prior inpatient psychiatric hospitalizations most recently at [facility] from 4/2023,  hx of highly disorganized behavior requiring transfer to [facility] hospitalization ([place]) such as smearing feces in the wall, eating feces, turning in a [place] over and over again, myriad of ED visits including [facility], 3 prior suicide attempts (last in 2012 via OD), recurrent chronic suicidal gestures and suicidal ideation, hx of property destruction when upset, long hx of sexually provocative behavior (both out in the community and while on inpatient units requiring 1:1 staff observation), hx of physical altercations, hx of verbal threats, hx of property destruction, long hx of medication and tx noncompliance resulting in AOT and PALACIOS administration, brought in by ambulance activated by residence for spreading feces on apartment wall.    This is a patient with a diagnosed schizophrenia-spectrum disorder, and an extensive psychiatric history with multiple hospitalizations for psychosis, bizarre behavior and SI, with significant documented high risk behaviors (physical altercations, property destruction, suicidal gestures). She is presenting now with bizarre behaviors noted at residence (smearing and eating feces), which patient has done historically. On initial evaluation on ML3, patient quickly showing evidence of odd behaviors (twirling excessively, pulling flowers from outside and placing in her hair, taking top off in public spaces), as well as irritability and verbal aggression towards peers. On initial interview, patient is calm, cooperative, strikingly linear, clear and goal-directed, and without any reports or evidence of psychosis or depression. She acknowledges bizarre behavior with feces and offers several unsatisfying explanations to this behavior. Per collateral obtained from residence staff, patient has shown increasingly erratic behavior over the past two weeks prior to admission without clear trigger. Attempted to reach out to psychiatrist, and awaiting call back.     Patient appears to struggle most prominently with poor impulse control and altered judgement as to what is normal behavior, though this appears to be chronic and does not appear to be driven by psychotic decompensation. Will continue patient's home medications. Encouraging patient to try change in medications for impulse control, such as increasing oxcarbazepine or starting lithium, which she declines at this time.    Plan:  -Admitted on a 9.39 involuntary status  -Routine checks (no HI or SI)  -Continue Haldol Decanoate 200mg IM qMonth (per patient, last on 8/14, will need to confirm with psychiatrist)  	-Will order metabolic labs for tomorrow  -Continue Trileptal 300mg BID, pt declining increase in dose  -Admission labs reviewed and unremarkable. EKG reviewed, QTc 456ms.   -Engage in unit chris, groups

## 2023-08-20 NOTE — BH INPATIENT PSYCHIATRY PROGRESS NOTE - NSBHFUPINTERVALHXFT_PSY_A_CORE
Patient is seen on unit followed up for psychotic decompensation.   Patient is discussed with nursing team. No interval events. Patient is eating and sleeping well.  Patient has been in fair behavioral control, np prns needed.    Patient is observed in hallway with headphones, dancing provocatively, she is hyper, manic.  Patient reports feeling “I’m fine”  begins to laugh very loudly.   She denies SI/SIB/HI no formulated plan, or intent on unit, engages in safety planning.   Patient denies AVH, paranoia, delusions or trina.  Pt presents very disorganized, manic and internally preoccupied. No acute medical concerns. Refused blood  work yesterday, pt is asked to allow blood work today, but declines.  Continue to monitor and provide therapeutic support.

## 2023-08-21 LAB
A1C WITH ESTIMATED AVERAGE GLUCOSE RESULT: 5.4 % — SIGNIFICANT CHANGE UP (ref 4–5.6)
CHOLEST SERPL-MCNC: 143 MG/DL — SIGNIFICANT CHANGE UP
ESTIMATED AVERAGE GLUCOSE: 108 — SIGNIFICANT CHANGE UP
HDLC SERPL-MCNC: 54 MG/DL — SIGNIFICANT CHANGE UP
LIPID PNL WITH DIRECT LDL SERPL: 56 MG/DL — SIGNIFICANT CHANGE UP
NON HDL CHOLESTEROL: 89 MG/DL — SIGNIFICANT CHANGE UP
TRIGL SERPL-MCNC: 164 MG/DL — HIGH

## 2023-08-21 PROCEDURE — 90853 GROUP PSYCHOTHERAPY: CPT

## 2023-08-21 PROCEDURE — 99232 SBSQ HOSP IP/OBS MODERATE 35: CPT

## 2023-08-21 RX ADMIN — HALOPERIDOL DECANOATE 5 MILLIGRAM(S): 100 INJECTION INTRAMUSCULAR at 08:00

## 2023-08-21 RX ADMIN — OXCARBAZEPINE 300 MILLIGRAM(S): 300 TABLET, FILM COATED ORAL at 08:01

## 2023-08-21 RX ADMIN — Medication 650 MILLIGRAM(S): at 16:36

## 2023-08-21 RX ADMIN — Medication 650 MILLIGRAM(S): at 23:04

## 2023-08-21 RX ADMIN — OXCARBAZEPINE 300 MILLIGRAM(S): 300 TABLET, FILM COATED ORAL at 20:06

## 2023-08-21 RX ADMIN — Medication 2 MILLIGRAM(S): at 08:00

## 2023-08-21 NOTE — BH INPATIENT PSYCHIATRY PROGRESS NOTE - MSE UNSTRUCTURED FT
Appearance: wearing top and bra, disheveled   Behavior: noted to be twirling around excessively, smiling widely  Speech: normal rate, rhythm, volume, clearly articulated, kaykay  Mood: "okay"  Affect: elevated  Thought process: odd interjection of topics and analogies, such as bringing up Nazis when describing [place], but overall linear and logical  Thought content: no kelby delusions elicited, appropriately responds to questions  Perceptions: denies , VH  Insight: fair, understands that she engages in odd and potentially unsafe behaviors, though unable to see some of the actions that are concerning already on the unit (such as verbal agitation, taking top off)  Judgement: some verbal aggression already on unit, took top off yesterday in dayroom  Cognition: grossly intact Appearance: wearing hospital attire and towel over hair  Behavior: smiling widely, high energy, kaykay  Speech: normal rate, loud, clearly articulated  Mood: "happy"  Affect: elevated  Thought process: more disorganized today, jumping from topic to topic  Thought content: no kelby delusions elicited, appropriately responds to questions  Perceptions: denies AH, VH  Insight: fair, understands that she engages in odd and potentially unsafe behaviors, though unable to see some of the actions that are concerning already on the unit (such as verbal agitation, sexual provocativeness)  Judgement: odd and erratic-- verbal aggression and sexually provocative behaviors, but able to be redirected  Cognition: grossly intact

## 2023-08-21 NOTE — BH INPATIENT PSYCHIATRY PROGRESS NOTE - NSBHCHARTREVIEWVS_PSY_A_CORE FT
Vital Signs Last 24 Hrs  T(C): 36.7 (08-21-23 @ 08:36), Max: 36.7 (08-21-23 @ 08:36)  T(F): 98.1 (08-21-23 @ 08:36), Max: 98.1 (08-21-23 @ 08:36)  HR: --  BP: --  BP(mean): --  RR: --  SpO2: --    Orthostatic VS  08-21-23 @ 08:36  Lying BP: --/-- HR: --  Sitting BP: 129/85 HR: 84  Standing BP: --/-- HR: --  Site: --  Mode: --  Orthostatic VS  08-20-23 @ 06:44  Lying BP: --/-- HR: --  Sitting BP: 150/92 HR: 90  Standing BP: 132/84 HR: 78  Site: left leg  Mode: electronic

## 2023-08-21 NOTE — BH INPATIENT PSYCHIATRY PROGRESS NOTE - NSBHFUPINTERVALHXFT_PSY_A_CORE
VSS, accepting medications. On Saturday, patient dancing provocatively in day room, and got into a verbal altercation with peer (able to be verbally re-directed). On Sunday, noted yelling "Hail Hitler" in crowded area, noted to be posturing with fists up to peer and received Haldol 5mg/Ativan 2mg PO PRN. Received additional PRNs this morning.  VSS, accepting medications. On Saturday, patient dancing provocatively in day room, and got into a verbal altercation with peer (able to be verbally re-directed). On Sunday, noted yelling "Hail Hitler" in crowded area, noted to be posturing with fists up to peer and received Haldol 5mg/Ativan 2mg PO PRN. Received additional PRNs this morning. Slept ~7 hours.    Patient seen with towel over head, states "I'm a mental patient!" Describes mood as "happy," and likes it here on the unit. States it is good for the soul. Talks about a story of previously being off meds, seeing a "white light" and that it effected her pineal gland. States she likes a lake in her building, and craves romantic/sexual intimacy. Discuss events over weekend including verbal aggression towards a peer. Discusses why that peer can frustrate her, but acknowledges that she had poor impulse management and feels bad about it. "I know I got to stop." States she wants to get a job, and describes her academic/professional history.    Recommended medication changes, patient declined.

## 2023-08-21 NOTE — BH INPATIENT PSYCHIATRY PROGRESS NOTE - NSBHMETABOLIC_PSY_ALL_CORE_FT
BMI: BMI (kg/m2): 42.4 (08-16-23 @ 19:12)  HbA1c:   Glucose: POCT Blood Glucose.: 87 mg/dL (08-16-23 @ 17:09)    BP: --  Lipid Panel:  BMI: BMI (kg/m2): 42.4 (08-16-23 @ 19:12)  HbA1c: A1C with Estimated Average Glucose Result: 5.4 % (08-21-23 @ 09:18)    Glucose: POCT Blood Glucose.: 87 mg/dL (08-16-23 @ 17:09)    BP: --  Lipid Panel: Date/Time: 08-21-23 @ 09:18  Cholesterol, Serum: 143  Direct LDL: --  HDL Cholesterol, Serum: 54  Total Cholesterol/HDL Ration Measurement: --  Triglycerides, Serum: 164

## 2023-08-21 NOTE — BH INPATIENT PSYCHIATRY PROGRESS NOTE - NSBHASSESSSUMMFT_PSY_ALL_CORE
Pt is a 41 y/o AAF, single, non-caregiver, unemployed, on disability for mental illness, domiciled at [facility], on AOT at this time, with past psychiatric history of schizoaffective disorder, as per chart: borderline personality disorder, >30 prior inpatient psychiatric hospitalizations most recently at [facility] from 4/2023,  hx of highly disorganized behavior requiring transfer to [facility] hospitalization ([place]) such as smearing feces in the wall, eating feces, turning in a [place] over and over again, myriad of ED visits including [facility], 3 prior suicide attempts (last in 2012 via OD), recurrent chronic suicidal gestures and suicidal ideation, hx of property destruction when upset, long hx of sexually provocative behavior (both out in the community and while on inpatient units requiring 1:1 staff observation), hx of physical altercations, hx of verbal threats, hx of property destruction, long hx of medication and tx noncompliance resulting in AOT and PALACIOS administration, brought in by ambulance activated by residence for spreading feces on apartment wall.    This is a patient with a diagnosed schizophrenia-spectrum disorder, and an extensive psychiatric history with multiple hospitalizations for psychosis, bizarre behavior and SI, with significant documented high risk behaviors (physical altercations, property destruction, suicidal gestures). She is presenting now with bizarre behaviors noted at residence (smearing and eating feces), which patient has done historically. On initial evaluation on ML3, patient quickly showing evidence of odd behaviors (twirling excessively, pulling flowers from outside and placing in her hair, taking top off in public spaces), as well as irritability and verbal aggression towards peers. On initial interview, patient is calm, cooperative, strikingly linear, clear and goal-directed, and without any reports or evidence of psychosis or depression. She acknowledges bizarre behavior with feces and offers several unsatisfying explanations to this behavior. Per collateral obtained from residence staff, patient has shown increasingly erratic behavior over the past two weeks prior to admission without clear trigger. Attempted to reach out to psychiatrist, and awaiting call back.     Patient appears to struggle most prominently with poor impulse control and altered judgement as to what is normal behavior, though this appears to be chronic and does not appear to be driven by psychotic decompensation. Will continue patient's home medications. Encouraging patient to try change in medications for impulse control, such as increasing oxcarbazepine or starting lithium, which she declines at this time.    Plan:  -Admitted on a 9.39 involuntary status  -Routine checks (no HI or SI)  -Continue Haldol Decanoate 200mg IM qMonth (per patient, last on 8/14, will need to confirm with psychiatrist)  	-Will order metabolic labs for tomorrow  -Continue Trileptal 300mg BID, pt declining increase in dose  -Admission labs reviewed and unremarkable. EKG reviewed, QTc 456ms.   -Engage in unit chris, groups Pt is a 41 y/o AAF, single, non-caregiver, unemployed, on disability for mental illness, domiciled at [facility], on AOT at this time, with past psychiatric history of schizoaffective disorder, as per chart: borderline personality disorder, >30 prior inpatient psychiatric hospitalizations most recently at [facility] from 4/2023,  hx of highly disorganized behavior requiring transfer to [facility] hospitalization ([place]) such as smearing feces in the wall, eating feces, turning in a [place] over and over again, myriad of ED visits including [facility], 3 prior suicide attempts (last in 2012 via OD), recurrent chronic suicidal gestures and suicidal ideation, hx of property destruction when upset, long hx of sexually provocative behavior (both out in the community and while on inpatient units requiring 1:1 staff observation), hx of physical altercations, hx of verbal threats, hx of property destruction, long hx of medication and tx noncompliance resulting in AOT and PALACIOS administration, brought in by ambulance activated by residence for spreading feces on apartment wall.    This is a patient with a diagnosed schizophrenia-spectrum disorder, and an extensive psychiatric history with multiple hospitalizations for psychosis, bizarre behavior and SI, with significant documented high risk behaviors (physical altercations, property destruction, suicidal gestures). She is presenting now with bizarre behaviors noted at residence (smearing and eating feces), which patient has done historically. On initial evaluation on ML3, patient quickly showing evidence of odd and manic-like behaviors (twirling excessively, pulling flowers from outside and placing in her hair, taking top off in public spaces), as well as irritability and verbal aggression towards peers. On initial interview, patient is calm, cooperative, strikingly linear, clear and goal-directed, and without any reports or evidence of psychosis or depression. She acknowledged bizarre behavior with feces and offers several unsatisfying explanations to this behavior. Per collateral obtained from residence staff, patient has shown increasingly erratic behavior over the past two weeks prior to admission without clear trigger. Attempted to reach out to psychiatrist, and awaiting call back.     On the unit, patient continues to show odd and sexually inappropriate behaviors, with loud/elevated affect concerning for trina, and more notably disorganized, though overall can be redirected, and unclear if this is reflective of poor impulse control and altered judgement at baseline versus psychotic/manic decompensation. Sleeping adequately. Will attempt to get more collateral to clarify. Regardless, no kelby unsafe behaviors as of yet, and patient repeatedly declining change in medications, such as increasing oxcarbazepine or starting lithium.     Plan:  -Admitted on a 9.39 involuntary status  -Routine checks (no HI or SI)  -Continue Haldol Decanoate 200mg IM qMonth (per patient, last on 8/14, will need to confirm with psychiatrist)  	-Will order metabolic labs for tomorrow  -Continue Trileptal 300mg BID, pt declining increase in dose  -Admission labs reviewed and unremarkable. EKG reviewed, QTc 456ms.   -Engage in unit chris, groups Pt is a 41 y/o AAF, single, non-caregiver, unemployed, on disability for mental illness, domiciled at [facility], on AOT at this time, with past psychiatric history of schizoaffective disorder, as per chart: borderline personality disorder, >30 prior inpatient psychiatric hospitalizations most recently at [facility] from 4/2023,  hx of highly disorganized behavior requiring transfer to [facility] hospitalization ([place]) such as smearing feces in the wall, eating feces, turning in a [place] over and over again, myriad of ED visits including [facility], 3 prior suicide attempts (last in 2012 via OD), recurrent chronic suicidal gestures and suicidal ideation, hx of property destruction when upset, long hx of sexually provocative behavior (both out in the community and while on inpatient units requiring 1:1 staff observation), hx of physical altercations, hx of verbal threats, hx of property destruction, long hx of medication and tx noncompliance resulting in AOT and PALACISO administration, brought in by ambulance activated by residence for spreading feces on apartment wall.    This is a patient with a diagnosed schizophrenia-spectrum disorder, and an extensive psychiatric history with multiple hospitalizations for psychosis, bizarre behavior and SI, with significant documented high risk behaviors (physical altercations, property destruction, suicidal gestures). She is presenting now with bizarre behaviors noted at residence (smearing and eating feces), which patient has done historically. On initial evaluation on ML3, patient quickly showing evidence of odd and manic-like behaviors (twirling excessively, pulling flowers from outside and placing in her hair, taking top off in public spaces), as well as irritability and verbal aggression towards peers. On initial interview, patient is calm, cooperative, strikingly linear, clear and goal-directed, and without any reports or evidence of psychosis or depression. She acknowledged bizarre behavior with feces and offers several unsatisfying explanations to this behavior. Per collateral obtained from residence staff, patient has shown increasingly erratic behavior over the past two weeks prior to admission without clear trigger. Attempted to reach out to psychiatrist, and awaiting call back.     On the unit, patient continues to show odd and sexually inappropriate behaviors, with loud/elevated affect concerning for trina, and more notably disorganized, though overall can be redirected, and unclear if this is reflective of chronic disinhibition leading to poor impulse control and altered judgement at baseline versus psychotic/manic decompensation. Sleeping adequately. Will attempt to get more collateral to clarify. Regardless, no kelby unsafe behaviors as of yet, and patient repeatedly declining change in medications, such as increasing oxcarbazepine or starting lithium.     Plan:  -Admitted on a 9.39 involuntary status  -Routine checks (no HI or SI)  -Continue Haldol Decanoate 200mg IM qMonth (per patient, last on 8/14, will need to confirm with psychiatrist)  	-Will order metabolic labs for tomorrow  -Continue Trileptal 300mg BID, pt declining increase in dose  -Admission labs reviewed and unremarkable. EKG reviewed, QTc 456ms.   -Engage in unit chris, groups

## 2023-08-22 PROCEDURE — 99232 SBSQ HOSP IP/OBS MODERATE 35: CPT

## 2023-08-22 PROCEDURE — 90853 GROUP PSYCHOTHERAPY: CPT

## 2023-08-22 RX ORDER — CLONAZEPAM 1 MG
1 TABLET ORAL
Refills: 0 | Status: DISCONTINUED | OUTPATIENT
Start: 2023-08-22 | End: 2023-08-24

## 2023-08-22 RX ORDER — IBUPROFEN 200 MG
400 TABLET ORAL ONCE
Refills: 0 | Status: COMPLETED | OUTPATIENT
Start: 2023-08-22 | End: 2023-08-22

## 2023-08-22 RX ORDER — ARIPIPRAZOLE 15 MG/1
5 TABLET ORAL AT BEDTIME
Refills: 0 | Status: DISCONTINUED | OUTPATIENT
Start: 2023-08-22 | End: 2023-08-23

## 2023-08-22 RX ADMIN — Medication 1 MILLIGRAM(S): at 19:23

## 2023-08-22 RX ADMIN — Medication 650 MILLIGRAM(S): at 06:10

## 2023-08-22 RX ADMIN — Medication 650 MILLIGRAM(S): at 00:25

## 2023-08-22 RX ADMIN — Medication 400 MILLIGRAM(S): at 11:30

## 2023-08-22 RX ADMIN — OXCARBAZEPINE 300 MILLIGRAM(S): 300 TABLET, FILM COATED ORAL at 08:08

## 2023-08-22 RX ADMIN — OXCARBAZEPINE 300 MILLIGRAM(S): 300 TABLET, FILM COATED ORAL at 19:23

## 2023-08-22 RX ADMIN — Medication 650 MILLIGRAM(S): at 05:30

## 2023-08-22 RX ADMIN — Medication 650 MILLIGRAM(S): at 17:45

## 2023-08-22 RX ADMIN — ARIPIPRAZOLE 5 MILLIGRAM(S): 15 TABLET ORAL at 19:22

## 2023-08-22 RX ADMIN — Medication 400 MILLIGRAM(S): at 12:30

## 2023-08-22 NOTE — BH INPATIENT PSYCHIATRY PROGRESS NOTE - NSBHCHARTREVIEWVS_PSY_A_CORE FT
Vital Signs Last 24 Hrs  T(C): 36.7 (08-22-23 @ 06:52), Max: 36.7 (08-22-23 @ 06:52)  T(F): 98.1 (08-22-23 @ 06:52), Max: 98.1 (08-22-23 @ 06:52)  HR: 72 (08-22-23 @ 06:52) (72 - 72)  BP: 142/85 (08-22-23 @ 06:52) (142/85 - 142/85)  BP(mean): --  RR: --  SpO2: --    Orthostatic VS  08-21-23 @ 08:36  Lying BP: --/-- HR: --  Sitting BP: 129/85 HR: 84  Standing BP: --/-- HR: --  Site: --  Mode: --

## 2023-08-22 NOTE — BH INPATIENT PSYCHIATRY PROGRESS NOTE - NSBHMETABOLIC_PSY_ALL_CORE_FT
BMI: BMI (kg/m2): 42.4 (08-16-23 @ 19:12)  HbA1c: A1C with Estimated Average Glucose Result: 5.4 % (08-21-23 @ 09:18)    Glucose: POCT Blood Glucose.: 87 mg/dL (08-16-23 @ 17:09)    BP: 142/85 (08-22-23 @ 06:52) (142/85 - 142/85)  Lipid Panel: Date/Time: 08-21-23 @ 09:18  Cholesterol, Serum: 143  Direct LDL: --  HDL Cholesterol, Serum: 54  Total Cholesterol/HDL Ration Measurement: --  Triglycerides, Serum: 164

## 2023-08-22 NOTE — BH INPATIENT PSYCHIATRY PROGRESS NOTE - NSBHFUPINTERVALHXFT_PSY_A_CORE
VSS, accepting medications, no acute events.  VSS, accepting medications, no acute events.     Patient reports she is feeling good today. Notes that her sleep was poor last night, but she doesn't feel tired. She states she's actually been feeling good for the past month, and noticing her sleep is more choppy during that time as well. Attributes to excitement over romantic interest at residence ([place]). Also notes she bought her yue nga for $900 two weeks ago, but then also states she has no money and so eats from the trash. Discuss that her room at the residence is a "crime scene" and getting worse over past month. Gives reasoning of seeing if she could live like a homeless person. Patient abruptly states at one point during interview, "Armageddon... it's going to happen." She brings up Hitler spontaneously. She makes a difficult to understand analogy to the Aurelio and I. She denies SI, HI.     Discussed in detail concerns about manic appearance and unsafe behaviors. Patient at this point only agreeable to home medications and addition of Ativan for sleep and trina.    Spoke to Dr. Garcia on 8/21:  Dr. Garcia  Moved earlier in the year. Notes that she's been more "up and down" since her move. Has been more triggered since moving. Old boyfriend living there and pestering her. Occasionally using MJ (though more recently denying). Has been more manic like in last few weeks.  States that her being kaykay, silly etc is hypomanic and off of her baseline.   Last got Haldol Dec 200mg on 8/14 VSS, accepting medications, no acute events. Slept 3 hours.    Patient reports she is feeling good today. Notes that her sleep was poor last night, but she doesn't feel tired. She states she's actually been feeling good for the past month, and noticing her sleep is more choppy during that time as well. Attributes to excitement over romantic interest at residence ([place]). Also notes she bought her yue nga for $900 two weeks ago, but then also states she has no money and so eats from the trash. Discuss that her room at the residence is a "crime scene" and getting worse over past month. Gives reasoning of seeing if she could live like a homeless person. Patient abruptly states at one point during interview, "Armageddon... it's going to happen." She brings up Hitler spontaneously. She makes a difficult to understand analogy to the Aurelio and I. She denies SI, HI.     Discussed in detail concerns about manic appearance and unsafe behaviors. Patient at this point only agreeable to home medications and addition of Ativan for sleep and trina.    Spoke to Dr. Garcia on 8/21:  Dr. Garcia  Moved earlier in the year. Notes that she's been more "up and down" since her move. Has been more triggered since moving. Old boyfriend living there and pestering her. Occasionally using MJ (though more recently denying). Has been more manic like in last few weeks.  States that her being kaykay, silly etc is hypomanic and off of her baseline.   Last got Haldol Dec 200mg on 8/14 VSS, accepting medications, no acute events. Slept 3 hours.    Patient reports she is feeling good today. Notes that her sleep was poor last night, but she doesn't feel tired. She states she's actually been feeling good for the past month, and noticing her sleep is more choppy during that time as well. Attributes to excitement over romantic interest at residence ([place]). Also notes she bought her yue nga for $900 two weeks ago, but then also states she has no money and so eats from the trash. Discuss that her room at the residence is a "crime scene" and getting worse over past month. Gives reasoning of seeing if she could live like a homeless person. Patient abruptly states at one point during interview, "Armageddon... it's going to happen." She brings up Hitler spontaneously. She makes a difficult to understand analogy to the Aurelio and I. She denies SI, HI.     Discussed in detail concerns about manic appearance and unsafe behaviors. Patient at this point only agreeable to home medications and addition of Ativan for sleep and trina.    Spoke to Dr. Garcia on 8/21:  Dr. Garcia  Moved earlier in the year. Notes that she's been more "up and down" since her move. Has been more triggered since moving. Old boyfriend living there and pestering her. Occasionally using MJ (though more recently denying). Has been more manic like in last few weeks.  States that her being kaykay, silly etc is hypomanic and off of her baseline.   Last got Haldol Dec 200mg on 8/14    Spoke to Dr. Thompson:  Patient accepts injection and Trileptal, but wont' take anything else. States she comes to [facility] when she needs help. Stats she's often getting into fights with residence, and can be belligerent with staff. She's hurt staff and other patients. Refuses groups, PHP. Has known patient to be psychotic (irritable/hostile, AH, grossly disorganized speech, very aggressive). Presentation hasn't changed much since May 2022, but the eating feces and manic presentation seems new. Thinks she will stop any new meds as soon as she leaves the hospital. thinks she needs a second PALACIOS, or ideally long term hospitalization.   States she's been on clozapine, but non-compliant.

## 2023-08-22 NOTE — BH INPATIENT PSYCHIATRY PROGRESS NOTE - NSBHCONSBHPROVDETAILS_PSY_A_CORE  FT
Reached out to [place], waiting for call back (psychiatrist on vacation) Spoke to Dr. Garcia (see HPI 8/22), waiting for call back from psychiatrist

## 2023-08-22 NOTE — BH INPATIENT PSYCHIATRY PROGRESS NOTE - CURRENT MEDICATION
MEDICATIONS  (STANDING):  ibuprofen  Tablet. 400 milliGRAM(s) Oral once  OXcarbazepine 300 milliGRAM(s) Oral two times a day    MEDICATIONS  (PRN):  acetaminophen     Tablet .. 650 milliGRAM(s) Oral every 6 hours PRN Moderate Pain (4 - 6)  haloperidol     Tablet 5 milliGRAM(s) Oral every 6 hours PRN agittaion  haloperidol    Injectable 5 milliGRAM(s) IntraMuscular Once PRN assaultive  hydrOXYzine hydrochloride 25 milliGRAM(s) Oral every 6 hours PRN Anxiety  LORazepam     Tablet 2 milliGRAM(s) Oral every 6 hours PRN Anxiety  LORazepam   Injectable 2 milliGRAM(s) IntraMuscular Once PRN Assaultive behavior   MEDICATIONS  (STANDING):  LORazepam     Tablet 2 milliGRAM(s) Oral once  LORazepam     Tablet 2 milliGRAM(s) Oral two times a day  OXcarbazepine 300 milliGRAM(s) Oral two times a day    MEDICATIONS  (PRN):  acetaminophen     Tablet .. 650 milliGRAM(s) Oral every 6 hours PRN Moderate Pain (4 - 6)  haloperidol     Tablet 5 milliGRAM(s) Oral every 6 hours PRN agittaion  haloperidol    Injectable 5 milliGRAM(s) IntraMuscular Once PRN assaultive  hydrOXYzine hydrochloride 25 milliGRAM(s) Oral every 6 hours PRN Anxiety  LORazepam     Tablet 2 milliGRAM(s) Oral every 6 hours PRN Anxiety  LORazepam   Injectable 2 milliGRAM(s) IntraMuscular Once PRN Assaultive behavior   MEDICATIONS  (STANDING):  LORazepam     Tablet 2 milliGRAM(s) Oral two times a day  LORazepam     Tablet 2 milliGRAM(s) Oral once  OXcarbazepine 300 milliGRAM(s) Oral two times a day    MEDICATIONS  (PRN):  acetaminophen     Tablet .. 650 milliGRAM(s) Oral every 6 hours PRN Moderate Pain (4 - 6)  haloperidol     Tablet 5 milliGRAM(s) Oral every 6 hours PRN agittaion  haloperidol    Injectable 5 milliGRAM(s) IntraMuscular Once PRN assaultive  hydrOXYzine hydrochloride 25 milliGRAM(s) Oral every 6 hours PRN Anxiety  LORazepam     Tablet 2 milliGRAM(s) Oral every 6 hours PRN Anxiety  LORazepam   Injectable 2 milliGRAM(s) IntraMuscular Once PRN Assaultive behavior   MEDICATIONS  (STANDING):  ARIPiprazole 5 milliGRAM(s) Oral at bedtime  clonazePAM  Tablet 1 milliGRAM(s) Oral two times a day  OXcarbazepine 300 milliGRAM(s) Oral two times a day    MEDICATIONS  (PRN):  acetaminophen     Tablet .. 650 milliGRAM(s) Oral every 6 hours PRN Moderate Pain (4 - 6)  haloperidol     Tablet 5 milliGRAM(s) Oral every 6 hours PRN agittaion  haloperidol    Injectable 5 milliGRAM(s) IntraMuscular Once PRN assaultive  hydrOXYzine hydrochloride 25 milliGRAM(s) Oral every 6 hours PRN Anxiety  LORazepam     Tablet 2 milliGRAM(s) Oral every 6 hours PRN Anxiety  LORazepam   Injectable 2 milliGRAM(s) IntraMuscular Once PRN Assaultive behavior

## 2023-08-22 NOTE — BH INPATIENT PSYCHIATRY PROGRESS NOTE - MSE UNSTRUCTURED FT
Appearance: wearing hospital attire and towel over hair  Behavior: smiling widely, high energy, kaykay  Speech: normal rate, loud, clearly articulated  Mood: "happy"  Affect: elevated  Thought process: more disorganized today, jumping from topic to topic  Thought content: no kelby delusions elicited, appropriately responds to questions  Perceptions: denies AH, VH  Insight: fair, understands that she engages in odd and potentially unsafe behaviors, though unable to see some of the actions that are concerning already on the unit (such as verbal agitation, sexual provocativeness)  Judgement: odd and erratic-- verbal aggression and sexually provocative behaviors, but able to be redirected  Cognition: grossly intact Appearance: wearing hospital attire, non-revealing  Behavior: smiling widely, high energy, kaykay  Speech: loud on unit, though volume becomes more regular during interview  Mood: "happy"  Affect: elevated  Thought process: clear and organized during discussion of certain topics, but then with abrupt interjections and analogies throughout without clear relevance to conversation topic  Thought content: no kelby delusions elicited, appropriately responds to questions though with unusual analogies/interjections  Perceptions: denies AH, VH  Insight: partial, understands that she engages in odd behaviors and acknowledges poor impulse control, though unable to see how some of her behaviors are unsafe (eating out of garbage, smearing feces on body)  Judgement: odd and erratic-- verbal aggression and sexually provocative behaviors, but able to be redirected  Cognition: grossly intact Appearance: wearing hospital attire, non-revealing  Behavior: smiling widely, high energy, kaykay  Speech: loud on unit, though volume becomes more regular during interview  Mood: "happy"  Affect: elevated  Thought process: can be organized during certain topics, but with many illogical statements  Thought content: no kelby delusions elicited, appropriately responds to questions though with unusual analogies/interjections  Perceptions: denies AH, VH  Insight: partial, understands that she engages in odd behaviors and acknowledges poor impulse control, though unable to see how some of her behaviors are unsafe (eating out of garbage, smearing feces on body)  Judgement: odd and erratic-- verbal aggression and sexually provocative behaviors, but able to be redirected  Cognition: grossly intact

## 2023-08-23 PROCEDURE — 99232 SBSQ HOSP IP/OBS MODERATE 35: CPT

## 2023-08-23 RX ORDER — ARIPIPRAZOLE 15 MG/1
10 TABLET ORAL AT BEDTIME
Refills: 0 | Status: DISCONTINUED | OUTPATIENT
Start: 2023-08-23 | End: 2023-08-24

## 2023-08-23 RX ADMIN — Medication 650 MILLIGRAM(S): at 16:43

## 2023-08-23 RX ADMIN — ARIPIPRAZOLE 10 MILLIGRAM(S): 15 TABLET ORAL at 20:08

## 2023-08-23 RX ADMIN — Medication 650 MILLIGRAM(S): at 07:39

## 2023-08-23 RX ADMIN — Medication 1 MILLIGRAM(S): at 08:11

## 2023-08-23 RX ADMIN — Medication 1 MILLIGRAM(S): at 20:08

## 2023-08-23 RX ADMIN — Medication 650 MILLIGRAM(S): at 11:00

## 2023-08-23 RX ADMIN — Medication 650 MILLIGRAM(S): at 11:30

## 2023-08-23 RX ADMIN — OXCARBAZEPINE 300 MILLIGRAM(S): 300 TABLET, FILM COATED ORAL at 20:08

## 2023-08-23 RX ADMIN — OXCARBAZEPINE 300 MILLIGRAM(S): 300 TABLET, FILM COATED ORAL at 08:11

## 2023-08-23 NOTE — BH INPATIENT PSYCHIATRY PROGRESS NOTE - CURRENT MEDICATION
MEDICATIONS  (STANDING):  ARIPiprazole 5 milliGRAM(s) Oral at bedtime  clonazePAM  Tablet 1 milliGRAM(s) Oral two times a day  OXcarbazepine 300 milliGRAM(s) Oral two times a day    MEDICATIONS  (PRN):  acetaminophen     Tablet .. 650 milliGRAM(s) Oral every 6 hours PRN Moderate Pain (4 - 6)  haloperidol     Tablet 5 milliGRAM(s) Oral every 6 hours PRN agittaion  haloperidol    Injectable 5 milliGRAM(s) IntraMuscular Once PRN assaultive  hydrOXYzine hydrochloride 25 milliGRAM(s) Oral every 6 hours PRN Anxiety  LORazepam     Tablet 2 milliGRAM(s) Oral every 6 hours PRN Anxiety  LORazepam   Injectable 2 milliGRAM(s) IntraMuscular Once PRN Assaultive behavior   MEDICATIONS  (STANDING):  ARIPiprazole 10 milliGRAM(s) Oral at bedtime  clonazePAM  Tablet 1 milliGRAM(s) Oral two times a day  OXcarbazepine 300 milliGRAM(s) Oral two times a day    MEDICATIONS  (PRN):  acetaminophen     Tablet .. 650 milliGRAM(s) Oral every 6 hours PRN Moderate Pain (4 - 6)  haloperidol     Tablet 5 milliGRAM(s) Oral every 6 hours PRN agittaion  haloperidol    Injectable 5 milliGRAM(s) IntraMuscular Once PRN assaultive  hydrOXYzine hydrochloride 25 milliGRAM(s) Oral every 6 hours PRN Anxiety  LORazepam     Tablet 2 milliGRAM(s) Oral every 6 hours PRN Anxiety  LORazepam   Injectable 2 milliGRAM(s) IntraMuscular Once PRN Assaultive behavior

## 2023-08-23 NOTE — BH INPATIENT PSYCHIATRY PROGRESS NOTE - NSBHFUPINTERVALHXFT_PSY_A_CORE
/66, HR 87, accepting medications, no PRNs. No issues overnight. /66, HR 87, accepting medications, no PRNs. No issues overnight.    Patient is found in hallway, shouting "point, flex, point, flex" as she points and flexes her toes. She is eating a waffle. She states her moods is "okay." States she actually slept well last night. States she feels "out of this world," and abruptly talks about wanting to go to [place], and that she's done a lot of research on space travel before coming here. Discuss manic symptoms. She asks about her diagnosis, and then states that there is a lot of racial and cultural factors that impact a diagnosis. States she hears the voice of God since being here in the hospital, telling her "do the right thing." Denies other statements or CAH. Denies TI, TW, TB. At the end of interview, asks for this wr /66, HR 87, accepting medications, no PRNs. No issues overnight.    Patient is found in hallway, shouting "point, flex, point, flex" as she points and flexes her toes. She is eating a waffle. She states her moods is "okay." States she actually slept well last night. States she feels "out of this world," and abruptly talks about wanting to go to [place], and that she's done a lot of research on space travel before coming here. Discuss manic symptoms. She asks about her diagnosis, and then states that there is a lot of racial and cultural factors that impact a diagnosis. States she hears the voice of God since being here in the hospital, telling her "do the right thing." Denies other statements or CAH. Denies SI, HI. Denies TI, TW, TB. At the end of interview, asks for this writer's opinion on the state of the world. /66, HR 87, accepting medications, no PRNs. No issues overnight. Slept 8+ hours.    Patient is found in hallway, shouting "point, flex, point, flex" as she points and flexes her toes. She is eating a waffle. She states her moods is "okay." States she actually slept well last night. States she feels "out of this world," and abruptly talks about wanting to go to [place], and that she's done a lot of research on space travel before coming here. Discuss manic symptoms. She asks about her diagnosis, and then states that there is a lot of racial and cultural factors that impact a diagnosis. States she hears the voice of God since being here in the hospital, telling her "do the right thing." Denies other statements or CAH. Denies SI, HI. Denies TI, TW, TB. At the end of interview, asks for this writer's opinion on the state of the world.

## 2023-08-23 NOTE — BH INPATIENT PSYCHIATRY PROGRESS NOTE - NSBHCHARTREVIEWVS_PSY_A_CORE FT
Vital Signs Last 24 Hrs  T(C): 36.5 (08-23-23 @ 06:44), Max: 36.5 (08-23-23 @ 06:44)  T(F): 97.7 (08-23-23 @ 06:44), Max: 97.7 (08-23-23 @ 06:44)  HR: --  BP: --  BP(mean): --  RR: --  SpO2: --    Orthostatic VS  08-23-23 @ 06:44  Lying BP: --/-- HR: --  Sitting BP: 149/66 HR: 87  Standing BP: --/-- HR: --  Site: upper left arm  Mode: electronic

## 2023-08-23 NOTE — BH INPATIENT PSYCHIATRY PROGRESS NOTE - NSBHASSESSSUMMFT_PSY_ALL_CORE
Pt is a 43 y/o AAF, single, non-caregiver, unemployed, on disability for mental illness, domiciled at [facility], on AOT at this time, with past psychiatric history of schizoaffective disorder, as per chart: borderline personality disorder, >30 prior inpatient psychiatric hospitalizations most recently at [facility] from 4/2023,  hx of highly disorganized behavior requiring transfer to [facility] hospitalization ([place]) such as smearing feces in the wall, eating feces, turning in a [place] over and over again, myriad of ED visits including [facility], 3 prior suicide attempts (last in 2012 via OD), recurrent chronic suicidal gestures and suicidal ideation, hx of property destruction when upset, long hx of sexually provocative behavior (both out in the community and while on inpatient units requiring 1:1 staff observation), hx of physical altercations, hx of verbal threats, hx of property destruction, long hx of medication and tx noncompliance resulting in AOT and PALACIOS administration, brought in by ambulance activated by residence for spreading feces on apartment wall.    This is a patient with a diagnosed schizophrenia-spectrum disorder, and an extensive psychiatric history with multiple hospitalizations for psychosis, unsafe behavior and SI, with significant documented high risk behaviors (physical altercations, property destruction, suicidal gestures). She is presenting now with bizarre behaviors noted at residence (smearing and eating feces, eating from garbage), that have worsened over past few weeks per staff and led them to calling EMS. Patient describes elevated mood and interrupted sleep the past month. Patient on AOT and medication adherent to Haldol decanoate and low dose oxcarbazepine. On initial evaluation on ML3, patient showing evidence of odd and manic-like behaviors (twirling excessively, pulling flowers from outside and placing in her hair, sexual provocativeness such as taking top off in public areas), as well as being loud, irritable and verbally aggressive at times towards peers. She acknowledges bizarre behaviors and offers several unsatisfying explanations to this behavior. Per psychiatrist, patient has been violent and aggressive over the past year, and has declined any medication changes, and been unwilling to take any oral medications after leaving hospital (besides low dose oxcarbazepine).    Patient has continued to show odd and sexually inappropriate behaviors, loud/elevated affect, some disorganized thought process and verbal aggression. Sleep is erratic/interrupted while on the unit. Presentation concerning for manic decompensation on top of poor baseline (which appears to be a mix of insufficiently treated psychosis, disinhibition and poor impulse control). No frankly unsafe behaviors yet on unit, and patient very hesitant to make medication changes (declines lithium or oxcarbazepine increase). She is open to benzodiazepines, so will start clonazepam for sleep, trina and aggression. Patient very reluctant to add antipsychotic, refuses to take risperidone but open to aripiprazole, so will start that tonight.    Plan:  -Admitted on a 9.39 involuntary status  -Routine checks (no HI or SI)  -Collateral obtained from psychiatrist, therapist and residence  -START clonazepam 1mg BID for aggression, trina and sleep  -START aripiprazole 5mg QHS  -Continue Haldol Decanoate 200mg IM qMonth (last given 8/14, next due 9/13)  	-Metabolic labs obtained 8/21  -Continue Trileptal 300mg BID, pt declining increase in dose  -Admission labs reviewed and unremarkable. EKG reviewed, QTc 456ms. \  -Engage in unit mileu, groups Pt is a 43 y/o AAF, single, non-caregiver, unemployed, on disability for mental illness, domiciled at [facility], on AOT at this time, with past psychiatric history of schizoaffective disorder, as per chart: borderline personality disorder, >30 prior inpatient psychiatric hospitalizations most recently at [facility] from 4/2023,  hx of highly disorganized behavior requiring transfer to [facility] hospitalization ([place]) such as smearing feces in the wall, eating feces, turning in a [place] over and over again, myriad of ED visits including [facility], 3 prior suicide attempts (last in 2012 via OD), recurrent chronic suicidal gestures and suicidal ideation, hx of property destruction when upset, long hx of sexually provocative behavior (both out in the community and while on inpatient units requiring 1:1 staff observation), hx of physical altercations, hx of verbal threats, hx of property destruction, long hx of medication and tx noncompliance resulting in AOT and PALACIOS administration, brought in by ambulance activated by residence for spreading feces on apartment wall.    This is a patient with a diagnosed schizophrenia-spectrum disorder, and an extensive psychiatric history with multiple hospitalizations for psychosis, unsafe behavior and SI, with significant documented high risk behaviors (physical altercations, property destruction, suicidal gestures). She is presenting now with bizarre behaviors noted at residence (smearing and eating feces, eating from garbage), that have worsened over past few weeks per staff and led them to calling EMS. Patient describes elevated mood and interrupted sleep the past month. Patient on AOT and medication adherent to Haldol decanoate and low dose oxcarbazepine. On initial evaluation on ML3, patient showing evidence of odd and manic-like behaviors (twirling excessively, pulling flowers from outside and placing in her hair, sexual provocativeness such as taking top off in public areas), as well as being loud, irritable and verbally aggressive at times towards peers. She acknowledges bizarre behaviors and offers several unsatisfying explanations to this behavior. Per psychiatrist, patient has been violent and aggressive over the past year, and has declined any medication changes, and been unwilling to take any oral medications after leaving hospital (besides low dose oxcarbazepine).    Patient has continued to show odd and sexually inappropriate behaviors, loud/elevated affect, some disorganized thought process/RUTH and verbal aggression. Sleep initially erratic/interrupted. Presentation concerning for manic decompensation on top of poor baseline (which appears to be a mix of insufficiently treated psychosis, disinhibition and poor impulse control). No frankly unsafe behaviors yet on unit, and patient very hesitant to make medication changes (declines lithium or oxcarbazepine increase). She is open to benzodiazepines, so will start clonazepam for sleep, trina and aggression. Patient very reluctant to add antipsychotic, refuses to take risperidone but open to aripiprazole, which was started, and will uptitrate further. Sleep improved last night.    Plan:  -Admitted on a 9.39 involuntary status  -Routine checks (no HI or SI)  -Collateral obtained from psychiatrist, therapist and residence  -Continue clonazepam 1mg BID for aggression, trina and sleep  -INCREASE to aripiprazole 10mg QHS  -Continue Haldol Decanoate 200mg IM qMonth (last given 8/14, next due 9/13)  	-Metabolic labs obtained 8/21  -Continue Trileptal 300mg BID, pt declining increase in dose  -Admission labs reviewed and unremarkable. EKG reviewed, QTc 456ms. \  -Engage in unit mileu, groups

## 2023-08-23 NOTE — BH INPATIENT PSYCHIATRY PROGRESS NOTE - MSE UNSTRUCTURED FT
Appearance: wearing hospital attire, non-revealing  Behavior: smiling widely, high energy, kaykay  Speech: loud on unit, though volume becomes more regular during interview  Mood: "happy"  Affect: elevated  Thought process: can be organized during certain topics, but with many illogical statements  Thought content: no kelby delusions elicited, appropriately responds to questions though with unusual analogies/interjections  Perceptions: denies AH, VH  Insight: partial, understands that she engages in odd behaviors and acknowledges poor impulse control, though unable to see how some of her behaviors are unsafe (eating out of garbage, smearing feces on body)  Judgement: odd and erratic-- verbal aggression and sexually provocative behaviors, but able to be redirected  Cognition: grossly intact Appearance: wearing stained hospital attire, eating waffle  Behavior: smiling widely, high energy, kaykay, pointing/flexing toes  Speech: loud on unit, though volume becomes more regular during interview  Mood: "okay"  Affect: elevated though slightly improved  Thought process: loosening of associations, some illogical statements though can be organized/linear during concrete aspects of conversation  Thought content: no kelby delusions elicited, appropriately responds to questions though with unusual analogies/interjections  Perceptions: reports she is hearing the voice of God telling her to "do the right thing"  Insight: partial, understands that she engages in odd behaviors and acknowledges poor impulse control, though unable to see how some of her behaviors are unsafe (eating out of garbage, smearing feces on body)  Judgement: odd and erratic-- verbal aggression and sexually provocative behaviors, but able to be redirected  Cognition: grossly intact

## 2023-08-24 PROCEDURE — 99232 SBSQ HOSP IP/OBS MODERATE 35: CPT

## 2023-08-24 RX ORDER — ARIPIPRAZOLE 15 MG/1
10 TABLET ORAL AT BEDTIME
Refills: 0 | Status: DISCONTINUED | OUTPATIENT
Start: 2023-08-24 | End: 2023-08-25

## 2023-08-24 RX ORDER — CLONAZEPAM 1 MG
0.5 TABLET ORAL
Refills: 0 | Status: DISCONTINUED | OUTPATIENT
Start: 2023-08-24 | End: 2023-08-28

## 2023-08-24 RX ADMIN — OXCARBAZEPINE 300 MILLIGRAM(S): 300 TABLET, FILM COATED ORAL at 07:54

## 2023-08-24 RX ADMIN — Medication 650 MILLIGRAM(S): at 20:20

## 2023-08-24 RX ADMIN — Medication 650 MILLIGRAM(S): at 19:32

## 2023-08-24 RX ADMIN — Medication 0.5 MILLIGRAM(S): at 20:21

## 2023-08-24 RX ADMIN — OXCARBAZEPINE 300 MILLIGRAM(S): 300 TABLET, FILM COATED ORAL at 20:21

## 2023-08-24 RX ADMIN — Medication 1 MILLIGRAM(S): at 07:54

## 2023-08-24 RX ADMIN — ARIPIPRAZOLE 10 MILLIGRAM(S): 15 TABLET ORAL at 20:21

## 2023-08-24 NOTE — BH INPATIENT PSYCHIATRY PROGRESS NOTE - MSE UNSTRUCTURED FT
Appearance: wearing stained hospital attire, eating waffle  Behavior: smiling widely, high energy, kaykay, pointing/flexing toes  Speech: loud on unit, though volume becomes more regular during interview  Mood: "okay"  Affect: elevated though slightly improved  Thought process: loosening of associations, some illogical statements though can be organized/linear during concrete aspects of conversation  Thought content: no kelby delusions elicited, appropriately responds to questions though with unusual analogies/interjections  Perceptions: reports she is hearing the voice of God telling her to "do the right thing"  Insight: partial, understands that she engages in odd behaviors and acknowledges poor impulse control, though unable to see how some of her behaviors are unsafe (eating out of garbage, smearing feces on body)  Judgement: odd and erratic-- verbal aggression and sexually provocative behaviors, but able to be redirected  Cognition: grossly intact Appearance: wearing stained hospital attire  Behavior: cooperative, still high energy but less dancing, no PMA/PMR  Speech: loud on unit but improved, regular rate and rhythm   Mood: "okay"  Affect: elevated though less than on admission  Thought process: has intermittent illogical statements though is largely organized/linear, especially during concrete aspects of conversation  Thought content: no kelby delusions elicited, appropriately responds to questions though with unusual analogies/interjections  Perceptions: no AH/VH elicited  Insight: partial, understands that she engages in odd behaviors and acknowledges poor impulse control, though unable to see how some of her behaviors are unsafe (eating out of garbage, smearing feces on body)  Judgement: odd and erratic-- continues to have some sexually provocative behavior, no verbal aggression in past few days  Cognition: grossly intact

## 2023-08-24 NOTE — BH INPATIENT PSYCHIATRY PROGRESS NOTE - NSBHFUPINTERVALHXFT_PSY_A_CORE
VSS, accepting medications, no PRNs. No acute issues. VSS, accepting medications, no PRNs. No acute issues.    Patient states her mood is "okay" though she feels somewhat lethargic/tired. She notes she did sleep well last night. States she feels her behaviors are less "outlandish" in this environment because she's not being solicited for cigarettes, money or sex. She denies having issues with any patients or staff on the unit. Denies feelings of paranoia. States she hasn't been hearing the voice of God today. Denies SI, HI, muscle stiffness, shakiness or restlessness. She has been thinking about childhood ballet that she didn't pursue because of her body type, as well as how her residence should really have a barbecue.  VSS, accepting medications, no PRNs. No acute issues. Slept 7 hours.    Patient states her mood is "okay" though she feels somewhat lethargic/tired. She notes she did sleep well last night. States she feels her behaviors are less "outlandish" in this environment because she's not being solicited for cigarettes, money or sex. She denies having issues with any patients or staff on the unit. Denies feelings of paranoia. States she hasn't been hearing the voice of God today. Denies SI, HI, muscle stiffness, shakiness or restlessness. She has been thinking about childhood ballet that she didn't pursue because of her body type, as well as how her residence should really have a barbecue.
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
## 2023-08-24 NOTE — BH INPATIENT PSYCHIATRY PROGRESS NOTE - NSBHCHARTREVIEWVS_PSY_A_CORE FT
Vital Signs Last 24 Hrs  T(C): 36.8 (08-24-23 @ 06:46), Max: 36.8 (08-24-23 @ 06:46)  T(F): 98.2 (08-24-23 @ 06:46), Max: 98.2 (08-24-23 @ 06:46)  HR: --  BP: --  BP(mean): --  RR: --  SpO2: --    Orthostatic VS  08-24-23 @ 06:46  Lying BP: --/-- HR: --  Sitting BP: 133/89 HR: 88  Standing BP: --/-- HR: --  Site: upper left arm  Mode: electronic  Orthostatic VS  08-23-23 @ 06:44  Lying BP: --/-- HR: --  Sitting BP: 149/66 HR: 87  Standing BP: --/-- HR: --  Site: upper left arm  Mode: electronic
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
## 2023-08-25 PROCEDURE — 99232 SBSQ HOSP IP/OBS MODERATE 35: CPT

## 2023-08-25 RX ORDER — ARIPIPRAZOLE 15 MG/1
15 TABLET ORAL AT BEDTIME
Refills: 0 | Status: DISCONTINUED | OUTPATIENT
Start: 2023-08-25 | End: 2023-08-28

## 2023-08-25 RX ADMIN — ARIPIPRAZOLE 15 MILLIGRAM(S): 15 TABLET ORAL at 20:23

## 2023-08-25 RX ADMIN — Medication 650 MILLIGRAM(S): at 17:44

## 2023-08-25 RX ADMIN — OXCARBAZEPINE 300 MILLIGRAM(S): 300 TABLET, FILM COATED ORAL at 20:23

## 2023-08-25 RX ADMIN — OXCARBAZEPINE 300 MILLIGRAM(S): 300 TABLET, FILM COATED ORAL at 08:03

## 2023-08-25 RX ADMIN — Medication 0.5 MILLIGRAM(S): at 20:23

## 2023-08-25 RX ADMIN — Medication 0.5 MILLIGRAM(S): at 08:03
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
## 2023-08-25 NOTE — BH INPATIENT PSYCHIATRY PROGRESS NOTE - MSE UNSTRUCTURED FT
Appearance: wearing stained hospital attire  Behavior: cooperative, still high energy but less dancing, no PMA/PMR  Speech: loud on unit but improved, regular rate and rhythm   Mood: "okay"  Affect: elevated though less than on admission  Thought process: has intermittent illogical statements though is largely organized/linear, especially during concrete aspects of conversation  Thought content: no kelby delusions elicited, appropriately responds to questions though with unusual analogies/interjections  Perceptions: no AH/VH elicited  Insight: partial, understands that she engages in odd behaviors and acknowledges poor impulse control, though unable to see how some of her behaviors are unsafe (eating out of garbage, smearing feces on body)  Judgement: odd and erratic-- continues to have some sexually provocative behavior, no verbal aggression in past few days  Cognition: grossly intact Appearance: casually dressed and appropriately covered  Behavior: cooperative, still high energy but less dancing, no PMA/PMR  Speech: loud on unit but improved, regular rate and rhythm   Mood: "elevated"  Affect: elevated   Thought process: loose, with intermittent illogical statements, though is largely organized/linear, especially during concrete aspects of conversation  Thought content: no kelby delusions elicited, appropriately responds to questions though with unusual analogies/interjections  Perceptions: no AH/VH elicited  Insight: partial, understands that she engages in odd behaviors and acknowledges poor impulse control, though unable to see how some of her behaviors are unsafe  Judgement: odd and erratic-- continues to have some sexually provocative behavior, no verbal aggression in past few days  Cognition: grossly intact

## 2023-08-25 NOTE — BH INPATIENT PSYCHIATRY PROGRESS NOTE - NSBHASSESSSUMMFT_PSY_ALL_CORE
Pt is a 43 y/o AAF, single, non-caregiver, unemployed, on disability for mental illness, domiciled at [facility], on AOT at this time, with past psychiatric history of schizoaffective disorder, as per chart: borderline personality disorder, >30 prior inpatient psychiatric hospitalizations most recently at [facility] from 4/2023,  hx of highly disorganized behavior requiring transfer to [facility] hospitalization ([place]) such as smearing feces in the wall, eating feces, turning in a [place] over and over again, myriad of ED visits including [facility], 3 prior suicide attempts (last in 2012 via OD), recurrent chronic suicidal gestures and suicidal ideation, hx of property destruction when upset, long hx of sexually provocative behavior (both out in the community and while on inpatient units requiring 1:1 staff observation), hx of physical altercations, hx of verbal threats, hx of property destruction, long hx of medication and tx noncompliance resulting in AOT and PALACIOS administration, brought in by ambulance activated by residence for spreading feces on apartment wall.    This is a patient with a diagnosed schizophrenia-spectrum disorder, and an extensive psychiatric history with multiple hospitalizations for psychosis, unsafe behavior and SI, with significant documented high risk behaviors (physical altercations, property destruction, suicidal gestures). She is presenting now with bizarre behaviors noted at residence (smearing and eating feces, eating from garbage), that have worsened over past few weeks per staff and led them to calling EMS. Patient describes elevated mood and interrupted sleep the past month. Patient on AOT and medication adherent to Haldol decanoate and low dose oxcarbazepine. On initial evaluation on ML3, patient showing evidence of odd and manic-like behaviors (twirling excessively, pulling flowers from outside and placing in her hair, sexual provocativeness such as taking top off in public areas), as well as being loud, irritable and verbally aggressive at times towards peers. She acknowledges bizarre behaviors and offers several unsatisfying explanations to this behavior. Per psychiatrist, patient has been aggressive over the past year, and has declined any medication changes, and been unwilling to take any oral medications after leaving hospital (besides low dose oxcarbazepine). Presentation here concerning for manic decompensation on top of poor baseline (which appears to be a mix of insufficiently treated psychosis, disinhibition and poor impulse control).     No frankly unsafe behaviors yet on unit, and patient very hesitant to make medication changes (declines lithium or oxcarbazepine increase). She has been accepting of addition of benzodiazepine for sleep/trina/aggression, and aripiprazole for trina/aggression (declines risperidone). Patient has continued to show some odd, disorganized and sexually inappropriate behaviors as well as loud speech and elevated affect all of which have shown some improvement. She's had outbursts of verbal aggression towards particular peer intermittently, though none for past few days. Sleep initially erratic/interrupted, but has been adequate past few nights.    Plan:  -Admitted on a 9.39 involuntary status  -Routine checks (no HI or SI)  -Collateral obtained from psychiatrist, therapist and residence  -DECREASE to clonazepam 0.5mg BID for aggression, trina and sleep  -Continue aripiprazole 10mg QHS  -Continue Haldol Decanoate 200mg IM qMonth (last given 8/14, next due 9/13)  	-Metabolic labs obtained 8/21  -Continue Trileptal 300mg BID, pt declining increase in dose  -Admission labs reviewed and unremarkable. EKG reviewed, QTc 456ms. \  -Engage in unit mileu, groups Pt is a 41 y/o AAF, single, non-caregiver, unemployed, on disability for mental illness, domiciled at [facility], on AOT at this time, with past psychiatric history of schizoaffective disorder, as per chart: borderline personality disorder, >30 prior inpatient psychiatric hospitalizations most recently at [facility] from 4/2023,  hx of highly disorganized behavior requiring transfer to [facility] hospitalization ([place]) such as smearing feces in the wall, eating feces, turning in a [place] over and over again, myriad of ED visits including [facility], 3 prior suicide attempts (last in 2012 via OD), recurrent chronic suicidal gestures and suicidal ideation, hx of property destruction when upset, long hx of sexually provocative behavior (both out in the community and while on inpatient units requiring 1:1 staff observation), hx of physical altercations, hx of verbal threats, hx of property destruction, long hx of medication and tx noncompliance resulting in AOT and PALACIOS administration, brought in by ambulance activated by residence for spreading feces on apartment wall.    This is a patient with a diagnosed schizophrenia-spectrum disorder, and an extensive psychiatric history with multiple hospitalizations for psychosis, unsafe behavior and SI, with significant documented high risk behaviors (physical altercations, property destruction, suicidal gestures). She is presenting now with bizarre behaviors noted at residence (ex. smearing and eating feces), that have worsened over past few weeks per staff and led them to calling EMS. Patient describes elevated mood and interrupted sleep the past month. Patient on AOT and medication adherent to Haldol decanoate and low dose oxcarbazepine. On initial evaluation on ML3, patient showing evidence of odd and manic-like behaviors (twirling excessively, pulling flowers from outside and placing in her hair, sexual provocativeness such as taking top off in public areas), as well as being loud, irritable and verbally aggressive at times towards peers. She acknowledges bizarre behaviors and offers several unsatisfying explanations to this behavior. Per psychiatrist, patient has been aggressive over the past year, and has declined any medication changes, and been unwilling to take any oral medications after leaving hospital (besides low dose oxcarbazepine). Presentation here concerning for manic decompensation on top of poor baseline (which appears to be a mix of insufficiently treated psychosis, disinhibition and poor impulse control).     No frankly unsafe behaviors yet on unit, and patient very hesitant to make medication changes (declines lithium or oxcarbazepine increase). She has been accepting of addition of benzodiazepine for sleep/trina/aggression, and aripiprazole for trina/aggression (declines risperidone). Patient has continued to show some odd, disorganized and sexually inappropriate behaviors as well as loud speech and elevated affect all of which have shown some mild improvement. She's had outbursts of verbal aggression towards particular peer intermittently, though none for past few days. Sleep initially erratic/interrupted, but has been adequate past few nights.    Plan:  -Admitted on a 9.39 involuntary status  -Routine checks (no HI or SI)  -Collateral obtained from psychiatrist, therapist and residence  -Continue clonazepam 0.5mg BID for aggression, trina and sleep  -INCREASE to aripiprazole 15mg QHS  -Continue Haldol Decanoate 200mg IM qMonth (last given 8/14, next due 9/13)  	-Metabolic labs obtained 8/21  -Continue Trileptal 300mg BID, pt declining increase in dose  -Admission labs reviewed and unremarkable. EKG reviewed, QTc 456ms. \  -Engage in unit mileu, groups

## 2023-08-25 NOTE — BH INPATIENT PSYCHIATRY PROGRESS NOTE - NSBHMETABOLIC_PSY_ALL_CORE_FT
BMI: BMI (kg/m2): 42.4 (08-16-23 @ 19:12)  HbA1c: A1C with Estimated Average Glucose Result: 5.4 % (08-21-23 @ 09:18)    Glucose: POCT Blood Glucose.: 87 mg/dL (08-16-23 @ 17:09)    BP: 130/72 (08-25-23 @ 06:13) (130/72 - 130/72)  Lipid Panel: Date/Time: 08-21-23 @ 09:18  Cholesterol, Serum: 143  Direct LDL: --  HDL Cholesterol, Serum: 54  Total Cholesterol/HDL Ration Measurement: --  Triglycerides, Serum: 164

## 2023-08-25 NOTE — BH INPATIENT PSYCHIATRY PROGRESS NOTE - NSBHCHARTREVIEWVS_PSY_A_CORE FT
Vital Signs Last 24 Hrs  T(C): 36.4 (08-25-23 @ 06:13), Max: 36.4 (08-25-23 @ 06:13)  T(F): 97.6 (08-25-23 @ 06:13), Max: 97.6 (08-25-23 @ 06:13)  HR: 79 (08-25-23 @ 06:13) (79 - 79)  BP: 130/72 (08-25-23 @ 06:13) (130/72 - 130/72)  BP(mean): --  RR: --  SpO2: --    Orthostatic VS  08-24-23 @ 06:46  Lying BP: --/-- HR: --  Sitting BP: 133/89 HR: 88  Standing BP: --/-- HR: --  Site: upper left arm  Mode: electronic

## 2023-08-25 NOTE — BH INPATIENT PSYCHIATRY PROGRESS NOTE - CURRENT MEDICATION
MEDICATIONS  (STANDING):  ARIPiprazole 10 milliGRAM(s) Oral at bedtime  clonazePAM  Tablet 0.5 milliGRAM(s) Oral two times a day  OXcarbazepine 300 milliGRAM(s) Oral two times a day    MEDICATIONS  (PRN):  acetaminophen     Tablet .. 650 milliGRAM(s) Oral every 6 hours PRN Moderate Pain (4 - 6)  haloperidol     Tablet 5 milliGRAM(s) Oral every 6 hours PRN agittaion  haloperidol    Injectable 5 milliGRAM(s) IntraMuscular Once PRN assaultive  hydrOXYzine hydrochloride 25 milliGRAM(s) Oral every 6 hours PRN Anxiety  LORazepam     Tablet 2 milliGRAM(s) Oral every 6 hours PRN Anxiety  LORazepam   Injectable 2 milliGRAM(s) IntraMuscular Once PRN Assaultive behavior   MEDICATIONS  (STANDING):  ARIPiprazole 15 milliGRAM(s) Oral at bedtime  clonazePAM  Tablet 0.5 milliGRAM(s) Oral two times a day  OXcarbazepine 300 milliGRAM(s) Oral two times a day    MEDICATIONS  (PRN):  acetaminophen     Tablet .. 650 milliGRAM(s) Oral every 6 hours PRN Moderate Pain (4 - 6)  haloperidol     Tablet 5 milliGRAM(s) Oral every 6 hours PRN agittaion  haloperidol    Injectable 5 milliGRAM(s) IntraMuscular Once PRN assaultive  hydrOXYzine hydrochloride 25 milliGRAM(s) Oral every 6 hours PRN Anxiety  LORazepam     Tablet 2 milliGRAM(s) Oral every 6 hours PRN Anxiety  LORazepam   Injectable 2 milliGRAM(s) IntraMuscular Once PRN Assaultive behavior

## 2023-08-26 RX ORDER — IBUPROFEN 200 MG
400 TABLET ORAL ONCE
Refills: 0 | Status: COMPLETED | OUTPATIENT
Start: 2023-08-26 | End: 2023-08-26

## 2023-08-26 RX ADMIN — Medication 0.5 MILLIGRAM(S): at 21:50

## 2023-08-26 RX ADMIN — Medication 400 MILLIGRAM(S): at 17:18

## 2023-08-26 RX ADMIN — Medication 2 MILLIGRAM(S): at 22:49

## 2023-08-26 RX ADMIN — OXCARBAZEPINE 300 MILLIGRAM(S): 300 TABLET, FILM COATED ORAL at 08:00

## 2023-08-26 RX ADMIN — Medication 0.5 MILLIGRAM(S): at 08:00

## 2023-08-26 RX ADMIN — Medication 25 MILLIGRAM(S): at 22:49

## 2023-08-26 RX ADMIN — OXCARBAZEPINE 300 MILLIGRAM(S): 300 TABLET, FILM COATED ORAL at 21:50

## 2023-08-26 RX ADMIN — Medication 400 MILLIGRAM(S): at 16:31

## 2023-08-26 RX ADMIN — ARIPIPRAZOLE 15 MILLIGRAM(S): 15 TABLET ORAL at 21:50

## 2023-08-26 RX ADMIN — HALOPERIDOL DECANOATE 5 MILLIGRAM(S): 100 INJECTION INTRAMUSCULAR at 22:49

## 2023-08-26 RX ADMIN — Medication 2 MILLIGRAM(S): at 15:14

## 2023-08-27 RX ADMIN — OXCARBAZEPINE 300 MILLIGRAM(S): 300 TABLET, FILM COATED ORAL at 08:38

## 2023-08-27 RX ADMIN — OXCARBAZEPINE 300 MILLIGRAM(S): 300 TABLET, FILM COATED ORAL at 20:11

## 2023-08-27 RX ADMIN — Medication 2 MILLIGRAM(S): at 08:46

## 2023-08-27 RX ADMIN — HALOPERIDOL DECANOATE 5 MILLIGRAM(S): 100 INJECTION INTRAMUSCULAR at 08:47

## 2023-08-27 RX ADMIN — Medication 0.5 MILLIGRAM(S): at 08:38

## 2023-08-27 RX ADMIN — Medication 0.5 MILLIGRAM(S): at 20:11

## 2023-08-27 RX ADMIN — ARIPIPRAZOLE 15 MILLIGRAM(S): 15 TABLET ORAL at 20:12

## 2023-08-28 PROCEDURE — 99232 SBSQ HOSP IP/OBS MODERATE 35: CPT

## 2023-08-28 PROCEDURE — 90853 GROUP PSYCHOTHERAPY: CPT

## 2023-08-28 RX ORDER — DIPHENHYDRAMINE HCL 50 MG
25 CAPSULE ORAL AT BEDTIME
Refills: 0 | Status: DISCONTINUED | OUTPATIENT
Start: 2023-08-28 | End: 2023-08-29

## 2023-08-28 RX ORDER — ARIPIPRAZOLE 15 MG/1
20 TABLET ORAL AT BEDTIME
Refills: 0 | Status: DISCONTINUED | OUTPATIENT
Start: 2023-08-28 | End: 2023-09-05

## 2023-08-28 RX ORDER — CLONAZEPAM 1 MG
0.25 TABLET ORAL
Refills: 0 | Status: DISCONTINUED | OUTPATIENT
Start: 2023-08-28 | End: 2023-08-29

## 2023-08-28 RX ADMIN — OXCARBAZEPINE 300 MILLIGRAM(S): 300 TABLET, FILM COATED ORAL at 09:00

## 2023-08-28 RX ADMIN — Medication 0.25 MILLIGRAM(S): at 20:10

## 2023-08-28 RX ADMIN — ARIPIPRAZOLE 20 MILLIGRAM(S): 15 TABLET ORAL at 20:10

## 2023-08-28 RX ADMIN — OXCARBAZEPINE 300 MILLIGRAM(S): 300 TABLET, FILM COATED ORAL at 20:10

## 2023-08-28 RX ADMIN — Medication 0.5 MILLIGRAM(S): at 09:00

## 2023-08-28 RX ADMIN — Medication 25 MILLIGRAM(S): at 20:11

## 2023-08-28 NOTE — BH INPATIENT PSYCHIATRY PROGRESS NOTE - NSBHFUPINTERVALHXFT_PSY_A_CORE
VSS, received additional Haldol 5mg and Ativan 2mg PRNs on Saturday and Sunday (one episode for yelling in room), appeared more angry VSS, received additional Haldol 5mg and Ativan 2mg PRNs on Saturday and Sunday (one episode for yelling in room), appeared more angry.    Patient seen smiling and laughing on unit, trying to get this writer's attention at all chances. States she is doing okay. States she had some moments of irritability over weekend because "people annoy me," but notes she didn't hit anyone. States she's noticed her mood come down a bit, and that she's been sleeping better. Denies feeling irritable or depressed now, denies SI, HI, AH. When asked if anything bothering her, states "the world" and alludes to political conflict. When talking about her mood being more labile previously, states it's bounced around like "a tennis ball... or a debate.... republicans and democrats." States she's read many books on child psychiatry.

## 2023-08-28 NOTE — BH INPATIENT PSYCHIATRY PROGRESS NOTE - MSE UNSTRUCTURED FT
Appearance: casually dressed and appropriately covered  Behavior: cooperative, still high energy but less dancing, no PMA/PMR  Speech: loud on unit but improved, regular rate and rhythm   Mood: "elevated"  Affect: elevated   Thought process: loose, with intermittent illogical statements, though is largely organized/linear, especially during concrete aspects of conversation  Thought content: no kelby delusions elicited, appropriately responds to questions though with unusual analogies/interjections  Perceptions: no AH/VH elicited  Insight: partial, understands that she engages in odd behaviors and acknowledges poor impulse control, though unable to see how some of her behaviors are unsafe  Judgement: odd and erratic-- continues to have some sexually provocative behavior, no verbal aggression in past few days  Cognition: grossly intact Appearance: casually dressed, cleavage-revealing  Behavior: cooperative, still high energy but less dancing, no PMA/PMR  Speech: loud on unit but improved, regular rate and rhythm   Mood: "okay"  Affect: elevated   Thought process: flight of ideas/loose at times though at other times is quite organized/linear, especially during concrete aspects of conversation  Thought content: no kelby delusions elicited, appropriately responds to questions though with unusual analogies/interjections  Perceptions: no AH/VH elicited  Insight: partial, understands that she engages in odd behaviors and acknowledges poor impulse control, though struggles to see how certain behaviors are unsafe  Judgement: odd and erratic-- continues to have some sexually provocative behavior, moments of irritability (including yelling and cursing), though not directed at others  Cognition: grossly intact

## 2023-08-28 NOTE — BH INPATIENT PSYCHIATRY PROGRESS NOTE - NSBHMETABOLIC_PSY_ALL_CORE_FT
BMI: BMI (kg/m2): 42.4 (08-16-23 @ 19:12)  HbA1c: A1C with Estimated Average Glucose Result: 5.4 % (08-21-23 @ 09:18)    Glucose: POCT Blood Glucose.: 87 mg/dL (08-16-23 @ 17:09)    BP: 143/90 (08-26-23 @ 06:51) (143/90 - 143/90)  Lipid Panel: Date/Time: 08-21-23 @ 09:18  Cholesterol, Serum: 143  Direct LDL: --  HDL Cholesterol, Serum: 54  Total Cholesterol/HDL Ration Measurement: --  Triglycerides, Serum: 164

## 2023-08-28 NOTE — BH INPATIENT PSYCHIATRY PROGRESS NOTE - NSBHASSESSSUMMFT_PSY_ALL_CORE
Pt is a 41 y/o AAF, single, non-caregiver, unemployed, on disability for mental illness, domiciled at [facility], on AOT at this time, with past psychiatric history of schizoaffective disorder, as per chart: borderline personality disorder, >30 prior inpatient psychiatric hospitalizations most recently at [facility] from 4/2023,  hx of highly disorganized behavior requiring transfer to [facility] hospitalization ([place]) such as smearing feces in the wall, eating feces, turning in a [place] over and over again, myriad of ED visits including [facility], 3 prior suicide attempts (last in 2012 via OD), recurrent chronic suicidal gestures and suicidal ideation, hx of property destruction when upset, long hx of sexually provocative behavior (both out in the community and while on inpatient units requiring 1:1 staff observation), hx of physical altercations, hx of verbal threats, hx of property destruction, long hx of medication and tx noncompliance resulting in AOT and PALACIOS administration, brought in by ambulance activated by residence for spreading feces on apartment wall.    This is a patient with a diagnosed schizophrenia-spectrum disorder, and an extensive psychiatric history with multiple hospitalizations for psychosis, unsafe behavior and SI, with significant documented high risk behaviors (physical altercations, property destruction, suicidal gestures). She is presenting now with bizarre behaviors noted at residence (ex. smearing and eating feces), that have worsened over past few weeks per staff and led them to calling EMS. Patient describes elevated mood and interrupted sleep the past month. Patient on AOT and medication adherent to Haldol decanoate and low dose oxcarbazepine. On initial evaluation on ML3, patient showing evidence of odd and manic-like behaviors (twirling excessively, pulling flowers from outside and placing in her hair, sexual provocativeness such as taking top off in public areas), as well as being loud, irritable and verbally aggressive at times towards peers. She acknowledges bizarre behaviors and offers several unsatisfying explanations to this behavior. Per psychiatrist, patient has been aggressive over the past year, and has declined any medication changes, and been unwilling to take any oral medications after leaving hospital (besides low dose oxcarbazepine). Presentation here concerning for manic decompensation on top of poor baseline (which appears to be a mix of insufficiently treated psychosis, disinhibition and poor impulse control).     No frankly unsafe behaviors yet on unit, and patient very hesitant to make medication changes (declines lithium or oxcarbazepine increase). She has been accepting of addition of benzodiazepine for sleep/trina/aggression, and aripiprazole for trina/aggression (declines risperidone). Patient has continued to show some odd, disorganized and sexually inappropriate behaviors as well as loud speech and elevated affect all of which have shown some mild improvement. She's had outbursts of verbal aggression towards particular peer intermittently, though none for past few days. Sleep initially erratic/interrupted, but has been adequate past few nights.    Plan:  -Admitted on a 9.39 involuntary status  -Routine checks (no HI or SI)  -Collateral obtained from psychiatrist, therapist and residence  -Continue clonazepam 0.5mg BID for aggression, trina and sleep  -INCREASE to aripiprazole 15mg QHS  -Continue Haldol Decanoate 200mg IM qMonth (last given 8/14, next due 9/13)  	-Metabolic labs obtained 8/21  -Continue Trileptal 300mg BID, pt declining increase in dose  -Admission labs reviewed and unremarkable. EKG reviewed, QTc 456ms. \  -Engage in unit mileu, groups Pt is a 43 y/o AAF, single, non-caregiver, unemployed, on disability for mental illness, domiciled at [facility], on AOT at this time, with past psychiatric history of schizoaffective disorder, as per chart: borderline personality disorder, >30 prior inpatient psychiatric hospitalizations most recently at [facility] from 4/2023,  hx of highly disorganized behavior requiring transfer to [facility] hospitalization ([place]) such as smearing feces in the wall, eating feces, turning in a [place] over and over again, myriad of ED visits including [facility], 3 prior suicide attempts (last in 2012 via OD), recurrent chronic suicidal gestures and suicidal ideation, hx of property destruction when upset, long hx of sexually provocative behavior (both out in the community and while on inpatient units requiring 1:1 staff observation), hx of physical altercations, hx of verbal threats, hx of property destruction, long hx of medication and tx noncompliance resulting in AOT and PALACIOS administration, brought in by ambulance activated by residence for spreading feces on apartment wall.    This is a patient with a diagnosed schizophrenia-spectrum disorder, and an extensive psychiatric history with multiple hospitalizations for psychosis, unsafe behavior and SI, with significant documented high risk behaviors (physical altercations, property destruction, suicidal gestures). She is presenting now with bizarre behaviors noted at residence (ex. smearing and eating feces), that have worsened over past few weeks per staff and led them to calling EMS. Patient describes elevated mood and interrupted sleep the past month. Patient on AOT and medication adherent to Haldol decanoate and low dose oxcarbazepine. On initial evaluation on ML3, patient showing evidence of odd and manic-like behaviors (twirling excessively, pulling flowers from outside and placing in her hair, sexual provocativeness such as taking top off in public areas), as well as being loud, irritable and verbally aggressive at times towards peers. She acknowledges bizarre behaviors and offers several unsatisfying explanations to this behavior. Per psychiatrist, patient has been aggressive over the past year, and has declined any medication changes, and been unwilling to take any oral medications after leaving hospital (besides low dose oxcarbazepine). Presentation here concerning for manic decompensation on top of poor baseline (which appears to be a mix of insufficiently treated psychosis, disinhibition and poor impulse control).     Patient initially very hesitant to make medication changes (declines lithium or oxcarbazepine increase), though she has been accepting of addition of benzodiazepine for sleep/trina/aggression, and aripiprazole for trina/aggression (declines risperidone). Patient has continued to show some odd, disorganized and sexually inappropriate behaviors as well as loud speech and elevated affect all of which have shown some improvement. She's had outbursts of verbal aggression towards particular peer intermittently, though none for past few days. Sleep initially erratic/interrupted, but has been adequate past few nights. Over weekend, more irritability noted, although per collateral, this is at baseline and may be indicative of improvement from more manic like symptoms.    Plan:  -Admitted on a 9.39 involuntary status  -Routine checks (no HI or SI)  -Collateral obtained from psychiatrist, therapist and residence  -DECREASE to clonazepam 0.25mg BID for aggression, trina and sleep (will plan to taper off before d/c if possible)  -INCREASE to aripiprazole 20mg QHS  -Continue Haldol Decanoate 200mg IM qMonth (last given 8/14, next due 9/13)  	-Metabolic labs obtained 8/21  -Continue Trileptal 300mg BID, pt declining increase in dose  -Admission labs reviewed and unremarkable. EKG reviewed, QTc 456ms. \  -Engage in unit [employer], groups

## 2023-08-28 NOTE — BH INPATIENT PSYCHIATRY PROGRESS NOTE - CURRENT MEDICATION
MEDICATIONS  (STANDING):  ARIPiprazole 15 milliGRAM(s) Oral at bedtime  clonazePAM  Tablet 0.5 milliGRAM(s) Oral two times a day  OXcarbazepine 300 milliGRAM(s) Oral two times a day    MEDICATIONS  (PRN):  acetaminophen     Tablet .. 650 milliGRAM(s) Oral every 6 hours PRN Moderate Pain (4 - 6)  haloperidol     Tablet 5 milliGRAM(s) Oral every 6 hours PRN agittaion  haloperidol    Injectable 5 milliGRAM(s) IntraMuscular Once PRN assaultive  hydrOXYzine hydrochloride 25 milliGRAM(s) Oral every 6 hours PRN Anxiety  LORazepam     Tablet 2 milliGRAM(s) Oral every 6 hours PRN Anxiety  LORazepam   Injectable 2 milliGRAM(s) IntraMuscular Once PRN Assaultive behavior   MEDICATIONS  (STANDING):  ARIPiprazole 20 milliGRAM(s) Oral at bedtime  clonazePAM  Tablet 0.25 milliGRAM(s) Oral two times a day  diphenhydrAMINE 25 milliGRAM(s) Oral at bedtime  OXcarbazepine 300 milliGRAM(s) Oral two times a day    MEDICATIONS  (PRN):  acetaminophen     Tablet .. 650 milliGRAM(s) Oral every 6 hours PRN Moderate Pain (4 - 6)  haloperidol     Tablet 5 milliGRAM(s) Oral every 6 hours PRN agittaion  haloperidol    Injectable 5 milliGRAM(s) IntraMuscular Once PRN assaultive  hydrOXYzine hydrochloride 25 milliGRAM(s) Oral every 6 hours PRN Anxiety  LORazepam     Tablet 2 milliGRAM(s) Oral every 6 hours PRN Anxiety  LORazepam   Injectable 2 milliGRAM(s) IntraMuscular Once PRN Assaultive behavior

## 2023-08-28 NOTE — BH INPATIENT PSYCHIATRY PROGRESS NOTE - NSBHCHARTREVIEWVS_PSY_A_CORE FT
Vital Signs Last 24 Hrs  T(C): --  T(F): --  HR: --  BP: --  BP(mean): --  RR: --  SpO2: --    Orthostatic VS  08-27-23 @ 06:31  Lying BP: --/-- HR: --  Sitting BP: 118/70 HR: 92  Standing BP: --/-- HR: --  Site: --  Mode: --   Vital Signs Last 24 Hrs  T(C): 36.8 (08-28-23 @ 08:30), Max: 36.8 (08-28-23 @ 08:30)  T(F): 98.3 (08-28-23 @ 08:30), Max: 98.3 (08-28-23 @ 08:30)  HR: --  BP: --  BP(mean): --  RR: --  SpO2: --    Orthostatic VS  08-28-23 @ 08:30  Lying BP: --/-- HR: --  Sitting BP: 142/82 HR: 87  Standing BP: --/-- HR: --  Site: upper left arm  Mode: electronic  Orthostatic VS  08-27-23 @ 06:31  Lying BP: --/-- HR: --  Sitting BP: 118/70 HR: 92  Standing BP: --/-- HR: --  Site: --  Mode: --

## 2023-08-29 PROCEDURE — 90853 GROUP PSYCHOTHERAPY: CPT

## 2023-08-29 PROCEDURE — 99232 SBSQ HOSP IP/OBS MODERATE 35: CPT

## 2023-08-29 RX ORDER — DIPHENHYDRAMINE HCL 50 MG
1 CAPSULE ORAL
Qty: 0 | Refills: 0 | DISCHARGE
Start: 2023-08-29

## 2023-08-29 RX ORDER — ARIPIPRAZOLE 15 MG/1
1 TABLET ORAL
Qty: 0 | Refills: 0 | DISCHARGE
Start: 2023-08-29

## 2023-08-29 RX ORDER — DIPHENHYDRAMINE HCL 50 MG
50 CAPSULE ORAL AT BEDTIME
Refills: 0 | Status: DISCONTINUED | OUTPATIENT
Start: 2023-08-29 | End: 2023-08-31

## 2023-08-29 RX ORDER — CLONAZEPAM 1 MG
1 TABLET ORAL
Qty: 0 | Refills: 0 | DISCHARGE
Start: 2023-08-29

## 2023-08-29 RX ORDER — HALOPERIDOL DECANOATE 100 MG/ML
200 INJECTION INTRAMUSCULAR
Qty: 0 | Refills: 0 | DISCHARGE

## 2023-08-29 RX ORDER — CLONAZEPAM 1 MG
0.5 TABLET ORAL AT BEDTIME
Refills: 0 | Status: DISCONTINUED | OUTPATIENT
Start: 2023-08-29 | End: 2023-09-01

## 2023-08-29 RX ADMIN — HALOPERIDOL DECANOATE 5 MILLIGRAM(S): 100 INJECTION INTRAMUSCULAR at 13:02

## 2023-08-29 RX ADMIN — Medication 650 MILLIGRAM(S): at 20:03

## 2023-08-29 RX ADMIN — OXCARBAZEPINE 300 MILLIGRAM(S): 300 TABLET, FILM COATED ORAL at 08:06

## 2023-08-29 RX ADMIN — OXCARBAZEPINE 300 MILLIGRAM(S): 300 TABLET, FILM COATED ORAL at 20:03

## 2023-08-29 RX ADMIN — Medication 50 MILLIGRAM(S): at 20:03

## 2023-08-29 RX ADMIN — Medication 0.5 MILLIGRAM(S): at 20:03

## 2023-08-29 RX ADMIN — ARIPIPRAZOLE 20 MILLIGRAM(S): 15 TABLET ORAL at 20:03

## 2023-08-29 RX ADMIN — Medication 0.25 MILLIGRAM(S): at 08:06

## 2023-08-29 RX ADMIN — Medication 2 MILLIGRAM(S): at 13:02

## 2023-08-29 NOTE — BH INPATIENT PSYCHIATRY PROGRESS NOTE - NSBHFUPINTERVALHXFT_PSY_A_CORE
VSS, accepting medications, no PRNs. Per staff, patient yelling at female peer and at one point seen laying on floor, otherwise no acute events. No sexually inappropriate behavior observed. VSS, accepting medications, no PRNs. Per staff, patient yelling at female peer and at one point seen laying on floor, otherwise no acute events. No sexually inappropriate behavior observed. Slept 4+ hours last night (interrupted).    Patient starts by noting her sleep was more on and off last night, and that she was having vivid dreams about her mother. States her mood is calm, and she enjoys her daily conversations with me. States she does not feel elevated. Brought up incident yesterday of putting lipstick all over her face; patient laughs and states she was being creative, and that this is normal for her. States she has been thinking about how to deal with the stresses that come with being at her residence/outside the hospital. Extensively discuss behaviors that she feels are part of her personality and quarkiness, and behaviors that are concerning to others. Discuss clothing and being provocatively dressed. Patient able to engage appropriately around these issues. Denies any verbal anger towards others or urges to physically fight. Denies SI, HI, AH. Denies muscle stiffness, tremor or restlessness.
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
## 2023-08-29 NOTE — BH INPATIENT PSYCHIATRY PROGRESS NOTE - MSE UNSTRUCTURED FT
23.6 Appearance: casually dressed, cleavage-revealing  Behavior: cooperative, still high energy but less dancing, no PMA/PMR  Speech: loud on unit but improved, regular rate and rhythm   Mood: "okay"  Affect: elevated   Thought process: flight of ideas/loose at times though at other times is quite organized/linear, especially during concrete aspects of conversation  Thought content: no kelby delusions elicited, appropriately responds to questions though with unusual analogies/interjections  Perceptions: no AH/VH elicited  Insight: partial, understands that she engages in odd behaviors and acknowledges poor impulse control, though struggles to see how certain behaviors are unsafe  Judgement: odd and erratic-- continues to have some sexually provocative behavior, moments of irritability (including yelling and cursing), though not directed at others  Cognition: grossly intact Appearance: casually dressed, appropriately covered  Behavior: cooperative, still high energy but less dancing, no PMA/PMR  Speech: loud on unit but improved, regular rate and rhythm   Mood: "calm"  Affect: slightly elevated, but improved   Thought process: flight of ideas at times though at other times is quite organized/linear, especially during concrete aspects of conversation  Thought content: no kelby delusions elicited, appropriately responds to questions though with some unusual analogies/interjections  Perceptions: no AH/VH elicited  Insight: partial, understands that she engages in odd behaviors and acknowledges poor impulse control, though struggles to see how certain behaviors are unsafe  Judgement: odd and erratic-- continues to have some sexually provocative behavior, moments of irritability (including yelling and cursing), though not directed at others  Cognition: grossly intact

## 2023-08-29 NOTE — BH INPATIENT PSYCHIATRY DISCHARGE NOTE - NSDCMRMEDTOKEN_GEN_ALL_CORE_FT
ARIPiprazole 20 mg oral tablet: 1 tab(s) orally once a day (at bedtime)  clonazePAM 0.5 mg oral tablet: 1 tab(s) orally once a day (at bedtime)  diphenhydrAMINE 50 mg oral capsule: 1 cap(s) orally once a day (at bedtime)  OXcarbazepine 300 mg oral tablet: 1 tab(s) orally 2 times a day   ARIPiprazole 20 mg oral tablet: 1 tab(s) orally once a day (at bedtime)  clonazePAM 1 mg oral tablet: 1 tab(s) orally once a day (at bedtime) MDD: 1  diphenhydrAMINE 50 mg oral capsule: 1 cap(s) orally once a day (at bedtime)  OXcarbazepine 300 mg oral tablet: 1 tab(s) orally 2 times a day

## 2023-08-29 NOTE — BH INPATIENT PSYCHIATRY DISCHARGE NOTE - MSE UNSTRUCTURED FT
Appearance: dressed in hospital attire, appropriately covered  Behavior: cooperative, still high energy but less dancing, can be intrusive  Speech: loud on unit at times, regular rate and rhythm   Mood: "okay"  Affect: somewhat elevated, but improved   Thought process: flight of ideas at times though at other times is quite organized/linear, especially during concrete aspects of conversation  Thought content: no kelby delusions elicited, appropriately responds to questions though with some unusual analogies/interjections  Perceptions: no AH/VH elicited, does not appear to be responding to internal stimuli  Insight: partial, understands that she engages in odd behaviors and acknowledges poor impulse control, though struggles to see how certain behaviors are unsafe  Judgement: odd and erratic-- continues to have some sexually provocative behavior, moments of irritability (including yelling and cursing), and disorganized behaviors (fishing stickers out of garbage, putting marker on face), but nothing overtly unsafe  Cognition: grossly intact Appearance: dressed in hospital attire, appropriately covered  Behavior: cooperative, still high energy but less dancing, can be intrusive  Speech: loud on unit at times, regular rate and rhythm   Mood: "ambivalent"  Affect: somewhat elevated, but improved   Thought process: flight of ideas at times though at other times is quite organized/linear, especially during concrete aspects of conversation  Thought content: no kelby delusions elicited, appropriately responds to questions though with some unusual analogies/interjections  Perceptions: no AH/VH elicited, does not appear to be responding to internal stimuli  Insight: partial, understands that she engages in odd behaviors and acknowledges poor impulse control, though struggles to see how certain behaviors are disruptive or inappropriate  Judgement: odd and erratic-- continues to have some sexually provocative behavior, verbal outbursts, and disorganized behaviors, but nothing overtly unsafe  Cognition: grossly intact

## 2023-08-29 NOTE — BH INPATIENT PSYCHIATRY PROGRESS NOTE - NSBHMETABOLIC_PSY_ALL_CORE_FT
BMI: BMI (kg/m2): 42.4 (08-16-23 @ 19:12)  HbA1c: A1C with Estimated Average Glucose Result: 5.4 % (08-21-23 @ 09:18)    Glucose: POCT Blood Glucose.: 87 mg/dL (08-16-23 @ 17:09)    BP: --  Lipid Panel: Date/Time: 08-21-23 @ 09:18  Cholesterol, Serum: 143  Direct LDL: --  HDL Cholesterol, Serum: 54  Total Cholesterol/HDL Ration Measurement: --  Triglycerides, Serum: 164

## 2023-08-29 NOTE — BH INPATIENT PSYCHIATRY DISCHARGE NOTE - NSBHPSYCHMEDOTHERFT_PSY_A_CORE
Abilify added to treat manic-like symptoms on admission. In outpatient setting once back at baseline, can be discontinued or if effective, can consider cross-titration form Haldol.

## 2023-08-29 NOTE — BH INPATIENT PSYCHIATRY PROGRESS NOTE - NSBHCHARTREVIEWVS_PSY_A_CORE FT
Vital Signs Last 24 Hrs  T(C): 36.6 (08-29-23 @ 07:55), Max: 36.6 (08-29-23 @ 07:55)  T(F): 97.8 (08-29-23 @ 07:55), Max: 97.8 (08-29-23 @ 07:55)  HR: --  BP: --  BP(mean): --  RR: --  SpO2: --    Orthostatic VS  08-29-23 @ 07:55  Lying BP: --/-- HR: --  Sitting BP: 137/90 HR: 96  Standing BP: --/-- HR: --  Site: upper left arm  Mode: electronic  Orthostatic VS  08-28-23 @ 08:30  Lying BP: --/-- HR: --  Sitting BP: 142/82 HR: 87  Standing BP: --/-- HR: --  Site: upper left arm  Mode: electronic
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
## 2023-08-29 NOTE — BH INPATIENT PSYCHIATRY DISCHARGE NOTE - HOSPITAL COURSE
Patient was brought to the hospital after EMS was activated by residence for erratic behavior over the last few weeks, including laying on the floor, smearing feces on wall and eating feces. In the ED and on ML3, patient offered numerous explanations for these behaviors, including that it is a vodoo practice (despite not practicing), that it was cleansing her skin, that it was to manage feelings of anger due to racist comments, and to deter unwanted sexual attention at her residence. Per patient, she's had elevated mood and interrupted sleep the past month. Patient had been on AOT and medication adherent to haloperidol decanoate and low dose oxcarbazepine in the community.     On initial evaluation on ML3, patient was elevated, loud and displaying odd and manic-like behaviors, including twirling excessively, removing plants from outside to place in her hair, lifting her top in public spaces and exclaiming "potassium!" while walking in the hallway, eating a banana.     Chart review reveals long history of psychiatric admissions for psychotic decompensations, often in the setting of medication nonadherence, including leading to aggression and property destruction. Collateral from residence and therapist suggested increase in manic-like and odd behaviors in the past two weeks. Per psychiatrist, patient has been hostile and aggressive over the past year, and has declined any medication changes, and been unwilling to take any oral medications after leaving hospitalizations (besides low dose oxcarbazepine).    Initially, patient's home oxcarbazepine 300mg BID was continued. Last haloperidol decanoate injection 200mg IM was given just prior to admission on 8/14. Medication changes encouraged including initiating lithium (declined, doesn't want to do blood draws), increasing oxcarbazepine (declined), and starting risperidone (declined, stated it made her eyes roll back). Ultimately, the patient agreed to adding clonazepam and aripiprazole for sleep, aggression and manic-like symptoms. Aripiprazole was titrated to 20mg without side effects and continued at this dose on discharge. Clonazepam was added at 1mg BID and eventually changed to 1mg QHS for sleep prior to discharge. Diphenhydramine 50mg QHS was added for sleep.    Over hospitalization, patient continued to show some odd and disorganized behaviors as well as loud speech and elevated affect, though no overtly unsafe behaviors, and it was unclear how much of these behaviors were baseline versus related to a decompensation. Her affect did gradually became slightly less elevated, and patient also became less sexually provocative. While sleep was initially poor and interrupted, with the addition of aripiprazole and clonazepam, patient was able to achieve improved sleep.***    Patient had a few verbal outbursts, directed at one peer in particular, which patient later showed remorse about and attributed to poor impulse control while frustrated. These outbursts lessened over the hospitalization.*** She had occasional episodes of irritability, which per collateral appeared consistent with her baseline.     Patient consistently denied SI and HI, and showed no evidence of unsafe behaviors or physical aggression towards herself or others. There was no episodes of handling her feces inappropriatley. She did not have any psychiatric emergencies or require IM medications, and she was very re-directable when displaying disruptive behaviors. She was overall very pleasant with staff. ***       Medically, patient remained stable and did not require medical consultation.     Psychiatric medications on discharge:  -Haloperidol Decanoate 200mg IM (last given 8/14)  -Oxcarbazepine 300mg BID  -Aripiprazole 20mg QHS  -Clonazepam 1mg QHS  -Diphenhydramine 50mg QHS   Patient was brought to the hospital after EMS was activated by residence for erratic behavior over the last few weeks, including laying on the floor, smearing feces on wall and eating feces. In the ED and on ML3, patient offered numerous explanations for these behaviors, including that it is a vodoo practice (despite not practicing), that it was cleansing her skin, that it was to manage feelings of anger due to racist comments, and to deter unwanted sexual attention at her residence. Per patient, she's had elevated mood and interrupted sleep the past month. Patient had been on AOT and medication adherent to haloperidol decanoate and low dose oxcarbazepine in the community.     On initial evaluation on ML3, patient was elevated, loud and displaying odd and manic-like behaviors, including twirling excessively, removing plants from outside to place in her hair, lifting her top in public spaces and exclaiming "potassium!" while walking in the hallway, eating a banana.     Chart review reveals long history of psychiatric admissions for psychotic decompensations, often in the setting of medication nonadherence, including leading to aggression and property destruction. Collateral from residence and therapist suggested increase in manic-like and odd behaviors in the past two weeks. Per psychiatrist, patient has been hostile and aggressive over the past year, and has declined any medication changes, and been unwilling to take any oral medications after leaving hospitalizations (besides low dose oxcarbazepine).    Initially, patient's home oxcarbazepine 300mg BID was continued. Last haloperidol decanoate injection 200mg IM was given just prior to admission on 8/14. Medication changes encouraged including initiating lithium (declined, doesn't want to do blood draws), increasing oxcarbazepine (declined), and starting risperidone (declined, stated it made her eyes roll back). Ultimately, the patient agreed to adding clonazepam and aripiprazole for sleep, aggression and manic-like symptoms. Aripiprazole was titrated to 20mg without side effects and continued at this dose on discharge. Clonazepam was added at 1mg BID and eventually changed to 1mg QHS for sleep prior to discharge. Diphenhydramine 50mg QHS was added for sleep.    Over hospitalization, patient continued to show some odd and disorganized behaviors as well as loud speech and elevated affect, though no overtly unsafe behaviors, and it was unclear how much of these behaviors were baseline versus related to a decompensation. Her affect did gradually became slightly less elevated, and patient also became less sexually provocative. While sleep was initially poor and interrupted, with the addition of aripiprazole and clonazepam, patient was able to achieve improved sleep.***    Patient had a few verbal outbursts, directed at one peer in particular, which patient later showed remorse about and attributed to poor impulse control while frustrated. These outbursts lessened over the hospitalization.*** She had occasional episodes of irritability, which per collateral appeared consistent with her baseline.     Patient consistently denied SI and HI, and showed no evidence of unsafe behaviors or physical aggression towards herself or others. There was no episodes of handling her feces inappropriatley. She did not have any psychiatric emergencies or require IM medications, and she was very re-directable when displaying disruptive behaviors. She was overall very pleasant with staff. ***       Medically, patient remained stable and did not require medical consultation.     Psychiatric medications on discharge:  -Haloperidol Decanoate 200mg IM (last given 8/14)  -Oxcarbazepine 300mg BID (given 30d supply on d/c)  -Aripiprazole 20mg QHS (given 30d supply on d/c)  -Clonazepam 1mg QHS (given 14d supply on d/c, need for ongoing rx to be determined by outpatient psychiatrist)  -Diphenhydramine 50mg QHS (given 30d supply on d/c)   Patient was brought to the hospital after EMS was activated by residence for erratic behavior over the last few weeks, including laying on the floor, smearing feces on wall and eating feces. In the ED and on ML3, patient offered numerous explanations for these behaviors, including that it is a vodoo practice (despite not practicing), that it was cleansing her skin, that it was to manage feelings of anger due to racist comments, and to deter unwanted sexual attention at her residence. Per patient, she's had elevated mood and interrupted sleep the past month. Patient had been on AOT and medication adherent to haloperidol decanoate and low dose oxcarbazepine in the community.     On initial evaluation on ML3, patient was elevated, loud and displaying odd and manic-like behaviors, including twirling excessively, removing plants from outside to place in her hair, lifting her top in public spaces and exclaiming "potassium!" while walking in the hallway, eating a banana. Chart review reveals long history of psychiatric admissions for psychotic decompensations, often in the setting of medication nonadherence, including leading to aggression and property destruction. Collateral from residence and therapist suggested increase in manic-like and odd behaviors in the past two weeks. Per psychiatrist, patient has been hostile and aggressive over the past year, and has declined any medication changes, and been unwilling to take any oral medications after leaving hospitalizations (besides low dose oxcarbazepine).    While patient did demonstrate multiple hypomanic/manic-like symptoms (such as elevated affect, loud speech, flight of ideas, sexual provocativeness, disorganized behaviors) these symptoms were far more prominent when she was in the milieu not being directly engaged by staff/treatment team. When odd or disruptive behaviors were noted by staff, patient was easily redirectable. On interviews, patient appeared more euthymic, calm, organized and reasonable. Sleep on the unit fluctuated, at times uninterrupted and adequate, and at other times more interrupted, but was usually at least 5-6 hours. Patient had a few verbal outbursts directed at one peer in particular, which patient later showed remorse about and attributed to poor impulse control while frustrated. She had scattered episodes of irritability, which per collateral appeared consistent with her baseline. Overall, diagnostic considerations include manic/hypomanic symptoms secondary to bipolar-spectrum disorder (or schizoaffective), as well as exacerbation of chronic disinhibition and personality vulnerabilities.     Initially, patient's home oxcarbazepine 300mg BID was continued. Last haloperidol decanoate injection 200mg IM was given just prior to admission on 8/14. Recommended various medication changes including initiating lithium (declined, doesn't want to do blood draws), increasing oxcarbazepine (declined), and starting risperidone (declined, stated it made her eyes roll back). Of note, patient with prior adverse reaction to Depakote, so this was not offered. Ultimately, the patient agreed to adding clonazepam and aripiprazole for sleep, aggression and manic-like symptoms. Aripiprazole was titrated to 20mg without side effects and continued at this dose on discharge. Clonazepam was added at 1mg BID and eventually changed to 1mg QHS for sleep prior to discharge. Diphenhydramine 50mg QHS was added for sleep.     Over the hospitalization, patient continued to show odd and disorganized behaviors as well as loud speech and elevated affect. As noted above, it remained unclear how much of these behaviors were baseline versus related to a decompensation. Her affect did gradually became less elevated, and patient also became less sexually provocative. Verbal outbursts towards peer lessened over hospitalization. While sleep was initially poor and interrupted, with the addition of aripiprazole and clonazepam, patient was able to achieve improved sleep.*** Given the incomplete response to aripiprazole/clonazepam, patent was again offered lithium prior to discharge which she declined. ***    Patient consistently denied SI, VI and HI, and showed no evidence of physical aggression towards herself or others. There was no episodes of handling her feces inappropriately or any other overtly unsafe behaviors. She did not have any psychiatric emergencies or require IM medications, and she was very re-directable when displaying disruptive behaviors. She was overall very pleasant with staff. There was no evidence of inability to care for self while on the unit. Given the above, patient was appropriate for discharge.     Medically, patient remained stable and did not require medical consultation.     Psychiatric medications on discharge:  -Haloperidol Decanoate 200mg IM (last given 8/14)  -Oxcarbazepine 300mg BID (given 30d supply on d/c)  -Aripiprazole 20mg QHS (given 30d supply on d/c)  -Clonazepam 1mg QHS (given 14d supply on d/c, need for ongoing rx to be determined by outpatient psychiatrist)  -Diphenhydramine 50mg QHS (given 30d supply on d/c)     Patient was brought to the hospital after EMS was activated by residence for erratic behavior over the last few weeks, including laying on the floor, smearing feces on wall and eating feces. In the ED and on ML3, patient offered numerous explanations for these behaviors, including that it is a vodoo practice (despite not practicing), that it was cleansing her skin, that it was to manage feelings of anger due to racist comments, and to deter unwanted sexual attention at her residence. Per patient, she's had elevated mood and interrupted sleep the past month. Patient had been on AOT and medication adherent to haloperidol decanoate and low dose oxcarbazepine in the community.     On initial evaluation on ML3, patient was elevated, loud and displaying odd and manic-like behaviors, including twirling excessively, removing plants from outside to place in her hair, lifting her top in public spaces and exclaiming "potassium!" while walking in the hallway, eating a banana. Chart review reveals long history of psychiatric admissions for psychotic decompensations, often in the setting of medication nonadherence, including leading to aggression and property destruction. Collateral from residence and therapist suggested increase in manic-like and odd behaviors in the past two weeks. Per psychiatrist, patient has been hostile and aggressive over the past year, and has declined any medication changes, and been unwilling to take any oral medications after leaving hospitalizations (besides low dose oxcarbazepine).    While patient did demonstrate multiple hypomanic/manic-like symptoms (such as elevated affect, loud speech, flight of ideas, sexual provocativeness, disorganized behaviors) these symptoms were far more prominent when she was in the milieu not being directly engaged by staff/treatment team. When odd or disruptive behaviors were noted by staff, patient was easily redirectable. On interviews, patient appeared more euthymic, calm, organized and reasonable. Sleep on the unit fluctuated, at times uninterrupted and adequate, and at other times more interrupted, but was usually at least 5-6 hours. Patient had a few verbal outbursts directed at one peer in particular, which patient later showed remorse about and attributed to poor impulse control while frustrated. She had scattered episodes of irritability, which per collateral appeared consistent with her baseline. Overall, diagnostic considerations include manic/hypomanic symptoms secondary to bipolar-spectrum disorder (or schizoaffective), as well as exacerbation of chronic disinhibition and personality vulnerabilities.     Initially, patient's home oxcarbazepine 300mg BID was continued. Last haloperidol decanoate injection 200mg IM was given just prior to admission on 8/14. Recommended various medication changes including initiating lithium (declined, doesn't want to do blood draws), increasing oxcarbazepine (declined), and starting risperidone (declined, stated it made her eyes roll back). Of note, patient with prior adverse reaction to Depakote, so this was not offered. Ultimately, the patient agreed to adding clonazepam and aripiprazole for sleep, aggression and manic-like symptoms. Aripiprazole was titrated to 20mg without side effects and continued at this dose on discharge. Clonazepam was added at 1mg BID and eventually changed to 1mg QHS for sleep prior to discharge. Diphenhydramine 50mg QHS was added for sleep.     Over the hospitalization, patient continued to show odd and disorganized behaviors as well as loud speech and elevated affect. As noted above, it remained unclear how much of these behaviors were baseline versus related to a decompensation. Her affect did gradually became less elevated, and patient also became less sexually provocative. Verbal outbursts towards peer lessened over hospitalization. While sleep was initially poor and interrupted, with the addition of aripiprazole and clonazepam, patient was able to achieve improved and adequate sleep in the days prior to discharge. Given the incomplete response to aripiprazole/clonazepam, patent was again offered lithium prior to discharge which she declined due to not wanting to do blood draws. Also offered clozapine, but again, patient was not willing to discuss doing medications that required blood draws.    Patient consistently denied SI, VI and HI, and showed no evidence of physical aggression towards herself or others. There was no episodes of handling her feces inappropriately or any other overtly unsafe behaviors. She did not have any psychiatric emergencies or require IM medications, and she was very re-directable when displaying disruptive behaviors. She was overall very pleasant with staff. There was no evidence of inability to care for self while on the unit. Given the above, patient was appropriate for discharge.     Medically, patient remained stable and did not require medical consultation.     Psychiatric medications on discharge:  -Haloperidol Decanoate 200mg IM (last given 8/14)  -Oxcarbazepine 300mg BID (given 30d supply on d/c)  -Aripiprazole 20mg QHS (given 30d supply on d/c)  -Clonazepam 1mg QHS (given 14d supply on d/c, need for ongoing rx to be determined by outpatient psychiatrist)  -Diphenhydramine 50mg QHS (given 30d supply on d/c)

## 2023-08-29 NOTE — BH INPATIENT PSYCHIATRY DISCHARGE NOTE - NSBHFUPINTERVALHXFT_PSY_A_CORE
Discharge Progress Note Date and Time: 09-05-23 @ 08:33    [facility], accepting medications, no PRNS. Over weekend, intermittently loud and intrusive, but redirectable.      Discharge Progress Note Date and Time: 09-05-23 @ 08:33    [facility], accepting medications, no PRNS. Over weekend, intermittently loud and intrusive, but redirectable. Slept well over weekend.      Patient states she had an okay weekend. Describes mood as "ambivalent" and we discuss her mixed feelings about discharge, as she states she is a little anxious about it. States appetite and energy are okay. Denies SI, HI, AH, VH. Denies muscle stiffness, tremor or restlessness. Amenable to continue all medications here on discharge. Understands she can go to ER or call 911, or call OP provider, if there are any safety concerns.          Discharge Progress Note Date and Time: 09-05-23 @ 08:33    [facility], accepting medications, no PRNS. Over weekend, intermittently loud and intrusive, but redirectable. Slept well over weekend.    Patient states she had an okay weekend. Describes mood as "ambivalent" and we discuss her mixed feelings about discharge, as she states she is a little anxious about it. States appetite and energy are okay. Reports no issues with anger and no verbal outbursts towards peer. Denies SI, HI, AH, VH. Denies muscle stiffness, tremor or restlessness. Amenable to continue all medications here on discharge. Understands she can go to ER or call 911, or call OP provider, if there are any safety concerns.    Re-offered patient lithium trial, which she again declined due to blood work. Also discussed clozapine, but patient again not interested in any medications with blood work.
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
## 2023-08-29 NOTE — BH INPATIENT PSYCHIATRY DISCHARGE NOTE - NSBHDCRISKMITIGATEFT_PSY_ALL_CORE
Patient is at elevated chronic risk of harm to self and others due to chronic psychotic disorder, history of aggression, history of suicidal behavior, history of non-adherence and history of multiple hospitalizations. Acute risk factors on admission include increased erratic behavior, elevated affect and poor sleep. Protective factors that mitigate this risk include improvement in sleep, lack of unsafe behaviors noted on the unit, consistent denial of SI and HI, ongoing motivation to engage in psychiatric care, stable residence, AOT in place and ongoing administration of long-acting injectable antipsychotic. There was no evidence of inability to care for self on the unit.??? ?Patient is agreeable to contact outpatient providers, call 911, or go to the nearest ED with any imminent safety concerns. Given the above, patient does not appear to constitute imminent threat to self or others and is appropriate for discharge.

## 2023-08-30 LAB — SARS-COV-2 RNA SPEC QL NAA+PROBE: SIGNIFICANT CHANGE UP

## 2023-08-30 PROCEDURE — 99232 SBSQ HOSP IP/OBS MODERATE 35: CPT

## 2023-08-30 RX ORDER — CALCIUM CARBONATE 500(1250)
2 TABLET ORAL EVERY 12 HOURS
Refills: 0 | Status: DISCONTINUED | OUTPATIENT
Start: 2023-08-30 | End: 2023-09-05

## 2023-08-30 RX ADMIN — Medication 650 MILLIGRAM(S): at 18:00

## 2023-08-30 RX ADMIN — OXCARBAZEPINE 300 MILLIGRAM(S): 300 TABLET, FILM COATED ORAL at 08:12

## 2023-08-30 RX ADMIN — Medication 650 MILLIGRAM(S): at 04:07

## 2023-08-30 RX ADMIN — Medication 650 MILLIGRAM(S): at 19:37

## 2023-08-30 RX ADMIN — Medication 650 MILLIGRAM(S): at 03:07

## 2023-08-30 RX ADMIN — Medication 0.5 MILLIGRAM(S): at 20:35

## 2023-08-30 RX ADMIN — Medication 50 MILLIGRAM(S): at 20:35

## 2023-08-30 RX ADMIN — OXCARBAZEPINE 300 MILLIGRAM(S): 300 TABLET, FILM COATED ORAL at 20:35

## 2023-08-30 RX ADMIN — ARIPIPRAZOLE 20 MILLIGRAM(S): 15 TABLET ORAL at 20:35

## 2023-08-30 NOTE — BH INPATIENT PSYCHIATRY PROGRESS NOTE - MSE UNSTRUCTURED FT
Appearance: casually dressed, appropriately covered  Behavior: cooperative, still high energy but less dancing, no PMA/PMR  Speech: loud on unit but improved, regular rate and rhythm   Mood: "calm"  Affect: slightly elevated, but improved   Thought process: flight of ideas at times though at other times is quite organized/linear, especially during concrete aspects of conversation  Thought content: no kelby delusions elicited, appropriately responds to questions though with some unusual analogies/interjections  Perceptions: no AH/VH elicited  Insight: partial, understands that she engages in odd behaviors and acknowledges poor impulse control, though struggles to see how certain behaviors are unsafe  Judgement: odd and erratic-- continues to have some sexually provocative behavior, moments of irritability (including yelling and cursing), though not directed at others  Cognition: grossly intact Appearance: casually dressed, appropriately covered  Behavior: cooperative, still high energy but less dancing, can be intrusive  Speech: loud on unit at times, regular rate and rhythm   Mood: "I don't know"  Affect: slightly elevated, but improved   Thought process: flight of ideas at times though at other times is quite organized/linear, especially during concrete aspects of conversation  Thought content: no kelby delusions elicited, appropriately responds to questions though with some unusual analogies/interjections  Perceptions: no AH/VH elicited  Insight: partial, understands that she engages in odd behaviors and acknowledges poor impulse control, though struggles to see how certain behaviors are unsafe  Judgement: odd and erratic-- continues to have some sexually provocative behavior, moments of irritability (including yelling and cursing), and disorganized behaviors (fishing stickers out of garbage)  Cognition: grossly intact

## 2023-08-30 NOTE — BH INPATIENT PSYCHIATRY PROGRESS NOTE - CURRENT MEDICATION
MEDICATIONS  (STANDING):  ARIPiprazole 20 milliGRAM(s) Oral at bedtime  clonazePAM  Tablet 0.5 milliGRAM(s) Oral at bedtime  diphenhydrAMINE 50 milliGRAM(s) Oral at bedtime  OXcarbazepine 300 milliGRAM(s) Oral two times a day    MEDICATIONS  (PRN):  acetaminophen     Tablet .. 650 milliGRAM(s) Oral every 6 hours PRN Moderate Pain (4 - 6)  haloperidol     Tablet 5 milliGRAM(s) Oral every 6 hours PRN agittaion  haloperidol    Injectable 5 milliGRAM(s) IntraMuscular Once PRN assaultive  hydrOXYzine hydrochloride 25 milliGRAM(s) Oral every 6 hours PRN Anxiety

## 2023-08-30 NOTE — BH INPATIENT PSYCHIATRY PROGRESS NOTE - NSBHFUPINTERVALHXFT_PSY_A_CORE
VSS, accepting medications, received Haldol 5/Ativan 2mg PRN last night. Continues to be loud, hyperverbal, and needs to be redirected. VSS, accepting medications, received Haldol 5/Ativan 2mg PRN last night. Continues to be loud, hyperverbal, and needs to be redirected. Slept 4 hours overnight and 3 hours during the day yesterday.    Patient pleasant and amenable to interview. States she doesn't know how to describe her mood. Discuss some of her behaviors, including finding stickers in trash can to put on herself. States this was a statement about black slavery and being up for sale, and that "homeless people do it." Try to discuss with her safety concerns around this. Tried to discuss interrupted sleep, but patient states that she got 6 hours, put woke up early, because that's what she used to do for work. States she sometimes yells to self, but hasn't yelled at others. Denies SI, HI, denies hearing voice of God or AH. Denies muscle stiffness, tremor, restlessness.

## 2023-08-30 NOTE — BH INPATIENT PSYCHIATRY PROGRESS NOTE - NSBHCHARTREVIEWVS_PSY_A_CORE FT
Vital Signs Last 24 Hrs  T(C): 36.8 (08-30-23 @ 05:53), Max: 36.8 (08-30-23 @ 05:53)  T(F): 98.2 (08-30-23 @ 05:53), Max: 98.2 (08-30-23 @ 05:53)  HR: --  BP: --  BP(mean): --  RR: --  SpO2: 100% (08-30-23 @ 05:53) (100% - 100%)    Orthostatic VS  08-30-23 @ 05:53  Lying BP: --/-- HR: --  Sitting BP: 119/77 HR: 85  Standing BP: --/-- HR: --  Site: --  Mode: --  Orthostatic VS  08-29-23 @ 07:55  Lying BP: --/-- HR: --  Sitting BP: 137/90 HR: 96  Standing BP: --/-- HR: --  Site: upper left arm  Mode: electronic

## 2023-08-30 NOTE — BH INPATIENT PSYCHIATRY PROGRESS NOTE - NSBHASSESSSUMMFT_PSY_ALL_CORE
Pt is a 41 y/o AAF, single, non-caregiver, unemployed, on disability for mental illness, domiciled at [facility], on AOT at this time, with past psychiatric history of schizoaffective disorder, as per chart: borderline personality disorder, >30 prior inpatient psychiatric hospitalizations most recently at [facility] from 4/2023,  hx of highly disorganized behavior requiring transfer to [facility] hospitalization ([place]) such as smearing feces in the wall, eating feces, turning in a [place] over and over again, myriad of ED visits including [facility], 3 prior suicide attempts (last in 2012 via OD), recurrent chronic suicidal gestures and suicidal ideation, hx of property destruction when upset, long hx of sexually provocative behavior (both out in the community and while on inpatient units requiring 1:1 staff observation), hx of physical altercations, hx of verbal threats, hx of property destruction, long hx of medication and tx noncompliance resulting in AOT and PALACIOS administration, brought in by ambulance activated by residence for spreading feces on apartment wall.    This is a patient with a diagnosed schizophrenia-spectrum disorder, and an extensive psychiatric history with multiple hospitalizations for psychosis, unsafe behavior and SI, with significant documented high risk behaviors (physical altercations, property destruction, suicidal gestures). She is presenting now with bizarre behaviors noted at residence (ex. smearing and eating feces), that have worsened over past few weeks per staff and led them to calling EMS. Patient describes elevated mood and interrupted sleep the past month. Patient on AOT and medication adherent to Haldol decanoate and low dose oxcarbazepine. On initial evaluation on ML3, patient showing evidence of odd and manic-like behaviors (twirling excessively, pulling flowers from outside and placing in her hair, sexual provocativeness such as taking top off in public areas), as well as being loud, irritable and verbally aggressive at times towards peers. She acknowledges bizarre behaviors and offers several unsatisfying explanations to this behavior. Per psychiatrist, patient has been aggressive over the past year, and has declined any medication changes, and been unwilling to take any oral medications after leaving hospital (besides low dose oxcarbazepine). Presentation here concerning for manic decompensation on top of poor baseline (which appears to be a mix of insufficiently treated psychosis, disinhibition and poor impulse control).     Patient initially very hesitant to make medication changes (declines lithium or oxcarbazepine increase), though she has been accepting of addition of benzodiazepine for sleep/trina/aggression, and aripiprazole for trina/aggression (declines risperidone). Patient has continued to show some odd and disorganized behaviors as well as loud speech and elevated affect, though no overtly unsafe behaviors and affect has become less elevated. Less sexually provocative. Unclear how much of this is baseline versus related to some sort of decompensation. She's had outbursts of verbal aggression towards particular peer intermittently, though not recently, and no physical aggression. Sleep initially erratic/interrupted, but has been generally adequate recently-- poor last night, so will consolidate clonazepam to evening.     Plan:  -Admitted on a 9.39 involuntary status, will convert to 2PC today  -Routine checks (no HI or SI)  -Collateral obtained from psychiatrist, therapist and residence  -CONSOLIDATE to clonazepam 0.5mg QHS for sleep  -Continue aripiprazole 20mg QHS  -INCREASE to diphenhydramine 50mg QHS for insomnia  -Continue Haldol Decanoate 200mg IM qMonth (last given 8/14, next due 9/13)  	-Metabolic labs obtained 8/21  -Continue Trileptal 300mg BID, pt declining increase in dose  -Admission labs reviewed and unremarkable. EKG reviewed, QTc 456ms.   -Engage in unit chris, groups Pt is a 43 y/o AAF, single, non-caregiver, unemployed, on disability for mental illness, domiciled at [facility], on AOT at this time, with past psychiatric history of schizoaffective disorder, as per chart: borderline personality disorder, >30 prior inpatient psychiatric hospitalizations most recently at [facility] from 4/2023,  hx of highly disorganized behavior requiring transfer to [facility] hospitalization ([place]) such as smearing feces in the wall, eating feces, turning in a [place] over and over again, myriad of ED visits including [facility], 3 prior suicide attempts (last in 2012 via OD), recurrent chronic suicidal gestures and suicidal ideation, hx of property destruction when upset, long hx of sexually provocative behavior (both out in the community and while on inpatient units requiring 1:1 staff observation), hx of physical altercations, hx of verbal threats, hx of property destruction, long hx of medication and tx noncompliance resulting in AOT and PALACIOS administration, brought in by ambulance activated by residence for spreading feces on apartment wall.    This is a patient with a diagnosed schizophrenia-spectrum disorder, and an extensive psychiatric history with multiple hospitalizations for psychosis, unsafe behavior and SI, with significant documented high risk behaviors (physical altercations, property destruction, suicidal gestures). She is presenting now with bizarre behaviors noted at residence (ex. smearing and eating feces), that have worsened over past few weeks per staff and led them to calling EMS. Patient describes elevated mood and interrupted sleep the past month. Patient on AOT and medication adherent to Haldol decanoate and low dose oxcarbazepine. On initial evaluation on ML3, patient showing evidence of odd and manic-like behaviors (twirling excessively, pulling flowers from outside and placing in her hair, sexual provocativeness such as taking top off in public areas), as well as being loud, irritable and verbally aggressive at times towards peers. She acknowledges bizarre behaviors and offers several unsatisfying explanations to this behavior. Per psychiatrist, patient has been aggressive over the past year, and has declined any medication changes, and been unwilling to take any oral medications after leaving hospital (besides low dose oxcarbazepine). Presentation here concerning for manic decompensation on top of poor baseline (which appears to be a mix of insufficiently treated psychosis, disinhibition and poor impulse control).     Patient initially very hesitant to make medication changes (declines lithium or oxcarbazepine increase), though she has been accepting of addition of benzodiazepine for sleep/trina/aggression, and aripiprazole for trina/aggression (declines risperidone). Patient has continued to show some odd and disorganized behaviors as well as loud speech and elevated affect, though no overtly unsafe behaviors and affect has become less elevated. Less sexually provocative. Unclear how much of this is baseline versus related to some sort of decompensation. She's had outbursts of verbal aggression, but recently has not been directed at another person, and no physical aggression. Sleep initially erratic/interrupted, but then improved, though last night was interrupted after sleeping during the day.    Plan:  -Involuntary status (2PC)  -Routine checks (no HI or SI)  -Collateral obtained from psychiatrist, therapist and residence  -Continue clonazepam 0.5mg QHS for sleep  -Continue aripiprazole 20mg QHS for manic symptoms  -Continue diphenhydramine 50mg QHS for insomnia  -Continue Haldol Decanoate 200mg IM qMonth (last given 8/14, next due 9/13)  	-Metabolic labs obtained 8/21  -Continue Trileptal 300mg BID, pt declining increase in dose  -Admission labs reviewed and unremarkable. EKG reviewed, QTc 456ms.   -Engage in unit chris, groups

## 2023-08-30 NOTE — BH INPATIENT PSYCHIATRY PROGRESS NOTE - PRN MEDS
MEDICATIONS  (PRN):  acetaminophen     Tablet .. 650 milliGRAM(s) Oral every 6 hours PRN Moderate Pain (4 - 6)  haloperidol     Tablet 5 milliGRAM(s) Oral every 6 hours PRN agittaion  haloperidol    Injectable 5 milliGRAM(s) IntraMuscular Once PRN assaultive  hydrOXYzine hydrochloride 25 milliGRAM(s) Oral every 6 hours PRN Anxiety

## 2023-08-31 PROCEDURE — 99232 SBSQ HOSP IP/OBS MODERATE 35: CPT

## 2023-08-31 RX ORDER — DIPHENHYDRAMINE HCL 50 MG
50 CAPSULE ORAL AT BEDTIME
Refills: 0 | Status: DISCONTINUED | OUTPATIENT
Start: 2023-08-31 | End: 2023-08-31

## 2023-08-31 RX ORDER — DIPHENHYDRAMINE HCL 50 MG
50 CAPSULE ORAL AT BEDTIME
Refills: 0 | Status: DISCONTINUED | OUTPATIENT
Start: 2023-08-31 | End: 2023-09-05

## 2023-08-31 RX ADMIN — OXCARBAZEPINE 300 MILLIGRAM(S): 300 TABLET, FILM COATED ORAL at 20:21

## 2023-08-31 RX ADMIN — ARIPIPRAZOLE 20 MILLIGRAM(S): 15 TABLET ORAL at 20:21

## 2023-08-31 RX ADMIN — Medication 0.5 MILLIGRAM(S): at 20:21

## 2023-08-31 RX ADMIN — OXCARBAZEPINE 300 MILLIGRAM(S): 300 TABLET, FILM COATED ORAL at 08:14

## 2023-08-31 RX ADMIN — Medication 50 MILLIGRAM(S): at 20:21

## 2023-08-31 NOTE — DIETITIAN INITIAL EVALUATION ADULT - ENERGY INTAKE
Adequate (%) Pt is a 41 y/o AA F, domiciled at [facility], on AOT at this time, with past psychiatric history of schizoaffective disorder, as per chart: borderline personality disorder, >30 prior inpatient psychiatric hospitalizations most recently at [facility] from 4/2023,  hx of highly disorganized behavior requiring transfer to [facility] hospitalization ([place]) , myriad of ED visits including [facility], 3 prior suicide attempts (last in 2012 via OD), recurrent chronic suicidal gestures and suicidal ideation, hx of property destruction when upset, long hx of sexually provocative behavior (both out in the community and while on inpatient units requiring 1:1 staff observation), hx of physical altercations, hx of verbal threats, hx of property destruction, long hx of medication and tx noncompliance resulting in AOT and PALACIOS administration, brought in by ambulance activated by residence for spreading feces on apartment wall, disorganized behavior, unable to care for self; PMH: GERD, asthma, fibroids, HTN.  Spoke to patient in the day room. Patient states her po intake is "up and down. I'm not eating all the food". Has food preferences in place which were reviewed and updated. Denies GI distress. Expressed she wants to lose weight. Discussed healthy meal plan, portion control and being active. Written copy of "Tips to Support Weight Loss" given to patient. Labs: 8/21 Triglycerides 164- encouraged patient to limit intake of fried foods and simple sugars.

## 2023-08-31 NOTE — DIETITIAN INITIAL EVALUATION ADULT - PERTINENT MEDS FT
MEDICATIONS  (STANDING):  ARIPiprazole 20 milliGRAM(s) Oral at bedtime  clonazePAM  Tablet 0.5 milliGRAM(s) Oral at bedtime  diphenhydrAMINE 50 milliGRAM(s) Oral at bedtime  OXcarbazepine 300 milliGRAM(s) Oral two times a day    MEDICATIONS  (PRN):  acetaminophen     Tablet .. 650 milliGRAM(s) Oral every 6 hours PRN Moderate Pain (4 - 6)  calcium carbonate    500 mG (Tums) Chewable 2 Tablet(s) Chew every 12 hours PRN indigestion  haloperidol     Tablet 5 milliGRAM(s) Oral every 6 hours PRN agittaion  haloperidol    Injectable 5 milliGRAM(s) IntraMuscular Once PRN assaultive  hydrOXYzine hydrochloride 25 milliGRAM(s) Oral every 6 hours PRN Anxiety

## 2023-08-31 NOTE — BH DISCHARGE NOTE NURSING/SOCIAL WORK/PSYCH REHAB - NSDCPRGOAL_PSY_ALL_CORE
Writer met with pt to discuss upcoming discharge and the pt’s progress throughout inpatient stay. Pt was receptive to safety planning process. On admission, the pt presented within the context of erratic behaviors noted at the pt’s residence and was observed as disorganized, demonstrating bizarre and impulsive behaviors in the community although mostly redirectable. During stay, the pt has been mostly visible within the community, engaged in some groups, at times appropriately engaged with insightful commentary, other times disruptive to therapeutic environment requiring redirection. Upon discharge, the pt reports that she is feeling apprehensive due to having to return to her residence at [facility]. Pt reports not wanting to be around “those people” as she describes the environment as not conducive to her overall wellbeing. Pt unable to elaborate during discussion and began discussing the “yue crown” that she purchased from [employer] stating, “I want to wear my crown and I can’t wear it there.” Pt also stated she is going to “buy herself hair and a dress” once discharged as she “got money.” Currently, the pt presents in improved behavioral control, is calm, cooperative, and appropriate during discussion with writer, and denies SI/HI/I/P and AH/VH/TH. Pt able to make progress towards goal during stay, and continues to make progress as she prepares for discharge. Pt remains with diminished judgement towards self and presenting symptoms, however improved insight at times. Pt encouraged to continue to engage in outpatient treatment post d/c.

## 2023-08-31 NOTE — BH TREATMENT PLAN - NSTXDISORGGOAL_PSY_ALL_CORE
Will verbalize 1 strategy to successfully cope with disturbed thinking

## 2023-08-31 NOTE — BH DISCHARGE NOTE NURSING/SOCIAL WORK/PSYCH REHAB - NSCDUDCCRISIS_PSY_A_CORE
[facility]  [phone] [facility] ([phone])  Text "WELL" to [phone]  Website: www.[employer].Visualnest/.National Suicide Prevention Lifeline 4 (917) 522-4683/.  [facility]  [phone] [place], [place] [address] [phone]   Hours:  Monday through Friday from 9 AM to 3 PM/988 Suicide and Crisis Lifeline

## 2023-08-31 NOTE — BH INPATIENT PSYCHIATRY PROGRESS NOTE - NSBHASSESSSUMMFT_PSY_ALL_CORE
Pt is a 43 y/o AAF, single, non-caregiver, unemployed, on disability for mental illness, domiciled at [facility], on AOT at this time, with past psychiatric history of schizoaffective disorder, as per chart: borderline personality disorder, >30 prior inpatient psychiatric hospitalizations most recently at [facility] from 4/2023,  hx of highly disorganized behavior requiring transfer to [facility] hospitalization ([place]) such as smearing feces in the wall, eating feces, turning in a [place] over and over again, myriad of ED visits including [facility], 3 prior suicide attempts (last in 2012 via OD), recurrent chronic suicidal gestures and suicidal ideation, hx of property destruction when upset, long hx of sexually provocative behavior (both out in the community and while on inpatient units requiring 1:1 staff observation), hx of physical altercations, hx of verbal threats, hx of property destruction, long hx of medication and tx noncompliance resulting in AOT and PALACIOS administration, brought in by ambulance activated by residence for spreading feces on apartment wall.    This is a patient with a diagnosed schizophrenia-spectrum disorder, and an extensive psychiatric history with multiple hospitalizations for psychosis, unsafe behavior and SI, with significant documented high risk behaviors (physical altercations, property destruction, suicidal gestures). She is presenting now with bizarre behaviors noted at residence (ex. smearing and eating feces), that have worsened over past few weeks per staff and led them to calling EMS. Patient describes elevated mood and interrupted sleep the past month. Patient on AOT and medication adherent to Haldol decanoate and low dose oxcarbazepine. On initial evaluation on ML3, patient showing evidence of odd and manic-like behaviors (twirling excessively, pulling flowers from outside and placing in her hair, sexual provocativeness such as taking top off in public areas), as well as being loud, irritable and verbally aggressive at times towards peers. She acknowledges bizarre behaviors and offers several unsatisfying explanations to this behavior. Per psychiatrist, patient has been aggressive over the past year, and has declined any medication changes, and been unwilling to take any oral medications after leaving hospital (besides low dose oxcarbazepine). Presentation here concerning for manic decompensation on top of poor baseline (which appears to be a mix of insufficiently treated psychosis, disinhibition and poor impulse control).     Patient initially very hesitant to make medication changes (declines lithium or oxcarbazepine increase), though she has been accepting of addition of benzodiazepine for sleep/trian/aggression, and aripiprazole for trina/aggression (declines risperidone). Patient has continued to show some odd and disorganized behaviors as well as loud speech and elevated affect, though no overtly unsafe behaviors and affect has become less elevated. Less sexually provocative. Unclear how much of this is baseline versus related to some sort of decompensation. She's had outbursts of verbal aggression, but recently has not been directed at another person, and no physical aggression. Sleep initially erratic/interrupted, but then improved, though last night was interrupted after sleeping during the day.    Plan:  -Involuntary status (2PC)  -Routine checks (no HI or SI)  -Collateral obtained from psychiatrist, therapist and residence  -Continue clonazepam 0.5mg QHS for sleep  -Continue aripiprazole 20mg QHS for manic symptoms  -Continue diphenhydramine 50mg QHS for insomnia  -Continue Haldol Decanoate 200mg IM qMonth (last given 8/14, next due 9/13)  	-Metabolic labs obtained 8/21  -Continue Trileptal 300mg BID, pt declining increase in dose  -Admission labs reviewed and unremarkable. EKG reviewed, QTc 456ms.   -Engage in unit chris, groups Pt is a 41 y/o AAF, single, non-caregiver, unemployed, on disability for mental illness, domiciled at [facility], on AOT at this time, with past psychiatric history of schizoaffective disorder, as per chart: borderline personality disorder, >30 prior inpatient psychiatric hospitalizations most recently at [facility] from 4/2023,  hx of highly disorganized behavior requiring transfer to [facility] hospitalization ([place]) such as smearing feces in the wall, eating feces, turning in a [place] over and over again, myriad of ED visits including [facility], 3 prior suicide attempts (last in 2012 via OD), recurrent chronic suicidal gestures and suicidal ideation, hx of property destruction when upset, long hx of sexually provocative behavior (both out in the community and while on inpatient units requiring 1:1 staff observation), hx of physical altercations, hx of verbal threats, hx of property destruction, long hx of medication and tx noncompliance resulting in AOT and PALACIOS administration, brought in by ambulance activated by residence for spreading feces on apartment wall.    This is a patient with a diagnosed schizophrenia-spectrum disorder, and an extensive psychiatric history with multiple hospitalizations for psychosis, unsafe behavior and SI, with significant documented high risk behaviors (physical altercations, property destruction, suicidal gestures). She is presenting now with bizarre behaviors noted at residence (ex. smearing and eating feces), that have worsened over past few weeks per staff and led them to calling EMS. Patient describes elevated mood and interrupted sleep the past month. Patient on AOT and medication adherent to Haldol decanoate and low dose oxcarbazepine. On initial evaluation on ML3, patient showing evidence of odd and manic-like behaviors (twirling excessively, pulling flowers from outside and placing in her hair, sexual provocativeness such as taking top off in public areas), as well as being loud, irritable and verbally aggressive at times towards peers. She acknowledges bizarre behaviors and offers several unsatisfying explanations to this behavior. Per psychiatrist, patient has been aggressive over the past year, and has declined any medication changes, and been unwilling to take any oral medications after leaving hospital (besides low dose oxcarbazepine). Presentation here concerning for manic decompensation on top of poor baseline (which appears to be a mix of partially untreated psychosis, disinhibition and poor impulse control).     Patient initially very hesitant to make medication changes (declines lithium or oxcarbazepine increase), though she has been accepting of addition of benzodiazepine for sleep/trina/aggression, and aripiprazole for trina/aggression (declines risperidone). Patient has continued to show some odd and disorganized behaviors as well as loud speech and elevated affect, though no overtly unsafe behaviors and affect has become less elevated. Still sexually provocative, but lessened. Unclear how much of this is baseline versus related to some sort of decompensation. She's had outbursts of verbal aggression, but usually not been directed at another person, and no physical aggression. Sleep initially erratic/interrupted and has fluctuated since, though was good last night on current sleep regimen.    Plan:  -Involuntary status (2PC)  -Routine checks (no HI or SI)  -Collateral obtained from psychiatrist, therapist and residence  -Continue home Haldol Decanoate 200mg IM qMonth (last given 8/14, next due 9/13)  	-Metabolic labs obtained 8/21  -Continue home Trileptal 300mg BID, pt declining increase in dose  -Continue aripiprazole 20mg QHS for manic symptoms  -Continue diphenhydramine 50mg QHS for insomnia  -Continue clonazepam 0.5mg QHS for insomnia  -Admission labs reviewed and unremarkable. EKG reviewed, QTc 456ms.   -Engage in unit chris, groups

## 2023-08-31 NOTE — BH DISCHARGE NOTE NURSING/SOCIAL WORK/PSYCH REHAB - NSDPDISTO_PSY_ALL_CORE
PA/Lat CXR reviewed - lead locations grossly stable & appropriate. No jaqueline PTX, effusion or other acute change. Official read pending.     Pending final read of CXR, no barriers to d/c from EP service perspective.   Will await final CXR read, but otherwise sign off as to EP. Please call EP service with questions, concerns or further arrhythmia issues.   Dispo per primary team. [place] [facility] [place]

## 2023-08-31 NOTE — BH TREATMENT PLAN - NSTXDISORGINTERPR_PSY_ALL_CORE
Over the next 7 days, Psychiatric Rehabilitation staff will utilize group and individual psychotherapy, and psychoeducation to assist the patient in reaching established treatment goal.

## 2023-08-31 NOTE — BH INPATIENT PSYCHIATRY PROGRESS NOTE - PRN MEDS
MEDICATIONS  (PRN):  acetaminophen     Tablet .. 650 milliGRAM(s) Oral every 6 hours PRN Moderate Pain (4 - 6)  calcium carbonate    500 mG (Tums) Chewable 2 Tablet(s) Chew every 12 hours PRN indigestion  haloperidol     Tablet 5 milliGRAM(s) Oral every 6 hours PRN agittaion  haloperidol    Injectable 5 milliGRAM(s) IntraMuscular Once PRN assaultive  hydrOXYzine hydrochloride 25 milliGRAM(s) Oral every 6 hours PRN Anxiety   MEDICATIONS  (PRN):  acetaminophen     Tablet .. 650 milliGRAM(s) Oral every 6 hours PRN Moderate Pain (4 - 6)  calcium carbonate    500 mG (Tums) Chewable 2 Tablet(s) Chew every 12 hours PRN indigestion  diphenhydrAMINE 50 milliGRAM(s) Oral at bedtime PRN Insomnia  haloperidol     Tablet 5 milliGRAM(s) Oral every 6 hours PRN agittaion  haloperidol    Injectable 5 milliGRAM(s) IntraMuscular Once PRN assaultive  hydrOXYzine hydrochloride 25 milliGRAM(s) Oral every 6 hours PRN Anxiety

## 2023-08-31 NOTE — BH INPATIENT PSYCHIATRY PROGRESS NOTE - NSBHCHARTREVIEWVS_PSY_A_CORE FT
Vital Signs Last 24 Hrs  T(C): 36.8 (08-31-23 @ 07:59), Max: 37.2 (08-30-23 @ 18:59)  T(F): 98.3 (08-31-23 @ 07:59), Max: 99 (08-30-23 @ 18:59)  HR: --  BP: --  BP(mean): --  RR: --  SpO2: --    Orthostatic VS  08-31-23 @ 07:59  Lying BP: --/-- HR: --  Sitting BP: 128/90 HR: 87  Standing BP: --/-- HR: --  Site: upper left arm  Mode: electronic  Orthostatic VS  08-30-23 @ 05:53  Lying BP: --/-- HR: --  Sitting BP: 119/77 HR: 85  Standing BP: --/-- HR: --  Site: --  Mode: --

## 2023-08-31 NOTE — BH TREATMENT PLAN - NSTXDISORGINTERMD_PSY_ALL_CORE
Med management with Haldol, Trileptal 
Med management with Haldol, Trileptal, Klonopin and Abilify
Med management with Haldol, Trileptal, Klonopin and Abilify

## 2023-08-31 NOTE — BH DISCHARGE NOTE NURSING/SOCIAL WORK/PSYCH REHAB - DISCHARGE INSTRUCTIONS AFTERCARE APPOINTMENTS
In order to check the location, date, or time of your aftercare appointment, please refer to your Discharge Instructions Document given to you upon leaving the hospital.  If you have lost the instructions please call [phone]

## 2023-08-31 NOTE — BH INPATIENT PSYCHIATRY PROGRESS NOTE - MSE UNSTRUCTURED FT
Appearance: casually dressed, appropriately covered  Behavior: cooperative, still high energy but less dancing, can be intrusive  Speech: loud on unit at times, regular rate and rhythm   Mood: "I don't know"  Affect: slightly elevated, but improved   Thought process: flight of ideas at times though at other times is quite organized/linear, especially during concrete aspects of conversation  Thought content: no kelby delusions elicited, appropriately responds to questions though with some unusual analogies/interjections  Perceptions: no AH/VH elicited  Insight: partial, understands that she engages in odd behaviors and acknowledges poor impulse control, though struggles to see how certain behaviors are unsafe  Judgement: odd and erratic-- continues to have some sexually provocative behavior, moments of irritability (including yelling and cursing), and disorganized behaviors (fishing stickers out of garbage)  Cognition: grossly intact Appearance: dressed in hospital attire, appropriately covered  Behavior: cooperative, still high energy but less dancing, can be intrusive  Speech: loud on unit at times, regular rate and rhythm   Mood: "good"  Affect: slightly elevated, but improved   Thought process: flight of ideas at times though at other times is quite organized/linear, especially during concrete aspects of conversation  Thought content: no kelby delusions elicited, appropriately responds to questions though with some unusual analogies/interjections  Perceptions: no AH/VH elicited  Insight: partial, understands that she engages in odd behaviors and acknowledges poor impulse control, though struggles to see how certain behaviors are unsafe  Judgement: odd and erratic-- continues to have some sexually provocative behavior, moments of irritability (including yelling and cursing), and disorganized behaviors (fishing stickers out of garbage)  Cognition: grossly intact

## 2023-08-31 NOTE — BH DISCHARGE NOTE NURSING/SOCIAL WORK/PSYCH REHAB - PATIENT PORTAL LINK FT
You can access the FollowMyHealth Patient Portal offered by [facility] by registering at the following website: [email or web address]. By joining [facility] FollowMyHealth portal, you will also be able to view your health information using other applications (apps) compatible with our system.

## 2023-08-31 NOTE — BH DISCHARGE NOTE NURSING/SOCIAL WORK/PSYCH REHAB - NSDCVIVACCINE_GEN_ALL_CORE_FT
Tdap; 13-Oct-2014 04:40; Tho Morel (RN); [employer]; [id number]; IntraMuscular; Dorsogluteal Right.; 0.5 milliLiter(s);

## 2023-08-31 NOTE — BH TREATMENT PLAN - NSTXDCFAMINTERSW_PSY_ALL_CORE
Ongoing support, psychoed and encouragement provided in effort for pt to accept support, engage approrpiately with staff and peers while complying with meds.

## 2023-08-31 NOTE — BH INPATIENT PSYCHIATRY PROGRESS NOTE - CURRENT MEDICATION
MEDICATIONS  (STANDING):  ARIPiprazole 20 milliGRAM(s) Oral at bedtime  clonazePAM  Tablet 0.5 milliGRAM(s) Oral at bedtime  diphenhydrAMINE 50 milliGRAM(s) Oral at bedtime  OXcarbazepine 300 milliGRAM(s) Oral two times a day    MEDICATIONS  (PRN):  acetaminophen     Tablet .. 650 milliGRAM(s) Oral every 6 hours PRN Moderate Pain (4 - 6)  calcium carbonate    500 mG (Tums) Chewable 2 Tablet(s) Chew every 12 hours PRN indigestion  haloperidol     Tablet 5 milliGRAM(s) Oral every 6 hours PRN agittaion  haloperidol    Injectable 5 milliGRAM(s) IntraMuscular Once PRN assaultive  hydrOXYzine hydrochloride 25 milliGRAM(s) Oral every 6 hours PRN Anxiety   MEDICATIONS  (STANDING):  ARIPiprazole 20 milliGRAM(s) Oral at bedtime  clonazePAM  Tablet 0.5 milliGRAM(s) Oral at bedtime  OXcarbazepine 300 milliGRAM(s) Oral two times a day    MEDICATIONS  (PRN):  acetaminophen     Tablet .. 650 milliGRAM(s) Oral every 6 hours PRN Moderate Pain (4 - 6)  calcium carbonate    500 mG (Tums) Chewable 2 Tablet(s) Chew every 12 hours PRN indigestion  diphenhydrAMINE 50 milliGRAM(s) Oral at bedtime PRN Insomnia  haloperidol     Tablet 5 milliGRAM(s) Oral every 6 hours PRN agittaion  haloperidol    Injectable 5 milliGRAM(s) IntraMuscular Once PRN assaultive  hydrOXYzine hydrochloride 25 milliGRAM(s) Oral every 6 hours PRN Anxiety

## 2023-08-31 NOTE — ED ADULT TRIAGE NOTE - ESI TRIAGE ACUITY LEVEL, MLM
3 Rinvoq Pregnancy And Lactation Text: Based on animal studies, Rinvoq may cause embryo-fetal harm when administered to pregnant women.  The medication should not be used in pregnancy.  Breastfeeding is not recommended during treatment and for 6 days after the last dose.

## 2023-08-31 NOTE — BH INPATIENT PSYCHIATRY PROGRESS NOTE - NSBHFUPINTERVALHXFT_PSY_A_CORE
VSS, accepting medications, no PRNs, continues to be loud with frequent verbal outbursts to self, at one point walking on unit without pants, flashed male staff, threatened to punch female peer.  VSS, accepting medications, no PRNs, continues to be loud with frequent verbal outbursts to self, at one point walking on unit without pants, flashed male staff, verbally agitated with female peer. Slept ~6-7 hours    Patient seen in her room. Notes she is reading the Qaran in Polish and Portuguese, notes she doesn't know these languages, but is trying to learn. She states her mood is good, and she is having fun here. Notes she likes it better here than at her residence. States bed is softer. States she sometimes yells to herself, but that this is normal for her. She denies having any issues with anyone on the unit and denies verbal agitation with peer. States she spoke to her sister last night, and had a good conversation. Denies SI, HI, AH, VH.

## 2023-09-01 PROCEDURE — 99232 SBSQ HOSP IP/OBS MODERATE 35: CPT

## 2023-09-01 RX ORDER — ARIPIPRAZOLE 15 MG/1
1 TABLET ORAL
Qty: 30 | Refills: 0
Start: 2023-09-01 | End: 2023-09-30

## 2023-09-01 RX ORDER — OXCARBAZEPINE 300 MG/1
1 TABLET, FILM COATED ORAL
Qty: 60 | Refills: 0
Start: 2023-09-01 | End: 2023-09-30

## 2023-09-01 RX ORDER — DIPHENHYDRAMINE HCL 50 MG
1 CAPSULE ORAL
Qty: 30 | Refills: 0
Start: 2023-09-01 | End: 2023-09-30

## 2023-09-01 RX ORDER — CLONAZEPAM 1 MG
1 TABLET ORAL
Qty: 14 | Refills: 0
Start: 2023-09-01 | End: 2023-09-14

## 2023-09-01 RX ORDER — CLONAZEPAM 1 MG
1 TABLET ORAL AT BEDTIME
Refills: 0 | Status: DISCONTINUED | OUTPATIENT
Start: 2023-09-01 | End: 2023-09-05

## 2023-09-01 RX ADMIN — ARIPIPRAZOLE 20 MILLIGRAM(S): 15 TABLET ORAL at 20:00

## 2023-09-01 RX ADMIN — Medication 1 MILLIGRAM(S): at 20:00

## 2023-09-01 RX ADMIN — Medication 50 MILLIGRAM(S): at 20:00

## 2023-09-01 RX ADMIN — Medication 650 MILLIGRAM(S): at 14:50

## 2023-09-01 RX ADMIN — OXCARBAZEPINE 300 MILLIGRAM(S): 300 TABLET, FILM COATED ORAL at 08:05

## 2023-09-01 RX ADMIN — OXCARBAZEPINE 300 MILLIGRAM(S): 300 TABLET, FILM COATED ORAL at 20:00

## 2023-09-01 RX ADMIN — Medication 650 MILLIGRAM(S): at 05:56

## 2023-09-01 NOTE — BH INPATIENT PSYCHIATRY PROGRESS NOTE - NSTXDISORGDATEEST_PSY_ALL_CORE
17-Aug-2023

## 2023-09-01 NOTE — BH INPATIENT PSYCHIATRY PROGRESS NOTE - NSICDXBHPRIMARYDX_PSY_ALL_CORE
Schizoaffective disorder   F25.9  
11-Mar-2021 19:04
Schizoaffective disorder   F25.9  

## 2023-09-01 NOTE — BH INPATIENT PSYCHIATRY PROGRESS NOTE - PRN MEDS
MEDICATIONS  (PRN):  acetaminophen     Tablet .. 650 milliGRAM(s) Oral every 6 hours PRN Moderate Pain (4 - 6)  calcium carbonate    500 mG (Tums) Chewable 2 Tablet(s) Chew every 12 hours PRN indigestion  haloperidol     Tablet 5 milliGRAM(s) Oral every 6 hours PRN agittaion  haloperidol    Injectable 5 milliGRAM(s) IntraMuscular Once PRN assaultive  hydrOXYzine hydrochloride 25 milliGRAM(s) Oral every 6 hours PRN Anxiety

## 2023-09-01 NOTE — BH INPATIENT PSYCHIATRY PROGRESS NOTE - NSTXDCFAMDATETRGT_PSY_ALL_CORE
31-Aug-2023
07-Sep-2023
31-Aug-2023

## 2023-09-01 NOTE — BH INPATIENT PSYCHIATRY PROGRESS NOTE - MSE UNSTRUCTURED FT
Appearance: dressed in hospital attire, appropriately covered  Behavior: cooperative, still high energy but less dancing, can be intrusive  Speech: loud on unit at times, regular rate and rhythm   Mood: "good"  Affect: slightly elevated, but improved   Thought process: flight of ideas at times though at other times is quite organized/linear, especially during concrete aspects of conversation  Thought content: no kelby delusions elicited, appropriately responds to questions though with some unusual analogies/interjections  Perceptions: no AH/VH elicited  Insight: partial, understands that she engages in odd behaviors and acknowledges poor impulse control, though struggles to see how certain behaviors are unsafe  Judgement: odd and erratic-- continues to have some sexually provocative behavior, moments of irritability (including yelling and cursing), and disorganized behaviors (fishing stickers out of garbage)  Cognition: grossly intact Appearance: dressed in hospital attire, appropriately covered  Behavior: cooperative, still high energy but less dancing, can be intrusive  Speech: loud on unit at times, regular rate and rhythm   Mood: "okay"  Affect: somewhat elevated, but improved   Thought process: flight of ideas at times though at other times is quite organized/linear, especially during concrete aspects of conversation  Thought content: no kelby delusions elicited, appropriately responds to questions though with some unusual analogies/interjections  Perceptions: no AH/VH elicited, does not appear to be responding to internal stimuli  Insight: partial, understands that she engages in odd behaviors and acknowledges poor impulse control, though struggles to see how certain behaviors are unsafe  Judgement: odd and erratic-- continues to have some sexually provocative behavior, moments of irritability (including yelling and cursing), and disorganized behaviors (fishing stickers out of garbage, putting marker on face), but nothing overtly unsafe  Cognition: grossly intact

## 2023-09-01 NOTE — BH INPATIENT PSYCHIATRY PROGRESS NOTE - NSTXDISORGGOAL_PSY_ALL_CORE
Will verbalize 1 strategy to successfully cope with disturbed thinking

## 2023-09-01 NOTE — BH INPATIENT PSYCHIATRY PROGRESS NOTE - NSBHFUPINTERVALCCFT_PSY_A_CORE
Patient seen in follow-up for erratic behavior. Patient seen, chart reviewed.
Patient seen in follow-up for erratic behavior. 
Patient seen in follow-up for erratic behavior. Patient seen, chart reviewed.
Patient seen in follow-up for erratic behavior. 
Patient seen in follow-up for erratic behavior. Patient seen, chart reviewed.

## 2023-09-01 NOTE — BH INPATIENT PSYCHIATRY PROGRESS NOTE - NSTXPROBDCFAM_PSY_ALL_CORE
DISCHARGE ISSUE - POOR ENGAGEMENT WITH SUPPORTS/FAMILY

## 2023-09-01 NOTE — BH INPATIENT PSYCHIATRY PROGRESS NOTE - NSBHFUPINTERVALHXFT_PSY_A_CORE
VSS, accepting medications, no PRNs, no acute events overnight. VSS, accepting medications, no PRNs, no acute events overnight.    Patient states that she is wearing  VSS, accepting medications, no PRNs, no acute events overnight. Slept ~6 hours interrupted    Patient states that she is wearing marker on her face, because is what they do in [place], and she wanted to try it. We move into her room for interview. She notes that she has the sheets off of her bed because that what she does at home and she doesn't want to dirty white linens. She states her mood is "okay." She wants to go to dance school and lose weight. Discuss verbal outburst with peer, and  VSS, accepting medications, no PRNs, no acute events overnight. Slept ~6 hours interrupted    Patient states that she is wearing marker on her face, because is what they do in [place], and she wanted to try it. We move into her room for interview. She notes that she has the sheets off of her bed because that what she does at home and she doesn't want to dirty white linens. She states her mood is "okay." She wants to go to dance school and lose weight. Discuss verbal outburst with peer, and frustration intolerance. Discuss that patient needs to stay in bed for at least 8 hours overnight, and not get up to shower at 4am. Discuss discharge on Tuesday. Barbara SI, HI, AH, VH.

## 2023-09-01 NOTE — BH INPATIENT PSYCHIATRY PROGRESS NOTE - NSTXDCFAMGOAL_PSY_ALL_CORE
Will agree to involve supports/family in treatment and discharge planning

## 2023-09-01 NOTE — BH INPATIENT PSYCHIATRY PROGRESS NOTE - NSCGISEVERILLNESS_PSY_ALL_CORE
6 = Severely ill - disruptive pathology, behavior and function are frequently influenced by symptoms, may require assistance from others

## 2023-09-01 NOTE — BH INPATIENT PSYCHIATRY PROGRESS NOTE - NSTXDISORGDATETRGT_PSY_ALL_CORE
07-Sep-2023
24-Aug-2023
31-Aug-2023
24-Aug-2023
31-Aug-2023
31-Aug-2023
24-Aug-2023
31-Aug-2023
24-Aug-2023
31-Aug-2023

## 2023-09-01 NOTE — BH INPATIENT PSYCHIATRY PROGRESS NOTE - NSBHCHARTREVIEWVS_PSY_A_CORE FT
Vital Signs Last 24 Hrs  T(C): 36.7 (09-01-23 @ 07:55), Max: 36.9 (08-31-23 @ 19:11)  T(F): 98 (09-01-23 @ 07:55), Max: 98.5 (08-31-23 @ 19:11)  HR: --  BP: --  BP(mean): --  RR: --  SpO2: --    Orthostatic VS  09-01-23 @ 07:55  Lying BP: --/-- HR: --  Sitting BP: 149/92 HR: 89  Standing BP: --/-- HR: --  Site: --  Mode: --  Orthostatic VS  08-31-23 @ 07:59  Lying BP: --/-- HR: --  Sitting BP: 128/90 HR: 87  Standing BP: --/-- HR: --  Site: upper left arm  Mode: electronic

## 2023-09-01 NOTE — BH INPATIENT PSYCHIATRY PROGRESS NOTE - NS ED B[facility] MED ROS PSYCHIATRIC
See HPI
29-Jan-2021 11:45
See HPI

## 2023-09-01 NOTE — BH INPATIENT PSYCHIATRY PROGRESS NOTE - NSDCCRITERIA_PSY_ALL_CORE
CGI <=3

## 2023-09-01 NOTE — BH INPATIENT PSYCHIATRY PROGRESS NOTE - NSTXDISORGINTERMD_PSY_ALL_CORE
Med management with Haldol, Trileptal 
Med management with Haldol, Trileptal, Klonopin and Abilify
Med management with Haldol, Trileptal 
Med management with Haldol, Trileptal 
Med management with Haldol, Trileptal, Klonopin and Abilify
Med management with Haldol, Trileptal, Klonopin and Abilify
Med management with Haldol, Trileptal 
Med management with Haldol, Trileptal, Klonopin and Abilify

## 2023-09-01 NOTE — BH INPATIENT PSYCHIATRY PROGRESS NOTE - NSBHASSESSSUMMFT_PSY_ALL_CORE
Pt is a 43 y/o AAF, single, non-caregiver, unemployed, on disability for mental illness, domiciled at [facility], on AOT at this time, with past psychiatric history of schizoaffective disorder, as per chart: borderline personality disorder, >30 prior inpatient psychiatric hospitalizations most recently at [facility] from 4/2023,  hx of highly disorganized behavior requiring transfer to [facility] hospitalization ([place]) such as smearing feces in the wall, eating feces, turning in a [place] over and over again, myriad of ED visits including [facility], 3 prior suicide attempts (last in 2012 via OD), recurrent chronic suicidal gestures and suicidal ideation, hx of property destruction when upset, long hx of sexually provocative behavior (both out in the community and while on inpatient units requiring 1:1 staff observation), hx of physical altercations, hx of verbal threats, hx of property destruction, long hx of medication and tx noncompliance resulting in AOT and PALACIOS administration, brought in by ambulance activated by residence for spreading feces on apartment wall.    This is a patient with a diagnosed schizophrenia-spectrum disorder, and an extensive psychiatric history with multiple hospitalizations for psychosis, unsafe behavior and SI, with significant documented high risk behaviors (physical altercations, property destruction, suicidal gestures). She is presenting now with bizarre behaviors noted at residence (ex. smearing and eating feces), that have worsened over past few weeks per staff and led them to calling EMS. Patient describes elevated mood and interrupted sleep the past month. Patient on AOT and medication adherent to Haldol decanoate and low dose oxcarbazepine. On initial evaluation on ML3, patient showing evidence of odd and manic-like behaviors (twirling excessively, pulling flowers from outside and placing in her hair, sexual provocativeness such as taking top off in public areas), as well as being loud, irritable and verbally aggressive at times towards peers. She acknowledges bizarre behaviors and offers several unsatisfying explanations to this behavior. Per psychiatrist, patient has been aggressive over the past year, and has declined any medication changes, and been unwilling to take any oral medications after leaving hospital (besides low dose oxcarbazepine). Presentation here concerning for manic decompensation on top of poor baseline (which appears to be a mix of partially untreated psychosis, disinhibition and poor impulse control).     Patient initially very hesitant to make medication changes (declines lithium or oxcarbazepine increase), though she has been accepting of addition of benzodiazepine for sleep/trina/aggression, and aripiprazole for trina/aggression (declines risperidone). Patient has continued to show some odd and disorganized behaviors as well as loud speech and elevated affect, though no overtly unsafe behaviors and affect has become less elevated. Still sexually provocative, but lessened. Unclear how much of this is baseline versus related to some sort of decompensation. She's had outbursts of verbal aggression, but usually not been directed at another person, and no physical aggression. Sleep initially erratic/interrupted and has fluctuated since, though was good last night on current sleep regimen.    Plan:  -Involuntary status (2PC)  -Routine checks (no HI or SI)  -Collateral obtained from psychiatrist, therapist and residence  -Continue home Haldol Decanoate 200mg IM qMonth (last given 8/14, next due 9/13)  	-Metabolic labs obtained 8/21  -Continue home Trileptal 300mg BID, pt declining increase in dose  -Continue aripiprazole 20mg QHS for manic symptoms  -Continue diphenhydramine 50mg QHS for insomnia  -Continue clonazepam 0.5mg QHS for insomnia  -Admission labs reviewed and unremarkable. EKG reviewed, QTc 456ms.   -Engage in unit chris, groups Pt is a 43 y/o AAF, single, non-caregiver, unemployed, on disability for mental illness, domiciled at [facility], on AOT at this time, with past psychiatric history of schizoaffective disorder, as per chart: borderline personality disorder, >30 prior inpatient psychiatric hospitalizations most recently at [facility] from 4/2023,  hx of highly disorganized behavior requiring transfer to [facility] hospitalization ([place]) such as smearing feces in the wall, eating feces, turning in a [place] over and over again, myriad of ED visits including [facility], 3 prior suicide attempts (last in 2012 via OD), recurrent chronic suicidal gestures and suicidal ideation, hx of property destruction when upset, long hx of sexually provocative behavior (both out in the community and while on inpatient units requiring 1:1 staff observation), hx of physical altercations, hx of verbal threats, hx of property destruction, long hx of medication and tx noncompliance resulting in AOT and PALACIOS administration, brought in by ambulance activated by residence for spreading feces on apartment wall.    Patient initially very hesitant to make medication changes (declines lithium or oxcarbazepine increase), though she has been accepting of addition of benzodiazepine for sleep/trina/aggression, and aripiprazole for trina/aggression (declines risperidone). Patient has continued to show odd and disorganized behaviors as well as loud speech and elevated affect, though no overtly unsafe behaviors and affect has become less elevated. Still sexually provocative, but lessened. Unclear how much of this is baseline versus related to some sort of decompensation. She's had outbursts of verbal aggression, but usually not been directed at another person, and no physical aggression. Can occasionally have verbal outbursts at one particular peer, who patient knows from previous encounters. Sleep initially erratic/interrupted and has fluctuated since, will increase clonazepam further.    Plan:  -Involuntary status (2PC)  -Routine checks (no HI or SI)  -Collateral obtained from psychiatrist, therapist and residence  -Continue home Haldol Decanoate 200mg IM qMonth (last given 8/14, next due 9/13)  	-Metabolic labs obtained 8/21  -Continue home Trileptal 300mg BID, pt declining increase in dose  -Continue aripiprazole 20mg QHS for manic symptoms  -Continue diphenhydramine 50mg QHS for insomnia  -INCREASE to clonazepam 1mg QHS for insomnia  -Admission labs reviewed and unremarkable. EKG reviewed, QTc 456ms.   -Engage in unit mileu, groups

## 2023-09-02 RX ADMIN — Medication 50 MILLIGRAM(S): at 20:06

## 2023-09-02 RX ADMIN — OXCARBAZEPINE 300 MILLIGRAM(S): 300 TABLET, FILM COATED ORAL at 20:06

## 2023-09-02 RX ADMIN — OXCARBAZEPINE 300 MILLIGRAM(S): 300 TABLET, FILM COATED ORAL at 08:26

## 2023-09-02 RX ADMIN — ARIPIPRAZOLE 20 MILLIGRAM(S): 15 TABLET ORAL at 20:06

## 2023-09-02 RX ADMIN — Medication 1 MILLIGRAM(S): at 20:06

## 2023-09-03 LAB — SARS-COV-2 RNA SPEC QL NAA+PROBE: SIGNIFICANT CHANGE UP

## 2023-09-03 RX ADMIN — OXCARBAZEPINE 300 MILLIGRAM(S): 300 TABLET, FILM COATED ORAL at 08:22

## 2023-09-03 RX ADMIN — ARIPIPRAZOLE 20 MILLIGRAM(S): 15 TABLET ORAL at 20:46

## 2023-09-03 RX ADMIN — Medication 50 MILLIGRAM(S): at 20:46

## 2023-09-03 RX ADMIN — OXCARBAZEPINE 300 MILLIGRAM(S): 300 TABLET, FILM COATED ORAL at 20:45

## 2023-09-03 RX ADMIN — Medication 1 MILLIGRAM(S): at 20:46

## 2023-09-04 RX ADMIN — OXCARBAZEPINE 300 MILLIGRAM(S): 300 TABLET, FILM COATED ORAL at 08:15

## 2023-09-04 RX ADMIN — Medication 50 MILLIGRAM(S): at 21:31

## 2023-09-04 RX ADMIN — ARIPIPRAZOLE 20 MILLIGRAM(S): 15 TABLET ORAL at 21:32

## 2023-09-04 RX ADMIN — Medication 1 MILLIGRAM(S): at 21:32

## 2023-09-04 RX ADMIN — OXCARBAZEPINE 300 MILLIGRAM(S): 300 TABLET, FILM COATED ORAL at 21:32

## 2023-09-05 VITALS — SYSTOLIC BLOOD PRESSURE: 134 MMHG | TEMPERATURE: 98 F | HEART RATE: 83 BPM | DIASTOLIC BLOOD PRESSURE: 88 MMHG

## 2023-09-05 RX ADMIN — OXCARBAZEPINE 300 MILLIGRAM(S): 300 TABLET, FILM COATED ORAL at 08:00

## 2023-09-07 PROCEDURE — 90853 GROUP PSYCHOTHERAPY: CPT

## 2023-09-07 NOTE — BH PSYCHOLOGY - GROUP THERAPY NOTE - NSBHPSYCHOLRESPONSE_PSY_A_CORE
Symptoms reduced/Coping skills acquired/Insight displayed/Accepted support
Coping skills acquired/Insight displayed/Accepted support
Symptoms reduced/Coping skills acquired/Accepted support
Symptoms reduced/Coping skills acquired/Accepted support
Symptoms reduced/Coping skills acquired/Insight displayed/Accepted support
Symptoms reduced/Coping skills acquired/Insight displayed/Accepted support

## 2023-09-07 NOTE — BH PSYCHOLOGY - GROUP THERAPY NOTE - NSBHPSYCHOLGRPNAME_PSY_A_CORE
CBT (Cognitive Behavioral Therapy) Group

## 2023-09-07 NOTE — BH PSYCHOLOGY - GROUP THERAPY NOTE - NSPSYCHOLGRPGENPT_PSY_A_CORE FT
Patient attended the psychology cognitive-behavior therapy group. The discussion was focused on the topic of Reality Acceptance. Group expectations, guidelines, confidentiality (as well as its limitations) were reviewed. The group began with a clear description of what acceptance is and what it is not. Group members were oriented to which types of situations require accepting reality. Group members then learned and practiced methods for gaining acceptance such as mindfulness, changing unhelpful beliefs, and coping ahead for events that seem unacceptable. Discussion stemmed from the group’s connection between thoughts, feelings, and behaviors. Group members demonstrated their understanding through active participation, discussion, and by asking clarifying questions. Members were also provided with a handout describing the concepts and strategies discussed during group. 
Patient attended the cognitive behavior therapy group session. Group focused on topics including identifying stress, understanding the physical reactions to stress, and learning adaptive coping methods in managing stress. Group expectations and guidelines, as well as confidentiality and its limitations, were addressed. Principles of cognitive behavior therapy related to today's group topic were reviewed and discussed. Group members illustrated understanding of these concepts by giving personal examples based on their current experiences. Discussions stemmed from the group topics to the causal connection between thoughts, feelings, and behaviors.
Patient attended the cognitive behavior therapy group session. Group session focused on the topic of problem solving.  Discussion revolved around the identification of problems, exploring the maximization of benefits while reducing the cost of negative consequences as well as tangible strategies to solving problems.  Group expectations and guidelines, as well as confidentiality and its limitations, were addressed. Principles of cognitive behavior therapy related to today's group topic were reviewed and discussed. Group members illustrated understanding of these concepts by giving personal examples based on their current experiences. Discussions stemmed from the group topics to the causal connection between thoughts, feelings, and behaviors.
Patient attended the psychology cognitive-behavior therapy group. The discussion was focused on the topic of Goal Setting. Group expectations, guidelines, confidentiality (as well as its limitations) were reviewed. The group began with a description of SMART goals. Group members then learned how to verify that their goals are smart, by ensuring they are specific, measurable, achievable, realistic, and time-limited. The group members identified potential obstacles to their goals and solutions to those obstacles. Discussion stemmed from the group’s connection between thoughts, feelings, and behaviors. Group members demonstrated their understanding through active participation, discussion, and by asking clarifying questions. Members were also provided with a handout describing the concepts and strategies discussed during group. 
Patient attended the psychology cognitive-behavior therapy group. The discussion was focused on the topic of Motivation. Group expectations, guidelines, confidentiality (as well as its limitations) were reviewed. The group began with a description of motivation and how it relates to the problems people experience. Group members identified problems in their lives that they would like to address as well as solutions to those problems. Group members then discussed the role of ambivalence and willingness for motivation. Group members then learned how to conduct a cost benefits analysis to improve motivation. Discussion stemmed from the group’s connection between thoughts, feelings, and behaviors. Group members demonstrated their understanding through active participation, discussion, and by asking clarifying questions. Members were also provided with a handout describing the concepts and strategies discussed during group. 
Patient attended the psychology cognitive-behavior therapy group. The discussion was focused on the topic of tolerating uncertainty. Group expectations, guidelines, confidentiality (as well as its limitations) were reviewed. The group began with a description of intolerance of uncertainty. Group members explored the need to feel assured and accepting that they cannot always feel confident. Group members then learned how to take the necessary steps towards tolerating uncertainty. Discussion stemmed from the group’s connection between thoughts, feelings, and behaviors. Group members demonstrated their understanding through active participation, discussion, and by asking clarifying questions. Members were also provided with a handout describing the concepts and strategies discussed during group.

## 2023-09-07 NOTE — BH PSYCHOLOGY - GROUP THERAPY NOTE - NSBHPTASSESSDT_PSY_A_CORE
05-Sep-2023 11:00
28-Aug-2023 11:00
29-Aug-2023 11:00
22-Aug-2023 11:00
21-Aug-2023 11:00
25-Aug-2023 11:00

## 2023-09-07 NOTE — BH PSYCHOLOGY - GROUP THERAPY NOTE - NSPSYCHOLGRPGENPROB_PSY_A_CORE
academic/vocational/social dysfunction/anxiety/depression

## 2023-09-07 NOTE — BH PSYCHOLOGY - GROUP THERAPY NOTE - NSBHPSYCHOLASSESSPROV_PSY_A_CORE
Licensed Psychologist
Licensed Psychologist and Psychology Trainee
Licensed Psychologist and Psychology Trainee
Licensed Psychologist
Licensed Psychologist and Psychology Trainee
Psychology Trainee only

## 2023-09-07 NOTE — BH PSYCHOLOGY - GROUP THERAPY NOTE - NSBHPSYCHOLGRPTYPE_PSY_A_CORE
General Group Therapy

## 2023-09-07 NOTE — BH PSYCHOLOGY - GROUP THERAPY NOTE - NSPSYCHOLGRPBILLING_PSY_A_CORE
[id number] - Group Psychotherapy
[id number] - Group Psychotherapy
[id number] - Group Psychotherapy
[id number] - Group Psychotherapy
[id number] - Group Psychotherapy

## 2023-09-07 NOTE — BH PSYCHOLOGY - GROUP THERAPY NOTE - TOKEN PULL-DIAGNOSIS
Primary Diagnosis:  Schizoaffective disorder [F25.9]        Problem Dx:   Schizoaffective disorder, bipolar type [F25.0]      

## 2023-09-07 NOTE — BH PSYCHOLOGY - GROUP THERAPY NOTE - NSPSYCHOLGRPGENGOAL_PSY_A_CORE
decrease symptoms/improve level of independent functioning/improve social/vocational/coping skills/prevent relapse/psychoeducation/treatment compliance
assessment/decrease symptoms/improve level of independent functioning/improve social/vocational/coping skills/prevent relapse/psychoeducation/treatment compliance
assessment/decrease symptoms/improve level of independent functioning/improve social/vocational/coping skills/psychoeducation/treatment compliance
assessment/decrease symptoms/improve level of independent functioning/improve social/vocational/coping skills/psychoeducation/treatment compliance

## 2023-09-07 NOTE — BH PSYCHOLOGY - GROUP THERAPY NOTE - NSBHPSYCHOLPARTICIPCOMMENT_PSY_A_CORE FT
Patient appeared attentive and interested in the group discussion.  Although the patient did not contribute spontaneously during the group session, patient was able to read from the worksheet when prompted. Patient was able to engage with the  and other members appropriately.
Patient arrived to group late. Patient refused to accept a worksheet when offered; however, later in the group, requested one. Patient actively participated in group. Patient did not raise her hand and would often call out statements or thoughts. When another patient interrupted group, patient began to yell and called the other patient “a retard.” Patient was redirected by , to which the patient responded by saying that the other patient was also being disrespectful. The  stated that everyone must be respectful to one another.  
Patient arrived to group on time. Patient filled out the worksheet prior to being instructed to do so. Patient left the group and eventually returned towards the end of the session. Patient returned and stated she completed the worksheet. When told we would be discussing what was written soon, she said “no thank you” and left the group.  
Patient participated actively in the session but required redirection several times during the group therapy session.  Patient was able to respond positively to redirection by the psychologist during the group discussion.
Patient arrived to group on time. Patient actively and spontaneously participated in group. Patient volunteered to answer questions posed to the group. Patient spoke without raising hand, and needed to be redirected; however, once told, was respectful for the remainder of group.  
Patient arrived several minutes late to the group and stayed for most of the session, leaving group occasionally.  The patient contributed spontaneously many times during the group session, offering personal examples of reality acceptance. For example, the pt discussed how rejecting reality often led her to the emotion of anguish. The patient was able to read from the worksheet when prompted.

## 2023-09-11 NOTE — ED BEHAVIORAL HEALTH ASSESSMENT NOTE - BEHAVIOR
Bed: 10  Expected date:   Expected time:   Means of arrival:   Comments:  1st triage  
Report given to Veronica LOMELI  
Cooperative

## 2023-09-12 NOTE — ED ADULT NURSE NOTE - EXTENSIONS OF SELF_ADULT
None Bcc Infiltrative Histology Text: There were numerous aggregates of basaloid cells demonstrating an infiltrative pattern.

## 2023-09-16 NOTE — ED BEHAVIORAL HEALTH NOTE - BEHAVIORAL HEALTH NOTE
Office Note     Name: Saúl Beltran    : 2011     MRN: [id number]     Chief Complaint  Fever    Subjective     History of Present Illness:  Saúl Beltran is a 12 y.o. male who presents today for fever.    7 days ago started having sigfnicant  fatigue, the next say started having fever as well.  Then 5 days ago start ago started having rash on  his thighs  and stomach which has since spread to his entire body.  Rash is itchy all over, and is much more noticeable and burns when his fever is up.        Eyes are also getting red but no discharge.     Has had cough, sometimes so hard he vomits.     Went to [facility] 4 days ago and was testd for strep, flu and covid and was positive for Flu A, did not do tamiflu. Was negative for Covid and strep     Came here 2 days ago with the worsening rash and persistent fever and had a positive rapid strep. Was started on amoxicillin but has continued to have fever despite being on amoxil 875 BID x 48 hours.      Has continued to have  fevers of 102-103 every day for the past 5-6+ days.  Comes down with motrin and tylenol then comes right back when his meds wear off.    Has continued to feel worse but the rash has gotten better.    Has worsening puffiness of eyes.    Review of Systems:   Review of Systems    Past Medical History: History reviewed. No pertinent past medical history.    Past Surgical History: History reviewed. No pertinent surgical history.    Family History:   Family History   Problem Relation Age of Onset    Hypertension Mother     Depression Mother     Obesity Mother     ADD / ADHD Father     Diabetes Father     Obesity Father        Social History:   Social History     Socioeconomic History    Marital status: Single   Tobacco Use    Smoking status: Never    Smokeless tobacco: Never   Vaping Use    Vaping Use: Never used   Substance and Sexual Activity    Alcohol use: Never    Drug use: Never    Sexual activity: Defer       Immunizations:   Immunization  Writer was asked to obtain livery service to transport the patient back to her residential facility. She does not have Medicaid, and is not eligible for livery service arranged by [facility], [phone]. Writer called [facility], [phone], and spoke with Raymond, who arranged for service to transport her for a fare of $12, to her residence, verified to be: [facility] [address], [facility], [facility], [address] [place] [address], phone [phone], with an ETA of 8:10PM. "History   Administered Date(s) Administered    DTaP / HiB / IPV 2009, 2009, 2009, 07/28/2010    DTaP 5 08/20/2013    Hep A, 2 Dose 07/28/2010, 02/22/2011    Hep B, Adolescent or Pediatric 2009, 03/30/2010    IPV 08/20/2013    MMR 03/30/2010    MMRV 08/20/2013    Meningococcal Conjugate 05/29/2020    PEDS-Pneumococcal Conjugate (PCV7) 2009, 2009, 2009, 03/30/2010    Pneumococcal Conjugate 13-Valent (PCV13) 02/22/2011    Rotavirus Pentavalent 2009, 2009, 2009    Tdap 05/29/2020    Varicella 03/30/2010        Medications:     Current Outpatient Medications:     amoxicillin (AMOXIL) 875 MG tablet, Take 1 tablet by mouth 2 (Two) Times a Day for 7 days., Disp: 14 tablet, Rfl: 0    Allergies:   No Known Allergies    Objective     Vital Signs  /64   Pulse (!) 135   Temp (!) 101.4 °F (38.6 °C)   Ht 174.6 cm (68.75\")   Wt 83.5 kg (184 lb 3 oz)   SpO2 97%   BMI 27.40 kg/m²   Estimated body mass index is 27.4 kg/m² as calculated from the following:    Height as of this encounter: 174.6 cm (68.75\").    Weight as of this encounter: 83.5 kg (184 lb 3 oz).          Physical Exam  Vitals and nursing note reviewed.   Constitutional:       Appearance: Normal appearance.   HENT:      Head:      Comments: Mild periorbital edema bilaterally, EOMI.    Cardiovascular:      Rate and Rhythm: Normal rate and regular rhythm.      Heart sounds: No murmur heard.    No friction rub. No gallop.   Pulmonary:      Effort: Pulmonary effort is normal.      Breath sounds: Normal breath sounds. No wheezing, rhonchi or rales.   Skin:     Comments: Erythematous throughout   Neurological:      Mental Status: He is alert.        Procedures     Assessment and Plan     1. Scarlet fever      2. Fever, unspecified fever cause       Patient returns today for persistent fever.  He had a positive rapid strep days ago and then 2 days prior to that also had a positive flu a test.  Despite being " on oral antibiotics for 48 hours his condition has continued to worsen.  His rash has improved somewhat but he continues to have some mild periorbital edema which is more noticeable than when compared to the last visit as well as some mild perioral soft tissue swelling which is increased slightly since the last visit.  His rash is improved but his skin remains erythematous throughout.  He continues to be febrile.  I have advised him to go to  [facility] to have evaluation for other sources of fever, he would likely need labs and imaging.  Which we are unable to obtain in his outpatient clinic since we do not have lab or imaging available on an outpatient basis on the weekend..  He is out of the typical age range for Kawasaki's but could have some other inflammatory process going on related to his recent illnesses.  Follow Up  No follow-ups on file.    Patient was advised to call the office or seek medical care if  any issues discussed during this visit worsen or persist or if new concerns arise        MD PRESLEY Piña [facility]  [address] [facility] [address] [phone]

## 2023-09-19 NOTE — ED ADULT NURSE NOTE - OBJECTIVE STATEMENT
PRIMARY CARE VISIT        Patient name : Weston Pappas   MRN number: [id number]   YOB: Dru Abdi MD     43 y/o F presents to ED intake A&Ox4 c/o chest pain. pt refusing blood work/ interventions at this time. ARTEM Logan made aware. awaiting DC.

## 2023-09-20 NOTE — ED ADULT NURSE NOTE - HOW OFTEN DO YOU HAVE A DRINK CONTAINING ALCOHOL?
EXAMINATION TYPE: Axial Bone Density

 

DATE OF EXAM: 9/20/2023

 

CLINICAL HISTORY: 71 years old Female.  ICD-10 CODE: M85.89 OTH DISRD OF BONE DENSITY AND STRUCTURE,

 

Height:  5 ft 3 in

Weight:  126

 

FRAX RISK QUESTIONS:

Alcohol (3 or more units per day):  no

Family History (Parent hip fracture):  no

Glucocorticoids (More than 3mos):  no

           (Ex: prednisone, prednisolone, methylprednisolone, dexamethasone, and hydrocortisone).    
     

History of Fracture in Adulthood: no

Secondary Osteoporosis:

  1.  Type 1 Diabetes: no

  2.  Hyperthyroidism: no

  3.  Menopause before 45: no

  4.  Malnutrition: no

  5.  Chronic liver disease: no

Rheumatoid Arthritis: no

Current Tobacco Use: yes

 

RISK FACTORS 

HISTORY OF: 

Surgery to Spine/Hip(right/left)/Wrist (right/left): no

Family History of Osteoporosis: no

Active: yes

Diet low in dairy products/other sources of calcium:  no

Postmenopausal woman: yes

Take estrogen and/or progesterone medications: no

Lost more than 2 inches in height since high school: yes

Frequent falls: no

Poor Health: good

Hyperparathyroidism: no

Adrenal Insufficiency: no

 

MEDICATIONS: 

Additional Medications: Atorvastatin, amlodipine, hydrochlorothiazide, losartan, eliquis, amiodarone,
 alendronate,  

 

 

Additional History:

 

 

EXAM MEASUREMENTS: 

Bone mineral densitometry was performed using the [employer] System.

Bone mineral density as measured about the Lumbar spine is:  

----- L1-L4(G/cm2): 1.112

T Score Values are as follows:

----- L1: -1.0

----- L2: -0.9

----- L3: -0.9

----- L4: 0.1

----- L1-L4: -0.6

 

Z Score Values are as follows:

----- L1: 1.0

----- L2: 1.1

----- L3: 1.1

----- L4: 2.1

----- L1-L4: 1.4

 

prev done elsewhere

Bone mineral density about the R hip (g/cm2): 0.771

Bone mineral density about the L hip (g/cm2): 0.814

T Score values are as follows:

-----R Neck: -1.9

-----L Neck: -1.6

-----R Total: -1.3

-----L Total: -1.1

 

Z Score values are as follows:

-----R Neck: 0.1

-----L Neck: 0.4

-----R Total: 0.5

-----L Total: 0.7

 

prev done elsewhere

 

FRAX%s: The graph provided illustrates a 12.4 % chance for a major osteoporotic fx and a 4.5 % chance
 for the hips probability for fx in 10 years time.

 

 

 

 

IMPRESSION:

Osteopenia (T Score between -2.5 and -1).

 

There is slightly increased risk of fracture and the patient may be considered 

for treatment. 

 

Re-Screen 2-5 years.

 

NOTE:  T-SCORE=SD OF THE YOUNG ADULT MEAN.
Never

## 2023-09-20 NOTE — ED BEHAVIORAL HEALTH ASSESSMENT NOTE - JUDGMENT (REGARDING EVERYDAY EVENTS)
Daily Note     Today's date: 2023  Patient name: Mitch Love  : 1966  MRN: [id number]  Referring provider: Tor Daniels PA-C  Dx:   Encounter Diagnosis     ICD-10-CM    1. Primary osteoarthritis of left knee  M17.12       2. Status post total left knee replacement  Z96.652       3. Difficulty walking  R26.2       4. Acute pain of left knee  M25.562       5. Preop testing  Z01.818                      Subjective: Patient reports she has minimal pain complaints and her knee is feeling good. Stair climbing is improving with a bit of knee pain descending the steps at times. Objective: See treatment diary below      Assessment: Tolerated treatment well. Good performance overall without complaints. Practiced up and down 16 steps using 1 handrail step over step with supervision. Patient performed well on the stairs without incident or knee complaints. Patient exhibited good technique with therapeutic exercises and would benefit from continued PT      Plan: Continue per plan of care.       Precautions: HTN. EPOC 23    Manuals                              Knee PROM JHON JL KM JHON NB JHON JHON KY JHON JHON   Edema Massage JHON JL KM   JHON STM hams   JHON quad and hams JHON   Neuro Re-Ed             Tandem walking 4x10 ft 4x10 ft 4x10 ft 4x10 ft 4x10ft  4x10 ft   2x20 ft                SLS 3x30" 30"x3 30"x3 3x30" 3x30" 3x30" 3x30" 3x30" 2x30" ea 3x30" ea                             Ther Ex             Supine Heel slides flexion 10x10" 10"x10 10x10"   5x10" 10x10" 10x10" 10x10" 10x10"   Knee extension stretch, sitting 10"x10"   5x10" 5x10" 10x10"   10x10" 10x10"   Prone quad stretch      3x30" 3x30" 3x30"     Pt edu on plan of care, pathology, home program 8'            Seated calf stretch    With strap 20"x5 With strap 20"x5 4x20" 4x20" 4x20" 3x30"    Prone lying for knee extension 3'x4# 3'x4# 3'x4# 3'x4# 3'x4#    3' 3' x 3#   Seated knee ext LLPS on stool 3'x4# 3'x4# 3'x4# 3'x4# 3'x4# 3'x4# 5'x0# 5'x0# 3' 3'                SLR abd      3x10 2x10 2# 2x10 2#     SLR 3x10 1# 1# 3x10 1# 3x10 1# 3x10 1# 3x10 1# 3x10 3x10 2# 3x10 2# 3x10 3x10   bridge 3x10 3x10 3x10 3x10 3x10 3x10 3x10 3x10 3x10 3x10                Bike for ROM 8' lvl 0 L0 8m L0 8' L1 8' L1 8' 10' lvl 1 10' lvl 1 5' Lvl 0 8' lvl 0 8' lvl 0                             Ther Activity             Step up and over 10x lvl 3 L3 10x L3 10x L3 10x L3 10x 10x lvl 3 10x lvl 3 10x lvl 3 2x10 lvl 2 10x lvl 2   Lateral step up   L3 10x   10x lvl 3 10x lvl 3 10x lvl 3 10x lvl 2 2x10 lvl 3   Fwd step up 10x lvl 3 L3 10x    10x lvl 3 10x lvl 3 10x  lvl 3     Ball squats 2x10  2x10 2x10   3x10 3x10 3x10 3x10 2x10   Stair climbing    1x up/ down CS 1x up/down CS   Up&Down 16 steps w/1 handrail step over step w/S     Gait Training                                       Modalities             Cold pack end 8' 8' 8' 8' 8' 8' 8' 5' on stool 8' 8' Poor

## 2023-10-03 NOTE — ED ADULT NURSE NOTE - CINV DISCH TEACH PARTICIP
Patient about to go home, pt's mother called for help, RN went to patient's bedside. ERP called to bedside. Patient seizing, +oral trauma. RN rolled patient to his side, placed pt on a non-rebreather while ERP jaw thrust patient. Pt seized for about 1 minute. ERP at bedside, pt becoming combative. RN placed new PIV in patient. Pt will be admitted. Security called to bedside due to combativeness. Patient's mother at bedside.    Patient

## 2023-10-12 NOTE — ED BEHAVIORAL HEALTH ASSESSMENT NOTE - NS ED BHA DEMOGRAPHICS MEDICAL RECORD REVIEWED CONSENT OBTAINED YN
-- DO NOT REPLY / DO NOT REPLY ALL --  -- Message is from Engagement Center Operations ([facility]) --    General Patient Message: Patient called back regarding the genital fungus. Requesting medication be sent to pharmacy [address]. Stated OTC does not work for him and needs to be prescribed something by Dr Nirali Augustin      Caller Information       Type Contact Phone/Fax    10/12/2023 12:59 PM CDT Phone (Incoming) Tameka Diaz (Self) [phone] (M)        Alternative phone number:     Can a detailed message be left? Yes    Message Turnaround:     Is it Working Hours? Yes - Working Hours     [place]:    Please give this turnaround time to the caller:   \"This message will be sent to [state Provider's name]. The clinical team will fulfill your request as soon as they review your message.\"                 Yes

## 2023-10-16 NOTE — ED ADULT NURSE NOTE - CHIEF COMPLAINT QUOTE
Patient brought to ER from home by EMS for c/o suicidal ideations with hallucinations. Pt attempted to cut her neck with her keys.
No

## 2023-10-19 NOTE — ED ADULT NURSE NOTE - ALCOHOL PRE SCREEN (AUDIT - C)
POST-OP DIAGNOSIS:  Umbilical hernia 19-Oct-2023 14:23:45  Ricardo Delgadillo  Symptomatic cholelithiasis 19-Oct-2023 14:23:51  Ricardo Delgadillo   Statement Selected

## 2023-11-07 NOTE — ED ADULT NURSE NOTE - PRIMARY CARE PROVIDER
unk No Repair - Repaired With Adjacent Surgical Defect Text (Leave Blank If You Do Not Want): After the excision the defect was repaired concurrently with another surgical defect which was in close approximation.

## 2023-11-08 NOTE — ED BEHAVIORAL HEALTH ASSESSMENT NOTE - AFFECT CONGRUENCE
Shortly after I came off Fentanyl, I have had feelings of anxiety, restless legs, and jumpiness that starts in the afternoon and continues into the evening. I have taken hydroxyzine at night to help with sleep, which may or may not help. And I m also trying Robaxin instead of hydroxyzine to see if that helps. Do you have any thoughts suggestions? Of note, I was also put on 2 new oral antibiotics 10/24 (Doxycycline and Augmentin). My last dose of Fentanyl was 10/29.     Requested further information from patient.     Routing to provider to review and advise.     Natty Tao RN  [facility] [place]  [phone]     Congruent

## 2023-11-09 NOTE — BH PATIENT PROFILE - LIVES WITH
Patient given discharge paperwork and all questions answered. Patient discharged with all of her belongings. alone

## 2023-11-10 NOTE — ED PROVIDER NOTE - MDM ORDERS SUBMITTED SELECTION
Benzoyl Peroxide Counseling: Patient counseled that medicine may cause skin irritation and bleach clothing.  In the event of skin irritation, the patient was advised to reduce the amount of the drug applied or use it less frequently.   The patient verbalized understanding of the proper use and possible adverse effects of benzoyl peroxide.  All of the patient's questions and concerns were addressed. Not Applicable

## 2023-11-12 NOTE — ED ADULT NURSE NOTE - NSSUSCREENINGQ2_ED_ALL_ED
He was last seen by a nephrologist in 2018. He had known secondary hyperparathyroidism at that time. He was on vitamin D, but then lost to follow up. Will refer back to nephrology to reestablish care.  
This was noted on an echo from 2019 and was mild, not clinically significant. There appears to be shared discussion to have a  repeat echo, but Reji did not follow up. If clinical symptoms due are of a possible cardiac nature then may consider.  
No

## 2023-11-20 NOTE — BH INPATIENT PSYCHIATRY PROGRESS NOTE - NSBHCONSBHPROVDETAILS_PSY_A_CORE  FT
Left a message on the recorder - Curious if her 24 urine analysis is back yet? Dropped it off a week and a half ago.    12/09:Attempt made to call  Kedar Buckner at [phone]  12/10: Second attempt to call  Kedar Buckner at [facility] LEFT VM  12/11/20: Attempts made to contact outpatient ACT Team.  Left VM for ACT Team NP Alile Bernal at [phone].    12/16/20: Professional collateral obtained from outpatient ACT Team NP Allie Bernal at [phone].    12/15/20: Spoke with ACT Team member Jennifer Nettles at [phone].

## 2023-11-21 NOTE — ED BEHAVIORAL HEALTH ASSESSMENT NOTE - MODE OF ARRIVAL
Epidural Block    Date/Time: 11/21/2023 1:43 AM    Performed by: Levi Crabtree MD  Authorized by: Levi Crabtree MD    Patient Location:  L&D  Indication: Labor Pain     At Surgeon's request  Surgeon:  Carmen  Pre anesthesia checklist Patient Identified (2 criteria), Consent Verified, Necessary Block Equipment Present, Monitors applied, IV Bolus, Allergies confirmed, Block Plan Confirmed, Drugs/Solutions Labeled, IV Access functioning, Timeout Performed, Coagulation Status, Block site marked (if applicable), Resuscitation equipment available, Sedation given (if needed) and Aseptic technique  Epidural:     Patient Position:  Sitting    Prep:  Chlorhexidine Gluconate    Max Sterile Barrier Technique:  Hand washing, cap/mask, sterile gloves, sterile drape and sterile dressing applied    Monitoring:  Heart rate, continuous pulse oximetry and non-invasive blood pressure    Approach:  Midline    Location:  L3-4  Injection Technique:  JHOANA air  Needle and Epidural Catheter:     Needle Type:  Tuohy    Needle Gauge:  17 G  Catheter Type:  Side hole  Securement:  Stabilization device, Tape and Transparent dressing  Test Dose:  Lidocaine 1.5% with epinephrine 1-to-200,000  Test Dose Volume (mL):  3  Test Dose Result:  Negative  Assessment:   Number of Attempts: 1   Procedure Assessment: patient tolerated procedure well with no complications  Start Time:  11/21/2023 1:43 AM  Stop Time: 11/21/2023 1:45 AM       EMT / Paramedic

## 2023-12-01 NOTE — ED BEHAVIORAL HEALTH ASSESSMENT NOTE - NS ED BHA PLAN TR BH CONTACTED FT
"Physical Therapy      Patient Name:  Radha Sotelo   MRN:  [id number]  Time: 09:46    PT to bedside for PT treatment session. Pt adamantly declined stating no OOB mobility with IV site in R UE. Discussed importance of stair training as pt has a flight of stairs to enter home. Pt stated "I can do the stairs. If theres a will, theres a way". PT to return when appropriate.    " ACT team aware. Ms. Camargo will inform ACT Team

## 2023-12-04 NOTE — ED ADULT TRIAGE NOTE - DOMESTIC TRAVEL HIGH RISK QUESTION
Patient is here following for Alzheimer's today with her daughter. They both report that her memory has gotten worse. The patient tries to stay busy. Patient's BP is a little high , patient  states that she always gets  nervous when coming to the drs. No

## 2023-12-09 ENCOUNTER — EMERGENCY (EMERGENCY)
Facility: HOSPITAL | Age: 43
LOS: 1 days | Discharge: LEFT BEFORE DISCHARGE | End: 2023-12-09
Admitting: STUDENT IN AN ORGANIZED HEALTH CARE EDUCATION/TRAINING PROGRAM
Payer: MEDICAID

## 2023-12-09 VITALS
SYSTOLIC BLOOD PRESSURE: 151 MMHG | HEART RATE: 69 BPM | TEMPERATURE: 98 F | RESPIRATION RATE: 16 BRPM | DIASTOLIC BLOOD PRESSURE: 83 MMHG | OXYGEN SATURATION: 100 %

## 2023-12-09 PROCEDURE — L9991: CPT

## 2023-12-09 NOTE — ED PROVIDER NOTE - PROGRESS NOTE DETAILS
PA COLTEN: unable to find patient in room 10A. Spoke with RN charge, Ed tech, unable to find patient after overhead for patient, bathroom checked. Spoke with MI Mooney, already spoke with Jennifer, escalated situation. Chart reviewed, patient travels independently with Taxi.

## 2023-12-09 NOTE — ED ADULT TRIAGE NOTE - CHIEF COMPLAINT QUOTE
from [address]- reported elevated bp . today. c/o " not feeling well earlier for a few minutes ,i feel okay now"  denies n,vomiting

## 2023-12-09 NOTE — PROVIDER CONTACT NOTE (OTHER) - ASSESSMENT
medical team informed pt came by EMS and was assigned room in Intake 10A but pt is not to be found in room - team searched all the restrooms. Writer reviewed pts chart and pt has multiple visit to ED. Writer reached to [facility] [phone] [address] and spoke with Staff Hiral and made her aware of pt left ED before seen by MD. Staff Blackman informed that - this is outpatient clinic and pt could and go as they please and pt knows to travel  INDEPENDENTLY  – [employer] and taxi. Writer informed Staff Hiral to call 911 if she do not show up and agreed with this plan. medical team, ANM and on call SW supervisor was made aware of this plan and intervention. medical team informed pt came by EMS and was assigned room in Intake 10A but pt is not to be found in room - team searched all the restrooms. Writer reviewed pts chart and pt has multiple visit to ED. Writer reached to [facility] [phone] [address] and spoke with Staff Hiral and made her aware of pt left ED before seen by MD. Staff Blackman informed that - this is outpatient clinic and pt could and go as they please and pt knows to travel  INDEPENDENTLY  – [employer] and taxi. Writer informed Staff Hiral to call 911 if she do not show up and agreed with this plan. medical team, ANM and on call SW supervisor was made aware of this plan and intervention.

## 2023-12-23 NOTE — ED ADULT NURSE NOTE - DISCHARGE DATE/TIME
Name: Sim Agustin ADMIT: 2023   : 1957  PCP: Seth Hester MD    MRN: [id number] LOS: 9 days   AGE/SEX: 66 y.o. male  ROOM: [facility]     Subjective   Subjective     Rash improving. Scr improving.     Review of Systems     Objective   Objective   Vital Signs  Temp:  [97.7 °F (36.5 °C)-98.6 °F (37 °C)] 98.1 °F (36.7 °C)  Heart Rate:  [65-86] 81  Resp:  [12-18] 16  BP: (130-143)/(58-72) 143/61  SpO2:  [85 %-100 %] 94 %  on   ;   Device (Oxygen Therapy): room air  Body mass index is 24.88 kg/m².  Physical Exam    General: Alert, sitting up in the bed,  not in distress  HEENT: Normocephalic, atraumatic  CV: Regular rate and rhythm, no murmurs rubs or gallops  Lungs:  no wheezing, nonlabored breathing  Abdomen: Soft, nontender, nondistended  Extremities: No significant peripheral edema, left AKA  SkinL: rash improving     Results Review     I reviewed the patient's new clinical results.  Results from last 7 days   Lab Units 23  040237   WBC 10*3/mm3 12.77* 14.15* 13.34* 15.04*   HEMOGLOBIN g/dL 9.9* 10.9* 10.5* 10.1*   PLATELETS 10*3/mm3 289 296 266 261     Results from last 7 days   Lab Units 23  0401 23  0459 23  045237   SODIUM mmol/L 142 142 139 140   POTASSIUM mmol/L 3.2* 2.9* 3.2* 3.3*   CHLORIDE mmol/L 109* 108* 106 106   CO2 mmol/L 20.0* 22.0 20.0* 19.8*   BUN mg/dL 26* 36* 36* 40*   CREATININE mg/dL 1.75* 2.45* 2.46* 2.70*   GLUCOSE mg/dL 84 96 105* 92   Estimated Creatinine Clearance: 42.4 mL/min (A) (by C-G formula based on SCr of 1.75 mg/dL (H)).  Results from last 7 days   Lab Units 23  0401 23  0459 23  0456 23  0447 23  1258 23  0408   ALBUMIN g/dL 2.7* 2.7* 2.4* 2.2*   < > 2.5*   BILIRUBIN mg/dL  --  0.4 0.4 0.4  --  0.3   ALK PHOS U/L  --  174* 150* 140*  --  145*   AST (SGOT) U/L  --  29 33 40  --  46*   ALT (SGPT) U/L  --  45* 51* 49*  --  51*    < > = values in this  "interval not displayed.     Results from last 7 days   Lab Units 12/23/23  0401 12/22/23  0459 12/21/23  0456 12/20/23  0447 12/19/23  1258   CALCIUM mg/dL 6.6* 7.3* 7.5* 7.7* 8.0*   ALBUMIN g/dL 2.7* 2.7* 2.4* 2.2* 2.5*   MAGNESIUM mg/dL 1.8 1.6 1.7 1.6  --    PHOSPHORUS mg/dL 1.6* 1.2*  --  2.9 3.4           COVID19   Date Value Ref Range Status   12/14/2023 Not Detected Not Detected - Ref. Range Final   05/18/2022 Not Detected Not Detected - Ref. Range Final     No results found for: \"HGBA1C\", \"POCGLU\"          XR Chest PA & Lateral  Narrative: XR CHEST PA AND LATERAL-     INDICATION: Follow-up pneumonia     COMPARISON: Chest radiographs dating back to 11/26/2015 CT abdomen  pelvis 12/14/2023     Impression: Small left pleural effusion. Similar left lower lobe  retrocardiac opacity suggest infectious/inflammatory process. This is  hard to discriminate from the pleural fluid on lateral injection. No  pneumothorax. Stable normal size cardiomediastinal silhouette. No focal  osseous abnormality.     This report was finalized on 12/18/2023 2:52 PM by Dr. Marco Smalls M.D on Workstation: [id number]       Scheduled Medications  allopurinol, 100 mg, Oral, Daily  ALPRAZolam, 0.25 mg, Oral, BID  aspirin, 81 mg, Oral, Nightly  brimonidine, 1 drop, Both Eyes, BID  budesonide-formoterol, 2 puff, Inhalation, BID - RT   And  tiotropium bromide monohydrate, 2 puff, Inhalation, Daily - RT  buPROPion XL, 300 mg, Oral, Daily  clopidogrel, 75 mg, Oral, Nightly  guaiFENesin, 600 mg, Oral, Q12H  heparin (porcine), 5,000 Units, Subcutaneous, Q8H  ipratropium-albuterol, 3 mL, Nebulization, 4x Daily - RT  isosorbide mononitrate, 30 mg, Oral, QAM  latanoprost, 1 drop, Both Eyes, Nightly  levothyroxine, 137 mcg, Oral, Daily  Menthol-Zinc Oxide, 1 application , Topical, Q12H  multivitamin with minerals, 1 tablet, Oral, Daily  potassium & sodium phosphates, 1 packet, Oral, BID AC  potassium phosphate, 15 mmol, Intravenous, " Q3H    Infusions     Diet  Diet: Cardiac Diets; Healthy Heart (2-3 Na+); Texture: Regular Texture (IDDSI 7); Fluid Consistency: Thin (IDDSI 0)    I have personally reviewed     [x]  Laboratory   []  Microbiology   []  Radiology   []  EKG/Telemetry  []  Cardiology/Vascular   []  Pathology    []  Records       Assessment/Plan     Active Hospital Problems    Diagnosis  POA    **Acute kidney injury [N17.9]  Yes    Stage 3b chronic kidney disease (CKD) [N18.32]  Yes    Anemia [D64.9]  Yes    Leukocytosis [D72.829]  Yes    Hyponatremia [E87.1]  Yes    Hypokalemia [E87.6]  Yes    Lactic acidosis [E87.20]  Yes    Hypotension [I95.9]  Yes    Hypercalcemia [E83.52]  Yes    Hyperlipidemia [E78.5]  Yes    PRISCILLA treated with BiPAP [G47.33]  Yes    Hypothyroidism [E03.9]  Yes    CAD (coronary artery disease) [I25.10]  Yes    COPD (chronic obstructive pulmonary disease) [J44.9]  Yes    Essential hypertension [I10]  Yes      Resolved Hospital Problems   No resolved problems to display.     Hypersensitivity rash  Suspect this is secondary to Zosyn. Improved after discontinuing zosyn     Acute respiratory failure with hypoxia  Sepsis secondary to suspected pneumonia  -Repeat PA lateral chest x-ray 12/18-Small left pleural effusion. Similar left lower lobe retrocardiac opacity suggest -infectious/inflammatory process.   -Status post vancomycin, was discontinued as MRSA nares was negative.  -finished course of zosyn    ARLYN on CKD stage 3B-secondary to hypotension, hypovolemia, and severe hypercalcemia: Creatinine gradually improving.  Nephrology managing.     Hypophosphatemia/hypokalemia.  Monitor  And replete per protocol as needed.    Hypercalcemia-Calcium level was elevated to 17 on arrival. PTH found to be 29.5. Etiology likely multifactorial from calcium supplementation and volume contraction.  Resolved.  Nephrology managing.     Hyponatremia-resolved with IV fluids.     Transaminitis.  LFTs mildly elevated, AST 46, and ALT 51.   Monitor daily CMP.  If continues to trend up we will initiate workup and  may need to hold atorvastatin.  -Slowly improving.      Anemia.  Iron studies consistent with anemia of chronic disease, however iron saturation of 7 likely has underlying iron deficiency anemia.  Will need repeat iron studies once acute illness resolves.  However stable.    Hypertension complicated by hypotension prior to arrival-BP stable.  Home BP meds on hold.    Coronary artery disease-continue aspirin, Plavix, statin.    COPD-.  No evidence to suggest acute exacerbation.  Continue Symbicort, tiotropium,- DuoNebs.  Scheduled Mucinex.  Supplemental oxygen as needed to maintain O2 sats 88 to 92%, pulse oximetry,     Hypothyroidism- Continue levothyroxine as prescribed.     Hyperlipidemia-Continue Statin as prescribed.     PRISCILLA-Okay to use home machine if available.     Peripheral vascular disease-Complicated by compartment syndrome 2016 leading to left AKA.     Anxiety: Decrease Xanax to 0.25 mg twice daily as likely contributing to intermittent confusion in the setting of acute illness.        Full Code   Heparin for dvt ppx   Disposition: SNF vs acute rehab when arrangements have been made        Carl Fitch MD  [facility]  12/23/23  13:24 EST       10-Jul-2022 13:46

## 2023-12-28 ENCOUNTER — EMERGENCY (EMERGENCY)
Facility: HOSPITAL | Age: 43
LOS: 0 days | Discharge: HOME | End: 2023-12-28
Attending: STUDENT IN AN ORGANIZED HEALTH CARE EDUCATION/TRAINING PROGRAM
Payer: MEDICAID

## 2023-12-28 VITALS
SYSTOLIC BLOOD PRESSURE: 143 MMHG | RESPIRATION RATE: 17 BRPM | TEMPERATURE: 98 F | HEART RATE: 66 BPM | DIASTOLIC BLOOD PRESSURE: 83 MMHG | OXYGEN SATURATION: 100 %

## 2023-12-28 VITALS
TEMPERATURE: 98 F | WEIGHT: 238.1 LBS | HEART RATE: 69 BPM | HEIGHT: 66 IN | RESPIRATION RATE: 16 BRPM | OXYGEN SATURATION: 99 % | SYSTOLIC BLOOD PRESSURE: 139 MMHG | DIASTOLIC BLOOD PRESSURE: 90 MMHG

## 2023-12-28 LAB
ALBUMIN SERPL ELPH-MCNC: 2.7 G/DL — LOW (ref 3.3–5)
ALBUMIN SERPL ELPH-MCNC: 2.7 G/DL — LOW (ref 3.3–5)
ALP SERPL-CCNC: 122 U/L — HIGH (ref 40–120)
ALP SERPL-CCNC: 122 U/L — HIGH (ref 40–120)
ALT FLD-CCNC: 26 U/L — SIGNIFICANT CHANGE UP (ref 12–78)
ALT FLD-CCNC: 26 U/L — SIGNIFICANT CHANGE UP (ref 12–78)
ANION GAP SERPL CALC-SCNC: 4 MMOL/L — LOW (ref 5–17)
ANION GAP SERPL CALC-SCNC: 4 MMOL/L — LOW (ref 5–17)
APPEARANCE UR: CLEAR — SIGNIFICANT CHANGE UP
APPEARANCE UR: CLEAR — SIGNIFICANT CHANGE UP
AST SERPL-CCNC: 17 U/L — SIGNIFICANT CHANGE UP (ref 15–37)
AST SERPL-CCNC: 17 U/L — SIGNIFICANT CHANGE UP (ref 15–37)
BACTERIA # UR AUTO: ABNORMAL /HPF
BACTERIA # UR AUTO: ABNORMAL /HPF
BASOPHILS # BLD AUTO: 0.02 K/UL — SIGNIFICANT CHANGE UP (ref 0–0.2)
BASOPHILS # BLD AUTO: 0.02 K/UL — SIGNIFICANT CHANGE UP (ref 0–0.2)
BASOPHILS NFR BLD AUTO: 0.2 % — SIGNIFICANT CHANGE UP (ref 0–2)
BASOPHILS NFR BLD AUTO: 0.2 % — SIGNIFICANT CHANGE UP (ref 0–2)
BILIRUB SERPL-MCNC: 0.1 MG/DL — LOW (ref 0.2–1.2)
BILIRUB SERPL-MCNC: 0.1 MG/DL — LOW (ref 0.2–1.2)
BILIRUB UR-MCNC: NEGATIVE — SIGNIFICANT CHANGE UP
BILIRUB UR-MCNC: NEGATIVE — SIGNIFICANT CHANGE UP
BUN SERPL-MCNC: 16 MG/DL — SIGNIFICANT CHANGE UP (ref 7–23)
BUN SERPL-MCNC: 16 MG/DL — SIGNIFICANT CHANGE UP (ref 7–23)
CALCIUM SERPL-MCNC: 8.3 MG/DL — LOW (ref 8.5–10.1)
CALCIUM SERPL-MCNC: 8.3 MG/DL — LOW (ref 8.5–10.1)
CHLORIDE SERPL-SCNC: 112 MMOL/L — HIGH (ref 96–108)
CHLORIDE SERPL-SCNC: 112 MMOL/L — HIGH (ref 96–108)
CO2 SERPL-SCNC: 24 MMOL/L — SIGNIFICANT CHANGE UP (ref 22–31)
CO2 SERPL-SCNC: 24 MMOL/L — SIGNIFICANT CHANGE UP (ref 22–31)
COLOR SPEC: YELLOW — SIGNIFICANT CHANGE UP
COLOR SPEC: YELLOW — SIGNIFICANT CHANGE UP
CREAT SERPL-MCNC: 0.69 MG/DL — SIGNIFICANT CHANGE UP (ref 0.5–1.3)
CREAT SERPL-MCNC: 0.69 MG/DL — SIGNIFICANT CHANGE UP (ref 0.5–1.3)
DIFF PNL FLD: ABNORMAL
DIFF PNL FLD: ABNORMAL
EGFR: 109 ML/MIN/1.73M2 — SIGNIFICANT CHANGE UP
EGFR: 109 ML/MIN/1.73M2 — SIGNIFICANT CHANGE UP
EOSINOPHIL # BLD AUTO: 0.18 K/UL — SIGNIFICANT CHANGE UP (ref 0–0.5)
EOSINOPHIL # BLD AUTO: 0.18 K/UL — SIGNIFICANT CHANGE UP (ref 0–0.5)
EOSINOPHIL NFR BLD AUTO: 2 % — SIGNIFICANT CHANGE UP (ref 0–6)
EOSINOPHIL NFR BLD AUTO: 2 % — SIGNIFICANT CHANGE UP (ref 0–6)
EPI CELLS # UR: PRESENT
EPI CELLS # UR: PRESENT
GLUCOSE SERPL-MCNC: 92 MG/DL — SIGNIFICANT CHANGE UP (ref 70–99)
GLUCOSE SERPL-MCNC: 92 MG/DL — SIGNIFICANT CHANGE UP (ref 70–99)
GLUCOSE UR QL: NEGATIVE MG/DL — SIGNIFICANT CHANGE UP
GLUCOSE UR QL: NEGATIVE MG/DL — SIGNIFICANT CHANGE UP
HCG SERPL-ACNC: <1 MIU/ML — SIGNIFICANT CHANGE UP
HCG SERPL-ACNC: <1 MIU/ML — SIGNIFICANT CHANGE UP
HCT VFR BLD CALC: 36 % — SIGNIFICANT CHANGE UP (ref 34.5–45)
HCT VFR BLD CALC: 36 % — SIGNIFICANT CHANGE UP (ref 34.5–45)
HGB BLD-MCNC: 11.1 G/DL — LOW (ref 11.5–15.5)
HGB BLD-MCNC: 11.1 G/DL — LOW (ref 11.5–15.5)
IMM GRANULOCYTES NFR BLD AUTO: 0.3 % — SIGNIFICANT CHANGE UP (ref 0–0.9)
IMM GRANULOCYTES NFR BLD AUTO: 0.3 % — SIGNIFICANT CHANGE UP (ref 0–0.9)
KETONES UR-MCNC: NEGATIVE MG/DL — SIGNIFICANT CHANGE UP
KETONES UR-MCNC: NEGATIVE MG/DL — SIGNIFICANT CHANGE UP
LEUKOCYTE ESTERASE UR-ACNC: NEGATIVE — SIGNIFICANT CHANGE UP
LEUKOCYTE ESTERASE UR-ACNC: NEGATIVE — SIGNIFICANT CHANGE UP
LIDOCAIN IGE QN: 20 U/L — SIGNIFICANT CHANGE UP (ref 13–75)
LIDOCAIN IGE QN: 20 U/L — SIGNIFICANT CHANGE UP (ref 13–75)
LYMPHOCYTES # BLD AUTO: 2.65 K/UL — SIGNIFICANT CHANGE UP (ref 1–3.3)
LYMPHOCYTES # BLD AUTO: 2.65 K/UL — SIGNIFICANT CHANGE UP (ref 1–3.3)
LYMPHOCYTES # BLD AUTO: 30 % — SIGNIFICANT CHANGE UP (ref 13–44)
LYMPHOCYTES # BLD AUTO: 30 % — SIGNIFICANT CHANGE UP (ref 13–44)
MAGNESIUM SERPL-MCNC: 2 MG/DL — SIGNIFICANT CHANGE UP (ref 1.6–2.6)
MAGNESIUM SERPL-MCNC: 2 MG/DL — SIGNIFICANT CHANGE UP (ref 1.6–2.6)
MCHC RBC-ENTMCNC: 27.9 PG — SIGNIFICANT CHANGE UP (ref 27–34)
MCHC RBC-ENTMCNC: 27.9 PG — SIGNIFICANT CHANGE UP (ref 27–34)
MCHC RBC-ENTMCNC: 30.8 G/DL — LOW (ref 32–36)
MCHC RBC-ENTMCNC: 30.8 G/DL — LOW (ref 32–36)
MCV RBC AUTO: 90.5 FL — SIGNIFICANT CHANGE UP (ref 80–100)
MCV RBC AUTO: 90.5 FL — SIGNIFICANT CHANGE UP (ref 80–100)
MONOCYTES # BLD AUTO: 0.66 K/UL — SIGNIFICANT CHANGE UP (ref 0–0.9)
MONOCYTES # BLD AUTO: 0.66 K/UL — SIGNIFICANT CHANGE UP (ref 0–0.9)
MONOCYTES NFR BLD AUTO: 7.5 % — SIGNIFICANT CHANGE UP (ref 2–14)
MONOCYTES NFR BLD AUTO: 7.5 % — SIGNIFICANT CHANGE UP (ref 2–14)
NEUTROPHILS # BLD AUTO: 5.3 K/UL — SIGNIFICANT CHANGE UP (ref 1.8–7.4)
NEUTROPHILS # BLD AUTO: 5.3 K/UL — SIGNIFICANT CHANGE UP (ref 1.8–7.4)
NEUTROPHILS NFR BLD AUTO: 60 % — SIGNIFICANT CHANGE UP (ref 43–77)
NEUTROPHILS NFR BLD AUTO: 60 % — SIGNIFICANT CHANGE UP (ref 43–77)
NITRITE UR-MCNC: NEGATIVE — SIGNIFICANT CHANGE UP
NITRITE UR-MCNC: NEGATIVE — SIGNIFICANT CHANGE UP
NRBC # BLD: 0 /100 WBCS — SIGNIFICANT CHANGE UP (ref 0–0)
NRBC # BLD: 0 /100 WBCS — SIGNIFICANT CHANGE UP (ref 0–0)
PH UR: 5 — SIGNIFICANT CHANGE UP (ref 5–8)
PH UR: 5 — SIGNIFICANT CHANGE UP (ref 5–8)
PLATELET # BLD AUTO: 270 K/UL — SIGNIFICANT CHANGE UP (ref 150–400)
PLATELET # BLD AUTO: 270 K/UL — SIGNIFICANT CHANGE UP (ref 150–400)
POTASSIUM SERPL-MCNC: 4.2 MMOL/L — SIGNIFICANT CHANGE UP (ref 3.5–5.3)
POTASSIUM SERPL-MCNC: 4.2 MMOL/L — SIGNIFICANT CHANGE UP (ref 3.5–5.3)
POTASSIUM SERPL-SCNC: 4.2 MMOL/L — SIGNIFICANT CHANGE UP (ref 3.5–5.3)
POTASSIUM SERPL-SCNC: 4.2 MMOL/L — SIGNIFICANT CHANGE UP (ref 3.5–5.3)
PROT SERPL-MCNC: 7.1 GM/DL — SIGNIFICANT CHANGE UP (ref 6–8.3)
PROT SERPL-MCNC: 7.1 GM/DL — SIGNIFICANT CHANGE UP (ref 6–8.3)
PROT UR-MCNC: 100 MG/DL
PROT UR-MCNC: 100 MG/DL
RBC # BLD: 3.98 M/UL — SIGNIFICANT CHANGE UP (ref 3.8–5.2)
RBC # BLD: 3.98 M/UL — SIGNIFICANT CHANGE UP (ref 3.8–5.2)
RBC # FLD: 15.1 % — HIGH (ref 10.3–14.5)
RBC # FLD: 15.1 % — HIGH (ref 10.3–14.5)
RBC CASTS # UR COMP ASSIST: SIGNIFICANT CHANGE UP /HPF (ref 0–4)
RBC CASTS # UR COMP ASSIST: SIGNIFICANT CHANGE UP /HPF (ref 0–4)
SODIUM SERPL-SCNC: 140 MMOL/L — SIGNIFICANT CHANGE UP (ref 135–145)
SODIUM SERPL-SCNC: 140 MMOL/L — SIGNIFICANT CHANGE UP (ref 135–145)
SP GR SPEC: 1.02 — SIGNIFICANT CHANGE UP (ref 1–1.03)
SP GR SPEC: 1.02 — SIGNIFICANT CHANGE UP (ref 1–1.03)
TROPONIN I, HIGH SENSITIVITY RESULT: 15.5 NG/L — SIGNIFICANT CHANGE UP
TROPONIN I, HIGH SENSITIVITY RESULT: 15.5 NG/L — SIGNIFICANT CHANGE UP
UROBILINOGEN FLD QL: 0.2 MG/DL — SIGNIFICANT CHANGE UP (ref 0.2–1)
UROBILINOGEN FLD QL: 0.2 MG/DL — SIGNIFICANT CHANGE UP (ref 0.2–1)
WBC # BLD: 8.84 K/UL — SIGNIFICANT CHANGE UP (ref 3.8–10.5)
WBC # BLD: 8.84 K/UL — SIGNIFICANT CHANGE UP (ref 3.8–10.5)
WBC # FLD AUTO: 8.84 K/UL — SIGNIFICANT CHANGE UP (ref 3.8–10.5)
WBC # FLD AUTO: 8.84 K/UL — SIGNIFICANT CHANGE UP (ref 3.8–10.5)
WBC UR QL: SIGNIFICANT CHANGE UP /HPF (ref 0–5)
WBC UR QL: SIGNIFICANT CHANGE UP /HPF (ref 0–5)

## 2023-12-28 PROCEDURE — 99285 EMERGENCY DEPT VISIT HI MDM: CPT

## 2023-12-28 PROCEDURE — 93010 ELECTROCARDIOGRAM REPORT: CPT

## 2023-12-28 NOTE — ED ADULT NURSE NOTE - NSFALLUNIVINTERV_ED_ALL_ED
Bed/Stretcher in lowest position, wheels locked, appropriate side rails in place/Call bell, personal items and telephone in reach/Instruct patient to call for assistance before getting out of bed/chair/stretcher/Non-slip footwear applied when patient is off stretcher/[place] to call system/Physically safe environment - no spills, clutter or unnecessary equipment/Purposeful proactive rounding/Room/bathroom lighting operational, light cord in reach Bed/Stretcher in lowest position, wheels locked, appropriate side rails in place/Call bell, personal items and telephone in reach/Instruct patient to call for assistance before getting out of bed/chair/stretcher/Non-slip footwear applied when patient is off stretcher/[place] to call system/Physically safe environment - no spills, clutter or unnecessary equipment/Purposeful proactive rounding/Room/bathroom lighting operational, light cord in reach

## 2023-12-28 NOTE — ED ADULT NURSE REASSESSMENT NOTE - NS ED NURSE REASSESS COMMENT FT1
pt provided d/c instructions and departed via ambulance, vitals stable, 0/10 pain, no other concerns are noted.

## 2023-12-28 NOTE — ED PROVIDER NOTE - OBJECTIVE STATEMENT
43F PMH Bipolar, GERD, asthma sent to ED from [facility] d/t elevated BP x6mo. Pt denies hx HTN. Pt endorses intermittent pressure-like mid-abd pain xhrs, improved currently. Denies previous similar pain. Denies F/C, h/a, dizziness, CP, palpitations, SOB, cough, flank pain, N/V/D/C, UTI sx, LE pain / swelling. Pt denies recent travel, sick contacts, new / spoiled foods.     PMH as above, PSH divya, Allergy Depakote, Meds as listed, LMP 12/5. 43F PMH Bipolar, GERD, asthma sent to ED from [facility] d/t elevated BP x6mo. Pt denies hx HTN. Pt endorses intermittent pressure-like mid-abd pain xhrs, improved currently. Denies previous similar pain. Denies F/C, h/a, dizziness, CP, palpitations, SOB, cough, flank pain, N/V/D/C, UTI sx, LE pain / swelling. Pt denies recent travel, sick contacts, new / spoiled foods.     PMH as above, PSH divya, Allergy Depakote, Meds as listed, LMP 12/5.

## 2023-12-28 NOTE — ED PROVIDER NOTE - NSFOLLOWUPINSTRUCTIONS_ED_ALL_ED_FT
Please follow up with your primary care provider to discuss possible need for blood pressure medications / diagnosis of high blood pressure. Please follow up with your primary care provider to discuss a possible new diagnosis of high blood pressure & a possible need to be started on medications to control your blood pressure.

## 2023-12-28 NOTE — ED PROVIDER NOTE - CLINICAL SUMMARY MEDICAL DECISION MAKING FREE TEXT BOX
43F PMH Bipolar, GERD, asthma pw elevated BP x6mo as well as pressure-like intermittent mid-abd pain xhrs. Afebrile, VSS. /60. Pt well appearing, in NAD. Benign abd exam. Plan for ECG, CBC, CMP, mag, lipase, HCG, UA/C. Re-eval. 43F PMH Bipolar, GERD, asthma pw elevated BP x6mo as well as pressure-like intermittent mid-abd pain xhrs. Afebrile, VSS. /60. Pt well appearing, in NAD. Benign abd exam. Plan for ECG, CBC, CMP, mag, lipase, HCG, UA/C. Re-eval.  W/u w/o significant abnormalities. On re-eval, pt resting comfortably. Stable for d/c home to facility. Instructed for close outpatient PCP f/u, d/w PCP regarding possible new HTN diagnosis & possible need to initiate BP medications. Return signs / symptoms d/w pt at length. She understands / agrees w/ this plan.

## 2023-12-28 NOTE — ED PROVIDER NOTE - PATIENT PORTAL LINK FT
You can access the FollowMyHealth Patient Portal offered by [facility] by registering at the following website: [email or web address]. By joining [employer]’s FollowMyHealth portal, you will also be able to view your health information using other applications (apps) compatible with our system. You can access the FollowMyHealth Patient Portal offered by [facility] by registering at the following website: [email or web address]. By joining [employer]’s FollowMyHealth portal, you will also be able to view your health information using other applications (apps) compatible with our system.

## 2023-12-28 NOTE — ED ADULT NURSE NOTE - OBJECTIVE STATEMENT
pt is ax4, on room air, ambulatory w/ a steady gait, pt arrived by EMS from [facility], pt reports lower abdominal pain starting a few hours ago w/o n/v/d. pt also reports hypertension for 6 months, but pt has not seen her PCP. pt is ax4, on room air, ambulatory w/ a steady gait, pt arrived by EMS from [facility], pt reports lower abdominal pain starting a few hours ago w/o n/v/d. pt also reports hypertension for 6 months, but pt has not seen her PCP.

## 2023-12-28 NOTE — ED ADULT TRIAGE NOTE - CHIEF COMPLAINT QUOTE
BIBA sent from [facility] for uncontrolled HTN for six months. 170/100. Denies headache, blurry vision.  patient also c/o abd pain. Denies N/V, urinary symptoms.  LMP 12/5 BIBA sent from [facility] for uncontrolled HTN for six months. 170/100. Denies headache, blurry vision.  patient also c/o abd pain. Denies N/V, urinary symptoms.  LMP 12/5

## 2023-12-28 NOTE — ED ADULT NURSE NOTE - CHIEF COMPLAINT QUOTE
BIBA sent from [facility] for uncontrolled HTN for six months. 170/100. Denies headache, blurry vision.  patient also c/o abd pain. Denies N/V, urinary symptoms.  LMP 12/5 BIBA sent from [facility] for uncontrolled HTN for six months. 170/100. Denies headache, blurry vision.  patient also c/o abd pain. Denies N/V, urinary symptoms.  LMP 12/5

## 2023-12-29 DIAGNOSIS — R10.9 UNSPECIFIED ABDOMINAL PAIN: ICD-10-CM

## 2023-12-29 DIAGNOSIS — F31.9 BIPOLAR DISORDER, UNSPECIFIED: ICD-10-CM

## 2023-12-29 DIAGNOSIS — I10 ESSENTIAL (PRIMARY) HYPERTENSION: ICD-10-CM

## 2023-12-29 DIAGNOSIS — K21.9 GASTRO-ESOPHAGEAL REFLUX DISEASE WITHOUT ESOPHAGITIS: ICD-10-CM

## 2023-12-29 DIAGNOSIS — J45.909 UNSPECIFIED ASTHMA, UNCOMPLICATED: ICD-10-CM

## 2023-12-29 DIAGNOSIS — Z88.1 ALLERGY STATUS TO OTHER ANTIBIOTIC AGENTS STATUS: ICD-10-CM

## 2023-12-29 DIAGNOSIS — Z90.49 ACQUIRED ABSENCE OF OTHER SPECIFIED PARTS OF DIGESTIVE TRACT: ICD-10-CM

## 2023-12-29 DIAGNOSIS — R03.0 ELEVATED BLOOD-PRESSURE READING, WITHOUT DIAGNOSIS OF HYPERTENSION: ICD-10-CM

## 2023-12-30 LAB
CULTURE RESULTS: SIGNIFICANT CHANGE UP
CULTURE RESULTS: SIGNIFICANT CHANGE UP
SPECIMEN SOURCE: SIGNIFICANT CHANGE UP
SPECIMEN SOURCE: SIGNIFICANT CHANGE UP

## 2024-01-01 NOTE — ED PROVIDER NOTE - MDM ORDERS SUBMITTED SELECTION
Diet, Breastfeeding:     Breastfeeding Frequency: ad luana     Special Instructions for Nursing:  on demand, unless medically contraindicated (10-09-24 @ 12:39) [Active]      
Not Applicable

## 2024-01-01 NOTE — ED PROVIDER NOTE - NS ED ATTENDING STATEMENT MOD
[facility] standing orders have been placed. Refer to infant’s chart for further details. Attending Only

## 2024-01-08 NOTE — ED PROVIDER NOTE - NS_EDPROVIDERDISPOUSERTYPE_ED_A_ED
EXAM NOTE      CHIEF COMPLAINT:   Chief Complaint   Patient presents with    New Patient     Rm 3-New Pt establish care          HISTORY    Mary Lemus is a 16 year old female who presents to the office to establish care. She has several concerns today.     Stomach cramps: Has cramping throughout the day, stomach very noisy. Rubbing it helps. Sometimes gets bloated. Gets constipated regularly with hard stools. No obvious trigger for abdominal pain, no obvious foods that worsen it. Sometimes associated with nausea. Sometimes has bloating. She is well hydrated. Denies rectal bleeding.     Migraines: daily for the last 2 years. Reports pain is throbbing, usually L side. Have been getting worse over time. Worse over the last 2-3 months. Has been taking excedrin with some relief. No obvious triggers. No numbness or tingling, feels like her  strength is somewhat weak, no other focal weakness, she does go to the gym and lifts weights without difficulty. Sometimes has double vision. They do not wake her at night. Often accompanied by nausea, rare vomiting.     She did start getting depo several months ago, not sure if that is worsening any of her current symptoms.     Lightheadedness: Reports history of vasovagal syncope. Has been noticing more lightheadedness when she is standing for too long or when she moves from a seated to standing position. Sometimes heat or being in the shower too long can trigger this   No history of concussions, no seizures.     Ear concern: Both ears, R > L, feel clogged and congested. L ear was bleeding slightly. She has been using qtips. No fevers.     History of anxiety: was on fluoxetine in the past, has since stopped. Can't recall why but thinks it may have made her symptoms worse.       GYN: G0, sexually active. Receives depo through OBGYN. Monthly periods. G/C in September negative. Does not use condoms.     Social  Diet: Eats regular meals.Drinks water 2-4 bottles of water per day.  Does like fruits.   Caffeine: Limited   Alcohol: Denies  Drugs/Tobacco: No regular use   Exercise: Exercises regularly at the gym.   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     MEDICAL HISTORY    History reviewed. No pertinent past medical history.    PAST HISTORIES:  I have reviewed the past medical history, family history, social history, medications and allergies listed in the medical record as obtained by my nursing staff and support staff and agree with their documentation.    MEDICATIONS    Current Outpatient Medications   Medication Sig    SUMAtriptan (IMITREX) 50 MG tablet Take 1 tablet by mouth daily as needed for Migraine. Take 1 tablet by mouth at onset of migraine. May repeat after 2 hours if needed.     No current facility-administered medications for this visit.       ALLERGIES    ALLERGIES:  No Known Allergies    REVIEW OF SYSTEMS      Constitutional:  Denies fever, chills +fatigue   HENT:  Denies nasal congestion or sore throat.  +ear pressure/fullness  Respiratory:  Denies cough, wheezing or shortness of breath.    Cardiovascular:  Denies chest pain, palpitations or chest pressure. +palpitations  Gastrointestinal:  Denies vomiting, diarrhea. +abdominal pain and cramping, constipation, nausea  Genitourinary:  Denies dysuria, hematuria     Neurologic:  Denies sensory changes +migraines, weak  strength, lightheadedness      PHYSICAL EXAM:    Visit Vitals  /78   Pulse 90   Temp 97.7 °F (36.5 °C)   Resp 16   Ht 5' 4\" (1.626 m)   Wt 50.3 kg (111 lb)   LMP 09/23/2023   SpO2 98%   BMI 19.05 kg/m²       General Appearance:  Alert, cooperative, no distress, appears stated age.   Psychiatric: Alert and oriented to time person and place. Appropriate affect.   Head:  Normocephalic, atraumatic with facial symmetry.  Eyes:  No upper lid swelling bilaterally. Conjunctivae pink. Normal sclera. Pupils equal, round and reactive to light. Extraocular movement intact bilaterally.  Ears: Soft  cerumen impacted bilaterally. Slight abrasion in L canal. Slight irritation of the canals noted bilaterally. Once cleared the TMs were grey, no signs of effusion/purulence.      Nose: Nasal mucosa is pink and moist. Turbinates are not swollen, septum is midline.  Throat:  Oropharynx is moist and pink with no lesions or exudate.   Neck:   Supple, trachea is midline, no adenopathy.    Lungs: Breath sounds are clear throughout bilaterally. No respiratory distress. No wheezing, rhonchi or rales.   Cardiovascular:  Regular rate and rhythm, S1 and S2 normal, no murmurs auscultated. Radial pulses 2+ bilaterally. No edema.  Abdomen:  Soft, non-distended, normoactive bowel sounds in all 4 quadrants. Mild lower abdominal tenderness.  MSK: 5/5 strength throughout,  strength 4/5 bilaterally  Neurologic: A&O x4. No focal neurologic deficits. DTR 3+ throughout        Assessment/Plan:  Mary was seen today for new patient.    Diagnoses and all orders for this visit:      1. Abdominal cramping  Wide ddx. Does have frequent constipation, advised to take daily metamucil/fiber and miralax as needed to see if this helps abdominal discomfort. Monitor for any triggers or trends. Consider further work up if regular BMs does not improve pain.     2. Chronic migraine without aura without status migrainosus, not intractable  Will trial abortive therapy. Consider prophylactic therapy depending on response.   - SUMAtriptan (IMITREX) 50 MG tablet; Take 1 tablet by mouth daily as needed for Migraine. Take 1 tablet by mouth at onset of migraine. May repeat after 2 hours if needed.  Dispense: 12 tablet; Refill: 1    3. Intermittent lightheadedness  Will discuss further at follow up given time constrains. Plan to order labs and likely EKG, no acute worsening of symptoms to warrant urgent work up, she roper shave a history of vasovagal syncope, per pt. Continue regular meals and proper hydration    4. Bilateral impacted cerumen  Removed cerumen  with curette bilaterally. Pt had improvement in hearing as well as improved discomfort. Advised to avoid qtips. Small abrasion in L canal, no signs of infection.     5. Constipation, unspecified constipation type  See above.     Assessment/plan dicussed with patient who verbalized understanding and agreement. All questions were addressed.  Follow up in 2-4 weeks      I have personally evaluated and examined the patient. The Attending was available to me as a supervising provider if needed.

## 2024-01-18 NOTE — ED BEHAVIORAL HEALTH ASSESSMENT NOTE - PROFESSIONAL COLLATERAL NAME
Initiate Treatment: Mupirocin 2% - apply to affected nail twice daily for ten days. If not resolved, repeat with a second course of ten days application. Continue Regimen: Soak finger with epsom salts. Detail Level: Zone Render In Strict Bullet Format?: No Continue Regimen: Cetaphil cleanser and Moisturizer. Modify Regimen: Evaluate topical vitamin c serum/cream application, if irritation occurs after application then reduce to only twice weekly application. Ms. Camargo

## 2024-01-21 ENCOUNTER — EMERGENCY (EMERGENCY)
Facility: HOSPITAL | Age: 44
LOS: 1 days | Discharge: HOME | End: 2024-01-21
Attending: EMERGENCY MEDICINE
Payer: MEDICARE

## 2024-01-21 VITALS
OXYGEN SATURATION: 97 % | RESPIRATION RATE: 20 BRPM | HEIGHT: 66 IN | HEART RATE: 78 BPM | DIASTOLIC BLOOD PRESSURE: 88 MMHG | WEIGHT: 169.98 LBS | TEMPERATURE: 98 F | SYSTOLIC BLOOD PRESSURE: 138 MMHG

## 2024-01-21 DIAGNOSIS — F25.0 SCHIZOAFFECTIVE DISORDER, BIPOLAR TYPE: ICD-10-CM

## 2024-01-21 LAB
ALBUMIN SERPL ELPH-MCNC: 4.2 G/DL — SIGNIFICANT CHANGE UP (ref 3.3–5)
ALP SERPL-CCNC: 129 U/L — HIGH (ref 40–120)
ALT FLD-CCNC: 18 U/L — SIGNIFICANT CHANGE UP (ref 10–45)
ANION GAP SERPL CALC-SCNC: 11 MMOL/L — SIGNIFICANT CHANGE UP (ref 5–17)
APAP SERPL-MCNC: <15 UG/ML — SIGNIFICANT CHANGE UP (ref 10–30)
APPEARANCE UR: CLEAR — SIGNIFICANT CHANGE UP
AST SERPL-CCNC: 11 U/L — SIGNIFICANT CHANGE UP (ref 10–40)
BACTERIA # UR AUTO: ABNORMAL /HPF
BASOPHILS # BLD AUTO: 0.04 K/UL — SIGNIFICANT CHANGE UP (ref 0–0.2)
BASOPHILS NFR BLD AUTO: 0.5 % — SIGNIFICANT CHANGE UP (ref 0–2)
BILIRUB SERPL-MCNC: 0.2 MG/DL — SIGNIFICANT CHANGE UP (ref 0.2–1.2)
BILIRUB UR-MCNC: NEGATIVE — SIGNIFICANT CHANGE UP
BUN SERPL-MCNC: 13 MG/DL — SIGNIFICANT CHANGE UP (ref 7–23)
CALCIUM SERPL-MCNC: 10 MG/DL — SIGNIFICANT CHANGE UP (ref 8.4–10.5)
CAST: 2 /LPF — SIGNIFICANT CHANGE UP (ref 0–4)
CHLORIDE SERPL-SCNC: 106 MMOL/L — SIGNIFICANT CHANGE UP (ref 96–108)
CO2 SERPL-SCNC: 25 MMOL/L — SIGNIFICANT CHANGE UP (ref 22–31)
COLOR SPEC: YELLOW — SIGNIFICANT CHANGE UP
CREAT SERPL-MCNC: 1.01 MG/DL — SIGNIFICANT CHANGE UP (ref 0.5–1.3)
DIFF PNL FLD: NEGATIVE — SIGNIFICANT CHANGE UP
EGFR: 70 ML/MIN/1.73M2 — SIGNIFICANT CHANGE UP
EOSINOPHIL # BLD AUTO: 0.1 K/UL — SIGNIFICANT CHANGE UP (ref 0–0.5)
EOSINOPHIL NFR BLD AUTO: 1.2 % — SIGNIFICANT CHANGE UP (ref 0–6)
ETHANOL SERPL-MCNC: <10 MG/DL — SIGNIFICANT CHANGE UP (ref 0–10)
GLUCOSE SERPL-MCNC: 98 MG/DL — SIGNIFICANT CHANGE UP (ref 70–99)
GLUCOSE UR QL: NEGATIVE MG/DL — SIGNIFICANT CHANGE UP
HCG SERPL-ACNC: <2 MIU/ML — SIGNIFICANT CHANGE UP
HCT VFR BLD CALC: 41.5 % — SIGNIFICANT CHANGE UP (ref 34.5–45)
HGB BLD-MCNC: 12.6 G/DL — SIGNIFICANT CHANGE UP (ref 11.5–15.5)
IMM GRANULOCYTES NFR BLD AUTO: 0.2 % — SIGNIFICANT CHANGE UP (ref 0–0.9)
KETONES UR-MCNC: ABNORMAL MG/DL
LEUKOCYTE ESTERASE UR-ACNC: ABNORMAL
LYMPHOCYTES # BLD AUTO: 2.49 K/UL — SIGNIFICANT CHANGE UP (ref 1–3.3)
LYMPHOCYTES # BLD AUTO: 31.1 % — SIGNIFICANT CHANGE UP (ref 13–44)
MCHC RBC-ENTMCNC: 28 PG — SIGNIFICANT CHANGE UP (ref 27–34)
MCHC RBC-ENTMCNC: 30.4 GM/DL — LOW (ref 32–36)
MCV RBC AUTO: 92.2 FL — SIGNIFICANT CHANGE UP (ref 80–100)
MONOCYTES # BLD AUTO: 0.53 K/UL — SIGNIFICANT CHANGE UP (ref 0–0.9)
MONOCYTES NFR BLD AUTO: 6.6 % — SIGNIFICANT CHANGE UP (ref 2–14)
NEUTROPHILS # BLD AUTO: 4.83 K/UL — SIGNIFICANT CHANGE UP (ref 1.8–7.4)
NEUTROPHILS NFR BLD AUTO: 60.4 % — SIGNIFICANT CHANGE UP (ref 43–77)
NITRITE UR-MCNC: NEGATIVE — SIGNIFICANT CHANGE UP
NRBC # BLD: 0 /100 WBCS — SIGNIFICANT CHANGE UP (ref 0–0)
PH UR: 5.5 — SIGNIFICANT CHANGE UP (ref 5–8)
PLATELET # BLD AUTO: 313 K/UL — SIGNIFICANT CHANGE UP (ref 150–400)
POTASSIUM SERPL-MCNC: 4 MMOL/L — SIGNIFICANT CHANGE UP (ref 3.5–5.3)
POTASSIUM SERPL-SCNC: 4 MMOL/L — SIGNIFICANT CHANGE UP (ref 3.5–5.3)
PROT SERPL-MCNC: 7.8 G/DL — SIGNIFICANT CHANGE UP (ref 6–8.3)
PROT UR-MCNC: 100 MG/DL
RBC # BLD: 4.5 M/UL — SIGNIFICANT CHANGE UP (ref 3.8–5.2)
RBC # FLD: 14.7 % — HIGH (ref 10.3–14.5)
RBC CASTS # UR COMP ASSIST: 6 /HPF — HIGH (ref 0–4)
REVIEW: SIGNIFICANT CHANGE UP
SALICYLATES SERPL-MCNC: <2 MG/DL — LOW (ref 15–30)
SARS-COV-2 RNA SPEC QL NAA+PROBE: SIGNIFICANT CHANGE UP
SODIUM SERPL-SCNC: 142 MMOL/L — SIGNIFICANT CHANGE UP (ref 135–145)
SP GR SPEC: 1.02 — SIGNIFICANT CHANGE UP (ref 1–1.03)
SQUAMOUS # UR AUTO: 8 /HPF — HIGH (ref 0–5)
UROBILINOGEN FLD QL: 0.2 MG/DL — SIGNIFICANT CHANGE UP (ref 0.2–1)
WBC # BLD: 8.01 K/UL — SIGNIFICANT CHANGE UP (ref 3.8–10.5)
WBC # FLD AUTO: 8.01 K/UL — SIGNIFICANT CHANGE UP (ref 3.8–10.5)
WBC UR QL: 7 /HPF — HIGH (ref 0–5)

## 2024-01-21 PROCEDURE — 90792 PSYCH DIAG EVAL W/MED SRVCS: CPT | Mod: 2W

## 2024-01-21 PROCEDURE — 93005 ELECTROCARDIOGRAM TRACING: CPT

## 2024-01-21 PROCEDURE — 80307 DRUG TEST PRSMV CHEM ANLYZR: CPT

## 2024-01-21 PROCEDURE — 81001 URINALYSIS AUTO W/SCOPE: CPT

## 2024-01-21 PROCEDURE — 99285 EMERGENCY DEPT VISIT HI MDM: CPT

## 2024-01-21 PROCEDURE — 84702 CHORIONIC GONADOTROPIN TEST: CPT

## 2024-01-21 PROCEDURE — 80053 COMPREHEN METABOLIC PANEL: CPT

## 2024-01-21 PROCEDURE — 99284 EMERGENCY DEPT VISIT MOD MDM: CPT | Mod: 25

## 2024-01-21 PROCEDURE — 87635 SARS-COV-2 COVID-19 AMP PRB: CPT

## 2024-01-21 PROCEDURE — 85025 COMPLETE CBC W/AUTO DIFF WBC: CPT

## 2024-01-21 NOTE — ED BEHAVIORAL HEALTH ASSESSMENT NOTE - HPI (INCLUDE ILLNESS QUALITY, SEVERITY, DURATION, TIMING, CONTEXT, MODIFYING FACTORS, ASSOCIATED SIGNS AND SYMPTOMS)
Pt is a 44 y/o AAF, single, non-caregiver, unemployed, on disability for mental illness, domiciled at [facility], on AOT at this time, with past psychiatric history of schizoaffective disorder, as per chart: borderline personality disorder, polysubstance abuse in the past but now sober, >30 prior inpatient psychiatric hospitalizations most recently at [facility] from 8/16/23- 9/1/23,  hx of highly disorganized behavior requiring transfer to [facility] hospitalization ([place]) such as smearing feces in the wall, eating feces, turning in a [place] over and over again, myriad of ED visits including [facility], 3 prior suicide attempts (last in 2012 via OD), recurrent chronic suicidal gestures and suicidal ideation, hx of property destruction when upset, long hx of sexually provocative behavior (both out in the community and while on inpatient units requiring 1:1 staff observation), hx of physical altercations, hx of verbal threats, hx of property destruction, hx of medication and tx noncompliance resulting in AOT and PALACIOS administration, brought in by ambulance activated by residence for stating that she wanted to cut her breasts off due to back pain and that she wanted put a plastic bag over her head.      Patient is calm and cooperative, concretely related, appears somewhat limited.  no signs of internal preoccupation.    Patient reports that she was having suicidal thoughts today without any specific trigger.  She states that SI comes and goes without trigger but states that recently her brother told her that she's "a waste of taxpayer money" and maybe this is why she's been feeling down lately.  She reports that she was having thoughts of cutting her breasts off today, so as to perform her own mastectomy, however when asked if she understands this would result in death she replies "yes silly, I wouldn't actually do that myself, I know that I would have to go to a doctor for that."  She denies having any true suicidal intent behind the statement made to staff.  She also states that she put a plastic bag over her head in front of staff today because she "wanted to feel what it would be like to have less oxygen in her brain." she again denies that this was a suicide attempt and denies wanting to harm herself by doing this.  She denies any SI, plan or intent at the time of interview and she reports that she wants to live.  She endorses that she believes in a higher power and goes to [employer], stating that suicide is a sin and she wants to "keep moving forward in life."      Patient denies any persistent depressive symptoms recently, hopelessness, low mood, low energy, sleep or appetite disturbance.  Denies any recent psychotic or manic symptoms or HI.  No delusions elicited.  She reports compliance with her medications Haldol Dec, Abilify, and Trileptal.  She sees her psychiatrist and therapist every Monday at her residence.  She feels supported by her residence staff and her siblings.  She endorses feeling safe to go home tonight and she already has a safety plan made with her therapist; she reviewed this plan with the writer and agreed to return to the ED if Suicidal thoughts get worse or more frequent or if she has some other crisis arise.     Patient denies any recent substance use.      Spoke with staff at patient's residence; they report that the patient at baseline can be erratic, psychotic, suicidal, and do performative behaviors to get their attention; these issues have necessitated many ED visits and inpatinet admission in the past, however they state that lately she does not seem to be decompensation and over the past 2 months she has not been to the ED at all, and seems to be doing fairly well.  They state that she has been compliant with medications:  Haldol Decanoate 200mg IM [id number] - next due Jan 26th  Trileptal 200mg BID  Abilify 20mg QHs    Staff reports that she did make statements about wanting to cut off her breasts and put a plastic bag over her head, however they were unclear if she had suicidal intent behind these statements; she chronically makes statements like this to get attention and so it can be hard to staff to tell if she's actually suicidal or not.  At this time they do not have concerns for acute suicidality and they believe she would be safe to return home tonight.  They deny patient appearing overtly psychotic or manic or depressed recently.  She has not been erratic or aggressive at home or using any substances either. Pt is a 42 y/o AAF, single, non-caregiver, unemployed, on disability for mental illness, domiciled at [facility], on AOT at this time, with past psychiatric history of schizoaffective disorder, as per chart: borderline personality disorder, polysubstance abuse in the past but now sober, >30 prior inpatient psychiatric hospitalizations most recently at [facility] from 8/16/23- 9/1/23,  hx of highly disorganized behavior requiring transfer to [facility] hospitalization ([place]) such as smearing feces in the wall, eating feces, turning in a [place] over and over again, myriad of ED visits including [facility], 3 prior suicide attempts (last in 2012 via OD), recurrent chronic suicidal gestures and suicidal ideation, hx of property destruction when upset, long hx of sexually provocative behavior (both out in the community and while on inpatient units requiring 1:1 staff observation), hx of physical altercations, hx of verbal threats, hx of property destruction, hx of medication and tx noncompliance resulting in AOT and PALACIOS administration, brought in by ambulance activated by residence for stating that she wanted to cut her breasts off due to back pain and that she wanted put a plastic bag over her head.      Patient is calm and cooperative, concretely related, appears somewhat limited.  no signs of internal preoccupation.    Patient reports that she was having suicidal thoughts today without any specific trigger.  She states that SI comes and goes without trigger but states that recently her brother told her that she's "a waste of taxpayer money" and maybe this is why she's been feeling down lately.  She reports that she was having thoughts of cutting her breasts off today, so as to perform her own mastectomy, however when asked if she understands this would result in death she replies "yes silly, I wouldn't actually do that myself, I know that I would have to go to a doctor for that."  She denies having any true suicidal intent behind the statement made to staff.  She also states that she put a plastic bag over her head in front of staff today because she "wanted to feel what it would be like to have less oxygen in her brain." she again denies that this was a suicide attempt and denies wanting to harm herself by doing this.  She denies any SI, plan or intent at the time of interview and she reports that she wants to live.  She endorses that she believes in a higher power and goes to [employer], stating that suicide is a sin and she wants to "keep moving forward in life."      Patient denies any persistent depressive symptoms recently, hopelessness, low mood, low energy, sleep or appetite disturbance.  Denies any recent psychotic or manic symptoms or HI.  No delusions elicited.  She reports compliance with her medications Haldol Dec, Abilify, and Trileptal.  She sees her psychiatrist and therapist every Monday at her residence.  She feels supported by her residence staff and her siblings.  She endorses feeling safe to go home tonight and she already has a safety plan made with her therapist; she reviewed this plan with the writer and agreed to return to the ED if Suicidal thoughts get worse or more frequent or if she has some other crisis arise.     Patient denies any recent substance use.      Spoke with staff at patient's residence; they report that the patient at baseline can be erratic, psychotic, suicidal, and do performative behaviors to get their attention; these issues have necessitated many ED visits and inpatient admissions in the past, however they state that lately she does not seem to be decompensation and over the past 2 months she has not been to the ED at all, and seems to be doing fairly well.  They state that she has been compliant with medications:  Haldol Decanoate 200mg IM [id number] - next due Jan 26th  Trileptal 200mg BID  Abilify 20mg QHs    Staff reports that she did make statements about wanting to cut off her breasts and also put a plastic bag over her head, however they state she chronically makes statements like this to get attention.  At this time they do not have concerns for acute suicidality and they believe she would be safe to return home tonight.  They deny patient appearing overtly psychotic or manic or depressed recently.  She has not been erratic or aggressive at home or using any substances either. Staff report pt has recently been at her baseline.

## 2024-01-21 NOTE — ED PROVIDER NOTE - ATTENDING CONTRIBUTION TO CARE
Shanon 43-year-old female with history of bipolar disorder borderline personality disorder living in [facility] with prior similar episodes of suicidal ideation was sent in by EMS patient has been reportedly aggressive with staff.  Currently denies SI HI however admits to having attempts in the past with digoxin over the KDIGO  On physical examination patient is well-appearing however hot behavior with expansive affect and mood  Will do medical clearance and talk to psychiatry and placed on close observation

## 2024-01-21 NOTE — ED BEHAVIORAL HEALTH ASSESSMENT NOTE - SUMMARY
Pt is a 42 y/o AAF, single, non-caregiver, unemployed, on disability for mental illness, domiciled at [facility], on AOT at this time, with past psychiatric history of schizoaffective disorder, as per chart: borderline personality disorder, polysubstance abuse in the past but now sober, >30 prior inpatient psychiatric hospitalizations most recently at [facility] from 8/16/23- 9/1/23,  hx of highly disorganized behavior requiring transfer to [facility] hospitalization ([place]) such as smearing feces in the wall, eating feces, turning in a [place] over and over again, myriad of ED visits including [facility], 3 prior suicide attempts (last in 2012 via OD), recurrent chronic suicidal gestures and suicidal ideation, hx of property destruction when upset, long hx of sexually provocative behavior (both out in the community and while on inpatient units requiring 1:1 staff observation), hx of physical altercations, hx of verbal threats, hx of property destruction, hx of medication and tx noncompliance resulting in AOT and PALACIOS administration, brought in by ambulance activated by [facility] for stating that she wanted to cut her breasts off due to back pain and that she wanted put a plastic bag over her head.      On evaluation patient appears ot be at baseline mental status and behavior; her statements and behaviors at the [facility] today do not represent true suicidality or any decompensation in her psychiatric illness.  Per [facility] staff, patient has actually been doing better than normal over the past 2 months, has been compliant with medications and treatment.  Patient is not acutely psychotic, manic or depressed and collateral also corroborates on this as well; they feel she is safe to return home tonight.  Patient was able to safety plan with the writer.  She has an appointment with her psychiatrist and psychologist tomorrow and she agrees to return to the ED if SI returns or if any other crisis develops.      Recommendations:  - no psychiatric contraindication to discharge  - f/u with psychiatrist Dr. Cruz and psychologist Dr. Garcia at [facility] tomorrow 1/22   - continue home medications as prescribed by Dr. Cruz Pt is a 42 y/o AAF, single, non-caregiver, unemployed, on disability for mental illness, domiciled at [facility], on AOT at this time, with past psychiatric history of schizoaffective disorder, as per chart: borderline personality disorder, polysubstance abuse in the past but now sober, >30 prior inpatient psychiatric hospitalizations most recently at [facility] from 8/16/23- 9/1/23,  hx of highly disorganized behavior requiring transfer to [facility] hospitalization ([place]) such as smearing feces in the wall, eating feces, turning in a [place] over and over again, myriad of ED visits including [facility], 3 prior suicide attempts (last in 2012 via OD), recurrent chronic suicidal gestures and suicidal ideation, hx of property destruction when upset, long hx of sexually provocative behavior (both out in the community and while on inpatient units requiring 1:1 staff observation), hx of physical altercations, hx of verbal threats, hx of property destruction, hx of medication and tx noncompliance resulting in AOT and PALACIOS administration, brought in by ambulance activated by [facility] for stating that she wanted to cut her breasts off due to back pain and that she wanted put a plastic bag over her head.      On evaluation patient appears ot be at baseline mental status and behavior; this writer suspects that her statements and behaviors at the [facility] today do not represent true suicidality or a decompensation in her psychiatric illness, but rather appear to have been done for attention as staff at her residence indicates, which is consistent with her baseline.  Per [facility] staff, patient has actually been doing better than normal over the past 2 months, has been compliant with medications and treatment.  Patient is not acutely psychotic, manic or depressed and collateral also corroborates on this as well; they feel she is safe to return home tonight.  Patient was able to safety plan with the writer.  She has an appointment with her psychiatrist and psychologist tomorrow and she agrees to return to the ED if SI returns or if she experiences acute psychiatric symptoms.    Recommendations:  - no psychiatric contraindication to discharge  - f/u with psychiatrist Dr. Cruz and psychologist Dr. Garcia at [facility] tomorrow 1/22   - continue home medications as prescribed by Dr. Cruz

## 2024-01-21 NOTE — ED BEHAVIORAL HEALTH NOTE - BEHAVIORAL HEALTH NOTE
===================    PRE-HOSPITAL COURSE    ==================    SOURCE:  RN and ED documentation     DETAILS:  Pt bibEMS for worsening SI     ============    ED COURSE    ============    SOURCE: RN and secondhand ED documentation.    ARRIVAL: Per RN patient bibEMS to ED. Patient cooperative with triage process.     BELONGINGS: Per RN, patient arrived with belongings. All belongings were provided to hospital security, and patient currently in a gown with a 1:1 staff member.    BEHAVIOR: RN described patient to be calm, remains in behavioral and impulse control, and is not   in restraints. Pt currently has 1:1 sitter.  Pt is not displaying aggression towards staff or others and was described as calm and cooperative. Per RN, pt presenting with anxious affect and mood is congruent with affect. Pt has a linear thought process. RN stated that the patient has not mentioned SI/HI although the pt is in the ED for worsening SI. Per RN pt has not mentioned A/VH.  There are no visible marks, bruises, or lacerations on the body. RN reported that the patient appears to be have fair hygiene.    TREATMENT: No medication administered. No restraints.     VISITORS: None currently     -----------------------------------------------   COVID Exposure Screen- collateral (i.e. third-party, chart review, belongings, etc; include EMS and ED staff)   ---------------------------------------------------   1. Has the patient had a COVID-19 test in the last 90 days? Unknown.   2. Has the patient tested positive for COVID-19 antibodies? Unknown.   3.Has the patient received 2 doses of the COVID-19 vaccine?  Unknown.   4. In the past 10 days, has the patient been around anyone with a positive COVID-19 test?* Unknown.   5.Has the patient been out of [place] within the past 10 days? Unknown.

## 2024-01-21 NOTE — ED BEHAVIORAL HEALTH ASSESSMENT NOTE - ADDITIONAL DETAILS ALL
admits to having passive SI intermittently over the past 2 weeks without any intent or plan to actually kill herself

## 2024-01-21 NOTE — ED BEHAVIORAL HEALTH ASSESSMENT NOTE - DETAILS
did not want to discuss spoke with residence staff admits to having passive SI intermittently over the past 2 weeks without any intent or plan to actually kill herself .

## 2024-01-21 NOTE — ED PROVIDER NOTE - PROGRESS NOTE DETAILS
Emergency Medicine / Medical Toxicology Attending MD Santillan:    Patient signed out to me by Dr. Claudio.  43-year-old female, exhibiting symptoms of grandiosity tangential thought process.  Awaiting evaluation by psychiatry.  Screening labs are unremarkable.  Screening ECG is also unremarkable. Az Strickland MD (PGY3):  psych consulted MD Santillan:  patient signed out to me by Dr. Claudio.  Patient exhibiting Sx concerning for psychosis.  Medically cleared.  Cleared by psychiatry.  Going back to back to Jennifer Garcia [facility], [phone]

## 2024-01-21 NOTE — ED BEHAVIORAL HEALTH ASSESSMENT NOTE - RISK ASSESSMENT
risk factors: psychotic illness, mood disorder, hx of suicide attempts, multiple past psych admissions, hx of aggression     protective: future oriented, able to safety plan, not suicidal, engaged in treatment, supportive family, domiciled in supportive housing,

## 2024-01-21 NOTE — ED PROVIDER NOTE - PATIENT PORTAL LINK FT
You can access the FollowMyHealth Patient Portal offered by [facility] by registering at the following website: [email or web address]. By joining [facility]’s FollowMyHealth portal, you will also be able to view your health information using other applications (apps) compatible with our system.

## 2024-01-21 NOTE — ED BEHAVIORAL HEALTH ASSESSMENT NOTE - NSSUICPROTFACT_PSY_ALL_CORE
Responsibility to children, family, or others/Identifies reasons for living/Supportive social network of family or friends/Fear of death or the actual act of killing self/Cultural, spiritual and/or moral attitudes against suicide/Positive therapeutic relationships/Christianity beliefs

## 2024-01-21 NOTE — ED PROVIDER NOTE - PHYSICAL EXAMINATION
General: Alert and Orientated x 3. No apparent distress.  Head: Normocephalic and atraumatic.  Eyes: PERRLA with EOMI.  Neck: Supple. Trachea midline.   Cardiac: Normal S1 and S2 w/ RRR. No murmurs appreciated. No JVD appreciated.  Pulmonary: CTA bilaterally. No increased WOB. No wheezes or crackles.  Abdominal: Soft, non-tender. (+) bowel sounds appreciated in all 4 quadrants. No hepatosplenomegaly.   Neurologic: No focal sensory or motor deficits.  Musculoskeletal: Strength appropriate in all 4 extremities for age with no limited ROM.  Skin: Color appropriate for race. Intact, warm, and well-perfused.  Psychiatric:  no aggression, disorganized thoughts, endorsing SI

## 2024-01-21 NOTE — ED PROVIDER NOTE - NS ED ROS FT
CONSTITUTIONAL: No fevers, no chills, no lightheadedness, no dizziness  EYES: no visual changes, no eye pain  EARS: no ear drainage, no ear pain, no change in hearing  NOSE: no nasal congestion  MOUTH/THROAT: no sore throat  CV: No chest pain, no palpitations  RESP: No SOB, no cough  GI: No n/v/d, no abd pain  : no dysuria, no hematuria, no flank pain  MSK: no back pain, no extremity pain  SKIN: no rashes  NEURO: no headache, no focal weakness, no decreased sensation/parasthesias   PSYCHIATRIC:   See HPI

## 2024-01-21 NOTE — ED PROVIDER NOTE - OBJECTIVE STATEMENT
43-year-old female with history of  bipolar disorder, borderline personality disorder, depression presenting to ED from [facility] for SI.  Unclear of what exactly happened at facility but per EMS patient endorsing suicide ideation.  Has attempted in the past by taking digoxin.  This was in 2012.  On assessment patient she is not aggressive.  States that she wanted to cut off her breast due to a cut on them.  unable to obtain comprehensive HPI from patient.  she continues to ask questions regarding suicide ideation and attempts and how it is  against the right of people to be stopped from harming themselves.

## 2024-01-21 NOTE — ED BEHAVIORAL HEALTH ASSESSMENT NOTE - PATIENT'S CHIEF COMPLAINT
"I didn't actually want to cut off my breasts, I'm not stupid, that would kill me.  I just have a lot of back pain."

## 2024-01-21 NOTE — ED PROVIDER NOTE - CLINICAL SUMMARY MEDICAL DECISION MAKING FREE TEXT BOX
Patient with history of borderline personality disorder, bipolar disorder, depression coming from [place] for SI.  Patient also presenting with disorganized thoughts.  patient endorsing SI, stating that she does not understand why people stop other people from harming herself.  Unclear of what exactly happened to [employer].  Unclear of any ingestions.  Vital stable.  will obtain psych clearance labs, psych consult and disposition pending psych evaluation.
declines

## 2024-01-21 NOTE — ED PROVIDER NOTE - NSFOLLOWUPINSTRUCTIONS_ED_ALL_ED_FT
Advance activity as tolerated.  Continue all previously prescribed medications as directed unless otherwise instructed.  Follow up with your primary care physician in 48-72 hours- bring copies of your results.  Return to the ER for worsening or persistent symptoms, and/or ANY NEW OR CONCERNING SYMPTOMS. If you have issues obtaining follow up, please call: [phone] to obtain a doctor or specialist who takes your insurance in your area.  You may call [phone] to make an appointment with the internal medicine clinic.      -  Follow up tomorrow with psychiatrist, Dr. Cruz and psychologist Dr. Garcia at [facility] tomorrow 1/22   - continue home medications as prescribed by Dr. Cruz

## 2024-01-21 NOTE — BH INPATIENT PSYCHIATRY ASSESSMENT NOTE - REASONS: WHAT REASONS DID YOU HAVE FOR THINKING ABOUT WANTING TO DIE OR KILLING YOURSELF? WAS IT TO END THE PAIN, STOP THE WAY YOU WERE FEELING (BECAUSE YOU COULDN’T STAND THE WAY YOU FELT), GET ATTENTION, REVENGE OR A REACTION FROM OTHERS? OR BOTH?
May try using the steroid daily with breakfast for 3 days to reduce the inflammation caused by the airways. It may or may not be helpful     Use plenty of nasal saline, bedside humidification.     I did recommend testing for the viral panel today- but you declined the testing. I cannot rule out RSV, Influenza or covid.     Please keep child away from vulnerable populations (elderly and infants) while coughing and if fever is present.     If worsening respiratory distress, retracting in the ribs, difficulty breathing go to the ER.        (0) Does not apply

## 2024-01-21 NOTE — BH SAFETY PLAN - ENVIRONMENT SAFETY 2:
lock up all medications, supplements or over the counters meds and keep them out of reach of Janessa

## 2024-01-21 NOTE — ED ADULT NURSE NOTE - OBJECTIVE STATEMENT
44 y/o F A&Ox3 PMH bipolar disorder, boderline personality disorder, depression denies PSH presents to the ED via EMS c/o suicidal thoughts. Pt states today she told staff at [facility] that she "wanted to cut off my breasts and bleed to death." Pt states she has "thoughts of hurting herself" and states "what is the meaning of life? Is this real?" Pt denies HI. Pt completely undressed and wanded by security. Valuables secured in patient envelope and given to red arely, envelope # [id number]. Pt placed on 1:1. Comfort & safety provided.

## 2024-01-22 VITALS
OXYGEN SATURATION: 100 % | TEMPERATURE: 98 F | RESPIRATION RATE: 20 BRPM | SYSTOLIC BLOOD PRESSURE: 144 MMHG | HEART RATE: 74 BPM | DIASTOLIC BLOOD PRESSURE: 75 MMHG

## 2024-01-22 NOTE — BH PATIENT PROFILE - NSDYSPHAGRISKASSESS_PSY_ALL_CORE
Pt will adhere to and recall LUE WBP/ROM precautions with 100% accuracy in 1-2 tx sessions to enhance safety with ADL tasks. Yes, patient not at risk

## 2024-01-23 NOTE — DISCHARGE NOTE NURSING/CASE MANAGEMENT/SOCIAL WORK - NSDPACMPNY_GEN_ALL_CORE
Family Alert-The patient is alert, awake and responds to voice. The patient is oriented to time, place, and person. The triage nurse is able to obtain subjective information.

## 2024-02-02 ENCOUNTER — EMERGENCY (EMERGENCY)
Facility: HOSPITAL | Age: 44
LOS: 1 days | Discharge: HOME | End: 2024-02-02
Attending: EMERGENCY MEDICINE | Admitting: EMERGENCY MEDICINE
Payer: MEDICARE

## 2024-02-02 VITALS
DIASTOLIC BLOOD PRESSURE: 99 MMHG | RESPIRATION RATE: 18 BRPM | HEART RATE: 75 BPM | TEMPERATURE: 98 F | SYSTOLIC BLOOD PRESSURE: 152 MMHG | OXYGEN SATURATION: 100 %

## 2024-02-02 VITALS
HEIGHT: 66 IN | TEMPERATURE: 99 F | DIASTOLIC BLOOD PRESSURE: 91 MMHG | HEART RATE: 87 BPM | SYSTOLIC BLOOD PRESSURE: 146 MMHG | RESPIRATION RATE: 18 BRPM | OXYGEN SATURATION: 99 %

## 2024-02-02 LAB
ALBUMIN SERPL ELPH-MCNC: 3.6 G/DL — SIGNIFICANT CHANGE UP (ref 3.3–5)
ALP SERPL-CCNC: 127 U/L — HIGH (ref 40–120)
ALT FLD-CCNC: 23 U/L — SIGNIFICANT CHANGE UP (ref 4–33)
ANION GAP SERPL CALC-SCNC: 14 MMOL/L — SIGNIFICANT CHANGE UP (ref 7–14)
AST SERPL-CCNC: 35 U/L — HIGH (ref 4–32)
BASE EXCESS BLDV CALC-SCNC: 0.7 MMOL/L — SIGNIFICANT CHANGE UP (ref -2–3)
BASOPHILS # BLD AUTO: 0.02 K/UL — SIGNIFICANT CHANGE UP (ref 0–0.2)
BASOPHILS NFR BLD AUTO: 0.3 % — SIGNIFICANT CHANGE UP (ref 0–2)
BILIRUB SERPL-MCNC: 0.2 MG/DL — SIGNIFICANT CHANGE UP (ref 0.2–1.2)
BLOOD GAS VENOUS COMPREHENSIVE RESULT: SIGNIFICANT CHANGE UP
BUN SERPL-MCNC: 7 MG/DL — SIGNIFICANT CHANGE UP (ref 7–23)
CALCIUM SERPL-MCNC: 9.7 MG/DL — SIGNIFICANT CHANGE UP (ref 8.4–10.5)
CHLORIDE BLDV-SCNC: 108 MMOL/L — SIGNIFICANT CHANGE UP (ref 96–108)
CHLORIDE SERPL-SCNC: 105 MMOL/L — SIGNIFICANT CHANGE UP (ref 98–107)
CO2 BLDV-SCNC: 29 MMOL/L — HIGH (ref 22–26)
CO2 SERPL-SCNC: 21 MMOL/L — LOW (ref 22–31)
CREAT SERPL-MCNC: 0.71 MG/DL — SIGNIFICANT CHANGE UP (ref 0.5–1.3)
EGFR: 107 ML/MIN/1.73M2 — SIGNIFICANT CHANGE UP
EOSINOPHIL # BLD AUTO: 0.13 K/UL — SIGNIFICANT CHANGE UP (ref 0–0.5)
EOSINOPHIL NFR BLD AUTO: 2 % — SIGNIFICANT CHANGE UP (ref 0–6)
GAS PNL BLDV: 136 MMOL/L — SIGNIFICANT CHANGE UP (ref 136–145)
GAS PNL BLDV: SIGNIFICANT CHANGE UP
GLUCOSE BLDV-MCNC: 86 MG/DL — SIGNIFICANT CHANGE UP (ref 70–99)
GLUCOSE SERPL-MCNC: 87 MG/DL — SIGNIFICANT CHANGE UP (ref 70–99)
HCG SERPL-ACNC: 1.1 MIU/ML — SIGNIFICANT CHANGE UP
HCO3 BLDV-SCNC: 27 MMOL/L — SIGNIFICANT CHANGE UP (ref 22–29)
HCT VFR BLD CALC: 40.4 % — SIGNIFICANT CHANGE UP (ref 34.5–45)
HCT VFR BLDA CALC: 38 % — SIGNIFICANT CHANGE UP (ref 34.5–46.5)
HGB BLD CALC-MCNC: 12.6 G/DL — SIGNIFICANT CHANGE UP (ref 11.7–16.1)
HGB BLD-MCNC: 12.2 G/DL — SIGNIFICANT CHANGE UP (ref 11.5–15.5)
HIV 1+2 AB+HIV1 P24 AG SERPL QL IA: SIGNIFICANT CHANGE UP
IANC: 3.6 K/UL — SIGNIFICANT CHANGE UP (ref 1.8–7.4)
IMM GRANULOCYTES NFR BLD AUTO: 0.3 % — SIGNIFICANT CHANGE UP (ref 0–0.9)
LACTATE BLDV-MCNC: 1.5 MMOL/L — SIGNIFICANT CHANGE UP (ref 0.5–2)
LIDOCAIN IGE QN: 18 U/L — SIGNIFICANT CHANGE UP (ref 7–60)
LYMPHOCYTES # BLD AUTO: 2.29 K/UL — SIGNIFICANT CHANGE UP (ref 1–3.3)
LYMPHOCYTES # BLD AUTO: 35.1 % — SIGNIFICANT CHANGE UP (ref 13–44)
MCHC RBC-ENTMCNC: 27.9 PG — SIGNIFICANT CHANGE UP (ref 27–34)
MCHC RBC-ENTMCNC: 30.2 GM/DL — LOW (ref 32–36)
MCV RBC AUTO: 92.4 FL — SIGNIFICANT CHANGE UP (ref 80–100)
MONOCYTES # BLD AUTO: 0.46 K/UL — SIGNIFICANT CHANGE UP (ref 0–0.9)
MONOCYTES NFR BLD AUTO: 7.1 % — SIGNIFICANT CHANGE UP (ref 2–14)
NEUTROPHILS # BLD AUTO: 3.6 K/UL — SIGNIFICANT CHANGE UP (ref 1.8–7.4)
NEUTROPHILS NFR BLD AUTO: 55.2 % — SIGNIFICANT CHANGE UP (ref 43–77)
NRBC # BLD: 0 /100 WBCS — SIGNIFICANT CHANGE UP (ref 0–0)
NRBC # FLD: 0 K/UL — SIGNIFICANT CHANGE UP (ref 0–0)
PCO2 BLDV: 52 MMHG — SIGNIFICANT CHANGE UP (ref 39–52)
PH BLDV: 7.33 — SIGNIFICANT CHANGE UP (ref 7.32–7.43)
PLATELET # BLD AUTO: 252 K/UL — SIGNIFICANT CHANGE UP (ref 150–400)
PO2 BLDV: 86 MMHG — HIGH (ref 25–45)
POTASSIUM BLDV-SCNC: 5.1 MMOL/L — SIGNIFICANT CHANGE UP (ref 3.5–5.1)
POTASSIUM SERPL-MCNC: 5.1 MMOL/L — SIGNIFICANT CHANGE UP (ref 3.5–5.3)
POTASSIUM SERPL-SCNC: 5.1 MMOL/L — SIGNIFICANT CHANGE UP (ref 3.5–5.3)
PROT SERPL-MCNC: 7.9 G/DL — SIGNIFICANT CHANGE UP (ref 6–8.3)
RBC # BLD: 4.37 M/UL — SIGNIFICANT CHANGE UP (ref 3.8–5.2)
RBC # FLD: 14.7 % — HIGH (ref 10.3–14.5)
SAO2 % BLDV: 96.1 % — HIGH (ref 67–88)
SODIUM SERPL-SCNC: 140 MMOL/L — SIGNIFICANT CHANGE UP (ref 135–145)
WBC # BLD: 6.52 K/UL — SIGNIFICANT CHANGE UP (ref 3.8–10.5)
WBC # FLD AUTO: 6.52 K/UL — SIGNIFICANT CHANGE UP (ref 3.8–10.5)

## 2024-02-02 PROCEDURE — 71046 X-RAY EXAM CHEST 2 VIEWS: CPT | Mod: 26

## 2024-02-02 PROCEDURE — 76830 TRANSVAGINAL US NON-OB: CPT | Mod: 26

## 2024-02-02 PROCEDURE — 99284 EMERGENCY DEPT VISIT MOD MDM: CPT

## 2024-02-02 RX ORDER — KETOROLAC TROMETHAMINE 30 MG/ML
15 SYRINGE (ML) INJECTION ONCE
Refills: 0 | Status: DISCONTINUED | OUTPATIENT
Start: 2024-02-02 | End: 2024-02-02

## 2024-02-02 RX ORDER — SODIUM CHLORIDE 9 MG/ML
1000 INJECTION INTRAMUSCULAR; INTRAVENOUS; SUBCUTANEOUS ONCE
Refills: 0 | Status: COMPLETED | OUTPATIENT
Start: 2024-02-02 | End: 2024-02-02

## 2024-02-02 NOTE — ED ADULT NURSE NOTE - CHIEF COMPLAINT QUOTE
Patient brought to ER by EMS from [place] [id number] with c/o abdominal pain times four days. States she has had blood in her vomitus.

## 2024-02-02 NOTE — CHART NOTE - NSCHARTNOTEFT_GEN_A_CORE
SW alerted by MD that pt is cleared for DC back to prior residence, [employer] [facility] [employer] [address] at [place]. SW contacted [employer] (p:[phone]) and spoke with Ambrosio who confirmed pt is able to return and that pt's mode of transportation is taxi. Medical provider in agreement with mode of transport. Verbal huddle regarding coordination of care completed with team.     SW arranged trip through [email or web address] portal for next available pickup (Inv# [id number]). Trip assigned to [facility] for 4:15pm pickup. No other SW needs identified at this time.

## 2024-02-02 NOTE — ED BEHAVIORAL HEALTH ASSESSMENT NOTE - PROFESSIONAL COLLATERAL PHONE
----- Message from Dianna Dennis sent at 2/2/2024  4:42 PM CST -----  Contact: Levy  Type:  Patient Returning Call    Who Called:Levy  Who Left Message for Patient:Nurse  Does the patient know what this is regarding?:missed call  Would the patient rather a call back or a response via [employer]? Call back  Best Call Back Number:     Additional Information: Levy missed a call and would like a call back    Thanks  LJ         residence [phone] then option 421 for  or 428 for T[place]anie // [email or web address] Ã¨[id number]˜ [employer] [phone] or [phone] [facility] [phone] then option 421 for

## 2024-02-02 NOTE — ED PROVIDER NOTE - CLINICAL SUMMARY MEDICAL DECISION MAKING FREE TEXT BOX
pt with n/v and abd pain  will get labs to assess renal, liver function will get cbc to look for elevated cbc will get us and ct scan to r.o abd pathology  will get ua to r.o uti will get ucg to r/o pregnancy if all neg will need transport for discharge

## 2024-02-02 NOTE — ED PROVIDER NOTE - NSFOLLOWUPINSTRUCTIONS_ED_ALL_ED_FT
Nausea and Vomiting, Adult  Nausea is the feeling that you have an upset stomach or that you are about to vomit. As nausea gets worse, it can lead to vomiting. Vomiting is when stomach contents forcefully come out of your mouth as a result of nausea. Vomiting can make you feel weak and cause you to become dehydrated.    Dehydration can make you feel tired and thirsty, cause you to have a dry mouth, and decrease how often you urinate. Older adults and people with other diseases or a weak disease-fighting system (immune system) are at higher risk for dehydration. It is important to treat your nausea and vomiting as told by your health care provider.    Follow these instructions at home:  Watch your symptoms for any changes. Tell your health care provider about them.    Eating and drinking    A bottle of clear fruit juice and glass of water.  A sign showing that a person should not drink alcohol.  Take an oral rehydration solution (ORS). This is a drink that is sold at pharmacies and retail stores.  Drink clear fluids slowly and in small amounts as you are able. Clear fluids include water, ice chips, low-calorie sports drinks, and fruit juice that has water added (diluted fruit juice).  Eat bland, easy-to-digest foods in small amounts as you are able. These foods include bananas, applesauce, rice, lean meats, toast, and crackers.  Avoid fluids that contain a lot of sugar or caffeine, such as energy drinks, sports drinks, and soda.  Avoid alcohol.  Avoid spicy or fatty foods.  General instructions    Take over-the-counter and prescription medicines only as told by your health care provider.  Drink enough fluid to keep your urine pale yellow.  Wash your hands often using soap and water for at least 20 seconds. If soap and water are not available, use hand .  Make sure that everyone in your household washes their hands well and often.  Rest at home while you recover.  Watch your condition for any changes.  Take slow and deep breaths when you feel nauseous.  Keep all follow-up visits. This is important.  Contact a health care provider if:  Your symptoms get worse.  You have new symptoms.  You have a fever.  You cannot drink fluids without vomiting.  Your nausea does not go away after 2 days.  You feel light-headed or dizzy.  You have a headache.  You have muscle cramps.  You have a rash.  You have pain while urinating.  Get help right away if:  You have pain in your chest, neck, arm, or jaw.  You feel extremely weak or you faint.  You have persistent vomiting.  You have vomit that is bright red or looks like black coffee grounds.  You have bloody or black stools (feces) or stools that look like tar.  You have a severe headache, a stiff neck, or both.  You have severe pain, cramping, or bloating in your abdomen.  You have difficulty breathing, or you are breathing very quickly.  Your heart is beating very quickly.  Your skin feels cold and clammy.  You feel confused.  You have signs of dehydration, such as:  Dark urine, very little urine, or no urine.  Cracked lips.  Dry mouth.  Sunken eyes.  Sleepiness.  Weakness.  These symptoms may be an emergency. Get help right away. Call 911.  Do not wait to see if the symptoms will go away.  Do not drive yourself to the hospital.  Summary  Nausea is the feeling that you have an upset stomach or that you are about to vomit. As nausea gets worse, it can lead to vomiting. Vomiting can make you feel weak and cause you to become dehydrated.  Follow instructions from your health care provider about eating and drinking to prevent dehydration.  Take over-the-counter and prescription medicines only as told by your health care provider.  Contact your health care provider if your symptoms get worse, or you have new symptoms.  Keep all follow-up visits. This is important.  This information is not intended to replace advice given to you by your health care provider. Make sure you discuss any questions you have with your health care provider.    Document Revised: 06/24/2022 Document Reviewed: 06/24/2022  [employer] Patient Education © 2023 [employer]  [employer] logo  Terms and Conditions  Privacy Policy  Editorial Policy  All content on this site: Copyright © 2024 [employer], its licensors, and contributors. All rights are reserved, including those for text and data mining, AI training, and similar technologies. For all open access content, the Creative Commons licensing terms apply.  Cookies are used by this site. To decline or learn more, visit our Cookies page.

## 2024-02-02 NOTE — ED PROVIDER NOTE - OBJECTIVE STATEMENT
43-year-old female, history of hypertension, asthma, GERD, uterine fibroids, schizoaffective disorder, bipolar disorder, depression, borderline personality disorder presenting for abdominal pain for 2 days.  Patient states that she had special K cereal and milk 2 days ago and started experiencing abdominal pain.  Associated with nausea and vomiting.  Had multiple episodes of vomiting before experiencing some bloody emesis.  Also complaining of cough.  Denies sick contacts.  Denies congestion.  No chest pain.  No shortness of breath.  No fever.  Reports cholecystectomy in the past.  Thinks that she still has her appendix.  Patient also reporting vaginal discharge that is different than normal.  Denies any history of STDs.  She is not currently sexually active last menstrual period was last month. 43-year-old female, history of hypertension, asthma, GERD, uterine fibroids, schizoaffective disorder, bipolar disorder, depression, borderline personality disorder presenting for abdominal pain for 2 days.  Patient states that she had special K cereal and milk 2 days ago and started experiencing abdominal pain.  Associated with nausea and vomiting.  Had multiple episodes of vomiting before experiencing some bloody emesis.  Also complaining of cough.  Denies sick contacts.  Denies congestion.  No chest pain.  No shortness of breath.  No fever.  Reports cholecystectomy in the past.  Thinks that she still has her appendix.  Patient also reporting vaginal discharge that is different than normal.  Denies any history of STDs.  She is not currently sexually active last menstrual period was last month.  margo pt with complaint of abd pain w n.v

## 2024-02-02 NOTE — ED PROVIDER NOTE - PROGRESS NOTE DETAILS
amoat  pt states no abd pain now no n.v   asking to eat and no longer wants to do ct scan  abd is soft ntnd pt looks well will do po trial if tolerates will dc back to [place] with ambulette Leydricah [place], DO (PGY1): patient tolerating PO. Requesting discharge. I updated her regarding results of her TVUS and advised her to follow up with her OB/GYN regarding the results. Patient verbalized understanding. Patient cleared for discharge. Questions answered.

## 2024-02-02 NOTE — ED PROVIDER NOTE - PHYSICAL EXAMINATION
margo   pt awake alert nad  listening to music and dancing in room  ncat  lungs clear  heart s1s2 rrr  abd soft ntnd  ext nl  skin nl

## 2024-02-02 NOTE — ED ADULT TRIAGE NOTE - CHIEF COMPLAINT QUOTE
Patient brought to ER by EMS from [place] [id number] with c/o abdominal pain times four days. States she has had blood in her vomitus.

## 2024-02-02 NOTE — ED PROVIDER NOTE - PATIENT PORTAL LINK FT
You can access the FollowMyHealth Patient Portal offered by [facility] by registering at the following website: [email or web address]. By joining [employer]’s FollowMyHealth portal, you will also be able to view your health information using other applications (apps) compatible with our system.

## 2024-02-08 ENCOUNTER — EMERGENCY (EMERGENCY)
Facility: HOSPITAL | Age: 44
LOS: 1 days | Discharge: HOME | End: 2024-02-08
Attending: EMERGENCY MEDICINE | Admitting: EMERGENCY MEDICINE
Payer: MEDICARE

## 2024-02-08 VITALS
HEART RATE: 67 BPM | WEIGHT: 231.04 LBS | HEIGHT: 66 IN | SYSTOLIC BLOOD PRESSURE: 133 MMHG | RESPIRATION RATE: 17 BRPM | DIASTOLIC BLOOD PRESSURE: 84 MMHG | TEMPERATURE: 99 F | OXYGEN SATURATION: 99 %

## 2024-02-08 PROCEDURE — 73600 X-RAY EXAM OF ANKLE: CPT | Mod: 26,LT

## 2024-02-08 PROCEDURE — 99284 EMERGENCY DEPT VISIT MOD MDM: CPT

## 2024-02-08 RX ORDER — ACETAMINOPHEN 500 MG
650 TABLET ORAL ONCE
Refills: 0 | Status: COMPLETED | OUTPATIENT
Start: 2024-02-08 | End: 2024-02-08

## 2024-02-08 RX ADMIN — Medication 650 MILLIGRAM(S): at 18:38

## 2024-02-08 NOTE — ED ADULT NURSE NOTE - OBJECTIVE STATEMENT
Patient received in stretcher. AOX4. Respirations even and unlabored. Spontaneous movement of all extremities noted. Presents to ER c/o L ankle pain since this AM. Patient reports she was walking around at about 11AM when she noticed swelling to his L ankle. Swelling noted to L lateral and medial ankle. No redness or warmth noted + pulse + sensation. Medicated as per MD orders. Comfort and safety maintained. All current care needs met. Care plan continued Kylah YOUNGBLOOD

## 2024-02-08 NOTE — ED PROVIDER NOTE - CLINICAL SUMMARY MEDICAL DECISION MAKING FREE TEXT BOX
A/P 43-year-old female with multiple medical problems presents with atraumatic left ankle pain.  Exam noted for normal gait however lateral malleolus point tenderness.  No soft tissue swelling or ecchymosis.  Plan: Analgesia, x-ray,

## 2024-02-08 NOTE — ED PROVIDER NOTE - PHYSICAL EXAMINATION
Postpartum Maternal Virtual Visit Form  Donna Linder  [id number]  @22 1515      Prenatal, Intrapartum, Postpartum Summary:       OB Provider: [facility]        Other Services Used: W[employer] and [facility] ([facility])        Prenatal Diagnoses: Hypertension and Gestational Diabetes    : 2     Para: 2     [place] Gender: female       Medications: Cannot display prior to admission medications because the patient has not been admitted in this contact.         Tylenol, motrin, has resumed home meds and regular insulin dosages.       Labor/Delivery Complications: Increased bleeding after surgery requiring ex lap, Covid pos after delivery    Gestation at birth: 38.1    Postpartum Complications: delayed hemorrhage    Delivery Method:  Section    Pain Relief Options During Hospital Stay: oral pain medication and spinal for surgery     Smoking Status: Never smoker    Substance Use: Denies substance use/abuse       [place] Feeding Plan: breastfeeding and formula:   Similac Advance     Primary Language: English            Postpartum Depression Scale:       I am able to laugh and see the funny side of things: 0 = As much as I ever did    I can look forward to things with enjoyment: 0 = As much as I ever did    I blame myself unnecessarily when things went wron = Sometimes    I find myself anxious of worried for no good reason: 0 = Never    I feel scared or panicky for no good reason: 0 = Never    Things have been getting on top of me: 0 = Never    I have been so unhappy that I have difficulty sleepin = Never    I feel (or have felt) sad or miserable: 0 = Never    I have been so unhappy that I have been cryin = Not very often    I have had thoughts of harming myself: 0 = Never    Postpartum Depression Scale Total Score: 3          Pain/Comfort/Sleep:     Pain Rating (Numeric Scale 1 - 10)  Current :  2    At Rest:  0    With Activity:  2    Acceptable Pain  Level:  4    Pain Location: low back and abdomen    Pain Description: aching    Pain Management Strategies (How do you treat your pain?): heating pad and tylenol or motrin         Coping/Psychosocial:       Verbalized Emotional State:  acceptance    Family/Support Network: significant other    Level of Involvement in Care: attentive       Safety:       Do you feel comfortable in your relationship with your baby?:  Yes    Have members of your household adjusted to your baby?: Yes    Is the baby's father supportive and/or involved with the baby?: Yes    How does your partner feel about the baby?: happy     Do you feel safe at home, school and work?: Yes    Are you in a relationship with someone who threatens you or hurts you?: No    Do you have the resources to keep yourself and your baby healthy and safe?: Yes       Review of Systems:       Negative except reports some shortness of air with activity, Dr Cummins aware and has recommended a pulmonary follow up post Covid.       Reproductive:       Lochia (per patient report):              Color:  brownish-red              Amount:  scant              Odor:  none    Breast (per patient report):              Left Breast:  nontender              Right Breast:   nontender              Left Nipple:  intact              Right Nipple:  intact              Breast Care:  supportive bra       Provider Notification:            Education Discussion:       Postpartum Depression Screening:  Education provided    Peripheral Blood Glucose:  Education provided    Doctor Appointments:   Education provided    Car Seat Safety:  Education provided    Immunization Schedule:  Education provided    Breast Feeding:  Education provided    Infant Safety:  Education provided    Family Planning:  Education provided    Postpartum Care:  Education provided    S&S to report:  Education provided    Comments:  Encouraged pulmonology and endocrinology follow ups in addition to OB.       Follow-Up  Appoinments:       Follow-up Appointments made:  No    Well Child Visit Appointment made:  No       Visit Summary:       Visit Summary:  Visit completed: No referral needed       Additional Notes:         Clari Peterson RN,  22 - 3:19 PM CST        Postpartum/[place] In-Home Assessment Form -     Donna Linder - [id number] - 22 1515      Prenatal, Intrapartum, Postpartum Summary:       Pediatrician: Nicol Kelly           Prenatal Diagnoses: Hypertension and Gestational Diabetes    [place] Gender: female       Discharge Medications: none       Labor/Delivery Complications: None    Gestation at birth: 38.1    [place] Complications:  nicu admission due to IDM    Delivery Method:  Section    [place] Feeding Method: breastfeeding and formula:   Similac Advance                Head to Toe:       Comfort/Sleep:            Reported Sleep Symptoms:  Sleeping well, waking appropriately for feeds.         Pacifier Use:  yes       Coping/Psychosocial:        Mother participation in care:  bathing, diaper change, dressing, feeding, holding and responds to infant cues         Father participation in care:  Attentive per mother's report    Family/Support Network: significant other, family and grandparent       Safety:        Do you feel comfortable in your relationship with your baby?:  Yes    Have members of your household adjusted to your baby?: Yes    Is the baby's father supportive and/or involved with the baby?: Yes    How does your partner feel about the baby?: happy     Do you feel safe at home, school and work?: Yes    Are you in a relationship with someone who threatens you or hurts you?: No    Do you have the resources to keep yourself and your baby healthy and safe?: Yes       Systems Review:       HEENT:  No reported signs or symptoms    Eye/Ear/Nose:  No reported signs or symptoms    Mouth:  No reported signs or symptoms    Cognitive/Neuro:  No reported signs or  symptoms    Respiratory:  No reported signs or symptoms    Cardiac:  No reported signs or symptoms    Neurovascular:  No reported signs or symptoms    Gastrointestinal:  No reported signs or symptoms    Genitourinary:    Skin:  No reported signs or symptoms    Breast:  No reported signs or symptoms    Umbilical Cord:  No reported signs or symptoms       Cord Appearance:  Clean and dry       Cord Care:  Alcohol wipes 3 times a day as needed    Musculoskeletal:  No reported signs or symptoms        Nutrition / LATCH Score          Intake/Output:       Intake:       Breastfeeding Section:            Breastfeeding Time (min) - Left:  Not latching well            Breastfeeding Time (min) - Right:  10-15            Total feedings X 24 hrs:  8-10         Formula Feeding:             Formula Type:  Similac with Iron            Formula rosa/oz:  20            Formula - PO (mL):  30-60            Total feedings X 24 hrs:  8-10    Output:       Number wet diapers X 24 hrs:  4-6       Last BM X 24 hrs:  2-3            Emesis (Unmeasured Occurrence):  denies        Hyperbilirubinemia Risk Assessment:     Major Risk Factors:   • Predischarge TSB or TcB in the high risk zone:  No  • Jaundice in first 24 hours:   No  • Blood group incompatibility with positive direct antiglobulin test:  No  • Any known hemolytic disease:   No  • Gestational age 35 - 36 weeks:   No  • Previous sibling received phototherapy:   No  • Cephalohematoma or significant bruising:   No  • Exclusive breastfeeding, particularly if nursing is not going well:   No  • Excessive weight loss with breastfeeding:   No  • East  race:   No    Minor Risk Factors:  • Predischarge TSB or TcB in the high - intermediate zone:   No  • Gestational age 37 - 38 weeks:   No  • Jaundice observed before discharge:   No  • Previous sibling with jaundice:   No  • Macrosomic infant of a diabetic mother:   No  • Maternal age > 24 years old:   No  • Male gender:   No    The  responses to the Hyperbilirubinemia Risk Assessment place the patient in the following risk zone: Low risk zone     Safe Sleep:       What safe sleep surface is available?:  bassinet    Are there stuffed animals, toys, pillows, quilts, blankets, wedeges, positioners, bumpers or other loose bedding in the infant's sleep environment?:  no    Where does the baby usually sleep?:  bassinet  (Sleep environment should be placed away from any item that could burn, cut or become wrapped around your baby)    Does baby ever share a sleep surface with a sibling, adult or pet?:  no    Does baby ever share a sleep surface in a bed, couch, recliner or other?:  no    What position do you place your baby to sleep? - For Naps: back   At Night: back      Are you and/or other caregivers smoking inside or outside the baby's home?:no    If you smoke outside, do you change your clothes before holding your baby?:  yes    Is the infant dressed appropriately for the temperature of the home?:  yes    Is the infant breastfeeding?:  both breast and bottle    Do you use a clean, dry pacifier that is not attached to a string or stuffed animal?:  no       Education Discussion:       Doctor Appointments:   Education provided    Car Seat Safety:  Education provided    In Home Safety:  Education provided    Infant Safety:  Education provided    Feeding/Feeding Safety:  Education provided    Community Resources:  Education provided    S&S to report:  Education provided           Follow-Up Appoinments:              Visit Summary:       Visit Summary:  Visit completed: No referral needed       Additional Notes:              Clari Peterson RN,  02/02/22 - 3:19 PM CST           Attending/Nima: Well-appearing, NAD; PERRL/EOMI, non-icterus, supple, no VALENTE, no JVD, RRR, CTAB; Abd-soft, NT/ND, no HSM; no LE edema, Left ankle-no swelling, +lat PT, no ecchymosis or  STS; A&Ox3, nonfocal; Skin-warm/dry

## 2024-02-08 NOTE — ED ADULT TRIAGE NOTE - CHIEF COMPLAINT QUOTE
arrives from [facility] c/o L ankle pain and swelling 1+. Atraumatic. denies injuries/falls began this morning. difficulty bearing weight due to pain Hx. schizophrenia, DM2, boderline personality disorder calm and cooperative in triage  in triage

## 2024-02-08 NOTE — ED PROVIDER NOTE - IV ALTEPLASE ADMIN OUTSIDE HIDDEN
pt report given to ERICA Adam in [facility], RN had no additional questions after report was given. pt has no complaints / discomforts right now. vss as noted. show

## 2024-02-08 NOTE — ED ADULT NURSE NOTE - CHIEF COMPLAINT QUOTE
arrives from [facility] c/o L ankle pain and swelling 1+. Atraumatic. denies injuries/falls began this morning. difficulty bearing weight due to pain Hx. schizophrenia, DM2, boderline personality disorder calm and cooperative in triage  in triage

## 2024-02-08 NOTE — ED PROVIDER NOTE - PATIENT PORTAL LINK FT
You can access the FollowMyHealth Patient Portal offered by [facility] by registering at the following website: [email or web address]. By joining [employer]’s FollowMyHealth portal, you will also be able to view your health information using other applications (apps) compatible with our system.

## 2024-02-08 NOTE — PROVIDER CONTACT NOTE (OTHER) - ASSESSMENT
pt is cleared for DC back to prior residence, [employer] [address] at [place]. SW contacted [employer] (p:[phone]) and spoke with Branden who confirmed pt is able to return and that pt's mode of transportation is taxi. Medical provider in agreement with mode of transport. Verbal huddle regarding coordination of care completed with team.

## 2024-02-08 NOTE — ED ADULT NURSE NOTE - NSFALLUNIVINTERV_ED_ALL_ED
. Bed/Stretcher in lowest position, wheels locked, appropriate side rails in place/Call bell, personal items and telephone in reach/Instruct patient to call for assistance before getting out of bed/chair/stretcher/Non-slip footwear applied when patient is off stretcher/[place] to call system/Physically safe environment - no spills, clutter or unnecessary equipment/Purposeful proactive rounding/Room/bathroom lighting operational, light cord in reach

## 2024-02-08 NOTE — ED PROVIDER NOTE - OBJECTIVE STATEMENT
Attending/Nima: 42 yo F [place] resident, h/o  hypertension, asthma, GERD, uterine fibroids, schizoaffective disorder, bipolar disorder, depression, borderline personality disorder Attending/Nima: 42 yo F [place] resident, h/o  hypertension, asthma, GERD, uterine fibroids, schizoaffective disorder, bipolar disorder, depression, borderline personality disorder p/w left ankle pain. Pt reports today ~1100 of left ankle pain, atraumatic. Denies swelling, weakness, parenthesis, or lightheadedness..

## 2024-02-15 NOTE — ED PROVIDER NOTE - CLINICAL SUMMARY MEDICAL DECISION MAKING FREE TEXT BOX
Medical evaluation performed. There is no clinical evidence of intoxication or any acute medical problem requiring immediate intervention. negative 43 y/o F  hx BPD, Bipolar D/O  Medical evaluation performed. There is no clinical evidence of intoxication or any acute medical problem requiring immediate intervention. SW consulted. D/C to [facility] normal/soft/nontender/nondistended/normal active bowel sounds

## 2024-02-22 NOTE — ED BEHAVIORAL HEALTH ASSESSMENT NOTE - AFFECT QUALITY
Euthymic
Continue Regimen: Cephalexin 500 TID as directed by Dr Og and mupirocin oint BID x 14 days to excision
Detail Level: Zone
Render In Strict Bullet Format?: No

## 2024-02-25 NOTE — ED ADULT TRIAGE NOTE - BH ALERT MESSAGE
No care due was identified.  [facility] Embedded Care Due Messages. Reference number: [id number].   2/25/2024 12:15:09 PM CST   “Initiate CONSTANT OBSERVATION and Contact Provider”

## 2024-02-26 NOTE — ED ADULT TRIAGE NOTE - NSTRIAGECARE_GEN_A_ER
EKG
PROVIDER:[TOKEN:[[id number]],FOLLOWUP:[1-3 days]],PROVIDER:[TOKEN:[[id number]],FOLLOWUP:[1 week]]

## 2024-02-28 NOTE — BH INPATIENT PSYCHIATRY PROGRESS NOTE - NSBHFUPINTERVALHXFT_PSY_A_CORE
28-Feb-2024 15:20 Chart reviewed, and case discussed with treatment team. No behavioral issues over the weekend, no prn’s for aggression.  No clinical worsening. Patient remains compliant with all standing medications. No SE noted.  Good sleep and appetite reported.  Patient reports feeling “anxious” reports she is ready to be transferred to [facility], patient inquired about the process. Patient is explained that she has been accepted and just waiting for bed.    Patient seems a bit irritable, frustrated.  She remains illogical, loosely associated. delusional, psychotic disorganization, bizarre, with internal preoccupied. Patient seems to be at baseline, symptomatically stable. No aggression on unit, no prn for aggression. Denies SI/HI. Waiting for bed at [facility].

## 2024-02-28 NOTE — ED ADULT NURSE NOTE - SUICIDE SCREENING QUESTION 1
Quality 130: Documentation Of Current Medications In The Medical Record: Current Medications Documented Detail Level: Detailed Quality 431: Preventive Care And Screening: Unhealthy Alcohol Use - Screening: Patient not identified as an unhealthy alcohol user when screened for unhealthy alcohol use using a systematic screening method Quality 226: Preventive Care And Screening: Tobacco Use: Screening And Cessation Intervention: Patient screened for tobacco use and is an ex/non-smoker Patient refused

## 2024-03-02 NOTE — ED ADULT NURSE NOTE - NS_BH TRG QUESTION4_ED_ALL_ED
[Benefits of weight loss discussed] : Benefits of weight loss discussed [Potential consequences of obesity discussed] : Potential consequences of obesity discussed [Encouraged to increase physical activity] : Encouraged to increase physical activity unk No

## 2024-03-27 NOTE — ED BEHAVIORAL HEALTH ASSESSMENT NOTE - NS ED BHA MED ROS RESPIRATORY
[facility]  [facility], [address]  [place] [address]  Phone: [phone]  Fax: [phone]      Patient: Madhuri Reyna                                                                              MRN: [id number]        YOB: 1960          AGE: 63 y.o.             PCP: Moira Storey,   Date:  03/27/24    Reason for Consultation: Follow-up (back)      HPI:  Madhuri Reyna is a 63 y.o. female with relevant PMH of scoliosis  L3-5 PSI fusion fusion in 1/3/2017 who presented with low back pain and gluteal pain.  She did well post op but in 2020 her pain returned.  She intermittently has pain radiating down her right leg with numbness and tingling - she had some chiropractic treatments which helped.  She has recently started PT.  CT lumbar spine without any significant spinal stenosis or nerve impingement. She has started cymbalta which seems to help with her gluteal pain- when pain is worse she takes it twice per day.  . She also has pain on her left lateral hip which makes it difficult for her to lie on her left side.  We tried a left GT bursa injection which has helped tremendously with left lateral hip pain. She returned with more posterior pain and we did a TPI which was helpful as well.  Recently her lateral hip pains has returned.  She has been on the boat with her  who recently had a CVA.  2/2024 we did a left GT bursa injection which again helped significantly.    Overall today she is doing well.      Works as - retired    Neurologic symptoms: numbness, tingling weakness, bowel or bladder changes.  No recent falls      Location: The pain is located in the right gluteal   2. Left lateral hip radiating down the left leg.    Radiation: The pain does radiate down right leg posterior thigh towards the foot with tinglingt- has improved .    Pain Score: Currently: 8/10    Quality: Pain is of a aching, stabbing, 
No complaints

## 2024-04-01 NOTE — ED ADULT TRIAGE NOTE - NS ED NURSE AMBULANCES
"The patient's goals for the shift include  \"feel better\"     The clinical goals for the shift include pt will remain hemodynamically stable, nausea will remain well controlled, pt will have a good nights sleep      Problem: Nutrition  Goal: Electrolytes WNL  Outcome: Progressing     Problem: Pain - Adult  Goal: Verbalizes/displays adequate comfort level or baseline comfort level  Outcome: Progressing  Flowsheets (Taken 3/29/2024 2002)  Verbalizes/displays adequate comfort level or baseline comfort level:   Encourage patient to monitor pain and request assistance   Administer analgesics based on type and severity of pain and evaluate response   Assess pain using appropriate pain scale   Implement non-pharmacological measures as appropriate and evaluate response   Consider cultural and social influences on pain and pain management   Notify Licensed Independent Practitioner if interventions unsuccessful or patient reports new pain     Problem: Chronic Conditions and Co-morbidities  Goal: Patient's chronic conditions and co-morbidity symptoms are monitored and maintained or improved  Outcome: Progressing  Flowsheets (Taken 3/29/2024 2002)  Care Plan - Patient's Chronic Conditions and Co-Morbidity Symptoms are Monitored and Maintained or Improved:   Collaborate with multidisciplinary team to address chronic and comorbid conditions and prevent exacerbation or deterioration   Monitor and assess patient's chronic conditions and comorbid symptoms for stability, deterioration, or improvement   Update acute care plan with appropriate goals if chronic or comorbid symptoms are exacerbated and prevent overall improvement and discharge     Problem: Skin  Goal: Prevent/minimize sheer/friction injuries  Outcome: Progressing  Flowsheets (Taken 3/29/2024 2002)  Prevent/minimize sheer/friction injuries:   Complete micro-shifts as needed if patient unable. Adjust patient position to relieve pressure points, not a full turn   Use pull " sheet   HOB 30 degrees or less   Turn/reposition every 2 hours/use positioning/transfer devices   Increase activity/out of bed for meals     Problem: Resident has compromised skin integrity  Goal: I will maintain or improve skin integrity  Outcome: Progressing  Note: Frequent turns and repositioning encouraged         [facility]

## 2024-04-26 NOTE — PSYCHIATRIC REHAB INITIAL EVALUATION - NSGHSEXGENDERID_PSY_ALL_CORE
Please keep appointment will discuss labs at the the time of the visit
Please keep appointment will discuss labs at the the time of the visit
Female
no

## 2024-04-30 NOTE — ED ADULT NURSE NOTE - HAVE YOU HAD A FIRST COVID-19 BOOSTER?
Received call back from patient regarding referral for Advance Care Planning and [facility] assistance. Patient states that she has some trouble affording co-pays. Patient states that she had a bill for over $200 and before she could get her application in for [facility] assistance, the bill went into collections. Currently patient has a bill with [facility] for unpaid co-pays and she would like to try and apply for financial assistance now. I will mail out application for [facility]. Reminded patient that she will need to have copy of bank statements and income verification. Patient verbalized understanding. Also patient agreed with moving forward with Advance Directive. Will mail out Advance. Will connect with patient next week for follow up.
Unable to leave message on cell voice mail, no voicemail set up. LM on home number which is EC (Mamadou) asked EC to have pt contact office to discuss results.   
No

## 2024-05-09 NOTE — ED BEHAVIORAL HEALTH ASSESSMENT NOTE - NS ED BHA ED COURSE UTILIZATION OF 1 TO 1 IN ED YN
"Goal Outcome Evaluation:  Plan of Care Reviewed With: patient        Progress: no change  Outcome Evaluation: Patient alert and oriented x4. Given PRN Percocet and Baclofen at 0808 for left hip pain/spasms. Med effective. Given PRN Zofran ODT for c/o nausea without emesis at 0818. Med effective. Patient stated, \"I don't know I just haven't felt well today.\" Vitals are WNL. PRN hemorrhoid cream applied per patient request this afternoon. Patient state med was somewhat effective. Voices needs. Con't current POC.                               " No

## 2024-05-13 NOTE — ED BEHAVIORAL HEALTH ASSESSMENT NOTE - DOMICILE TYPE
Psychiatric Residence PAST SURGICAL HISTORY:  History of colonoscopy     History of inguinal hernia repair     S/P CABG x 4

## 2024-05-16 NOTE — ED BEHAVIORAL HEALTH ASSESSMENT NOTE - REASON FOR REFERRAL
Made an appt reminder call no answer lmom. Appt with dr frances on 5/22/24  @10:00am  in [facility] .  
suicidal ideation

## 2024-05-20 NOTE — ED BEHAVIORAL HEALTH ASSESSMENT NOTE - PRIMARY DX
Pt has made multiple attempts to elope ED. PD aware.   Schizoaffective disorder, bipolar type Borderline personality disorder

## 2024-05-22 ENCOUNTER — EMERGENCY (EMERGENCY)
Facility: HOSPITAL | Age: 44
LOS: 1 days | Discharge: HOME | End: 2024-05-22
Attending: STUDENT IN AN ORGANIZED HEALTH CARE EDUCATION/TRAINING PROGRAM | Admitting: STUDENT IN AN ORGANIZED HEALTH CARE EDUCATION/TRAINING PROGRAM
Payer: MEDICARE

## 2024-05-22 VITALS
HEIGHT: 66 IN | DIASTOLIC BLOOD PRESSURE: 88 MMHG | RESPIRATION RATE: 18 BRPM | SYSTOLIC BLOOD PRESSURE: 150 MMHG | OXYGEN SATURATION: 98 % | HEART RATE: 74 BPM | WEIGHT: 242.51 LBS | TEMPERATURE: 98 F

## 2024-05-22 PROCEDURE — 99284 EMERGENCY DEPT VISIT MOD MDM: CPT

## 2024-05-22 RX ORDER — IBUPROFEN 200 MG
600 TABLET ORAL ONCE
Refills: 0 | Status: COMPLETED | OUTPATIENT
Start: 2024-05-22 | End: 2024-05-22

## 2024-05-22 RX ORDER — ACETAMINOPHEN 500 MG
650 TABLET ORAL ONCE
Refills: 0 | Status: COMPLETED | OUTPATIENT
Start: 2024-05-22 | End: 2024-05-22

## 2024-05-22 RX ADMIN — Medication 200 MILLIGRAM(S): at 19:49

## 2024-05-22 RX ADMIN — Medication 650 MILLIGRAM(S): at 19:49

## 2024-05-22 RX ADMIN — Medication 600 MILLIGRAM(S): at 19:49

## 2024-05-22 NOTE — ED ADULT NURSE NOTE - OBJECTIVE STATEMENT
Patient A&o X4, received in intake , with complaints of cough/body aches. Patient states, "I have been having a cough, body aches and nasal congestion x1 week". Patient also complains generalized pain rating 4 out of 10 currently, denies taking any medication prior to arrival. Patient denies any psych complaints at this time. Patient able to speak in clear sentences, respirations equal and unlabored. Lung sounds clear b/l, equal chest rise and fall noted. Denies CP/SOB, nausea, vomiting and diarrhea at this time. Skin warm and dry. Provider at bedside for eval, pending further orders.

## 2024-05-22 NOTE — ED ADULT NURSE NOTE - NSFALLUNIVINTERV_ED_ALL_ED
Bed/Stretcher in lowest position, wheels locked, appropriate side rails in place/Call bell, personal items and telephone in reach/Instruct patient to call for assistance before getting out of bed/chair/stretcher/Non-slip footwear applied when patient is off stretcher/[place] to call system/Physically safe environment - no spills, clutter or unnecessary equipment/Purposeful proactive rounding/Room/bathroom lighting operational, light cord in reach

## 2024-05-22 NOTE — ED PROVIDER NOTE - OBJECTIVE STATEMENT
Patient is a 43-year-old female with history of [facility] resident, h/o  hypertension, asthma, GERD, uterine fibroids, schizoaffective disorder, bipolar disorder, depression, borderline personality disorder who presents to the ED with flulike symptoms.  Patient states that for the past week she has been having cough, congestion, chest pain only with cough.  She also endorses mild fatigue.  She has been staying hydrated but is concerned that her symptoms have been going away.  She denies any difficulty breathing or shortness of breath, fevers or chills, chest pain when not coughing.  No productive cough.  No abdominal pain ,n/v, diarrhea.

## 2024-05-22 NOTE — ED ADULT NURSE NOTE - SUICIDE SCREENING QUESTION 2
75 y/o M with PMHx of HTN, BPH, spinal stenosis, dyslipidemia, anxiety, neuropathy, B12 deficiency who presents from rehab for evaluation of weakness and hypotension    Anemia   no overt gi bleeding; hgb stable  likely r/t sacral decub/hematoma, sp debridement  monitor cbc, transfuse prn  cont ppi and carafate  monitor stool color   no plans for endoscopic evaluation as no overt gib      FTT/Dysphagia   unclear etiology; esophagram at [place] 2/2019 normal   some improvement in po intake  correct elytes prn  cont ppi    on supplements per nutrition   encouragement/assistance at meals      COVID-19  per primary No

## 2024-05-22 NOTE — ED PROVIDER NOTE - CLINICAL SUMMARY MEDICAL DECISION MAKING FREE TEXT BOX
Lewis - Patient is a 43-year-old female with history of [facility] resident, h/o  hypertension, asthma, GERD, uterine fibroids, schizoaffective disorder, bipolar disorder, depression, borderline personality disorder who presents to the ED with flulike symptoms. ddx includes but is not limited to URI, covid, flu. low concern for pna. will check flu/covid and treat symptomatically

## 2024-05-22 NOTE — ED PROVIDER NOTE - ATTENDING CONTRIBUTION TO CARE
43-year-old female past medical history hypertension, asthma, schizoaffective disorder, bipolar disorder, fibroids, borderline personality disorder presents for approximately 1 week of cough, nasal congestion, chest pain with cough, mild fatigue.  Patient reports tolerating p.o. well, breathing well.  She denies current chest pain, abdominal pain, back pain, fevers, chills, dysuria or hematuria urinary frequency or urgency.  She denies any diarrhea constipation.  No known sick contacts.    Exam as above.  Constellation of symptoms likely viral illness,.  Reassurance and anticipatory guidance, return precautions discussed with patient at bedside.  Will give symptom relief.  Social work to assist with transportation back to [facility].

## 2024-05-22 NOTE — BH SOCIAL WORK INITIAL PSYCHOSOCIAL EVALUATION - NSBHLEGALRESTRAINPT_PSY_ALL_CORE
No protocol for requested medication.    Medication: tirzepatide (Mounjaro) 7.5 MG/0.5ML Solution Pen-injector   Last office visit date: 5/20/24  Pharmacy:      Order pended, routed to clinician for review.       Pharmacy switch request    No

## 2024-05-22 NOTE — ED PROVIDER NOTE - PHYSICAL EXAMINATION
Swapna Saucedo MD  GENERAL: Patient awake alert NAD.  HEENT: NC/AT, Moist mucous membranes, EOMI.  LUNGS: CTAB, no wheezes or crackles.   CARDIAC: RRR, no m/r/g.    ABDOMEN: Soft, NT, ND, No rebound, guarding. No CVA tenderness.   EXT: No edema. No calf tenderness.   MSK: No pain with movement, no deformities.  NEURO: A&Ox3. Moving all extremities.  SKIN: Warm and dry. No rash.  PSYCH: Normal affect.

## 2024-05-22 NOTE — ED PROVIDER NOTE - PATIENT PORTAL LINK FT
You can access the FollowMyHealth Patient Portal offered by [facility] by registering at the following website: [email or web address]. By joining [facility]’s FollowMyHealth portal, you will also be able to view your health information using other applications (apps) compatible with our system.

## 2024-05-22 NOTE — PROVIDER CONTACT NOTE (OTHER) - ASSESSMENT
SW called residence [phone]; spoke with staff member Ty, who confirmed that pt is a resident and can return.  He reports that pt travels independently and can take a taxi.  Pt reports wanting to take a taxi as well; provider aware an in agreement with pt traveling via taxi.

## 2024-05-22 NOTE — ED ADULT TRIAGE NOTE - CHIEF COMPLAINT QUOTE
pt c/o of body aches, cold cough and nasal congestion for few days, pt denies any sob, pt with hx schizophrenia appears calm and cooperative @ present

## 2024-05-22 NOTE — ED PROVIDER NOTE - NSFOLLOWUPINSTRUCTIONS_ED_ALL_ED_FT
You were seen in the ED for a cough.    You likely have an upper respiratory infection.  This should resolve on its own in the next several days.    For your cough, take Robitussin as per the over-the-counter bottle.    For any muscle aches or fevers, take Tylenol and ibuprofen as per the over-the-counter bottles.    Follow-up with your primary care doctor in the next 2 to 3 days.  Return to the ED for any new or worrisome symptoms including worsening cough with chest pain, difficulty breathing, fevers.

## 2024-06-10 NOTE — ED BEHAVIORAL HEALTH ASSESSMENT NOTE - AXIS III
Message left concerning pre procedure details and arrival time for procedure. Return phone number left for questions or concerns.   GERD

## 2024-06-12 ENCOUNTER — EMERGENCY (EMERGENCY)
Facility: HOSPITAL | Age: 44
LOS: 1 days | Discharge: HOME | End: 2024-06-12
Attending: EMERGENCY MEDICINE
Payer: MEDICARE

## 2024-06-12 VITALS
HEART RATE: 71 BPM | OXYGEN SATURATION: 97 % | RESPIRATION RATE: 18 BRPM | SYSTOLIC BLOOD PRESSURE: 150 MMHG | TEMPERATURE: 98 F | DIASTOLIC BLOOD PRESSURE: 94 MMHG

## 2024-06-12 VITALS
SYSTOLIC BLOOD PRESSURE: 137 MMHG | RESPIRATION RATE: 18 BRPM | TEMPERATURE: 98 F | DIASTOLIC BLOOD PRESSURE: 86 MMHG | HEIGHT: 66 IN | HEART RATE: 82 BPM | OXYGEN SATURATION: 98 %

## 2024-06-12 DIAGNOSIS — F25.0 SCHIZOAFFECTIVE DISORDER, BIPOLAR TYPE: ICD-10-CM

## 2024-06-12 DIAGNOSIS — F60.3 BORDERLINE PERSONALITY DISORDER: ICD-10-CM

## 2024-06-12 DIAGNOSIS — F25.9 SCHIZOAFFECTIVE DISORDER, UNSPECIFIED: ICD-10-CM

## 2024-06-12 LAB
ANION GAP SERPL CALC-SCNC: 14 MMOL/L — SIGNIFICANT CHANGE UP (ref 5–17)
APAP SERPL-MCNC: <15 UG/ML — SIGNIFICANT CHANGE UP (ref 10–30)
BASOPHILS # BLD AUTO: 0.01 K/UL — SIGNIFICANT CHANGE UP (ref 0–0.2)
BASOPHILS NFR BLD AUTO: 0.1 % — SIGNIFICANT CHANGE UP (ref 0–2)
BUN SERPL-MCNC: 11 MG/DL — SIGNIFICANT CHANGE UP (ref 7–23)
CALCIUM SERPL-MCNC: 9.8 MG/DL — SIGNIFICANT CHANGE UP (ref 8.4–10.5)
CHLORIDE SERPL-SCNC: 106 MMOL/L — SIGNIFICANT CHANGE UP (ref 96–108)
CO2 SERPL-SCNC: 23 MMOL/L — SIGNIFICANT CHANGE UP (ref 22–31)
CREAT SERPL-MCNC: 0.79 MG/DL — SIGNIFICANT CHANGE UP (ref 0.5–1.3)
EGFR: 94 ML/MIN/1.73M2 — SIGNIFICANT CHANGE UP
EOSINOPHIL # BLD AUTO: 1.05 K/UL — HIGH (ref 0–0.5)
EOSINOPHIL NFR BLD AUTO: 11.5 % — HIGH (ref 0–6)
ETHANOL SERPL-MCNC: <10 MG/DL — SIGNIFICANT CHANGE UP (ref 0–10)
GLUCOSE SERPL-MCNC: 115 MG/DL — HIGH (ref 70–99)
HCT VFR BLD CALC: 41.9 % — SIGNIFICANT CHANGE UP (ref 34.5–45)
HGB BLD-MCNC: 12.9 G/DL — SIGNIFICANT CHANGE UP (ref 11.5–15.5)
IMM GRANULOCYTES NFR BLD AUTO: 0.4 % — SIGNIFICANT CHANGE UP (ref 0–0.9)
LYMPHOCYTES # BLD AUTO: 2.43 K/UL — SIGNIFICANT CHANGE UP (ref 1–3.3)
LYMPHOCYTES # BLD AUTO: 26.6 % — SIGNIFICANT CHANGE UP (ref 13–44)
MCHC RBC-ENTMCNC: 29 PG — SIGNIFICANT CHANGE UP (ref 27–34)
MCHC RBC-ENTMCNC: 30.8 GM/DL — LOW (ref 32–36)
MCV RBC AUTO: 94.2 FL — SIGNIFICANT CHANGE UP (ref 80–100)
MONOCYTES # BLD AUTO: 0.35 K/UL — SIGNIFICANT CHANGE UP (ref 0–0.9)
MONOCYTES NFR BLD AUTO: 3.8 % — SIGNIFICANT CHANGE UP (ref 2–14)
NEUTROPHILS # BLD AUTO: 5.24 K/UL — SIGNIFICANT CHANGE UP (ref 1.8–7.4)
NEUTROPHILS NFR BLD AUTO: 57.6 % — SIGNIFICANT CHANGE UP (ref 43–77)
NRBC # BLD: 0 /100 WBCS — SIGNIFICANT CHANGE UP (ref 0–0)
PLATELET # BLD AUTO: 245 K/UL — SIGNIFICANT CHANGE UP (ref 150–400)
POTASSIUM SERPL-MCNC: 4.2 MMOL/L — SIGNIFICANT CHANGE UP (ref 3.5–5.3)
POTASSIUM SERPL-SCNC: 4.2 MMOL/L — SIGNIFICANT CHANGE UP (ref 3.5–5.3)
RBC # BLD: 4.45 M/UL — SIGNIFICANT CHANGE UP (ref 3.8–5.2)
RBC # FLD: 14.4 % — SIGNIFICANT CHANGE UP (ref 10.3–14.5)
SALICYLATES SERPL-MCNC: <2 MG/DL — LOW (ref 15–30)
SODIUM SERPL-SCNC: 143 MMOL/L — SIGNIFICANT CHANGE UP (ref 135–145)
WBC # BLD: 9.12 K/UL — SIGNIFICANT CHANGE UP (ref 3.8–10.5)
WBC # FLD AUTO: 9.12 K/UL — SIGNIFICANT CHANGE UP (ref 3.8–10.5)

## 2024-06-12 PROCEDURE — 80307 DRUG TEST PRSMV CHEM ANLYZR: CPT

## 2024-06-12 PROCEDURE — 36415 COLL VENOUS BLD VENIPUNCTURE: CPT

## 2024-06-12 PROCEDURE — 99285 EMERGENCY DEPT VISIT HI MDM: CPT | Mod: 25

## 2024-06-12 PROCEDURE — 80048 BASIC METABOLIC PNL TOTAL CA: CPT

## 2024-06-12 PROCEDURE — 85025 COMPLETE CBC W/AUTO DIFF WBC: CPT

## 2024-06-12 PROCEDURE — 90792 PSYCH DIAG EVAL W/MED SRVCS: CPT

## 2024-06-12 PROCEDURE — 99285 EMERGENCY DEPT VISIT HI MDM: CPT

## 2024-06-12 RX ORDER — ACETAMINOPHEN 500 MG
650 TABLET ORAL ONCE
Refills: 0 | Status: COMPLETED | OUTPATIENT
Start: 2024-06-12 | End: 2024-06-12

## 2024-06-12 RX ADMIN — Medication 650 MILLIGRAM(S): at 15:16
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
## 2024-06-12 NOTE — ED PROVIDER NOTE - CONSTITUTIONAL, MLM
SVT (supraventricular tachycardia) normal... Well appearing, awake, alert, oriented to person, place, time/situation and in no apparent distress.

## 2024-06-12 NOTE — ED BEHAVIORAL HEALTH ASSESSMENT NOTE - NSSUICPROTFACT_PSY_ALL_CORE
Home Care Instructions                                                                                                                Peterson Mcguire   MRN: [id number]    Department:  [facility], Emergency Dept   Date of Visit:  6/28/2017            [facility], Emergency Dept    [address] [address]    Phone:  [phone]      You were seen by     Kaiden Chahal D.O.      Your Diagnosis Was     Multiple abrasions     T14.8       Follow-up Information     1. Schedule an appointment as soon as possible for a visit with [facility].    Contact information    [address] [address] [phone]      Medication Information     Review all of your home medications and newly ordered medications with your primary doctor and/or pharmacist as soon as possible. Follow medication instructions as directed by your doctor and/or pharmacist.     Please keep your complete medication list with you and share with your physician. Update the information when medications are discontinued, doses are changed, or new medications (including over-the-counter products) are added; and carry medication information at all times in the event of emergency situations.               Medication List      ASK your doctor about these medications        Instructions    Morning Afternoon Evening Bedtime    cyclobenzaprine 10 MG Tabs   Commonly known as:  FLEXERIL        Take 1 Tab by mouth 3 times a day as needed for Moderate Pain.   Dose:  10 mg                        dicyclomine 10 MG Caps   Commonly known as:  BENTYL        Take 1 Cap by mouth 4 Times a Day,Before Meals and at Bedtime.   Dose:  10 mg                        docusate sodium 100 MG Caps   Commonly known as:  COLACE        Take 1 Cap by mouth 2 times a day.   Dose:  100 mg                        fluoxetine 20 MG Caps   Commonly known as:  PROZAC        Take 40 mg by mouth every day.   Dose:  40 mg                       magnesium citrate Soln        Take 150 mL by mouth every 6 hours.   Dose:  150 mL                        naproxen 500 MG Tabs   Commonly known as:  NAPROSYN        Doctor's comments:  As needed for mild pain   Take 1 Tab by mouth 2 times a day, with meals.   Dose:  500 mg                        polyethylene glycol 3350 Powd   Commonly known as:  MIRALAX        Take 17 g by mouth every day.   Dose:  17 g                        RISPERDAL 2 MG Tabs   Generic drug:  risperidone        Take 2 mg by mouth 2 times a day.   Dose:  2 mg                                Procedures and tests performed during your visit     EKG (ER)        Discharge Instructions       Abrasion  An abrasion is a cut or scrape on the surface of your skin. An abrasion does not go through all of the layers of your skin. It is important to take good care of your abrasion to prevent infection.  HOME CARE  Medicines  · Take or apply medicines only as told by your doctor.  · If you were prescribed an antibiotic ointment, finish all of it even if you start to feel better.  Wound Care  · Clean the wound with mild soap and water 2-3 times per day or as told by your doctor. Pat your wound dry with a clean towel. Do not rub it.  · There are many ways to close and cover a wound. Follow instructions from your doctor about:  ¨ How to take care of your wound.  ¨ When and how you should change your bandage (dressing).  ¨ When and how you should take off your dressing.  · Check your wound every day for signs of infection. Watch for:  ¨ Redness, swelling, or pain.  ¨ Fluid, blood, or pus.  General Instructions  · Keep the dressing dry as told by your doctor. Do not take baths, swim, use a hot tub, or do anything that would put your wound underwater until your doctor says it is okay.  · If there is swelling, raise (elevate) the injured area above the level of your heart while you are sitting or lying down.  · Keep all follow-up visits as told by  your doctor. This is important.  GET HELP IF:  · You were given a tetanus shot and you have any of these where the needle went in:  ¨ Swelling.  ¨ Very bad pain.  ¨ Redness.  ¨ Bleeding.  · Medicine does not help your pain.  · You have any of these at the site of the wound:  ¨ More redness.  ¨ More swelling.  ¨ More pain.  GET HELP RIGHT AWAY IF:  · You have a red streak going away from your wound.  · You have a fever.  · You have fluid, blood, or pus coming from your wound.  · There is a bad smell coming from your wound.     This information is not intended to replace advice given to you by your health care provider. Make sure you discuss any questions you have with your health care provider.     Document Released: 06/05/2009 Document Revised: 05/03/2016 Document Reviewed: 12/16/2015  [employer] Interactive Patient Education ©2016 [employer]            Patient Information     Patient Information    Following emergency treatment: all patient requiring follow-up care must return either to a private physician or a clinic if your condition worsens before you are able to obtain further medical attention, please return to the emergency room.     Billing Information    At [facility], we work to make the billing process streamlined for our patients.  Our Representatives are here to answer any questions you may have regarding your hospital bill.  If you have insurance coverage and have supplied your insurance information to us, we will submit a claim to your insurer on your behalf.  Should you have any questions regarding your bill, we can be reached online or by phone as follows:  Online: You are able pay your bills online or live chat with our representatives about any billing questions you may have. We are here to help Monday - Friday from 8:00am to 7:30pm and 9:00am - 12:00pm on Saturdays.  Please visit [email or web address]  for more information.   Phone:  [phone] or  [phone]    Please note that your emergency physician, surgeon, pathologist, radiologist, anesthesiologist, and other specialists are not employed by [facility] and will therefore bill separately for their services.  Please contact them directly for any questions concerning their bills at the numbers below:     Emergency Physician Services:  [phone]  [facility]:  [phone]  [facility]:  [phone]  [facility]:  [phone]    1. Your final bill may vary from the amount quoted upon discharge if all procedures are not complete at that time, or if your doctor has additional procedures of which we are not aware. You will receive an additional bill if you return to the Emergency Department at [facility] for suture removal regardless of the facility of which the sutures were placed.     2. Please arrange for settlement of this account at the emergency registration.    3. All self-pay accounts are due in full at the time of treatment.  If you are unable to meet this obligation then payment is expected within 4-5 days.     4. If you have had radiology studies (CT, X-ray, Ultrasound, MRI), you have received a preliminary result during your emergency department visit. Please contact the radiology department [phone] to receive a copy of your final result. Please discuss the Final result with your primary physician or with the follow up physician provided.     Crisis Hotline:  [place] Crisis Hotline:  9-160-SIXNUEN or 1-239.647.2444  [place] Crisis Hotline:    1-182.127.2944 or 216-584-3816         ED Discharge Follow Up Questions    1. In order to provide you with very good care, we would like to follow up with a phone call in the next few days.  May we have your permission to contact you?     YES /  NO    2. What is the best phone number to call you? (       )_____-__________    3. What is the best time to call you?      Morning  /  Afternoon  /   Evening                   Patient Signature:  ____________________________________________________________    Date:  ____________________________________________________________                Identifies reasons for living
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
## 2024-06-12 NOTE — ED ADULT NURSE NOTE - CAS EDN DISCHARGE ASSESSMENT
Please abstract the following data from this visit with this patient into the appropriate field in Epic:  Pap smear done on this date: March 2017 (approximately), by this group: [facility] , results were normal.  Sarah Severson, CMA  
Alert and oriented to person, place and time

## 2024-06-12 NOTE — ED BEHAVIORAL HEALTH ASSESSMENT NOTE - ACCESS TO FIREARM
CM received a msg re: pt's readiness for DC today. Orders were placed on 9/22  for oxygen at  2Ls at rest and 7Ls with activity. Pt has respiratory DME in place through [employer]- CM called the DME vendor this - phone # [phone]  A request made for a copy of the order- The order/ RT home O2 eval and demographic form were sent to an [employer] representative at-E-mail:  [email or web address]  Request made for urgent order processing post review   Direction provided to contact pt number [phone] and/or her daughterAyana -phone # [phone] re: delivery timing to ensure safe DC today- Bedside RN wale. RAMÓN Castelan RNCM    * [phone]. Call received from [employer]- orders reviewed/ processed- DME to be delivered today. Bedside RN john      No

## 2024-06-12 NOTE — ED PROVIDER NOTE - PATIENT PORTAL LINK FT
You can access the FollowMyHealth Patient Portal offered by [facility] by registering at the following website: [email or web address]. By joining [facility]’s FollowMyHealth portal, you will also be able to view your health information using other applications (apps) compatible with our system.

## 2024-06-12 NOTE — ED BEHAVIORAL HEALTH ASSESSMENT NOTE - RISK ASSESSMENT
Patient presents with low acute risk of suicide - acute risk factors include impulsivity, cluster B traits/BPD. Chronic risk factors include prior SA attempt via overdose, hx of polysubstance abuse, hx of SAD bipolar type. Protective factors include engagement in treatment, stable residence. Patient presents low-moderate acute risk of violence. At this time, the patient does not meet criteria for psychiatric hospitalization and can be discharged back to residence.

## 2024-06-12 NOTE — ED BEHAVIORAL HEALTH ASSESSMENT NOTE - DESCRIPTION
none known Lives in [facility] on [address] On disability, single. Hx of polysubstance abuse. Smokes 3 cigarettes/daily. Vital Signs Last 24 Hrs  T(C): 36.8 (12 Jun 2024 13:45), Max: 36.8 (12 Jun 2024 13:45)  T(F): 98.3 (12 Jun 2024 13:45), Max: 98.3 (12 Jun 2024 13:45)  HR: 82 (12 Jun 2024 13:45) (82 - 82)  BP: 137/86 (12 Jun 2024 13:45) (137/86 - 137/86)  BP(mean): --  RR: 18 (12 Jun 2024 13:45) (18 - 18)  SpO2: 98% (12 Jun 2024 13:45) (98% - 98%)    Parameters below as of 12 Jun 2024 13:45  Patient On (Oxygen Delivery Method): room air

## 2024-06-12 NOTE — ED BEHAVIORAL HEALTH ASSESSMENT NOTE - CURRENT MEDICATION
-Haloperidol Decanoate 200mg IM (last given on 5/19 per patient)  -Oxcarbazepine 300mg BID   -Aripiprazole 20mg QHS  -Clonazepam 1mg QHS   -Diphenhydramine 50mg QHS

## 2024-06-12 NOTE — ED PROVIDER NOTE - CLINICAL SUMMARY MEDICAL DECISION MAKING FREE TEXT BOX
Possible aggressive behavior, pt now calm and cooperative.  denies suicidal or homicidal ideation.  will consult psychiatry for evaluation.

## 2024-06-12 NOTE — ED PROVIDER NOTE - NSFOLLOWUPINSTRUCTIONS_ED_ALL_ED_FT
no You were seen in ER for psychiatric clearance before returning back to [facility].    1) Please follow-up with your Psychiatrist and Primary Medical Doctor in 3-5 days.  2) Please call 911 and return to the Emergency Department if you experiences: sadness, depression, anxiety, suicidal thoughts, homicidal thoughts, hallucinations, changes in your thinking, or symptoms that are new or recurrent.  3) The Adult [facility] Crisis Center at [facility] is open Monday through Friday from 9 a.m. to 7 p.m. You can either walk-in to see the Psychiatrist, or call [phone].
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
## 2024-06-13 NOTE — CHART NOTE - NSCHARTNOTEFT_GEN_A_CORE
Emergency Social Work Note:  LMSW received a referral for assistance with discharge planning. Per  medical team  patient is medically cleared for discharge.   Chart was reviewed. Patient seen by PSYCH CL and cleared to return to [facility]. LMSW contacted [facility]  who confirmed pt is able to return and mode of transport is ambulance. Patient has Medicaid insurance benefit.  ([phone])   Trip Number: [id number]  No further concerns voiced.  Transportation provider to be assigned. LMSW will continue to follow up as needed.

## 2024-06-20 NOTE — ED ADULT TRIAGE NOTE - TEMPERATURE IN FAHRENHEIT (DEGREES F)
Discharge instructions reviewed with patient. Patient denies further questions and verbalizes understanding   98.9
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
## 2024-07-03 NOTE — ED PROVIDER NOTE - PRO INTERPRETER NEED 2
Scribed for Dr. Adam Vargas by Jose Painting. I, Dr. Adam Vargas have personally reviewed and agreed with the information entered by the Virtual Scribe. 07/03/24.    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  #Neurogenic bowel + bladder  #Urinary incontinence  #OAB - spasms  S/p cysto, bladder botox and exp lap with me (10/20/23)  S/p bladder botox (200 UI) with me (06/06/24).  Reports good response to latest round of botox.   States his incontinence seems improved, no longer wet in the morning.   Remains satisfied with results of the procedure.   Continue oxybutynin BID and Myrbetriq as well.   RTC in 4 months with a RBUS prior to.     #Recurrent UTI's  S/p mitrofanoff with Dr. Myles.  Urine culture grew klebsiella species (06/06/24).   S/p PO antibiotics, UA showed trace leuks today.   No signs of blood or ongoing infection.   Continue Macrodantin 50 mg daily for UTI suppression.      TO REVIEW:  Treat empirically with course of Augmentin for presumed UTI.  Encouraged to call if he develops any UTI or acute//worsening symptoms.   Encouraged to call if he develops issues with cathing, retention or leakage.   Can consider 300 UI botox at next procedure when this is indicated.    All questions were answered to the patient’s satisfaction.  Patient agrees with the plan and wishes to proceed.  Continue follow-up for ongoing care of his chronic medical conditions.       History of Present Illness (HPI):  Jose presents for a post-op visit.   The patient’s EMR has been reviewed.  Lives in [place], [place]  Accompanied by Mother whom provides additional history.  Previously followed by Pediatric Urology with Dr. Workman.     Hx of spinal injury 2/2 intrauterine  stroke and cord infarction.  Subsequent myelomalacia, hemiparesis, neurogenic bowel and bladder.  Hx of recurrent UTIs s/p mitrofanoff with Dr. Myles  Was experiencing recurrent intra-abdominal abscess, now resolving with improved catheter timing and technique     UDS//CT and MRI imaging reviewed:  bladder capacity appears in the 300-400ml range which should be well managed with his current CIC regimen and oral medications. Numerous CT and MRI images reviewed going back to 2021, possible diverticula vs. thin area seen in the bladder dome, small right posterior diverticulum, nothing significant, most recent CT without clear/definitive perforation obvious.     S/p cysto, bladder botox and exp lap with me (10/20/23)  Ex-lap unsuccessful due to intraabdominal adhesions  Cystoscopy, cystogram, and looposcopy/loopogram did not reveal any perforation. There were well-healed areas of injury in the posterior wall of the bladder near the Mitrofanoff entry point that I feel are likely due to injuries with catheterization.    S/p bladder botox (200 UI) with me (06/06/24).    TODAY: (07/03/24)  Presents for a post-op visit.   States his leakage seems improved s/p latest round of botox.   Reporting that he no longer wakes up wet in the morning.   Continues oxybutynin BID (morning and evening) as well.   Continues with baclofen pump 2/2 muscle spasms.   Recently increased pump dose.   Denies any recent UTI's.   IO UA showed trace leuks, no blood or signs of infection.     TO REVIEW: Last visit (02/21/24)  Reports he has been doing well overall.   Reports significant improvement since starting bladder botox.   However, continues to have recurrent UTI's.   Albeit, he has only been symptomatic for 1-2 days before resolving.   Continues daily Macrobid for prophylaxis.   Denies any gross hematuria, fevers or chills.      TO REVIEW: (11/15/23)  Accompanied by Mother whom provides additional history.  Concerned that he may have a UTI  given his malodorous urine.  Reports having about 3x UTI's in the past 6 months.   Not currently on antibiotics for prophylaxis.      S/p courses of keflex, amikacin and gentamicin washes for prophylaxis.   Tolerated levofloxacin with benadryl in the past.   Has tolerated macrobid in the past as well.   Continues CIC, no issues.   Feel botox is working  Cathing for more volumes and less of an interval, less leakage in pull-up      Notes he appears to be more constipated since the procedure.   Gradually improving post-operatively.      TO REVIEW: (09/06/23)  Doing well s/p latest hospitalization for an intraabdominal infection.  Continues flagyl and ceftriaxone for treatment. Followed by ID.  ID is concerned given his ESR remains elevated.  Plans to undergo further imaging.  Denies any recent infections.     Continues CIC via mitrafanoff, without significant issue.  C/o persistent OAB symptoms.   No significant benefit with myrbetriq.  Denies any side-effects.     TO REVIEW: (08/23/23)  Myrbetriq helping, denies any side-effects.  Doing well s/p latest hospitalization, still has indwelling urethral catheter  Evaluated by ID yesterday, continues flagyl and ceftriaxone for treatment.  Continues to cath mitrofanoff once daily to promote patency without issue  Denies any gross hematuria, flank pain, recent fevers or chills.      TO REVIEW: Initial visit (07/27/23)  Recently treated for sepsis 2/2 a MSSA R gluteal abscess. (March 2023)  Followed by Pediatric surgery (Dr. Resendiz) for this, s/p vessel loop placement (04/17/23).     Underwent a post-op MRI (05/23/23) 2/2 concerns of infectious recurrence.  MRI was negative for osteomyelitis but did show residual fluid in the abdomen.   Described as phlegmon. Currently he is symptom free and completed antibiotics.     Continues to follow up with pediatric ID, with Dr. Lyric Vargas.   Initially, he was performing gentamicin washes s/p mitrafanoff.   Transitioned to amikacin  bladder washes, however discontinued 2/2 adverse reactions. Discontinued this about 1-2 months ago.      Continues to cath his stoma throughout the day.  Mother caths via his penis once before bed and in the morning to fully drain his bladder.   Denies having to irrigate or any significant mucus with this.  Occasionally, she will irrigate if he has a UTI.  He denies any recent UTIs.  Constipation has been under control.      At baseline, he leaks occasionally.   Albeit, he states this has been much improved since taking max dose oxybutynin.  His OAB symptoms and leakage are not too bothersome at this time.  He does not feel he requires additional treatment for this right now.  But he may reconsider this if his symptoms worsen.      PSHx: Multiple gastric surgeries (3x for closure of gastrocutaneous fistula, feeding gastrostomy placement). Hx of baclofen pump.   Allergies: Bactrim (anaphylaxis), vancomycin (rash), ciprofloxacin (urticaria    Past Medical History:   Diagnosis Date    Abnormal results of pulmonary function studies 03/21/2019    Abnormal PFT    Acute infarction of spinal cord (embolic) (nonembolic) (Multi) 04/30/2020    intrauterine hemorrhage and subsequent cord infarction    Asthma ([facility])     last seen by Lyric Cameron 05/11/2023    GERD (gastroesophageal reflux disease)     s/p Nissen    Hemiplegia (Multi)     History of transfusion     Immunocompromised (Multi)     Intra-abdominal abscess (Multi)     recurrent    Mitrofanoff appendicovesicostomy present (Multi)     MSSA (methicillin susceptible Staphylococcus aureus)     MSSA R gluteal abscess 03/2023    Neurogenic bladder     Neurogenic bowel     Other conditions influencing health status 07/21/2013    Drug-induced Myelopathy    Other conditions influencing health status 03/04/2022    Abscess, abdomen    Pectus excavatum 07/21/2013    Pectus excavatum    Peritoneal abscess (Multi) 05/24/2021    Peritonitis with abscess of intestine    Personal  history of diseases of the skin and subcutaneous tissue 05/17/2019    History of dermatitis    Personal history of diseases of the skin and subcutaneous tissue     History of eczema    Personal history of other diseases of the digestive system 04/13/2020    History of chronic constipation    Personal history of other infectious and parasitic diseases 07/24/2019    History of onychomycosis    Personal history of other infectious and parasitic diseases 09/24/2018    History of wound infection    Recurrent UTI (urinary tract infection)     s/p Mitrofanoff    Restrictive lung disease     Scoliosis, unspecified     Kyphoscoliosis    Snoring     Snoring    Tinea pedis 07/23/2019    Tinea pedis of both feet    Urinary tract infection     UTI (urinary tract infection)     current    Xerosis cutis 07/23/2019    Xerosis of skin     Past Surgical History:   Procedure Laterality Date    ACHILLES TENDON SURGERY  10/30/2013    Subcutaneous Tenotomy Achilles Tendon Under Gen Anesthesia    BACLOFEN PUMP IMPLANTATION  2019    BRONCHOSCOPY      CYSTOSCOPY      GASTROSTOMY  10/30/2013    Gastric Surgery Feeding Gastrostomy s/p removal    MITROFANOFF PROCEDURE  08/06/2018    NISSEN FUNDOPLICATION      2003    OTHER SURGICAL HISTORY  10/30/2013    Direct Laryngoscopy    OTHER SURGICAL HISTORY  10/30/2013    Closed Treatment Fx Of Proximal Femoral Neck W/ Manipulation    OTHER SURGICAL HISTORY  04/16/2015    Complex Repair Of Wound Trunk 2.6 To 7.5 Cm    OTHER SURGICAL HISTORY  10/14/2022    Circumcision    SPINAL FUSION      x2 2015, 2017    TRACHEOSTOMY TUBE PLACEMENT      s/p decannulation 2005    US GUIDED PERCUTANEOUS PERITONEAL OR RETROPERITONEAL FLUID COLLECTION DRAINAGE  04/08/2021    US GUIDED PERCUTANEOUS PERITONEAL OR RETROPERITONEAL FLUID COLLECTION DRAINAGE 4/8/2021 [facility]     Family History   Problem Relation Name Age of Onset    Diabetes Maternal Grandfather       Social History     Tobacco Use   Smoking  Status Never    Passive exposure: Never   Smokeless Tobacco Never     Current Outpatient Medications   Medication Sig Dispense Refill    albuterol 2.5 mg /3 mL (0.083 %) nebulizer solution Take 3 mL (2.5 mg) by nebulization every 4 hours if needed for wheezing. Inhale one vial every 4-6 hours as needed for cough, wheezing and shortness of breath 75 mL 3    budesonide-formoteroL (Symbicort) 160-4.5 mcg/actuation inhaler INHALE 2 PUFFS every 4-6 hours as needed for prolonged cough, shortness of breath, and wheeze as well as 2 puffs once daily. MAX 12 puffs per day.      cholecalciferol (Vitamin D-3) 50 mcg (2,000 unit) capsule TAKE 1 CAPSULE Daily      cyanocobalamin (Vitamin B-12) 1,000 mcg tablet TAKE 1 TABLET DAILY AS DIRECTED.      docusate sodium (Colace) 100 mg capsule Take 1 capsule (100 mg) by mouth once daily. 60 capsule 11    famotidine (Pepcid) 40 mg tablet Take 1 tablet (40 mg) by mouth 2 times a day. 60 tablet 11    ferrous sulfate ER (Slow Fe) 142 mg ER tablet Take 1 tablet by mouth once daily.      L. ACIDOPHILUS/BIFID. ANIMALIS ORAL Take 1 capsule by mouth once daily.      mometasone (Asmanex Twisthaler) 220 mcg/ actuation (14) inhaler Inhale 2 puffs once daily as needed. Rinse mouth with water after use to reduce aftertaste and incidence of candidiasis. Do not swallow.      montelukast (Singulair) 10 mg tablet Take 1 tablet (10 mg) by mouth once daily. 30 tablet 11    Myrbetriq 50 mg tablet extended release 24 hr 24 hr tablet take 1 tablet by mouth once daily 30 tablet 11    oxybutynin XL (Ditropan-XL) 15 mg 24 hr tablet take 1 tablet by mouth once daily 30 tablet 11    polyethylene glycol (Glycolax, Miralax) 17 gram/dose powder Take 17 g by mouth once daily. MIX IN 8 OUNCES OF WATER, JUICE, OR TEA AND DRINK DAILY. 510 g 11     Current Facility-Administered Medications   Medication Dose Route Frequency Provider Last Rate Last Admin    baclofen (Lioresal) 2,000 mcg/mL intrathecal injection  300 mcg/day  intrathecal Continuous Yoko Riley MD 0.006 mL/hr at 06/27/24 1125 300 mcg/day at 06/27/24 1125    baclofen (Lioresal) intrathecal injection  100 mcg/day intrathecal Continuous Yoko Riley MD 0.002 mL/hr at 01/11/24 1101 100 mcg/day at 01/11/24 1101     Allergies   Allergen Reactions    Ciprofloxacin Anaphylaxis    Sulfamethoxazole-Trimethoprim Anaphylaxis    Sutures Other     Patients mother states patient reacts to dissolveable suture     Fish Containing Products Hives    Other Other    Shellfish Containing Products Hives    Vancomycin Other     Red Man's syndrome      Past medical, surgical, family and social history in the chart was reviewed and is accurate including any additions to what is in this HPI.    REVIEW OF SYSTEMS (ROS):   Constitutional: denies any unintentional weight loss or change in strength.  Integumentary: denies any rashes or pruritus.  Eyes: denies any double vision or eye pain.  Ear/Nose/Mouth/Throat: denies any nosebleeds or gum bleeds.  Cardiovascular: denies any chest pain or syncope.  Respiratory: denies hemoptysis.  Gastrointestinal: denies nausea or vomiting.  Musculoskeletal: denies muscle cramping or weakness.  Neurologic: denies convulsions or seizures.  Hematologic/Lymphatic: denies bleeding tendencies.  Endocrine: denies heat/cold intolerance.  All other systems have been reviewed and are negative unless otherwise noted in the HPI.     OBJECTIVE:  Visit Vitals  /70   Pulse 74   Temp 36.4 °C (97.5 °F)     PHYSICAL EXAM:  Constitutional: No obvious distress.  Eyes: Non-injected conjunctiva, sclera clear, EOMI.  Ears/Nose/Mouth/Throat: No obvious drainage per ears or nose.  Cardiovascular: Extremities are warm and well perfused. No edema, cyanosis or pallor.  Respiratory: No audible wheezing/stridor; respirations do not appear labored.  Gastrointestinal: Abdomen soft, not distended.  Musculoskeletal: Normal ROM of extremities.  Skin: No obvious rashes or  open sores.  Neurologic: Alert and oriented, CN 2-12 grossly intact.  Psychiatric: Answers questions appropriately with normal affect.  Hematologic/Lymphatic/Immunologic: No obvious bruises or sites of spontaneous bleeding.  Genitourinary: No CVA tenderness, bladder not palpable.     LABS & IMAGING:  Lab Results   Component Value Date    WBC 7.7 06/03/2024    HGB 14.9 06/03/2024    HCT 43.7 06/03/2024     06/03/2024    CHOL 192 06/03/2024    TRIG 207 (H) 06/03/2024    HDL 40.0 06/03/2024    ALT 24 06/03/2024    AST 22 06/03/2024     06/03/2024    K 4.0 06/03/2024     06/03/2024    CREATININE 0.57 06/03/2024    BUN 11 06/03/2024    CO2 27 06/03/2024    TSH 1.86 06/03/2024    INR 1.3 (H) 08/11/2023     Scribed for Dr. Adam Vargas by Jose Painting.  I, Dr. Adam Vargas have personally reviewed and agreed with the information entered by the Virtual Scribe. 07/03/24.   English

## 2024-07-07 ENCOUNTER — EMERGENCY (EMERGENCY)
Facility: HOSPITAL | Age: 44
LOS: 1 days | Discharge: HOME | End: 2024-07-07
Attending: EMERGENCY MEDICINE | Admitting: EMERGENCY MEDICINE
Payer: MEDICARE

## 2024-07-07 VITALS
DIASTOLIC BLOOD PRESSURE: 82 MMHG | HEART RATE: 108 BPM | RESPIRATION RATE: 18 BRPM | HEIGHT: 66 IN | SYSTOLIC BLOOD PRESSURE: 127 MMHG | TEMPERATURE: 98 F | OXYGEN SATURATION: 95 %

## 2024-07-07 PROCEDURE — 99284 EMERGENCY DEPT VISIT MOD MDM: CPT

## 2024-07-08 VITALS
OXYGEN SATURATION: 99 % | TEMPERATURE: 98 F | HEART RATE: 97 BPM | RESPIRATION RATE: 17 BRPM | SYSTOLIC BLOOD PRESSURE: 123 MMHG | DIASTOLIC BLOOD PRESSURE: 73 MMHG

## 2024-07-08 PROCEDURE — 74176 CT ABD & PELVIS W/O CONTRAST: CPT | Mod: 26,MC

## 2024-07-08 RX ORDER — ONDANSETRON HYDROCHLORIDE 2 MG/ML
1 INJECTION INTRAMUSCULAR; INTRAVENOUS
Qty: 8 | Refills: 0
Start: 2024-07-08

## 2024-07-08 RX ORDER — ACETAMINOPHEN 325 MG
650 TABLET ORAL ONCE
Refills: 0 | Status: COMPLETED | OUTPATIENT
Start: 2024-07-08 | End: 2024-07-08

## 2024-07-08 RX ORDER — ONDANSETRON HYDROCHLORIDE 2 MG/ML
4 INJECTION INTRAMUSCULAR; INTRAVENOUS ONCE
Refills: 0 | Status: DISCONTINUED | OUTPATIENT
Start: 2024-07-08 | End: 2024-07-08

## 2024-07-08 RX ORDER — ONDANSETRON HYDROCHLORIDE 2 MG/ML
4 INJECTION INTRAMUSCULAR; INTRAVENOUS ONCE
Refills: 0 | Status: COMPLETED | OUTPATIENT
Start: 2024-07-08 | End: 2024-07-08

## 2024-07-08 RX ADMIN — Medication 650 MILLIGRAM(S): at 00:18

## 2024-07-08 RX ADMIN — ONDANSETRON HYDROCHLORIDE 4 MILLIGRAM(S): 2 INJECTION INTRAMUSCULAR; INTRAVENOUS at 00:27

## 2024-07-20 NOTE — ED ADULT NURSE NOTE - NS_SISCREENINGSR_GEN_ALL_ED
Patient is a 14 yo F presenting with dizziness, neck pain, and headache. Per patient, went [place] [email or web address] yesterday and rode many rides involving going upside down, rollercoasters etc. This morning began having neck pain and headache associated with feeling of pressure behind R eye. At 730PM patient began feeling dizzy and like she was going to pass out so had to leave work. Took nothing OTC for pain. Reports it is possible she hit her head on a ride but not sure. Reports pain when moving neck side to side and continued dizziness/ feeling "out of it". Denies fever, URI, n/v/d, rash, sick contacts, recent travel, vision changes, hearing changes. PMH of anxiety and depression on Abilify 5mg and hydroxizine 10mg daily. No surgeries, admissions, NKDA, IUTD. HEADSS negative. Negative Patient is a 14 yo F presenting with dizziness, neck pain, and headache. Per patient, went to [employer] yesterday and rode many rides involving going upside down, roller coasters etc. This morning began having neck pain and headache associated with feeling of pressure behind R eye. At 730PM patient began feeling dizzy and like she was going to pass out so had to leave work. Took nothing OTC for pain. Reports it is possible she hit her head on a ride but not sure. Reports pain when moving neck side to side and continued dizziness/ feeling "out of it". Denies fever, URI, n/v/d, rash, sick contacts, recent travel, vision changes, hearing changes. PMH of anxiety and depression on Abilify 5mg and hydroxizine 10mg daily. No surgeries, admissions, NKDA, IUTD. HEADSS negative. Mom was also there yday and went to  and received XR and told she had whiplash. Pt has otherwise been at baseline, no vomiting, taking some PO, LMP 2 weeks ago, denies coitarche and illicits. hasn't taken any medications and doesn't want to take medications, has actually improved since being here but still w/ mild HA

## 2024-08-06 NOTE — ED ADULT NURSE NOTE - NSFALLRSKINDICATORS_ED_ALL_ED
Phone number: [phone] for [facility].  Pt was referred for abdominal pain, nausea, ulcer, and GERD.  Please have her follow up with GI as well for abnormal alkaline phosphatase as the liver portion is the highest over bone and colon portion.  She may need further liver evaluation.    Please let me know if patient has any further questions or concerns. no

## 2024-08-13 NOTE — ED ADULT NURSE NOTE - OBJECTIVE STATEMENT
Received pt in  pt bought in by EMS from [facility] for non compliant of meds pt delusional denies si/hi/avh presently eval on going. room air

## 2024-08-13 NOTE — BH INPATIENT PSYCHIATRY PROGRESS NOTE - NSBHMSEBEHAV_PSY_A_CORE
Bronchoscopy Procedure Note    Timeout was done appropriately by staff    Procedure:  Bronchoscopy, Therapeutic washing right lung  Right lower lobe endobronchial brushing    Preoperative Diagnosis: Unresolving pneumonia with weight loss    Postoperative Diagnosis:     no endobronchial lesions, no mucopurulent secretions, right lung washing revealed yellowish fluid material   Right lower lobe endobronchial brushing x 1 was done  bronchial washings sent for flow cytometry in addition to cytology/expanded cultures    Anesthesia: Moderate Sedation    Procedure Details: Patient was consented for the procedure with all risks and benefits of the procedure explained in detail.  Patient was given the opportunity to ask questions and all concerns were answered.  The bronchocope was inserted into the main airway via the oropharynx. An anatomical survey was done of the main airways and the subsegmental bronchus to at least the first subsegmental level of all five lobes of both lungs.  The findings are reported below.  A bronchoalveolar lavage was performed using aliquots of normal saline instilled into the airways then aspirated back.      Findings:      no endobronchial lesions, no mucopurulent secretions, right lung washing revealed yellowish fluid material   Right lower lobe endobronchial brushing x 1 was done  bronchial washings sent for flow cytometry in addition to cytology/expanded cultures    Patient tolerated procedure well        Estimated Blood Loss:  Minimal           Specimens:  Sent serosanguinous fluid                Complications:  None; patient tolerated the procedure well.           Disposition: PACU - hemodynamically stable.      Patient tolerated the procedure well.    Kelvin Gonzalez MD  8/13/2024  08:59 EDT     Cooperative/Other

## 2024-08-15 NOTE — BH INPATIENT PSYCHIATRY PROGRESS NOTE - NSBHFUPINTERVALHXFT_PSY_A_CORE
No Patient is followed up for psychosis. Chart, medications and labs reviewed.  Patient is discussed with nursing staff. No significant overnight issues.  No appreciated change in status today. Per nursing patient was found in bed with feces in her bed, on sheets and on the patient. Patient shouting at nursing when attempting to help. Patient refused to allow housekeeping in her room to clean.  After strong encouragement patient allowed staff assistance.  Patient remains disorganized, psychotic, with ongoing perceptual disturbances. Psychotic symptoms successfully elicited; talking to self, RIS, appears internally preoccupied, sexually provocative and disorganized. Patient’s symptoms are worsening due to her noncompliance with medications.   Unable to fully participate in interview due to severity of psychotic symptoms. No mention of sleep disturbances or appetite concerns.  Refusing standing medications, refused her Prolixin Dec last week.  No akithisia, EPS, or tremors.  Encouraged patient to continue medication regimen.  Self- care remains an issue.  Treatment team proceeding with MOO and State application.

## 2024-08-19 NOTE — BH PATIENT PROFILE - FALL HARM RISK - CONCLUSION
Alert and oriented, no focal deficits, no motor or sensory deficits. cn ii-xii intact, no dysmetria, non-ataxic gait, neck supple
Universal Safety Interventions

## 2024-08-27 NOTE — ED BEHAVIORAL HEALTH ASSESSMENT NOTE - DIFFERENTIAL
Is This A New Presentation, Or A Follow-Up?: Skin Lesion
bipolar disorder  borderline personality disorder

## 2024-09-13 NOTE — ED ADULT NURSE REASSESSMENT NOTE - CONDITION
improved PROVIDER:[TOKEN:[[id number]],FOLLOWUP:[1 week]],PROVIDER:[TOKEN:[[id number]],FOLLOWUP:[1 week]],PROVIDER:[TOKEN:[[id number]],FOLLOWUP:[1 week]]

## 2024-09-21 NOTE — BH INPATIENT PSYCHIATRY PROGRESS NOTE - NSBHCONSBHPROVDETAILS_PSY_A_CORE  FT
12/09:Attempt made to call  Kedar Buckner at [phone]  12/10: Second attempt to call  Kedar Buckner at [facility] LEFT VM  12/11/20: Attempts made to contact outpatient ACT Team.  Left VM for ACT Team NP Allie Bernal at [phone].    12/16/20: Professional collateral obtained from outpatient ACT Team NP Allie Bernal at [phone].    12/15/20: Spoke with ACT Team member Jennifer Nettles at [phone]. Normal for race

## 2024-09-23 NOTE — ED PROVIDER NOTE - PRINCIPAL DIAGNOSIS
OCCUPATIONAL THERAPY EVALUATION - INPATIENT     Room Number: 311/311-A  Evaluation Date: 9/23/2024  Type of Evaluation: Initial  Presenting Problem: epistaxis, syncope    Physician Order: IP Consult to Occupational Therapy  Reason for Therapy: ADL/IADL Dysfunction and Discharge Planning    OCCUPATIONAL THERAPY ASSESSMENT   Patient is currently functioning near baseline with toileting, lower body dressing, transfers, and dynamic standing balance. Prior to admission, patient's baseline is independent.  Patient is requiring grossly CGA for most standing ADLs and functional transfers, increased assist for LB ADLs as a result of the following impairments: decreased functional strength, decreased functional reach, decreased endurance, pain, and impaired standing balance. Occupational Therapy will continue to follow for duration of hospitalization.    Patient will benefit from continued skilled OT Services with no additional skilled services but increased support at home      History Related to Current Admission: Patient is a 75 year old female admitted on 9/21/2024 with Presenting Problem: epistaxis, syncope. Co-Morbidities : IgG deficiency, h/o DVT/PE, neuropathy, R knee OA, depression, CPS, DM, OA, anxiety, asthma, breast CA s/p lumpectomy/radiation    WEIGHT BEARING RESTRICTION  Weight Bearing Restriction: None                Recommendations for nursing staff:   Transfers: 1-person  Toileting location: toilet    EVALUATION SESSION:  Patient Start of Session: supine    FUNCTIONAL TRANSFER ASSESSMENT  Sit to Stand: Edge of Bed  Edge of Bed: Contact Guard Assist    BED MOBILITY  Supine to Sit : Modified Independent  Sit to Supine (OT): Modified Independent    BALANCE ASSESSMENT     FUNCTIONAL ADL ASSESSMENT  LB Dressing Seated: Dependent (total provided to don B socks this session, will further address LB dressing future session)  LB Dressing Standing: Contact Guard Assist (simulated clothing management to/from  waist)  Toileting Standing: Contact Guard Assist (simulated clothing management to/from waist)      ACTIVITY TOLERANCE: WFL                         O2 SATURATIONS       COGNITION  Overall Cognitive Status:  WFL - within functional limits    Upper Extremity   ROM: within functional limits except for the following:  Right Shoulder flexion approx 50% AROM, reports baseline from old kalliey but functional  Strength: within functional limits     EDUCATION PROVIDED  Patient: Role of Occupational Therapy; Functional Transfer Techniques; Fall Prevention; Compensatory ADL Techniques  Patient's Response to Education: Verbalized Understanding; Requires Further Education    Equipment used: RW  Demonstrates functional use, Would benefit from additional trial      Therapist comments: Patient fatigued but participatory with encouragement, primarily limited by migraine pain this session. Patient educated on safety precautions and incorporation into ADLs and transfers, followed with good return demo. See Functional Assessment sections above for patient's performance on ADLs and functional transfers this session. Will continue to follow.     Patient End of Session: In bed;Needs met;Call light within reach;RN aware of session/findings;All patient questions and concerns addressed;Alarm set    OCCUPATIONAL PROFILE    HOME SITUATION  Type of Home: [facility]  Home Layout: Two level  Lives With: Family;Other (Comment) (adult grandchild)    Toilet and Equipment: Comfort height toilet;Grab bar  Shower/Tub and Equipment: Walk-in shower;Grab bar (built-in seat)  Other Equipment: Reacher    Occupation/Status: works in property management     Drives: Yes  Patient Regularly Uses: Glasses;Other (Comment) (CPAP)    Prior Level of Function: Patient reports independent in all I/ADL and functional mobility without device prior to admission.    SUBJECTIVE   \"I think when I'm feeling better I'll be fine\"    PAIN ASSESSMENT  Ratin  Location:  migraine  Management Techniques: Relaxation;Ice    OBJECTIVE  Precautions: Limb alert - left;Other (Comment) (B rhinorocket)  Fall Risk: Standard fall risk      ASSESSMENTS    AM-PAC ‘6-Clicks’ Inpatient Daily Activity Short Form  -   Putting on and taking off regular lower body clothing?: A Lot  -   Bathing (including washing, rinsing, drying)?: A Little  -   Toileting, which includes using toilet, bedpan or urinal? : A Little  -   Putting on and taking off regular upper body clothing?: None  -   Taking care of personal grooming such as brushing teeth?: None  -   Eating meals?: None    AM-PAC Score:  Score: 20  Approx Degree of Impairment: 38.32%  Standardized Score (AM-PAC Scale): 42.03    ADDITIONAL TESTS     NEUROLOGICAL FINDINGS      COGNITION ASSESSMENTS       PLAN  OT Treatment Plan: Balance activities;ADL training;Functional transfer training;Endurance training;Patient/Family education;Patient/Family training;Compensatory technique education  Rehab Potential : Good  Frequency: 3x/week  Number of Visits to Meet Established Goals: 3    ADL GOALS   Patient will perform lower body dressing with supervision  Patient will perform toileting with supervision    FUNCTIONAL TRANSFER GOALS   Patient will transfer to toilet with supervision    Patient Evaluation Complexity Level:   Occupational Profile/Medical History LOW - Brief history including review of medical or therapy records    Specific performance deficits impacting engagement in ADL/IADL LOW  1 - 3 performance deficits    Client Assessment/Performance Deficits LOW - No comorbidities nor modifications of tasks    Clinical Decision Making LOW - Analysis of occupational profile, problem-focused assessments, limited treatment options    Overall Complexity LOW     OT Session Time: 23 minutes  Therapeutic Activity: 8 minutes   Bipolar illness

## 2024-09-30 ENCOUNTER — EMERGENCY (EMERGENCY)
Facility: HOSPITAL | Age: 44
LOS: 1 days | Discharge: HOME | End: 2024-09-30
Attending: STUDENT IN AN ORGANIZED HEALTH CARE EDUCATION/TRAINING PROGRAM | Admitting: STUDENT IN AN ORGANIZED HEALTH CARE EDUCATION/TRAINING PROGRAM
Payer: MEDICARE

## 2024-09-30 VITALS
DIASTOLIC BLOOD PRESSURE: 89 MMHG | OXYGEN SATURATION: 99 % | TEMPERATURE: 98 F | SYSTOLIC BLOOD PRESSURE: 148 MMHG | HEIGHT: 66 IN | RESPIRATION RATE: 18 BRPM | HEART RATE: 69 BPM

## 2024-09-30 VITALS
SYSTOLIC BLOOD PRESSURE: 120 MMHG | HEART RATE: 68 BPM | OXYGEN SATURATION: 100 % | TEMPERATURE: 98 F | RESPIRATION RATE: 17 BRPM | DIASTOLIC BLOOD PRESSURE: 70 MMHG

## 2024-09-30 LAB
ALBUMIN SERPL ELPH-MCNC: 3.7 G/DL — SIGNIFICANT CHANGE UP (ref 3.3–5)
ALP SERPL-CCNC: 115 U/L — SIGNIFICANT CHANGE UP (ref 40–120)
ALT FLD-CCNC: 14 U/L — SIGNIFICANT CHANGE UP (ref 4–33)
ANION GAP SERPL CALC-SCNC: 10 MMOL/L — SIGNIFICANT CHANGE UP (ref 7–14)
APPEARANCE UR: CLEAR — SIGNIFICANT CHANGE UP
AST SERPL-CCNC: 12 U/L — SIGNIFICANT CHANGE UP (ref 4–32)
BACTERIA # UR AUTO: NEGATIVE /HPF — SIGNIFICANT CHANGE UP
BASOPHILS # BLD AUTO: 0.03 K/UL — SIGNIFICANT CHANGE UP (ref 0–0.2)
BASOPHILS NFR BLD AUTO: 0.4 % — SIGNIFICANT CHANGE UP (ref 0–2)
BILIRUB SERPL-MCNC: <0.2 MG/DL — SIGNIFICANT CHANGE UP (ref 0.2–1.2)
BILIRUB UR-MCNC: NEGATIVE — SIGNIFICANT CHANGE UP
BUN SERPL-MCNC: 14 MG/DL — SIGNIFICANT CHANGE UP (ref 7–23)
CALCIUM SERPL-MCNC: 9.3 MG/DL — SIGNIFICANT CHANGE UP (ref 8.4–10.5)
CAST: 2 /LPF — SIGNIFICANT CHANGE UP (ref 0–4)
CHLORIDE SERPL-SCNC: 108 MMOL/L — HIGH (ref 98–107)
CO2 SERPL-SCNC: 25 MMOL/L — SIGNIFICANT CHANGE UP (ref 22–31)
COD CRY URNS QL: PRESENT
COLOR SPEC: YELLOW — SIGNIFICANT CHANGE UP
CREAT SERPL-MCNC: 0.75 MG/DL — SIGNIFICANT CHANGE UP (ref 0.5–1.3)
DIFF PNL FLD: NEGATIVE — SIGNIFICANT CHANGE UP
EGFR: 99 ML/MIN/1.73M2 — SIGNIFICANT CHANGE UP
EOSINOPHIL # BLD AUTO: 0.35 K/UL — SIGNIFICANT CHANGE UP (ref 0–0.5)
EOSINOPHIL NFR BLD AUTO: 5 % — SIGNIFICANT CHANGE UP (ref 0–6)
GLUCOSE SERPL-MCNC: 75 MG/DL — SIGNIFICANT CHANGE UP (ref 70–99)
GLUCOSE UR QL: NEGATIVE MG/DL — SIGNIFICANT CHANGE UP
HCG SERPL-ACNC: <1 MIU/ML — SIGNIFICANT CHANGE UP
HCT VFR BLD CALC: 39.6 % — SIGNIFICANT CHANGE UP (ref 34.5–45)
HGB BLD-MCNC: 12.3 G/DL — SIGNIFICANT CHANGE UP (ref 11.5–15.5)
IANC: 3.52 K/UL — SIGNIFICANT CHANGE UP (ref 1.8–7.4)
IMM GRANULOCYTES NFR BLD AUTO: 0.1 % — SIGNIFICANT CHANGE UP (ref 0–0.9)
KETONES UR-MCNC: ABNORMAL MG/DL
LEUKOCYTE ESTERASE UR-ACNC: NEGATIVE — SIGNIFICANT CHANGE UP
LIDOCAIN IGE QN: 28 U/L — SIGNIFICANT CHANGE UP (ref 7–60)
LYMPHOCYTES # BLD AUTO: 2.57 K/UL — SIGNIFICANT CHANGE UP (ref 1–3.3)
LYMPHOCYTES # BLD AUTO: 36.7 % — SIGNIFICANT CHANGE UP (ref 13–44)
MCHC RBC-ENTMCNC: 28.9 PG — SIGNIFICANT CHANGE UP (ref 27–34)
MCHC RBC-ENTMCNC: 31.1 GM/DL — LOW (ref 32–36)
MCV RBC AUTO: 93 FL — SIGNIFICANT CHANGE UP (ref 80–100)
MONOCYTES # BLD AUTO: 0.52 K/UL — SIGNIFICANT CHANGE UP (ref 0–0.9)
MONOCYTES NFR BLD AUTO: 7.4 % — SIGNIFICANT CHANGE UP (ref 2–14)
NEUTROPHILS # BLD AUTO: 3.52 K/UL — SIGNIFICANT CHANGE UP (ref 1.8–7.4)
NEUTROPHILS NFR BLD AUTO: 50.4 % — SIGNIFICANT CHANGE UP (ref 43–77)
NITRITE UR-MCNC: NEGATIVE — SIGNIFICANT CHANGE UP
NRBC # BLD: 0 /100 WBCS — SIGNIFICANT CHANGE UP (ref 0–0)
NRBC # FLD: 0 K/UL — SIGNIFICANT CHANGE UP (ref 0–0)
PH UR: 6 — SIGNIFICANT CHANGE UP (ref 5–8)
PLATELET # BLD AUTO: 236 K/UL — SIGNIFICANT CHANGE UP (ref 150–400)
POTASSIUM SERPL-MCNC: 3.5 MMOL/L — SIGNIFICANT CHANGE UP (ref 3.5–5.3)
POTASSIUM SERPL-SCNC: 3.5 MMOL/L — SIGNIFICANT CHANGE UP (ref 3.5–5.3)
PROT SERPL-MCNC: 7.4 G/DL — SIGNIFICANT CHANGE UP (ref 6–8.3)
PROT UR-MCNC: 100 MG/DL
RBC # BLD: 4.26 M/UL — SIGNIFICANT CHANGE UP (ref 3.8–5.2)
RBC # FLD: 13.5 % — SIGNIFICANT CHANGE UP (ref 10.3–14.5)
RBC CASTS # UR COMP ASSIST: 6 /HPF — HIGH (ref 0–4)
REVIEW: SIGNIFICANT CHANGE UP
SODIUM SERPL-SCNC: 143 MMOL/L — SIGNIFICANT CHANGE UP (ref 135–145)
SP GR SPEC: 1.03 — HIGH (ref 1–1.03)
SQUAMOUS # UR AUTO: 1 /HPF — SIGNIFICANT CHANGE UP (ref 0–5)
UROBILINOGEN FLD QL: 1 MG/DL — SIGNIFICANT CHANGE UP (ref 0.2–1)
WBC # BLD: 7 K/UL — SIGNIFICANT CHANGE UP (ref 3.8–10.5)
WBC # FLD AUTO: 7 K/UL — SIGNIFICANT CHANGE UP (ref 3.8–10.5)
WBC UR QL: 1 /HPF — SIGNIFICANT CHANGE UP (ref 0–5)

## 2024-09-30 PROCEDURE — 99284 EMERGENCY DEPT VISIT MOD MDM: CPT

## 2024-09-30 RX ORDER — MAGNESIUM, ALUMINUM HYDROXIDE 200-225/5
30 SUSPENSION, ORAL (FINAL DOSE FORM) ORAL ONCE
Refills: 0 | Status: COMPLETED | OUTPATIENT
Start: 2024-09-30 | End: 2024-09-30

## 2024-09-30 RX ORDER — ONDANSETRON 2 MG/ML
4 INJECTION, SOLUTION INTRAMUSCULAR; INTRAVENOUS ONCE
Refills: 0 | Status: COMPLETED | OUTPATIENT
Start: 2024-09-30 | End: 2024-09-30

## 2024-09-30 RX ORDER — FAMOTIDINE 10 MG/ML
20 INJECTION INTRAVENOUS ONCE
Refills: 0 | Status: COMPLETED | OUTPATIENT
Start: 2024-09-30 | End: 2024-09-30

## 2024-09-30 RX ORDER — SODIUM CHLORIDE 9 MG/ML
1000 INJECTION INTRAMUSCULAR; INTRAVENOUS; SUBCUTANEOUS ONCE
Refills: 0 | Status: COMPLETED | OUTPATIENT
Start: 2024-09-30 | End: 2024-09-30

## 2024-09-30 RX ADMIN — Medication 30 MILLILITER(S): at 18:10

## 2024-09-30 RX ADMIN — SODIUM CHLORIDE 1000 MILLILITER(S): 9 INJECTION INTRAMUSCULAR; INTRAVENOUS; SUBCUTANEOUS at 18:18

## 2024-09-30 RX ADMIN — FAMOTIDINE 20 MILLIGRAM(S): 10 INJECTION INTRAVENOUS at 18:18

## 2024-09-30 RX ADMIN — ONDANSETRON 4 MILLIGRAM(S): 2 INJECTION, SOLUTION INTRAMUSCULAR; INTRAVENOUS at 19:27

## 2024-09-30 NOTE — ED PROVIDER NOTE - PROGRESS NOTE DETAILS
pt feels much better. labs reassuring. vss. pt jonny po. SW called to assist pt to get back home to [facility]. An extensive discussion was had with the patient regarding labs/imaging and tests prior to discharge. We discussed in depth the importance of follow up for continuing medical care as an outpatient. The patient was advised to return the Emergency Department for worsening or persistent symptoms, and/or any new or concerning symptoms.

## 2024-09-30 NOTE — PROVIDER CONTACT NOTE (OTHER) - ASSESSMENT
Called residence [phone]; spoke with staff member, Adrienne, who confirmed that pt is a resident and can return.  Per Adrienne, pt can travel independently via taxi.  SW discussed with provider, who is aware and in agreement for pt to travel home via taxi.  Pt is in agreement as well.

## 2024-09-30 NOTE — ED ADULT NURSE NOTE - OBJECTIVE STATEMENT
Pt. is alert and oriented x 4, presenting to the ER with complaints of  abdominal pain, nausea and vomiting. Pt. stated " my abdomen has been hurting me since last night and I vomited about 3 times". C/o mild lower abdominal pain and nausea, no vomiting at present. Medicated as ordered, labs sent. Pt. is from [place] [address], pt. is calm and cooperative, out of bed ambulating without difficulty.

## 2024-09-30 NOTE — ED ADULT NURSE NOTE - CHIEF COMPLAINT QUOTE
Patient brought to Er by EMS from [place] [address] for c/o nausea, vomiting and upper abdominal pain that started 11pm last night.. Patient not sure of weight.

## 2024-09-30 NOTE — ED PROVIDER NOTE - PATIENT PORTAL LINK FT
You can access the FollowMyHealth Patient Portal offered by [facility] by registering at the following website: [email or web address]. By joining [facility]’s FollowMyHealth portal, you will also be able to view your health information using other applications (apps) compatible with our system.

## 2024-09-30 NOTE — ED PROVIDER NOTE - CLINICAL SUMMARY MEDICAL DECISION MAKING FREE TEXT BOX
eval for female with nausea vomiting and abdominal pain. vss. no fever. well appearing. no distress. abd benign. there is ttp but no rebound distension or guarding to suggest perforation. LFTs and bilirubin normal and pain not related to eating means, doubt choledocholithiases. more likyl PUD, gastritis or gerd. pregnancy test negative. Patient feels better after gi cocktail. Po challenge, dc.

## 2024-09-30 NOTE — ED PROVIDER NOTE - NSFOLLOWUPINSTRUCTIONS_ED_ALL_ED_FT
Acute Abdominal Pain    WHAT YOU NEED TO KNOW:  The cause of your abdominal pain may not be found. If a cause is found, treatment will depend on what the cause is.    DISCHARGE INSTRUCTIONS:    Seek care immediately if:  You vomit blood or cannot stop vomiting.  You have blood in your bowel movement or it looks like tar.  You have bleeding from your rectum.  Your abdomen is larger than usual, more painful, and hard.  You have severe pain in your abdomen.  You stop passing gas and having bowel movements.  You feel weak, dizzy, or faint.  Contact your healthcare provider if:  You have a fever.  You have new signs and symptoms.  Your symptoms do not get better with treatment.  You have questions or concerns about your condition or care.  Medicines may be given to decrease pain, treat an infection, and manage your symptoms. Take your medicine as directed. Call your healthcare provider if you think your medicine is not helping or if you have side effects. Tell him if you are allergic to any medicine. Keep a list of the medicines, vitamins, and herbs you take. Include the amounts, and when and why you take them. Bring the list or the pill bottles to follow-up visits. Carry your medicine list with you in case of an emergency.    Manage your symptoms:    Apply heat on your abdomen for 20 to 30 minutes every 2 hours for as many days as directed. Heat helps decrease pain and muscle spasms.  Manage your stress. Stress may cause abdominal pain. Your healthcare provider may recommend relaxation techniques and deep breathing exercises to help decrease your stress. Your healthcare provider may recommend you talk to someone about your stress or anxiety, such as a counselor or a trusted friend. Get plenty of sleep and exercise regularly.  Limit or do not drink alcohol. Alcohol can make your abdominal pain worse. Ask your healthcare provider if it is safe for you to drink alcohol. Also ask how much is safe for you to drink.  Do not smoke. Nicotine and other chemicals in cigarettes can damage your esophagus and stomach. Ask your healthcare provider for information if you currently smoke and need help to quit. E-cigarettes or smokeless tobacco still contain nicotine. Talk to your healthcare provider before you use these products.  Make changes to the food you eat as directed: Do not eat foods that cause abdominal pain or other symptoms. Eat small meals more often.  Eat more high-fiber foods if you are constipated. High-fiber foods include fruits, vegetables, whole-grain foods, and legumes.  Do not eat foods that cause gas if you have bloating. Examples include broccoli, cabbage, and cauliflower. Do not drink soda or carbonated drinks, because these may also cause gas.  Do not eat foods or drinks that contain sorbitol or fructose if you have diarrhea and bloating. Some examples are fruit juices, candy, jelly, and sugar-free gum.  Do not eat high-fat foods, such as fried foods, cheeseburgers, hot dogs, and desserts.  Limit or do not drink caffeine. Caffeine may make symptoms, such as heart burn or nausea, worse.  Drink plenty of liquids to prevent dehydration from diarrhea or vomiting. Ask your healthcare provider how much liquid to drink each day and which liquids are best for you.  Follow up with your healthcare provider as directed: Write down your questions so you remember to ask them during your visits. Seek immediate medical assistance for any new or worsening symptoms. If you have issues obtaining follow up, please call: [phone] or [phone]  to obtain a doctor or specialist who takes your insurance in your area.     Acute Abdominal Pain    WHAT YOU NEED TO KNOW:  The cause of your abdominal pain may not be found. If a cause is found, treatment will depend on what the cause is.    DISCHARGE INSTRUCTIONS:    Seek care immediately if:  You vomit blood or cannot stop vomiting.  You have blood in your bowel movement or it looks like tar.  You have bleeding from your rectum.  Your abdomen is larger than usual, more painful, and hard.  You have severe pain in your abdomen.  You stop passing gas and having bowel movements.  You feel weak, dizzy, or faint.  Contact your healthcare provider if:  You have a fever.  You have new signs and symptoms.  Your symptoms do not get better with treatment.  You have questions or concerns about your condition or care.  Medicines may be given to decrease pain, treat an infection, and manage your symptoms. Take your medicine as directed. Call your healthcare provider if you think your medicine is not helping or if you have side effects. Tell him if you are allergic to any medicine. Keep a list of the medicines, vitamins, and herbs you take. Include the amounts, and when and why you take them. Bring the list or the pill bottles to follow-up visits. Carry your medicine list with you in case of an emergency.    Manage your symptoms:    Apply heat on your abdomen for 20 to 30 minutes every 2 hours for as many days as directed. Heat helps decrease pain and muscle spasms.  Manage your stress. Stress may cause abdominal pain. Your healthcare provider may recommend relaxation techniques and deep breathing exercises to help decrease your stress. Your healthcare provider may recommend you talk to someone about your stress or anxiety, such as a counselor or a trusted friend. Get plenty of sleep and exercise regularly.  Limit or do not drink alcohol. Alcohol can make your abdominal pain worse. Ask your healthcare provider if it is safe for you to drink alcohol. Also ask how much is safe for you to drink.  Do not smoke. Nicotine and other chemicals in cigarettes can damage your esophagus and stomach. Ask your healthcare provider for information if you currently smoke and need help to quit. E-cigarettes or smokeless tobacco still contain nicotine. Talk to your healthcare provider before you use these products.  Make changes to the food you eat as directed: Do not eat foods that cause abdominal pain or other symptoms. Eat small meals more often.  Eat more high-fiber foods if you are constipated. High-fiber foods include fruits, vegetables, whole-grain foods, and legumes.  Do not eat foods that cause gas if you have bloating. Examples include broccoli, cabbage, and cauliflower. Do not drink soda or carbonated drinks, because these may also cause gas.  Do not eat foods or drinks that contain sorbitol or fructose if you have diarrhea and bloating. Some examples are fruit juices, candy, jelly, and sugar-free gum.  Do not eat high-fat foods, such as fried foods, cheeseburgers, hot dogs, and desserts.  Limit or do not drink caffeine. Caffeine may make symptoms, such as heart burn or nausea, worse.  Drink plenty of liquids to prevent dehydration from diarrhea or vomiting. Ask your healthcare provider how much liquid to drink each day and which liquids are best for you.  Follow up with your healthcare provider as directed: Write down your questions so you remember to ask them during your visits.

## 2024-09-30 NOTE — ED PROVIDER NOTE - OBJECTIVE STATEMENT
44F presents to ED with three episodes of bloody vomit yesterday and abdominal pain today. Hx GERD and cholecystectomy. Tried to drink tea today and did not vomit. No fevers or chills. No diarrhea. Last BM yesterday was normal. LMP was in August, she has been spotting since. No headache. No weakness. Patient used to be on Nexium but states her doctor stopped prescribing it for her. Smokes 1-2 cigarettes a day and does not drink or do drugs.

## 2024-09-30 NOTE — ED ADULT TRIAGE NOTE - CHIEF COMPLAINT QUOTE
Patient brought to Er by EMS from [place] [address] for c/o nausea, vomiting and upper abdominal pain that started 11pm last night.. Patient not sure of weight.

## 2024-09-30 NOTE — ED PROVIDER NOTE - TEMPLATE, MLM
General Doxepin Counseling:  Patient advised that the medication is sedating and not to drive a car after taking this medication. Patient informed of potential adverse effects including but not limited to dry mouth, urinary retention, and blurry vision.  The patient verbalized understanding of the proper use and possible adverse effects of doxepin.  All of the patient's questions and concerns were addressed.

## 2024-10-17 NOTE — ED PROVIDER NOTE - CONSULTANT FREE TEXT FOR MDM DISCUSSED CASE WITH QUESTION
Msg left for this patient. ----- Message from Ana sent at 10/17/2024 10:58 AM CDT -----  Regarding: results  Type:  Test Results    Who Called: pt  Name of Test (Lab/Mammo/Etc): Urine   Date of Test:   Ordering Provider: Jose Luis   Where the test was performed:   Would the patient rather a call back or a response via [facility]? Call   Best Call Back Number: [phone]  Additional Information:  pt stated she has been waiting on a call for test results  
psychiatry

## 2024-10-21 NOTE — ED PROVIDER NOTE - MDM ORDERS SUBMITTED SELECTION
Juice provided. Child still appears tired despite stating she feels better.  Sats drop to 88%  RR 42, provider updated and high flow order to RT paged. Provider to bedside & update parent plan of care.     Not Applicable

## 2024-10-23 NOTE — ED PROVIDER NOTE - NSFOLLOWUPCLINICS_GEN_ALL_ED_FT
What Is The Reason For Today's Visit?: Preventative Skin Check [facility]  Behavioral Health  [id number] [place] [address]  Phone: [phone]  Fax:   Follow Up Time: 1-3 Days

## 2024-11-08 NOTE — ED BEHAVIORAL HEALTH ASSESSMENT NOTE - PATIENT SEEN BY
\"Have you been to the ER, urgent care clinic since your last visit?  Hospitalized since your last visit?\"    NO    “Have you seen or consulted any other health care providers outside our system since your last visit?”    NO    Have you had a mammogram?”   NO    No breast cancer screening on file       “Have you had a colorectal cancer screening such as a colonoscopy/FIT/Cologuard?    NO    No colonoscopy on file  No cologuard on file  No FIT/FOBT on file   No flexible sigmoidoscopy on file     Chief Complaint   Patient presents with    Establish Care     BP (!) 169/107 (Site: Right Upper Arm, Position: Sitting, Cuff Size: Medium Adult) Comment: has been out of medication for 1 month  Pulse 98   Temp 97.1 °F (36.2 °C) (Temporal)   Resp 18   Ht 1.575 m (5' 2\")   Wt 69.9 kg (154 lb)   SpO2 100%   BMI 28.17 kg/m²           Breath sounds: Normal breath sounds.   Musculoskeletal:      Right lower leg: No edema.      Left lower leg: No edema.   Skin:     General: Skin is warm and dry.   Neurological:      General: No focal deficit present.      Mental Status: She is alert.   Psychiatric:         Mood and Affect: Mood normal.         Behavior: Behavior normal.         Thought Content: Thought content normal.         Judgment: Judgment normal.          Assessment & Plan      1. Essential hypertension  -     Comprehensive Metabolic Panel  -     CBC with Auto Differential  -     Microalbumin / Creatinine Urine Ratio  2. Flu vaccine need  -     Influenza, FLUAD Trivalent, (age 65 y+), IM, Preservative Free, 0.5mL  3. BMI 28.0-28.9,adult  -     CBC with Auto Differential  4. Lipid screening  -     Lipid Panel  5. Diabetes mellitus screening  -     Hemoglobin A1C    Had a long conversation with patient regarding her hypertension  We discussed reducing salt in the diet but she says that she already does not add any salt and does not do microwaved or canned foods  She reports she has been trying to work on her health by getting more active and changing her diet  She does not smoke she does not drink alcohol  She does not use NSAIDs consistently reports occasional NSAID use  She has reported side effect of losartan of fatigue at 25 mg  We discussed because of this we could trial an alternative medication and she was agreeable  We decided with shared decision making on chlorthalidone  I discussed with patient that we will likely need to do a stepwise process of trying to control her blood pressure and it is possible she may need more than 1 agent  We discussed possible side effects and she was agreeable to us getting blood work to update her electrolytes and kidney panel    Reviewed: limited records    Aspects of this note have been generated using voice recognition software. Despite editing, there may be some syntax errors.  Follow-up and  Attending Psychiatrist without NP/Trainee

## 2024-12-02 NOTE — ED ADULT NURSE NOTE - NS ED NOTE ABUSE RESPONSE YN
she will let them know.   --Follow up 6 weeks; call sooner with any concerns.   12/2/2024:   She did not follow up after May visit, but contacted office in October to let us know the Imitrex was not working.  She was prescribed Maxalt instead, but states today that it is not helping either, provides no relief at all for her migraines. Continues to have about 1 migraine a week.  Has been worse in the past week, she believes because she had a GI bug (now resolved) and was dehydrated.   --Discuss options, and will trial Nurtec; Discussed risks/benefits, alternatives, potential side effects, proper administration of medication.   --Stay hydrated.  Educational handouts given.  --Advised of symptoms that would require reevaluation, or that would require immediate medical attention in the ER; patient expresses understanding.               Previous history for reference:      She is engaged; planning wedding for next year.  Graduated from [employer] with a major in biology.  Works as a realtor.  Has 2yo daughter.      Fatigue, hair loss--  HPI 9/20/23:   Symptoms for the past several years.  States she has very low energy, feels fatigued all the time.  Has noticed hair thinning.  Has occasional heart palpitations.  Denies chest pain, shortness of breath, weight loss, nausea/vomiting/diarrhea/abdominal pain.   Reports strong family history of thyroid issues, and would like screening today.  States she was told she was borderline in the past.   10/24/23:   Labs were unremarkable. CBC, CMP, TSH wnl.  States she is feeling a lot better than at her last visit; had recently started a new job, and is caring for 2yo daughter, and planning wedding.  Still with some fatigue.      Elevated blood pressure--  HPI 9/20/23:   Elevated in office today.  Monitor labs today.  Advise to monitor BP at home for the next few weeks, and return for BP log review.  Advise low sodium DASH diet, regular exercise.   10/24/23:   BP running mostly 
Yes

## 2024-12-11 NOTE — ED ADULT TRIAGE NOTE - CHIEF COMPLAINT QUOTE
Cryotherapy    What is it?  Use of a very cold liquid, such as liquid nitrogen, to freeze and destroy abnormal skin cells that need to be removed    What should I expect?  Tenderness and redness  A small blister that might grow and fill with dark purple blood. There may be crusting.  More than one treatment may be needed if the lesions do not go away.    How do I care for the treated area?  Gently wash the area with your hands when bathing.  Use a thin layer of Vaseline to help with healing. You may use a Band-Aid.   The area should heal within 7-10 days and may leave behind a pink or lighter color.   Do not use an antibiotic or Neosporin ointment.   You may take acetaminophen (Tylenol) for pain.     Call your doctor if you have:  Severe pain  Signs of infection (warmth, redness, cloudy yellow drainage, and or a bad smell)  Questions or concerns    Who should I call with questions?      [facility]: [phone]      [facility]: [phone]      For urgent needs outside of business hours call the [facility] at [phone] and ask for the dermatology resident on call         Patient Education       Proper skin care from [facility]:    -Eliminate harsh soaps as they strip the natural oils from the skin, often resulting in dry itchy skin ( i.e. Dial, Zest, Nessa Spring)  -Use mild soaps such as Cetaphil or Dove Sensitive Skin in the shower. You do not need to use soap on arms, legs, and trunk every time you shower unless visibly soiled.   -Avoid hot or cold showers.  -After showering, lightly dry off and apply moisturizing within 2-3 minutes. This will help trap moisture in the skin.   -Aggressive use of a moisturizer at least 1-2 times a day to the entire body (including -Vanicream, Cetaphil, Aquaphor or Cerave) and moisturize hands after every washing.  -We recommend using moisturizers that come in a tub that needs to be scooped out,  abd pain    c/o mid abd pain for 1 day with nausea, vomiting x40, diarrhea x2. not a pump. This has more of an oil base. It will hold moisture in your skin much better than a water base moisturizer. The above recommended are non-pore clogging.      Wear a sunscreen with at least SPF 30 on your face, ears, neck and V of the chest daily. Wear sunscreen on other areas of the body if those areas are exposed to the sun throughout the day. Sunscreens can contain physical and/or chemical blockers. Physical blockers are less likely to clog pores, these include zinc oxide and titanium dioxide. Reapply every two hour and after swimming.     Sunscreen examples: https://www.ewg.org/sunscreen/    UV radiation  UVA radiation remains constant throughout the day and throughout the year. It is a longer wavelength than UVB and therefore penetrates deeper into the skin leading to immediate and delayed tanning, photoaging, and skin cancer. 70-80% of UVA and UVB radiation occurs between the hours of 10am-2pm.  UVB radiation  UVB radiation causes the most harmful effects and is more significant during the summer months. However, snow and ice can reflect UVB radiation leading to skin damage during the winter months as well. UVB radiation is responsible for tanning, burning, inflammation, delayed erythema (pinkness), pigmentation (brown spots), and skin cancer.     I recommend self monthly full body exams and yearly full body exams with a dermatology provider. If you develop a new or changing lesion please follow up for examination. Most skin cancers are pink and scaly or pink and pearly. However, we do see blue/brown/black skin cancers.  Consider the ABCDEs of melanoma when giving yourself your monthly full body exam ( don't forget the groin, buttocks, feet, toes, etc). A-asymmetry, B-borders, C-color, D-diameter, E-elevation or evolving. If you see any of these changes please follow up in clinic. If you cannot see your back I recommend purchasing a hand held mirror to use with a larger wall mirror.       Checking  for Skin Cancer  You can find cancer early by checking your skin each month. There are 3 kinds of skin cancer. They are melanoma, basal cell carcinoma, and squamous cell carcinoma. Doing monthly skin checks is the best way to find new marks or skin changes. Follow the instructions below for checking your skin.   The ABCDEs of checking moles for melanoma   Check your moles or growths for signs of melanoma using ABCDE:   Asymmetry: the sides of the mole or growth don t match  Border: the edges are ragged, notched, or blurred  Color: the color within the mole or growth varies  Diameter: the mole or growth is larger than 6 mm (size of a pencil eraser)  Evolving: the size, shape, or color of the mole or growth is changing (evolving is not shown in the images below)    Checking for other types of skin cancer  Basal cell carcinoma or squamous cell carcinoma have symptoms such as:     A spot or mole that looks different from all other marks on your skin  Changes in how an area feels, such as itching, tenderness, or pain  Changes in the skin's surface, such as oozing, bleeding, or scaliness  A sore that does not heal  New swelling or redness beyond the border of a mole    Who s at risk?  Anyone can get skin cancer. But you are at greater risk if you have:   Fair skin, light-colored hair, or light-colored eyes  Many moles or abnormal moles on your skin  A history of sunburns from sunlight or tanning beds  A family history of skin cancer  A history of exposure to radiation or chemicals  A weakened immune system  If you have had skin cancer in the past, you are at risk for recurring skin cancer.   How to check your skin  Do your monthly skin checkups in front of a full-length mirror. Check all parts of your body, including your:   Head (ears, face, neck, and scalp)  Torso (front, back, and sides)  Arms (tops, undersides, upper, and lower armpits)  Hands (palms, backs, and fingers, including under the nails)  Buttocks and  genitals  Legs (front, back, and sides)  Feet (tops, soles, toes, including under the nails, and between toes)  If you have a lot of moles, take digital photos of them each month. Make sure to take photos both up close and from a distance. These can help you see if any moles change over time.   Most skin changes are not cancer. But if you see any changes in your skin, call your doctor right away. Only he or she can diagnose a problem. If you have skin cancer, seeing your doctor can be the first step toward getting the treatment that could save your life.   [employer] last reviewed this educational content on 4/1/2019 2000-2020 [employer]. [address] [place] [address], [place] [address]. All rights reserved. This information is not intended as a substitute for professional medical care. Always follow your healthcare professional's instructions.       When should I call my doctor?  If you are worsening or not improving, please, contact us or seek urgent care as noted below.     Who should I call with questions (adults)?  [facility] (adult and pediatric): [phone]  [facility] (adult): [phone]  [facility] ([facility] and [place]) [phone]  For urgent needs outside of business hours call the [facility] at [phone] and ask for the dermatology resident on call to be paged  If this is a medical emergency and you are unable to reach an ER, Call [phone]      If you need a prescription refill, please contact your pharmacy. Refills are approved or denied by our Physicians during normal business hours, Monday through Fridays  Per office policy, refills will not be granted if you have not been seen within the past year (or sooner depending on your child's condition The ABCDEs of Melanoma    Skin cancer can develop anywhere on the skin. Ask someone for help when checking your skin,  especially in hard to see places. If you notice a mole different from others, or that changes, enlarges, itches, or bleeds (even if it is small), you should see a dermatologist.

## 2024-12-11 NOTE — ED BEHAVIORAL HEALTH ASSESSMENT NOTE - NSBHPSYCHOLCOGORIENT_PSY_A_CORE
What Type Of Note Output Would You Prefer (Optional)?: Bullet Format
Is This A New Presentation, Or A Follow-Up?: Skin Lesion
Oriented to time, place, person, situation

## 2025-01-14 NOTE — ED ADULT TRIAGE NOTE - RESPIRATORY RATE (BREATHS/MIN)
Patient's Name: Nichole Jordan  : 2025  MR#: [id number]  RESIDENT EMERGENCY DEPARTMENT NOTE    SUBJECTIVE   CC:    Chief Complaint   Patient presents with    Shortness of Breath       HPI: Nichole Jordan is a 23 m.o. male presenting in the care of his parents for cough, congestion, and shortness of breath.  Patient was seen at urgent care earlier today for cough and congestion after he is noted to be coughing at .  He was febrile to 100.3 F at the urgent care and found to have increased work of breathing.  Patient was swabbed for COVID and influenza which was negative.  Per mom, urgent care was not able to test for RSV.  Patient attends , mom denies any sick symptoms, fever, diarrhea, vomiting.  Mom has noticed slightly decreased p.o. today but otherwise has had appropriate wet diapers.    HISTORY:   - PMHx:  has a past medical history of Breech presentation of fetus ([facility]) (2023) and Gastroesophageal reflux disease without esophagitis (2023). has Cow's milk protein allergy; Constipation; Diaper rash; IUGR,  ([facility]); SGA (small for gestational age) ([facility]); Health check for child over 28 days old; Infantile eczema; Chronic nasal congestion; Snoring; Hypertrophy of nasal turbinates; Sleep-disordered breathing; Hypertrophy of adenoids alone; Middle ear effusion, right; Suspected sleep apnea; and Obstructive sleep apnea on their problem list.  - PSx:  has no past surgical history on file.   - Hosp: None  - Med:   Current Facility-Administered Medications   Medication Dose Route Frequency Provider Last Rate Last Admin    cetirizine (ZyrTEC) oral solution 2.5 mg  2.5 mg oral Once Katharine Corrales MD        ibuprofen 100 mg/5 mL suspension 120 mg  10 mg/kg (Dosing Weight) oral Once Katharine Corrales MD         Current Outpatient Medications   Medication Sig Dispense Refill    acetaminophen (Tylenol) 160 mg/5 mL liquid Take 6 mL (192 mg) by mouth every 6 hours if needed for  mild pain (1 - 3). 120 mL 0    Children's cetirizine 1 mg/mL syrup TAKE 2.5 ML (2.5 MG) BY MOUTH ONCE DAILY AS NEEDED FOR ALLERGIES. 240 mL 2    EPINEPHrine (AUVI-Q) 0.15 mg/0.15 mL inj auto-injector injection Inject 0.15 mL (0.15 mg) into the muscle 1 time if needed for anaphylaxis. 1 each 2    hydrocortisone 1 % ointment Apply topically 2 times a day. PRN to dry skin, use for 1-2 wks, the off for 1 wk (Patient not taking: Reported on 4/3/2024) 28 g 3    hydrocortisone 2.5 % ointment Apply 1 Application topically 2 times a day as needed for irritation. (Patient not taking: Reported on 5/29/2024) 28 g 3    ibuprofen 100 mg/5 mL suspension Take 6 mL (120 mg) by mouth every 6 hours if needed for mild pain (1 - 3). 237 mL 0    mupirocin (Bactroban) 2 % ointment APPLY THIN FILM  TO AFFECTED AREA 3 TIMES DAILY FOR 7 TO 10 DAYS. (Patient not taking: Reported on 10/23/2024)      zinc oxide-cod liver oil 40 % ointment USE EXTERNALLY AS DIRECTED to diaper area as needed up to every diaper change (Patient not taking: Reported on 10/23/2024)        - All: is allergic to egg.  - Immunization:   - FamHx: family history includes Allergies in an other family member; Anemia in an other family member; Ankylosing spondylitis in his mother; Asthma in an other family member; Diabetes in an other family member; Hypertension in his mother; Vision problems in an other family member.   - Soc:    - PCP: Kat Del Cid MD     ROS: All systems were reviewed and negative except as mentioned above in HPI    OBJECTIVE   Triage vitals:  T 36.9 °C (98.4 °F)    BP (!) 123/85  RR (!) 32  O2 95 % None (Room air)    PHYSICAL EXAM  - Gen: Alert, well appearing, in NAD   - Head/Neck: NCAT, neck w/ FROM   - Eyes: EOMI, PERRL, anicteric sclerae, noninjected conjunctivae   - Ears: TMs clear b/l without sign of infection  - Nose: +congestion and rhinorrhea  - Mouth:  MMM, OP without erythema or lesions. Tonsils 3+ b/l  - Heart: RRR, no murmurs,  rubs, or gallops  - Lungs: CTA b/l, no rhonchi, rales or wheezing, belly breathing with subcostal retractions. Tachypneic.  - Abdomen: soft, NT, ND, no HSM, no palpable masses  - Extremities: WWP, no c/c/e, cap refill <2sec   - Neurologic: Alert, symmetrical facies, moves all extremities equally, responsive to touch  - Psychological: Normal parent/child interaction    RESULTS  Labs Reviewed   RSV PCR     No orders to display       ED COURSE/MEDICAL DECISION MAKING     Diagnoses as of 01/13/25 2352   Viral URI with cough     Per mom's orders, will swab for RSV.  Most likely differential given symptoms of cough, congestion, low-grade fever is viral URI.  Patient has mild increased work of breathing on exam, suction patient with improvement in breathing. Patient was given a dose of Zyrtec as he takes it usually at night, and  note provided. Patient discharged in stable condition. Parents informed the will be contacted if RSV test is positive.    ASSESSMENT/PLAN   Nichole Jordan is a 23 m.o. male presenting with increased WOB likely in the setting of viral URI. Patient improved after suctioning.     All questions answered. Return precautions discussed. Family expresses understanding, in agreement with plan. Discharged home in stable condition.    - Impression:   1. Viral URI with cough          - Dispo: Home  - Prescriptions:   ED Prescriptions    None       - Follow-up: PCP    Patient staffed with attending physician MD Katharine Sarkar MD  Pediatrics, PGY-2     Katharine Corrales MD  Resident  01/13/25 2146     18

## 2025-01-21 NOTE — BH PSYCHOLOGY - GROUP THERAPY NOTE - NSPSYCHOLGRPCOGPT_PSY_A_CORE FT
Called and spoke with Pretty at [facility] to cancel patients' left heart cath as he is requesting to have this performed at [facility] in [place].     Will send request to Dr Olson / Susanna MAGANA.     
Called scheduling to cancel the order for a left heart cath for [facility].  Patient has requested to have this performed at [facility].  Scheduling transferred me to the cath lab.  Left a vm to cancel this left heart cath.    
Caller: Miki Jett    Relationship: Self    Best call back number: [phone] (home)     What is the best time to reach you: ANYTIME    Who are you requesting to speak with (clinical staff, provider,  specific staff member): SCHEDULING    What was the call regarding: PT HAS A HEART CATH SCHEDULED FOR 1.28.25 AT [facility] AND WANTS THIS MOVED TO [facility]. PLEASE CALL PT WHEN AVAILABLE TO R.S       
Patient attended Cognitive Behavioral Therapy Group. The group started with a brief check in and mindfulness /relaxation exercise. The group then focused on the topic of distress tolerance and self care. Patients discussed the concept of distress tolerance and shared examples of healthy ways to cope with distress and ways to engage in healthy self-care (balanced sleep, healthy eating etc). The  explained concepts, reinforced participation, and engaged patients in the discussion.  The group concluded with a checkout and discussion of daily goals.

## 2025-02-07 NOTE — ED ADULT NURSE NOTE - NS PRO PASSIVE SMOKE EXP
S/P fall over a week ago and CT noted: Minimally displaced fracture involving the distal sacrum/proximal coccyx   Pt's symptoms and presentation are consistent with a displaced fracture in the distal sacrum/proximal coccyx that is non operative.   Pt has benefitted from PT/OT and has progressed while in the hospital.   Pain is still present at rest, but more difficult to control with ambulation  Ortho consulted, they agree with assessment and that pt has no acute surgical intervention available    Pain control with tylenol, hydrocodone, and lidocaine patch   These will be continued post discharge  PT   Fall precautions  Pt will be going to skilled rehab at discharge to progress further.       No

## 2025-02-26 NOTE — ED ADULT NURSE NOTE - NS_BH TRG QUESTION3_ED_ALL_ED
[facility]/Population Health Chart Review & Patient Outreach Details For Medication Adherence Project    Reason for Outreach Encounter: 3rd Party payor non-compliance report ([employer], [employer], UHC, etc)  2.  Patient Outreach Method: Reviewed patient chart   3.   Medication in question:    Hypertension Medications              olmesartan-hydrochlorothiazide (BENICAR HCT) 20-12.5 mg per tablet TAKE 1 TABLET BY MOUTH DAILY                 LF 30 ds 2/25/25  Lisinopril dc'ed    4.  Reviewed and or Updates Made To: Patient Chart  5. Outreach Outcomes and/or actions taken: Patient filled medication and is on track to be adherent and Medication discontinued  Additional Notes:        No

## 2025-03-06 NOTE — ED PROVIDER NOTE - CHIEF COMPLAINT
Copied from CRM #[id number]. Topic: MW Medication/Rx - MW Patient Calling for RX Needs  >> Mar 6, 2025  8:40 AM Lana MARES wrote:  Tyrone Hawkins called with Question about medication during working hrs.   Selected 'Wrap Up CRM' and created new Telephone Encounter after clicking 'Convert to Clinical Call'. Selected reason for call 'Medication Issue'. Sent Med template and routed as routine priority per Care Site Dept KB page for Med Refill Working Hrs support to appropriate clinical pool. Readback full message.-- DO NOT REPLY / DO NOT REPLY ALL --  -- This inbox is not monitored. If this was sent to the wrong provider or department, reroute message to P ECO Reroute pool. --  -- Message is from Engagement Center Operations (ECO) --      Message Type:  Medication Question or Concern    Medication Question: levothyroxine 150 MCG tablet. Per patient it was dicussed at his last visit that he possibly might need to skip days of taking this medication or either take a lower dosage. Patient would like to confirm how he should be taking this medication. Please call. Thank you    Preferred pharmacy verified and selected.        Patient has been advised the message will be addressed within 2-3 business days.               The patient is a 42y Female complaining of suicidal thoughts.

## 2025-03-08 NOTE — ED ADULT NURSE NOTE - NSFALLRISKINTERV_ED_ALL_ED
paige: CT without any findings either in the bones or in identifying any sort of renal stone.  Patient feels well urine does not appear infected nor does it have blood.  Patient is being directed follow-up with his urologist for likely cystoscopy Communicate fall risk and risk factors to all staff, patient, and family/Provide visual cue: yellow wristband, yellow gown, etc/Reinforce activity limits and safety measures with patient and family/Call bell, personal items and telephone in reach/Instruct patient to call for assistance before getting out of bed/chair/stretcher/Non-slip footwear applied when patient is off stretcher/[place] to call system/Physically safe environment - no spills, clutter or unnecessary equipment/Purposeful Proactive Rounding/Room/bathroom lighting operational, light cord in reach

## 2025-04-04 NOTE — ED PROVIDER NOTE - IV ALTEPLASE EXCL REL HIDDEN
34 YO M with PMHx of morbid obesity, BEA on CPAP, pAFIB (not on AC), HTN, and BL papilledema attributed to pseudotumor cerebri who presented initially to [facility] on 2/24 with SOB and BL LE edema. Found with URI outpatient with progressive SOB, and concern for noted for MFPNA on imaging. Course progressed with AHRF with worsening O2 demand, respiratory distress, secretions, and somnolence requiring intubation (difficult with success after 6 attempts) in ED and admission to [facility] MICU. While in MICU, patient noted with increasing O2 demand with no improvement despite optimal vent management. Concern noted for progressive ARDS, unable to prone given morbid obesity, and ultimately cannulated for vvECMO on 2/25 and transferred to [facility] for further management on 2/26. While at [facility], tx with diuresis and ABX, and ultimately decannulated and s/p 60XLTCP trach and PEG on 3/7. Patient ultimately transferred to RCU on 3/11 and course complicated by recurrent high grade fevers and found with fungemia.    NEUROLOGY  # Hx of Pseudotumor Cerebri   - s/p LP in 2024 with high opening pressure  - s/p MRI 2024 with possible pituitary mass but unclear because of pressure effect from pseudotumor cerebri causing partially empty sella.  - Prolactin elevated   - ACTH low but recieved steroids during admission   - FT4 low normal and TSH normal, but given FT4 low normal TSH should be higher   - Discuss endocrine consult for inhouse work up vs outpatient.     # Sedation   - Weaned off sedation in ICU   - Nimbex turned off 2/27   - Prop turned off 3/9   - Precedex turned off and catapres started 3/11  - Continue on catapres 0.2 TID (decreased 3/25) - taper done 3/31 to 0.1 tid - discontinued 4/3     # Insomnia   - Patient worked night shift x 15 years   - Trouble sleeping at night leading to day time sleepiness and poor participation with PT  - Continue on Seroquel 25 QHS (increased 3/18)   - Continue on melatonin   - Continue on neurontin as below    # BL LE neuropathy   - CIPN concern   - Neurontin increased to 300mg Q8H with relief   - added Percocet Q4H for moderate pain (3/25) and continue on Dilaudid Q6H for severe pain   - Pain mgt called to consult - with recs for tylenol ATC, NEurontin increased to 400mg q6, Oxycodone 5 q4h prn , Dilaudid for BREAKTHROUGH pain only, Lidocaine patches on each leg, ice prn to feet  - Some improvement in pain - PT consult for TENS,   - oxycodone inc to 10mg as pt with no relief /buttocks area indurated /tender CT ordered -no abcess  - C/w Gabapentin : 600/600/800 and uptitrate as required  - Switched to PO Dilaudid 2mg Q6H PRN moderate-severe and IV Dilaudid PRN severe breakthrough and with dressing changes     CARDIOVASCULAR  # HTN   - HTN noted in ICU requiring cardene and esmolol GTTs   - Lisinopril dc'ed to increase catapres   - Continue on norvasc 10 and catapres 0.3 TID   - Monitor BP     # AFIB   - Hx of pAFIB   - No RVR episodes or pAFIB episodes in ICU   - No rate control medications needed  - Monitor on telemetry     # RV dilation second to PHTN   - POCUS on arrival to [facility] with RVE concern   - TTE 10/2024 with EF 55-60 with normal LVSF, moderate LVH and normal RVSF and size with no concern for pHTN   - TTE on 2/25 at  with EF 61 and normal LVSF, but enlarged RV size with reduced RVSF, mild TR, mild PHTN with PASP 49, and dilated IVC to 3.4cn, but normal TAPSE 2.1.  - Continue on aggressive diuresis in ICU    - TTE 3/11 with RVSF reduced with TAPSE 1.6. IVC improved to 2.12cm.   - Continue on lasix 40 PO BID   - Monitor IO/BMP    RESPIRATORY  # ARDS second to MFPNA   - Hx of BEA on CPAP presented to  post URI with SOB and found with AHRF with progression to ARDS second to post viral MFPNA   - Intubated 2/25   - Cannulated for vvECMO 2/26   - s/p 60XLTCP tracheostomy 3/7   - Decannulated 3/7   - CTSx removed tracheostomy and ECMO sutures on 3/17   - Continue on TC QD with PMV as able with strong phonation  - Continue on PS 18/10/40 QHS given OHS   - Continue on nebs and HTS   -  trials continue and now trialing overnight with BIPAP for OHS (10/8/12)  - Decannulated 3/28   - Using nasal cannula 4L     GI  # Dysphagia   - s/p PEG 3/7   - Attempted green dye on 3/13 and failed  - Continue on TF  - Continue on miralax and senna  - RPT green dye passed 3/19  - 3/20 Passed FEEST - on soft bite sized with thin liquids     # Mild transaminitis   - LFTs elevated in ICU with TBILI elevated likely from ECMO hemolysis   - US ABD with hepatic steatosis   - Trend LFTs      RENAL  # ELLA   - ELLA likely from cardiorenal with RVE   - Diuresed in ICU and improved   - Monitor renal function and UOP     # Hypernatremia   - Hypernatremia with fever spikes   - Fever curve improved and attempting to wean FWF   - Continue on  Q6H (decreased 3/19)    INFECTIOUS DISEASE  # Recurrent fevers with fungemia from unknown source   - Recurrent fevers post ECMO decannulation thought initially to be cytokine surge   - BCx, SCx, UCx, RVP and MRSA RPT on 3/12 negative   - Completed zosyn (3/12-3/18) empirically with improved WBC , however recurrent fevers noted.   - RPT SCx, UA, CXR and RVP negative  - TTE 3/17 with no IE  - PanCT 3/17 with PNA and persistent panniculitis   - BCx 3/15 and 3/16 with candida albicans.   - Concern for possible source from panniculitis   - Continue on caspo (3/17 - ) and RPT BCx 3/18 negative  - ID following  - plan for 2 week course after negative BCx to 4/2  - 3/20 Bcx- NGTD  - 3/30 spiked temp cx's sent + ua and c/s pending Bcx neg to date - started on vanco /zosyn ID following   - 4/3 - persistent fevers, leukocytosis. Will repeat CT C/A/P to evaluate for source       # MFPNA and abdominal pannus cellulitis   - Presented to  post URI with SOB and found with AHRF with progression to ARDS second to post viral MFPNA   - RVP, legionella, strept, BAL, BCx, UCx, HIV, PCP, and adenovirus PCR negative   - Initially started on multiple agents in ICU and ultimatelty completed zosyn (2/26-3/9) ZMAX (2/25-2/26) and vanco (2/26-3/1)     # R/o Sacral OM  - Noted with hard indurated sacral wound by Wound Care Team  - CT Pelvis (3/29) revaeling superficial skin thickening overlying the sacrum with underlying edema of the subcutaneous fat, in keeping with the clinical history of sacral wound. No evidence of underlying tract or drainable fluid collection.    # Penile discharge   - GC/ chlamydia negative   - HIV negative   - Monitor for now    # PPX   - MRSA PCR positive and completed bactroban (2/26-3/3)     HEME   # Anemia likely second to ECMO circuit vs AOCD   - HH low in ICU second to ECMO circuit   - HH dropping again today however no bleeding noted   - Microcytic and hemochromic, sent anemia panel 3/19  - Trend HH     VASCULAR   # R IJ and BL LE DVTs   - Post ECMO dopplers on 3/9 negative for BL UE/ LE DVTs.   - Subsequent post ECMO dopplers performed on 3/14 and noted with acute, non-occlusive deep vein thrombosis noted within the right internal jugular vein. acute, occlusive deep vein thromboses noted within the right and left peroneal veins at both mid calves, and age-indeterminate, occlusive deep vein thrombosis visualized within the left gastrocnemius vein at the proximal calf.     - Continue on argatroban GTT and change to eliquis     PODIATRY   # BL plantar feet blisters likely pressors induced  - Seen by podiatry and blisters lanced on 3/4 and again on 3/18  - Continue on local wound care per podiatry   - Podiatry following  - OK for WBAT will fu with PT for offloading shoe if appropriate  - Pain mgt consult   - Podiatry FU 3/28 done /following     ENDOCRINE  # Pre-DM2   - A1C 6.7  - Continue on ISS   - Monitor FS   - IF no demand then stop ISS     LINES/ TUBES  - R IJ and R FEM ECMO (2/26-3/7)     SKIN  # Pannus canndial intertrigo   # Sacrum/ buttock MAD  - Continue on clotrimazole cream   - WOC appreciated   -Seen by WC pt noted to have oozing from Buttock wound surgicel placed by wound care On follow up no further bleeding  - Wound care placed orders   - No bleeding     ETHICS/ GOC    - FULL CODE     DISPO - PT following  Seen by PMR for acute rehab per recs      36 YO M with PMHx of morbid obesity, BEA on CPAP, pAFIB (not on AC), HTN, and BL papilledema attributed to pseudotumor cerebri who presented initially to [facility] on 2/24 with SOB and BL LE edema. Found with URI outpatient with progressive SOB, and concern for noted for MFPNA on imaging. Course progressed with AHRF with worsening O2 demand, respiratory distress, secretions, and somnolence requiring intubation (difficult with success after 6 attempts) in ED and admission to [facility] MICU. While in MICU, patient noted with increasing O2 demand with no improvement despite optimal vent management. Concern noted for progressive ARDS, unable to prone given morbid obesity, and ultimately cannulated for vvECMO on 2/25 and transferred to [facility] for further management on 2/26. While at [facility], tx with diuresis and ABX, and ultimately decannulated and s/p 60XLTCP trach and PEG on 3/7. Patient ultimately transferred to RCU on 3/11 and course complicated by recurrent high grade fevers and found with fungemia.    NEUROLOGY  # Hx of Pseudotumor Cerebri   - s/p LP in 2024 with high opening pressure  - s/p MRI 2024 with possible pituitary mass but unclear because of pressure effect from pseudotumor cerebri causing partially empty sella.  - Prolactin elevated   - ACTH low but recieved steroids during admission   - FT4 low normal and TSH normal, but given FT4 low normal TSH should be higher   - Discuss endocrine consult for inhouse work up vs outpatient.     # Sedation   - Weaned off sedation in ICU   - Nimbex turned off 2/27   - Prop turned off 3/9   - Precedex turned off and catapres started 3/11  - Continue on catapres 0.2 TID (decreased 3/25) - taper done 3/31 to 0.1 tid - discontinued 4/3     # Insomnia   - Patient worked night shift x 15 years   - Trouble sleeping at night leading to day time sleepiness and poor participation with PT  - Continue on Seroquel 25 QHS (increased 3/18)   - Continue on melatonin   - Continue on neurontin as below    # BL LE neuropathy   - CIPN concern   - Neurontin increased to 300mg Q8H with relief   - added Percocet Q4H for moderate pain (3/25) and continue on Dilaudid Q6H for severe pain   - Pain mgt called to consult - with recs for tylenol ATC, NEurontin increased to 400mg q6, Oxycodone 5 q4h prn , Dilaudid for BREAKTHROUGH pain only, Lidocaine patches on each leg, ice prn to feet  - Some improvement in pain - PT consult for TENS,   - oxycodone inc to 10mg as pt with no relief /buttocks area indurated /tender CT ordered -no abcess  - C/w Gabapentin : 600/600/800 and uptitrate as required  - Switched to PO Dilaudid 2mg Q6H PRN moderate-severe and IV Dilaudid PRN severe breakthrough and with dressing changes   - Still with severe pain 10/10 despite change to dilaudid will increase to 4mg q4h prn with iv dilaudid breakthrough  - I have reviewed with pt to not wait to long for dose of pain medication ot prevent pain from escalating     CARDIOVASCULAR  # HTN   - HTN noted in ICU requiring cardene and esmolol GTTs   - Lisinopril dc'ed to increase catapres   - Continue on norvasc 10 and catapres 0.3 TID   - Monitor BP     # AFIB   - Hx of pAFIB   - No RVR episodes or pAFIB episodes in ICU   - No rate control medications needed  - Monitor on telemetry     # RV dilation second to PHTN   - POCUS on arrival to [facility] with RVE concern   - TTE 10/2024 with EF 55-60 with normal LVSF, moderate LVH and normal RVSF and size with no concern for pHTN   - TTE on 2/25 at  with EF 61 and normal LVSF, but enlarged RV size with reduced RVSF, mild TR, mild PHTN with PASP 49, and dilated IVC to 3.4cn, but normal TAPSE 2.1.  - Continue on aggressive diuresis in ICU    - TTE 3/11 with RVSF reduced with TAPSE 1.6. IVC improved to 2.12cm.   - Continue on lasix 40 PO BID   - Monitor IO/BMP    RESPIRATORY  # ARDS second to MFPNA   - Hx of BEA on CPAP presented to  post URI with SOB and found with AHRF with progression to ARDS second to post viral MFPNA   - Intubated 2/25   - Cannulated for vvECMO 2/26   - s/p 60XLTCP tracheostomy 3/7   - Decannulated 3/7   - CTSx removed tracheostomy and ECMO sutures on 3/17   - Continue on TC QD with PMV as able with strong phonation  - Continue on PS 18/10/40 QHS given OHS   - Continue on nebs and HTS   -  trials continue and now trialing overnight with BIPAP for OHS (10/8/12)  - Decannulated 3/28   - Using nasal cannula 4L     GI  # Dysphagia   - s/p PEG 3/7   - Attempted green dye on 3/13 and failed  - Continue on TF  - Continue on miralax and senna  - RPT green dye passed 3/19  - 3/20 Passed FEEST - on soft bite sized with thin liquids     # Mild transaminitis   - LFTs elevated in ICU with TBILI elevated likely from ECMO hemolysis   - US ABD with hepatic steatosis   - Trend LFTs      RENAL  # ELLA   - ELLA likely from cardiorenal with RVE   - Diuresed in ICU and improved   - Monitor renal function and UOP     # Hypernatremia   - Hypernatremia with fever spikes   - Fever curve improved and attempting to wean FWF   - Continue on  Q6H (decreased 3/19)    INFECTIOUS DISEASE  # Recurrent fevers with fungemia from unknown source   - Recurrent fevers post ECMO decannulation thought initially to be cytokine surge   - BCx, SCx, UCx, RVP and MRSA RPT on 3/12 negative   - Completed zosyn (3/12-3/18) empirically with improved WBC , however recurrent fevers noted.   - RPT SCx, UA, CXR and RVP negative  - TTE 3/17 with no IE  - PanCT 3/17 with PNA and persistent panniculitis   - BCx 3/15 and 3/16 with candida albicans.   - Concern for possible source from panniculitis   - Continue on caspo (3/17 - ) and RPT BCx 3/18 negative  - ID following  - plan for 2 week course after negative BCx to 4/2  - 3/20 Bcx- NGTD  - 3/30 spiked temp cx's sent + ua and c/s pending Bcx neg to date - started on vanco /zosyn ID following   - 4/3 - persistent fevers, leukocytosis. Will repeat CT C/A/P to evaluate for source - no source found- Bilat lung opacities mildly decreased         # MFPNA and abdominal pannus cellulitis   - Presented to HH post URI with SOB and found with AHRF with progression to ARDS second to post viral MFPNA   - RVP, legionella, strept, BAL, BCx, UCx, HIV, PCP, and adenovirus PCR negative   - Initially started on multiple agents in ICU and ultimatelty completed zosyn (2/26-3/9) ZMAX (2/25-2/26) and vanco (2/26-3/1)     # R/o Sacral OM  - Noted with hard indurated sacral wound by Wound Care Team  - CT Pelvis (3/29) revaeling superficial skin thickening overlying the sacrum with underlying edema of the subcutaneous fat, in keeping with the clinical history of sacral wound. No evidence of underlying tract or drainable fluid collection.  - CT 4/3 - no fluid collection in sacral tissue     # Penile discharge   - GC/ chlamydia negative   - HIV negative   - Monitor for now    # PPX   - MRSA PCR positive and completed bactroban (2/26-3/3)     HEME   # Anemia likely second to ECMO circuit vs AOCD   - HH low in ICU second to ECMO circuit   - HH dropping again today however no bleeding noted   - Microcytic and hemochromic, sent anemia panel 3/19  - Trend HH     VASCULAR   # R IJ and BL LE DVTs   - Post ECMO dopplers on 3/9 negative for BL UE/ LE DVTs.   - Subsequent post ECMO dopplers performed on 3/14 and noted with acute, non-occlusive deep vein thrombosis noted within the right internal jugular vein. acute, occlusive deep vein thromboses noted within the right and left peroneal veins at both mid calves, and age-indeterminate, occlusive deep vein thrombosis visualized within the left gastrocnemius vein at the proximal calf.     - Continue on argatroban GTT and change to eliquis     PODIATRY   # BL plantar feet blisters likely pressors induced  - Seen by podiatry and blisters lanced on 3/4 and again on 3/18  - Continue on local wound care per podiatry   - Podiatry following  - OK for WBAT will fu with PT for offloading shoe if appropriate  - Pain mgt consult   - Podiatry FU 3/28 / 4/3 done /following     ENDOCRINE  # Pre-DM2   - A1C 6.7  - Continue on ISS   - Monitor FS   - IF no demand then stop ISS     LINES/ TUBES  - R IJ and R FEM ECMO (2/26-3/7)     SKIN  # Pannus canndial intertrigo   # Sacrum/ buttock MAD  - Continue on clotrimazole cream   - WOC appreciated   -Seen by WC pt noted to have oozing from Buttock wound surgicel placed by wound care On follow up no further bleeding  - Wound care placed orders   - No bleeding     ETHICS/ GOC    - FULL CODE     DISPO - PT following  Seen by PMR for acute rehab per recs   show

## 2025-04-09 NOTE — ED PROVIDER NOTE - SECONDARY DIAGNOSIS.
[FreeTextEntry3] : - Large joint injection was performed of the right knee.  - The indication for this procedure was pain and inflammation. Patient has tried OTC's including aspirin, Ibuprofen, Aleve, etc or prescription NSAIDS, and/or exercises at home and/or physical therapy without satisfactory response, patient had decreased mobility in the joint and the risks benefits, and alternatives have been discussed, and verbal consent was obtained. The site was prepped with alcohol, betadine, ethyl chloride sprayed topically and sterile technique used.  - An injection of Betamethasone 2cc; Marcaine 5cc injected. - Patient was advised to call if redness, pain or fever occur, apply ice for 15 minutes out of every hour for the next 12-24 hours as tolerated and patient was advised to rest the joint(s) for 2 days.  - Ultrasound guidance was indicated for this patient due to prior failure or difficult injection. All ultrasound images have been permanently captured and stored accordingly in our picture archiving and communication system. Visualization of the needle and placement of injection was performed without complication.
Bipolar disorder

## 2025-04-10 NOTE — ED BEHAVIORAL HEALTH ASSESSMENT NOTE - ELEVATED CHRONIC RISK
[TextEntry] : CARDIOVASCULAR: Negative RESPIRATORY: Negative GASTROINTESTINAL: Negative NEUROLOGICAL: Negative Yes

## 2025-04-25 NOTE — BH TREATMENT PLAN - NSTXPSYCHOGOAL_PSY_ALL_CORE
Followup with your pediatrician in 2-3 days or sooner if changes  Tylenol and or Motrin for pain and discomfort  Sling while awake not while sleeping  Ice on and off but not while sleeping  If increased pain or discomfort return to the ER  Followup with your physician at [facility] if pain continues over the next few days   
Will ask for PRN medication to manage hallucinations
Will be able to report experiencing hallucinations to staff
Will be able to report experiencing hallucinations to staff
Will report hearing a voice only momentarily a few times a day instead of all day

## 2025-05-03 NOTE — BH INPATIENT PSYCHIATRY ASSESSMENT NOTE - NSBHCHARTREVIEWVS_PSY_A_CORE FT
No Vital Signs Last 24 Hrs  T(C): 36.2 (14 Jan 2021 06:41), Max: 36.7 (13 Jan 2021 15:33)  T(F): 97.1 (14 Jan 2021 06:41), Max: 98 (13 Jan 2021 15:33)  HR: 85 (13 Jan 2021 22:41) (85 - 86)  BP: 120/61 (13 Jan 2021 22:41) (120/61 - 123/77)  BP(mean): --  RR: 16 (13 Jan 2021 22:41) (16 - 18)  SpO2: 100% (13 Jan 2021 22:41) (100% - 100%)

## 2025-05-05 NOTE — ED ADULT NURSE NOTE - CHIEF COMPLAINT
"Zi"Analilia Campos was seen and treated in our emergency department on 5/5/2025.  He may return to work on 05/07/2025.       If you have any questions or concerns, please don't hesitate to call.       LPN    " The patient is a 42y Female complaining of psychiatric evaluation.

## 2025-05-08 NOTE — ED BEHAVIORAL HEALTH ASSESSMENT NOTE - AFFECT QUALITY
SCRIBE #1 NOTE: I, Alva Westfall, am scribing for, and in the presence of, Noé Smith MD. I have scribed the entire note. Dr. Smith documented the pt's PEx.      History     Chief Complaint   Patient presents with    Hypertension     HTN last 2-3 days, sent by psychiatrist for high liver enzymes and HTN. Pt reports fatigue.     Review of patient's allergies indicates:  No Known Allergies      History of Present Illness     HPI    5/8/2025, 3:50 PM  History obtained from the patient and medical records      History of Present Illness: Cirilo Pichardo is a 38 y.o. male patient with a PMHx of fatty liver disease, headache, depression, and schizoaffective disorder who presents to the Emergency Department for evaluation of hypertensive BP which began 3 days ago. He also had high liver enzymes today and was sent to the ED by his psychiatrist for further evaluation. Pt states he felt uncomfortable 2 days ago and felt like he was about to lose consciousness because of his elevated BP. His mother measured his BP, and his BP was at 140/112 on 5/6/2025 and at 140/91 today. Symptoms are constant and moderate in severity. No mitigating or exacerbating factors reported. Associated sxs include fatigue. Patient denies any CP or headache. No prior Tx specified. He takes amlodipine for his BP, but he ran out of the medication. No further complaints or concerns at this time.       Arrival mode: Personal Transportation    PCP: Ashlie Aguilar FNP        Past Medical History:  Past Medical History:   Diagnosis Date    Depression     Headache     Schizo-affective disorder        Past Surgical History:  History reviewed. No pertinent surgical history.      Family History:  No family history on file.    Social History:  Social History     Tobacco Use    Smoking status: Never    Smokeless tobacco: Never   Substance and Sexual Activity    Alcohol use: No    Drug use: No    Sexual activity: Yes     Partners: Female        Review of  Systems     Review of Systems   Constitutional:  Positive for fatigue. Negative for fever.   HENT:  Negative for sore throat.    Respiratory:  Negative for shortness of breath.    Cardiovascular:  Negative for chest pain.   Gastrointestinal:  Negative for nausea.   Genitourinary:  Negative for dysuria.   Musculoskeletal:  Negative for back pain.   Skin:  Negative for rash.   Neurological:  Negative for weakness and headaches. Syncope: near syncopal episode.  Hematological:  Does not bruise/bleed easily.        (+) elevated BP x 3 days   All other systems reviewed and are negative.     Physical Exam     Initial Vitals [05/08/25 1412]   BP Pulse Resp Temp SpO2   (!) 158/94 108 18 97.5 °F (36.4 °C) 98 %      MAP       --          Physical Exam  Nursing Notes and Vital Signs Reviewed.  Constitutional: Patient is in no acute distress. Well-developed and well-nourished.  Head: Atraumatic. Normocephalic.  Eyes: PERRL. EOM intact. Conjunctivae are not pale. No scleral icterus.  ENT: Mucous membranes are . Oropharynx is clear and symmetric.    Neck: Supple. Full ROM. No lymphadenopathy.  Cardiovascular: Regular rate. Regular rhythm. No murmurs, rubs, or gallops. Distal pulses are 2+ and symmetric.  Pulmonary/Chest: No respiratory distress. Clear to auscultation bilaterally. No wheezing or rales.  Abdominal: Soft and non-distended.  There is no tenderness.  No rebound, guarding, or rigidity. Good bowel sounds.  Genitourinary: No CVA tenderness.  Musculoskeletal: Moves all extremities. No obvious deformities. No edema. No calf tenderness.  Skin: Warm and dry.  Neurological:  Alert, awake, and appropriate.  Normal speech.  No acute focal neurological deficits are appreciated  Psychiatric: Normal affect. Good eye contact. Appropriate in content.     ED Course   Procedures  ED Vital Signs:  Vitals:    05/08/25 1412 05/08/25 1612 05/08/25 1656 05/08/25 1657   BP: (!) 158/94 (!) 144/76 131/74 135/81   Pulse: 108 94 97 110   Resp:  "18 19 (!) 27 (!) 21   Temp: 97.5 °F (36.4 °C) 97.6 °F (36.4 °C)     TempSrc: Oral Oral     SpO2: 98% 99% 99% 99%   Weight: 112.8 kg (248 lb 10.9 oz)      Height: 5' 5" (1.651 m)       05/08/25 1659 05/08/25 1924   BP: (!) 156/89 (!) 166/81   Pulse: (!) 124 101   Resp: (!) 26 20   Temp:  97.9 °F (36.6 °C)   TempSrc:  Oral   SpO2: 98% 99%   Weight:     Height:         Abnormal Lab Results:  Labs Reviewed   COMPREHENSIVE METABOLIC PANEL - Abnormal       Result Value    Sodium 138      Potassium 4.4      Chloride 101      CO2 27      Glucose 90      BUN 10      Creatinine 0.9      Calcium 9.5      Protein Total 8.1      Albumin 4.0      Bilirubin Total 0.4            AST 42      ALT 76 (*)     Anion Gap 10      eGFR >60     CBC WITH DIFFERENTIAL - Abnormal    WBC 6.63      RBC 5.23      HGB 14.7      HCT 44.0      MCV 84      MCH 28.1      MCHC 33.4      RDW 12.6      Platelet Count 212      MPV 8.7 (*)     Nucleated RBC 0      Neut % 56.3      Lymph % 35.0      Mono % 6.6      Eos % 1.1      Basophil % 0.5      Imm Grans % 0.5      Neut # 3.74      Lymph # 2.32      Mono # 0.44      Eos # 0.07      Baso # 0.03      Imm Grans # 0.03     B-TYPE NATRIURETIC PEPTIDE - Normal    BNP <10     TROPONIN I - Normal    Troponin-I <0.006     CBC W/ AUTO DIFFERENTIAL    Narrative:     The following orders were created for panel order CBC auto differential.  Procedure                               Abnormality         Status                     ---------                               -----------         ------                     CBC with Differential[[id number]]       Abnormal            Final result                 Please view results for these tests on the individual orders.        All Lab Results:  Results for orders placed or performed during the hospital encounter of 05/08/25   EKG 12-lead    Collection Time: 05/08/25  4:10 PM   Result Value Ref Range    QRS Duration 90 ms    [facility] QTC Calculation 402 ms   Comprehensive " metabolic panel    Collection Time: 05/08/25  4:38 PM   Result Value Ref Range    Sodium 138 136 - 145 mmol/L    Potassium 4.4 3.5 - 5.1 mmol/L    Chloride 101 95 - 110 mmol/L    CO2 27 23 - 29 mmol/L    Glucose 90 70 - 110 mg/dL    BUN 10 6 - 20 mg/dL    Creatinine 0.9 0.5 - 1.4 mg/dL    Calcium 9.5 8.7 - 10.5 mg/dL    Protein Total 8.1 6.0 - 8.4 gm/dL    Albumin 4.0 3.5 - 5.2 g/dL    Bilirubin Total 0.4 0.1 - 1.0 mg/dL     40 - 150 unit/L    AST 42 11 - 45 unit/L    ALT 76 (H) 10 - 44 unit/L    Anion Gap 10 8 - 16 mmol/L    eGFR >60 >60 mL/min/1.73/m2   Brain natriuretic peptide    Collection Time: 05/08/25  4:38 PM   Result Value Ref Range    BNP <10 0 - 99 pg/mL   Troponin I    Collection Time: 05/08/25  4:38 PM   Result Value Ref Range    Troponin-I <0.006 <=0.026 ng/mL   CBC with Differential    Collection Time: 05/08/25  4:38 PM   Result Value Ref Range    WBC 6.63 3.90 - 12.70 K/uL    RBC 5.23 4.60 - 6.20 M/uL    HGB 14.7 14.0 - 18.0 gm/dL    HCT 44.0 40.0 - 54.0 %    MCV 84 82 - 98 fL    MCH 28.1 27.0 - 31.0 pg    MCHC 33.4 32.0 - 36.0 g/dL    RDW 12.6 11.5 - 14.5 %    Platelet Count 212 150 - 450 K/uL    MPV 8.7 (L) 9.2 - 12.9 fL    Nucleated RBC 0 <=0 /100 WBC    Neut % 56.3 38 - 73 %    Lymph % 35.0 18 - 48 %    Mono % 6.6 4 - 15 %    Eos % 1.1 <=8 %    Basophil % 0.5 <=1.9 %    Imm Grans % 0.5 0.0 - 0.5 %    Neut # 3.74 1.8 - 7.7 K/uL    Lymph # 2.32 1 - 4.8 K/uL    Mono # 0.44 0.3 - 1 K/uL    Eos # 0.07 <=0.5 K/uL    Baso # 0.03 <=0.2 K/uL    Imm Grans # 0.03 0.00 - 0.04 K/uL       Imaging Results:  Imaging Results              X-Ray Chest AP Portable (Final result)  Result time 05/08/25 16:34:24      Final result by Cristhian Pina MD (05/08/25 16:34:24)                   Impression:     No acute process.    Finalized on: 5/8/2025 4:34 PM By:  Cristhian Pina MD RUIZ PhD  [facility]# [id number]      2025-05-08 16:36:29.245     [facility]               Narrative:    EXAM: XR CHEST AP PORTABLE    CLINICAL  HISTORY: Pain    FINDINGS:  Lungs are clear.  Cardiac silhouette within normal. No acute osseous injury.                                         The EKG was ordered, reviewed, and independently interpreted by the ED provider.  Interpretation time: 16:10  Rate: 94 BPM  Rhythm: normal sinus rhythm  Interpretation: Minimal voltage criteria for LVH, may be normal variant (R in aVL). Cannot rule out Inferior infarct, age undetermined. No STEMI.         The Emergency Provider reviewed the vital signs and test results, which are outlined above.     ED Discussion     6:54 PM: Reassessed pt at this time. Discussed with patient all pertinent ED information and results. Discussed pt dx and plan of tx. Gave the patient all f/u and return to the ED instructions. All questions and concerns were addressed at this time. Patient expresses understanding of information and instructions, and is comfortable with plan to discharge. Pt is stable for discharge.     I discussed with patient that evaluation in the ED does not suggest any emergent or life threatening medical conditions requiring immediate intervention beyond what was provided in the ED, and I believe patient is safe for discharge.  Regardless, an unremarkable evaluation in the ED does not preclude the development or presence of a serious of life threatening condition. As such, I instructed that the patient is to return immediately for any worsening or change in current symptoms.    ED Course as of 05/08/25 2101   Thu May 08, 2025   1850 Patient has Amlodipine Rx at pharmacy. Has been out for several weeks. This should help reduce his BP. Stable for d/c.  [BA]      ED Course User Index  [BA] Noé Smith MD     Medical Decision Making  Amount and/or Complexity of Data Reviewed  Labs: ordered. Decision-making details documented in ED Course.  Radiology: ordered. Decision-making details documented in ED Course.  ECG/medicine tests: ordered and independent interpretation  performed. Decision-making details documented in ED Course.    Risk  Risk Details: Differential diagnosis includes: UTI, Viral syndrome, Pneumonia, Heart failure, Acute coronary syndrome, Arrhythmia, Thyroid disease, Medication side effect, Hypoglycemia, DKA, HHS, Kidney failure, Liver failure, Electrolyte derangement, Sepsis, Deconditioning                ED Medication(s):  Medications - No data to display    Discharge Medication List as of 5/8/2025  6:54 PM           Follow-up Information       Ashlie Aguilar FNP. Schedule an appointment as soon as possible for a visit in 2 days.    Specialty: Family Medicine  Why: For re-evaluation and further treatment  Contact information:  [address] [facility] [address]  [address]  [facility] [place] [facility] [address]  [phone]               [facility] - Emergency Dept.. Go today.    Specialty: Emergency Medicine  Why: If symptoms worsen, For re-evaluation and further treatment, As needed  Contact information:  [address] [facility] [address] [phone]                               Scribe Attestation:   Scribe #1: I performed the above scribed service and the documentation accurately describes the services I performed. I attest to the accuracy of the note.     Attending:   Physician Attestation Statement for Scribe #1: I, Noé Smith MD, personally performed the services described in this documentation, as scribed by Alva Westfall, in my presence, and it is both accurate and complete.           Clinical Impression       ICD-10-CM ICD-9-CM   1. Hypertension, unspecified type  I10 401.9   2. Near syncope  R55 780.2   3. Tachycardia  R00.0 785.0       Disposition:   Disposition: Discharged  Condition: Stable         Noé Smith MD  05/08/25 5448     Elevated

## 2025-05-11 NOTE — ED PROVIDER NOTE - NSCAREINITIATED _GEN_ER
Collateral below has requested that the information provided remain confidential: Yes [  ] No [ x ]  Collateral below has provided information that patient is/may be unaware of: Yes [  ] No [x  ]  Patient gives permission to obtain collateral from _____:  (  ) Yes  (x  )  No  Rationale for overriding objection            (x  ) Lack of capacity. Details: ________            (  ) Assessing risk of danger to self/others. Details: ________  Rationale for selecting specific collateral source            (  ) Potential to impact risk of danger to self/others and no alternative equivalent. 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   BASELINE FUNCTIONING: At baseline, pt is okay and able to take care of herself and go to school. Pts thought process is more linear.  DATE HPI STARTED: Unknown  DECOMPENSATION: AS per mom, she only sees patient once every 2-3 months and speaks to her a couple of times a week. Mom is unsure of what brought pt to the ED but is concerned that pt is unable to handle her anxiety as well as being on her own in a big city (They live in [place]). Mom feels that everything is overstimulating for pt. Mom reports that pt has struggled with anxiety for some time and feels this is pt's main problem which leads to pt having concerning behaviors. Mom reports that pt was attending [employer] and living in the dorms up until last spring when she was kicked out of the dorms for using cocaine. As per mom, pt attempted to put all the cocaine in her nose in an attempt to "stop her heart". Mom took pt home that day and mom reported that that day pt drank a bottle of listerine and inhaled a dust can which prompted an ED visit where she then proceeded to try to choke/hang herself with the hospital bed sheets. As per mom, pt has been to many rehab and residential facilities, even sending pt to a facility in [place]. Mom reports pt has been to numerous treatments, has done DBT but feels that she does not know how else to help pt. Mom reports that she cares for a child with a disability at home and that she wants to make sure if pt does decide to come home, that pt is stable.   SUICIDALITY Mom does not report any recent reports of SI or suicide attempts and reports a long hx of SA most recently last Spring.   VIOLENCE: None reported  SUBSTANCE: As per mom, she is unsure if patient is currently taking any drugs but is aware of hx of cocaine use.   ========================  PAST PSYCHIATRIC HISTORY  ========================  DATE PAST PSYCHIATRIC HISTORY STARTED: Unknown  MAIN PSYCHIATRIC DIAGNOSIS: Mom reports a dx of Anxiety, Depression and Autism  PSYCHIATRIC HOSPITALIZATIONS: Mom reports prior psych hospitalizations in [facility], and [place]   SUICIDALITY: As per mom, pt has a hx of numerous suicide attempts. As per mom ,pt's most recent SA as far as she is aware was last spring when pt drank a bottle of listerine, inhaled a dust can at home, then attempted to hang/choke herself with the bed sheets from the hospital in [place].   VIOLENCE: None reported  SUBSTANCE USE: Mom reports a hx of cocaine use.   ==============  OTHER HISTORY  ==============  CURRENT MEDICATION: Mother is unsure what pt's medications currently are.    MEDICAL HISTORY: None reported   ALLERGIES None reported   LEGAL ISSUES: None reported   FIREARM ACCESS: None reported   SOCIAL HISTORY: Patient currently resides on her own, mother is unsure if pt has a roommate.   FAMILY HISTORY: AS per mom, pt's father was diagnosed with Bipolar I  DEVELOPMENTAL HISTORY: Autism Paz Keene(NP)

## 2025-05-15 NOTE — ED ADULT NURSE NOTE - NSICDXPASTMEDICALHX_GEN_ALL_CORE_FT
29.2 PAST MEDICAL HISTORY:  Asthma     Bipolar Disorder     Borderline Personality Disorder     Cholelithiasis     Depression     Fibroids     GERD (Gastroesophageal Reflux Disease)     Obesity

## 2025-05-17 NOTE — ED PROVIDER NOTE - CHPI ED SYMPTOMS POS
Continuity of Care Form    Patient Name: Katherine Vásquez   :  1932  MRN:  [id number]    Admit date:  5/15/2025  Discharge date:  2025    Code Status Order: DNR-CCA   Advance Directives:     Admitting Physician:  Wendy Malone MD  PCP: Chandra Arrieta MD    Discharging Nurse: ***  Discharging Hospital Unit/Room#: 3TN-3364/3364-01  Discharging Unit Phone Number: ***    Emergency Contact:   Extended Emergency Contact Information  Primary Emergency Contact: Rikki Zhao  Home Phone: [phone]  Work Phone: [phone]  Mobile Phone: [phone]  Relation: Grandchild  Preferred language: English   needed? No  Secondary Emergency Contact: Luan Kong  Mobile Phone: [phone]  Relation: Grandchild   needed? No    Past Surgical History:  Past Surgical History:   Procedure Laterality Date    HYSTERECTOMY (CERVIX STATUS UNKNOWN)         Immunization History:   Immunization History   Administered Date(s) Administered    Influenza Virus Vaccine 2016, 2017    Influenza, FLUZONE High Dose (age 65 y+), IM, Quadv, 0.7mL 10/27/2020    Influenza, FLUZONE High Dose, (age 65 y+), IM, Trivalent PF, 0.5mL 2018, 10/22/2019    PPD Test 2018    Pneumococcal, PCV-13, PREVNAR 13, (age 6w+), IM, 0.5mL 2017    Pneumococcal, PPSV23, PNEUMOVAX 23, (age 2y+), SC/IM, 0.5mL 2018       Active Problems:  Patient Active Problem List   Diagnosis Code    Contusion of right hand S60.221A    Cerebrovascular accident (CVA) ([facility]) I63.9    HTN (hypertension), benign I10    Dyslipidemia E78.5    Dementia due to Alzheimer's disease (HCC) G30.9, F02.80    Wrist pain, left M25.532    Coronary artery disease involving native coronary artery of native heart I25.10    Atrial fibrillation and flutter ([facility]) I48.91, I48.92    Cardiac murmur R01.1    TIA (transient ischemic attack) G45.9    Severe malnutrition E43    Stroke-like symptoms R29.90    Anticoagulated Z79.01        Isolation/Infection:   Isolation            No Isolation          Patient Infection Status    None to display              Nurse Assessment:  Last Vital Signs: BP (!) 143/84   Pulse 93   Temp 98.3 °F (36.8 °C) (Oral)   Resp 17   Ht 1.6 m (5' 3\")   Wt 54.4 kg (119 lb 14.4 oz)   SpO2 98%   BMI 21.24 kg/m²     Last documented pain score (0-10 scale):    Last Weight:   Wt Readings from Last 1 Encounters:   05/17/25 54.4 kg (119 lb 14.4 oz)     Mental Status:  disoriented and alert    IV Access:  - None    Nursing Mobility/ADLs:  Walking   Dependent  Transfer  Dependent  Bathing  Dependent  Dressing  Dependent  Toileting  Dependent  Feeding  Dependent  Med Admin  Dependent  Med Delivery   crushed and in apple sauce or pudding    Wound Care Documentation and Therapy:        Elimination:  Continence:   Bowel: No  Bladder: No  Urinary Catheter: None   Colostomy/Ileostomy/Ileal Conduit: No       Date of Last BM: 05/15/2025    Intake/Output Summary (Last 24 hours) at 5/17/2025 1028  Last data filed at 5/17/2025 0635  Gross per 24 hour   Intake 125 ml   Output 400 ml   Net -275 ml     I/O last 3 completed shifts:  In: 245 [P.O.:240; I.V.:5]  Out: 900 [Urine:900]    Safety Concerns:     Aspiration Risk    Impairments/Disabilities:      None    Nutrition Therapy:  Current Nutrition Therapy:   - Oral Diet:  General    Routes of Feeding: Oral  Liquids: Thin Liquids  Daily Fluid Restriction: no  Last Modified Barium Swallow with Video (Video Swallowing Test): not done    Treatments at the Time of Hospital Discharge:   Respiratory Treatments: N/A  Oxygen Therapy:  is not on home oxygen therapy.  Ventilator:    - No ventilator support    Rehab Therapies: Physical Therapy and Occupational Therapy  Weight Bearing Status/Restrictions: No weight bearing restrictions  Other Medical Equipment (for information only, NOT a DME order):  N/A  Other Treatments: N/A    Patient's personal belongings (please select all that are sent  SUICIDAL/DEPRESSION/NERVOUSNESS/HALLUCINATIONS

## 2025-05-27 NOTE — ED BEHAVIORAL HEALTH ASSESSMENT NOTE - NS ED BHA COCAINE
Patient: Jose Mejía  : 1982    Encounter Date: 2025    Pt was seen in the NH.  Pt is in usual state , no complaint  General appearance: Comfortable, no distress  ROS: No SOB  Medications reviewed  Head: Normal  Neck: Soft  Heart: Regular  Lungs: Clear  Abdomen: soft    Plan:   1)clinically doing fine  2) To continue Invega 4.5 mg daily    Problem List Items Addressed This Visit    None  Visit Diagnoses         Schizoaffective disorder, depressive type (Multi)    -  Primary               Electronically Signed By: Conrado Oh MD   25  4:18 PM   Yes

## 2025-06-12 NOTE — ED PROVIDER NOTE - NSICDXFAMILYHX_GEN_ALL_CORE_FT
Hpi Title: Evaluation of Skin Lesions FAMILY HISTORY:  No pertinent family history in first degree relatives

## 2025-06-19 NOTE — ED ADULT TRIAGE NOTE - PATIENT ON (OXYGEN DELIVERY METHOD)
Pt had follow up with FP yesterday.   Her lab results were documented in provider notes. They are aware of labs.    room air

## 2025-08-07 ENCOUNTER — EMERGENCY (EMERGENCY)
Facility: HOSPITAL | Age: 45
LOS: 1 days | End: 2025-08-07
Attending: STUDENT IN AN ORGANIZED HEALTH CARE EDUCATION/TRAINING PROGRAM | Admitting: STUDENT IN AN ORGANIZED HEALTH CARE EDUCATION/TRAINING PROGRAM

## 2025-08-07 VITALS
DIASTOLIC BLOOD PRESSURE: 83 MMHG | WEIGHT: 240.08 LBS | HEIGHT: 66 IN | SYSTOLIC BLOOD PRESSURE: 153 MMHG | HEART RATE: 66 BPM | TEMPERATURE: 98 F | RESPIRATION RATE: 16 BRPM | OXYGEN SATURATION: 96 %

## 2025-08-07 PROCEDURE — 99283 EMERGENCY DEPT VISIT LOW MDM: CPT

## 2025-08-08 ENCOUNTER — INPATIENT (INPATIENT)
Facility: HOSPITAL | Age: 45
LOS: 4 days | Discharge: HOME | End: 2025-08-13
Attending: STUDENT IN AN ORGANIZED HEALTH CARE EDUCATION/TRAINING PROGRAM | Admitting: STUDENT IN AN ORGANIZED HEALTH CARE EDUCATION/TRAINING PROGRAM
Payer: MEDICARE

## 2025-08-08 VITALS
HEART RATE: 69 BPM | HEIGHT: 66 IN | DIASTOLIC BLOOD PRESSURE: 83 MMHG | OXYGEN SATURATION: 98 % | TEMPERATURE: 98 F | SYSTOLIC BLOOD PRESSURE: 129 MMHG | RESPIRATION RATE: 18 BRPM | WEIGHT: 197.98 LBS

## 2025-08-08 DIAGNOSIS — F25.9 SCHIZOAFFECTIVE DISORDER, UNSPECIFIED: ICD-10-CM

## 2025-08-08 DIAGNOSIS — F60.3 BORDERLINE PERSONALITY DISORDER: ICD-10-CM

## 2025-08-08 LAB
ALBUMIN SERPL ELPH-MCNC: 3.8 G/DL — SIGNIFICANT CHANGE UP (ref 3.3–5)
ALP SERPL-CCNC: 127 U/L — HIGH (ref 40–120)
ALT FLD-CCNC: 18 U/L — SIGNIFICANT CHANGE UP (ref 4–33)
AMPHET UR-MCNC: NEGATIVE — SIGNIFICANT CHANGE UP
ANION GAP SERPL CALC-SCNC: 10 MMOL/L — SIGNIFICANT CHANGE UP (ref 7–14)
APAP SERPL-MCNC: <10 UG/ML — LOW (ref 15–25)
APPEARANCE UR: CLEAR — SIGNIFICANT CHANGE UP
AST SERPL-CCNC: 15 U/L — SIGNIFICANT CHANGE UP (ref 4–32)
BACTERIA # UR AUTO: NEGATIVE /HPF — SIGNIFICANT CHANGE UP
BARBITURATES UR SCN-MCNC: NEGATIVE — SIGNIFICANT CHANGE UP
BASOPHILS # BLD AUTO: 0.01 K/UL — SIGNIFICANT CHANGE UP (ref 0–0.2)
BASOPHILS NFR BLD AUTO: 0.2 % — SIGNIFICANT CHANGE UP (ref 0–2)
BENZODIAZ UR-MCNC: NEGATIVE — SIGNIFICANT CHANGE UP
BILIRUB SERPL-MCNC: <0.2 MG/DL — SIGNIFICANT CHANGE UP (ref 0.2–1.2)
BILIRUB UR-MCNC: NEGATIVE — SIGNIFICANT CHANGE UP
BUN SERPL-MCNC: 14 MG/DL — SIGNIFICANT CHANGE UP (ref 7–23)
CALCIUM SERPL-MCNC: 8.8 MG/DL — SIGNIFICANT CHANGE UP (ref 8.4–10.5)
CAST: 1 /LPF — SIGNIFICANT CHANGE UP (ref 0–4)
CHLORIDE SERPL-SCNC: 108 MMOL/L — HIGH (ref 98–107)
CO2 SERPL-SCNC: 25 MMOL/L — SIGNIFICANT CHANGE UP (ref 22–31)
COCAINE METAB.OTHER UR-MCNC: NEGATIVE — SIGNIFICANT CHANGE UP
COLOR SPEC: YELLOW — SIGNIFICANT CHANGE UP
CREAT SERPL-MCNC: 0.89 MG/DL — SIGNIFICANT CHANGE UP (ref 0.5–1.3)
CREATININE URINE RESULT, DAU: 78 MG/DL — SIGNIFICANT CHANGE UP
DIFF PNL FLD: ABNORMAL
EGFR: 80 ML/MIN/1.73M2 — SIGNIFICANT CHANGE UP
EGFR: 80 ML/MIN/1.73M2 — SIGNIFICANT CHANGE UP
EOSINOPHIL # BLD AUTO: 0.27 K/UL — SIGNIFICANT CHANGE UP (ref 0–0.5)
EOSINOPHIL NFR BLD AUTO: 4.1 % — SIGNIFICANT CHANGE UP (ref 0–6)
ETHANOL SERPL-MCNC: <10 MG/DL — SIGNIFICANT CHANGE UP
FENTANYL UR QL SCN: NEGATIVE — SIGNIFICANT CHANGE UP
GLUCOSE SERPL-MCNC: 84 MG/DL — SIGNIFICANT CHANGE UP (ref 70–99)
GLUCOSE UR QL: NEGATIVE MG/DL — SIGNIFICANT CHANGE UP
HCG SERPL-ACNC: <1 MIU/ML — SIGNIFICANT CHANGE UP
HCT VFR BLD CALC: 40.6 % — SIGNIFICANT CHANGE UP (ref 34.5–45)
HGB BLD-MCNC: 12.8 G/DL — SIGNIFICANT CHANGE UP (ref 11.5–15.5)
IMM GRANULOCYTES # BLD AUTO: 0.04 K/UL — SIGNIFICANT CHANGE UP (ref 0–0.07)
IMM GRANULOCYTES NFR BLD AUTO: 0.6 % — SIGNIFICANT CHANGE UP (ref 0–0.9)
KETONES UR QL: NEGATIVE MG/DL — SIGNIFICANT CHANGE UP
LEUKOCYTE ESTERASE UR-ACNC: NEGATIVE — SIGNIFICANT CHANGE UP
LYMPHOCYTES # BLD AUTO: 2.43 K/UL — SIGNIFICANT CHANGE UP (ref 1–3.3)
LYMPHOCYTES NFR BLD AUTO: 37 % — SIGNIFICANT CHANGE UP (ref 13–44)
MCHC RBC-ENTMCNC: 28.8 PG — SIGNIFICANT CHANGE UP (ref 27–34)
MCHC RBC-ENTMCNC: 31.5 G/DL — LOW (ref 32–36)
MCV RBC AUTO: 91.4 FL — SIGNIFICANT CHANGE UP (ref 80–100)
METHADONE UR-MCNC: NEGATIVE — SIGNIFICANT CHANGE UP
MONOCYTES # BLD AUTO: 0.38 K/UL — SIGNIFICANT CHANGE UP (ref 0–0.9)
MONOCYTES NFR BLD AUTO: 5.8 % — SIGNIFICANT CHANGE UP (ref 2–14)
NEUTROPHILS # BLD AUTO: 3.44 K/UL — SIGNIFICANT CHANGE UP (ref 1.8–7.4)
NEUTROPHILS NFR BLD AUTO: 52.3 % — SIGNIFICANT CHANGE UP (ref 43–77)
NITRITE UR-MCNC: NEGATIVE — SIGNIFICANT CHANGE UP
NRBC # BLD AUTO: 0 K/UL — SIGNIFICANT CHANGE UP (ref 0–0)
NRBC # FLD: 0 K/UL — SIGNIFICANT CHANGE UP (ref 0–0)
NRBC BLD AUTO-RTO: 0 /100 WBCS — SIGNIFICANT CHANGE UP (ref 0–0)
OPIATES UR-MCNC: NEGATIVE — SIGNIFICANT CHANGE UP
OXYCODONE UR-MCNC: NEGATIVE — SIGNIFICANT CHANGE UP
PCP SPEC-MCNC: SIGNIFICANT CHANGE UP
PCP UR-MCNC: NEGATIVE — SIGNIFICANT CHANGE UP
PH UR: 6 — SIGNIFICANT CHANGE UP (ref 5–8)
PLATELET # BLD AUTO: 242 K/UL — SIGNIFICANT CHANGE UP (ref 150–400)
PMV BLD: 9.3 FL — SIGNIFICANT CHANGE UP (ref 7–13)
POTASSIUM SERPL-MCNC: 4.1 MMOL/L — SIGNIFICANT CHANGE UP (ref 3.5–5.3)
POTASSIUM SERPL-SCNC: 4.1 MMOL/L — SIGNIFICANT CHANGE UP (ref 3.5–5.3)
PROT SERPL-MCNC: 7.2 G/DL — SIGNIFICANT CHANGE UP (ref 6–8.3)
PROT UR-MCNC: SIGNIFICANT CHANGE UP MG/DL
RBC # BLD: 4.44 M/UL — SIGNIFICANT CHANGE UP (ref 3.8–5.2)
RBC # FLD: 13.4 % — SIGNIFICANT CHANGE UP (ref 10.3–14.5)
RBC CASTS # UR COMP ASSIST: 0 /HPF — SIGNIFICANT CHANGE UP (ref 0–4)
SALICYLATES SERPL-MCNC: <0.3 MG/DL — LOW (ref 15–30)
SODIUM SERPL-SCNC: 143 MMOL/L — SIGNIFICANT CHANGE UP (ref 135–145)
SP GR SPEC: 1.01 — SIGNIFICANT CHANGE UP (ref 1–1.03)
SQUAMOUS # UR AUTO: 1 /HPF — SIGNIFICANT CHANGE UP (ref 0–5)
THC UR QL: NEGATIVE — SIGNIFICANT CHANGE UP
TOXICOLOGY SCREEN, DRUGS OF ABUSE, SERUM RESULT: SIGNIFICANT CHANGE UP
TSH SERPL-MCNC: 1.83 UIU/ML — SIGNIFICANT CHANGE UP (ref 0.27–4.2)
UROBILINOGEN FLD QL: 0.2 MG/DL — SIGNIFICANT CHANGE UP (ref 0.2–1)
WBC # BLD: 6.57 K/UL — SIGNIFICANT CHANGE UP (ref 3.8–10.5)
WBC # FLD AUTO: 6.57 K/UL — SIGNIFICANT CHANGE UP (ref 3.8–10.5)
WBC UR QL: 0 /HPF — SIGNIFICANT CHANGE UP (ref 0–5)

## 2025-08-08 PROCEDURE — 99285 EMERGENCY DEPT VISIT HI MDM: CPT

## 2025-08-08 RX ORDER — OXCARBAZEPINE 150 MG/1
300 TABLET, FILM COATED ORAL
Refills: 0 | Status: DISCONTINUED | OUTPATIENT
Start: 2025-08-09 | End: 2025-08-13

## 2025-08-08 RX ORDER — HALOPERIDOL 10 MG/1
5 TABLET ORAL ONCE
Refills: 0 | Status: DISCONTINUED | OUTPATIENT
Start: 2025-08-09 | End: 2025-08-13

## 2025-08-08 RX ORDER — ARIPIPRAZOLE 2 MG/1
20 TABLET ORAL DAILY
Refills: 0 | Status: DISCONTINUED | OUTPATIENT
Start: 2025-08-09 | End: 2025-08-13

## 2025-08-08 RX ORDER — HALOPERIDOL 10 MG/1
5 TABLET ORAL EVERY 6 HOURS
Refills: 0 | Status: DISCONTINUED | OUTPATIENT
Start: 2025-08-09 | End: 2025-08-13

## 2025-08-08 RX ORDER — LORAZEPAM 4 MG/ML
2 VIAL (ML) INJECTION EVERY 6 HOURS
Refills: 0 | Status: DISCONTINUED | OUTPATIENT
Start: 2025-08-09 | End: 2025-08-13

## 2025-08-08 RX ORDER — LORAZEPAM 4 MG/ML
2 VIAL (ML) INJECTION ONCE
Refills: 0 | Status: DISCONTINUED | OUTPATIENT
Start: 2025-08-09 | End: 2025-08-11

## 2025-08-09 PROCEDURE — 99222 1ST HOSP IP/OBS MODERATE 55: CPT

## 2025-08-09 RX ADMIN — OXCARBAZEPINE 300 MILLIGRAM(S): 150 TABLET, FILM COATED ORAL at 20:52

## 2025-08-10 PROCEDURE — 99232 SBSQ HOSP IP/OBS MODERATE 35: CPT

## 2025-08-10 RX ADMIN — OXCARBAZEPINE 300 MILLIGRAM(S): 150 TABLET, FILM COATED ORAL at 20:11

## 2025-08-10 RX ADMIN — OXCARBAZEPINE 300 MILLIGRAM(S): 150 TABLET, FILM COATED ORAL at 08:15

## 2025-08-10 RX ADMIN — ARIPIPRAZOLE 20 MILLIGRAM(S): 2 TABLET ORAL at 08:16

## 2025-08-11 LAB
ADD ON TEST-SPECIMEN IN LAB: SIGNIFICANT CHANGE UP
CHOLEST SERPL-MCNC: 144 MG/DL — SIGNIFICANT CHANGE UP
HDLC SERPL-MCNC: 52 MG/DL — SIGNIFICANT CHANGE UP
LDLC SERPL-MCNC: 65 MG/DL — SIGNIFICANT CHANGE UP
LIPID PNL WITH DIRECT LDL SERPL: 65 MG/DL — SIGNIFICANT CHANGE UP
NONHDLC SERPL-MCNC: 92 MG/DL — SIGNIFICANT CHANGE UP
TRIGL SERPL-MCNC: 160 MG/DL — HIGH

## 2025-08-11 PROCEDURE — 99232 SBSQ HOSP IP/OBS MODERATE 35: CPT

## 2025-08-11 PROCEDURE — 90853 GROUP PSYCHOTHERAPY: CPT

## 2025-08-11 RX ORDER — HALOPERIDOL 10 MG/1
200 TABLET ORAL ONCE
Refills: 0 | Status: COMPLETED | OUTPATIENT
Start: 2025-08-12 | End: 2025-08-12

## 2025-08-11 RX ORDER — MIDAZOLAM IN 0.9 % SOD.CHLORID 1 MG/ML
5 PLASTIC BAG, INJECTION (ML) INTRAVENOUS EVERY 8 HOURS
Refills: 0 | Status: DISCONTINUED | OUTPATIENT
Start: 2025-08-11 | End: 2025-08-13

## 2025-08-11 RX ADMIN — OXCARBAZEPINE 300 MILLIGRAM(S): 150 TABLET, FILM COATED ORAL at 20:46

## 2025-08-11 RX ADMIN — OXCARBAZEPINE 300 MILLIGRAM(S): 150 TABLET, FILM COATED ORAL at 08:46

## 2025-08-11 RX ADMIN — ARIPIPRAZOLE 20 MILLIGRAM(S): 2 TABLET ORAL at 08:46

## 2025-08-12 PROCEDURE — 99238 HOSP IP/OBS DSCHRG MGMT 30/<: CPT

## 2025-08-12 RX ORDER — OXCARBAZEPINE 150 MG/1
1 TABLET, FILM COATED ORAL
Qty: 0 | Refills: 0 | DISCHARGE
Start: 2025-08-12

## 2025-08-12 RX ORDER — ARIPIPRAZOLE 2 MG/1
1 TABLET ORAL
Qty: 0 | Refills: 0 | DISCHARGE
Start: 2025-08-12

## 2025-08-12 RX ORDER — HALOPERIDOL 10 MG/1
200 TABLET ORAL
Qty: 0 | Refills: 0 | DISCHARGE
Start: 2025-08-12

## 2025-08-12 RX ADMIN — OXCARBAZEPINE 300 MILLIGRAM(S): 150 TABLET, FILM COATED ORAL at 08:19

## 2025-08-12 RX ADMIN — ARIPIPRAZOLE 20 MILLIGRAM(S): 2 TABLET ORAL at 08:18

## 2025-08-12 RX ADMIN — OXCARBAZEPINE 300 MILLIGRAM(S): 150 TABLET, FILM COATED ORAL at 20:24

## 2025-08-12 RX ADMIN — HALOPERIDOL 200 MILLIGRAM(S): 10 TABLET ORAL at 10:21

## 2025-08-13 VITALS — TEMPERATURE: 98 F | RESPIRATION RATE: 17 BRPM

## 2025-08-13 RX ADMIN — OXCARBAZEPINE 300 MILLIGRAM(S): 150 TABLET, FILM COATED ORAL at 08:29

## 2025-08-13 RX ADMIN — ARIPIPRAZOLE 20 MILLIGRAM(S): 2 TABLET ORAL at 08:29

## (undated) DEVICE — POSITIONER STRAP ARMBOARD VELCRO TS-30

## (undated) DEVICE — SOL IRR POUR H2O 500ML

## (undated) DEVICE — PROTECTOR HEEL / ELBOW

## (undated) DEVICE — TROCAR COVIDIEN VERSAONE FIXATION CANNULA 5MM

## (undated) DEVICE — SUT MONOCRYL 4-0 27" PS-2 UNDYED

## (undated) DEVICE — PACK GENERAL LAPAROSCOPY

## (undated) DEVICE — DRAPE 3/4 SHEET 52X76"

## (undated) DEVICE — ENDOCATCH 10MM SPECIMEN POUCH

## (undated) DEVICE — SOL IRR POUR NS 0.9% 1000ML

## (undated) DEVICE — WARMING BLANKET UPPER ADULT

## (undated) DEVICE — ELCTR BOVIE TIP BLADE INSULATED 2.75" EDGE

## (undated) DEVICE — DISSECTOR ENDOSCOPIC KITTNER SINGLE TIP

## (undated) DEVICE — SUT VICRYL 0 27" UR-6

## (undated) DEVICE — BASIN SET SINGLE

## (undated) DEVICE — TUBING OLYMPUS INSUFFLATION

## (undated) DEVICE — GLV 7.5 PROTEXIS (BLUE)

## (undated) DEVICE — DRSG STERISTRIPS 0.5 X 4"

## (undated) DEVICE — VENODYNE/SCD SLEEVE CALF MEDIUM

## (undated) DEVICE — D HELP - CLEARVIEW CLEARIFY SYSTEM

## (undated) DEVICE — CANISTER DISPOSABLE THIN WALL 3000CC

## (undated) DEVICE — TUBING HYDRO-SURG PLUS IRRIGATOR W SMOKEVAC & PROBE

## (undated) DEVICE — DRAPE TOWEL BLUE 17" X 24"

## (undated) DEVICE — TROCAR COVIDIEN BLUNT TIP HASSAN 12MM STANDARD

## (undated) DEVICE — BLADE SURGICAL #15 CARBON

## (undated) DEVICE — ELCTR GROUNDING PAD ADULT COVIDIEN

## (undated) DEVICE — SYR LUER LOK 20CC

## (undated) DEVICE — TROCAR COVIDIEN VERSAPORT BLADELESS OPTICAL 5MM STANDARD

## (undated) DEVICE — GLV 7 PROTEXIS (WHITE)

## (undated) DEVICE — TIP METZENBAUM SCISSOR MONOPOLAR ENDOCUT (ORANGE)